# Patient Record
Sex: MALE | Race: WHITE | Employment: UNEMPLOYED | URBAN - METROPOLITAN AREA
[De-identification: names, ages, dates, MRNs, and addresses within clinical notes are randomized per-mention and may not be internally consistent; named-entity substitution may affect disease eponyms.]

---

## 2017-01-03 ENCOUNTER — OFFICE VISIT (OUTPATIENT)
Dept: INTERNAL MEDICINE CLINIC | Age: 57
End: 2017-01-03

## 2017-01-03 VITALS
DIASTOLIC BLOOD PRESSURE: 84 MMHG | SYSTOLIC BLOOD PRESSURE: 136 MMHG | WEIGHT: 229 LBS | HEART RATE: 76 BPM | HEIGHT: 67 IN | TEMPERATURE: 97.6 F | BODY MASS INDEX: 35.94 KG/M2

## 2017-01-03 DIAGNOSIS — I73.9 PAD (PERIPHERAL ARTERY DISEASE) (HCC): ICD-10-CM

## 2017-01-03 DIAGNOSIS — E78.00 PURE HYPERCHOLESTEROLEMIA: ICD-10-CM

## 2017-01-03 DIAGNOSIS — E11.8 CONTROLLED TYPE 2 DIABETES MELLITUS WITH COMPLICATION, WITHOUT LONG-TERM CURRENT USE OF INSULIN (HCC): Primary | ICD-10-CM

## 2017-01-03 DIAGNOSIS — J41.0 SIMPLE CHRONIC BRONCHITIS (HCC): ICD-10-CM

## 2017-01-03 LAB
A/G RATIO: 1 (ref 1.1–2.2)
ALBUMIN SERPL-MCNC: 4 G/DL (ref 3.4–5)
ALP BLD-CCNC: 79 U/L (ref 40–129)
ALT SERPL-CCNC: 24 U/L (ref 10–40)
ANION GAP SERPL CALCULATED.3IONS-SCNC: 14 MMOL/L (ref 3–16)
AST SERPL-CCNC: 24 U/L (ref 15–37)
BILIRUB SERPL-MCNC: 0.3 MG/DL (ref 0–1)
BUN BLDV-MCNC: 15 MG/DL (ref 7–20)
CALCIUM SERPL-MCNC: 9.6 MG/DL (ref 8.3–10.6)
CHLORIDE BLD-SCNC: 96 MMOL/L (ref 99–110)
CHOLESTEROL, TOTAL: 193 MG/DL (ref 0–199)
CO2: 31 MMOL/L (ref 21–32)
CREAT SERPL-MCNC: 0.9 MG/DL (ref 0.9–1.3)
GFR AFRICAN AMERICAN: >60
GFR NON-AFRICAN AMERICAN: >60
GLOBULIN: 3.9 G/DL
GLUCOSE BLD-MCNC: 147 MG/DL (ref 70–99)
HCT VFR BLD CALC: 37.4 % (ref 40.5–52.5)
HDLC SERPL-MCNC: 46 MG/DL (ref 40–60)
HEMOGLOBIN: 12 G/DL (ref 13.5–17.5)
LDL CHOLESTEROL CALCULATED: 127 MG/DL
MCH RBC QN AUTO: 25.6 PG (ref 26–34)
MCHC RBC AUTO-ENTMCNC: 32.2 G/DL (ref 31–36)
MCV RBC AUTO: 79.7 FL (ref 80–100)
PDW BLD-RTO: 16.2 % (ref 12.4–15.4)
PLATELET # BLD: 234 K/UL (ref 135–450)
PMV BLD AUTO: 8.9 FL (ref 5–10.5)
POTASSIUM SERPL-SCNC: 5.3 MMOL/L (ref 3.5–5.1)
RBC # BLD: 4.69 M/UL (ref 4.2–5.9)
SODIUM BLD-SCNC: 141 MMOL/L (ref 136–145)
TOTAL PROTEIN: 7.9 G/DL (ref 6.4–8.2)
TRIGL SERPL-MCNC: 98 MG/DL (ref 0–150)
VLDLC SERPL CALC-MCNC: 20 MG/DL
WBC # BLD: 7.6 K/UL (ref 4–11)

## 2017-01-03 PROCEDURE — 36415 COLL VENOUS BLD VENIPUNCTURE: CPT | Performed by: INTERNAL MEDICINE

## 2017-01-03 PROCEDURE — 99214 OFFICE O/P EST MOD 30 MIN: CPT | Performed by: INTERNAL MEDICINE

## 2017-01-03 ASSESSMENT — ENCOUNTER SYMPTOMS
GASTROINTESTINAL NEGATIVE: 1
SHORTNESS OF BREATH: 1
WHEEZING: 1
VISUAL CHANGE: 0

## 2017-01-04 LAB
ESTIMATED AVERAGE GLUCOSE: 165.7 MG/DL
HBA1C MFR BLD: 7.4 %

## 2017-01-10 DIAGNOSIS — E78.2 MIXED HYPERLIPIDEMIA: ICD-10-CM

## 2017-01-10 RX ORDER — ATORVASTATIN CALCIUM 10 MG/1
TABLET, FILM COATED ORAL
Qty: 90 TABLET | Refills: 0 | Status: ON HOLD | OUTPATIENT
Start: 2017-01-10 | End: 2017-06-15 | Stop reason: HOSPADM

## 2017-01-30 ENCOUNTER — CARE COORDINATION (OUTPATIENT)
Dept: CARE COORDINATION | Age: 57
End: 2017-01-30

## 2017-02-06 ENCOUNTER — OFFICE VISIT (OUTPATIENT)
Dept: CARDIOLOGY CLINIC | Age: 57
End: 2017-02-06

## 2017-02-06 VITALS
SYSTOLIC BLOOD PRESSURE: 132 MMHG | HEART RATE: 142 BPM | BODY MASS INDEX: 37.35 KG/M2 | HEIGHT: 67 IN | DIASTOLIC BLOOD PRESSURE: 86 MMHG | OXYGEN SATURATION: 97 % | WEIGHT: 238 LBS

## 2017-02-06 DIAGNOSIS — I50.22 CHRONIC SYSTOLIC CONGESTIVE HEART FAILURE (HCC): ICD-10-CM

## 2017-02-06 DIAGNOSIS — I42.9 CARDIOMYOPATHY (HCC): ICD-10-CM

## 2017-02-06 DIAGNOSIS — I73.9 PVD (PERIPHERAL VASCULAR DISEASE) (HCC): ICD-10-CM

## 2017-02-06 DIAGNOSIS — I48.91 ATRIAL FIBRILLATION WITH RVR (HCC): ICD-10-CM

## 2017-02-06 DIAGNOSIS — J44.1 COPD EXACERBATION (HCC): ICD-10-CM

## 2017-02-06 DIAGNOSIS — R06.02 SOB (SHORTNESS OF BREATH): Primary | ICD-10-CM

## 2017-02-06 PROCEDURE — 99214 OFFICE O/P EST MOD 30 MIN: CPT | Performed by: NURSE PRACTITIONER

## 2017-02-06 PROCEDURE — 93000 ELECTROCARDIOGRAM COMPLETE: CPT | Performed by: NURSE PRACTITIONER

## 2017-02-08 ENCOUNTER — TELEPHONE (OUTPATIENT)
Dept: SURGERY | Age: 57
End: 2017-02-08

## 2017-02-09 ENCOUNTER — TELEPHONE (OUTPATIENT)
Dept: SURGERY | Age: 57
End: 2017-02-09

## 2017-02-13 ENCOUNTER — TELEPHONE (OUTPATIENT)
Dept: CARDIOLOGY CLINIC | Age: 57
End: 2017-02-13

## 2017-02-13 ENCOUNTER — CARE COORDINATION (OUTPATIENT)
Dept: CARE COORDINATION | Age: 57
End: 2017-02-13

## 2017-02-17 ENCOUNTER — OFFICE VISIT (OUTPATIENT)
Dept: CARDIOLOGY CLINIC | Age: 57
End: 2017-02-17

## 2017-02-17 VITALS
HEIGHT: 67 IN | SYSTOLIC BLOOD PRESSURE: 110 MMHG | OXYGEN SATURATION: 98 % | DIASTOLIC BLOOD PRESSURE: 64 MMHG | BODY MASS INDEX: 34.84 KG/M2 | HEART RATE: 92 BPM | WEIGHT: 222 LBS

## 2017-02-17 DIAGNOSIS — I48.19 PERSISTENT ATRIAL FIBRILLATION (HCC): ICD-10-CM

## 2017-02-17 DIAGNOSIS — Z72.0 TOBACCO USE: ICD-10-CM

## 2017-02-17 DIAGNOSIS — I42.9 CARDIOMYOPATHY (HCC): ICD-10-CM

## 2017-02-17 DIAGNOSIS — I10 ESSENTIAL HYPERTENSION: ICD-10-CM

## 2017-02-17 DIAGNOSIS — I73.9 PVD (PERIPHERAL VASCULAR DISEASE) (HCC): ICD-10-CM

## 2017-02-17 DIAGNOSIS — I50.23 ACUTE ON CHRONIC SYSTOLIC HF (HEART FAILURE) (HCC): Primary | ICD-10-CM

## 2017-02-17 DIAGNOSIS — J44.9 CHRONIC OBSTRUCTIVE PULMONARY DISEASE, UNSPECIFIED COPD TYPE (HCC): ICD-10-CM

## 2017-02-17 PROCEDURE — 93000 ELECTROCARDIOGRAM COMPLETE: CPT | Performed by: NURSE PRACTITIONER

## 2017-02-17 PROCEDURE — 99214 OFFICE O/P EST MOD 30 MIN: CPT | Performed by: NURSE PRACTITIONER

## 2017-02-17 RX ORDER — NICOTINE 21 MG/24HR
1 PATCH, TRANSDERMAL 24 HOURS TRANSDERMAL EVERY 24 HOURS
COMMUNITY
End: 2017-05-18

## 2017-02-17 RX ORDER — FUROSEMIDE 40 MG/1
TABLET ORAL
Qty: 50 TABLET | Refills: 3 | Status: SHIPPED | OUTPATIENT
Start: 2017-02-17 | End: 2017-02-24 | Stop reason: DRUGHIGH

## 2017-02-20 RX ORDER — RANITIDINE 150 MG/1
TABLET ORAL
Qty: 180 TABLET | Refills: 1 | Status: ON HOLD | OUTPATIENT
Start: 2017-02-20 | End: 2017-06-15 | Stop reason: HOSPADM

## 2017-02-20 RX ORDER — RIVAROXABAN 20 MG/1
TABLET, FILM COATED ORAL
Qty: 30 TABLET | Refills: 6 | Status: SHIPPED | OUTPATIENT
Start: 2017-02-20 | End: 2017-02-24 | Stop reason: SDUPTHER

## 2017-02-24 ENCOUNTER — OFFICE VISIT (OUTPATIENT)
Dept: SURGERY | Age: 57
End: 2017-02-24

## 2017-02-24 ENCOUNTER — OFFICE VISIT (OUTPATIENT)
Dept: CARDIOLOGY CLINIC | Age: 57
End: 2017-02-24

## 2017-02-24 ENCOUNTER — PROCEDURE VISIT (OUTPATIENT)
Dept: SURGERY | Age: 57
End: 2017-02-24

## 2017-02-24 VITALS
HEART RATE: 104 BPM | DIASTOLIC BLOOD PRESSURE: 74 MMHG | SYSTOLIC BLOOD PRESSURE: 114 MMHG | BODY MASS INDEX: 35.16 KG/M2 | HEIGHT: 67 IN | WEIGHT: 224 LBS | OXYGEN SATURATION: 97 %

## 2017-02-24 VITALS
SYSTOLIC BLOOD PRESSURE: 128 MMHG | HEIGHT: 67 IN | HEART RATE: 63 BPM | DIASTOLIC BLOOD PRESSURE: 82 MMHG | BODY MASS INDEX: 35.63 KG/M2 | WEIGHT: 227 LBS

## 2017-02-24 DIAGNOSIS — E11.8 TYPE 2 DIABETES MELLITUS WITH COMPLICATION, UNSPECIFIED LONG TERM INSULIN USE STATUS: ICD-10-CM

## 2017-02-24 DIAGNOSIS — E78.00 PURE HYPERCHOLESTEROLEMIA: ICD-10-CM

## 2017-02-24 DIAGNOSIS — I10 ESSENTIAL HYPERTENSION: ICD-10-CM

## 2017-02-24 DIAGNOSIS — I42.9 CARDIOMYOPATHY (HCC): ICD-10-CM

## 2017-02-24 DIAGNOSIS — I73.9 PVD (PERIPHERAL VASCULAR DISEASE) (HCC): ICD-10-CM

## 2017-02-24 DIAGNOSIS — I48.19 PERSISTENT ATRIAL FIBRILLATION (HCC): Primary | ICD-10-CM

## 2017-02-24 DIAGNOSIS — I50.43 ACUTE ON CHRONIC COMBINED SYSTOLIC AND DIASTOLIC HF (HEART FAILURE) (HCC): ICD-10-CM

## 2017-02-24 DIAGNOSIS — I70.212 ATHEROSCLEROSIS OF NATIVE ARTERY OF LEFT LOWER EXTREMITY WITH INTERMITTENT CLAUDICATION (HCC): Primary | ICD-10-CM

## 2017-02-24 DIAGNOSIS — Z72.0 TOBACCO USE: ICD-10-CM

## 2017-02-24 PROCEDURE — 93000 ELECTROCARDIOGRAM COMPLETE: CPT | Performed by: NURSE PRACTITIONER

## 2017-02-24 PROCEDURE — 99214 OFFICE O/P EST MOD 30 MIN: CPT | Performed by: SURGERY

## 2017-02-24 PROCEDURE — 93925 LOWER EXTREMITY STUDY: CPT | Performed by: SURGERY

## 2017-02-24 PROCEDURE — 99213 OFFICE O/P EST LOW 20 MIN: CPT | Performed by: NURSE PRACTITIONER

## 2017-02-24 RX ORDER — FUROSEMIDE 40 MG/1
TABLET ORAL
Qty: 60 TABLET | Refills: 3 | Status: ON HOLD
Start: 2017-02-24 | End: 2017-06-15 | Stop reason: HOSPADM

## 2017-02-24 RX ORDER — BUDESONIDE AND FORMOTEROL FUMARATE DIHYDRATE 160; 4.5 UG/1; UG/1
AEROSOL RESPIRATORY (INHALATION)
Qty: 10.2 G | Refills: 5 | Status: ON HOLD | OUTPATIENT
Start: 2017-02-24 | End: 2017-06-02 | Stop reason: HOSPADM

## 2017-02-24 ASSESSMENT — ENCOUNTER SYMPTOMS
BACK PAIN: 1
RESPIRATORY NEGATIVE: 1
EYES NEGATIVE: 1

## 2017-03-24 ENCOUNTER — OFFICE VISIT (OUTPATIENT)
Dept: CARDIOLOGY CLINIC | Age: 57
End: 2017-03-24

## 2017-03-24 VITALS
DIASTOLIC BLOOD PRESSURE: 62 MMHG | HEART RATE: 92 BPM | HEIGHT: 67 IN | WEIGHT: 215 LBS | BODY MASS INDEX: 33.74 KG/M2 | SYSTOLIC BLOOD PRESSURE: 94 MMHG | OXYGEN SATURATION: 96 %

## 2017-03-24 DIAGNOSIS — I48.19 PERSISTENT ATRIAL FIBRILLATION (HCC): Primary | ICD-10-CM

## 2017-03-24 DIAGNOSIS — I73.9 PVD (PERIPHERAL VASCULAR DISEASE) (HCC): ICD-10-CM

## 2017-03-24 DIAGNOSIS — I42.9 CARDIOMYOPATHY (HCC): ICD-10-CM

## 2017-03-24 DIAGNOSIS — Z72.0 TOBACCO USE: ICD-10-CM

## 2017-03-24 DIAGNOSIS — I48.19 PERSISTENT ATRIAL FIBRILLATION (HCC): ICD-10-CM

## 2017-03-24 DIAGNOSIS — I10 ESSENTIAL HYPERTENSION: ICD-10-CM

## 2017-03-24 DIAGNOSIS — I50.42 CHRONIC COMBINED SYSTOLIC AND DIASTOLIC HF (HEART FAILURE) (HCC): ICD-10-CM

## 2017-03-24 LAB — TSH REFLEX: 2.53 UIU/ML (ref 0.27–4.2)

## 2017-03-24 PROCEDURE — 93000 ELECTROCARDIOGRAM COMPLETE: CPT | Performed by: NURSE PRACTITIONER

## 2017-03-24 PROCEDURE — 99213 OFFICE O/P EST LOW 20 MIN: CPT | Performed by: NURSE PRACTITIONER

## 2017-04-27 ENCOUNTER — OFFICE VISIT (OUTPATIENT)
Dept: INTERNAL MEDICINE CLINIC | Age: 57
End: 2017-04-27

## 2017-04-27 VITALS
HEART RATE: 105 BPM | BODY MASS INDEX: 34.61 KG/M2 | WEIGHT: 221 LBS | TEMPERATURE: 97.7 F | OXYGEN SATURATION: 95 % | DIASTOLIC BLOOD PRESSURE: 70 MMHG | SYSTOLIC BLOOD PRESSURE: 124 MMHG

## 2017-04-27 DIAGNOSIS — M79.672 BILATERAL LEG AND FOOT PAIN: Primary | ICD-10-CM

## 2017-04-27 DIAGNOSIS — J42 CHRONIC BRONCHITIS, UNSPECIFIED CHRONIC BRONCHITIS TYPE (HCC): ICD-10-CM

## 2017-04-27 DIAGNOSIS — M79.604 BILATERAL LEG AND FOOT PAIN: Primary | ICD-10-CM

## 2017-04-27 DIAGNOSIS — M79.605 BILATERAL LEG AND FOOT PAIN: Primary | ICD-10-CM

## 2017-04-27 DIAGNOSIS — I73.9 PAD (PERIPHERAL ARTERY DISEASE) (HCC): ICD-10-CM

## 2017-04-27 DIAGNOSIS — M79.671 BILATERAL LEG AND FOOT PAIN: Primary | ICD-10-CM

## 2017-04-27 DIAGNOSIS — H46.9: ICD-10-CM

## 2017-04-27 PROCEDURE — 99214 OFFICE O/P EST MOD 30 MIN: CPT | Performed by: INTERNAL MEDICINE

## 2017-04-27 RX ORDER — ALBUTEROL SULFATE 90 UG/1
2 AEROSOL, METERED RESPIRATORY (INHALATION) EVERY 6 HOURS PRN
Qty: 8.5 G | Refills: 5 | Status: SHIPPED | OUTPATIENT
Start: 2017-04-27 | End: 2017-09-22

## 2017-04-27 RX ORDER — GABAPENTIN 100 MG/1
100 CAPSULE ORAL 3 TIMES DAILY
Qty: 90 CAPSULE | Refills: 3 | Status: ON HOLD | OUTPATIENT
Start: 2017-04-27 | End: 2017-06-15 | Stop reason: HOSPADM

## 2017-04-27 ASSESSMENT — PATIENT HEALTH QUESTIONNAIRE - PHQ9
1. LITTLE INTEREST OR PLEASURE IN DOING THINGS: 0
SUM OF ALL RESPONSES TO PHQ9 QUESTIONS 1 & 2: 0
SUM OF ALL RESPONSES TO PHQ QUESTIONS 1-9: 0
2. FEELING DOWN, DEPRESSED OR HOPELESS: 0

## 2017-04-27 ASSESSMENT — ENCOUNTER SYMPTOMS
COUGH: 1
SHORTNESS OF BREATH: 1
GASTROINTESTINAL NEGATIVE: 1
BACK PAIN: 1

## 2017-05-08 ENCOUNTER — PROCEDURE VISIT (OUTPATIENT)
Dept: SURGERY | Age: 57
End: 2017-05-08

## 2017-05-08 DIAGNOSIS — I70.322 ATHEROSCLEROSIS OF BYPASS GRAFT OF LEFT LOWER EXTREMITY WITH REST PAIN (HCC): Primary | ICD-10-CM

## 2017-05-08 PROCEDURE — 93926 LOWER EXTREMITY STUDY: CPT | Performed by: SURGERY

## 2017-05-09 DIAGNOSIS — I70.212 ATHEROSCLEROSIS OF NATIVE ARTERY OF LEFT LOWER EXTREMITY WITH INTERMITTENT CLAUDICATION (HCC): Primary | ICD-10-CM

## 2017-05-09 RX ORDER — HYDROCODONE BITARTRATE AND ACETAMINOPHEN 5; 325 MG/1; MG/1
1 TABLET ORAL EVERY 6 HOURS PRN
Qty: 30 TABLET | Refills: 0 | Status: SHIPPED | OUTPATIENT
Start: 2017-05-09 | End: 2017-05-16

## 2017-05-12 ENCOUNTER — OFFICE VISIT (OUTPATIENT)
Dept: SURGERY | Age: 57
End: 2017-05-12

## 2017-05-12 VITALS
HEART RATE: 120 BPM | DIASTOLIC BLOOD PRESSURE: 83 MMHG | BODY MASS INDEX: 34.61 KG/M2 | WEIGHT: 221 LBS | SYSTOLIC BLOOD PRESSURE: 116 MMHG

## 2017-05-12 DIAGNOSIS — I48.91 ATRIAL FIBRILLATION WITH RAPID VENTRICULAR RESPONSE (HCC): ICD-10-CM

## 2017-05-12 DIAGNOSIS — E11.8 TYPE 2 DIABETES MELLITUS WITH COMPLICATION, UNSPECIFIED LONG TERM INSULIN USE STATUS: ICD-10-CM

## 2017-05-12 DIAGNOSIS — I73.9 PAD (PERIPHERAL ARTERY DISEASE) (HCC): ICD-10-CM

## 2017-05-12 DIAGNOSIS — I70.323: Primary | ICD-10-CM

## 2017-05-12 DIAGNOSIS — I50.21 ACUTE SYSTOLIC HF (HEART FAILURE) (HCC): ICD-10-CM

## 2017-05-12 DIAGNOSIS — I99.8 ISCHEMIC FOOT: ICD-10-CM

## 2017-05-12 DIAGNOSIS — E11.51 DIABETES MELLITUS WITH PERIPHERAL CIRCULATORY DISORDER (HCC): ICD-10-CM

## 2017-05-12 PROCEDURE — 99214 OFFICE O/P EST MOD 30 MIN: CPT | Performed by: SURGERY

## 2017-05-12 RX ORDER — HYDROCODONE BITARTRATE AND ACETAMINOPHEN 5; 325 MG/1; MG/1
1 TABLET ORAL EVERY 6 HOURS PRN
Qty: 40 TABLET | Refills: 0 | Status: ON HOLD | OUTPATIENT
Start: 2017-05-12 | End: 2017-06-02 | Stop reason: HOSPADM

## 2017-05-12 ASSESSMENT — ENCOUNTER SYMPTOMS
GASTROINTESTINAL NEGATIVE: 1
ALLERGIC/IMMUNOLOGIC NEGATIVE: 1
RESPIRATORY NEGATIVE: 1

## 2017-05-16 ENCOUNTER — HOSPITAL ENCOUNTER (OUTPATIENT)
Dept: VASCULAR LAB | Age: 57
Discharge: HOME | End: 2017-05-16
Attending: SURGERY | Admitting: SURGERY

## 2017-05-16 ENCOUNTER — TELEPHONE (OUTPATIENT)
Dept: CARDIOLOGY CLINIC | Age: 57
End: 2017-05-16

## 2017-05-16 DIAGNOSIS — I70.323: ICD-10-CM

## 2017-05-18 ENCOUNTER — OFFICE VISIT (OUTPATIENT)
Dept: CARDIOLOGY CLINIC | Age: 57
End: 2017-05-18

## 2017-05-18 ENCOUNTER — HOSPITAL ENCOUNTER (OUTPATIENT)
Dept: OTHER | Age: 57
Discharge: HOME | End: 2017-05-18

## 2017-05-18 VITALS
HEIGHT: 70 IN | OXYGEN SATURATION: 97 % | SYSTOLIC BLOOD PRESSURE: 94 MMHG | BODY MASS INDEX: 31.92 KG/M2 | DIASTOLIC BLOOD PRESSURE: 54 MMHG | WEIGHT: 223 LBS | HEART RATE: 96 BPM

## 2017-05-18 DIAGNOSIS — I42.9 CARDIOMYOPATHY (HCC): ICD-10-CM

## 2017-05-18 DIAGNOSIS — I48.0 PAF (PAROXYSMAL ATRIAL FIBRILLATION) (HCC): Primary | ICD-10-CM

## 2017-05-18 DIAGNOSIS — I10 ESSENTIAL HYPERTENSION: ICD-10-CM

## 2017-05-18 DIAGNOSIS — Z72.0 TOBACCO ABUSE: ICD-10-CM

## 2017-05-18 DIAGNOSIS — I50.42 CHRONIC COMBINED SYSTOLIC AND DIASTOLIC HEART FAILURE (HCC): ICD-10-CM

## 2017-05-18 DIAGNOSIS — I73.9 PERIPHERAL VASCULAR DISEASE (HCC): ICD-10-CM

## 2017-05-18 PROCEDURE — 93000 ELECTROCARDIOGRAM COMPLETE: CPT | Performed by: INTERNAL MEDICINE

## 2017-05-18 PROCEDURE — 99999 PR OFFICE/OUTPT VISIT,PROCEDURE ONLY: CPT | Performed by: INTERNAL MEDICINE

## 2017-05-23 ENCOUNTER — TELEPHONE (OUTPATIENT)
Dept: SURGERY | Age: 57
End: 2017-05-23

## 2017-06-19 ENCOUNTER — TELEPHONE (OUTPATIENT)
Dept: SURGERY | Age: 57
End: 2017-06-19

## 2017-06-22 ENCOUNTER — OFFICE VISIT (OUTPATIENT)
Dept: CARDIOLOGY CLINIC | Age: 57
End: 2017-06-22

## 2017-06-22 VITALS
HEART RATE: 104 BPM | HEIGHT: 66 IN | SYSTOLIC BLOOD PRESSURE: 100 MMHG | OXYGEN SATURATION: 96 % | DIASTOLIC BLOOD PRESSURE: 62 MMHG

## 2017-06-22 DIAGNOSIS — I48.20 CHRONIC ATRIAL FIBRILLATION (HCC): ICD-10-CM

## 2017-06-22 DIAGNOSIS — J44.9 CHRONIC OBSTRUCTIVE PULMONARY DISEASE, UNSPECIFIED COPD TYPE (HCC): ICD-10-CM

## 2017-06-22 DIAGNOSIS — I50.42 CHRONIC COMBINED SYSTOLIC AND DIASTOLIC HEART FAILURE (HCC): Primary | ICD-10-CM

## 2017-06-22 DIAGNOSIS — I73.9 PAD (PERIPHERAL ARTERY DISEASE) (HCC): ICD-10-CM

## 2017-06-22 DIAGNOSIS — D64.9 ANEMIA, UNSPECIFIED TYPE: ICD-10-CM

## 2017-06-22 PROCEDURE — 99214 OFFICE O/P EST MOD 30 MIN: CPT | Performed by: NURSE PRACTITIONER

## 2017-06-22 PROCEDURE — 93000 ELECTROCARDIOGRAM COMPLETE: CPT | Performed by: NURSE PRACTITIONER

## 2017-06-23 ENCOUNTER — OFFICE VISIT (OUTPATIENT)
Dept: INTERNAL MEDICINE CLINIC | Age: 57
End: 2017-06-23

## 2017-06-23 VITALS
DIASTOLIC BLOOD PRESSURE: 80 MMHG | HEART RATE: 90 BPM | OXYGEN SATURATION: 96 % | SYSTOLIC BLOOD PRESSURE: 122 MMHG | TEMPERATURE: 98 F

## 2017-06-23 DIAGNOSIS — S78.112A ABOVE KNEE AMPUTATION OF LEFT LOWER EXTREMITY (HCC): ICD-10-CM

## 2017-06-23 DIAGNOSIS — J43.2 CENTRILOBULAR EMPHYSEMA (HCC): ICD-10-CM

## 2017-06-23 DIAGNOSIS — E10.8 DIABETES MELLITUS TYPE 1 WITH COMPLICATIONS (HCC): ICD-10-CM

## 2017-06-23 DIAGNOSIS — D64.89 ANEMIA DUE TO OTHER CAUSE: Primary | ICD-10-CM

## 2017-06-23 LAB
ANION GAP SERPL CALCULATED.3IONS-SCNC: 15 MMOL/L (ref 3–16)
BUN BLDV-MCNC: 20 MG/DL (ref 7–20)
CALCIUM SERPL-MCNC: 9.9 MG/DL (ref 8.3–10.6)
CHLORIDE BLD-SCNC: 96 MMOL/L (ref 99–110)
CO2: 29 MMOL/L (ref 21–32)
CREAT SERPL-MCNC: 0.9 MG/DL (ref 0.9–1.3)
GFR AFRICAN AMERICAN: >60
GFR NON-AFRICAN AMERICAN: >60
GLUCOSE BLD-MCNC: 118 MG/DL (ref 70–99)
HCT VFR BLD CALC: 35.8 % (ref 40.5–52.5)
HEMOGLOBIN: 11 G/DL (ref 13.5–17.5)
MCH RBC QN AUTO: 24 PG (ref 26–34)
MCHC RBC AUTO-ENTMCNC: 30.7 G/DL (ref 31–36)
MCV RBC AUTO: 78.3 FL (ref 80–100)
PDW BLD-RTO: 18.6 % (ref 12.4–15.4)
PLATELET # BLD: 311 K/UL (ref 135–450)
PMV BLD AUTO: 9.4 FL (ref 5–10.5)
POTASSIUM SERPL-SCNC: 5.3 MMOL/L (ref 3.5–5.1)
RBC # BLD: 4.58 M/UL (ref 4.2–5.9)
SODIUM BLD-SCNC: 140 MMOL/L (ref 136–145)
WBC # BLD: 8.8 K/UL (ref 4–11)

## 2017-06-23 PROCEDURE — 36415 COLL VENOUS BLD VENIPUNCTURE: CPT | Performed by: INTERNAL MEDICINE

## 2017-06-23 PROCEDURE — 99214 OFFICE O/P EST MOD 30 MIN: CPT | Performed by: INTERNAL MEDICINE

## 2017-06-23 RX ORDER — DIPHENHYDRAMINE HCL 25 MG
25 TABLET ORAL EVERY 6 HOURS PRN
COMMUNITY
End: 2018-09-26 | Stop reason: ALTCHOICE

## 2017-06-23 RX ORDER — OXYCODONE HYDROCHLORIDE AND ACETAMINOPHEN 5; 325 MG/1; MG/1
1 TABLET ORAL EVERY 4 HOURS PRN
COMMUNITY
End: 2017-11-22 | Stop reason: ALTCHOICE

## 2017-06-23 ASSESSMENT — ENCOUNTER SYMPTOMS
COLOR CHANGE: 0
SINUS PRESSURE: 0
COUGH: 1
SHORTNESS OF BREATH: 1
CHOKING: 0
BACK PAIN: 1
GASTROINTESTINAL NEGATIVE: 1
EYES NEGATIVE: 1
STRIDOR: 0
APNEA: 0

## 2017-06-26 ENCOUNTER — TELEPHONE (OUTPATIENT)
Dept: INTERNAL MEDICINE CLINIC | Age: 57
End: 2017-06-26

## 2017-06-26 ENCOUNTER — TELEPHONE (OUTPATIENT)
Dept: CARDIOLOGY CLINIC | Age: 57
End: 2017-06-26

## 2017-06-26 DIAGNOSIS — E87.5 HYPERPOTASSEMIA: Primary | ICD-10-CM

## 2017-06-27 PROBLEM — E87.5 SERUM POTASSIUM ELEVATED: Status: ACTIVE | Noted: 2017-06-27

## 2017-06-27 RX ORDER — OXYCODONE HYDROCHLORIDE AND ACETAMINOPHEN 5; 325 MG/1; MG/1
1 TABLET ORAL EVERY 4 HOURS PRN
Qty: 80 TABLET | Refills: 0 | Status: SHIPPED | OUTPATIENT
Start: 2017-06-27 | End: 2017-07-04

## 2017-06-29 ENCOUNTER — TELEPHONE (OUTPATIENT)
Dept: INTERNAL MEDICINE CLINIC | Age: 57
End: 2017-06-29

## 2017-06-29 DIAGNOSIS — N40.0 BENIGN NODULAR PROSTATIC HYPERPLASIA WITHOUT LOWER URINARY TRACT SYMPTOMS: Primary | ICD-10-CM

## 2017-06-29 RX ORDER — TAMSULOSIN HYDROCHLORIDE 0.4 MG/1
0.4 CAPSULE ORAL DAILY
Qty: 30 CAPSULE | Refills: 3 | Status: SHIPPED | OUTPATIENT
Start: 2017-06-29 | End: 2017-12-19 | Stop reason: SDUPTHER

## 2017-07-06 ENCOUNTER — TELEPHONE (OUTPATIENT)
Dept: INTERNAL MEDICINE CLINIC | Age: 57
End: 2017-07-06

## 2017-07-07 ENCOUNTER — OFFICE VISIT (OUTPATIENT)
Dept: SURGERY | Age: 57
End: 2017-07-07

## 2017-07-07 VITALS — HEART RATE: 111 BPM | DIASTOLIC BLOOD PRESSURE: 88 MMHG | SYSTOLIC BLOOD PRESSURE: 144 MMHG

## 2017-07-07 DIAGNOSIS — Z89.612 S/P AKA (ABOVE KNEE AMPUTATION) UNILATERAL, LEFT (HCC): Primary | ICD-10-CM

## 2017-07-07 PROCEDURE — 99024 POSTOP FOLLOW-UP VISIT: CPT | Performed by: SURGERY

## 2017-07-07 ASSESSMENT — ENCOUNTER SYMPTOMS
GASTROINTESTINAL NEGATIVE: 1
RESPIRATORY NEGATIVE: 1
ALLERGIC/IMMUNOLOGIC NEGATIVE: 1

## 2017-07-11 ENCOUNTER — TELEPHONE (OUTPATIENT)
Dept: SURGERY | Age: 57
End: 2017-07-11

## 2017-07-11 RX ORDER — OXYCODONE HYDROCHLORIDE AND ACETAMINOPHEN 5; 325 MG/1; MG/1
1 TABLET ORAL EVERY 6 HOURS PRN
Qty: 25 TABLET | Refills: 0 | Status: SHIPPED | OUTPATIENT
Start: 2017-07-11 | End: 2017-07-18

## 2017-07-13 ENCOUNTER — TELEPHONE (OUTPATIENT)
Dept: INTERNAL MEDICINE CLINIC | Age: 57
End: 2017-07-13

## 2017-07-21 ENCOUNTER — OFFICE VISIT (OUTPATIENT)
Dept: CARDIOLOGY CLINIC | Age: 57
End: 2017-07-21

## 2017-07-21 VITALS
OXYGEN SATURATION: 98 % | SYSTOLIC BLOOD PRESSURE: 138 MMHG | HEIGHT: 66 IN | DIASTOLIC BLOOD PRESSURE: 72 MMHG | HEART RATE: 96 BPM

## 2017-07-21 DIAGNOSIS — I73.9 PAD (PERIPHERAL ARTERY DISEASE) (HCC): ICD-10-CM

## 2017-07-21 DIAGNOSIS — I50.42 CHRONIC COMBINED SYSTOLIC AND DIASTOLIC HEART FAILURE (HCC): ICD-10-CM

## 2017-07-21 DIAGNOSIS — J44.9 CHRONIC OBSTRUCTIVE PULMONARY DISEASE, UNSPECIFIED COPD TYPE (HCC): ICD-10-CM

## 2017-07-21 DIAGNOSIS — D64.9 ANEMIA, UNSPECIFIED TYPE: ICD-10-CM

## 2017-07-21 DIAGNOSIS — I48.20 CHRONIC ATRIAL FIBRILLATION (HCC): Primary | ICD-10-CM

## 2017-07-21 PROCEDURE — 99213 OFFICE O/P EST LOW 20 MIN: CPT | Performed by: NURSE PRACTITIONER

## 2017-07-21 PROCEDURE — 93000 ELECTROCARDIOGRAM COMPLETE: CPT | Performed by: NURSE PRACTITIONER

## 2017-07-25 ENCOUNTER — TELEPHONE (OUTPATIENT)
Dept: INTERNAL MEDICINE CLINIC | Age: 57
End: 2017-07-25

## 2017-07-25 ENCOUNTER — NURSE ONLY (OUTPATIENT)
Dept: INTERNAL MEDICINE CLINIC | Age: 57
End: 2017-07-25

## 2017-07-25 DIAGNOSIS — E87.5 HYPERPOTASSEMIA: ICD-10-CM

## 2017-07-25 DIAGNOSIS — M79.605 PAIN OF LEFT LOWER EXTREMITY: Primary | ICD-10-CM

## 2017-07-25 LAB
ANION GAP SERPL CALCULATED.3IONS-SCNC: 17 MMOL/L (ref 3–16)
BUN BLDV-MCNC: 24 MG/DL (ref 7–20)
CALCIUM SERPL-MCNC: 9.8 MG/DL (ref 8.3–10.6)
CHLORIDE BLD-SCNC: 94 MMOL/L (ref 99–110)
CO2: 30 MMOL/L (ref 21–32)
CREAT SERPL-MCNC: 0.9 MG/DL (ref 0.9–1.3)
GFR AFRICAN AMERICAN: >60
GFR NON-AFRICAN AMERICAN: >60
GLUCOSE BLD-MCNC: 150 MG/DL (ref 70–99)
POTASSIUM SERPL-SCNC: 4.5 MMOL/L (ref 3.5–5.1)
SODIUM BLD-SCNC: 141 MMOL/L (ref 136–145)

## 2017-07-25 PROCEDURE — 36415 COLL VENOUS BLD VENIPUNCTURE: CPT | Performed by: INTERNAL MEDICINE

## 2017-07-27 ENCOUNTER — HOSPITAL ENCOUNTER (OUTPATIENT)
Dept: OTHER | Age: 57
Discharge: HOME | End: 2017-07-31
Attending: PHYSICAL MEDICINE & REHABILITATION | Admitting: PHYSICAL MEDICINE & REHABILITATION

## 2017-08-01 ENCOUNTER — HOSPITAL ENCOUNTER (OUTPATIENT)
Dept: PHYSICAL THERAPY | Age: 57
Discharge: HOME | End: 2017-08-02
Admitting: PHYSICAL MEDICINE & REHABILITATION

## 2017-08-03 ENCOUNTER — HOSPITAL ENCOUNTER (OUTPATIENT)
Dept: PHYSICAL THERAPY | Age: 57
Discharge: HOME | End: 2017-08-04
Admitting: PHYSICAL MEDICINE & REHABILITATION

## 2017-08-07 ENCOUNTER — TELEPHONE (OUTPATIENT)
Dept: PAIN MANAGEMENT | Age: 57
End: 2017-08-07

## 2017-08-08 ENCOUNTER — HOSPITAL ENCOUNTER (OUTPATIENT)
Dept: PHYSICAL THERAPY | Age: 57
Discharge: HOME | End: 2017-08-09
Admitting: PHYSICAL MEDICINE & REHABILITATION

## 2017-08-10 ENCOUNTER — HOSPITAL ENCOUNTER (OUTPATIENT)
Dept: PHYSICAL THERAPY | Age: 57
Discharge: HOME | End: 2017-08-11
Admitting: PHYSICAL MEDICINE & REHABILITATION

## 2017-08-17 ENCOUNTER — HOSPITAL ENCOUNTER (OUTPATIENT)
Dept: PHYSICAL THERAPY | Age: 57
Discharge: HOME | End: 2017-08-18
Admitting: PHYSICAL MEDICINE & REHABILITATION

## 2017-08-22 ENCOUNTER — HOSPITAL ENCOUNTER (OUTPATIENT)
Dept: PHYSICAL THERAPY | Age: 57
Discharge: HOME | End: 2017-08-23
Admitting: PHYSICAL MEDICINE & REHABILITATION

## 2017-08-29 ENCOUNTER — HOSPITAL ENCOUNTER (OUTPATIENT)
Dept: PHYSICAL THERAPY | Age: 57
Discharge: HOME | End: 2017-08-30
Admitting: PHYSICAL MEDICINE & REHABILITATION

## 2017-09-05 ENCOUNTER — HOSPITAL ENCOUNTER (OUTPATIENT)
Dept: PHYSICAL THERAPY | Age: 57
Discharge: HOME | End: 2017-09-06
Admitting: PHYSICAL MEDICINE & REHABILITATION

## 2017-09-07 ENCOUNTER — HOSPITAL ENCOUNTER (OUTPATIENT)
Dept: PHYSICAL THERAPY | Age: 57
Discharge: HOME | End: 2017-09-08
Admitting: PHYSICAL MEDICINE & REHABILITATION

## 2017-09-12 ENCOUNTER — HOSPITAL ENCOUNTER (OUTPATIENT)
Dept: PHYSICAL THERAPY | Age: 57
Discharge: HOME | End: 2017-09-13
Admitting: PHYSICAL MEDICINE & REHABILITATION

## 2017-09-14 ENCOUNTER — HOSPITAL ENCOUNTER (OUTPATIENT)
Dept: PHYSICAL THERAPY | Age: 57
Discharge: HOME | End: 2017-09-15
Admitting: PHYSICAL MEDICINE & REHABILITATION

## 2017-09-19 ENCOUNTER — HOSPITAL ENCOUNTER (OUTPATIENT)
Dept: PHYSICAL THERAPY | Age: 57
Discharge: HOME | End: 2017-09-20
Admitting: PHYSICAL MEDICINE & REHABILITATION

## 2017-09-22 ENCOUNTER — OFFICE VISIT (OUTPATIENT)
Dept: INTERNAL MEDICINE CLINIC | Age: 57
End: 2017-09-22

## 2017-09-22 VITALS — DIASTOLIC BLOOD PRESSURE: 80 MMHG | HEART RATE: 64 BPM | TEMPERATURE: 97.9 F | SYSTOLIC BLOOD PRESSURE: 126 MMHG

## 2017-09-22 DIAGNOSIS — E11.8 TYPE 2 DIABETES MELLITUS WITH COMPLICATION, UNSPECIFIED LONG TERM INSULIN USE STATUS: Primary | ICD-10-CM

## 2017-09-22 DIAGNOSIS — E78.2 MIXED HYPERLIPIDEMIA: ICD-10-CM

## 2017-09-22 DIAGNOSIS — I48.0 PAF (PAROXYSMAL ATRIAL FIBRILLATION) (HCC): ICD-10-CM

## 2017-09-22 DIAGNOSIS — J42 CHRONIC BRONCHITIS, UNSPECIFIED CHRONIC BRONCHITIS TYPE (HCC): ICD-10-CM

## 2017-09-22 PROCEDURE — 99214 OFFICE O/P EST MOD 30 MIN: CPT | Performed by: INTERNAL MEDICINE

## 2017-09-22 ASSESSMENT — ENCOUNTER SYMPTOMS
GASTROINTESTINAL NEGATIVE: 1
COUGH: 1
SHORTNESS OF BREATH: 1

## 2017-09-28 ENCOUNTER — HOSPITAL ENCOUNTER (OUTPATIENT)
Dept: PHYSICAL THERAPY | Age: 57
Discharge: HOME | End: 2017-09-29
Admitting: PHYSICAL MEDICINE & REHABILITATION

## 2017-10-03 ENCOUNTER — HOSPITAL ENCOUNTER (OUTPATIENT)
Dept: PHYSICAL THERAPY | Age: 57
Discharge: HOME | End: 2017-10-04
Admitting: PHYSICAL MEDICINE & REHABILITATION

## 2017-10-10 ENCOUNTER — HOSPITAL ENCOUNTER (OUTPATIENT)
Dept: PHYSICAL THERAPY | Age: 57
Discharge: HOME | End: 2017-10-11
Admitting: PHYSICAL MEDICINE & REHABILITATION

## 2017-10-17 ENCOUNTER — HOSPITAL ENCOUNTER (OUTPATIENT)
Dept: PHYSICAL THERAPY | Age: 57
Discharge: HOME | End: 2017-10-18
Admitting: PHYSICAL MEDICINE & REHABILITATION

## 2017-11-01 ENCOUNTER — HOSPITAL ENCOUNTER (OUTPATIENT)
Dept: OTHER | Age: 57
Discharge: HOME | End: 2017-11-30
Attending: PHYSICAL MEDICINE & REHABILITATION | Admitting: PHYSICAL MEDICINE & REHABILITATION

## 2017-11-10 ENCOUNTER — OFFICE VISIT (OUTPATIENT)
Dept: SURGERY | Age: 57
End: 2017-11-10

## 2017-11-10 VITALS
DIASTOLIC BLOOD PRESSURE: 82 MMHG | HEART RATE: 108 BPM | WEIGHT: 225 LBS | BODY MASS INDEX: 35.24 KG/M2 | SYSTOLIC BLOOD PRESSURE: 122 MMHG

## 2017-11-10 DIAGNOSIS — I48.21 PERMANENT ATRIAL FIBRILLATION (HCC): ICD-10-CM

## 2017-11-10 DIAGNOSIS — I10 ESSENTIAL HYPERTENSION: ICD-10-CM

## 2017-11-10 DIAGNOSIS — I99.8 ISCHEMIC FOOT: ICD-10-CM

## 2017-11-10 DIAGNOSIS — I70.25 ATHEROSCLEROSIS OF NATIVE ARTERIES OF THE EXTREMITIES WITH ULCERATION (HCC): ICD-10-CM

## 2017-11-10 DIAGNOSIS — I50.43 HEART FAILURE, ACUTE ON CHRONIC, SYSTOLIC AND DIASTOLIC (HCC): ICD-10-CM

## 2017-11-10 DIAGNOSIS — I73.9 PAD (PERIPHERAL ARTERY DISEASE) (HCC): Primary | ICD-10-CM

## 2017-11-10 PROCEDURE — 99214 OFFICE O/P EST MOD 30 MIN: CPT | Performed by: SURGERY

## 2017-11-10 PROCEDURE — G8598 ASA/ANTIPLAT THER USED: HCPCS | Performed by: SURGERY

## 2017-11-10 PROCEDURE — G8427 DOCREV CUR MEDS BY ELIG CLIN: HCPCS | Performed by: SURGERY

## 2017-11-10 PROCEDURE — 4004F PT TOBACCO SCREEN RCVD TLK: CPT | Performed by: SURGERY

## 2017-11-10 PROCEDURE — G8417 CALC BMI ABV UP PARAM F/U: HCPCS | Performed by: SURGERY

## 2017-11-10 PROCEDURE — G8484 FLU IMMUNIZE NO ADMIN: HCPCS | Performed by: SURGERY

## 2017-11-10 PROCEDURE — 3017F COLORECTAL CA SCREEN DOC REV: CPT | Performed by: SURGERY

## 2017-11-10 ASSESSMENT — ENCOUNTER SYMPTOMS
SHORTNESS OF BREATH: 1
COLOR CHANGE: 1

## 2017-11-10 NOTE — PROGRESS NOTES
wheezes. He has no rales. He exhibits no tenderness. Abdominal: Soft. Bowel sounds are normal. He exhibits no distension, no abdominal bruit and no mass. There is no hepatosplenomegaly. There is no tenderness. There is no rebound, no guarding and no CVA tenderness. No hernia. Hernia confirmed negative in the ventral area, confirmed negative in the right inguinal area and confirmed negative in the left inguinal area. Genitourinary:   Genitourinary Comments: RECTAL EXAM  &  Guaiac NOT INDICATED   Musculoskeletal: Normal range of motion. He exhibits no edema or tenderness. Arms:       Legs:  +1 pitting edema of the lower back   Lymphadenopathy:     He has no cervical adenopathy. Right cervical: No superficial cervical, no deep cervical and no posterior cervical adenopathy present. Left cervical: No superficial cervical, no deep cervical and no posterior cervical adenopathy present. Right: No inguinal and no supraclavicular adenopathy present. Left: No inguinal and no supraclavicular adenopathy present. Neurological: He is alert and oriented to person, place, and time. He displays no atrophy and no tremor. No cranial nerve deficit or sensory deficit. He exhibits normal muscle tone. Coordination and gait normal.   Skin: Skin is warm and dry. Psychiatric: He has a normal mood and affect. His behavior is normal. Judgment and thought content normal.   Nursing note and vitals reviewed. Assessment:      1. PAD (peripheral artery disease) ([address]     2. Atherosclerosis of native arteries of the extremities with ulceration ([address]     3. Ischemic foot     4. Essential hypertension     5. Heart failure, acute on chronic, systolic and diastolic (HCC)     6. Permanent atrial fibrillation (HCC)             Plan:    SOB on exertion  Continue with medication for the heart to help with shortness of breath. Circulation to the right leg is adequate.   Return to office in 4 months.

## 2017-11-13 PROBLEM — E87.6 HYPOKALEMIA: Status: ACTIVE | Noted: 2017-11-13

## 2017-11-13 PROBLEM — J96.01 ACUTE RESPIRATORY FAILURE WITH HYPOXEMIA (HCC): Status: ACTIVE | Noted: 2017-11-13

## 2017-11-13 PROBLEM — E11.9 DIABETES MELLITUS TYPE 2, CONTROLLED (HCC): Status: ACTIVE | Noted: 2017-11-13

## 2017-11-13 PROBLEM — K59.00 CONSTIPATION: Status: ACTIVE | Noted: 2017-11-13

## 2017-11-13 PROBLEM — I50.23 ACUTE ON CHRONIC SYSTOLIC CHF (CONGESTIVE HEART FAILURE) (HCC): Status: ACTIVE | Noted: 2017-11-13

## 2017-11-22 ENCOUNTER — OFFICE VISIT (OUTPATIENT)
Dept: CARDIOLOGY CLINIC | Age: 57
End: 2017-11-22

## 2017-11-22 VITALS
SYSTOLIC BLOOD PRESSURE: 100 MMHG | DIASTOLIC BLOOD PRESSURE: 62 MMHG | BODY MASS INDEX: 36.73 KG/M2 | WEIGHT: 234 LBS | HEART RATE: 110 BPM | OXYGEN SATURATION: 96 % | HEIGHT: 67 IN

## 2017-11-22 DIAGNOSIS — J44.9 CHRONIC OBSTRUCTIVE PULMONARY DISEASE, UNSPECIFIED COPD TYPE (HCC): ICD-10-CM

## 2017-11-22 DIAGNOSIS — I50.42 CHRONIC COMBINED SYSTOLIC AND DIASTOLIC HEART FAILURE (HCC): ICD-10-CM

## 2017-11-22 DIAGNOSIS — Z72.0 TOBACCO USE: ICD-10-CM

## 2017-11-22 DIAGNOSIS — I73.9 PAD (PERIPHERAL ARTERY DISEASE) (HCC): ICD-10-CM

## 2017-11-22 DIAGNOSIS — D64.9 ANEMIA, UNSPECIFIED TYPE: ICD-10-CM

## 2017-11-22 DIAGNOSIS — I48.20 CHRONIC ATRIAL FIBRILLATION (HCC): Primary | ICD-10-CM

## 2017-11-22 PROCEDURE — 99214 OFFICE O/P EST MOD 30 MIN: CPT | Performed by: NURSE PRACTITIONER

## 2017-11-22 PROCEDURE — G8417 CALC BMI ABV UP PARAM F/U: HCPCS | Performed by: NURSE PRACTITIONER

## 2017-11-22 PROCEDURE — G8484 FLU IMMUNIZE NO ADMIN: HCPCS | Performed by: NURSE PRACTITIONER

## 2017-11-22 PROCEDURE — 1111F DSCHRG MED/CURRENT MED MERGE: CPT | Performed by: NURSE PRACTITIONER

## 2017-11-22 PROCEDURE — 3023F SPIROM DOC REV: CPT | Performed by: NURSE PRACTITIONER

## 2017-11-22 PROCEDURE — G8427 DOCREV CUR MEDS BY ELIG CLIN: HCPCS | Performed by: NURSE PRACTITIONER

## 2017-11-22 PROCEDURE — G8598 ASA/ANTIPLAT THER USED: HCPCS | Performed by: NURSE PRACTITIONER

## 2017-11-22 PROCEDURE — 93000 ELECTROCARDIOGRAM COMPLETE: CPT | Performed by: NURSE PRACTITIONER

## 2017-11-22 PROCEDURE — 4004F PT TOBACCO SCREEN RCVD TLK: CPT | Performed by: NURSE PRACTITIONER

## 2017-11-22 PROCEDURE — G8926 SPIRO NO PERF OR DOC: HCPCS | Performed by: NURSE PRACTITIONER

## 2017-11-22 PROCEDURE — 3017F COLORECTAL CA SCREEN DOC REV: CPT | Performed by: NURSE PRACTITIONER

## 2017-11-22 NOTE — LETTER
[facility] HEART [place] [address] [address] [facility]  Phone: [phone]  Fax: [phone]    Katelynn Hernandez CNP        November 22, 2017     Kina Jurado MD  [address] [place] [address]    Patient: Rigoberto Shaikh  MR Number: [id number]  YOB: 1960  Date of Visit: 11/22/2017    Dear Dr. Kina Jurado: Thank you for the request for consultation for Huber Spaulding. HPI:  The patient is 62 y.o. male with a past medical history significant for COPD, HTN, PAD with prior stents, HLP, NICM , A-fib and SHF here for hospital follow up. Hospitalized 11/11-11/16/2017 with CHF and atrial fib. He was diuresed and sent home with med adjustments. Has had several admits for CHF in the past (last in 5/2017). Last seen in office in 7/2016. Overall feels terrible. C/o fatigue, lack of ambition, cough productive clear phlegm and edema in R foot. Has noted some palpitations. Using nebulizer. Sleeps in recliner at home and using O2 at night. Gets dizzy with prolonged coughing episodes. Denies anginal chest pain,  syncope, weight change or claudication. Continues to smoke. Monitoring sodium and fluid intake closely    Assessment:    1. Chronic Atrial fib  -ECG A-fib today;  (on auscultation 102)  -on long term anticoagulation (Xarelto)  -on Amiodarone and Toprol for rate control  -CHADS Vasc score > 3    2. Chronic combined systolic and diastolic heart failure (HCC)  -last LVEF 40%  -NYHA class III  -continue Lasix, metolazone, BB and losartan  -not on spironolactone (worsening Cr and hyperkalemia in the past)    3. PAD (peripheral artery disease) ([facility])  -S/P L AKA  -continue statin    4. Chronic obstructive pulmonary disease, unspecified COPD type ([facility]  -on home O2 as needed  -continues to smoke  -now on nebulizer    5. Anemia, unspecified type  -last Hgb stable    6. Tobacco use  -smoking cessation emphasized    7.  Essential HTN

## 2017-11-22 NOTE — PROGRESS NOTES
[facility], [place], operated by [facility]  Office Visit    [place]  1960 November 22, 2017    CC:   Chief Complaint   Patient presents with   [address] from [address] 11/16/17; CHF, AF, DM    Chest Pain     tightness and belly pain    Dizziness     when he coughs    Edema     foot, ankle, calf    Fatigue     feels worn out, no energy    Palpitations     random    Shortness of Breath     at rest and on exertion     HPI:  The patient is 62 y.o. male with a past medical history significant for COPD, HTN, PAD with prior stents, HLP, NICM , A-fib and SHF here for hospital follow up. Hospitalized 11/11-11/16/2017 with CHF and atrial fib. He was diuresed and sent home with med adjustments. Has had several admits for CHF in the past (last in 5/2017). Last seen in office in 7/2016. Overall feels terrible. C/o fatigue, lack of ambition, cough productive clear phlegm and edema in R foot. Has noted some palpitations. Using nebulizer. Sleeps in recliner at home and using O2 at night. Gets dizzy with prolonged coughing episodes. Denies anginal chest pain,  syncope, weight change or claudication. Continues to smoke. Monitoring sodium and fluid intake closely    Review of Systems:  Constitutional: Denies  weakness, night sweats or fever. + fatigue  HEENT: Denies new visual changes, ringing in ears, nosebleeds, nasal congestion  Respiratory: + chronic SOB and chronic cough. Cardiovascular: see HPI  GI: Denies N/V, diarrhea, constipation, abdominal pain, change in bowel habits, melena or hematochezia  : Denies urinary frequency, urgency, incontinence, hematuria or dysuria. Skin: Denies rash, hives, or cyanosis  Musculoskeletal: + chronic back pain; + phantom pain in L stump  Neurological: Denies syncope or TIA-like symptoms.   Psychiatric: Denies anxiety, insomnia     Past Medical History:   Diagnosis Date    Alcohol abuse     Arthritis     Atrial fibrillation (HCC)     CHF (congestive mouth 2 times daily 60 tablet 3    Blood Glucose Monitoring Suppl (FREESTYLE FREEDOM LITE) w/Device KIT 1 kit by Does not apply route 2 times daily as needed (fasting and 2 hours post prandial) 1 kit 0    glucose blood VI test strips (FREESTYLE TEST STRIPS) strip Test Once Daily, Freestyle Lite, DX: 250.00 100 each 3     No current facility-administered medications for this visit. Physical Exam:   /62   Pulse 110 Comment: irreg  Ht 5' 7\" (1.702 m)   Wt 234 lb (106.1 kg) Comment: per patient; states he can not get on scale  SpO2 96%   BMI 36.65 kg/m²   Wt Readings from Last 2 Encounters:   11/22/17 234 lb (106.1 kg)   11/16/17 234 lb 12.6 oz (106.5 kg)     Constitutional: He is oriented to person, place, and time. He appears well-developed and well-nourished. In no acute distress. In wheelchair. Flat affect. HEENT: Normocephalic and atraumatic. Sclerae anicteric. No xanthelasmas. Neck: Neck supple. No JVD present. No  thyromegaly present. Cardiovascular: irreg, irreg; normal S1 and S2; soft systolic murmur; auscultated   Pulmonary/Chest: Effort normal and breath sounds normal.  Lungs clear to auscultation. Chest wall nontender  Abdominal: soft, nontender, nondistended. + bowel sounds; no hepatomegaly  Extremities: No cyanosis, or clubbing. + L AKA. Pulses are 2+ radial/carotid bilaterally; R DP pulse easily obtained with doppler. Trace R ankle edema-chronic  stasis changes. .  Neurological: No focal  deficit. Skin: Skin is warm and dry. Psychiatric: He has a normal mood and affect.  His speech is normal and behavior is normal.     Lab Review:   Lab Results   Component Value Date    TRIG 98 01/03/2017    HDL 46 01/03/2017    LDLCALC 127 01/03/2017    LABVLDL 20 01/03/2017     Lab Results   Component Value Date     11/16/2017    K 3.2 11/16/2017    CL 96 11/16/2017    CO2 34 11/16/2017    BUN 40 11/16/2017    CREATININE 1.0 11/16/2017    GLUCOSE 218 11/16/2017    CALCIUM 9.1 regurgitation seen.       [facility] 8/10/2015:  1. Right coronary artery arises from the right sinus Valsalva. It is a dominant artery, gives rise to the posterior descending and posterolateral branches. Right coronary artery and its branches are free of atherosclerosis.    2. Left main trunk arises from the right sinus Valsalva. It divides into the left and right descending artery and left circumflex artery. Left main trunk is free of atherosclerosis. 3. Left anterior descending artery: Moderate sized artery and descends into the intraventricular sulcus and wraps around the apex of the heart. The left anterior descending artery and its branches are free of atherosclerosis. 4. Left circumflex artery arises from the left main trunk. It is a moderate-sized artery. It gives rise to a moderate-sized obtuse marginal branch and is free of atherosclerosis. Left anterior descending artery as described earlier is also free of any atherosclerosis.       Assessment:    1. Chronic Atrial fib  -ECG A-fib today;  (on auscultation 102)  -on long term anticoagulation (Xarelto)  -on Amiodarone and Toprol for rate control  -CHADS Vasc score > 3    2. Chronic combined systolic and diastolic heart failure (HCC)  -last LVEF 40%  -NYHA class III  -continue Lasix, metolazone, BB and losartan  -not on spironolactone (worsening Cr and hyperkalemia in the past)    3. PAD (peripheral artery disease) (HCC)  -S/P L AKA  -continue statin    4. Chronic obstructive pulmonary disease, unspecified COPD type ([facility]  -on home O2 as needed  -continues to smoke  -now on nebulizer    5. Anemia, unspecified type  -last Hgb stable    6. Tobacco use  -smoking cessation emphasized    7. Essential HTN  -fairly well controlled  -continue medical management  -low sodium diet     8.  Diabetes Mellitus  -advised to follow up with his PCP    Plan:  Continue Cordarone, statin, lasix, metolazone, losartan, Toprol, Xarelto, KCL  Emphasized low fat/low sodium diet, continued smoking cessation, fluid restriction, worsening signs/sxs of HF   Labs from 11/16/2017 reviewed: needs follow up BMP, CBC  Reinforced annual testing with Amiodarone: eye exam, CXR or PFT's, TSH and liver profile  Emphasized smoking cessation and discussed strategies for smoking cessation (> 5 minutes of counseling given)  Advised follow up with his PCP regarding his depression, diabetes, and neuropathy (scheduled 12/5 with Dr. Inder Whitehead). He will plan for labs to be performed at that time. Follow up with Dr. Eileen Martin or D. Enzweiler, CNP in 4 weeks or sooner if needed    Return in about 4 weeks (around 12/20/2017) for Dr. Eileen Martin or D. Enzweiler, CNP. Thanks for allowing me to participate in the care of this patient.       Isabel Akhtar, [address] [facility], [address] [place] [address] [address], [address] [facility]  Office: [phone]  Fax: [phone]

## 2017-12-05 ENCOUNTER — OFFICE VISIT (OUTPATIENT)
Dept: INTERNAL MEDICINE CLINIC | Age: 57
End: 2017-12-05

## 2017-12-05 VITALS
OXYGEN SATURATION: 96 % | SYSTOLIC BLOOD PRESSURE: 132 MMHG | DIASTOLIC BLOOD PRESSURE: 80 MMHG | TEMPERATURE: 97.7 F | HEART RATE: 169 BPM

## 2017-12-05 DIAGNOSIS — F32.0 MILD SINGLE CURRENT EPISODE OF MAJOR DEPRESSIVE DISORDER (HCC): ICD-10-CM

## 2017-12-05 DIAGNOSIS — E78.00 PURE HYPERCHOLESTEROLEMIA: ICD-10-CM

## 2017-12-05 DIAGNOSIS — I48.20 CHRONIC ATRIAL FIBRILLATION (HCC): ICD-10-CM

## 2017-12-05 DIAGNOSIS — J41.0 SIMPLE CHRONIC BRONCHITIS (HCC): ICD-10-CM

## 2017-12-05 DIAGNOSIS — E08.59 DIABETES MELLITUS DUE TO UNDERLYING CONDITION WITH CARDIAC COMPLICATION (HCC): Primary | ICD-10-CM

## 2017-12-05 LAB
A/G RATIO: 1.2 (ref 1.1–2.2)
ALBUMIN SERPL-MCNC: 4 G/DL (ref 3.4–5)
ALP BLD-CCNC: 86 U/L (ref 40–129)
ALT SERPL-CCNC: 19 U/L (ref 10–40)
ANION GAP SERPL CALCULATED.3IONS-SCNC: 17 MMOL/L (ref 3–16)
AST SERPL-CCNC: 18 U/L (ref 15–37)
BILIRUB SERPL-MCNC: 0.5 MG/DL (ref 0–1)
BILIRUBIN, POC: NORMAL
BLOOD URINE, POC: NORMAL
BUN BLDV-MCNC: 14 MG/DL (ref 7–20)
CALCIUM SERPL-MCNC: 9.8 MG/DL (ref 8.3–10.6)
CHLORIDE BLD-SCNC: 96 MMOL/L (ref 99–110)
CHOLESTEROL, TOTAL: 132 MG/DL (ref 0–199)
CLARITY, POC: CLEAR
CO2: 31 MMOL/L (ref 21–32)
COLOR, POC: NORMAL
CREAT SERPL-MCNC: 0.8 MG/DL (ref 0.9–1.3)
GFR AFRICAN AMERICAN: >60
GFR NON-AFRICAN AMERICAN: >60
GLOBULIN: 3.4 G/DL
GLUCOSE BLD-MCNC: 111 MG/DL (ref 70–99)
GLUCOSE URINE, POC: NORMAL
HCT VFR BLD CALC: 37.1 % (ref 40.5–52.5)
HDLC SERPL-MCNC: 36 MG/DL (ref 40–60)
HEMOGLOBIN: 11.9 G/DL (ref 13.5–17.5)
KETONES, POC: NORMAL
LDL CHOLESTEROL CALCULATED: 79 MG/DL
LEUKOCYTE EST, POC: NORMAL
MCH RBC QN AUTO: 26.1 PG (ref 26–34)
MCHC RBC AUTO-ENTMCNC: 32 G/DL (ref 31–36)
MCV RBC AUTO: 81.6 FL (ref 80–100)
NITRITE, POC: NORMAL
PDW BLD-RTO: 18.2 % (ref 12.4–15.4)
PH, POC: 5
PLATELET # BLD: 284 K/UL (ref 135–450)
PMV BLD AUTO: 9.6 FL (ref 5–10.5)
POTASSIUM SERPL-SCNC: 3.7 MMOL/L (ref 3.5–5.1)
PROTEIN, POC: NORMAL
RBC # BLD: 4.55 M/UL (ref 4.2–5.9)
SODIUM BLD-SCNC: 144 MMOL/L (ref 136–145)
SPECIFIC GRAVITY, POC: 1.01
TOTAL PROTEIN: 7.4 G/DL (ref 6.4–8.2)
TRIGL SERPL-MCNC: 83 MG/DL (ref 0–150)
UROBILINOGEN, POC: 0.2
VLDLC SERPL CALC-MCNC: 17 MG/DL
WBC # BLD: 9.2 K/UL (ref 4–11)

## 2017-12-05 PROCEDURE — 36415 COLL VENOUS BLD VENIPUNCTURE: CPT | Performed by: INTERNAL MEDICINE

## 2017-12-05 PROCEDURE — 4004F PT TOBACCO SCREEN RCVD TLK: CPT | Performed by: INTERNAL MEDICINE

## 2017-12-05 PROCEDURE — 99214 OFFICE O/P EST MOD 30 MIN: CPT | Performed by: INTERNAL MEDICINE

## 2017-12-05 PROCEDURE — 81002 URINALYSIS NONAUTO W/O SCOPE: CPT | Performed by: INTERNAL MEDICINE

## 2017-12-05 PROCEDURE — G8598 ASA/ANTIPLAT THER USED: HCPCS | Performed by: INTERNAL MEDICINE

## 2017-12-05 PROCEDURE — G8427 DOCREV CUR MEDS BY ELIG CLIN: HCPCS | Performed by: INTERNAL MEDICINE

## 2017-12-05 PROCEDURE — 3023F SPIROM DOC REV: CPT | Performed by: INTERNAL MEDICINE

## 2017-12-05 PROCEDURE — G8484 FLU IMMUNIZE NO ADMIN: HCPCS | Performed by: INTERNAL MEDICINE

## 2017-12-05 PROCEDURE — G8417 CALC BMI ABV UP PARAM F/U: HCPCS | Performed by: INTERNAL MEDICINE

## 2017-12-05 PROCEDURE — 3017F COLORECTAL CA SCREEN DOC REV: CPT | Performed by: INTERNAL MEDICINE

## 2017-12-05 PROCEDURE — 1111F DSCHRG MED/CURRENT MED MERGE: CPT | Performed by: INTERNAL MEDICINE

## 2017-12-05 PROCEDURE — G8926 SPIRO NO PERF OR DOC: HCPCS | Performed by: INTERNAL MEDICINE

## 2017-12-05 RX ORDER — DULOXETIN HYDROCHLORIDE 30 MG/1
30 CAPSULE, DELAYED RELEASE ORAL DAILY
Qty: 30 CAPSULE | Refills: 5 | Status: SHIPPED | OUTPATIENT
Start: 2017-12-05 | End: 2018-07-30 | Stop reason: SDUPTHER

## 2017-12-05 RX ORDER — LOSARTAN POTASSIUM 25 MG/1
25 TABLET ORAL DAILY
Qty: 30 TABLET | Refills: 3 | Status: SHIPPED | OUTPATIENT
Start: 2017-12-05 | End: 2018-05-19 | Stop reason: SDUPTHER

## 2017-12-05 ASSESSMENT — ENCOUNTER SYMPTOMS
COUGH: 0
GASTROINTESTINAL NEGATIVE: 1
SHORTNESS OF BREATH: 1
APNEA: 1

## 2017-12-05 NOTE — PATIENT INSTRUCTIONS
Please call your pharmacy if you need any refills of your medication(s). Please call our office at [phone] if you don't hear from us about your test results. Bring an accurate list of your medications with you at every appointment to ensure that we have the correct information.     Our office hours are: Monday - Friday 8:30 am- 5 pm    Phone lines turn on at 8:30 am

## 2017-12-05 NOTE — PROGRESS NOTES
Subjective:      Patient ID: Kimberly Platt is a 62 y.o. male. Patient presents with:   Follow-Up from Hospital: CHF, follow up from hospital    Kimberly Platt is a 62 y.o. male with the following history as recorded in EpicCare:  Patient Active Problem List    Elevated troponin         Priority: High (1)      Anemia         Priority: High (1)      Heart failure, acute on chronic, systolic and diastolic (HCC)         Priority: High (1)      Hypomagnesemia         Priority: High (1)      Chronic atrial fibrillation (HCC)         Priority: High (1)      PAF (paroxysmal atrial fibrillation) ([address]         Priority: High (1)      Acute respiratory failure with hypoxemia ([address]         Date Noted: 11/13/2017      Diabetes mellitus type 2, controlled ([facility]         Date Noted: 11/13/2017      Constipation         Date Noted: 11/13/2017      Hypokalemia         Date Noted: 11/13/2017      Acute on chronic systolic CHF (congestive heart failure) ([address]         Date Noted: 11/13/2017      Persistent atrial fibrillation (HCC)      Postprocedural hypotension      Acute respiratory failure with hypercapnia (HCC)      Metabolic acidosis      Centrilobular emphysema ([address]      Other specified injury of inferior mesenteric vein, initial encounter      Permanent atrial fibrillation (HCC)      Shortness of breath      Bradycardia      Acromioclavicular joint arthritis         Date Noted: 08/29/2016      Nonhealing surgical wound         Date Noted: 06/27/2016      COPD exacerbation ([address]      Ischemic foot         Date Noted: 04/27/2016      Diabetes mellitus with peripheral circulatory disorder ([facility]         Date Noted: 04/27/2016      Limb ischemia      Vasovagal syncope         Date Noted: 11/30/2015      Laceration of scalp without foreign body         Date Noted: 11/30/2015      Nonischemic cardiomyopathy ([address]         Date Noted: 11/30/2015      Syncope and collapse      Hypotension         Date Noted: 10/28/2015

## 2017-12-06 LAB
ESTIMATED AVERAGE GLUCOSE: 168.6 MG/DL
HBA1C MFR BLD: 7.5 %

## 2017-12-19 RX ORDER — TAMSULOSIN HYDROCHLORIDE 0.4 MG/1
0.4 CAPSULE ORAL DAILY
Qty: 30 CAPSULE | Refills: 0 | Status: SHIPPED | OUTPATIENT
Start: 2017-12-19 | End: 2018-02-15 | Stop reason: SDUPTHER

## 2017-12-26 ENCOUNTER — OFFICE VISIT (OUTPATIENT)
Dept: CARDIOLOGY CLINIC | Age: 57
End: 2017-12-26

## 2017-12-26 VITALS
HEIGHT: 67 IN | BODY MASS INDEX: 32.82 KG/M2 | WEIGHT: 209.12 LBS | DIASTOLIC BLOOD PRESSURE: 70 MMHG | HEART RATE: 90 BPM | SYSTOLIC BLOOD PRESSURE: 110 MMHG | OXYGEN SATURATION: 96 %

## 2017-12-26 DIAGNOSIS — I73.9 PAD (PERIPHERAL ARTERY DISEASE) (HCC): ICD-10-CM

## 2017-12-26 DIAGNOSIS — I10 ESSENTIAL HYPERTENSION: ICD-10-CM

## 2017-12-26 DIAGNOSIS — I50.42 CHRONIC COMBINED SYSTOLIC AND DIASTOLIC HEART FAILURE (HCC): Primary | ICD-10-CM

## 2017-12-26 DIAGNOSIS — I48.20 CHRONIC ATRIAL FIBRILLATION (HCC): ICD-10-CM

## 2017-12-26 DIAGNOSIS — Z72.0 TOBACCO USE: ICD-10-CM

## 2017-12-26 PROCEDURE — 3017F COLORECTAL CA SCREEN DOC REV: CPT | Performed by: NURSE PRACTITIONER

## 2017-12-26 PROCEDURE — G8427 DOCREV CUR MEDS BY ELIG CLIN: HCPCS | Performed by: NURSE PRACTITIONER

## 2017-12-26 PROCEDURE — G8598 ASA/ANTIPLAT THER USED: HCPCS | Performed by: NURSE PRACTITIONER

## 2017-12-26 PROCEDURE — G8484 FLU IMMUNIZE NO ADMIN: HCPCS | Performed by: NURSE PRACTITIONER

## 2017-12-26 PROCEDURE — 4004F PT TOBACCO SCREEN RCVD TLK: CPT | Performed by: NURSE PRACTITIONER

## 2017-12-26 PROCEDURE — G8417 CALC BMI ABV UP PARAM F/U: HCPCS | Performed by: NURSE PRACTITIONER

## 2017-12-26 PROCEDURE — 99213 OFFICE O/P EST LOW 20 MIN: CPT | Performed by: NURSE PRACTITIONER

## 2017-12-26 RX ORDER — FUROSEMIDE 40 MG/1
40 TABLET ORAL 2 TIMES DAILY
Qty: 60 TABLET | Refills: 3 | Status: SHIPPED | OUTPATIENT
Start: 2017-12-26 | End: 2018-02-15 | Stop reason: SDUPTHER

## 2017-12-26 RX ORDER — METOPROLOL SUCCINATE 50 MG/1
50 TABLET, EXTENDED RELEASE ORAL 2 TIMES DAILY
Qty: 60 TABLET | Refills: 3 | Status: SHIPPED | OUTPATIENT
Start: 2017-12-26 | End: 2018-05-01 | Stop reason: SDUPTHER
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
## 2017-12-26 NOTE — LETTER
[facility] HEART [address] [place] [address]  Phone: [phone]  Fax: [phone]    Dariela Armando SAMANTHA        December 26, 2017     Readlyn Box, MD  [address] [place] [address]    Patient: Narciso Lopez  MR Number: [id number]  YOB: 1960  Date of Visit: 12/26/2017    Dear Dr. Eliel Patel: Thank you for the request for consultation for Mace Reasoner. HPI:  The patient is 62 y.o. male with a past medical history significant for COPD, HTN, PAD with prior stents, HLP, NICM , A-fib and SHF here for follow up. Hospitalized 11/11-11/16/2017 with CHF and atrial fib. He was diuresed and sent home with med adjustments. Has had several admits for CHF in the past (last in 5/2017). He was last seen in office on 11/22/2017 and no med changes were made at that time and smoking cessation emphasized. Overall feeling well. Has lost some weight and admits to increased urination since last seen. Continues to smoke. SOB with improvement since last visit. Using nebulizer at home and has relief from his SOB when he uses it. Has not needed home O2. Monitors sodium and fluid intake closely. Has some mild dyspnea with increased ambulation or rolling wheelchair long distances. Has occasional palpitations that are short lived and occur only with increased exertion. Has occasional nonproductive cough. Denies chest pain/discomfort, orthopnea/PND,  dizziness, syncope, edema  or claudication. Assessment:    1. C Chronic combined systolic and diastolic heart failure (HCC)  -last LVEF 40%  -NYHA class III and currently compensated  -continue Lasix, metolazone, BB and losartan  -not on spironolactone (worsening Cr and hyperkalemia in the past)    2. Chronic Atrial fib  -rate controlled on Amiodarone and BB  -on long term anticoagulation (Xarelto)  -CHADS Vasc score > 3     3.  PAD (peripheral artery disease) ([facility])  -S/P L AKA  -continue statin -followed by Dr. Heath Tomas    4. Essential HTN  -controlled  -continue medical management  -low sodium diet    5. Tobacco use  -smoking cessation discussed    6. Chronic obstructive pulmonary disease, unspecified COPD type ([address]  -on home O2 as needed (has not been using)  -continues to smoke  -nebulizer at home    7.  Anemia, unspecified type  -last Hgb improved    8. Diabetes Mellitus-last A1c 7.5    Plan:    Continue Cordarone, statin, lasix, metolazone, losartan, Toprol, Xarelto, KCL  Reinforced low fat/low sodium diet, smoking cessation, fluid restriction, worsening signs/sxs of HF   Labs from 12/5/2017 reviewed  Reinforced annual testing with Amiodarone: eye exam, CXR (11/2017) or PFT's, TSH (11/2017) and liver profile (12/2017)  Emphasized smoking cessation and discussed strategies for smoking cessation (> 5 minutes of counseling given)  Follow up with Dr. Cirilo Lazcano in 3 months in [place], or  sooner if needed    Return in about 3 months (around 3/26/2018) for with Dr. Cirilo Lazcano Anise Husbands). Thanks for allowing me to participate in the care of this patient. If you have questions, please do not hesitate to call me. I look forward to following Brady Davis along with you.     Sincerely,        DIRK Angel, CNP

## 2017-12-27 NOTE — COMMUNICATION BODY
HPI:  The patient is 62 y.o. male with a past medical history significant for COPD, HTN, PAD with prior stents, HLP, NICM , A-fib and SHF here for follow up. Hospitalized 11/11-11/16/2017 with CHF and atrial fib. He was diuresed and sent home with med adjustments. Has had several admits for CHF in the past (last in 5/2017). He was last seen in office on 11/22/2017 and no med changes were made at that time and smoking cessation emphasized. Overall feeling well. Has lost some weight and admits to increased urination since last seen. Continues to smoke. SOB with improvement since last visit. Using nebulizer at home and has relief from his SOB when he uses it. Has not needed home O2. Monitors sodium and fluid intake closely. Has some mild dyspnea with increased ambulation or rolling wheelchair long distances. Has occasional palpitations that are short lived and occur only with increased exertion. Has occasional nonproductive cough. Denies chest pain/discomfort, orthopnea/PND,  dizziness, syncope, edema  or claudication. Assessment:    1. C Chronic combined systolic and diastolic heart failure (HCC)  -last LVEF 40%  -NYHA class III and currently compensated  -continue Lasix, metolazone, BB and losartan  -not on spironolactone (worsening Cr and hyperkalemia in the past)    2. Chronic Atrial fib  -rate controlled on Amiodarone and BB  -on long term anticoagulation (Xarelto)  -CHADS Vasc score > 3     3. PAD (peripheral artery disease) ([facility])  -S/P L AKA  -continue statin  -followed by Dr. Beena Serrano    4. Essential HTN  -controlled  -continue medical management  -low sodium diet    5. Tobacco use  -smoking cessation discussed    6. Chronic obstructive pulmonary disease, unspecified COPD type ([facility]  -on home O2 as needed (has not been using)  -continues to smoke  -nebulizer at home    7.  Anemia, unspecified type  -last Hgb improved    8.  Diabetes Mellitus-last A1c 7.5    Plan:    Continue Cordarone, statin, lasix,

## 2018-01-08 RX ORDER — FAMOTIDINE 20 MG/1
TABLET, FILM COATED ORAL
Qty: 60 TABLET | Refills: 5 | Status: ON HOLD | OUTPATIENT
Start: 2018-01-08 | End: 2018-07-20 | Stop reason: HOSPADM

## 2018-01-19 ENCOUNTER — TELEPHONE (OUTPATIENT)
Dept: CARDIOLOGY CLINIC | Age: 58
End: 2018-01-19

## 2018-01-19 RX ORDER — AMIODARONE HYDROCHLORIDE 200 MG/1
200 TABLET ORAL DAILY
Qty: 30 TABLET | Refills: 3 | Status: SHIPPED | OUTPATIENT
Start: 2018-01-19 | End: 2018-03-23 | Stop reason: ALTCHOICE

## 2018-01-19 NOTE — TELEPHONE ENCOUNTER
Medication Refill    When was your last appointment with cardiology?  (if 1year or longer, please schedule an appointment)    Medication needing refilled: Amiodarone    Doseage of the medication: 200 mg    How are you taking this medication (QD, BID, TID, QID, PRN): QD    Patient want a 30 or 90 day supply called in: 30 day supply    Which Pharmacy are we sending the medication to: [address]

## 2018-02-02 ENCOUNTER — OFFICE VISIT (OUTPATIENT)
Dept: INTERNAL MEDICINE CLINIC | Age: 58
End: 2018-02-02

## 2018-02-02 VITALS
TEMPERATURE: 97.2 F | SYSTOLIC BLOOD PRESSURE: 132 MMHG | HEART RATE: 83 BPM | DIASTOLIC BLOOD PRESSURE: 78 MMHG | OXYGEN SATURATION: 92 %

## 2018-02-02 DIAGNOSIS — Z72.0 TOBACCO ABUSE: ICD-10-CM

## 2018-02-02 DIAGNOSIS — I48.20 CHRONIC ATRIAL FIBRILLATION (HCC): ICD-10-CM

## 2018-02-02 DIAGNOSIS — J06.9 ACUTE URI: Primary | ICD-10-CM

## 2018-02-02 DIAGNOSIS — J41.0 SIMPLE CHRONIC BRONCHITIS (HCC): ICD-10-CM

## 2018-02-02 DIAGNOSIS — I50.43 HEART FAILURE, ACUTE ON CHRONIC, SYSTOLIC AND DIASTOLIC (HCC): ICD-10-CM

## 2018-02-02 PROCEDURE — 3017F COLORECTAL CA SCREEN DOC REV: CPT | Performed by: INTERNAL MEDICINE

## 2018-02-02 PROCEDURE — G8926 SPIRO NO PERF OR DOC: HCPCS | Performed by: INTERNAL MEDICINE

## 2018-02-02 PROCEDURE — G8427 DOCREV CUR MEDS BY ELIG CLIN: HCPCS | Performed by: INTERNAL MEDICINE

## 2018-02-02 PROCEDURE — G8484 FLU IMMUNIZE NO ADMIN: HCPCS | Performed by: INTERNAL MEDICINE

## 2018-02-02 PROCEDURE — 99214 OFFICE O/P EST MOD 30 MIN: CPT | Performed by: INTERNAL MEDICINE

## 2018-02-02 PROCEDURE — G8599 NO ASA/ANTIPLAT THER USE RNG: HCPCS | Performed by: INTERNAL MEDICINE

## 2018-02-02 PROCEDURE — G8417 CALC BMI ABV UP PARAM F/U: HCPCS | Performed by: INTERNAL MEDICINE

## 2018-02-02 PROCEDURE — 4004F PT TOBACCO SCREEN RCVD TLK: CPT | Performed by: INTERNAL MEDICINE

## 2018-02-02 PROCEDURE — 3023F SPIROM DOC REV: CPT | Performed by: INTERNAL MEDICINE

## 2018-02-02 RX ORDER — DOXYCYCLINE HYCLATE 100 MG
100 TABLET ORAL 2 TIMES DAILY
Qty: 20 TABLET | Refills: 0 | Status: SHIPPED | OUTPATIENT
Start: 2018-02-02 | End: 2018-02-12

## 2018-02-02 RX ORDER — GUAIFENESIN AND CODEINE PHOSPHATE 100; 10 MG/5ML; MG/5ML
10 SOLUTION ORAL 3 TIMES DAILY PRN
Qty: 120 ML | Refills: 0 | Status: SHIPPED | OUTPATIENT
Start: 2018-02-02 | End: 2018-02-09

## 2018-02-02 ASSESSMENT — ENCOUNTER SYMPTOMS
GASTROINTESTINAL NEGATIVE: 1
WHEEZING: 1
COUGH: 1
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
## 2018-02-02 NOTE — PATIENT INSTRUCTIONS
Please call your pharmacy if you need any refills of your medication(s). Please call our office at [phone] if you don't hear from us about your test results. Bring an accurate list of your medications with you at every appointment to ensure that we have the correct information.     Our office hours are: Monday - Friday 8:30 am- 5 pm    Phone lines turn on at 8:30 am

## 2018-02-08 ENCOUNTER — TELEPHONE (OUTPATIENT)
Dept: INTERNAL MEDICINE CLINIC | Age: 58
End: 2018-02-08

## 2018-02-08 NOTE — TELEPHONE ENCOUNTER
Blessing--nurse zachary/ Faisal calling b/c she's following the pt and pt is requesting a electric wheelchair. Pt has a leg amputation and torn rotator cuff that is making it hard for him to push his wheelchair thru the carpeting in his house. Fernanda Duggan wants to know if Dr Benjie Lopez would write a rx for the electric wheelchair and there is a medical equipment supplier that Dr Benjie Lopez uses. Per Blessing the rx and clinical information would need to go the medical supplier.

## 2018-02-15 DIAGNOSIS — I50.42 CHRONIC COMBINED SYSTOLIC AND DIASTOLIC HEART FAILURE (HCC): ICD-10-CM

## 2018-02-15 RX ORDER — TAMSULOSIN HYDROCHLORIDE 0.4 MG/1
0.4 CAPSULE ORAL DAILY
Qty: 30 CAPSULE | Refills: 0 | Status: SHIPPED | OUTPATIENT
Start: 2018-02-15 | End: 2018-03-21 | Stop reason: SDUPTHER

## 2018-02-15 RX ORDER — FUROSEMIDE 40 MG/1
40 TABLET ORAL 2 TIMES DAILY
Qty: 60 TABLET | Refills: 3 | Status: SHIPPED | OUTPATIENT
Start: 2018-02-15 | End: 2018-07-26 | Stop reason: ALTCHOICE

## 2018-02-20 ENCOUNTER — TELEPHONE (OUTPATIENT)
Dept: INTERNAL MEDICINE CLINIC | Age: 58
End: 2018-02-20

## 2018-02-22 NOTE — TELEPHONE ENCOUNTER
LUIS ALFREDO:      Neyda--OT w/ [facility] calling to let Dr Omayra Grimes know that she did a OT eval only today and pt has no concerns w/ his ADL\"s at the present time.

## 2018-02-26 RX ORDER — ATORVASTATIN CALCIUM 10 MG/1
10 TABLET, FILM COATED ORAL DAILY
Qty: 30 TABLET | Refills: 3 | Status: SHIPPED | OUTPATIENT
Start: 2018-02-26 | End: 2018-07-30 | Stop reason: SDUPTHER

## 2018-03-06 RX ORDER — RIVAROXABAN 20 MG/1
TABLET, FILM COATED ORAL
Qty: 30 TABLET | Refills: 3 | Status: SHIPPED | OUTPATIENT
Start: 2018-03-06 | End: 2018-06-11 | Stop reason: SDUPTHER

## 2018-03-13 ENCOUNTER — OFFICE VISIT (OUTPATIENT)
Dept: INTERNAL MEDICINE CLINIC | Age: 58
End: 2018-03-13

## 2018-03-13 VITALS
TEMPERATURE: 97.7 F | DIASTOLIC BLOOD PRESSURE: 68 MMHG | SYSTOLIC BLOOD PRESSURE: 102 MMHG | OXYGEN SATURATION: 97 % | HEART RATE: 95 BPM

## 2018-03-13 DIAGNOSIS — E08.40 DIABETES MELLITUS DUE TO UNDERLYING CONDITION WITH DIABETIC NEUROPATHY, WITHOUT LONG-TERM CURRENT USE OF INSULIN (HCC): Primary | ICD-10-CM

## 2018-03-13 DIAGNOSIS — I10 ESSENTIAL HYPERTENSION: ICD-10-CM

## 2018-03-13 DIAGNOSIS — J42 CHRONIC BRONCHITIS, UNSPECIFIED CHRONIC BRONCHITIS TYPE (HCC): ICD-10-CM

## 2018-03-13 LAB
CHOLESTEROL, TOTAL: 143 MG/DL (ref 0–199)
HCT VFR BLD CALC: 40.9 % (ref 40.5–52.5)
HDLC SERPL-MCNC: 31 MG/DL (ref 40–60)
HEMOGLOBIN: 14 G/DL (ref 13.5–17.5)
LDL CHOLESTEROL CALCULATED: 83 MG/DL
MCH RBC QN AUTO: 28.6 PG (ref 26–34)
MCHC RBC AUTO-ENTMCNC: 34.1 G/DL (ref 31–36)
MCV RBC AUTO: 83.9 FL (ref 80–100)
PDW BLD-RTO: 18.9 % (ref 12.4–15.4)
PLATELET # BLD: 233 K/UL (ref 135–450)
PMV BLD AUTO: 9.6 FL (ref 5–10.5)
RBC # BLD: 4.88 M/UL (ref 4.2–5.9)
TRIGL SERPL-MCNC: 143 MG/DL (ref 0–150)
VLDLC SERPL CALC-MCNC: 29 MG/DL
WBC # BLD: 10.5 K/UL (ref 4–11)

## 2018-03-13 PROCEDURE — 36415 COLL VENOUS BLD VENIPUNCTURE: CPT | Performed by: INTERNAL MEDICINE

## 2018-03-13 PROCEDURE — G8417 CALC BMI ABV UP PARAM F/U: HCPCS | Performed by: INTERNAL MEDICINE

## 2018-03-13 PROCEDURE — 3017F COLORECTAL CA SCREEN DOC REV: CPT | Performed by: INTERNAL MEDICINE

## 2018-03-13 PROCEDURE — 3023F SPIROM DOC REV: CPT | Performed by: INTERNAL MEDICINE

## 2018-03-13 PROCEDURE — G8482 FLU IMMUNIZE ORDER/ADMIN: HCPCS | Performed by: INTERNAL MEDICINE

## 2018-03-13 PROCEDURE — 4004F PT TOBACCO SCREEN RCVD TLK: CPT | Performed by: INTERNAL MEDICINE

## 2018-03-13 PROCEDURE — G8926 SPIRO NO PERF OR DOC: HCPCS | Performed by: INTERNAL MEDICINE

## 2018-03-13 PROCEDURE — G8598 ASA/ANTIPLAT THER USED: HCPCS | Performed by: INTERNAL MEDICINE

## 2018-03-13 PROCEDURE — 99214 OFFICE O/P EST MOD 30 MIN: CPT | Performed by: INTERNAL MEDICINE

## 2018-03-13 PROCEDURE — G8427 DOCREV CUR MEDS BY ELIG CLIN: HCPCS | Performed by: INTERNAL MEDICINE

## 2018-03-13 RX ORDER — ALBUTEROL SULFATE 90 UG/1
2 AEROSOL, METERED RESPIRATORY (INHALATION) EVERY 4 HOURS PRN
Qty: 1 INHALER | Refills: 0 | Status: SHIPPED | OUTPATIENT
Start: 2018-03-13 | End: 2018-09-20 | Stop reason: SDUPTHER

## 2018-03-13 ASSESSMENT — ENCOUNTER SYMPTOMS
COUGH: 1
WHEEZING: 1
SHORTNESS OF BREATH: 1
GASTROINTESTINAL NEGATIVE: 1
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
## 2018-03-13 NOTE — PATIENT INSTRUCTIONS
Please call your pharmacy if you need any refills of your medication(s). Please call our office at [phone] if you don't hear from us about your test results. Bring an accurate list of your medications with you at every appointment to ensure that we have the correct information.     Our office hours are: Monday - Friday 8:30 am- 5 pm    Phone lines turn on at 8:30 am

## 2018-03-14 LAB
ESTIMATED AVERAGE GLUCOSE: 157.1 MG/DL
HBA1C MFR BLD: 7.1 %

## 2018-03-20 ENCOUNTER — TELEPHONE (OUTPATIENT)
Dept: INTERNAL MEDICINE CLINIC | Age: 58
End: 2018-03-20

## 2018-03-20 NOTE — TELEPHONE ENCOUNTER
LUIS ALFREDO:    Alecia--PT w/ [facility] wants for Dr Devon Colby to know that they ordered a wheelchair for pt and they are discharging pt from home PT today.

## 2018-03-22 RX ORDER — TAMSULOSIN HYDROCHLORIDE 0.4 MG/1
0.4 CAPSULE ORAL DAILY
Qty: 30 CAPSULE | Refills: 0 | Status: SHIPPED | OUTPATIENT
Start: 2018-03-22 | End: 2018-05-08 | Stop reason: SDUPTHER

## 2018-03-23 ENCOUNTER — OFFICE VISIT (OUTPATIENT)
Dept: CARDIOLOGY CLINIC | Age: 58
End: 2018-03-23

## 2018-03-23 VITALS
HEART RATE: 96 BPM | HEIGHT: 67 IN | WEIGHT: 215 LBS | SYSTOLIC BLOOD PRESSURE: 104 MMHG | BODY MASS INDEX: 33.74 KG/M2 | DIASTOLIC BLOOD PRESSURE: 60 MMHG | OXYGEN SATURATION: 97 %

## 2018-03-23 DIAGNOSIS — I10 ESSENTIAL HYPERTENSION: ICD-10-CM

## 2018-03-23 DIAGNOSIS — I48.20 CHRONIC ATRIAL FIBRILLATION (HCC): ICD-10-CM

## 2018-03-23 DIAGNOSIS — Z72.0 TOBACCO USE: ICD-10-CM

## 2018-03-23 DIAGNOSIS — J44.9 CHRONIC OBSTRUCTIVE PULMONARY DISEASE, UNSPECIFIED COPD TYPE (HCC): ICD-10-CM

## 2018-03-23 DIAGNOSIS — D64.9 ANEMIA, UNSPECIFIED TYPE: ICD-10-CM

## 2018-03-23 DIAGNOSIS — Z51.81 ENCOUNTER FOR MONITORING DIURETIC THERAPY: ICD-10-CM

## 2018-03-23 DIAGNOSIS — Z79.899 ENCOUNTER FOR MONITORING DIURETIC THERAPY: ICD-10-CM

## 2018-03-23 DIAGNOSIS — I73.9 PAD (PERIPHERAL ARTERY DISEASE) (HCC): ICD-10-CM

## 2018-03-23 DIAGNOSIS — I50.42 CHRONIC COMBINED SYSTOLIC AND DIASTOLIC HEART FAILURE (HCC): Primary | ICD-10-CM

## 2018-03-23 PROCEDURE — 93000 ELECTROCARDIOGRAM COMPLETE: CPT | Performed by: INTERNAL MEDICINE

## 2018-03-23 PROCEDURE — 99214 OFFICE O/P EST MOD 30 MIN: CPT | Performed by: INTERNAL MEDICINE

## 2018-03-23 PROCEDURE — G8417 CALC BMI ABV UP PARAM F/U: HCPCS | Performed by: INTERNAL MEDICINE

## 2018-03-23 PROCEDURE — 4004F PT TOBACCO SCREEN RCVD TLK: CPT | Performed by: INTERNAL MEDICINE

## 2018-03-23 PROCEDURE — G8598 ASA/ANTIPLAT THER USED: HCPCS | Performed by: INTERNAL MEDICINE

## 2018-03-23 PROCEDURE — G8482 FLU IMMUNIZE ORDER/ADMIN: HCPCS | Performed by: INTERNAL MEDICINE

## 2018-03-23 PROCEDURE — G8427 DOCREV CUR MEDS BY ELIG CLIN: HCPCS | Performed by: INTERNAL MEDICINE

## 2018-03-23 PROCEDURE — G8926 SPIRO NO PERF OR DOC: HCPCS | Performed by: INTERNAL MEDICINE

## 2018-03-23 PROCEDURE — 3017F COLORECTAL CA SCREEN DOC REV: CPT | Performed by: INTERNAL MEDICINE

## 2018-03-23 PROCEDURE — 3023F SPIROM DOC REV: CPT | Performed by: INTERNAL MEDICINE

## 2018-03-23 NOTE — PATIENT INSTRUCTIONS
increases your risk for heart attack and stroke. If you need help quitting, talk to your doctor about stop-smoking programs and medicines. These can increase your chances of quitting for good. When should you call for help? Call 911 anytime you think you may need emergency care. This may mean having symptoms that suggest that your blood pressure is causing a serious heart or blood vessel problem. Your blood pressure may be over 180/110. ? For example, call 911 if:  ? · You have symptoms of a heart attack. These may include:  ¨ Chest pain or pressure, or a strange feeling in the chest.  ¨ Sweating. ¨ Shortness of breath. ¨ Nausea or vomiting. ¨ Pain, pressure, or a strange feeling in the back, neck, jaw, or upper belly or in one or both shoulders or arms. ¨ Lightheadedness or sudden weakness. ¨ A fast or irregular heartbeat. ? · You have symptoms of a stroke. These may include:  ¨ Sudden numbness, tingling, weakness, or loss of movement in your face, arm, or leg, especially on only one side of your body. ¨ Sudden vision changes. ¨ Sudden trouble speaking. ¨ Sudden confusion or trouble understanding simple statements. ¨ Sudden problems with walking or balance. ¨ A sudden, severe headache that is different from past headaches. ? · You have severe back or belly pain. ?Do not wait until your blood pressure comes down on its own. Get help right away. ?Call your doctor now or seek immediate care if:  ? · Your blood pressure is much higher than normal (such as 180/110 or higher), but you don't have symptoms. ? · You think high blood pressure is causing symptoms, such as:  ¨ Severe headache. ¨ Blurry vision. ? Watch closely for changes in your health, and be sure to contact your doctor if:  ? · Your blood pressure measures 140/90 or higher at least 2 times. That means the top number is 140 or higher or the bottom number is 90 or higher, or both.    ? · You think you may be having side effects from your

## 2018-03-23 NOTE — PROGRESS NOTES
Outpatient Prescriptions   Medication Sig Dispense Refill    tamsulosin (FLOMAX) 0.4 MG capsule TAKE 1 CAPSULE BY MOUTH DAILY 30 capsule 0    albuterol sulfate HFA (PROVENTIL HFA) 108 (90 Base) MCG/ACT inhaler Inhale 2 puffs into the lungs every 4 hours as needed for Wheezing or Shortness of Breath (Space out to every 6 hours as symptoms improve) Space out to every 6 hours as symptoms improve.  1 Inhaler 0    XARELTO 20 MG TABS tablet TAKE 1 TABLET BY MOUTH EVERY DAY 30 tablet 3    atorvastatin (LIPITOR) 10 MG tablet Take 1 tablet by mouth daily 30 tablet 3    furosemide (LASIX) 40 MG tablet Take 1 tablet by mouth 2 times daily 60 tablet 3    famotidine (PEPCID) 20 MG tablet TAKE 1 TABLET BY MOUTH TWICE DAILY 60 tablet 5    MAGNESIUM-OXIDE 400 (241.3 Mg) MG TABS tablet TAKE 1 TABLET BY MOUTH TWICE DAILY 60 tablet 5    metoprolol succinate (TOPROL XL) 50 MG extended release tablet Take 1 tablet by mouth 2 times daily 60 tablet 3    losartan (COZAAR) 25 MG tablet Take 1 tablet by mouth daily 30 tablet 3    DULoxetine (CYMBALTA) 30 MG extended release capsule Take 1 capsule by mouth daily 30 capsule 5    ipratropium-albuterol (DUONEB) 0.5-2.5 (3) MG/3ML SOLN nebulizer solution Inhale 3 mLs into the lungs every 4 hours as needed for Shortness of Breath 360 mL 1    metolazone (ZAROXOLYN) 2.5 MG tablet Take 1 tablet by mouth Twice a Week 12 tablet 2    Respiratory Therapy Supplies (NEBULIZER/ADULT MASK) KIT 1 kit by Does not apply route as needed (SOB) 1 kit 0    potassium chloride (KLOR-CON M) 20 MEQ extended release tablet Take 1 tablet by mouth daily (with breakfast) 60 tablet 3    diphenhydrAMINE (BENADRYL) 25 MG tablet Take 25 mg by mouth every 6 hours as needed for Itching      metFORMIN (GLUCOPHAGE) 500 MG tablet TAKE 2 TABLETS BY MOUTH TWICE DAILY WITH FOOD 180 tablet 3    gabapentin (NEURONTIN) 100 MG capsule Take 1 capsule by mouth 3 times daily 90 capsule 3    Blood Glucose Monitoring Suppl (FREESTYLE FREEDOM LITE) w/Device KIT 1 kit by Does not apply route 2 times daily as needed (fasting and 2 hours post prandial) 1 kit 0    glucose blood VI test strips (FREESTYLE TEST STRIPS) strip Test Once Daily, Freestyle Lite, DX: 250.00 100 each 3     No current facility-administered medications for this visit. Physical Exam:     /60   Pulse 96   Ht 5' 7\" (1.702 m)   Wt 215 lb (97.5 kg) Comment: left leg amputated, weight given by patient  SpO2 97%   BMI 33.67 kg/m²   Wt Readings from Last 2 Encounters:   03/23/18 215 lb (97.5 kg)   12/26/17 209 lb 1.9 oz (94.9 kg)     Constitutional: He is oriented to person, place, and time. He appears well-developed and well-nourished. In no acute distress. In wheelchair. Talkative and pleasant today  HEENT: Normocephalic and atraumatic. Sclerae anicteric. No xanthelasmas. Neck: Neck supple. No JVD present. No  thyromegaly present. Cardiovascular:slightly irreg; normal S1 and S2; soft systolic murmur  Pulmonary/Chest: Effort normal and breath sounds normal.  Lungs clear to auscultation. Chest wall nontender  Abdominal: soft, nontender, nondistended. + bowel sounds; no hepatomegaly  Extremities: No cyanosis, or clubbing. + L AKA. Pulses are 2+ radial/carotid bilaterally; R DP faint but palpable. Chronic stasis changes R pretibial area  Neurological: No focal  deficit. Skin: Skin is warm and dry. Psychiatric: He has a normal mood and affect.  His speech is normal and behavior is normal.     Lab Review:   Lab Results   Component Value Date    TRIG 143 03/13/2018    HDL 31 03/13/2018    LDLCALC 83 03/13/2018    LABVLDL 29 03/13/2018     Lab Results   Component Value Date     12/05/2017    K 3.7 12/05/2017    CL 96 12/05/2017    CO2 31 12/05/2017    BUN 14 12/05/2017    CREATININE 0.8 12/05/2017    GLUCOSE 111 12/05/2017    CALCIUM 9.8 12/05/2017      Lab Results   Component Value Date    WBC 10.5 03/13/2018    HGB 14.0 03/13/2018    HCT 40.9 03/13/2018    MCV 83.9 03/13/2018     03/13/2018     Echo 5/22/17:  Left ventricular size appears to be increased .  Normal left ventricular wall thickness. Ejection fraction is moderately reduced & visually estimated to be 35-40 %. The mitral valve leaflets are slightly thickened with normal leaflet mobility. The left atrium appears to be dilated. The right ventricle appears to be mildly enlarged. Right atrial size appears to be dilated . Echo 1/27/2017: This is a suboptimal study. Definity was administered.   Patient has irregular rhythm   Left ventricle size is normal. Normal left ventricular wall thickness.   Global ejection fraction is moderate-to-severely decreased and estimated to  be 40 % .   No regional wall motion abnormalities are noted. Normal right ventricular  size.   Right ventricular systolic function is moderately reduced . [address] 2/8/2017:  OVERALL IMPRESSION:  1. Significantly elevated right heart pressures and capillary wedge pressures  consistent with congestive heart failure. 2. Normal cardiac output and indices. RHC/C 8/11/2015:   1. Right atrial mean pressure was 8 mmHg.    2. RV pressure was 40/2 with mean of 2.    3. PA pressure was 47/19 with a mean of 27.  4. Pulmonary capillary wedge pressure mean was about 20.  5. Cardiac output is 3.13 L per minute. 6. Cardiac index was 1.5 L per minute/body surface area.    7. The LV pressure was 92/5.    8. End-diastolic pressure was 17. There was no gradient across the aortic  valve noted. PA mean saturation was 57 and RV mean saturation was 56.    9. Left ventriculogram reveals a global left ventricular systolic function  with estimated EF of approximately 20% with global hypokinesis. There was no  mitral regurgitation seen.       [facility] 8/10/2015:  1. Right coronary artery arises from the right sinus Valsalva. It is a dominant artery, gives rise to the posterior descending and posterolateral branches.  Right coronary artery and

## 2018-03-23 NOTE — LETTER
[facility] SUR[facility] SPECIALTY [place] [id number]  Phone: [phone]  Fax: [phone]    Sukhjinder Jurado MD        March 23, 2018     MD Latanya Hancock [address] [facility] [address]    Patient: Marisel Crews  MR Number: [id number]  YOB: 1960  Date of Visit: 3/23/2018    Dear Dr. Sangeeta Meadows:                [address]  Office Visit    Marisel rCews  1960 March 23, 2018    CC: \"My breathing is good\"     HPI:  The patient is 62 y.o. male with a past medical history significant for COPD, HTN, PAD with prior stents, HLP, NICM , A-fib and SHF here for follow up. Hospitalized 11/11-11/16/2017 with CHF and atrial fib. He was diuresed and sent home with med adjustments. Has had several admits for CHF in the past (last in 5/2017). He was last seen in office on 11/22/2017 and no med changes were made at that time and smoking cessation emphasized. He was last seen in office by Adelfo Meyer CNP in 12/2017. He is here today in follow up for his Afib and SHF. He states he is doing well. He does have occasional dizziness if he coughs too hard or pushes himself. He continues to smokes about 4-5 cigarettes daily. he states breathing is okay. He continues to use a nebulizer at home and has relief from his SOB when he uses it. He has not needed home O2. He does monitor sodium and fluid intake closely. He has had some mild dyspnea with increased ambulation or rolling wheelchair long distances. He has a nonproductive cough. Denies chest pain/discomfort, orthopnea/PND, dizziness, syncope, edema  or claudication. Review of Systems:  Constitutional: Denies  weakness, night sweats or fever. + fatigue   HEENT: Denies new visual changes, ringing in ears, nosebleeds, nasal congestion  Respiratory: + chronic SOB (with some improvement) and chronic cough.    Cardiovascular: see HPI  GI: Denies N/V, diarrhea, constipation, abdominal pain, change in bowel

## 2018-04-04 ENCOUNTER — TELEPHONE (OUTPATIENT)
Dept: INTERNAL MEDICINE CLINIC | Age: 58
End: 2018-04-04

## 2018-04-23 RX ORDER — METOLAZONE 2.5 MG/1
TABLET ORAL
Qty: 12 TABLET | Refills: 6 | Status: SHIPPED | OUTPATIENT
Start: 2018-04-23 | End: 2018-10-25

## 2018-04-24 PROBLEM — I48.91 ATRIAL FIBRILLATION WITH RAPID VENTRICULAR RESPONSE (HCC): Status: ACTIVE | Noted: 2018-04-24

## 2018-04-25 PROBLEM — J44.1 COPD WITH ACUTE EXACERBATION (HCC): Status: ACTIVE | Noted: 2018-04-25

## 2018-04-26 PROBLEM — A41.9 SEPSIS (HCC): Status: ACTIVE | Noted: 2018-04-26

## 2018-04-26 PROBLEM — F14.90 COCAINE USE: Status: ACTIVE | Noted: 2018-04-26

## 2018-04-28 PROBLEM — A41.9 SEPSIS (HCC): Status: RESOLVED | Noted: 2018-04-26 | Resolved: 2018-04-28

## 2018-04-29 ENCOUNTER — CARE COORDINATION (OUTPATIENT)
Dept: CASE MANAGEMENT | Age: 58
End: 2018-04-29

## 2018-04-29 DIAGNOSIS — J44.1 COPD WITH ACUTE EXACERBATION (HCC): Primary | ICD-10-CM

## 2018-04-29 PROCEDURE — 1111F DSCHRG MED/CURRENT MED MERGE: CPT

## 2018-05-01 ENCOUNTER — OFFICE VISIT (OUTPATIENT)
Dept: CARDIOLOGY CLINIC | Age: 58
End: 2018-05-01

## 2018-05-01 VITALS
OXYGEN SATURATION: 97 % | BODY MASS INDEX: 34.06 KG/M2 | SYSTOLIC BLOOD PRESSURE: 122 MMHG | HEART RATE: 114 BPM | WEIGHT: 217 LBS | DIASTOLIC BLOOD PRESSURE: 70 MMHG | HEIGHT: 67 IN

## 2018-05-01 DIAGNOSIS — I48.0 PAF (PAROXYSMAL ATRIAL FIBRILLATION) (HCC): ICD-10-CM

## 2018-05-01 DIAGNOSIS — I48.20 CHRONIC ATRIAL FIBRILLATION (HCC): ICD-10-CM

## 2018-05-01 DIAGNOSIS — I73.9 PAD (PERIPHERAL ARTERY DISEASE) (HCC): ICD-10-CM

## 2018-05-01 DIAGNOSIS — R06.02 SOB (SHORTNESS OF BREATH): ICD-10-CM

## 2018-05-01 DIAGNOSIS — J44.9 CHRONIC OBSTRUCTIVE PULMONARY DISEASE, UNSPECIFIED COPD TYPE (HCC): ICD-10-CM

## 2018-05-01 DIAGNOSIS — I10 ESSENTIAL HYPERTENSION: ICD-10-CM

## 2018-05-01 DIAGNOSIS — D64.9 ANEMIA, UNSPECIFIED TYPE: ICD-10-CM

## 2018-05-01 DIAGNOSIS — I50.43 ACUTE ON CHRONIC COMBINED SYSTOLIC AND DIASTOLIC HEART FAILURE (HCC): Primary | ICD-10-CM

## 2018-05-01 DIAGNOSIS — Z72.0 TOBACCO USE: ICD-10-CM

## 2018-05-01 PROCEDURE — G8427 DOCREV CUR MEDS BY ELIG CLIN: HCPCS | Performed by: INTERNAL MEDICINE

## 2018-05-01 PROCEDURE — 1036F TOBACCO NON-USER: CPT | Performed by: INTERNAL MEDICINE

## 2018-05-01 PROCEDURE — 1111F DSCHRG MED/CURRENT MED MERGE: CPT | Performed by: INTERNAL MEDICINE

## 2018-05-01 PROCEDURE — 99214 OFFICE O/P EST MOD 30 MIN: CPT | Performed by: INTERNAL MEDICINE

## 2018-05-01 PROCEDURE — G8417 CALC BMI ABV UP PARAM F/U: HCPCS | Performed by: INTERNAL MEDICINE

## 2018-05-01 PROCEDURE — 93000 ELECTROCARDIOGRAM COMPLETE: CPT | Performed by: INTERNAL MEDICINE

## 2018-05-01 PROCEDURE — 3023F SPIROM DOC REV: CPT | Performed by: INTERNAL MEDICINE

## 2018-05-01 PROCEDURE — G8598 ASA/ANTIPLAT THER USED: HCPCS | Performed by: INTERNAL MEDICINE

## 2018-05-01 PROCEDURE — 3017F COLORECTAL CA SCREEN DOC REV: CPT | Performed by: INTERNAL MEDICINE

## 2018-05-01 PROCEDURE — G8926 SPIRO NO PERF OR DOC: HCPCS | Performed by: INTERNAL MEDICINE

## 2018-05-01 RX ORDER — METOPROLOL SUCCINATE 50 MG/1
75 TABLET, EXTENDED RELEASE ORAL 2 TIMES DAILY
Qty: 90 TABLET | Refills: 3 | Status: ON HOLD | OUTPATIENT
Start: 2018-05-01 | End: 2018-10-21 | Stop reason: HOSPADM

## 2018-05-08 RX ORDER — TAMSULOSIN HYDROCHLORIDE 0.4 MG/1
0.4 CAPSULE ORAL DAILY
Qty: 30 CAPSULE | Refills: 0 | Status: SHIPPED | OUTPATIENT
Start: 2018-05-08 | End: 2018-06-16 | Stop reason: SDUPTHER

## 2018-05-19 DIAGNOSIS — E08.59 DIABETES MELLITUS DUE TO UNDERLYING CONDITION WITH CARDIAC COMPLICATION (HCC): ICD-10-CM

## 2018-05-21 ENCOUNTER — TELEPHONE (OUTPATIENT)
Dept: CARDIOLOGY CLINIC | Age: 58
End: 2018-05-21

## 2018-05-21 DIAGNOSIS — I50.43 HEART FAILURE, ACUTE ON CHRONIC, SYSTOLIC AND DIASTOLIC (HCC): Primary | ICD-10-CM

## 2018-05-21 RX ORDER — LOSARTAN POTASSIUM 25 MG/1
25 TABLET ORAL DAILY
Qty: 30 TABLET | Refills: 0 | Status: SHIPPED | OUTPATIENT
Start: 2018-05-21 | End: 2018-06-24 | Stop reason: SDUPTHER

## 2018-06-11 ENCOUNTER — OFFICE VISIT (OUTPATIENT)
Dept: INTERNAL MEDICINE CLINIC | Age: 58
End: 2018-06-11

## 2018-06-11 VITALS
DIASTOLIC BLOOD PRESSURE: 78 MMHG | SYSTOLIC BLOOD PRESSURE: 122 MMHG | TEMPERATURE: 97.5 F | OXYGEN SATURATION: 95 % | HEART RATE: 94 BPM

## 2018-06-11 DIAGNOSIS — J42 CHRONIC BRONCHITIS, UNSPECIFIED CHRONIC BRONCHITIS TYPE (HCC): ICD-10-CM

## 2018-06-11 DIAGNOSIS — Z72.0 TOBACCO ABUSE: Primary | ICD-10-CM

## 2018-06-11 DIAGNOSIS — E11.51 DIABETES MELLITUS WITH PERIPHERAL CIRCULATORY DISORDER (HCC): ICD-10-CM

## 2018-06-11 DIAGNOSIS — I48.20 CHRONIC ATRIAL FIBRILLATION (HCC): ICD-10-CM

## 2018-06-11 DIAGNOSIS — F51.01 PRIMARY INSOMNIA: ICD-10-CM

## 2018-06-11 DIAGNOSIS — E78.00 PURE HYPERCHOLESTEROLEMIA: ICD-10-CM

## 2018-06-11 DIAGNOSIS — K21.00 GASTROESOPHAGEAL REFLUX DISEASE WITH ESOPHAGITIS: ICD-10-CM

## 2018-06-11 LAB
A/G RATIO: 1.2 (ref 1.1–2.2)
ALBUMIN SERPL-MCNC: 4.1 G/DL (ref 3.4–5)
ALP BLD-CCNC: 67 U/L (ref 40–129)
ALT SERPL-CCNC: 23 U/L (ref 10–40)
ANION GAP SERPL CALCULATED.3IONS-SCNC: 17 MMOL/L (ref 3–16)
AST SERPL-CCNC: 24 U/L (ref 15–37)
BILIRUB SERPL-MCNC: 0.4 MG/DL (ref 0–1)
BILIRUBIN, POC: NORMAL
BLOOD URINE, POC: NORMAL
BUN BLDV-MCNC: 19 MG/DL (ref 7–20)
CALCIUM SERPL-MCNC: 9.8 MG/DL (ref 8.3–10.6)
CHLORIDE BLD-SCNC: 94 MMOL/L (ref 99–110)
CHOLESTEROL, TOTAL: 141 MG/DL (ref 0–199)
CLARITY, POC: CLEAR
CO2: 33 MMOL/L (ref 21–32)
COLOR, POC: NORMAL
CREAT SERPL-MCNC: 1 MG/DL (ref 0.9–1.3)
GFR AFRICAN AMERICAN: >60
GFR NON-AFRICAN AMERICAN: >60
GLOBULIN: 3.4 G/DL
GLUCOSE BLD-MCNC: 159 MG/DL (ref 70–99)
GLUCOSE URINE, POC: NORMAL
HDLC SERPL-MCNC: 29 MG/DL (ref 40–60)
KETONES, POC: NORMAL
LDL CHOLESTEROL CALCULATED: 79 MG/DL
LEUKOCYTE EST, POC: NORMAL
NITRITE, POC: NORMAL
PH, POC: 6.5
POTASSIUM SERPL-SCNC: 3.7 MMOL/L (ref 3.5–5.1)
PROTEIN, POC: NORMAL
SODIUM BLD-SCNC: 144 MMOL/L (ref 136–145)
SPECIFIC GRAVITY, POC: 1.01
TOTAL PROTEIN: 7.5 G/DL (ref 6.4–8.2)
TRIGL SERPL-MCNC: 163 MG/DL (ref 0–150)
TSH SERPL DL<=0.05 MIU/L-ACNC: 1.1 UIU/ML (ref 0.27–4.2)
UROBILINOGEN, POC: 0.2
VLDLC SERPL CALC-MCNC: 33 MG/DL

## 2018-06-11 PROCEDURE — 3023F SPIROM DOC REV: CPT | Performed by: INTERNAL MEDICINE

## 2018-06-11 PROCEDURE — 1036F TOBACCO NON-USER: CPT | Performed by: INTERNAL MEDICINE

## 2018-06-11 PROCEDURE — G8926 SPIRO NO PERF OR DOC: HCPCS | Performed by: INTERNAL MEDICINE

## 2018-06-11 PROCEDURE — 99214 OFFICE O/P EST MOD 30 MIN: CPT | Performed by: INTERNAL MEDICINE

## 2018-06-11 PROCEDURE — G8417 CALC BMI ABV UP PARAM F/U: HCPCS | Performed by: INTERNAL MEDICINE

## 2018-06-11 PROCEDURE — G8598 ASA/ANTIPLAT THER USED: HCPCS | Performed by: INTERNAL MEDICINE

## 2018-06-11 PROCEDURE — 2022F DILAT RTA XM EVC RTNOPTHY: CPT | Performed by: INTERNAL MEDICINE

## 2018-06-11 PROCEDURE — 36415 COLL VENOUS BLD VENIPUNCTURE: CPT | Performed by: INTERNAL MEDICINE

## 2018-06-11 PROCEDURE — 81002 URINALYSIS NONAUTO W/O SCOPE: CPT | Performed by: INTERNAL MEDICINE

## 2018-06-11 PROCEDURE — 3017F COLORECTAL CA SCREEN DOC REV: CPT | Performed by: INTERNAL MEDICINE

## 2018-06-11 PROCEDURE — G8427 DOCREV CUR MEDS BY ELIG CLIN: HCPCS | Performed by: INTERNAL MEDICINE

## 2018-06-11 PROCEDURE — 3045F PR MOST RECENT HEMOGLOBIN A1C LEVEL 7.0-9.0%: CPT | Performed by: INTERNAL MEDICINE

## 2018-06-11 RX ORDER — OMEPRAZOLE 40 MG/1
40 CAPSULE, DELAYED RELEASE ORAL
Qty: 90 CAPSULE | Refills: 3 | Status: SHIPPED | OUTPATIENT
Start: 2018-06-11

## 2018-06-11 RX ORDER — NICOTINE 21 MG/24HR
1 PATCH, TRANSDERMAL 24 HOURS TRANSDERMAL EVERY 24 HOURS
Qty: 30 PATCH | Refills: 5 | COMMUNITY
Start: 2018-06-11 | End: 2019-01-10 | Stop reason: ALTCHOICE

## 2018-06-11 RX ORDER — TRAZODONE HYDROCHLORIDE 50 MG/1
50 TABLET ORAL NIGHTLY
Qty: 60 TABLET | Refills: 3 | Status: SHIPPED | OUTPATIENT
Start: 2018-06-11 | End: 2018-11-29 | Stop reason: SDUPTHER

## 2018-06-11 ASSESSMENT — ENCOUNTER SYMPTOMS
DIFFICULTY BREATHING: 1
COUGH: 1
SHORTNESS OF BREATH: 1
ABDOMINAL PAIN: 1

## 2018-06-11 ASSESSMENT — COPD QUESTIONNAIRES: COPD: 1

## 2018-06-12 ENCOUNTER — TELEPHONE (OUTPATIENT)
Dept: INTERNAL MEDICINE CLINIC | Age: 58
End: 2018-06-12

## 2018-06-12 LAB
ESTIMATED AVERAGE GLUCOSE: 177.2 MG/DL
HBA1C MFR BLD: 7.8 %

## 2018-06-18 RX ORDER — TAMSULOSIN HYDROCHLORIDE 0.4 MG/1
0.4 CAPSULE ORAL DAILY
Qty: 30 CAPSULE | Refills: 0 | Status: SHIPPED | OUTPATIENT
Start: 2018-06-18 | End: 2018-07-10 | Stop reason: SDUPTHER

## 2018-06-21 RX ORDER — GABAPENTIN 100 MG/1
100 CAPSULE ORAL 3 TIMES DAILY
Qty: 90 CAPSULE | Refills: 3 | Status: SHIPPED | OUTPATIENT
Start: 2018-06-21 | End: 2018-10-16 | Stop reason: ALTCHOICE

## 2018-06-24 DIAGNOSIS — E08.59 DIABETES MELLITUS DUE TO UNDERLYING CONDITION WITH CARDIAC COMPLICATION (HCC): ICD-10-CM

## 2018-06-25 RX ORDER — LOSARTAN POTASSIUM 25 MG/1
25 TABLET ORAL DAILY
Qty: 30 TABLET | Refills: 0 | Status: SHIPPED | OUTPATIENT
Start: 2018-06-25 | End: 2018-07-30 | Stop reason: SDUPTHER

## 2018-07-10 RX ORDER — TAMSULOSIN HYDROCHLORIDE 0.4 MG/1
0.4 CAPSULE ORAL DAILY
Qty: 30 CAPSULE | Refills: 0 | Status: SHIPPED | OUTPATIENT
Start: 2018-07-10 | End: 2018-10-01 | Stop reason: SDUPTHER

## 2018-07-15 PROBLEM — I48.91 ATRIAL FIBRILLATION WITH RVR (HCC): Status: ACTIVE | Noted: 2018-07-15

## 2018-07-15 PROBLEM — E87.1 HYPONATREMIA: Status: ACTIVE | Noted: 2018-07-15

## 2018-07-15 PROBLEM — J96.21 ACUTE AND CHRONIC RESPIRATORY FAILURE WITH HYPOXIA (HCC): Status: ACTIVE | Noted: 2018-07-15

## 2018-07-21 ENCOUNTER — CARE COORDINATION (OUTPATIENT)
Dept: CASE MANAGEMENT | Age: 58
End: 2018-07-21

## 2018-07-21 DIAGNOSIS — J96.21 ACUTE AND CHRONIC RESPIRATORY FAILURE WITH HYPOXIA (HCC): Primary | ICD-10-CM

## 2018-07-21 PROCEDURE — 1111F DSCHRG MED/CURRENT MED MERGE: CPT

## 2018-07-23 RX ORDER — TAMSULOSIN HYDROCHLORIDE 0.4 MG/1
0.4 CAPSULE ORAL DAILY
Qty: 30 CAPSULE | Refills: 0 | Status: SHIPPED | OUTPATIENT
Start: 2018-07-23 | End: 2018-07-26 | Stop reason: CLARIF

## 2018-07-26 ENCOUNTER — OFFICE VISIT (OUTPATIENT)
Dept: CARDIOLOGY CLINIC | Age: 58
End: 2018-07-26

## 2018-07-26 VITALS
BODY MASS INDEX: 33.27 KG/M2 | DIASTOLIC BLOOD PRESSURE: 82 MMHG | WEIGHT: 212 LBS | SYSTOLIC BLOOD PRESSURE: 118 MMHG | HEIGHT: 67 IN | HEART RATE: 88 BPM | OXYGEN SATURATION: 95 %

## 2018-07-26 DIAGNOSIS — I50.42 CHRONIC COMBINED SYSTOLIC AND DIASTOLIC HF (HEART FAILURE) (HCC): ICD-10-CM

## 2018-07-26 DIAGNOSIS — I42.8 NONISCHEMIC CARDIOMYOPATHY (HCC): ICD-10-CM

## 2018-07-26 DIAGNOSIS — Z72.0 TOBACCO ABUSE: ICD-10-CM

## 2018-07-26 DIAGNOSIS — I73.9 PAD (PERIPHERAL ARTERY DISEASE) (HCC): ICD-10-CM

## 2018-07-26 DIAGNOSIS — I48.20 CHRONIC ATRIAL FIBRILLATION (HCC): Primary | ICD-10-CM

## 2018-07-26 PROCEDURE — 4004F PT TOBACCO SCREEN RCVD TLK: CPT | Performed by: NURSE PRACTITIONER

## 2018-07-26 PROCEDURE — G8417 CALC BMI ABV UP PARAM F/U: HCPCS | Performed by: NURSE PRACTITIONER

## 2018-07-26 PROCEDURE — 99214 OFFICE O/P EST MOD 30 MIN: CPT | Performed by: NURSE PRACTITIONER

## 2018-07-26 PROCEDURE — 1111F DSCHRG MED/CURRENT MED MERGE: CPT | Performed by: NURSE PRACTITIONER

## 2018-07-26 PROCEDURE — G8427 DOCREV CUR MEDS BY ELIG CLIN: HCPCS | Performed by: NURSE PRACTITIONER

## 2018-07-26 PROCEDURE — G8598 ASA/ANTIPLAT THER USED: HCPCS | Performed by: NURSE PRACTITIONER

## 2018-07-26 PROCEDURE — 3017F COLORECTAL CA SCREEN DOC REV: CPT | Performed by: NURSE PRACTITIONER

## 2018-07-26 PROCEDURE — 93000 ELECTROCARDIOGRAM COMPLETE: CPT | Performed by: NURSE PRACTITIONER

## 2018-07-26 RX ORDER — FUROSEMIDE 40 MG/1
40 TABLET ORAL 2 TIMES DAILY
COMMUNITY
End: 2018-09-26 | Stop reason: SDUPTHER

## 2018-07-26 RX ORDER — TORSEMIDE 20 MG/1
20 TABLET ORAL 2 TIMES DAILY
COMMUNITY
End: 2018-07-26 | Stop reason: CLARIF
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
## 2018-07-30 ENCOUNTER — CARE COORDINATION (OUTPATIENT)
Dept: CASE MANAGEMENT | Age: 58
End: 2018-07-30

## 2018-07-30 DIAGNOSIS — E08.59 DIABETES MELLITUS DUE TO UNDERLYING CONDITION WITH CARDIAC COMPLICATION (HCC): ICD-10-CM

## 2018-07-30 DIAGNOSIS — F32.0 MILD SINGLE CURRENT EPISODE OF MAJOR DEPRESSIVE DISORDER (HCC): ICD-10-CM

## 2018-07-30 RX ORDER — DULOXETIN HYDROCHLORIDE 30 MG/1
30 CAPSULE, DELAYED RELEASE ORAL DAILY
Qty: 30 CAPSULE | Refills: 0 | Status: SHIPPED | OUTPATIENT
Start: 2018-07-30 | End: 2018-08-30 | Stop reason: SDUPTHER

## 2018-07-30 RX ORDER — LOSARTAN POTASSIUM 25 MG/1
25 TABLET ORAL DAILY
Qty: 30 TABLET | Refills: 0 | Status: SHIPPED | OUTPATIENT
Start: 2018-07-30 | End: 2018-08-30 | Stop reason: SDUPTHER

## 2018-07-30 RX ORDER — ATORVASTATIN CALCIUM 10 MG/1
10 TABLET, FILM COATED ORAL DAILY
Qty: 30 TABLET | Refills: 0 | Status: SHIPPED | OUTPATIENT
Start: 2018-07-30 | End: 2018-08-30 | Stop reason: SDUPTHER

## 2018-07-30 NOTE — CARE COORDINATION
ZAC   9/26/2018 12:20 PM JOON Villegas CNP [facility]       Verlena Bence, RN

## 2018-08-13 ENCOUNTER — OFFICE VISIT (OUTPATIENT)
Dept: ORTHOPEDIC SURGERY | Age: 58
End: 2018-08-13

## 2018-08-13 VITALS
HEIGHT: 67 IN | BODY MASS INDEX: 33.27 KG/M2 | DIASTOLIC BLOOD PRESSURE: 88 MMHG | WEIGHT: 212 LBS | HEART RATE: 89 BPM | SYSTOLIC BLOOD PRESSURE: 137 MMHG

## 2018-08-13 DIAGNOSIS — M19.019 GLENOHUMERAL ARTHRITIS: ICD-10-CM

## 2018-08-13 DIAGNOSIS — M25.511 CHRONIC RIGHT SHOULDER PAIN: ICD-10-CM

## 2018-08-13 DIAGNOSIS — G89.29 CHRONIC RIGHT SHOULDER PAIN: ICD-10-CM

## 2018-08-13 DIAGNOSIS — M75.81 TENDINITIS OF RIGHT ROTATOR CUFF: Primary | ICD-10-CM

## 2018-08-13 DIAGNOSIS — M19.011 ARTHRITIS OF RIGHT ACROMIOCLAVICULAR JOINT: ICD-10-CM

## 2018-08-13 PROCEDURE — 4004F PT TOBACCO SCREEN RCVD TLK: CPT | Performed by: ORTHOPAEDIC SURGERY

## 2018-08-13 PROCEDURE — 1111F DSCHRG MED/CURRENT MED MERGE: CPT | Performed by: ORTHOPAEDIC SURGERY

## 2018-08-13 PROCEDURE — 20610 DRAIN/INJ JOINT/BURSA W/O US: CPT | Performed by: ORTHOPAEDIC SURGERY

## 2018-08-13 PROCEDURE — 3017F COLORECTAL CA SCREEN DOC REV: CPT | Performed by: ORTHOPAEDIC SURGERY

## 2018-08-13 PROCEDURE — G8417 CALC BMI ABV UP PARAM F/U: HCPCS | Performed by: ORTHOPAEDIC SURGERY

## 2018-08-13 PROCEDURE — G8427 DOCREV CUR MEDS BY ELIG CLIN: HCPCS | Performed by: ORTHOPAEDIC SURGERY

## 2018-08-13 PROCEDURE — G8598 ASA/ANTIPLAT THER USED: HCPCS | Performed by: ORTHOPAEDIC SURGERY

## 2018-08-13 PROCEDURE — 99214 OFFICE O/P EST MOD 30 MIN: CPT | Performed by: ORTHOPAEDIC SURGERY

## 2018-08-13 RX ORDER — METHYLPREDNISOLONE ACETATE 40 MG/ML
80 INJECTION, SUSPENSION INTRA-ARTICULAR; INTRALESIONAL; INTRAMUSCULAR; SOFT TISSUE ONCE
Status: COMPLETED | OUTPATIENT
Start: 2018-08-13 | End: 2018-08-13

## 2018-08-13 RX ADMIN — METHYLPREDNISOLONE ACETATE 80 MG: 40 INJECTION, SUSPENSION INTRA-ARTICULAR; INTRALESIONAL; INTRAMUSCULAR; SOFT TISSUE at 10:42

## 2018-08-13 NOTE — PROGRESS NOTES
degrees forward flexion, 50 degrees of external rotation and internal rotation to L3. Active and passive range of motion of the opposite shoulder is full. Examination of the right shoulder reveals positive Neer and Barcenas' impingement signs. There is mild subacromial crepitus with range of motion. Drop arm test is negative bilaterally. Speed's test is negative. There is no evidence of tenderness over the Bicipital groove. He  does have tenderness over the [facility] joint. Cross body adduction test is positive. He has moderate tenderness over the subacromial space. There is no evidence of scapular winging. Lift off sign is negative. There is no evidence of atrophy. External rotation strength is full. There are no skin lesions, cellulitis, or extreme edema in the upper extremities. Sensation is intact to axillary, median, radial, and ulnar nerves bilaterally. The patient has warm and well-perfused bilateral upper extremities with brisk capillary refill. Radial and ulnar pulses are palpable and 2+ bilaterally. X-rays: 4 views of the right shoulder obtained in the office today were extensively reviewed. The x-rays confirm moderate glenohumeral arthritis. There is evidence of a.c. joint arthritis. There is evidence of a calcification within the subacromial space consistent with calcific rotator cuff tendinitis. There is evidence of a type II acromion. Impression:  #1 right shoulder impingement/rotator cuff tendinitis #2 right shoulder a.c. joint arthritis #3 right shoulder glenohumeral arthritis    Plan: At this time we will inject the right shoulder under sterile conditions. After a Betadine prep of the shoulder, 3cc of 0.25% Marcaine and 2cc of 40 mg Depo-Medrol were injected into the shoulder. The patient tolerated the injection rather well. The patient was instructed to follow up immediately for any signs of infection. The patient will follow up with me in 6 week(s). The treatment plan was discussed

## 2018-08-14 ENCOUNTER — CARE COORDINATION (OUTPATIENT)
Dept: CASE MANAGEMENT | Age: 58
End: 2018-08-14

## 2018-08-30 DIAGNOSIS — F32.0 MILD SINGLE CURRENT EPISODE OF MAJOR DEPRESSIVE DISORDER (HCC): ICD-10-CM

## 2018-08-30 DIAGNOSIS — E08.59 DIABETES MELLITUS DUE TO UNDERLYING CONDITION WITH CARDIAC COMPLICATION (HCC): ICD-10-CM

## 2018-08-30 RX ORDER — LOSARTAN POTASSIUM 25 MG/1
25 TABLET ORAL DAILY
Qty: 30 TABLET | Refills: 0 | Status: SHIPPED | OUTPATIENT
Start: 2018-08-30 | End: 2018-10-01 | Stop reason: SDUPTHER

## 2018-08-30 RX ORDER — ATORVASTATIN CALCIUM 10 MG/1
10 TABLET, FILM COATED ORAL DAILY
Qty: 30 TABLET | Refills: 0 | Status: SHIPPED | OUTPATIENT
Start: 2018-08-30 | End: 2018-10-01 | Stop reason: SDUPTHER

## 2018-08-30 RX ORDER — DULOXETIN HYDROCHLORIDE 30 MG/1
30 CAPSULE, DELAYED RELEASE ORAL DAILY
Qty: 30 CAPSULE | Refills: 0 | Status: SHIPPED | OUTPATIENT
Start: 2018-08-30 | End: 2018-10-16 | Stop reason: SDUPTHER

## 2018-09-15 DIAGNOSIS — I50.42 CHRONIC COMBINED SYSTOLIC AND DIASTOLIC HEART FAILURE (HCC): ICD-10-CM

## 2018-09-17 RX ORDER — FUROSEMIDE 40 MG/1
TABLET ORAL
Qty: 60 TABLET | Refills: 0 | Status: SHIPPED | OUTPATIENT
Start: 2018-09-17 | End: 2018-09-26 | Stop reason: SDUPTHER

## 2018-09-20 ENCOUNTER — TELEPHONE (OUTPATIENT)
Dept: INTERNAL MEDICINE CLINIC | Age: 58
End: 2018-09-20

## 2018-09-20 RX ORDER — ALBUTEROL SULFATE 90 UG/1
2 AEROSOL, METERED RESPIRATORY (INHALATION) EVERY 4 HOURS PRN
Qty: 1 INHALER | Refills: 0 | Status: SHIPPED | OUTPATIENT
Start: 2018-09-20 | End: 2018-11-29 | Stop reason: SDUPTHER

## 2018-09-25 ENCOUNTER — TELEPHONE (OUTPATIENT)
Dept: INTERNAL MEDICINE CLINIC | Age: 58
End: 2018-09-25

## 2018-09-25 DIAGNOSIS — Z89.619 HISTORY OF LEG AMPUTATION (HCC): Primary | ICD-10-CM

## 2018-09-26 ENCOUNTER — OFFICE VISIT (OUTPATIENT)
Dept: CARDIOLOGY CLINIC | Age: 58
End: 2018-09-26
Payer: COMMERCIAL

## 2018-09-26 VITALS
HEIGHT: 67 IN | HEART RATE: 75 BPM | BODY MASS INDEX: 33.2 KG/M2 | DIASTOLIC BLOOD PRESSURE: 64 MMHG | OXYGEN SATURATION: 98 % | SYSTOLIC BLOOD PRESSURE: 110 MMHG

## 2018-09-26 DIAGNOSIS — I42.8 NONISCHEMIC CARDIOMYOPATHY (HCC): ICD-10-CM

## 2018-09-26 DIAGNOSIS — I73.9 PAD (PERIPHERAL ARTERY DISEASE) (HCC): ICD-10-CM

## 2018-09-26 DIAGNOSIS — I48.20 CHRONIC ATRIAL FIBRILLATION (HCC): Primary | ICD-10-CM

## 2018-09-26 DIAGNOSIS — Z72.0 TOBACCO ABUSE: ICD-10-CM

## 2018-09-26 DIAGNOSIS — I50.42 CHRONIC COMBINED SYSTOLIC AND DIASTOLIC HF (HEART FAILURE) (HCC): ICD-10-CM

## 2018-09-26 DIAGNOSIS — F10.10 ETOH ABUSE: ICD-10-CM

## 2018-09-26 LAB
A/G RATIO: 1.2 (ref 1.1–2.2)
ALBUMIN SERPL-MCNC: 4.1 G/DL (ref 3.4–5)
ALP BLD-CCNC: 70 U/L (ref 40–129)
ALT SERPL-CCNC: 18 U/L (ref 10–40)
ANION GAP SERPL CALCULATED.3IONS-SCNC: 14 MMOL/L (ref 3–16)
AST SERPL-CCNC: 20 U/L (ref 15–37)
BILIRUB SERPL-MCNC: 0.5 MG/DL (ref 0–1)
BUN BLDV-MCNC: 17 MG/DL (ref 7–20)
CALCIUM SERPL-MCNC: 9.2 MG/DL (ref 8.3–10.6)
CHLORIDE BLD-SCNC: 89 MMOL/L (ref 99–110)
CO2: 34 MMOL/L (ref 21–32)
CREAT SERPL-MCNC: 1 MG/DL (ref 0.9–1.3)
GFR AFRICAN AMERICAN: >60
GFR NON-AFRICAN AMERICAN: >60
GLOBULIN: 3.3 G/DL
GLUCOSE BLD-MCNC: 134 MG/DL (ref 70–99)
POTASSIUM SERPL-SCNC: 3.4 MMOL/L (ref 3.5–5.1)
PRO-BNP: 901 PG/ML (ref 0–124)
SODIUM BLD-SCNC: 137 MMOL/L (ref 136–145)
TOTAL PROTEIN: 7.4 G/DL (ref 6.4–8.2)

## 2018-09-26 PROCEDURE — G8598 ASA/ANTIPLAT THER USED: HCPCS | Performed by: NURSE PRACTITIONER

## 2018-09-26 PROCEDURE — G8417 CALC BMI ABV UP PARAM F/U: HCPCS | Performed by: NURSE PRACTITIONER

## 2018-09-26 PROCEDURE — 3017F COLORECTAL CA SCREEN DOC REV: CPT | Performed by: NURSE PRACTITIONER

## 2018-09-26 PROCEDURE — G8427 DOCREV CUR MEDS BY ELIG CLIN: HCPCS | Performed by: NURSE PRACTITIONER

## 2018-09-26 PROCEDURE — 99214 OFFICE O/P EST MOD 30 MIN: CPT | Performed by: NURSE PRACTITIONER

## 2018-09-26 PROCEDURE — 4004F PT TOBACCO SCREEN RCVD TLK: CPT | Performed by: NURSE PRACTITIONER

## 2018-09-26 PROCEDURE — 93000 ELECTROCARDIOGRAM COMPLETE: CPT | Performed by: NURSE PRACTITIONER

## 2018-09-26 RX ORDER — FUROSEMIDE 40 MG/1
TABLET ORAL
Qty: 80 TABLET | Refills: 3 | Status: SHIPPED | OUTPATIENT
Start: 2018-09-26 | End: 2018-11-29 | Stop reason: SDUPTHER

## 2018-09-26 NOTE — LETTER
[facility] [address]  Phone: [phone]  Fax: [phone]    Abdulaziz Butler, APRN - CNP        September 26, 2018     Walter Olivarez MD  [address] [place] [address]    Patient: Johnathon Avery  MR Number: [id number]  YOB: 1960  Date of Visit: 9/26/2018    Dear Dr. Walter Olivarez: Thank you for the request for consultation for Adam Sizer     HPI:  The patient is 62 y.o. male with a past medical history significant for COPD, HTN, PAD with prior stents, HLP, NICM , A-fib and SHF here for follow up. He has had a multiple hospital admits in the past for CHF, rapid A-fib and COPD exacerbation. Last hospitalized from 7/15-7/20/2018 after presenting with worsening SOB, cough, generalized weakness. He was intubated, diuresed and placed on IV cardizem for rapid atrial fib. Diuresed > 3L and wt at discharge 213#. + for cocaine on drug screen. He was followed up in office in 7/2018 and no med changes made. Overall feeling okay. He is frustrated that he can't get up on his leg yet. He has talked with his PCP and is trying to get into inpatient rehab at [facility] to help improve his ability to get up on his legs. Unable to stand on scale. Feels he has gained weight: abdominal girth increased. Edema in R leg. Continues to smoke and drink beer daily 8-10 beers a day. Admits he does not pay any attention to sodium intake daily. Has noted increased SOB. Uses O2 at night. Denies chest pain/discomfort,  orthopnea/PND, cough, palpitations, dizziness, syncope. L AKA    Assessment:    1. Chronic atrial fibrillation (HCC)  -VR slightly increased today (suspect secondary to volume overload)  -continue BB and CCB  -on Xarelto     2.  Nonischemic Cardiomyopathy  -multifactorial  -chronic diastolic dysfunction (LVEF 45-50%)  -continue BB, ARB and diuretic   -low sodium diet and fluid restriction 3. Chronic combined systolic and diastolic HF (heart failure) (HCC)  -compensated at present  -LVEF 45-50%: NYHA class III  -continue  BB and ARB  -increase diuretic  -no aldactone d/t elevated Cr and elevated K+ in the past    4. Tobacco use  -on Nicoderm patch  -smoking cessation emphasized    5. PAD (peripheral artery disease) ([facility])  -S/P L AKA  -continue statin  -followed by vascular     6. ETOH abuse  -decreasing beer consumption discussed    7. Essential hypertension  -well controlled  -continue medical management    8. Chronic obstructive pulmonary disease, unspecified COPD type ([facility] [address]  -on home O2 as needed   -continues to smoke and cessation emphasized    9. Diabetes Mellitus, type II  -per Primary MD    Plan:    Continue diltiazem, statin, lasix, metolazone, losartan, Toprol, Xarelto, KCL, magnesium tab  Increase Lasix  to 80 mg in the AM and 40 mg in the PM x 5 days and then decrease to 40 mg BID given fluid overload  Reinforced and discussed low-fat/low sodium diet, monitoring of daily weights, fluid restriction (which includes decreasing ETOH intake significantly), worsening signs and symptoms of heart failure and when to call, and the importance of regular exercise and activity. Discussed smoking cessation and discussed strategies for smoking cessation (> 3-5 minutes of counseling given)  Discussed decreasing ETOH intake  Check CMP and BNP level today  Discussed his lifestyle choices and long term effect on his prognosis. He does not seem to want to change his habits stating, \"It's all I got. \"  Follow up with D. Enzweiler, CNP in 2 months  or sooner if needed    Return in about 2 months (around 11/26/2018) for with D. Enzweiler, CNP. Thanks for allowing me to participate in the care of this patient. If you have questions, please do not hesitate to call me. I look forward to following Jodie Nassar along with you.     Sincerely,        JOON Lantigua - SAMANTHA

## 2018-09-26 NOTE — PROGRESS NOTES
[address]  Office Visit    Igor Panda  1960 September 26, 2018    CC:   Chief Complaint   Patient presents with    Follow-up     chf,copd,dm,hld,pad,af/ SOB at times, some swelling/ denies any other symptoms/ pt didnt bring medication list today     HPI:  The patient is 62 y.o. male with a past medical history significant for COPD, HTN, PAD with prior stents, HLP, NICM , A-fib and SHF here for follow up. He has had a multiple hospital admits in the past for CHF, rapid A-fib and COPD exacerbation. Last hospitalized from 7/15-7/20/2018 after presenting with worsening SOB, cough, generalized weakness. He was intubated, diuresed and placed on IV cardizem for rapid atrial fib. Diuresed > 3L and wt at discharge 213#. + for cocaine on drug screen. He was followed up in office in 7/2018 and no med changes made. Overall feeling okay. He is frustrated that he can't get up on his leg yet. He has talked with his PCP and is trying to get into inpatient rehab at [facility] to help improve his ability to get up on his legs. Unable to stand on scale. Feels he has gained weight: abdominal girth increased. Edema in R leg. Continues to smoke and drink beer daily 8-10 beers a day. Admits he does not pay any attention to sodium intake daily. Has noted increased SOB. Uses O2 at night. Denies chest pain/discomfort,  orthopnea/PND, cough, palpitations, dizziness, syncope. L AKA    Review of Systems:  Constitutional: Denies  weakness, night sweats or fever. + chronic fatigue   HEENT: Denies new visual changes, ringing in ears, nosebleeds, nasal congestion   Respiratory: + chronic SOB   Cardiovascular: see HPI  GI: Denies N/V, diarrhea, constipation, abdominal pain, change in bowel habits, melena or hematochezia  : Denies urinary frequency, urgency, incontinence, hematuria or dysuria.   Skin: Denies rash, hives, or cyanosis  Musculoskeletal: + chronic shoulder pain and L stump 3/24/2018    Smokeless tobacco: Never Used    Alcohol use 4.2 oz/week     7 Cans of beer per week      Comment: daily - last drink 2. 1.17       Allergies   Allergen Reactions    Rocephin [Ceftriaxone] Other (See Comments)     Sloughing of skin (blisters) on hands and lower arms; pancytopenia     Current Outpatient Prescriptions   Medication Sig Dispense Refill    furosemide (LASIX) 40 MG tablet Take 80 mg (2 tablets) in the AM and 40 mg (1 tablet) in the PM x 5 days and then decrease to 40 mg tablet twice a day 80 tablet 3    albuterol sulfate HFA (PROVENTIL HFA) 108 (90 Base) MCG/ACT inhaler Inhale 2 puffs into the lungs every 4 hours as needed for Wheezing or Shortness of Breath (Space out to every 6 hours as symptoms improve) Space out to every 6 hours as symptoms improve. 1 Inhaler 0    mometasone-formoterol (DULERA) 100-5 MCG/ACT inhaler Inhale 2 puffs into the lungs 2 times daily Rinse mouth after using.  1 Inhaler 2    DULoxetine (CYMBALTA) 30 MG extended release capsule TAKE 1 CAPSULE BY MOUTH DAILY 30 capsule 0    atorvastatin (LIPITOR) 10 MG tablet TAKE 1 TABLET BY MOUTH DAILY 30 tablet 0    losartan (COZAAR) 25 MG tablet TAKE 1 TABLET BY MOUTH DAILY 30 tablet 0    diltiazem (CARDIZEM CD) 120 MG extended release capsule Take 1 capsule by mouth daily 30 capsule 3    mometasone-formoterol (DULERA) 100-5 MCG/ACT inhaler Inhale 2 puffs into the lungs 2 times daily 1 Inhaler 0    tamsulosin (FLOMAX) 0.4 MG capsule TAKE 1 CAPSULE BY MOUTH DAILY 30 capsule 0    nicotine (NICODERM CQ) 21 MG/24HR Place 1 patch onto the skin every 24 hours 30 patch 5    omeprazole (PRILOSEC) 40 MG delayed release capsule Take 1 capsule by mouth daily (with breakfast) 90 capsule 3    rivaroxaban (XARELTO) 20 MG TABS tablet Take 1 tablet by mouth daily (with breakfast) 30 tablet 5    traZODone (DESYREL) 50 MG tablet Take 1 tablet by mouth nightly 60 tablet 3    metoprolol succinate (TOPROL XL) 50 MG extended release is warm and dry. Psychiatric: He has a normal mood and affect. His speech is normal and behavior is normal.     Lab Review:   Lab Results   Component Value Date    TRIG 230 07/16/2018    HDL 26 07/16/2018    LDLCALC 70 07/16/2018    LABVLDL 46 07/16/2018     Lab Results   Component Value Date     07/20/2018    K 3.2 07/20/2018    K 3.3 07/15/2018    CL 99 07/20/2018    CO2 37 07/20/2018    BUN 43 07/20/2018    CREATININE 1.0 07/20/2018    GLUCOSE 136 07/20/2018    CALCIUM 8.4 07/20/2018      Lab Results   Component Value Date    WBC 11.9 (H) 07/20/2018    HGB 11.2 (L) 07/20/2018    HCT 33.4 (L) 07/20/2018    MCV 89.6 07/20/2018     07/20/2018     ECG 9/26/2018: Atrial fib with ; nonspecific ST-TW changes    Echo 4/25/2018:  Technically difficult study due to patient size and body habitus .   Mild bi-atrial enlargement.   Mild conc. LVH ; EF 45-50%.   Normal right ventricular size and function.     Echo 5/22/17:  Left ventricular size appears to be increased .  Normal left ventricular wall thickness. Ejection fraction is moderately reduced & visually estimated to be 35-40 %. The mitral valve leaflets are slightly thickened with normal leaflet mobility. The left atrium appears to be dilated. The right ventricle appears to be mildly enlarged. Right atrial size appears to be dilated . [address] 2/8/2017:  OVERALL IMPRESSION:  1. Significantly elevated right heart pressures and capillary wedge pressures  consistent with congestive heart failure. 2. Normal cardiac output and indices. RHC/LHC 8/11/2015:   1. Right atrial mean pressure was 8 mmHg.    2. RV pressure was 40/2 with mean of 2.    3. PA pressure was 47/19 with a mean of 27.  4. Pulmonary capillary wedge pressure mean was about 20.  5. Cardiac output is 3.13 L per minute. 6. Cardiac index was 1.5 L per minute/body surface area.    7. The LV pressure was 92/5.    8. End-diastolic pressure was 17.  There was no gradient across the aortic consumption discussed    7. Essential hypertension  -well controlled  -continue medical management    8. Chronic obstructive pulmonary disease, unspecified COPD type ([address]  -on home O2 as needed   -continues to smoke and cessation emphasized    9. Diabetes Mellitus, type II  -per Primary MD    Plan:  Continue diltiazem, statin, lasix, metolazone, losartan, Toprol, Xarelto, KCL, magnesium tab  Increase Lasix  to 80 mg in the AM and 40 mg in the PM x 5 days and then decrease to 40 mg BID given fluid overload  Reinforced and discussed low-fat/low sodium diet, monitoring of daily weights, fluid restriction (which includes decreasing ETOH intake significantly), worsening signs and symptoms of heart failure and when to call, and the importance of regular exercise and activity. Discussed smoking cessation and discussed strategies for smoking cessation (> 3-5 minutes of counseling given)  Discussed decreasing ETOH intake  Check CMP and BNP level today  Discussed his lifestyle choices and long term effect on his prognosis. He does not seem to want to change his habits stating, \"It's all I got. \"  Follow up with D. Enzweiler, CNP in 2 months  or sooner if needed    Return in about 2 months (around 11/26/2018) for with D. Enzweiler, CNP. Thanks for allowing me to participate in the care of this patient.       Edith Catalan, [address], [address], [address]  Office: [phone]  Fax: [phone]

## 2018-09-26 NOTE — PATIENT INSTRUCTIONS
Decrease fluid intake to no more than 64 ounces daily    Decrease sodium intake to < 2000 mg daily    Change Lasix (Furosemide) to 80 mg (2 tabs) in the AM and 40 mg (1 tab) in the PM x 5 days and then decrease to 40 mg tablet twice a day    Lab work today: CMP and BNP

## 2018-09-27 ENCOUNTER — TELEPHONE (OUTPATIENT)
Dept: INTERNAL MEDICINE CLINIC | Age: 58
End: 2018-09-27

## 2018-09-27 RX ORDER — POTASSIUM CHLORIDE 1.5 G/1.77G
20 POWDER, FOR SOLUTION ORAL DAILY
Qty: 30 EACH | Refills: 3 | Status: SHIPPED | OUTPATIENT
Start: 2018-09-27 | End: 2018-11-29

## 2018-10-01 DIAGNOSIS — E08.59 DIABETES MELLITUS DUE TO UNDERLYING CONDITION WITH CARDIAC COMPLICATION (HCC): ICD-10-CM

## 2018-10-01 RX ORDER — LOSARTAN POTASSIUM 25 MG/1
25 TABLET ORAL DAILY
Qty: 30 TABLET | Refills: 0 | Status: SHIPPED | OUTPATIENT
Start: 2018-10-01 | End: 2018-11-18 | Stop reason: SDUPTHER

## 2018-10-01 RX ORDER — TAMSULOSIN HYDROCHLORIDE 0.4 MG/1
0.4 CAPSULE ORAL DAILY
Qty: 30 CAPSULE | Refills: 0 | Status: SHIPPED | OUTPATIENT
Start: 2018-10-01 | End: 2018-11-06 | Stop reason: SDUPTHER

## 2018-10-01 RX ORDER — ATORVASTATIN CALCIUM 10 MG/1
10 TABLET, FILM COATED ORAL DAILY
Qty: 30 TABLET | Refills: 0 | Status: ON HOLD | OUTPATIENT
Start: 2018-10-01 | End: 2018-10-21 | Stop reason: HOSPADM

## 2018-10-16 ENCOUNTER — HOSPITAL ENCOUNTER (INPATIENT)
Age: 58
LOS: 5 days | Discharge: HOME | DRG: 194 | End: 2018-10-21
Attending: EMERGENCY MEDICINE | Admitting: HOSPITALIST
Payer: COMMERCIAL

## 2018-10-16 ENCOUNTER — APPOINTMENT (OUTPATIENT)
Dept: GENERAL RADIOLOGY | Age: 58
DRG: 194 | End: 2018-10-16
Payer: COMMERCIAL

## 2018-10-16 DIAGNOSIS — I48.91 ATRIAL FIBRILLATION WITH RAPID VENTRICULAR RESPONSE (HCC): ICD-10-CM

## 2018-10-16 DIAGNOSIS — F32.0 MILD SINGLE CURRENT EPISODE OF MAJOR DEPRESSIVE DISORDER (HCC): ICD-10-CM

## 2018-10-16 DIAGNOSIS — I50.9 ACUTE ON CHRONIC CONGESTIVE HEART FAILURE, UNSPECIFIED HEART FAILURE TYPE (HCC): Primary | ICD-10-CM

## 2018-10-16 LAB
A/G RATIO: 1.1 (ref 1.1–2.2)
ALBUMIN SERPL-MCNC: 4.2 G/DL (ref 3.4–5)
ALP BLD-CCNC: 68 U/L (ref 40–129)
ALT SERPL-CCNC: 20 U/L (ref 10–40)
ANION GAP SERPL CALCULATED.3IONS-SCNC: 16 MMOL/L (ref 3–16)
AST SERPL-CCNC: 30 U/L (ref 15–37)
BILIRUB SERPL-MCNC: 0.4 MG/DL (ref 0–1)
BILIRUBIN URINE: NEGATIVE
BLOOD, URINE: NEGATIVE
BUN BLDV-MCNC: 14 MG/DL (ref 7–20)
CALCIUM SERPL-MCNC: 9.6 MG/DL (ref 8.3–10.6)
CHLORIDE BLD-SCNC: 89 MMOL/L (ref 99–110)
CLARITY: CLEAR
CO2: 33 MMOL/L (ref 21–32)
COLOR: YELLOW
CREAT SERPL-MCNC: 0.9 MG/DL (ref 0.9–1.3)
ETHANOL: 53 MG/DL (ref 0–0.08)
GFR AFRICAN AMERICAN: >60
GFR NON-AFRICAN AMERICAN: >60
GLOBULIN: 3.8 G/DL
GLUCOSE BLD-MCNC: 176 MG/DL (ref 70–99)
GLUCOSE BLD-MCNC: 177 MG/DL (ref 70–99)
GLUCOSE URINE: NEGATIVE MG/DL
HCT VFR BLD CALC: 38.9 % (ref 40.5–52.5)
HEMOGLOBIN: 13 G/DL (ref 13.5–17.5)
KETONES, URINE: NEGATIVE MG/DL
LEUKOCYTE ESTERASE, URINE: NEGATIVE
MCH RBC QN AUTO: 29.6 PG (ref 26–34)
MCHC RBC AUTO-ENTMCNC: 33.5 G/DL (ref 31–36)
MCV RBC AUTO: 88.4 FL (ref 80–100)
MICROSCOPIC EXAMINATION: NORMAL
NITRITE, URINE: NEGATIVE
PDW BLD-RTO: 14.4 % (ref 12.4–15.4)
PERFORMED ON: ABNORMAL
PH UA: 6
PLATELET # BLD: 236 K/UL (ref 135–450)
PMV BLD AUTO: 8.5 FL (ref 5–10.5)
POTASSIUM SERPL-SCNC: 3.1 MMOL/L (ref 3.5–5.1)
PRO-BNP: 1081 PG/ML (ref 0–124)
PROTEIN UA: NEGATIVE MG/DL
RBC # BLD: 4.4 M/UL (ref 4.2–5.9)
SODIUM BLD-SCNC: 138 MMOL/L (ref 136–145)
SPECIFIC GRAVITY UA: 1.01
TOTAL PROTEIN: 8 G/DL (ref 6.4–8.2)
TROPONIN: <0.01 NG/ML
URINE REFLEX TO CULTURE: NORMAL
URINE TYPE: NORMAL
UROBILINOGEN, URINE: 0.2 E.U./DL
WBC # BLD: 9.8 K/UL (ref 4–11)

## 2018-10-16 PROCEDURE — 83880 ASSAY OF NATRIURETIC PEPTIDE: CPT

## 2018-10-16 PROCEDURE — 96365 THER/PROPH/DIAG IV INF INIT: CPT

## 2018-10-16 PROCEDURE — 99285 EMERGENCY DEPT VISIT HI MDM: CPT

## 2018-10-16 PROCEDURE — 6370000000 HC RX 637 (ALT 250 FOR IP): Performed by: HOSPITALIST

## 2018-10-16 PROCEDURE — 6360000002 HC RX W HCPCS: Performed by: PHYSICIAN ASSISTANT

## 2018-10-16 PROCEDURE — 93005 ELECTROCARDIOGRAM TRACING: CPT | Performed by: PHYSICIAN ASSISTANT

## 2018-10-16 PROCEDURE — 2580000003 HC RX 258: Performed by: PHYSICIAN ASSISTANT

## 2018-10-16 PROCEDURE — 2500000003 HC RX 250 WO HCPCS: Performed by: PHYSICIAN ASSISTANT

## 2018-10-16 PROCEDURE — 71046 X-RAY EXAM CHEST 2 VIEWS: CPT

## 2018-10-16 PROCEDURE — 6360000002 HC RX W HCPCS: Performed by: HOSPITALIST

## 2018-10-16 PROCEDURE — 2060000000 HC ICU INTERMEDIATE R&B

## 2018-10-16 PROCEDURE — 6370000000 HC RX 637 (ALT 250 FOR IP): Performed by: PHYSICIAN ASSISTANT

## 2018-10-16 PROCEDURE — 94640 AIRWAY INHALATION TREATMENT: CPT

## 2018-10-16 PROCEDURE — 84484 ASSAY OF TROPONIN QUANT: CPT

## 2018-10-16 PROCEDURE — G0480 DRUG TEST DEF 1-7 CLASSES: HCPCS

## 2018-10-16 PROCEDURE — 85027 COMPLETE CBC AUTOMATED: CPT

## 2018-10-16 PROCEDURE — 81003 URINALYSIS AUTO W/O SCOPE: CPT

## 2018-10-16 PROCEDURE — 80053 COMPREHEN METABOLIC PANEL: CPT

## 2018-10-16 PROCEDURE — 2700000000 HC OXYGEN THERAPY PER DAY

## 2018-10-16 PROCEDURE — 96376 TX/PRO/DX INJ SAME DRUG ADON: CPT

## 2018-10-16 PROCEDURE — 2580000003 HC RX 258: Performed by: HOSPITALIST

## 2018-10-16 PROCEDURE — 96375 TX/PRO/DX INJ NEW DRUG ADDON: CPT

## 2018-10-16 PROCEDURE — 94664 DEMO&/EVAL PT USE INHALER: CPT

## 2018-10-16 RX ORDER — PREDNISONE 20 MG/1
40 TABLET ORAL DAILY
Status: DISCONTINUED | OUTPATIENT
Start: 2018-10-17 | End: 2018-10-18

## 2018-10-16 RX ORDER — POTASSIUM CHLORIDE 1.5 G/1.77G
20 POWDER, FOR SOLUTION ORAL DAILY
Status: DISCONTINUED | OUTPATIENT
Start: 2018-10-17 | End: 2018-10-17

## 2018-10-16 RX ORDER — POTASSIUM CHLORIDE 1.5 G/1.77G
40 POWDER, FOR SOLUTION ORAL PRN
Status: DISCONTINUED | OUTPATIENT
Start: 2018-10-16 | End: 2018-10-21 | Stop reason: HOSPADM

## 2018-10-16 RX ORDER — METHYLPREDNISOLONE SODIUM SUCCINATE 125 MG/2ML
125 INJECTION, POWDER, LYOPHILIZED, FOR SOLUTION INTRAMUSCULAR; INTRAVENOUS ONCE
Status: COMPLETED | OUTPATIENT
Start: 2018-10-16 | End: 2018-10-16

## 2018-10-16 RX ORDER — MAGNESIUM SULFATE 1 G/100ML
1 INJECTION INTRAVENOUS PRN
Status: DISCONTINUED | OUTPATIENT
Start: 2018-10-16 | End: 2018-10-21 | Stop reason: HOSPADM

## 2018-10-16 RX ORDER — LOSARTAN POTASSIUM 25 MG/1
25 TABLET ORAL DAILY
Status: DISCONTINUED | OUTPATIENT
Start: 2018-10-17 | End: 2018-10-21 | Stop reason: HOSPADM

## 2018-10-16 RX ORDER — PANTOPRAZOLE SODIUM 40 MG/1
40 TABLET, DELAYED RELEASE ORAL
Status: DISCONTINUED | OUTPATIENT
Start: 2018-10-17 | End: 2018-10-21 | Stop reason: HOSPADM

## 2018-10-16 RX ORDER — ONDANSETRON 2 MG/ML
4 INJECTION INTRAMUSCULAR; INTRAVENOUS EVERY 6 HOURS PRN
Status: DISCONTINUED | OUTPATIENT
Start: 2018-10-16 | End: 2018-10-21 | Stop reason: HOSPADM

## 2018-10-16 RX ORDER — POTASSIUM CHLORIDE 20 MEQ/1
40 TABLET, EXTENDED RELEASE ORAL ONCE
Status: COMPLETED | OUTPATIENT
Start: 2018-10-16 | End: 2018-10-16

## 2018-10-16 RX ORDER — SODIUM CHLORIDE 0.9 % (FLUSH) 0.9 %
10 SYRINGE (ML) INJECTION PRN
Status: DISCONTINUED | OUTPATIENT
Start: 2018-10-16 | End: 2018-10-21 | Stop reason: HOSPADM

## 2018-10-16 RX ORDER — GABAPENTIN 100 MG/1
200 CAPSULE ORAL 3 TIMES DAILY
COMMUNITY

## 2018-10-16 RX ORDER — ACETAMINOPHEN 325 MG/1
650 TABLET ORAL EVERY 4 HOURS PRN
Status: DISCONTINUED | OUTPATIENT
Start: 2018-10-16 | End: 2018-10-21 | Stop reason: HOSPADM

## 2018-10-16 RX ORDER — DEXTROSE MONOHYDRATE 25 G/50ML
12.5 INJECTION, SOLUTION INTRAVENOUS PRN
Status: DISCONTINUED | OUTPATIENT
Start: 2018-10-16 | End: 2018-10-21 | Stop reason: HOSPADM

## 2018-10-16 RX ORDER — ATORVASTATIN CALCIUM 10 MG/1
10 TABLET, FILM COATED ORAL DAILY
Status: DISCONTINUED | OUTPATIENT
Start: 2018-10-17 | End: 2018-10-20

## 2018-10-16 RX ORDER — POTASSIUM CHLORIDE 20 MEQ/1
40 TABLET, EXTENDED RELEASE ORAL PRN
Status: DISCONTINUED | OUTPATIENT
Start: 2018-10-16 | End: 2018-10-21 | Stop reason: HOSPADM

## 2018-10-16 RX ORDER — FUROSEMIDE 10 MG/ML
40 INJECTION INTRAMUSCULAR; INTRAVENOUS 2 TIMES DAILY
Status: DISCONTINUED | OUTPATIENT
Start: 2018-10-17 | End: 2018-10-20

## 2018-10-16 RX ORDER — NICOTINE POLACRILEX 4 MG
15 LOZENGE BUCCAL PRN
Status: DISCONTINUED | OUTPATIENT
Start: 2018-10-16 | End: 2018-10-21 | Stop reason: HOSPADM

## 2018-10-16 RX ORDER — DULOXETIN HYDROCHLORIDE 30 MG/1
30 CAPSULE, DELAYED RELEASE ORAL DAILY
Qty: 30 CAPSULE | Refills: 0 | Status: SHIPPED | OUTPATIENT
Start: 2018-10-16 | End: 2018-11-25 | Stop reason: SDUPTHER

## 2018-10-16 RX ORDER — TAMSULOSIN HYDROCHLORIDE 0.4 MG/1
0.4 CAPSULE ORAL DAILY
Status: DISCONTINUED | OUTPATIENT
Start: 2018-10-17 | End: 2018-10-21 | Stop reason: HOSPADM

## 2018-10-16 RX ORDER — HYDROCODONE BITARTRATE AND ACETAMINOPHEN 5; 325 MG/1; MG/1
1 TABLET ORAL ONCE
Status: COMPLETED | OUTPATIENT
Start: 2018-10-16 | End: 2018-10-16

## 2018-10-16 RX ORDER — DULOXETIN HYDROCHLORIDE 30 MG/1
30 CAPSULE, DELAYED RELEASE ORAL DAILY
Status: DISCONTINUED | OUTPATIENT
Start: 2018-10-17 | End: 2018-10-21 | Stop reason: HOSPADM

## 2018-10-16 RX ORDER — SODIUM CHLORIDE 0.9 % (FLUSH) 0.9 %
10 SYRINGE (ML) INJECTION EVERY 12 HOURS SCHEDULED
Status: DISCONTINUED | OUTPATIENT
Start: 2018-10-16 | End: 2018-10-21 | Stop reason: HOSPADM

## 2018-10-16 RX ORDER — FUROSEMIDE 10 MG/ML
40 INJECTION INTRAMUSCULAR; INTRAVENOUS ONCE
Status: COMPLETED | OUTPATIENT
Start: 2018-10-16 | End: 2018-10-16

## 2018-10-16 RX ORDER — DILTIAZEM HYDROCHLORIDE 5 MG/ML
10 INJECTION INTRAVENOUS ONCE
Status: COMPLETED | OUTPATIENT
Start: 2018-10-16 | End: 2018-10-16

## 2018-10-16 RX ORDER — GABAPENTIN 100 MG/1
100 CAPSULE ORAL 3 TIMES DAILY
Status: DISCONTINUED | OUTPATIENT
Start: 2018-10-16 | End: 2018-10-21 | Stop reason: HOSPADM

## 2018-10-16 RX ORDER — TRAZODONE HYDROCHLORIDE 50 MG/1
50 TABLET ORAL NIGHTLY
Status: DISCONTINUED | OUTPATIENT
Start: 2018-10-16 | End: 2018-10-21 | Stop reason: HOSPADM

## 2018-10-16 RX ORDER — POTASSIUM CHLORIDE 7.45 MG/ML
10 INJECTION INTRAVENOUS PRN
Status: DISCONTINUED | OUTPATIENT
Start: 2018-10-16 | End: 2018-10-21 | Stop reason: HOSPADM

## 2018-10-16 RX ORDER — NICOTINE 21 MG/24HR
1 PATCH, TRANSDERMAL 24 HOURS TRANSDERMAL EVERY 24 HOURS
Status: DISCONTINUED | OUTPATIENT
Start: 2018-10-17 | End: 2018-10-21 | Stop reason: HOSPADM

## 2018-10-16 RX ORDER — DEXTROSE MONOHYDRATE 50 MG/ML
100 INJECTION, SOLUTION INTRAVENOUS PRN
Status: DISCONTINUED | OUTPATIENT
Start: 2018-10-16 | End: 2018-10-21 | Stop reason: HOSPADM

## 2018-10-16 RX ADMIN — GABAPENTIN 100 MG: 100 CAPSULE ORAL at 23:24

## 2018-10-16 RX ADMIN — HYDROCODONE BITARTRATE AND ACETAMINOPHEN 1 TABLET: 5; 325 TABLET ORAL at 20:16

## 2018-10-16 RX ADMIN — METHYLPREDNISOLONE SODIUM SUCCINATE 125 MG: 125 INJECTION, POWDER, FOR SOLUTION INTRAMUSCULAR; INTRAVENOUS at 23:25

## 2018-10-16 RX ADMIN — DILTIAZEM HYDROCHLORIDE 10 MG: 5 INJECTION INTRAVENOUS at 21:33

## 2018-10-16 RX ADMIN — Medication 10 ML: at 23:24

## 2018-10-16 RX ADMIN — POTASSIUM CHLORIDE 40 MEQ: 20 TABLET, EXTENDED RELEASE ORAL at 20:21

## 2018-10-16 RX ADMIN — INSULIN LISPRO 1 UNITS: 100 INJECTION, SOLUTION INTRAVENOUS; SUBCUTANEOUS at 23:25

## 2018-10-16 RX ADMIN — TRAZODONE HYDROCHLORIDE 50 MG: 50 TABLET ORAL at 23:24

## 2018-10-16 RX ADMIN — FUROSEMIDE 40 MG: 10 INJECTION, SOLUTION INTRAMUSCULAR; INTRAVENOUS at 20:19

## 2018-10-16 RX ADMIN — DILTIAZEM HYDROCHLORIDE 5 MG/HR: 5 INJECTION INTRAVENOUS at 22:15

## 2018-10-16 RX ADMIN — MOMETASONE FUROATE AND FORMOTEROL FUMARATE DIHYDRATE 2 PUFF: 100; 5 AEROSOL RESPIRATORY (INHALATION) at 23:25

## 2018-10-16 RX ADMIN — METOPROLOL SUCCINATE 75 MG: 50 TABLET, EXTENDED RELEASE ORAL at 23:24

## 2018-10-16 RX ADMIN — Medication 400 MG: at 23:24

## 2018-10-16 ASSESSMENT — PAIN DESCRIPTION - LOCATION
LOCATION: BACK
LOCATION: BACK
LOCATION: LEG;BACK
LOCATION: BACK

## 2018-10-16 ASSESSMENT — ENCOUNTER SYMPTOMS
ABDOMINAL PAIN: 0
COUGH: 1
COLOR CHANGE: 0
VOMITING: 0
SHORTNESS OF BREATH: 1

## 2018-10-16 ASSESSMENT — PAIN SCALES - GENERAL
PAINLEVEL_OUTOF10: 10
PAINLEVEL_OUTOF10: 9
PAINLEVEL_OUTOF10: 8

## 2018-10-16 ASSESSMENT — PAIN DESCRIPTION - ORIENTATION
ORIENTATION: LEFT
ORIENTATION: LOWER

## 2018-10-16 ASSESSMENT — PAIN DESCRIPTION - DESCRIPTORS: DESCRIPTORS: ACHING;THROBBING

## 2018-10-16 ASSESSMENT — PAIN DESCRIPTION - FREQUENCY: FREQUENCY: CONTINUOUS

## 2018-10-16 ASSESSMENT — PAIN DESCRIPTION - PAIN TYPE: TYPE: CHRONIC PAIN

## 2018-10-16 NOTE — ED TRIAGE NOTES
Pt to ED via [facility] with c/o of SOB of last couple of days. States he feels like he has too many fluids build up. SpO2 is 95% on RA. Pt is in Afib rate of 120 with occ PVCs. Pt states he is having lower back pain, that he feels as if his kidneys are full. Pt states he drinks 5 or more alcoholic drinks a day, does not wish to talk about it at this time. States he knows its a problem but he has bigger problems right now. Pt also reports lower back pain from 3 herniated discs.

## 2018-10-16 NOTE — ED PROVIDER NOTES
[address]  eMERGENCY dEPARTMENT eNCOUnter      Pt Name: Jane Raygoza  MRN: [id number]  [place] 1960  Date of evaluation: 10/16/2018  Provider: Braden Resendiz Dr       Chief Complaint   Patient presents with    Shortness of Breath     pt to ER via [facility] for c/o shortness of breath for a couple of days       CRITICAL CARE TIME   Total Critical Care time was 31 minutes, excluding separately reportable procedures. There was a high probability of clinically significant/life threatening deterioration in the patient's condition which required my urgent intervention. Including IV Cardizem management, reevaluation, supplement oxygen, discussion with hospitalist, IV Lasix. HISTORY OF PRESENT ILLNESS  (Location/Symptom, Timing/Onset, Context/Setting, Quality, Duration, Modifying Factors, Severity.)   Jane Raygoza is a 62 y.o. male who presents to the emergency department via EMS complaint shortness of breath and cough. Symptoms have been present for the past 2-3 days. Patient has history of COPD and states that he uses 2 L via nasal cannula p.r.n. He states that he is a smoker. He is coughing up yellow-green phlegm. He states that his symptoms feel similar to prior congestive heart failure exacerbations. He has a left above-the-knee amputation but states his right leg has been swelling. No vomiting or abdominal pain. Takes Lasix 40 mg bid at home as well as Xarelto for history of chronic A. fib. Nursing Notes were reviewed and I agree. REVIEW OF SYSTEMS    (2-9 systems for level 4, 10 or more for level 5)     Review of Systems   Constitutional: Negative for fever. Respiratory: Positive for cough and shortness of breath. Cardiovascular: Positive for leg swelling. Negative for chest pain. Gastrointestinal: Negative for abdominal pain and vomiting. Skin: Negative for color change, rash and wound.    Neurological: Negative for weakness and drinks about 4.2 oz of alcohol per week . He reports that he does not use drugs. PHYSICAL EXAM    (up to 7 for level 4, 8 or more for level 5)     ED Triage Vitals [10/16/18 1818]   BP Temp Temp Source Pulse Resp SpO2 Height Weight   120/61 98.6 °F (37 °C) Oral 117 20 94 % 5' 7\" (1.702 m) 234 lb 9.1 oz (106.4 kg)     Physical Exam   Constitutional: He is oriented to person, place, and time. He appears well-developed and well-nourished. No distress. HENT:   Head: Normocephalic and atraumatic. Eyes: Pupils are equal, round, and reactive to light. Neck: Normal range of motion. Cardiovascular: Normal heart sounds. An irregularly irregular rhythm present. Tachycardia present. Pulmonary/Chest: No respiratory distress. He has rales. Abdominal: Soft. There is no tenderness. Musculoskeletal: He exhibits edema. Left aka prior   Neurological: He is alert and oriented to person, place, and time. Skin: Skin is warm. Psychiatric: He has a normal mood and affect. His behavior is normal.   Nursing note and vitals reviewed. DIAGNOSTIC RESULTS     EKG: All EKG's are interpreted by DREA Moore in the absence of a cardiologist.    EKG interpreted by myself - please refer to attending physician's note for complete EKG interpretation:    Rhythm: afib   Rate: rapid  No evidence of acute ischemia or injury. RADIOLOGY:   Non-plain film images such as CT, Ultrasound and MRI are read by the radiologist. Plain radiographic images are visualized and preliminarily interpreted by DREA Moore with the below findings:    Reviewed radiology's dictation. Interpretation per the Radiologist below, if available at the time of this note:    XR CHEST STANDARD (2 VW)   Final Result   Borderline cardiomegaly with perihilar and reticular airspace opacities that   may represent vascular congestion or mild pulmonary edema. An underlying   viral infection may also be considered.                LABS:  Labs Reviewed

## 2018-10-17 LAB
ANION GAP SERPL CALCULATED.3IONS-SCNC: 13 MMOL/L (ref 3–16)
BUN BLDV-MCNC: 19 MG/DL (ref 7–20)
CALCIUM SERPL-MCNC: 9.6 MG/DL (ref 8.3–10.6)
CHLORIDE BLD-SCNC: 89 MMOL/L (ref 99–110)
CO2: 33 MMOL/L (ref 21–32)
CREAT SERPL-MCNC: 1.3 MG/DL (ref 0.9–1.3)
ESTIMATED AVERAGE GLUCOSE: 180 MG/DL
GFR AFRICAN AMERICAN: >60
GFR NON-AFRICAN AMERICAN: 57
GLUCOSE BLD-MCNC: 292 MG/DL (ref 70–99)
GLUCOSE BLD-MCNC: 331 MG/DL (ref 70–99)
GLUCOSE BLD-MCNC: 334 MG/DL (ref 70–99)
GLUCOSE BLD-MCNC: 360 MG/DL (ref 70–99)
GLUCOSE BLD-MCNC: 399 MG/DL (ref 70–99)
HBA1C MFR BLD: 7.9 %
LV EF: 45 %
LVEF MODALITY: NORMAL
MAGNESIUM: 1.6 MG/DL (ref 1.8–2.4)
MAGNESIUM: 2 MG/DL (ref 1.8–2.4)
PERFORMED ON: ABNORMAL
POTASSIUM SERPL-SCNC: 3.9 MMOL/L (ref 3.5–5.1)
POTASSIUM SERPL-SCNC: 5.2 MMOL/L (ref 3.5–5.1)
PRO-BNP: 700 PG/ML (ref 0–124)
SODIUM BLD-SCNC: 135 MMOL/L (ref 136–145)
TROPONIN: 0.02 NG/ML
TROPONIN: <0.01 NG/ML

## 2018-10-17 PROCEDURE — G0008 ADMIN INFLUENZA VIRUS VAC: HCPCS | Performed by: HOSPITALIST

## 2018-10-17 PROCEDURE — 80048 BASIC METABOLIC PNL TOTAL CA: CPT

## 2018-10-17 PROCEDURE — 83880 ASSAY OF NATRIURETIC PEPTIDE: CPT

## 2018-10-17 PROCEDURE — 94640 AIRWAY INHALATION TREATMENT: CPT

## 2018-10-17 PROCEDURE — 2700000000 HC OXYGEN THERAPY PER DAY

## 2018-10-17 PROCEDURE — 6370000000 HC RX 637 (ALT 250 FOR IP): Performed by: NURSE PRACTITIONER

## 2018-10-17 PROCEDURE — 84132 ASSAY OF SERUM POTASSIUM: CPT

## 2018-10-17 PROCEDURE — 83735 ASSAY OF MAGNESIUM: CPT

## 2018-10-17 PROCEDURE — 84484 ASSAY OF TROPONIN QUANT: CPT

## 2018-10-17 PROCEDURE — 2060000000 HC ICU INTERMEDIATE R&B

## 2018-10-17 PROCEDURE — 6360000004 HC RX CONTRAST MEDICATION: Performed by: INTERNAL MEDICINE

## 2018-10-17 PROCEDURE — 6370000000 HC RX 637 (ALT 250 FOR IP): Performed by: INTERNAL MEDICINE

## 2018-10-17 PROCEDURE — 2580000003 HC RX 258: Performed by: PHYSICIAN ASSISTANT

## 2018-10-17 PROCEDURE — 2580000003 HC RX 258: Performed by: HOSPITALIST

## 2018-10-17 PROCEDURE — 6370000000 HC RX 637 (ALT 250 FOR IP): Performed by: HOSPITALIST

## 2018-10-17 PROCEDURE — 6360000002 HC RX W HCPCS: Performed by: NURSE PRACTITIONER

## 2018-10-17 PROCEDURE — 94761 N-INVAS EAR/PLS OXIMETRY MLT: CPT

## 2018-10-17 PROCEDURE — 83036 HEMOGLOBIN GLYCOSYLATED A1C: CPT

## 2018-10-17 PROCEDURE — 36415 COLL VENOUS BLD VENIPUNCTURE: CPT

## 2018-10-17 PROCEDURE — 2500000003 HC RX 250 WO HCPCS: Performed by: PHYSICIAN ASSISTANT

## 2018-10-17 PROCEDURE — 90686 IIV4 VACC NO PRSV 0.5 ML IM: CPT | Performed by: HOSPITALIST

## 2018-10-17 PROCEDURE — C8929 TTE W OR WO FOL WCON,DOPPLER: HCPCS

## 2018-10-17 PROCEDURE — 6360000002 HC RX W HCPCS: Performed by: HOSPITALIST

## 2018-10-17 RX ORDER — DIPHENHYDRAMINE HYDROCHLORIDE 50 MG/ML
25 INJECTION INTRAMUSCULAR; INTRAVENOUS ONCE
Status: COMPLETED | OUTPATIENT
Start: 2018-10-17 | End: 2018-10-17

## 2018-10-17 RX ORDER — AZITHROMYCIN 250 MG/1
250 TABLET, FILM COATED ORAL DAILY
Status: COMPLETED | OUTPATIENT
Start: 2018-10-18 | End: 2018-10-21

## 2018-10-17 RX ORDER — AZITHROMYCIN 500 MG/1
500 TABLET, FILM COATED ORAL DAILY
Status: COMPLETED | OUTPATIENT
Start: 2018-10-17 | End: 2018-10-17

## 2018-10-17 RX ORDER — INSULIN GLARGINE 100 [IU]/ML
8 INJECTION, SOLUTION SUBCUTANEOUS NIGHTLY
Status: DISCONTINUED | OUTPATIENT
Start: 2018-10-17 | End: 2018-10-18

## 2018-10-17 RX ORDER — IPRATROPIUM BROMIDE AND ALBUTEROL SULFATE 2.5; .5 MG/3ML; MG/3ML
1 SOLUTION RESPIRATORY (INHALATION) EVERY 4 HOURS PRN
Status: DISCONTINUED | OUTPATIENT
Start: 2018-10-17 | End: 2018-10-21 | Stop reason: HOSPADM

## 2018-10-17 RX ADMIN — PERFLUTREN 2.2 MG: 6.52 INJECTION, SUSPENSION INTRAVENOUS at 11:03

## 2018-10-17 RX ADMIN — FUROSEMIDE 40 MG: 10 INJECTION, SOLUTION INTRAMUSCULAR; INTRAVENOUS at 17:05

## 2018-10-17 RX ADMIN — GABAPENTIN 100 MG: 100 CAPSULE ORAL at 08:00

## 2018-10-17 RX ADMIN — LOSARTAN POTASSIUM 25 MG: 25 TABLET ORAL at 11:55

## 2018-10-17 RX ADMIN — INSULIN LISPRO 7 UNITS: 100 INJECTION, SOLUTION INTRAVENOUS; SUBCUTANEOUS at 20:48

## 2018-10-17 RX ADMIN — ACETAMINOPHEN 650 MG: 325 TABLET, FILM COATED ORAL at 20:59

## 2018-10-17 RX ADMIN — RIVAROXABAN 20 MG: 20 TABLET, FILM COATED ORAL at 07:59

## 2018-10-17 RX ADMIN — Medication 10 ML: at 11:54

## 2018-10-17 RX ADMIN — PREDNISONE 40 MG: 20 TABLET ORAL at 07:55

## 2018-10-17 RX ADMIN — PANTOPRAZOLE SODIUM 40 MG: 40 TABLET, DELAYED RELEASE ORAL at 06:35

## 2018-10-17 RX ADMIN — AZITHROMYCIN 500 MG: 500 TABLET, FILM COATED ORAL at 13:51

## 2018-10-17 RX ADMIN — INSULIN LISPRO 5 UNITS: 100 INJECTION, SOLUTION INTRAVENOUS; SUBCUTANEOUS at 11:57

## 2018-10-17 RX ADMIN — Medication 400 MG: at 08:00

## 2018-10-17 RX ADMIN — MOMETASONE FUROATE AND FORMOTEROL FUMARATE DIHYDRATE 2 PUFF: 100; 5 AEROSOL RESPIRATORY (INHALATION) at 07:42

## 2018-10-17 RX ADMIN — MAGNESIUM SULFATE HEPTAHYDRATE 1 G: 1 INJECTION, SOLUTION INTRAVENOUS at 06:36

## 2018-10-17 RX ADMIN — ATORVASTATIN CALCIUM 10 MG: 10 TABLET, FILM COATED ORAL at 08:01

## 2018-10-17 RX ADMIN — INSULIN LISPRO 8 UNITS: 100 INJECTION, SOLUTION INTRAVENOUS; SUBCUTANEOUS at 08:06

## 2018-10-17 RX ADMIN — DIPHENHYDRAMINE HYDROCHLORIDE 25 MG: 50 INJECTION, SOLUTION INTRAMUSCULAR; INTRAVENOUS at 22:11

## 2018-10-17 RX ADMIN — MOMETASONE FUROATE AND FORMOTEROL FUMARATE DIHYDRATE 2 PUFF: 100; 5 AEROSOL RESPIRATORY (INHALATION) at 19:32

## 2018-10-17 RX ADMIN — DILTIAZEM HYDROCHLORIDE 2.5 MG/HR: 5 INJECTION INTRAVENOUS at 18:18

## 2018-10-17 RX ADMIN — GABAPENTIN 100 MG: 100 CAPSULE ORAL at 13:51

## 2018-10-17 RX ADMIN — DILTIAZEM HYDROCHLORIDE 5 MG/HR: 5 INJECTION INTRAVENOUS at 13:52

## 2018-10-17 RX ADMIN — TAMSULOSIN HYDROCHLORIDE 0.4 MG: 0.4 CAPSULE ORAL at 07:59

## 2018-10-17 RX ADMIN — METOPROLOL SUCCINATE 75 MG: 50 TABLET, EXTENDED RELEASE ORAL at 20:40

## 2018-10-17 RX ADMIN — METOPROLOL SUCCINATE 75 MG: 50 TABLET, EXTENDED RELEASE ORAL at 07:58

## 2018-10-17 RX ADMIN — INSULIN LISPRO 10 UNITS: 100 INJECTION, SOLUTION INTRAVENOUS; SUBCUTANEOUS at 11:47

## 2018-10-17 RX ADMIN — INSULIN GLARGINE 8 UNITS: 100 INJECTION, SOLUTION SUBCUTANEOUS at 20:48

## 2018-10-17 RX ADMIN — DILTIAZEM HYDROCHLORIDE 10 MG/HR: 5 INJECTION INTRAVENOUS at 11:44

## 2018-10-17 RX ADMIN — ACETAMINOPHEN 650 MG: 325 TABLET, FILM COATED ORAL at 00:00

## 2018-10-17 RX ADMIN — Medication 400 MG: at 20:40

## 2018-10-17 RX ADMIN — GABAPENTIN 100 MG: 100 CAPSULE ORAL at 20:40

## 2018-10-17 RX ADMIN — TRAZODONE HYDROCHLORIDE 50 MG: 50 TABLET ORAL at 20:40

## 2018-10-17 RX ADMIN — FUROSEMIDE 40 MG: 10 INJECTION, SOLUTION INTRAMUSCULAR; INTRAVENOUS at 08:03

## 2018-10-17 RX ADMIN — DULOXETINE HYDROCHLORIDE 30 MG: 30 CAPSULE, DELAYED RELEASE ORAL at 08:01

## 2018-10-17 RX ADMIN — MAGNESIUM SULFATE HEPTAHYDRATE 1 G: 1 INJECTION, SOLUTION INTRAVENOUS at 07:33

## 2018-10-17 RX ADMIN — INSULIN LISPRO 12 UNITS: 100 INJECTION, SOLUTION INTRAVENOUS; SUBCUTANEOUS at 17:05

## 2018-10-17 RX ADMIN — INFLUENZA A VIRUS A/MICHIGAN/45/2015 X-275 (H1N1) ANTIGEN (FORMALDEHYDE INACTIVATED), INFLUENZA A VIRUS A/SINGAPORE/INFIMH-16-0019/2016 IVR-186 (H3N2) ANTIGEN (FORMALDEHYDE INACTIVATED), INFLUENZA B VIRUS B/PHUKET/3073/2013 ANTIGEN (FORMALDEHYDE INACTIVATED), AND INFLUENZA B VIRUS B/MARYLAND/15/2016 BX-69A ANTIGEN (FORMALDEHYDE INACTIVATED) 0.5 ML: 15; 15; 15; 15 INJECTION, SUSPENSION INTRAMUSCULAR at 08:03

## 2018-10-17 RX ADMIN — Medication 10 ML: at 20:40

## 2018-10-17 ASSESSMENT — PAIN DESCRIPTION - LOCATION
LOCATION: BACK

## 2018-10-17 ASSESSMENT — PAIN DESCRIPTION - ORIENTATION
ORIENTATION: LOWER

## 2018-10-17 ASSESSMENT — PAIN SCALES - GENERAL
PAINLEVEL_OUTOF10: 6
PAINLEVEL_OUTOF10: 10
PAINLEVEL_OUTOF10: 8
PAINLEVEL_OUTOF10: 6
PAINLEVEL_OUTOF10: 8

## 2018-10-17 ASSESSMENT — PAIN DESCRIPTION - PAIN TYPE
TYPE: CHRONIC PAIN

## 2018-10-17 ASSESSMENT — PAIN DESCRIPTION - DESCRIPTORS
DESCRIPTORS: BURNING;SHOOTING;SHARP
DESCRIPTORS: ACHING;THROBBING
DESCRIPTORS: ACHING;THROBBING

## 2018-10-17 ASSESSMENT — PAIN DESCRIPTION - FREQUENCY
FREQUENCY: CONTINUOUS

## 2018-10-17 ASSESSMENT — PAIN DESCRIPTION - ONSET: ONSET: ON-GOING

## 2018-10-17 NOTE — PROGRESS NOTES
Hospitalist Progress Note      PCP: Debbie Salgado MD    Date of Admission: 10/16/2018    Chief Complaint: shortness of breath    Subjective: Patient seen and examined. He feels better than when he first came in, but still short of breath. Denies fever, chills, chest pain, abdominal pain, nausea, vomiting, constipation, diarrhea, and dysuria. Medications:  Reviewed    Infusion Medications    diltiazem 7.5 mg/hr (10/17/18 1205)    dextrose       Scheduled Medications    insulin lispro  0-18 Units Subcutaneous TID WC    insulin lispro  0-9 Units Subcutaneous Nightly    insulin glargine  8 Units Subcutaneous Nightly    atorvastatin  10 mg Oral Daily    gabapentin  100 mg Oral TID    DULoxetine  30 mg Oral Daily    mometasone-formoterol  2 puff Inhalation BID    pantoprazole  40 mg Oral QAM AC    nicotine  1 patch Transdermal Q24H    rivaroxaban  20 mg Oral Daily with breakfast    tamsulosin  0.4 mg Oral Daily    traZODone  50 mg Oral Nightly    metoprolol succinate  75 mg Oral BID    losartan  25 mg Oral Daily    magnesium oxide  400 mg Oral BID    sodium chloride flush  10 mL Intravenous 2 times per day    furosemide  40 mg Intravenous BID    predniSONE  40 mg Oral Daily     PRN Meds: sodium chloride flush, magnesium hydroxide, ondansetron, potassium chloride **OR** potassium chloride **OR** potassium chloride, magnesium sulfate, glucose, dextrose, glucagon (rDNA), dextrose, acetaminophen      Intake/Output Summary (Last 24 hours) at 10/17/18 1238  Last data filed at 10/17/18 1201   Gross per 24 hour   Intake           698.75 ml   Output             1578 ml   Net          -879.25 ml       Exam:    /75   Pulse 97   Temp 97.9 °F (36.6 °C) (Oral)   Resp 12   Ht 5' 7\" (1.702 m)   Wt 227 lb 15.3 oz (103.4 kg)   SpO2 93%   BMI 35.70 kg/m²     General appearance: No apparent distress, appears stated age and cooperative. HEENT: Pupils equal, round, and reactive to light. Problems    Diagnosis Date Noted    CHF, left ventricular ([address] [I50.9] 10/16/2018        Acute on chronic combined systolic and diastolic heart failure, NYHA Class III - last Echo 4/18 with EF 45%. -repeat Echo pending  -continue IV Lasix 40 mg BID  -daily weights  -strict I/O's  -continue BB, ARB, statin    Chronic atrial fibrillation with RVR - rate relatively well controlled. Started on dilt gtt and BB continued.   -continued to titrate down dilt gtt  -may need to increase BB dose if BP is stable  -continue Xarelto, ARB    COPD exacerbation - at baseline O2 but with wheezing on exam  -continue PO prednisone  -Dulera BID  -Duoneb PRN  -Azithromycin x 5 days    Hyperkalemia - likely 2/2 potassium replacement from yesterday  -hold potassium today  -recheck BMP tomorrow    Tobacco abuse  -nicotine patches offered    Type 2 DM - only on metformin at home  -rechecking Hgb A1c  -high blood glucose levels likely from steroids but may be uncontrolled  -high dose SSI  -Lantus  -hypoglycemia protocol    Alcohol abuse  -CIWA protocol      DVT Prophylaxis: Xarelto  Diet: DIET CARDIAC; Carb Control: 3 carb choices (45 gms)/meal; Daily Fluid Restriction: 1500 ml  Code Status: Full Code    PT/OT Eval Status: defer    Dispo - continue care    Sandie Rboledo MD

## 2018-10-17 NOTE — PROGRESS NOTES
Pt declined CHF nutrition therapy. See at LOS.      Electronically signed by Jeniffer Webster on 10/17/2018 at 2:46 PM

## 2018-10-17 NOTE — H&P
9/20/18  Yes Devon Villasenor MD   diltiazem (CARDIZEM CD) 120 MG extended release capsule Take 1 capsule by mouth daily 7/20/18  Yes Joanna Castle MD   mometasone-formoterol (DULERA) 100-5 MCG/ACT inhaler Inhale 2 puffs into the lungs 2 times daily 7/20/18  Yes Joanna Castle MD   omeprazole (PRILOSEC) 40 MG delayed release capsule Take 1 capsule by mouth daily (with breakfast) 6/11/18  Yes Devon Villasenor MD   rivaroxaban (XARELTO) 20 MG TABS tablet Take 1 tablet by mouth daily (with breakfast) 6/11/18  Yes Devon Villasenor MD   traZODone (DESYREL) 50 MG tablet Take 1 tablet by mouth nightly 6/11/18  Yes Devon Villasenor MD   metoprolol succinate (TOPROL XL) 50 MG extended release tablet Take 1.5 tablets by mouth 2 times daily 5/1/18  Yes Leticia Mena MD   metolazone (ZAROXOLYN) 2.5 MG tablet TAKE 1 TABLET BY MOUTH TWICE EVERY WEEK 4/23/18  Yes Leticia Mena MD   MAGNESIUM-OXIDE 400 (241.3 Mg) MG TABS tablet TAKE 1 TABLET BY MOUTH TWICE DAILY 1/8/18  Yes JOON Mejía CNP   metFORMIN (GLUCOPHAGE) 500 MG tablet TAKE 2 TABLETS BY MOUTH TWICE DAILY WITH FOOD 6/16/17  Yes Stephen Eaton MD   potassium chloride (KLOR-CON) 20 MEQ packet Take 20 mEq by mouth daily 9/27/18   JOON Mejía CNP   nicotine (NICODERM CQ) 21 MG/24HR Place 1 patch onto the skin every 24 hours 6/11/18   Devon Villasenor MD   Respiratory Therapy Supplies (NEBULIZER/ADULT MASK) KIT 1 kit by Does not apply route as needed (SOB) 11/16/17   Ignacia Mack MD   Blood Glucose Monitoring Suppl (FREESTYLE FREEDOM LITE) w/Device KIT 1 kit by Does not apply route 2 times daily as needed (fasting and 2 hours post prandial) 6/14/17   Stephen Eaton MD   glucose blood VI test strips (FREESTYLE TEST STRIPS) strip Test Once Daily, Freestyle Lite, DX: 250.00 9/16/15   Devon Villasenor MD       Allergies:  Rocephin [ceftriaxone]    Social History:      The patient currently lives     TOBACCO:   reports that he has been smoking

## 2018-10-18 LAB
ANION GAP SERPL CALCULATED.3IONS-SCNC: 13 MMOL/L (ref 3–16)
BUN BLDV-MCNC: 35 MG/DL (ref 7–20)
CALCIUM SERPL-MCNC: 9.5 MG/DL (ref 8.3–10.6)
CHLORIDE BLD-SCNC: 91 MMOL/L (ref 99–110)
CHOLESTEROL, TOTAL: 126 MG/DL (ref 0–199)
CO2: 35 MMOL/L (ref 21–32)
CREAT SERPL-MCNC: 1 MG/DL (ref 0.9–1.3)
EKG ATRIAL RATE: 82 BPM
EKG DIAGNOSIS: NORMAL
EKG Q-T INTERVAL: 342 MS
EKG QRS DURATION: 86 MS
EKG QTC CALCULATION (BAZETT): 471 MS
EKG R AXIS: 88 DEGREES
EKG T AXIS: 255 DEGREES
EKG VENTRICULAR RATE: 114 BPM
GFR AFRICAN AMERICAN: >60
GFR NON-AFRICAN AMERICAN: >60
GLUCOSE BLD-MCNC: 219 MG/DL (ref 70–99)
GLUCOSE BLD-MCNC: 268 MG/DL (ref 70–99)
GLUCOSE BLD-MCNC: 273 MG/DL (ref 70–99)
GLUCOSE BLD-MCNC: 371 MG/DL (ref 70–99)
GLUCOSE BLD-MCNC: 395 MG/DL (ref 70–99)
HDLC SERPL-MCNC: 35 MG/DL (ref 40–60)
LDL CHOLESTEROL CALCULATED: 77 MG/DL
MAGNESIUM: 2.3 MG/DL (ref 1.8–2.4)
PERFORMED ON: ABNORMAL
POTASSIUM SERPL-SCNC: 3.4 MMOL/L (ref 3.5–5.1)
SODIUM BLD-SCNC: 139 MMOL/L (ref 136–145)
TRIGL SERPL-MCNC: 71 MG/DL (ref 0–150)
VLDLC SERPL CALC-MCNC: 14 MG/DL

## 2018-10-18 PROCEDURE — G8979 MOBILITY GOAL STATUS: HCPCS | Performed by: PHYSICAL THERAPIST

## 2018-10-18 PROCEDURE — 94664 DEMO&/EVAL PT USE INHALER: CPT

## 2018-10-18 PROCEDURE — 93010 ELECTROCARDIOGRAM REPORT: CPT | Performed by: INTERNAL MEDICINE

## 2018-10-18 PROCEDURE — 97167 OT EVAL HIGH COMPLEX 60 MIN: CPT | Performed by: PHYSICIAN ASSISTANT

## 2018-10-18 PROCEDURE — 2060000000 HC ICU INTERMEDIATE R&B

## 2018-10-18 PROCEDURE — 2500000003 HC RX 250 WO HCPCS: Performed by: INTERNAL MEDICINE

## 2018-10-18 PROCEDURE — 36415 COLL VENOUS BLD VENIPUNCTURE: CPT

## 2018-10-18 PROCEDURE — 80048 BASIC METABOLIC PNL TOTAL CA: CPT

## 2018-10-18 PROCEDURE — 6370000000 HC RX 637 (ALT 250 FOR IP): Performed by: HOSPITALIST

## 2018-10-18 PROCEDURE — 6370000000 HC RX 637 (ALT 250 FOR IP): Performed by: INTERNAL MEDICINE

## 2018-10-18 PROCEDURE — 97530 THERAPEUTIC ACTIVITIES: CPT | Performed by: PHYSICAL THERAPIST

## 2018-10-18 PROCEDURE — 94640 AIRWAY INHALATION TREATMENT: CPT

## 2018-10-18 PROCEDURE — 99223 1ST HOSP IP/OBS HIGH 75: CPT | Performed by: INTERNAL MEDICINE

## 2018-10-18 PROCEDURE — 97162 PT EVAL MOD COMPLEX 30 MIN: CPT | Performed by: PHYSICAL THERAPIST

## 2018-10-18 PROCEDURE — G8987 SELF CARE CURRENT STATUS: HCPCS | Performed by: PHYSICIAN ASSISTANT

## 2018-10-18 PROCEDURE — G8978 MOBILITY CURRENT STATUS: HCPCS | Performed by: PHYSICAL THERAPIST

## 2018-10-18 PROCEDURE — 6360000002 HC RX W HCPCS: Performed by: NURSE PRACTITIONER

## 2018-10-18 PROCEDURE — 6360000002 HC RX W HCPCS: Performed by: HOSPITALIST

## 2018-10-18 PROCEDURE — 2580000003 HC RX 258: Performed by: HOSPITALIST

## 2018-10-18 PROCEDURE — 97530 THERAPEUTIC ACTIVITIES: CPT | Performed by: PHYSICIAN ASSISTANT

## 2018-10-18 PROCEDURE — 2700000000 HC OXYGEN THERAPY PER DAY

## 2018-10-18 PROCEDURE — 2580000003 HC RX 258: Performed by: INTERNAL MEDICINE

## 2018-10-18 PROCEDURE — 97535 SELF CARE MNGMENT TRAINING: CPT | Performed by: PHYSICIAN ASSISTANT

## 2018-10-18 PROCEDURE — 83735 ASSAY OF MAGNESIUM: CPT

## 2018-10-18 PROCEDURE — 80061 LIPID PANEL: CPT

## 2018-10-18 PROCEDURE — 94761 N-INVAS EAR/PLS OXIMETRY MLT: CPT

## 2018-10-18 PROCEDURE — G8988 SELF CARE GOAL STATUS: HCPCS | Performed by: PHYSICIAN ASSISTANT

## 2018-10-18 RX ORDER — METOPROLOL SUCCINATE 25 MG/1
25 TABLET, EXTENDED RELEASE ORAL ONCE
Status: COMPLETED | OUTPATIENT
Start: 2018-10-18 | End: 2018-10-18

## 2018-10-18 RX ORDER — DIPHENHYDRAMINE HYDROCHLORIDE 50 MG/ML
25 INJECTION INTRAMUSCULAR; INTRAVENOUS NIGHTLY PRN
Status: DISCONTINUED | OUTPATIENT
Start: 2018-10-19 | End: 2018-10-18

## 2018-10-18 RX ORDER — DIPHENHYDRAMINE HYDROCHLORIDE 50 MG/ML
25 INJECTION INTRAMUSCULAR; INTRAVENOUS NIGHTLY PRN
Status: DISCONTINUED | OUTPATIENT
Start: 2018-10-18 | End: 2018-10-21 | Stop reason: HOSPADM

## 2018-10-18 RX ORDER — PREDNISONE 20 MG/1
40 TABLET ORAL DAILY
Status: COMPLETED | OUTPATIENT
Start: 2018-10-19 | End: 2018-10-21

## 2018-10-18 RX ORDER — METOPROLOL SUCCINATE 50 MG/1
100 TABLET, EXTENDED RELEASE ORAL 2 TIMES DAILY
Status: DISCONTINUED | OUTPATIENT
Start: 2018-10-18 | End: 2018-10-19

## 2018-10-18 RX ORDER — OXYCODONE HYDROCHLORIDE AND ACETAMINOPHEN 5; 325 MG/1; MG/1
1 TABLET ORAL EVERY 4 HOURS PRN
Status: DISCONTINUED | OUTPATIENT
Start: 2018-10-18 | End: 2018-10-21 | Stop reason: HOSPADM

## 2018-10-18 RX ORDER — INSULIN GLARGINE 100 [IU]/ML
10 INJECTION, SOLUTION SUBCUTANEOUS NIGHTLY
Status: DISCONTINUED | OUTPATIENT
Start: 2018-10-18 | End: 2018-10-20

## 2018-10-18 RX ADMIN — PANTOPRAZOLE SODIUM 40 MG: 40 TABLET, DELAYED RELEASE ORAL at 07:02

## 2018-10-18 RX ADMIN — RIVAROXABAN 20 MG: 20 TABLET, FILM COATED ORAL at 09:22

## 2018-10-18 RX ADMIN — FUROSEMIDE 40 MG: 10 INJECTION, SOLUTION INTRAMUSCULAR; INTRAVENOUS at 18:20

## 2018-10-18 RX ADMIN — INSULIN LISPRO 9 UNITS: 100 INJECTION, SOLUTION INTRAVENOUS; SUBCUTANEOUS at 22:19

## 2018-10-18 RX ADMIN — FUROSEMIDE 40 MG: 10 INJECTION, SOLUTION INTRAMUSCULAR; INTRAVENOUS at 09:21

## 2018-10-18 RX ADMIN — GABAPENTIN 100 MG: 100 CAPSULE ORAL at 16:14

## 2018-10-18 RX ADMIN — DULOXETINE HYDROCHLORIDE 30 MG: 30 CAPSULE, DELAYED RELEASE ORAL at 09:22

## 2018-10-18 RX ADMIN — METOPROLOL SUCCINATE 25 MG: 25 TABLET, EXTENDED RELEASE ORAL at 11:12

## 2018-10-18 RX ADMIN — PREDNISONE 40 MG: 20 TABLET ORAL at 09:22

## 2018-10-18 RX ADMIN — METOPROLOL SUCCINATE 100 MG: 50 TABLET, EXTENDED RELEASE ORAL at 20:40

## 2018-10-18 RX ADMIN — DILTIAZEM HYDROCHLORIDE 5 MG/HR: 5 INJECTION INTRAVENOUS at 19:58

## 2018-10-18 RX ADMIN — INSULIN GLARGINE 10 UNITS: 100 INJECTION, SOLUTION SUBCUTANEOUS at 22:16

## 2018-10-18 RX ADMIN — Medication 10 ML: at 09:22

## 2018-10-18 RX ADMIN — INSULIN LISPRO 9 UNITS: 100 INJECTION, SOLUTION INTRAVENOUS; SUBCUTANEOUS at 13:56

## 2018-10-18 RX ADMIN — OXYCODONE AND ACETAMINOPHEN 1 TABLET: 5; 325 TABLET ORAL at 20:43

## 2018-10-18 RX ADMIN — Medication 400 MG: at 09:22

## 2018-10-18 RX ADMIN — INSULIN LISPRO 6 UNITS: 100 INJECTION, SOLUTION INTRAVENOUS; SUBCUTANEOUS at 09:23

## 2018-10-18 RX ADMIN — MOMETASONE FUROATE AND FORMOTEROL FUMARATE DIHYDRATE 2 PUFF: 100; 5 AEROSOL RESPIRATORY (INHALATION) at 07:28

## 2018-10-18 RX ADMIN — METOPROLOL SUCCINATE 75 MG: 50 TABLET, EXTENDED RELEASE ORAL at 09:21

## 2018-10-18 RX ADMIN — GABAPENTIN 100 MG: 100 CAPSULE ORAL at 20:40

## 2018-10-18 RX ADMIN — LOSARTAN POTASSIUM 25 MG: 25 TABLET ORAL at 09:22

## 2018-10-18 RX ADMIN — TAMSULOSIN HYDROCHLORIDE 0.4 MG: 0.4 CAPSULE ORAL at 09:22

## 2018-10-18 RX ADMIN — MOMETASONE FUROATE AND FORMOTEROL FUMARATE DIHYDRATE 2 PUFF: 100; 5 AEROSOL RESPIRATORY (INHALATION) at 19:43

## 2018-10-18 RX ADMIN — OXYCODONE AND ACETAMINOPHEN 1 TABLET: 5; 325 TABLET ORAL at 11:16

## 2018-10-18 RX ADMIN — POTASSIUM CHLORIDE 40 MEQ: 20 TABLET, EXTENDED RELEASE ORAL at 09:22

## 2018-10-18 RX ADMIN — Medication 400 MG: at 20:40

## 2018-10-18 RX ADMIN — Medication 10 ML: at 20:41

## 2018-10-18 RX ADMIN — AZITHROMYCIN 250 MG: 250 TABLET, FILM COATED ORAL at 09:21

## 2018-10-18 RX ADMIN — ATORVASTATIN CALCIUM 10 MG: 10 TABLET, FILM COATED ORAL at 09:21

## 2018-10-18 RX ADMIN — TRAZODONE HYDROCHLORIDE 50 MG: 50 TABLET ORAL at 20:40

## 2018-10-18 RX ADMIN — DILTIAZEM HYDROCHLORIDE 5 MG/HR: 5 INJECTION INTRAVENOUS at 18:21

## 2018-10-18 RX ADMIN — INSULIN LISPRO 15 UNITS: 100 INJECTION, SOLUTION INTRAVENOUS; SUBCUTANEOUS at 18:21

## 2018-10-18 RX ADMIN — DIPHENHYDRAMINE HYDROCHLORIDE 25 MG: 50 INJECTION, SOLUTION INTRAMUSCULAR; INTRAVENOUS at 22:16

## 2018-10-18 RX ADMIN — GABAPENTIN 100 MG: 100 CAPSULE ORAL at 09:22

## 2018-10-18 RX ADMIN — OXYCODONE AND ACETAMINOPHEN 1 TABLET: 5; 325 TABLET ORAL at 16:14

## 2018-10-18 ASSESSMENT — PAIN SCALES - GENERAL
PAINLEVEL_OUTOF10: 0
PAINLEVEL_OUTOF10: 7

## 2018-10-18 ASSESSMENT — PAIN DESCRIPTION - LOCATION: LOCATION: BACK

## 2018-10-18 ASSESSMENT — PAIN DESCRIPTION - PAIN TYPE: TYPE: CHRONIC PAIN

## 2018-10-18 ASSESSMENT — PAIN SCALES - WONG BAKER: WONGBAKER_NUMERICALRESPONSE: 0

## 2018-10-18 ASSESSMENT — PAIN DESCRIPTION - ORIENTATION: ORIENTATION: LOWER

## 2018-10-18 NOTE — PROGRESS NOTES
restricted  Self Care Goal Status (): At least 1 percent but less than 20 percent impaired, limited or restricted  OutComes Score    Maximo Ware scored a 18/24 on the AM-[facility] ADL Inpatient form. Current research shows that an AM-PAC score of 17 or less is typically not associated with a discharge to the patient's home setting. Based on the patients AM-PAC score and their current ADL deficits, it is recommended that the patient have 3-5 sessions per week of Occupational Therapy at d/c to increase the patients independence. AM-PAC Score        AM-[facility] Inpatient Daily Activity Raw Score: 18  AM-PAC Inpatient ADL T-Scale Score : 38.66  ADL Inpatient CMS 0-100% Score: 46.65  ADL Inpatient CMS G-Code Modifier : CK    Goals  Short term goals  Time Frame for Short term goals: 2-3 weeks  Short term goal 1: S bathing  Short term goal 2: S dressing  Short term goal 3: S toileting  Short term goal 4: S functional transfers  Short term goal 5: S grooming at sink with standing for 2 minutes at a time  Short term goal 6: I UE HEP (limited R shoulder). Patient Goals   Patient goals : Pt wants to be able to walk with his prosthetic.        Therapy Time   Individual Concurrent Group Co-treatment   Time In 0915         Time Out 1036         Minutes 81         Timed Code Treatment Minutes: [address] [place], [place]

## 2018-10-18 NOTE — CONSULTS
tablet 250 mg, 250 mg, Oral, Daily  diltiazem 125 mg in dextrose 5 % 125 mL infusion, 5 mg/hr, Intravenous, Continuous  atorvastatin (LIPITOR) tablet 10 mg, 10 mg, Oral, Daily  gabapentin (NEURONTIN) capsule 100 mg, 100 mg, Oral, TID  DULoxetine (CYMBALTA) extended release capsule 30 mg, 30 mg, Oral, Daily  mometasone-formoterol (DULERA) 100-5 MCG/ACT inhaler 2 puff, 2 puff, Inhalation, BID  pantoprazole (PROTONIX) tablet 40 mg, 40 mg, Oral, QAM AC  nicotine (NICODERM CQ) 21 MG/24HR 1 patch, 1 patch, Transdermal, Q24H  rivaroxaban (XARELTO) tablet 20 mg, 20 mg, Oral, Daily with breakfast  tamsulosin (FLOMAX) capsule 0.4 mg, 0.4 mg, Oral, Daily  traZODone (DESYREL) tablet 50 mg, 50 mg, Oral, Nightly  losartan (COZAAR) tablet 25 mg, 25 mg, Oral, Daily  magnesium oxide (MAG-OX) tablet 400 mg, 400 mg, Oral, BID  sodium chloride flush 0.9 % injection 10 mL, 10 mL, Intravenous, 2 times per day  sodium chloride flush 0.9 % injection 10 mL, 10 mL, Intravenous, PRN  magnesium hydroxide (MILK OF MAGNESIA) 400 MG/5ML suspension 30 mL, 30 mL, Oral, Daily PRN  ondansetron (ZOFRAN) injection 4 mg, 4 mg, Intravenous, Q6H PRN  potassium chloride (KLOR-CON M) extended release tablet 40 mEq, 40 mEq, Oral, PRN **OR** potassium chloride (KLOR-CON) packet 40 mEq, 40 mEq, Oral, PRN **OR** potassium chloride 10 mEq/100 mL IVPB (Peripheral Line), 10 mEq, Intravenous, PRN  magnesium sulfate 1 g in dextrose 5% 100 mL IVPB, 1 g, Intravenous, PRN  furosemide (LASIX) injection 40 mg, 40 mg, Intravenous, BID  glucose (GLUTOSE) 40 % oral gel 15 g, 15 g, Oral, PRN  dextrose 50 % solution 12.5 g, 12.5 g, Intravenous, PRN  glucagon (rDNA) injection 1 mg, 1 mg, Intramuscular, PRN  dextrose 5 % solution, 100 mL/hr, Intravenous, PRN  acetaminophen (TYLENOL) tablet 650 mg, 650 mg, Oral, Q4H PRN      Labs:   Recent Labs      10/16/18   1920   WBC  9.8   HGB  13.0*   HCT  38.9*   PLT  236     Recent Labs      10/17/18   0412  10/17/18   1417  10/18/18 0500   NA  135*   --   139   K  5.2*  3.9  3.4*   CO2  33*   --   35*   BUN  19   --   35*   CREATININE  1.3   --   1.0   GLUCOSE  292*   --   268*     No results for input(s): BNP in the last 72 hours. No results for input(s): PROTIME, INR in the last 72 hours. No results for input(s): APTT in the last 72 hours. Recent Labs      10/16/18   1920  10/17/18   0006  10/17/18   0413   TROPONINI  <0.01  0.02*  <0.01     Lab Results   Component Value Date    HDL 35 10/18/2018    LDLCALC 77 10/18/2018    TRIG 71 10/18/2018     Recent Labs      10/16/18   1920   AST  30   ALT  20   LABALBU  4.2         EKG:       Chest X-Ray:      Echo 10/18/2018   Patient appears to be in atrial fibrillation.   Mild Conc. LVH with EF  45%.   The left atrium is mildly dilated.   Mild mitral regurgitation.   Normal RV size with mildly reduced systolic function.   Trivial pulmonic regurgitation present      [facility] 8/10/2015:  1. Right coronary artery arises from the right sinus Valsalva. It is a dominant artery, gives rise to the posterior descending and posterolateral branches. Right coronary artery and its branches are free of atherosclerosis.    2. Left main trunk arises from the right sinus Valsalva. It divides into the left and right descending artery and left circumflex artery. Left main trunk is free of atherosclerosis. 3. Left anterior descending artery: Moderate sized artery and descends into the intraventricular sulcus and wraps around the apex of the heart. The left anterior descending artery and its branches are free of atherosclerosis. 4. Left circumflex artery arises from the left main trunk. It is a moderate-sized artery. It gives rise to a moderate-sized obtuse marginal branch and is free of atherosclerosis. Left anterior descending artery as described earlier is also free of any atherosclerosis. [address] 2/8/2017:  OVERALL IMPRESSION:  1.  Significantly elevated right heart pressures and capillary wedge

## 2018-10-18 NOTE — PROGRESS NOTES
(Left); Wrist fracture surgery (Right, 1980); knee surgery; angioplasty (Bilateral, 1-15-15, 1/19/15); Coronary angioplasty with stent; Femoral-tibial Bypass Graft (Left, 5/2/16); and Leg amputation through femur. Restrictions  Restrictions/Precautions  Restrictions/Precautions: Fall Risk  Position Activity Restriction  Other position/activity restrictions: L AKA, IV attached  Vision/Hearing  Vision: Within Functional Limits  Hearing: Within functional limits     Subjective  General  Chart Reviewed: Yes  Patient assessed for rehabilitation services?: Yes  Additional Pertinent Hx: per ER note:  Chyna Greenwood is a 62 y.o. male who presents to the emergency department via EMS complaint shortness of breath and cough. Symptoms have been present for the past 2-3 days. Patient has history of COPD and states that he uses 2 L via nasal cannula p.r.n. He states that he is a smoker. He is coughing up yellow-green phlegm. He states that his symptoms feel similar to prior congestive heart failure exacerbations. He has a left above-the-knee amputation but states his right leg has been swelling. No vomiting or abdominal pain. Takes Lasix 40 mg bid at home as well as Xarelto for history of chronic A. fib.   Response To Previous Treatment: Not applicable  Family / Caregiver Present: No  Follows Commands: Within Functional Limits  Subjective  Subjective: pt agreeable to therapy; has some unrealistic expectations about therapy and his deficits; pt initially reported he wanted to walk but then reports he can only stand briefly due to chronic back pain and they can't do surgery and he has shoulder problems  Pain Screening  Patient Currently in Pain: No  Vital Signs  Patient Currently in Pain: No       Orientation  Orientation  Overall Orientation Status: Within Functional Limits    Social/Functional History  Social/Functional History  Lives With:  (parents)  Type of Home: House  Home Layout: One level  Home Access: Ramped entrance  Bathroom Shower/Tub: Tub/Shower unit  Bathroom Toilet: Handicap height  Bathroom Equipment: Grab bars in shower, Shower chair, Hand-held shower, 3-in-1 commode, Grab bars around toilet  Bathroom Accessibility: Accessible (bathroom accessable for w/c but not electric w/c)  Home Equipment: Rolling walker, Cane, [employer], Electric scooter  ADL Assistance: Independent  Ambulation Assistance: Independent (w/c Ind)  Transfer Assistance: Independent  Active : Yes  Occupation: On disability  Type of occupation: dry wall  IADL Comments: Pt uses power w/c for mobility in the house, and std w/c to get out to the car.   Objective          PROM RLE (degrees)  RLE PROM: WFL  RLE General PROM: slighlty lacking full knee extension  PROM LLE (degrees)  LLE General PROM: L AKA; WFL at hip  Strength RLE  Strength RLE: WFL  Strength LLE  Comment: L AKA     Sensation  Overall Sensation Status: WFL     Transfers  Sit to Stand: Contact guard assistance;Minimal Assistance (with gerda stedy)  Stand to sit: Contact guard assistance;Minimal Assistance (with gerda stedy)  Stand Pivot Transfers: Contact guard assistance  Ambulation  Ambulation?: No (pt uses w/c or electric scooter for home mobility)     Balance  Comments: SBA for sitting balance  Exercises  Comments: encouraged glut sets, and RLE ex while sitting in chair     Pt needed to use bathroom, therefore used stedy to transport to commode, pt needed some assist with clothes management; sat on stedy to brush his teeth    Assessment   Body structures, Functions, Activity limitations: Decreased functional mobility   Assessment: pt is a 63 yo male who was adm to hosp with SOB and weakness; pt currently is a fall risk and would like to be more Ind and stronger so he can walk short distances to allow him to store his electric scooter or w/c in car and walk a few feet to get into car; feel pt has the potential to achieve this with continued therapy; recommend continued

## 2018-10-18 NOTE — PROGRESS NOTES
infection may also be considered. Assessment/Plan:    Active Hospital Problems    Diagnosis Date Noted    CHF, left ventricular ([facility] [address] [I50.9] 10/16/2018        Acute on chronic combined systolic and diastolic heart failure, NYHA Class III - improved - last Echo 4/18 with EF 45%. Repeat Echo 10/17 essentially unchanged.  -continue IV Lasix 40 mg BID, possibly transition to PO tomorrow  -daily weights  -strict I/O's  -continue BB, ARB, statin; increase BB with hopes of weaning off dilt gtt    Chronic atrial fibrillation with RVR - rate relatively well controlled. Started on dilt gtt and BB continued. -continued to titrate down dilt gtt  -increase BB dose to try and wean off dilt gtt  -continue Xarelto, ARB    COPD exacerbation - at baseline O2 but with wheezing on exam  -continue PO prednisone  -Dulera BID  -Duoneb PRN  -Azithromycin x 5 days    Hypokalemia - likely 2/2 Lasix  -replace PRN  -recheck BMP tomorrow    Tobacco abuse  -nicotine patches offered    Type 2 DM - only on metformin at home, A1c 7.9.  -high blood glucose levels likely from steroids  -high dose SSI  -Lantus 10 Units tonight  -hypoglycemia protocol    Alcohol abuse  -CIWA protocol      DVT Prophylaxis: Xarelto  Diet: DIET CARDIAC; Low Sodium (2 GM);  Daily Fluid Restriction: 1500 ml  Code Status: Full Code    PT/OT Eval Status: defer    Dispo - continue care, anticipate discharge in 1-2 days    Ponce Méndez MD

## 2018-10-19 LAB
ANION GAP SERPL CALCULATED.3IONS-SCNC: 14 MMOL/L (ref 3–16)
BUN BLDV-MCNC: 43 MG/DL (ref 7–20)
CALCIUM SERPL-MCNC: 9.9 MG/DL (ref 8.3–10.6)
CHLORIDE BLD-SCNC: 94 MMOL/L (ref 99–110)
CO2: 37 MMOL/L (ref 21–32)
CREAT SERPL-MCNC: 1.3 MG/DL (ref 0.9–1.3)
GFR AFRICAN AMERICAN: >60
GFR NON-AFRICAN AMERICAN: 57
GLUCOSE BLD-MCNC: 176 MG/DL (ref 70–99)
GLUCOSE BLD-MCNC: 223 MG/DL (ref 70–99)
GLUCOSE BLD-MCNC: 270 MG/DL (ref 70–99)
GLUCOSE BLD-MCNC: 317 MG/DL (ref 70–99)
GLUCOSE BLD-MCNC: 325 MG/DL (ref 70–99)
MAGNESIUM: 2.3 MG/DL (ref 1.8–2.4)
PERFORMED ON: ABNORMAL
POTASSIUM SERPL-SCNC: 3.8 MMOL/L (ref 3.5–5.1)
PRO-BNP: 967 PG/ML (ref 0–124)
SODIUM BLD-SCNC: 145 MMOL/L (ref 136–145)

## 2018-10-19 PROCEDURE — 83735 ASSAY OF MAGNESIUM: CPT

## 2018-10-19 PROCEDURE — 83880 ASSAY OF NATRIURETIC PEPTIDE: CPT

## 2018-10-19 PROCEDURE — 99233 SBSQ HOSP IP/OBS HIGH 50: CPT | Performed by: INTERNAL MEDICINE

## 2018-10-19 PROCEDURE — 2580000003 HC RX 258: Performed by: HOSPITALIST

## 2018-10-19 PROCEDURE — 94640 AIRWAY INHALATION TREATMENT: CPT

## 2018-10-19 PROCEDURE — 6370000000 HC RX 637 (ALT 250 FOR IP): Performed by: INTERNAL MEDICINE

## 2018-10-19 PROCEDURE — 97530 THERAPEUTIC ACTIVITIES: CPT | Performed by: PHYSICAL THERAPIST

## 2018-10-19 PROCEDURE — G8987 SELF CARE CURRENT STATUS: HCPCS

## 2018-10-19 PROCEDURE — 2500000003 HC RX 250 WO HCPCS: Performed by: INTERNAL MEDICINE

## 2018-10-19 PROCEDURE — 2060000000 HC ICU INTERMEDIATE R&B

## 2018-10-19 PROCEDURE — 2700000000 HC OXYGEN THERAPY PER DAY

## 2018-10-19 PROCEDURE — 97530 THERAPEUTIC ACTIVITIES: CPT

## 2018-10-19 PROCEDURE — 36415 COLL VENOUS BLD VENIPUNCTURE: CPT

## 2018-10-19 PROCEDURE — G8989 SELF CARE D/C STATUS: HCPCS

## 2018-10-19 PROCEDURE — 6370000000 HC RX 637 (ALT 250 FOR IP): Performed by: HOSPITALIST

## 2018-10-19 PROCEDURE — 2580000003 HC RX 258: Performed by: INTERNAL MEDICINE

## 2018-10-19 PROCEDURE — G8988 SELF CARE GOAL STATUS: HCPCS

## 2018-10-19 PROCEDURE — 6360000002 HC RX W HCPCS: Performed by: HOSPITALIST

## 2018-10-19 PROCEDURE — 94761 N-INVAS EAR/PLS OXIMETRY MLT: CPT

## 2018-10-19 PROCEDURE — 80048 BASIC METABOLIC PNL TOTAL CA: CPT

## 2018-10-19 PROCEDURE — G8980 MOBILITY D/C STATUS: HCPCS | Performed by: PHYSICAL THERAPIST

## 2018-10-19 PROCEDURE — 6360000002 HC RX W HCPCS: Performed by: NURSE PRACTITIONER

## 2018-10-19 RX ORDER — METOPROLOL SUCCINATE 50 MG/1
100 TABLET, EXTENDED RELEASE ORAL DAILY
Status: DISCONTINUED | OUTPATIENT
Start: 2018-10-20 | End: 2018-10-21 | Stop reason: HOSPADM

## 2018-10-19 RX ADMIN — Medication 10 ML: at 19:45

## 2018-10-19 RX ADMIN — OXYCODONE AND ACETAMINOPHEN 1 TABLET: 5; 325 TABLET ORAL at 06:22

## 2018-10-19 RX ADMIN — GABAPENTIN 100 MG: 100 CAPSULE ORAL at 08:34

## 2018-10-19 RX ADMIN — Medication 400 MG: at 20:53

## 2018-10-19 RX ADMIN — METOPROLOL SUCCINATE 100 MG: 50 TABLET, EXTENDED RELEASE ORAL at 08:34

## 2018-10-19 RX ADMIN — INSULIN LISPRO 3 UNITS: 100 INJECTION, SOLUTION INTRAVENOUS; SUBCUTANEOUS at 08:38

## 2018-10-19 RX ADMIN — OXYCODONE AND ACETAMINOPHEN 1 TABLET: 5; 325 TABLET ORAL at 11:43

## 2018-10-19 RX ADMIN — RIVAROXABAN 20 MG: 20 TABLET, FILM COATED ORAL at 08:34

## 2018-10-19 RX ADMIN — MOMETASONE FUROATE AND FORMOTEROL FUMARATE DIHYDRATE 2 PUFF: 100; 5 AEROSOL RESPIRATORY (INHALATION) at 08:40

## 2018-10-19 RX ADMIN — LOSARTAN POTASSIUM 25 MG: 25 TABLET ORAL at 08:34

## 2018-10-19 RX ADMIN — ATORVASTATIN CALCIUM 10 MG: 10 TABLET, FILM COATED ORAL at 08:33

## 2018-10-19 RX ADMIN — PREDNISONE 40 MG: 20 TABLET ORAL at 08:34

## 2018-10-19 RX ADMIN — Medication 400 MG: at 08:33

## 2018-10-19 RX ADMIN — FUROSEMIDE 40 MG: 10 INJECTION, SOLUTION INTRAMUSCULAR; INTRAVENOUS at 08:33

## 2018-10-19 RX ADMIN — MOMETASONE FUROATE AND FORMOTEROL FUMARATE DIHYDRATE 2 PUFF: 100; 5 AEROSOL RESPIRATORY (INHALATION) at 20:38

## 2018-10-19 RX ADMIN — OXYCODONE AND ACETAMINOPHEN 1 TABLET: 5; 325 TABLET ORAL at 20:53

## 2018-10-19 RX ADMIN — PANTOPRAZOLE SODIUM 40 MG: 40 TABLET, DELAYED RELEASE ORAL at 06:22

## 2018-10-19 RX ADMIN — GABAPENTIN 100 MG: 100 CAPSULE ORAL at 13:46

## 2018-10-19 RX ADMIN — Medication 10 ML: at 08:34

## 2018-10-19 RX ADMIN — DIPHENHYDRAMINE HYDROCHLORIDE 25 MG: 50 INJECTION, SOLUTION INTRAMUSCULAR; INTRAVENOUS at 20:53

## 2018-10-19 RX ADMIN — TRAZODONE HYDROCHLORIDE 50 MG: 50 TABLET ORAL at 20:53

## 2018-10-19 RX ADMIN — TAMSULOSIN HYDROCHLORIDE 0.4 MG: 0.4 CAPSULE ORAL at 08:34

## 2018-10-19 RX ADMIN — OXYCODONE AND ACETAMINOPHEN 1 TABLET: 5; 325 TABLET ORAL at 02:15

## 2018-10-19 RX ADMIN — AZITHROMYCIN 250 MG: 250 TABLET, FILM COATED ORAL at 08:34

## 2018-10-19 RX ADMIN — INSULIN LISPRO 12 UNITS: 100 INJECTION, SOLUTION INTRAVENOUS; SUBCUTANEOUS at 16:32

## 2018-10-19 RX ADMIN — GABAPENTIN 100 MG: 100 CAPSULE ORAL at 20:53

## 2018-10-19 RX ADMIN — DILTIAZEM HYDROCHLORIDE 5 MG/HR: 5 INJECTION INTRAVENOUS at 19:44

## 2018-10-19 RX ADMIN — INSULIN GLARGINE 10 UNITS: 100 INJECTION, SOLUTION SUBCUTANEOUS at 21:05

## 2018-10-19 RX ADMIN — INSULIN LISPRO 5 UNITS: 100 INJECTION, SOLUTION INTRAVENOUS; SUBCUTANEOUS at 21:06

## 2018-10-19 RX ADMIN — DULOXETINE HYDROCHLORIDE 30 MG: 30 CAPSULE, DELAYED RELEASE ORAL at 08:34

## 2018-10-19 RX ADMIN — FUROSEMIDE 40 MG: 10 INJECTION, SOLUTION INTRAMUSCULAR; INTRAVENOUS at 16:31

## 2018-10-19 RX ADMIN — OXYCODONE AND ACETAMINOPHEN 1 TABLET: 5; 325 TABLET ORAL at 16:31

## 2018-10-19 RX ADMIN — INSULIN LISPRO 12 UNITS: 100 INJECTION, SOLUTION INTRAVENOUS; SUBCUTANEOUS at 11:44

## 2018-10-19 ASSESSMENT — PAIN SCALES - GENERAL
PAINLEVEL_OUTOF10: 0
PAINLEVEL_OUTOF10: 8
PAINLEVEL_OUTOF10: 0
PAINLEVEL_OUTOF10: 8
PAINLEVEL_OUTOF10: 0
PAINLEVEL_OUTOF10: 8
PAINLEVEL_OUTOF10: 7
PAINLEVEL_OUTOF10: 6

## 2018-10-19 NOTE — PROGRESS NOTES
PRN      Labs:   Recent Labs      10/16/18   1920   WBC  9.8   HGB  13.0*   HCT  38.9*   PLT  236     Recent Labs      10/18/18   0500  10/19/18   0520   NA  139  145   K  3.4*  3.8   CO2  35*  37*   BUN  35*  43*   CREATININE  1.0  1.3   GLUCOSE  268*  223*     No results for input(s): BNP in the last 72 hours. No results for input(s): PROTIME, INR in the last 72 hours. No results for input(s): APTT in the last 72 hours. Recent Labs      10/16/18   1920  10/17/18   0006  10/17/18   0413   TROPONINI  <0.01  0.02*  <0.01     Lab Results   Component Value Date    HDL 35 10/18/2018    LDLCALC 77 10/18/2018    TRIG 71 10/18/2018     Recent Labs      10/16/18   1920   AST  30   ALT  20   LABALBU  4.2         EKG:       Chest X-Ray:      Echo 10/18/2018   Patient appears to be in atrial fibrillation.   Mild Conc. LVH with EF  45%.   The left atrium is mildly dilated.   Mild mitral regurgitation.   Normal RV size with mildly reduced systolic function.   Trivial pulmonic regurgitation present      [facility] 8/10/2015:  1. Right coronary artery arises from the right sinus Valsalva. It is a dominant artery, gives rise to the posterior descending and posterolateral branches. Right coronary artery and its branches are free of atherosclerosis.    2. Left main trunk arises from the right sinus Valsalva. It divides into the left and right descending artery and left circumflex artery. Left main trunk is free of atherosclerosis. 3. Left anterior descending artery: Moderate sized artery and descends into the intraventricular sulcus and wraps around the apex of the heart. The left anterior descending artery and its branches are free of atherosclerosis. 4. Left circumflex artery arises from the left main trunk. It is a moderate-sized artery. It gives rise to a moderate-sized obtuse marginal branch and is free of atherosclerosis. Left anterior descending artery as described earlier is also free of any atherosclerosis.        [address]

## 2018-10-19 NOTE — PROGRESS NOTES
Hospitalist Progress Note      PCP: Sruthi Darling MD    Date of Admission: 10/16/2018    Chief Complaint: shortness of breath    Subjective: Patient seen and examined. He feels better than when he first came in. Denies fever, chills, chest pain. Medications:  Reviewed    Infusion Medications    dextrose       Scheduled Medications    [START ON 10/20/2018] metoprolol succinate  100 mg Oral Daily    insulin glargine  10 Units Subcutaneous Nightly    predniSONE  40 mg Oral Daily    insulin lispro  0-18 Units Subcutaneous TID WC    insulin lispro  0-9 Units Subcutaneous Nightly    azithromycin  250 mg Oral Daily    atorvastatin  10 mg Oral Daily    gabapentin  100 mg Oral TID    DULoxetine  30 mg Oral Daily    mometasone-formoterol  2 puff Inhalation BID    pantoprazole  40 mg Oral QAM AC    nicotine  1 patch Transdermal Q24H    rivaroxaban  20 mg Oral Daily with breakfast    tamsulosin  0.4 mg Oral Daily    traZODone  50 mg Oral Nightly    losartan  25 mg Oral Daily    magnesium oxide  400 mg Oral BID    sodium chloride flush  10 mL Intravenous 2 times per day    furosemide  40 mg Intravenous BID     PRN Meds: oxyCODONE-acetaminophen, diphenhydrAMINE, ipratropium-albuterol, sodium chloride flush, magnesium hydroxide, ondansetron, potassium chloride **OR** potassium chloride **OR** potassium chloride, magnesium sulfate, glucose, dextrose, glucagon (rDNA), dextrose, acetaminophen      Intake/Output Summary (Last 24 hours) at 10/19/18 1049  Last data filed at 10/19/18 1029   Gross per 24 hour   Intake          1409.63 ml   Output             2175 ml   Net          -765.37 ml       Exam:    /84   Pulse 95   Temp 97.2 °F (36.2 °C) (Axillary)   Resp 20   Ht 5' 7\" (1.702 m)   Wt 229 lb 4.5 oz (104 kg)   SpO2 95%   BMI 35.91 kg/m²     General appearance: No apparent distress, appears stated age and cooperative. HEENT: Pupils equal, round, and reactive to light.  Conjunctivae/corneas

## 2018-10-20 ENCOUNTER — APPOINTMENT (OUTPATIENT)
Dept: CT IMAGING | Age: 58
DRG: 194 | End: 2018-10-20
Payer: COMMERCIAL

## 2018-10-20 LAB
ANION GAP SERPL CALCULATED.3IONS-SCNC: 11 MMOL/L (ref 3–16)
BUN BLDV-MCNC: 40 MG/DL (ref 7–20)
CALCIUM SERPL-MCNC: 9.6 MG/DL (ref 8.3–10.6)
CHLORIDE BLD-SCNC: 96 MMOL/L (ref 99–110)
CO2: 37 MMOL/L (ref 21–32)
CREAT SERPL-MCNC: 1.1 MG/DL (ref 0.9–1.3)
GFR AFRICAN AMERICAN: >60
GFR NON-AFRICAN AMERICAN: >60
GLUCOSE BLD-MCNC: 158 MG/DL (ref 70–99)
GLUCOSE BLD-MCNC: 163 MG/DL (ref 70–99)
GLUCOSE BLD-MCNC: 216 MG/DL (ref 70–99)
GLUCOSE BLD-MCNC: 294 MG/DL (ref 70–99)
GLUCOSE BLD-MCNC: 385 MG/DL (ref 70–99)
MAGNESIUM: 2.2 MG/DL (ref 1.8–2.4)
PERFORMED ON: ABNORMAL
POTASSIUM SERPL-SCNC: 3.5 MMOL/L (ref 3.5–5.1)
SODIUM BLD-SCNC: 144 MMOL/L (ref 136–145)

## 2018-10-20 PROCEDURE — 6370000000 HC RX 637 (ALT 250 FOR IP): Performed by: INTERNAL MEDICINE

## 2018-10-20 PROCEDURE — 71250 CT THORAX DX C-: CPT

## 2018-10-20 PROCEDURE — 6370000000 HC RX 637 (ALT 250 FOR IP): Performed by: HOSPITALIST

## 2018-10-20 PROCEDURE — 99233 SBSQ HOSP IP/OBS HIGH 50: CPT | Performed by: INTERNAL MEDICINE

## 2018-10-20 PROCEDURE — 2700000000 HC OXYGEN THERAPY PER DAY

## 2018-10-20 PROCEDURE — 0HBRXZZ EXCISION OF TOE NAIL, EXTERNAL APPROACH: ICD-10-PCS | Performed by: PODIATRIST

## 2018-10-20 PROCEDURE — 2580000003 HC RX 258: Performed by: HOSPITALIST

## 2018-10-20 PROCEDURE — 6360000002 HC RX W HCPCS: Performed by: NURSE PRACTITIONER

## 2018-10-20 PROCEDURE — 80048 BASIC METABOLIC PNL TOTAL CA: CPT

## 2018-10-20 PROCEDURE — 2060000000 HC ICU INTERMEDIATE R&B

## 2018-10-20 PROCEDURE — 83735 ASSAY OF MAGNESIUM: CPT

## 2018-10-20 PROCEDURE — 94761 N-INVAS EAR/PLS OXIMETRY MLT: CPT

## 2018-10-20 PROCEDURE — 6360000002 HC RX W HCPCS: Performed by: HOSPITALIST

## 2018-10-20 PROCEDURE — 6370000000 HC RX 637 (ALT 250 FOR IP): Performed by: PODIATRIST

## 2018-10-20 PROCEDURE — 36415 COLL VENOUS BLD VENIPUNCTURE: CPT

## 2018-10-20 PROCEDURE — 6360000002 HC RX W HCPCS: Performed by: INTERNAL MEDICINE

## 2018-10-20 PROCEDURE — 94640 AIRWAY INHALATION TREATMENT: CPT

## 2018-10-20 RX ORDER — LIDOCAINE HYDROCHLORIDE 10 MG/ML
5 INJECTION, SOLUTION INFILTRATION; PERINEURAL ONCE
Status: DISCONTINUED | OUTPATIENT
Start: 2018-10-20 | End: 2018-10-21 | Stop reason: HOSPADM

## 2018-10-20 RX ORDER — ATORVASTATIN CALCIUM 40 MG/1
40 TABLET, FILM COATED ORAL DAILY
Status: DISCONTINUED | OUTPATIENT
Start: 2018-10-21 | End: 2018-10-21 | Stop reason: HOSPADM

## 2018-10-20 RX ORDER — DILTIAZEM HYDROCHLORIDE 120 MG/1
120 CAPSULE, COATED, EXTENDED RELEASE ORAL DAILY
Status: DISCONTINUED | OUTPATIENT
Start: 2018-10-20 | End: 2018-10-20

## 2018-10-20 RX ORDER — ACETAZOLAMIDE 500 MG/5ML
500 INJECTION, POWDER, LYOPHILIZED, FOR SOLUTION INTRAVENOUS EVERY 12 HOURS
Status: COMPLETED | OUTPATIENT
Start: 2018-10-20 | End: 2018-10-21

## 2018-10-20 RX ORDER — FUROSEMIDE 40 MG/1
40 TABLET ORAL 2 TIMES DAILY
Status: DISCONTINUED | OUTPATIENT
Start: 2018-10-21 | End: 2018-10-21 | Stop reason: HOSPADM

## 2018-10-20 RX ORDER — FUROSEMIDE 10 MG/ML
60 INJECTION INTRAMUSCULAR; INTRAVENOUS 2 TIMES DAILY
Status: COMPLETED | OUTPATIENT
Start: 2018-10-20 | End: 2018-10-20

## 2018-10-20 RX ORDER — DILTIAZEM HYDROCHLORIDE 180 MG/1
180 CAPSULE, COATED, EXTENDED RELEASE ORAL DAILY
Status: DISCONTINUED | OUTPATIENT
Start: 2018-10-20 | End: 2018-10-21 | Stop reason: HOSPADM

## 2018-10-20 RX ORDER — GENTAMICIN SULFATE 1 MG/G
CREAM TOPICAL 3 TIMES DAILY
Status: DISCONTINUED | OUTPATIENT
Start: 2018-10-20 | End: 2018-10-21 | Stop reason: HOSPADM

## 2018-10-20 RX ORDER — INSULIN GLARGINE 100 [IU]/ML
15 INJECTION, SOLUTION SUBCUTANEOUS NIGHTLY
Status: DISCONTINUED | OUTPATIENT
Start: 2018-10-20 | End: 2018-10-21 | Stop reason: HOSPADM

## 2018-10-20 RX ADMIN — OXYCODONE AND ACETAMINOPHEN 1 TABLET: 5; 325 TABLET ORAL at 06:00

## 2018-10-20 RX ADMIN — OXYCODONE AND ACETAMINOPHEN 1 TABLET: 5; 325 TABLET ORAL at 12:30

## 2018-10-20 RX ADMIN — INSULIN LISPRO 2 UNITS: 100 INJECTION, SOLUTION INTRAVENOUS; SUBCUTANEOUS at 21:06

## 2018-10-20 RX ADMIN — ATORVASTATIN CALCIUM 10 MG: 10 TABLET, FILM COATED ORAL at 08:20

## 2018-10-20 RX ADMIN — INSULIN LISPRO 14 UNITS: 100 INJECTION, SOLUTION INTRAVENOUS; SUBCUTANEOUS at 18:29

## 2018-10-20 RX ADMIN — MOMETASONE FUROATE AND FORMOTEROL FUMARATE DIHYDRATE 2 PUFF: 100; 5 AEROSOL RESPIRATORY (INHALATION) at 08:45

## 2018-10-20 RX ADMIN — DULOXETINE HYDROCHLORIDE 30 MG: 30 CAPSULE, DELAYED RELEASE ORAL at 08:20

## 2018-10-20 RX ADMIN — FUROSEMIDE 40 MG: 10 INJECTION, SOLUTION INTRAMUSCULAR; INTRAVENOUS at 08:21

## 2018-10-20 RX ADMIN — GABAPENTIN 100 MG: 100 CAPSULE ORAL at 20:45

## 2018-10-20 RX ADMIN — INSULIN GLARGINE 15 UNITS: 100 INJECTION, SOLUTION SUBCUTANEOUS at 21:05

## 2018-10-20 RX ADMIN — MOMETASONE FUROATE AND FORMOTEROL FUMARATE DIHYDRATE 2 PUFF: 100; 5 AEROSOL RESPIRATORY (INHALATION) at 20:11

## 2018-10-20 RX ADMIN — GABAPENTIN 100 MG: 100 CAPSULE ORAL at 12:30

## 2018-10-20 RX ADMIN — POTASSIUM CHLORIDE 40 MEQ: 20 TABLET, EXTENDED RELEASE ORAL at 08:40

## 2018-10-20 RX ADMIN — RIVAROXABAN 20 MG: 20 TABLET, FILM COATED ORAL at 08:28

## 2018-10-20 RX ADMIN — METOPROLOL SUCCINATE 100 MG: 50 TABLET, EXTENDED RELEASE ORAL at 08:20

## 2018-10-20 RX ADMIN — INSULIN LISPRO 3 UNITS: 100 INJECTION, SOLUTION INTRAVENOUS; SUBCUTANEOUS at 08:41

## 2018-10-20 RX ADMIN — GENTAMICIN SULFATE: 1 CREAM TOPICAL at 09:54

## 2018-10-20 RX ADMIN — LOSARTAN POTASSIUM 25 MG: 25 TABLET ORAL at 08:20

## 2018-10-20 RX ADMIN — ACETAZOLAMIDE 500 MG: 500 INJECTION, POWDER, LYOPHILIZED, FOR SOLUTION INTRAVENOUS at 16:40

## 2018-10-20 RX ADMIN — Medication 400 MG: at 08:20

## 2018-10-20 RX ADMIN — Medication 400 MG: at 20:45

## 2018-10-20 RX ADMIN — GABAPENTIN 100 MG: 100 CAPSULE ORAL at 08:20

## 2018-10-20 RX ADMIN — GENTAMICIN SULFATE: 1 CREAM TOPICAL at 12:37

## 2018-10-20 RX ADMIN — PANTOPRAZOLE SODIUM 40 MG: 40 TABLET, DELAYED RELEASE ORAL at 05:33

## 2018-10-20 RX ADMIN — OXYCODONE AND ACETAMINOPHEN 1 TABLET: 5; 325 TABLET ORAL at 06:31

## 2018-10-20 RX ADMIN — TAMSULOSIN HYDROCHLORIDE 0.4 MG: 0.4 CAPSULE ORAL at 08:28

## 2018-10-20 RX ADMIN — AZITHROMYCIN 250 MG: 250 TABLET, FILM COATED ORAL at 08:20

## 2018-10-20 RX ADMIN — INSULIN LISPRO 20 UNITS: 100 INJECTION, SOLUTION INTRAVENOUS; SUBCUTANEOUS at 12:37

## 2018-10-20 RX ADMIN — Medication 10 ML: at 20:45

## 2018-10-20 RX ADMIN — FUROSEMIDE 60 MG: 10 INJECTION, SOLUTION INTRAMUSCULAR; INTRAVENOUS at 16:41

## 2018-10-20 RX ADMIN — TRAZODONE HYDROCHLORIDE 50 MG: 50 TABLET ORAL at 20:45

## 2018-10-20 RX ADMIN — DILTIAZEM HYDROCHLORIDE 180 MG: 180 CAPSULE, COATED, EXTENDED RELEASE ORAL at 12:30

## 2018-10-20 RX ADMIN — OXYCODONE AND ACETAMINOPHEN 1 TABLET: 5; 325 TABLET ORAL at 20:45

## 2018-10-20 RX ADMIN — PREDNISONE 40 MG: 20 TABLET ORAL at 08:28

## 2018-10-20 RX ADMIN — DIPHENHYDRAMINE HYDROCHLORIDE 25 MG: 50 INJECTION, SOLUTION INTRAMUSCULAR; INTRAVENOUS at 20:45

## 2018-10-20 RX ADMIN — GENTAMICIN SULFATE: 1 CREAM TOPICAL at 20:49

## 2018-10-20 ASSESSMENT — PAIN SCALES - GENERAL
PAINLEVEL_OUTOF10: 8
PAINLEVEL_OUTOF10: 8
PAINLEVEL_OUTOF10: 5
PAINLEVEL_OUTOF10: 4
PAINLEVEL_OUTOF10: 0
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
## 2018-10-20 NOTE — PLAN OF CARE
Problem: Falls - Risk of:  Goal: Will remain free from falls  Will remain free from falls   Outcome: Ongoing  Fall precautions in place. Bed wheels locked, bed in lowest position, call light within reach, non-skid footwear applied, bed alarm on. Patient is aware to call nurse for assistance before getting up. Problem: Pain:  Goal: Pain level will decrease  Pain level will decrease   Outcome: Ongoing  Pt. Pain level assessed. Patient describes pain using 0-10 pain scale. Patient educated on pain management and notifying the nurse of pain. Patient pain managed with PRN analgesic per MD order. Problem: OXYGENATION/RESPIRATORY FUNCTION  Goal: Patient will achieve/maintain normal respiratory rate/effort  Respiratory rate and effort will be within normal limits for the patient   Outcome: Ongoing      Problem: HEMODYNAMIC STATUS  Goal: Patient has stable vital signs and fluid balance  Outcome: Ongoing      Problem: FLUID AND ELECTROLYTE IMBALANCE  Goal: Fluid and electrolyte balance are achieved/maintained  Outcome: Ongoing  Strict I/O's. Patient weighed daily. Patient on 1500mL fluid restriction. Patient educated on and encouraged to drink the prescribed amount of fluid.      Problem: ACTIVITY INTOLERANCE/IMPAIRED MOBILITY  Goal: Mobility/activity is maintained at optimum level for patient  Outcome: Ongoing

## 2018-10-21 VITALS
WEIGHT: 237.66 LBS | RESPIRATION RATE: 16 BRPM | TEMPERATURE: 97.5 F | OXYGEN SATURATION: 96 % | DIASTOLIC BLOOD PRESSURE: 87 MMHG | HEART RATE: 109 BPM | HEIGHT: 67 IN | BODY MASS INDEX: 37.3 KG/M2 | SYSTOLIC BLOOD PRESSURE: 129 MMHG

## 2018-10-21 LAB
ANION GAP SERPL CALCULATED.3IONS-SCNC: 10 MMOL/L (ref 3–16)
BUN BLDV-MCNC: 40 MG/DL (ref 7–20)
CALCIUM SERPL-MCNC: 9.3 MG/DL (ref 8.3–10.6)
CHLORIDE BLD-SCNC: 95 MMOL/L (ref 99–110)
CO2: 34 MMOL/L (ref 21–32)
CREAT SERPL-MCNC: 1.1 MG/DL (ref 0.9–1.3)
GFR AFRICAN AMERICAN: >60
GFR NON-AFRICAN AMERICAN: >60
GLUCOSE BLD-MCNC: 178 MG/DL (ref 70–99)
GLUCOSE BLD-MCNC: 231 MG/DL (ref 70–99)
GLUCOSE BLD-MCNC: 306 MG/DL (ref 70–99)
MAGNESIUM: 2.4 MG/DL (ref 1.8–2.4)
PERFORMED ON: ABNORMAL
PERFORMED ON: ABNORMAL
POTASSIUM SERPL-SCNC: 3.5 MMOL/L (ref 3.5–5.1)
PRO-BNP: 1137 PG/ML (ref 0–124)
SODIUM BLD-SCNC: 139 MMOL/L (ref 136–145)

## 2018-10-21 PROCEDURE — 6370000000 HC RX 637 (ALT 250 FOR IP): Performed by: HOSPITALIST

## 2018-10-21 PROCEDURE — 99232 SBSQ HOSP IP/OBS MODERATE 35: CPT | Performed by: INTERNAL MEDICINE

## 2018-10-21 PROCEDURE — 6370000000 HC RX 637 (ALT 250 FOR IP): Performed by: INTERNAL MEDICINE

## 2018-10-21 PROCEDURE — 36415 COLL VENOUS BLD VENIPUNCTURE: CPT

## 2018-10-21 PROCEDURE — 6360000002 HC RX W HCPCS: Performed by: INTERNAL MEDICINE

## 2018-10-21 PROCEDURE — 83880 ASSAY OF NATRIURETIC PEPTIDE: CPT

## 2018-10-21 PROCEDURE — 94640 AIRWAY INHALATION TREATMENT: CPT

## 2018-10-21 PROCEDURE — 80048 BASIC METABOLIC PNL TOTAL CA: CPT

## 2018-10-21 PROCEDURE — 94761 N-INVAS EAR/PLS OXIMETRY MLT: CPT

## 2018-10-21 PROCEDURE — 2700000000 HC OXYGEN THERAPY PER DAY

## 2018-10-21 PROCEDURE — 83735 ASSAY OF MAGNESIUM: CPT

## 2018-10-21 RX ORDER — ATORVASTATIN CALCIUM 40 MG/1
40 TABLET, FILM COATED ORAL DAILY
Qty: 30 TABLET | Refills: 3 | Status: SHIPPED | OUTPATIENT
Start: 2018-10-22 | End: 2019-04-15 | Stop reason: SDUPTHER

## 2018-10-21 RX ORDER — PREDNISONE 20 MG/1
20 TABLET ORAL DAILY
Qty: 3 TABLET | Refills: 0 | Status: SHIPPED | OUTPATIENT
Start: 2018-10-21 | End: 2018-10-24

## 2018-10-21 RX ORDER — DILTIAZEM HYDROCHLORIDE 180 MG/1
180 CAPSULE, COATED, EXTENDED RELEASE ORAL DAILY
Qty: 30 CAPSULE | Refills: 3 | Status: SHIPPED | OUTPATIENT
Start: 2018-10-22 | End: 2018-11-01 | Stop reason: SDUPTHER

## 2018-10-21 RX ORDER — METOPROLOL SUCCINATE 100 MG/1
100 TABLET, EXTENDED RELEASE ORAL DAILY
Qty: 30 TABLET | Refills: 3 | Status: ON HOLD | OUTPATIENT
Start: 2018-10-22 | End: 2018-12-25 | Stop reason: ALTCHOICE

## 2018-10-21 RX ADMIN — GENTAMICIN SULFATE: 1 CREAM TOPICAL at 07:44

## 2018-10-21 RX ADMIN — AZITHROMYCIN 250 MG: 250 TABLET, FILM COATED ORAL at 07:40

## 2018-10-21 RX ADMIN — LOSARTAN POTASSIUM 25 MG: 25 TABLET ORAL at 07:40

## 2018-10-21 RX ADMIN — OXYCODONE AND ACETAMINOPHEN 1 TABLET: 5; 325 TABLET ORAL at 09:58

## 2018-10-21 RX ADMIN — OXYCODONE AND ACETAMINOPHEN 1 TABLET: 5; 325 TABLET ORAL at 01:48

## 2018-10-21 RX ADMIN — METOPROLOL SUCCINATE 100 MG: 50 TABLET, EXTENDED RELEASE ORAL at 07:40

## 2018-10-21 RX ADMIN — OXYCODONE AND ACETAMINOPHEN 1 TABLET: 5; 325 TABLET ORAL at 12:57

## 2018-10-21 RX ADMIN — GABAPENTIN 100 MG: 100 CAPSULE ORAL at 07:40

## 2018-10-21 RX ADMIN — GABAPENTIN 100 MG: 100 CAPSULE ORAL at 12:48

## 2018-10-21 RX ADMIN — MOMETASONE FUROATE AND FORMOTEROL FUMARATE DIHYDRATE 2 PUFF: 100; 5 AEROSOL RESPIRATORY (INHALATION) at 07:58

## 2018-10-21 RX ADMIN — Medication 400 MG: at 07:40

## 2018-10-21 RX ADMIN — RIVAROXABAN 20 MG: 20 TABLET, FILM COATED ORAL at 07:40

## 2018-10-21 RX ADMIN — FUROSEMIDE 40 MG: 40 TABLET ORAL at 07:40

## 2018-10-21 RX ADMIN — OXYCODONE AND ACETAMINOPHEN 1 TABLET: 5; 325 TABLET ORAL at 05:58

## 2018-10-21 RX ADMIN — ATORVASTATIN CALCIUM 40 MG: 40 TABLET, FILM COATED ORAL at 07:40

## 2018-10-21 RX ADMIN — TAMSULOSIN HYDROCHLORIDE 0.4 MG: 0.4 CAPSULE ORAL at 07:39

## 2018-10-21 RX ADMIN — DILTIAZEM HYDROCHLORIDE 180 MG: 180 CAPSULE, COATED, EXTENDED RELEASE ORAL at 07:40

## 2018-10-21 RX ADMIN — PREDNISONE 40 MG: 20 TABLET ORAL at 07:40

## 2018-10-21 RX ADMIN — DULOXETINE HYDROCHLORIDE 30 MG: 30 CAPSULE, DELAYED RELEASE ORAL at 07:39

## 2018-10-21 RX ADMIN — POTASSIUM CHLORIDE 40 MEQ: 20 TABLET, EXTENDED RELEASE ORAL at 07:40

## 2018-10-21 RX ADMIN — INSULIN LISPRO 17 UNITS: 100 INJECTION, SOLUTION INTRAVENOUS; SUBCUTANEOUS at 12:48

## 2018-10-21 RX ADMIN — ACETAZOLAMIDE 500 MG: 500 INJECTION, POWDER, LYOPHILIZED, FOR SOLUTION INTRAVENOUS at 05:05

## 2018-10-21 RX ADMIN — INSULIN LISPRO 8 UNITS: 100 INJECTION, SOLUTION INTRAVENOUS; SUBCUTANEOUS at 07:45

## 2018-10-21 RX ADMIN — PANTOPRAZOLE SODIUM 40 MG: 40 TABLET, DELAYED RELEASE ORAL at 05:09

## 2018-10-21 ASSESSMENT — PAIN SCALES - GENERAL
PAINLEVEL_OUTOF10: 8
PAINLEVEL_OUTOF10: 5
PAINLEVEL_OUTOF10: 7
PAINLEVEL_OUTOF10: 8
PAINLEVEL_OUTOF10: 5

## 2018-10-21 NOTE — PROGRESS NOTES
Pharmacy Heart Failure Medication Reconciliation Note    Pt discharged from [facility] today after admission for CHF. David Glover has a diagnosis of systolic heart failure (last EF = 45% on 10/17). Patient taking an ACEI / ARB / Entresto:  Yes     Patient taking a BETA BLOCKER:  Yes  Patient taking a LOOP DIURETIC: Yes  Patient taking a ALDOSTERONE RECEPTOR ANTAGONIST: No: EF > 40    Corrections to discharge medications include:  Have atorvastatin prescribed at higher dose as it was changed by cardiologist during admission. Discharge Medications:         Medication List      START taking these medications    predniSONE 20 MG tablet  Commonly known as:  DELTASONE  Take 1 tablet by mouth daily for 3 days        CHANGE how you take these medications    atorvastatin 40 MG tablet  Commonly known as:  LIPITOR  Take 1 tablet by mouth daily  Start taking on:  10/22/2018  What changed:  · medication strength  · how much to take     diltiazem 180 MG extended release capsule  Commonly known as:  CARDIZEM CD  Take 1 capsule by mouth daily  Start taking on:  10/22/2018  What changed:  · medication strength  · how much to take     furosemide 40 MG tablet  Commonly known as:  LASIX  Take 80 mg (2 tablets) in the AM and 40 mg (1 tablet) in the PM x 5 days and then decrease to 40 mg tablet twice a day  What changed:  · how much to take  · how to take this  · when to take this  · additional instructions     gabapentin 100 MG capsule  Commonly known as:  NEURONTIN  What changed:  Another medication with the same name was removed. Continue taking this medication, and follow the directions you see here.      metoprolol succinate 100 MG extended release tablet  Commonly known as:  TOPROL XL  Take 1 tablet by mouth daily  Start taking on:  10/22/2018  What changed:  · medication strength  · how much to take  · when to take this     mometasone-formoterol 100-5 MCG/ACT inhaler  Commonly known as:  DULERA  Inhale 2 puffs into the lungs 2 Your Medications      These medications were sent to [address], [address] [place] [id number]    Phone:  [phone]   · atorvastatin 40 MG tablet  · diltiazem 180 MG extended release capsule  · metoprolol succinate 100 MG extended release tablet  · predniSONE 20 MG tablet         NEETU John MARISABEL [facility]  Heart Failure Discharge Medication Reconciliation Program  [phone]

## 2018-10-21 NOTE — DISCHARGE SUMMARY
mild pulmonary edema. Hospital Course:    Patient was admitted to the hospital and then started on supportive care with improvement in symptoms and please see below for further information. Acute on chronic systolic heart failure: Improving. Trigger unclear and maybe dietary non-compliance. EF 45%. -Diuretics   -Continue B-blocker and ARB   -s/p Cards eval and cleared for discharge         Atrial fibrillation w/ RVR: Improved. -Cardizem infusion weaned off   -Cardizem and B-blocker up-titrated   -Continue Xarelto         COPD exacerbation: Improving. Extensive wheezing improving. Attributed to ongoing tobacco use.    -Nebs   -Steroids   -O2   -CT chest negative   -s/p Empiric Azithromycin        Chronic respiratory failure with hypoxia: Attributed to morbid obesity. On 2 L/min of nightly oxygen.    -Nightly oxygen        Obesity/obstructive sleep apnea: Complicates medical therapy and at increased risk for morbidity and mortality.   -Counseled on lifestyle modification   -Nightly O2   -Counseled regarding outpatient sleep study        Discharge Physical Exam:  Vitals: /87   Pulse 109   Temp 97.5 °F (36.4 °C) (Oral)   Resp 16   Ht 5' 7\" (1.702 m)   Wt 237 lb 10.5 oz (107.8 kg)   SpO2 96%   BMI 37.22 kg/m²   General appearance: Morbidly obesed  Skin: Warm  HEENT: Head: Normocephalic, no lesions, without obvious abnormality. Neck: no adenopathy, no carotid bruit, no JVD, supple, symmetrical, trachea midline and thyroid not enlarged, symmetric, no tenderness/mass/nodules  Lungs: wheezes bilaterally  Heart: irregularly irregular rhythm  Abdomen: obese and distended  Extremities: extremities normal, atraumatic, no cyanosis or edema  Neurologic: Mental status: Alert, oriented, thought content appropriate  Capillary Refill: Brisk,< 3 seconds   Peripheral Pulses: +2 palpable, equal bilaterally          Labs:  For convenience and continuity at follow-up the following most recent labs are

## 2018-10-21 NOTE — PROGRESS NOTES
-Nebs   -Steroids   -O2   -CT chest negative   -Empiric Azithromycin        Chronic respiratory failure with hypoxia: Attributed to morbid obesity.  On 2 L/min of nightly oxygen.    -Nightly oxygen        Obesity/obstructive sleep apnea: Complicates medical therapy and at increased risk for morbidity and mortality.   -Counseled on lifestyle modification   -Nightly O2   -Counseled regarding outpatient sleep study            Disposition:   1. TBD    Angelica Garnica MD, FACP  10/21/2018  10:13 AM  [phone]

## 2018-10-25 ENCOUNTER — CARE COORDINATION (OUTPATIENT)
Dept: CARE COORDINATION | Age: 58
End: 2018-10-25

## 2018-10-25 ENCOUNTER — OFFICE VISIT (OUTPATIENT)
Dept: INTERNAL MEDICINE CLINIC | Age: 58
End: 2018-10-25
Payer: COMMERCIAL

## 2018-10-25 VITALS
OXYGEN SATURATION: 96 % | TEMPERATURE: 97.4 F | SYSTOLIC BLOOD PRESSURE: 122 MMHG | HEART RATE: 97 BPM | DIASTOLIC BLOOD PRESSURE: 80 MMHG

## 2018-10-25 DIAGNOSIS — I48.0 PAF (PAROXYSMAL ATRIAL FIBRILLATION) (HCC): Primary | ICD-10-CM

## 2018-10-25 DIAGNOSIS — I25.729 CORONARY ARTERY DISEASE INVOLVING AUTOLOGOUS ARTERY CORONARY BYPASS GRAFT WITH ANGINA PECTORIS (HCC): ICD-10-CM

## 2018-10-25 DIAGNOSIS — E10.40 TYPE 1 DIABETES MELLITUS WITH DIABETIC NEUROPATHY (HCC): ICD-10-CM

## 2018-10-25 DIAGNOSIS — I50.22 CHRONIC SYSTOLIC CHF (CONGESTIVE HEART FAILURE), NYHA CLASS 3 (HCC): ICD-10-CM

## 2018-10-25 DIAGNOSIS — F17.210 CIGARETTE NICOTINE DEPENDENCE WITHOUT COMPLICATION: ICD-10-CM

## 2018-10-25 DIAGNOSIS — J44.1 COPD EXACERBATION (HCC): ICD-10-CM

## 2018-10-25 DIAGNOSIS — E11.51 DIABETES MELLITUS WITH PERIPHERAL CIRCULATORY DISORDER (HCC): ICD-10-CM

## 2018-10-25 PROCEDURE — 1111F DSCHRG MED/CURRENT MED MERGE: CPT | Performed by: INTERNAL MEDICINE

## 2018-10-25 PROCEDURE — G8427 DOCREV CUR MEDS BY ELIG CLIN: HCPCS | Performed by: INTERNAL MEDICINE

## 2018-10-25 PROCEDURE — G8598 ASA/ANTIPLAT THER USED: HCPCS | Performed by: INTERNAL MEDICINE

## 2018-10-25 PROCEDURE — G8417 CALC BMI ABV UP PARAM F/U: HCPCS | Performed by: INTERNAL MEDICINE

## 2018-10-25 PROCEDURE — G8926 SPIRO NO PERF OR DOC: HCPCS | Performed by: INTERNAL MEDICINE

## 2018-10-25 PROCEDURE — 3023F SPIROM DOC REV: CPT | Performed by: INTERNAL MEDICINE

## 2018-10-25 PROCEDURE — 3045F PR MOST RECENT HEMOGLOBIN A1C LEVEL 7.0-9.0%: CPT | Performed by: INTERNAL MEDICINE

## 2018-10-25 PROCEDURE — 4004F PT TOBACCO SCREEN RCVD TLK: CPT | Performed by: INTERNAL MEDICINE

## 2018-10-25 PROCEDURE — G8482 FLU IMMUNIZE ORDER/ADMIN: HCPCS | Performed by: INTERNAL MEDICINE

## 2018-10-25 PROCEDURE — 3017F COLORECTAL CA SCREEN DOC REV: CPT | Performed by: INTERNAL MEDICINE

## 2018-10-25 PROCEDURE — 99214 OFFICE O/P EST MOD 30 MIN: CPT | Performed by: INTERNAL MEDICINE

## 2018-10-25 PROCEDURE — 2022F DILAT RTA XM EVC RTNOPTHY: CPT | Performed by: INTERNAL MEDICINE

## 2018-10-25 ASSESSMENT — ENCOUNTER SYMPTOMS
DYSPNEA ASSOCIATED WITH: MINIMAL EXERTION
DIARRHEA: 1
WHEEZING: 1
SHORTNESS OF BREATH: 1

## 2018-10-25 NOTE — PROGRESS NOTES
Acromioclavicular joint arthritis         Date Noted: 08/29/2016      Nonhealing surgical wound         Date Noted: 06/27/2016      COPD exacerbation ([facility]      Ischemic foot         Date Noted: 04/27/2016      Diabetes mellitus with peripheral circulatory disorder ([facility]         Date Noted: 04/27/2016      Limb ischemia      Vasovagal syncope         Date Noted: 11/30/2015      Laceration of scalp without foreign body         Date Noted: 11/30/2015      Nonischemic cardiomyopathy ([facility]         Date Noted: 11/30/2015      Syncope and collapse      Hypotension         Date Noted: 10/28/2015      Typical atrial flutter (HCC)      Chronic systolic CHF (congestive heart failure), NYHA class 3 (HCC)      Hyperkalemia      Acute on chronic combined systolic and diastolic HF (heart failure) (HCC)      Chest pain of uncertain etiology      Coronary artery disease involving autologous artery coronary bypass graft with angina pectoris ([facility]      Essential hypertension      Hypokalemia         Date Noted: 08/07/2015      REJI (acute kidney injury) ([facility]         Date Noted: 08/03/2015      Atrial fibrillation with RVR ([facility]         Date Noted: 08/02/2015      Hyponatremia      Congestive heart failure (HCC)      Pleural effusion due to CHF (congestive heart failure) ([facility]         Date Noted: 08/01/2015      Pleural effusion, right         Date Noted: 08/01/2015      Alcohol abuse, continuous         Date Noted: 08/01/2015      Tachycardia         Date Noted: 08/01/2015      Chest pain         Date Noted: 06/21/2015      Atherosclerosis of native artery of left lower extremity with intermittent claudication ([facility]         Date Noted: 04/03/2015      Tobacco use         Date Noted: 12/29/2014      Back pain         Date Noted: 06/09/2014      PAD (peripheral artery disease) ([facility]         Date Noted: 06/09/2014      Spinal stenosis of lumbar region         Date Noted: 06/09/2014      Viral hepatitis C         Date Noted: 05/30/2014      COPD (chronic obstructive pulmonary disease) ([facility]         Date Noted: 05/16/2014      Diabetes mellitus with hyperglycemia ([facility]         Date Noted: 05/08/2014      HBP (high blood pressure)         Date Noted: 05/08/2014      Hyperlipidemia         Date Noted: 05/08/2014      Arthritis      Current Outpatient Prescriptions:  metoprolol succinate (TOPROL XL) 100 MG extended release tablet, Take 1 tablet by mouth daily, Disp: 30 tablet, Rfl: 3  diltiazem (CARDIZEM CD) 180 MG extended release capsule, Take 1 capsule by mouth daily, Disp: 30 capsule, Rfl: 3  atorvastatin (LIPITOR) 40 MG tablet, Take 1 tablet by mouth daily, Disp: 30 tablet, Rfl: 3  DULoxetine (CYMBALTA) 30 MG extended release capsule, TAKE 1 CAPSULE BY MOUTH DAILY, Disp: 30 capsule, Rfl: 0  gabapentin (NEURONTIN) 100 MG capsule, Take 100 mg by mouth 3 times daily. ., Disp: , Rfl:   tamsulosin (FLOMAX) 0.4 MG capsule, TAKE 1 CAPSULE BY MOUTH DAILY, Disp: 30 capsule, Rfl: 0  losartan (COZAAR) 25 MG tablet, TAKE 1 TABLET BY MOUTH DAILY, Disp: 30 tablet, Rfl: 0  potassium chloride (KLOR-CON) 20 MEQ packet, Take 20 mEq by mouth daily, Disp: 30 each, Rfl: 3  furosemide (LASIX) 40 MG tablet, Take 80 mg (2 tablets) in the AM and 40 mg (1 tablet) in the PM x 5 days and then decrease to 40 mg tablet twice a day (Patient taking differently: Take 40 mg by mouth 2 times daily ), Disp: 80 tablet, Rfl: 3  albuterol sulfate HFA (PROVENTIL HFA) 108 (90 Base) MCG/ACT inhaler, Inhale 2 puffs into the lungs every 4 hours as needed for Wheezing or Shortness of Breath (Space out to every 6 hours as symptoms improve) Space out to every 6 hours as symptoms improve., Disp: 1 Inhaler, Rfl: 0  mometasone-formoterol (DULERA) 100-5 MCG/ACT inhaler, Inhale 2 puffs into the lungs 2 times daily, Disp: 1 Inhaler, Rfl: 0  nicotine (NICODERM CQ) 21 MG/24HR, Place 1 patch onto the skin every 24 hours, Disp: 30 patch, Rfl: 5  omeprazole (PRILOSEC) 40 MG delayed release is significant for CAD and COPD. (CHF)       Review of Systems   Constitutional: Positive for fatigue. Negative for fever. HENT: Negative. Eyes: Negative for visual disturbance. Respiratory: Positive for shortness of breath and wheezing. Cardiovascular: Positive for palpitations and leg swelling. Negative for chest pain. Gastrointestinal: Positive for diarrhea. Genitourinary: Positive for frequency. Musculoskeletal: Positive for gait problem. Lt leg amputation. Skin: Negative. Neurological: Positive for headaches. Psychiatric/Behavioral: Positive for sleep disturbance. The patient is nervous/anxious. Objective:   Physical Exam   Cardiovascular:   Heart beat regular. Pulmonary/Chest: He has decreased breath sounds. He has no wheezes. He has no rhonchi. Assessment:      Encounter Diagnoses   Name Primary?  PAF (paroxysmal atrial fibrillation) (HCC) Yes    Chronic systolic CHF (congestive heart failure), NYHA class 3 (HCC)     Diabetes mellitus with peripheral circulatory disorder (HCC)     COPD exacerbation (HCC)     Coronary artery disease involving autologous artery coronary bypass graft with angina pectoris (HCC)     Cigarette nicotine dependence without complication            Plan:      Pamela Bee was seen today for follow-up from hospital.    Diagnoses and all orders for this visit:    PAF (paroxysmal atrial fibrillation) (HCC)    Chronic systolic CHF (congestive heart failure), NYHA class 3 ([address]    Diabetes mellitus with peripheral circulatory disorder ([address]    COPD exacerbation (HCC)    Coronary artery disease involving autologous artery coronary bypass graft with angina pectoris ([address]    Cigarette nicotine dependence without complication      Referred to  care.         Emeterio Dai MD

## 2018-10-26 ENCOUNTER — TELEPHONE (OUTPATIENT)
Dept: INTERNAL MEDICINE CLINIC | Age: 58
End: 2018-10-26

## 2018-11-01 ENCOUNTER — TELEPHONE (OUTPATIENT)
Dept: INTERNAL MEDICINE CLINIC | Age: 58
End: 2018-11-01

## 2018-11-01 ENCOUNTER — TELEPHONE (OUTPATIENT)
Dept: CARDIOLOGY CLINIC | Age: 58
End: 2018-11-01

## 2018-11-01 RX ORDER — DILTIAZEM HYDROCHLORIDE 240 MG/1
240 CAPSULE, COATED, EXTENDED RELEASE ORAL DAILY
Qty: 30 CAPSULE | Refills: 3 | Status: ON HOLD | OUTPATIENT
Start: 2018-11-01 | End: 2019-07-18 | Stop reason: HOSPADM

## 2018-11-01 NOTE — TELEPHONE ENCOUNTER
Increase Cardizem to 240 mg daily and continue other medications. He chronically has a HR around 100-110's. Reinforce compliance with meds, sodium intake and fluid restriction. He should have a follow up appointment.

## 2018-11-06 RX ORDER — TAMSULOSIN HYDROCHLORIDE 0.4 MG/1
0.4 CAPSULE ORAL DAILY
Qty: 30 CAPSULE | Refills: 0 | Status: SHIPPED | OUTPATIENT
Start: 2018-11-06 | End: 2018-12-06 | Stop reason: SDUPTHER

## 2018-11-18 DIAGNOSIS — E08.59 DIABETES MELLITUS DUE TO UNDERLYING CONDITION WITH CARDIAC COMPLICATION (HCC): ICD-10-CM

## 2018-11-18 DIAGNOSIS — I50.42 CHRONIC COMBINED SYSTOLIC AND DIASTOLIC HEART FAILURE (HCC): ICD-10-CM

## 2018-11-19 RX ORDER — FUROSEMIDE 40 MG/1
TABLET ORAL
Qty: 60 TABLET | Refills: 0 | Status: SHIPPED | OUTPATIENT
Start: 2018-11-19 | End: 2019-01-10 | Stop reason: ALTCHOICE

## 2018-11-19 RX ORDER — LOSARTAN POTASSIUM 25 MG/1
25 TABLET ORAL DAILY
Qty: 30 TABLET | Refills: 0 | Status: SHIPPED | OUTPATIENT
Start: 2018-11-19 | End: 2018-12-17 | Stop reason: SDUPTHER

## 2018-11-21 ENCOUNTER — CARE COORDINATION (OUTPATIENT)
Dept: CARE COORDINATION | Age: 58
End: 2018-11-21

## 2018-11-21 ASSESSMENT — ENCOUNTER SYMPTOMS: DYSPNEA ASSOCIATED WITH: MINIMAL EXERTION

## 2018-11-21 NOTE — CARE COORDINATION
MD Corrine   nicotine (NICODERM CQ) 21 MG/24HR Place 1 patch onto the skin every 24 hours 6/11/18   Devon Villasenor MD   omeprazole (PRILOSEC) 40 MG delayed release capsule Take 1 capsule by mouth daily (with breakfast) 6/11/18   Devon Villasenor MD   rivaroxaban (XARELTO) 20 MG TABS tablet Take 1 tablet by mouth daily (with breakfast) 6/11/18   Devon Villasenor MD   traZODone (DESYREL) 50 MG tablet Take 1 tablet by mouth nightly 6/11/18   Devon Villasenor MD   MAGNESIUM-OXIDE 400 (241.3 Mg) MG TABS tablet TAKE 1 TABLET BY MOUTH TWICE DAILY 1/8/18   JOON Mejía CNP   Respiratory Therapy Supplies (NEBULIZER/ADULT MASK) KIT 1 kit by Does not apply route as needed (SOB) 11/16/17   Ignacia Mack MD   metFORMIN (GLUCOPHAGE) 500 MG tablet TAKE 2 TABLETS BY MOUTH TWICE DAILY WITH FOOD 6/16/17   Stephen Eaton MD   Blood Glucose Monitoring Suppl (FREESTYLE FREEDOM LITE) w/Device KIT 1 kit by Does not apply route 2 times daily as needed (fasting and 2 hours post prandial) 6/14/17   Stephen Eaton MD   glucose blood VI test strips (FREESTYLE TEST STRIPS) strip Test Once Daily, Freestyle Lite, DX: 250.00 9/16/15   Devon Villasenor MD       Future Appointments  Date Time Provider Kiran Valverdei   [facility]2018 10:00 [facility]   12/4/2018 10:40 AM JOON Salter CNP [facility]     ,   Diabetes Assessment    Medic Alert ID:  No  Meal Planning:  Avoidance of concentrated sweets   How often do you test your blood sugar?:  No Testing   Do you have barriers with adherence to non-pharmacologic self-management interventions?  (Nutrition/Exercise/Self-Monitoring):  No   Have you ever had to go to the ED for symptoms of low blood sugar?:  No       No patient-reported symptoms      ,   Congestive Heart Failure Assessment    Are you currently restricting fluids?:  Other (Comment: 1500 ml)  Do you understand a low sodium diet?:  Yes  Do you understand how to read food labels?:  No  Do you salt your food before tasting it?:  Yes     Swelling (worse than baseline) in hands, feet/legs or around abdomen      Symptoms:   CHF associated angina: Neg, CHF associated dyspnea on exertion: Pos, CHF associated fatigue: Neg, CHF associated leg swelling: Pos, CHF associated orthostatic hypotension: Neg, CHF associated PND: Neg, CHF associated shortness of breath: Neg, CHF associated weakness: Neg      Symptom course:  improving  Patient-reported weight (lb):  (Comment: no weight available -difficulty standing on scale)  Salt intake watch compared to last visit:  improved      and   COPD Assessment    Does the patient understand envrionmental exposure?:  Yes  Is the patient able to verbalize Rescue vs. Long Acting medications?:  Yes  Does the patient have a nebulizer?:  No  Does the patient use a space with inhaled medications?:  Yes            Symptoms:      Symptom course:  stable  Breathlessness:  minimal exertion  Increase use of rapid acting/rescue inhaled medications?:  No  Change in chronic cough?:  No/At Baseline  Change in sputum?:  No/At Baseline  Sputum characteristics:  White  Self Monitoring - SaO2:  Yes  Baseline SaO2 Reading:  (Comment: no battery )

## 2018-11-25 DIAGNOSIS — F32.0 MILD SINGLE CURRENT EPISODE OF MAJOR DEPRESSIVE DISORDER (HCC): ICD-10-CM

## 2018-11-26 RX ORDER — FAMOTIDINE 20 MG/1
TABLET, FILM COATED ORAL
Qty: 60 TABLET | Refills: 5 | Status: ON HOLD | OUTPATIENT
Start: 2018-11-26 | End: 2018-12-29 | Stop reason: HOSPADM

## 2018-11-26 RX ORDER — DULOXETIN HYDROCHLORIDE 30 MG/1
30 CAPSULE, DELAYED RELEASE ORAL DAILY
Qty: 30 CAPSULE | Refills: 0 | Status: SHIPPED | OUTPATIENT
Start: 2018-11-26 | End: 2018-11-29

## 2018-11-29 ENCOUNTER — OFFICE VISIT (OUTPATIENT)
Dept: INTERNAL MEDICINE CLINIC | Age: 58
End: 2018-11-29
Payer: COMMERCIAL

## 2018-11-29 ENCOUNTER — CARE COORDINATION (OUTPATIENT)
Dept: CARE COORDINATION | Age: 58
End: 2018-11-29

## 2018-11-29 VITALS
DIASTOLIC BLOOD PRESSURE: 80 MMHG | SYSTOLIC BLOOD PRESSURE: 132 MMHG | WEIGHT: 237 LBS | HEIGHT: 67 IN | BODY MASS INDEX: 37.2 KG/M2 | RESPIRATION RATE: 18 BRPM | HEART RATE: 126 BPM | OXYGEN SATURATION: 91 %

## 2018-11-29 DIAGNOSIS — E87.6 HYPOKALEMIA: ICD-10-CM

## 2018-11-29 DIAGNOSIS — I50.43 HEART FAILURE, ACUTE ON CHRONIC, SYSTOLIC AND DIASTOLIC (HCC): ICD-10-CM

## 2018-11-29 DIAGNOSIS — F32.A DEPRESSION, UNSPECIFIED DEPRESSION TYPE: ICD-10-CM

## 2018-11-29 DIAGNOSIS — F51.01 PRIMARY INSOMNIA: ICD-10-CM

## 2018-11-29 DIAGNOSIS — I73.9 PAD (PERIPHERAL ARTERY DISEASE) (HCC): ICD-10-CM

## 2018-11-29 DIAGNOSIS — J42 CHRONIC BRONCHITIS, UNSPECIFIED CHRONIC BRONCHITIS TYPE (HCC): Primary | ICD-10-CM

## 2018-11-29 PROCEDURE — 3017F COLORECTAL CA SCREEN DOC REV: CPT | Performed by: INTERNAL MEDICINE

## 2018-11-29 PROCEDURE — G8598 ASA/ANTIPLAT THER USED: HCPCS | Performed by: INTERNAL MEDICINE

## 2018-11-29 PROCEDURE — G8926 SPIRO NO PERF OR DOC: HCPCS | Performed by: INTERNAL MEDICINE

## 2018-11-29 PROCEDURE — G8427 DOCREV CUR MEDS BY ELIG CLIN: HCPCS | Performed by: INTERNAL MEDICINE

## 2018-11-29 PROCEDURE — 99214 OFFICE O/P EST MOD 30 MIN: CPT | Performed by: INTERNAL MEDICINE

## 2018-11-29 PROCEDURE — 4004F PT TOBACCO SCREEN RCVD TLK: CPT | Performed by: INTERNAL MEDICINE

## 2018-11-29 PROCEDURE — 3023F SPIROM DOC REV: CPT | Performed by: INTERNAL MEDICINE

## 2018-11-29 PROCEDURE — G8482 FLU IMMUNIZE ORDER/ADMIN: HCPCS | Performed by: INTERNAL MEDICINE

## 2018-11-29 PROCEDURE — G8417 CALC BMI ABV UP PARAM F/U: HCPCS | Performed by: INTERNAL MEDICINE

## 2018-11-29 RX ORDER — POTASSIUM CHLORIDE 600 MG/1
8 TABLET, FILM COATED, EXTENDED RELEASE ORAL 3 TIMES DAILY
Qty: 90 TABLET | Refills: 3 | Status: ON HOLD | OUTPATIENT
Start: 2018-11-29 | End: 2019-01-05

## 2018-11-29 RX ORDER — ALBUTEROL SULFATE 90 UG/1
2 AEROSOL, METERED RESPIRATORY (INHALATION) EVERY 4 HOURS PRN
Qty: 1 INHALER | Refills: 0 | Status: ON HOLD | OUTPATIENT
Start: 2018-11-29 | End: 2019-11-11

## 2018-11-29 RX ORDER — DULOXETIN HYDROCHLORIDE 60 MG/1
60 CAPSULE, DELAYED RELEASE ORAL DAILY
Qty: 90 CAPSULE | Refills: 1 | Status: ON HOLD | OUTPATIENT
Start: 2018-11-29 | End: 2019-06-02

## 2018-11-29 RX ORDER — TRAZODONE HYDROCHLORIDE 50 MG/1
50 TABLET ORAL NIGHTLY
Qty: 90 TABLET | Refills: 3 | Status: SHIPPED | OUTPATIENT
Start: 2018-11-29 | End: 2019-04-15 | Stop reason: SDUPTHER

## 2018-11-29 ASSESSMENT — ENCOUNTER SYMPTOMS
SHORTNESS OF BREATH: 1
WHEEZING: 1
DIFFICULTY BREATHING: 1
BACK PAIN: 1
CHEST TIGHTNESS: 1
DYSPNEA ASSOCIATED WITH: MINIMAL EXERTION

## 2018-11-29 ASSESSMENT — COPD QUESTIONNAIRES: COPD: 1
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
## 2018-12-04 ENCOUNTER — OFFICE VISIT (OUTPATIENT)
Dept: CARDIOLOGY CLINIC | Age: 58
End: 2018-12-04
Payer: COMMERCIAL

## 2018-12-04 VITALS
HEART RATE: 98 BPM | SYSTOLIC BLOOD PRESSURE: 110 MMHG | OXYGEN SATURATION: 96 % | HEIGHT: 67 IN | DIASTOLIC BLOOD PRESSURE: 78 MMHG | BODY MASS INDEX: 37.12 KG/M2

## 2018-12-04 DIAGNOSIS — I50.42 CHRONIC COMBINED SYSTOLIC AND DIASTOLIC HEART FAILURE (HCC): ICD-10-CM

## 2018-12-04 DIAGNOSIS — Z72.0 TOBACCO ABUSE: ICD-10-CM

## 2018-12-04 DIAGNOSIS — I10 ESSENTIAL HYPERTENSION: ICD-10-CM

## 2018-12-04 DIAGNOSIS — I73.9 PAD (PERIPHERAL ARTERY DISEASE) (HCC): ICD-10-CM

## 2018-12-04 DIAGNOSIS — I42.8 NONISCHEMIC CARDIOMYOPATHY (HCC): ICD-10-CM

## 2018-12-04 DIAGNOSIS — I48.20 CHRONIC ATRIAL FIBRILLATION (HCC): Primary | ICD-10-CM

## 2018-12-04 PROCEDURE — 93000 ELECTROCARDIOGRAM COMPLETE: CPT | Performed by: NURSE PRACTITIONER

## 2018-12-04 PROCEDURE — G8417 CALC BMI ABV UP PARAM F/U: HCPCS | Performed by: NURSE PRACTITIONER

## 2018-12-04 PROCEDURE — 3017F COLORECTAL CA SCREEN DOC REV: CPT | Performed by: NURSE PRACTITIONER

## 2018-12-04 PROCEDURE — G8482 FLU IMMUNIZE ORDER/ADMIN: HCPCS | Performed by: NURSE PRACTITIONER

## 2018-12-04 PROCEDURE — G8598 ASA/ANTIPLAT THER USED: HCPCS | Performed by: NURSE PRACTITIONER

## 2018-12-04 PROCEDURE — G8427 DOCREV CUR MEDS BY ELIG CLIN: HCPCS | Performed by: NURSE PRACTITIONER

## 2018-12-04 PROCEDURE — 99214 OFFICE O/P EST MOD 30 MIN: CPT | Performed by: NURSE PRACTITIONER

## 2018-12-04 PROCEDURE — 4004F PT TOBACCO SCREEN RCVD TLK: CPT | Performed by: NURSE PRACTITIONER

## 2018-12-04 NOTE — PROGRESS NOTES
Inhale 2 puffs into the lungs 2 times daily 1 Inhaler 0    nicotine (NICODERM CQ) 21 MG/24HR Place 1 patch onto the skin every 24 hours 30 patch 5    omeprazole (PRILOSEC) 40 MG delayed release capsule Take 1 capsule by mouth daily (with breakfast) 90 capsule 3    rivaroxaban (XARELTO) 20 MG TABS tablet Take 1 tablet by mouth daily (with breakfast) 30 tablet 5    MAGNESIUM-OXIDE 400 (241.3 Mg) MG TABS tablet TAKE 1 TABLET BY MOUTH TWICE DAILY 60 tablet 5    Respiratory Therapy Supplies (NEBULIZER/ADULT MASK) KIT 1 kit by Does not apply route as needed (SOB) 1 kit 0    metFORMIN (GLUCOPHAGE) 500 MG tablet TAKE 2 TABLETS BY MOUTH TWICE DAILY WITH FOOD 180 tablet 3    Blood Glucose Monitoring Suppl (FREESTYLE FREEDOM LITE) w/Device KIT 1 kit by Does not apply route 2 times daily as needed (fasting and 2 hours post prandial) 1 kit 0    glucose blood VI test strips (FREESTYLE TEST STRIPS) strip Test Once Daily, Freestyle Lite, DX: 250.00 100 each 3     No current facility-administered medications for this visit. Physical Exam:   /78   Pulse 98   Ht 5' 7\" (1.702 m)   SpO2 96%   BMI 37.12 kg/m²   Wt Readings from Last 2 Encounters:   11/29/18 237 lb (107.5 kg)   10/21/18 237 lb 10.5 oz (107.8 kg)     Constitutional: He is oriented to person, place, and time. He appears well-developed and well-nourished. In no acute distress. In wheelchair. Color flushed  HEENT: Normocephalic and atraumatic. Sclerae anicteric. No xanthelasmas. Mild swelling of lower eyelids  Neck: Neck supple and thick. JVD hard to ascertain d/t body habitus. No  thyromegaly present. Cardiovascular: irreg, irreg; normal S1 and S2; soft systolic murmur  Pulmonary/Chest: Effort normal and breath sounds normal.  Lungs clear to auscultation. Chest wall nontender  Abdominal: soft, nontender, nondistended, + bowel sounds; Extremities: + L AKA. Pulses are 2+ radial bilateral; R DP faint but palpable.  Chronic stasis changes R

## 2018-12-06 RX ORDER — TAMSULOSIN HYDROCHLORIDE 0.4 MG/1
0.4 CAPSULE ORAL DAILY
Qty: 30 CAPSULE | Refills: 0 | Status: SHIPPED | OUTPATIENT
Start: 2018-12-06 | End: 2019-06-13 | Stop reason: SDUPTHER

## 2018-12-17 DIAGNOSIS — E08.59 DIABETES MELLITUS DUE TO UNDERLYING CONDITION WITH CARDIAC COMPLICATION (HCC): ICD-10-CM

## 2018-12-17 RX ORDER — LOSARTAN POTASSIUM 25 MG/1
25 TABLET ORAL DAILY
Qty: 30 TABLET | Refills: 0 | Status: ON HOLD | OUTPATIENT
Start: 2018-12-17 | End: 2018-12-29 | Stop reason: HOSPADM

## 2018-12-24 ENCOUNTER — HOSPITAL ENCOUNTER (INPATIENT)
Age: 58
LOS: 5 days | Discharge: HOME HEALTH | DRG: 140 | End: 2018-12-29
Attending: EMERGENCY MEDICINE | Admitting: INTERNAL MEDICINE
Payer: COMMERCIAL

## 2018-12-24 ENCOUNTER — APPOINTMENT (OUTPATIENT)
Dept: GENERAL RADIOLOGY | Age: 58
DRG: 140 | End: 2018-12-24
Payer: COMMERCIAL

## 2018-12-24 DIAGNOSIS — R79.89 ELEVATED BRAIN NATRIURETIC PEPTIDE (BNP) LEVEL: ICD-10-CM

## 2018-12-24 DIAGNOSIS — D64.9 ANEMIA, UNSPECIFIED TYPE: ICD-10-CM

## 2018-12-24 DIAGNOSIS — E87.6 HYPOKALEMIA: ICD-10-CM

## 2018-12-24 DIAGNOSIS — J44.1 COPD EXACERBATION (HCC): Primary | ICD-10-CM

## 2018-12-24 DIAGNOSIS — R79.89 ELEVATED SERUM CREATININE: ICD-10-CM

## 2018-12-24 PROBLEM — N17.9 ACUTE RENAL FAILURE (ARF) (HCC): Status: ACTIVE | Noted: 2018-12-24

## 2018-12-24 PROBLEM — I25.10 CAD (CORONARY ARTERY DISEASE): Status: ACTIVE | Noted: 2018-12-24

## 2018-12-24 PROBLEM — E66.01 MORBID OBESITY DUE TO EXCESS CALORIES (HCC): Status: ACTIVE | Noted: 2018-12-24

## 2018-12-24 LAB
A/G RATIO: 1.1 (ref 1.1–2.2)
ALBUMIN SERPL-MCNC: 3.9 G/DL (ref 3.4–5)
ALP BLD-CCNC: 82 U/L (ref 40–129)
ALT SERPL-CCNC: 13 U/L (ref 10–40)
ANION GAP SERPL CALCULATED.3IONS-SCNC: 19 MMOL/L (ref 3–16)
AST SERPL-CCNC: 14 U/L (ref 15–37)
BASOPHILS ABSOLUTE: 0.1 K/UL (ref 0–0.2)
BASOPHILS RELATIVE PERCENT: 1.1 %
BILIRUB SERPL-MCNC: 0.6 MG/DL (ref 0–1)
BUN BLDV-MCNC: 9 MG/DL (ref 7–20)
CALCIUM SERPL-MCNC: 8.8 MG/DL (ref 8.3–10.6)
CHLORIDE BLD-SCNC: 87 MMOL/L (ref 99–110)
CO2: 26 MMOL/L (ref 21–32)
CREAT SERPL-MCNC: 1.5 MG/DL (ref 0.9–1.3)
EOSINOPHILS ABSOLUTE: 0.1 K/UL (ref 0–0.6)
EOSINOPHILS RELATIVE PERCENT: 1.6 %
ETHANOL: 85 MG/DL (ref 0–0.08)
GFR AFRICAN AMERICAN: 58
GFR NON-AFRICAN AMERICAN: 48
GLOBULIN: 3.5 G/DL
GLUCOSE BLD-MCNC: 166 MG/DL (ref 70–99)
GLUCOSE BLD-MCNC: 226 MG/DL (ref 70–99)
HCT VFR BLD CALC: 28.7 % (ref 40.5–52.5)
HEMOGLOBIN: 9.4 G/DL (ref 13.5–17.5)
INR BLD: 2.04 (ref 0.86–1.14)
LACTIC ACID: 4.1 MMOL/L (ref 0.4–2)
LYMPHOCYTES ABSOLUTE: 1.4 K/UL (ref 1–5.1)
LYMPHOCYTES RELATIVE PERCENT: 15.2 %
MCH RBC QN AUTO: 29.1 PG (ref 26–34)
MCHC RBC AUTO-ENTMCNC: 32.7 G/DL (ref 31–36)
MCV RBC AUTO: 89 FL (ref 80–100)
MONOCYTES ABSOLUTE: 0.7 K/UL (ref 0–1.3)
MONOCYTES RELATIVE PERCENT: 8 %
NEUTROPHILS ABSOLUTE: 6.8 K/UL (ref 1.7–7.7)
NEUTROPHILS RELATIVE PERCENT: 74.1 %
PDW BLD-RTO: 15.1 % (ref 12.4–15.4)
PERFORMED ON: ABNORMAL
PLATELET # BLD: 249 K/UL (ref 135–450)
PMV BLD AUTO: 8.3 FL (ref 5–10.5)
POTASSIUM SERPL-SCNC: 3.1 MMOL/L (ref 3.5–5.1)
PRO-BNP: 2100 PG/ML (ref 0–124)
PROTHROMBIN TIME: 23.2 SEC (ref 9.8–13)
RBC # BLD: 3.22 M/UL (ref 4.2–5.9)
SODIUM BLD-SCNC: 132 MMOL/L (ref 136–145)
TOTAL PROTEIN: 7.4 G/DL (ref 6.4–8.2)
TROPONIN: 0.01 NG/ML
WBC # BLD: 9.2 K/UL (ref 4–11)

## 2018-12-24 PROCEDURE — 84484 ASSAY OF TROPONIN QUANT: CPT

## 2018-12-24 PROCEDURE — 85610 PROTHROMBIN TIME: CPT

## 2018-12-24 PROCEDURE — 96374 THER/PROPH/DIAG INJ IV PUSH: CPT

## 2018-12-24 PROCEDURE — 2700000000 HC OXYGEN THERAPY PER DAY

## 2018-12-24 PROCEDURE — 71045 X-RAY EXAM CHEST 1 VIEW: CPT

## 2018-12-24 PROCEDURE — 83880 ASSAY OF NATRIURETIC PEPTIDE: CPT

## 2018-12-24 PROCEDURE — 6370000000 HC RX 637 (ALT 250 FOR IP): Performed by: PHYSICIAN ASSISTANT

## 2018-12-24 PROCEDURE — 1200000000 HC SEMI PRIVATE

## 2018-12-24 PROCEDURE — 2580000003 HC RX 258: Performed by: INTERNAL MEDICINE

## 2018-12-24 PROCEDURE — 2580000003 HC RX 258: Performed by: EMERGENCY MEDICINE

## 2018-12-24 PROCEDURE — 96361 HYDRATE IV INFUSION ADD-ON: CPT

## 2018-12-24 PROCEDURE — 85025 COMPLETE CBC W/AUTO DIFF WBC: CPT

## 2018-12-24 PROCEDURE — 83605 ASSAY OF LACTIC ACID: CPT

## 2018-12-24 PROCEDURE — 94760 N-INVAS EAR/PLS OXIMETRY 1: CPT

## 2018-12-24 PROCEDURE — 94640 AIRWAY INHALATION TREATMENT: CPT

## 2018-12-24 PROCEDURE — 80053 COMPREHEN METABOLIC PANEL: CPT

## 2018-12-24 PROCEDURE — 6360000002 HC RX W HCPCS: Performed by: INTERNAL MEDICINE

## 2018-12-24 PROCEDURE — 87040 BLOOD CULTURE FOR BACTERIA: CPT

## 2018-12-24 PROCEDURE — 6370000000 HC RX 637 (ALT 250 FOR IP): Performed by: INTERNAL MEDICINE

## 2018-12-24 PROCEDURE — 6360000002 HC RX W HCPCS: Performed by: PHYSICIAN ASSISTANT

## 2018-12-24 PROCEDURE — 99285 EMERGENCY DEPT VISIT HI MDM: CPT

## 2018-12-24 PROCEDURE — 93005 ELECTROCARDIOGRAM TRACING: CPT | Performed by: PHYSICIAN ASSISTANT

## 2018-12-24 PROCEDURE — 94664 DEMO&/EVAL PT USE INHALER: CPT

## 2018-12-24 PROCEDURE — G0480 DRUG TEST DEF 1-7 CLASSES: HCPCS

## 2018-12-24 PROCEDURE — 36415 COLL VENOUS BLD VENIPUNCTURE: CPT

## 2018-12-24 RX ORDER — NICOTINE POLACRILEX 4 MG
15 LOZENGE BUCCAL PRN
Status: DISCONTINUED | OUTPATIENT
Start: 2018-12-24 | End: 2018-12-29 | Stop reason: HOSPADM

## 2018-12-24 RX ORDER — POTASSIUM CHLORIDE 20 MEQ/1
40 TABLET, EXTENDED RELEASE ORAL ONCE
Status: COMPLETED | OUTPATIENT
Start: 2018-12-24 | End: 2018-12-24

## 2018-12-24 RX ORDER — ONDANSETRON 2 MG/ML
4 INJECTION INTRAMUSCULAR; INTRAVENOUS EVERY 4 HOURS PRN
Status: DISCONTINUED | OUTPATIENT
Start: 2018-12-24 | End: 2018-12-29 | Stop reason: HOSPADM

## 2018-12-24 RX ORDER — GABAPENTIN 100 MG/1
100 CAPSULE ORAL 3 TIMES DAILY
Status: DISCONTINUED | OUTPATIENT
Start: 2018-12-24 | End: 2018-12-29 | Stop reason: HOSPADM

## 2018-12-24 RX ORDER — METOPROLOL SUCCINATE 50 MG/1
150 TABLET, EXTENDED RELEASE ORAL DAILY
Status: DISCONTINUED | OUTPATIENT
Start: 2018-12-25 | End: 2018-12-24

## 2018-12-24 RX ORDER — BISACODYL 10 MG
10 SUPPOSITORY, RECTAL RECTAL DAILY PRN
Status: DISCONTINUED | OUTPATIENT
Start: 2018-12-24 | End: 2018-12-29 | Stop reason: HOSPADM

## 2018-12-24 RX ORDER — ATORVASTATIN CALCIUM 40 MG/1
40 TABLET, FILM COATED ORAL DAILY
Status: DISCONTINUED | OUTPATIENT
Start: 2018-12-25 | End: 2018-12-29 | Stop reason: HOSPADM

## 2018-12-24 RX ORDER — FUROSEMIDE 10 MG/ML
40 INJECTION INTRAMUSCULAR; INTRAVENOUS ONCE
Status: DISCONTINUED | OUTPATIENT
Start: 2018-12-24 | End: 2018-12-24

## 2018-12-24 RX ORDER — SODIUM CHLORIDE 0.9 % (FLUSH) 0.9 %
10 SYRINGE (ML) INJECTION PRN
Status: DISCONTINUED | OUTPATIENT
Start: 2018-12-24 | End: 2018-12-29 | Stop reason: HOSPADM

## 2018-12-24 RX ORDER — DOCUSATE SODIUM 100 MG/1
100 CAPSULE, LIQUID FILLED ORAL 2 TIMES DAILY PRN
Status: DISCONTINUED | OUTPATIENT
Start: 2018-12-24 | End: 2018-12-29 | Stop reason: HOSPADM

## 2018-12-24 RX ORDER — SODIUM CHLORIDE 9 MG/ML
INJECTION, SOLUTION INTRAVENOUS CONTINUOUS
Status: DISCONTINUED | OUTPATIENT
Start: 2018-12-24 | End: 2018-12-25

## 2018-12-24 RX ORDER — LOSARTAN POTASSIUM 25 MG/1
25 TABLET ORAL DAILY
Status: DISCONTINUED | OUTPATIENT
Start: 2018-12-25 | End: 2018-12-24

## 2018-12-24 RX ORDER — TRAMADOL HYDROCHLORIDE 50 MG/1
100 TABLET ORAL EVERY 6 HOURS PRN
Status: DISCONTINUED | OUTPATIENT
Start: 2018-12-24 | End: 2018-12-25

## 2018-12-24 RX ORDER — PANTOPRAZOLE SODIUM 40 MG/1
40 TABLET, DELAYED RELEASE ORAL
Status: DISCONTINUED | OUTPATIENT
Start: 2018-12-25 | End: 2018-12-29 | Stop reason: HOSPADM

## 2018-12-24 RX ORDER — IPRATROPIUM BROMIDE AND ALBUTEROL SULFATE 2.5; .5 MG/3ML; MG/3ML
1 SOLUTION RESPIRATORY (INHALATION)
Status: DISCONTINUED | OUTPATIENT
Start: 2018-12-24 | End: 2018-12-29 | Stop reason: HOSPADM

## 2018-12-24 RX ORDER — TRAMADOL HYDROCHLORIDE 50 MG/1
50 TABLET ORAL EVERY 6 HOURS PRN
Status: DISCONTINUED | OUTPATIENT
Start: 2018-12-24 | End: 2018-12-25

## 2018-12-24 RX ORDER — DEXTROSE MONOHYDRATE 50 MG/ML
100 INJECTION, SOLUTION INTRAVENOUS PRN
Status: DISCONTINUED | OUTPATIENT
Start: 2018-12-24 | End: 2018-12-29 | Stop reason: HOSPADM

## 2018-12-24 RX ORDER — TAMSULOSIN HYDROCHLORIDE 0.4 MG/1
0.4 CAPSULE ORAL DAILY
Status: DISCONTINUED | OUTPATIENT
Start: 2018-12-25 | End: 2018-12-29 | Stop reason: HOSPADM

## 2018-12-24 RX ORDER — IPRATROPIUM BROMIDE AND ALBUTEROL SULFATE 2.5; .5 MG/3ML; MG/3ML
3 SOLUTION RESPIRATORY (INHALATION) ONCE
Status: COMPLETED | OUTPATIENT
Start: 2018-12-24 | End: 2018-12-24

## 2018-12-24 RX ORDER — METHYLPREDNISOLONE SODIUM SUCCINATE 125 MG/2ML
125 INJECTION, POWDER, LYOPHILIZED, FOR SOLUTION INTRAMUSCULAR; INTRAVENOUS ONCE
Status: COMPLETED | OUTPATIENT
Start: 2018-12-24 | End: 2018-12-24

## 2018-12-24 RX ORDER — TRAZODONE HYDROCHLORIDE 50 MG/1
50 TABLET ORAL NIGHTLY
Status: DISCONTINUED | OUTPATIENT
Start: 2018-12-24 | End: 2018-12-29 | Stop reason: HOSPADM

## 2018-12-24 RX ORDER — DEXTROSE MONOHYDRATE 25 G/50ML
12.5 INJECTION, SOLUTION INTRAVENOUS PRN
Status: DISCONTINUED | OUTPATIENT
Start: 2018-12-24 | End: 2018-12-29 | Stop reason: HOSPADM

## 2018-12-24 RX ORDER — IPRATROPIUM BROMIDE AND ALBUTEROL SULFATE 2.5; .5 MG/3ML; MG/3ML
1 SOLUTION RESPIRATORY (INHALATION)
Status: DISCONTINUED | OUTPATIENT
Start: 2018-12-24 | End: 2018-12-24

## 2018-12-24 RX ORDER — DILTIAZEM HYDROCHLORIDE 240 MG/1
240 CAPSULE, COATED, EXTENDED RELEASE ORAL DAILY
Status: DISCONTINUED | OUTPATIENT
Start: 2018-12-25 | End: 2018-12-28

## 2018-12-24 RX ORDER — METHYLPREDNISOLONE SODIUM SUCCINATE 40 MG/ML
40 INJECTION, POWDER, LYOPHILIZED, FOR SOLUTION INTRAMUSCULAR; INTRAVENOUS EVERY 6 HOURS
Status: DISCONTINUED | OUTPATIENT
Start: 2018-12-25 | End: 2018-12-26

## 2018-12-24 RX ORDER — DULOXETIN HYDROCHLORIDE 60 MG/1
60 CAPSULE, DELAYED RELEASE ORAL DAILY
Status: DISCONTINUED | OUTPATIENT
Start: 2018-12-25 | End: 2018-12-29 | Stop reason: HOSPADM

## 2018-12-24 RX ORDER — IPRATROPIUM BROMIDE AND ALBUTEROL SULFATE 2.5; .5 MG/3ML; MG/3ML
1 SOLUTION RESPIRATORY (INHALATION)
Status: DISCONTINUED | OUTPATIENT
Start: 2018-12-25 | End: 2018-12-25

## 2018-12-24 RX ORDER — 0.9 % SODIUM CHLORIDE 0.9 %
500 INTRAVENOUS SOLUTION INTRAVENOUS ONCE
Status: COMPLETED | OUTPATIENT
Start: 2018-12-24 | End: 2018-12-24

## 2018-12-24 RX ORDER — NICOTINE 21 MG/24HR
1 PATCH, TRANSDERMAL 24 HOURS TRANSDERMAL EVERY 24 HOURS
Status: DISCONTINUED | OUTPATIENT
Start: 2018-12-25 | End: 2018-12-29 | Stop reason: HOSPADM

## 2018-12-24 RX ORDER — SODIUM CHLORIDE 0.9 % (FLUSH) 0.9 %
10 SYRINGE (ML) INJECTION EVERY 12 HOURS SCHEDULED
Status: DISCONTINUED | OUTPATIENT
Start: 2018-12-24 | End: 2018-12-29 | Stop reason: HOSPADM

## 2018-12-24 RX ADMIN — AZITHROMYCIN MONOHYDRATE 500 MG: 500 INJECTION, POWDER, LYOPHILIZED, FOR SOLUTION INTRAVENOUS at 23:58

## 2018-12-24 RX ADMIN — SODIUM CHLORIDE: 9 INJECTION, SOLUTION INTRAVENOUS at 23:57

## 2018-12-24 RX ADMIN — SODIUM CHLORIDE 500 ML: 9 INJECTION, SOLUTION INTRAVENOUS at 20:27

## 2018-12-24 RX ADMIN — GABAPENTIN 100 MG: 100 CAPSULE ORAL at 23:52

## 2018-12-24 RX ADMIN — MOMETASONE FUROATE AND FORMOTEROL FUMARATE DIHYDRATE 2 PUFF: 100; 5 AEROSOL RESPIRATORY (INHALATION) at 22:32

## 2018-12-24 RX ADMIN — TRAZODONE HYDROCHLORIDE 50 MG: 50 TABLET ORAL at 23:52

## 2018-12-24 RX ADMIN — METHYLPREDNISOLONE SODIUM SUCCINATE 125 MG: 125 INJECTION, POWDER, FOR SOLUTION INTRAMUSCULAR; INTRAVENOUS at 20:27

## 2018-12-24 RX ADMIN — POTASSIUM CHLORIDE 40 MEQ: 20 TABLET, EXTENDED RELEASE ORAL at 21:35

## 2018-12-24 RX ADMIN — IPRATROPIUM BROMIDE AND ALBUTEROL SULFATE 3 AMPULE: .5; 3 SOLUTION RESPIRATORY (INHALATION) at 20:28

## 2018-12-24 RX ADMIN — Medication 10 ML: at 23:55

## 2018-12-24 ASSESSMENT — PAIN DESCRIPTION - LOCATION: LOCATION: BACK;FLANK;CHEST

## 2018-12-24 ASSESSMENT — PAIN DESCRIPTION - PAIN TYPE: TYPE: CHRONIC PAIN;ACUTE PAIN

## 2018-12-24 ASSESSMENT — PAIN SCALES - GENERAL: PAINLEVEL_OUTOF10: 7

## 2018-12-25 LAB
ALBUMIN SERPL-MCNC: 4.1 G/DL (ref 3.4–5)
ANION GAP SERPL CALCULATED.3IONS-SCNC: 15 MMOL/L (ref 3–16)
ANION GAP SERPL CALCULATED.3IONS-SCNC: 16 MMOL/L (ref 3–16)
BASE EXCESS ARTERIAL: 1.1 MMOL/L (ref -3–3)
BASOPHILS ABSOLUTE: 0 K/UL (ref 0–0.2)
BASOPHILS RELATIVE PERCENT: 0.4 %
BILIRUBIN URINE: ABNORMAL
BLOOD, URINE: NEGATIVE
BUN BLDV-MCNC: 16 MG/DL (ref 7–20)
BUN BLDV-MCNC: 25 MG/DL (ref 7–20)
CALCIUM SERPL-MCNC: 9 MG/DL (ref 8.3–10.6)
CALCIUM SERPL-MCNC: 9.1 MG/DL (ref 8.3–10.6)
CARBOXYHEMOGLOBIN ARTERIAL: 1.9 % (ref 0–1.5)
CHLORIDE BLD-SCNC: 88 MMOL/L (ref 99–110)
CHLORIDE BLD-SCNC: 88 MMOL/L (ref 99–110)
CLARITY: ABNORMAL
CO2: 25 MMOL/L (ref 21–32)
CO2: 25 MMOL/L (ref 21–32)
COLOR: YELLOW
CORTISOL TOTAL: 11 UG/DL
CREAT SERPL-MCNC: 1.4 MG/DL (ref 0.9–1.3)
CREAT SERPL-MCNC: 1.6 MG/DL (ref 0.9–1.3)
CREATININE URINE: 164.7 MG/DL (ref 39–259)
EOSINOPHILS ABSOLUTE: 0 K/UL (ref 0–0.6)
EOSINOPHILS RELATIVE PERCENT: 0.1 %
EPITHELIAL CELLS, UA: 1 /HPF (ref 0–5)
GFR AFRICAN AMERICAN: 54
GFR AFRICAN AMERICAN: >60
GFR NON-AFRICAN AMERICAN: 45
GFR NON-AFRICAN AMERICAN: 52
GLUCOSE BLD-MCNC: 298 MG/DL (ref 70–99)
GLUCOSE BLD-MCNC: 302 MG/DL (ref 70–99)
GLUCOSE BLD-MCNC: 310 MG/DL (ref 70–99)
GLUCOSE BLD-MCNC: 315 MG/DL (ref 70–99)
GLUCOSE BLD-MCNC: 360 MG/DL (ref 70–99)
GLUCOSE BLD-MCNC: 416 MG/DL (ref 70–99)
GLUCOSE URINE: 500 MG/DL
HCO3 ARTERIAL: 26.4 MMOL/L (ref 21–29)
HCT VFR BLD CALC: 28.7 % (ref 40.5–52.5)
HEMOGLOBIN, ART, EXTENDED: 9.2 G/DL (ref 13.5–17.5)
HEMOGLOBIN: 9.6 G/DL (ref 13.5–17.5)
HYALINE CASTS: 11 /LPF (ref 0–8)
KETONES, URINE: ABNORMAL MG/DL
LACTIC ACID: 3.5 MMOL/L (ref 0.4–2)
LACTIC ACID: 4.3 MMOL/L (ref 0.4–2)
LEUKOCYTE ESTERASE, URINE: NEGATIVE
LYMPHOCYTES ABSOLUTE: 0.4 K/UL (ref 1–5.1)
LYMPHOCYTES RELATIVE PERCENT: 5.1 %
MCH RBC QN AUTO: 29.6 PG (ref 26–34)
MCHC RBC AUTO-ENTMCNC: 33.5 G/DL (ref 31–36)
MCV RBC AUTO: 88.2 FL (ref 80–100)
METHEMOGLOBIN ARTERIAL: 0.6 %
MICROSCOPIC EXAMINATION: YES
MONOCYTES ABSOLUTE: 0.1 K/UL (ref 0–1.3)
MONOCYTES RELATIVE PERCENT: 1.1 %
NEUTROPHILS ABSOLUTE: 8.1 K/UL (ref 1.7–7.7)
NEUTROPHILS RELATIVE PERCENT: 93.3 %
NITRITE, URINE: NEGATIVE
O2 CONTENT ARTERIAL: 13 ML/DL
O2 SAT, ARTERIAL: 99.8 %
O2 THERAPY: ABNORMAL
OSMOLALITY URINE: 293 MOSM/KG (ref 390–1070)
PCO2 ARTERIAL: 48.5 MMHG (ref 35–45)
PDW BLD-RTO: 15.2 % (ref 12.4–15.4)
PERFORMED ON: ABNORMAL
PH ARTERIAL: 7.35 (ref 7.35–7.45)
PH UA: 5
PHOSPHORUS: 3.9 MG/DL (ref 2.5–4.9)
PLATELET # BLD: 230 K/UL (ref 135–450)
PMV BLD AUTO: 8.7 FL (ref 5–10.5)
PO2 ARTERIAL: 176 MMHG (ref 75–108)
POTASSIUM REFLEX MAGNESIUM: 5.3 MMOL/L (ref 3.5–5.1)
POTASSIUM SERPL-SCNC: 4.9 MMOL/L (ref 3.5–5.1)
POTASSIUM SERPL-SCNC: 5.3 MMOL/L (ref 3.5–5.1)
PROTEIN UA: ABNORMAL MG/DL
RBC # BLD: 3.25 M/UL (ref 4.2–5.9)
RBC UA: 2 /HPF (ref 0–4)
SODIUM BLD-SCNC: 128 MMOL/L (ref 136–145)
SODIUM BLD-SCNC: 129 MMOL/L (ref 136–145)
SODIUM URINE: <20 MMOL/L
SPECIFIC GRAVITY UA: 1.01
TCO2 ARTERIAL: 27.8 MMOL/L
TSH REFLEX: 0.43 UIU/ML (ref 0.27–4.2)
URIC ACID, SERUM: 9.7 MG/DL (ref 3.5–7.2)
UROBILINOGEN, URINE: 0.2 E.U./DL
WBC # BLD: 8.7 K/UL (ref 4–11)
WBC UA: 7 /HPF (ref 0–5)

## 2018-12-25 PROCEDURE — 93010 ELECTROCARDIOGRAM REPORT: CPT | Performed by: INTERNAL MEDICINE

## 2018-12-25 PROCEDURE — 6370000000 HC RX 637 (ALT 250 FOR IP): Performed by: FAMILY MEDICINE

## 2018-12-25 PROCEDURE — 6370000000 HC RX 637 (ALT 250 FOR IP): Performed by: INTERNAL MEDICINE

## 2018-12-25 PROCEDURE — 82803 BLOOD GASES ANY COMBINATION: CPT

## 2018-12-25 PROCEDURE — 83930 ASSAY OF BLOOD OSMOLALITY: CPT

## 2018-12-25 PROCEDURE — 36600 WITHDRAWAL OF ARTERIAL BLOOD: CPT

## 2018-12-25 PROCEDURE — 83935 ASSAY OF URINE OSMOLALITY: CPT

## 2018-12-25 PROCEDURE — 2060000000 HC ICU INTERMEDIATE R&B

## 2018-12-25 PROCEDURE — 84443 ASSAY THYROID STIM HORMONE: CPT

## 2018-12-25 PROCEDURE — 82570 ASSAY OF URINE CREATININE: CPT

## 2018-12-25 PROCEDURE — 2580000003 HC RX 258: Performed by: INTERNAL MEDICINE

## 2018-12-25 PROCEDURE — 94760 N-INVAS EAR/PLS OXIMETRY 1: CPT

## 2018-12-25 PROCEDURE — 87086 URINE CULTURE/COLONY COUNT: CPT

## 2018-12-25 PROCEDURE — 94762 N-INVAS EAR/PLS OXIMTRY CONT: CPT

## 2018-12-25 PROCEDURE — 84300 ASSAY OF URINE SODIUM: CPT

## 2018-12-25 PROCEDURE — 6360000002 HC RX W HCPCS: Performed by: INTERNAL MEDICINE

## 2018-12-25 PROCEDURE — 84550 ASSAY OF BLOOD/URIC ACID: CPT

## 2018-12-25 PROCEDURE — 81001 URINALYSIS AUTO W/SCOPE: CPT

## 2018-12-25 PROCEDURE — 80069 RENAL FUNCTION PANEL: CPT

## 2018-12-25 PROCEDURE — 94640 AIRWAY INHALATION TREATMENT: CPT

## 2018-12-25 PROCEDURE — 6360000002 HC RX W HCPCS: Performed by: FAMILY MEDICINE

## 2018-12-25 PROCEDURE — 83605 ASSAY OF LACTIC ACID: CPT

## 2018-12-25 PROCEDURE — 2700000000 HC OXYGEN THERAPY PER DAY

## 2018-12-25 PROCEDURE — 82533 TOTAL CORTISOL: CPT

## 2018-12-25 PROCEDURE — 36415 COLL VENOUS BLD VENIPUNCTURE: CPT

## 2018-12-25 PROCEDURE — 2500000003 HC RX 250 WO HCPCS: Performed by: INTERNAL MEDICINE

## 2018-12-25 PROCEDURE — 85025 COMPLETE CBC W/AUTO DIFF WBC: CPT

## 2018-12-25 RX ORDER — DILTIAZEM HYDROCHLORIDE 5 MG/ML
10 INJECTION INTRAVENOUS ONCE
Status: COMPLETED | OUTPATIENT
Start: 2018-12-25 | End: 2018-12-25

## 2018-12-25 RX ORDER — OXYCODONE HYDROCHLORIDE AND ACETAMINOPHEN 5; 325 MG/1; MG/1
1 TABLET ORAL EVERY 6 HOURS PRN
Status: DISCONTINUED | OUTPATIENT
Start: 2018-12-25 | End: 2018-12-29 | Stop reason: HOSPADM

## 2018-12-25 RX ORDER — INSULIN GLARGINE 100 [IU]/ML
10 INJECTION, SOLUTION SUBCUTANEOUS NIGHTLY
Status: DISCONTINUED | OUTPATIENT
Start: 2018-12-25 | End: 2018-12-26

## 2018-12-25 RX ORDER — BUDESONIDE 0.5 MG/2ML
0.5 INHALANT ORAL 2 TIMES DAILY
Status: DISCONTINUED | OUTPATIENT
Start: 2018-12-25 | End: 2018-12-29 | Stop reason: HOSPADM

## 2018-12-25 RX ORDER — METOPROLOL SUCCINATE 50 MG/1
100 TABLET, EXTENDED RELEASE ORAL DAILY
Status: DISCONTINUED | OUTPATIENT
Start: 2018-12-25 | End: 2018-12-26

## 2018-12-25 RX ORDER — IPRATROPIUM BROMIDE AND ALBUTEROL SULFATE 2.5; .5 MG/3ML; MG/3ML
1 SOLUTION RESPIRATORY (INHALATION) EVERY 4 HOURS
Status: DISCONTINUED | OUTPATIENT
Start: 2018-12-25 | End: 2018-12-29 | Stop reason: HOSPADM

## 2018-12-25 RX ORDER — FORMOTEROL FUMARATE 20 UG/2ML
20 SOLUTION RESPIRATORY (INHALATION) 2 TIMES DAILY
Status: DISCONTINUED | OUTPATIENT
Start: 2018-12-25 | End: 2018-12-29 | Stop reason: HOSPADM

## 2018-12-25 RX ORDER — SODIUM CHLORIDE 9 MG/ML
INJECTION, SOLUTION INTRAVENOUS CONTINUOUS
Status: DISCONTINUED | OUTPATIENT
Start: 2018-12-25 | End: 2018-12-27

## 2018-12-25 RX ORDER — METOPROLOL SUCCINATE 50 MG/1
150 TABLET, EXTENDED RELEASE ORAL DAILY
Status: ON HOLD | COMMUNITY
End: 2019-02-19 | Stop reason: HOSPADM

## 2018-12-25 RX ADMIN — DILTIAZEM HYDROCHLORIDE 240 MG: 240 CAPSULE, COATED, EXTENDED RELEASE ORAL at 08:46

## 2018-12-25 RX ADMIN — OXYCODONE AND ACETAMINOPHEN 1 TABLET: 5; 325 TABLET ORAL at 12:32

## 2018-12-25 RX ADMIN — METHYLPREDNISOLONE SODIUM SUCCINATE 40 MG: 40 INJECTION, POWDER, FOR SOLUTION INTRAMUSCULAR; INTRAVENOUS at 22:33

## 2018-12-25 RX ADMIN — METHYLPREDNISOLONE SODIUM SUCCINATE 40 MG: 40 INJECTION, POWDER, FOR SOLUTION INTRAMUSCULAR; INTRAVENOUS at 08:46

## 2018-12-25 RX ADMIN — DOCUSATE SODIUM 100 MG: 100 CAPSULE, LIQUID FILLED ORAL at 22:33

## 2018-12-25 RX ADMIN — INSULIN LISPRO 15 UNITS: 100 INJECTION, SOLUTION INTRAVENOUS; SUBCUTANEOUS at 17:07

## 2018-12-25 RX ADMIN — RIVAROXABAN 20 MG: 20 TABLET, FILM COATED ORAL at 08:55

## 2018-12-25 RX ADMIN — INSULIN LISPRO 9 UNITS: 100 INJECTION, SOLUTION INTRAVENOUS; SUBCUTANEOUS at 22:40

## 2018-12-25 RX ADMIN — TRAZODONE HYDROCHLORIDE 50 MG: 50 TABLET ORAL at 22:33

## 2018-12-25 RX ADMIN — FORMOTEROL FUMARATE DIHYDRATE 20 MCG: 20 SOLUTION RESPIRATORY (INHALATION) at 21:44

## 2018-12-25 RX ADMIN — MOMETASONE FUROATE AND FORMOTEROL FUMARATE DIHYDRATE 2 PUFF: 100; 5 AEROSOL RESPIRATORY (INHALATION) at 09:20

## 2018-12-25 RX ADMIN — METOPROLOL SUCCINATE 100 MG: 50 TABLET, EXTENDED RELEASE ORAL at 12:31

## 2018-12-25 RX ADMIN — BUDESONIDE 500 MCG: 0.5 SUSPENSION RESPIRATORY (INHALATION) at 12:51

## 2018-12-25 RX ADMIN — GABAPENTIN 100 MG: 100 CAPSULE ORAL at 13:42

## 2018-12-25 RX ADMIN — ATORVASTATIN CALCIUM 40 MG: 40 TABLET, FILM COATED ORAL at 08:46

## 2018-12-25 RX ADMIN — PANTOPRAZOLE SODIUM 40 MG: 40 TABLET, DELAYED RELEASE ORAL at 06:35

## 2018-12-25 RX ADMIN — INSULIN LISPRO 8 UNITS: 100 INJECTION, SOLUTION INTRAVENOUS; SUBCUTANEOUS at 09:00

## 2018-12-25 RX ADMIN — METHYLPREDNISOLONE SODIUM SUCCINATE 40 MG: 40 INJECTION, POWDER, FOR SOLUTION INTRAMUSCULAR; INTRAVENOUS at 13:42

## 2018-12-25 RX ADMIN — DULOXETINE HYDROCHLORIDE 60 MG: 60 CAPSULE, DELAYED RELEASE ORAL at 08:46

## 2018-12-25 RX ADMIN — INSULIN LISPRO 2 UNITS: 100 INJECTION, SOLUTION INTRAVENOUS; SUBCUTANEOUS at 00:01

## 2018-12-25 RX ADMIN — BUDESONIDE 500 MCG: 0.5 SUSPENSION RESPIRATORY (INHALATION) at 21:45

## 2018-12-25 RX ADMIN — IPRATROPIUM BROMIDE AND ALBUTEROL SULFATE 1 AMPULE: 2.5; .5 SOLUTION RESPIRATORY (INHALATION) at 16:05

## 2018-12-25 RX ADMIN — AZITHROMYCIN MONOHYDRATE 500 MG: 500 INJECTION, POWDER, LYOPHILIZED, FOR SOLUTION INTRAVENOUS at 22:34

## 2018-12-25 RX ADMIN — GABAPENTIN 100 MG: 100 CAPSULE ORAL at 22:33

## 2018-12-25 RX ADMIN — INSULIN GLARGINE 10 UNITS: 100 INJECTION, SOLUTION SUBCUTANEOUS at 22:41

## 2018-12-25 RX ADMIN — INSULIN LISPRO 12 UNITS: 100 INJECTION, SOLUTION INTRAVENOUS; SUBCUTANEOUS at 11:54

## 2018-12-25 RX ADMIN — DILTIAZEM HYDROCHLORIDE 5 MG/HR: 5 INJECTION INTRAVENOUS at 05:35

## 2018-12-25 RX ADMIN — MOMETASONE FUROATE AND FORMOTEROL FUMARATE DIHYDRATE 2 PUFF: 100; 5 AEROSOL RESPIRATORY (INHALATION) at 21:44

## 2018-12-25 RX ADMIN — IPRATROPIUM BROMIDE AND ALBUTEROL SULFATE 1 AMPULE: 2.5; .5 SOLUTION RESPIRATORY (INHALATION) at 21:44

## 2018-12-25 RX ADMIN — FORMOTEROL FUMARATE DIHYDRATE 20 MCG: 20 SOLUTION RESPIRATORY (INHALATION) at 12:51

## 2018-12-25 RX ADMIN — GABAPENTIN 100 MG: 100 CAPSULE ORAL at 08:46

## 2018-12-25 RX ADMIN — IPRATROPIUM BROMIDE AND ALBUTEROL SULFATE 1 AMPULE: .5; 3 SOLUTION RESPIRATORY (INHALATION) at 09:20

## 2018-12-25 RX ADMIN — OXYCODONE AND ACETAMINOPHEN 1 TABLET: 5; 325 TABLET ORAL at 18:41

## 2018-12-25 RX ADMIN — DILTIAZEM HYDROCHLORIDE 10 MG: 5 INJECTION INTRAVENOUS at 05:32

## 2018-12-25 RX ADMIN — TAMSULOSIN HYDROCHLORIDE 0.4 MG: 0.4 CAPSULE ORAL at 08:46

## 2018-12-25 RX ADMIN — Medication 10 ML: at 22:48

## 2018-12-25 RX ADMIN — SODIUM CHLORIDE: 9 INJECTION, SOLUTION INTRAVENOUS at 13:42

## 2018-12-25 RX ADMIN — METHYLPREDNISOLONE SODIUM SUCCINATE 40 MG: 40 INJECTION, POWDER, FOR SOLUTION INTRAMUSCULAR; INTRAVENOUS at 03:01

## 2018-12-25 ASSESSMENT — ENCOUNTER SYMPTOMS
VOMITING: 0
SHORTNESS OF BREATH: 1
WHEEZING: 1
ABDOMINAL DISTENTION: 1
NAUSEA: 0
COUGH: 1

## 2018-12-25 ASSESSMENT — PAIN SCALES - GENERAL
PAINLEVEL_OUTOF10: 6
PAINLEVEL_OUTOF10: 0
PAINLEVEL_OUTOF10: 3
PAINLEVEL_OUTOF10: 6
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
## 2018-12-25 NOTE — PROGRESS NOTES
Patient arrived to room 4112. Patient is alert and oriented in bed. VSS. Call light within reach of patient. Will continue to monitor.

## 2018-12-25 NOTE — ED PROVIDER NOTES
[address] [place] [address]  eMERGENCY dEPARTMENT eNCOUnter      Pt Name: John Leyva  MRN: [id number]  [place] 1960  Date of evaluation: 12/24/2018  Provider: DREA Pisano    CHIEF COMPLAINT       Chief Complaint   Patient presents with    Shortness of Breath     x4 days on 4L wears 2L HS at home denies fever, cough, or chills         HISTORY OF PRESENT ILLNESS  (Location/Symptom, Timing/Onset, Context/Setting, Quality, Duration, Modifying Factors, Severity.)   John Leyva is a 62 y.o. male who presents to the emergency department For shortness of breath for the past week. Patient wears 2 L oxygen at night only. He reports he thinks he's retaining fluid. Reports his fluid restriction and should only drink 1500 mL a a day. Reports he's been drinking 6-8 12 ounce beers a day. Reports he's been doing about 40 years. Denies any history of withdrawal or DTs. Also reports she's been wheezing. Has COPD. Wheezing has been worse for the past week. Has had a cough productive of white's and brown sputum. Denies hemoptysis. He does take Lasix b.i.d. which he has been compliant with . hes unsure the dose. Denies fever chills nausea vomiting. Reports his abdomen is swollen. No chest pain now. Has some chest pain yesterday. Reports he has A. fib takes Xarelto. Smokes half pack a day for the past 40 years. Has history of hyperlipidemia hypertension diabetes family history of MI. Has had 2 MIs in the past and one stent. Nursing Notes were reviewed and I agree. REVIEW OF SYSTEMS    (2-9 systems for level 4, 10 or more for level 5)     Review of Systems   Constitutional: Negative for chills and fever. Respiratory: Positive for cough, shortness of breath and wheezing. Neg for hemoptysis   Cardiovascular: Negative for chest pain. Gastrointestinal: Positive for abdominal distention. Negative for nausea and vomiting.      Except as noted above the remainder of the review of nicotine (NICODERM CQ) 21 MG/24HR Place 1 patch onto the skin every 24 hours  Qty: 30 patch, Refills: 5    Associated Diagnoses: Tobacco abuse; Chronic bronchitis, unspecified chronic bronchitis type (HCC)      omeprazole (PRILOSEC) 40 MG delayed release capsule Take 1 capsule by mouth daily (with breakfast)  Qty: 90 capsule, Refills: 3    Associated Diagnoses: Gastroesophageal reflux disease with esophagitis      rivaroxaban (XARELTO) 20 MG TABS tablet Take 1 tablet by mouth daily (with breakfast)  Qty: 30 tablet, Refills: 5    Associated Diagnoses: Chronic atrial fibrillation (HCC)      Respiratory Therapy Supplies (NEBULIZER/ADULT MASK) KIT 1 kit by Does not apply route as needed (SOB)  Qty: 1 kit, Refills: 0      !! metFORMIN (GLUCOPHAGE) 500 MG tablet TAKE 2 TABLETS BY MOUTH TWICE DAILY WITH FOOD  Qty: 180 tablet, Refills: 3      Blood Glucose Monitoring Suppl (FREESTYLE FREEDOM LITE) w/Device KIT 1 kit by Does not apply route 2 times daily as needed (fasting and 2 hours post prandial)  Qty: 1 kit, Refills: 0    Associated Diagnoses: Diabetes mellitus with peripheral circulatory disorder (HCC)      glucose blood VI test strips (FREESTYLE TEST STRIPS) strip Test Once Daily, Freestyle Lite, DX: 250.00  Qty: 100 each, Refills: 3       !! - Potential duplicate medications found. Please discuss with provider. ALLERGIES     Rocephin [ceftriaxone]    FAMILY HISTORY       Family History   Problem Relation Age of Onset    Cancer Maternal Grandmother     Diabetes Maternal Grandmother     Cancer Maternal Grandfather     High Cholesterol Mother     High Blood Pressure Father      Family Status   Relation Status    MGM     MGF     Mother Alive    Father Alive        SOCIAL HISTORY      reports that he has been smoking Cigarettes. He has a 10.00 pack-year smoking history. He has never used smokeless tobacco. He reports that he drinks about 25.2 - 33.6 oz of alcohol per week .  He reports that he does not use drugs. PHYSICAL EXAM    (up to 7 for level 4, 8 or more for level 5)     ED Triage Vitals   BP Temp Temp Source Pulse Resp SpO2 Height Weight   12/24/18 1933 12/24/18 1937 12/24/18 1937 12/24/18 1933 12/24/18 1937 12/24/18 1937 12/24/18 1937 12/24/18 1937   88/68 97.5 °F (36.4 °C) Oral 82 24 97 % 5' 7\" (1.702 m) 251 lb 8.7 oz (114.1 kg)       Physical Exam   Constitutional: He is oriented to person, place, and time. He appears well-developed and well-nourished. No distress. HENT:   Head: Normocephalic and atraumatic. Right Ear: External ear normal.   Left Ear: External ear normal.   Nose: Nose normal.   Eyes: EOM are normal.   Neck: Normal range of motion. Neck supple. Cardiovascular:   Irregularly irregular   Pulmonary/Chest: He is in respiratory distress (mild). He has wheezes. Moderate amount of wheezing bilaterally    On 5L NC    Speaks in full sentences but conversationally dyspneic. Abdominal: Soft. He exhibits distension (mild). He exhibits no mass. There is tenderness (mild diffuse TTP entire abdomen). There is no rebound and no guarding. No hernia. Musculoskeletal: Normal range of motion. Neurological: He is alert and oriented to person, place, and time. Skin: Skin is warm and dry. He is not diaphoretic. Psychiatric: He has a normal mood and affect. His behavior is normal. Judgment and thought content normal.       DIFFERENTIAL DIAGNOSIS   COPD/Asthma Exacerbation, Pneumothorax, CHF, Pneumonia, Pulmonary Embolism, Anemia      DIAGNOSTICRESULTS     EKG: All EKG's are interpreted by DREA Funez in the absence of a cardiologist.    EKG obtained. Please see Dr. Di Cogan note for interpretation.     RADIOLOGY:   Non-plain film images such as CT, Ultrasound and MRI are read by the radiologist. Plain radiographic images are visualized and preliminarily interpreted by DREA Funez with the below findings:      Interpretation per the Radiologist Elevated brain natriuretic peptide (BNP) level    4. Anemia, unspecified type    5. Elevated serum creatinine          DISPOSITION/PLAN   DISPOSITION Admitted 12/24/2018 09:21:37 PM      PATIENT REFERRED TO:  Jose Angulo MD  [address] [facility].   [place] [id number]  [phone]            DISCHARGE MEDICATIONS:  Current Discharge Medication List          (Please note that portions of this note werecompleted with a voice recognition program.  Efforts were made to edit the dictations but occasionally words are mis-transcribed.)    Joon Madrid, [address] [place] [address]  12/25/18 [id number]
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
## 2018-12-26 LAB
ANION GAP SERPL CALCULATED.3IONS-SCNC: 13 MMOL/L (ref 3–16)
BASOPHILS ABSOLUTE: 0 K/UL (ref 0–0.2)
BASOPHILS RELATIVE PERCENT: 0 %
BUN BLDV-MCNC: 32 MG/DL (ref 7–20)
CALCIUM SERPL-MCNC: 9.6 MG/DL (ref 8.3–10.6)
CHLORIDE BLD-SCNC: 91 MMOL/L (ref 99–110)
CO2: 26 MMOL/L (ref 21–32)
CREAT SERPL-MCNC: 1.4 MG/DL (ref 0.9–1.3)
EOSINOPHILS ABSOLUTE: 0 K/UL (ref 0–0.6)
EOSINOPHILS RELATIVE PERCENT: 0 %
GFR AFRICAN AMERICAN: >60
GFR NON-AFRICAN AMERICAN: 52
GLUCOSE BLD-MCNC: 356 MG/DL (ref 70–99)
GLUCOSE BLD-MCNC: 364 MG/DL (ref 70–99)
GLUCOSE BLD-MCNC: 372 MG/DL (ref 70–99)
GLUCOSE BLD-MCNC: 377 MG/DL (ref 70–99)
GLUCOSE BLD-MCNC: 404 MG/DL (ref 70–99)
HCT VFR BLD CALC: 28.8 % (ref 40.5–52.5)
HEMOGLOBIN: 9.5 G/DL (ref 13.5–17.5)
LACTIC ACID: 2.6 MMOL/L (ref 0.4–2)
LYMPHOCYTES ABSOLUTE: 0.5 K/UL (ref 1–5.1)
LYMPHOCYTES RELATIVE PERCENT: 3.4 %
MCH RBC QN AUTO: 29.6 PG (ref 26–34)
MCHC RBC AUTO-ENTMCNC: 32.9 G/DL (ref 31–36)
MCV RBC AUTO: 90 FL (ref 80–100)
MONOCYTES ABSOLUTE: 0.6 K/UL (ref 0–1.3)
MONOCYTES RELATIVE PERCENT: 4.2 %
NEUTROPHILS ABSOLUTE: 14 K/UL (ref 1.7–7.7)
NEUTROPHILS RELATIVE PERCENT: 92.4 %
OSMOLALITY: 291 MOSM/KG (ref 278–305)
PDW BLD-RTO: 15.5 % (ref 12.4–15.4)
PERFORMED ON: ABNORMAL
PLATELET # BLD: 242 K/UL (ref 135–450)
PMV BLD AUTO: 8.7 FL (ref 5–10.5)
POTASSIUM REFLEX MAGNESIUM: 5 MMOL/L (ref 3.5–5.1)
RBC # BLD: 3.2 M/UL (ref 4.2–5.9)
SODIUM BLD-SCNC: 130 MMOL/L (ref 136–145)
URINE CULTURE, ROUTINE: NORMAL
WBC # BLD: 15.1 K/UL (ref 4–11)

## 2018-12-26 PROCEDURE — 2580000003 HC RX 258: Performed by: INTERNAL MEDICINE

## 2018-12-26 PROCEDURE — 6360000002 HC RX W HCPCS: Performed by: INTERNAL MEDICINE

## 2018-12-26 PROCEDURE — 6370000000 HC RX 637 (ALT 250 FOR IP): Performed by: INTERNAL MEDICINE

## 2018-12-26 PROCEDURE — 97530 THERAPEUTIC ACTIVITIES: CPT

## 2018-12-26 PROCEDURE — G8988 SELF CARE GOAL STATUS: HCPCS

## 2018-12-26 PROCEDURE — 94640 AIRWAY INHALATION TREATMENT: CPT

## 2018-12-26 PROCEDURE — 2060000000 HC ICU INTERMEDIATE R&B

## 2018-12-26 PROCEDURE — 80048 BASIC METABOLIC PNL TOTAL CA: CPT

## 2018-12-26 PROCEDURE — G8978 MOBILITY CURRENT STATUS: HCPCS

## 2018-12-26 PROCEDURE — G8979 MOBILITY GOAL STATUS: HCPCS

## 2018-12-26 PROCEDURE — 83605 ASSAY OF LACTIC ACID: CPT

## 2018-12-26 PROCEDURE — 6370000000 HC RX 637 (ALT 250 FOR IP): Performed by: FAMILY MEDICINE

## 2018-12-26 PROCEDURE — 94664 DEMO&/EVAL PT USE INHALER: CPT

## 2018-12-26 PROCEDURE — 97166 OT EVAL MOD COMPLEX 45 MIN: CPT

## 2018-12-26 PROCEDURE — 97161 PT EVAL LOW COMPLEX 20 MIN: CPT

## 2018-12-26 PROCEDURE — 94762 N-INVAS EAR/PLS OXIMTRY CONT: CPT

## 2018-12-26 PROCEDURE — 2700000000 HC OXYGEN THERAPY PER DAY

## 2018-12-26 PROCEDURE — 85025 COMPLETE CBC W/AUTO DIFF WBC: CPT

## 2018-12-26 PROCEDURE — 36415 COLL VENOUS BLD VENIPUNCTURE: CPT

## 2018-12-26 PROCEDURE — 6360000002 HC RX W HCPCS: Performed by: FAMILY MEDICINE

## 2018-12-26 PROCEDURE — G8987 SELF CARE CURRENT STATUS: HCPCS

## 2018-12-26 RX ORDER — INSULIN GLARGINE 100 [IU]/ML
10 INJECTION, SOLUTION SUBCUTANEOUS 2 TIMES DAILY
Status: DISCONTINUED | OUTPATIENT
Start: 2018-12-26 | End: 2018-12-27

## 2018-12-26 RX ORDER — AZITHROMYCIN 500 MG/1
500 TABLET, FILM COATED ORAL NIGHTLY
Status: COMPLETED | OUTPATIENT
Start: 2018-12-26 | End: 2018-12-28

## 2018-12-26 RX ORDER — METHYLPREDNISOLONE SODIUM SUCCINATE 40 MG/ML
40 INJECTION, POWDER, LYOPHILIZED, FOR SOLUTION INTRAMUSCULAR; INTRAVENOUS EVERY 8 HOURS
Status: DISCONTINUED | OUTPATIENT
Start: 2018-12-26 | End: 2018-12-28

## 2018-12-26 RX ORDER — DIPHENHYDRAMINE HCL 25 MG
50 TABLET ORAL EVERY 6 HOURS PRN
Status: DISCONTINUED | OUTPATIENT
Start: 2018-12-26 | End: 2018-12-29 | Stop reason: HOSPADM

## 2018-12-26 RX ORDER — METOPROLOL SUCCINATE 50 MG/1
50 TABLET, EXTENDED RELEASE ORAL ONCE
Status: COMPLETED | OUTPATIENT
Start: 2018-12-26 | End: 2018-12-26

## 2018-12-26 RX ORDER — METOPROLOL SUCCINATE 50 MG/1
150 TABLET, EXTENDED RELEASE ORAL DAILY
Status: DISCONTINUED | OUTPATIENT
Start: 2018-12-27 | End: 2018-12-29 | Stop reason: HOSPADM

## 2018-12-26 RX ADMIN — SODIUM CHLORIDE: 9 INJECTION, SOLUTION INTRAVENOUS at 05:10

## 2018-12-26 RX ADMIN — INSULIN LISPRO 18 UNITS: 100 INJECTION, SOLUTION INTRAVENOUS; SUBCUTANEOUS at 07:45

## 2018-12-26 RX ADMIN — SODIUM CHLORIDE: 9 INJECTION, SOLUTION INTRAVENOUS at 18:27

## 2018-12-26 RX ADMIN — IPRATROPIUM BROMIDE AND ALBUTEROL SULFATE 1 AMPULE: 2.5; .5 SOLUTION RESPIRATORY (INHALATION) at 08:33

## 2018-12-26 RX ADMIN — RIVAROXABAN 20 MG: 20 TABLET, FILM COATED ORAL at 07:42

## 2018-12-26 RX ADMIN — PANTOPRAZOLE SODIUM 40 MG: 40 TABLET, DELAYED RELEASE ORAL at 06:53

## 2018-12-26 RX ADMIN — Medication 10 ML: at 21:39

## 2018-12-26 RX ADMIN — IPRATROPIUM BROMIDE AND ALBUTEROL SULFATE 1 AMPULE: 2.5; .5 SOLUTION RESPIRATORY (INHALATION) at 04:21

## 2018-12-26 RX ADMIN — ATORVASTATIN CALCIUM 40 MG: 40 TABLET, FILM COATED ORAL at 07:42

## 2018-12-26 RX ADMIN — GABAPENTIN 100 MG: 100 CAPSULE ORAL at 13:02

## 2018-12-26 RX ADMIN — IPRATROPIUM BROMIDE AND ALBUTEROL SULFATE 1 AMPULE: 2.5; .5 SOLUTION RESPIRATORY (INHALATION) at 00:32

## 2018-12-26 RX ADMIN — METHYLPREDNISOLONE SODIUM SUCCINATE 40 MG: 40 INJECTION, POWDER, FOR SOLUTION INTRAMUSCULAR; INTRAVENOUS at 15:26

## 2018-12-26 RX ADMIN — BUDESONIDE 500 MCG: 0.5 SUSPENSION RESPIRATORY (INHALATION) at 19:49

## 2018-12-26 RX ADMIN — DIPHENHYDRAMINE HCL 50 MG: 25 TABLET ORAL at 22:30

## 2018-12-26 RX ADMIN — TAMSULOSIN HYDROCHLORIDE 0.4 MG: 0.4 CAPSULE ORAL at 07:42

## 2018-12-26 RX ADMIN — OXYCODONE AND ACETAMINOPHEN 1 TABLET: 5; 325 TABLET ORAL at 13:02

## 2018-12-26 RX ADMIN — DILTIAZEM HYDROCHLORIDE 240 MG: 240 CAPSULE, COATED, EXTENDED RELEASE ORAL at 07:42

## 2018-12-26 RX ADMIN — INSULIN LISPRO 15 UNITS: 100 INJECTION, SOLUTION INTRAVENOUS; SUBCUTANEOUS at 17:09

## 2018-12-26 RX ADMIN — GABAPENTIN 100 MG: 100 CAPSULE ORAL at 21:39

## 2018-12-26 RX ADMIN — METOPROLOL SUCCINATE 100 MG: 50 TABLET, EXTENDED RELEASE ORAL at 07:42

## 2018-12-26 RX ADMIN — OXYCODONE AND ACETAMINOPHEN 1 TABLET: 5; 325 TABLET ORAL at 18:57

## 2018-12-26 RX ADMIN — IPRATROPIUM BROMIDE AND ALBUTEROL SULFATE 1 AMPULE: 2.5; .5 SOLUTION RESPIRATORY (INHALATION) at 11:34

## 2018-12-26 RX ADMIN — OXYCODONE AND ACETAMINOPHEN 1 TABLET: 5; 325 TABLET ORAL at 00:43

## 2018-12-26 RX ADMIN — INSULIN GLARGINE 10 UNITS: 100 INJECTION, SOLUTION SUBCUTANEOUS at 09:58

## 2018-12-26 RX ADMIN — DOCUSATE SODIUM 100 MG: 100 CAPSULE, LIQUID FILLED ORAL at 21:39

## 2018-12-26 RX ADMIN — DULOXETINE HYDROCHLORIDE 60 MG: 60 CAPSULE, DELAYED RELEASE ORAL at 07:41

## 2018-12-26 RX ADMIN — IPRATROPIUM BROMIDE AND ALBUTEROL SULFATE 1 AMPULE: .5; 3 SOLUTION RESPIRATORY (INHALATION) at 16:28

## 2018-12-26 RX ADMIN — INSULIN GLARGINE 10 UNITS: 100 INJECTION, SOLUTION SUBCUTANEOUS at 22:30

## 2018-12-26 RX ADMIN — AZITHROMYCIN 500 MG: 500 TABLET, FILM COATED ORAL at 21:38

## 2018-12-26 RX ADMIN — FORMOTEROL FUMARATE DIHYDRATE 20 MCG: 20 SOLUTION RESPIRATORY (INHALATION) at 08:50

## 2018-12-26 RX ADMIN — INSULIN LISPRO 7 UNITS: 100 INJECTION, SOLUTION INTRAVENOUS; SUBCUTANEOUS at 22:31

## 2018-12-26 RX ADMIN — METHYLPREDNISOLONE SODIUM SUCCINATE 40 MG: 40 INJECTION, POWDER, FOR SOLUTION INTRAMUSCULAR; INTRAVENOUS at 03:05

## 2018-12-26 RX ADMIN — BUDESONIDE 500 MCG: 0.5 SUSPENSION RESPIRATORY (INHALATION) at 08:43

## 2018-12-26 RX ADMIN — TRAZODONE HYDROCHLORIDE 50 MG: 50 TABLET ORAL at 21:38

## 2018-12-26 RX ADMIN — FORMOTEROL FUMARATE DIHYDRATE 20 MCG: 20 SOLUTION RESPIRATORY (INHALATION) at 19:49

## 2018-12-26 RX ADMIN — IPRATROPIUM BROMIDE AND ALBUTEROL SULFATE 1 AMPULE: 2.5; .5 SOLUTION RESPIRATORY (INHALATION) at 19:49

## 2018-12-26 RX ADMIN — DIPHENHYDRAMINE HCL 50 MG: 25 TABLET ORAL at 13:02

## 2018-12-26 RX ADMIN — OXYCODONE AND ACETAMINOPHEN 1 TABLET: 5; 325 TABLET ORAL at 06:53

## 2018-12-26 RX ADMIN — INSULIN LISPRO 15 UNITS: 100 INJECTION, SOLUTION INTRAVENOUS; SUBCUTANEOUS at 11:34

## 2018-12-26 RX ADMIN — METOPROLOL SUCCINATE 50 MG: 50 TABLET, EXTENDED RELEASE ORAL at 09:58

## 2018-12-26 RX ADMIN — GABAPENTIN 100 MG: 100 CAPSULE ORAL at 07:42

## 2018-12-26 RX ADMIN — METHYLPREDNISOLONE SODIUM SUCCINATE 40 MG: 40 INJECTION, POWDER, FOR SOLUTION INTRAMUSCULAR; INTRAVENOUS at 07:41

## 2018-12-26 ASSESSMENT — PAIN DESCRIPTION - PAIN TYPE
TYPE: CHRONIC PAIN

## 2018-12-26 ASSESSMENT — PAIN DESCRIPTION - ORIENTATION
ORIENTATION: MID;LOWER
ORIENTATION: MID;LOWER
ORIENTATION: LOWER;MID

## 2018-12-26 ASSESSMENT — PAIN DESCRIPTION - DESCRIPTORS
DESCRIPTORS: ACHING

## 2018-12-26 ASSESSMENT — PAIN SCALES - GENERAL
PAINLEVEL_OUTOF10: 0
PAINLEVEL_OUTOF10: 7
PAINLEVEL_OUTOF10: 7
PAINLEVEL_OUTOF10: 8
PAINLEVEL_OUTOF10: 0
PAINLEVEL_OUTOF10: 7
PAINLEVEL_OUTOF10: 0
PAINLEVEL_OUTOF10: 6
PAINLEVEL_OUTOF10: 0
PAINLEVEL_OUTOF10: 5
PAINLEVEL_OUTOF10: 0

## 2018-12-26 ASSESSMENT — PAIN DESCRIPTION - LOCATION
LOCATION: BACK
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
## 2018-12-26 NOTE — PROGRESS NOTES
Results for Jon Herrera (MRN [id number]) as of 12/25/2018 23:56   Ref. Range 12/25/2018 19:09   Lactic Acid Latest Ref Range: 0.4 - 2.0 mmol/L 4.3 ()     Samira Reyes MP. notified of above Lactic acid.

## 2018-12-26 NOTE — PLAN OF CARE
Problem: Falls - Risk of:  Goal: Will remain free from falls  Will remain free from falls   Outcome: Ongoing  Ongoing  Goal: Absence of physical injury  Absence of physical injury   Outcome: Ongoing  Calls out appropriately. Problem: Risk for Impaired Skin Integrity  Goal: Tissue integrity - skin and mucous membranes  Structural intactness and normal physiological function of skin and  mucous membranes. Intervention: TURN PATIENT  Patient is reminded to turn while awake in bed. Problem:  Activity Intolerance:  Goal: Ability to tolerate increased activity will improve  Ability to tolerate increased activity will improve   Outcome: Ongoing  Ongoing

## 2018-12-27 LAB
ANION GAP SERPL CALCULATED.3IONS-SCNC: 13 MMOL/L (ref 3–16)
BASOPHILS ABSOLUTE: 0 K/UL (ref 0–0.2)
BASOPHILS RELATIVE PERCENT: 0.2 %
BUN BLDV-MCNC: 50 MG/DL (ref 7–20)
CALCIUM SERPL-MCNC: 9.6 MG/DL (ref 8.3–10.6)
CHLORIDE BLD-SCNC: 94 MMOL/L (ref 99–110)
CO2: 24 MMOL/L (ref 21–32)
CREAT SERPL-MCNC: 1.4 MG/DL (ref 0.9–1.3)
EKG ATRIAL RATE: 111 BPM
EKG DIAGNOSIS: NORMAL
EKG Q-T INTERVAL: 378 MS
EKG QRS DURATION: 90 MS
EKG QTC CALCULATION (BAZETT): 457 MS
EKG R AXIS: 93 DEGREES
EKG T AXIS: 193 DEGREES
EKG VENTRICULAR RATE: 88 BPM
EOSINOPHILS ABSOLUTE: 0 K/UL (ref 0–0.6)
EOSINOPHILS RELATIVE PERCENT: 0 %
GFR AFRICAN AMERICAN: >60
GFR NON-AFRICAN AMERICAN: 52
GLUCOSE BLD-MCNC: 273 MG/DL (ref 70–99)
GLUCOSE BLD-MCNC: 293 MG/DL (ref 70–99)
GLUCOSE BLD-MCNC: 303 MG/DL (ref 70–99)
GLUCOSE BLD-MCNC: 365 MG/DL (ref 70–99)
GLUCOSE BLD-MCNC: 411 MG/DL (ref 70–99)
HCT VFR BLD CALC: 30.8 % (ref 40.5–52.5)
HEMOGLOBIN: 10 G/DL (ref 13.5–17.5)
LACTIC ACID: 2.3 MMOL/L (ref 0.4–2)
LYMPHOCYTES ABSOLUTE: 0.8 K/UL (ref 1–5.1)
LYMPHOCYTES RELATIVE PERCENT: 4.5 %
MCH RBC QN AUTO: 29.5 PG (ref 26–34)
MCHC RBC AUTO-ENTMCNC: 32.3 G/DL (ref 31–36)
MCV RBC AUTO: 91.4 FL (ref 80–100)
MONOCYTES ABSOLUTE: 1 K/UL (ref 0–1.3)
MONOCYTES RELATIVE PERCENT: 5.8 %
NEUTROPHILS ABSOLUTE: 15.4 K/UL (ref 1.7–7.7)
NEUTROPHILS RELATIVE PERCENT: 89.5 %
PDW BLD-RTO: 15.7 % (ref 12.4–15.4)
PERFORMED ON: ABNORMAL
PLATELET # BLD: 244 K/UL (ref 135–450)
PMV BLD AUTO: 8.6 FL (ref 5–10.5)
POTASSIUM REFLEX MAGNESIUM: 5.4 MMOL/L (ref 3.5–5.1)
RBC # BLD: 3.37 M/UL (ref 4.2–5.9)
SODIUM BLD-SCNC: 131 MMOL/L (ref 136–145)
WBC # BLD: 17.2 K/UL (ref 4–11)

## 2018-12-27 PROCEDURE — 80048 BASIC METABOLIC PNL TOTAL CA: CPT

## 2018-12-27 PROCEDURE — 94664 DEMO&/EVAL PT USE INHALER: CPT

## 2018-12-27 PROCEDURE — 97530 THERAPEUTIC ACTIVITIES: CPT

## 2018-12-27 PROCEDURE — G8988 SELF CARE GOAL STATUS: HCPCS

## 2018-12-27 PROCEDURE — 6360000002 HC RX W HCPCS: Performed by: FAMILY MEDICINE

## 2018-12-27 PROCEDURE — 6370000000 HC RX 637 (ALT 250 FOR IP): Performed by: FAMILY MEDICINE

## 2018-12-27 PROCEDURE — 97535 SELF CARE MNGMENT TRAINING: CPT

## 2018-12-27 PROCEDURE — 99255 IP/OBS CONSLTJ NEW/EST HI 80: CPT | Performed by: INTERNAL MEDICINE

## 2018-12-27 PROCEDURE — 2060000000 HC ICU INTERMEDIATE R&B

## 2018-12-27 PROCEDURE — 2580000003 HC RX 258: Performed by: INTERNAL MEDICINE

## 2018-12-27 PROCEDURE — 83605 ASSAY OF LACTIC ACID: CPT

## 2018-12-27 PROCEDURE — 2700000000 HC OXYGEN THERAPY PER DAY

## 2018-12-27 PROCEDURE — 94762 N-INVAS EAR/PLS OXIMTRY CONT: CPT

## 2018-12-27 PROCEDURE — 85025 COMPLETE CBC W/AUTO DIFF WBC: CPT

## 2018-12-27 PROCEDURE — 36415 COLL VENOUS BLD VENIPUNCTURE: CPT

## 2018-12-27 PROCEDURE — 94640 AIRWAY INHALATION TREATMENT: CPT

## 2018-12-27 PROCEDURE — G8987 SELF CARE CURRENT STATUS: HCPCS

## 2018-12-27 PROCEDURE — 6370000000 HC RX 637 (ALT 250 FOR IP): Performed by: INTERNAL MEDICINE

## 2018-12-27 RX ORDER — SODIUM POLYSTYRENE SULFONATE 15 G/60ML
15 SUSPENSION ORAL; RECTAL ONCE
Status: COMPLETED | OUTPATIENT
Start: 2018-12-27 | End: 2018-12-27

## 2018-12-27 RX ORDER — INSULIN GLARGINE 100 [IU]/ML
15 INJECTION, SOLUTION SUBCUTANEOUS ONCE
Status: COMPLETED | OUTPATIENT
Start: 2018-12-27 | End: 2018-12-27

## 2018-12-27 RX ORDER — INSULIN GLARGINE 100 [IU]/ML
20 INJECTION, SOLUTION SUBCUTANEOUS 2 TIMES DAILY
Status: DISCONTINUED | OUTPATIENT
Start: 2018-12-27 | End: 2018-12-29 | Stop reason: HOSPADM

## 2018-12-27 RX ADMIN — DILTIAZEM HYDROCHLORIDE 240 MG: 240 CAPSULE, COATED, EXTENDED RELEASE ORAL at 08:18

## 2018-12-27 RX ADMIN — INSULIN LISPRO 12 UNITS: 100 INJECTION, SOLUTION INTRAVENOUS; SUBCUTANEOUS at 08:20

## 2018-12-27 RX ADMIN — GABAPENTIN 100 MG: 100 CAPSULE ORAL at 20:17

## 2018-12-27 RX ADMIN — METHYLPREDNISOLONE SODIUM SUCCINATE 40 MG: 40 INJECTION, POWDER, FOR SOLUTION INTRAMUSCULAR; INTRAVENOUS at 00:19

## 2018-12-27 RX ADMIN — Medication 10 ML: at 08:19

## 2018-12-27 RX ADMIN — INSULIN LISPRO 10 UNITS: 100 INJECTION, SOLUTION INTRAVENOUS; SUBCUTANEOUS at 17:28

## 2018-12-27 RX ADMIN — SODIUM POLYSTYRENE SULFONATE 15 G: 15 SUSPENSION ORAL; RECTAL at 13:22

## 2018-12-27 RX ADMIN — INSULIN GLARGINE 10 UNITS: 100 INJECTION, SOLUTION SUBCUTANEOUS at 08:21

## 2018-12-27 RX ADMIN — METHYLPREDNISOLONE SODIUM SUCCINATE 40 MG: 40 INJECTION, POWDER, FOR SOLUTION INTRAMUSCULAR; INTRAVENOUS at 23:10

## 2018-12-27 RX ADMIN — IPRATROPIUM BROMIDE AND ALBUTEROL SULFATE 1 AMPULE: 2.5; .5 SOLUTION RESPIRATORY (INHALATION) at 00:23

## 2018-12-27 RX ADMIN — RIVAROXABAN 20 MG: 20 TABLET, FILM COATED ORAL at 08:18

## 2018-12-27 RX ADMIN — IPRATROPIUM BROMIDE AND ALBUTEROL SULFATE 1 AMPULE: 2.5; .5 SOLUTION RESPIRATORY (INHALATION) at 05:08

## 2018-12-27 RX ADMIN — IPRATROPIUM BROMIDE AND ALBUTEROL SULFATE 1 AMPULE: 2.5; .5 SOLUTION RESPIRATORY (INHALATION) at 16:33

## 2018-12-27 RX ADMIN — DULOXETINE HYDROCHLORIDE 60 MG: 60 CAPSULE, DELAYED RELEASE ORAL at 08:19

## 2018-12-27 RX ADMIN — BUDESONIDE 500 MCG: 0.5 SUSPENSION RESPIRATORY (INHALATION) at 20:08

## 2018-12-27 RX ADMIN — GABAPENTIN 100 MG: 100 CAPSULE ORAL at 08:18

## 2018-12-27 RX ADMIN — INSULIN LISPRO 15 UNITS: 100 INJECTION, SOLUTION INTRAVENOUS; SUBCUTANEOUS at 13:22

## 2018-12-27 RX ADMIN — INSULIN GLARGINE 15 UNITS: 100 INJECTION, SOLUTION SUBCUTANEOUS at 10:59

## 2018-12-27 RX ADMIN — Medication 10 ML: at 20:18

## 2018-12-27 RX ADMIN — PANTOPRAZOLE SODIUM 40 MG: 40 TABLET, DELAYED RELEASE ORAL at 06:22

## 2018-12-27 RX ADMIN — TRAZODONE HYDROCHLORIDE 50 MG: 50 TABLET ORAL at 20:17

## 2018-12-27 RX ADMIN — FORMOTEROL FUMARATE DIHYDRATE 20 MCG: 20 SOLUTION RESPIRATORY (INHALATION) at 20:07

## 2018-12-27 RX ADMIN — ATORVASTATIN CALCIUM 40 MG: 40 TABLET, FILM COATED ORAL at 08:18

## 2018-12-27 RX ADMIN — TAMSULOSIN HYDROCHLORIDE 0.4 MG: 0.4 CAPSULE ORAL at 08:19

## 2018-12-27 RX ADMIN — AZITHROMYCIN 500 MG: 500 TABLET, FILM COATED ORAL at 20:17

## 2018-12-27 RX ADMIN — METOPROLOL SUCCINATE 150 MG: 50 TABLET, EXTENDED RELEASE ORAL at 08:19

## 2018-12-27 RX ADMIN — IPRATROPIUM BROMIDE AND ALBUTEROL SULFATE 1 AMPULE: 2.5; .5 SOLUTION RESPIRATORY (INHALATION) at 20:08

## 2018-12-27 RX ADMIN — OXYCODONE AND ACETAMINOPHEN 1 TABLET: 5; 325 TABLET ORAL at 14:28

## 2018-12-27 RX ADMIN — INSULIN LISPRO 10 UNITS: 100 INJECTION, SOLUTION INTRAVENOUS; SUBCUTANEOUS at 13:23

## 2018-12-27 RX ADMIN — METHYLPREDNISOLONE SODIUM SUCCINATE 40 MG: 40 INJECTION, POWDER, FOR SOLUTION INTRAMUSCULAR; INTRAVENOUS at 08:19

## 2018-12-27 RX ADMIN — INSULIN LISPRO 5 UNITS: 100 INJECTION, SOLUTION INTRAVENOUS; SUBCUTANEOUS at 20:18

## 2018-12-27 RX ADMIN — METHYLPREDNISOLONE SODIUM SUCCINATE 40 MG: 40 INJECTION, POWDER, FOR SOLUTION INTRAMUSCULAR; INTRAVENOUS at 17:28

## 2018-12-27 RX ADMIN — BUDESONIDE 500 MCG: 0.5 SUSPENSION RESPIRATORY (INHALATION) at 08:10

## 2018-12-27 RX ADMIN — IPRATROPIUM BROMIDE AND ALBUTEROL SULFATE 1 AMPULE: 2.5; .5 SOLUTION RESPIRATORY (INHALATION) at 08:00

## 2018-12-27 RX ADMIN — SODIUM CHLORIDE: 9 INJECTION, SOLUTION INTRAVENOUS at 06:46

## 2018-12-27 RX ADMIN — FORMOTEROL FUMARATE DIHYDRATE 20 MCG: 20 SOLUTION RESPIRATORY (INHALATION) at 08:20

## 2018-12-27 RX ADMIN — INSULIN GLARGINE 20 UNITS: 100 INJECTION, SOLUTION SUBCUTANEOUS at 20:19

## 2018-12-27 RX ADMIN — GABAPENTIN 100 MG: 100 CAPSULE ORAL at 14:28

## 2018-12-27 RX ADMIN — OXYCODONE AND ACETAMINOPHEN 1 TABLET: 5; 325 TABLET ORAL at 23:09

## 2018-12-27 RX ADMIN — OXYCODONE AND ACETAMINOPHEN 1 TABLET: 5; 325 TABLET ORAL at 01:26

## 2018-12-27 RX ADMIN — INSULIN LISPRO 18 UNITS: 100 INJECTION, SOLUTION INTRAVENOUS; SUBCUTANEOUS at 17:28

## 2018-12-27 RX ADMIN — OXYCODONE AND ACETAMINOPHEN 1 TABLET: 5; 325 TABLET ORAL at 08:19

## 2018-12-27 RX ADMIN — IPRATROPIUM BROMIDE AND ALBUTEROL SULFATE 1 AMPULE: 2.5; .5 SOLUTION RESPIRATORY (INHALATION) at 12:20

## 2018-12-27 ASSESSMENT — PAIN DESCRIPTION - ORIENTATION
ORIENTATION: LOWER
ORIENTATION: MID;LOWER
ORIENTATION: LOWER
ORIENTATION: LOWER

## 2018-12-27 ASSESSMENT — PAIN SCALES - GENERAL
PAINLEVEL_OUTOF10: 10
PAINLEVEL_OUTOF10: 0
PAINLEVEL_OUTOF10: 8
PAINLEVEL_OUTOF10: 10
PAINLEVEL_OUTOF10: 10
PAINLEVEL_OUTOF10: 7
PAINLEVEL_OUTOF10: 0

## 2018-12-27 ASSESSMENT — PAIN DESCRIPTION - LOCATION
LOCATION: BACK

## 2018-12-27 ASSESSMENT — PAIN DESCRIPTION - PAIN TYPE
TYPE: CHRONIC PAIN

## 2018-12-27 ASSESSMENT — PAIN DESCRIPTION - FREQUENCY: FREQUENCY: INTERMITTENT

## 2018-12-27 ASSESSMENT — PAIN DESCRIPTION - DESCRIPTORS
DESCRIPTORS: ACHING
DESCRIPTORS: ACHING
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
## 2018-12-27 NOTE — PROGRESS NOTES
fibrillation ([facility] Pneumonia; Psychiatric problem; PVD (peripheral vascular disease) ([facility] Type 2 diabetes mellitus without complication ([facility] and Type II or unspecified type diabetes mellitus without mention of complication, not stated as uncontrolled. has a past surgical history that includes Leg Surgery (Right, 1980); Appendectomy; Hand surgery (Left); Wrist fracture surgery (Right, 1980); knee surgery; angioplasty (Bilateral, 1-15-15, 1/19/15); Coronary angioplasty with stent; Femoral-tibial Bypass Graft (Left, 5/2/16); and Leg amputation through femur. Social/Functional History  Lives With:  (parents )  Type of Home: House  Home Layout: One level  Home Access: Ramped entrance  Bathroom Shower/Tub: Tub/Shower unit  Bathroom Equipment: Grab bars in shower, Shower chair, Hand-held shower, 3-in-1 commode, Grab bars around toilet  Bathroom Accessibility: Accessible (bathroom accessable for w/c but not electric w/c)  Home Equipment: Cane, Rolling walker, [employer], Wheelchair-electric  ADL Assistance: Independent  Ambulation Assistance: Independent (w/c )  Transfer Assistance: Independent  Active : Yes  Occupation: On disability  IADL Comments: Pt uses power w/c for mobility in the house, and std w/c to get out to the car. Restrictions  Restrictions/Precautions  Restrictions/Precautions: Fall Risk  Position Activity Restriction  Other position/activity restrictions: L AKA; goes by Sreekanth. Subjective   General  Chart Reviewed: Yes  Patient assessed for rehabilitation services?: Yes  Additional Pertinent Hx: has a past medical history of Alcohol abuse; Anticoagulant long-term use; Arthritis; Atrial fibrillation ([address] Blood circulation, collateral; CHF (congestive heart failure) ([address] Chronic back pain; Chronic kidney disease; COPD (chronic obstructive pulmonary disease) ([address] Coronary artery disease; Diabetic neuropathy ([facility]  Fractures, multiple; GERD (gastroesophageal reflux disease);

## 2018-12-28 LAB
ANION GAP SERPL CALCULATED.3IONS-SCNC: 17 MMOL/L (ref 3–16)
BASOPHILS ABSOLUTE: 0 K/UL (ref 0–0.2)
BASOPHILS RELATIVE PERCENT: 0.2 %
BUN BLDV-MCNC: 52 MG/DL (ref 7–20)
CALCIUM SERPL-MCNC: 9.5 MG/DL (ref 8.3–10.6)
CHLORIDE BLD-SCNC: 93 MMOL/L (ref 99–110)
CO2: 25 MMOL/L (ref 21–32)
CREAT SERPL-MCNC: 1.5 MG/DL (ref 0.9–1.3)
EOSINOPHILS ABSOLUTE: 0 K/UL (ref 0–0.6)
EOSINOPHILS RELATIVE PERCENT: 0.1 %
GFR AFRICAN AMERICAN: 58
GFR NON-AFRICAN AMERICAN: 48
GLUCOSE BLD-MCNC: 258 MG/DL (ref 70–99)
GLUCOSE BLD-MCNC: 270 MG/DL (ref 70–99)
GLUCOSE BLD-MCNC: 280 MG/DL (ref 70–99)
GLUCOSE BLD-MCNC: 290 MG/DL (ref 70–99)
GLUCOSE BLD-MCNC: 386 MG/DL (ref 70–99)
HCT VFR BLD CALC: 32 % (ref 40.5–52.5)
HEMOGLOBIN: 10.2 G/DL (ref 13.5–17.5)
LYMPHOCYTES ABSOLUTE: 1 K/UL (ref 1–5.1)
LYMPHOCYTES RELATIVE PERCENT: 6.3 %
MCH RBC QN AUTO: 29 PG (ref 26–34)
MCHC RBC AUTO-ENTMCNC: 31.8 G/DL (ref 31–36)
MCV RBC AUTO: 91 FL (ref 80–100)
MONOCYTES ABSOLUTE: 1.2 K/UL (ref 0–1.3)
MONOCYTES RELATIVE PERCENT: 7.5 %
NEUTROPHILS ABSOLUTE: 13.3 K/UL (ref 1.7–7.7)
NEUTROPHILS RELATIVE PERCENT: 85.9 %
PDW BLD-RTO: 15.8 % (ref 12.4–15.4)
PERFORMED ON: ABNORMAL
PLATELET # BLD: 278 K/UL (ref 135–450)
PMV BLD AUTO: 8.8 FL (ref 5–10.5)
POTASSIUM REFLEX MAGNESIUM: 4.6 MMOL/L (ref 3.5–5.1)
RBC # BLD: 3.51 M/UL (ref 4.2–5.9)
SODIUM BLD-SCNC: 135 MMOL/L (ref 136–145)
WBC # BLD: 15.5 K/UL (ref 4–11)

## 2018-12-28 PROCEDURE — 97530 THERAPEUTIC ACTIVITIES: CPT

## 2018-12-28 PROCEDURE — 6360000002 HC RX W HCPCS: Performed by: FAMILY MEDICINE

## 2018-12-28 PROCEDURE — 2580000003 HC RX 258: Performed by: INTERNAL MEDICINE

## 2018-12-28 PROCEDURE — 2580000003 HC RX 258: Performed by: FAMILY MEDICINE

## 2018-12-28 PROCEDURE — 6360000002 HC RX W HCPCS: Performed by: INTERNAL MEDICINE

## 2018-12-28 PROCEDURE — 94762 N-INVAS EAR/PLS OXIMTRY CONT: CPT

## 2018-12-28 PROCEDURE — 97535 SELF CARE MNGMENT TRAINING: CPT

## 2018-12-28 PROCEDURE — 2500000003 HC RX 250 WO HCPCS: Performed by: FAMILY MEDICINE

## 2018-12-28 PROCEDURE — 6370000000 HC RX 637 (ALT 250 FOR IP): Performed by: FAMILY MEDICINE

## 2018-12-28 PROCEDURE — 2700000000 HC OXYGEN THERAPY PER DAY

## 2018-12-28 PROCEDURE — G8978 MOBILITY CURRENT STATUS: HCPCS

## 2018-12-28 PROCEDURE — 36415 COLL VENOUS BLD VENIPUNCTURE: CPT

## 2018-12-28 PROCEDURE — 99232 SBSQ HOSP IP/OBS MODERATE 35: CPT | Performed by: INTERNAL MEDICINE

## 2018-12-28 PROCEDURE — 80048 BASIC METABOLIC PNL TOTAL CA: CPT

## 2018-12-28 PROCEDURE — G8979 MOBILITY GOAL STATUS: HCPCS

## 2018-12-28 PROCEDURE — 6370000000 HC RX 637 (ALT 250 FOR IP): Performed by: INTERNAL MEDICINE

## 2018-12-28 PROCEDURE — 85025 COMPLETE CBC W/AUTO DIFF WBC: CPT

## 2018-12-28 PROCEDURE — 94640 AIRWAY INHALATION TREATMENT: CPT

## 2018-12-28 PROCEDURE — 2060000000 HC ICU INTERMEDIATE R&B

## 2018-12-28 RX ORDER — METHYLPREDNISOLONE SODIUM SUCCINATE 40 MG/ML
40 INJECTION, POWDER, LYOPHILIZED, FOR SOLUTION INTRAMUSCULAR; INTRAVENOUS EVERY 12 HOURS
Status: DISCONTINUED | OUTPATIENT
Start: 2018-12-28 | End: 2018-12-29 | Stop reason: HOSPADM

## 2018-12-28 RX ORDER — DILTIAZEM HYDROCHLORIDE 5 MG/ML
10 INJECTION INTRAVENOUS ONCE
Status: COMPLETED | OUTPATIENT
Start: 2018-12-28 | End: 2018-12-28

## 2018-12-28 RX ORDER — FUROSEMIDE 10 MG/ML
40 INJECTION INTRAMUSCULAR; INTRAVENOUS ONCE
Status: COMPLETED | OUTPATIENT
Start: 2018-12-28 | End: 2018-12-28

## 2018-12-28 RX ADMIN — IPRATROPIUM BROMIDE AND ALBUTEROL SULFATE 1 AMPULE: 2.5; .5 SOLUTION RESPIRATORY (INHALATION) at 04:14

## 2018-12-28 RX ADMIN — Medication 10 ML: at 22:02

## 2018-12-28 RX ADMIN — TRAZODONE HYDROCHLORIDE 50 MG: 50 TABLET ORAL at 22:01

## 2018-12-28 RX ADMIN — IPRATROPIUM BROMIDE AND ALBUTEROL SULFATE 1 AMPULE: 2.5; .5 SOLUTION RESPIRATORY (INHALATION) at 00:42

## 2018-12-28 RX ADMIN — DILTIAZEM HYDROCHLORIDE 10 MG: 5 INJECTION INTRAVENOUS at 18:03

## 2018-12-28 RX ADMIN — INSULIN LISPRO 15 UNITS: 100 INJECTION, SOLUTION INTRAVENOUS; SUBCUTANEOUS at 12:22

## 2018-12-28 RX ADMIN — OXYCODONE AND ACETAMINOPHEN 1 TABLET: 5; 325 TABLET ORAL at 20:25

## 2018-12-28 RX ADMIN — INSULIN LISPRO 9 UNITS: 100 INJECTION, SOLUTION INTRAVENOUS; SUBCUTANEOUS at 10:05

## 2018-12-28 RX ADMIN — OXYCODONE AND ACETAMINOPHEN 1 TABLET: 5; 325 TABLET ORAL at 10:04

## 2018-12-28 RX ADMIN — PANTOPRAZOLE SODIUM 40 MG: 40 TABLET, DELAYED RELEASE ORAL at 06:05

## 2018-12-28 RX ADMIN — METHYLPREDNISOLONE SODIUM SUCCINATE 40 MG: 40 INJECTION, POWDER, FOR SOLUTION INTRAMUSCULAR; INTRAVENOUS at 22:02

## 2018-12-28 RX ADMIN — IPRATROPIUM BROMIDE AND ALBUTEROL SULFATE 1 AMPULE: 2.5; .5 SOLUTION RESPIRATORY (INHALATION) at 20:17

## 2018-12-28 RX ADMIN — Medication 10 ML: at 10:05

## 2018-12-28 RX ADMIN — INSULIN LISPRO 5 UNITS: 100 INJECTION, SOLUTION INTRAVENOUS; SUBCUTANEOUS at 22:03

## 2018-12-28 RX ADMIN — METHYLPREDNISOLONE SODIUM SUCCINATE 40 MG: 40 INJECTION, POWDER, FOR SOLUTION INTRAMUSCULAR; INTRAVENOUS at 10:05

## 2018-12-28 RX ADMIN — INSULIN GLARGINE 20 UNITS: 100 INJECTION, SOLUTION SUBCUTANEOUS at 22:03

## 2018-12-28 RX ADMIN — INSULIN LISPRO 15 UNITS: 100 INJECTION, SOLUTION INTRAVENOUS; SUBCUTANEOUS at 16:58

## 2018-12-28 RX ADMIN — TAMSULOSIN HYDROCHLORIDE 0.4 MG: 0.4 CAPSULE ORAL at 10:03

## 2018-12-28 RX ADMIN — DILTIAZEM HYDROCHLORIDE 5 MG/HR: 5 INJECTION INTRAVENOUS at 18:03

## 2018-12-28 RX ADMIN — INSULIN GLARGINE 20 UNITS: 100 INJECTION, SOLUTION SUBCUTANEOUS at 10:06

## 2018-12-28 RX ADMIN — RIVAROXABAN 20 MG: 20 TABLET, FILM COATED ORAL at 10:04

## 2018-12-28 RX ADMIN — BUDESONIDE 500 MCG: 0.5 SUSPENSION RESPIRATORY (INHALATION) at 09:10

## 2018-12-28 RX ADMIN — GABAPENTIN 100 MG: 100 CAPSULE ORAL at 10:04

## 2018-12-28 RX ADMIN — INSULIN LISPRO 9 UNITS: 100 INJECTION, SOLUTION INTRAVENOUS; SUBCUTANEOUS at 16:58

## 2018-12-28 RX ADMIN — GABAPENTIN 100 MG: 100 CAPSULE ORAL at 15:30

## 2018-12-28 RX ADMIN — ATORVASTATIN CALCIUM 40 MG: 40 TABLET, FILM COATED ORAL at 10:02

## 2018-12-28 RX ADMIN — FUROSEMIDE 40 MG: 10 INJECTION, SOLUTION INTRAMUSCULAR; INTRAVENOUS at 15:30

## 2018-12-28 RX ADMIN — IPRATROPIUM BROMIDE AND ALBUTEROL SULFATE 1 AMPULE: 2.5; .5 SOLUTION RESPIRATORY (INHALATION) at 09:10

## 2018-12-28 RX ADMIN — GABAPENTIN 100 MG: 100 CAPSULE ORAL at 22:01

## 2018-12-28 RX ADMIN — METOPROLOL SUCCINATE 150 MG: 50 TABLET, EXTENDED RELEASE ORAL at 10:03

## 2018-12-28 RX ADMIN — DILTIAZEM HYDROCHLORIDE 240 MG: 240 CAPSULE, COATED, EXTENDED RELEASE ORAL at 10:02

## 2018-12-28 RX ADMIN — FORMOTEROL FUMARATE DIHYDRATE 20 MCG: 20 SOLUTION RESPIRATORY (INHALATION) at 20:17

## 2018-12-28 RX ADMIN — AZITHROMYCIN 500 MG: 500 TABLET, FILM COATED ORAL at 22:01

## 2018-12-28 RX ADMIN — INSULIN LISPRO 10 UNITS: 100 INJECTION, SOLUTION INTRAVENOUS; SUBCUTANEOUS at 10:04

## 2018-12-28 RX ADMIN — IPRATROPIUM BROMIDE AND ALBUTEROL SULFATE 1 AMPULE: 2.5; .5 SOLUTION RESPIRATORY (INHALATION) at 11:37

## 2018-12-28 RX ADMIN — FORMOTEROL FUMARATE DIHYDRATE 20 MCG: 20 SOLUTION RESPIRATORY (INHALATION) at 09:09

## 2018-12-28 RX ADMIN — DULOXETINE HYDROCHLORIDE 60 MG: 60 CAPSULE, DELAYED RELEASE ORAL at 10:03

## 2018-12-28 RX ADMIN — BUDESONIDE 500 MCG: 0.5 SUSPENSION RESPIRATORY (INHALATION) at 20:17

## 2018-12-28 RX ADMIN — IPRATROPIUM BROMIDE AND ALBUTEROL SULFATE 1 AMPULE: 2.5; .5 SOLUTION RESPIRATORY (INHALATION) at 16:00

## 2018-12-28 ASSESSMENT — PAIN SCALES - GENERAL
PAINLEVEL_OUTOF10: 10
PAINLEVEL_OUTOF10: 5
PAINLEVEL_OUTOF10: 0
PAINLEVEL_OUTOF10: 5
PAINLEVEL_OUTOF10: 9
PAINLEVEL_OUTOF10: 5

## 2018-12-28 ASSESSMENT — PAIN DESCRIPTION - LOCATION
LOCATION: BACK

## 2018-12-28 ASSESSMENT — PAIN DESCRIPTION - PAIN TYPE
TYPE: CHRONIC PAIN

## 2018-12-28 ASSESSMENT — PAIN DESCRIPTION - DESCRIPTORS: DESCRIPTORS: ACHING

## 2018-12-28 ASSESSMENT — PAIN DESCRIPTION - ORIENTATION: ORIENTATION: LOWER;MID

## 2018-12-28 NOTE — CARE COORDINATION
Sw spoke to patient in regards to discharge plan. He is now refusing SNF, reported that he will be returning home from hospital. He feels that he can safely manage his care at home. He is active with [facility] for RN, wishes to resume them and add PT/OT. He expressed no other needs at this time. HC orders and HEENA will need to be completed. SW will need to fax orders to [facility]([phone]) upon discharge.      Sruthi Ahumada, [address]  [phone]  12/28/18 at 1:13 PM

## 2018-12-28 NOTE — PLAN OF CARE
Problem: Falls - Risk of:  Goal: Will remain free from falls  Will remain free from falls   Outcome: Ongoing      Problem: Risk for Impaired Skin Integrity  Goal: Tissue integrity - skin and mucous membranes  Structural intactness and normal physiological function of skin and  mucous membranes.    Outcome: Ongoing      Problem: Gas Exchange - Impaired:  Goal: Levels of oxygenation will improve  Levels of oxygenation will improve   Outcome: Ongoing      Problem: Pain:  Goal: Control of chronic pain  Control of chronic pain   Outcome: Ongoing      Problem: OXYGENATION/RESPIRATORY FUNCTION  Goal: Patient will maintain patent airway  Outcome: Ongoing

## 2018-12-29 ENCOUNTER — APPOINTMENT (OUTPATIENT)
Dept: GENERAL RADIOLOGY | Age: 58
DRG: 140 | End: 2018-12-29
Payer: COMMERCIAL

## 2018-12-29 VITALS
WEIGHT: 255.95 LBS | SYSTOLIC BLOOD PRESSURE: 132 MMHG | RESPIRATION RATE: 16 BRPM | HEIGHT: 67 IN | DIASTOLIC BLOOD PRESSURE: 90 MMHG | TEMPERATURE: 97.5 F | BODY MASS INDEX: 40.17 KG/M2 | OXYGEN SATURATION: 93 % | HEART RATE: 109 BPM

## 2018-12-29 LAB
ANION GAP SERPL CALCULATED.3IONS-SCNC: 8 MMOL/L (ref 3–16)
BASOPHILS ABSOLUTE: 0 K/UL (ref 0–0.2)
BASOPHILS RELATIVE PERCENT: 0.4 %
BUN BLDV-MCNC: 48 MG/DL (ref 7–20)
CALCIUM SERPL-MCNC: 9.8 MG/DL (ref 8.3–10.6)
CHLORIDE BLD-SCNC: 96 MMOL/L (ref 99–110)
CO2: 32 MMOL/L (ref 21–32)
CREAT SERPL-MCNC: 1.1 MG/DL (ref 0.9–1.3)
EOSINOPHILS ABSOLUTE: 0 K/UL (ref 0–0.6)
EOSINOPHILS RELATIVE PERCENT: 0 %
GFR AFRICAN AMERICAN: >60
GFR NON-AFRICAN AMERICAN: >60
GLUCOSE BLD-MCNC: 206 MG/DL (ref 70–99)
GLUCOSE BLD-MCNC: 233 MG/DL (ref 70–99)
GLUCOSE BLD-MCNC: 319 MG/DL (ref 70–99)
HCT VFR BLD CALC: 30.3 % (ref 40.5–52.5)
HEMOGLOBIN: 10 G/DL (ref 13.5–17.5)
LYMPHOCYTES ABSOLUTE: 0.8 K/UL (ref 1–5.1)
LYMPHOCYTES RELATIVE PERCENT: 6.9 %
MCH RBC QN AUTO: 29.6 PG (ref 26–34)
MCHC RBC AUTO-ENTMCNC: 33.1 G/DL (ref 31–36)
MCV RBC AUTO: 89.3 FL (ref 80–100)
MONOCYTES ABSOLUTE: 0.9 K/UL (ref 0–1.3)
MONOCYTES RELATIVE PERCENT: 7.8 %
NEUTROPHILS ABSOLUTE: 10.3 K/UL (ref 1.7–7.7)
NEUTROPHILS RELATIVE PERCENT: 84.9 %
PDW BLD-RTO: 15.1 % (ref 12.4–15.4)
PERFORMED ON: ABNORMAL
PERFORMED ON: ABNORMAL
PLATELET # BLD: 232 K/UL (ref 135–450)
PMV BLD AUTO: 8.8 FL (ref 5–10.5)
POTASSIUM REFLEX MAGNESIUM: 4.6 MMOL/L (ref 3.5–5.1)
RBC # BLD: 3.39 M/UL (ref 4.2–5.9)
SODIUM BLD-SCNC: 136 MMOL/L (ref 136–145)
WBC # BLD: 12.1 K/UL (ref 4–11)

## 2018-12-29 PROCEDURE — 6370000000 HC RX 637 (ALT 250 FOR IP): Performed by: INTERNAL MEDICINE

## 2018-12-29 PROCEDURE — 85025 COMPLETE CBC W/AUTO DIFF WBC: CPT

## 2018-12-29 PROCEDURE — 6370000000 HC RX 637 (ALT 250 FOR IP): Performed by: FAMILY MEDICINE

## 2018-12-29 PROCEDURE — 6360000002 HC RX W HCPCS: Performed by: INTERNAL MEDICINE

## 2018-12-29 PROCEDURE — 71045 X-RAY EXAM CHEST 1 VIEW: CPT

## 2018-12-29 PROCEDURE — 80048 BASIC METABOLIC PNL TOTAL CA: CPT

## 2018-12-29 PROCEDURE — 6360000002 HC RX W HCPCS: Performed by: FAMILY MEDICINE

## 2018-12-29 PROCEDURE — 94640 AIRWAY INHALATION TREATMENT: CPT

## 2018-12-29 PROCEDURE — 2700000000 HC OXYGEN THERAPY PER DAY

## 2018-12-29 PROCEDURE — 36415 COLL VENOUS BLD VENIPUNCTURE: CPT

## 2018-12-29 RX ORDER — PREDNISONE 10 MG/1
TABLET ORAL
Qty: 30 TABLET | Refills: 0 | Status: ON HOLD | OUTPATIENT
Start: 2018-12-29 | End: 2019-01-05 | Stop reason: HOSPADM

## 2018-12-29 RX ORDER — DILTIAZEM HYDROCHLORIDE 240 MG/1
240 CAPSULE, COATED, EXTENDED RELEASE ORAL DAILY
Status: DISCONTINUED | OUTPATIENT
Start: 2018-12-29 | End: 2018-12-29 | Stop reason: HOSPADM

## 2018-12-29 RX ADMIN — OXYCODONE AND ACETAMINOPHEN 1 TABLET: 5; 325 TABLET ORAL at 10:37

## 2018-12-29 RX ADMIN — INSULIN LISPRO 6 UNITS: 100 INJECTION, SOLUTION INTRAVENOUS; SUBCUTANEOUS at 08:27

## 2018-12-29 RX ADMIN — TAMSULOSIN HYDROCHLORIDE 0.4 MG: 0.4 CAPSULE ORAL at 08:21

## 2018-12-29 RX ADMIN — GABAPENTIN 100 MG: 100 CAPSULE ORAL at 13:30

## 2018-12-29 RX ADMIN — INSULIN LISPRO 15 UNITS: 100 INJECTION, SOLUTION INTRAVENOUS; SUBCUTANEOUS at 08:28

## 2018-12-29 RX ADMIN — METHYLPREDNISOLONE SODIUM SUCCINATE 40 MG: 40 INJECTION, POWDER, FOR SOLUTION INTRAMUSCULAR; INTRAVENOUS at 11:31

## 2018-12-29 RX ADMIN — METOPROLOL SUCCINATE 150 MG: 50 TABLET, EXTENDED RELEASE ORAL at 08:21

## 2018-12-29 RX ADMIN — OXYCODONE AND ACETAMINOPHEN 1 TABLET: 5; 325 TABLET ORAL at 04:28

## 2018-12-29 RX ADMIN — DULOXETINE HYDROCHLORIDE 60 MG: 60 CAPSULE, DELAYED RELEASE ORAL at 08:21

## 2018-12-29 RX ADMIN — IPRATROPIUM BROMIDE AND ALBUTEROL SULFATE 1 AMPULE: 2.5; .5 SOLUTION RESPIRATORY (INHALATION) at 09:08

## 2018-12-29 RX ADMIN — RIVAROXABAN 20 MG: 20 TABLET, FILM COATED ORAL at 08:21

## 2018-12-29 RX ADMIN — IPRATROPIUM BROMIDE AND ALBUTEROL SULFATE 1 AMPULE: 2.5; .5 SOLUTION RESPIRATORY (INHALATION) at 04:28

## 2018-12-29 RX ADMIN — PANTOPRAZOLE SODIUM 40 MG: 40 TABLET, DELAYED RELEASE ORAL at 06:54

## 2018-12-29 RX ADMIN — DILTIAZEM HYDROCHLORIDE 240 MG: 240 CAPSULE, COATED, EXTENDED RELEASE ORAL at 10:36

## 2018-12-29 RX ADMIN — IPRATROPIUM BROMIDE AND ALBUTEROL SULFATE 1 AMPULE: 2.5; .5 SOLUTION RESPIRATORY (INHALATION) at 12:50

## 2018-12-29 RX ADMIN — INSULIN LISPRO 15 UNITS: 100 INJECTION, SOLUTION INTRAVENOUS; SUBCUTANEOUS at 13:36

## 2018-12-29 RX ADMIN — GABAPENTIN 100 MG: 100 CAPSULE ORAL at 08:21

## 2018-12-29 RX ADMIN — BUDESONIDE 500 MCG: 0.5 SUSPENSION RESPIRATORY (INHALATION) at 09:08

## 2018-12-29 RX ADMIN — INSULIN GLARGINE 20 UNITS: 100 INJECTION, SOLUTION SUBCUTANEOUS at 08:25

## 2018-12-29 RX ADMIN — IPRATROPIUM BROMIDE AND ALBUTEROL SULFATE 1 AMPULE: 2.5; .5 SOLUTION RESPIRATORY (INHALATION) at 00:13

## 2018-12-29 RX ADMIN — FORMOTEROL FUMARATE DIHYDRATE 20 MCG: 20 SOLUTION RESPIRATORY (INHALATION) at 09:08

## 2018-12-29 RX ADMIN — ATORVASTATIN CALCIUM 40 MG: 40 TABLET, FILM COATED ORAL at 08:21

## 2018-12-29 ASSESSMENT — PAIN DESCRIPTION - PAIN TYPE
TYPE: CHRONIC PAIN

## 2018-12-29 ASSESSMENT — PAIN DESCRIPTION - LOCATION
LOCATION: BACK

## 2018-12-29 ASSESSMENT — PAIN SCALES - GENERAL
PAINLEVEL_OUTOF10: 7
PAINLEVEL_OUTOF10: 6
PAINLEVEL_OUTOF10: 6
PAINLEVEL_OUTOF10: 8
PAINLEVEL_OUTOF10: 3

## 2018-12-29 NOTE — PLAN OF CARE
Problem: Risk for Impaired Skin Integrity  Goal: Tissue integrity - skin and mucous membranes  Structural intactness and normal physiological function of skin and  mucous membranes. Outcome: Ongoing  Skin assessment completed every shift. Pt assessed for incontinence, appropriate barrier cream applied prn. Pt encouraged to turn/rotate every 2 hours. Assistance provided if pt unable to do so themselves. Electronically signed by Robbie Marrero RN on 12/29/2018 at 9:46 AM      Problem: Discharge Planning:  Goal: Discharged to appropriate level of care  Discharged to appropriate level of care   Outcome: Ongoing      Problem: Pain:  Goal: Pain level will decrease  Pain level will decrease   Outcome: Ongoing  Pain/discomfort being managed with PRN analgesics per MD orders. Pt able to express presence and absence of pain and rate pain appropriately using numerical scale. Electronically signed by Robbie Marrero RN on 12/29/2018 at 9:46 AM      Problem: OXYGENATION/RESPIRATORY FUNCTION  Goal: Patient will maintain patent airway  Outcome: Ongoing  Educated patient/family on CHF signs/ symptoms, causes, treatments, limited fluid intake, daily weights, discharge medications, low sodium diet, activiety and follow-up. Pt to call physician if weight gain of 3 pounds in one day or 5 pounds in one week.   Electronically signed by Robbie Marrero RN on 12/29/2018 at 9:46 AM

## 2018-12-30 LAB
BLOOD CULTURE, ROUTINE: NORMAL
BLOOD CULTURE, ROUTINE: NORMAL

## 2018-12-31 ENCOUNTER — TELEPHONE (OUTPATIENT)
Dept: INTERNAL MEDICINE CLINIC | Age: 58
End: 2018-12-31

## 2018-12-31 ENCOUNTER — APPOINTMENT (OUTPATIENT)
Dept: GENERAL RADIOLOGY | Age: 58
DRG: 201 | End: 2018-12-31
Payer: COMMERCIAL

## 2018-12-31 ENCOUNTER — HOSPITAL ENCOUNTER (INPATIENT)
Age: 58
LOS: 5 days | Discharge: NURSING, REHAB OR LONG-TERM CARE | DRG: 201 | End: 2019-01-05
Attending: EMERGENCY MEDICINE | Admitting: INTERNAL MEDICINE
Payer: COMMERCIAL

## 2018-12-31 ENCOUNTER — CARE COORDINATION (OUTPATIENT)
Dept: CARE COORDINATION | Age: 58
End: 2018-12-31

## 2018-12-31 ENCOUNTER — TELEPHONE (OUTPATIENT)
Dept: OTHER | Facility: CLINIC | Age: 58
End: 2018-12-31

## 2018-12-31 DIAGNOSIS — I48.91 ATRIAL FIBRILLATION WITH RVR (HCC): Primary | ICD-10-CM

## 2018-12-31 DIAGNOSIS — G89.29 CHRONIC BACK PAIN, UNSPECIFIED BACK LOCATION, UNSPECIFIED BACK PAIN LATERALITY: ICD-10-CM

## 2018-12-31 DIAGNOSIS — J44.1 COPD WITH ACUTE EXACERBATION (HCC): ICD-10-CM

## 2018-12-31 DIAGNOSIS — I50.23 ACUTE ON CHRONIC SYSTOLIC HEART FAILURE (HCC): ICD-10-CM

## 2018-12-31 DIAGNOSIS — M54.9 CHRONIC BACK PAIN, UNSPECIFIED BACK LOCATION, UNSPECIFIED BACK PAIN LATERALITY: ICD-10-CM

## 2018-12-31 DIAGNOSIS — E87.6 HYPOKALEMIA: ICD-10-CM

## 2018-12-31 LAB
ANION GAP SERPL CALCULATED.3IONS-SCNC: 13 MMOL/L (ref 3–16)
BASE EXCESS ARTERIAL: 5.6 MMOL/L (ref -3–3)
BASOPHILS ABSOLUTE: 0 K/UL (ref 0–0.2)
BASOPHILS RELATIVE PERCENT: 0.2 %
BUN BLDV-MCNC: 40 MG/DL (ref 7–20)
CALCIUM SERPL-MCNC: 9.3 MG/DL (ref 8.3–10.6)
CARBOXYHEMOGLOBIN ARTERIAL: 2 % (ref 0–1.5)
CHLORIDE BLD-SCNC: 94 MMOL/L (ref 99–110)
CO2: 29 MMOL/L (ref 21–32)
CREAT SERPL-MCNC: 0.9 MG/DL (ref 0.9–1.3)
EOSINOPHILS ABSOLUTE: 0 K/UL (ref 0–0.6)
EOSINOPHILS RELATIVE PERCENT: 0.1 %
GFR AFRICAN AMERICAN: >60
GFR NON-AFRICAN AMERICAN: >60
GLUCOSE BLD-MCNC: 150 MG/DL (ref 70–99)
HCO3 ARTERIAL: 30.6 MMOL/L (ref 21–29)
HCT VFR BLD CALC: 33 % (ref 40.5–52.5)
HEMOGLOBIN, ART, EXTENDED: 10.3 G/DL (ref 13.5–17.5)
HEMOGLOBIN: 10.5 G/DL (ref 13.5–17.5)
LYMPHOCYTES ABSOLUTE: 1 K/UL (ref 1–5.1)
LYMPHOCYTES RELATIVE PERCENT: 6.2 %
MCH RBC QN AUTO: 27.9 PG (ref 26–34)
MCHC RBC AUTO-ENTMCNC: 31.6 G/DL (ref 31–36)
MCV RBC AUTO: 88.2 FL (ref 80–100)
METHEMOGLOBIN ARTERIAL: 1 %
MONOCYTES ABSOLUTE: 1.5 K/UL (ref 0–1.3)
MONOCYTES RELATIVE PERCENT: 9.5 %
NEUTROPHILS ABSOLUTE: 13.6 K/UL (ref 1.7–7.7)
NEUTROPHILS RELATIVE PERCENT: 84 %
O2 CONTENT ARTERIAL: 14 ML/DL
O2 SAT, ARTERIAL: 99.2 %
O2 THERAPY: ABNORMAL
PCO2 ARTERIAL: 49.1 MMHG (ref 35–45)
PDW BLD-RTO: 15.7 % (ref 12.4–15.4)
PH ARTERIAL: 7.41 (ref 7.35–7.45)
PLATELET # BLD: 277 K/UL (ref 135–450)
PMV BLD AUTO: 9.1 FL (ref 5–10.5)
PO2 ARTERIAL: 149 MMHG (ref 75–108)
POTASSIUM SERPL-SCNC: 4.1 MMOL/L (ref 3.5–5.1)
PRO-BNP: 8308 PG/ML (ref 0–124)
RBC # BLD: 3.75 M/UL (ref 4.2–5.9)
SODIUM BLD-SCNC: 136 MMOL/L (ref 136–145)
TCO2 ARTERIAL: 32.1 MMOL/L
TROPONIN: 0.02 NG/ML
WBC # BLD: 16.2 K/UL (ref 4–11)

## 2018-12-31 PROCEDURE — 2580000003 HC RX 258: Performed by: INTERNAL MEDICINE

## 2018-12-31 PROCEDURE — 96365 THER/PROPH/DIAG IV INF INIT: CPT

## 2018-12-31 PROCEDURE — 93010 ELECTROCARDIOGRAM REPORT: CPT | Performed by: INTERNAL MEDICINE

## 2018-12-31 PROCEDURE — 2700000000 HC OXYGEN THERAPY PER DAY

## 2018-12-31 PROCEDURE — 2500000003 HC RX 250 WO HCPCS: Performed by: INTERNAL MEDICINE

## 2018-12-31 PROCEDURE — 6370000000 HC RX 637 (ALT 250 FOR IP): Performed by: NURSE PRACTITIONER

## 2018-12-31 PROCEDURE — 84484 ASSAY OF TROPONIN QUANT: CPT

## 2018-12-31 PROCEDURE — 94640 AIRWAY INHALATION TREATMENT: CPT

## 2018-12-31 PROCEDURE — 96376 TX/PRO/DX INJ SAME DRUG ADON: CPT

## 2018-12-31 PROCEDURE — 96366 THER/PROPH/DIAG IV INF ADDON: CPT

## 2018-12-31 PROCEDURE — 83880 ASSAY OF NATRIURETIC PEPTIDE: CPT

## 2018-12-31 PROCEDURE — 36600 WITHDRAWAL OF ARTERIAL BLOOD: CPT

## 2018-12-31 PROCEDURE — 93005 ELECTROCARDIOGRAM TRACING: CPT | Performed by: NURSE PRACTITIONER

## 2018-12-31 PROCEDURE — 85025 COMPLETE CBC W/AUTO DIFF WBC: CPT

## 2018-12-31 PROCEDURE — 71045 X-RAY EXAM CHEST 1 VIEW: CPT

## 2018-12-31 PROCEDURE — 2580000003 HC RX 258: Performed by: NURSE PRACTITIONER

## 2018-12-31 PROCEDURE — 36415 COLL VENOUS BLD VENIPUNCTURE: CPT

## 2018-12-31 PROCEDURE — 94660 CPAP INITIATION&MGMT: CPT

## 2018-12-31 PROCEDURE — 2060000000 HC ICU INTERMEDIATE R&B

## 2018-12-31 PROCEDURE — 80048 BASIC METABOLIC PNL TOTAL CA: CPT

## 2018-12-31 PROCEDURE — 94664 DEMO&/EVAL PT USE INHALER: CPT

## 2018-12-31 PROCEDURE — 94762 N-INVAS EAR/PLS OXIMTRY CONT: CPT

## 2018-12-31 PROCEDURE — 6370000000 HC RX 637 (ALT 250 FOR IP): Performed by: INTERNAL MEDICINE

## 2018-12-31 PROCEDURE — 96375 TX/PRO/DX INJ NEW DRUG ADDON: CPT

## 2018-12-31 PROCEDURE — 2500000003 HC RX 250 WO HCPCS: Performed by: NURSE PRACTITIONER

## 2018-12-31 PROCEDURE — 82803 BLOOD GASES ANY COMBINATION: CPT

## 2018-12-31 PROCEDURE — 6360000002 HC RX W HCPCS: Performed by: INTERNAL MEDICINE

## 2018-12-31 PROCEDURE — 99285 EMERGENCY DEPT VISIT HI MDM: CPT

## 2018-12-31 PROCEDURE — 6360000002 HC RX W HCPCS: Performed by: NURSE PRACTITIONER

## 2018-12-31 RX ORDER — SODIUM CHLORIDE 0.9 % (FLUSH) 0.9 %
10 SYRINGE (ML) INJECTION EVERY 12 HOURS SCHEDULED
Status: DISCONTINUED | OUTPATIENT
Start: 2018-12-31 | End: 2019-01-05 | Stop reason: HOSPADM

## 2018-12-31 RX ORDER — OXYCODONE HYDROCHLORIDE AND ACETAMINOPHEN 5; 325 MG/1; MG/1
1 TABLET ORAL EVERY 6 HOURS PRN
Status: DISCONTINUED | OUTPATIENT
Start: 2018-12-31 | End: 2019-01-05 | Stop reason: HOSPADM

## 2018-12-31 RX ORDER — DILTIAZEM HYDROCHLORIDE 5 MG/ML
10 INJECTION INTRAVENOUS ONCE
Status: COMPLETED | OUTPATIENT
Start: 2018-12-31 | End: 2018-12-31

## 2018-12-31 RX ORDER — DULOXETIN HYDROCHLORIDE 60 MG/1
60 CAPSULE, DELAYED RELEASE ORAL DAILY
Status: DISCONTINUED | OUTPATIENT
Start: 2019-01-01 | End: 2019-01-05 | Stop reason: HOSPADM

## 2018-12-31 RX ORDER — LEVOFLOXACIN 5 MG/ML
500 INJECTION, SOLUTION INTRAVENOUS EVERY 24 HOURS
Status: DISCONTINUED | OUTPATIENT
Start: 2018-12-31 | End: 2019-01-02

## 2018-12-31 RX ORDER — ONDANSETRON 2 MG/ML
4 INJECTION INTRAMUSCULAR; INTRAVENOUS EVERY 6 HOURS PRN
Status: DISCONTINUED | OUTPATIENT
Start: 2018-12-31 | End: 2019-01-05 | Stop reason: HOSPADM

## 2018-12-31 RX ORDER — METOPROLOL SUCCINATE 50 MG/1
150 TABLET, EXTENDED RELEASE ORAL DAILY
Status: DISCONTINUED | OUTPATIENT
Start: 2018-12-31 | End: 2019-01-05 | Stop reason: HOSPADM

## 2018-12-31 RX ORDER — IPRATROPIUM BROMIDE AND ALBUTEROL SULFATE 2.5; .5 MG/3ML; MG/3ML
1 SOLUTION RESPIRATORY (INHALATION) ONCE
Status: COMPLETED | OUTPATIENT
Start: 2018-12-31 | End: 2018-12-31

## 2018-12-31 RX ORDER — TAMSULOSIN HYDROCHLORIDE 0.4 MG/1
0.4 CAPSULE ORAL DAILY
Status: DISCONTINUED | OUTPATIENT
Start: 2018-12-31 | End: 2019-01-05 | Stop reason: HOSPADM

## 2018-12-31 RX ORDER — ATORVASTATIN CALCIUM 40 MG/1
40 TABLET, FILM COATED ORAL DAILY
Status: DISCONTINUED | OUTPATIENT
Start: 2019-01-01 | End: 2019-01-05 | Stop reason: HOSPADM

## 2018-12-31 RX ORDER — OXYCODONE HYDROCHLORIDE AND ACETAMINOPHEN 5; 325 MG/1; MG/1
1-2 TABLET ORAL EVERY 6 HOURS PRN
COMMUNITY
End: 2019-03-29

## 2018-12-31 RX ORDER — GABAPENTIN 100 MG/1
100 CAPSULE ORAL 3 TIMES DAILY
Status: DISCONTINUED | OUTPATIENT
Start: 2018-12-31 | End: 2019-01-05 | Stop reason: HOSPADM

## 2018-12-31 RX ORDER — SODIUM CHLORIDE 0.9 % (FLUSH) 0.9 %
10 SYRINGE (ML) INJECTION PRN
Status: DISCONTINUED | OUTPATIENT
Start: 2018-12-31 | End: 2019-01-05 | Stop reason: HOSPADM

## 2018-12-31 RX ORDER — METHYLPREDNISOLONE SODIUM SUCCINATE 40 MG/ML
40 INJECTION, POWDER, LYOPHILIZED, FOR SOLUTION INTRAMUSCULAR; INTRAVENOUS EVERY 6 HOURS
Status: DISCONTINUED | OUTPATIENT
Start: 2018-12-31 | End: 2019-01-01

## 2018-12-31 RX ORDER — FUROSEMIDE 10 MG/ML
40 INJECTION INTRAMUSCULAR; INTRAVENOUS ONCE
Status: COMPLETED | OUTPATIENT
Start: 2018-12-31 | End: 2018-12-31

## 2018-12-31 RX ADMIN — DILTIAZEM HYDROCHLORIDE 10 MG/HR: 5 INJECTION INTRAVENOUS at 18:22

## 2018-12-31 RX ADMIN — METHYLPREDNISOLONE SODIUM SUCCINATE 40 MG: 40 INJECTION, POWDER, FOR SOLUTION INTRAMUSCULAR; INTRAVENOUS at 18:22

## 2018-12-31 RX ADMIN — OXYCODONE AND ACETAMINOPHEN 1 TABLET: 5; 325 TABLET ORAL at 18:22

## 2018-12-31 RX ADMIN — FUROSEMIDE 40 MG: 10 INJECTION, SOLUTION INTRAMUSCULAR; INTRAVENOUS at 11:54

## 2018-12-31 RX ADMIN — MOMETASONE FUROATE AND FORMOTEROL FUMARATE DIHYDRATE 2 PUFF: 100; 5 AEROSOL RESPIRATORY (INHALATION) at 19:45

## 2018-12-31 RX ADMIN — IPRATROPIUM BROMIDE AND ALBUTEROL SULFATE 1 AMPULE: .5; 3 SOLUTION RESPIRATORY (INHALATION) at 11:45

## 2018-12-31 RX ADMIN — METHYLPREDNISOLONE SODIUM SUCCINATE 40 MG: 40 INJECTION, POWDER, FOR SOLUTION INTRAMUSCULAR; INTRAVENOUS at 22:31

## 2018-12-31 RX ADMIN — GABAPENTIN 100 MG: 100 CAPSULE ORAL at 18:22

## 2018-12-31 RX ADMIN — DILTIAZEM HYDROCHLORIDE 5 MG/HR: 5 INJECTION INTRAVENOUS at 13:42

## 2018-12-31 RX ADMIN — DILTIAZEM HYDROCHLORIDE 10 MG: 5 INJECTION INTRAVENOUS at 12:12

## 2018-12-31 RX ADMIN — LEVOFLOXACIN 500 MG: 5 INJECTION, SOLUTION INTRAVENOUS at 18:22

## 2018-12-31 RX ADMIN — GABAPENTIN 100 MG: 100 CAPSULE ORAL at 20:41

## 2018-12-31 ASSESSMENT — PAIN DESCRIPTION - LOCATION
LOCATION: BACK
LOCATION: BACK

## 2018-12-31 ASSESSMENT — PAIN SCALES - GENERAL
PAINLEVEL_OUTOF10: 6
PAINLEVEL_OUTOF10: 10
PAINLEVEL_OUTOF10: 7

## 2018-12-31 ASSESSMENT — PAIN DESCRIPTION - ORIENTATION
ORIENTATION: LOWER
ORIENTATION: LOWER

## 2018-12-31 ASSESSMENT — PAIN DESCRIPTION - DESCRIPTORS: DESCRIPTORS: ACHING

## 2018-12-31 ASSESSMENT — PAIN DESCRIPTION - PAIN TYPE
TYPE: CHRONIC PAIN
TYPE: CHRONIC PAIN

## 2018-12-31 NOTE — TELEPHONE ENCOUNTER
Writer contacted Rose [place], ED provider to inform of 30 day readmission risk. ED provider informed writer of readmission.

## 2018-12-31 NOTE — TELEPHONE ENCOUNTER
Hailey sharma/ [facility] just saw the pt today. Pt was discharged from the hospital this past weekend. Pt's vitals are: /90,pulse 134,02 level is at 97%, and his respirations are between 26 and 30. Pt has edema in his lower extremities,abdomen and face/near eyes. Pt is having a hard time speaking b/c he's having such a hard time breathing. Pt's mom is considering calling a ambulance b/c pt is so bad.

## 2019-01-01 LAB
ANION GAP SERPL CALCULATED.3IONS-SCNC: 12 MMOL/L (ref 3–16)
BUN BLDV-MCNC: 41 MG/DL (ref 7–20)
CALCIUM SERPL-MCNC: 9.2 MG/DL (ref 8.3–10.6)
CHLORIDE BLD-SCNC: 91 MMOL/L (ref 99–110)
CO2: 32 MMOL/L (ref 21–32)
CREAT SERPL-MCNC: 1.1 MG/DL (ref 0.9–1.3)
GFR AFRICAN AMERICAN: >60
GFR NON-AFRICAN AMERICAN: >60
GLUCOSE BLD-MCNC: 392 MG/DL (ref 70–99)
GLUCOSE BLD-MCNC: 393 MG/DL (ref 70–99)
GLUCOSE BLD-MCNC: 468 MG/DL (ref 70–99)
PERFORMED ON: ABNORMAL
PERFORMED ON: ABNORMAL
POTASSIUM SERPL-SCNC: 4.2 MMOL/L (ref 3.5–5.1)
SODIUM BLD-SCNC: 135 MMOL/L (ref 136–145)

## 2019-01-01 PROCEDURE — 2060000000 HC ICU INTERMEDIATE R&B

## 2019-01-01 PROCEDURE — 6360000002 HC RX W HCPCS: Performed by: INTERNAL MEDICINE

## 2019-01-01 PROCEDURE — 94640 AIRWAY INHALATION TREATMENT: CPT

## 2019-01-01 PROCEDURE — 2580000003 HC RX 258: Performed by: INTERNAL MEDICINE

## 2019-01-01 PROCEDURE — 80048 BASIC METABOLIC PNL TOTAL CA: CPT

## 2019-01-01 PROCEDURE — 83036 HEMOGLOBIN GLYCOSYLATED A1C: CPT

## 2019-01-01 PROCEDURE — 6370000000 HC RX 637 (ALT 250 FOR IP): Performed by: INTERNAL MEDICINE

## 2019-01-01 PROCEDURE — 2700000000 HC OXYGEN THERAPY PER DAY

## 2019-01-01 PROCEDURE — 2500000003 HC RX 250 WO HCPCS: Performed by: INTERNAL MEDICINE

## 2019-01-01 PROCEDURE — 2500000003 HC RX 250 WO HCPCS: Performed by: PHYSICIAN ASSISTANT

## 2019-01-01 PROCEDURE — 36415 COLL VENOUS BLD VENIPUNCTURE: CPT

## 2019-01-01 PROCEDURE — 94761 N-INVAS EAR/PLS OXIMETRY MLT: CPT

## 2019-01-01 PROCEDURE — 99255 IP/OBS CONSLTJ NEW/EST HI 80: CPT | Performed by: INTERNAL MEDICINE

## 2019-01-01 RX ORDER — DEXTROSE MONOHYDRATE 50 MG/ML
100 INJECTION, SOLUTION INTRAVENOUS PRN
Status: DISCONTINUED | OUTPATIENT
Start: 2019-01-01 | End: 2019-01-01 | Stop reason: SDUPTHER

## 2019-01-01 RX ORDER — NICOTINE POLACRILEX 4 MG
15 LOZENGE BUCCAL PRN
Status: DISCONTINUED | OUTPATIENT
Start: 2019-01-01 | End: 2019-01-01 | Stop reason: SDUPTHER

## 2019-01-01 RX ORDER — NICOTINE POLACRILEX 4 MG
15 LOZENGE BUCCAL PRN
Status: DISCONTINUED | OUTPATIENT
Start: 2019-01-01 | End: 2019-01-05 | Stop reason: HOSPADM

## 2019-01-01 RX ORDER — DEXTROSE MONOHYDRATE 25 G/50ML
12.5 INJECTION, SOLUTION INTRAVENOUS PRN
Status: DISCONTINUED | OUTPATIENT
Start: 2019-01-01 | End: 2019-01-05 | Stop reason: HOSPADM

## 2019-01-01 RX ORDER — METHYLPREDNISOLONE SODIUM SUCCINATE 40 MG/ML
40 INJECTION, POWDER, LYOPHILIZED, FOR SOLUTION INTRAMUSCULAR; INTRAVENOUS EVERY 12 HOURS
Status: DISCONTINUED | OUTPATIENT
Start: 2019-01-02 | End: 2019-01-02

## 2019-01-01 RX ORDER — DEXTROSE MONOHYDRATE 25 G/50ML
12.5 INJECTION, SOLUTION INTRAVENOUS PRN
Status: DISCONTINUED | OUTPATIENT
Start: 2019-01-01 | End: 2019-01-01 | Stop reason: SDUPTHER

## 2019-01-01 RX ORDER — DEXTROSE MONOHYDRATE 50 MG/ML
100 INJECTION, SOLUTION INTRAVENOUS PRN
Status: DISCONTINUED | OUTPATIENT
Start: 2019-01-01 | End: 2019-01-05 | Stop reason: HOSPADM

## 2019-01-01 RX ORDER — METOPROLOL TARTRATE 5 MG/5ML
5 INJECTION INTRAVENOUS EVERY 6 HOURS PRN
Status: DISCONTINUED | OUTPATIENT
Start: 2019-01-01 | End: 2019-01-02

## 2019-01-01 RX ORDER — FUROSEMIDE 10 MG/ML
40 INJECTION INTRAMUSCULAR; INTRAVENOUS 2 TIMES DAILY
Status: DISCONTINUED | OUTPATIENT
Start: 2019-01-01 | End: 2019-01-05 | Stop reason: HOSPADM

## 2019-01-01 RX ADMIN — GABAPENTIN 100 MG: 100 CAPSULE ORAL at 07:58

## 2019-01-01 RX ADMIN — Medication 10 ML: at 20:03

## 2019-01-01 RX ADMIN — TAMSULOSIN HYDROCHLORIDE 0.4 MG: 0.4 CAPSULE ORAL at 07:57

## 2019-01-01 RX ADMIN — GABAPENTIN 100 MG: 100 CAPSULE ORAL at 13:32

## 2019-01-01 RX ADMIN — MOMETASONE FUROATE AND FORMOTEROL FUMARATE DIHYDRATE 2 PUFF: 100; 5 AEROSOL RESPIRATORY (INHALATION) at 20:48

## 2019-01-01 RX ADMIN — METOPROLOL SUCCINATE 150 MG: 50 TABLET, EXTENDED RELEASE ORAL at 07:57

## 2019-01-01 RX ADMIN — DULOXETINE HYDROCHLORIDE 60 MG: 60 CAPSULE, DELAYED RELEASE ORAL at 07:58

## 2019-01-01 RX ADMIN — OXYCODONE AND ACETAMINOPHEN 1 TABLET: 5; 325 TABLET ORAL at 13:32

## 2019-01-01 RX ADMIN — METOPROLOL TARTRATE 5 MG: 5 INJECTION, SOLUTION INTRAVENOUS at 22:58

## 2019-01-01 RX ADMIN — OXYCODONE AND ACETAMINOPHEN 1 TABLET: 5; 325 TABLET ORAL at 00:24

## 2019-01-01 RX ADMIN — GABAPENTIN 100 MG: 100 CAPSULE ORAL at 20:02

## 2019-01-01 RX ADMIN — OXYCODONE AND ACETAMINOPHEN 1 TABLET: 5; 325 TABLET ORAL at 20:02

## 2019-01-01 RX ADMIN — INSULIN LISPRO 7 UNITS: 100 INJECTION, SOLUTION INTRAVENOUS; SUBCUTANEOUS at 21:30

## 2019-01-01 RX ADMIN — METHYLPREDNISOLONE SODIUM SUCCINATE 40 MG: 40 INJECTION, POWDER, FOR SOLUTION INTRAMUSCULAR; INTRAVENOUS at 11:53

## 2019-01-01 RX ADMIN — OXYCODONE AND ACETAMINOPHEN 1 TABLET: 5; 325 TABLET ORAL at 06:28

## 2019-01-01 RX ADMIN — INSULIN LISPRO 6 UNITS: 100 INJECTION, SOLUTION INTRAVENOUS; SUBCUTANEOUS at 17:53

## 2019-01-01 RX ADMIN — METHYLPREDNISOLONE SODIUM SUCCINATE 40 MG: 40 INJECTION, POWDER, FOR SOLUTION INTRAMUSCULAR; INTRAVENOUS at 04:47

## 2019-01-01 RX ADMIN — MOMETASONE FUROATE AND FORMOTEROL FUMARATE DIHYDRATE 2 PUFF: 100; 5 AEROSOL RESPIRATORY (INHALATION) at 08:58

## 2019-01-01 RX ADMIN — LEVOFLOXACIN 500 MG: 5 INJECTION, SOLUTION INTRAVENOUS at 17:53

## 2019-01-01 RX ADMIN — RIVAROXABAN 20 MG: 20 TABLET, FILM COATED ORAL at 07:58

## 2019-01-01 RX ADMIN — DILTIAZEM HYDROCHLORIDE 10 MG/HR: 5 INJECTION INTRAVENOUS at 00:24

## 2019-01-01 RX ADMIN — FUROSEMIDE 40 MG: 10 INJECTION, SOLUTION INTRAMUSCULAR; INTRAVENOUS at 17:52

## 2019-01-01 RX ADMIN — ATORVASTATIN CALCIUM 40 MG: 40 TABLET, FILM COATED ORAL at 07:58

## 2019-01-01 ASSESSMENT — PAIN SCALES - GENERAL
PAINLEVEL_OUTOF10: 10
PAINLEVEL_OUTOF10: 7
PAINLEVEL_OUTOF10: 8
PAINLEVEL_OUTOF10: 0
PAINLEVEL_OUTOF10: 2
PAINLEVEL_OUTOF10: 0
PAINLEVEL_OUTOF10: 8
PAINLEVEL_OUTOF10: 3

## 2019-01-01 ASSESSMENT — PAIN DESCRIPTION - LOCATION
LOCATION: BACK

## 2019-01-01 ASSESSMENT — PAIN DESCRIPTION - ORIENTATION
ORIENTATION: LOWER

## 2019-01-01 ASSESSMENT — PAIN DESCRIPTION - FREQUENCY
FREQUENCY: CONTINUOUS
FREQUENCY: CONTINUOUS

## 2019-01-01 ASSESSMENT — PAIN DESCRIPTION - PAIN TYPE
TYPE: CHRONIC PAIN

## 2019-01-01 ASSESSMENT — PAIN DESCRIPTION - DESCRIPTORS: DESCRIPTORS: ACHING

## 2019-01-02 LAB
ESTIMATED AVERAGE GLUCOSE: 185.8 MG/DL
GLUCOSE BLD-MCNC: 264 MG/DL (ref 70–99)
GLUCOSE BLD-MCNC: 283 MG/DL (ref 70–99)
GLUCOSE BLD-MCNC: 322 MG/DL (ref 70–99)
GLUCOSE BLD-MCNC: 345 MG/DL (ref 70–99)
HBA1C MFR BLD: 8.1 %
PERFORMED ON: ABNORMAL

## 2019-01-02 PROCEDURE — 97166 OT EVAL MOD COMPLEX 45 MIN: CPT

## 2019-01-02 PROCEDURE — 97530 THERAPEUTIC ACTIVITIES: CPT

## 2019-01-02 PROCEDURE — 97530 THERAPEUTIC ACTIVITIES: CPT | Performed by: PHYSICAL THERAPIST

## 2019-01-02 PROCEDURE — 6370000000 HC RX 637 (ALT 250 FOR IP): Performed by: INTERNAL MEDICINE

## 2019-01-02 PROCEDURE — 2700000000 HC OXYGEN THERAPY PER DAY

## 2019-01-02 PROCEDURE — 2500000003 HC RX 250 WO HCPCS: Performed by: PHYSICIAN ASSISTANT

## 2019-01-02 PROCEDURE — 2060000000 HC ICU INTERMEDIATE R&B

## 2019-01-02 PROCEDURE — 6370000000 HC RX 637 (ALT 250 FOR IP): Performed by: PHYSICIAN ASSISTANT

## 2019-01-02 PROCEDURE — G8987 SELF CARE CURRENT STATUS: HCPCS

## 2019-01-02 PROCEDURE — 97162 PT EVAL MOD COMPLEX 30 MIN: CPT | Performed by: PHYSICAL THERAPIST

## 2019-01-02 PROCEDURE — G8988 SELF CARE GOAL STATUS: HCPCS

## 2019-01-02 PROCEDURE — 6360000002 HC RX W HCPCS: Performed by: INTERNAL MEDICINE

## 2019-01-02 PROCEDURE — 94664 DEMO&/EVAL PT USE INHALER: CPT

## 2019-01-02 PROCEDURE — 94640 AIRWAY INHALATION TREATMENT: CPT

## 2019-01-02 PROCEDURE — G8978 MOBILITY CURRENT STATUS: HCPCS | Performed by: PHYSICAL THERAPIST

## 2019-01-02 PROCEDURE — 94761 N-INVAS EAR/PLS OXIMETRY MLT: CPT

## 2019-01-02 PROCEDURE — 2580000003 HC RX 258: Performed by: INTERNAL MEDICINE

## 2019-01-02 PROCEDURE — G8979 MOBILITY GOAL STATUS: HCPCS | Performed by: PHYSICAL THERAPIST

## 2019-01-02 RX ORDER — LEVOFLOXACIN 500 MG/1
500 TABLET, FILM COATED ORAL EVERY EVENING
Status: DISCONTINUED | OUTPATIENT
Start: 2019-01-02 | End: 2019-01-05 | Stop reason: HOSPADM

## 2019-01-02 RX ORDER — METOPROLOL TARTRATE 5 MG/5ML
10 INJECTION INTRAVENOUS EVERY 6 HOURS PRN
Status: DISCONTINUED | OUTPATIENT
Start: 2019-01-02 | End: 2019-01-05 | Stop reason: HOSPADM

## 2019-01-02 RX ORDER — INSULIN GLARGINE 100 [IU]/ML
10 INJECTION, SOLUTION SUBCUTANEOUS NIGHTLY
Status: DISCONTINUED | OUTPATIENT
Start: 2019-01-02 | End: 2019-01-05 | Stop reason: HOSPADM

## 2019-01-02 RX ORDER — PANTOPRAZOLE SODIUM 40 MG/1
40 TABLET, DELAYED RELEASE ORAL
Status: DISCONTINUED | OUTPATIENT
Start: 2019-01-03 | End: 2019-01-05 | Stop reason: HOSPADM

## 2019-01-02 RX ORDER — PREDNISONE 20 MG/1
40 TABLET ORAL DAILY
Status: DISCONTINUED | OUTPATIENT
Start: 2019-01-02 | End: 2019-01-05 | Stop reason: HOSPADM

## 2019-01-02 RX ORDER — TIZANIDINE 4 MG/1
4 TABLET ORAL EVERY 6 HOURS PRN
Status: DISCONTINUED | OUTPATIENT
Start: 2019-01-02 | End: 2019-01-05 | Stop reason: HOSPADM

## 2019-01-02 RX ORDER — DILTIAZEM HYDROCHLORIDE 240 MG/1
240 CAPSULE, COATED, EXTENDED RELEASE ORAL DAILY
Status: DISCONTINUED | OUTPATIENT
Start: 2019-01-02 | End: 2019-01-05 | Stop reason: HOSPADM

## 2019-01-02 RX ADMIN — TAMSULOSIN HYDROCHLORIDE 0.4 MG: 0.4 CAPSULE ORAL at 09:36

## 2019-01-02 RX ADMIN — GABAPENTIN 100 MG: 100 CAPSULE ORAL at 09:36

## 2019-01-02 RX ADMIN — METOPROLOL SUCCINATE 150 MG: 50 TABLET, EXTENDED RELEASE ORAL at 09:36

## 2019-01-02 RX ADMIN — INSULIN LISPRO 9 UNITS: 100 INJECTION, SOLUTION INTRAVENOUS; SUBCUTANEOUS at 09:37

## 2019-01-02 RX ADMIN — INSULIN GLARGINE 10 UNITS: 100 INJECTION, SOLUTION SUBCUTANEOUS at 22:54

## 2019-01-02 RX ADMIN — LEVOFLOXACIN 500 MG: 500 TABLET, FILM COATED ORAL at 18:02

## 2019-01-02 RX ADMIN — MOMETASONE FUROATE AND FORMOTEROL FUMARATE DIHYDRATE 2 PUFF: 100; 5 AEROSOL RESPIRATORY (INHALATION) at 21:08

## 2019-01-02 RX ADMIN — ATORVASTATIN CALCIUM 40 MG: 40 TABLET, FILM COATED ORAL at 09:36

## 2019-01-02 RX ADMIN — METOPROLOL TARTRATE 10 MG: 5 INJECTION, SOLUTION INTRAVENOUS at 18:02

## 2019-01-02 RX ADMIN — METHYLPREDNISOLONE SODIUM SUCCINATE 40 MG: 40 INJECTION, POWDER, FOR SOLUTION INTRAMUSCULAR; INTRAVENOUS at 05:41

## 2019-01-02 RX ADMIN — GABAPENTIN 100 MG: 100 CAPSULE ORAL at 14:29

## 2019-01-02 RX ADMIN — INSULIN LISPRO 6 UNITS: 100 INJECTION, SOLUTION INTRAVENOUS; SUBCUTANEOUS at 18:02

## 2019-01-02 RX ADMIN — DILTIAZEM HYDROCHLORIDE 240 MG: 240 CAPSULE, COATED, EXTENDED RELEASE ORAL at 14:29

## 2019-01-02 RX ADMIN — OXYCODONE AND ACETAMINOPHEN 1 TABLET: 5; 325 TABLET ORAL at 09:36

## 2019-01-02 RX ADMIN — Medication 10 ML: at 20:44

## 2019-01-02 RX ADMIN — TIZANIDINE 4 MG: 4 TABLET ORAL at 02:43

## 2019-01-02 RX ADMIN — METOPROLOL TARTRATE 10 MG: 5 INJECTION, SOLUTION INTRAVENOUS at 02:43

## 2019-01-02 RX ADMIN — INSULIN LISPRO 8 UNITS: 100 INJECTION, SOLUTION INTRAVENOUS; SUBCUTANEOUS at 14:30

## 2019-01-02 RX ADMIN — Medication 10 ML: at 09:37

## 2019-01-02 RX ADMIN — FUROSEMIDE 40 MG: 10 INJECTION, SOLUTION INTRAMUSCULAR; INTRAVENOUS at 18:02

## 2019-01-02 RX ADMIN — OXYCODONE AND ACETAMINOPHEN 1 TABLET: 5; 325 TABLET ORAL at 02:11

## 2019-01-02 RX ADMIN — FUROSEMIDE 40 MG: 10 INJECTION, SOLUTION INTRAMUSCULAR; INTRAVENOUS at 09:36

## 2019-01-02 RX ADMIN — MOMETASONE FUROATE AND FORMOTEROL FUMARATE DIHYDRATE 2 PUFF: 100; 5 AEROSOL RESPIRATORY (INHALATION) at 09:58

## 2019-01-02 RX ADMIN — INSULIN LISPRO 4 UNITS: 100 INJECTION, SOLUTION INTRAVENOUS; SUBCUTANEOUS at 22:53

## 2019-01-02 RX ADMIN — GABAPENTIN 100 MG: 100 CAPSULE ORAL at 20:43

## 2019-01-02 RX ADMIN — OXYCODONE AND ACETAMINOPHEN 1 TABLET: 5; 325 TABLET ORAL at 20:43

## 2019-01-02 RX ADMIN — DULOXETINE HYDROCHLORIDE 60 MG: 60 CAPSULE, DELAYED RELEASE ORAL at 09:36

## 2019-01-02 RX ADMIN — PREDNISONE 40 MG: 20 TABLET ORAL at 18:02

## 2019-01-02 RX ADMIN — RIVAROXABAN 20 MG: 20 TABLET, FILM COATED ORAL at 09:41

## 2019-01-02 ASSESSMENT — PAIN DESCRIPTION - LOCATION
LOCATION: BACK

## 2019-01-02 ASSESSMENT — PAIN SCALES - GENERAL
PAINLEVEL_OUTOF10: 0
PAINLEVEL_OUTOF10: 5
PAINLEVEL_OUTOF10: 0
PAINLEVEL_OUTOF10: 7
PAINLEVEL_OUTOF10: 0
PAINLEVEL_OUTOF10: 10
PAINLEVEL_OUTOF10: 8
PAINLEVEL_OUTOF10: 7

## 2019-01-02 ASSESSMENT — PAIN DESCRIPTION - PAIN TYPE
TYPE: CHRONIC PAIN

## 2019-01-02 ASSESSMENT — PAIN DESCRIPTION - ORIENTATION: ORIENTATION: LOWER

## 2019-01-03 LAB
ALBUMIN SERPL-MCNC: 3.6 G/DL (ref 3.4–5)
ANION GAP SERPL CALCULATED.3IONS-SCNC: 9 MMOL/L (ref 3–16)
BASOPHILS ABSOLUTE: 0 K/UL (ref 0–0.2)
BASOPHILS RELATIVE PERCENT: 0.3 %
BUN BLDV-MCNC: 36 MG/DL (ref 7–20)
CALCIUM SERPL-MCNC: 9.3 MG/DL (ref 8.3–10.6)
CHLORIDE BLD-SCNC: 92 MMOL/L (ref 99–110)
CO2: 38 MMOL/L (ref 21–32)
CREAT SERPL-MCNC: 0.9 MG/DL (ref 0.9–1.3)
EKG ATRIAL RATE: 102 BPM
EKG DIAGNOSIS: NORMAL
EKG Q-T INTERVAL: 260 MS
EKG QRS DURATION: 82 MS
EKG QTC CALCULATION (BAZETT): 428 MS
EKG R AXIS: 96 DEGREES
EKG T AXIS: -86 DEGREES
EKG VENTRICULAR RATE: 163 BPM
EOSINOPHILS ABSOLUTE: 0 K/UL (ref 0–0.6)
EOSINOPHILS RELATIVE PERCENT: 0 %
GFR AFRICAN AMERICAN: >60
GFR NON-AFRICAN AMERICAN: >60
GLUCOSE BLD-MCNC: 256 MG/DL (ref 70–99)
GLUCOSE BLD-MCNC: 257 MG/DL (ref 70–99)
GLUCOSE BLD-MCNC: 400 MG/DL (ref 70–99)
GLUCOSE BLD-MCNC: 427 MG/DL (ref 70–99)
GLUCOSE BLD-MCNC: 427 MG/DL (ref 70–99)
HCT VFR BLD CALC: 31 % (ref 40.5–52.5)
HEMOGLOBIN: 10.1 G/DL (ref 13.5–17.5)
LYMPHOCYTES ABSOLUTE: 0.8 K/UL (ref 1–5.1)
LYMPHOCYTES RELATIVE PERCENT: 6.3 %
MAGNESIUM: 2.2 MG/DL (ref 1.8–2.4)
MCH RBC QN AUTO: 28.9 PG (ref 26–34)
MCHC RBC AUTO-ENTMCNC: 32.4 G/DL (ref 31–36)
MCV RBC AUTO: 89.2 FL (ref 80–100)
MONOCYTES ABSOLUTE: 1.1 K/UL (ref 0–1.3)
MONOCYTES RELATIVE PERCENT: 8.8 %
NEUTROPHILS ABSOLUTE: 10.8 K/UL (ref 1.7–7.7)
NEUTROPHILS RELATIVE PERCENT: 84.6 %
PDW BLD-RTO: 15.7 % (ref 12.4–15.4)
PERFORMED ON: ABNORMAL
PHOSPHORUS: 3.6 MG/DL (ref 2.5–4.9)
PLATELET # BLD: 251 K/UL (ref 135–450)
PMV BLD AUTO: 9 FL (ref 5–10.5)
POTASSIUM SERPL-SCNC: 4 MMOL/L (ref 3.5–5.1)
RBC # BLD: 3.48 M/UL (ref 4.2–5.9)
SODIUM BLD-SCNC: 139 MMOL/L (ref 136–145)
WBC # BLD: 12.7 K/UL (ref 4–11)

## 2019-01-03 PROCEDURE — 83735 ASSAY OF MAGNESIUM: CPT

## 2019-01-03 PROCEDURE — 6360000002 HC RX W HCPCS: Performed by: INTERNAL MEDICINE

## 2019-01-03 PROCEDURE — 6370000000 HC RX 637 (ALT 250 FOR IP): Performed by: INTERNAL MEDICINE

## 2019-01-03 PROCEDURE — 97530 THERAPEUTIC ACTIVITIES: CPT

## 2019-01-03 PROCEDURE — G8988 SELF CARE GOAL STATUS: HCPCS

## 2019-01-03 PROCEDURE — 94761 N-INVAS EAR/PLS OXIMETRY MLT: CPT

## 2019-01-03 PROCEDURE — 2060000000 HC ICU INTERMEDIATE R&B

## 2019-01-03 PROCEDURE — 94640 AIRWAY INHALATION TREATMENT: CPT

## 2019-01-03 PROCEDURE — 80069 RENAL FUNCTION PANEL: CPT

## 2019-01-03 PROCEDURE — 85025 COMPLETE CBC W/AUTO DIFF WBC: CPT

## 2019-01-03 PROCEDURE — 36415 COLL VENOUS BLD VENIPUNCTURE: CPT

## 2019-01-03 PROCEDURE — G8987 SELF CARE CURRENT STATUS: HCPCS

## 2019-01-03 PROCEDURE — 97535 SELF CARE MNGMENT TRAINING: CPT

## 2019-01-03 PROCEDURE — 2580000003 HC RX 258: Performed by: INTERNAL MEDICINE

## 2019-01-03 PROCEDURE — 97110 THERAPEUTIC EXERCISES: CPT

## 2019-01-03 PROCEDURE — 2700000000 HC OXYGEN THERAPY PER DAY

## 2019-01-03 RX ADMIN — MOMETASONE FUROATE AND FORMOTEROL FUMARATE DIHYDRATE 2 PUFF: 100; 5 AEROSOL RESPIRATORY (INHALATION) at 20:33

## 2019-01-03 RX ADMIN — INSULIN LISPRO 6 UNITS: 100 INJECTION, SOLUTION INTRAVENOUS; SUBCUTANEOUS at 21:22

## 2019-01-03 RX ADMIN — TAMSULOSIN HYDROCHLORIDE 0.4 MG: 0.4 CAPSULE ORAL at 09:54

## 2019-01-03 RX ADMIN — INSULIN LISPRO 12 UNITS: 100 INJECTION, SOLUTION INTRAVENOUS; SUBCUTANEOUS at 18:07

## 2019-01-03 RX ADMIN — FUROSEMIDE 40 MG: 10 INJECTION, SOLUTION INTRAMUSCULAR; INTRAVENOUS at 09:55

## 2019-01-03 RX ADMIN — INSULIN LISPRO 6 UNITS: 100 INJECTION, SOLUTION INTRAVENOUS; SUBCUTANEOUS at 09:55

## 2019-01-03 RX ADMIN — ATORVASTATIN CALCIUM 40 MG: 40 TABLET, FILM COATED ORAL at 09:54

## 2019-01-03 RX ADMIN — OXYCODONE AND ACETAMINOPHEN 1 TABLET: 5; 325 TABLET ORAL at 18:07

## 2019-01-03 RX ADMIN — METOPROLOL SUCCINATE 150 MG: 50 TABLET, EXTENDED RELEASE ORAL at 09:54

## 2019-01-03 RX ADMIN — DULOXETINE HYDROCHLORIDE 60 MG: 60 CAPSULE, DELAYED RELEASE ORAL at 09:54

## 2019-01-03 RX ADMIN — GABAPENTIN 100 MG: 100 CAPSULE ORAL at 15:37

## 2019-01-03 RX ADMIN — Medication 10 ML: at 09:55

## 2019-01-03 RX ADMIN — DILTIAZEM HYDROCHLORIDE 240 MG: 240 CAPSULE, COATED, EXTENDED RELEASE ORAL at 09:54

## 2019-01-03 RX ADMIN — OXYCODONE AND ACETAMINOPHEN 1 TABLET: 5; 325 TABLET ORAL at 04:22

## 2019-01-03 RX ADMIN — Medication 10 ML: at 21:21

## 2019-01-03 RX ADMIN — FUROSEMIDE 40 MG: 10 INJECTION, SOLUTION INTRAMUSCULAR; INTRAVENOUS at 18:07

## 2019-01-03 RX ADMIN — OXYCODONE AND ACETAMINOPHEN 1 TABLET: 5; 325 TABLET ORAL at 11:34

## 2019-01-03 RX ADMIN — INSULIN LISPRO 12 UNITS: 100 INJECTION, SOLUTION INTRAVENOUS; SUBCUTANEOUS at 11:34

## 2019-01-03 RX ADMIN — RIVAROXABAN 20 MG: 20 TABLET, FILM COATED ORAL at 09:54

## 2019-01-03 RX ADMIN — LEVOFLOXACIN 500 MG: 500 TABLET, FILM COATED ORAL at 18:07

## 2019-01-03 RX ADMIN — PREDNISONE 40 MG: 20 TABLET ORAL at 09:54

## 2019-01-03 RX ADMIN — PANTOPRAZOLE SODIUM 40 MG: 40 TABLET, DELAYED RELEASE ORAL at 06:09

## 2019-01-03 RX ADMIN — INSULIN GLARGINE 10 UNITS: 100 INJECTION, SOLUTION SUBCUTANEOUS at 21:21

## 2019-01-03 RX ADMIN — GABAPENTIN 100 MG: 100 CAPSULE ORAL at 21:21

## 2019-01-03 RX ADMIN — GABAPENTIN 100 MG: 100 CAPSULE ORAL at 09:55

## 2019-01-03 RX ADMIN — MOMETASONE FUROATE AND FORMOTEROL FUMARATE DIHYDRATE 2 PUFF: 100; 5 AEROSOL RESPIRATORY (INHALATION) at 07:38

## 2019-01-03 ASSESSMENT — PAIN SCALES - GENERAL
PAINLEVEL_OUTOF10: 6
PAINLEVEL_OUTOF10: 6
PAINLEVEL_OUTOF10: 4
PAINLEVEL_OUTOF10: 5
PAINLEVEL_OUTOF10: 7
PAINLEVEL_OUTOF10: 0

## 2019-01-03 ASSESSMENT — PAIN DESCRIPTION - PAIN TYPE
TYPE: CHRONIC PAIN
TYPE: CHRONIC PAIN

## 2019-01-03 ASSESSMENT — PAIN DESCRIPTION - DESCRIPTORS: DESCRIPTORS: ACHING

## 2019-01-03 ASSESSMENT — PAIN DESCRIPTION - LOCATION: LOCATION: BACK

## 2019-01-04 LAB
ALBUMIN SERPL-MCNC: 3.6 G/DL (ref 3.4–5)
ANION GAP SERPL CALCULATED.3IONS-SCNC: 8 MMOL/L (ref 3–16)
ANISOCYTOSIS: ABNORMAL
BANDED NEUTROPHILS RELATIVE PERCENT: 4 % (ref 0–7)
BASOPHILS ABSOLUTE: 0 K/UL (ref 0–0.2)
BASOPHILS RELATIVE PERCENT: 0 %
BUN BLDV-MCNC: 32 MG/DL (ref 7–20)
CALCIUM SERPL-MCNC: 9.2 MG/DL (ref 8.3–10.6)
CHLORIDE BLD-SCNC: 93 MMOL/L (ref 99–110)
CO2: 41 MMOL/L (ref 21–32)
CREAT SERPL-MCNC: 0.8 MG/DL (ref 0.9–1.3)
EOSINOPHILS ABSOLUTE: 0 K/UL (ref 0–0.6)
EOSINOPHILS RELATIVE PERCENT: 0 %
GFR AFRICAN AMERICAN: >60
GFR NON-AFRICAN AMERICAN: >60
GLUCOSE BLD-MCNC: 225 MG/DL (ref 70–99)
GLUCOSE BLD-MCNC: 255 MG/DL (ref 70–99)
GLUCOSE BLD-MCNC: 276 MG/DL (ref 70–99)
GLUCOSE BLD-MCNC: 350 MG/DL (ref 70–99)
GLUCOSE BLD-MCNC: 383 MG/DL (ref 70–99)
HCT VFR BLD CALC: 31.6 % (ref 40.5–52.5)
HEMATOLOGY PATH CONSULT: NORMAL
HEMATOLOGY PATH CONSULT: YES
HEMOGLOBIN: 10.1 G/DL (ref 13.5–17.5)
LYMPHOCYTES ABSOLUTE: 1.6 K/UL (ref 1–5.1)
LYMPHOCYTES RELATIVE PERCENT: 11 %
MAGNESIUM: 2.1 MG/DL (ref 1.8–2.4)
MCH RBC QN AUTO: 28.1 PG (ref 26–34)
MCHC RBC AUTO-ENTMCNC: 32 G/DL (ref 31–36)
MCV RBC AUTO: 87.7 FL (ref 80–100)
MONOCYTES ABSOLUTE: 1.7 K/UL (ref 0–1.3)
MONOCYTES RELATIVE PERCENT: 12 %
NEUTROPHILS ABSOLUTE: 10.9 K/UL (ref 1.7–7.7)
NEUTROPHILS RELATIVE PERCENT: 73 %
OVALOCYTES: ABNORMAL
PDW BLD-RTO: 15.4 % (ref 12.4–15.4)
PERFORMED ON: ABNORMAL
PHOSPHORUS: 2.9 MG/DL (ref 2.5–4.9)
PLATELET # BLD: 227 K/UL (ref 135–450)
PLATELET SLIDE REVIEW: ADEQUATE
PMV BLD AUTO: 9.1 FL (ref 5–10.5)
POIKILOCYTES: ABNORMAL
POTASSIUM SERPL-SCNC: 3.8 MMOL/L (ref 3.5–5.1)
RBC # BLD: 3.6 M/UL (ref 4.2–5.9)
SLIDE REVIEW: ABNORMAL
SODIUM BLD-SCNC: 142 MMOL/L (ref 136–145)
TEAR DROP CELLS: ABNORMAL
TOXIC GRANULATION: PRESENT
VACUOLATED NEUTROPHILS: PRESENT
WBC # BLD: 14.2 K/UL (ref 4–11)

## 2019-01-04 PROCEDURE — 2580000003 HC RX 258: Performed by: INTERNAL MEDICINE

## 2019-01-04 PROCEDURE — 2060000000 HC ICU INTERMEDIATE R&B

## 2019-01-04 PROCEDURE — 6370000000 HC RX 637 (ALT 250 FOR IP): Performed by: INTERNAL MEDICINE

## 2019-01-04 PROCEDURE — 94640 AIRWAY INHALATION TREATMENT: CPT

## 2019-01-04 PROCEDURE — 6360000002 HC RX W HCPCS: Performed by: INTERNAL MEDICINE

## 2019-01-04 PROCEDURE — 94761 N-INVAS EAR/PLS OXIMETRY MLT: CPT

## 2019-01-04 PROCEDURE — 97530 THERAPEUTIC ACTIVITIES: CPT

## 2019-01-04 PROCEDURE — 2700000000 HC OXYGEN THERAPY PER DAY

## 2019-01-04 PROCEDURE — 83735 ASSAY OF MAGNESIUM: CPT

## 2019-01-04 PROCEDURE — 80069 RENAL FUNCTION PANEL: CPT

## 2019-01-04 PROCEDURE — G8978 MOBILITY CURRENT STATUS: HCPCS

## 2019-01-04 PROCEDURE — 36415 COLL VENOUS BLD VENIPUNCTURE: CPT

## 2019-01-04 PROCEDURE — 85025 COMPLETE CBC W/AUTO DIFF WBC: CPT

## 2019-01-04 PROCEDURE — G8979 MOBILITY GOAL STATUS: HCPCS

## 2019-01-04 RX ADMIN — MOMETASONE FUROATE AND FORMOTEROL FUMARATE DIHYDRATE 2 PUFF: 100; 5 AEROSOL RESPIRATORY (INHALATION) at 09:00

## 2019-01-04 RX ADMIN — OXYCODONE AND ACETAMINOPHEN 1 TABLET: 5; 325 TABLET ORAL at 00:22

## 2019-01-04 RX ADMIN — TAMSULOSIN HYDROCHLORIDE 0.4 MG: 0.4 CAPSULE ORAL at 08:42

## 2019-01-04 RX ADMIN — RIVAROXABAN 20 MG: 20 TABLET, FILM COATED ORAL at 08:42

## 2019-01-04 RX ADMIN — DILTIAZEM HYDROCHLORIDE 240 MG: 240 CAPSULE, COATED, EXTENDED RELEASE ORAL at 08:43

## 2019-01-04 RX ADMIN — FUROSEMIDE 40 MG: 10 INJECTION, SOLUTION INTRAMUSCULAR; INTRAVENOUS at 08:42

## 2019-01-04 RX ADMIN — DULOXETINE HYDROCHLORIDE 60 MG: 60 CAPSULE, DELAYED RELEASE ORAL at 08:42

## 2019-01-04 RX ADMIN — INSULIN LISPRO 6 UNITS: 100 INJECTION, SOLUTION INTRAVENOUS; SUBCUTANEOUS at 08:45

## 2019-01-04 RX ADMIN — Medication 10 ML: at 08:45

## 2019-01-04 RX ADMIN — METOPROLOL SUCCINATE 150 MG: 50 TABLET, EXTENDED RELEASE ORAL at 08:42

## 2019-01-04 RX ADMIN — INSULIN GLARGINE 10 UNITS: 100 INJECTION, SOLUTION SUBCUTANEOUS at 21:21

## 2019-01-04 RX ADMIN — PANTOPRAZOLE SODIUM 40 MG: 40 TABLET, DELAYED RELEASE ORAL at 06:38

## 2019-01-04 RX ADMIN — GABAPENTIN 100 MG: 100 CAPSULE ORAL at 13:45

## 2019-01-04 RX ADMIN — MOMETASONE FUROATE AND FORMOTEROL FUMARATE DIHYDRATE 2 PUFF: 100; 5 AEROSOL RESPIRATORY (INHALATION) at 21:11

## 2019-01-04 RX ADMIN — INSULIN LISPRO 4 UNITS: 100 INJECTION, SOLUTION INTRAVENOUS; SUBCUTANEOUS at 12:31

## 2019-01-04 RX ADMIN — INSULIN LISPRO 10 UNITS: 100 INJECTION, SOLUTION INTRAVENOUS; SUBCUTANEOUS at 17:25

## 2019-01-04 RX ADMIN — PREDNISONE 40 MG: 20 TABLET ORAL at 08:43

## 2019-01-04 RX ADMIN — LEVOFLOXACIN 500 MG: 500 TABLET, FILM COATED ORAL at 17:26

## 2019-01-04 RX ADMIN — OXYCODONE AND ACETAMINOPHEN 1 TABLET: 5; 325 TABLET ORAL at 14:24

## 2019-01-04 RX ADMIN — GABAPENTIN 100 MG: 100 CAPSULE ORAL at 21:21

## 2019-01-04 RX ADMIN — Medication 10 ML: at 21:21

## 2019-01-04 RX ADMIN — INSULIN LISPRO 5 UNITS: 100 INJECTION, SOLUTION INTRAVENOUS; SUBCUTANEOUS at 21:21

## 2019-01-04 RX ADMIN — ATORVASTATIN CALCIUM 40 MG: 40 TABLET, FILM COATED ORAL at 08:42

## 2019-01-04 RX ADMIN — OXYCODONE AND ACETAMINOPHEN 1 TABLET: 5; 325 TABLET ORAL at 06:38

## 2019-01-04 RX ADMIN — GABAPENTIN 100 MG: 100 CAPSULE ORAL at 08:42

## 2019-01-04 RX ADMIN — OXYCODONE AND ACETAMINOPHEN 1 TABLET: 5; 325 TABLET ORAL at 22:04

## 2019-01-04 RX ADMIN — FUROSEMIDE 40 MG: 10 INJECTION, SOLUTION INTRAMUSCULAR; INTRAVENOUS at 17:26

## 2019-01-04 ASSESSMENT — PAIN DESCRIPTION - PAIN TYPE
TYPE: CHRONIC PAIN

## 2019-01-04 ASSESSMENT — PAIN SCALES - GENERAL
PAINLEVEL_OUTOF10: 8
PAINLEVEL_OUTOF10: 8
PAINLEVEL_OUTOF10: 5
PAINLEVEL_OUTOF10: 3
PAINLEVEL_OUTOF10: 8
PAINLEVEL_OUTOF10: 0
PAINLEVEL_OUTOF10: 8
PAINLEVEL_OUTOF10: 8

## 2019-01-04 ASSESSMENT — PAIN DESCRIPTION - FREQUENCY: FREQUENCY: CONTINUOUS

## 2019-01-04 ASSESSMENT — PAIN DESCRIPTION - LOCATION
LOCATION: BACK

## 2019-01-04 ASSESSMENT — PAIN DESCRIPTION - ORIENTATION
ORIENTATION: LOWER
ORIENTATION: LOWER

## 2019-01-05 VITALS
HEART RATE: 105 BPM | BODY MASS INDEX: 38.34 KG/M2 | RESPIRATION RATE: 18 BRPM | TEMPERATURE: 97.8 F | WEIGHT: 244.27 LBS | OXYGEN SATURATION: 97 % | SYSTOLIC BLOOD PRESSURE: 124 MMHG | DIASTOLIC BLOOD PRESSURE: 92 MMHG | HEIGHT: 67 IN

## 2019-01-05 LAB
ALBUMIN SERPL-MCNC: 3.6 G/DL (ref 3.4–5)
ANION GAP SERPL CALCULATED.3IONS-SCNC: 7 MMOL/L (ref 3–16)
BUN BLDV-MCNC: 28 MG/DL (ref 7–20)
CALCIUM SERPL-MCNC: 9.3 MG/DL (ref 8.3–10.6)
CHLORIDE BLD-SCNC: 90 MMOL/L (ref 99–110)
CO2: 42 MMOL/L (ref 21–32)
CREAT SERPL-MCNC: 0.8 MG/DL (ref 0.9–1.3)
FERRITIN: 47.6 NG/ML (ref 30–400)
FOLATE: 17.59 NG/ML (ref 4.78–24.2)
GFR AFRICAN AMERICAN: >60
GFR NON-AFRICAN AMERICAN: >60
GLUCOSE BLD-MCNC: 202 MG/DL (ref 70–99)
GLUCOSE BLD-MCNC: 216 MG/DL (ref 70–99)
GLUCOSE BLD-MCNC: 224 MG/DL (ref 70–99)
HAPTOGLOBIN: 279 MG/DL (ref 30–200)
IRON SATURATION: 10 % (ref 20–50)
IRON: 42 UG/DL (ref 59–158)
MAGNESIUM: 2 MG/DL (ref 1.8–2.4)
PERFORMED ON: ABNORMAL
PERFORMED ON: ABNORMAL
PHOSPHORUS: 2.8 MG/DL (ref 2.5–4.9)
POTASSIUM SERPL-SCNC: 3.8 MMOL/L (ref 3.5–5.1)
SODIUM BLD-SCNC: 139 MMOL/L (ref 136–145)
TOTAL IRON BINDING CAPACITY: 404 UG/DL (ref 260–445)
TSH SERPL DL<=0.05 MIU/L-ACNC: 0.7 UIU/ML (ref 0.27–4.2)
VITAMIN B-12: 701 PG/ML (ref 211–911)

## 2019-01-05 PROCEDURE — 80069 RENAL FUNCTION PANEL: CPT

## 2019-01-05 PROCEDURE — 6370000000 HC RX 637 (ALT 250 FOR IP): Performed by: INTERNAL MEDICINE

## 2019-01-05 PROCEDURE — 82607 VITAMIN B-12: CPT

## 2019-01-05 PROCEDURE — 83550 IRON BINDING TEST: CPT

## 2019-01-05 PROCEDURE — 83010 ASSAY OF HAPTOGLOBIN QUANT: CPT

## 2019-01-05 PROCEDURE — 2700000000 HC OXYGEN THERAPY PER DAY

## 2019-01-05 PROCEDURE — 84155 ASSAY OF PROTEIN SERUM: CPT

## 2019-01-05 PROCEDURE — 88271 CYTOGENETICS DNA PROBE: CPT

## 2019-01-05 PROCEDURE — 85025 COMPLETE CBC W/AUTO DIFF WBC: CPT

## 2019-01-05 PROCEDURE — 6360000002 HC RX W HCPCS: Performed by: INTERNAL MEDICINE

## 2019-01-05 PROCEDURE — 88275 CYTOGENETICS 100-300: CPT

## 2019-01-05 PROCEDURE — 83540 ASSAY OF IRON: CPT

## 2019-01-05 PROCEDURE — 88237 TISSUE CULTURE BONE MARROW: CPT

## 2019-01-05 PROCEDURE — 82746 ASSAY OF FOLIC ACID SERUM: CPT

## 2019-01-05 PROCEDURE — 84165 PROTEIN E-PHORESIS SERUM: CPT

## 2019-01-05 PROCEDURE — 36415 COLL VENOUS BLD VENIPUNCTURE: CPT

## 2019-01-05 PROCEDURE — 84443 ASSAY THYROID STIM HORMONE: CPT

## 2019-01-05 PROCEDURE — 94760 N-INVAS EAR/PLS OXIMETRY 1: CPT

## 2019-01-05 PROCEDURE — 83735 ASSAY OF MAGNESIUM: CPT

## 2019-01-05 PROCEDURE — 82728 ASSAY OF FERRITIN: CPT

## 2019-01-05 PROCEDURE — 2580000003 HC RX 258: Performed by: INTERNAL MEDICINE

## 2019-01-05 PROCEDURE — 94640 AIRWAY INHALATION TREATMENT: CPT

## 2019-01-05 RX ORDER — POTASSIUM CHLORIDE 600 MG/1
8 TABLET, FILM COATED, EXTENDED RELEASE ORAL DAILY
Qty: 90 TABLET | Refills: 0 | Status: ON HOLD | OUTPATIENT
Start: 2019-01-05 | End: 2019-01-15 | Stop reason: HOSPADM

## 2019-01-05 RX ORDER — FERROUS SULFATE TAB EC 324 MG (65 MG FE EQUIVALENT) 324 (65 FE) MG
324 TABLET DELAYED RESPONSE ORAL
Qty: 30 TABLET | Refills: 0 | Status: ON HOLD | OUTPATIENT
Start: 2019-01-05 | End: 2019-06-02

## 2019-01-05 RX ORDER — INSULIN GLARGINE 100 [IU]/ML
12 INJECTION, SOLUTION SUBCUTANEOUS NIGHTLY
Qty: 1 VIAL | Refills: 0 | Status: ON HOLD | OUTPATIENT
Start: 2019-01-05 | End: 2019-01-15 | Stop reason: HOSPADM

## 2019-01-05 RX ORDER — OXYCODONE HYDROCHLORIDE AND ACETAMINOPHEN 5; 325 MG/1; MG/1
1 TABLET ORAL EVERY 8 HOURS PRN
Qty: 3 TABLET | Refills: 0 | Status: SHIPPED | OUTPATIENT
Start: 2019-01-05 | End: 2019-01-06

## 2019-01-05 RX ORDER — FERROUS SULFATE TAB EC 324 MG (65 MG FE EQUIVALENT) 324 (65 FE) MG
324 TABLET DELAYED RESPONSE ORAL
Status: DISCONTINUED | OUTPATIENT
Start: 2019-01-05 | End: 2019-01-05 | Stop reason: HOSPADM

## 2019-01-05 RX ORDER — PREDNISONE 10 MG/1
TABLET ORAL
Qty: 26 TABLET | Refills: 0 | Status: SHIPPED | OUTPATIENT
Start: 2019-01-05 | End: 2019-02-12

## 2019-01-05 RX ORDER — LEVOFLOXACIN 500 MG/1
500 TABLET, FILM COATED ORAL EVERY EVENING
Qty: 3 TABLET | Refills: 0 | Status: SHIPPED | OUTPATIENT
Start: 2019-01-05 | End: 2019-01-08

## 2019-01-05 RX ADMIN — LEVOFLOXACIN 500 MG: 500 TABLET, FILM COATED ORAL at 16:44

## 2019-01-05 RX ADMIN — FUROSEMIDE 40 MG: 10 INJECTION, SOLUTION INTRAMUSCULAR; INTRAVENOUS at 16:44

## 2019-01-05 RX ADMIN — FERROUS SULFATE TAB EC 324 MG (65 MG FE EQUIVALENT) 324 MG: 324 (65 FE) TABLET DELAYED RESPONSE at 12:41

## 2019-01-05 RX ADMIN — METOPROLOL SUCCINATE 150 MG: 50 TABLET, EXTENDED RELEASE ORAL at 09:24

## 2019-01-05 RX ADMIN — INSULIN LISPRO 4 UNITS: 100 INJECTION, SOLUTION INTRAVENOUS; SUBCUTANEOUS at 12:41

## 2019-01-05 RX ADMIN — DULOXETINE HYDROCHLORIDE 60 MG: 60 CAPSULE, DELAYED RELEASE ORAL at 09:24

## 2019-01-05 RX ADMIN — GABAPENTIN 100 MG: 100 CAPSULE ORAL at 09:24

## 2019-01-05 RX ADMIN — GABAPENTIN 100 MG: 100 CAPSULE ORAL at 12:40

## 2019-01-05 RX ADMIN — INSULIN LISPRO 4 UNITS: 100 INJECTION, SOLUTION INTRAVENOUS; SUBCUTANEOUS at 09:26

## 2019-01-05 RX ADMIN — FUROSEMIDE 40 MG: 10 INJECTION, SOLUTION INTRAMUSCULAR; INTRAVENOUS at 09:24

## 2019-01-05 RX ADMIN — Medication 10 ML: at 09:25

## 2019-01-05 RX ADMIN — PREDNISONE 40 MG: 20 TABLET ORAL at 09:24

## 2019-01-05 RX ADMIN — OXYCODONE AND ACETAMINOPHEN 1 TABLET: 5; 325 TABLET ORAL at 16:51

## 2019-01-05 RX ADMIN — OXYCODONE AND ACETAMINOPHEN 1 TABLET: 5; 325 TABLET ORAL at 05:36

## 2019-01-05 RX ADMIN — RIVAROXABAN 20 MG: 20 TABLET, FILM COATED ORAL at 09:25

## 2019-01-05 RX ADMIN — MOMETASONE FUROATE AND FORMOTEROL FUMARATE DIHYDRATE 2 PUFF: 100; 5 AEROSOL RESPIRATORY (INHALATION) at 09:05

## 2019-01-05 RX ADMIN — DILTIAZEM HYDROCHLORIDE 240 MG: 240 CAPSULE, COATED, EXTENDED RELEASE ORAL at 09:24

## 2019-01-05 RX ADMIN — TAMSULOSIN HYDROCHLORIDE 0.4 MG: 0.4 CAPSULE ORAL at 09:24

## 2019-01-05 RX ADMIN — PANTOPRAZOLE SODIUM 40 MG: 40 TABLET, DELAYED RELEASE ORAL at 05:36

## 2019-01-05 RX ADMIN — ATORVASTATIN CALCIUM 40 MG: 40 TABLET, FILM COATED ORAL at 09:24

## 2019-01-05 ASSESSMENT — PAIN SCALES - GENERAL
PAINLEVEL_OUTOF10: 8
PAINLEVEL_OUTOF10: 7
PAINLEVEL_OUTOF10: 8

## 2019-01-07 LAB
ALBUMIN SERPL-MCNC: 2.8 G/DL (ref 3.1–4.9)
ALPHA-1-GLOBULIN: 0.2 G/DL (ref 0.2–0.4)
ALPHA-2-GLOBULIN: 0.9 G/DL (ref 0.4–1.1)
BASOPHILS ABSOLUTE: 0.1 K/UL (ref 0–0.2)
BASOPHILS RELATIVE PERCENT: 0.4 %
BETA GLOBULIN: 1.3 G/DL (ref 0.9–1.6)
EOSINOPHILS ABSOLUTE: 0 K/UL (ref 0–0.6)
EOSINOPHILS RELATIVE PERCENT: 0.1 %
GAMMA GLOBULIN: 1.2 G/DL (ref 0.6–1.8)
HCT VFR BLD CALC: 32.9 % (ref 40.5–52.5)
HEMATOLOGY PATH CONSULT: NO
HEMOGLOBIN: 10.8 G/DL (ref 13.5–17.5)
LYMPHOCYTES ABSOLUTE: 1.5 K/UL (ref 1–5.1)
LYMPHOCYTES RELATIVE PERCENT: 9.9 %
MCH RBC QN AUTO: 29.2 PG (ref 26–34)
MCHC RBC AUTO-ENTMCNC: 32.9 G/DL (ref 31–36)
MCV RBC AUTO: 88.5 FL (ref 80–100)
MONOCYTES ABSOLUTE: 1.6 K/UL (ref 0–1.3)
MONOCYTES RELATIVE PERCENT: 10.4 %
NEUTROPHILS ABSOLUTE: 12.3 K/UL (ref 1.7–7.7)
NEUTROPHILS RELATIVE PERCENT: 79.2 %
PDW BLD-RTO: 15.1 % (ref 12.4–15.4)
PLATELET # BLD: 218 K/UL (ref 135–450)
PMV BLD AUTO: 8.6 FL (ref 5–10.5)
RBC # BLD: 3.72 M/UL (ref 4.2–5.9)
SPE/IFE INTERPRETATION: NORMAL
TOTAL PROTEIN: 6.4 G/DL (ref 6.4–8.2)
WBC # BLD: 15.5 K/UL (ref 4–11)

## 2019-01-09 LAB — REPORT: NORMAL

## 2019-01-10 ENCOUNTER — TELEPHONE (OUTPATIENT)
Dept: OTHER | Facility: CLINIC | Age: 59
End: 2019-01-10

## 2019-01-10 ENCOUNTER — HOSPITAL ENCOUNTER (INPATIENT)
Age: 59
LOS: 5 days | Discharge: NURSING, REHAB OR LONG-TERM CARE | DRG: 133 | End: 2019-01-16
Attending: EMERGENCY MEDICINE | Admitting: HOSPITALIST
Payer: COMMERCIAL

## 2019-01-10 ENCOUNTER — APPOINTMENT (OUTPATIENT)
Dept: GENERAL RADIOLOGY | Age: 59
DRG: 133 | End: 2019-01-10
Payer: COMMERCIAL

## 2019-01-10 DIAGNOSIS — R79.81 ELEVATED CO2 LEVEL: Primary | ICD-10-CM

## 2019-01-10 DIAGNOSIS — M54.9 CHRONIC BACK PAIN, UNSPECIFIED BACK LOCATION, UNSPECIFIED BACK PAIN LATERALITY: ICD-10-CM

## 2019-01-10 DIAGNOSIS — G89.29 CHRONIC BACK PAIN, UNSPECIFIED BACK LOCATION, UNSPECIFIED BACK PAIN LATERALITY: ICD-10-CM

## 2019-01-10 DIAGNOSIS — R73.9 HYPERGLYCEMIA: ICD-10-CM

## 2019-01-10 PROBLEM — R06.89 HYPERCAPNEMIA: Status: ACTIVE | Noted: 2019-01-10

## 2019-01-10 LAB
A/G RATIO: 1.3 (ref 1.1–2.2)
ALBUMIN SERPL-MCNC: 4 G/DL (ref 3.4–5)
ALP BLD-CCNC: 65 U/L (ref 40–129)
ALT SERPL-CCNC: 79 U/L (ref 10–40)
ANION GAP SERPL CALCULATED.3IONS-SCNC: 15 MMOL/L (ref 3–16)
AST SERPL-CCNC: 35 U/L (ref 15–37)
BASE EXCESS ARTERIAL: 17.2 MMOL/L (ref -3–3)
BETA-HYDROXYBUTYRATE: 0.11 MMOL/L (ref 0–0.27)
BILIRUB SERPL-MCNC: 0.3 MG/DL (ref 0–1)
BILIRUBIN URINE: NEGATIVE
BLOOD, URINE: NEGATIVE
BUN BLDV-MCNC: 22 MG/DL (ref 7–20)
CALCIUM SERPL-MCNC: 9.9 MG/DL (ref 8.3–10.6)
CARBOXYHEMOGLOBIN ARTERIAL: 4.7 % (ref 0–1.5)
CHLORIDE BLD-SCNC: 84 MMOL/L (ref 99–110)
CLARITY: CLEAR
CO2: 40 MMOL/L (ref 21–32)
COLOR: YELLOW
CREAT SERPL-MCNC: 1.2 MG/DL (ref 0.9–1.3)
GFR AFRICAN AMERICAN: >60
GFR NON-AFRICAN AMERICAN: >60
GLOBULIN: 3.1 G/DL
GLUCOSE BLD-MCNC: 133 MG/DL (ref 70–99)
GLUCOSE BLD-MCNC: 138 MG/DL (ref 70–99)
GLUCOSE BLD-MCNC: 140 MG/DL (ref 70–99)
GLUCOSE BLD-MCNC: 169 MG/DL (ref 70–99)
GLUCOSE BLD-MCNC: 542 MG/DL (ref 70–99)
GLUCOSE URINE: >=1000 MG/DL
HCO3 ARTERIAL: 44.9 MMOL/L (ref 21–29)
HCT VFR BLD CALC: 34.1 % (ref 40.5–52.5)
HEMOGLOBIN, ART, EXTENDED: 11 G/DL (ref 13.5–17.5)
HEMOGLOBIN: 10.8 G/DL (ref 13.5–17.5)
KETONES, URINE: NEGATIVE MG/DL
LEUKOCYTE ESTERASE, URINE: NEGATIVE
MCH RBC QN AUTO: 28.1 PG (ref 26–34)
MCHC RBC AUTO-ENTMCNC: 31.9 G/DL (ref 31–36)
MCV RBC AUTO: 88.2 FL (ref 80–100)
METHEMOGLOBIN ARTERIAL: 1 %
MICROSCOPIC EXAMINATION: ABNORMAL
NITRITE, URINE: NEGATIVE
O2 CONTENT ARTERIAL: 14 ML/DL
O2 SAT, ARTERIAL: 97.9 %
O2 THERAPY: ABNORMAL
PCO2 ARTERIAL: 72.1 MMHG (ref 35–45)
PDW BLD-RTO: 16.1 % (ref 12.4–15.4)
PERFORMED ON: ABNORMAL
PH ARTERIAL: 7.41 (ref 7.35–7.45)
PH UA: 6
PLATELET # BLD: 175 K/UL (ref 135–450)
PMV BLD AUTO: 9.1 FL (ref 5–10.5)
PO2 ARTERIAL: 93.5 MMHG (ref 75–108)
POTASSIUM SERPL-SCNC: 3.6 MMOL/L (ref 3.5–5.1)
PRO-BNP: 1876 PG/ML (ref 0–124)
PROTEIN UA: NEGATIVE MG/DL
RBC # BLD: 3.86 M/UL (ref 4.2–5.9)
SODIUM BLD-SCNC: 139 MMOL/L (ref 136–145)
SPECIFIC GRAVITY UA: 1.01
TCO2 ARTERIAL: 47.1 MMOL/L
TOTAL PROTEIN: 7.1 G/DL (ref 6.4–8.2)
TROPONIN: 0.02 NG/ML
URINE REFLEX TO CULTURE: ABNORMAL
URINE TYPE: ABNORMAL
UROBILINOGEN, URINE: 0.2 E.U./DL
WBC # BLD: 12.5 K/UL (ref 4–11)

## 2019-01-10 PROCEDURE — 94762 N-INVAS EAR/PLS OXIMTRY CONT: CPT

## 2019-01-10 PROCEDURE — 82010 KETONE BODYS QUAN: CPT

## 2019-01-10 PROCEDURE — 6360000002 HC RX W HCPCS: Performed by: PHYSICIAN ASSISTANT

## 2019-01-10 PROCEDURE — 84484 ASSAY OF TROPONIN QUANT: CPT

## 2019-01-10 PROCEDURE — 36600 WITHDRAWAL OF ARTERIAL BLOOD: CPT

## 2019-01-10 PROCEDURE — 83880 ASSAY OF NATRIURETIC PEPTIDE: CPT

## 2019-01-10 PROCEDURE — 6370000000 HC RX 637 (ALT 250 FOR IP): Performed by: PHYSICIAN ASSISTANT

## 2019-01-10 PROCEDURE — 81003 URINALYSIS AUTO W/O SCOPE: CPT

## 2019-01-10 PROCEDURE — 2700000000 HC OXYGEN THERAPY PER DAY

## 2019-01-10 PROCEDURE — 80053 COMPREHEN METABOLIC PANEL: CPT

## 2019-01-10 PROCEDURE — 96374 THER/PROPH/DIAG INJ IV PUSH: CPT

## 2019-01-10 PROCEDURE — 36415 COLL VENOUS BLD VENIPUNCTURE: CPT

## 2019-01-10 PROCEDURE — 85027 COMPLETE CBC AUTOMATED: CPT

## 2019-01-10 PROCEDURE — G0378 HOSPITAL OBSERVATION PER HR: HCPCS

## 2019-01-10 PROCEDURE — 82803 BLOOD GASES ANY COMBINATION: CPT

## 2019-01-10 PROCEDURE — 71046 X-RAY EXAM CHEST 2 VIEWS: CPT

## 2019-01-10 PROCEDURE — 93005 ELECTROCARDIOGRAM TRACING: CPT | Performed by: PHYSICIAN ASSISTANT

## 2019-01-10 PROCEDURE — 99285 EMERGENCY DEPT VISIT HI MDM: CPT

## 2019-01-10 RX ORDER — POTASSIUM CHLORIDE 750 MG/1
10 TABLET, FILM COATED, EXTENDED RELEASE ORAL DAILY
Status: DISCONTINUED | OUTPATIENT
Start: 2019-01-11 | End: 2019-01-14

## 2019-01-10 RX ORDER — 0.9 % SODIUM CHLORIDE 0.9 %
500 INTRAVENOUS SOLUTION INTRAVENOUS ONCE
Status: DISCONTINUED | OUTPATIENT
Start: 2019-01-10 | End: 2019-01-13

## 2019-01-10 RX ORDER — DULOXETIN HYDROCHLORIDE 60 MG/1
60 CAPSULE, DELAYED RELEASE ORAL DAILY
Status: DISCONTINUED | OUTPATIENT
Start: 2019-01-11 | End: 2019-01-16 | Stop reason: HOSPADM

## 2019-01-10 RX ORDER — PANTOPRAZOLE SODIUM 40 MG/1
40 TABLET, DELAYED RELEASE ORAL
Status: DISCONTINUED | OUTPATIENT
Start: 2019-01-11 | End: 2019-01-16 | Stop reason: HOSPADM

## 2019-01-10 RX ORDER — SODIUM CHLORIDE 0.9 % (FLUSH) 0.9 %
10 SYRINGE (ML) INJECTION PRN
Status: DISCONTINUED | OUTPATIENT
Start: 2019-01-10 | End: 2019-01-16 | Stop reason: HOSPADM

## 2019-01-10 RX ORDER — FUROSEMIDE 10 MG/ML
40 INJECTION INTRAMUSCULAR; INTRAVENOUS ONCE
Status: COMPLETED | OUTPATIENT
Start: 2019-01-10 | End: 2019-01-10

## 2019-01-10 RX ORDER — DILTIAZEM HYDROCHLORIDE 240 MG/1
240 CAPSULE, COATED, EXTENDED RELEASE ORAL DAILY
Status: DISCONTINUED | OUTPATIENT
Start: 2019-01-11 | End: 2019-01-16 | Stop reason: HOSPADM

## 2019-01-10 RX ORDER — INSULIN GLARGINE 100 [IU]/ML
12 INJECTION, SOLUTION SUBCUTANEOUS NIGHTLY
Status: DISCONTINUED | OUTPATIENT
Start: 2019-01-11 | End: 2019-01-12

## 2019-01-10 RX ORDER — METOPROLOL SUCCINATE 50 MG/1
150 TABLET, EXTENDED RELEASE ORAL DAILY
Status: DISCONTINUED | OUTPATIENT
Start: 2019-01-11 | End: 2019-01-16 | Stop reason: HOSPADM

## 2019-01-10 RX ORDER — ALBUTEROL SULFATE 90 UG/1
2 AEROSOL, METERED RESPIRATORY (INHALATION) EVERY 4 HOURS PRN
Status: DISCONTINUED | OUTPATIENT
Start: 2019-01-10 | End: 2019-01-16 | Stop reason: HOSPADM

## 2019-01-10 RX ORDER — METOLAZONE 5 MG/1
5 TABLET ORAL DAILY
Status: ON HOLD | COMMUNITY
End: 2019-01-15 | Stop reason: HOSPADM

## 2019-01-10 RX ORDER — FERROUS SULFATE TAB EC 324 MG (65 MG FE EQUIVALENT) 324 (65 FE) MG
324 TABLET DELAYED RESPONSE ORAL
Status: DISCONTINUED | OUTPATIENT
Start: 2019-01-11 | End: 2019-01-16 | Stop reason: HOSPADM

## 2019-01-10 RX ORDER — GABAPENTIN 100 MG/1
100 CAPSULE ORAL 3 TIMES DAILY
Status: DISCONTINUED | OUTPATIENT
Start: 2019-01-11 | End: 2019-01-16 | Stop reason: HOSPADM

## 2019-01-10 RX ORDER — TRAZODONE HYDROCHLORIDE 50 MG/1
50 TABLET ORAL NIGHTLY
Status: DISCONTINUED | OUTPATIENT
Start: 2019-01-11 | End: 2019-01-16 | Stop reason: HOSPADM

## 2019-01-10 RX ORDER — ONDANSETRON 2 MG/ML
4 INJECTION INTRAMUSCULAR; INTRAVENOUS EVERY 6 HOURS PRN
Status: DISCONTINUED | OUTPATIENT
Start: 2019-01-10 | End: 2019-01-16 | Stop reason: HOSPADM

## 2019-01-10 RX ORDER — NICOTINE POLACRILEX 4 MG
15 LOZENGE BUCCAL PRN
Status: DISCONTINUED | OUTPATIENT
Start: 2019-01-10 | End: 2019-01-16 | Stop reason: HOSPADM

## 2019-01-10 RX ORDER — SODIUM CHLORIDE 0.9 % (FLUSH) 0.9 %
10 SYRINGE (ML) INJECTION EVERY 12 HOURS SCHEDULED
Status: DISCONTINUED | OUTPATIENT
Start: 2019-01-11 | End: 2019-01-16 | Stop reason: HOSPADM

## 2019-01-10 RX ORDER — TAMSULOSIN HYDROCHLORIDE 0.4 MG/1
0.4 CAPSULE ORAL DAILY
Status: DISCONTINUED | OUTPATIENT
Start: 2019-01-11 | End: 2019-01-16 | Stop reason: HOSPADM

## 2019-01-10 RX ORDER — DEXTROSE MONOHYDRATE 50 MG/ML
100 INJECTION, SOLUTION INTRAVENOUS PRN
Status: DISCONTINUED | OUTPATIENT
Start: 2019-01-10 | End: 2019-01-16 | Stop reason: HOSPADM

## 2019-01-10 RX ORDER — METOLAZONE 5 MG/1
5 TABLET ORAL DAILY
Status: DISCONTINUED | OUTPATIENT
Start: 2019-01-11 | End: 2019-01-14

## 2019-01-10 RX ORDER — DEXTROSE MONOHYDRATE 25 G/50ML
12.5 INJECTION, SOLUTION INTRAVENOUS PRN
Status: DISCONTINUED | OUTPATIENT
Start: 2019-01-10 | End: 2019-01-16 | Stop reason: HOSPADM

## 2019-01-10 RX ORDER — ATORVASTATIN CALCIUM 40 MG/1
40 TABLET, FILM COATED ORAL DAILY
Status: DISCONTINUED | OUTPATIENT
Start: 2019-01-11 | End: 2019-01-16 | Stop reason: HOSPADM

## 2019-01-10 RX ORDER — IPRATROPIUM BROMIDE AND ALBUTEROL SULFATE 2.5; .5 MG/3ML; MG/3ML
1 SOLUTION RESPIRATORY (INHALATION)
Status: DISCONTINUED | OUTPATIENT
Start: 2019-01-11 | End: 2019-01-11

## 2019-01-10 RX ORDER — LANOLIN ALCOHOL/MO/W.PET/CERES
400 CREAM (GRAM) TOPICAL 2 TIMES DAILY
Status: DISCONTINUED | OUTPATIENT
Start: 2019-01-11 | End: 2019-01-16 | Stop reason: HOSPADM

## 2019-01-10 RX ORDER — TORSEMIDE 20 MG/1
40 TABLET ORAL 2 TIMES DAILY
Status: ON HOLD | COMMUNITY
End: 2019-01-15

## 2019-01-10 RX ORDER — ALBUTEROL SULFATE 2.5 MG/3ML
2.5 SOLUTION RESPIRATORY (INHALATION)
Status: DISCONTINUED | OUTPATIENT
Start: 2019-01-10 | End: 2019-01-16 | Stop reason: HOSPADM

## 2019-01-10 RX ORDER — TORSEMIDE 20 MG/1
40 TABLET ORAL 2 TIMES DAILY
Status: DISCONTINUED | OUTPATIENT
Start: 2019-01-11 | End: 2019-01-12

## 2019-01-10 RX ADMIN — INSULIN HUMAN 10 UNITS: 100 INJECTION, SOLUTION PARENTERAL at 21:28

## 2019-01-10 RX ADMIN — FUROSEMIDE 40 MG: 10 INJECTION, SOLUTION INTRAMUSCULAR; INTRAVENOUS at 21:22

## 2019-01-10 ASSESSMENT — ENCOUNTER SYMPTOMS
DIARRHEA: 1
ABDOMINAL PAIN: 0
COUGH: 1
VOMITING: 0
SHORTNESS OF BREATH: 1
BACK PAIN: 0

## 2019-01-11 PROBLEM — R06.02 SOB (SHORTNESS OF BREATH): Status: ACTIVE | Noted: 2019-01-11

## 2019-01-11 LAB
BASE EXCESS ARTERIAL: 18.8 MMOL/L (ref -3–3)
BASE EXCESS ARTERIAL: 20.7 MMOL/L (ref -3–3)
CARBOXYHEMOGLOBIN ARTERIAL: 2.2 % (ref 0–1.5)
CARBOXYHEMOGLOBIN ARTERIAL: 2.4 % (ref 0–1.5)
EKG ATRIAL RATE: 104 BPM
EKG DIAGNOSIS: NORMAL
EKG Q-T INTERVAL: 350 MS
EKG QRS DURATION: 84 MS
EKG QTC CALCULATION (BAZETT): 437 MS
EKG R AXIS: 102 DEGREES
EKG T AXIS: 239 DEGREES
EKG VENTRICULAR RATE: 94 BPM
GLUCOSE BLD-MCNC: 250 MG/DL (ref 70–99)
GLUCOSE BLD-MCNC: 285 MG/DL (ref 70–99)
GLUCOSE BLD-MCNC: 395 MG/DL (ref 70–99)
GLUCOSE BLD-MCNC: 441 MG/DL (ref 70–99)
GLUCOSE BLD-MCNC: 480 MG/DL (ref 70–99)
HCO3 ARTERIAL: 46.9 MMOL/L (ref 21–29)
HCO3 ARTERIAL: 49.3 MMOL/L (ref 21–29)
HEMOGLOBIN, ART, EXTENDED: 11 G/DL (ref 13.5–17.5)
HEMOGLOBIN, ART, EXTENDED: 11.3 G/DL (ref 13.5–17.5)
METHEMOGLOBIN ARTERIAL: 0.6 %
METHEMOGLOBIN ARTERIAL: 0.8 %
O2 CONTENT ARTERIAL: 12 ML/DL
O2 CONTENT ARTERIAL: 14 ML/DL
O2 SAT, ARTERIAL: 74.9 %
O2 SAT, ARTERIAL: 94.7 %
O2 THERAPY: ABNORMAL
O2 THERAPY: ABNORMAL
PCO2 ARTERIAL: 75.7 MMHG (ref 35–45)
PCO2 ARTERIAL: 79.9 MMHG (ref 35–45)
PERFORMED ON: ABNORMAL
PH ARTERIAL: 7.41 (ref 7.35–7.45)
PH ARTERIAL: 7.41 (ref 7.35–7.45)
PO2 ARTERIAL: 41.7 MMHG (ref 75–108)
PO2 ARTERIAL: 70 MMHG (ref 75–108)
TCO2 ARTERIAL: 49.2 MMOL/L
TCO2 ARTERIAL: 51.8 MMOL/L

## 2019-01-11 PROCEDURE — 6370000000 HC RX 637 (ALT 250 FOR IP): Performed by: FAMILY MEDICINE

## 2019-01-11 PROCEDURE — 93010 ELECTROCARDIOGRAM REPORT: CPT | Performed by: INTERNAL MEDICINE

## 2019-01-11 PROCEDURE — 6370000000 HC RX 637 (ALT 250 FOR IP): Performed by: NURSE PRACTITIONER

## 2019-01-11 PROCEDURE — 94760 N-INVAS EAR/PLS OXIMETRY 1: CPT

## 2019-01-11 PROCEDURE — G0378 HOSPITAL OBSERVATION PER HR: HCPCS

## 2019-01-11 PROCEDURE — 36600 WITHDRAWAL OF ARTERIAL BLOOD: CPT

## 2019-01-11 PROCEDURE — 2580000003 HC RX 258: Performed by: NURSE PRACTITIONER

## 2019-01-11 PROCEDURE — 6370000000 HC RX 637 (ALT 250 FOR IP): Performed by: INTERNAL MEDICINE

## 2019-01-11 PROCEDURE — 2700000000 HC OXYGEN THERAPY PER DAY

## 2019-01-11 PROCEDURE — 1200000000 HC SEMI PRIVATE

## 2019-01-11 PROCEDURE — 94640 AIRWAY INHALATION TREATMENT: CPT

## 2019-01-11 PROCEDURE — 96376 TX/PRO/DX INJ SAME DRUG ADON: CPT

## 2019-01-11 PROCEDURE — 6360000002 HC RX W HCPCS: Performed by: INTERNAL MEDICINE

## 2019-01-11 PROCEDURE — 99223 1ST HOSP IP/OBS HIGH 75: CPT | Performed by: INTERNAL MEDICINE

## 2019-01-11 PROCEDURE — 96375 TX/PRO/DX INJ NEW DRUG ADDON: CPT

## 2019-01-11 PROCEDURE — 82803 BLOOD GASES ANY COMBINATION: CPT

## 2019-01-11 RX ORDER — OXYCODONE HYDROCHLORIDE AND ACETAMINOPHEN 5; 325 MG/1; MG/1
2 TABLET ORAL EVERY 6 HOURS PRN
Status: DISCONTINUED | OUTPATIENT
Start: 2019-01-11 | End: 2019-01-16 | Stop reason: HOSPADM

## 2019-01-11 RX ORDER — OXYCODONE HYDROCHLORIDE AND ACETAMINOPHEN 5; 325 MG/1; MG/1
1 TABLET ORAL EVERY 6 HOURS PRN
Status: DISCONTINUED | OUTPATIENT
Start: 2019-01-11 | End: 2019-01-16 | Stop reason: HOSPADM

## 2019-01-11 RX ORDER — ACETAZOLAMIDE 500 MG/5ML
250 INJECTION, POWDER, LYOPHILIZED, FOR SOLUTION INTRAVENOUS 3 TIMES DAILY
Status: DISCONTINUED | OUTPATIENT
Start: 2019-01-11 | End: 2019-01-13

## 2019-01-11 RX ORDER — PREDNISONE 10 MG/1
10 TABLET ORAL DAILY
Status: DISCONTINUED | OUTPATIENT
Start: 2019-01-17 | End: 2019-01-11

## 2019-01-11 RX ORDER — PREDNISONE 20 MG/1
20 TABLET ORAL DAILY
Status: DISCONTINUED | OUTPATIENT
Start: 2019-01-14 | End: 2019-01-11

## 2019-01-11 RX ORDER — PREDNISONE 10 MG/1
10 TABLET ORAL ONCE
Status: COMPLETED | OUTPATIENT
Start: 2019-01-11 | End: 2019-01-11

## 2019-01-11 RX ORDER — IPRATROPIUM BROMIDE AND ALBUTEROL SULFATE 2.5; .5 MG/3ML; MG/3ML
1 SOLUTION RESPIRATORY (INHALATION) EVERY 6 HOURS
Status: DISCONTINUED | OUTPATIENT
Start: 2019-01-11 | End: 2019-01-16 | Stop reason: HOSPADM

## 2019-01-11 RX ADMIN — TORSEMIDE 40 MG: 20 TABLET ORAL at 17:20

## 2019-01-11 RX ADMIN — ACETAZOLAMIDE 250 MG: 500 INJECTION, POWDER, LYOPHILIZED, FOR SOLUTION INTRAVENOUS at 10:45

## 2019-01-11 RX ADMIN — IPRATROPIUM BROMIDE AND ALBUTEROL SULFATE 1 AMPULE: 2.5; .5 SOLUTION RESPIRATORY (INHALATION) at 20:54

## 2019-01-11 RX ADMIN — DILTIAZEM HYDROCHLORIDE 240 MG: 240 CAPSULE, COATED, EXTENDED RELEASE ORAL at 08:55

## 2019-01-11 RX ADMIN — IPRATROPIUM BROMIDE AND ALBUTEROL SULFATE 1 AMPULE: 2.5; .5 SOLUTION RESPIRATORY (INHALATION) at 13:33

## 2019-01-11 RX ADMIN — GABAPENTIN 100 MG: 100 CAPSULE ORAL at 00:34

## 2019-01-11 RX ADMIN — PANTOPRAZOLE SODIUM 40 MG: 40 TABLET, DELAYED RELEASE ORAL at 06:01

## 2019-01-11 RX ADMIN — INSULIN LISPRO 5 UNITS: 100 INJECTION, SOLUTION INTRAVENOUS; SUBCUTANEOUS at 21:12

## 2019-01-11 RX ADMIN — GABAPENTIN 100 MG: 100 CAPSULE ORAL at 08:54

## 2019-01-11 RX ADMIN — TRAZODONE HYDROCHLORIDE 50 MG: 50 TABLET ORAL at 00:34

## 2019-01-11 RX ADMIN — POTASSIUM CHLORIDE 10 MEQ: 750 TABLET, EXTENDED RELEASE ORAL at 08:54

## 2019-01-11 RX ADMIN — RIVAROXABAN 20 MG: 20 TABLET, FILM COATED ORAL at 08:54

## 2019-01-11 RX ADMIN — Medication 400 MG: at 08:54

## 2019-01-11 RX ADMIN — FERROUS SULFATE TAB EC 324 MG (65 MG FE EQUIVALENT) 324 MG: 324 (65 FE) TABLET DELAYED RESPONSE at 08:54

## 2019-01-11 RX ADMIN — INSULIN GLARGINE 12 UNITS: 100 INJECTION, SOLUTION SUBCUTANEOUS at 21:12

## 2019-01-11 RX ADMIN — ACETAZOLAMIDE 250 MG: 500 INJECTION, POWDER, LYOPHILIZED, FOR SOLUTION INTRAVENOUS at 21:11

## 2019-01-11 RX ADMIN — INSULIN LISPRO 12 UNITS: 100 INJECTION, SOLUTION INTRAVENOUS; SUBCUTANEOUS at 17:07

## 2019-01-11 RX ADMIN — Medication 400 MG: at 21:10

## 2019-01-11 RX ADMIN — OXYCODONE AND ACETAMINOPHEN 2 TABLET: 5; 325 TABLET ORAL at 21:10

## 2019-01-11 RX ADMIN — OXYCODONE AND ACETAMINOPHEN 2 TABLET: 5; 325 TABLET ORAL at 00:34

## 2019-01-11 RX ADMIN — INSULIN LISPRO 6 UNITS: 100 INJECTION, SOLUTION INTRAVENOUS; SUBCUTANEOUS at 08:55

## 2019-01-11 RX ADMIN — ATORVASTATIN CALCIUM 40 MG: 40 TABLET, FILM COATED ORAL at 08:55

## 2019-01-11 RX ADMIN — TORSEMIDE 40 MG: 20 TABLET ORAL at 08:54

## 2019-01-11 RX ADMIN — IPRATROPIUM BROMIDE AND ALBUTEROL SULFATE 1 AMPULE: .5; 3 SOLUTION RESPIRATORY (INHALATION) at 07:51

## 2019-01-11 RX ADMIN — GABAPENTIN 100 MG: 100 CAPSULE ORAL at 21:10

## 2019-01-11 RX ADMIN — INSULIN GLARGINE 12 UNITS: 100 INJECTION, SOLUTION SUBCUTANEOUS at 02:22

## 2019-01-11 RX ADMIN — OXYCODONE AND ACETAMINOPHEN 2 TABLET: 5; 325 TABLET ORAL at 06:32

## 2019-01-11 RX ADMIN — Medication 10 ML: at 09:00

## 2019-01-11 RX ADMIN — TAMSULOSIN HYDROCHLORIDE 0.4 MG: 0.4 CAPSULE ORAL at 08:54

## 2019-01-11 RX ADMIN — INSULIN LISPRO 6 UNITS: 100 INJECTION, SOLUTION INTRAVENOUS; SUBCUTANEOUS at 12:42

## 2019-01-11 RX ADMIN — DULOXETINE 60 MG: 60 CAPSULE, DELAYED RELEASE ORAL at 08:54

## 2019-01-11 RX ADMIN — PREDNISONE 10 MG: 10 TABLET ORAL at 08:54

## 2019-01-11 RX ADMIN — MOMETASONE FUROATE AND FORMOTEROL FUMARATE DIHYDRATE 2 PUFF: 100; 5 AEROSOL RESPIRATORY (INHALATION) at 20:54

## 2019-01-11 RX ADMIN — GABAPENTIN 100 MG: 100 CAPSULE ORAL at 14:06

## 2019-01-11 RX ADMIN — METOPROLOL SUCCINATE 150 MG: 50 TABLET, EXTENDED RELEASE ORAL at 08:54

## 2019-01-11 RX ADMIN — Medication 10 ML: at 23:50

## 2019-01-11 RX ADMIN — ACETAZOLAMIDE 250 MG: 500 INJECTION, POWDER, LYOPHILIZED, FOR SOLUTION INTRAVENOUS at 14:07

## 2019-01-11 RX ADMIN — OXYCODONE AND ACETAMINOPHEN 2 TABLET: 5; 325 TABLET ORAL at 14:07

## 2019-01-11 RX ADMIN — MOMETASONE FUROATE AND FORMOTEROL FUMARATE DIHYDRATE 2 PUFF: 100; 5 AEROSOL RESPIRATORY (INHALATION) at 07:51

## 2019-01-11 RX ADMIN — METOLAZONE 5 MG: 5 TABLET ORAL at 08:54

## 2019-01-11 RX ADMIN — TRAZODONE HYDROCHLORIDE 50 MG: 50 TABLET ORAL at 21:10

## 2019-01-11 RX ADMIN — Medication 400 MG: at 00:34

## 2019-01-11 ASSESSMENT — PAIN SCALES - GENERAL
PAINLEVEL_OUTOF10: 8
PAINLEVEL_OUTOF10: 10
PAINLEVEL_OUTOF10: 8
PAINLEVEL_OUTOF10: 0
PAINLEVEL_OUTOF10: 8

## 2019-01-11 ASSESSMENT — PAIN DESCRIPTION - PAIN TYPE
TYPE: CHRONIC PAIN
TYPE: CHRONIC PAIN

## 2019-01-11 ASSESSMENT — PAIN DESCRIPTION - ORIENTATION: ORIENTATION: MID

## 2019-01-11 ASSESSMENT — PAIN DESCRIPTION - FREQUENCY: FREQUENCY: CONTINUOUS

## 2019-01-11 ASSESSMENT — PAIN DESCRIPTION - LOCATION: LOCATION: BACK

## 2019-01-11 ASSESSMENT — PAIN DESCRIPTION - DESCRIPTORS: DESCRIPTORS: CONSTANT;ACHING

## 2019-01-11 ASSESSMENT — PAIN DESCRIPTION - ONSET: ONSET: ON-GOING

## 2019-01-12 LAB
ANION GAP SERPL CALCULATED.3IONS-SCNC: 11 MMOL/L (ref 3–16)
BASE EXCESS ARTERIAL: 19.6 MMOL/L (ref -3–3)
BUN BLDV-MCNC: 34 MG/DL (ref 7–20)
CALCIUM SERPL-MCNC: 10.1 MG/DL (ref 8.3–10.6)
CARBOXYHEMOGLOBIN ARTERIAL: 1.6 % (ref 0–1.5)
CHLORIDE BLD-SCNC: 78 MMOL/L (ref 99–110)
CO2: 49 MMOL/L (ref 21–32)
CREAT SERPL-MCNC: 1.3 MG/DL (ref 0.9–1.3)
GFR AFRICAN AMERICAN: >60
GFR NON-AFRICAN AMERICAN: 57
GLUCOSE BLD-MCNC: 232 MG/DL (ref 70–99)
GLUCOSE BLD-MCNC: 255 MG/DL (ref 70–99)
GLUCOSE BLD-MCNC: 280 MG/DL (ref 70–99)
GLUCOSE BLD-MCNC: 293 MG/DL (ref 70–99)
GLUCOSE BLD-MCNC: 299 MG/DL (ref 70–99)
HCO3 ARTERIAL: 48.4 MMOL/L (ref 21–29)
HCT VFR BLD CALC: 35.4 % (ref 40.5–52.5)
HEMOGLOBIN, ART, EXTENDED: 11.2 G/DL (ref 13.5–17.5)
HEMOGLOBIN: 11.5 G/DL (ref 13.5–17.5)
MCH RBC QN AUTO: 28.8 PG (ref 26–34)
MCHC RBC AUTO-ENTMCNC: 32.5 G/DL (ref 31–36)
MCV RBC AUTO: 88.6 FL (ref 80–100)
METHEMOGLOBIN ARTERIAL: 0.6 %
O2 CONTENT ARTERIAL: 15 ML/DL
O2 SAT, ARTERIAL: 94.5 %
O2 THERAPY: ABNORMAL
PCO2 ARTERIAL: 82.8 MMHG (ref 35–45)
PDW BLD-RTO: 15.9 % (ref 12.4–15.4)
PERFORMED ON: ABNORMAL
PH ARTERIAL: 7.38 (ref 7.35–7.45)
PLATELET # BLD: 138 K/UL (ref 135–450)
PMV BLD AUTO: 9.4 FL (ref 5–10.5)
PO2 ARTERIAL: 73.4 MMHG (ref 75–108)
POTASSIUM SERPL-SCNC: 2.9 MMOL/L (ref 3.5–5.1)
RBC # BLD: 4 M/UL (ref 4.2–5.9)
SODIUM BLD-SCNC: 138 MMOL/L (ref 136–145)
TCO2 ARTERIAL: 50.9 MMOL/L
WBC # BLD: 9.3 K/UL (ref 4–11)

## 2019-01-12 PROCEDURE — 2580000003 HC RX 258: Performed by: NURSE PRACTITIONER

## 2019-01-12 PROCEDURE — 6370000000 HC RX 637 (ALT 250 FOR IP): Performed by: FAMILY MEDICINE

## 2019-01-12 PROCEDURE — 2700000000 HC OXYGEN THERAPY PER DAY

## 2019-01-12 PROCEDURE — 1200000000 HC SEMI PRIVATE

## 2019-01-12 PROCEDURE — 94761 N-INVAS EAR/PLS OXIMETRY MLT: CPT

## 2019-01-12 PROCEDURE — 85027 COMPLETE CBC AUTOMATED: CPT

## 2019-01-12 PROCEDURE — 6360000002 HC RX W HCPCS: Performed by: INTERNAL MEDICINE

## 2019-01-12 PROCEDURE — 2580000003 HC RX 258: Performed by: INTERNAL MEDICINE

## 2019-01-12 PROCEDURE — 82803 BLOOD GASES ANY COMBINATION: CPT

## 2019-01-12 PROCEDURE — 36415 COLL VENOUS BLD VENIPUNCTURE: CPT

## 2019-01-12 PROCEDURE — 6360000002 HC RX W HCPCS: Performed by: FAMILY MEDICINE

## 2019-01-12 PROCEDURE — 80048 BASIC METABOLIC PNL TOTAL CA: CPT

## 2019-01-12 PROCEDURE — 6370000000 HC RX 637 (ALT 250 FOR IP): Performed by: NURSE PRACTITIONER

## 2019-01-12 PROCEDURE — 6370000000 HC RX 637 (ALT 250 FOR IP): Performed by: INTERNAL MEDICINE

## 2019-01-12 PROCEDURE — 94640 AIRWAY INHALATION TREATMENT: CPT

## 2019-01-12 PROCEDURE — 94664 DEMO&/EVAL PT USE INHALER: CPT

## 2019-01-12 PROCEDURE — 36600 WITHDRAWAL OF ARTERIAL BLOOD: CPT

## 2019-01-12 RX ORDER — POTASSIUM CHLORIDE 20 MEQ/1
40 TABLET, EXTENDED RELEASE ORAL ONCE
Status: COMPLETED | OUTPATIENT
Start: 2019-01-12 | End: 2019-01-12

## 2019-01-12 RX ORDER — INSULIN GLARGINE 100 [IU]/ML
12 INJECTION, SOLUTION SUBCUTANEOUS 2 TIMES DAILY
Status: DISCONTINUED | OUTPATIENT
Start: 2019-01-12 | End: 2019-01-14

## 2019-01-12 RX ORDER — POTASSIUM CHLORIDE 7.45 MG/ML
10 INJECTION INTRAVENOUS
Status: COMPLETED | OUTPATIENT
Start: 2019-01-12 | End: 2019-01-12

## 2019-01-12 RX ADMIN — IPRATROPIUM BROMIDE AND ALBUTEROL SULFATE 1 AMPULE: 2.5; .5 SOLUTION RESPIRATORY (INHALATION) at 12:57

## 2019-01-12 RX ADMIN — POTASSIUM CHLORIDE 10 MEQ: 7.46 INJECTION, SOLUTION INTRAVENOUS at 20:01

## 2019-01-12 RX ADMIN — ATORVASTATIN CALCIUM 40 MG: 40 TABLET, FILM COATED ORAL at 09:06

## 2019-01-12 RX ADMIN — Medication 10 ML: at 09:12

## 2019-01-12 RX ADMIN — POTASSIUM CHLORIDE 10 MEQ: 7.46 INJECTION, SOLUTION INTRAVENOUS at 15:51

## 2019-01-12 RX ADMIN — RIVAROXABAN 20 MG: 20 TABLET, FILM COATED ORAL at 09:05

## 2019-01-12 RX ADMIN — MOMETASONE FUROATE AND FORMOTEROL FUMARATE DIHYDRATE 2 PUFF: 100; 5 AEROSOL RESPIRATORY (INHALATION) at 19:36

## 2019-01-12 RX ADMIN — POTASSIUM CHLORIDE 10 MEQ: 750 TABLET, EXTENDED RELEASE ORAL at 09:06

## 2019-01-12 RX ADMIN — IPRATROPIUM BROMIDE AND ALBUTEROL SULFATE 1 AMPULE: 2.5; .5 SOLUTION RESPIRATORY (INHALATION) at 08:46

## 2019-01-12 RX ADMIN — Medication 400 MG: at 20:59

## 2019-01-12 RX ADMIN — TORSEMIDE 40 MG: 20 TABLET ORAL at 09:06

## 2019-01-12 RX ADMIN — POTASSIUM CHLORIDE 10 MEQ: 7.46 INJECTION, SOLUTION INTRAVENOUS at 16:55

## 2019-01-12 RX ADMIN — TAMSULOSIN HYDROCHLORIDE 0.4 MG: 0.4 CAPSULE ORAL at 09:05

## 2019-01-12 RX ADMIN — TRAZODONE HYDROCHLORIDE 50 MG: 50 TABLET ORAL at 20:59

## 2019-01-12 RX ADMIN — OXYCODONE AND ACETAMINOPHEN 2 TABLET: 5; 325 TABLET ORAL at 03:22

## 2019-01-12 RX ADMIN — OXYCODONE AND ACETAMINOPHEN 2 TABLET: 5; 325 TABLET ORAL at 15:15

## 2019-01-12 RX ADMIN — Medication 400 MG: at 09:05

## 2019-01-12 RX ADMIN — INSULIN GLARGINE 12 UNITS: 100 INJECTION, SOLUTION SUBCUTANEOUS at 10:26

## 2019-01-12 RX ADMIN — INSULIN GLARGINE 12 UNITS: 100 INJECTION, SOLUTION SUBCUTANEOUS at 21:00

## 2019-01-12 RX ADMIN — ACETAZOLAMIDE 250 MG: 500 INJECTION, POWDER, LYOPHILIZED, FOR SOLUTION INTRAVENOUS at 21:01

## 2019-01-12 RX ADMIN — IPRATROPIUM BROMIDE AND ALBUTEROL SULFATE 1 AMPULE: 2.5; .5 SOLUTION RESPIRATORY (INHALATION) at 00:58

## 2019-01-12 RX ADMIN — INSULIN LISPRO 9 UNITS: 100 INJECTION, SOLUTION INTRAVENOUS; SUBCUTANEOUS at 16:54

## 2019-01-12 RX ADMIN — WATER 5 ML: 1 INJECTION INTRAMUSCULAR; INTRAVENOUS; SUBCUTANEOUS at 21:53

## 2019-01-12 RX ADMIN — GABAPENTIN 100 MG: 100 CAPSULE ORAL at 09:05

## 2019-01-12 RX ADMIN — METOPROLOL SUCCINATE 150 MG: 50 TABLET, EXTENDED RELEASE ORAL at 09:03

## 2019-01-12 RX ADMIN — IPRATROPIUM BROMIDE AND ALBUTEROL SULFATE 1 AMPULE: 2.5; .5 SOLUTION RESPIRATORY (INHALATION) at 19:36

## 2019-01-12 RX ADMIN — PANTOPRAZOLE SODIUM 40 MG: 40 TABLET, DELAYED RELEASE ORAL at 06:19

## 2019-01-12 RX ADMIN — DULOXETINE 60 MG: 60 CAPSULE, DELAYED RELEASE ORAL at 09:05

## 2019-01-12 RX ADMIN — OXYCODONE AND ACETAMINOPHEN 2 TABLET: 5; 325 TABLET ORAL at 09:03

## 2019-01-12 RX ADMIN — POTASSIUM CHLORIDE 40 MEQ: 20 TABLET, EXTENDED RELEASE ORAL at 16:54

## 2019-01-12 RX ADMIN — FERROUS SULFATE TAB EC 324 MG (65 MG FE EQUIVALENT) 324 MG: 324 (65 FE) TABLET DELAYED RESPONSE at 09:06

## 2019-01-12 RX ADMIN — MOMETASONE FUROATE AND FORMOTEROL FUMARATE DIHYDRATE 2 PUFF: 100; 5 AEROSOL RESPIRATORY (INHALATION) at 08:46

## 2019-01-12 RX ADMIN — ACETAZOLAMIDE 250 MG: 500 INJECTION, POWDER, LYOPHILIZED, FOR SOLUTION INTRAVENOUS at 15:12

## 2019-01-12 RX ADMIN — GABAPENTIN 100 MG: 100 CAPSULE ORAL at 20:59

## 2019-01-12 RX ADMIN — INSULIN LISPRO 0 UNITS: 100 INJECTION, SOLUTION INTRAVENOUS; SUBCUTANEOUS at 20:59

## 2019-01-12 RX ADMIN — INSULIN LISPRO 9 UNITS: 100 INJECTION, SOLUTION INTRAVENOUS; SUBCUTANEOUS at 12:54

## 2019-01-12 RX ADMIN — ACETAZOLAMIDE 250 MG: 500 INJECTION, POWDER, LYOPHILIZED, FOR SOLUTION INTRAVENOUS at 09:05

## 2019-01-12 RX ADMIN — GABAPENTIN 100 MG: 100 CAPSULE ORAL at 15:12

## 2019-01-12 RX ADMIN — OXYCODONE AND ACETAMINOPHEN 2 TABLET: 5; 325 TABLET ORAL at 21:08

## 2019-01-12 RX ADMIN — METOLAZONE 5 MG: 5 TABLET ORAL at 09:05

## 2019-01-12 RX ADMIN — DILTIAZEM HYDROCHLORIDE 240 MG: 240 CAPSULE, COATED, EXTENDED RELEASE ORAL at 09:04

## 2019-01-12 RX ADMIN — INSULIN LISPRO 9 UNITS: 100 INJECTION, SOLUTION INTRAVENOUS; SUBCUTANEOUS at 09:13

## 2019-01-12 ASSESSMENT — PAIN SCALES - GENERAL
PAINLEVEL_OUTOF10: 8
PAINLEVEL_OUTOF10: 6
PAINLEVEL_OUTOF10: 3
PAINLEVEL_OUTOF10: 8
PAINLEVEL_OUTOF10: 6
PAINLEVEL_OUTOF10: 8

## 2019-01-12 ASSESSMENT — PAIN DESCRIPTION - LOCATION: LOCATION: BACK

## 2019-01-12 ASSESSMENT — PAIN DESCRIPTION - PAIN TYPE: TYPE: CHRONIC PAIN

## 2019-01-13 LAB
ANION GAP SERPL CALCULATED.3IONS-SCNC: 9 MMOL/L (ref 3–16)
BUN BLDV-MCNC: 33 MG/DL (ref 7–20)
CALCIUM SERPL-MCNC: 9.7 MG/DL (ref 8.3–10.6)
CHLORIDE BLD-SCNC: 80 MMOL/L (ref 99–110)
CO2: 48 MMOL/L (ref 21–32)
CREAT SERPL-MCNC: 1.3 MG/DL (ref 0.9–1.3)
GFR AFRICAN AMERICAN: >60
GFR NON-AFRICAN AMERICAN: 57
GLUCOSE BLD-MCNC: 191 MG/DL (ref 70–99)
GLUCOSE BLD-MCNC: 213 MG/DL (ref 70–99)
GLUCOSE BLD-MCNC: 232 MG/DL (ref 70–99)
GLUCOSE BLD-MCNC: 235 MG/DL (ref 70–99)
GLUCOSE BLD-MCNC: 249 MG/DL (ref 70–99)
PERFORMED ON: ABNORMAL
POTASSIUM SERPL-SCNC: 2.9 MMOL/L (ref 3.5–5.1)
SODIUM BLD-SCNC: 137 MMOL/L (ref 136–145)

## 2019-01-13 PROCEDURE — 6360000002 HC RX W HCPCS: Performed by: FAMILY MEDICINE

## 2019-01-13 PROCEDURE — 2580000003 HC RX 258: Performed by: NURSE PRACTITIONER

## 2019-01-13 PROCEDURE — 2580000003 HC RX 258: Performed by: INTERNAL MEDICINE

## 2019-01-13 PROCEDURE — 6370000000 HC RX 637 (ALT 250 FOR IP): Performed by: FAMILY MEDICINE

## 2019-01-13 PROCEDURE — 1200000000 HC SEMI PRIVATE

## 2019-01-13 PROCEDURE — 6370000000 HC RX 637 (ALT 250 FOR IP): Performed by: NURSE PRACTITIONER

## 2019-01-13 PROCEDURE — 80048 BASIC METABOLIC PNL TOTAL CA: CPT

## 2019-01-13 PROCEDURE — 94640 AIRWAY INHALATION TREATMENT: CPT

## 2019-01-13 PROCEDURE — 94760 N-INVAS EAR/PLS OXIMETRY 1: CPT

## 2019-01-13 PROCEDURE — 6370000000 HC RX 637 (ALT 250 FOR IP): Performed by: INTERNAL MEDICINE

## 2019-01-13 PROCEDURE — 6360000002 HC RX W HCPCS: Performed by: INTERNAL MEDICINE

## 2019-01-13 PROCEDURE — 2700000000 HC OXYGEN THERAPY PER DAY

## 2019-01-13 PROCEDURE — 36415 COLL VENOUS BLD VENIPUNCTURE: CPT

## 2019-01-13 PROCEDURE — 99232 SBSQ HOSP IP/OBS MODERATE 35: CPT | Performed by: INTERNAL MEDICINE

## 2019-01-13 RX ORDER — POTASSIUM CHLORIDE 20 MEQ/1
40 TABLET, EXTENDED RELEASE ORAL ONCE
Status: COMPLETED | OUTPATIENT
Start: 2019-01-13 | End: 2019-01-13

## 2019-01-13 RX ORDER — POTASSIUM CHLORIDE 7.45 MG/ML
10 INJECTION INTRAVENOUS
Status: COMPLETED | OUTPATIENT
Start: 2019-01-13 | End: 2019-01-13

## 2019-01-13 RX ORDER — SODIUM CHLORIDE 9 MG/ML
INJECTION, SOLUTION INTRAVENOUS ONCE
Status: COMPLETED | OUTPATIENT
Start: 2019-01-13 | End: 2019-01-13

## 2019-01-13 RX ORDER — NICOTINE 21 MG/24HR
1 PATCH, TRANSDERMAL 24 HOURS TRANSDERMAL DAILY PRN
Status: DISCONTINUED | OUTPATIENT
Start: 2019-01-13 | End: 2019-01-16 | Stop reason: HOSPADM

## 2019-01-13 RX ADMIN — INSULIN LISPRO 7 UNITS: 100 INJECTION, SOLUTION INTRAVENOUS; SUBCUTANEOUS at 17:32

## 2019-01-13 RX ADMIN — WATER 5 ML: 1 INJECTION INTRAMUSCULAR; INTRAVENOUS; SUBCUTANEOUS at 13:20

## 2019-01-13 RX ADMIN — INSULIN LISPRO 7 UNITS: 100 INJECTION, SOLUTION INTRAVENOUS; SUBCUTANEOUS at 13:26

## 2019-01-13 RX ADMIN — MOMETASONE FUROATE AND FORMOTEROL FUMARATE DIHYDRATE 2 PUFF: 100; 5 AEROSOL RESPIRATORY (INHALATION) at 20:11

## 2019-01-13 RX ADMIN — METOPROLOL SUCCINATE 150 MG: 50 TABLET, EXTENDED RELEASE ORAL at 09:03

## 2019-01-13 RX ADMIN — TRAZODONE HYDROCHLORIDE 50 MG: 50 TABLET ORAL at 21:36

## 2019-01-13 RX ADMIN — DILTIAZEM HYDROCHLORIDE 240 MG: 240 CAPSULE, COATED, EXTENDED RELEASE ORAL at 09:03

## 2019-01-13 RX ADMIN — POTASSIUM CHLORIDE 10 MEQ: 750 TABLET, EXTENDED RELEASE ORAL at 09:03

## 2019-01-13 RX ADMIN — FERROUS SULFATE TAB EC 324 MG (65 MG FE EQUIVALENT) 324 MG: 324 (65 FE) TABLET DELAYED RESPONSE at 09:04

## 2019-01-13 RX ADMIN — Medication 10 MEQ: at 11:03

## 2019-01-13 RX ADMIN — OXYCODONE AND ACETAMINOPHEN 2 TABLET: 5; 325 TABLET ORAL at 04:53

## 2019-01-13 RX ADMIN — IPRATROPIUM BROMIDE AND ALBUTEROL SULFATE 1 AMPULE: 2.5; .5 SOLUTION RESPIRATORY (INHALATION) at 08:37

## 2019-01-13 RX ADMIN — INSULIN LISPRO 6 UNITS: 100 INJECTION, SOLUTION INTRAVENOUS; SUBCUTANEOUS at 09:05

## 2019-01-13 RX ADMIN — IPRATROPIUM BROMIDE AND ALBUTEROL SULFATE 1 AMPULE: 2.5; .5 SOLUTION RESPIRATORY (INHALATION) at 20:11

## 2019-01-13 RX ADMIN — IPRATROPIUM BROMIDE AND ALBUTEROL SULFATE 1 AMPULE: 2.5; .5 SOLUTION RESPIRATORY (INHALATION) at 01:42

## 2019-01-13 RX ADMIN — OXYCODONE AND ACETAMINOPHEN 2 TABLET: 5; 325 TABLET ORAL at 11:03

## 2019-01-13 RX ADMIN — ACETAZOLAMIDE 250 MG: 500 INJECTION, POWDER, LYOPHILIZED, FOR SOLUTION INTRAVENOUS at 02:50

## 2019-01-13 RX ADMIN — INSULIN LISPRO 6 UNITS: 100 INJECTION, SOLUTION INTRAVENOUS; SUBCUTANEOUS at 17:32

## 2019-01-13 RX ADMIN — ATORVASTATIN CALCIUM 40 MG: 40 TABLET, FILM COATED ORAL at 09:03

## 2019-01-13 RX ADMIN — WATER 5 ML: 1 INJECTION INTRAMUSCULAR; INTRAVENOUS; SUBCUTANEOUS at 09:19

## 2019-01-13 RX ADMIN — Medication 10 MEQ: at 16:00

## 2019-01-13 RX ADMIN — Medication 10 MEQ: at 13:19

## 2019-01-13 RX ADMIN — GABAPENTIN 100 MG: 100 CAPSULE ORAL at 09:03

## 2019-01-13 RX ADMIN — INSULIN LISPRO 6 UNITS: 100 INJECTION, SOLUTION INTRAVENOUS; SUBCUTANEOUS at 13:25

## 2019-01-13 RX ADMIN — ACETAZOLAMIDE 250 MG: 500 INJECTION, POWDER, LYOPHILIZED, FOR SOLUTION INTRAVENOUS at 13:19

## 2019-01-13 RX ADMIN — INSULIN GLARGINE 12 UNITS: 100 INJECTION, SOLUTION SUBCUTANEOUS at 09:04

## 2019-01-13 RX ADMIN — IPRATROPIUM BROMIDE AND ALBUTEROL SULFATE 1 AMPULE: 2.5; .5 SOLUTION RESPIRATORY (INHALATION) at 13:41

## 2019-01-13 RX ADMIN — Medication 10 MEQ: at 12:08

## 2019-01-13 RX ADMIN — RIVAROXABAN 20 MG: 20 TABLET, FILM COATED ORAL at 09:03

## 2019-01-13 RX ADMIN — TAMSULOSIN HYDROCHLORIDE 0.4 MG: 0.4 CAPSULE ORAL at 09:03

## 2019-01-13 RX ADMIN — GABAPENTIN 100 MG: 100 CAPSULE ORAL at 13:19

## 2019-01-13 RX ADMIN — INSULIN GLARGINE 12 UNITS: 100 INJECTION, SOLUTION SUBCUTANEOUS at 21:37

## 2019-01-13 RX ADMIN — Medication 400 MG: at 09:03

## 2019-01-13 RX ADMIN — Medication 400 MG: at 21:36

## 2019-01-13 RX ADMIN — Medication 10 ML: at 09:03

## 2019-01-13 RX ADMIN — DULOXETINE 60 MG: 60 CAPSULE, DELAYED RELEASE ORAL at 09:03

## 2019-01-13 RX ADMIN — PANTOPRAZOLE SODIUM 40 MG: 40 TABLET, DELAYED RELEASE ORAL at 05:07

## 2019-01-13 RX ADMIN — INSULIN LISPRO 2 UNITS: 100 INJECTION, SOLUTION INTRAVENOUS; SUBCUTANEOUS at 21:37

## 2019-01-13 RX ADMIN — SODIUM CHLORIDE: 9 INJECTION, SOLUTION INTRAVENOUS at 17:27

## 2019-01-13 RX ADMIN — OXYCODONE AND ACETAMINOPHEN 2 TABLET: 5; 325 TABLET ORAL at 21:36

## 2019-01-13 RX ADMIN — GABAPENTIN 100 MG: 100 CAPSULE ORAL at 21:36

## 2019-01-13 RX ADMIN — METOLAZONE 5 MG: 5 TABLET ORAL at 09:03

## 2019-01-13 RX ADMIN — POTASSIUM CHLORIDE 40 MEQ: 20 TABLET, EXTENDED RELEASE ORAL at 11:03

## 2019-01-13 RX ADMIN — MOMETASONE FUROATE AND FORMOTEROL FUMARATE DIHYDRATE 2 PUFF: 100; 5 AEROSOL RESPIRATORY (INHALATION) at 08:37

## 2019-01-13 ASSESSMENT — PAIN SCALES - GENERAL
PAINLEVEL_OUTOF10: 0
PAINLEVEL_OUTOF10: 7
PAINLEVEL_OUTOF10: 0
PAINLEVEL_OUTOF10: 8
PAINLEVEL_OUTOF10: 10
PAINLEVEL_OUTOF10: 0
PAINLEVEL_OUTOF10: 8

## 2019-01-14 LAB
ANION GAP SERPL CALCULATED.3IONS-SCNC: 11 MMOL/L (ref 3–16)
BASE EXCESS ARTERIAL: 13.1 MMOL/L (ref -3–3)
BUN BLDV-MCNC: 26 MG/DL (ref 7–20)
CALCIUM SERPL-MCNC: 8.8 MG/DL (ref 8.3–10.6)
CARBOXYHEMOGLOBIN ARTERIAL: 1.8 % (ref 0–1.5)
CHLORIDE BLD-SCNC: 88 MMOL/L (ref 99–110)
CO2: 39 MMOL/L (ref 21–32)
CREAT SERPL-MCNC: 1.1 MG/DL (ref 0.9–1.3)
GFR AFRICAN AMERICAN: >60
GFR NON-AFRICAN AMERICAN: >60
GLUCOSE BLD-MCNC: 174 MG/DL (ref 70–99)
GLUCOSE BLD-MCNC: 207 MG/DL (ref 70–99)
GLUCOSE BLD-MCNC: 266 MG/DL (ref 70–99)
GLUCOSE BLD-MCNC: 272 MG/DL (ref 70–99)
GLUCOSE BLD-MCNC: 277 MG/DL (ref 70–99)
HCO3 ARTERIAL: 40.9 MMOL/L (ref 21–29)
HEMOGLOBIN, ART, EXTENDED: 10.7 G/DL (ref 13.5–17.5)
METHEMOGLOBIN ARTERIAL: 1 %
O2 CONTENT ARTERIAL: 14 ML/DL
O2 SAT, ARTERIAL: 95.3 %
O2 THERAPY: ABNORMAL
PCO2 ARTERIAL: 74.9 MMHG (ref 35–45)
PERFORMED ON: ABNORMAL
PH ARTERIAL: 7.36 (ref 7.35–7.45)
PO2 ARTERIAL: 75.2 MMHG (ref 75–108)
POTASSIUM REFLEX MAGNESIUM: 3.2 MMOL/L (ref 3.5–5.1)
POTASSIUM SERPL-SCNC: 3 MMOL/L (ref 3.5–5.1)
SODIUM BLD-SCNC: 138 MMOL/L (ref 136–145)
TCO2 ARTERIAL: 43.2 MMOL/L

## 2019-01-14 PROCEDURE — 84132 ASSAY OF SERUM POTASSIUM: CPT

## 2019-01-14 PROCEDURE — 97166 OT EVAL MOD COMPLEX 45 MIN: CPT

## 2019-01-14 PROCEDURE — 6370000000 HC RX 637 (ALT 250 FOR IP): Performed by: NURSE PRACTITIONER

## 2019-01-14 PROCEDURE — 36600 WITHDRAWAL OF ARTERIAL BLOOD: CPT

## 2019-01-14 PROCEDURE — 83735 ASSAY OF MAGNESIUM: CPT

## 2019-01-14 PROCEDURE — 94640 AIRWAY INHALATION TREATMENT: CPT

## 2019-01-14 PROCEDURE — 80048 BASIC METABOLIC PNL TOTAL CA: CPT

## 2019-01-14 PROCEDURE — 6370000000 HC RX 637 (ALT 250 FOR IP): Performed by: FAMILY MEDICINE

## 2019-01-14 PROCEDURE — 2580000003 HC RX 258: Performed by: NURSE PRACTITIONER

## 2019-01-14 PROCEDURE — 6360000002 HC RX W HCPCS: Performed by: HOSPITALIST

## 2019-01-14 PROCEDURE — 2700000000 HC OXYGEN THERAPY PER DAY

## 2019-01-14 PROCEDURE — 94760 N-INVAS EAR/PLS OXIMETRY 1: CPT

## 2019-01-14 PROCEDURE — 97530 THERAPEUTIC ACTIVITIES: CPT

## 2019-01-14 PROCEDURE — 97535 SELF CARE MNGMENT TRAINING: CPT

## 2019-01-14 PROCEDURE — G8987 SELF CARE CURRENT STATUS: HCPCS

## 2019-01-14 PROCEDURE — 1200000000 HC SEMI PRIVATE

## 2019-01-14 PROCEDURE — 6370000000 HC RX 637 (ALT 250 FOR IP): Performed by: PHYSICIAN ASSISTANT

## 2019-01-14 PROCEDURE — G8988 SELF CARE GOAL STATUS: HCPCS

## 2019-01-14 PROCEDURE — G8978 MOBILITY CURRENT STATUS: HCPCS

## 2019-01-14 PROCEDURE — 97162 PT EVAL MOD COMPLEX 30 MIN: CPT

## 2019-01-14 PROCEDURE — 36415 COLL VENOUS BLD VENIPUNCTURE: CPT

## 2019-01-14 PROCEDURE — 6370000000 HC RX 637 (ALT 250 FOR IP): Performed by: INTERNAL MEDICINE

## 2019-01-14 PROCEDURE — 6370000000 HC RX 637 (ALT 250 FOR IP): Performed by: HOSPITALIST

## 2019-01-14 PROCEDURE — 82803 BLOOD GASES ANY COMBINATION: CPT

## 2019-01-14 PROCEDURE — 99231 SBSQ HOSP IP/OBS SF/LOW 25: CPT | Performed by: INTERNAL MEDICINE

## 2019-01-14 PROCEDURE — G8979 MOBILITY GOAL STATUS: HCPCS

## 2019-01-14 RX ORDER — POTASSIUM CHLORIDE 7.45 MG/ML
10 INJECTION INTRAVENOUS PRN
Status: DISCONTINUED | OUTPATIENT
Start: 2019-01-14 | End: 2019-01-15

## 2019-01-14 RX ORDER — GUAIFENESIN 100 MG/5ML
200 SOLUTION ORAL EVERY 4 HOURS PRN
Status: DISCONTINUED | OUTPATIENT
Start: 2019-01-14 | End: 2019-01-16 | Stop reason: HOSPADM

## 2019-01-14 RX ORDER — POTASSIUM CHLORIDE 20 MEQ/1
40 TABLET, EXTENDED RELEASE ORAL PRN
Status: DISCONTINUED | OUTPATIENT
Start: 2019-01-14 | End: 2019-01-15

## 2019-01-14 RX ORDER — POTASSIUM CHLORIDE 1.5 G/1.77G
40 POWDER, FOR SOLUTION ORAL PRN
Status: DISCONTINUED | OUTPATIENT
Start: 2019-01-14 | End: 2019-01-15

## 2019-01-14 RX ORDER — INSULIN GLARGINE 100 [IU]/ML
15 INJECTION, SOLUTION SUBCUTANEOUS 2 TIMES DAILY
Status: DISCONTINUED | OUTPATIENT
Start: 2019-01-14 | End: 2019-01-16 | Stop reason: HOSPADM

## 2019-01-14 RX ADMIN — POTASSIUM CHLORIDE 10 MEQ: 7.46 INJECTION, SOLUTION INTRAVENOUS at 18:29

## 2019-01-14 RX ADMIN — GABAPENTIN 100 MG: 100 CAPSULE ORAL at 14:01

## 2019-01-14 RX ADMIN — DILTIAZEM HYDROCHLORIDE 240 MG: 240 CAPSULE, COATED, EXTENDED RELEASE ORAL at 09:21

## 2019-01-14 RX ADMIN — TRAZODONE HYDROCHLORIDE 50 MG: 50 TABLET ORAL at 22:04

## 2019-01-14 RX ADMIN — METOPROLOL SUCCINATE 150 MG: 50 TABLET, EXTENDED RELEASE ORAL at 09:20

## 2019-01-14 RX ADMIN — RIVAROXABAN 20 MG: 20 TABLET, FILM COATED ORAL at 09:31

## 2019-01-14 RX ADMIN — FERROUS SULFATE TAB EC 324 MG (65 MG FE EQUIVALENT) 324 MG: 324 (65 FE) TABLET DELAYED RESPONSE at 09:31

## 2019-01-14 RX ADMIN — MOMETASONE FUROATE AND FORMOTEROL FUMARATE DIHYDRATE 2 PUFF: 100; 5 AEROSOL RESPIRATORY (INHALATION) at 08:04

## 2019-01-14 RX ADMIN — OXYCODONE AND ACETAMINOPHEN 2 TABLET: 5; 325 TABLET ORAL at 14:09

## 2019-01-14 RX ADMIN — DULOXETINE 60 MG: 60 CAPSULE, DELAYED RELEASE ORAL at 09:20

## 2019-01-14 RX ADMIN — INSULIN LISPRO 7 UNITS: 100 INJECTION, SOLUTION INTRAVENOUS; SUBCUTANEOUS at 18:33

## 2019-01-14 RX ADMIN — Medication 10 ML: at 22:08

## 2019-01-14 RX ADMIN — GUAIFENESIN 200 MG: 200 SOLUTION ORAL at 22:06

## 2019-01-14 RX ADMIN — INSULIN LISPRO 7 UNITS: 100 INJECTION, SOLUTION INTRAVENOUS; SUBCUTANEOUS at 14:02

## 2019-01-14 RX ADMIN — OXYCODONE AND ACETAMINOPHEN 2 TABLET: 5; 325 TABLET ORAL at 22:05

## 2019-01-14 RX ADMIN — POTASSIUM CHLORIDE 10 MEQ: 7.46 INJECTION, SOLUTION INTRAVENOUS at 17:07

## 2019-01-14 RX ADMIN — MOMETASONE FUROATE AND FORMOTEROL FUMARATE DIHYDRATE 2 PUFF: 100; 5 AEROSOL RESPIRATORY (INHALATION) at 20:01

## 2019-01-14 RX ADMIN — INSULIN GLARGINE 15 UNITS: 100 INJECTION, SOLUTION SUBCUTANEOUS at 22:03

## 2019-01-14 RX ADMIN — POTASSIUM CHLORIDE 10 MEQ: 7.46 INJECTION, SOLUTION INTRAVENOUS at 15:04

## 2019-01-14 RX ADMIN — GABAPENTIN 100 MG: 100 CAPSULE ORAL at 22:05

## 2019-01-14 RX ADMIN — Medication 400 MG: at 09:20

## 2019-01-14 RX ADMIN — INSULIN LISPRO 9 UNITS: 100 INJECTION, SOLUTION INTRAVENOUS; SUBCUTANEOUS at 09:21

## 2019-01-14 RX ADMIN — INSULIN LISPRO 7 UNITS: 100 INJECTION, SOLUTION INTRAVENOUS; SUBCUTANEOUS at 09:22

## 2019-01-14 RX ADMIN — INSULIN LISPRO 3 UNITS: 100 INJECTION, SOLUTION INTRAVENOUS; SUBCUTANEOUS at 18:30

## 2019-01-14 RX ADMIN — OXYCODONE AND ACETAMINOPHEN 2 TABLET: 5; 325 TABLET ORAL at 06:24

## 2019-01-14 RX ADMIN — INSULIN LISPRO 3 UNITS: 100 INJECTION, SOLUTION INTRAVENOUS; SUBCUTANEOUS at 22:04

## 2019-01-14 RX ADMIN — INSULIN LISPRO 9 UNITS: 100 INJECTION, SOLUTION INTRAVENOUS; SUBCUTANEOUS at 14:01

## 2019-01-14 RX ADMIN — POTASSIUM CHLORIDE 10 MEQ: 7.46 INJECTION, SOLUTION INTRAVENOUS at 12:47

## 2019-01-14 RX ADMIN — IPRATROPIUM BROMIDE AND ALBUTEROL SULFATE 1 AMPULE: 2.5; .5 SOLUTION RESPIRATORY (INHALATION) at 20:01

## 2019-01-14 RX ADMIN — POTASSIUM CHLORIDE 10 MEQ: 750 TABLET, EXTENDED RELEASE ORAL at 09:21

## 2019-01-14 RX ADMIN — POTASSIUM CHLORIDE 10 MEQ: 7.46 INJECTION, SOLUTION INTRAVENOUS at 16:06

## 2019-01-14 RX ADMIN — GABAPENTIN 100 MG: 100 CAPSULE ORAL at 09:20

## 2019-01-14 RX ADMIN — IPRATROPIUM BROMIDE AND ALBUTEROL SULFATE 1 AMPULE: 2.5; .5 SOLUTION RESPIRATORY (INHALATION) at 08:04

## 2019-01-14 RX ADMIN — POTASSIUM CHLORIDE 10 MEQ: 7.46 INJECTION, SOLUTION INTRAVENOUS at 14:01

## 2019-01-14 RX ADMIN — ATORVASTATIN CALCIUM 40 MG: 40 TABLET, FILM COATED ORAL at 09:21

## 2019-01-14 RX ADMIN — INSULIN GLARGINE 12 UNITS: 100 INJECTION, SOLUTION SUBCUTANEOUS at 09:27

## 2019-01-14 RX ADMIN — Medication 400 MG: at 22:05

## 2019-01-14 RX ADMIN — TAMSULOSIN HYDROCHLORIDE 0.4 MG: 0.4 CAPSULE ORAL at 09:20

## 2019-01-14 RX ADMIN — PANTOPRAZOLE SODIUM 40 MG: 40 TABLET, DELAYED RELEASE ORAL at 06:24

## 2019-01-14 ASSESSMENT — PAIN SCALES - GENERAL
PAINLEVEL_OUTOF10: 7
PAINLEVEL_OUTOF10: 8
PAINLEVEL_OUTOF10: 7
PAINLEVEL_OUTOF10: 8

## 2019-01-14 ASSESSMENT — PAIN DESCRIPTION - LOCATION: LOCATION: BACK

## 2019-01-14 ASSESSMENT — PAIN DESCRIPTION - PAIN TYPE: TYPE: CHRONIC PAIN

## 2019-01-15 LAB
ANION GAP SERPL CALCULATED.3IONS-SCNC: 6 MMOL/L (ref 3–16)
BUN BLDV-MCNC: 21 MG/DL (ref 7–20)
CALCIUM SERPL-MCNC: 9 MG/DL (ref 8.3–10.6)
CHLORIDE BLD-SCNC: 93 MMOL/L (ref 99–110)
CO2: 40 MMOL/L (ref 21–32)
CREAT SERPL-MCNC: 0.9 MG/DL (ref 0.9–1.3)
GFR AFRICAN AMERICAN: >60
GFR NON-AFRICAN AMERICAN: >60
GLUCOSE BLD-MCNC: 211 MG/DL (ref 70–99)
GLUCOSE BLD-MCNC: 232 MG/DL (ref 70–99)
GLUCOSE BLD-MCNC: 252 MG/DL (ref 70–99)
GLUCOSE BLD-MCNC: 264 MG/DL (ref 70–99)
GLUCOSE BLD-MCNC: 268 MG/DL (ref 70–99)
MAGNESIUM: 2.1 MG/DL (ref 1.8–2.4)
PERFORMED ON: ABNORMAL
POTASSIUM SERPL-SCNC: 3.2 MMOL/L (ref 3.5–5.1)
SODIUM BLD-SCNC: 139 MMOL/L (ref 136–145)

## 2019-01-15 PROCEDURE — 6370000000 HC RX 637 (ALT 250 FOR IP): Performed by: NURSE PRACTITIONER

## 2019-01-15 PROCEDURE — 94761 N-INVAS EAR/PLS OXIMETRY MLT: CPT

## 2019-01-15 PROCEDURE — 6370000000 HC RX 637 (ALT 250 FOR IP): Performed by: FAMILY MEDICINE

## 2019-01-15 PROCEDURE — 2700000000 HC OXYGEN THERAPY PER DAY

## 2019-01-15 PROCEDURE — 36415 COLL VENOUS BLD VENIPUNCTURE: CPT

## 2019-01-15 PROCEDURE — 6370000000 HC RX 637 (ALT 250 FOR IP): Performed by: HOSPITALIST

## 2019-01-15 PROCEDURE — 2580000003 HC RX 258: Performed by: NURSE PRACTITIONER

## 2019-01-15 PROCEDURE — 80048 BASIC METABOLIC PNL TOTAL CA: CPT

## 2019-01-15 PROCEDURE — 1200000000 HC SEMI PRIVATE

## 2019-01-15 PROCEDURE — 94664 DEMO&/EVAL PT USE INHALER: CPT

## 2019-01-15 PROCEDURE — 94640 AIRWAY INHALATION TREATMENT: CPT

## 2019-01-15 PROCEDURE — 94760 N-INVAS EAR/PLS OXIMETRY 1: CPT

## 2019-01-15 PROCEDURE — 6370000000 HC RX 637 (ALT 250 FOR IP): Performed by: INTERNAL MEDICINE

## 2019-01-15 RX ORDER — POTASSIUM CHLORIDE 20 MEQ/1
40 TABLET, EXTENDED RELEASE ORAL ONCE
Status: COMPLETED | OUTPATIENT
Start: 2019-01-15 | End: 2019-01-15

## 2019-01-15 RX ORDER — POTASSIUM CHLORIDE 20 MEQ/1
20 TABLET, EXTENDED RELEASE ORAL DAILY
Qty: 30 TABLET | Refills: 1 | Status: ON HOLD | OUTPATIENT
Start: 2019-01-15 | End: 2019-02-12

## 2019-01-15 RX ORDER — INSULIN GLARGINE 100 [IU]/ML
15 INJECTION, SOLUTION SUBCUTANEOUS 2 TIMES DAILY
Qty: 1 VIAL | Refills: 3 | Status: ON HOLD | OUTPATIENT
Start: 2019-01-15 | End: 2019-02-19

## 2019-01-15 RX ORDER — TORSEMIDE 20 MG/1
20 TABLET ORAL DAILY
Qty: 30 TABLET | Refills: 3 | Status: ON HOLD | OUTPATIENT
Start: 2019-01-15 | End: 2019-02-12

## 2019-01-15 RX ADMIN — GABAPENTIN 100 MG: 100 CAPSULE ORAL at 21:56

## 2019-01-15 RX ADMIN — Medication 400 MG: at 09:01

## 2019-01-15 RX ADMIN — INSULIN LISPRO 7 UNITS: 100 INJECTION, SOLUTION INTRAVENOUS; SUBCUTANEOUS at 12:37

## 2019-01-15 RX ADMIN — GABAPENTIN 100 MG: 100 CAPSULE ORAL at 17:58

## 2019-01-15 RX ADMIN — MOMETASONE FUROATE AND FORMOTEROL FUMARATE DIHYDRATE 2 PUFF: 100; 5 AEROSOL RESPIRATORY (INHALATION) at 19:44

## 2019-01-15 RX ADMIN — DULOXETINE 60 MG: 60 CAPSULE, DELAYED RELEASE ORAL at 09:01

## 2019-01-15 RX ADMIN — INSULIN GLARGINE 15 UNITS: 100 INJECTION, SOLUTION SUBCUTANEOUS at 12:33

## 2019-01-15 RX ADMIN — OXYCODONE AND ACETAMINOPHEN 2 TABLET: 5; 325 TABLET ORAL at 19:05

## 2019-01-15 RX ADMIN — Medication 10 ML: at 21:56

## 2019-01-15 RX ADMIN — INSULIN LISPRO 7 UNITS: 100 INJECTION, SOLUTION INTRAVENOUS; SUBCUTANEOUS at 18:08

## 2019-01-15 RX ADMIN — INSULIN LISPRO 9 UNITS: 100 INJECTION, SOLUTION INTRAVENOUS; SUBCUTANEOUS at 12:32

## 2019-01-15 RX ADMIN — Medication 400 MG: at 21:56

## 2019-01-15 RX ADMIN — INSULIN GLARGINE 15 UNITS: 100 INJECTION, SOLUTION SUBCUTANEOUS at 21:56

## 2019-01-15 RX ADMIN — Medication 10 ML: at 09:02

## 2019-01-15 RX ADMIN — METOPROLOL SUCCINATE 150 MG: 50 TABLET, EXTENDED RELEASE ORAL at 09:02

## 2019-01-15 RX ADMIN — INSULIN LISPRO 5 UNITS: 100 INJECTION, SOLUTION INTRAVENOUS; SUBCUTANEOUS at 21:57

## 2019-01-15 RX ADMIN — INSULIN LISPRO 6 UNITS: 100 INJECTION, SOLUTION INTRAVENOUS; SUBCUTANEOUS at 17:58

## 2019-01-15 RX ADMIN — IPRATROPIUM BROMIDE AND ALBUTEROL SULFATE 1 AMPULE: 2.5; .5 SOLUTION RESPIRATORY (INHALATION) at 19:44

## 2019-01-15 RX ADMIN — RIVAROXABAN 20 MG: 20 TABLET, FILM COATED ORAL at 09:01

## 2019-01-15 RX ADMIN — IPRATROPIUM BROMIDE AND ALBUTEROL SULFATE 1 AMPULE: 2.5; .5 SOLUTION RESPIRATORY (INHALATION) at 07:15

## 2019-01-15 RX ADMIN — INSULIN LISPRO 7 UNITS: 100 INJECTION, SOLUTION INTRAVENOUS; SUBCUTANEOUS at 09:09

## 2019-01-15 RX ADMIN — TRAZODONE HYDROCHLORIDE 50 MG: 50 TABLET ORAL at 21:56

## 2019-01-15 RX ADMIN — ATORVASTATIN CALCIUM 40 MG: 40 TABLET, FILM COATED ORAL at 09:02

## 2019-01-15 RX ADMIN — FERROUS SULFATE TAB EC 324 MG (65 MG FE EQUIVALENT) 324 MG: 324 (65 FE) TABLET DELAYED RESPONSE at 09:01

## 2019-01-15 RX ADMIN — TAMSULOSIN HYDROCHLORIDE 0.4 MG: 0.4 CAPSULE ORAL at 09:01

## 2019-01-15 RX ADMIN — OXYCODONE AND ACETAMINOPHEN 2 TABLET: 5; 325 TABLET ORAL at 09:25

## 2019-01-15 RX ADMIN — DILTIAZEM HYDROCHLORIDE 240 MG: 240 CAPSULE, COATED, EXTENDED RELEASE ORAL at 09:01

## 2019-01-15 RX ADMIN — MOMETASONE FUROATE AND FORMOTEROL FUMARATE DIHYDRATE 2 PUFF: 100; 5 AEROSOL RESPIRATORY (INHALATION) at 07:25

## 2019-01-15 RX ADMIN — IPRATROPIUM BROMIDE AND ALBUTEROL SULFATE 1 AMPULE: 2.5; .5 SOLUTION RESPIRATORY (INHALATION) at 12:45

## 2019-01-15 RX ADMIN — GABAPENTIN 100 MG: 100 CAPSULE ORAL at 09:01

## 2019-01-15 RX ADMIN — IPRATROPIUM BROMIDE AND ALBUTEROL SULFATE 1 AMPULE: 2.5; .5 SOLUTION RESPIRATORY (INHALATION) at 01:08

## 2019-01-15 RX ADMIN — POTASSIUM CHLORIDE 40 MEQ: 20 TABLET, EXTENDED RELEASE ORAL at 09:02

## 2019-01-15 RX ADMIN — PANTOPRAZOLE SODIUM 40 MG: 40 TABLET, DELAYED RELEASE ORAL at 06:33

## 2019-01-15 RX ADMIN — INSULIN LISPRO 6 UNITS: 100 INJECTION, SOLUTION INTRAVENOUS; SUBCUTANEOUS at 09:09

## 2019-01-15 ASSESSMENT — PAIN SCALES - GENERAL
PAINLEVEL_OUTOF10: 8
PAINLEVEL_OUTOF10: 8
PAINLEVEL_OUTOF10: 6

## 2019-01-16 VITALS
OXYGEN SATURATION: 95 % | WEIGHT: 231.7 LBS | HEART RATE: 71 BPM | DIASTOLIC BLOOD PRESSURE: 69 MMHG | RESPIRATION RATE: 20 BRPM | BODY MASS INDEX: 38.6 KG/M2 | HEIGHT: 65 IN | SYSTOLIC BLOOD PRESSURE: 123 MMHG | TEMPERATURE: 98.5 F

## 2019-01-16 LAB
ANION GAP SERPL CALCULATED.3IONS-SCNC: 10 MMOL/L (ref 3–16)
BUN BLDV-MCNC: 17 MG/DL (ref 7–20)
CALCIUM SERPL-MCNC: 8.8 MG/DL (ref 8.3–10.6)
CHLORIDE BLD-SCNC: 93 MMOL/L (ref 99–110)
CO2: 37 MMOL/L (ref 21–32)
CREAT SERPL-MCNC: 0.7 MG/DL (ref 0.9–1.3)
GFR AFRICAN AMERICAN: >60
GFR NON-AFRICAN AMERICAN: >60
GLUCOSE BLD-MCNC: 229 MG/DL (ref 70–99)
GLUCOSE BLD-MCNC: 235 MG/DL (ref 70–99)
GLUCOSE BLD-MCNC: 316 MG/DL (ref 70–99)
PERFORMED ON: ABNORMAL
PERFORMED ON: ABNORMAL
POTASSIUM SERPL-SCNC: 3 MMOL/L (ref 3.5–5.1)
SODIUM BLD-SCNC: 140 MMOL/L (ref 136–145)

## 2019-01-16 PROCEDURE — 94761 N-INVAS EAR/PLS OXIMETRY MLT: CPT

## 2019-01-16 PROCEDURE — 2700000000 HC OXYGEN THERAPY PER DAY

## 2019-01-16 PROCEDURE — 6370000000 HC RX 637 (ALT 250 FOR IP): Performed by: HOSPITALIST

## 2019-01-16 PROCEDURE — 80048 BASIC METABOLIC PNL TOTAL CA: CPT

## 2019-01-16 PROCEDURE — 36415 COLL VENOUS BLD VENIPUNCTURE: CPT

## 2019-01-16 PROCEDURE — 6370000000 HC RX 637 (ALT 250 FOR IP): Performed by: NURSE PRACTITIONER

## 2019-01-16 PROCEDURE — 2580000003 HC RX 258: Performed by: NURSE PRACTITIONER

## 2019-01-16 PROCEDURE — 6370000000 HC RX 637 (ALT 250 FOR IP): Performed by: FAMILY MEDICINE

## 2019-01-16 PROCEDURE — 6370000000 HC RX 637 (ALT 250 FOR IP): Performed by: INTERNAL MEDICINE

## 2019-01-16 PROCEDURE — 94640 AIRWAY INHALATION TREATMENT: CPT

## 2019-01-16 RX ORDER — OXYCODONE HYDROCHLORIDE AND ACETAMINOPHEN 5; 325 MG/1; MG/1
1 TABLET ORAL EVERY 6 HOURS PRN
Qty: 30 TABLET | Refills: 0 | Status: SHIPPED | OUTPATIENT
Start: 2019-01-16 | End: 2019-01-23

## 2019-01-16 RX ADMIN — TAMSULOSIN HYDROCHLORIDE 0.4 MG: 0.4 CAPSULE ORAL at 08:32

## 2019-01-16 RX ADMIN — INSULIN LISPRO 12 UNITS: 100 INJECTION, SOLUTION INTRAVENOUS; SUBCUTANEOUS at 12:21

## 2019-01-16 RX ADMIN — METOPROLOL SUCCINATE 150 MG: 50 TABLET, EXTENDED RELEASE ORAL at 08:32

## 2019-01-16 RX ADMIN — IPRATROPIUM BROMIDE AND ALBUTEROL SULFATE 1 AMPULE: 2.5; .5 SOLUTION RESPIRATORY (INHALATION) at 08:09

## 2019-01-16 RX ADMIN — IPRATROPIUM BROMIDE AND ALBUTEROL SULFATE 1 AMPULE: 2.5; .5 SOLUTION RESPIRATORY (INHALATION) at 00:55

## 2019-01-16 RX ADMIN — PANTOPRAZOLE SODIUM 40 MG: 40 TABLET, DELAYED RELEASE ORAL at 06:45

## 2019-01-16 RX ADMIN — MOMETASONE FUROATE AND FORMOTEROL FUMARATE DIHYDRATE 2 PUFF: 100; 5 AEROSOL RESPIRATORY (INHALATION) at 08:09

## 2019-01-16 RX ADMIN — INSULIN LISPRO 7 UNITS: 100 INJECTION, SOLUTION INTRAVENOUS; SUBCUTANEOUS at 08:33

## 2019-01-16 RX ADMIN — IPRATROPIUM BROMIDE AND ALBUTEROL SULFATE 1 AMPULE: 2.5; .5 SOLUTION RESPIRATORY (INHALATION) at 13:00

## 2019-01-16 RX ADMIN — INSULIN LISPRO 7 UNITS: 100 INJECTION, SOLUTION INTRAVENOUS; SUBCUTANEOUS at 12:21

## 2019-01-16 RX ADMIN — Medication 10 ML: at 08:34

## 2019-01-16 RX ADMIN — GABAPENTIN 100 MG: 100 CAPSULE ORAL at 08:32

## 2019-01-16 RX ADMIN — DILTIAZEM HYDROCHLORIDE 240 MG: 240 CAPSULE, COATED, EXTENDED RELEASE ORAL at 08:32

## 2019-01-16 RX ADMIN — INSULIN LISPRO 6 UNITS: 100 INJECTION, SOLUTION INTRAVENOUS; SUBCUTANEOUS at 08:33

## 2019-01-16 RX ADMIN — Medication 400 MG: at 08:32

## 2019-01-16 RX ADMIN — INSULIN GLARGINE 15 UNITS: 100 INJECTION, SOLUTION SUBCUTANEOUS at 11:20

## 2019-01-16 RX ADMIN — FERROUS SULFATE TAB EC 324 MG (65 MG FE EQUIVALENT) 324 MG: 324 (65 FE) TABLET DELAYED RESPONSE at 08:32

## 2019-01-16 RX ADMIN — ATORVASTATIN CALCIUM 40 MG: 40 TABLET, FILM COATED ORAL at 08:32

## 2019-01-16 RX ADMIN — RIVAROXABAN 20 MG: 20 TABLET, FILM COATED ORAL at 08:32

## 2019-01-16 RX ADMIN — DULOXETINE 60 MG: 60 CAPSULE, DELAYED RELEASE ORAL at 08:32

## 2019-01-16 RX ADMIN — OXYCODONE AND ACETAMINOPHEN 2 TABLET: 5; 325 TABLET ORAL at 08:32

## 2019-01-16 ASSESSMENT — PAIN SCALES - GENERAL
PAINLEVEL_OUTOF10: 0
PAINLEVEL_OUTOF10: 7
PAINLEVEL_OUTOF10: 5

## 2019-02-12 ENCOUNTER — APPOINTMENT (OUTPATIENT)
Dept: GENERAL RADIOLOGY | Age: 59
DRG: 113 | End: 2019-02-12
Payer: COMMERCIAL

## 2019-02-12 ENCOUNTER — HOSPITAL ENCOUNTER (INPATIENT)
Age: 59
LOS: 9 days | Discharge: HOME HEALTH | DRG: 113 | End: 2019-02-21
Attending: EMERGENCY MEDICINE | Admitting: INTERNAL MEDICINE
Payer: COMMERCIAL

## 2019-02-12 DIAGNOSIS — J11.1 INFLUENZA WITH RESPIRATORY MANIFESTATION OTHER THAN PNEUMONIA: ICD-10-CM

## 2019-02-12 DIAGNOSIS — R06.89 HYPERCAPNIA: ICD-10-CM

## 2019-02-12 DIAGNOSIS — J96.01 ACUTE RESPIRATORY FAILURE WITH HYPOXIA (HCC): Primary | ICD-10-CM

## 2019-02-12 DIAGNOSIS — J81.0 ACUTE PULMONARY EDEMA (HCC): ICD-10-CM

## 2019-02-12 LAB
ANION GAP SERPL CALCULATED.3IONS-SCNC: 12 MMOL/L (ref 3–16)
APTT: 39.6 SEC (ref 26–36)
BASE EXCESS ARTERIAL: 10.5 MMOL/L (ref -3–3)
BASOPHILS ABSOLUTE: 0.1 K/UL (ref 0–0.2)
BASOPHILS RELATIVE PERCENT: 0.9 %
BUN BLDV-MCNC: 14 MG/DL (ref 7–20)
CALCIUM SERPL-MCNC: 9 MG/DL (ref 8.3–10.6)
CARBOXYHEMOGLOBIN ARTERIAL: 2.3 % (ref 0–1.5)
CHLORIDE BLD-SCNC: 92 MMOL/L (ref 99–110)
CO2: 33 MMOL/L (ref 21–32)
CREAT SERPL-MCNC: 1.1 MG/DL (ref 0.9–1.3)
EOSINOPHILS ABSOLUTE: 0 K/UL (ref 0–0.6)
EOSINOPHILS RELATIVE PERCENT: 0.3 %
GFR AFRICAN AMERICAN: >60
GFR NON-AFRICAN AMERICAN: >60
GLUCOSE BLD-MCNC: 146 MG/DL (ref 70–99)
GLUCOSE BLD-MCNC: 179 MG/DL (ref 70–99)
GLUCOSE BLD-MCNC: 248 MG/DL (ref 70–99)
HCO3 ARTERIAL: 36.5 MMOL/L (ref 21–29)
HCT VFR BLD CALC: 32.9 % (ref 40.5–52.5)
HEMOGLOBIN, ART, EXTENDED: 10.1 G/DL (ref 13.5–17.5)
HEMOGLOBIN: 10.6 G/DL (ref 13.5–17.5)
INR BLD: 2.82 (ref 0.86–1.14)
LACTIC ACID: 1 MMOL/L (ref 0.4–2)
LYMPHOCYTES ABSOLUTE: 0.5 K/UL (ref 1–5.1)
LYMPHOCYTES RELATIVE PERCENT: 6.9 %
MCH RBC QN AUTO: 30 PG (ref 26–34)
MCHC RBC AUTO-ENTMCNC: 32.3 G/DL (ref 31–36)
MCV RBC AUTO: 93.1 FL (ref 80–100)
METHEMOGLOBIN ARTERIAL: 0.5 %
MONOCYTES ABSOLUTE: 0.8 K/UL (ref 0–1.3)
MONOCYTES RELATIVE PERCENT: 11.3 %
NEUTROPHILS ABSOLUTE: 6 K/UL (ref 1.7–7.7)
NEUTROPHILS RELATIVE PERCENT: 80.6 %
O2 CONTENT ARTERIAL: 12 ML/DL
O2 SAT, ARTERIAL: 87.9 %
O2 THERAPY: ABNORMAL
PCO2 ARTERIAL: 58.8 MMHG (ref 35–45)
PDW BLD-RTO: 19 % (ref 12.4–15.4)
PERFORMED ON: ABNORMAL
PERFORMED ON: ABNORMAL
PH ARTERIAL: 7.41 (ref 7.35–7.45)
PLATELET # BLD: 211 K/UL (ref 135–450)
PMV BLD AUTO: 8.8 FL (ref 5–10.5)
PO2 ARTERIAL: 53.5 MMHG (ref 75–108)
POTASSIUM REFLEX MAGNESIUM: 4.8 MMOL/L (ref 3.5–5.1)
PRO-BNP: 4715 PG/ML (ref 0–124)
PROCALCITONIN: 0.1 NG/ML (ref 0–0.15)
PROTHROMBIN TIME: 32.1 SEC (ref 9.8–13)
RAPID INFLUENZA  B AGN: NEGATIVE
RAPID INFLUENZA A AGN: POSITIVE
RBC # BLD: 3.53 M/UL (ref 4.2–5.9)
SODIUM BLD-SCNC: 137 MMOL/L (ref 136–145)
TCO2 ARTERIAL: 38.3 MMOL/L
TROPONIN: <0.01 NG/ML
WBC # BLD: 7.4 K/UL (ref 4–11)

## 2019-02-12 PROCEDURE — 6360000002 HC RX W HCPCS: Performed by: EMERGENCY MEDICINE

## 2019-02-12 PROCEDURE — 87804 INFLUENZA ASSAY W/OPTIC: CPT

## 2019-02-12 PROCEDURE — 6370000000 HC RX 637 (ALT 250 FOR IP): Performed by: INTERNAL MEDICINE

## 2019-02-12 PROCEDURE — 94664 DEMO&/EVAL PT USE INHALER: CPT

## 2019-02-12 PROCEDURE — 36600 WITHDRAWAL OF ARTERIAL BLOOD: CPT

## 2019-02-12 PROCEDURE — 84484 ASSAY OF TROPONIN QUANT: CPT

## 2019-02-12 PROCEDURE — 6360000002 HC RX W HCPCS: Performed by: INTERNAL MEDICINE

## 2019-02-12 PROCEDURE — 2700000000 HC OXYGEN THERAPY PER DAY

## 2019-02-12 PROCEDURE — 99285 EMERGENCY DEPT VISIT HI MDM: CPT

## 2019-02-12 PROCEDURE — 80048 BASIC METABOLIC PNL TOTAL CA: CPT

## 2019-02-12 PROCEDURE — 94640 AIRWAY INHALATION TREATMENT: CPT

## 2019-02-12 PROCEDURE — 85025 COMPLETE CBC W/AUTO DIFF WBC: CPT

## 2019-02-12 PROCEDURE — 85610 PROTHROMBIN TIME: CPT

## 2019-02-12 PROCEDURE — 6370000000 HC RX 637 (ALT 250 FOR IP): Performed by: EMERGENCY MEDICINE

## 2019-02-12 PROCEDURE — 85730 THROMBOPLASTIN TIME PARTIAL: CPT

## 2019-02-12 PROCEDURE — 83605 ASSAY OF LACTIC ACID: CPT

## 2019-02-12 PROCEDURE — 96375 TX/PRO/DX INJ NEW DRUG ADDON: CPT

## 2019-02-12 PROCEDURE — 2060000000 HC ICU INTERMEDIATE R&B

## 2019-02-12 PROCEDURE — 83880 ASSAY OF NATRIURETIC PEPTIDE: CPT

## 2019-02-12 PROCEDURE — 96365 THER/PROPH/DIAG IV INF INIT: CPT

## 2019-02-12 PROCEDURE — 94762 N-INVAS EAR/PLS OXIMTRY CONT: CPT

## 2019-02-12 PROCEDURE — 82803 BLOOD GASES ANY COMBINATION: CPT

## 2019-02-12 PROCEDURE — 71045 X-RAY EXAM CHEST 1 VIEW: CPT

## 2019-02-12 PROCEDURE — 36415 COLL VENOUS BLD VENIPUNCTURE: CPT

## 2019-02-12 PROCEDURE — 84145 PROCALCITONIN (PCT): CPT

## 2019-02-12 PROCEDURE — 2580000003 HC RX 258: Performed by: INTERNAL MEDICINE

## 2019-02-12 PROCEDURE — 93005 ELECTROCARDIOGRAM TRACING: CPT | Performed by: EMERGENCY MEDICINE

## 2019-02-12 RX ORDER — POTASSIUM CHLORIDE 20 MEQ/1
40 TABLET, EXTENDED RELEASE ORAL 2 TIMES DAILY
Status: ON HOLD | COMMUNITY
End: 2019-06-02

## 2019-02-12 RX ORDER — TORSEMIDE 20 MG/1
20 TABLET ORAL DAILY
Status: DISCONTINUED | OUTPATIENT
Start: 2019-02-13 | End: 2019-02-12 | Stop reason: DRUGHIGH

## 2019-02-12 RX ORDER — TORSEMIDE 20 MG/1
60 TABLET ORAL DAILY
Status: DISCONTINUED | OUTPATIENT
Start: 2019-02-13 | End: 2019-02-13

## 2019-02-12 RX ORDER — ALBUTEROL SULFATE 90 UG/1
2 AEROSOL, METERED RESPIRATORY (INHALATION) EVERY 4 HOURS PRN
Status: DISCONTINUED | OUTPATIENT
Start: 2019-02-12 | End: 2019-02-21 | Stop reason: HOSPADM

## 2019-02-12 RX ORDER — METHYLPREDNISOLONE SODIUM SUCCINATE 125 MG/2ML
125 INJECTION, POWDER, LYOPHILIZED, FOR SOLUTION INTRAMUSCULAR; INTRAVENOUS ONCE
Status: COMPLETED | OUTPATIENT
Start: 2019-02-12 | End: 2019-02-12

## 2019-02-12 RX ORDER — INSULIN GLARGINE 100 [IU]/ML
15 INJECTION, SOLUTION SUBCUTANEOUS 2 TIMES DAILY
Status: DISCONTINUED | OUTPATIENT
Start: 2019-02-12 | End: 2019-02-14

## 2019-02-12 RX ORDER — ONDANSETRON 2 MG/ML
4 INJECTION INTRAMUSCULAR; INTRAVENOUS EVERY 6 HOURS PRN
Status: DISCONTINUED | OUTPATIENT
Start: 2019-02-12 | End: 2019-02-21 | Stop reason: HOSPADM

## 2019-02-12 RX ORDER — LEVOFLOXACIN 5 MG/ML
750 INJECTION, SOLUTION INTRAVENOUS ONCE
Status: COMPLETED | OUTPATIENT
Start: 2019-02-12 | End: 2019-02-12

## 2019-02-12 RX ORDER — TRAZODONE HYDROCHLORIDE 50 MG/1
50 TABLET ORAL NIGHTLY
Status: DISCONTINUED | OUTPATIENT
Start: 2019-02-12 | End: 2019-02-21 | Stop reason: HOSPADM

## 2019-02-12 RX ORDER — FUROSEMIDE 10 MG/ML
40 INJECTION INTRAMUSCULAR; INTRAVENOUS ONCE
Status: COMPLETED | OUTPATIENT
Start: 2019-02-12 | End: 2019-02-12

## 2019-02-12 RX ORDER — DULOXETIN HYDROCHLORIDE 60 MG/1
60 CAPSULE, DELAYED RELEASE ORAL DAILY
Status: DISCONTINUED | OUTPATIENT
Start: 2019-02-13 | End: 2019-02-21 | Stop reason: HOSPADM

## 2019-02-12 RX ORDER — FUROSEMIDE 10 MG/ML
40 INJECTION INTRAMUSCULAR; INTRAVENOUS ONCE
Status: DISCONTINUED | OUTPATIENT
Start: 2019-02-12 | End: 2019-02-12

## 2019-02-12 RX ORDER — LEVOFLOXACIN 500 MG/1
500 TABLET, FILM COATED ORAL DAILY
Status: DISCONTINUED | OUTPATIENT
Start: 2019-02-13 | End: 2019-02-14

## 2019-02-12 RX ORDER — DEXTROSE MONOHYDRATE 25 G/50ML
12.5 INJECTION, SOLUTION INTRAVENOUS PRN
Status: DISCONTINUED | OUTPATIENT
Start: 2019-02-12 | End: 2019-02-21 | Stop reason: HOSPADM

## 2019-02-12 RX ORDER — PANTOPRAZOLE SODIUM 40 MG/1
40 TABLET, DELAYED RELEASE ORAL
Status: DISCONTINUED | OUTPATIENT
Start: 2019-02-13 | End: 2019-02-21 | Stop reason: HOSPADM

## 2019-02-12 RX ORDER — OSELTAMIVIR PHOSPHATE 75 MG/1
75 CAPSULE ORAL 2 TIMES DAILY
Status: COMPLETED | OUTPATIENT
Start: 2019-02-12 | End: 2019-02-17

## 2019-02-12 RX ORDER — IPRATROPIUM BROMIDE AND ALBUTEROL SULFATE 2.5; .5 MG/3ML; MG/3ML
1 SOLUTION RESPIRATORY (INHALATION) ONCE
Status: COMPLETED | OUTPATIENT
Start: 2019-02-12 | End: 2019-02-12

## 2019-02-12 RX ORDER — DILTIAZEM HYDROCHLORIDE 240 MG/1
240 CAPSULE, COATED, EXTENDED RELEASE ORAL DAILY
Status: DISCONTINUED | OUTPATIENT
Start: 2019-02-13 | End: 2019-02-21 | Stop reason: HOSPADM

## 2019-02-12 RX ORDER — SODIUM CHLORIDE 0.9 % (FLUSH) 0.9 %
10 SYRINGE (ML) INJECTION EVERY 12 HOURS SCHEDULED
Status: DISCONTINUED | OUTPATIENT
Start: 2019-02-12 | End: 2019-02-21 | Stop reason: HOSPADM

## 2019-02-12 RX ORDER — OSELTAMIVIR PHOSPHATE 75 MG/1
75 CAPSULE ORAL ONCE
Status: COMPLETED | OUTPATIENT
Start: 2019-02-12 | End: 2019-02-12

## 2019-02-12 RX ORDER — GABAPENTIN 100 MG/1
100 CAPSULE ORAL 3 TIMES DAILY
Status: DISCONTINUED | OUTPATIENT
Start: 2019-02-12 | End: 2019-02-21 | Stop reason: HOSPADM

## 2019-02-12 RX ORDER — TORSEMIDE 20 MG/1
60 TABLET ORAL DAILY
Status: ON HOLD | COMMUNITY
End: 2019-02-21 | Stop reason: HOSPADM

## 2019-02-12 RX ORDER — OXYCODONE HYDROCHLORIDE AND ACETAMINOPHEN 5; 325 MG/1; MG/1
1 TABLET ORAL EVERY 6 HOURS PRN
Status: DISCONTINUED | OUTPATIENT
Start: 2019-02-12 | End: 2019-02-21 | Stop reason: HOSPADM

## 2019-02-12 RX ORDER — SODIUM CHLORIDE 0.9 % (FLUSH) 0.9 %
10 SYRINGE (ML) INJECTION PRN
Status: DISCONTINUED | OUTPATIENT
Start: 2019-02-12 | End: 2019-02-21 | Stop reason: HOSPADM

## 2019-02-12 RX ORDER — NICOTINE POLACRILEX 4 MG
15 LOZENGE BUCCAL PRN
Status: DISCONTINUED | OUTPATIENT
Start: 2019-02-12 | End: 2019-02-21 | Stop reason: HOSPADM

## 2019-02-12 RX ORDER — ATORVASTATIN CALCIUM 40 MG/1
40 TABLET, FILM COATED ORAL DAILY
Status: DISCONTINUED | OUTPATIENT
Start: 2019-02-13 | End: 2019-02-21 | Stop reason: HOSPADM

## 2019-02-12 RX ORDER — DEXTROSE MONOHYDRATE 50 MG/ML
100 INJECTION, SOLUTION INTRAVENOUS PRN
Status: DISCONTINUED | OUTPATIENT
Start: 2019-02-12 | End: 2019-02-21 | Stop reason: HOSPADM

## 2019-02-12 RX ORDER — ALBUTEROL SULFATE 2.5 MG/3ML
5 SOLUTION RESPIRATORY (INHALATION) ONCE
Status: COMPLETED | OUTPATIENT
Start: 2019-02-12 | End: 2019-02-12

## 2019-02-12 RX ORDER — TAMSULOSIN HYDROCHLORIDE 0.4 MG/1
0.4 CAPSULE ORAL DAILY
Status: DISCONTINUED | OUTPATIENT
Start: 2019-02-13 | End: 2019-02-21 | Stop reason: HOSPADM

## 2019-02-12 RX ADMIN — Medication 10 ML: at 20:32

## 2019-02-12 RX ADMIN — METHYLPREDNISOLONE SODIUM SUCCINATE 125 MG: 125 INJECTION, POWDER, FOR SOLUTION INTRAMUSCULAR; INTRAVENOUS at 13:20

## 2019-02-12 RX ADMIN — TRAZODONE HYDROCHLORIDE 50 MG: 50 TABLET ORAL at 20:31

## 2019-02-12 RX ADMIN — INSULIN LISPRO 1 UNITS: 100 INJECTION, SOLUTION INTRAVENOUS; SUBCUTANEOUS at 17:08

## 2019-02-12 RX ADMIN — IPRATROPIUM BROMIDE AND ALBUTEROL SULFATE 1 AMPULE: .5; 3 SOLUTION RESPIRATORY (INHALATION) at 13:03

## 2019-02-12 RX ADMIN — LEVOFLOXACIN 750 MG: 5 INJECTION, SOLUTION INTRAVENOUS at 13:52

## 2019-02-12 RX ADMIN — FUROSEMIDE 40 MG: 10 INJECTION, SOLUTION INTRAMUSCULAR; INTRAVENOUS at 17:06

## 2019-02-12 RX ADMIN — ALBUTEROL SULFATE 5 MG: 2.5 SOLUTION RESPIRATORY (INHALATION) at 14:35

## 2019-02-12 RX ADMIN — OSELTAMIVIR PHOSPHATE 75 MG: 75 CAPSULE ORAL at 13:53

## 2019-02-12 RX ADMIN — INSULIN GLARGINE 15 UNITS: 100 INJECTION, SOLUTION SUBCUTANEOUS at 22:05

## 2019-02-12 RX ADMIN — GABAPENTIN 100 MG: 100 CAPSULE ORAL at 20:31

## 2019-02-12 RX ADMIN — MOMETASONE FUROATE AND FORMOTEROL FUMARATE DIHYDRATE 2 PUFF: 100; 5 AEROSOL RESPIRATORY (INHALATION) at 20:14

## 2019-02-12 RX ADMIN — OSELTAMIVIR PHOSPHATE 75 MG: 75 CAPSULE ORAL at 20:31

## 2019-02-12 RX ADMIN — GABAPENTIN 100 MG: 100 CAPSULE ORAL at 17:06

## 2019-02-12 RX ADMIN — INSULIN LISPRO 1 UNITS: 100 INJECTION, SOLUTION INTRAVENOUS; SUBCUTANEOUS at 21:37

## 2019-02-12 ASSESSMENT — ENCOUNTER SYMPTOMS
CHOKING: 0
EYE PAIN: 0
BACK PAIN: 0
EYE ITCHING: 0
SHORTNESS OF BREATH: 1
STRIDOR: 0
COUGH: 1
NAUSEA: 0
APNEA: 0
VOMITING: 0
ABDOMINAL DISTENTION: 0
BLOOD IN STOOL: 0
EYE DISCHARGE: 0
ABDOMINAL PAIN: 0
COLOR CHANGE: 0
DIARRHEA: 0
CHEST TIGHTNESS: 0
EYE REDNESS: 0
RECTAL PAIN: 0
ANAL BLEEDING: 0
WHEEZING: 0
PHOTOPHOBIA: 0
CONSTIPATION: 0

## 2019-02-12 ASSESSMENT — PAIN DESCRIPTION - LOCATION: LOCATION: BACK

## 2019-02-12 ASSESSMENT — PAIN DESCRIPTION - DESCRIPTORS: DESCRIPTORS: ACHING

## 2019-02-12 ASSESSMENT — PAIN SCALES - GENERAL
PAINLEVEL_OUTOF10: 0

## 2019-02-12 ASSESSMENT — PAIN DESCRIPTION - PAIN TYPE: TYPE: CHRONIC PAIN

## 2019-02-12 ASSESSMENT — PAIN DESCRIPTION - PROGRESSION: CLINICAL_PROGRESSION: GRADUALLY WORSENING

## 2019-02-12 ASSESSMENT — PAIN DESCRIPTION - ORIENTATION: ORIENTATION: MID

## 2019-02-13 LAB
ANION GAP SERPL CALCULATED.3IONS-SCNC: 9 MMOL/L (ref 3–16)
BASE EXCESS ARTERIAL: 9.8 MMOL/L (ref -3–3)
BASOPHILS ABSOLUTE: 0 K/UL (ref 0–0.2)
BASOPHILS RELATIVE PERCENT: 0.2 %
BUN BLDV-MCNC: 19 MG/DL (ref 7–20)
CALCIUM SERPL-MCNC: 8.9 MG/DL (ref 8.3–10.6)
CARBOXYHEMOGLOBIN ARTERIAL: 1.5 % (ref 0–1.5)
CHLORIDE BLD-SCNC: 93 MMOL/L (ref 99–110)
CO2: 35 MMOL/L (ref 21–32)
CREAT SERPL-MCNC: 0.9 MG/DL (ref 0.9–1.3)
EOSINOPHILS ABSOLUTE: 0 K/UL (ref 0–0.6)
EOSINOPHILS RELATIVE PERCENT: 0 %
GFR AFRICAN AMERICAN: >60
GFR NON-AFRICAN AMERICAN: >60
GLUCOSE BLD-MCNC: 192 MG/DL (ref 70–99)
GLUCOSE BLD-MCNC: 200 MG/DL (ref 70–99)
GLUCOSE BLD-MCNC: 203 MG/DL (ref 70–99)
GLUCOSE BLD-MCNC: 230 MG/DL (ref 70–99)
GLUCOSE BLD-MCNC: 315 MG/DL (ref 70–99)
HCO3 ARTERIAL: 36.5 MMOL/L (ref 21–29)
HCT VFR BLD CALC: 30.5 % (ref 40.5–52.5)
HEMOGLOBIN, ART, EXTENDED: 10.2 G/DL (ref 13.5–17.5)
HEMOGLOBIN: 9.9 G/DL (ref 13.5–17.5)
LYMPHOCYTES ABSOLUTE: 0.4 K/UL (ref 1–5.1)
LYMPHOCYTES RELATIVE PERCENT: 8.4 %
MAGNESIUM: 2.1 MG/DL (ref 1.8–2.4)
MCH RBC QN AUTO: 30.3 PG (ref 26–34)
MCHC RBC AUTO-ENTMCNC: 32.5 G/DL (ref 31–36)
MCV RBC AUTO: 93.1 FL (ref 80–100)
METHEMOGLOBIN ARTERIAL: 0.9 %
MONOCYTES ABSOLUTE: 0.4 K/UL (ref 0–1.3)
MONOCYTES RELATIVE PERCENT: 8.2 %
NEUTROPHILS ABSOLUTE: 4.1 K/UL (ref 1.7–7.7)
NEUTROPHILS RELATIVE PERCENT: 83.2 %
O2 CONTENT ARTERIAL: 13 ML/DL
O2 SAT, ARTERIAL: 94.7 %
O2 THERAPY: ABNORMAL
PCO2 ARTERIAL: 64.4 MMHG (ref 35–45)
PDW BLD-RTO: 18.6 % (ref 12.4–15.4)
PERFORMED ON: ABNORMAL
PH ARTERIAL: 7.37 (ref 7.35–7.45)
PLATELET # BLD: 197 K/UL (ref 135–450)
PMV BLD AUTO: 9 FL (ref 5–10.5)
PO2 ARTERIAL: 71.9 MMHG (ref 75–108)
POTASSIUM REFLEX MAGNESIUM: 4.7 MMOL/L (ref 3.5–5.1)
RBC # BLD: 3.28 M/UL (ref 4.2–5.9)
SODIUM BLD-SCNC: 137 MMOL/L (ref 136–145)
TCO2 ARTERIAL: 38.5 MMOL/L
WBC # BLD: 5 K/UL (ref 4–11)

## 2019-02-13 PROCEDURE — 94660 CPAP INITIATION&MGMT: CPT

## 2019-02-13 PROCEDURE — 6370000000 HC RX 637 (ALT 250 FOR IP): Performed by: INTERNAL MEDICINE

## 2019-02-13 PROCEDURE — 36415 COLL VENOUS BLD VENIPUNCTURE: CPT

## 2019-02-13 PROCEDURE — 2060000000 HC ICU INTERMEDIATE R&B

## 2019-02-13 PROCEDURE — 94664 DEMO&/EVAL PT USE INHALER: CPT

## 2019-02-13 PROCEDURE — 80048 BASIC METABOLIC PNL TOTAL CA: CPT

## 2019-02-13 PROCEDURE — 36600 WITHDRAWAL OF ARTERIAL BLOOD: CPT

## 2019-02-13 PROCEDURE — 85025 COMPLETE CBC W/AUTO DIFF WBC: CPT

## 2019-02-13 PROCEDURE — 6360000002 HC RX W HCPCS: Performed by: INTERNAL MEDICINE

## 2019-02-13 PROCEDURE — 83735 ASSAY OF MAGNESIUM: CPT

## 2019-02-13 PROCEDURE — 2700000000 HC OXYGEN THERAPY PER DAY

## 2019-02-13 PROCEDURE — 94640 AIRWAY INHALATION TREATMENT: CPT

## 2019-02-13 PROCEDURE — 2580000003 HC RX 258: Performed by: INTERNAL MEDICINE

## 2019-02-13 PROCEDURE — 99255 IP/OBS CONSLTJ NEW/EST HI 80: CPT | Performed by: INTERNAL MEDICINE

## 2019-02-13 PROCEDURE — 82803 BLOOD GASES ANY COMBINATION: CPT

## 2019-02-13 PROCEDURE — 94762 N-INVAS EAR/PLS OXIMTRY CONT: CPT

## 2019-02-13 RX ORDER — METHYLPREDNISOLONE SODIUM SUCCINATE 40 MG/ML
40 INJECTION, POWDER, LYOPHILIZED, FOR SOLUTION INTRAMUSCULAR; INTRAVENOUS DAILY
Status: DISCONTINUED | OUTPATIENT
Start: 2019-02-13 | End: 2019-02-14

## 2019-02-13 RX ORDER — IPRATROPIUM BROMIDE AND ALBUTEROL SULFATE 2.5; .5 MG/3ML; MG/3ML
1 SOLUTION RESPIRATORY (INHALATION)
Status: DISCONTINUED | OUTPATIENT
Start: 2019-02-13 | End: 2019-02-21 | Stop reason: HOSPADM

## 2019-02-13 RX ORDER — METOPROLOL SUCCINATE 25 MG/1
25 TABLET, EXTENDED RELEASE ORAL DAILY
Status: DISCONTINUED | OUTPATIENT
Start: 2019-02-13 | End: 2019-02-14

## 2019-02-13 RX ORDER — OXYMETAZOLINE HYDROCHLORIDE 0.05 G/100ML
2 SPRAY NASAL 2 TIMES DAILY
Status: COMPLETED | OUTPATIENT
Start: 2019-02-13 | End: 2019-02-16

## 2019-02-13 RX ORDER — FUROSEMIDE 10 MG/ML
40 INJECTION INTRAMUSCULAR; INTRAVENOUS 3 TIMES DAILY
Status: DISCONTINUED | OUTPATIENT
Start: 2019-02-13 | End: 2019-02-14

## 2019-02-13 RX ADMIN — OSELTAMIVIR PHOSPHATE 75 MG: 75 CAPSULE ORAL at 09:52

## 2019-02-13 RX ADMIN — LEVOFLOXACIN 500 MG: 500 TABLET, FILM COATED ORAL at 09:53

## 2019-02-13 RX ADMIN — INSULIN GLARGINE 15 UNITS: 100 INJECTION, SOLUTION SUBCUTANEOUS at 10:02

## 2019-02-13 RX ADMIN — OXYCODONE AND ACETAMINOPHEN 1 TABLET: 5; 325 TABLET ORAL at 10:21

## 2019-02-13 RX ADMIN — MOMETASONE FUROATE AND FORMOTEROL FUMARATE DIHYDRATE 2 PUFF: 100; 5 AEROSOL RESPIRATORY (INHALATION) at 07:48

## 2019-02-13 RX ADMIN — METHYLPREDNISOLONE SODIUM SUCCINATE 40 MG: 40 INJECTION, POWDER, FOR SOLUTION INTRAMUSCULAR; INTRAVENOUS at 18:38

## 2019-02-13 RX ADMIN — DULOXETINE 60 MG: 60 CAPSULE, DELAYED RELEASE ORAL at 09:53

## 2019-02-13 RX ADMIN — GABAPENTIN 100 MG: 100 CAPSULE ORAL at 09:52

## 2019-02-13 RX ADMIN — Medication 10 ML: at 21:27

## 2019-02-13 RX ADMIN — INSULIN GLARGINE 15 UNITS: 100 INJECTION, SOLUTION SUBCUTANEOUS at 22:43

## 2019-02-13 RX ADMIN — Medication 10 ML: at 09:54

## 2019-02-13 RX ADMIN — IPRATROPIUM BROMIDE AND ALBUTEROL SULFATE 1 AMPULE: .5; 3 SOLUTION RESPIRATORY (INHALATION) at 21:26

## 2019-02-13 RX ADMIN — TORSEMIDE 60 MG: 20 TABLET ORAL at 09:52

## 2019-02-13 RX ADMIN — PANTOPRAZOLE SODIUM 40 MG: 40 TABLET, DELAYED RELEASE ORAL at 06:23

## 2019-02-13 RX ADMIN — FUROSEMIDE 40 MG: 10 INJECTION, SOLUTION INTRAMUSCULAR; INTRAVENOUS at 21:27

## 2019-02-13 RX ADMIN — OSELTAMIVIR PHOSPHATE 75 MG: 75 CAPSULE ORAL at 21:26

## 2019-02-13 RX ADMIN — GABAPENTIN 100 MG: 100 CAPSULE ORAL at 21:26

## 2019-02-13 RX ADMIN — INSULIN LISPRO 1 UNITS: 100 INJECTION, SOLUTION INTRAVENOUS; SUBCUTANEOUS at 18:39

## 2019-02-13 RX ADMIN — INSULIN LISPRO 2 UNITS: 100 INJECTION, SOLUTION INTRAVENOUS; SUBCUTANEOUS at 22:43

## 2019-02-13 RX ADMIN — GABAPENTIN 100 MG: 100 CAPSULE ORAL at 13:15

## 2019-02-13 RX ADMIN — DILTIAZEM HYDROCHLORIDE 240 MG: 240 CAPSULE, COATED, EXTENDED RELEASE ORAL at 09:53

## 2019-02-13 RX ADMIN — INSULIN LISPRO 2 UNITS: 100 INJECTION, SOLUTION INTRAVENOUS; SUBCUTANEOUS at 10:02

## 2019-02-13 RX ADMIN — TAMSULOSIN HYDROCHLORIDE 0.4 MG: 0.4 CAPSULE ORAL at 09:52

## 2019-02-13 RX ADMIN — RIVAROXABAN 20 MG: 20 TABLET, FILM COATED ORAL at 09:53

## 2019-02-13 RX ADMIN — Medication 2 SPRAY: at 21:26

## 2019-02-13 RX ADMIN — FUROSEMIDE 40 MG: 10 INJECTION, SOLUTION INTRAMUSCULAR; INTRAVENOUS at 13:15

## 2019-02-13 RX ADMIN — METOPROLOL SUCCINATE 25 MG: 25 TABLET, EXTENDED RELEASE ORAL at 18:38

## 2019-02-13 RX ADMIN — OXYCODONE AND ACETAMINOPHEN 1 TABLET: 5; 325 TABLET ORAL at 22:10

## 2019-02-13 RX ADMIN — TRAZODONE HYDROCHLORIDE 50 MG: 50 TABLET ORAL at 21:26

## 2019-02-13 RX ADMIN — ATORVASTATIN CALCIUM 40 MG: 40 TABLET, FILM COATED ORAL at 09:53

## 2019-02-13 RX ADMIN — MOMETASONE FUROATE AND FORMOTEROL FUMARATE DIHYDRATE 2 PUFF: 100; 5 AEROSOL RESPIRATORY (INHALATION) at 21:26

## 2019-02-13 RX ADMIN — INSULIN LISPRO 2 UNITS: 100 INJECTION, SOLUTION INTRAVENOUS; SUBCUTANEOUS at 14:05

## 2019-02-13 ASSESSMENT — PAIN DESCRIPTION - ONSET: ONSET: ON-GOING

## 2019-02-13 ASSESSMENT — PAIN DESCRIPTION - LOCATION: LOCATION: BACK

## 2019-02-13 ASSESSMENT — PAIN SCALES - WONG BAKER
WONGBAKER_NUMERICALRESPONSE: 6
WONGBAKER_NUMERICALRESPONSE: 0
WONGBAKER_NUMERICALRESPONSE: 0
WONGBAKER_NUMERICALRESPONSE: 6
WONGBAKER_NUMERICALRESPONSE: 0
WONGBAKER_NUMERICALRESPONSE: 6
WONGBAKER_NUMERICALRESPONSE: 0
WONGBAKER_NUMERICALRESPONSE: 6
WONGBAKER_NUMERICALRESPONSE: 6
WONGBAKER_NUMERICALRESPONSE: 0
WONGBAKER_NUMERICALRESPONSE: 6
WONGBAKER_NUMERICALRESPONSE: 6

## 2019-02-13 ASSESSMENT — PAIN DESCRIPTION - DESCRIPTORS: DESCRIPTORS: ACHING

## 2019-02-13 ASSESSMENT — PAIN DESCRIPTION - PAIN TYPE
TYPE: CHRONIC PAIN
TYPE: CHRONIC PAIN

## 2019-02-13 ASSESSMENT — PAIN SCALES - GENERAL
PAINLEVEL_OUTOF10: 5
PAINLEVEL_OUTOF10: 10
PAINLEVEL_OUTOF10: 10
PAINLEVEL_OUTOF10: 8

## 2019-02-13 ASSESSMENT — PAIN DESCRIPTION - PROGRESSION: CLINICAL_PROGRESSION: GRADUALLY WORSENING

## 2019-02-13 ASSESSMENT — PAIN DESCRIPTION - FREQUENCY: FREQUENCY: CONTINUOUS

## 2019-02-13 ASSESSMENT — PAIN DESCRIPTION - ORIENTATION: ORIENTATION: MID

## 2019-02-14 LAB
ANION GAP SERPL CALCULATED.3IONS-SCNC: 11 MMOL/L (ref 3–16)
BASE EXCESS ARTERIAL: 16.5 MMOL/L (ref -3–3)
BASOPHILS ABSOLUTE: 0 K/UL (ref 0–0.2)
BASOPHILS RELATIVE PERCENT: 0.6 %
BUN BLDV-MCNC: 28 MG/DL (ref 7–20)
CALCIUM SERPL-MCNC: 9 MG/DL (ref 8.3–10.6)
CARBOXYHEMOGLOBIN ARTERIAL: 1.4 % (ref 0–1.5)
CHLORIDE BLD-SCNC: 89 MMOL/L (ref 99–110)
CO2: 41 MMOL/L (ref 21–32)
CREAT SERPL-MCNC: 0.9 MG/DL (ref 0.9–1.3)
EOSINOPHILS ABSOLUTE: 0 K/UL (ref 0–0.6)
EOSINOPHILS RELATIVE PERCENT: 0.1 %
GFR AFRICAN AMERICAN: >60
GFR NON-AFRICAN AMERICAN: >60
GLUCOSE BLD-MCNC: 241 MG/DL (ref 70–99)
GLUCOSE BLD-MCNC: 299 MG/DL (ref 70–99)
GLUCOSE BLD-MCNC: 306 MG/DL (ref 70–99)
GLUCOSE BLD-MCNC: 310 MG/DL (ref 70–99)
GLUCOSE BLD-MCNC: 323 MG/DL (ref 70–99)
GLUCOSE BLD-MCNC: 333 MG/DL (ref 70–99)
HCO3 ARTERIAL: 43.9 MMOL/L (ref 21–29)
HCT VFR BLD CALC: 32.4 % (ref 40.5–52.5)
HEMOGLOBIN, ART, EXTENDED: 10.2 G/DL (ref 13.5–17.5)
HEMOGLOBIN: 10.4 G/DL (ref 13.5–17.5)
LYMPHOCYTES ABSOLUTE: 0.6 K/UL (ref 1–5.1)
LYMPHOCYTES RELATIVE PERCENT: 7.9 %
MAGNESIUM: 1.9 MG/DL (ref 1.8–2.4)
MCH RBC QN AUTO: 29.7 PG (ref 26–34)
MCHC RBC AUTO-ENTMCNC: 32.2 G/DL (ref 31–36)
MCV RBC AUTO: 92.4 FL (ref 80–100)
METHEMOGLOBIN ARTERIAL: 0.7 %
MONOCYTES ABSOLUTE: 0.5 K/UL (ref 0–1.3)
MONOCYTES RELATIVE PERCENT: 6.9 %
NEUTROPHILS ABSOLUTE: 6 K/UL (ref 1.7–7.7)
NEUTROPHILS RELATIVE PERCENT: 84.5 %
O2 CONTENT ARTERIAL: 14 ML/DL
O2 SAT, ARTERIAL: 95.7 %
O2 THERAPY: ABNORMAL
PCO2 ARTERIAL: 72.1 MMHG (ref 35–45)
PDW BLD-RTO: 17.9 % (ref 12.4–15.4)
PERFORMED ON: ABNORMAL
PH ARTERIAL: 7.4 (ref 7.35–7.45)
PLATELET # BLD: 216 K/UL (ref 135–450)
PMV BLD AUTO: 9.2 FL (ref 5–10.5)
PO2 ARTERIAL: 75 MMHG (ref 75–108)
POTASSIUM REFLEX MAGNESIUM: 3.6 MMOL/L (ref 3.5–5.1)
RBC # BLD: 3.5 M/UL (ref 4.2–5.9)
SODIUM BLD-SCNC: 141 MMOL/L (ref 136–145)
TCO2 ARTERIAL: 46.1 MMOL/L
WBC # BLD: 7.1 K/UL (ref 4–11)

## 2019-02-14 PROCEDURE — 6370000000 HC RX 637 (ALT 250 FOR IP): Performed by: STUDENT IN AN ORGANIZED HEALTH CARE EDUCATION/TRAINING PROGRAM

## 2019-02-14 PROCEDURE — 36600 WITHDRAWAL OF ARTERIAL BLOOD: CPT

## 2019-02-14 PROCEDURE — 2700000000 HC OXYGEN THERAPY PER DAY

## 2019-02-14 PROCEDURE — 99255 IP/OBS CONSLTJ NEW/EST HI 80: CPT | Performed by: INTERNAL MEDICINE

## 2019-02-14 PROCEDURE — 80048 BASIC METABOLIC PNL TOTAL CA: CPT

## 2019-02-14 PROCEDURE — 94640 AIRWAY INHALATION TREATMENT: CPT

## 2019-02-14 PROCEDURE — 6360000002 HC RX W HCPCS: Performed by: INTERNAL MEDICINE

## 2019-02-14 PROCEDURE — 6370000000 HC RX 637 (ALT 250 FOR IP): Performed by: INTERNAL MEDICINE

## 2019-02-14 PROCEDURE — 85025 COMPLETE CBC W/AUTO DIFF WBC: CPT

## 2019-02-14 PROCEDURE — 82803 BLOOD GASES ANY COMBINATION: CPT

## 2019-02-14 PROCEDURE — 2060000000 HC ICU INTERMEDIATE R&B

## 2019-02-14 PROCEDURE — 83735 ASSAY OF MAGNESIUM: CPT

## 2019-02-14 PROCEDURE — 99233 SBSQ HOSP IP/OBS HIGH 50: CPT | Performed by: INTERNAL MEDICINE

## 2019-02-14 PROCEDURE — 94762 N-INVAS EAR/PLS OXIMTRY CONT: CPT

## 2019-02-14 PROCEDURE — 2580000003 HC RX 258: Performed by: INTERNAL MEDICINE

## 2019-02-14 PROCEDURE — 36415 COLL VENOUS BLD VENIPUNCTURE: CPT

## 2019-02-14 PROCEDURE — 94660 CPAP INITIATION&MGMT: CPT

## 2019-02-14 RX ORDER — INSULIN GLARGINE 100 [IU]/ML
25 INJECTION, SOLUTION SUBCUTANEOUS 2 TIMES DAILY
Status: DISCONTINUED | OUTPATIENT
Start: 2019-02-14 | End: 2019-02-17

## 2019-02-14 RX ORDER — ACETAZOLAMIDE 500 MG/5ML
500 INJECTION, POWDER, LYOPHILIZED, FOR SOLUTION INTRAVENOUS EVERY 12 HOURS SCHEDULED
Status: COMPLETED | OUTPATIENT
Start: 2019-02-14 | End: 2019-02-15

## 2019-02-14 RX ORDER — FUROSEMIDE 10 MG/ML
40 INJECTION INTRAMUSCULAR; INTRAVENOUS 2 TIMES DAILY
Status: DISCONTINUED | OUTPATIENT
Start: 2019-02-14 | End: 2019-02-16

## 2019-02-14 RX ORDER — PREDNISONE 20 MG/1
40 TABLET ORAL DAILY
Status: COMPLETED | OUTPATIENT
Start: 2019-02-14 | End: 2019-02-17

## 2019-02-14 RX ORDER — METOPROLOL SUCCINATE 50 MG/1
50 TABLET, EXTENDED RELEASE ORAL DAILY
Status: DISCONTINUED | OUTPATIENT
Start: 2019-02-15 | End: 2019-02-15

## 2019-02-14 RX ADMIN — Medication 10 ML: at 21:38

## 2019-02-14 RX ADMIN — FUROSEMIDE 40 MG: 10 INJECTION, SOLUTION INTRAMUSCULAR; INTRAVENOUS at 19:12

## 2019-02-14 RX ADMIN — Medication 2 SPRAY: at 09:46

## 2019-02-14 RX ADMIN — METOPROLOL SUCCINATE 25 MG: 25 TABLET, EXTENDED RELEASE ORAL at 09:00

## 2019-02-14 RX ADMIN — GABAPENTIN 100 MG: 100 CAPSULE ORAL at 09:01

## 2019-02-14 RX ADMIN — DILTIAZEM HYDROCHLORIDE 240 MG: 240 CAPSULE, COATED, EXTENDED RELEASE ORAL at 09:01

## 2019-02-14 RX ADMIN — INSULIN GLARGINE 25 UNITS: 100 INJECTION, SOLUTION SUBCUTANEOUS at 21:38

## 2019-02-14 RX ADMIN — ATORVASTATIN CALCIUM 40 MG: 40 TABLET, FILM COATED ORAL at 09:00

## 2019-02-14 RX ADMIN — TAMSULOSIN HYDROCHLORIDE 0.4 MG: 0.4 CAPSULE ORAL at 09:01

## 2019-02-14 RX ADMIN — MOMETASONE FUROATE AND FORMOTEROL FUMARATE DIHYDRATE 2 PUFF: 100; 5 AEROSOL RESPIRATORY (INHALATION) at 20:22

## 2019-02-14 RX ADMIN — IPRATROPIUM BROMIDE AND ALBUTEROL SULFATE 1 AMPULE: .5; 3 SOLUTION RESPIRATORY (INHALATION) at 12:25

## 2019-02-14 RX ADMIN — OXYCODONE AND ACETAMINOPHEN 1 TABLET: 5; 325 TABLET ORAL at 19:11

## 2019-02-14 RX ADMIN — IPRATROPIUM BROMIDE AND ALBUTEROL SULFATE 1 AMPULE: .5; 3 SOLUTION RESPIRATORY (INHALATION) at 20:22

## 2019-02-14 RX ADMIN — INSULIN LISPRO 4 UNITS: 100 INJECTION, SOLUTION INTRAVENOUS; SUBCUTANEOUS at 18:37

## 2019-02-14 RX ADMIN — FUROSEMIDE 40 MG: 10 INJECTION, SOLUTION INTRAMUSCULAR; INTRAVENOUS at 09:05

## 2019-02-14 RX ADMIN — IPRATROPIUM BROMIDE AND ALBUTEROL SULFATE 1 AMPULE: .5; 3 SOLUTION RESPIRATORY (INHALATION) at 16:48

## 2019-02-14 RX ADMIN — LEVOFLOXACIN 500 MG: 500 TABLET, FILM COATED ORAL at 09:01

## 2019-02-14 RX ADMIN — Medication 2 SPRAY: at 21:38

## 2019-02-14 RX ADMIN — IPRATROPIUM BROMIDE AND ALBUTEROL SULFATE 1 AMPULE: .5; 3 SOLUTION RESPIRATORY (INHALATION) at 09:23

## 2019-02-14 RX ADMIN — MOMETASONE FUROATE AND FORMOTEROL FUMARATE DIHYDRATE 2 PUFF: 100; 5 AEROSOL RESPIRATORY (INHALATION) at 09:23

## 2019-02-14 RX ADMIN — OXYCODONE AND ACETAMINOPHEN 1 TABLET: 5; 325 TABLET ORAL at 05:52

## 2019-02-14 RX ADMIN — INSULIN LISPRO 4 UNITS: 100 INJECTION, SOLUTION INTRAVENOUS; SUBCUTANEOUS at 09:21

## 2019-02-14 RX ADMIN — GABAPENTIN 100 MG: 100 CAPSULE ORAL at 19:11

## 2019-02-14 RX ADMIN — INSULIN LISPRO 7 UNITS: 100 INJECTION, SOLUTION INTRAVENOUS; SUBCUTANEOUS at 09:13

## 2019-02-14 RX ADMIN — INSULIN LISPRO 7 UNITS: 100 INJECTION, SOLUTION INTRAVENOUS; SUBCUTANEOUS at 13:33

## 2019-02-14 RX ADMIN — METHYLPREDNISOLONE SODIUM SUCCINATE 40 MG: 40 INJECTION, POWDER, FOR SOLUTION INTRAMUSCULAR; INTRAVENOUS at 09:03

## 2019-02-14 RX ADMIN — OSELTAMIVIR PHOSPHATE 75 MG: 75 CAPSULE ORAL at 21:36

## 2019-02-14 RX ADMIN — PANTOPRAZOLE SODIUM 40 MG: 40 TABLET, DELAYED RELEASE ORAL at 05:52

## 2019-02-14 RX ADMIN — ACETAZOLAMIDE SODIUM 500 MG: 500 INJECTION, POWDER, LYOPHILIZED, FOR SOLUTION INTRAVENOUS at 13:23

## 2019-02-14 RX ADMIN — INSULIN GLARGINE 25 UNITS: 100 INJECTION, SOLUTION SUBCUTANEOUS at 09:45

## 2019-02-14 RX ADMIN — GABAPENTIN 100 MG: 100 CAPSULE ORAL at 21:35

## 2019-02-14 RX ADMIN — INSULIN LISPRO 2 UNITS: 100 INJECTION, SOLUTION INTRAVENOUS; SUBCUTANEOUS at 21:39

## 2019-02-14 RX ADMIN — INSULIN LISPRO 7 UNITS: 100 INJECTION, SOLUTION INTRAVENOUS; SUBCUTANEOUS at 18:41

## 2019-02-14 RX ADMIN — PREDNISONE 40 MG: 20 TABLET ORAL at 19:11

## 2019-02-14 RX ADMIN — OSELTAMIVIR PHOSPHATE 75 MG: 75 CAPSULE ORAL at 09:01

## 2019-02-14 RX ADMIN — ACETAZOLAMIDE SODIUM 500 MG: 500 INJECTION, POWDER, LYOPHILIZED, FOR SOLUTION INTRAVENOUS at 21:37

## 2019-02-14 RX ADMIN — TRAZODONE HYDROCHLORIDE 50 MG: 50 TABLET ORAL at 21:35

## 2019-02-14 RX ADMIN — INSULIN LISPRO 3 UNITS: 100 INJECTION, SOLUTION INTRAVENOUS; SUBCUTANEOUS at 13:30

## 2019-02-14 RX ADMIN — DULOXETINE 60 MG: 60 CAPSULE, DELAYED RELEASE ORAL at 09:01

## 2019-02-14 RX ADMIN — Medication 10 ML: at 09:05

## 2019-02-14 RX ADMIN — RIVAROXABAN 20 MG: 20 TABLET, FILM COATED ORAL at 09:00

## 2019-02-14 ASSESSMENT — PAIN DESCRIPTION - PAIN TYPE
TYPE: CHRONIC PAIN

## 2019-02-14 ASSESSMENT — PAIN DESCRIPTION - FREQUENCY
FREQUENCY: CONTINUOUS

## 2019-02-14 ASSESSMENT — PAIN SCALES - GENERAL
PAINLEVEL_OUTOF10: 8
PAINLEVEL_OUTOF10: 5
PAINLEVEL_OUTOF10: 5
PAINLEVEL_OUTOF10: 6
PAINLEVEL_OUTOF10: 0
PAINLEVEL_OUTOF10: 6
PAINLEVEL_OUTOF10: 6

## 2019-02-14 ASSESSMENT — PAIN SCALES - WONG BAKER
WONGBAKER_NUMERICALRESPONSE: 6

## 2019-02-14 ASSESSMENT — PAIN DESCRIPTION - PROGRESSION
CLINICAL_PROGRESSION: GRADUALLY IMPROVING

## 2019-02-14 ASSESSMENT — PAIN DESCRIPTION - LOCATION
LOCATION: BACK

## 2019-02-14 ASSESSMENT — PAIN DESCRIPTION - DESCRIPTORS
DESCRIPTORS: ACHING

## 2019-02-14 ASSESSMENT — PAIN DESCRIPTION - ORIENTATION: ORIENTATION: MID

## 2019-02-14 ASSESSMENT — PAIN DESCRIPTION - ONSET
ONSET: ON-GOING

## 2019-02-15 LAB
ANION GAP SERPL CALCULATED.3IONS-SCNC: 12 MMOL/L (ref 3–16)
BASOPHILS ABSOLUTE: 0 K/UL (ref 0–0.2)
BASOPHILS RELATIVE PERCENT: 0.1 %
BUN BLDV-MCNC: 25 MG/DL (ref 7–20)
CALCIUM SERPL-MCNC: 9.2 MG/DL (ref 8.3–10.6)
CHLORIDE BLD-SCNC: 97 MMOL/L (ref 99–110)
CO2: 34 MMOL/L (ref 21–32)
CREAT SERPL-MCNC: 0.9 MG/DL (ref 0.9–1.3)
EOSINOPHILS ABSOLUTE: 0 K/UL (ref 0–0.6)
EOSINOPHILS RELATIVE PERCENT: 0 %
GFR AFRICAN AMERICAN: >60
GFR NON-AFRICAN AMERICAN: >60
GLUCOSE BLD-MCNC: 267 MG/DL (ref 70–99)
GLUCOSE BLD-MCNC: 308 MG/DL (ref 70–99)
GLUCOSE BLD-MCNC: 324 MG/DL (ref 70–99)
GLUCOSE BLD-MCNC: 341 MG/DL (ref 70–99)
GLUCOSE BLD-MCNC: 387 MG/DL (ref 70–99)
HCT VFR BLD CALC: 34 % (ref 40.5–52.5)
HEMOGLOBIN: 11 G/DL (ref 13.5–17.5)
LYMPHOCYTES ABSOLUTE: 0.8 K/UL (ref 1–5.1)
LYMPHOCYTES RELATIVE PERCENT: 7.2 %
MAGNESIUM: 2.1 MG/DL (ref 1.8–2.4)
MCH RBC QN AUTO: 29.7 PG (ref 26–34)
MCHC RBC AUTO-ENTMCNC: 32.3 G/DL (ref 31–36)
MCV RBC AUTO: 91.8 FL (ref 80–100)
MONOCYTES ABSOLUTE: 0.8 K/UL (ref 0–1.3)
MONOCYTES RELATIVE PERCENT: 7.2 %
NEUTROPHILS ABSOLUTE: 9.2 K/UL (ref 1.7–7.7)
NEUTROPHILS RELATIVE PERCENT: 85.5 %
PDW BLD-RTO: 17.3 % (ref 12.4–15.4)
PERFORMED ON: ABNORMAL
PLATELET # BLD: 220 K/UL (ref 135–450)
PMV BLD AUTO: 8.8 FL (ref 5–10.5)
POTASSIUM REFLEX MAGNESIUM: 3.2 MMOL/L (ref 3.5–5.1)
RBC # BLD: 3.7 M/UL (ref 4.2–5.9)
SODIUM BLD-SCNC: 143 MMOL/L (ref 136–145)
WBC # BLD: 10.8 K/UL (ref 4–11)

## 2019-02-15 PROCEDURE — 83735 ASSAY OF MAGNESIUM: CPT

## 2019-02-15 PROCEDURE — 6370000000 HC RX 637 (ALT 250 FOR IP): Performed by: INTERNAL MEDICINE

## 2019-02-15 PROCEDURE — 80048 BASIC METABOLIC PNL TOTAL CA: CPT

## 2019-02-15 PROCEDURE — 36415 COLL VENOUS BLD VENIPUNCTURE: CPT

## 2019-02-15 PROCEDURE — 94640 AIRWAY INHALATION TREATMENT: CPT

## 2019-02-15 PROCEDURE — 6370000000 HC RX 637 (ALT 250 FOR IP): Performed by: STUDENT IN AN ORGANIZED HEALTH CARE EDUCATION/TRAINING PROGRAM

## 2019-02-15 PROCEDURE — 6360000002 HC RX W HCPCS: Performed by: INTERNAL MEDICINE

## 2019-02-15 PROCEDURE — 2580000003 HC RX 258: Performed by: INTERNAL MEDICINE

## 2019-02-15 PROCEDURE — 2060000000 HC ICU INTERMEDIATE R&B

## 2019-02-15 PROCEDURE — 99232 SBSQ HOSP IP/OBS MODERATE 35: CPT | Performed by: INTERNAL MEDICINE

## 2019-02-15 PROCEDURE — 85025 COMPLETE CBC W/AUTO DIFF WBC: CPT

## 2019-02-15 PROCEDURE — 94762 N-INVAS EAR/PLS OXIMTRY CONT: CPT

## 2019-02-15 PROCEDURE — 94660 CPAP INITIATION&MGMT: CPT

## 2019-02-15 PROCEDURE — 94664 DEMO&/EVAL PT USE INHALER: CPT

## 2019-02-15 PROCEDURE — 2700000000 HC OXYGEN THERAPY PER DAY

## 2019-02-15 RX ORDER — POTASSIUM CHLORIDE 1.5 G/1.77G
40 POWDER, FOR SOLUTION ORAL ONCE
Status: DISCONTINUED | OUTPATIENT
Start: 2019-02-15 | End: 2019-02-15

## 2019-02-15 RX ORDER — SPIRONOLACTONE 25 MG/1
25 TABLET ORAL DAILY
Status: DISCONTINUED | OUTPATIENT
Start: 2019-02-15 | End: 2019-02-21 | Stop reason: HOSPADM

## 2019-02-15 RX ORDER — POTASSIUM CHLORIDE 20 MEQ/1
40 TABLET, EXTENDED RELEASE ORAL ONCE
Status: COMPLETED | OUTPATIENT
Start: 2019-02-15 | End: 2019-02-15

## 2019-02-15 RX ADMIN — POTASSIUM CHLORIDE 40 MEQ: 20 TABLET, EXTENDED RELEASE ORAL at 17:01

## 2019-02-15 RX ADMIN — PREDNISONE 40 MG: 20 TABLET ORAL at 08:42

## 2019-02-15 RX ADMIN — FUROSEMIDE 40 MG: 10 INJECTION, SOLUTION INTRAMUSCULAR; INTRAVENOUS at 16:27

## 2019-02-15 RX ADMIN — METOPROLOL SUCCINATE 50 MG: 50 TABLET, EXTENDED RELEASE ORAL at 08:42

## 2019-02-15 RX ADMIN — TRAZODONE HYDROCHLORIDE 50 MG: 50 TABLET ORAL at 22:23

## 2019-02-15 RX ADMIN — RIVAROXABAN 20 MG: 20 TABLET, FILM COATED ORAL at 08:42

## 2019-02-15 RX ADMIN — TAMSULOSIN HYDROCHLORIDE 0.4 MG: 0.4 CAPSULE ORAL at 08:42

## 2019-02-15 RX ADMIN — INSULIN LISPRO 7 UNITS: 100 INJECTION, SOLUTION INTRAVENOUS; SUBCUTANEOUS at 12:06

## 2019-02-15 RX ADMIN — SPIRONOLACTONE 25 MG: 25 TABLET ORAL at 17:01

## 2019-02-15 RX ADMIN — Medication 2 SPRAY: at 22:24

## 2019-02-15 RX ADMIN — DILTIAZEM HYDROCHLORIDE 240 MG: 240 CAPSULE, COATED, EXTENDED RELEASE ORAL at 08:42

## 2019-02-15 RX ADMIN — GABAPENTIN 100 MG: 100 CAPSULE ORAL at 16:28

## 2019-02-15 RX ADMIN — INSULIN LISPRO 2 UNITS: 100 INJECTION, SOLUTION INTRAVENOUS; SUBCUTANEOUS at 22:26

## 2019-02-15 RX ADMIN — INSULIN HUMAN 10 UNITS: 100 INJECTION, SOLUTION PARENTERAL at 15:41

## 2019-02-15 RX ADMIN — GABAPENTIN 100 MG: 100 CAPSULE ORAL at 22:22

## 2019-02-15 RX ADMIN — ATORVASTATIN CALCIUM 40 MG: 40 TABLET, FILM COATED ORAL at 08:42

## 2019-02-15 RX ADMIN — IPRATROPIUM BROMIDE AND ALBUTEROL SULFATE 1 AMPULE: .5; 3 SOLUTION RESPIRATORY (INHALATION) at 08:03

## 2019-02-15 RX ADMIN — IPRATROPIUM BROMIDE AND ALBUTEROL SULFATE 1 AMPULE: .5; 3 SOLUTION RESPIRATORY (INHALATION) at 16:39

## 2019-02-15 RX ADMIN — PANTOPRAZOLE SODIUM 40 MG: 40 TABLET, DELAYED RELEASE ORAL at 06:00

## 2019-02-15 RX ADMIN — POTASSIUM CHLORIDE 40 MEQ: 20 TABLET, EXTENDED RELEASE ORAL at 14:10

## 2019-02-15 RX ADMIN — INSULIN LISPRO 3 UNITS: 100 INJECTION, SOLUTION INTRAVENOUS; SUBCUTANEOUS at 08:50

## 2019-02-15 RX ADMIN — ACETAZOLAMIDE SODIUM 500 MG: 500 INJECTION, POWDER, LYOPHILIZED, FOR SOLUTION INTRAVENOUS at 22:24

## 2019-02-15 RX ADMIN — GABAPENTIN 100 MG: 100 CAPSULE ORAL at 08:42

## 2019-02-15 RX ADMIN — OSELTAMIVIR PHOSPHATE 75 MG: 75 CAPSULE ORAL at 22:22

## 2019-02-15 RX ADMIN — INSULIN GLARGINE 25 UNITS: 100 INJECTION, SOLUTION SUBCUTANEOUS at 08:46

## 2019-02-15 RX ADMIN — Medication 10 ML: at 22:23

## 2019-02-15 RX ADMIN — OXYCODONE AND ACETAMINOPHEN 1 TABLET: 5; 325 TABLET ORAL at 15:42

## 2019-02-15 RX ADMIN — IPRATROPIUM BROMIDE AND ALBUTEROL SULFATE 1 AMPULE: .5; 3 SOLUTION RESPIRATORY (INHALATION) at 12:09

## 2019-02-15 RX ADMIN — DULOXETINE 60 MG: 60 CAPSULE, DELAYED RELEASE ORAL at 08:42

## 2019-02-15 RX ADMIN — MOMETASONE FUROATE AND FORMOTEROL FUMARATE DIHYDRATE 2 PUFF: 100; 5 AEROSOL RESPIRATORY (INHALATION) at 08:05

## 2019-02-15 RX ADMIN — INSULIN LISPRO 4 UNITS: 100 INJECTION, SOLUTION INTRAVENOUS; SUBCUTANEOUS at 17:01

## 2019-02-15 RX ADMIN — OXYCODONE AND ACETAMINOPHEN 1 TABLET: 5; 325 TABLET ORAL at 08:43

## 2019-02-15 RX ADMIN — Medication 2 SPRAY: at 11:35

## 2019-02-15 RX ADMIN — INSULIN LISPRO 7 UNITS: 100 INJECTION, SOLUTION INTRAVENOUS; SUBCUTANEOUS at 08:47

## 2019-02-15 RX ADMIN — Medication 10 ML: at 08:41

## 2019-02-15 RX ADMIN — INSULIN LISPRO 7 UNITS: 100 INJECTION, SOLUTION INTRAVENOUS; SUBCUTANEOUS at 16:34

## 2019-02-15 RX ADMIN — INSULIN GLARGINE 25 UNITS: 100 INJECTION, SOLUTION SUBCUTANEOUS at 22:24

## 2019-02-15 RX ADMIN — INSULIN LISPRO 5 UNITS: 100 INJECTION, SOLUTION INTRAVENOUS; SUBCUTANEOUS at 12:06

## 2019-02-15 RX ADMIN — ACETAZOLAMIDE SODIUM 500 MG: 500 INJECTION, POWDER, LYOPHILIZED, FOR SOLUTION INTRAVENOUS at 08:41

## 2019-02-15 RX ADMIN — OSELTAMIVIR PHOSPHATE 75 MG: 75 CAPSULE ORAL at 08:42

## 2019-02-15 RX ADMIN — FUROSEMIDE 40 MG: 10 INJECTION, SOLUTION INTRAMUSCULAR; INTRAVENOUS at 08:42

## 2019-02-15 ASSESSMENT — PAIN DESCRIPTION - PAIN TYPE
TYPE: CHRONIC PAIN

## 2019-02-15 ASSESSMENT — PAIN SCALES - GENERAL
PAINLEVEL_OUTOF10: 5
PAINLEVEL_OUTOF10: 4
PAINLEVEL_OUTOF10: 7
PAINLEVEL_OUTOF10: 6
PAINLEVEL_OUTOF10: 7
PAINLEVEL_OUTOF10: 0
PAINLEVEL_OUTOF10: 8
PAINLEVEL_OUTOF10: 6
PAINLEVEL_OUTOF10: 5
PAINLEVEL_OUTOF10: 0
PAINLEVEL_OUTOF10: 8

## 2019-02-15 ASSESSMENT — PAIN SCALES - WONG BAKER
WONGBAKER_NUMERICALRESPONSE: 4
WONGBAKER_NUMERICALRESPONSE: 6
WONGBAKER_NUMERICALRESPONSE: 6

## 2019-02-15 ASSESSMENT — PAIN DESCRIPTION - DESCRIPTORS
DESCRIPTORS: ACHING

## 2019-02-15 ASSESSMENT — PAIN DESCRIPTION - LOCATION
LOCATION: GENERALIZED

## 2019-02-15 ASSESSMENT — PAIN DESCRIPTION - FREQUENCY
FREQUENCY: CONTINUOUS

## 2019-02-16 LAB
ANION GAP SERPL CALCULATED.3IONS-SCNC: 11 MMOL/L (ref 3–16)
ANISOCYTOSIS: ABNORMAL
BASOPHILS ABSOLUTE: 0 K/UL (ref 0–0.2)
BASOPHILS RELATIVE PERCENT: 0 %
BUN BLDV-MCNC: 25 MG/DL (ref 7–20)
CALCIUM SERPL-MCNC: 9.4 MG/DL (ref 8.3–10.6)
CHLORIDE BLD-SCNC: 97 MMOL/L (ref 99–110)
CO2: 30 MMOL/L (ref 21–32)
CREAT SERPL-MCNC: 0.8 MG/DL (ref 0.9–1.3)
EOSINOPHILS ABSOLUTE: 0 K/UL (ref 0–0.6)
EOSINOPHILS RELATIVE PERCENT: 0 %
GFR AFRICAN AMERICAN: >60
GFR NON-AFRICAN AMERICAN: >60
GLUCOSE BLD-MCNC: 163 MG/DL (ref 70–99)
GLUCOSE BLD-MCNC: 168 MG/DL (ref 70–99)
GLUCOSE BLD-MCNC: 208 MG/DL (ref 70–99)
GLUCOSE BLD-MCNC: 219 MG/DL (ref 70–99)
GLUCOSE BLD-MCNC: 338 MG/DL (ref 70–99)
HCT VFR BLD CALC: 33.9 % (ref 40.5–52.5)
HEMOGLOBIN: 11.1 G/DL (ref 13.5–17.5)
LYMPHOCYTES ABSOLUTE: 1.9 K/UL (ref 1–5.1)
LYMPHOCYTES RELATIVE PERCENT: 15 %
MAGNESIUM: 2.2 MG/DL (ref 1.8–2.4)
MCH RBC QN AUTO: 29.2 PG (ref 26–34)
MCHC RBC AUTO-ENTMCNC: 32.6 G/DL (ref 31–36)
MCV RBC AUTO: 89.7 FL (ref 80–100)
MONOCYTES ABSOLUTE: 1.5 K/UL (ref 0–1.3)
MONOCYTES RELATIVE PERCENT: 12 %
NEUTROPHILS ABSOLUTE: 9.1 K/UL (ref 1.7–7.7)
NEUTROPHILS RELATIVE PERCENT: 73 %
PDW BLD-RTO: 17.2 % (ref 12.4–15.4)
PERFORMED ON: ABNORMAL
PLATELET # BLD: 249 K/UL (ref 135–450)
PLATELET SLIDE REVIEW: ADEQUATE
PMV BLD AUTO: 8.6 FL (ref 5–10.5)
POTASSIUM REFLEX MAGNESIUM: 3.3 MMOL/L (ref 3.5–5.1)
RBC # BLD: 3.78 M/UL (ref 4.2–5.9)
SLIDE REVIEW: ABNORMAL
SODIUM BLD-SCNC: 138 MMOL/L (ref 136–145)
WBC # BLD: 12.5 K/UL (ref 4–11)

## 2019-02-16 PROCEDURE — 6370000000 HC RX 637 (ALT 250 FOR IP): Performed by: INTERNAL MEDICINE

## 2019-02-16 PROCEDURE — 94762 N-INVAS EAR/PLS OXIMTRY CONT: CPT

## 2019-02-16 PROCEDURE — 83735 ASSAY OF MAGNESIUM: CPT

## 2019-02-16 PROCEDURE — 85025 COMPLETE CBC W/AUTO DIFF WBC: CPT

## 2019-02-16 PROCEDURE — 2060000000 HC ICU INTERMEDIATE R&B

## 2019-02-16 PROCEDURE — 6360000002 HC RX W HCPCS: Performed by: INTERNAL MEDICINE

## 2019-02-16 PROCEDURE — 99232 SBSQ HOSP IP/OBS MODERATE 35: CPT | Performed by: INTERNAL MEDICINE

## 2019-02-16 PROCEDURE — 80048 BASIC METABOLIC PNL TOTAL CA: CPT

## 2019-02-16 PROCEDURE — 2580000003 HC RX 258: Performed by: INTERNAL MEDICINE

## 2019-02-16 PROCEDURE — 2700000000 HC OXYGEN THERAPY PER DAY

## 2019-02-16 PROCEDURE — 6370000000 HC RX 637 (ALT 250 FOR IP): Performed by: STUDENT IN AN ORGANIZED HEALTH CARE EDUCATION/TRAINING PROGRAM

## 2019-02-16 PROCEDURE — 94640 AIRWAY INHALATION TREATMENT: CPT

## 2019-02-16 RX ORDER — METOPROLOL SUCCINATE 50 MG/1
100 TABLET, EXTENDED RELEASE ORAL DAILY
Status: DISCONTINUED | OUTPATIENT
Start: 2019-02-17 | End: 2019-02-18

## 2019-02-16 RX ORDER — FUROSEMIDE 10 MG/ML
60 INJECTION INTRAMUSCULAR; INTRAVENOUS 2 TIMES DAILY
Status: DISCONTINUED | OUTPATIENT
Start: 2019-02-16 | End: 2019-02-19

## 2019-02-16 RX ORDER — POTASSIUM CHLORIDE 20 MEQ/1
40 TABLET, EXTENDED RELEASE ORAL ONCE
Status: COMPLETED | OUTPATIENT
Start: 2019-02-16 | End: 2019-02-16

## 2019-02-16 RX ADMIN — Medication 10 ML: at 09:56

## 2019-02-16 RX ADMIN — IPRATROPIUM BROMIDE AND ALBUTEROL SULFATE 1 AMPULE: .5; 3 SOLUTION RESPIRATORY (INHALATION) at 12:41

## 2019-02-16 RX ADMIN — MOMETASONE FUROATE AND FORMOTEROL FUMARATE DIHYDRATE 2 PUFF: 100; 5 AEROSOL RESPIRATORY (INHALATION) at 09:04

## 2019-02-16 RX ADMIN — OSELTAMIVIR PHOSPHATE 75 MG: 75 CAPSULE ORAL at 20:30

## 2019-02-16 RX ADMIN — OXYCODONE AND ACETAMINOPHEN 1 TABLET: 5; 325 TABLET ORAL at 18:12

## 2019-02-16 RX ADMIN — IPRATROPIUM BROMIDE AND ALBUTEROL SULFATE 1 AMPULE: .5; 3 SOLUTION RESPIRATORY (INHALATION) at 20:38

## 2019-02-16 RX ADMIN — MOMETASONE FUROATE AND FORMOTEROL FUMARATE DIHYDRATE 2 PUFF: 100; 5 AEROSOL RESPIRATORY (INHALATION) at 20:43

## 2019-02-16 RX ADMIN — INSULIN LISPRO 2 UNITS: 100 INJECTION, SOLUTION INTRAVENOUS; SUBCUTANEOUS at 18:40

## 2019-02-16 RX ADMIN — POTASSIUM CHLORIDE 40 MEQ: 20 TABLET, EXTENDED RELEASE ORAL at 11:24

## 2019-02-16 RX ADMIN — PREDNISONE 40 MG: 20 TABLET ORAL at 09:45

## 2019-02-16 RX ADMIN — TRAZODONE HYDROCHLORIDE 50 MG: 50 TABLET ORAL at 20:07

## 2019-02-16 RX ADMIN — METOPROLOL SUCCINATE 75 MG: 50 TABLET, EXTENDED RELEASE ORAL at 09:45

## 2019-02-16 RX ADMIN — IPRATROPIUM BROMIDE AND ALBUTEROL SULFATE 1 AMPULE: .5; 3 SOLUTION RESPIRATORY (INHALATION) at 09:04

## 2019-02-16 RX ADMIN — Medication 10 ML: at 20:02

## 2019-02-16 RX ADMIN — PANTOPRAZOLE SODIUM 40 MG: 40 TABLET, DELAYED RELEASE ORAL at 06:25

## 2019-02-16 RX ADMIN — INSULIN LISPRO 10 UNITS: 100 INJECTION, SOLUTION INTRAVENOUS; SUBCUTANEOUS at 18:39

## 2019-02-16 RX ADMIN — INSULIN GLARGINE 25 UNITS: 100 INJECTION, SOLUTION SUBCUTANEOUS at 21:27

## 2019-02-16 RX ADMIN — OSELTAMIVIR PHOSPHATE 75 MG: 75 CAPSULE ORAL at 09:45

## 2019-02-16 RX ADMIN — INSULIN LISPRO 7 UNITS: 100 INJECTION, SOLUTION INTRAVENOUS; SUBCUTANEOUS at 09:47

## 2019-02-16 RX ADMIN — DILTIAZEM HYDROCHLORIDE 240 MG: 240 CAPSULE, COATED, EXTENDED RELEASE ORAL at 09:45

## 2019-02-16 RX ADMIN — INSULIN LISPRO 2 UNITS: 100 INJECTION, SOLUTION INTRAVENOUS; SUBCUTANEOUS at 13:17

## 2019-02-16 RX ADMIN — IPRATROPIUM BROMIDE AND ALBUTEROL SULFATE 1 AMPULE: .5; 3 SOLUTION RESPIRATORY (INHALATION) at 16:40

## 2019-02-16 RX ADMIN — FUROSEMIDE 60 MG: 10 INJECTION, SOLUTION INTRAMUSCULAR; INTRAVENOUS at 20:01

## 2019-02-16 RX ADMIN — RIVAROXABAN 20 MG: 20 TABLET, FILM COATED ORAL at 09:55

## 2019-02-16 RX ADMIN — DULOXETINE 60 MG: 60 CAPSULE, DELAYED RELEASE ORAL at 09:44

## 2019-02-16 RX ADMIN — OXYCODONE AND ACETAMINOPHEN 1 TABLET: 5; 325 TABLET ORAL at 09:44

## 2019-02-16 RX ADMIN — INSULIN LISPRO 7 UNITS: 100 INJECTION, SOLUTION INTRAVENOUS; SUBCUTANEOUS at 13:16

## 2019-02-16 RX ADMIN — GABAPENTIN 100 MG: 100 CAPSULE ORAL at 09:45

## 2019-02-16 RX ADMIN — INSULIN LISPRO 1 UNITS: 100 INJECTION, SOLUTION INTRAVENOUS; SUBCUTANEOUS at 09:48

## 2019-02-16 RX ADMIN — Medication 2 SPRAY: at 09:46

## 2019-02-16 RX ADMIN — ATORVASTATIN CALCIUM 40 MG: 40 TABLET, FILM COATED ORAL at 09:45

## 2019-02-16 RX ADMIN — TAMSULOSIN HYDROCHLORIDE 0.4 MG: 0.4 CAPSULE ORAL at 09:45

## 2019-02-16 RX ADMIN — INSULIN LISPRO 2 UNITS: 100 INJECTION, SOLUTION INTRAVENOUS; SUBCUTANEOUS at 21:27

## 2019-02-16 RX ADMIN — GABAPENTIN 100 MG: 100 CAPSULE ORAL at 20:30

## 2019-02-16 RX ADMIN — SPIRONOLACTONE 25 MG: 25 TABLET ORAL at 09:45

## 2019-02-16 RX ADMIN — INSULIN GLARGINE 25 UNITS: 100 INJECTION, SOLUTION SUBCUTANEOUS at 09:46

## 2019-02-16 RX ADMIN — FUROSEMIDE 40 MG: 10 INJECTION, SOLUTION INTRAMUSCULAR; INTRAVENOUS at 09:44

## 2019-02-16 ASSESSMENT — PAIN SCALES - GENERAL
PAINLEVEL_OUTOF10: 0
PAINLEVEL_OUTOF10: 6
PAINLEVEL_OUTOF10: 5
PAINLEVEL_OUTOF10: 8
PAINLEVEL_OUTOF10: 0

## 2019-02-17 LAB
ANION GAP SERPL CALCULATED.3IONS-SCNC: 12 MMOL/L (ref 3–16)
BASOPHILS ABSOLUTE: 0.1 K/UL (ref 0–0.2)
BASOPHILS RELATIVE PERCENT: 0.5 %
BUN BLDV-MCNC: 28 MG/DL (ref 7–20)
CALCIUM SERPL-MCNC: 9.6 MG/DL (ref 8.3–10.6)
CHLORIDE BLD-SCNC: 98 MMOL/L (ref 99–110)
CO2: 30 MMOL/L (ref 21–32)
CREAT SERPL-MCNC: 0.8 MG/DL (ref 0.9–1.3)
EOSINOPHILS ABSOLUTE: 0 K/UL (ref 0–0.6)
EOSINOPHILS RELATIVE PERCENT: 0.1 %
GFR AFRICAN AMERICAN: >60
GFR NON-AFRICAN AMERICAN: >60
GLUCOSE BLD-MCNC: 187 MG/DL (ref 70–99)
GLUCOSE BLD-MCNC: 244 MG/DL (ref 70–99)
GLUCOSE BLD-MCNC: 252 MG/DL (ref 70–99)
GLUCOSE BLD-MCNC: 292 MG/DL (ref 70–99)
GLUCOSE BLD-MCNC: 302 MG/DL (ref 70–99)
HCT VFR BLD CALC: 35.3 % (ref 40.5–52.5)
HEMOGLOBIN: 11.5 G/DL (ref 13.5–17.5)
LYMPHOCYTES ABSOLUTE: 1.3 K/UL (ref 1–5.1)
LYMPHOCYTES RELATIVE PERCENT: 11.9 %
MAGNESIUM: 2.2 MG/DL (ref 1.8–2.4)
MCH RBC QN AUTO: 29.7 PG (ref 26–34)
MCHC RBC AUTO-ENTMCNC: 32.7 G/DL (ref 31–36)
MCV RBC AUTO: 90.7 FL (ref 80–100)
MONOCYTES ABSOLUTE: 1 K/UL (ref 0–1.3)
MONOCYTES RELATIVE PERCENT: 9.2 %
NEUTROPHILS ABSOLUTE: 8.4 K/UL (ref 1.7–7.7)
NEUTROPHILS RELATIVE PERCENT: 78.3 %
PDW BLD-RTO: 16.9 % (ref 12.4–15.4)
PERFORMED ON: ABNORMAL
PLATELET # BLD: 241 K/UL (ref 135–450)
PMV BLD AUTO: 8.9 FL (ref 5–10.5)
POTASSIUM REFLEX MAGNESIUM: 4.1 MMOL/L (ref 3.5–5.1)
RBC # BLD: 3.89 M/UL (ref 4.2–5.9)
SODIUM BLD-SCNC: 140 MMOL/L (ref 136–145)
WBC # BLD: 10.7 K/UL (ref 4–11)

## 2019-02-17 PROCEDURE — 6370000000 HC RX 637 (ALT 250 FOR IP): Performed by: STUDENT IN AN ORGANIZED HEALTH CARE EDUCATION/TRAINING PROGRAM

## 2019-02-17 PROCEDURE — 94640 AIRWAY INHALATION TREATMENT: CPT

## 2019-02-17 PROCEDURE — 2580000003 HC RX 258: Performed by: INTERNAL MEDICINE

## 2019-02-17 PROCEDURE — 6370000000 HC RX 637 (ALT 250 FOR IP): Performed by: INTERNAL MEDICINE

## 2019-02-17 PROCEDURE — 6360000002 HC RX W HCPCS: Performed by: INTERNAL MEDICINE

## 2019-02-17 PROCEDURE — 99233 SBSQ HOSP IP/OBS HIGH 50: CPT | Performed by: INTERNAL MEDICINE

## 2019-02-17 PROCEDURE — 80048 BASIC METABOLIC PNL TOTAL CA: CPT

## 2019-02-17 PROCEDURE — 2700000000 HC OXYGEN THERAPY PER DAY

## 2019-02-17 PROCEDURE — 36415 COLL VENOUS BLD VENIPUNCTURE: CPT

## 2019-02-17 PROCEDURE — 2060000000 HC ICU INTERMEDIATE R&B

## 2019-02-17 PROCEDURE — 83735 ASSAY OF MAGNESIUM: CPT

## 2019-02-17 PROCEDURE — 94760 N-INVAS EAR/PLS OXIMETRY 1: CPT

## 2019-02-17 PROCEDURE — 85025 COMPLETE CBC W/AUTO DIFF WBC: CPT

## 2019-02-17 RX ORDER — INSULIN GLARGINE 100 [IU]/ML
30 INJECTION, SOLUTION SUBCUTANEOUS 2 TIMES DAILY
Status: DISCONTINUED | OUTPATIENT
Start: 2019-02-17 | End: 2019-02-21 | Stop reason: HOSPADM

## 2019-02-17 RX ADMIN — ATORVASTATIN CALCIUM 40 MG: 40 TABLET, FILM COATED ORAL at 09:59

## 2019-02-17 RX ADMIN — INSULIN GLARGINE 30 UNITS: 100 INJECTION, SOLUTION SUBCUTANEOUS at 10:02

## 2019-02-17 RX ADMIN — DULOXETINE 60 MG: 60 CAPSULE, DELAYED RELEASE ORAL at 09:59

## 2019-02-17 RX ADMIN — INSULIN LISPRO 15 UNITS: 100 INJECTION, SOLUTION INTRAVENOUS; SUBCUTANEOUS at 16:56

## 2019-02-17 RX ADMIN — PREDNISONE 40 MG: 20 TABLET ORAL at 09:58

## 2019-02-17 RX ADMIN — Medication 10 ML: at 10:00

## 2019-02-17 RX ADMIN — TAMSULOSIN HYDROCHLORIDE 0.4 MG: 0.4 CAPSULE ORAL at 09:58

## 2019-02-17 RX ADMIN — IPRATROPIUM BROMIDE AND ALBUTEROL SULFATE 1 AMPULE: .5; 3 SOLUTION RESPIRATORY (INHALATION) at 19:34

## 2019-02-17 RX ADMIN — GABAPENTIN 100 MG: 100 CAPSULE ORAL at 21:02

## 2019-02-17 RX ADMIN — INSULIN LISPRO 2 UNITS: 100 INJECTION, SOLUTION INTRAVENOUS; SUBCUTANEOUS at 12:40

## 2019-02-17 RX ADMIN — IPRATROPIUM BROMIDE AND ALBUTEROL SULFATE 1 AMPULE: .5; 3 SOLUTION RESPIRATORY (INHALATION) at 11:58

## 2019-02-17 RX ADMIN — DILTIAZEM HYDROCHLORIDE 240 MG: 240 CAPSULE, COATED, EXTENDED RELEASE ORAL at 09:59

## 2019-02-17 RX ADMIN — GABAPENTIN 100 MG: 100 CAPSULE ORAL at 12:39

## 2019-02-17 RX ADMIN — INSULIN LISPRO 15 UNITS: 100 INJECTION, SOLUTION INTRAVENOUS; SUBCUTANEOUS at 12:40

## 2019-02-17 RX ADMIN — IPRATROPIUM BROMIDE AND ALBUTEROL SULFATE 1 AMPULE: .5; 3 SOLUTION RESPIRATORY (INHALATION) at 16:04

## 2019-02-17 RX ADMIN — SPIRONOLACTONE 25 MG: 25 TABLET ORAL at 09:58

## 2019-02-17 RX ADMIN — INSULIN LISPRO 3 UNITS: 100 INJECTION, SOLUTION INTRAVENOUS; SUBCUTANEOUS at 16:56

## 2019-02-17 RX ADMIN — METOPROLOL SUCCINATE 100 MG: 50 TABLET, EXTENDED RELEASE ORAL at 09:57

## 2019-02-17 RX ADMIN — INSULIN LISPRO 1 UNITS: 100 INJECTION, SOLUTION INTRAVENOUS; SUBCUTANEOUS at 10:01

## 2019-02-17 RX ADMIN — MOMETASONE FUROATE AND FORMOTEROL FUMARATE DIHYDRATE 2 PUFF: 100; 5 AEROSOL RESPIRATORY (INHALATION) at 08:24

## 2019-02-17 RX ADMIN — INSULIN GLARGINE 30 UNITS: 100 INJECTION, SOLUTION SUBCUTANEOUS at 21:02

## 2019-02-17 RX ADMIN — OXYCODONE AND ACETAMINOPHEN 1 TABLET: 5; 325 TABLET ORAL at 18:54

## 2019-02-17 RX ADMIN — IPRATROPIUM BROMIDE AND ALBUTEROL SULFATE 1 AMPULE: .5; 3 SOLUTION RESPIRATORY (INHALATION) at 08:24

## 2019-02-17 RX ADMIN — TRAZODONE HYDROCHLORIDE 50 MG: 50 TABLET ORAL at 21:03

## 2019-02-17 RX ADMIN — RIVAROXABAN 20 MG: 20 TABLET, FILM COATED ORAL at 09:59

## 2019-02-17 RX ADMIN — INSULIN LISPRO 2 UNITS: 100 INJECTION, SOLUTION INTRAVENOUS; SUBCUTANEOUS at 21:02

## 2019-02-17 RX ADMIN — INSULIN LISPRO 15 UNITS: 100 INJECTION, SOLUTION INTRAVENOUS; SUBCUTANEOUS at 10:02

## 2019-02-17 RX ADMIN — PANTOPRAZOLE SODIUM 40 MG: 40 TABLET, DELAYED RELEASE ORAL at 05:35

## 2019-02-17 RX ADMIN — FUROSEMIDE 60 MG: 10 INJECTION, SOLUTION INTRAMUSCULAR; INTRAVENOUS at 10:00

## 2019-02-17 RX ADMIN — MOMETASONE FUROATE AND FORMOTEROL FUMARATE DIHYDRATE 2 PUFF: 100; 5 AEROSOL RESPIRATORY (INHALATION) at 19:34

## 2019-02-17 RX ADMIN — OXYCODONE AND ACETAMINOPHEN 1 TABLET: 5; 325 TABLET ORAL at 09:59

## 2019-02-17 RX ADMIN — GABAPENTIN 100 MG: 100 CAPSULE ORAL at 09:58

## 2019-02-17 RX ADMIN — Medication 10 ML: at 21:03

## 2019-02-17 RX ADMIN — FUROSEMIDE 60 MG: 10 INJECTION, SOLUTION INTRAMUSCULAR; INTRAVENOUS at 18:49

## 2019-02-17 RX ADMIN — OSELTAMIVIR PHOSPHATE 75 MG: 75 CAPSULE ORAL at 09:59

## 2019-02-17 ASSESSMENT — PAIN SCALES - GENERAL
PAINLEVEL_OUTOF10: 8
PAINLEVEL_OUTOF10: 5
PAINLEVEL_OUTOF10: 0
PAINLEVEL_OUTOF10: 8

## 2019-02-17 ASSESSMENT — PAIN DESCRIPTION - LOCATION: LOCATION: BACK

## 2019-02-17 ASSESSMENT — PAIN DESCRIPTION - PROGRESSION: CLINICAL_PROGRESSION: GRADUALLY WORSENING

## 2019-02-17 ASSESSMENT — PAIN DESCRIPTION - PAIN TYPE: TYPE: CHRONIC PAIN

## 2019-02-17 ASSESSMENT — PAIN DESCRIPTION - DESCRIPTORS: DESCRIPTORS: ACHING;CONSTANT;DISCOMFORT;DULL

## 2019-02-17 ASSESSMENT — PAIN DESCRIPTION - ONSET: ONSET: ON-GOING

## 2019-02-17 ASSESSMENT — PAIN - FUNCTIONAL ASSESSMENT: PAIN_FUNCTIONAL_ASSESSMENT: PREVENTS OR INTERFERES SOME ACTIVE ACTIVITIES AND ADLS

## 2019-02-17 ASSESSMENT — PAIN DESCRIPTION - FREQUENCY: FREQUENCY: CONTINUOUS

## 2019-02-18 LAB
ANION GAP SERPL CALCULATED.3IONS-SCNC: 10 MMOL/L (ref 3–16)
BASOPHILS ABSOLUTE: 0.1 K/UL (ref 0–0.2)
BASOPHILS RELATIVE PERCENT: 0.7 %
BUN BLDV-MCNC: 26 MG/DL (ref 7–20)
CALCIUM SERPL-MCNC: 9.3 MG/DL (ref 8.3–10.6)
CHLORIDE BLD-SCNC: 99 MMOL/L (ref 99–110)
CO2: 35 MMOL/L (ref 21–32)
CREAT SERPL-MCNC: 0.7 MG/DL (ref 0.9–1.3)
EOSINOPHILS ABSOLUTE: 0.1 K/UL (ref 0–0.6)
EOSINOPHILS RELATIVE PERCENT: 0.8 %
GFR AFRICAN AMERICAN: >60
GFR NON-AFRICAN AMERICAN: >60
GLUCOSE BLD-MCNC: 139 MG/DL (ref 70–99)
GLUCOSE BLD-MCNC: 154 MG/DL (ref 70–99)
GLUCOSE BLD-MCNC: 174 MG/DL (ref 70–99)
GLUCOSE BLD-MCNC: 228 MG/DL (ref 70–99)
GLUCOSE BLD-MCNC: 241 MG/DL (ref 70–99)
HCT VFR BLD CALC: 37.3 % (ref 40.5–52.5)
HEMOGLOBIN: 12 G/DL (ref 13.5–17.5)
LYMPHOCYTES ABSOLUTE: 2.3 K/UL (ref 1–5.1)
LYMPHOCYTES RELATIVE PERCENT: 17.7 %
MAGNESIUM: 2.1 MG/DL (ref 1.8–2.4)
MCH RBC QN AUTO: 29 PG (ref 26–34)
MCHC RBC AUTO-ENTMCNC: 32.3 G/DL (ref 31–36)
MCV RBC AUTO: 89.7 FL (ref 80–100)
MONOCYTES ABSOLUTE: 1.4 K/UL (ref 0–1.3)
MONOCYTES RELATIVE PERCENT: 11 %
NEUTROPHILS ABSOLUTE: 9.2 K/UL (ref 1.7–7.7)
NEUTROPHILS RELATIVE PERCENT: 69.8 %
PDW BLD-RTO: 16.7 % (ref 12.4–15.4)
PERFORMED ON: ABNORMAL
PLATELET # BLD: 256 K/UL (ref 135–450)
PMV BLD AUTO: 8.8 FL (ref 5–10.5)
POTASSIUM REFLEX MAGNESIUM: 3.3 MMOL/L (ref 3.5–5.1)
RBC # BLD: 4.16 M/UL (ref 4.2–5.9)
SODIUM BLD-SCNC: 144 MMOL/L (ref 136–145)
WBC # BLD: 13.2 K/UL (ref 4–11)

## 2019-02-18 PROCEDURE — 2580000003 HC RX 258: Performed by: INTERNAL MEDICINE

## 2019-02-18 PROCEDURE — 6370000000 HC RX 637 (ALT 250 FOR IP): Performed by: INTERNAL MEDICINE

## 2019-02-18 PROCEDURE — 80048 BASIC METABOLIC PNL TOTAL CA: CPT

## 2019-02-18 PROCEDURE — 83735 ASSAY OF MAGNESIUM: CPT

## 2019-02-18 PROCEDURE — 2060000000 HC ICU INTERMEDIATE R&B

## 2019-02-18 PROCEDURE — 85025 COMPLETE CBC W/AUTO DIFF WBC: CPT

## 2019-02-18 PROCEDURE — 6370000000 HC RX 637 (ALT 250 FOR IP): Performed by: HOSPITALIST

## 2019-02-18 PROCEDURE — 99232 SBSQ HOSP IP/OBS MODERATE 35: CPT | Performed by: INTERNAL MEDICINE

## 2019-02-18 PROCEDURE — 94640 AIRWAY INHALATION TREATMENT: CPT

## 2019-02-18 PROCEDURE — 94664 DEMO&/EVAL PT USE INHALER: CPT

## 2019-02-18 PROCEDURE — 36415 COLL VENOUS BLD VENIPUNCTURE: CPT

## 2019-02-18 PROCEDURE — 6360000002 HC RX W HCPCS: Performed by: INTERNAL MEDICINE

## 2019-02-18 PROCEDURE — 99232 SBSQ HOSP IP/OBS MODERATE 35: CPT | Performed by: NURSE PRACTITIONER

## 2019-02-18 PROCEDURE — 2700000000 HC OXYGEN THERAPY PER DAY

## 2019-02-18 PROCEDURE — 94762 N-INVAS EAR/PLS OXIMTRY CONT: CPT

## 2019-02-18 RX ORDER — POTASSIUM CHLORIDE 1.5 G/1.77G
40 POWDER, FOR SOLUTION ORAL PRN
Status: DISCONTINUED | OUTPATIENT
Start: 2019-02-18 | End: 2019-02-21 | Stop reason: HOSPADM

## 2019-02-18 RX ORDER — POTASSIUM CHLORIDE 20 MEQ/1
40 TABLET, EXTENDED RELEASE ORAL PRN
Status: DISCONTINUED | OUTPATIENT
Start: 2019-02-18 | End: 2019-02-21 | Stop reason: HOSPADM

## 2019-02-18 RX ORDER — METOPROLOL SUCCINATE 50 MG/1
150 TABLET, EXTENDED RELEASE ORAL DAILY
Status: DISCONTINUED | OUTPATIENT
Start: 2019-02-19 | End: 2019-02-21 | Stop reason: HOSPADM

## 2019-02-18 RX ORDER — POTASSIUM CHLORIDE 7.45 MG/ML
10 INJECTION INTRAVENOUS PRN
Status: DISCONTINUED | OUTPATIENT
Start: 2019-02-18 | End: 2019-02-21 | Stop reason: HOSPADM

## 2019-02-18 RX ADMIN — TRAZODONE HYDROCHLORIDE 50 MG: 50 TABLET ORAL at 21:15

## 2019-02-18 RX ADMIN — IPRATROPIUM BROMIDE AND ALBUTEROL SULFATE 1 AMPULE: .5; 3 SOLUTION RESPIRATORY (INHALATION) at 20:47

## 2019-02-18 RX ADMIN — INSULIN LISPRO 1 UNITS: 100 INJECTION, SOLUTION INTRAVENOUS; SUBCUTANEOUS at 21:15

## 2019-02-18 RX ADMIN — OXYCODONE AND ACETAMINOPHEN 1 TABLET: 5; 325 TABLET ORAL at 09:01

## 2019-02-18 RX ADMIN — FUROSEMIDE 60 MG: 10 INJECTION, SOLUTION INTRAMUSCULAR; INTRAVENOUS at 08:53

## 2019-02-18 RX ADMIN — INSULIN GLARGINE 30 UNITS: 100 INJECTION, SOLUTION SUBCUTANEOUS at 08:55

## 2019-02-18 RX ADMIN — OXYCODONE AND ACETAMINOPHEN 1 TABLET: 5; 325 TABLET ORAL at 17:35

## 2019-02-18 RX ADMIN — METOPROLOL SUCCINATE 100 MG: 50 TABLET, EXTENDED RELEASE ORAL at 08:51

## 2019-02-18 RX ADMIN — GABAPENTIN 100 MG: 100 CAPSULE ORAL at 13:21

## 2019-02-18 RX ADMIN — INSULIN LISPRO 2 UNITS: 100 INJECTION, SOLUTION INTRAVENOUS; SUBCUTANEOUS at 12:20

## 2019-02-18 RX ADMIN — DILTIAZEM HYDROCHLORIDE 240 MG: 240 CAPSULE, COATED, EXTENDED RELEASE ORAL at 08:51

## 2019-02-18 RX ADMIN — POTASSIUM CHLORIDE 40 MEQ: 20 TABLET, EXTENDED RELEASE ORAL at 06:58

## 2019-02-18 RX ADMIN — ATORVASTATIN CALCIUM 40 MG: 40 TABLET, FILM COATED ORAL at 08:53

## 2019-02-18 RX ADMIN — MOMETASONE FUROATE AND FORMOTEROL FUMARATE DIHYDRATE 2 PUFF: 100; 5 AEROSOL RESPIRATORY (INHALATION) at 20:47

## 2019-02-18 RX ADMIN — GABAPENTIN 100 MG: 100 CAPSULE ORAL at 21:15

## 2019-02-18 RX ADMIN — DULOXETINE 60 MG: 60 CAPSULE, DELAYED RELEASE ORAL at 08:51

## 2019-02-18 RX ADMIN — IPRATROPIUM BROMIDE AND ALBUTEROL SULFATE 1 AMPULE: .5; 3 SOLUTION RESPIRATORY (INHALATION) at 15:55

## 2019-02-18 RX ADMIN — Medication 10 ML: at 08:54

## 2019-02-18 RX ADMIN — INSULIN GLARGINE 30 UNITS: 100 INJECTION, SOLUTION SUBCUTANEOUS at 21:15

## 2019-02-18 RX ADMIN — INSULIN LISPRO 15 UNITS: 100 INJECTION, SOLUTION INTRAVENOUS; SUBCUTANEOUS at 12:21

## 2019-02-18 RX ADMIN — INSULIN LISPRO 2 UNITS: 100 INJECTION, SOLUTION INTRAVENOUS; SUBCUTANEOUS at 17:36

## 2019-02-18 RX ADMIN — PANTOPRAZOLE SODIUM 40 MG: 40 TABLET, DELAYED RELEASE ORAL at 06:40

## 2019-02-18 RX ADMIN — FUROSEMIDE 60 MG: 10 INJECTION, SOLUTION INTRAMUSCULAR; INTRAVENOUS at 17:34

## 2019-02-18 RX ADMIN — RIVAROXABAN 20 MG: 20 TABLET, FILM COATED ORAL at 08:51

## 2019-02-18 RX ADMIN — INSULIN LISPRO 15 UNITS: 100 INJECTION, SOLUTION INTRAVENOUS; SUBCUTANEOUS at 17:36

## 2019-02-18 RX ADMIN — TAMSULOSIN HYDROCHLORIDE 0.4 MG: 0.4 CAPSULE ORAL at 08:53

## 2019-02-18 RX ADMIN — GABAPENTIN 100 MG: 100 CAPSULE ORAL at 08:53

## 2019-02-18 RX ADMIN — IPRATROPIUM BROMIDE AND ALBUTEROL SULFATE 1 AMPULE: .5; 3 SOLUTION RESPIRATORY (INHALATION) at 12:04

## 2019-02-18 RX ADMIN — INSULIN LISPRO 15 UNITS: 100 INJECTION, SOLUTION INTRAVENOUS; SUBCUTANEOUS at 08:55

## 2019-02-18 RX ADMIN — Medication 10 ML: at 21:15

## 2019-02-18 RX ADMIN — SPIRONOLACTONE 25 MG: 25 TABLET ORAL at 08:53

## 2019-02-18 ASSESSMENT — PAIN DESCRIPTION - ONSET: ONSET: ON-GOING

## 2019-02-18 ASSESSMENT — PAIN DESCRIPTION - ORIENTATION
ORIENTATION: LOWER
ORIENTATION: LOWER

## 2019-02-18 ASSESSMENT — PAIN DESCRIPTION - DESCRIPTORS
DESCRIPTORS: ACHING
DESCRIPTORS: ACHING;SHOOTING

## 2019-02-18 ASSESSMENT — PAIN SCALES - GENERAL
PAINLEVEL_OUTOF10: 0
PAINLEVEL_OUTOF10: 8
PAINLEVEL_OUTOF10: 0
PAINLEVEL_OUTOF10: 8
PAINLEVEL_OUTOF10: 7
PAINLEVEL_OUTOF10: 0
PAINLEVEL_OUTOF10: 7

## 2019-02-18 ASSESSMENT — PAIN DESCRIPTION - PAIN TYPE
TYPE: CHRONIC PAIN
TYPE: CHRONIC PAIN

## 2019-02-18 ASSESSMENT — PAIN DESCRIPTION - LOCATION
LOCATION: BACK
LOCATION: BACK

## 2019-02-18 ASSESSMENT — PAIN DESCRIPTION - FREQUENCY: FREQUENCY: CONTINUOUS

## 2019-02-19 ENCOUNTER — TELEPHONE (OUTPATIENT)
Dept: CARDIOLOGY CLINIC | Age: 59
End: 2019-02-19

## 2019-02-19 PROBLEM — J96.01 ACUTE RESPIRATORY FAILURE WITH HYPOXEMIA (HCC): Status: RESOLVED | Noted: 2017-11-13 | Resolved: 2019-02-19

## 2019-02-19 LAB
ANION GAP SERPL CALCULATED.3IONS-SCNC: 11 MMOL/L (ref 3–16)
BASOPHILS ABSOLUTE: 0.1 K/UL (ref 0–0.2)
BASOPHILS RELATIVE PERCENT: 1 %
BUN BLDV-MCNC: 25 MG/DL (ref 7–20)
CALCIUM SERPL-MCNC: 9.3 MG/DL (ref 8.3–10.6)
CHLORIDE BLD-SCNC: 96 MMOL/L (ref 99–110)
CO2: 35 MMOL/L (ref 21–32)
CREAT SERPL-MCNC: 0.7 MG/DL (ref 0.9–1.3)
EOSINOPHILS ABSOLUTE: 0.2 K/UL (ref 0–0.6)
EOSINOPHILS RELATIVE PERCENT: 2 %
GFR AFRICAN AMERICAN: >60
GFR NON-AFRICAN AMERICAN: >60
GLUCOSE BLD-MCNC: 111 MG/DL (ref 70–99)
GLUCOSE BLD-MCNC: 132 MG/DL (ref 70–99)
GLUCOSE BLD-MCNC: 170 MG/DL (ref 70–99)
GLUCOSE BLD-MCNC: 185 MG/DL (ref 70–99)
GLUCOSE BLD-MCNC: 265 MG/DL (ref 70–99)
HCT VFR BLD CALC: 38.9 % (ref 40.5–52.5)
HEMOGLOBIN: 12.7 G/DL (ref 13.5–17.5)
LYMPHOCYTES ABSOLUTE: 2.5 K/UL (ref 1–5.1)
LYMPHOCYTES RELATIVE PERCENT: 23.3 %
MAGNESIUM: 2.1 MG/DL (ref 1.8–2.4)
MCH RBC QN AUTO: 29.4 PG (ref 26–34)
MCHC RBC AUTO-ENTMCNC: 32.8 G/DL (ref 31–36)
MCV RBC AUTO: 89.8 FL (ref 80–100)
MONOCYTES ABSOLUTE: 1.1 K/UL (ref 0–1.3)
MONOCYTES RELATIVE PERCENT: 10.3 %
NEUTROPHILS ABSOLUTE: 6.8 K/UL (ref 1.7–7.7)
NEUTROPHILS RELATIVE PERCENT: 63.4 %
PDW BLD-RTO: 17 % (ref 12.4–15.4)
PERFORMED ON: ABNORMAL
PLATELET # BLD: 271 K/UL (ref 135–450)
PMV BLD AUTO: 8.5 FL (ref 5–10.5)
POTASSIUM REFLEX MAGNESIUM: 3.7 MMOL/L (ref 3.5–5.1)
RBC # BLD: 4.33 M/UL (ref 4.2–5.9)
SODIUM BLD-SCNC: 142 MMOL/L (ref 136–145)
WBC # BLD: 10.8 K/UL (ref 4–11)

## 2019-02-19 PROCEDURE — 6360000002 HC RX W HCPCS: Performed by: NURSE PRACTITIONER

## 2019-02-19 PROCEDURE — 6370000000 HC RX 637 (ALT 250 FOR IP): Performed by: INTERNAL MEDICINE

## 2019-02-19 PROCEDURE — 94640 AIRWAY INHALATION TREATMENT: CPT

## 2019-02-19 PROCEDURE — 2060000000 HC ICU INTERMEDIATE R&B

## 2019-02-19 PROCEDURE — 94664 DEMO&/EVAL PT USE INHALER: CPT

## 2019-02-19 PROCEDURE — 99233 SBSQ HOSP IP/OBS HIGH 50: CPT | Performed by: NURSE PRACTITIONER

## 2019-02-19 PROCEDURE — 80048 BASIC METABOLIC PNL TOTAL CA: CPT

## 2019-02-19 PROCEDURE — 83735 ASSAY OF MAGNESIUM: CPT

## 2019-02-19 PROCEDURE — 36415 COLL VENOUS BLD VENIPUNCTURE: CPT

## 2019-02-19 PROCEDURE — 94762 N-INVAS EAR/PLS OXIMTRY CONT: CPT

## 2019-02-19 PROCEDURE — 85025 COMPLETE CBC W/AUTO DIFF WBC: CPT

## 2019-02-19 PROCEDURE — 2700000000 HC OXYGEN THERAPY PER DAY

## 2019-02-19 PROCEDURE — 6370000000 HC RX 637 (ALT 250 FOR IP): Performed by: NURSE PRACTITIONER

## 2019-02-19 PROCEDURE — 2580000003 HC RX 258: Performed by: INTERNAL MEDICINE

## 2019-02-19 RX ORDER — SPIRONOLACTONE 25 MG/1
25 TABLET ORAL DAILY
Qty: 30 TABLET | Refills: 3 | Status: ON HOLD | OUTPATIENT
Start: 2019-02-19 | End: 2019-06-02

## 2019-02-19 RX ORDER — POTASSIUM CHLORIDE 20 MEQ/1
40 TABLET, EXTENDED RELEASE ORAL ONCE
Status: COMPLETED | OUTPATIENT
Start: 2019-02-19 | End: 2019-02-19

## 2019-02-19 RX ORDER — TORSEMIDE 20 MG/1
20 TABLET ORAL 2 TIMES DAILY
Status: DISCONTINUED | OUTPATIENT
Start: 2019-02-19 | End: 2019-02-20 | Stop reason: SDUPTHER

## 2019-02-19 RX ORDER — DIGOXIN 0.25 MG/ML
250 INJECTION INTRAMUSCULAR; INTRAVENOUS ONCE
Status: COMPLETED | OUTPATIENT
Start: 2019-02-19 | End: 2019-02-19

## 2019-02-19 RX ORDER — METOPROLOL SUCCINATE 50 MG/1
150 TABLET, EXTENDED RELEASE ORAL DAILY
Qty: 30 TABLET | Refills: 3 | Status: ON HOLD | OUTPATIENT
Start: 2019-02-19 | End: 2019-07-18 | Stop reason: HOSPADM

## 2019-02-19 RX ORDER — INSULIN GLARGINE 100 [IU]/ML
30 INJECTION, SOLUTION SUBCUTANEOUS 2 TIMES DAILY
Qty: 1 VIAL | Refills: 3 | Status: SHIPPED | OUTPATIENT
Start: 2019-02-19 | End: 2019-03-29

## 2019-02-19 RX ADMIN — INSULIN GLARGINE 30 UNITS: 100 INJECTION, SOLUTION SUBCUTANEOUS at 22:03

## 2019-02-19 RX ADMIN — IPRATROPIUM BROMIDE AND ALBUTEROL SULFATE 1 AMPULE: .5; 3 SOLUTION RESPIRATORY (INHALATION) at 08:30

## 2019-02-19 RX ADMIN — METOPROLOL SUCCINATE 150 MG: 50 TABLET, EXTENDED RELEASE ORAL at 10:41

## 2019-02-19 RX ADMIN — TORSEMIDE 20 MG: 20 TABLET ORAL at 10:45

## 2019-02-19 RX ADMIN — POTASSIUM CHLORIDE 40 MEQ: 20 TABLET, EXTENDED RELEASE ORAL at 10:42

## 2019-02-19 RX ADMIN — DULOXETINE 60 MG: 60 CAPSULE, DELAYED RELEASE ORAL at 10:42

## 2019-02-19 RX ADMIN — IPRATROPIUM BROMIDE AND ALBUTEROL SULFATE 1 AMPULE: .5; 3 SOLUTION RESPIRATORY (INHALATION) at 16:29

## 2019-02-19 RX ADMIN — IPRATROPIUM BROMIDE AND ALBUTEROL SULFATE 1 AMPULE: .5; 3 SOLUTION RESPIRATORY (INHALATION) at 19:28

## 2019-02-19 RX ADMIN — INSULIN LISPRO 15 UNITS: 100 INJECTION, SOLUTION INTRAVENOUS; SUBCUTANEOUS at 10:46

## 2019-02-19 RX ADMIN — OXYCODONE AND ACETAMINOPHEN 1 TABLET: 5; 325 TABLET ORAL at 00:19

## 2019-02-19 RX ADMIN — INSULIN LISPRO 15 UNITS: 100 INJECTION, SOLUTION INTRAVENOUS; SUBCUTANEOUS at 12:17

## 2019-02-19 RX ADMIN — TRAZODONE HYDROCHLORIDE 50 MG: 50 TABLET ORAL at 21:57

## 2019-02-19 RX ADMIN — TAMSULOSIN HYDROCHLORIDE 0.4 MG: 0.4 CAPSULE ORAL at 10:42

## 2019-02-19 RX ADMIN — RIVAROXABAN 20 MG: 20 TABLET, FILM COATED ORAL at 10:42

## 2019-02-19 RX ADMIN — SPIRONOLACTONE 25 MG: 25 TABLET ORAL at 10:42

## 2019-02-19 RX ADMIN — Medication 10 ML: at 22:09

## 2019-02-19 RX ADMIN — ATORVASTATIN CALCIUM 40 MG: 40 TABLET, FILM COATED ORAL at 10:41

## 2019-02-19 RX ADMIN — DILTIAZEM HYDROCHLORIDE 240 MG: 240 CAPSULE, COATED, EXTENDED RELEASE ORAL at 10:42

## 2019-02-19 RX ADMIN — PANTOPRAZOLE SODIUM 40 MG: 40 TABLET, DELAYED RELEASE ORAL at 05:59

## 2019-02-19 RX ADMIN — MOMETASONE FUROATE AND FORMOTEROL FUMARATE DIHYDRATE 2 PUFF: 100; 5 AEROSOL RESPIRATORY (INHALATION) at 19:28

## 2019-02-19 RX ADMIN — Medication 10 ML: at 10:43

## 2019-02-19 RX ADMIN — OXYCODONE AND ACETAMINOPHEN 1 TABLET: 5; 325 TABLET ORAL at 06:23

## 2019-02-19 RX ADMIN — IPRATROPIUM BROMIDE AND ALBUTEROL SULFATE 1 AMPULE: .5; 3 SOLUTION RESPIRATORY (INHALATION) at 12:35

## 2019-02-19 RX ADMIN — DIGOXIN 250 MCG: 0.25 INJECTION INTRAMUSCULAR; INTRAVENOUS at 13:56

## 2019-02-19 RX ADMIN — MOMETASONE FUROATE AND FORMOTEROL FUMARATE DIHYDRATE 2 PUFF: 100; 5 AEROSOL RESPIRATORY (INHALATION) at 08:40

## 2019-02-19 RX ADMIN — INSULIN LISPRO 3 UNITS: 100 INJECTION, SOLUTION INTRAVENOUS; SUBCUTANEOUS at 12:16

## 2019-02-19 RX ADMIN — GABAPENTIN 100 MG: 100 CAPSULE ORAL at 10:42

## 2019-02-19 RX ADMIN — GABAPENTIN 100 MG: 100 CAPSULE ORAL at 21:57

## 2019-02-19 RX ADMIN — OXYCODONE AND ACETAMINOPHEN 1 TABLET: 5; 325 TABLET ORAL at 22:03

## 2019-02-19 RX ADMIN — INSULIN LISPRO 1 UNITS: 100 INJECTION, SOLUTION INTRAVENOUS; SUBCUTANEOUS at 17:29

## 2019-02-19 RX ADMIN — GABAPENTIN 100 MG: 100 CAPSULE ORAL at 13:57

## 2019-02-19 RX ADMIN — INSULIN GLARGINE 30 UNITS: 100 INJECTION, SOLUTION SUBCUTANEOUS at 10:46

## 2019-02-19 RX ADMIN — TORSEMIDE 20 MG: 20 TABLET ORAL at 17:28

## 2019-02-19 RX ADMIN — OXYCODONE AND ACETAMINOPHEN 1 TABLET: 5; 325 TABLET ORAL at 13:57

## 2019-02-19 RX ADMIN — INSULIN LISPRO 15 UNITS: 100 INJECTION, SOLUTION INTRAVENOUS; SUBCUTANEOUS at 17:29

## 2019-02-19 ASSESSMENT — PAIN - FUNCTIONAL ASSESSMENT: PAIN_FUNCTIONAL_ASSESSMENT: PREVENTS OR INTERFERES SOME ACTIVE ACTIVITIES AND ADLS

## 2019-02-19 ASSESSMENT — PAIN SCALES - GENERAL
PAINLEVEL_OUTOF10: 8
PAINLEVEL_OUTOF10: 3
PAINLEVEL_OUTOF10: 0
PAINLEVEL_OUTOF10: 7
PAINLEVEL_OUTOF10: 0
PAINLEVEL_OUTOF10: 8

## 2019-02-19 ASSESSMENT — PAIN SCALES - WONG BAKER
WONGBAKER_NUMERICALRESPONSE: 4
WONGBAKER_NUMERICALRESPONSE: 4

## 2019-02-19 ASSESSMENT — PAIN DESCRIPTION - DESCRIPTORS: DESCRIPTORS: ACHING

## 2019-02-19 ASSESSMENT — PAIN DESCRIPTION - PROGRESSION
CLINICAL_PROGRESSION: GRADUALLY IMPROVING

## 2019-02-19 ASSESSMENT — PAIN DESCRIPTION - ORIENTATION: ORIENTATION: LOWER

## 2019-02-19 ASSESSMENT — PAIN DESCRIPTION - ONSET: ONSET: ON-GOING

## 2019-02-19 ASSESSMENT — PAIN DESCRIPTION - LOCATION: LOCATION: BACK

## 2019-02-19 ASSESSMENT — PAIN DESCRIPTION - FREQUENCY: FREQUENCY: CONTINUOUS

## 2019-02-19 ASSESSMENT — PAIN DESCRIPTION - PAIN TYPE: TYPE: CHRONIC PAIN

## 2019-02-19 NOTE — TELEPHONE ENCOUNTER
[facility] is calling to report a drug interaction between the pt's potassium and spironolactone. [facility] would like to know if Chrissy SINGH is aware. You can reach an available pharmacist at [phone].

## 2019-02-20 LAB
ANION GAP SERPL CALCULATED.3IONS-SCNC: 11 MMOL/L (ref 3–16)
BASOPHILS ABSOLUTE: 0.1 K/UL (ref 0–0.2)
BASOPHILS RELATIVE PERCENT: 1.4 %
BUN BLDV-MCNC: 24 MG/DL (ref 7–20)
CALCIUM SERPL-MCNC: 9.2 MG/DL (ref 8.3–10.6)
CHLORIDE BLD-SCNC: 94 MMOL/L (ref 99–110)
CO2: 36 MMOL/L (ref 21–32)
CREAT SERPL-MCNC: 0.7 MG/DL (ref 0.9–1.3)
EKG ATRIAL RATE: 63 BPM
EKG DIAGNOSIS: NORMAL
EKG P AXIS: 17 DEGREES
EKG P-R INTERVAL: 180 MS
EKG Q-T INTERVAL: 400 MS
EKG QRS DURATION: 86 MS
EKG QTC CALCULATION (BAZETT): 409 MS
EKG R AXIS: 92 DEGREES
EKG T AXIS: 88 DEGREES
EKG VENTRICULAR RATE: 63 BPM
EOSINOPHILS ABSOLUTE: 0.2 K/UL (ref 0–0.6)
EOSINOPHILS RELATIVE PERCENT: 2.1 %
GFR AFRICAN AMERICAN: >60
GFR NON-AFRICAN AMERICAN: >60
GLUCOSE BLD-MCNC: 120 MG/DL (ref 70–99)
GLUCOSE BLD-MCNC: 123 MG/DL (ref 70–99)
GLUCOSE BLD-MCNC: 137 MG/DL (ref 70–99)
GLUCOSE BLD-MCNC: 233 MG/DL (ref 70–99)
HCT VFR BLD CALC: 41.2 % (ref 40.5–52.5)
HEMOGLOBIN: 13.3 G/DL (ref 13.5–17.5)
LYMPHOCYTES ABSOLUTE: 2.2 K/UL (ref 1–5.1)
LYMPHOCYTES RELATIVE PERCENT: 22.8 %
MAGNESIUM: 2.1 MG/DL (ref 1.8–2.4)
MCH RBC QN AUTO: 29.1 PG (ref 26–34)
MCHC RBC AUTO-ENTMCNC: 32.3 G/DL (ref 31–36)
MCV RBC AUTO: 90.2 FL (ref 80–100)
MONOCYTES ABSOLUTE: 1.1 K/UL (ref 0–1.3)
MONOCYTES RELATIVE PERCENT: 11.1 %
NEUTROPHILS ABSOLUTE: 6.2 K/UL (ref 1.7–7.7)
NEUTROPHILS RELATIVE PERCENT: 62.6 %
PDW BLD-RTO: 16.8 % (ref 12.4–15.4)
PERFORMED ON: ABNORMAL
PLATELET # BLD: 276 K/UL (ref 135–450)
PMV BLD AUTO: 9.2 FL (ref 5–10.5)
POTASSIUM REFLEX MAGNESIUM: 4.2 MMOL/L (ref 3.5–5.1)
RBC # BLD: 4.56 M/UL (ref 4.2–5.9)
SODIUM BLD-SCNC: 141 MMOL/L (ref 136–145)
WBC # BLD: 9.8 K/UL (ref 4–11)

## 2019-02-20 PROCEDURE — 2580000003 HC RX 258: Performed by: INTERNAL MEDICINE

## 2019-02-20 PROCEDURE — 94762 N-INVAS EAR/PLS OXIMTRY CONT: CPT

## 2019-02-20 PROCEDURE — 6370000000 HC RX 637 (ALT 250 FOR IP): Performed by: INTERNAL MEDICINE

## 2019-02-20 PROCEDURE — 92960 CARDIOVERSION ELECTRIC EXT: CPT | Performed by: INTERNAL MEDICINE

## 2019-02-20 PROCEDURE — 94664 DEMO&/EVAL PT USE INHALER: CPT

## 2019-02-20 PROCEDURE — 5A2204Z RESTORATION OF CARDIAC RHYTHM, SINGLE: ICD-10-PCS | Performed by: INTERNAL MEDICINE

## 2019-02-20 PROCEDURE — 6370000000 HC RX 637 (ALT 250 FOR IP): Performed by: NURSE PRACTITIONER

## 2019-02-20 PROCEDURE — 93010 ELECTROCARDIOGRAM REPORT: CPT | Performed by: INTERNAL MEDICINE

## 2019-02-20 PROCEDURE — 6360000002 HC RX W HCPCS: Performed by: INTERNAL MEDICINE

## 2019-02-20 PROCEDURE — 99233 SBSQ HOSP IP/OBS HIGH 50: CPT | Performed by: INTERNAL MEDICINE

## 2019-02-20 PROCEDURE — 93005 ELECTROCARDIOGRAM TRACING: CPT | Performed by: INTERNAL MEDICINE

## 2019-02-20 PROCEDURE — 80048 BASIC METABOLIC PNL TOTAL CA: CPT

## 2019-02-20 PROCEDURE — 2060000000 HC ICU INTERMEDIATE R&B

## 2019-02-20 PROCEDURE — 94640 AIRWAY INHALATION TREATMENT: CPT

## 2019-02-20 PROCEDURE — 85025 COMPLETE CBC W/AUTO DIFF WBC: CPT

## 2019-02-20 PROCEDURE — 2700000000 HC OXYGEN THERAPY PER DAY

## 2019-02-20 PROCEDURE — 2500000003 HC RX 250 WO HCPCS

## 2019-02-20 PROCEDURE — 36415 COLL VENOUS BLD VENIPUNCTURE: CPT

## 2019-02-20 PROCEDURE — 83735 ASSAY OF MAGNESIUM: CPT

## 2019-02-20 PROCEDURE — 2580000003 HC RX 258

## 2019-02-20 RX ORDER — POTASSIUM CHLORIDE 20 MEQ/1
40 TABLET, EXTENDED RELEASE ORAL 2 TIMES DAILY
Status: DISCONTINUED | OUTPATIENT
Start: 2019-02-20 | End: 2019-02-21 | Stop reason: HOSPADM

## 2019-02-20 RX ORDER — SODIUM CHLORIDE 0.9 % (FLUSH) 0.9 %
10 SYRINGE (ML) INJECTION PRN
Status: DISCONTINUED | OUTPATIENT
Start: 2019-02-20 | End: 2019-02-20 | Stop reason: SDUPTHER

## 2019-02-20 RX ORDER — SODIUM CHLORIDE 0.9 % (FLUSH) 0.9 %
10 SYRINGE (ML) INJECTION EVERY 12 HOURS SCHEDULED
Status: DISCONTINUED | OUTPATIENT
Start: 2019-02-20 | End: 2019-02-20 | Stop reason: SDUPTHER

## 2019-02-20 RX ORDER — TORSEMIDE 20 MG/1
60 TABLET ORAL DAILY
Status: DISCONTINUED | OUTPATIENT
Start: 2019-02-20 | End: 2019-02-21 | Stop reason: HOSPADM

## 2019-02-20 RX ADMIN — TRAZODONE HYDROCHLORIDE 50 MG: 50 TABLET ORAL at 22:27

## 2019-02-20 RX ADMIN — GABAPENTIN 100 MG: 100 CAPSULE ORAL at 09:27

## 2019-02-20 RX ADMIN — INSULIN GLARGINE 30 UNITS: 100 INJECTION, SOLUTION SUBCUTANEOUS at 22:34

## 2019-02-20 RX ADMIN — DULOXETINE 60 MG: 60 CAPSULE, DELAYED RELEASE ORAL at 09:27

## 2019-02-20 RX ADMIN — AMIODARONE HYDROCHLORIDE 1 MG/MIN: 50 INJECTION, SOLUTION INTRAVENOUS at 12:42

## 2019-02-20 RX ADMIN — OXYCODONE AND ACETAMINOPHEN 1 TABLET: 5; 325 TABLET ORAL at 16:54

## 2019-02-20 RX ADMIN — MOMETASONE FUROATE AND FORMOTEROL FUMARATE DIHYDRATE 2 PUFF: 100; 5 AEROSOL RESPIRATORY (INHALATION) at 20:23

## 2019-02-20 RX ADMIN — SPIRONOLACTONE 25 MG: 25 TABLET ORAL at 09:26

## 2019-02-20 RX ADMIN — IPRATROPIUM BROMIDE AND ALBUTEROL SULFATE 1 AMPULE: .5; 3 SOLUTION RESPIRATORY (INHALATION) at 16:42

## 2019-02-20 RX ADMIN — INSULIN LISPRO 15 UNITS: 100 INJECTION, SOLUTION INTRAVENOUS; SUBCUTANEOUS at 16:57

## 2019-02-20 RX ADMIN — TAMSULOSIN HYDROCHLORIDE 0.4 MG: 0.4 CAPSULE ORAL at 09:27

## 2019-02-20 RX ADMIN — INSULIN LISPRO 2 UNITS: 100 INJECTION, SOLUTION INTRAVENOUS; SUBCUTANEOUS at 12:46

## 2019-02-20 RX ADMIN — IPRATROPIUM BROMIDE AND ALBUTEROL SULFATE 1 AMPULE: .5; 3 SOLUTION RESPIRATORY (INHALATION) at 09:05

## 2019-02-20 RX ADMIN — IPRATROPIUM BROMIDE AND ALBUTEROL SULFATE 1 AMPULE: .5; 3 SOLUTION RESPIRATORY (INHALATION) at 20:22

## 2019-02-20 RX ADMIN — DEXTROSE MONOHYDRATE 150 MG: 50 INJECTION, SOLUTION INTRAVENOUS at 12:29

## 2019-02-20 RX ADMIN — RIVAROXABAN 20 MG: 20 TABLET, FILM COATED ORAL at 09:28

## 2019-02-20 RX ADMIN — ATORVASTATIN CALCIUM 40 MG: 40 TABLET, FILM COATED ORAL at 09:26

## 2019-02-20 RX ADMIN — IPRATROPIUM BROMIDE AND ALBUTEROL SULFATE 1 AMPULE: .5; 3 SOLUTION RESPIRATORY (INHALATION) at 12:20

## 2019-02-20 RX ADMIN — MOMETASONE FUROATE AND FORMOTEROL FUMARATE DIHYDRATE 2 PUFF: 100; 5 AEROSOL RESPIRATORY (INHALATION) at 09:15

## 2019-02-20 RX ADMIN — Medication 10 ML: at 09:28

## 2019-02-20 RX ADMIN — INSULIN LISPRO 15 UNITS: 100 INJECTION, SOLUTION INTRAVENOUS; SUBCUTANEOUS at 09:29

## 2019-02-20 RX ADMIN — OXYCODONE AND ACETAMINOPHEN 1 TABLET: 5; 325 TABLET ORAL at 09:26

## 2019-02-20 RX ADMIN — TORSEMIDE 20 MG: 20 TABLET ORAL at 09:27

## 2019-02-20 RX ADMIN — INSULIN GLARGINE 30 UNITS: 100 INJECTION, SOLUTION SUBCUTANEOUS at 09:28

## 2019-02-20 RX ADMIN — PANTOPRAZOLE SODIUM 40 MG: 40 TABLET, DELAYED RELEASE ORAL at 05:00

## 2019-02-20 RX ADMIN — OXYCODONE AND ACETAMINOPHEN 1 TABLET: 5; 325 TABLET ORAL at 22:26

## 2019-02-20 RX ADMIN — GABAPENTIN 100 MG: 100 CAPSULE ORAL at 22:27

## 2019-02-20 RX ADMIN — GABAPENTIN 100 MG: 100 CAPSULE ORAL at 16:53

## 2019-02-20 RX ADMIN — DILTIAZEM HYDROCHLORIDE 240 MG: 240 CAPSULE, COATED, EXTENDED RELEASE ORAL at 09:26

## 2019-02-20 RX ADMIN — TORSEMIDE 60 MG: 20 TABLET ORAL at 16:53

## 2019-02-20 RX ADMIN — METOPROLOL SUCCINATE 150 MG: 50 TABLET, EXTENDED RELEASE ORAL at 09:27

## 2019-02-20 ASSESSMENT — PAIN - FUNCTIONAL ASSESSMENT
PAIN_FUNCTIONAL_ASSESSMENT: PREVENTS OR INTERFERES SOME ACTIVE ACTIVITIES AND ADLS
PAIN_FUNCTIONAL_ASSESSMENT: PREVENTS OR INTERFERES SOME ACTIVE ACTIVITIES AND ADLS

## 2019-02-20 ASSESSMENT — PAIN SCALES - GENERAL
PAINLEVEL_OUTOF10: 8
PAINLEVEL_OUTOF10: 8
PAINLEVEL_OUTOF10: 4
PAINLEVEL_OUTOF10: 8
PAINLEVEL_OUTOF10: 6
PAINLEVEL_OUTOF10: 8

## 2019-02-20 ASSESSMENT — PAIN DESCRIPTION - PROGRESSION
CLINICAL_PROGRESSION: NOT CHANGED
CLINICAL_PROGRESSION: NOT CHANGED
CLINICAL_PROGRESSION: GRADUALLY IMPROVING
CLINICAL_PROGRESSION: GRADUALLY IMPROVING
CLINICAL_PROGRESSION: NOT CHANGED
CLINICAL_PROGRESSION: NOT CHANGED
CLINICAL_PROGRESSION: GRADUALLY IMPROVING
CLINICAL_PROGRESSION: GRADUALLY IMPROVING

## 2019-02-20 ASSESSMENT — PAIN DESCRIPTION - FREQUENCY
FREQUENCY: CONTINUOUS
FREQUENCY: CONTINUOUS

## 2019-02-20 ASSESSMENT — PAIN DESCRIPTION - ONSET
ONSET: ON-GOING
ONSET: ON-GOING

## 2019-02-20 ASSESSMENT — PAIN DESCRIPTION - PAIN TYPE
TYPE: CHRONIC PAIN
TYPE: CHRONIC PAIN

## 2019-02-20 ASSESSMENT — PAIN SCALES - WONG BAKER
WONGBAKER_NUMERICALRESPONSE: 4

## 2019-02-20 ASSESSMENT — PAIN DESCRIPTION - DESCRIPTORS
DESCRIPTORS: ACHING
DESCRIPTORS: ACHING;CONSTANT;DISCOMFORT;DULL

## 2019-02-20 ASSESSMENT — PAIN DESCRIPTION - LOCATION
LOCATION: BACK
LOCATION: BACK

## 2019-02-20 ASSESSMENT — PAIN DESCRIPTION - ORIENTATION: ORIENTATION: LOWER

## 2019-02-21 VITALS
RESPIRATION RATE: 20 BRPM | HEART RATE: 75 BPM | BODY MASS INDEX: 35.47 KG/M2 | OXYGEN SATURATION: 97 % | DIASTOLIC BLOOD PRESSURE: 83 MMHG | HEIGHT: 67 IN | SYSTOLIC BLOOD PRESSURE: 147 MMHG | TEMPERATURE: 98.2 F | WEIGHT: 225.97 LBS

## 2019-02-21 LAB
ANION GAP SERPL CALCULATED.3IONS-SCNC: 11 MMOL/L (ref 3–16)
BUN BLDV-MCNC: 24 MG/DL (ref 7–20)
CALCIUM SERPL-MCNC: 9.1 MG/DL (ref 8.3–10.6)
CHLORIDE BLD-SCNC: 93 MMOL/L (ref 99–110)
CO2: 36 MMOL/L (ref 21–32)
CREAT SERPL-MCNC: 0.9 MG/DL (ref 0.9–1.3)
GFR AFRICAN AMERICAN: >60
GFR NON-AFRICAN AMERICAN: >60
GLUCOSE BLD-MCNC: 167 MG/DL (ref 70–99)
GLUCOSE BLD-MCNC: 209 MG/DL (ref 70–99)
MAGNESIUM: 1.9 MG/DL (ref 1.8–2.4)
PERFORMED ON: ABNORMAL
POTASSIUM REFLEX MAGNESIUM: 4.6 MMOL/L (ref 3.5–5.1)
SODIUM BLD-SCNC: 140 MMOL/L (ref 136–145)

## 2019-02-21 PROCEDURE — 6370000000 HC RX 637 (ALT 250 FOR IP): Performed by: INTERNAL MEDICINE

## 2019-02-21 PROCEDURE — 36415 COLL VENOUS BLD VENIPUNCTURE: CPT

## 2019-02-21 PROCEDURE — 83735 ASSAY OF MAGNESIUM: CPT

## 2019-02-21 PROCEDURE — 94640 AIRWAY INHALATION TREATMENT: CPT

## 2019-02-21 PROCEDURE — 2700000000 HC OXYGEN THERAPY PER DAY

## 2019-02-21 PROCEDURE — 6370000000 HC RX 637 (ALT 250 FOR IP): Performed by: NURSE PRACTITIONER

## 2019-02-21 PROCEDURE — 94762 N-INVAS EAR/PLS OXIMTRY CONT: CPT

## 2019-02-21 PROCEDURE — 2580000003 HC RX 258: Performed by: INTERNAL MEDICINE

## 2019-02-21 PROCEDURE — 80048 BASIC METABOLIC PNL TOTAL CA: CPT

## 2019-02-21 PROCEDURE — 6360000002 HC RX W HCPCS: Performed by: HOSPITALIST

## 2019-02-21 RX ORDER — AMIODARONE HYDROCHLORIDE 200 MG/1
200 TABLET ORAL DAILY
Status: DISCONTINUED | OUTPATIENT
Start: 2019-02-21 | End: 2019-02-21 | Stop reason: HOSPADM

## 2019-02-21 RX ORDER — TORSEMIDE 20 MG/1
60 TABLET ORAL DAILY
Qty: 30 TABLET | Refills: 3 | Status: SHIPPED | OUTPATIENT
Start: 2019-02-21 | End: 2019-06-12 | Stop reason: SDUPTHER

## 2019-02-21 RX ORDER — AMIODARONE HYDROCHLORIDE 200 MG/1
200 TABLET ORAL DAILY
Qty: 30 TABLET | Refills: 3 | Status: ON HOLD | OUTPATIENT
Start: 2019-02-21 | End: 2019-07-18 | Stop reason: HOSPADM

## 2019-02-21 RX ADMIN — DILTIAZEM HYDROCHLORIDE 240 MG: 240 CAPSULE, COATED, EXTENDED RELEASE ORAL at 09:43

## 2019-02-21 RX ADMIN — MOMETASONE FUROATE AND FORMOTEROL FUMARATE DIHYDRATE 2 PUFF: 100; 5 AEROSOL RESPIRATORY (INHALATION) at 08:23

## 2019-02-21 RX ADMIN — IPRATROPIUM BROMIDE AND ALBUTEROL SULFATE 1 AMPULE: .5; 3 SOLUTION RESPIRATORY (INHALATION) at 08:23

## 2019-02-21 RX ADMIN — TORSEMIDE 60 MG: 20 TABLET ORAL at 09:44

## 2019-02-21 RX ADMIN — Medication 10 ML: at 09:45

## 2019-02-21 RX ADMIN — TAMSULOSIN HYDROCHLORIDE 0.4 MG: 0.4 CAPSULE ORAL at 09:43

## 2019-02-21 RX ADMIN — SPIRONOLACTONE 25 MG: 25 TABLET ORAL at 09:43

## 2019-02-21 RX ADMIN — PANTOPRAZOLE SODIUM 40 MG: 40 TABLET, DELAYED RELEASE ORAL at 09:43

## 2019-02-21 RX ADMIN — GABAPENTIN 100 MG: 100 CAPSULE ORAL at 09:43

## 2019-02-21 RX ADMIN — INSULIN LISPRO 15 UNITS: 100 INJECTION, SOLUTION INTRAVENOUS; SUBCUTANEOUS at 09:49

## 2019-02-21 RX ADMIN — RIVAROXABAN 20 MG: 20 TABLET, FILM COATED ORAL at 09:43

## 2019-02-21 RX ADMIN — INSULIN GLARGINE 30 UNITS: 100 INJECTION, SOLUTION SUBCUTANEOUS at 09:48

## 2019-02-21 RX ADMIN — METOPROLOL SUCCINATE 150 MG: 50 TABLET, EXTENDED RELEASE ORAL at 09:43

## 2019-02-21 RX ADMIN — DULOXETINE 60 MG: 60 CAPSULE, DELAYED RELEASE ORAL at 09:43

## 2019-02-21 RX ADMIN — Medication 10 ML: at 00:43

## 2019-02-21 RX ADMIN — OXYCODONE AND ACETAMINOPHEN 1 TABLET: 5; 325 TABLET ORAL at 09:44

## 2019-02-21 RX ADMIN — ATORVASTATIN CALCIUM 40 MG: 40 TABLET, FILM COATED ORAL at 09:43

## 2019-02-21 RX ADMIN — POTASSIUM CHLORIDE 40 MEQ: 1500 TABLET, EXTENDED RELEASE ORAL at 00:48

## 2019-02-21 RX ADMIN — POTASSIUM CHLORIDE 40 MEQ: 1500 TABLET, EXTENDED RELEASE ORAL at 09:44

## 2019-02-21 RX ADMIN — POTASSIUM CHLORIDE 10 MEQ: 7.46 INJECTION, SOLUTION INTRAVENOUS at 00:42

## 2019-02-21 RX ADMIN — AMIODARONE HYDROCHLORIDE 200 MG: 200 TABLET ORAL at 09:48

## 2019-02-21 ASSESSMENT — PAIN SCALES - WONG BAKER
WONGBAKER_NUMERICALRESPONSE: 4

## 2019-02-21 ASSESSMENT — PAIN DESCRIPTION - PROGRESSION
CLINICAL_PROGRESSION: NOT CHANGED

## 2019-02-21 ASSESSMENT — PAIN SCALES - GENERAL: PAINLEVEL_OUTOF10: 8

## 2019-02-22 ENCOUNTER — TELEPHONE (OUTPATIENT)
Dept: INTERNAL MEDICINE CLINIC | Age: 59
End: 2019-02-22

## 2019-02-22 LAB
EKG ATRIAL RATE: 85 BPM
EKG DIAGNOSIS: NORMAL
EKG Q-T INTERVAL: 344 MS
EKG QRS DURATION: 82 MS
EKG QTC CALCULATION (BAZETT): 423 MS
EKG R AXIS: 97 DEGREES
EKG T AXIS: 85 DEGREES
EKG VENTRICULAR RATE: 91 BPM

## 2019-02-25 ENCOUNTER — TELEPHONE (OUTPATIENT)
Dept: FAMILY MEDICINE CLINIC | Age: 59
End: 2019-02-25

## 2019-02-26 ENCOUNTER — OFFICE VISIT (OUTPATIENT)
Dept: CARDIOLOGY CLINIC | Age: 59
End: 2019-02-26
Payer: COMMERCIAL

## 2019-02-26 VITALS
HEART RATE: 79 BPM | SYSTOLIC BLOOD PRESSURE: 140 MMHG | BODY MASS INDEX: 37.6 KG/M2 | DIASTOLIC BLOOD PRESSURE: 72 MMHG | OXYGEN SATURATION: 95 % | HEIGHT: 65 IN

## 2019-02-26 DIAGNOSIS — I10 ESSENTIAL HYPERTENSION: ICD-10-CM

## 2019-02-26 DIAGNOSIS — I73.9 PAD (PERIPHERAL ARTERY DISEASE) (HCC): ICD-10-CM

## 2019-02-26 DIAGNOSIS — I42.8 NONISCHEMIC CARDIOMYOPATHY (HCC): ICD-10-CM

## 2019-02-26 DIAGNOSIS — Z72.0 TOBACCO ABUSE: ICD-10-CM

## 2019-02-26 DIAGNOSIS — I48.0 PAROXYSMAL ATRIAL FIBRILLATION (HCC): ICD-10-CM

## 2019-02-26 DIAGNOSIS — F10.10 ETOH ABUSE: ICD-10-CM

## 2019-02-26 DIAGNOSIS — I50.42 CHRONIC COMBINED SYSTOLIC AND DIASTOLIC HEART FAILURE (HCC): Primary | ICD-10-CM

## 2019-02-26 DIAGNOSIS — J44.9 CHRONIC OBSTRUCTIVE PULMONARY DISEASE, UNSPECIFIED COPD TYPE (HCC): ICD-10-CM

## 2019-02-26 PROCEDURE — G8417 CALC BMI ABV UP PARAM F/U: HCPCS | Performed by: NURSE PRACTITIONER

## 2019-02-26 PROCEDURE — 3023F SPIROM DOC REV: CPT | Performed by: NURSE PRACTITIONER

## 2019-02-26 PROCEDURE — G8926 SPIRO NO PERF OR DOC: HCPCS | Performed by: NURSE PRACTITIONER

## 2019-02-26 PROCEDURE — G8427 DOCREV CUR MEDS BY ELIG CLIN: HCPCS | Performed by: NURSE PRACTITIONER

## 2019-02-26 PROCEDURE — 93000 ELECTROCARDIOGRAM COMPLETE: CPT | Performed by: NURSE PRACTITIONER

## 2019-02-26 PROCEDURE — G8598 ASA/ANTIPLAT THER USED: HCPCS | Performed by: NURSE PRACTITIONER

## 2019-02-26 PROCEDURE — 4004F PT TOBACCO SCREEN RCVD TLK: CPT | Performed by: NURSE PRACTITIONER

## 2019-02-26 PROCEDURE — 99214 OFFICE O/P EST MOD 30 MIN: CPT | Performed by: NURSE PRACTITIONER

## 2019-02-26 PROCEDURE — G8482 FLU IMMUNIZE ORDER/ADMIN: HCPCS | Performed by: NURSE PRACTITIONER

## 2019-02-26 PROCEDURE — 3017F COLORECTAL CA SCREEN DOC REV: CPT | Performed by: NURSE PRACTITIONER

## 2019-02-27 ENCOUNTER — OFFICE VISIT (OUTPATIENT)
Dept: PULMONOLOGY | Age: 59
End: 2019-02-27
Payer: COMMERCIAL

## 2019-02-27 VITALS
OXYGEN SATURATION: 93 % | SYSTOLIC BLOOD PRESSURE: 121 MMHG | HEART RATE: 63 BPM | BODY MASS INDEX: 39.15 KG/M2 | TEMPERATURE: 96.8 F | WEIGHT: 235 LBS | HEIGHT: 65 IN | DIASTOLIC BLOOD PRESSURE: 74 MMHG | RESPIRATION RATE: 16 BRPM

## 2019-02-27 DIAGNOSIS — J43.2 CENTRILOBULAR EMPHYSEMA (HCC): ICD-10-CM

## 2019-02-27 DIAGNOSIS — G47.34 NOCTURNAL HYPOXIA: ICD-10-CM

## 2019-02-27 DIAGNOSIS — F17.218 CIGARETTE NICOTINE DEPENDENCE WITH OTHER NICOTINE-INDUCED DISORDER: Primary | ICD-10-CM

## 2019-02-27 PROCEDURE — G0296 VISIT TO DETERM LDCT ELIG: HCPCS | Performed by: INTERNAL MEDICINE

## 2019-02-27 PROCEDURE — G8427 DOCREV CUR MEDS BY ELIG CLIN: HCPCS | Performed by: INTERNAL MEDICINE

## 2019-02-27 PROCEDURE — G8598 ASA/ANTIPLAT THER USED: HCPCS | Performed by: INTERNAL MEDICINE

## 2019-02-27 PROCEDURE — 3017F COLORECTAL CA SCREEN DOC REV: CPT | Performed by: INTERNAL MEDICINE

## 2019-02-27 PROCEDURE — G8417 CALC BMI ABV UP PARAM F/U: HCPCS | Performed by: INTERNAL MEDICINE

## 2019-02-27 PROCEDURE — 99214 OFFICE O/P EST MOD 30 MIN: CPT | Performed by: INTERNAL MEDICINE

## 2019-02-27 PROCEDURE — 4004F PT TOBACCO SCREEN RCVD TLK: CPT | Performed by: INTERNAL MEDICINE

## 2019-02-27 PROCEDURE — 3023F SPIROM DOC REV: CPT | Performed by: INTERNAL MEDICINE

## 2019-02-27 PROCEDURE — G8926 SPIRO NO PERF OR DOC: HCPCS | Performed by: INTERNAL MEDICINE

## 2019-02-27 PROCEDURE — G8482 FLU IMMUNIZE ORDER/ADMIN: HCPCS | Performed by: INTERNAL MEDICINE

## 2019-03-12 ENCOUNTER — CARE COORDINATION (OUTPATIENT)
Dept: CARE COORDINATION | Age: 59
End: 2019-03-12

## 2019-03-12 ASSESSMENT — ENCOUNTER SYMPTOMS: DYSPNEA ASSOCIATED WITH: MINIMAL EXERTION

## 2019-03-14 ENCOUNTER — HOSPITAL ENCOUNTER (OUTPATIENT)
Dept: CT IMAGING | Age: 59
Discharge: HOME | End: 2019-03-14
Payer: COMMERCIAL

## 2019-03-14 ENCOUNTER — HOSPITAL ENCOUNTER (OUTPATIENT)
Dept: PULMONOLOGY | Age: 59
Discharge: HOME | End: 2019-03-14
Payer: COMMERCIAL

## 2019-03-14 DIAGNOSIS — J43.2 CENTRILOBULAR EMPHYSEMA (HCC): ICD-10-CM

## 2019-03-14 DIAGNOSIS — F17.218 CIGARETTE NICOTINE DEPENDENCE WITH OTHER NICOTINE-INDUCED DISORDER: ICD-10-CM

## 2019-03-14 LAB
DLCO %PRED: 61 %
DLCO PRED: NORMAL ML/MIN/MMHG
DLCO/VA %PRED: NORMAL %
DLCO/VA PRED: NORMAL ML/MIN/MMHG
DLCO/VA: NORMAL ML/MIN/MMHG
DLCO: NORMAL ML/MIN/MMHG
EXPIRATORY TIME-POST: NORMAL SEC
EXPIRATORY TIME: NORMAL SEC
FEF 25-75% %CHNG: NORMAL
FEF 25-75% %PRED-POST: NORMAL %
FEF 25-75% %PRED-PRE: NORMAL L/SEC
FEF 25-75% PRED: NORMAL L/SEC
FEF 25-75%-POST: NORMAL L/SEC
FEF 25-75%-PRE: NORMAL L/SEC
FEV1 %PRED-POST: 52 %
FEV1 %PRED-PRE: 40 %
FEV1 PRED: NORMAL L
FEV1-POST: NORMAL L
FEV1-PRE: NORMAL L
FEV1/FVC %PRED-POST: NORMAL %
FEV1/FVC %PRED-PRE: NORMAL %
FEV1/FVC PRED: NORMAL %
FEV1/FVC-POST: 79 %
FEV1/FVC-PRE: 77 %
FVC %PRED-POST: NORMAL L
FVC %PRED-PRE: NORMAL %
FVC PRED: NORMAL L
FVC-POST: NORMAL L
FVC-PRE: NORMAL L
GAW %PRED: NORMAL %
GAW PRED: NORMAL L/S/CMH2O
GAW: NORMAL L/S/CMH2O
IC %PRED: NORMAL %
IC PRED: NORMAL L
IC: NORMAL L
MEP: NORMAL
MIP: NORMAL
MVV %PRED-PRE: NORMAL %
MVV PRED: NORMAL L/MIN
MVV-PRE: NORMAL L/MIN
PEF %PRED-POST: NORMAL %
PEF %PRED-PRE: NORMAL L/SEC
PEF PRED: NORMAL L/SEC
PEF%CHNG: NORMAL
PEF-POST: NORMAL L/SEC
PEF-PRE: NORMAL L/SEC
RAW %PRED: NORMAL %
RAW PRED: NORMAL CMH2O/L/S
RAW: NORMAL CMH2O/L/S
RV %PRED: NORMAL %
RV PRED: NORMAL L
RV: NORMAL L
SVC %PRED: NORMAL %
SVC PRED: NORMAL L
SVC: NORMAL L
TLC %PRED: 78 %
TLC PRED: NORMAL L
TLC: NORMAL L
VA %PRED: NORMAL %
VA PRED: NORMAL L
VA: NORMAL L
VTG %PRED: NORMAL %
VTG PRED: NORMAL L
VTG: NORMAL L

## 2019-03-14 PROCEDURE — 6370000000 HC RX 637 (ALT 250 FOR IP): Performed by: INTERNAL MEDICINE

## 2019-03-14 PROCEDURE — 94726 PLETHYSMOGRAPHY LUNG VOLUMES: CPT | Performed by: INTERNAL MEDICINE

## 2019-03-14 PROCEDURE — 94664 DEMO&/EVAL PT USE INHALER: CPT

## 2019-03-14 PROCEDURE — 94060 EVALUATION OF WHEEZING: CPT | Performed by: INTERNAL MEDICINE

## 2019-03-14 PROCEDURE — 94729 DIFFUSING CAPACITY: CPT

## 2019-03-14 PROCEDURE — 94640 AIRWAY INHALATION TREATMENT: CPT

## 2019-03-14 PROCEDURE — 94729 DIFFUSING CAPACITY: CPT | Performed by: INTERNAL MEDICINE

## 2019-03-14 PROCEDURE — 94726 PLETHYSMOGRAPHY LUNG VOLUMES: CPT

## 2019-03-14 PROCEDURE — G0297 LDCT FOR LUNG CA SCREEN: HCPCS

## 2019-03-14 PROCEDURE — 94060 EVALUATION OF WHEEZING: CPT

## 2019-03-14 RX ORDER — ALBUTEROL SULFATE 90 UG/1
4 AEROSOL, METERED RESPIRATORY (INHALATION) ONCE
Status: COMPLETED | OUTPATIENT
Start: 2019-03-14 | End: 2019-03-14

## 2019-03-14 RX ADMIN — ALBUTEROL SULFATE 4 PUFF: 90 AEROSOL, METERED RESPIRATORY (INHALATION) at 12:07

## 2019-03-14 ASSESSMENT — PULMONARY FUNCTION TESTS
FEV1/FVC_PRE: 77
FEV1_PERCENT_PREDICTED_PRE: 40
FEV1_PERCENT_PREDICTED_POST: 52
FEV1/FVC_POST: 79

## 2019-03-25 ENCOUNTER — TELEPHONE (OUTPATIENT)
Dept: INTERNAL MEDICINE CLINIC | Age: 59
End: 2019-03-25

## 2019-03-29 ENCOUNTER — OFFICE VISIT (OUTPATIENT)
Dept: INTERNAL MEDICINE CLINIC | Age: 59
End: 2019-03-29
Payer: COMMERCIAL

## 2019-03-29 VITALS
HEART RATE: 118 BPM | TEMPERATURE: 97.3 F | SYSTOLIC BLOOD PRESSURE: 124 MMHG | OXYGEN SATURATION: 91 % | DIASTOLIC BLOOD PRESSURE: 80 MMHG

## 2019-03-29 DIAGNOSIS — E11.51 DIABETES MELLITUS WITH PERIPHERAL CIRCULATORY DISORDER (HCC): Primary | ICD-10-CM

## 2019-03-29 DIAGNOSIS — F41.9 ANXIETY: ICD-10-CM

## 2019-03-29 DIAGNOSIS — J96.12 CHRONIC RESPIRATORY FAILURE WITH HYPERCAPNIA (HCC): ICD-10-CM

## 2019-03-29 DIAGNOSIS — I10 ESSENTIAL HYPERTENSION: ICD-10-CM

## 2019-03-29 DIAGNOSIS — I48.20 CHRONIC ATRIAL FIBRILLATION (HCC): ICD-10-CM

## 2019-03-29 DIAGNOSIS — F51.01 PRIMARY INSOMNIA: ICD-10-CM

## 2019-03-29 DIAGNOSIS — E78.2 MIXED HYPERLIPIDEMIA: ICD-10-CM

## 2019-03-29 DIAGNOSIS — I73.9 PAD (PERIPHERAL ARTERY DISEASE) (HCC): ICD-10-CM

## 2019-03-29 DIAGNOSIS — J44.1 COPD EXACERBATION (HCC): ICD-10-CM

## 2019-03-29 DIAGNOSIS — I50.42 CHRONIC COMBINED SYSTOLIC AND DIASTOLIC HEART FAILURE (HCC): ICD-10-CM

## 2019-03-29 LAB
A/G RATIO: 1.1 (ref 1.1–2.2)
ALBUMIN SERPL-MCNC: 3.8 G/DL (ref 3.4–5)
ALP BLD-CCNC: 80 U/L (ref 40–129)
ALT SERPL-CCNC: 17 U/L (ref 10–40)
ANION GAP SERPL CALCULATED.3IONS-SCNC: 12 MMOL/L (ref 3–16)
AST SERPL-CCNC: 19 U/L (ref 15–37)
BILIRUB SERPL-MCNC: 0.5 MG/DL (ref 0–1)
BUN BLDV-MCNC: 12 MG/DL (ref 7–20)
CALCIUM SERPL-MCNC: 9.3 MG/DL (ref 8.3–10.6)
CHLORIDE BLD-SCNC: 93 MMOL/L (ref 99–110)
CHOLESTEROL, TOTAL: 126 MG/DL (ref 0–199)
CO2: 37 MMOL/L (ref 21–32)
CREAT SERPL-MCNC: 0.9 MG/DL (ref 0.9–1.3)
GFR AFRICAN AMERICAN: >60
GFR NON-AFRICAN AMERICAN: >60
GLOBULIN: 3.4 G/DL
GLUCOSE BLD-MCNC: 211 MG/DL (ref 70–99)
HCT VFR BLD CALC: 37.1 % (ref 40.5–52.5)
HDLC SERPL-MCNC: 27 MG/DL (ref 40–60)
HEMOGLOBIN: 12.1 G/DL (ref 13.5–17.5)
LDL CHOLESTEROL CALCULATED: 76 MG/DL
MCH RBC QN AUTO: 28.6 PG (ref 26–34)
MCHC RBC AUTO-ENTMCNC: 32.6 G/DL (ref 31–36)
MCV RBC AUTO: 87.6 FL (ref 80–100)
PDW BLD-RTO: 16 % (ref 12.4–15.4)
PLATELET # BLD: 210 K/UL (ref 135–450)
PMV BLD AUTO: 9.8 FL (ref 5–10.5)
POTASSIUM SERPL-SCNC: 4.3 MMOL/L (ref 3.5–5.1)
RBC # BLD: 4.24 M/UL (ref 4.2–5.9)
SODIUM BLD-SCNC: 142 MMOL/L (ref 136–145)
TOTAL PROTEIN: 7.2 G/DL (ref 6.4–8.2)
TRIGL SERPL-MCNC: 116 MG/DL (ref 0–150)
TSH SERPL DL<=0.05 MIU/L-ACNC: 0.28 UIU/ML (ref 0.27–4.2)
VLDLC SERPL CALC-MCNC: 23 MG/DL
WBC # BLD: 9.4 K/UL (ref 4–11)

## 2019-03-29 PROCEDURE — 3023F SPIROM DOC REV: CPT | Performed by: INTERNAL MEDICINE

## 2019-03-29 PROCEDURE — G8417 CALC BMI ABV UP PARAM F/U: HCPCS | Performed by: INTERNAL MEDICINE

## 2019-03-29 PROCEDURE — 2022F DILAT RTA XM EVC RTNOPTHY: CPT | Performed by: INTERNAL MEDICINE

## 2019-03-29 PROCEDURE — G8482 FLU IMMUNIZE ORDER/ADMIN: HCPCS | Performed by: INTERNAL MEDICINE

## 2019-03-29 PROCEDURE — G8427 DOCREV CUR MEDS BY ELIG CLIN: HCPCS | Performed by: INTERNAL MEDICINE

## 2019-03-29 PROCEDURE — 1036F TOBACCO NON-USER: CPT | Performed by: INTERNAL MEDICINE

## 2019-03-29 PROCEDURE — G8926 SPIRO NO PERF OR DOC: HCPCS | Performed by: INTERNAL MEDICINE

## 2019-03-29 PROCEDURE — G8598 ASA/ANTIPLAT THER USED: HCPCS | Performed by: INTERNAL MEDICINE

## 2019-03-29 PROCEDURE — 36415 COLL VENOUS BLD VENIPUNCTURE: CPT | Performed by: INTERNAL MEDICINE

## 2019-03-29 PROCEDURE — 3045F PR MOST RECENT HEMOGLOBIN A1C LEVEL 7.0-9.0%: CPT | Performed by: INTERNAL MEDICINE

## 2019-03-29 PROCEDURE — 99214 OFFICE O/P EST MOD 30 MIN: CPT | Performed by: INTERNAL MEDICINE

## 2019-03-29 PROCEDURE — 3017F COLORECTAL CA SCREEN DOC REV: CPT | Performed by: INTERNAL MEDICINE

## 2019-03-29 ASSESSMENT — ENCOUNTER SYMPTOMS
RESPIRATORY NEGATIVE: 1
GASTROINTESTINAL NEGATIVE: 1

## 2019-03-30 LAB
ESTIMATED AVERAGE GLUCOSE: 231.7 MG/DL
HBA1C MFR BLD: 9.7 %

## 2019-04-01 ENCOUNTER — TELEPHONE (OUTPATIENT)
Dept: INTERNAL MEDICINE CLINIC | Age: 59
End: 2019-04-01

## 2019-04-01 NOTE — TELEPHONE ENCOUNTER
[address] [facility] calling to give update on pt. Nadira Freitas stated that pt had a fall over the weekend and hurt his back when he it the chair and landed on the floor.

## 2019-04-15 DIAGNOSIS — F51.01 PRIMARY INSOMNIA: ICD-10-CM

## 2019-04-15 DIAGNOSIS — J42 CHRONIC BRONCHITIS, UNSPECIFIED CHRONIC BRONCHITIS TYPE (HCC): ICD-10-CM

## 2019-04-15 RX ORDER — ATORVASTATIN CALCIUM 40 MG/1
40 TABLET, FILM COATED ORAL DAILY
Qty: 30 TABLET | Refills: 0 | Status: SHIPPED | OUTPATIENT
Start: 2019-04-15 | End: 2019-05-23 | Stop reason: SDUPTHER

## 2019-04-15 RX ORDER — TRAZODONE HYDROCHLORIDE 50 MG/1
TABLET ORAL
Qty: 30 TABLET | Refills: 0 | Status: SHIPPED | OUTPATIENT
Start: 2019-04-15 | End: 2019-05-06 | Stop reason: SDUPTHER

## 2019-04-16 ENCOUNTER — CARE COORDINATION (OUTPATIENT)
Dept: CARE COORDINATION | Age: 59
End: 2019-04-16

## 2019-04-16 NOTE — CARE COORDINATION
Ambulatory Care Coordination Note  4/16/2019  CM Risk Score: 11  Elsy Mortality Risk Score:      ACC: Tata Ni, RN    Summary Note: [address] spoke with Sindi Landa. He denies complaints today. He feels that he has tried to make changes in his diet. Lab Results   Component Value Date    LABA1C 9.7 03/29/2019    LABA1C 8.1 01/01/2019    LABA1C 7.9 10/17/2018     Lab Results   Component Value Date    .7 03/29/2019    .8 01/01/2019    .0 10/17/2018     He is being followed by [facility]. He denies increased cough, shortness of breath and swelling. He is not weighing daily. Johnie plans to follow up with Dr. Mohamdu Correa. Future Appointments   Date Time Provider Kiran Stephens   5/29/2019 10:20 AM JOON Boyle - CNP [facility]   6/3/2019 10:20 AM Gerda Levin MD [facility]   6/28/2019 11:00 AM Milena Sol MD [facility]     Action: discussed recent testing. Offered support of RD with low sodium and low carb diet. Discussed A1C results. Contacted [facility] and requested call back from nurse. PLAN:  F/u with [facility] nurse: Sherrie Wilkins  F/u on diabetic, CHF and COPD teaching. Care Coordination Interventions    Program Enrollment:  Complex Care  Referral from Primary Care Provider:  No  Suggested Interventions and Community Resources  Fall Risk Prevention:  Completed  Home Health Services:  Completed (Comment: [address] [facility]  [phone])  Social Work:  In Process  Zone Management Tools: In Process  Other Services or Interventions:  [facility] - skilled care 2 x week and PT 2 x week         Goals Addressed     None          Prior to Admission medications    Medication Sig Start Date End Date Taking?  Authorizing Provider   traZODone (DESYREL) 50 MG tablet TAKE 1 TABLET BY MOUTH DAILY FOR INSOMNIA 4/15/19   Milena Sol MD   atorvastatin (LIPITOR) 40 MG tablet TAKE 1 TABLET BY MOUTH DAILY 4/15/19   Milena Sol MD metFORMIN (GLUCOPHAGE) 500 MG tablet Take 500 mg by mouth 2 times daily (with meals)    Historical Provider, MD   amiodarone (CORDARONE) 200 MG tablet Take 1 tablet by mouth daily 2/21/19   Janet Monte MD   torsemide [facility]) 20 MG tablet Take 3 tablets by mouth daily 2/21/19   Janet Monte MD   metoprolol succinate (TOPROL XL) 50 MG extended release tablet Take 3 tablets by mouth daily 2/19/19   Janet Monte MD   spironolactone (ALDACTONE) 25 MG tablet Take 1 tablet by mouth daily 2/19/19   JOON Chapa CNP   potassium chloride (KLOR-CON M) 20 MEQ extended release tablet Take 40 mEq by mouth 2 times daily    Historical Provider, MD   ferrous sulfate 324 (65 Fe) MG EC tablet Take 1 tablet by mouth daily (with breakfast) 1/5/19   Brian Upton MD   MAGNESIUM-OXIDE 400 (241.3 Mg) MG TABS tablet TAKE 1 TABLET BY MOUTH TWICE DAILY 12/7/18   Wilder Day Do, MD   tamsulosin (FLOMAX) 0.4 MG capsule TAKE 1 CAPSULE BY MOUTH DAILY 12/6/18   Beni Sahu MD   albuterol sulfate HFA (PROVENTIL HFA) 108 (90 Base) MCG/ACT inhaler Inhale 2 puffs into the lungs every 4 hours as needed for Wheezing or Shortness of Breath (Space out to every 6 hours as symptoms improve) Space out to every 6 hours as symptoms improve. 11/29/18   Beni Sahu MD   DULoxetine (CYMBALTA) 60 MG extended release capsule Take 1 capsule by mouth daily 11/29/18   Beni Sahu MD   diltiazem (CARDIZEM CD) 240 MG extended release capsule Take 1 capsule by mouth daily 11/1/18   JOON Chapa CNP   gabapentin (NEURONTIN) 100 MG capsule Take 100 mg by mouth 3 times daily. Su Olivas     Historical Provider, MD   mometasone-formoterol [facility]) 100-5 MCG/ACT inhaler Inhale 2 puffs into the lungs 2 times daily 7/20/18   Joanna Castle MD   omeprazole (PRILOSEC) 40 MG delayed release capsule Take 1 capsule by mouth daily (with breakfast) 6/11/18   Beni Sahu MD   rivaroxaban (XARELTO) 20 MG TABS tablet Take 1 tablet by mouth daily (with breakfast) 6/11/18   Unique Billingsley MD       Future Appointments   Date Time Provider Kiran Valverdei   5/29/2019 10:20 AM JOON Franco - CNP [facility]   6/3/2019 10:20 AM MD JAYCE Ayala [facility]   6/28/2019 11:00 AM Unique Billingsley MD [facility]     ,   Diabetes Assessment    Medic Alert ID:  No  Meal Planning:  Avoidance of concentrated sweets   How often do you test your blood sugar?:  No Testing   Do you have barriers with adherence to non-pharmacologic self-management interventions?  (Nutrition/Exercise/Self-Monitoring):  No   Have you ever had to go to the ED for symptoms of low blood sugar?:  No       Increase or Decrease trend in Blood Sugars   Do you have hyperglycemia symptoms?:  No   Do you have hypoglycemia symptoms?:  No   Last Blood Sugar Value:  235      ,   Congestive Heart Failure Assessment    Are you currently restricting fluids?:  Other (Comment: 1500 ml)  Do you understand a low sodium diet?:  Yes  Do you understand how to read food labels?:  No  Do you salt your food before tasting it?:  Yes     No patient-reported symptoms      Symptoms:          and   COPD Assessment    Does the patient understand envrionmental exposure?:  Yes  Is the patient able to verbalize Rescue vs. Long Acting medications?:  Yes  Does the patient have a nebulizer?:  No  Does the patient use a space with inhaled medications?:  Yes     No patient-reported symptoms         Symptoms:

## 2019-04-17 ENCOUNTER — CARE COORDINATION (OUTPATIENT)
Dept: CARE COORDINATION | Age: 59
End: 2019-04-17

## 2019-04-18 DIAGNOSIS — F32.0 MILD SINGLE CURRENT EPISODE OF MAJOR DEPRESSIVE DISORDER (HCC): ICD-10-CM

## 2019-04-18 DIAGNOSIS — I48.20 CHRONIC ATRIAL FIBRILLATION (HCC): ICD-10-CM

## 2019-04-18 RX ORDER — RIVAROXABAN 20 MG/1
TABLET, FILM COATED ORAL
Qty: 30 TABLET | Refills: 0 | Status: SHIPPED | OUTPATIENT
Start: 2019-04-18 | End: 2019-05-23 | Stop reason: SDUPTHER

## 2019-04-18 RX ORDER — DULOXETIN HYDROCHLORIDE 30 MG/1
30 CAPSULE, DELAYED RELEASE ORAL DAILY
Qty: 30 CAPSULE | Refills: 0 | Status: SHIPPED | OUTPATIENT
Start: 2019-04-18 | End: 2019-05-23 | Stop reason: SDUPTHER

## 2019-04-18 NOTE — CARE COORDINATION
RNCC spoke with Mateo today. She reports she is unaware of A1C results. (9.7)  Encouraged to contact office with BG levels and CHF monitoring. She reports he continues to drink alcohol - beer. Unsure of how many beers per day. He is non-compliant with evening meds. According to Mateo. Mateo will call if any needs or concerns.

## 2019-04-22 ENCOUNTER — TELEPHONE (OUTPATIENT)
Dept: INTERNAL MEDICINE CLINIC | Age: 59
End: 2019-04-22

## 2019-04-22 NOTE — TELEPHONE ENCOUNTER
Miki Mai calling to let you know that they will be doing PT 2x this week and certification will be up so they will do a reevaluation.

## 2019-04-24 ENCOUNTER — TELEPHONE (OUTPATIENT)
Dept: INTERNAL MEDICINE CLINIC | Age: 59
End: 2019-04-24

## 2019-04-24 NOTE — TELEPHONE ENCOUNTER
perlita---PT w/ [facility] calling b/c pt had a fall yesterday. Pt was transferring from his power chair to his  and his right knee buckled. Pt's right knee is sore. Jess Worrell just wanted dr Melissa Jeronimo to be made aware.

## 2019-04-29 ENCOUNTER — TELEPHONE (OUTPATIENT)
Dept: INTERNAL MEDICINE CLINIC | Age: 59
End: 2019-04-29

## 2019-04-29 NOTE — TELEPHONE ENCOUNTER
Brayden Moya w/ [facility] calling to let Dr Robert aly know that the pt is doing well with his prostatic leg. He is able to walk down his driveway and back up. They will continue PT 2x a week for 4 weeks.

## 2019-05-06 DIAGNOSIS — F51.01 PRIMARY INSOMNIA: ICD-10-CM

## 2019-05-06 DIAGNOSIS — J42 CHRONIC BRONCHITIS, UNSPECIFIED CHRONIC BRONCHITIS TYPE (HCC): ICD-10-CM

## 2019-05-07 RX ORDER — TRAZODONE HYDROCHLORIDE 50 MG/1
TABLET ORAL
Qty: 30 TABLET | Refills: 0 | Status: ON HOLD | OUTPATIENT
Start: 2019-05-07 | End: 2020-02-14 | Stop reason: HOSPADM

## 2019-05-23 DIAGNOSIS — I48.20 CHRONIC ATRIAL FIBRILLATION (HCC): ICD-10-CM

## 2019-05-23 DIAGNOSIS — F32.0 MILD SINGLE CURRENT EPISODE OF MAJOR DEPRESSIVE DISORDER (HCC): ICD-10-CM

## 2019-05-24 RX ORDER — DULOXETIN HYDROCHLORIDE 30 MG/1
30 CAPSULE, DELAYED RELEASE ORAL DAILY
Qty: 30 CAPSULE | Refills: 0 | Status: ON HOLD | OUTPATIENT
Start: 2019-05-24 | End: 2019-07-20 | Stop reason: HOSPADM

## 2019-05-24 RX ORDER — RIVAROXABAN 20 MG/1
TABLET, FILM COATED ORAL
Qty: 30 TABLET | Refills: 0 | Status: ON HOLD | OUTPATIENT
Start: 2019-05-24 | End: 2019-07-18 | Stop reason: HOSPADM

## 2019-05-24 RX ORDER — ATORVASTATIN CALCIUM 40 MG/1
40 TABLET, FILM COATED ORAL DAILY
Qty: 30 TABLET | Refills: 0 | Status: ON HOLD | OUTPATIENT
Start: 2019-05-24 | End: 2020-02-25 | Stop reason: HOSPADM

## 2019-06-01 ENCOUNTER — APPOINTMENT (OUTPATIENT)
Dept: GENERAL RADIOLOGY | Age: 59
DRG: 194 | End: 2019-06-01
Payer: COMMERCIAL

## 2019-06-01 ENCOUNTER — HOSPITAL ENCOUNTER (INPATIENT)
Age: 59
LOS: 10 days | Discharge: HOME HEALTH | DRG: 194 | End: 2019-06-11
Attending: EMERGENCY MEDICINE | Admitting: INTERNAL MEDICINE
Payer: COMMERCIAL

## 2019-06-01 DIAGNOSIS — I50.23 ACUTE ON CHRONIC SYSTOLIC (CONGESTIVE) HEART FAILURE (HCC): ICD-10-CM

## 2019-06-01 DIAGNOSIS — J44.1 COPD WITH ACUTE EXACERBATION (HCC): Primary | ICD-10-CM

## 2019-06-01 DIAGNOSIS — R09.02 HYPOXIA: ICD-10-CM

## 2019-06-01 PROBLEM — Z89.612 HX OF AKA (ABOVE KNEE AMPUTATION), LEFT (HCC): Status: ACTIVE | Noted: 2019-06-01

## 2019-06-01 LAB
A/G RATIO: 0.9 (ref 1.1–2.2)
ALBUMIN SERPL-MCNC: 3.8 G/DL (ref 3.4–5)
ALP BLD-CCNC: 89 U/L (ref 40–129)
ALT SERPL-CCNC: 18 U/L (ref 10–40)
ANION GAP SERPL CALCULATED.3IONS-SCNC: 14 MMOL/L (ref 3–16)
AST SERPL-CCNC: 19 U/L (ref 15–37)
BASE EXCESS VENOUS: 6.4 MMOL/L
BASOPHILS ABSOLUTE: 0.1 K/UL (ref 0–0.2)
BASOPHILS RELATIVE PERCENT: 1.1 %
BILIRUB SERPL-MCNC: 0.8 MG/DL (ref 0–1)
BUN BLDV-MCNC: 11 MG/DL (ref 7–20)
CALCIUM SERPL-MCNC: 9.4 MG/DL (ref 8.3–10.6)
CARBOXYHEMOGLOBIN: 6.1 %
CHLORIDE BLD-SCNC: 90 MMOL/L (ref 99–110)
CO2: 30 MMOL/L (ref 21–32)
CREAT SERPL-MCNC: 1 MG/DL (ref 0.9–1.3)
EOSINOPHILS ABSOLUTE: 0.1 K/UL (ref 0–0.6)
EOSINOPHILS RELATIVE PERCENT: 1 %
GFR AFRICAN AMERICAN: >60
GFR NON-AFRICAN AMERICAN: >60
GLOBULIN: 4.3 G/DL
GLUCOSE BLD-MCNC: 238 MG/DL (ref 70–99)
GLUCOSE BLD-MCNC: 242 MG/DL (ref 70–99)
HCO3 VENOUS: 32 MMOL/L (ref 23–29)
HCT VFR BLD CALC: 34.2 % (ref 40.5–52.5)
HEMOGLOBIN: 11.1 G/DL (ref 13.5–17.5)
LYMPHOCYTES ABSOLUTE: 1.3 K/UL (ref 1–5.1)
LYMPHOCYTES RELATIVE PERCENT: 14.1 %
MCH RBC QN AUTO: 28.9 PG (ref 26–34)
MCHC RBC AUTO-ENTMCNC: 32.6 G/DL (ref 31–36)
MCV RBC AUTO: 88.7 FL (ref 80–100)
METHEMOGLOBIN VENOUS: 0.7 %
MONOCYTES ABSOLUTE: 1 K/UL (ref 0–1.3)
MONOCYTES RELATIVE PERCENT: 10.9 %
NEUTROPHILS ABSOLUTE: 6.8 K/UL (ref 1.7–7.7)
NEUTROPHILS RELATIVE PERCENT: 72.9 %
O2 CONTENT, VEN: 12 ML/DL
O2 SAT, VEN: 89 %
O2 THERAPY: ABNORMAL
PCO2, VEN: 57.1 MMHG (ref 40–50)
PDW BLD-RTO: 15.8 % (ref 12.4–15.4)
PERFORMED ON: ABNORMAL
PH VENOUS: 7.37 (ref 7.35–7.45)
PLATELET # BLD: 243 K/UL (ref 135–450)
PMV BLD AUTO: 8.7 FL (ref 5–10.5)
PO2, VEN: 57 MMHG
POTASSIUM REFLEX MAGNESIUM: 3.8 MMOL/L (ref 3.5–5.1)
PRO-BNP: 3334 PG/ML (ref 0–124)
RBC # BLD: 3.85 M/UL (ref 4.2–5.9)
SODIUM BLD-SCNC: 134 MMOL/L (ref 136–145)
TCO2 CALC VENOUS: 34 MMOL/L
TOTAL PROTEIN: 8.1 G/DL (ref 6.4–8.2)
TROPONIN: <0.01 NG/ML
WBC # BLD: 9.4 K/UL (ref 4–11)

## 2019-06-01 PROCEDURE — 94762 N-INVAS EAR/PLS OXIMTRY CONT: CPT

## 2019-06-01 PROCEDURE — 2060000000 HC ICU INTERMEDIATE R&B

## 2019-06-01 PROCEDURE — 96375 TX/PRO/DX INJ NEW DRUG ADDON: CPT

## 2019-06-01 PROCEDURE — 83880 ASSAY OF NATRIURETIC PEPTIDE: CPT

## 2019-06-01 PROCEDURE — 80053 COMPREHEN METABOLIC PANEL: CPT

## 2019-06-01 PROCEDURE — 85025 COMPLETE CBC W/AUTO DIFF WBC: CPT

## 2019-06-01 PROCEDURE — 6370000000 HC RX 637 (ALT 250 FOR IP): Performed by: INTERNAL MEDICINE

## 2019-06-01 PROCEDURE — 6370000000 HC RX 637 (ALT 250 FOR IP): Performed by: PHYSICIAN ASSISTANT

## 2019-06-01 PROCEDURE — 36415 COLL VENOUS BLD VENIPUNCTURE: CPT

## 2019-06-01 PROCEDURE — 2580000003 HC RX 258: Performed by: INTERNAL MEDICINE

## 2019-06-01 PROCEDURE — 94640 AIRWAY INHALATION TREATMENT: CPT

## 2019-06-01 PROCEDURE — 96374 THER/PROPH/DIAG INJ IV PUSH: CPT

## 2019-06-01 PROCEDURE — 6360000002 HC RX W HCPCS: Performed by: INTERNAL MEDICINE

## 2019-06-01 PROCEDURE — 84484 ASSAY OF TROPONIN QUANT: CPT

## 2019-06-01 PROCEDURE — 99285 EMERGENCY DEPT VISIT HI MDM: CPT

## 2019-06-01 PROCEDURE — 82803 BLOOD GASES ANY COMBINATION: CPT

## 2019-06-01 PROCEDURE — 6360000002 HC RX W HCPCS: Performed by: PHYSICIAN ASSISTANT

## 2019-06-01 PROCEDURE — 2700000000 HC OXYGEN THERAPY PER DAY

## 2019-06-01 PROCEDURE — 71046 X-RAY EXAM CHEST 2 VIEWS: CPT

## 2019-06-01 RX ORDER — FUROSEMIDE 10 MG/ML
40 INJECTION INTRAMUSCULAR; INTRAVENOUS ONCE
Status: COMPLETED | OUTPATIENT
Start: 2019-06-01 | End: 2019-06-01

## 2019-06-01 RX ORDER — IPRATROPIUM BROMIDE AND ALBUTEROL SULFATE 2.5; .5 MG/3ML; MG/3ML
1 SOLUTION RESPIRATORY (INHALATION) ONCE
Status: COMPLETED | OUTPATIENT
Start: 2019-06-01 | End: 2019-06-01

## 2019-06-01 RX ORDER — METHYLPREDNISOLONE SODIUM SUCCINATE 40 MG/ML
40 INJECTION, POWDER, LYOPHILIZED, FOR SOLUTION INTRAMUSCULAR; INTRAVENOUS EVERY 6 HOURS
Status: DISCONTINUED | OUTPATIENT
Start: 2019-06-02 | End: 2019-06-04

## 2019-06-01 RX ORDER — METHYLPREDNISOLONE SODIUM SUCCINATE 125 MG/2ML
125 INJECTION, POWDER, LYOPHILIZED, FOR SOLUTION INTRAMUSCULAR; INTRAVENOUS ONCE
Status: COMPLETED | OUTPATIENT
Start: 2019-06-01 | End: 2019-06-01

## 2019-06-01 RX ORDER — IPRATROPIUM BROMIDE AND ALBUTEROL SULFATE 2.5; .5 MG/3ML; MG/3ML
1 SOLUTION RESPIRATORY (INHALATION)
Status: DISCONTINUED | OUTPATIENT
Start: 2019-06-01 | End: 2019-06-11 | Stop reason: HOSPADM

## 2019-06-01 RX ORDER — DEXTROSE MONOHYDRATE 50 MG/ML
100 INJECTION, SOLUTION INTRAVENOUS PRN
Status: DISCONTINUED | OUTPATIENT
Start: 2019-06-01 | End: 2019-06-04 | Stop reason: SDUPTHER

## 2019-06-01 RX ORDER — AMIODARONE HYDROCHLORIDE 200 MG/1
200 TABLET ORAL DAILY
Status: DISCONTINUED | OUTPATIENT
Start: 2019-06-02 | End: 2019-06-11 | Stop reason: HOSPADM

## 2019-06-01 RX ORDER — PANTOPRAZOLE SODIUM 40 MG/1
40 TABLET, DELAYED RELEASE ORAL
Status: DISCONTINUED | OUTPATIENT
Start: 2019-06-02 | End: 2019-06-11 | Stop reason: HOSPADM

## 2019-06-01 RX ORDER — ACETAMINOPHEN 325 MG/1
650 TABLET ORAL EVERY 4 HOURS PRN
Status: DISCONTINUED | OUTPATIENT
Start: 2019-06-01 | End: 2019-06-11 | Stop reason: HOSPADM

## 2019-06-01 RX ORDER — TAMSULOSIN HYDROCHLORIDE 0.4 MG/1
0.4 CAPSULE ORAL DAILY
Status: DISCONTINUED | OUTPATIENT
Start: 2019-06-02 | End: 2019-06-11 | Stop reason: HOSPADM

## 2019-06-01 RX ORDER — METOPROLOL SUCCINATE 50 MG/1
150 TABLET, EXTENDED RELEASE ORAL DAILY
Status: DISCONTINUED | OUTPATIENT
Start: 2019-06-02 | End: 2019-06-11 | Stop reason: HOSPADM

## 2019-06-01 RX ORDER — ACETAMINOPHEN 325 MG/1
650 TABLET ORAL EVERY 4 HOURS PRN
Status: DISCONTINUED | OUTPATIENT
Start: 2019-06-01 | End: 2019-06-01 | Stop reason: SDUPTHER

## 2019-06-01 RX ORDER — DULOXETIN HYDROCHLORIDE 30 MG/1
30 CAPSULE, DELAYED RELEASE ORAL DAILY
Status: DISCONTINUED | OUTPATIENT
Start: 2019-06-02 | End: 2019-06-11 | Stop reason: HOSPADM

## 2019-06-01 RX ORDER — SODIUM CHLORIDE 0.9 % (FLUSH) 0.9 %
10 SYRINGE (ML) INJECTION EVERY 12 HOURS SCHEDULED
Status: DISCONTINUED | OUTPATIENT
Start: 2019-06-01 | End: 2019-06-11 | Stop reason: HOSPADM

## 2019-06-01 RX ORDER — SODIUM CHLORIDE 0.9 % (FLUSH) 0.9 %
10 SYRINGE (ML) INJECTION PRN
Status: DISCONTINUED | OUTPATIENT
Start: 2019-06-01 | End: 2019-06-11 | Stop reason: HOSPADM

## 2019-06-01 RX ORDER — DEXTROSE MONOHYDRATE 25 G/50ML
12.5 INJECTION, SOLUTION INTRAVENOUS PRN
Status: DISCONTINUED | OUTPATIENT
Start: 2019-06-01 | End: 2019-06-04 | Stop reason: SDUPTHER

## 2019-06-01 RX ORDER — TRAZODONE HYDROCHLORIDE 50 MG/1
50 TABLET ORAL NIGHTLY PRN
Status: DISCONTINUED | OUTPATIENT
Start: 2019-06-01 | End: 2019-06-11 | Stop reason: HOSPADM

## 2019-06-01 RX ORDER — GABAPENTIN 100 MG/1
100 CAPSULE ORAL 3 TIMES DAILY
Status: DISCONTINUED | OUTPATIENT
Start: 2019-06-01 | End: 2019-06-11 | Stop reason: HOSPADM

## 2019-06-01 RX ORDER — OXYCODONE HYDROCHLORIDE AND ACETAMINOPHEN 5; 325 MG/1; MG/1
1 TABLET ORAL ONCE
Status: COMPLETED | OUTPATIENT
Start: 2019-06-01 | End: 2019-06-01

## 2019-06-01 RX ORDER — NICOTINE POLACRILEX 4 MG
15 LOZENGE BUCCAL PRN
Status: DISCONTINUED | OUTPATIENT
Start: 2019-06-01 | End: 2019-06-04 | Stop reason: SDUPTHER

## 2019-06-01 RX ORDER — ATORVASTATIN CALCIUM 40 MG/1
40 TABLET, FILM COATED ORAL DAILY
Status: DISCONTINUED | OUTPATIENT
Start: 2019-06-02 | End: 2019-06-11 | Stop reason: HOSPADM

## 2019-06-01 RX ORDER — FUROSEMIDE 10 MG/ML
40 INJECTION INTRAMUSCULAR; INTRAVENOUS 2 TIMES DAILY
Status: DISCONTINUED | OUTPATIENT
Start: 2019-06-02 | End: 2019-06-04

## 2019-06-01 RX ORDER — DILTIAZEM HYDROCHLORIDE 240 MG/1
240 CAPSULE, COATED, EXTENDED RELEASE ORAL DAILY
Status: DISCONTINUED | OUTPATIENT
Start: 2019-06-02 | End: 2019-06-11 | Stop reason: HOSPADM

## 2019-06-01 RX ORDER — ONDANSETRON 2 MG/ML
4 INJECTION INTRAMUSCULAR; INTRAVENOUS EVERY 4 HOURS PRN
Status: DISCONTINUED | OUTPATIENT
Start: 2019-06-01 | End: 2019-06-11 | Stop reason: HOSPADM

## 2019-06-01 RX ADMIN — IPRATROPIUM BROMIDE AND ALBUTEROL SULFATE 1 AMPULE: .5; 3 SOLUTION RESPIRATORY (INHALATION) at 17:38

## 2019-06-01 RX ADMIN — IPRATROPIUM BROMIDE AND ALBUTEROL SULFATE 1 AMPULE: .5; 3 SOLUTION RESPIRATORY (INHALATION) at 17:39

## 2019-06-01 RX ADMIN — Medication 10 ML: at 21:29

## 2019-06-01 RX ADMIN — GABAPENTIN 100 MG: 100 CAPSULE ORAL at 21:28

## 2019-06-01 RX ADMIN — IPRATROPIUM BROMIDE AND ALBUTEROL SULFATE 1 AMPULE: .5; 3 SOLUTION RESPIRATORY (INHALATION) at 20:53

## 2019-06-01 RX ADMIN — IPRATROPIUM BROMIDE AND ALBUTEROL SULFATE 1 AMPULE: .5; 3 SOLUTION RESPIRATORY (INHALATION) at 17:37

## 2019-06-01 RX ADMIN — INSULIN LISPRO 2 UNITS: 100 INJECTION, SOLUTION INTRAVENOUS; SUBCUTANEOUS at 21:30

## 2019-06-01 RX ADMIN — METHYLPREDNISOLONE SODIUM SUCCINATE 40 MG: 40 INJECTION, POWDER, FOR SOLUTION INTRAMUSCULAR; INTRAVENOUS at 23:08

## 2019-06-01 RX ADMIN — OXYCODONE HYDROCHLORIDE AND ACETAMINOPHEN 1 TABLET: 5; 325 TABLET ORAL at 17:28

## 2019-06-01 RX ADMIN — FUROSEMIDE 40 MG: 10 INJECTION, SOLUTION INTRAMUSCULAR; INTRAVENOUS at 17:59

## 2019-06-01 RX ADMIN — AZITHROMYCIN MONOHYDRATE 500 MG: 500 INJECTION, POWDER, LYOPHILIZED, FOR SOLUTION INTRAVENOUS at 21:28

## 2019-06-01 RX ADMIN — METHYLPREDNISOLONE SODIUM SUCCINATE 125 MG: 125 INJECTION, POWDER, FOR SOLUTION INTRAMUSCULAR; INTRAVENOUS at 17:28

## 2019-06-01 RX ADMIN — TRAZODONE HYDROCHLORIDE 50 MG: 50 TABLET ORAL at 23:08

## 2019-06-01 RX ADMIN — MOMETASONE FUROATE AND FORMOTEROL FUMARATE DIHYDRATE 2 PUFF: 100; 5 AEROSOL RESPIRATORY (INHALATION) at 20:53

## 2019-06-01 ASSESSMENT — PAIN DESCRIPTION - FREQUENCY: FREQUENCY: CONTINUOUS

## 2019-06-01 ASSESSMENT — PAIN DESCRIPTION - DESCRIPTORS: DESCRIPTORS: THROBBING;BURNING

## 2019-06-01 ASSESSMENT — PAIN DESCRIPTION - LOCATION: LOCATION: LEG;BACK

## 2019-06-01 ASSESSMENT — ENCOUNTER SYMPTOMS
BACK PAIN: 0
ABDOMINAL PAIN: 0
SHORTNESS OF BREATH: 1
EYE PAIN: 0
NAUSEA: 0
VOMITING: 0
DIARRHEA: 0
WHEEZING: 1
COUGH: 1
ABDOMINAL DISTENTION: 1

## 2019-06-01 ASSESSMENT — PAIN DESCRIPTION - PROGRESSION: CLINICAL_PROGRESSION: NOT CHANGED

## 2019-06-01 ASSESSMENT — PAIN SCALES - GENERAL
PAINLEVEL_OUTOF10: 6
PAINLEVEL_OUTOF10: 0
PAINLEVEL_OUTOF10: 8

## 2019-06-01 ASSESSMENT — PAIN DESCRIPTION - ORIENTATION: ORIENTATION: RIGHT

## 2019-06-01 ASSESSMENT — PAIN DESCRIPTION - PAIN TYPE
TYPE: CHRONIC PAIN
TYPE: ACUTE PAIN

## 2019-06-01 ASSESSMENT — PAIN - FUNCTIONAL ASSESSMENT: PAIN_FUNCTIONAL_ASSESSMENT: PREVENTS OR INTERFERES SOME ACTIVE ACTIVITIES AND ADLS

## 2019-06-01 ASSESSMENT — PAIN DESCRIPTION - ONSET: ONSET: ON-GOING
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
## 2019-06-01 NOTE — ED NOTES
Bed: B-09  Expected date: 6/1/19  Expected time: 4:21 PM  Means of arrival: [address]  Comments:  59M resp distress/ on 15L NRB     Nella Mckinney RN  06/01/19 2257

## 2019-06-01 NOTE — ED PROVIDER NOTES
I independently evaluated and obtained a history and physical on Abhishek Alvarado. All diagnostic, treatment, and disposition assistants were made to myself in conjunction the advanced practice provider. For further details of this patient's emergency department encounter, please see the advanced practice provider's documentation. History: Pt presents with chronic cough and acute dyspnea. He has COPD and is on oxygen therapy at home. His oxygen saturation was 82% when EMS arrived and 88% on 4LNC here. Physician Exam: Patient does have some accessory muscle use while holding a conversation. Mild respiratory distress present. Diffuse rhonchi with some expiratory wheezing present. Regular heart rate and rhythm. Patient was given 3 breathing treatments. On blood workup his BNP is elevated. He has findings consistent with CHF on chest x-ray. Lung exam he has findings more consistent with COPD. He likely has a combination of causes for his symptoms. He requires 4 L of oxygen to maintain oxygen saturation above 87%. He will be admitted for further care.       (Please note that portions of this note may have been completed with a voice recognition program. Efforts were made to edit the dictations but occasionally words are mis-transcribed.)        Forrest Roach MD  06/04/19 8947
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
## 2019-06-02 LAB
ANION GAP SERPL CALCULATED.3IONS-SCNC: 14 MMOL/L (ref 3–16)
BASOPHILS ABSOLUTE: 0 K/UL (ref 0–0.2)
BASOPHILS RELATIVE PERCENT: 0.5 %
BUN BLDV-MCNC: 19 MG/DL (ref 7–20)
CALCIUM SERPL-MCNC: 9 MG/DL (ref 8.3–10.6)
CHLORIDE BLD-SCNC: 88 MMOL/L (ref 99–110)
CO2: 31 MMOL/L (ref 21–32)
CREAT SERPL-MCNC: 1.3 MG/DL (ref 0.9–1.3)
EOSINOPHILS ABSOLUTE: 0 K/UL (ref 0–0.6)
EOSINOPHILS RELATIVE PERCENT: 0 %
GFR AFRICAN AMERICAN: >60
GFR NON-AFRICAN AMERICAN: 56
GLUCOSE BLD-MCNC: 387 MG/DL (ref 70–99)
GLUCOSE BLD-MCNC: 400 MG/DL (ref 70–99)
GLUCOSE BLD-MCNC: 414 MG/DL (ref 70–99)
GLUCOSE BLD-MCNC: 425 MG/DL (ref 70–99)
GLUCOSE BLD-MCNC: 460 MG/DL (ref 70–99)
HCT VFR BLD CALC: 32.4 % (ref 40.5–52.5)
HEMOGLOBIN: 10.6 G/DL (ref 13.5–17.5)
LYMPHOCYTES ABSOLUTE: 0.4 K/UL (ref 1–5.1)
LYMPHOCYTES RELATIVE PERCENT: 5.4 %
MCH RBC QN AUTO: 29.1 PG (ref 26–34)
MCHC RBC AUTO-ENTMCNC: 32.7 G/DL (ref 31–36)
MCV RBC AUTO: 89 FL (ref 80–100)
MONOCYTES ABSOLUTE: 0.1 K/UL (ref 0–1.3)
MONOCYTES RELATIVE PERCENT: 1.5 %
NEUTROPHILS ABSOLUTE: 6.7 K/UL (ref 1.7–7.7)
NEUTROPHILS RELATIVE PERCENT: 92.6 %
PDW BLD-RTO: 15.8 % (ref 12.4–15.4)
PERFORMED ON: ABNORMAL
PLATELET # BLD: 205 K/UL (ref 135–450)
PMV BLD AUTO: 9.7 FL (ref 5–10.5)
POTASSIUM REFLEX MAGNESIUM: 5.1 MMOL/L (ref 3.5–5.1)
PROCALCITONIN: 0.1 NG/ML (ref 0–0.15)
RBC # BLD: 3.64 M/UL (ref 4.2–5.9)
SODIUM BLD-SCNC: 133 MMOL/L (ref 136–145)
WBC # BLD: 7.2 K/UL (ref 4–11)

## 2019-06-02 PROCEDURE — 85025 COMPLETE CBC W/AUTO DIFF WBC: CPT

## 2019-06-02 PROCEDURE — 2700000000 HC OXYGEN THERAPY PER DAY

## 2019-06-02 PROCEDURE — 80048 BASIC METABOLIC PNL TOTAL CA: CPT

## 2019-06-02 PROCEDURE — 6370000000 HC RX 637 (ALT 250 FOR IP): Performed by: INTERNAL MEDICINE

## 2019-06-02 PROCEDURE — 94640 AIRWAY INHALATION TREATMENT: CPT

## 2019-06-02 PROCEDURE — 6370000000 HC RX 637 (ALT 250 FOR IP): Performed by: NURSE PRACTITIONER

## 2019-06-02 PROCEDURE — 84145 PROCALCITONIN (PCT): CPT

## 2019-06-02 PROCEDURE — 2580000003 HC RX 258: Performed by: INTERNAL MEDICINE

## 2019-06-02 PROCEDURE — 6360000002 HC RX W HCPCS: Performed by: INTERNAL MEDICINE

## 2019-06-02 PROCEDURE — 36415 COLL VENOUS BLD VENIPUNCTURE: CPT

## 2019-06-02 PROCEDURE — 94760 N-INVAS EAR/PLS OXIMETRY 1: CPT

## 2019-06-02 PROCEDURE — 2060000000 HC ICU INTERMEDIATE R&B

## 2019-06-02 RX ORDER — INSULIN GLARGINE 100 [IU]/ML
15 INJECTION, SOLUTION SUBCUTANEOUS NIGHTLY
Status: DISCONTINUED | OUTPATIENT
Start: 2019-06-02 | End: 2019-06-04

## 2019-06-02 RX ORDER — LIDOCAINE 4 G/G
1 PATCH TOPICAL DAILY
Status: DISCONTINUED | OUTPATIENT
Start: 2019-06-02 | End: 2019-06-11 | Stop reason: HOSPADM

## 2019-06-02 RX ORDER — POTASSIUM CHLORIDE 600 MG/1
8 TABLET, FILM COATED, EXTENDED RELEASE ORAL 3 TIMES DAILY
Status: ON HOLD | COMMUNITY
End: 2019-07-18 | Stop reason: HOSPADM

## 2019-06-02 RX ADMIN — INSULIN LISPRO 5 UNITS: 100 INJECTION, SOLUTION INTRAVENOUS; SUBCUTANEOUS at 21:24

## 2019-06-02 RX ADMIN — INSULIN LISPRO 12 UNITS: 100 INJECTION, SOLUTION INTRAVENOUS; SUBCUTANEOUS at 13:45

## 2019-06-02 RX ADMIN — FUROSEMIDE 40 MG: 10 INJECTION, SOLUTION INTRAMUSCULAR; INTRAVENOUS at 08:40

## 2019-06-02 RX ADMIN — INSULIN LISPRO 12 UNITS: 100 INJECTION, SOLUTION INTRAVENOUS; SUBCUTANEOUS at 08:39

## 2019-06-02 RX ADMIN — METHYLPREDNISOLONE SODIUM SUCCINATE 40 MG: 40 INJECTION, POWDER, FOR SOLUTION INTRAMUSCULAR; INTRAVENOUS at 23:19

## 2019-06-02 RX ADMIN — GABAPENTIN 100 MG: 100 CAPSULE ORAL at 13:46

## 2019-06-02 RX ADMIN — MOMETASONE FUROATE AND FORMOTEROL FUMARATE DIHYDRATE 2 PUFF: 100; 5 AEROSOL RESPIRATORY (INHALATION) at 20:25

## 2019-06-02 RX ADMIN — INSULIN LISPRO 5 UNITS: 100 INJECTION, SOLUTION INTRAVENOUS; SUBCUTANEOUS at 18:33

## 2019-06-02 RX ADMIN — IPRATROPIUM BROMIDE AND ALBUTEROL SULFATE 1 AMPULE: .5; 3 SOLUTION RESPIRATORY (INHALATION) at 09:32

## 2019-06-02 RX ADMIN — MOMETASONE FUROATE AND FORMOTEROL FUMARATE DIHYDRATE 2 PUFF: 100; 5 AEROSOL RESPIRATORY (INHALATION) at 09:32

## 2019-06-02 RX ADMIN — DILTIAZEM HYDROCHLORIDE 240 MG: 240 CAPSULE, COATED, EXTENDED RELEASE ORAL at 08:40

## 2019-06-02 RX ADMIN — RIVAROXABAN 20 MG: 20 TABLET, FILM COATED ORAL at 08:40

## 2019-06-02 RX ADMIN — ACETAMINOPHEN 650 MG: 325 TABLET ORAL at 23:19

## 2019-06-02 RX ADMIN — DULOXETINE HYDROCHLORIDE 30 MG: 30 CAPSULE, DELAYED RELEASE ORAL at 08:40

## 2019-06-02 RX ADMIN — GABAPENTIN 100 MG: 100 CAPSULE ORAL at 08:40

## 2019-06-02 RX ADMIN — INSULIN GLARGINE 15 UNITS: 100 INJECTION, SOLUTION SUBCUTANEOUS at 21:24

## 2019-06-02 RX ADMIN — METHYLPREDNISOLONE SODIUM SUCCINATE 40 MG: 40 INJECTION, POWDER, FOR SOLUTION INTRAMUSCULAR; INTRAVENOUS at 13:46

## 2019-06-02 RX ADMIN — IPRATROPIUM BROMIDE AND ALBUTEROL SULFATE 1 AMPULE: .5; 3 SOLUTION RESPIRATORY (INHALATION) at 12:56

## 2019-06-02 RX ADMIN — AMIODARONE HYDROCHLORIDE 200 MG: 200 TABLET ORAL at 08:40

## 2019-06-02 RX ADMIN — METHYLPREDNISOLONE SODIUM SUCCINATE 40 MG: 40 INJECTION, POWDER, FOR SOLUTION INTRAMUSCULAR; INTRAVENOUS at 18:32

## 2019-06-02 RX ADMIN — INSULIN LISPRO 8 UNITS: 100 INJECTION, SOLUTION INTRAVENOUS; SUBCUTANEOUS at 14:35

## 2019-06-02 RX ADMIN — ATORVASTATIN CALCIUM 40 MG: 40 TABLET, FILM COATED ORAL at 08:40

## 2019-06-02 RX ADMIN — IPRATROPIUM BROMIDE AND ALBUTEROL SULFATE 1 AMPULE: .5; 3 SOLUTION RESPIRATORY (INHALATION) at 20:25

## 2019-06-02 RX ADMIN — TRAZODONE HYDROCHLORIDE 50 MG: 50 TABLET ORAL at 23:19

## 2019-06-02 RX ADMIN — METHYLPREDNISOLONE SODIUM SUCCINATE 40 MG: 40 INJECTION, POWDER, FOR SOLUTION INTRAMUSCULAR; INTRAVENOUS at 06:55

## 2019-06-02 RX ADMIN — PANTOPRAZOLE SODIUM 40 MG: 40 TABLET, DELAYED RELEASE ORAL at 06:55

## 2019-06-02 RX ADMIN — METOPROLOL SUCCINATE 150 MG: 50 TABLET, FILM COATED, EXTENDED RELEASE ORAL at 08:40

## 2019-06-02 RX ADMIN — TAMSULOSIN HYDROCHLORIDE 0.4 MG: 0.4 CAPSULE ORAL at 08:40

## 2019-06-02 RX ADMIN — FUROSEMIDE 40 MG: 10 INJECTION, SOLUTION INTRAMUSCULAR; INTRAVENOUS at 18:32

## 2019-06-02 RX ADMIN — ACETAMINOPHEN 650 MG: 325 TABLET ORAL at 09:02

## 2019-06-02 RX ADMIN — INSULIN LISPRO 12 UNITS: 100 INJECTION, SOLUTION INTRAVENOUS; SUBCUTANEOUS at 18:33

## 2019-06-02 RX ADMIN — GABAPENTIN 100 MG: 100 CAPSULE ORAL at 21:24

## 2019-06-02 RX ADMIN — Medication 10 ML: at 21:24

## 2019-06-02 RX ADMIN — IPRATROPIUM BROMIDE AND ALBUTEROL SULFATE 1 AMPULE: .5; 3 SOLUTION RESPIRATORY (INHALATION) at 16:28

## 2019-06-02 ASSESSMENT — PAIN DESCRIPTION - LOCATION
LOCATION: BACK
LOCATION: BACK;LEG

## 2019-06-02 ASSESSMENT — PAIN SCALES - GENERAL
PAINLEVEL_OUTOF10: 0
PAINLEVEL_OUTOF10: 6
PAINLEVEL_OUTOF10: 4
PAINLEVEL_OUTOF10: 5

## 2019-06-02 ASSESSMENT — PAIN DESCRIPTION - DESCRIPTORS
DESCRIPTORS: ACHING
DESCRIPTORS: ACHING

## 2019-06-02 ASSESSMENT — PAIN DESCRIPTION - PROGRESSION
CLINICAL_PROGRESSION: NOT CHANGED
CLINICAL_PROGRESSION: NOT CHANGED

## 2019-06-02 ASSESSMENT — PAIN - FUNCTIONAL ASSESSMENT: PAIN_FUNCTIONAL_ASSESSMENT: PREVENTS OR INTERFERES SOME ACTIVE ACTIVITIES AND ADLS

## 2019-06-02 ASSESSMENT — PAIN DESCRIPTION - ORIENTATION: ORIENTATION: RIGHT

## 2019-06-02 ASSESSMENT — PAIN DESCRIPTION - ONSET: ONSET: ON-GOING

## 2019-06-02 ASSESSMENT — PAIN DESCRIPTION - PAIN TYPE
TYPE: CHRONIC PAIN
TYPE: CHRONIC PAIN

## 2019-06-02 ASSESSMENT — PAIN DESCRIPTION - FREQUENCY
FREQUENCY: CONTINUOUS
FREQUENCY: CONTINUOUS

## 2019-06-02 ASSESSMENT — PAIN DESCRIPTION - DIRECTION: RADIATING_TOWARDS: LEG

## 2019-06-02 NOTE — PROGRESS NOTES
Pt arrived to room 5277 from ED at about 2000, A&Ox4, denies pain at this time, on 02 4L/NC, 02 sat 93%, Placed on heart monitor, CM notified, safety precautions discussed with pt, verbalized understanding, no acute distress noted at this time. Will continue to monitor.

## 2019-06-02 NOTE — PROGRESS NOTES
4 Eyes Skin Assessment     The patient is being assess for  Admission    I agree that 2 RN's have performed a thorough Head to Toe Skin Assessment on the patient. ALL assessment sites listed below have been assessed. Redness to mireya-area noted     Areas assessed by both nurses:  [x]   Head, Face, and Ears   [x]   Shoulders, Back, and Chest  [x]   Arms, Elbows, and Hands   []   Coccyx, Sacrum, and IschIum  [x]   Legs, Feet, and Heels        Does the Patient have Skin Breakdown?   No         Carlos Alberto Prevention initiated:  Yes   Wound Care Orders initiated:  No      [facility] nurse consulted for Pressure Injury (Stage 3,4, Unstageable, DTI, NWPT, and Complex wounds), New and Established Ostomies:  NA      Nurse 1 eSignature: Electronically signed by Ivanna Cano RN on 6/1/19 at 8:44 PM    **SHARE this note so that the co-signing nurse is able to place an eSignature    Nurse 2 eSignature: Electronically signed by Favian Elizondo RN on 6/2/19 at 1:53 AM

## 2019-06-02 NOTE — PLAN OF CARE
Problem: Falls - Risk of:  Goal: Will remain free from falls  Description  Will remain free from falls  Outcome: Ongoing  Goal: Absence of physical injury  Description  Absence of physical injury  Outcome: Ongoing     Problem: Risk for Impaired Skin Integrity  Goal: Tissue integrity - skin and mucous membranes  Description  Structural intactness and normal physiological function of skin and  mucous membranes.   Outcome: Ongoing     Problem: Acute Pain  Goal: Control of acute pain  Description  Control of acute pain     Outcome: Ongoing     Problem: SKIN INTEGRITY  Goal: Risk for impaired skin integrity will decrease  Description  Risk for impaired skin integrity will decrease     Outcome: Ongoing     Problem: DAILY CARE  Goal: Daily care needs are met  Outcome: Ongoing
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
## 2019-06-03 LAB
ANION GAP SERPL CALCULATED.3IONS-SCNC: 10 MMOL/L (ref 3–16)
BASOPHILS ABSOLUTE: 0 K/UL (ref 0–0.2)
BASOPHILS RELATIVE PERCENT: 0 %
BUN BLDV-MCNC: 34 MG/DL (ref 7–20)
CALCIUM SERPL-MCNC: 9.4 MG/DL (ref 8.3–10.6)
CHLORIDE BLD-SCNC: 91 MMOL/L (ref 99–110)
CO2: 32 MMOL/L (ref 21–32)
CREAT SERPL-MCNC: 1.1 MG/DL (ref 0.9–1.3)
EOSINOPHILS ABSOLUTE: 0 K/UL (ref 0–0.6)
EOSINOPHILS RELATIVE PERCENT: 0 %
GFR AFRICAN AMERICAN: >60
GFR NON-AFRICAN AMERICAN: >60
GLUCOSE BLD-MCNC: 315 MG/DL (ref 70–99)
GLUCOSE BLD-MCNC: 333 MG/DL (ref 70–99)
GLUCOSE BLD-MCNC: 403 MG/DL (ref 70–99)
GLUCOSE BLD-MCNC: 428 MG/DL (ref 70–99)
GLUCOSE BLD-MCNC: 434 MG/DL (ref 70–99)
GLUCOSE BLD-MCNC: 473 MG/DL (ref 70–99)
HCT VFR BLD CALC: 32.4 % (ref 40.5–52.5)
HEMOGLOBIN: 10.5 G/DL (ref 13.5–17.5)
LYMPHOCYTES ABSOLUTE: 0.5 K/UL (ref 1–5.1)
LYMPHOCYTES RELATIVE PERCENT: 4.1 %
MCH RBC QN AUTO: 28.6 PG (ref 26–34)
MCHC RBC AUTO-ENTMCNC: 32.3 G/DL (ref 31–36)
MCV RBC AUTO: 88.7 FL (ref 80–100)
MONOCYTES ABSOLUTE: 0.6 K/UL (ref 0–1.3)
MONOCYTES RELATIVE PERCENT: 4.4 %
NEUTROPHILS ABSOLUTE: 12 K/UL (ref 1.7–7.7)
NEUTROPHILS RELATIVE PERCENT: 91.5 %
PDW BLD-RTO: 15.7 % (ref 12.4–15.4)
PERFORMED ON: ABNORMAL
PLATELET # BLD: 220 K/UL (ref 135–450)
PMV BLD AUTO: 9.3 FL (ref 5–10.5)
POTASSIUM REFLEX MAGNESIUM: 4.5 MMOL/L (ref 3.5–5.1)
RBC # BLD: 3.66 M/UL (ref 4.2–5.9)
SODIUM BLD-SCNC: 133 MMOL/L (ref 136–145)
WBC # BLD: 13.1 K/UL (ref 4–11)

## 2019-06-03 PROCEDURE — 94640 AIRWAY INHALATION TREATMENT: CPT

## 2019-06-03 PROCEDURE — 6360000002 HC RX W HCPCS: Performed by: INTERNAL MEDICINE

## 2019-06-03 PROCEDURE — 85025 COMPLETE CBC W/AUTO DIFF WBC: CPT

## 2019-06-03 PROCEDURE — 99254 IP/OBS CNSLTJ NEW/EST MOD 60: CPT | Performed by: INTERNAL MEDICINE

## 2019-06-03 PROCEDURE — 80048 BASIC METABOLIC PNL TOTAL CA: CPT

## 2019-06-03 PROCEDURE — 6370000000 HC RX 637 (ALT 250 FOR IP): Performed by: NURSE PRACTITIONER

## 2019-06-03 PROCEDURE — 2580000003 HC RX 258: Performed by: INTERNAL MEDICINE

## 2019-06-03 PROCEDURE — 94761 N-INVAS EAR/PLS OXIMETRY MLT: CPT

## 2019-06-03 PROCEDURE — 36415 COLL VENOUS BLD VENIPUNCTURE: CPT

## 2019-06-03 PROCEDURE — 6370000000 HC RX 637 (ALT 250 FOR IP): Performed by: INTERNAL MEDICINE

## 2019-06-03 PROCEDURE — 2700000000 HC OXYGEN THERAPY PER DAY

## 2019-06-03 PROCEDURE — 2060000000 HC ICU INTERMEDIATE R&B

## 2019-06-03 RX ADMIN — IPRATROPIUM BROMIDE AND ALBUTEROL SULFATE 1 AMPULE: .5; 3 SOLUTION RESPIRATORY (INHALATION) at 20:08

## 2019-06-03 RX ADMIN — METHYLPREDNISOLONE SODIUM SUCCINATE 40 MG: 40 INJECTION, POWDER, FOR SOLUTION INTRAMUSCULAR; INTRAVENOUS at 18:40

## 2019-06-03 RX ADMIN — METOPROLOL SUCCINATE 150 MG: 50 TABLET, FILM COATED, EXTENDED RELEASE ORAL at 08:51

## 2019-06-03 RX ADMIN — GABAPENTIN 100 MG: 100 CAPSULE ORAL at 08:51

## 2019-06-03 RX ADMIN — INSULIN LISPRO 8 UNITS: 100 INJECTION, SOLUTION INTRAVENOUS; SUBCUTANEOUS at 08:53

## 2019-06-03 RX ADMIN — INSULIN LISPRO 5 UNITS: 100 INJECTION, SOLUTION INTRAVENOUS; SUBCUTANEOUS at 08:53

## 2019-06-03 RX ADMIN — PANTOPRAZOLE SODIUM 40 MG: 40 TABLET, DELAYED RELEASE ORAL at 06:27

## 2019-06-03 RX ADMIN — INSULIN LISPRO 6 UNITS: 100 INJECTION, SOLUTION INTRAVENOUS; SUBCUTANEOUS at 21:59

## 2019-06-03 RX ADMIN — GABAPENTIN 100 MG: 100 CAPSULE ORAL at 21:48

## 2019-06-03 RX ADMIN — IPRATROPIUM BROMIDE AND ALBUTEROL SULFATE 1 AMPULE: .5; 3 SOLUTION RESPIRATORY (INHALATION) at 08:00

## 2019-06-03 RX ADMIN — MOMETASONE FUROATE AND FORMOTEROL FUMARATE DIHYDRATE 2 PUFF: 100; 5 AEROSOL RESPIRATORY (INHALATION) at 20:08

## 2019-06-03 RX ADMIN — Medication 10 ML: at 08:56

## 2019-06-03 RX ADMIN — Medication 10 ML: at 22:24

## 2019-06-03 RX ADMIN — INSULIN LISPRO 12 UNITS: 100 INJECTION, SOLUTION INTRAVENOUS; SUBCUTANEOUS at 13:37

## 2019-06-03 RX ADMIN — IPRATROPIUM BROMIDE AND ALBUTEROL SULFATE 1 AMPULE: .5; 3 SOLUTION RESPIRATORY (INHALATION) at 12:00

## 2019-06-03 RX ADMIN — DULOXETINE HYDROCHLORIDE 30 MG: 30 CAPSULE, DELAYED RELEASE ORAL at 08:51

## 2019-06-03 RX ADMIN — TRAZODONE HYDROCHLORIDE 50 MG: 50 TABLET ORAL at 22:00

## 2019-06-03 RX ADMIN — AMIODARONE HYDROCHLORIDE 200 MG: 200 TABLET ORAL at 08:51

## 2019-06-03 RX ADMIN — MOMETASONE FUROATE AND FORMOTEROL FUMARATE DIHYDRATE 2 PUFF: 100; 5 AEROSOL RESPIRATORY (INHALATION) at 08:01

## 2019-06-03 RX ADMIN — INSULIN LISPRO 12 UNITS: 100 INJECTION, SOLUTION INTRAVENOUS; SUBCUTANEOUS at 18:42

## 2019-06-03 RX ADMIN — TAMSULOSIN HYDROCHLORIDE 0.4 MG: 0.4 CAPSULE ORAL at 08:51

## 2019-06-03 RX ADMIN — FUROSEMIDE 40 MG: 10 INJECTION, SOLUTION INTRAMUSCULAR; INTRAVENOUS at 08:51

## 2019-06-03 RX ADMIN — INSULIN LISPRO 8 UNITS: 100 INJECTION, SOLUTION INTRAVENOUS; SUBCUTANEOUS at 13:37

## 2019-06-03 RX ADMIN — ACETAMINOPHEN 650 MG: 325 TABLET ORAL at 22:00

## 2019-06-03 RX ADMIN — ATORVASTATIN CALCIUM 40 MG: 40 TABLET, FILM COATED ORAL at 08:51

## 2019-06-03 RX ADMIN — INSULIN LISPRO 8 UNITS: 100 INJECTION, SOLUTION INTRAVENOUS; SUBCUTANEOUS at 18:40

## 2019-06-03 RX ADMIN — GABAPENTIN 100 MG: 100 CAPSULE ORAL at 13:35

## 2019-06-03 RX ADMIN — ACETAMINOPHEN 650 MG: 325 TABLET ORAL at 09:02

## 2019-06-03 RX ADMIN — IPRATROPIUM BROMIDE AND ALBUTEROL SULFATE 1 AMPULE: .5; 3 SOLUTION RESPIRATORY (INHALATION) at 16:33

## 2019-06-03 RX ADMIN — DILTIAZEM HYDROCHLORIDE 240 MG: 240 CAPSULE, COATED, EXTENDED RELEASE ORAL at 08:51

## 2019-06-03 RX ADMIN — METHYLPREDNISOLONE SODIUM SUCCINATE 40 MG: 40 INJECTION, POWDER, FOR SOLUTION INTRAMUSCULAR; INTRAVENOUS at 13:35

## 2019-06-03 RX ADMIN — METHYLPREDNISOLONE SODIUM SUCCINATE 40 MG: 40 INJECTION, POWDER, FOR SOLUTION INTRAMUSCULAR; INTRAVENOUS at 06:27

## 2019-06-03 RX ADMIN — RIVAROXABAN 20 MG: 20 TABLET, FILM COATED ORAL at 08:51

## 2019-06-03 RX ADMIN — INSULIN GLARGINE 15 UNITS: 100 INJECTION, SOLUTION SUBCUTANEOUS at 21:59

## 2019-06-03 RX ADMIN — METHYLPREDNISOLONE SODIUM SUCCINATE 40 MG: 40 INJECTION, POWDER, FOR SOLUTION INTRAMUSCULAR; INTRAVENOUS at 23:31

## 2019-06-03 RX ADMIN — FUROSEMIDE 40 MG: 10 INJECTION, SOLUTION INTRAMUSCULAR; INTRAVENOUS at 18:40

## 2019-06-03 ASSESSMENT — PAIN DESCRIPTION - FREQUENCY: FREQUENCY: CONTINUOUS

## 2019-06-03 ASSESSMENT — PAIN SCALES - GENERAL
PAINLEVEL_OUTOF10: 0
PAINLEVEL_OUTOF10: 0
PAINLEVEL_OUTOF10: 6
PAINLEVEL_OUTOF10: 2
PAINLEVEL_OUTOF10: 3

## 2019-06-03 ASSESSMENT — PAIN - FUNCTIONAL ASSESSMENT: PAIN_FUNCTIONAL_ASSESSMENT: ACTIVITIES ARE NOT PREVENTED

## 2019-06-03 ASSESSMENT — PAIN DESCRIPTION - ONSET: ONSET: ON-GOING

## 2019-06-03 ASSESSMENT — PAIN DESCRIPTION - DESCRIPTORS: DESCRIPTORS: ACHING

## 2019-06-03 ASSESSMENT — PAIN DESCRIPTION - PAIN TYPE: TYPE: ACUTE PAIN

## 2019-06-03 ASSESSMENT — PAIN DESCRIPTION - PROGRESSION: CLINICAL_PROGRESSION: NOT CHANGED

## 2019-06-03 ASSESSMENT — PAIN DESCRIPTION - LOCATION: LOCATION: HEAD

## 2019-06-03 ASSESSMENT — PAIN DESCRIPTION - ORIENTATION: ORIENTATION: ANTERIOR

## 2019-06-03 NOTE — PLAN OF CARE
Problem: Falls - Risk of:  Goal: Will remain free from falls  Description  Will remain free from falls  6/3/2019 0301 by Demetria Estes RN  Outcome: Ongoing     Problem: Falls - Risk of:  Goal: Absence of physical injury  Description  Absence of physical injury  6/3/2019 0301 by Demetria Estes RN  Outcome: Ongoing   Patient has remained free from falls and physical injury this shift. Patient calls appropriately for help. Call light within reach, bed in lowest position with brakes on, fall socks on and other fall visuals posted. Will continue to monitor. Problem: Acute Pain  Goal: Control of acute pain  Description  Control of acute pain     6/3/2019 0301 by Demetria Estes RN  Outcome: Ongoing   Pain/discomfort being managed with PRN analgesics per MD orders. Pt able to express presence and absence of pain and rate pain appropriately using numerical scale. No complaints of pain for this Rn. Will continue to monitor.    Electronically signed by Demetria Estes RN on 6/3/2019 at 3:02 AM
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
## 2019-06-04 LAB
ANION GAP SERPL CALCULATED.3IONS-SCNC: 11 MMOL/L (ref 3–16)
BASOPHILS ABSOLUTE: 0 K/UL (ref 0–0.2)
BASOPHILS RELATIVE PERCENT: 0.1 %
BUN BLDV-MCNC: 40 MG/DL (ref 7–20)
CALCIUM SERPL-MCNC: 9.4 MG/DL (ref 8.3–10.6)
CHLORIDE BLD-SCNC: 92 MMOL/L (ref 99–110)
CO2: 34 MMOL/L (ref 21–32)
CREAT SERPL-MCNC: 1 MG/DL (ref 0.9–1.3)
EOSINOPHILS ABSOLUTE: 0 K/UL (ref 0–0.6)
EOSINOPHILS RELATIVE PERCENT: 0 %
GFR AFRICAN AMERICAN: >60
GFR NON-AFRICAN AMERICAN: >60
GLUCOSE BLD-MCNC: 370 MG/DL (ref 70–99)
GLUCOSE BLD-MCNC: 387 MG/DL (ref 70–99)
GLUCOSE BLD-MCNC: 410 MG/DL (ref 70–99)
GLUCOSE BLD-MCNC: 411 MG/DL (ref 70–99)
GLUCOSE BLD-MCNC: 411 MG/DL (ref 70–99)
GLUCOSE BLD-MCNC: 421 MG/DL (ref 70–99)
GLUCOSE BLD-MCNC: 469 MG/DL (ref 70–99)
GLUCOSE BLD-MCNC: 527 MG/DL (ref 70–99)
HCT VFR BLD CALC: 31.6 % (ref 40.5–52.5)
HEMOGLOBIN: 10.1 G/DL (ref 13.5–17.5)
LYMPHOCYTES ABSOLUTE: 0.5 K/UL (ref 1–5.1)
LYMPHOCYTES RELATIVE PERCENT: 4.6 %
MCH RBC QN AUTO: 28.8 PG (ref 26–34)
MCHC RBC AUTO-ENTMCNC: 32 G/DL (ref 31–36)
MCV RBC AUTO: 90.1 FL (ref 80–100)
MONOCYTES ABSOLUTE: 0.5 K/UL (ref 0–1.3)
MONOCYTES RELATIVE PERCENT: 4.7 %
NEUTROPHILS ABSOLUTE: 10.2 K/UL (ref 1.7–7.7)
NEUTROPHILS RELATIVE PERCENT: 90.6 %
PDW BLD-RTO: 15.7 % (ref 12.4–15.4)
PERFORMED ON: ABNORMAL
PLATELET # BLD: 208 K/UL (ref 135–450)
PMV BLD AUTO: 9.3 FL (ref 5–10.5)
POTASSIUM REFLEX MAGNESIUM: 4.1 MMOL/L (ref 3.5–5.1)
RBC # BLD: 3.51 M/UL (ref 4.2–5.9)
SODIUM BLD-SCNC: 137 MMOL/L (ref 136–145)
WBC # BLD: 11.3 K/UL (ref 4–11)

## 2019-06-04 PROCEDURE — 97162 PT EVAL MOD COMPLEX 30 MIN: CPT

## 2019-06-04 PROCEDURE — 97535 SELF CARE MNGMENT TRAINING: CPT

## 2019-06-04 PROCEDURE — 6370000000 HC RX 637 (ALT 250 FOR IP): Performed by: INTERNAL MEDICINE

## 2019-06-04 PROCEDURE — 94760 N-INVAS EAR/PLS OXIMETRY 1: CPT

## 2019-06-04 PROCEDURE — 6360000002 HC RX W HCPCS: Performed by: INTERNAL MEDICINE

## 2019-06-04 PROCEDURE — 2580000003 HC RX 258: Performed by: INTERNAL MEDICINE

## 2019-06-04 PROCEDURE — 85025 COMPLETE CBC W/AUTO DIFF WBC: CPT

## 2019-06-04 PROCEDURE — 99233 SBSQ HOSP IP/OBS HIGH 50: CPT | Performed by: NURSE PRACTITIONER

## 2019-06-04 PROCEDURE — 97530 THERAPEUTIC ACTIVITIES: CPT

## 2019-06-04 PROCEDURE — 6370000000 HC RX 637 (ALT 250 FOR IP): Performed by: NURSE PRACTITIONER

## 2019-06-04 PROCEDURE — 80048 BASIC METABOLIC PNL TOTAL CA: CPT

## 2019-06-04 PROCEDURE — 97166 OT EVAL MOD COMPLEX 45 MIN: CPT

## 2019-06-04 PROCEDURE — 2700000000 HC OXYGEN THERAPY PER DAY

## 2019-06-04 PROCEDURE — 94640 AIRWAY INHALATION TREATMENT: CPT

## 2019-06-04 PROCEDURE — 2060000000 HC ICU INTERMEDIATE R&B

## 2019-06-04 PROCEDURE — 6360000002 HC RX W HCPCS: Performed by: NURSE PRACTITIONER

## 2019-06-04 PROCEDURE — 36415 COLL VENOUS BLD VENIPUNCTURE: CPT

## 2019-06-04 RX ORDER — DEXTROSE MONOHYDRATE 50 MG/ML
100 INJECTION, SOLUTION INTRAVENOUS PRN
Status: DISCONTINUED | OUTPATIENT
Start: 2019-06-04 | End: 2019-06-11 | Stop reason: HOSPADM

## 2019-06-04 RX ORDER — FUROSEMIDE 10 MG/ML
80 INJECTION INTRAMUSCULAR; INTRAVENOUS 2 TIMES DAILY
Status: DISCONTINUED | OUTPATIENT
Start: 2019-06-04 | End: 2019-06-05

## 2019-06-04 RX ORDER — INSULIN GLARGINE 100 [IU]/ML
20 INJECTION, SOLUTION SUBCUTANEOUS NIGHTLY
Status: DISCONTINUED | OUTPATIENT
Start: 2019-06-04 | End: 2019-06-09

## 2019-06-04 RX ORDER — NICOTINE POLACRILEX 4 MG
15 LOZENGE BUCCAL PRN
Status: DISCONTINUED | OUTPATIENT
Start: 2019-06-04 | End: 2019-06-11 | Stop reason: HOSPADM

## 2019-06-04 RX ORDER — DEXTROSE MONOHYDRATE 25 G/50ML
12.5 INJECTION, SOLUTION INTRAVENOUS PRN
Status: DISCONTINUED | OUTPATIENT
Start: 2019-06-04 | End: 2019-06-11 | Stop reason: HOSPADM

## 2019-06-04 RX ADMIN — INSULIN LISPRO 18 UNITS: 100 INJECTION, SOLUTION INTRAVENOUS; SUBCUTANEOUS at 17:52

## 2019-06-04 RX ADMIN — ATORVASTATIN CALCIUM 40 MG: 40 TABLET, FILM COATED ORAL at 08:48

## 2019-06-04 RX ADMIN — FUROSEMIDE 40 MG: 10 INJECTION, SOLUTION INTRAMUSCULAR; INTRAVENOUS at 08:47

## 2019-06-04 RX ADMIN — IPRATROPIUM BROMIDE AND ALBUTEROL SULFATE 1 AMPULE: .5; 3 SOLUTION RESPIRATORY (INHALATION) at 19:41

## 2019-06-04 RX ADMIN — IPRATROPIUM BROMIDE AND ALBUTEROL SULFATE 1 AMPULE: .5; 3 SOLUTION RESPIRATORY (INHALATION) at 13:19

## 2019-06-04 RX ADMIN — GABAPENTIN 100 MG: 100 CAPSULE ORAL at 20:39

## 2019-06-04 RX ADMIN — INSULIN LISPRO 18 UNITS: 100 INJECTION, SOLUTION INTRAVENOUS; SUBCUTANEOUS at 13:24

## 2019-06-04 RX ADMIN — AMIODARONE HYDROCHLORIDE 200 MG: 200 TABLET ORAL at 08:48

## 2019-06-04 RX ADMIN — INSULIN LISPRO 8 UNITS: 100 INJECTION, SOLUTION INTRAVENOUS; SUBCUTANEOUS at 17:52

## 2019-06-04 RX ADMIN — INSULIN LISPRO 8 UNITS: 100 INJECTION, SOLUTION INTRAVENOUS; SUBCUTANEOUS at 11:55

## 2019-06-04 RX ADMIN — INSULIN LISPRO 8 UNITS: 100 INJECTION, SOLUTION INTRAVENOUS; SUBCUTANEOUS at 08:49

## 2019-06-04 RX ADMIN — METHYLPREDNISOLONE SODIUM SUCCINATE 40 MG: 40 INJECTION, POWDER, FOR SOLUTION INTRAMUSCULAR; INTRAVENOUS at 05:18

## 2019-06-04 RX ADMIN — PANTOPRAZOLE SODIUM 40 MG: 40 TABLET, DELAYED RELEASE ORAL at 05:18

## 2019-06-04 RX ADMIN — IPRATROPIUM BROMIDE AND ALBUTEROL SULFATE 1 AMPULE: .5; 3 SOLUTION RESPIRATORY (INHALATION) at 17:00

## 2019-06-04 RX ADMIN — DULOXETINE HYDROCHLORIDE 30 MG: 30 CAPSULE, DELAYED RELEASE ORAL at 08:48

## 2019-06-04 RX ADMIN — GABAPENTIN 100 MG: 100 CAPSULE ORAL at 13:30

## 2019-06-04 RX ADMIN — Medication 10 ML: at 20:40

## 2019-06-04 RX ADMIN — DILTIAZEM HYDROCHLORIDE 240 MG: 240 CAPSULE, COATED, EXTENDED RELEASE ORAL at 08:48

## 2019-06-04 RX ADMIN — ACETAMINOPHEN 650 MG: 325 TABLET ORAL at 20:39

## 2019-06-04 RX ADMIN — METOPROLOL SUCCINATE 150 MG: 50 TABLET, FILM COATED, EXTENDED RELEASE ORAL at 08:48

## 2019-06-04 RX ADMIN — INSULIN GLARGINE 20 UNITS: 100 INJECTION, SOLUTION SUBCUTANEOUS at 20:40

## 2019-06-04 RX ADMIN — MOMETASONE FUROATE AND FORMOTEROL FUMARATE DIHYDRATE 2 PUFF: 100; 5 AEROSOL RESPIRATORY (INHALATION) at 19:41

## 2019-06-04 RX ADMIN — Medication 10 ML: at 08:47

## 2019-06-04 RX ADMIN — INSULIN LISPRO 12 UNITS: 100 INJECTION, SOLUTION INTRAVENOUS; SUBCUTANEOUS at 08:49

## 2019-06-04 RX ADMIN — GABAPENTIN 100 MG: 100 CAPSULE ORAL at 08:48

## 2019-06-04 RX ADMIN — RIVAROXABAN 20 MG: 20 TABLET, FILM COATED ORAL at 08:48

## 2019-06-04 RX ADMIN — TAMSULOSIN HYDROCHLORIDE 0.4 MG: 0.4 CAPSULE ORAL at 08:48

## 2019-06-04 RX ADMIN — TRAZODONE HYDROCHLORIDE 50 MG: 50 TABLET ORAL at 20:39

## 2019-06-04 RX ADMIN — FUROSEMIDE 80 MG: 10 INJECTION, SOLUTION INTRAMUSCULAR; INTRAVENOUS at 17:52

## 2019-06-04 RX ADMIN — INSULIN LISPRO 12 UNITS: 100 INJECTION, SOLUTION INTRAVENOUS; SUBCUTANEOUS at 11:54

## 2019-06-04 ASSESSMENT — PAIN DESCRIPTION - DESCRIPTORS
DESCRIPTORS: ACHING
DESCRIPTORS: ACHING

## 2019-06-04 ASSESSMENT — PAIN DESCRIPTION - ONSET: ONSET: ON-GOING

## 2019-06-04 ASSESSMENT — PAIN SCALES - GENERAL
PAINLEVEL_OUTOF10: 6
PAINLEVEL_OUTOF10: 0
PAINLEVEL_OUTOF10: 0
PAINLEVEL_OUTOF10: 4
PAINLEVEL_OUTOF10: 8

## 2019-06-04 ASSESSMENT — PAIN DESCRIPTION - PAIN TYPE
TYPE: CHRONIC PAIN
TYPE: CHRONIC PAIN

## 2019-06-04 ASSESSMENT — PAIN DESCRIPTION - PROGRESSION: CLINICAL_PROGRESSION: NOT CHANGED

## 2019-06-04 ASSESSMENT — PAIN DESCRIPTION - LOCATION
LOCATION: BACK;GENERALIZED
LOCATION: BACK

## 2019-06-04 ASSESSMENT — PAIN - FUNCTIONAL ASSESSMENT: PAIN_FUNCTIONAL_ASSESSMENT: ACTIVITIES ARE NOT PREVENTED

## 2019-06-04 ASSESSMENT — PAIN DESCRIPTION - DIRECTION: RADIATING_TOWARDS: EVERYWHERE

## 2019-06-04 ASSESSMENT — PAIN DESCRIPTION - FREQUENCY: FREQUENCY: CONTINUOUS

## 2019-06-04 NOTE — PLAN OF CARE
Problem: Falls - Risk of:  Goal: Will remain free from falls  Description  Will remain free from falls  Outcome: Ongoing   Fall risk assessment completed every shift. All precautions in place. Pt has call light within reach at all times. Room clear of clutter. Pt aware to call for assistance when getting up. Problem: Risk for Impaired Skin Integrity  Goal: Tissue integrity - skin and mucous membranes  Description  Structural intactness and normal physiological function of skin and  mucous membranes. Outcome: Ongoing   Skin assessment completed every shift. Pt assessed for incontinence, appropriate barrier cream applied prn. Pt encouraged to turn/rotate every 2 hours. Assistance provided if pt unable to do so themselves. Problem: Acute Pain  Goal: Control of acute pain  Description  Control of acute pain  Outcome: Ongoing   Pain/discomfort being managed with PRN analgesics per MD orders. Pt able to express presence and absence of pain and rate pain appropriately using numerical scale. Problem: OXYGENATION/RESPIRATORY FUNCTION  Goal: Patient will maintain patent airway  Outcome: Ongoing   Patient's airway has maintained patency. Patient's breath sounds are diminished. c/o SOB with exertion. Patient able to express concerns. Will continue to monitor. Problem: FLUID AND ELECTROLYTE IMBALANCE  Goal: Fluid and electrolyte balance are achieved/maintained  Outcome: Ongoing   PO and IV fluid intake closely monitored. Electrolytes assessed and compared. Family included in patient care and education throughout. Will continue to monitor.

## 2019-06-04 NOTE — PROGRESS NOTES
[facility]   Daily Progress Note      Admit Date:  6/1/2019    Reason for follow up visit: CHF    CC: \"I don't feel any better. Still SOB. \"    60 y/o male with PMH significant for morbid obesity, CAD, PAD, nonischemic cardiomyopathy, CHF, COPD, HLP,  CKD, diabetes, untreated sleep apnea, PAF (S/P DCCV in 2/2019) and noncompliance who presented with c/o SOB, wt gain, hypoxia and chest pain. Wt is up ~ 25-30# since last admit in 2/2019. + smoker and + ETOH    Interval History:  Pt seen and examined  Continues to c/o SOB and edema  BP stable. O2 @ 4L. Wt today 254#  On IV diuretic (intermittent). Net diuresis -1.5 L  Remains in atrial fib today with VR 's  Denies further chest pain, productive cough, palpitations or dizziness    Subjective:  Pt with no acute overnight cardiac events. Review of Systems:   · Constitutional: + wt gain. There's been no change in energy level, sleep pattern, or activity level. No fevers, chills. · Eyes: No visual changes or diplopia. No scleral icterus. · ENT: No Headaches, hearing loss or vertigo. No mouth sores or sore throat. · Cardiovascular: as reviewed in HPI  · Respiratory: + occasional cough productive clear sputum. +nebulizer at home. No hematemesis. · Gastrointestinal: No abdominal pain, appetite loss, blood in stools. No change in bowel or bladder habits. · Genitourinary: No dysuria, trouble voiding, or hematuria. · Musculoskeletal:  + L AKA  · Integumentary: No rash or pruritis. · Neurological: No headache, diplopia, change in muscle strength, numbness or tingling. · Psychiatric: No anxiety or depression. · Endocrine: No temperature intolerance. No excessive thirst, fluid intake, or urination. No tremor. · Hematologic/Lymphatic: No abnormal bruising or bleeding, blood clots or swollen lymph nodes. · Allergic/Immunologic: No nasal congestion or hives.     Objective:   /84   Pulse 99   Temp 98.7 °F (37.1 °C) (Oral)   Resp 18 Ht 5' 7\" (1.702 m)   Wt 254 lb 6.6 oz (115.4 kg)   SpO2 92%   BMI 39.85 kg/m²       Intake/Output Summary (Last 24 hours) at 6/4/2019 0919  Last data filed at 6/4/2019 0852  Gross per 24 hour   Intake 1400 ml   Output 2500 ml   Net -1100 ml     Wt Readings from Last 3 Encounters:   06/04/19 254 lb 6.6 oz (115.4 kg)   02/27/19 235 lb (106.6 kg)   02/21/19 225 lb 15.5 oz (102.5 kg)       Physical Exam:  General: Awake, alert, and oriented X4. Up in chair. Dyspneic with prolonged talking. Chronically ill in appearance. IN no acute distress. Skin:  Warm and dry. No new appearing rashes or lesions. Neck:  Supple and thick. JVD hard to ascertain d/t body habitus. Carotids without bruits. Chest: Lungs with decreased breath sounds posteriorly. No wheezes/rhonchi/rales  Cardiovascular:  Irreg; normal S1 and S2; soft systolic murmur   Abdomen: obese, distended, nontender, +bowel sounds. Extremities: + L AKA. 2+ edema RLE. No cyanosis. 2+ bilateral radial pulses; 1+ R DP pulse.  Chronic stasis changes to RLE    Medications:    insulin lispro  8 Units Subcutaneous TID WC    lidocaine  1 patch Transdermal Daily    insulin glargine  15 Units Subcutaneous Nightly    rivaroxaban  20 mg Oral Daily with breakfast    tamsulosin  0.4 mg Oral Daily    pantoprazole  40 mg Oral QAM AC    mometasone-formoterol  2 puff Inhalation BID    metoprolol succinate  150 mg Oral Daily    gabapentin  100 mg Oral TID    DULoxetine  30 mg Oral Daily    diltiazem  240 mg Oral Daily    atorvastatin  40 mg Oral Daily    amiodarone  200 mg Oral Daily    furosemide  40 mg Intravenous BID    ipratropium-albuterol  1 ampule Inhalation Q4H WA    methylPREDNISolone  40 mg Intravenous Q6H    sodium chloride flush  10 mL Intravenous 2 times per day    insulin lispro  0-12 Units Subcutaneous TID     insulin lispro  0-6 Units Subcutaneous Nightly      dextrose       traZODone, ipratropium-albuterol, sodium chloride flush, magnesium hydroxide, ondansetron, glucose, dextrose, glucagon (rDNA), dextrose, acetaminophen    Lab Data:  CBC:   Recent Labs     06/02/19  0455 06/03/19  0550 06/04/19  0507   WBC 7.2 13.1* 11.3*   HGB 10.6* 10.5* 10.1*    220 208     BMP:    Recent Labs     06/02/19  0455 06/03/19  0550 06/04/19  0507   * 133* 137   K 5.1 4.5 4.1   CO2 31 32 34*   BUN 19 34* 40*   CREATININE 1.3 1.1 1.0     LIVR:   Recent Labs     06/01/19  1651   AST 19   ALT 18     Results for Carroll Contreras (MRN [id number]) as of 6/4/2019 10:57   Ref. Range 6/1/2019 16:51   Pro-BNP Latest Ref Range: 0 - 124 pg/mL 3,334 (H)   Troponin Latest Ref Range: <0.01 ng/mL <0.01     2/20/2019 Cardioversion:  IMPRESSION:  Successful cardioversion using 200 joules of synchronized current.  Postprocedure ECG has been obtained.     Echo 10/17/2018:  Suboptimal image quality. Definity contrast administered.   Patient appears to be in atrial fibrillation.   Mild Conc. LVH with EF  45%.   The left atrium is mildly dilated.   Mild mitral regurgitation.   Normal RV size with mildly reduced systolic function.   Trivial pulmonic regurgitation present. [address] 2/8/2017:  OVERALL IMPRESSION:  1. Significantly elevated right heart pressures and capillary wedge pressures  consistent with congestive heart failure. 2. Normal cardiac output and indices. [facility] 8/10/2015:  1. Right coronary artery arises from the right sinus Valsalva. It is a dominant artery, gives rise to the posterior descending and posterolateral branches. Right coronary artery and its branches are free of atherosclerosis.    2. Left main trunk arises from the right sinus Valsalva. It divides into the left and right descending artery and left circumflex artery. Left main trunk is free of atherosclerosis. 3. Left anterior descending artery: Moderate sized artery and descends into the intraventricular sulcus and wraps around the apex of the heart.  The left anterior descending artery and its branches are free of atherosclerosis. 4. Left circumflex artery arises from the left main trunk. It is a moderate-sized artery. It gives rise to a moderate-sized obtuse marginal branch and is free of atherosclerosis. Left anterior descending artery as described earlier is also free of any atherosclerosis. Left ventriculogram reveals a global left ventricular systolic function with estimated EF of approximately 20% with global hypokinesis. There was no mitral regurgitation seen. Telemetry: Atrial fibrillation with controlled VR    Assessment/Plan:    1. Acute on chronic systolic and diastolic HF/Nonischemic cardiomyopathy  -decompensated d/t noncompliance with diet and fluid restriction and recurrent atrial fibrillation  -continue IV diuretic (will increase dose)  -he is ~ 25# above his weight when discharged in 2/2019  -continue BB and diltiazem (has had difficult to control atrial fib, hence BB + CCB despite his EF)  -will consider addition of ACE-I pending renal function after IV lasix (has been on ARB in the past)  -low sodium diet and fluid restriction    2. Recurrent chronic atrial fibrillation  -has longstanding H/O difficult to control atrial fib  -S/P DCCV in 2/2019 with return to SR (not sure when it recurred)  -suspect he will be difficult to keep in SR given his COPD  -continue amiodarone, diltiazem and toprol  -continue Xarelto    3. Untreated sleep apnea  -he is quite adamant that he has no intention of using CPAP and does not want to pursue. Discussed effects of untreated sleep apnea on his overall heart and health; he continues to decline CPAP. 4. PAD  -S/P L AKA  -on statin    5. COPD  -uses nebulizer daily  -continues to smoke  -smoking cessation emphasized    6. ETOH use  -4-8 beers (most days of the week)  -discussed ETOH cessation    7. Diabetes Mellitus  -on insulin  -BS not well controlled  -Rx per Primary team    8.  Morbid obesity  -weight loss encouraged    Lengthy discussion with pt today regarding his self destructive behaviors and their effects on his long term prognosis. Discussed at length sodium and fluid restriction, ETOH cessation, smoking cessation, control of his BS and effects of his untreated sleep apnea. Discussed that his CHF is a chronic, progressive disease and lifestyle changes will help in managing it and his other chronic diseases. Given that he is > 25# above his weight at discharge in February of this year, will increase dose of lasix and monitor closely. May need to consider lasix infusion if no increase in diuresis with increased dose Lasix. Approximately 40 minutes spent with pt this AM discussing above.      Electronically signed by JOON Casey CNP on 6/4/2019 at 9:19 AM

## 2019-06-04 NOTE — PROGRESS NOTES
Occupational Therapy   Occupational Therapy Initial Assessment  Date: 2019   Patient Name: Qasim Mooney  MRN: [id number]     : 1960    Date of Service: 2019    Discharge Recommendations:  24 hour supervision or assist     Qsaim Mooney scored a 16/24 on the AM-PAC ADL Inpatient form. Current research shows that an AM-PAC score of 17 or less is typically not associated with a discharge to the patient's home setting. Pt's AM-PAC score reflects need for further skilled therapy upon d/c and feel pt would benefit. However, pt reports he plans to d/c to home when he is d/c'd from the hospital-will need 24/7 supervision to ensure pt safety and maximize independence with ADLs. Assessment: Pt is a 60 yo M admitted with congestive heart failure. PMHx includes chronic A-fib, COPD, HTN, DMII, CAD, morbid obesity, L AKA (2017). PTA, pt lives with his parents, recieves assistance PRN for ADLs, mother completes all homemaking tasks. Pt uses an electric WC for mobility and reports independence with fxl transfers. Pt is on 2L O2 at home. This date, pt functioning below baseline secondary to fatigue and SOB. Pt required mod Ax2 for bed mobility, max Ax2 and Stedy for fxl transfers, max A for toileting, and total A for LB dressing. Plan to continue to see on acute. Pt's AM-PAC score reflects need for further skilled therapy upon d/c and feel pt would benefit. However, pt reports he plans to d/c to home when he is d/c'd from the hospital-will need 24/7 supervision to ensure pt safety and maximize independence with ADLs.    Prognosis: Fair  Decision Making: Medium Complexity  History: see above  Exam: Anticipate pt will require overall max A for ADLs  Assistance / Modification: Tiffanie Daily  Patient Education: Role of OT, POC, d/c plans  Barriers to Learning: cognition, self-limiting behavior  REQUIRES OT FOLLOW UP: Yes  Activity Tolerance  Activity Tolerance: Treatment limited secondary to decreased history of hypertension, hyperlipidemia, diabetes mellitus, coronary artery disease, atrial fibrillation and CHF. He presents to the emergency room for evaluation following a two week history of worsening shortness of breath which became acutely worse over the past 24 hours. He states that his shortness of breath is now severe, worse with minimal exertion and improved with rest. EMS reports and oxygen saturation of 83% on room air at the time of their arrival. In the emergency room he was found to have acute on chronic systolic congestive heart failure and a COPD exacerbation. Family / Caregiver Present: No  Referring Practitioner: JOON Herring NP  Diagnosis: Acute on chronic systolic (congestive) heart failure   Subjective  Subjective: Pt met bedside for OT eval/tx with PT. Pt supine in bed upon arrival, requesting to use the bathroom and agreeable to therapists assisting with this (with encouragement). C/o chronic back pain but does not rate.   Pain Assessment  Pain Assessment: 0-10  Pain Level: 8  Pain Type: Chronic pain  Pain Location: Back  Pain Descriptors: Aching  Non-Pharmaceutical Pain Intervention(s): Repositioned  Oxygen Therapy  SpO2: 92 %  Pulse Oximeter Device Mode: Intermittent  Pulse Oximeter Device Location: Finger  O2 Device: Nasal cannula  O2 Flow Rate (L/min): 4 L/min  Social/Functional History  Social/Functional History  Lives With: Family(parents)  Type of Home: House  Home Layout: One level  Home Access: Stairs to enter with rails, Ramped entrance  Entrance Stairs - Number of Steps: 1-2 CORNEL to enter, also had ramped entrance   Bathroom Shower/Tub: Tub/Shower unit  [employer]: Handicap height  Bathroom Equipment: Grab bars in shower, Shower chair, Hand-held shower, 3-in-1 commode, Grab bars around toilet  Bathroom Accessibility: Accessible  Home Equipment: Rolling walker, Hernández Darter, Wheelchair-electric, Oxygen(2L home O2)  Receives Help From: Family  ADL Assistance: time;Follows one step commands with repetition  Attention Span: Appears intact  Memory: Appears intact  Safety Judgement: Decreased awareness of need for safety  Problem Solving: Decreased awareness of errors;Assistance required to identify errors made  Insights: Decreased awareness of deficits  Initiation: Requires cues for some  Sequencing: Requires cues for some                 LUE AROM (degrees)  LUE AROM : WFL  Left Hand AROM (degrees)  Left Hand AROM: WFL  RUE AROM (degrees)  RUE AROM : WFL  Right Hand AROM (degrees)  Right Hand AROM: WFL  LUE Strength  LUE Strength Comment: grossly 3+/5  RUE Strength  RUE Strength Comment: grossly 4/5                   Plan   Plan  Times per week: 3-5  Times per day: Daily  Current Treatment Recommendations: Strengthening, Functional Mobility Training, Endurance Training, ROM, Safety Education & Training, Self-Care / ADL, Balance Training      AM-PAC Score        AM-[facility] Inpatient Daily Activity Raw Score: 16  AM-PAC Inpatient ADL T-Scale Score : 35.96  ADL Inpatient CMS 0-100% Score: 53.32  ADL Inpatient CMS G-Code Modifier : CK    Goals  Short term goals  Time Frame for Short term goals: prior to d/c  Short term goal 1: Pt will perform bed mobility with mod Ax1  Short term goal 2: Pt will perform fxl transfers with min Ax2 and AD  Short term goal 3: Pt will toilet with min A  Short term goal 4: Pt will perform seated grooming tasks with setup  Short term goal 5: Pt will perform UB sponge bath with min A  Long term goals  Time Frame for Long term goals : LTG=STG  Patient Goals   Patient goals : return home       Therapy Time   Individual Concurrent Group Co-treatment   Time In 0902     0902   Time Out 0940     0940   Minutes 38     38   Timed Code Treatment Minutes: 28 Minutes(10 min eval)     This note to serve as OT d/c summary if pt is d/c-ed prior to next therapy session.     Sriram Lux, OTR/L [id number]
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
## 2019-06-04 NOTE — PLAN OF CARE
Problem: Falls - Risk of:  Goal: Will remain free from falls  Description  Will remain free from falls  6/4/2019 0007 by Fernando Urbina RN  Outcome: Ongoing  6/3/2019 2004 by Shanique Iniguez RN  Outcome: Ongoing  Goal: Absence of physical injury  Description  Absence of physical injury  6/4/2019 0007 by Fernando Urbina RN  Outcome: Ongoing  6/3/2019 2004 by Shanique Iniguez RN  Outcome: Ongoing     Problem: Risk for Impaired Skin Integrity  Goal: Tissue integrity - skin and mucous membranes  Description  Structural intactness and normal physiological function of skin and  mucous membranes.   6/4/2019 0007 by Fernando Urbina RN  Outcome: Ongoing  6/3/2019 2004 by Shanique Iniguez RN  Outcome: Ongoing     Problem: Acute Pain  Goal: Control of acute pain  Description  Control of acute pain     6/4/2019 0007 by Fernando Urbina RN  Outcome: Ongoing  6/3/2019 2004 by Shanique Iniguez RN  Outcome: Ongoing     Problem: SKIN INTEGRITY  Goal: Risk for impaired skin integrity will decrease  Description  Risk for impaired skin integrity will decrease     6/4/2019 0007 by Fernando Urbina RN  Outcome: Ongoing  6/3/2019 2004 by Shanique Iniguez RN  Outcome: Ongoing     Problem: DAILY CARE  Goal: Daily care needs are met  6/4/2019 0007 by Fernando Urbina RN  Outcome: Ongoing  6/3/2019 2004 by Shanique Iniguez RN  Outcome: Ongoing     Problem: OXYGENATION/RESPIRATORY FUNCTION  Goal: Patient will maintain patent airway  Outcome: Ongoing  Goal: Patient will achieve/maintain normal respiratory rate/effort  Description  Respiratory rate and effort will be within normal limits for the patient  Outcome: Ongoing     Problem: HEMODYNAMIC STATUS  Goal: Patient has stable vital signs and fluid balance  Outcome: Ongoing     Problem: FLUID AND ELECTROLYTE IMBALANCE  Goal: Fluid and electrolyte balance are achieved/maintained  Outcome: Ongoing     Problem: ACTIVITY INTOLERANCE/IMPAIRED MOBILITY  Goal: Mobility/activity is maintained at optimum level for patient  Outcome: Ongoing

## 2019-06-04 NOTE — CARE COORDINATION
Discharge Planning:  SW again met with pt to discuss d/c plans. SW expressed concern that per PT notes pt is requiring Mod-Max assist of 2. Pt stated that he has zheng to Home at [facility] in the past and if he was going to go somewhere, he would go to this facility but he IS NOT going to go to any facility for skilled care. Pt stated that he is able to transfer to his power chair without any difficulty at home and that his mother is able to take care of him. Pt stated that he is agreeable to home care through [address] [place] [address] for PT/OT and nursing. SW did discuss the [facility] with pt. Pt is agreeable to a referral to the [facility]. [id number] completed and faxed to [address] [place] [address] on pts behalf. Plan: Home with resumption of nursing through [facility] and the addition of PT/OT.   Referral initiated to the [facility] program.   Electronically signed by Almaz Garcia on 6/4/2019 at 11:43 AM

## 2019-06-04 NOTE — CONSULTS
HF RN consult received from [address] [place] [address]. Chart reviewed. Pt is very well known to HF RNs from previous encounters. Patient has longstanding history of noncompliance. He has history of COPD, CHF, and AF. He continues to smoke and has excessive ETOH intake. He states he is reluctant to stop either as these are his Gloria Betty enjoyment left in life\". Patient presented to ED via EMS with c/o SOB x 1 week. EMS reported sat 83% on RA upon arrival.  Patient has known DIMAS but refuses home CPAP. Patient follows with I and was last seen in office on 2/26/19 by Florecita Mendez NP. He was improved from recent hospital stay 2/12 - 21 / 2019 with HF / COPD. Discharge weight was 225#. Pt was unable to stand for office weight. ProBNP on admission 3334 with Cr 1.0. Weight 251# (ED) and 248# (floor). Cardiology was consulted. Pt is being treated with IV bolus Lasix. Dr Jason Lopez notes recurrent HF secondary to noncompliance, recurrent AF, refusal to use CPAP for known DIMAS, continued ETOH abuse / smoking. CM noted need for home scale but pt is unable to stand so he is not appropriate / unable to self weigh at home. I met with him in his room. He is on room air and appears relatively comfortable at rest and with light conversation. Patient is well versed in his HF management - he simply does not do it. He states \"I do this every three months or so\". We discussed his actions between hospitalizations is the issue. He states \"I know -- I have to do better\". I introduced topic of hospice care if he no longer wishes to follow \"medical care\" -- he states \"oh, I am ok taking medicine\". We discussed his other lifestyle habits that negatively impact his health. He is not willing to stop smoking , drinking, and \"won't wear one of those face mask things (cpap) -- I can't\". He evades topic of hospice even though I circled back to it 3 times in our conversation.       He states his mother fixes \"just regular food\" when she cooks. He states she does not use added salt \"but it is on the table\". He reports he does not add any to his meals -- claims he uses Mrs Claire Portillo for seasoning. He reports snacking in evenings -- \"oranges, halos (tangerines), and that kind of stuff-- I don't eat chips\". Praise given for this positive behavior. He asks for some water and states \"I haven't had any since 7 am\". I cleared with RN and provided him with 1/2 cup of ice chips. Pt is aware he has an existing appt on 6/12 with Ritu Gomez NP. He reports his mother will be bringing him. He states he is able to get in / out of car with her assistance. I question what he plans to do when his parents are no longer able to help care for him. He states \"I guess I will live in a nursing home ---but I would rather die\". Pt states he might still be in the hospital at time of his cardiology appt. I assured him I would monitor and would push it back if pt was still in hospital on Friday. He agrees to this plan. We discussed [facility]. He denies any prior knowledge of this service. He declines offer of referral despite my urging of benefit for HF, COPD AND DM. He declines. HF measures are in place: daily weights, I/O, and sodium /fluid restricted diet. HF careplan is current. HF instructions will be added to [address]. For now, pt has appt 6/12 with Ritu Gomez NP. HF RNs will continue to monitor and assist with transition of care at discharge.

## 2019-06-04 NOTE — CONSULTS
[address] [address]                                  CONSULTATION    PATIENT NAME: Naomi Tracey                  :        1960  MED REC NO:   [id number]                          ROOM:       5277  ACCOUNT NO:   [de-identified]                           ADMIT DATE: 2019  PROVIDER:     Rosine Eisenmenger, MD    CONSULT DATE:  2019    HISTORY OF PRESENT ILLNESS:  This is a 59-year-old morbidly obese male  with a known history of coronary artery disease, peripheral arterial  disease, nonischemic cardiomyopathy, recurrent CHF, noncompliance,  diabetes, morbid obesity, and untreated sleep apnea, who came to the  hospital with symptoms of worsening shortness of breath and weight gain. He also had evidence of low oxygen levels. He also complained to me  about some intermittent chest pain. He does admit to smoking and  drinking moderately heavy at times. He is also not compliant with his  dietary restrictions. He does say that he only smokes a few cigarettes  daily. He presented to the Emergency Room and he was again found to have heart  failure and he has been admitted with Cardiology consultation requested. REVIEW OF SYSTEMS:  Please see HPI. All other systems were reviewed and  they are negative. PAST MEDICAL HISTORY:  1. History of nonischemic chemotherapy and recurrent congestive heart  failure. Last ejection fraction is about 45%. 2.  Persistent atrial fibrillation, currently on amiodarone and Xarelto. 3.  Morbid obesity with possible untreated sleep apnea. 4.  Chronic kidney disease. 5.  Paroxysmal atrial fibrillation. 6.  Hyperlipidemia. 7.  COPD. 8.  Diabetic neuropathy. SURGICAL HISTORY:  1. The patient had an angioplasty in the past.  2.  Fem-tibial bypass. 3.  Leg amputation. 4.  Wrist fracture.     SOCIAL HISTORY:  The patient admits to drinking moderately heavy at  times and he also smokes. MEDICINE/ALLERGIES:  Have been reviewed. His last echocardiogram was from 11/2019. It showed an EF of 45%. The  patient is also status post successful cardioversion on 02/20/2019. PHYSICAL EXAMINATION:  VITAL SIGNS:  His pulse is 106 and irregular, blood pressure 133/85,  respirations are 16, temperature is 97.2. CONSTITUTIONAL:  He is alert and oriented but has evidence of shortness  of breath. HEENT:  Neck is short. Jugular venous pulse cannot adequately be  assessed. No carotid bruits heard. No thyromegaly appreciated. Eyes  show no pale conjunctiva or icterus. CARDIAC:  Examination reveals irregularly irregular heart rhythm. I  could not appreciate any gallop, murmur, or rub. LUNGS:  Reveal bilateral expiratory wheezes. ABDOMEN:  Distended. Unable to appreciate any organomegaly. EXTREMITIES:  Show 1 to 2+ lower extremity edema. SKIN:  Shows changes of chronic venous insufficiency. NEUROLOGIC:  He is alert, oriented. Cranial nerves II through XII  intact. No focal deficit. LABORATORY DATA:  Sodium is 133, potassium 4.5, chloride is 91, bicarb  32, BUN is 34, creatinine 1.1. ProBNP was 3334. Troponin was less than  0.01. White count is 13.1, hemoglobin 10.5, hematocrit is 32.4. IMAGING:  Chest x-ray is consistent with pulmonary edema. EKG shows  atrial fibrillation. Blood gas show pH of 7.37, pCO2 of 57, pCO2 of 57. IMPRESSION:  1. This is a 68-year-old male who has presented with recurrent  acute-on-chronic systolic/diastolic heart failure. This is due to a  combination of his noncompliance, his recurrent atrial fibrillation, his  noncompliance with the CPAP machine, as well as his noncompliance with  diet at home. 2.  Morbid obesity. 3.  Untreated sleep apnea. 4.  COPD. 5.  Recurrent atrial fibrillation. RECOMMENDATIONS:  1.  I will agree with IV diuretic therapy.   2.  Again, reinforce the importance of fluid and sodium restriction as  well as avoiding alcohol. 3.  We will try to arrange for him to be arranged for a CPAP machine. I appreciate the opportunity to participate in the care of this pleasant  male.     With warm regards,        David Valles MD    D: 06/03/2019 17:18:42       T: 06/03/2019 21:45:27     [email or web address]  Job#: [id number]     Doc#: [id number]    CC:  <>
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
## 2019-06-05 LAB
ANION GAP SERPL CALCULATED.3IONS-SCNC: 12 MMOL/L (ref 3–16)
BASOPHILS ABSOLUTE: 0 K/UL (ref 0–0.2)
BASOPHILS RELATIVE PERCENT: 0.1 %
BUN BLDV-MCNC: 40 MG/DL (ref 7–20)
CALCIUM SERPL-MCNC: 9 MG/DL (ref 8.3–10.6)
CHLORIDE BLD-SCNC: 96 MMOL/L (ref 99–110)
CO2: 34 MMOL/L (ref 21–32)
CREAT SERPL-MCNC: 1 MG/DL (ref 0.9–1.3)
EOSINOPHILS ABSOLUTE: 0 K/UL (ref 0–0.6)
EOSINOPHILS RELATIVE PERCENT: 0 %
GFR AFRICAN AMERICAN: >60
GFR NON-AFRICAN AMERICAN: >60
GLUCOSE BLD-MCNC: 184 MG/DL (ref 70–99)
GLUCOSE BLD-MCNC: 222 MG/DL (ref 70–99)
GLUCOSE BLD-MCNC: 263 MG/DL (ref 70–99)
GLUCOSE BLD-MCNC: 277 MG/DL (ref 70–99)
GLUCOSE BLD-MCNC: 293 MG/DL (ref 70–99)
HCT VFR BLD CALC: 31.8 % (ref 40.5–52.5)
HEMOGLOBIN: 10.2 G/DL (ref 13.5–17.5)
LYMPHOCYTES ABSOLUTE: 1.2 K/UL (ref 1–5.1)
LYMPHOCYTES RELATIVE PERCENT: 10.6 %
MCH RBC QN AUTO: 28.5 PG (ref 26–34)
MCHC RBC AUTO-ENTMCNC: 32.1 G/DL (ref 31–36)
MCV RBC AUTO: 88.8 FL (ref 80–100)
MONOCYTES ABSOLUTE: 1.3 K/UL (ref 0–1.3)
MONOCYTES RELATIVE PERCENT: 11.5 %
NEUTROPHILS ABSOLUTE: 8.7 K/UL (ref 1.7–7.7)
NEUTROPHILS RELATIVE PERCENT: 77.8 %
PDW BLD-RTO: 15.5 % (ref 12.4–15.4)
PERFORMED ON: ABNORMAL
PLATELET # BLD: 226 K/UL (ref 135–450)
PMV BLD AUTO: 9.4 FL (ref 5–10.5)
POTASSIUM REFLEX MAGNESIUM: 4 MMOL/L (ref 3.5–5.1)
RBC # BLD: 3.58 M/UL (ref 4.2–5.9)
SODIUM BLD-SCNC: 142 MMOL/L (ref 136–145)
WBC # BLD: 11.1 K/UL (ref 4–11)

## 2019-06-05 PROCEDURE — 94760 N-INVAS EAR/PLS OXIMETRY 1: CPT

## 2019-06-05 PROCEDURE — 80048 BASIC METABOLIC PNL TOTAL CA: CPT

## 2019-06-05 PROCEDURE — 85025 COMPLETE CBC W/AUTO DIFF WBC: CPT

## 2019-06-05 PROCEDURE — 6370000000 HC RX 637 (ALT 250 FOR IP): Performed by: INTERNAL MEDICINE

## 2019-06-05 PROCEDURE — 36415 COLL VENOUS BLD VENIPUNCTURE: CPT

## 2019-06-05 PROCEDURE — 2580000003 HC RX 258: Performed by: NURSE PRACTITIONER

## 2019-06-05 PROCEDURE — 6360000002 HC RX W HCPCS: Performed by: NURSE PRACTITIONER

## 2019-06-05 PROCEDURE — 2700000000 HC OXYGEN THERAPY PER DAY

## 2019-06-05 PROCEDURE — 2580000003 HC RX 258: Performed by: INTERNAL MEDICINE

## 2019-06-05 PROCEDURE — 99232 SBSQ HOSP IP/OBS MODERATE 35: CPT | Performed by: NURSE PRACTITIONER

## 2019-06-05 PROCEDURE — 94640 AIRWAY INHALATION TREATMENT: CPT

## 2019-06-05 PROCEDURE — 6370000000 HC RX 637 (ALT 250 FOR IP): Performed by: NURSE PRACTITIONER

## 2019-06-05 PROCEDURE — 2060000000 HC ICU INTERMEDIATE R&B

## 2019-06-05 PROCEDURE — 94761 N-INVAS EAR/PLS OXIMETRY MLT: CPT

## 2019-06-05 RX ADMIN — INSULIN LISPRO 3 UNITS: 100 INJECTION, SOLUTION INTRAVENOUS; SUBCUTANEOUS at 17:30

## 2019-06-05 RX ADMIN — DULOXETINE HYDROCHLORIDE 30 MG: 30 CAPSULE, DELAYED RELEASE ORAL at 09:30

## 2019-06-05 RX ADMIN — INSULIN LISPRO 9 UNITS: 100 INJECTION, SOLUTION INTRAVENOUS; SUBCUTANEOUS at 12:05

## 2019-06-05 RX ADMIN — INSULIN GLARGINE 20 UNITS: 100 INJECTION, SOLUTION SUBCUTANEOUS at 20:24

## 2019-06-05 RX ADMIN — IPRATROPIUM BROMIDE AND ALBUTEROL SULFATE 1 AMPULE: .5; 3 SOLUTION RESPIRATORY (INHALATION) at 08:36

## 2019-06-05 RX ADMIN — INSULIN LISPRO 8 UNITS: 100 INJECTION, SOLUTION INTRAVENOUS; SUBCUTANEOUS at 12:03

## 2019-06-05 RX ADMIN — INSULIN LISPRO 8 UNITS: 100 INJECTION, SOLUTION INTRAVENOUS; SUBCUTANEOUS at 17:31

## 2019-06-05 RX ADMIN — Medication 10 ML: at 09:41

## 2019-06-05 RX ADMIN — ACETAMINOPHEN 650 MG: 325 TABLET ORAL at 09:49

## 2019-06-05 RX ADMIN — TRAZODONE HYDROCHLORIDE 50 MG: 50 TABLET ORAL at 20:23

## 2019-06-05 RX ADMIN — INSULIN LISPRO 9 UNITS: 100 INJECTION, SOLUTION INTRAVENOUS; SUBCUTANEOUS at 09:46

## 2019-06-05 RX ADMIN — GABAPENTIN 100 MG: 100 CAPSULE ORAL at 20:23

## 2019-06-05 RX ADMIN — RIVAROXABAN 20 MG: 20 TABLET, FILM COATED ORAL at 09:50

## 2019-06-05 RX ADMIN — MOMETASONE FUROATE AND FORMOTEROL FUMARATE DIHYDRATE 2 PUFF: 100; 5 AEROSOL RESPIRATORY (INHALATION) at 20:44

## 2019-06-05 RX ADMIN — AMIODARONE HYDROCHLORIDE 200 MG: 200 TABLET ORAL at 09:30

## 2019-06-05 RX ADMIN — FUROSEMIDE 10 MG/HR: 10 INJECTION, SOLUTION INTRAMUSCULAR; INTRAVENOUS at 15:04

## 2019-06-05 RX ADMIN — ATORVASTATIN CALCIUM 40 MG: 40 TABLET, FILM COATED ORAL at 09:29

## 2019-06-05 RX ADMIN — IPRATROPIUM BROMIDE AND ALBUTEROL SULFATE 1 AMPULE: .5; 3 SOLUTION RESPIRATORY (INHALATION) at 11:32

## 2019-06-05 RX ADMIN — PANTOPRAZOLE SODIUM 40 MG: 40 TABLET, DELAYED RELEASE ORAL at 06:56

## 2019-06-05 RX ADMIN — IPRATROPIUM BROMIDE AND ALBUTEROL SULFATE 1 AMPULE: .5; 3 SOLUTION RESPIRATORY (INHALATION) at 20:44

## 2019-06-05 RX ADMIN — GABAPENTIN 100 MG: 100 CAPSULE ORAL at 14:20

## 2019-06-05 RX ADMIN — GABAPENTIN 100 MG: 100 CAPSULE ORAL at 09:31

## 2019-06-05 RX ADMIN — MOMETASONE FUROATE AND FORMOTEROL FUMARATE DIHYDRATE 2 PUFF: 100; 5 AEROSOL RESPIRATORY (INHALATION) at 08:36

## 2019-06-05 RX ADMIN — ACETAMINOPHEN 650 MG: 325 TABLET ORAL at 20:23

## 2019-06-05 RX ADMIN — INSULIN LISPRO 8 UNITS: 100 INJECTION, SOLUTION INTRAVENOUS; SUBCUTANEOUS at 09:47

## 2019-06-05 RX ADMIN — FUROSEMIDE 80 MG: 10 INJECTION, SOLUTION INTRAMUSCULAR; INTRAVENOUS at 09:33

## 2019-06-05 RX ADMIN — DILTIAZEM HYDROCHLORIDE 240 MG: 240 CAPSULE, COATED, EXTENDED RELEASE ORAL at 09:29

## 2019-06-05 RX ADMIN — IPRATROPIUM BROMIDE AND ALBUTEROL SULFATE 1 AMPULE: .5; 3 SOLUTION RESPIRATORY (INHALATION) at 16:20

## 2019-06-05 RX ADMIN — METOPROLOL SUCCINATE 150 MG: 50 TABLET, FILM COATED, EXTENDED RELEASE ORAL at 09:31

## 2019-06-05 RX ADMIN — Medication 10 ML: at 20:25

## 2019-06-05 RX ADMIN — TAMSULOSIN HYDROCHLORIDE 0.4 MG: 0.4 CAPSULE ORAL at 09:28

## 2019-06-05 ASSESSMENT — PAIN DESCRIPTION - PAIN TYPE
TYPE: CHRONIC PAIN

## 2019-06-05 ASSESSMENT — PAIN DESCRIPTION - LOCATION
LOCATION: BACK
LOCATION: BACK;GENERALIZED

## 2019-06-05 ASSESSMENT — PAIN DESCRIPTION - PROGRESSION: CLINICAL_PROGRESSION: NOT CHANGED

## 2019-06-05 ASSESSMENT — PAIN - FUNCTIONAL ASSESSMENT: PAIN_FUNCTIONAL_ASSESSMENT: PREVENTS OR INTERFERES SOME ACTIVE ACTIVITIES AND ADLS

## 2019-06-05 ASSESSMENT — PAIN SCALES - GENERAL
PAINLEVEL_OUTOF10: 8
PAINLEVEL_OUTOF10: 8
PAINLEVEL_OUTOF10: 6
PAINLEVEL_OUTOF10: 8
PAINLEVEL_OUTOF10: 3

## 2019-06-05 ASSESSMENT — PAIN DESCRIPTION - DESCRIPTORS
DESCRIPTORS: ACHING

## 2019-06-05 ASSESSMENT — PAIN DESCRIPTION - FREQUENCY: FREQUENCY: CONTINUOUS

## 2019-06-05 ASSESSMENT — PAIN DESCRIPTION - ONSET: ONSET: ON-GOING
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
## 2019-06-05 NOTE — PROGRESS NOTES
Occupational Therapy  OT to pt's bedside with PT, attempted to complete tx; however, unable to complete at this time due to pt refusal. Pt stating that he just got back to bed and needs time to rest.  Provided encouragement and explained purpose of tx, pt cont to refuse at this time. Will cont to follow.      Sharyle Fujisawa, OT/L [id number]

## 2019-06-05 NOTE — PLAN OF CARE
Problem: Falls - Risk of:  Goal: Will remain free from falls  Description  Will remain free from falls  Outcome: Ongoing   Fall risk assessment completed every shift. All precautions in place. Pt has call light within reach at all times. Room clear of clutter. Pt aware to call for assistance when getting up. Problem: Risk for Impaired Skin Integrity  Goal: Tissue integrity - skin and mucous membranes  Description  Structural intactness and normal physiological function of skin and  mucous membranes. Outcome: Ongoing   Skin assessment completed every shift. Pt assessed for incontinence, appropriate barrier cream applied prn. Pt encouraged to turn/rotate every 2 hours. Assistance provided if pt unable to do so themselves. Problem: OXYGENATION/RESPIRATORY FUNCTION  Goal: Patient will achieve/maintain normal respiratory rate/effort  Description  Respiratory rate and effort will be within normal limits for the patient  Outcome: Ongoing   Patient's airway has maintained patency. Patient's breath sounds are diminished with wheezes. c/o SOB while flat and with exertion. Patient able to express concerns. Will continue to monitor. Problem: FLUID AND ELECTROLYTE IMBALANCE  Goal: Fluid and electrolyte balance are achieved/maintained  Outcome: Ongoing   PO and IV fluid intake closely monitored. Electrolytes assessed and compared. Family included in patient care and education throughout. Will continue to monitor.

## 2019-06-05 NOTE — PROGRESS NOTES
[address] 81   Daily Progress Note      Admit Date:  6/1/2019    Reason for follow up visit: CHF    CC: \" I am still SOB and I feel like I am swelling more. \"    60 y/o male with PMH significant for morbid obesity, CAD, PAD, nonischemic cardiomyopathy, CHF, COPD, HLP,  CKD, diabetes, untreated sleep apnea, PAF (S/P DCCV in 2/2019) and noncompliance who presented with c/o SOB, wt gain, hypoxia and chest pain. Wt is up ~ 25-30# since last admit in 2/2019. Has been receiving intermittent IV lasix without much change in sxs. + smoker and + ETOH    Interval History:  Pt seen and examined  Remains SOB and with generalized edema  Remains on O2 @ 4L. Wt today 252# (225# baseline)  In atrial fib with controlled VR  Denies chest pain, cough, palpitations or dizziness    Subjective:  Pt with no acute overnight cardiac events. Review of Systems:   · Constitutional: + wt gain. No unanticipated weight loss. There's been no change in energy level, sleep pattern, or activity level. No fevers, chills. · Eyes: No visual changes or diplopia. No scleral icterus. · ENT: No Headaches, hearing loss or vertigo. No mouth sores or sore throat. · Cardiovascular: as reviewed in HPI  · Respiratory: No sputum production. No hematemesis. · Gastrointestinal: No abdominal pain, appetite loss, blood in stools. No change in bowel or bladder habits. · Genitourinary: No dysuria, trouble voiding, or hematuria. · Musculoskeletal:  + L AKA  · Integumentary: No rash or pruritis. · Neurological: No headache, diplopia, change in muscle strength, numbness or tingling. · Psychiatric: No anxiety or depression. · Endocrine: No temperature intolerance. No excessive thirst, fluid intake, or urination. No tremor. · Hematologic/Lymphatic: No abnormal bruising or bleeding, blood clots or swollen lymph nodes. · Allergic/Immunologic: No nasal congestion or hives.     Objective:   BP (!) 133/103   Pulse 102   Temp 97.6 °F (36.4 °C) (Oral)   Resp 18   Ht 5' 7\" (1.702 m)   Wt 252 lb 6.8 oz (114.5 kg)   SpO2 93%   BMI 39.54 kg/m²       Intake/Output Summary (Last 24 hours) at 6/5/2019 1313  Last data filed at 6/5/2019 1301  Gross per 24 hour   Intake 1010 ml   Output 4115 ml   Net -3105 ml     Wt Readings from Last 3 Encounters:   06/05/19 252 lb 6.8 oz (114.5 kg)   02/27/19 235 lb (106.6 kg)   02/21/19 225 lb 15.5 oz (102.5 kg)       Physical Exam:  General: In no acute distress. Awake, alert, and oriented X4. Dyspneic with prolonged talking. On O2. Skin:  Warm and dry. No new appearing rashes or lesions. Neck:  Supple and thick. Hard to ascertain JVD d/t body habitus  Chest: Lungs with decreased breath sounds bilaterally. No wheezes/rhonchi/rales  Cardiovascular:  Irreg; normal S1 and S2; soft systolic murmur  Abdomen: obese, distended, nontender, +bowel sounds. + pitting edema lower abdominal wall  Extremities: L AKA. 2-3+ edema RLE; presacral edema.   Chronic stasis changes to lower R leg    Medications:    furosemide  80 mg Intravenous BID    insulin lispro  0-18 Units Subcutaneous TID WC    insulin glargine  20 Units Subcutaneous Nightly    insulin lispro  8 Units Subcutaneous TID WC    lidocaine  1 patch Transdermal Daily    rivaroxaban  20 mg Oral Daily with breakfast    tamsulosin  0.4 mg Oral Daily    pantoprazole  40 mg Oral QAM AC    mometasone-formoterol  2 puff Inhalation BID    metoprolol succinate  150 mg Oral Daily    gabapentin  100 mg Oral TID    DULoxetine  30 mg Oral Daily    diltiazem  240 mg Oral Daily    atorvastatin  40 mg Oral Daily    amiodarone  200 mg Oral Daily    ipratropium-albuterol  1 ampule Inhalation Q4H WA    sodium chloride flush  10 mL Intravenous 2 times per day      dextrose       glucose, dextrose, glucagon (rDNA), dextrose, traZODone, ipratropium-albuterol, sodium chloride flush, magnesium hydroxide, ondansetron, acetaminophen    Lab Data:  CBC:   Recent Labs     06/03/19  0550 06/04/19  0507 06/05/19  0523   WBC 13.1* 11.3* 11.1*   HGB 10.5* 10.1* 10.2*    208 226     BMP:    Recent Labs     06/03/19  0550 06/04/19  0507 06/05/19  0523   * 137 142   K 4.5 4.1 4.0   CO2 32 34* 34*   BUN 34* 40* 40*   CREATININE 1.1 1.0 1.0     Results for Alexx Jaime (MRN [id number]) as of 6/5/2019 14:36   Ref. Range 6/1/2019 16:51   Pro-BNP Latest Ref Range: 0 - 124 pg/mL 3,334 (H)     2/20/2019 Cardioversion:  IMPRESSION:  Successful cardioversion using 200 joules of synchronized current.  Postprocedure ECG has been obtained.     Echo 10/17/2018:  Suboptimal image quality. Definity contrast administered.   Patient appears to be in atrial fibrillation.   Mild Conc. LVH with EF  45%.   The left atrium is mildly dilated.   Mild mitral regurgitation.   Normal RV size with mildly reduced systolic function.   Trivial pulmonic regurgitation present.     [facility] 2/8/2017:  OVERALL IMPRESSION:  1. Significantly elevated right heart pressures and capillary wedge pressures  consistent with congestive heart failure. 2. Normal cardiac output and indices.     [facility] 8/10/2015:  1. Right coronary artery arises from the right sinus Valsalva. It is a dominant artery, gives rise to the posterior descending and posterolateral branches. Right coronary artery and its branches are free of atherosclerosis.    2. Left main trunk arises from the right sinus Valsalva. It divides into the left and right descending artery and left circumflex artery. Left main trunk is free of atherosclerosis. 3. Left anterior descending artery: Moderate sized artery and descends into the intraventricular sulcus and wraps around the apex of the heart. The left anterior descending artery and its branches are free of atherosclerosis. 4. Left circumflex artery arises from the left main trunk. It is a moderate-sized artery. It gives rise to a moderate-sized obtuse marginal branch and is free of atherosclerosis.  Left anterior descending artery as described earlier is also free of any atherosclerosis.    Left ventriculogram reveals a global left ventricular systolic function with estimated EF of approximately 20% with global hypokinesis. There was no mitral regurgitation seen. Telemetry:Atrial fib with controlled VR    Assessment/Plan:    1. Acute on chronic systolic and diastolic HF/Nonischemic cardiomyopathy  -decompensated d/t noncompliance with diet and fluid restriction and recurrent atrial fibrillation  -+ anasarca and with little change from IV diuretic; still > 25# above baseline weight  -will change intermittent lasix to lasix gtt and monitor  -continue BB and diltiazem (has had difficult to control atrial fib, hence BB + CCB despite his EF)  -will consider addition of ACE-I pending renal function after IV lasix (has been on ARB in the past)  -low sodium diet and fluid restriction     2. Recurrent chronic atrial fibrillation  -has longstanding H/O difficult to control atrial fib  -S/P DCCV in 2/2019 with return to SR (not sure when it recurred)  -will be difficult to maintain normal SR given his COPD and comorbidities  -continue amiodarone (rate control), diltiazem and toprol  -continue Xarelto (CHADS Vasc score: 4 for HTN, vascular disease, CHF and DM)     3. Untreated sleep apnea  -he will not agree to CPAP despite discussion regarding effects of untreated sleep apnea long term  -he is quite adamant that he has no intention of using CPAP and does not want to pursue.      4. PAD  -S/P L AKA  -on statin     5. COPD  -uses nebulizer daily  -on home O2  -continues to smoke, but smoking cessation emphasized     6. ETOH use  -4-8 beers (most days of the week)  -discussed ETOH cessation     7. Diabetes Mellitus  -on insulin  -BS improving  -Rx per Primary team     8.  Morbid obesity  -weight loss encouraged    Electronically signed by JOON Salas - CNP on 6/5/2019 at 1:13 PM

## 2019-06-06 LAB
ANION GAP SERPL CALCULATED.3IONS-SCNC: 10 MMOL/L (ref 3–16)
BASOPHILS ABSOLUTE: 0 K/UL (ref 0–0.2)
BASOPHILS RELATIVE PERCENT: 0.2 %
BUN BLDV-MCNC: 30 MG/DL (ref 7–20)
CALCIUM SERPL-MCNC: 9 MG/DL (ref 8.3–10.6)
CHLORIDE BLD-SCNC: 92 MMOL/L (ref 99–110)
CO2: 43 MMOL/L (ref 21–32)
CREAT SERPL-MCNC: 0.9 MG/DL (ref 0.9–1.3)
EOSINOPHILS ABSOLUTE: 0.1 K/UL (ref 0–0.6)
EOSINOPHILS RELATIVE PERCENT: 0.8 %
GFR AFRICAN AMERICAN: >60
GFR NON-AFRICAN AMERICAN: >60
GLUCOSE BLD-MCNC: 181 MG/DL (ref 70–99)
GLUCOSE BLD-MCNC: 186 MG/DL (ref 70–99)
GLUCOSE BLD-MCNC: 188 MG/DL (ref 70–99)
GLUCOSE BLD-MCNC: 202 MG/DL (ref 70–99)
GLUCOSE BLD-MCNC: 240 MG/DL (ref 70–99)
HCT VFR BLD CALC: 31.3 % (ref 40.5–52.5)
HEMOGLOBIN: 10.2 G/DL (ref 13.5–17.5)
LYMPHOCYTES ABSOLUTE: 1.4 K/UL (ref 1–5.1)
LYMPHOCYTES RELATIVE PERCENT: 17.5 %
MAGNESIUM: 1.8 MG/DL (ref 1.8–2.4)
MCH RBC QN AUTO: 28.8 PG (ref 26–34)
MCHC RBC AUTO-ENTMCNC: 32.7 G/DL (ref 31–36)
MCV RBC AUTO: 88.1 FL (ref 80–100)
MONOCYTES ABSOLUTE: 0.8 K/UL (ref 0–1.3)
MONOCYTES RELATIVE PERCENT: 10.1 %
NEUTROPHILS ABSOLUTE: 5.8 K/UL (ref 1.7–7.7)
NEUTROPHILS RELATIVE PERCENT: 71.4 %
PDW BLD-RTO: 15.3 % (ref 12.4–15.4)
PERFORMED ON: ABNORMAL
PLATELET # BLD: 202 K/UL (ref 135–450)
PMV BLD AUTO: 8.8 FL (ref 5–10.5)
POTASSIUM REFLEX MAGNESIUM: 3.5 MMOL/L (ref 3.5–5.1)
PRO-BNP: 2366 PG/ML (ref 0–124)
RBC # BLD: 3.55 M/UL (ref 4.2–5.9)
SODIUM BLD-SCNC: 145 MMOL/L (ref 136–145)
WBC # BLD: 8.1 K/UL (ref 4–11)

## 2019-06-06 PROCEDURE — 94640 AIRWAY INHALATION TREATMENT: CPT

## 2019-06-06 PROCEDURE — 94761 N-INVAS EAR/PLS OXIMETRY MLT: CPT

## 2019-06-06 PROCEDURE — 80048 BASIC METABOLIC PNL TOTAL CA: CPT

## 2019-06-06 PROCEDURE — 83735 ASSAY OF MAGNESIUM: CPT

## 2019-06-06 PROCEDURE — 6370000000 HC RX 637 (ALT 250 FOR IP): Performed by: NURSE PRACTITIONER

## 2019-06-06 PROCEDURE — 2580000003 HC RX 258: Performed by: INTERNAL MEDICINE

## 2019-06-06 PROCEDURE — 83880 ASSAY OF NATRIURETIC PEPTIDE: CPT

## 2019-06-06 PROCEDURE — 2700000000 HC OXYGEN THERAPY PER DAY

## 2019-06-06 PROCEDURE — 6370000000 HC RX 637 (ALT 250 FOR IP): Performed by: INTERNAL MEDICINE

## 2019-06-06 PROCEDURE — 85025 COMPLETE CBC W/AUTO DIFF WBC: CPT

## 2019-06-06 PROCEDURE — 6360000002 HC RX W HCPCS: Performed by: NURSE PRACTITIONER

## 2019-06-06 PROCEDURE — 2580000003 HC RX 258: Performed by: NURSE PRACTITIONER

## 2019-06-06 PROCEDURE — 99232 SBSQ HOSP IP/OBS MODERATE 35: CPT | Performed by: NURSE PRACTITIONER

## 2019-06-06 PROCEDURE — 36415 COLL VENOUS BLD VENIPUNCTURE: CPT

## 2019-06-06 PROCEDURE — 2060000000 HC ICU INTERMEDIATE R&B

## 2019-06-06 RX ORDER — POTASSIUM CHLORIDE 20 MEQ/1
20 TABLET, EXTENDED RELEASE ORAL 2 TIMES DAILY WITH MEALS
Status: DISCONTINUED | OUTPATIENT
Start: 2019-06-06 | End: 2019-06-09

## 2019-06-06 RX ORDER — ACETAZOLAMIDE 250 MG/1
250 TABLET ORAL DAILY
Status: DISCONTINUED | OUTPATIENT
Start: 2019-06-06 | End: 2019-06-07

## 2019-06-06 RX ADMIN — IPRATROPIUM BROMIDE AND ALBUTEROL SULFATE 1 AMPULE: .5; 3 SOLUTION RESPIRATORY (INHALATION) at 20:54

## 2019-06-06 RX ADMIN — PANTOPRAZOLE SODIUM 40 MG: 40 TABLET, DELAYED RELEASE ORAL at 06:22

## 2019-06-06 RX ADMIN — Medication 10 ML: at 21:18

## 2019-06-06 RX ADMIN — INSULIN LISPRO 3 UNITS: 100 INJECTION, SOLUTION INTRAVENOUS; SUBCUTANEOUS at 17:12

## 2019-06-06 RX ADMIN — INSULIN LISPRO 8 UNITS: 100 INJECTION, SOLUTION INTRAVENOUS; SUBCUTANEOUS at 17:12

## 2019-06-06 RX ADMIN — IPRATROPIUM BROMIDE AND ALBUTEROL SULFATE 1 AMPULE: .5; 3 SOLUTION RESPIRATORY (INHALATION) at 12:33

## 2019-06-06 RX ADMIN — MOMETASONE FUROATE AND FORMOTEROL FUMARATE DIHYDRATE 2 PUFF: 100; 5 AEROSOL RESPIRATORY (INHALATION) at 20:54

## 2019-06-06 RX ADMIN — INSULIN GLARGINE 20 UNITS: 100 INJECTION, SOLUTION SUBCUTANEOUS at 21:18

## 2019-06-06 RX ADMIN — AMIODARONE HYDROCHLORIDE 200 MG: 200 TABLET ORAL at 08:36

## 2019-06-06 RX ADMIN — ACETAMINOPHEN 650 MG: 325 TABLET ORAL at 17:15

## 2019-06-06 RX ADMIN — ACETAMINOPHEN 650 MG: 325 TABLET ORAL at 21:18

## 2019-06-06 RX ADMIN — FUROSEMIDE 10 MG/HR: 10 INJECTION, SOLUTION INTRAMUSCULAR; INTRAVENOUS at 01:03

## 2019-06-06 RX ADMIN — METOPROLOL SUCCINATE 150 MG: 50 TABLET, FILM COATED, EXTENDED RELEASE ORAL at 08:36

## 2019-06-06 RX ADMIN — ATORVASTATIN CALCIUM 40 MG: 40 TABLET, FILM COATED ORAL at 08:36

## 2019-06-06 RX ADMIN — ACETAZOLAMIDE 250 MG: 250 TABLET ORAL at 17:11

## 2019-06-06 RX ADMIN — TRAZODONE HYDROCHLORIDE 50 MG: 50 TABLET ORAL at 21:18

## 2019-06-06 RX ADMIN — IPRATROPIUM BROMIDE AND ALBUTEROL SULFATE 1 AMPULE: .5; 3 SOLUTION RESPIRATORY (INHALATION) at 08:49

## 2019-06-06 RX ADMIN — GABAPENTIN 100 MG: 100 CAPSULE ORAL at 17:10

## 2019-06-06 RX ADMIN — IPRATROPIUM BROMIDE AND ALBUTEROL SULFATE 1 AMPULE: .5; 3 SOLUTION RESPIRATORY (INHALATION) at 15:55

## 2019-06-06 RX ADMIN — INSULIN LISPRO 8 UNITS: 100 INJECTION, SOLUTION INTRAVENOUS; SUBCUTANEOUS at 08:37

## 2019-06-06 RX ADMIN — ACETAMINOPHEN 650 MG: 325 TABLET ORAL at 08:40

## 2019-06-06 RX ADMIN — POTASSIUM CHLORIDE 20 MEQ: 20 TABLET, EXTENDED RELEASE ORAL at 17:11

## 2019-06-06 RX ADMIN — DULOXETINE HYDROCHLORIDE 30 MG: 30 CAPSULE, DELAYED RELEASE ORAL at 08:36

## 2019-06-06 RX ADMIN — POTASSIUM CHLORIDE 20 MEQ: 20 TABLET, EXTENDED RELEASE ORAL at 08:39

## 2019-06-06 RX ADMIN — RIVAROXABAN 20 MG: 20 TABLET, FILM COATED ORAL at 08:36

## 2019-06-06 RX ADMIN — GABAPENTIN 100 MG: 100 CAPSULE ORAL at 08:36

## 2019-06-06 RX ADMIN — INSULIN LISPRO 3 UNITS: 100 INJECTION, SOLUTION INTRAVENOUS; SUBCUTANEOUS at 08:37

## 2019-06-06 RX ADMIN — INSULIN LISPRO 8 UNITS: 100 INJECTION, SOLUTION INTRAVENOUS; SUBCUTANEOUS at 12:10

## 2019-06-06 RX ADMIN — DILTIAZEM HYDROCHLORIDE 240 MG: 240 CAPSULE, COATED, EXTENDED RELEASE ORAL at 08:36

## 2019-06-06 RX ADMIN — TAMSULOSIN HYDROCHLORIDE 0.4 MG: 0.4 CAPSULE ORAL at 08:36

## 2019-06-06 RX ADMIN — GABAPENTIN 100 MG: 100 CAPSULE ORAL at 21:18

## 2019-06-06 RX ADMIN — Medication 10 ML: at 08:40

## 2019-06-06 RX ADMIN — INSULIN LISPRO 6 UNITS: 100 INJECTION, SOLUTION INTRAVENOUS; SUBCUTANEOUS at 12:10

## 2019-06-06 RX ADMIN — MOMETASONE FUROATE AND FORMOTEROL FUMARATE DIHYDRATE 2 PUFF: 100; 5 AEROSOL RESPIRATORY (INHALATION) at 08:49

## 2019-06-06 ASSESSMENT — PAIN DESCRIPTION - ONSET
ONSET: ON-GOING
ONSET: ON-GOING

## 2019-06-06 ASSESSMENT — PAIN DESCRIPTION - PROGRESSION
CLINICAL_PROGRESSION: NOT CHANGED

## 2019-06-06 ASSESSMENT — PAIN - FUNCTIONAL ASSESSMENT
PAIN_FUNCTIONAL_ASSESSMENT: ACTIVITIES ARE NOT PREVENTED
PAIN_FUNCTIONAL_ASSESSMENT: ACTIVITIES ARE NOT PREVENTED

## 2019-06-06 ASSESSMENT — PAIN SCALES - GENERAL
PAINLEVEL_OUTOF10: 6
PAINLEVEL_OUTOF10: 0
PAINLEVEL_OUTOF10: 4
PAINLEVEL_OUTOF10: 4

## 2019-06-06 ASSESSMENT — PAIN DESCRIPTION - FREQUENCY
FREQUENCY: CONTINUOUS

## 2019-06-06 ASSESSMENT — PAIN DESCRIPTION - LOCATION
LOCATION: BACK

## 2019-06-06 ASSESSMENT — PAIN DESCRIPTION - DESCRIPTORS
DESCRIPTORS: ACHING
DESCRIPTORS: DISCOMFORT
DESCRIPTORS: ACHING

## 2019-06-06 ASSESSMENT — PAIN DESCRIPTION - PAIN TYPE
TYPE: CHRONIC PAIN
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
## 2019-06-06 NOTE — PROGRESS NOTES
Occupational Therapy Attempt Note  Felice Andino  [id number]  6/6/2019    Patient met b/s, in bed. Patient refuses therapy at this time, \"maybe later this afternoon. \" Educated that due to therapy schedule, this is the best time. Patient continues to refuse. Patient states he is too weak. Education provided that this is all the more reason to participate in therapy. Patient reports he has only been using gerda stedy because \"no one will let me try to stand-pivot. \" Educated patient this Dewain Chant will allow him to try to stand-pivot as this is what he does at home. Patient continues to refuse at this time. Will continue to try as patient willing. For last fxl status, refer to last OT note on 6/4/219 as patient has refused therapy attempts on 6/5 and 6/6/19.     David Schmidt, [address]

## 2019-06-06 NOTE — PLAN OF CARE
Fall risk assessment completed every shift. All precautions in place. Pt has call light within reach at all times. Room clear of clutter. Pt aware to call for assistance when getting up. Skin assessment completed every shift. Pt assessed for incontinence, appropriate barrier cream applied prn. Pt encouraged to turn/rotate every 2 hours. Assistance provided if pt unable to do so themselves.

## 2019-06-06 NOTE — PLAN OF CARE
Problem: Falls - Risk of:  Goal: Will remain free from falls  6/6/2019 0054 by Johnson Lugo RN  Outcome: Met This Shift  Note:   Fall risk assessment completed every shift. All precautions in place. Pt has call light within reach at all times. Room clear of clutter. Pt aware to call for assistance when getting up. Bed alarm on. No falls this shift. 6/5/2019 1548 by Juan Jerome RN  Outcome: Ongoing  Goal: Absence of physical injury  Outcome: Met This Shift     Problem: OXYGENATION/RESPIRATORY FUNCTION  Goal: Patient will maintain patent airway  Outcome: Met This Shift  Note:   Oxygen saturations WNL. Remains short of breath at rest and has difficulty with minimal activity. Problem: Risk for Impaired Skin Integrity  Goal: Tissue integrity - skin and mucous membranes  6/6/2019 0054 by Johnson Lugo RN  Outcome: Ongoing  Note:   Skin assessment completed every shift. Pt assessed for incontinence, appropriate barrier cream applied prn. Pt encouraged to turn/rotate every 2 hours. Assistance provided if pt unable to do so themselves. Cream applied PRN to swollen reddened areas with repositioning and pad changes. No new breakdown noted. 6/5/2019 1548 by Juan Jerome RN  Outcome: Ongoing     Problem: Acute Pain  Goal: Control of acute pain  Outcome: Ongoing  Note:   Pain level and characteristics of pain assessed. Patient included in decisions related to pain management. Pain medications given as ordered after other non-medication management options have been attempted. Effectiveness of pain medications assessed and ineffective pain management reported to MD as needed. Problem: SKIN INTEGRITY  Goal: Risk for impaired skin integrity will decrease  Outcome: Ongoing     Problem: DAILY CARE  Goal: Daily care needs are met  Outcome: Ongoing  Note:   Needs assist with everything. Encouraging movement and adhering to diet restrictions.      Problem: OXYGENATION/RESPIRATORY FUNCTION  Goal: Patient will achieve/maintain normal respiratory rate/effort  6/6/2019 0054 by Dee Mariano RN  Outcome: Ongoing  6/5/2019 1548 by Hayes Quiles RN  Outcome: Ongoing     Problem: HEMODYNAMIC STATUS  Goal: Patient has stable vital signs and fluid balance  Outcome: Ongoing     Problem: FLUID AND ELECTROLYTE IMBALANCE  Goal: Fluid and electrolyte balance are achieved/maintained  6/6/2019 0054 by Dee Mariano RN  Outcome: Ongoing  Note:   Lasix drip started. Continue to monitor fluid and electrolytes. 6/5/2019 1548 by Hayes Quiles RN  Outcome: Ongoing     Problem: ACTIVITY INTOLERANCE/IMPAIRED MOBILITY  Goal: Mobility/activity is maintained at optimum level for patient  Outcome: Ongoing     Problem: Pain:  Goal: Control of acute pain  Outcome: Ongoing  Note:   Pain level and characteristics of pain assessed. Patient included in decisions related to pain management. Pain medications given as ordered after other non-medication management options have been attempted. Effectiveness of pain medications assessed and ineffective pain management reported to MD as needed.    Goal: Pain level will decrease  Outcome: Ongoing  Goal: Control of chronic pain  Outcome: Ongoing

## 2019-06-06 NOTE — PROGRESS NOTES
[facility] 81   Daily Progress Note      Admit Date:  6/1/2019    Reason for follow up visit: CHF    CC: \"I feel terrible today. Nothing in particular. \"    62 y/o male with PMH significant for morbid obesity, CAD, PAD, nonischemic cardiomyopathy, CHF, COPD, HLP,  CKD, diabetes, untreated sleep apnea, PAF (S/P DCCV in 2/2019) and noncompliance who presented with c/o SOB, wt gain, hypoxia and chest pain. Wt is up ~ 25-30# since last admit in 2/2019. Given IV diuretic and slowly diuresing. + smoker and + ETOH    Interval History:  Labs noted. Will hold lasix for now. Diamox today  K+ noted: KCL supplement given  BP noted. Remains on O2  Continues to c/o SOB and generalized edema. Denies chest pain, cough, palpitations or dizziness  Net diuresis -6L since admit; -2.6 L last 24 hours    Subjective:  Pt with no acute overnight cardiac events. Review of Systems:   · Constitutional: no unanticipated weight loss. There's been no change in energy level, sleep pattern, or activity level. No fevers, chills. · Eyes: No visual changes or diplopia. No scleral icterus. · ENT: No Headaches or vertigo. No mouth sores or sore throat. · Cardiovascular: as reviewed in HPI  · Respiratory: + cough with clear sputum. No hematemesis. · Gastrointestinal: No abdominal pain, appetite loss, blood in stools. No change in bowel or bladder habits. · Genitourinary: No dysuria, trouble voiding, or hematuria. · Musculoskeletal:  + L AKA  · Integumentary: No rash or pruritis. · Neurological: No headache, diplopia, change in muscle strength, numbness or tingling. · Psychiatric: No anxiety or depression. · Endocrine: No temperature intolerance. No excessive thirst, fluid intake, or urination. No tremor. · Hematologic/Lymphatic: No abnormal bruising or bleeding, blood clots or swollen lymph nodes. · Allergic/Immunologic: No nasal congestion or hives.     Objective:   /84   Pulse 90   Temp 97.5 °F (36.4 °C) (Oral)   Resp 20   Ht 5' 7\" (1.702 m)   Wt 250 lb 7.1 oz (113.6 kg)   SpO2 96%   BMI 39.22 kg/m²       Intake/Output Summary (Last 24 hours) at 6/6/2019 0825  Last data filed at 6/6/2019 0816  Gross per 24 hour   Intake 2059.83 ml   Output 4465 ml   Net -2405.17 ml     Wt Readings from Last 3 Encounters:   06/06/19 250 lb 7.1 oz (113.6 kg)   02/27/19 235 lb (106.6 kg)   02/21/19 225 lb 15.5 oz (102.5 kg)       Physical Exam:  General: In no acute distress. Awake, alert, and oriented X4. Resting comfortably in bed. Skin:  Warm and dry. No new appearing rashes or lesions. Neck:  Supple and thick. Difficult to ascertain JVD d/t bdy habitus  Chest: Lungs with diminished breath sounds. No wheezes/rhonchi/rales  Cardiovascular:  Irreg; normal S1 and S2; soft systolic murmur  Abdomen: obese, + abdominal distension with lower abdominal wall edema;  +bowel sounds. Extremities:  L AKA.  2+ edema R lower leg; chronic stasis changes    Medications:    potassium chloride  20 mEq Oral BID WC    insulin lispro  0-18 Units Subcutaneous TID WC    insulin glargine  20 Units Subcutaneous Nightly    insulin lispro  8 Units Subcutaneous TID WC    lidocaine  1 patch Transdermal Daily    rivaroxaban  20 mg Oral Daily with breakfast    tamsulosin  0.4 mg Oral Daily    pantoprazole  40 mg Oral QAM AC    mometasone-formoterol  2 puff Inhalation BID    metoprolol succinate  150 mg Oral Daily    gabapentin  100 mg Oral TID    DULoxetine  30 mg Oral Daily    diltiazem  240 mg Oral Daily    atorvastatin  40 mg Oral Daily    amiodarone  200 mg Oral Daily    ipratropium-albuterol  1 ampule Inhalation Q4H WA    sodium chloride flush  10 mL Intravenous 2 times per day      dextrose       glucose, dextrose, glucagon (rDNA), dextrose, traZODone, ipratropium-albuterol, sodium chloride flush, magnesium hydroxide, ondansetron, acetaminophen    Lab Data:  CBC:   Recent Labs     06/04/19  0507 06/05/19  0523 06/06/19  0645   WBC 11.3* 11.1* 8.1   HGB 10.1* 10.2* 10.2*    226 202     BMP:    Recent Labs     06/04/19  0507 06/05/19  0523 06/06/19  0645    142 145   K 4.1 4.0 3.5   CO2 34* 34* 43*   BUN 40* 40* 30*   CREATININE 1.0 1.0 0.9     Results for Whiteny Anaya (MRN [id number]) as of 6/6/2019 14:13   Ref. Range 6/1/2019 16:51 6/6/2019 06:45   Pro-BNP Latest Ref Range: 0 - 124 pg/mL 3,334 (H) 2,366 (H)     2/20/2019 Cardioversion:  IMPRESSION:  Successful cardioversion using 200 joules of synchronized current.  Postprocedure ECG has been obtained.     Echo 10/17/2018:  Suboptimal image quality. Definity contrast administered.   Patient appears to be in atrial fibrillation.   Mild Conc. LVH with EF  45%.   The left atrium is mildly dilated.   Mild mitral regurgitation.   Normal RV size with mildly reduced systolic function.   Trivial pulmonic regurgitation present.     [facility] 2/8/2017:  OVERALL IMPRESSION:  1. Significantly elevated right heart pressures and capillary wedge pressures  consistent with congestive heart failure. 2. Normal cardiac output and indices.     [facility] 8/10/2015:  1. Right coronary artery arises from the right sinus Valsalva. It is a dominant artery, gives rise to the posterior descending and posterolateral branches. Right coronary artery and its branches are free of atherosclerosis.    2. Left main trunk arises from the right sinus Valsalva. It divides into the left and right descending artery and left circumflex artery. Left main trunk is free of atherosclerosis. 3. Left anterior descending artery: Moderate sized artery and descends into the intraventricular sulcus and wraps around the apex of the heart. The left anterior descending artery and its branches are free of atherosclerosis. 4. Left circumflex artery arises from the left main trunk. It is a moderate-sized artery. It gives rise to a moderate-sized obtuse marginal branch and is free of atherosclerosis.  Left anterior descending artery as described earlier is also free of any atherosclerosis.    Left ventriculogram reveals a global left ventricular systolic function with estimated EF of approximately 20% with global hypokinesis. There was no mitral regurgitation seen. Telemetry: Atrial fib with controlled VR    Assessment/Plan:    1. Acute on chronic systolic and diastolic HF/Nonischemic cardiomyopathy  -decompensated d/t noncompliance with diet and fluid restriction and recurrent atrial fibrillation  -increased diuresis on Lasix gtt: dc'd d/t contractual alkalosis  -will give dose of diamox today  -consider metolazone in AM  -continue BB and diltiazem for rate control  -LVEF 45%  -will consider addition of ACE-I pending renal function after IV lasix (has been on ARB in the past)  -low sodium diet and fluid restriction     2. Chronic atrial fibrillation  -has longstanding H/O difficult to control atrial fib  -S/P DCCV in 2/2019   -will be difficult to control given his significant COPD  -continue amiodarone (rate control), diltiazem and toprol  -continue Xarelto (CHADS Vasc score: 4 for HTN, vascular disease, CHF and DM)     3. Untreated sleep apnea  -again discussed sleep study and use of CPAP  -he does not want to pursue and is vocal about not wanting CPAP    4. PAD  -S/P L AKA  -on statin     5. COPD  -uses nebulizer daily  - nearing home O2 use  -smoking cessation emphasized     6. ETOH use  -discussed ETOH cessation     7. Diabetes Mellitus  -on insulin  -BS improving  -Rx per Primary team     8.  Morbid obesity  -weight loss encouraged    Electronically signed by JOON Hopkins - CNP on 6/6/2019 at 8:25 AM

## 2019-06-07 LAB
ANION GAP SERPL CALCULATED.3IONS-SCNC: 7 MMOL/L (ref 3–16)
BASOPHILS ABSOLUTE: 0 K/UL (ref 0–0.2)
BASOPHILS RELATIVE PERCENT: 0.5 %
BUN BLDV-MCNC: 26 MG/DL (ref 7–20)
CALCIUM SERPL-MCNC: 9.8 MG/DL (ref 8.3–10.6)
CHLORIDE BLD-SCNC: 95 MMOL/L (ref 99–110)
CO2: 40 MMOL/L (ref 21–32)
CREAT SERPL-MCNC: 0.8 MG/DL (ref 0.9–1.3)
EOSINOPHILS ABSOLUTE: 0.2 K/UL (ref 0–0.6)
EOSINOPHILS RELATIVE PERCENT: 2.3 %
GFR AFRICAN AMERICAN: >60
GFR NON-AFRICAN AMERICAN: >60
GLUCOSE BLD-MCNC: 207 MG/DL (ref 70–99)
GLUCOSE BLD-MCNC: 217 MG/DL (ref 70–99)
GLUCOSE BLD-MCNC: 233 MG/DL (ref 70–99)
GLUCOSE BLD-MCNC: 238 MG/DL (ref 70–99)
GLUCOSE BLD-MCNC: 243 MG/DL (ref 70–99)
HCT VFR BLD CALC: 30.3 % (ref 40.5–52.5)
HEMOGLOBIN: 9.9 G/DL (ref 13.5–17.5)
LYMPHOCYTES ABSOLUTE: 1.4 K/UL (ref 1–5.1)
LYMPHOCYTES RELATIVE PERCENT: 17.1 %
MAGNESIUM: 2 MG/DL (ref 1.8–2.4)
MCH RBC QN AUTO: 28.5 PG (ref 26–34)
MCHC RBC AUTO-ENTMCNC: 32.5 G/DL (ref 31–36)
MCV RBC AUTO: 87.7 FL (ref 80–100)
MONOCYTES ABSOLUTE: 0.8 K/UL (ref 0–1.3)
MONOCYTES RELATIVE PERCENT: 9.8 %
NEUTROPHILS ABSOLUTE: 5.9 K/UL (ref 1.7–7.7)
NEUTROPHILS RELATIVE PERCENT: 70.3 %
PDW BLD-RTO: 15.6 % (ref 12.4–15.4)
PERFORMED ON: ABNORMAL
PLATELET # BLD: 200 K/UL (ref 135–450)
PMV BLD AUTO: 9.2 FL (ref 5–10.5)
POTASSIUM REFLEX MAGNESIUM: 3.4 MMOL/L (ref 3.5–5.1)
RBC # BLD: 3.45 M/UL (ref 4.2–5.9)
SODIUM BLD-SCNC: 142 MMOL/L (ref 136–145)
WBC # BLD: 8.4 K/UL (ref 4–11)

## 2019-06-07 PROCEDURE — 99232 SBSQ HOSP IP/OBS MODERATE 35: CPT | Performed by: NURSE PRACTITIONER

## 2019-06-07 PROCEDURE — 94760 N-INVAS EAR/PLS OXIMETRY 1: CPT

## 2019-06-07 PROCEDURE — 36415 COLL VENOUS BLD VENIPUNCTURE: CPT

## 2019-06-07 PROCEDURE — 2060000000 HC ICU INTERMEDIATE R&B

## 2019-06-07 PROCEDURE — 2700000000 HC OXYGEN THERAPY PER DAY

## 2019-06-07 PROCEDURE — 6370000000 HC RX 637 (ALT 250 FOR IP): Performed by: INTERNAL MEDICINE

## 2019-06-07 PROCEDURE — 83735 ASSAY OF MAGNESIUM: CPT

## 2019-06-07 PROCEDURE — 94640 AIRWAY INHALATION TREATMENT: CPT

## 2019-06-07 PROCEDURE — 6360000002 HC RX W HCPCS: Performed by: NURSE PRACTITIONER

## 2019-06-07 PROCEDURE — 85025 COMPLETE CBC W/AUTO DIFF WBC: CPT

## 2019-06-07 PROCEDURE — 6370000000 HC RX 637 (ALT 250 FOR IP): Performed by: NURSE PRACTITIONER

## 2019-06-07 PROCEDURE — 80048 BASIC METABOLIC PNL TOTAL CA: CPT

## 2019-06-07 PROCEDURE — 2580000003 HC RX 258: Performed by: INTERNAL MEDICINE

## 2019-06-07 RX ORDER — FUROSEMIDE 10 MG/ML
40 INJECTION INTRAMUSCULAR; INTRAVENOUS ONCE
Status: COMPLETED | OUTPATIENT
Start: 2019-06-07 | End: 2019-06-07

## 2019-06-07 RX ORDER — METOLAZONE 5 MG/1
5 TABLET ORAL DAILY
Status: COMPLETED | OUTPATIENT
Start: 2019-06-07 | End: 2019-06-07

## 2019-06-07 RX ORDER — METOLAZONE 5 MG/1
5 TABLET ORAL DAILY
Status: DISCONTINUED | OUTPATIENT
Start: 2019-06-08 | End: 2019-06-07

## 2019-06-07 RX ADMIN — ACETAMINOPHEN 650 MG: 325 TABLET ORAL at 11:16

## 2019-06-07 RX ADMIN — POTASSIUM CHLORIDE 20 MEQ: 20 TABLET, EXTENDED RELEASE ORAL at 08:45

## 2019-06-07 RX ADMIN — AMIODARONE HYDROCHLORIDE 200 MG: 200 TABLET ORAL at 08:46

## 2019-06-07 RX ADMIN — RIVAROXABAN 20 MG: 20 TABLET, FILM COATED ORAL at 08:47

## 2019-06-07 RX ADMIN — IPRATROPIUM BROMIDE AND ALBUTEROL SULFATE 1 AMPULE: .5; 3 SOLUTION RESPIRATORY (INHALATION) at 21:36

## 2019-06-07 RX ADMIN — TRAZODONE HYDROCHLORIDE 50 MG: 50 TABLET ORAL at 21:36

## 2019-06-07 RX ADMIN — DULOXETINE HYDROCHLORIDE 30 MG: 30 CAPSULE, DELAYED RELEASE ORAL at 08:44

## 2019-06-07 RX ADMIN — INSULIN LISPRO 6 UNITS: 100 INJECTION, SOLUTION INTRAVENOUS; SUBCUTANEOUS at 12:11

## 2019-06-07 RX ADMIN — GABAPENTIN 100 MG: 100 CAPSULE ORAL at 08:45

## 2019-06-07 RX ADMIN — ACETAZOLAMIDE 250 MG: 250 TABLET ORAL at 10:21

## 2019-06-07 RX ADMIN — MOMETASONE FUROATE AND FORMOTEROL FUMARATE DIHYDRATE 2 PUFF: 100; 5 AEROSOL RESPIRATORY (INHALATION) at 09:33

## 2019-06-07 RX ADMIN — INSULIN LISPRO 8 UNITS: 100 INJECTION, SOLUTION INTRAVENOUS; SUBCUTANEOUS at 12:09

## 2019-06-07 RX ADMIN — INSULIN LISPRO 8 UNITS: 100 INJECTION, SOLUTION INTRAVENOUS; SUBCUTANEOUS at 17:55

## 2019-06-07 RX ADMIN — INSULIN LISPRO 6 UNITS: 100 INJECTION, SOLUTION INTRAVENOUS; SUBCUTANEOUS at 17:55

## 2019-06-07 RX ADMIN — DILTIAZEM HYDROCHLORIDE 240 MG: 240 CAPSULE, COATED, EXTENDED RELEASE ORAL at 08:46

## 2019-06-07 RX ADMIN — Medication 1 MG: at 21:36

## 2019-06-07 RX ADMIN — INSULIN GLARGINE 20 UNITS: 100 INJECTION, SOLUTION SUBCUTANEOUS at 21:36

## 2019-06-07 RX ADMIN — METOPROLOL SUCCINATE 150 MG: 50 TABLET, FILM COATED, EXTENDED RELEASE ORAL at 08:49

## 2019-06-07 RX ADMIN — INSULIN LISPRO 8 UNITS: 100 INJECTION, SOLUTION INTRAVENOUS; SUBCUTANEOUS at 08:51

## 2019-06-07 RX ADMIN — IPRATROPIUM BROMIDE AND ALBUTEROL SULFATE 1 AMPULE: .5; 3 SOLUTION RESPIRATORY (INHALATION) at 09:33

## 2019-06-07 RX ADMIN — Medication 10 ML: at 15:58

## 2019-06-07 RX ADMIN — TAMSULOSIN HYDROCHLORIDE 0.4 MG: 0.4 CAPSULE ORAL at 08:47

## 2019-06-07 RX ADMIN — ACETAMINOPHEN 650 MG: 325 TABLET ORAL at 21:36

## 2019-06-07 RX ADMIN — IPRATROPIUM BROMIDE AND ALBUTEROL SULFATE 1 AMPULE: .5; 3 SOLUTION RESPIRATORY (INHALATION) at 16:47

## 2019-06-07 RX ADMIN — GABAPENTIN 100 MG: 100 CAPSULE ORAL at 21:36

## 2019-06-07 RX ADMIN — ATORVASTATIN CALCIUM 40 MG: 40 TABLET, FILM COATED ORAL at 08:44

## 2019-06-07 RX ADMIN — INSULIN LISPRO 6 UNITS: 100 INJECTION, SOLUTION INTRAVENOUS; SUBCUTANEOUS at 08:53

## 2019-06-07 RX ADMIN — Medication 10 ML: at 10:21

## 2019-06-07 RX ADMIN — POTASSIUM CHLORIDE 20 MEQ: 20 TABLET, EXTENDED RELEASE ORAL at 17:55

## 2019-06-07 RX ADMIN — FUROSEMIDE 40 MG: 10 INJECTION, SOLUTION INTRAMUSCULAR; INTRAVENOUS at 15:58

## 2019-06-07 RX ADMIN — GABAPENTIN 100 MG: 100 CAPSULE ORAL at 13:50

## 2019-06-07 RX ADMIN — Medication 10 ML: at 21:40

## 2019-06-07 RX ADMIN — METOLAZONE 5 MG: 5 TABLET ORAL at 15:20

## 2019-06-07 RX ADMIN — MOMETASONE FUROATE AND FORMOTEROL FUMARATE DIHYDRATE 2 PUFF: 100; 5 AEROSOL RESPIRATORY (INHALATION) at 21:36

## 2019-06-07 RX ADMIN — PANTOPRAZOLE SODIUM 40 MG: 40 TABLET, DELAYED RELEASE ORAL at 06:59

## 2019-06-07 ASSESSMENT — PAIN DESCRIPTION - LOCATION
LOCATION: BACK

## 2019-06-07 ASSESSMENT — PAIN DESCRIPTION - FREQUENCY
FREQUENCY: CONTINUOUS

## 2019-06-07 ASSESSMENT — PAIN DESCRIPTION - PAIN TYPE
TYPE: CHRONIC PAIN

## 2019-06-07 ASSESSMENT — PAIN DESCRIPTION - ONSET
ONSET: ON-GOING

## 2019-06-07 ASSESSMENT — PAIN DESCRIPTION - PROGRESSION
CLINICAL_PROGRESSION: GRADUALLY IMPROVING
CLINICAL_PROGRESSION: NOT CHANGED
CLINICAL_PROGRESSION: NOT CHANGED

## 2019-06-07 ASSESSMENT — PAIN SCALES - GENERAL
PAINLEVEL_OUTOF10: 8
PAINLEVEL_OUTOF10: 8
PAINLEVEL_OUTOF10: 2
PAINLEVEL_OUTOF10: 8
PAINLEVEL_OUTOF10: 8

## 2019-06-07 ASSESSMENT — PAIN DESCRIPTION - DESCRIPTORS
DESCRIPTORS: DISCOMFORT

## 2019-06-07 NOTE — PROGRESS NOTES
Occupational Therapy and Discharge  Unable to see pt at this time due to pt declining to participate at this time. Pt educated on importance of OOB activity with pt reporting understanding and stating \"I'll be fine\". Pt has declined 3/4 therapy attempts at this time and and has not been receptive to therapy. Pt is not appropriate at this time due to lack of participation and will be D/C from OT services. Will follow up with pt as appropriate.      Vladislav Reece, OTR/L

## 2019-06-07 NOTE — PROGRESS NOTES
Nutrition Assessment (Low Risk)    Type and Reason for Visit: Initial(LOS)    Nutrition Recommendations:   Continue to encourage diet compliance with MD orders     Nutrition Assessment:  Patient assessed for nutritional risk. Deemed to be at low risk at this time. Will continue to monitor for changes in status.       Malnutrition Assessment:  · Malnutrition Status: No malnutrition    Nutrition Risk Level   Risk Level: Low    Nutrition Diagnosis:   · Problem: (non compliance to MD ordered regimen)  · Etiology: Cardiac dysfunction, Endocrine dysfunction    Signs and symptoms: Patient report of    Nutrition Intervention:  Food and/or Delivery: Continue current diet  Nutrition Education/Counseling/Coordination of Care:  Continued Inpatient Monitoring(further education declined)      Electronically signed by Adeel Schafer RD, LD on 6/7/19 at 3:48 PM    Contact Number: [phone]

## 2019-06-07 NOTE — PLAN OF CARE
Problem: Falls - Risk of:  Goal: Will remain free from falls  Description  Will remain free from falls  6/7/2019 1123 by Robby Strong RN  Outcome: Ongoing  6/7/2019 0228 by Robbie Johnston RN  Outcome: Ongoing  Goal: Absence of physical injury  Description  Absence of physical injury  6/7/2019 1123 by Robby Strong RN  Outcome: Ongoing  6/7/2019 0228 by Robbie Johnston RN  Outcome: Ongoing     Problem: Risk for Impaired Skin Integrity  Goal: Tissue integrity - skin and mucous membranes  Description  Structural intactness and normal physiological function of skin and  mucous membranes.   6/7/2019 1123 by Robby Strong RN  Outcome: Ongoing  6/7/2019 0228 by Robbie Johnston RN  Outcome: Ongoing     Problem: Acute Pain  Goal: Control of acute pain  Description  Control of acute pain     Control of acute pain  6/7/2019 1123 by Robby Strong RN  Outcome: Ongoing  6/7/2019 0228 by Robbie Johnston RN  Outcome: Ongoing     Problem: SKIN INTEGRITY  Goal: Risk for impaired skin integrity will decrease  Description  Risk for impaired skin integrity will decrease     Outcome: Ongoing     Problem: DAILY CARE  Goal: Daily care needs are met  Outcome: Ongoing     Problem: OXYGENATION/RESPIRATORY FUNCTION  Goal: Patient will maintain patent airway  Outcome: Ongoing  Goal: Patient will achieve/maintain normal respiratory rate/effort  Description  Respiratory rate and effort will be within normal limits for the patient  Outcome: Ongoing     Problem: HEMODYNAMIC STATUS  Goal: Patient has stable vital signs and fluid balance  Outcome: Ongoing     Problem: FLUID AND ELECTROLYTE IMBALANCE  Goal: Fluid and electrolyte balance are achieved/maintained  Outcome: Ongoing     Problem: ACTIVITY INTOLERANCE/IMPAIRED MOBILITY  Goal: Mobility/activity is maintained at optimum level for patient  Outcome: Ongoing     Problem: Pain:  Goal: Control of acute pain  Description  Control of acute pain     Control of acute pain  6/7/2019 1123 by Efren Napier RN  Outcome: Ongoing  6/7/2019 0228 by Estefania Fine RN  Outcome: Ongoing  Goal: Pain level will decrease  Description  Pain level will decrease  Outcome: Ongoing  Goal: Control of chronic pain  Description  Control of chronic pain  Outcome: Ongoing
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
## 2019-06-07 NOTE — PROGRESS NOTES
Physical Therapy  Attempt/Discharge Summary  Pt resting quietly in bed. PT explained goals of therapy (assessing pt's mobility on a daily basis; improving strength to ensure safe transition home). Pt verbalized understanding. PT offered tx session to pt in order to assess pt's mobility. Pt declined. PT explained that in order to determine safe d/c plan, pt needs to participate in mobility tasks. Pt continued to refuse. PT asked pt if he feels he will be able to manage at home (completing bed mobility and pivot transfers), and pt stated, \"We will cross that bridge when we come to it. \"  Pt is not receptive to therapy, and has been consistently unwilling to participate. At this time, PT is discharging pt from caseload d/t his lack of participation. If pt becomes willing to participate, please re-order therapy. It is unclear if pt is safe to return home at this time.    Time in: 1317  Electronically signed by David Chauhan, [place] [id number] on 6/7/2019 at 1:27 PM

## 2019-06-07 NOTE — PROGRESS NOTES
Jarad   Daily Progress Note      Admit Date:  6/1/2019    Reason for follow up visit:  CHF    CC: \"I don't fell like doing anything. \"    60 y/o male with PMH significant for morbid obesity, CAD, PAD, nonischemic cardiomyopathy, CHF, COPD, HLP,  CKD, diabetes, untreated sleep apnea, PAF (S/P DCCV in 2/2019) and noncompliance who presented with c/o SOB, wt gain, hypoxia and chest pain. Wt is up ~ 25-30# since last admit in 2/2019. Given IV diuretic and slowly diuresed, but held d/t contractual alkalosis. Received Diamox yesterday . + smoker and + ETOH    Interval History:  Lasix remains on hold. Diamox last 2 days. Net diuresis -5.4 L. Wt. ? Accuracy (bed scale only)  K+ 3.4 today  He continues to refuse to get out of bed or work with therapy  Continues to c/o SOB without much change  Denies chest pain, productive cough, palpitations or dizziness    Subjective:  Pt with no acute overnight cardiac events. Review of Systems:   · Constitutional: no unanticipated weight loss. There's been no change in energy level, sleep pattern, or activity level. No fevers, chills. · Eyes: No visual changes or diplopia. No scleral icterus. · ENT: No Headaches, hearing loss or vertigo. No mouth sores or sore throat. · Cardiovascular: as reviewed in HPI  · Respiratory: + improved cough. No hematemesis. · Gastrointestinal: No abdominal pain, appetite loss, blood in stools. No change in bowel or bladder habits. · Genitourinary: No dysuria, trouble voiding, or hematuria. · Musculoskeletal: + L AKA  · Integumentary: No rash or pruritis. · Neurological: No headache, diplopia, change in muscle strength, numbness or tingling. · Psychiatric: No anxiety or depression. · Endocrine: No temperature intolerance. No excessive thirst, fluid intake, or urination. No tremor. · Hematologic/Lymphatic: No abnormal bruising or bleeding, blood clots or swollen lymph nodes.   · Allergic/Immunologic: No nasal congestion or hives. Objective:   /85   Pulse 94   Temp 97.5 °F (36.4 °C) (Oral)   Resp 18   Ht 5' 7\" (1.702 m)   Wt 252 lb 10.4 oz (114.6 kg)   SpO2 93%   BMI 39.57 kg/m²       Intake/Output Summary (Last 24 hours) at 6/7/2019 1451  Last data filed at 6/7/2019 1351  Gross per 24 hour   Intake 1920 ml   Output 1775 ml   Net 145 ml     Wt Readings from Last 3 Encounters:   06/07/19 252 lb 10.4 oz (114.6 kg)   02/27/19 235 lb (106.6 kg)   02/21/19 225 lb 15.5 oz (102.5 kg)       Physical Exam:  General: In no acute distress. Awake, alert, and oriented X4. Remains on O2. Resting in bed (refuses to get up)  Skin:  Warm and dry. No new appearing rashes or lesions. Neck:  Supple and thick. No carotid bruit appreciated. JVD hard to ascertain d/t body habitus  Chest:  Lungs with decreased breath sounds bilaterally. No wheezes/rhonchi/rales  Cardiovascular:  Irreg; normal S1 and S2; no murmur/gallop or rub   Abdomen: obese, nontender, +bowel sounds. Extremities: 1+ RLE edema; RLE with chronic stasis changes. 1+ R DP/PT pulses.  + L AKA    Medications:    potassium chloride  20 mEq Oral BID WC    acetaZOLAMIDE  250 mg Oral Daily    insulin lispro  0-18 Units Subcutaneous TID WC    insulin glargine  20 Units Subcutaneous Nightly    insulin lispro  8 Units Subcutaneous TID WC    lidocaine  1 patch Transdermal Daily    rivaroxaban  20 mg Oral Daily with breakfast    tamsulosin  0.4 mg Oral Daily    pantoprazole  40 mg Oral QAM AC    mometasone-formoterol  2 puff Inhalation BID    metoprolol succinate  150 mg Oral Daily    gabapentin  100 mg Oral TID    DULoxetine  30 mg Oral Daily    diltiazem  240 mg Oral Daily    atorvastatin  40 mg Oral Daily    amiodarone  200 mg Oral Daily    ipratropium-albuterol  1 ampule Inhalation Q4H WA    sodium chloride flush  10 mL Intravenous 2 times per day      dextrose       melatonin ER, glucose, dextrose, glucagon (rDNA), dextrose, traZODone, ipratropium-albuterol, sodium chloride flush, magnesium hydroxide, ondansetron, acetaminophen    Lab Data:  CBC:   Recent Labs     06/05/19  0523 06/06/19  0645 06/07/19  0516   WBC 11.1* 8.1 8.4   HGB 10.2* 10.2* 9.9*    202 200     BMP:    Recent Labs     06/05/19  0523 06/06/19  0645 06/07/19  0516    145 142   K 4.0 3.5 3.4*   CO2 34* 43* 40*   BUN 40* 30* 26*   CREATININE 1.0 0.9 0.8*     Results for Ulises Olivarez (MRN [id number]) as of 6/7/2019 14:59   Ref. Range 6/1/2019 16:51 6/6/2019 06:45   Pro-BNP Latest Ref Range: 0 - 124 pg/mL 3,334 (H) 2,366 (H)     2/20/2019 Cardioversion:  IMPRESSION:  Successful cardioversion using 200 joules of synchronized current.  Postprocedure ECG has been obtained.     Echo 10/17/2018:  Suboptimal image quality. Definity contrast administered.   Patient appears to be in atrial fibrillation.   Mild Conc. LVH with EF  45%.   The left atrium is mildly dilated.   Mild mitral regurgitation.   Normal RV size with mildly reduced systolic function.   Trivial pulmonic regurgitation present.     [facility] 2/8/2017:  OVERALL IMPRESSION:  1. Significantly elevated right heart pressures and capillary wedge pressures  consistent with congestive heart failure. 2. Normal cardiac output and indices.     [facility] 8/10/2015:  1. Right coronary artery arises from the right sinus Valsalva. It is a dominant artery, gives rise to the posterior descending and posterolateral branches. Right coronary artery and its branches are free of atherosclerosis.    2. Left main trunk arises from the right sinus Valsalva. It divides into the left and right descending artery and left circumflex artery. Left main trunk is free of atherosclerosis. 3. Left anterior descending artery: Moderate sized artery and descends into the intraventricular sulcus and wraps around the apex of the heart. The left anterior descending artery and its branches are free of atherosclerosis. 4. Left circumflex artery arises from the left main trunk. It is a moderate-sized artery. It gives rise to a moderate-sized obtuse marginal branch and is free of atherosclerosis. Left anterior descending artery as described earlier is also free of any atherosclerosis.    Left ventriculogram reveals a global left ventricular systolic function with estimated EF of approximately 20% with global hypokinesis. There was no mitral regurgitation seen.     Telemetry: A-fib with controlled VR    Assessment/Plan:    1. Acute on chronic systolic and diastolic HF/Nonischemic cardiomyopathy  -decompensated d/t noncompliance with diet and fluid restriction and recurrent atrial fibrillation  -has been off lasix d/t contractual alkalosis; diamox last 2 days  -still with SOB and edema  -d/w Dr. Shimon Rabago: will give dose of IV lasix today and metolazone to see if any further diuresis  -continue both BB and CCB (difficult to control HR in past)  -will consider addition of ACE-I pending renal function -low sodium diet and fluid restriction     2. Recurrent chronic atrial fibrillation  -remains in atrial fib (controlled VR)  -has longstanding H/O difficult to control atrial fib  -S/P DCCV in 2/2019 with return to SR (not sure when it recurred)  -will be difficult to maintain normal SR given his COPD and comorbidities  -continue amiodarone (rate control), diltiazem and toprol  -continue Xarelto (CHADS Vasc score: 4 for HTN, vascular disease, CHF and DM)     3. Untreated sleep apnea  -he continues to decline any further testing or use of CPAP     4. PAD  -S/P L AKA  -chronic stasis changes in RLE  -on statin     5. COPD  -uses nebulizer daily  -on home O2  -continues to smoke  -smoking cessation emphasized     6. ETOH use  -4-8 beers (most days of the week)  Reinforced ETOH cessation     7. Diabetes Mellitus  -on insulin  -BS improving  -Rx per Primary team     8. Morbid obesity  -weight loss encouraged    Very difficult situation. He is not wanting to participate in therapy or get out of bed.  He insists his mother will take care of things once he is discharged. Again discussed his lifestyle and changes that should be made in order to help keep him out of the hospital. He expresses no desire to change anything. He remains volume overloaded, so will dc diamox and after d/w Dr. Luisa Castro, will give dose of IV lasix and metolazone today to see if any enhanced diuresis.      Electronically signed by JOON Hernandez CNP on 6/7/2019 at 2:51 PM

## 2019-06-08 LAB
ANION GAP SERPL CALCULATED.3IONS-SCNC: 8 MMOL/L (ref 3–16)
BASOPHILS ABSOLUTE: 0 K/UL (ref 0–0.2)
BASOPHILS RELATIVE PERCENT: 0.4 %
BUN BLDV-MCNC: 22 MG/DL (ref 7–20)
CALCIUM SERPL-MCNC: 10.3 MG/DL (ref 8.3–10.6)
CHLORIDE BLD-SCNC: 94 MMOL/L (ref 99–110)
CO2: 40 MMOL/L (ref 21–32)
CREAT SERPL-MCNC: 0.8 MG/DL (ref 0.9–1.3)
EOSINOPHILS ABSOLUTE: 0.2 K/UL (ref 0–0.6)
EOSINOPHILS RELATIVE PERCENT: 2 %
GFR AFRICAN AMERICAN: >60
GFR NON-AFRICAN AMERICAN: >60
GLUCOSE BLD-MCNC: 152 MG/DL (ref 70–99)
GLUCOSE BLD-MCNC: 161 MG/DL (ref 70–99)
GLUCOSE BLD-MCNC: 200 MG/DL (ref 70–99)
GLUCOSE BLD-MCNC: 227 MG/DL (ref 70–99)
GLUCOSE BLD-MCNC: 229 MG/DL (ref 70–99)
HCT VFR BLD CALC: 33.1 % (ref 40.5–52.5)
HEMOGLOBIN: 10.5 G/DL (ref 13.5–17.5)
LYMPHOCYTES ABSOLUTE: 1.5 K/UL (ref 1–5.1)
LYMPHOCYTES RELATIVE PERCENT: 16.4 %
MCH RBC QN AUTO: 27.9 PG (ref 26–34)
MCHC RBC AUTO-ENTMCNC: 31.9 G/DL (ref 31–36)
MCV RBC AUTO: 87.5 FL (ref 80–100)
MONOCYTES ABSOLUTE: 0.9 K/UL (ref 0–1.3)
MONOCYTES RELATIVE PERCENT: 9.2 %
NEUTROPHILS ABSOLUTE: 6.7 K/UL (ref 1.7–7.7)
NEUTROPHILS RELATIVE PERCENT: 72 %
PDW BLD-RTO: 15.6 % (ref 12.4–15.4)
PERFORMED ON: ABNORMAL
PLATELET # BLD: 229 K/UL (ref 135–450)
PMV BLD AUTO: 9.2 FL (ref 5–10.5)
POTASSIUM REFLEX MAGNESIUM: 3.8 MMOL/L (ref 3.5–5.1)
RBC # BLD: 3.78 M/UL (ref 4.2–5.9)
SODIUM BLD-SCNC: 142 MMOL/L (ref 136–145)
WBC # BLD: 9.4 K/UL (ref 4–11)

## 2019-06-08 PROCEDURE — 94640 AIRWAY INHALATION TREATMENT: CPT

## 2019-06-08 PROCEDURE — 36415 COLL VENOUS BLD VENIPUNCTURE: CPT

## 2019-06-08 PROCEDURE — 2060000000 HC ICU INTERMEDIATE R&B

## 2019-06-08 PROCEDURE — 6370000000 HC RX 637 (ALT 250 FOR IP): Performed by: INTERNAL MEDICINE

## 2019-06-08 PROCEDURE — 6360000002 HC RX W HCPCS: Performed by: INTERNAL MEDICINE

## 2019-06-08 PROCEDURE — 6370000000 HC RX 637 (ALT 250 FOR IP): Performed by: NURSE PRACTITIONER

## 2019-06-08 PROCEDURE — 94760 N-INVAS EAR/PLS OXIMETRY 1: CPT

## 2019-06-08 PROCEDURE — 80048 BASIC METABOLIC PNL TOTAL CA: CPT

## 2019-06-08 PROCEDURE — 99232 SBSQ HOSP IP/OBS MODERATE 35: CPT | Performed by: INTERNAL MEDICINE

## 2019-06-08 PROCEDURE — 2700000000 HC OXYGEN THERAPY PER DAY

## 2019-06-08 PROCEDURE — 85025 COMPLETE CBC W/AUTO DIFF WBC: CPT

## 2019-06-08 PROCEDURE — 2580000003 HC RX 258: Performed by: INTERNAL MEDICINE

## 2019-06-08 RX ORDER — FUROSEMIDE 10 MG/ML
40 INJECTION INTRAMUSCULAR; INTRAVENOUS 2 TIMES DAILY
Status: COMPLETED | OUTPATIENT
Start: 2019-06-08 | End: 2019-06-09

## 2019-06-08 RX ORDER — METOLAZONE 5 MG/1
5 TABLET ORAL DAILY
Status: COMPLETED | OUTPATIENT
Start: 2019-06-08 | End: 2019-06-09

## 2019-06-08 RX ADMIN — POTASSIUM CHLORIDE 20 MEQ: 20 TABLET, EXTENDED RELEASE ORAL at 10:10

## 2019-06-08 RX ADMIN — INSULIN GLARGINE 20 UNITS: 100 INJECTION, SOLUTION SUBCUTANEOUS at 20:56

## 2019-06-08 RX ADMIN — ATORVASTATIN CALCIUM 40 MG: 40 TABLET, FILM COATED ORAL at 10:10

## 2019-06-08 RX ADMIN — PANTOPRAZOLE SODIUM 40 MG: 40 TABLET, DELAYED RELEASE ORAL at 10:17

## 2019-06-08 RX ADMIN — GABAPENTIN 100 MG: 100 CAPSULE ORAL at 13:19

## 2019-06-08 RX ADMIN — TAMSULOSIN HYDROCHLORIDE 0.4 MG: 0.4 CAPSULE ORAL at 10:10

## 2019-06-08 RX ADMIN — INSULIN LISPRO 6 UNITS: 100 INJECTION, SOLUTION INTRAVENOUS; SUBCUTANEOUS at 18:49

## 2019-06-08 RX ADMIN — DULOXETINE HYDROCHLORIDE 30 MG: 30 CAPSULE, DELAYED RELEASE ORAL at 10:10

## 2019-06-08 RX ADMIN — IPRATROPIUM BROMIDE AND ALBUTEROL SULFATE 1 AMPULE: .5; 3 SOLUTION RESPIRATORY (INHALATION) at 20:54

## 2019-06-08 RX ADMIN — INSULIN LISPRO 8 UNITS: 100 INJECTION, SOLUTION INTRAVENOUS; SUBCUTANEOUS at 13:20

## 2019-06-08 RX ADMIN — METOPROLOL SUCCINATE 150 MG: 50 TABLET, FILM COATED, EXTENDED RELEASE ORAL at 10:10

## 2019-06-08 RX ADMIN — DILTIAZEM HYDROCHLORIDE 240 MG: 240 CAPSULE, COATED, EXTENDED RELEASE ORAL at 10:10

## 2019-06-08 RX ADMIN — MOMETASONE FUROATE AND FORMOTEROL FUMARATE DIHYDRATE 2 PUFF: 100; 5 AEROSOL RESPIRATORY (INHALATION) at 08:31

## 2019-06-08 RX ADMIN — INSULIN LISPRO 8 UNITS: 100 INJECTION, SOLUTION INTRAVENOUS; SUBCUTANEOUS at 10:13

## 2019-06-08 RX ADMIN — IPRATROPIUM BROMIDE AND ALBUTEROL SULFATE 1 AMPULE: .5; 3 SOLUTION RESPIRATORY (INHALATION) at 08:31

## 2019-06-08 RX ADMIN — Medication 10 ML: at 21:00

## 2019-06-08 RX ADMIN — FUROSEMIDE 40 MG: 10 INJECTION, SOLUTION INTRAMUSCULAR; INTRAVENOUS at 18:49

## 2019-06-08 RX ADMIN — GABAPENTIN 100 MG: 100 CAPSULE ORAL at 20:56

## 2019-06-08 RX ADMIN — INSULIN LISPRO 6 UNITS: 100 INJECTION, SOLUTION INTRAVENOUS; SUBCUTANEOUS at 13:20

## 2019-06-08 RX ADMIN — IPRATROPIUM BROMIDE AND ALBUTEROL SULFATE 1 AMPULE: .5; 3 SOLUTION RESPIRATORY (INHALATION) at 12:31

## 2019-06-08 RX ADMIN — MOMETASONE FUROATE AND FORMOTEROL FUMARATE DIHYDRATE 2 PUFF: 100; 5 AEROSOL RESPIRATORY (INHALATION) at 20:55

## 2019-06-08 RX ADMIN — IPRATROPIUM BROMIDE AND ALBUTEROL SULFATE 1 AMPULE: .5; 3 SOLUTION RESPIRATORY (INHALATION) at 16:50

## 2019-06-08 RX ADMIN — INSULIN LISPRO 3 UNITS: 100 INJECTION, SOLUTION INTRAVENOUS; SUBCUTANEOUS at 10:12

## 2019-06-08 RX ADMIN — ACETAMINOPHEN 650 MG: 325 TABLET ORAL at 20:56

## 2019-06-08 RX ADMIN — FUROSEMIDE 40 MG: 10 INJECTION, SOLUTION INTRAMUSCULAR; INTRAVENOUS at 13:19

## 2019-06-08 RX ADMIN — METOLAZONE 5 MG: 5 TABLET ORAL at 13:19

## 2019-06-08 RX ADMIN — GABAPENTIN 100 MG: 100 CAPSULE ORAL at 10:10

## 2019-06-08 RX ADMIN — TRAZODONE HYDROCHLORIDE 50 MG: 50 TABLET ORAL at 20:56

## 2019-06-08 RX ADMIN — POTASSIUM CHLORIDE 20 MEQ: 20 TABLET, EXTENDED RELEASE ORAL at 18:53

## 2019-06-08 RX ADMIN — INSULIN LISPRO 8 UNITS: 100 INJECTION, SOLUTION INTRAVENOUS; SUBCUTANEOUS at 18:49

## 2019-06-08 RX ADMIN — RIVAROXABAN 20 MG: 20 TABLET, FILM COATED ORAL at 10:10

## 2019-06-08 RX ADMIN — AMIODARONE HYDROCHLORIDE 200 MG: 200 TABLET ORAL at 10:10

## 2019-06-08 ASSESSMENT — PAIN DESCRIPTION - PROGRESSION: CLINICAL_PROGRESSION: GRADUALLY IMPROVING

## 2019-06-08 ASSESSMENT — PAIN DESCRIPTION - ONSET: ONSET: ON-GOING

## 2019-06-08 ASSESSMENT — PAIN DESCRIPTION - FREQUENCY: FREQUENCY: CONTINUOUS

## 2019-06-08 ASSESSMENT — PAIN DESCRIPTION - DESCRIPTORS: DESCRIPTORS: ACHING

## 2019-06-08 ASSESSMENT — PAIN DESCRIPTION - PAIN TYPE: TYPE: CHRONIC PAIN

## 2019-06-08 ASSESSMENT — PAIN SCALES - GENERAL
PAINLEVEL_OUTOF10: 6
PAINLEVEL_OUTOF10: 8
PAINLEVEL_OUTOF10: 5

## 2019-06-08 ASSESSMENT — PAIN DESCRIPTION - LOCATION: LOCATION: BACK

## 2019-06-08 ASSESSMENT — PAIN DESCRIPTION - ORIENTATION: ORIENTATION: LOWER
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
## 2019-06-08 NOTE — PROGRESS NOTES
(Oral)   Resp 18   Ht 5' 7\" (1.702 m)   Wt 250 lb 3.6 oz (113.5 kg)   SpO2 92%   BMI 39.19 kg/m²       Intake/Output Summary (Last 24 hours) at 6/8/2019 1052  Last data filed at 6/8/2019 0859  Gross per 24 hour   Intake 840 ml   Output 3775 ml   Net -2935 ml     Wt Readings from Last 3 Encounters:   06/08/19 250 lb 3.6 oz (113.5 kg)   02/27/19 235 lb (106.6 kg)   02/21/19 225 lb 15.5 oz (102.5 kg)       Physical Exam:  Telemetry: Afib with v-rates 70-90's bpm.  General: In no acute distress. Awake, alert, and oriented X4. Remains on O2. Resting in bed (refuses to get up)  Skin:  Warm and dry. No new appearing rashes or lesions. Neck:  Supple and thick. No carotid bruit appreciated. JVD hard to ascertain d/t body habitus  Chest:  Lungs with decreased breath sounds bilaterally. No wheezes/rhonchi/rales  Cardiovascular:  Irreg; normal S2; no murmur/gallop or rub   Abdomen: obese, nontender, +bowel sounds. Extremities: 1+ RLE edema; RLE with chronic stasis changes. 1+ R DP/PT pulses.  + L AKA    Medications:    potassium chloride  20 mEq Oral BID WC    insulin lispro  0-18 Units Subcutaneous TID WC    insulin glargine  20 Units Subcutaneous Nightly    insulin lispro  8 Units Subcutaneous TID WC    lidocaine  1 patch Transdermal Daily    rivaroxaban  20 mg Oral Daily with breakfast    tamsulosin  0.4 mg Oral Daily    pantoprazole  40 mg Oral QAM AC    mometasone-formoterol  2 puff Inhalation BID    metoprolol succinate  150 mg Oral Daily    gabapentin  100 mg Oral TID    DULoxetine  30 mg Oral Daily    diltiazem  240 mg Oral Daily    atorvastatin  40 mg Oral Daily    amiodarone  200 mg Oral Daily    ipratropium-albuterol  1 ampule Inhalation Q4H WA    sodium chloride flush  10 mL Intravenous 2 times per day      dextrose       melatonin ER, glucose, dextrose, glucagon (rDNA), dextrose, traZODone, ipratropium-albuterol, sodium chloride flush, magnesium hydroxide, ondansetron, acetaminophen    Lab Data:  CBC:   Recent Labs     06/06/19  0645 06/07/19  0516 06/08/19  0539   WBC 8.1 8.4 9.4   HGB 10.2* 9.9* 10.5*    200 229     BMP:    Recent Labs     06/06/19  0645 06/07/19  0516 06/08/19  0539    142 142   K 3.5 3.4* 3.8   CO2 43* 40* 40*   BUN 30* 26* 22*   CREATININE 0.9 0.8* 0.8*     Results for Javier Ma (MRN [id number]) as of 6/7/2019 14:59   Ref. Range 6/1/2019 16:51 6/6/2019 06:45   Pro-BNP Latest Ref Range: 0 - 124 pg/mL 3,334 (H) 2,366 (H)     2/20/2019 Cardioversion:  IMPRESSION:  Successful cardioversion using 200 joules of synchronized current.  Postprocedure ECG has been obtained.     Echo 10/17/2018:  Suboptimal image quality. Definity contrast administered.   Patient appears to be in atrial fibrillation.   Mild Conc. LVH with EF  45%.   The left atrium is mildly dilated.   Mild mitral regurgitation.   Normal RV size with mildly reduced systolic function.   Trivial pulmonic regurgitation present.     [facility] 2/8/2017:  OVERALL IMPRESSION:  1. Significantly elevated right heart pressures and capillary wedge pressures  consistent with congestive heart failure. 2. Normal cardiac output and indices.     [facility] 8/10/2015:  1. Right coronary artery arises from the right sinus Valsalva. It is a dominant artery, gives rise to the posterior descending and posterolateral branches. Right coronary artery and its branches are free of atherosclerosis.    2. Left main trunk arises from the right sinus Valsalva. It divides into the left and right descending artery and left circumflex artery. Left main trunk is free of atherosclerosis. 3. Left anterior descending artery: Moderate sized artery and descends into the intraventricular sulcus and wraps around the apex of the heart. The left anterior descending artery and its branches are free of atherosclerosis. 4. Left circumflex artery arises from the left main trunk. It is a moderate-sized artery.  It gives rise to a moderate-sized obtuse marginal branch and is free of atherosclerosis. Left anterior descending artery as described earlier is also free of any atherosclerosis.    Left ventriculogram reveals a global left ventricular systolic function with estimated EF of approximately 20% with global hypokinesis. There was no mitral regurgitation seen.     Telemetry: A-fib with controlled VR    Assessment/Plan:    1. Acute on chronic systolic and diastolic HF/Nonischemic cardiomyopathy  -decompensated d/t noncompliance with diet and fluid restriction and recurrent atrial fibrillation  -has been off lasix d/t contractual alkalosis; diamox last 2 days  -still with SOB and edema  -d/w Dr. Marychuy Foreman: will give dose of IV lasix today and metolazone to see if any further diuresis  -continue both BB and CCB (difficult to control HR in past)  -will consider addition of ACE-I pending renal function -low sodium diet and fluid restriction     2. Recurrent chronic atrial fibrillation  -remains in atrial fib (controlled VR)  -has longstanding H/O difficult to control atrial fib  -S/P DCCV in 2/2019 with return to SR (not sure when it recurred)  -will be difficult to maintain normal SR given his COPD and comorbidities  -continue amiodarone (rate control), diltiazem and toprol  -continue Xarelto (CHADS Vasc score: 4 for HTN, vascular disease, CHF and DM)     3. Untreated sleep apnea  -he continues to decline any further testing or use of CPAP     4. PAD  -S/P L AKA  -chronic stasis changes in RLE  -on statin     5. COPD  -uses nebulizer daily  -on home O2  -continues to smoke  -smoking cessation emphasized     6. ETOH use  -4-8 beers (most days of the week)  Reinforced ETOH cessation     7. Diabetes Mellitus  -on insulin  -BS improving  -Rx per Primary team     8. Morbid obesity  -weight loss encouraged    Very difficult situation. He is not wanting to participate in therapy or get out of bed. He insists his mother will take care of things once he is discharged.  Again discussed his lifestyle and changes that should be made in order to help keep him out of the hospital. He expresses no desire to change anything. He remains volume overloaded, and Lasix IV and metolazone yesterday very effective. Will continue the same today and tomorrow.       Electronically signed by Damari Levy MD on 6/8/2019 at 10:52 AM

## 2019-06-09 LAB
ANION GAP SERPL CALCULATED.3IONS-SCNC: 8 MMOL/L (ref 3–16)
BASOPHILS ABSOLUTE: 0 K/UL (ref 0–0.2)
BASOPHILS RELATIVE PERCENT: 0.5 %
BUN BLDV-MCNC: 21 MG/DL (ref 7–20)
CALCIUM SERPL-MCNC: 10.6 MG/DL (ref 8.3–10.6)
CHLORIDE BLD-SCNC: 90 MMOL/L (ref 99–110)
CO2: 42 MMOL/L (ref 21–32)
CREAT SERPL-MCNC: 0.7 MG/DL (ref 0.9–1.3)
EOSINOPHILS ABSOLUTE: 0.2 K/UL (ref 0–0.6)
EOSINOPHILS RELATIVE PERCENT: 1.8 %
GFR AFRICAN AMERICAN: >60
GFR NON-AFRICAN AMERICAN: >60
GLUCOSE BLD-MCNC: 191 MG/DL (ref 70–99)
GLUCOSE BLD-MCNC: 193 MG/DL (ref 70–99)
GLUCOSE BLD-MCNC: 225 MG/DL (ref 70–99)
GLUCOSE BLD-MCNC: 276 MG/DL (ref 70–99)
GLUCOSE BLD-MCNC: 340 MG/DL (ref 70–99)
HCT VFR BLD CALC: 32.3 % (ref 40.5–52.5)
HEMOGLOBIN: 10.7 G/DL (ref 13.5–17.5)
LYMPHOCYTES ABSOLUTE: 1.2 K/UL (ref 1–5.1)
LYMPHOCYTES RELATIVE PERCENT: 14.2 %
MAGNESIUM: 1.9 MG/DL (ref 1.8–2.4)
MCH RBC QN AUTO: 28.8 PG (ref 26–34)
MCHC RBC AUTO-ENTMCNC: 33 G/DL (ref 31–36)
MCV RBC AUTO: 87 FL (ref 80–100)
MONOCYTES ABSOLUTE: 0.9 K/UL (ref 0–1.3)
MONOCYTES RELATIVE PERCENT: 10.5 %
NEUTROPHILS ABSOLUTE: 6.1 K/UL (ref 1.7–7.7)
NEUTROPHILS RELATIVE PERCENT: 73 %
PDW BLD-RTO: 15.3 % (ref 12.4–15.4)
PERFORMED ON: ABNORMAL
PLATELET # BLD: 225 K/UL (ref 135–450)
PMV BLD AUTO: 8.9 FL (ref 5–10.5)
POTASSIUM REFLEX MAGNESIUM: 3.3 MMOL/L (ref 3.5–5.1)
RBC # BLD: 3.72 M/UL (ref 4.2–5.9)
SODIUM BLD-SCNC: 140 MMOL/L (ref 136–145)
WBC # BLD: 8.4 K/UL (ref 4–11)

## 2019-06-09 PROCEDURE — 6370000000 HC RX 637 (ALT 250 FOR IP): Performed by: NURSE PRACTITIONER

## 2019-06-09 PROCEDURE — 85025 COMPLETE CBC W/AUTO DIFF WBC: CPT

## 2019-06-09 PROCEDURE — 6360000002 HC RX W HCPCS: Performed by: INTERNAL MEDICINE

## 2019-06-09 PROCEDURE — 94640 AIRWAY INHALATION TREATMENT: CPT

## 2019-06-09 PROCEDURE — 6370000000 HC RX 637 (ALT 250 FOR IP): Performed by: INTERNAL MEDICINE

## 2019-06-09 PROCEDURE — 36415 COLL VENOUS BLD VENIPUNCTURE: CPT

## 2019-06-09 PROCEDURE — 94761 N-INVAS EAR/PLS OXIMETRY MLT: CPT

## 2019-06-09 PROCEDURE — 99233 SBSQ HOSP IP/OBS HIGH 50: CPT | Performed by: INTERNAL MEDICINE

## 2019-06-09 PROCEDURE — 2060000000 HC ICU INTERMEDIATE R&B

## 2019-06-09 PROCEDURE — 80048 BASIC METABOLIC PNL TOTAL CA: CPT

## 2019-06-09 PROCEDURE — 2700000000 HC OXYGEN THERAPY PER DAY

## 2019-06-09 PROCEDURE — 2580000003 HC RX 258: Performed by: INTERNAL MEDICINE

## 2019-06-09 PROCEDURE — 83735 ASSAY OF MAGNESIUM: CPT

## 2019-06-09 RX ORDER — POTASSIUM CHLORIDE 20 MEQ/1
40 TABLET, EXTENDED RELEASE ORAL 2 TIMES DAILY WITH MEALS
Status: DISCONTINUED | OUTPATIENT
Start: 2019-06-09 | End: 2019-06-11 | Stop reason: HOSPADM

## 2019-06-09 RX ORDER — INSULIN GLARGINE 100 [IU]/ML
24 INJECTION, SOLUTION SUBCUTANEOUS NIGHTLY
Status: DISCONTINUED | OUTPATIENT
Start: 2019-06-09 | End: 2019-06-10

## 2019-06-09 RX ORDER — ACETAZOLAMIDE 250 MG/1
250 TABLET ORAL ONCE
Status: COMPLETED | OUTPATIENT
Start: 2019-06-09 | End: 2019-06-09

## 2019-06-09 RX ADMIN — INSULIN LISPRO 8 UNITS: 100 INJECTION, SOLUTION INTRAVENOUS; SUBCUTANEOUS at 09:33

## 2019-06-09 RX ADMIN — TRAZODONE HYDROCHLORIDE 50 MG: 50 TABLET ORAL at 22:19

## 2019-06-09 RX ADMIN — GABAPENTIN 100 MG: 100 CAPSULE ORAL at 09:31

## 2019-06-09 RX ADMIN — Medication 10 ML: at 17:27

## 2019-06-09 RX ADMIN — Medication 1 MG: at 22:42

## 2019-06-09 RX ADMIN — INSULIN LISPRO 6 UNITS: 100 INJECTION, SOLUTION INTRAVENOUS; SUBCUTANEOUS at 12:28

## 2019-06-09 RX ADMIN — IPRATROPIUM BROMIDE AND ALBUTEROL SULFATE 1 AMPULE: .5; 3 SOLUTION RESPIRATORY (INHALATION) at 19:43

## 2019-06-09 RX ADMIN — INSULIN GLARGINE 24 UNITS: 100 INJECTION, SOLUTION SUBCUTANEOUS at 22:19

## 2019-06-09 RX ADMIN — METOLAZONE 5 MG: 5 TABLET ORAL at 09:31

## 2019-06-09 RX ADMIN — FUROSEMIDE 40 MG: 10 INJECTION, SOLUTION INTRAMUSCULAR; INTRAVENOUS at 17:24

## 2019-06-09 RX ADMIN — MOMETASONE FUROATE AND FORMOTEROL FUMARATE DIHYDRATE 2 PUFF: 100; 5 AEROSOL RESPIRATORY (INHALATION) at 19:43

## 2019-06-09 RX ADMIN — Medication 10 ML: at 21:02

## 2019-06-09 RX ADMIN — MOMETASONE FUROATE AND FORMOTEROL FUMARATE DIHYDRATE 2 PUFF: 100; 5 AEROSOL RESPIRATORY (INHALATION) at 08:36

## 2019-06-09 RX ADMIN — IPRATROPIUM BROMIDE AND ALBUTEROL SULFATE 1 AMPULE: .5; 3 SOLUTION RESPIRATORY (INHALATION) at 08:36

## 2019-06-09 RX ADMIN — AMIODARONE HYDROCHLORIDE 200 MG: 200 TABLET ORAL at 09:31

## 2019-06-09 RX ADMIN — INSULIN LISPRO 10 UNITS: 100 INJECTION, SOLUTION INTRAVENOUS; SUBCUTANEOUS at 17:25

## 2019-06-09 RX ADMIN — GABAPENTIN 100 MG: 100 CAPSULE ORAL at 12:28

## 2019-06-09 RX ADMIN — METOPROLOL SUCCINATE 150 MG: 50 TABLET, FILM COATED, EXTENDED RELEASE ORAL at 09:31

## 2019-06-09 RX ADMIN — PANTOPRAZOLE SODIUM 40 MG: 40 TABLET, DELAYED RELEASE ORAL at 06:13

## 2019-06-09 RX ADMIN — DILTIAZEM HYDROCHLORIDE 240 MG: 240 CAPSULE, COATED, EXTENDED RELEASE ORAL at 09:31

## 2019-06-09 RX ADMIN — ACETAZOLAMIDE 250 MG: 250 TABLET ORAL at 12:28

## 2019-06-09 RX ADMIN — INSULIN LISPRO 8 UNITS: 100 INJECTION, SOLUTION INTRAVENOUS; SUBCUTANEOUS at 12:29

## 2019-06-09 RX ADMIN — ATORVASTATIN CALCIUM 40 MG: 40 TABLET, FILM COATED ORAL at 09:31

## 2019-06-09 RX ADMIN — DULOXETINE HYDROCHLORIDE 30 MG: 30 CAPSULE, DELAYED RELEASE ORAL at 09:31

## 2019-06-09 RX ADMIN — POTASSIUM CHLORIDE 20 MEQ: 20 TABLET, EXTENDED RELEASE ORAL at 09:31

## 2019-06-09 RX ADMIN — FUROSEMIDE 40 MG: 10 INJECTION, SOLUTION INTRAMUSCULAR; INTRAVENOUS at 09:31

## 2019-06-09 RX ADMIN — GABAPENTIN 100 MG: 100 CAPSULE ORAL at 21:01

## 2019-06-09 RX ADMIN — INSULIN LISPRO 9 UNITS: 100 INJECTION, SOLUTION INTRAVENOUS; SUBCUTANEOUS at 17:24

## 2019-06-09 RX ADMIN — POTASSIUM CHLORIDE 40 MEQ: 20 TABLET, EXTENDED RELEASE ORAL at 17:24

## 2019-06-09 RX ADMIN — ACETAMINOPHEN 650 MG: 325 TABLET ORAL at 22:19

## 2019-06-09 RX ADMIN — RIVAROXABAN 20 MG: 20 TABLET, FILM COATED ORAL at 09:31

## 2019-06-09 RX ADMIN — TAMSULOSIN HYDROCHLORIDE 0.4 MG: 0.4 CAPSULE ORAL at 09:31

## 2019-06-09 RX ADMIN — IPRATROPIUM BROMIDE AND ALBUTEROL SULFATE 1 AMPULE: .5; 3 SOLUTION RESPIRATORY (INHALATION) at 17:01

## 2019-06-09 RX ADMIN — INSULIN LISPRO 3 UNITS: 100 INJECTION, SOLUTION INTRAVENOUS; SUBCUTANEOUS at 09:32

## 2019-06-09 ASSESSMENT — PAIN SCALES - GENERAL
PAINLEVEL_OUTOF10: 8
PAINLEVEL_OUTOF10: 6
PAINLEVEL_OUTOF10: 6
PAINLEVEL_OUTOF10: 8

## 2019-06-09 ASSESSMENT — PAIN DESCRIPTION - ORIENTATION
ORIENTATION: LOWER
ORIENTATION: LOWER

## 2019-06-09 ASSESSMENT — PAIN DESCRIPTION - LOCATION
LOCATION: BACK

## 2019-06-09 ASSESSMENT — PAIN DESCRIPTION - DESCRIPTORS: DESCRIPTORS: ACHING

## 2019-06-09 ASSESSMENT — PAIN - FUNCTIONAL ASSESSMENT: PAIN_FUNCTIONAL_ASSESSMENT: ACTIVITIES ARE NOT PREVENTED

## 2019-06-09 ASSESSMENT — PAIN DESCRIPTION - FREQUENCY: FREQUENCY: CONTINUOUS

## 2019-06-09 ASSESSMENT — PAIN DESCRIPTION - PAIN TYPE
TYPE: CHRONIC PAIN

## 2019-06-09 ASSESSMENT — PAIN DESCRIPTION - ONSET: ONSET: ON-GOING

## 2019-06-09 ASSESSMENT — PAIN DESCRIPTION - PROGRESSION: CLINICAL_PROGRESSION: NOT CHANGED

## 2019-06-09 NOTE — PLAN OF CARE
Problem: Falls - Risk of:  Goal: Will remain free from falls  Description  Will remain free from falls  Outcome: Ongoing  Goal: Absence of physical injury  Description  Absence of physical injury  Outcome: Ongoing     Problem: OXYGENATION/RESPIRATORY FUNCTION  Goal: Patient will maintain patent airway  Outcome: Ongoing  Goal: Patient will achieve/maintain normal respiratory rate/effort  Description  Respiratory rate and effort will be within normal limits for the patient  Outcome: Ongoing     Problem: HEMODYNAMIC STATUS  Goal: Patient has stable vital signs and fluid balance  Outcome: Ongoing     Problem: FLUID AND ELECTROLYTE IMBALANCE  Goal: Fluid and electrolyte balance are achieved/maintained  Outcome: Ongoing

## 2019-06-09 NOTE — PROGRESS NOTES
[facility]  Inpatient Progress Note    CC:         CHF         HPI:     60 y/o male with PMH significant for morbid obesity, CAD, PAD, nonischemic cardiomyopathy, CHF, COPD, HLP,  CKD, diabetes, untreated sleep apnea, PAF (S/P DCCV in 2/2019) and noncompliance who presented with c/o SOB, wt gain, hypoxia and chest pain. Wt is up ~ 25-30# since last admit in 2/2019. Given IV diuretic and slowly diuresed, but held d/t contractual alkalosis. Received Diamox yesterday . + smoker and + ETOH     Interval History:   Continues to c/o SOB without much change  Denies chest pain, productive cough, palpitations or dizziness  Fluid Balance -54509  Net loss -2930 yesterday    Review of Systems -   Constitutional: Negative for weight gain/loss; malaise, fever  Respiratory: Negative for Asthma;  cough and hemoptysis  Cardiovascular: Negative for palpitations,dizziness   Gastrointestinal: Negative for abd.pain; constipation/diarrhea;    Genitourinary: Negative for stones; hematuria; frequency hesitancy  Integumentt: Negative for rash or pruritis  Hematologic/lymphatic: Negative for blood dyscrasia; leukemia/lymphoma  Musculoskeletal: Negative for Connective tissue disease  Neurological:  Negative for Seizure   Behavioral/Psych:Negative for Bipolar disorder, Schizophrenia; Dementia  Endocrine: negative for thyroid, parathyroid disease      Intake/Output Summary (Last 24 hours) at 6/9/2019 1143  Last data filed at 6/9/2019 1039  Gross per 24 hour   Intake 720 ml   Output 4235 ml   Net -3515 ml         Physical Examination:    BP (!) 168/92   Pulse 77   Temp 98.4 °F (36.9 °C)   Resp 22   Ht 5' 7\" (1.702 m)   Wt 247 lb 2.2 oz (112.1 kg)   SpO2 92%   BMI 38.71 kg/m²   HEENT:  Face: Atraumatic, Conjunctiva: Pink; non icteric,  Mucous Memb:  Moist, No thyromegaly or Lymphadenopathy  Respiratory:  Resp Assessment: normal, Resp Auscultation: clear in front   Cardiovascular: Auscultation: nl S1 & S2, Palpation:  Nl PMI;  No heaves or thrills, JVP:  normal  Abdomen: Soft, non-tender, Normal bowel sounds,  No organomegaly  Extremities: No Cyanosis or Clubbing;   Neurological: Oriented to time, place, and person, Non-anxious  Psychiatric: Normal mood and affect  Skin: Warm and dry,  No rash seen    Current Facility-Administered Medications: potassium chloride (KLOR-CON M) extended release tablet 40 mEq, 40 mEq, Oral, BID WC  acetaZOLAMIDE (DIAMOX) tablet 250 mg, 250 mg, Oral, Once  furosemide (LASIX) injection 40 mg, 40 mg, Intravenous, BID  melatonin ER tablet 1 mg, 1 mg, Oral, Nightly PRN  glucose (GLUTOSE) 40 % oral gel 15 g, 15 g, Oral, PRN  dextrose 50 % IV solution, 12.5 g, Intravenous, PRN  glucagon (rDNA) injection 1 mg, 1 mg, Intramuscular, PRN  dextrose 5 % solution, 100 mL/hr, Intravenous, PRN  insulin lispro (HUMALOG) injection vial 0-18 Units, 0-18 Units, Subcutaneous, TID   insulin glargine (LANTUS) injection vial 20 Units, 20 Units, Subcutaneous, Nightly  insulin lispro (HUMALOG) injection vial 8 Units, 8 Units, Subcutaneous, TID WC  lidocaine 4 % external patch 1 patch, 1 patch, Transdermal, Daily  rivaroxaban (XARELTO) tablet 20 mg, 20 mg, Oral, Daily with breakfast  traZODone (DESYREL) tablet 50 mg, 50 mg, Oral, Nightly PRN  tamsulosin (FLOMAX) capsule 0.4 mg, 0.4 mg, Oral, Daily  pantoprazole (PROTONIX) tablet 40 mg, 40 mg, Oral, QAM AC  mometasone-formoterol (DULERA) 100-5 MCG/ACT inhaler 2 puff, 2 puff, Inhalation, BID  metoprolol succinate (TOPROL XL) extended release tablet 150 mg, 150 mg, Oral, Daily  gabapentin (NEURONTIN) capsule 100 mg, 100 mg, Oral, TID  DULoxetine (CYMBALTA) extended release capsule 30 mg, 30 mg, Oral, Daily  diltiazem (CARDIZEM CD) extended release capsule 240 mg, 240 mg, Oral, Daily  atorvastatin (LIPITOR) tablet 40 mg, 40 mg, Oral, Daily  amiodarone (CORDARONE) tablet 200 mg, 200 mg, Oral, Daily  ipratropium-albuterol (DUONEB) nebulizer solution 1 ampule, 1 ampule, Inhalation, Q2H PRN  ipratropium-albuterol (DUONEB) nebulizer solution 1 ampule, 1 ampule, Inhalation, Q4H WA  sodium chloride flush 0.9 % injection 10 mL, 10 mL, Intravenous, 2 times per day  sodium chloride flush 0.9 % injection 10 mL, 10 mL, Intravenous, PRN  magnesium hydroxide (MILK OF MAGNESIA) 400 MG/5ML suspension 30 mL, 30 mL, Oral, Daily PRN  ondansetron (ZOFRAN) injection 4 mg, 4 mg, Intravenous, Q4H PRN  acetaminophen (TYLENOL) tablet 650 mg, 650 mg, Oral, Q4H PRN         Labs:   Recent Labs     06/08/19  0539 06/09/19  0639   WBC 9.4 8.4   HGB 10.5* 10.7*   HCT 33.1* 32.3*    225     Recent Labs     06/08/19  0539 06/09/19  0639    140   K 3.8 3.3*   CO2 40* 42*   BUN 22* 21*   CREATININE 0.8* 0.7*   LABGLOM >60 >60       2/20/2019 Cardioversion:  Successful cardioversion using 200 joules of synchronized current.  Postprocedure ECG has been obtained.     Echo 10/17/2018:  Suboptimal image quality. Definity contrast administered.   Patient appears to be in atrial fibrillation.   Mild Conc. LVH with EF  45%.   The left atrium is mildly dilated.   Mild mitral regurgitation.   Normal RV size with mildly reduced systolic function.   Trivial pulmonic regurgitation present.     [facility] 2/8/2017:  OVERALL IMPRESSION:  1. Significantly elevated right heart pressures and capillary wedge pressures  consistent with congestive heart failure. 2. Normal cardiac output and indices.     [facility] 8/10/2015:  1. Right coronary artery arises from the right sinus Valsalva. It is a dominant artery, gives rise to the posterior descending and posterolateral branches. Right coronary artery and its branches are free of atherosclerosis.    2. Left main trunk arises from the right sinus Valsalva. It divides into the left and right descending artery and left circumflex artery. Left main trunk is free of atherosclerosis. 3. Left anterior descending artery:  Moderate sized artery and descends into the intraventricular sulcus and wraps around
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
## 2019-06-10 LAB
ANION GAP SERPL CALCULATED.3IONS-SCNC: 7 MMOL/L (ref 3–16)
BASOPHILS ABSOLUTE: 0 K/UL (ref 0–0.2)
BASOPHILS RELATIVE PERCENT: 0.5 %
BUN BLDV-MCNC: 21 MG/DL (ref 7–20)
CALCIUM SERPL-MCNC: 10.3 MG/DL (ref 8.3–10.6)
CHLORIDE BLD-SCNC: 88 MMOL/L (ref 99–110)
CO2: 42 MMOL/L (ref 21–32)
CREAT SERPL-MCNC: 0.9 MG/DL (ref 0.9–1.3)
EOSINOPHILS ABSOLUTE: 0.1 K/UL (ref 0–0.6)
EOSINOPHILS RELATIVE PERCENT: 1.6 %
GFR AFRICAN AMERICAN: >60
GFR NON-AFRICAN AMERICAN: >60
GLUCOSE BLD-MCNC: 207 MG/DL (ref 70–99)
GLUCOSE BLD-MCNC: 227 MG/DL (ref 70–99)
GLUCOSE BLD-MCNC: 236 MG/DL (ref 70–99)
GLUCOSE BLD-MCNC: 237 MG/DL (ref 70–99)
GLUCOSE BLD-MCNC: 290 MG/DL (ref 70–99)
HCT VFR BLD CALC: 32.6 % (ref 40.5–52.5)
HEMOGLOBIN: 10.5 G/DL (ref 13.5–17.5)
LYMPHOCYTES ABSOLUTE: 1.3 K/UL (ref 1–5.1)
LYMPHOCYTES RELATIVE PERCENT: 16.4 %
MAGNESIUM: 2 MG/DL (ref 1.8–2.4)
MCH RBC QN AUTO: 27.6 PG (ref 26–34)
MCHC RBC AUTO-ENTMCNC: 32.4 G/DL (ref 31–36)
MCV RBC AUTO: 85.3 FL (ref 80–100)
MONOCYTES ABSOLUTE: 1.1 K/UL (ref 0–1.3)
MONOCYTES RELATIVE PERCENT: 13.7 %
NEUTROPHILS ABSOLUTE: 5.5 K/UL (ref 1.7–7.7)
NEUTROPHILS RELATIVE PERCENT: 67.8 %
PDW BLD-RTO: 15.7 % (ref 12.4–15.4)
PERFORMED ON: ABNORMAL
PLATELET # BLD: 242 K/UL (ref 135–450)
PMV BLD AUTO: 8.9 FL (ref 5–10.5)
POTASSIUM REFLEX MAGNESIUM: 3.2 MMOL/L (ref 3.5–5.1)
PRO-BNP: 1402 PG/ML (ref 0–124)
RBC # BLD: 3.81 M/UL (ref 4.2–5.9)
SODIUM BLD-SCNC: 137 MMOL/L (ref 136–145)
WBC # BLD: 8.2 K/UL (ref 4–11)

## 2019-06-10 PROCEDURE — 6370000000 HC RX 637 (ALT 250 FOR IP): Performed by: INTERNAL MEDICINE

## 2019-06-10 PROCEDURE — 83735 ASSAY OF MAGNESIUM: CPT

## 2019-06-10 PROCEDURE — 85025 COMPLETE CBC W/AUTO DIFF WBC: CPT

## 2019-06-10 PROCEDURE — 2700000000 HC OXYGEN THERAPY PER DAY

## 2019-06-10 PROCEDURE — 6370000000 HC RX 637 (ALT 250 FOR IP): Performed by: NURSE PRACTITIONER

## 2019-06-10 PROCEDURE — 2060000000 HC ICU INTERMEDIATE R&B

## 2019-06-10 PROCEDURE — 94640 AIRWAY INHALATION TREATMENT: CPT

## 2019-06-10 PROCEDURE — 94760 N-INVAS EAR/PLS OXIMETRY 1: CPT

## 2019-06-10 PROCEDURE — 83880 ASSAY OF NATRIURETIC PEPTIDE: CPT

## 2019-06-10 PROCEDURE — 80048 BASIC METABOLIC PNL TOTAL CA: CPT

## 2019-06-10 PROCEDURE — 36415 COLL VENOUS BLD VENIPUNCTURE: CPT

## 2019-06-10 PROCEDURE — 99232 SBSQ HOSP IP/OBS MODERATE 35: CPT | Performed by: NURSE PRACTITIONER

## 2019-06-10 PROCEDURE — 6360000002 HC RX W HCPCS: Performed by: NURSE PRACTITIONER

## 2019-06-10 PROCEDURE — 2580000003 HC RX 258: Performed by: INTERNAL MEDICINE

## 2019-06-10 RX ORDER — POTASSIUM CHLORIDE 750 MG/1
40 TABLET, FILM COATED, EXTENDED RELEASE ORAL
Status: DISCONTINUED | OUTPATIENT
Start: 2019-06-10 | End: 2019-06-10 | Stop reason: DRUGHIGH

## 2019-06-10 RX ORDER — FUROSEMIDE 10 MG/ML
40 INJECTION INTRAMUSCULAR; INTRAVENOUS ONCE
Status: COMPLETED | OUTPATIENT
Start: 2019-06-10 | End: 2019-06-10

## 2019-06-10 RX ORDER — TORSEMIDE 20 MG/1
40 TABLET ORAL DAILY
Status: DISCONTINUED | OUTPATIENT
Start: 2019-06-11 | End: 2019-06-10

## 2019-06-10 RX ORDER — INSULIN GLARGINE 100 [IU]/ML
35 INJECTION, SOLUTION SUBCUTANEOUS NIGHTLY
Status: DISCONTINUED | OUTPATIENT
Start: 2019-06-10 | End: 2019-06-11 | Stop reason: HOSPADM

## 2019-06-10 RX ORDER — TORSEMIDE 20 MG/1
40 TABLET ORAL 2 TIMES DAILY
Status: DISCONTINUED | OUTPATIENT
Start: 2019-06-11 | End: 2019-06-11 | Stop reason: HOSPADM

## 2019-06-10 RX ORDER — ACETAZOLAMIDE 250 MG/1
250 TABLET ORAL ONCE
Status: COMPLETED | OUTPATIENT
Start: 2019-06-10 | End: 2019-06-10

## 2019-06-10 RX ORDER — POTASSIUM CHLORIDE 750 MG/1
40 TABLET, FILM COATED, EXTENDED RELEASE ORAL ONCE
Status: COMPLETED | OUTPATIENT
Start: 2019-06-10 | End: 2019-06-10

## 2019-06-10 RX ORDER — SPIRONOLACTONE 25 MG/1
25 TABLET ORAL DAILY
Status: DISCONTINUED | OUTPATIENT
Start: 2019-06-10 | End: 2019-06-11 | Stop reason: HOSPADM

## 2019-06-10 RX ADMIN — Medication 10 ML: at 21:00

## 2019-06-10 RX ADMIN — IPRATROPIUM BROMIDE AND ALBUTEROL SULFATE 1 AMPULE: .5; 3 SOLUTION RESPIRATORY (INHALATION) at 19:59

## 2019-06-10 RX ADMIN — TAMSULOSIN HYDROCHLORIDE 0.4 MG: 0.4 CAPSULE ORAL at 08:23

## 2019-06-10 RX ADMIN — INSULIN LISPRO 6 UNITS: 100 INJECTION, SOLUTION INTRAVENOUS; SUBCUTANEOUS at 16:56

## 2019-06-10 RX ADMIN — PANTOPRAZOLE SODIUM 40 MG: 40 TABLET, DELAYED RELEASE ORAL at 06:17

## 2019-06-10 RX ADMIN — GABAPENTIN 100 MG: 100 CAPSULE ORAL at 15:31

## 2019-06-10 RX ADMIN — GABAPENTIN 100 MG: 100 CAPSULE ORAL at 08:22

## 2019-06-10 RX ADMIN — DILTIAZEM HYDROCHLORIDE 240 MG: 240 CAPSULE, COATED, EXTENDED RELEASE ORAL at 08:22

## 2019-06-10 RX ADMIN — Medication 1 MG: at 22:58

## 2019-06-10 RX ADMIN — INSULIN LISPRO 6 UNITS: 100 INJECTION, SOLUTION INTRAVENOUS; SUBCUTANEOUS at 12:48

## 2019-06-10 RX ADMIN — GABAPENTIN 100 MG: 100 CAPSULE ORAL at 21:27

## 2019-06-10 RX ADMIN — AMIODARONE HYDROCHLORIDE 200 MG: 200 TABLET ORAL at 08:22

## 2019-06-10 RX ADMIN — FUROSEMIDE 40 MG: 10 INJECTION, SOLUTION INTRAMUSCULAR; INTRAVENOUS at 10:45

## 2019-06-10 RX ADMIN — ACETAZOLAMIDE 250 MG: 250 TABLET ORAL at 11:14

## 2019-06-10 RX ADMIN — Medication 10 ML: at 08:25

## 2019-06-10 RX ADMIN — POTASSIUM CHLORIDE 40 MEQ: 750 TABLET, EXTENDED RELEASE ORAL at 10:46

## 2019-06-10 RX ADMIN — SPIRONOLACTONE 25 MG: 25 TABLET, FILM COATED ORAL at 10:45

## 2019-06-10 RX ADMIN — IPRATROPIUM BROMIDE AND ALBUTEROL SULFATE 1 AMPULE: .5; 3 SOLUTION RESPIRATORY (INHALATION) at 08:46

## 2019-06-10 RX ADMIN — ACETAMINOPHEN 650 MG: 325 TABLET ORAL at 21:31

## 2019-06-10 RX ADMIN — POTASSIUM CHLORIDE 40 MEQ: 20 TABLET, EXTENDED RELEASE ORAL at 16:56

## 2019-06-10 RX ADMIN — DULOXETINE HYDROCHLORIDE 30 MG: 30 CAPSULE, DELAYED RELEASE ORAL at 08:22

## 2019-06-10 RX ADMIN — INSULIN LISPRO 12 UNITS: 100 INJECTION, SOLUTION INTRAVENOUS; SUBCUTANEOUS at 16:58

## 2019-06-10 RX ADMIN — ATORVASTATIN CALCIUM 40 MG: 40 TABLET, FILM COATED ORAL at 08:22

## 2019-06-10 RX ADMIN — MOMETASONE FUROATE AND FORMOTEROL FUMARATE DIHYDRATE 2 PUFF: 100; 5 AEROSOL RESPIRATORY (INHALATION) at 20:00

## 2019-06-10 RX ADMIN — METOPROLOL SUCCINATE 150 MG: 50 TABLET, FILM COATED, EXTENDED RELEASE ORAL at 08:23

## 2019-06-10 RX ADMIN — INSULIN GLARGINE 35 UNITS: 100 INJECTION, SOLUTION SUBCUTANEOUS at 21:26

## 2019-06-10 RX ADMIN — INSULIN LISPRO 12 UNITS: 100 INJECTION, SOLUTION INTRAVENOUS; SUBCUTANEOUS at 12:46

## 2019-06-10 RX ADMIN — INSULIN LISPRO 10 UNITS: 100 INJECTION, SOLUTION INTRAVENOUS; SUBCUTANEOUS at 08:23

## 2019-06-10 RX ADMIN — RIVAROXABAN 20 MG: 20 TABLET, FILM COATED ORAL at 08:22

## 2019-06-10 RX ADMIN — MOMETASONE FUROATE AND FORMOTEROL FUMARATE DIHYDRATE 2 PUFF: 100; 5 AEROSOL RESPIRATORY (INHALATION) at 08:46

## 2019-06-10 RX ADMIN — INSULIN LISPRO 6 UNITS: 100 INJECTION, SOLUTION INTRAVENOUS; SUBCUTANEOUS at 08:23

## 2019-06-10 RX ADMIN — TRAZODONE HYDROCHLORIDE 50 MG: 50 TABLET ORAL at 21:27

## 2019-06-10 RX ADMIN — POTASSIUM CHLORIDE 40 MEQ: 20 TABLET, EXTENDED RELEASE ORAL at 08:22

## 2019-06-10 ASSESSMENT — PAIN DESCRIPTION - LOCATION
LOCATION: BACK
LOCATION: BACK

## 2019-06-10 ASSESSMENT — PAIN SCALES - GENERAL
PAINLEVEL_OUTOF10: 6
PAINLEVEL_OUTOF10: 0
PAINLEVEL_OUTOF10: 6
PAINLEVEL_OUTOF10: 6
PAINLEVEL_OUTOF10: 0
PAINLEVEL_OUTOF10: 0

## 2019-06-10 ASSESSMENT — PAIN DESCRIPTION - DESCRIPTORS
DESCRIPTORS: ACHING
DESCRIPTORS: ACHING

## 2019-06-10 ASSESSMENT — PAIN DESCRIPTION - ORIENTATION
ORIENTATION: LOWER
ORIENTATION: LOWER

## 2019-06-10 ASSESSMENT — PAIN DESCRIPTION - PAIN TYPE
TYPE: CHRONIC PAIN
TYPE: CHRONIC PAIN

## 2019-06-10 ASSESSMENT — PAIN DESCRIPTION - PROGRESSION: CLINICAL_PROGRESSION: NOT CHANGED

## 2019-06-10 NOTE — PLAN OF CARE
Problem: Falls - Risk of:  Goal: Will remain free from falls  Description  Will remain free from falls  6/10/2019 0829 by Renata Schmidt RN  Outcome: Ongoing  6/10/2019 0242 by Allie Hamilton RN  Outcome: Ongoing  Goal: Absence of physical injury  Description  Absence of physical injury  6/10/2019 0829 by Renata Schmidt RN  Outcome: Ongoing  6/10/2019 0242 by Allie Hamilton RN  Outcome: Ongoing     Problem: Risk for Impaired Skin Integrity  Goal: Tissue integrity - skin and mucous membranes  Description  Structural intactness and normal physiological function of skin and  mucous membranes.   6/10/2019 0829 by Renata Schmidt RN  Outcome: Ongoing  6/10/2019 0242 by Allie Hamilton RN  Outcome: Ongoing     Problem: Acute Pain  Goal: Control of acute pain  Description  Control of acute pain     Control of acute pain  6/10/2019 0829 by Renata Schmidt RN  Outcome: Ongoing  6/10/2019 0242 by Allie Hamilton RN  Outcome: Ongoing     Problem: SKIN INTEGRITY  Goal: Risk for impaired skin integrity will decrease  Description  Risk for impaired skin integrity will decrease     6/10/2019 0829 by Renata Schmidt RN  Outcome: Ongoing  6/10/2019 0242 by Allie Hamilton RN  Outcome: Ongoing     Problem: DAILY CARE  Goal: Daily care needs are met  6/10/2019 0829 by Renata Schmidt RN  Outcome: Ongoing  6/10/2019 0242 by Allie Hamilton RN  Outcome: Ongoing     Problem: OXYGENATION/RESPIRATORY FUNCTION  Goal: Patient will maintain patent airway  6/10/2019 0829 by Renata Schmidt RN  Outcome: Ongoing  6/10/2019 0242 by Allie Hamilton RN  Outcome: Ongoing  Goal: Patient will achieve/maintain normal respiratory rate/effort  Description  Respiratory rate and effort will be within normal limits for the patient  6/10/2019 0829 by Renata Schmidt RN  Outcome: Ongoing  6/10/2019 0242 by Allie Hamilton RN  Outcome: Ongoing     Problem: HEMODYNAMIC STATUS  Goal: Patient has stable

## 2019-06-10 NOTE — PROGRESS NOTES
[facility] 81   Daily Progress Note      Admit Date:  6/1/2019    Reason for follow up visit: CHF    CC: \"I want to go home. I'm tired of peeing. \"    60 y/o male with PMH significant for morbid obesity, CAD, PAD, nonischemic cardiomyopathy, CHF, COPD, HLP,  CKD, diabetes, untreated sleep apnea, PAF (S/P DCCV in 2/2019) and noncompliance who presented with c/o SOB, wt gain, hypoxia and chest pain. Wt is up ~ 25-30# since last admit in 2/2019. Given IV diuretic and slowly diuresed, but held d/t contractual alkalosis. Received Diamox and then resumed lasix and metolazone to enhance diuresis.     Interval History:  Pt seen and examined  Remains on IV lasix. Net diuresis -14+L since admit  Net diuresis - > 3L last 24 hours. K+ 3.2 today. BP controlled. Remains on O2 @ 2L. Wt today   235# (wt loss 16# since admit)  SOB improved. Edema improving. Denies chest pain, cough, palpitations or dizziness  He has refused therapy and refuses to get out of bed. Subjective:  Pt with no acute overnight cardiac events. Review of Systems:   · Constitutional: no unanticipated weight loss. There's been no change in energy level, sleep pattern, or activity level. No fevers, chills. · Eyes: No visual changes or diplopia. No scleral icterus. · ENT: No Headaches, hearing loss or vertigo. No mouth sores or sore throat. · Cardiovascular: as reviewed in HPI  · Respiratory: No cough or wheezing, no sputum production. No hematemesis. · Gastrointestinal: No abdominal pain, appetite loss, blood in stools. No change in bowel or bladder habits. · Genitourinary: No dysuria, trouble voiding, or hematuria. · Musculoskeletal: + L AKA  · Integumentary: No rash or pruritis. · Neurological: No headache, diplopia, change in muscle strength, numbness or tingling. · Psychiatric: No anxiety or depression. · Endocrine: No temperature intolerance. No excessive thirst, fluid intake, or urination.  No tremor. · Hematologic/Lymphatic: No abnormal bruising or bleeding, blood clots or swollen lymph nodes. · Allergic/Immunologic: No nasal congestion or hives. Objective:   /75   Pulse 87   Temp 98.2 °F (36.8 °C) (Oral)   Resp 16   Ht 5' 7\" (1.702 m)   Wt 235 lb 14.3 oz (107 kg)   SpO2 93%   BMI 36.95 kg/m²       Intake/Output Summary (Last 24 hours) at 6/10/2019 0936  Last data filed at 6/10/2019 0916  Gross per 24 hour   Intake 1300 ml   Output 4505 ml   Net -3205 ml     Wt Readings from Last 3 Encounters:   06/10/19 235 lb 14.3 oz (107 kg)   02/27/19 235 lb (106.6 kg)   02/21/19 225 lb 15.5 oz (102.5 kg)       Physical Exam:  General: In no acute distress. Awake, alert, and oriented X4. Lying in bed. Skin:  Warm and dry. No new appearing rashes or lesions. Neck:  Supple and thick. JVD hard to ascertain d/t body habitus  Chest: Lungs clear to auscultation. No wheezes/rhonchi/rales  Cardiovascular:  Irreg; normal S1 and S2; no murmur/gallop or rub  Abdomen: obese,soft, nontender, +bowel sounds. Extremities:  1+ R hip edema and 1+ RLE edema. L AKA.   1+ R DP pulses     Medications:    insulin glargine  35 Units Subcutaneous Nightly    insulin lispro  12 Units Subcutaneous TID WC    potassium chloride  40 mEq Oral Once    potassium chloride  40 mEq Oral BID WC    insulin lispro  0-18 Units Subcutaneous TID WC    lidocaine  1 patch Transdermal Daily    rivaroxaban  20 mg Oral Daily with breakfast    tamsulosin  0.4 mg Oral Daily    pantoprazole  40 mg Oral QAM AC    mometasone-formoterol  2 puff Inhalation BID    metoprolol succinate  150 mg Oral Daily    gabapentin  100 mg Oral TID    DULoxetine  30 mg Oral Daily    diltiazem  240 mg Oral Daily    atorvastatin  40 mg Oral Daily    amiodarone  200 mg Oral Daily    ipratropium-albuterol  1 ampule Inhalation Q4H WA    sodium chloride flush  10 mL Intravenous 2 times per day      dextrose       melatonin ER, glucose, dextrose, glucagon (rDNA), dextrose, traZODone, ipratropium-albuterol, sodium chloride flush, magnesium hydroxide, ondansetron, acetaminophen    Lab Data:  CBC:   Recent Labs     06/08/19  0539 06/09/19  0639 06/10/19  0642   WBC 9.4 8.4 8.2   HGB 10.5* 10.7* 10.5*    225 242     BMP:    Recent Labs     06/08/19  0539 06/09/19  0639 06/10/19  0642    140 137   K 3.8 3.3* 3.2*   CO2 40* 42* 42*   BUN 22* 21* 21*   CREATININE 0.8* 0.7* 0.9     Results for Karis Rivera (MRN [id number]) as of 6/10/2019 09:39   Ref. Range 6/6/2019 06:45   Pro-BNP Latest Ref Range: 0 - 124 pg/mL 2,366 (H)     2/20/2019: Successful cardioversion  (now back in atrial fib)    Echo 10/17/2018:  Suboptimal image quality. Definity contrast administered.   Patient appears to be in atrial fibrillation.   Mild Conc. LVH with EF  45%.   The left atrium is mildly dilated.   Mild mitral regurgitation.   Normal RV size with mildly reduced systolic function.   Trivial pulmonic regurgitation present.     [facility] 2/8/2017:  OVERALL IMPRESSION:  1. Significantly elevated right heart pressures and capillary wedge pressures  consistent with congestive heart failure. 2. Normal cardiac output and indices.     [facility] 8/10/2015:  1. Right coronary artery arises from the right sinus Valsalva. It is a dominant artery, gives rise to the posterior descending and posterolateral branches. Right coronary artery and its branches are free of atherosclerosis.    2. Left main trunk arises from the right sinus Valsalva. It divides into the left and right descending artery and left circumflex artery. Left main trunk is free of atherosclerosis. 3. Left anterior descending artery: Moderate sized artery and descends into the intraventricular sulcus and wraps around the apex of the heart. The left anterior descending artery and its branches are free of atherosclerosis. 4. Left circumflex artery arises from the left main trunk. It is a moderate-sized artery.  It gives rise to a moderate-sized obtuse marginal branch and is free of atherosclerosis. Left anterior descending artery as described earlier is also free of any atherosclerosis.    Left ventriculogram reveals a global left ventricular systolic function with estimated EF of approximately 20% with global hypokinesis. There was no mitral regurgitation seen.      Telemetry: Atrial fib with controlled VR (rate 70-90)    Assessment/Plan:    1. Acute on chronic systolic and diastolic HF/Nonischemic cardiomyopathy  -decompensated d/t noncompliance with diet and fluid restriction   -will given IV lasix dose today and begin spironolactone  -change to po torsemide in the AM (hopefully home 1-2 days); he declines rehab and is unwilling to participate in PT  -continue BB  -will consider addition of ACE-I/ARB as outpt pending renal function. Will hold at present d/t addition of spironolactone and borderline BP  -low sodium diet, fluid restriction and decrease in ETOH intake again discussed and emphasized     2. Chronic atrial fibrillation  -controlled VR on BB + CCB + amiodarone and will continue  -longstanding H/O atrial fib with difficult to control HR  -S/P DCCV in 2/2019 with return to atrial fib  -will be difficult to maintain normal SR given his COPD and comorbidities  -continue Xarelto (CHADS Vasc score: 4 for HTN, vascular disease, CHF and DM)     3. Untreated sleep apnea  -he continues to decline any further testing or use of CPAP     4. PAD  -S/P L AKA  -chronic stasis changes in RLE  -on statin     5. COPD/Tobacc use  -uses nebulizer daily  -on home O2 and encouraged to use 24/7 at home (as been prescribed in the past)  -continued to smoke prior to admit and again emphasized smoking cessation     6. ETOH use  -4-8 beers most days of the week prior to admit  -reinforce decrease in ETOH consumption     7. Diabetes Mellitus  -on insulin  -BS improving  -Rx per Primary team     8.  Morbid obesity  -weight loss encouraged    Very difficult situation as he declines to participate in PT/OT, and offers little insight into his diet and fluid intake. He states his mother will take care of it. Emphasized that he needs to take an interest and initiative in his care. Discussed and emphasized low sodium diet and fluid restriction again and compliance with follow up. IV lasix x 1 dose today along with spironolactone. Diamox dose today and begin po torsemide tomorrow AM. Hopefully home in next 1-2 days. High risk for readmit.     Electronically signed by JOON Teixeira CNP on 6/10/2019 at 9:36 AM

## 2019-06-10 NOTE — PLAN OF CARE
Problem: Falls - Risk of:  Goal: Will remain free from falls  Description  Will remain free from falls  Outcome: Ongoing  Goal: Absence of physical injury  Description  Absence of physical injury  Outcome: Ongoing     Problem: Risk for Impaired Skin Integrity  Goal: Tissue integrity - skin and mucous membranes  Description  Structural intactness and normal physiological function of skin and  mucous membranes.   Outcome: Ongoing     Problem: Acute Pain  Goal: Control of acute pain  Description  Control of acute pain     Control of acute pain  Outcome: Ongoing     Problem: SKIN INTEGRITY  Goal: Risk for impaired skin integrity will decrease  Description  Risk for impaired skin integrity will decrease     Outcome: Ongoing     Problem: DAILY CARE  Goal: Daily care needs are met  Outcome: Ongoing     Problem: OXYGENATION/RESPIRATORY FUNCTION  Goal: Patient will maintain patent airway  Outcome: Ongoing  Goal: Patient will achieve/maintain normal respiratory rate/effort  Description  Respiratory rate and effort will be within normal limits for the patient  Outcome: Ongoing     Problem: HEMODYNAMIC STATUS  Goal: Patient has stable vital signs and fluid balance  Outcome: Ongoing     Problem: FLUID AND ELECTROLYTE IMBALANCE  Goal: Fluid and electrolyte balance are achieved/maintained  Outcome: Ongoing     Problem: ACTIVITY INTOLERANCE/IMPAIRED MOBILITY  Goal: Mobility/activity is maintained at optimum level for patient  Outcome: Ongoing     Problem: Pain:  Goal: Control of acute pain  Description  Control of acute pain     Control of acute pain  Outcome: Ongoing  Goal: Pain level will decrease  Description  Pain level will decrease  Outcome: Ongoing  Goal: Control of chronic pain  Description  Control of chronic pain  Outcome: Ongoing
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
## 2019-06-11 ENCOUNTER — TELEPHONE (OUTPATIENT)
Dept: INTERNAL MEDICINE CLINIC | Age: 59
End: 2019-06-11

## 2019-06-11 VITALS
SYSTOLIC BLOOD PRESSURE: 128 MMHG | BODY MASS INDEX: 37.2 KG/M2 | TEMPERATURE: 98.2 F | WEIGHT: 236.99 LBS | HEIGHT: 67 IN | RESPIRATION RATE: 16 BRPM | DIASTOLIC BLOOD PRESSURE: 66 MMHG | HEART RATE: 82 BPM | OXYGEN SATURATION: 92 %

## 2019-06-11 LAB
ANION GAP SERPL CALCULATED.3IONS-SCNC: 10 MMOL/L (ref 3–16)
BASOPHILS ABSOLUTE: 0.1 K/UL (ref 0–0.2)
BASOPHILS RELATIVE PERCENT: 0.7 %
BUN BLDV-MCNC: 25 MG/DL (ref 7–20)
CALCIUM SERPL-MCNC: 10.5 MG/DL (ref 8.3–10.6)
CHLORIDE BLD-SCNC: 93 MMOL/L (ref 99–110)
CO2: 36 MMOL/L (ref 21–32)
CREAT SERPL-MCNC: 0.9 MG/DL (ref 0.9–1.3)
EOSINOPHILS ABSOLUTE: 0.1 K/UL (ref 0–0.6)
EOSINOPHILS RELATIVE PERCENT: 1.4 %
ESTIMATED AVERAGE GLUCOSE: 223.1 MG/DL
GFR AFRICAN AMERICAN: >60
GFR NON-AFRICAN AMERICAN: >60
GLUCOSE BLD-MCNC: 215 MG/DL (ref 70–99)
GLUCOSE BLD-MCNC: 229 MG/DL (ref 70–99)
GLUCOSE BLD-MCNC: 302 MG/DL (ref 70–99)
HBA1C MFR BLD: 9.4 %
HCT VFR BLD CALC: 33.1 % (ref 40.5–52.5)
HEMOGLOBIN: 10.9 G/DL (ref 13.5–17.5)
LYMPHOCYTES ABSOLUTE: 1.3 K/UL (ref 1–5.1)
LYMPHOCYTES RELATIVE PERCENT: 14.9 %
MCH RBC QN AUTO: 28.4 PG (ref 26–34)
MCHC RBC AUTO-ENTMCNC: 32.9 G/DL (ref 31–36)
MCV RBC AUTO: 86.5 FL (ref 80–100)
MONOCYTES ABSOLUTE: 1.2 K/UL (ref 0–1.3)
MONOCYTES RELATIVE PERCENT: 13.5 %
NEUTROPHILS ABSOLUTE: 6 K/UL (ref 1.7–7.7)
NEUTROPHILS RELATIVE PERCENT: 69.5 %
PDW BLD-RTO: 15.6 % (ref 12.4–15.4)
PERFORMED ON: ABNORMAL
PERFORMED ON: ABNORMAL
PLATELET # BLD: 236 K/UL (ref 135–450)
PMV BLD AUTO: 9 FL (ref 5–10.5)
POTASSIUM REFLEX MAGNESIUM: 3.6 MMOL/L (ref 3.5–5.1)
RBC # BLD: 3.82 M/UL (ref 4.2–5.9)
SODIUM BLD-SCNC: 139 MMOL/L (ref 136–145)
WBC # BLD: 8.7 K/UL (ref 4–11)

## 2019-06-11 PROCEDURE — 85025 COMPLETE CBC W/AUTO DIFF WBC: CPT

## 2019-06-11 PROCEDURE — 80048 BASIC METABOLIC PNL TOTAL CA: CPT

## 2019-06-11 PROCEDURE — 6370000000 HC RX 637 (ALT 250 FOR IP): Performed by: INTERNAL MEDICINE

## 2019-06-11 PROCEDURE — 2580000003 HC RX 258: Performed by: INTERNAL MEDICINE

## 2019-06-11 PROCEDURE — 94640 AIRWAY INHALATION TREATMENT: CPT

## 2019-06-11 PROCEDURE — 6370000000 HC RX 637 (ALT 250 FOR IP): Performed by: NURSE PRACTITIONER

## 2019-06-11 PROCEDURE — 2700000000 HC OXYGEN THERAPY PER DAY

## 2019-06-11 PROCEDURE — 36415 COLL VENOUS BLD VENIPUNCTURE: CPT

## 2019-06-11 PROCEDURE — 99232 SBSQ HOSP IP/OBS MODERATE 35: CPT | Performed by: NURSE PRACTITIONER

## 2019-06-11 PROCEDURE — 83036 HEMOGLOBIN GLYCOSYLATED A1C: CPT

## 2019-06-11 PROCEDURE — 94760 N-INVAS EAR/PLS OXIMETRY 1: CPT

## 2019-06-11 RX ORDER — SPIRONOLACTONE 25 MG/1
25 TABLET ORAL DAILY
Qty: 30 TABLET | Refills: 3 | Status: ON HOLD | OUTPATIENT
Start: 2019-06-11 | End: 2019-07-20 | Stop reason: HOSPADM

## 2019-06-11 RX ADMIN — ATORVASTATIN CALCIUM 40 MG: 40 TABLET, FILM COATED ORAL at 08:54

## 2019-06-11 RX ADMIN — MOMETASONE FUROATE AND FORMOTEROL FUMARATE DIHYDRATE 2 PUFF: 100; 5 AEROSOL RESPIRATORY (INHALATION) at 09:33

## 2019-06-11 RX ADMIN — DULOXETINE HYDROCHLORIDE 30 MG: 30 CAPSULE, DELAYED RELEASE ORAL at 08:55

## 2019-06-11 RX ADMIN — TORSEMIDE 40 MG: 20 TABLET ORAL at 08:54

## 2019-06-11 RX ADMIN — INSULIN LISPRO 12 UNITS: 100 INJECTION, SOLUTION INTRAVENOUS; SUBCUTANEOUS at 13:11

## 2019-06-11 RX ADMIN — IPRATROPIUM BROMIDE AND ALBUTEROL SULFATE 1 AMPULE: .5; 3 SOLUTION RESPIRATORY (INHALATION) at 09:33

## 2019-06-11 RX ADMIN — GABAPENTIN 100 MG: 100 CAPSULE ORAL at 13:10

## 2019-06-11 RX ADMIN — TAMSULOSIN HYDROCHLORIDE 0.4 MG: 0.4 CAPSULE ORAL at 08:54

## 2019-06-11 RX ADMIN — GABAPENTIN 100 MG: 100 CAPSULE ORAL at 08:53

## 2019-06-11 RX ADMIN — RIVAROXABAN 20 MG: 20 TABLET, FILM COATED ORAL at 08:53

## 2019-06-11 RX ADMIN — AMIODARONE HYDROCHLORIDE 200 MG: 200 TABLET ORAL at 08:54

## 2019-06-11 RX ADMIN — Medication 10 ML: at 08:55

## 2019-06-11 RX ADMIN — METOPROLOL SUCCINATE 150 MG: 50 TABLET, FILM COATED, EXTENDED RELEASE ORAL at 08:52

## 2019-06-11 RX ADMIN — POTASSIUM CHLORIDE 40 MEQ: 20 TABLET, EXTENDED RELEASE ORAL at 08:54

## 2019-06-11 RX ADMIN — SPIRONOLACTONE 25 MG: 25 TABLET, FILM COATED ORAL at 08:55

## 2019-06-11 RX ADMIN — PANTOPRAZOLE SODIUM 40 MG: 40 TABLET, DELAYED RELEASE ORAL at 06:22

## 2019-06-11 RX ADMIN — DILTIAZEM HYDROCHLORIDE 240 MG: 240 CAPSULE, COATED, EXTENDED RELEASE ORAL at 08:53

## 2019-06-11 RX ADMIN — INSULIN LISPRO 12 UNITS: 100 INJECTION, SOLUTION INTRAVENOUS; SUBCUTANEOUS at 08:58

## 2019-06-11 RX ADMIN — INSULIN LISPRO 6 UNITS: 100 INJECTION, SOLUTION INTRAVENOUS; SUBCUTANEOUS at 08:57

## 2019-06-11 ASSESSMENT — PAIN DESCRIPTION - PAIN TYPE
TYPE: CHRONIC PAIN
TYPE: CHRONIC PAIN

## 2019-06-11 ASSESSMENT — PAIN DESCRIPTION - ONSET: ONSET: ON-GOING

## 2019-06-11 ASSESSMENT — PAIN DESCRIPTION - LOCATION
LOCATION: BACK
LOCATION: BACK

## 2019-06-11 ASSESSMENT — PAIN DESCRIPTION - PROGRESSION
CLINICAL_PROGRESSION: NOT CHANGED

## 2019-06-11 ASSESSMENT — PAIN SCALES - GENERAL
PAINLEVEL_OUTOF10: 5
PAINLEVEL_OUTOF10: 5

## 2019-06-11 ASSESSMENT — PAIN DESCRIPTION - DESCRIPTORS: DESCRIPTORS: ACHING

## 2019-06-11 ASSESSMENT — PAIN DESCRIPTION - ORIENTATION
ORIENTATION: LOWER
ORIENTATION: LOWER

## 2019-06-11 ASSESSMENT — PAIN - FUNCTIONAL ASSESSMENT: PAIN_FUNCTIONAL_ASSESSMENT: PREVENTS OR INTERFERES SOME ACTIVE ACTIVITIES AND ADLS

## 2019-06-11 ASSESSMENT — PAIN DESCRIPTION - FREQUENCY
FREQUENCY: CONTINUOUS
FREQUENCY: CONTINUOUS

## 2019-06-11 NOTE — DISCHARGE SUMMARY
Hospital Medicine Discharge Summary      Patient ID: Tonja Christie      Patient's PCP: Adelaida Izquierdo MD    Admit Date: 6/1/2019     Discharge Date:   06/11/19     Admitting Physician: Anastasiya Wheeler MD     Discharge Provider: JOON Stephen CNP     Discharge Diagnoses: Active Hospital Problems    Diagnosis Date Noted    Chronic atrial fibrillation [facility]) [I48.2]      Priority: High    Acute on chronic systolic (congestive) heart failure (HCC) [I50.23] 06/01/2019    Hx of AKA (above knee amputation), left ([facility] [Z89.612] 06/01/2019    Morbid obesity due to excess calories ([facility] [E66.01] 12/24/2018    CAD (coronary artery disease) [I25.10] 12/24/2018    DMII (diabetes mellitus, type 2) ([facility] [E11.9] 11/13/2017    COPD exacerbation ([facility] [J44.1]     Essential hypertension [I10]     Hyperlipidemia [E78.5] 05/08/2014       Disposition:  [] Home  [x] Home with home health [] Rehab [] Psych [] SNF  [] LTAC  [] Long term nursing home or group home [] Transfer to ICU  [] Transfer to PCU [] Other:    Adelaida Izquierdo MD  [address] [place] [address]. Lulu Trinidad [id number]  [phone]             Discharge Condition: stable      Code Status:  Full Code     Activity: activity as tolerated    Diet: DIET CARB CONTROL; Low Sodium (2 GM);  Daily Fluid Restriction: 1000 ml     Discharge Medications:     Current Discharge Medication List           Details   insulin glargine (LANTUS SOLOSTAR) 100 UNIT/ML injection pen Inject 35 Units into the skin nightly  Qty: 5 pen, Refills: 1      Insulin Pen Needle ([employer] PEN NEEDLES) 31G X 6 MM MISC 1 each by Does not apply route daily  Qty: 100 each, Refills: 1              Details   metFORMIN (GLUCOPHAGE) 500 MG tablet Take 2 tablets by mouth 2 times daily (with meals)  Qty: 60 tablet, Refills: 0              Details   potassium chloride (KLOR-CON) 8 MEQ extended release tablet Take 8 mEq by mouth 3 times daily      XARELTO 20 MG TABS tablet TAKE 1 TABLET BY MOUTH DAILY WITH Trachea midline. Lungs: Clear to auscultation bilaterally. Respirations even and unlabored. Chest: Exam unremarkable. Cardiac: S1/S2 noted. Regular Rhythm and rate. Abdomen/GI: Soft. Non-tender. Non-distended. BS+. Extremities: PP+. Atraumatic. No redness/cyanosis/edema noted. Brisk cap refill. Left AKA. Skin: Dry and intact. No lesions noted. Chronic vascular changes noted. Neuro: Grossly intact. No focal deficits noted. Consults:     IP CONSULT TO HEART FAILURE NURSE/COORDINATOR  IP CONSULT TO CARDIOLOGY  IP CONSULT TO HEART FAILURE NURSE/COORDINATOR  IP CONSULT TO HOME CARE NEEDS  IP CONSULT TO DIABETES EDUCATOR    Significant Diagnostic Studies:         Labs: For convenience and continuity at follow-up the following most recent labs are provided:    CBC:    Lab Results   Component Value Date    WBC 8.7 06/11/2019    HGB 10.9 06/11/2019    HCT 33.1 06/11/2019     06/11/2019       Renal:    Lab Results   Component Value Date     06/11/2019    K 3.6 06/11/2019    CL 93 06/11/2019    CO2 36 06/11/2019    BUN 25 06/11/2019    CREATININE 0.9 06/11/2019    CALCIUM 10.5 06/11/2019    PHOS 2.8 01/05/2019       Future Appointments   Date Time Provider [facility]   6/17/2019  1:20 PM JOON Martinez CNP [facility]   6/28/2019 11:00 AM Lucy Wyatt MD [facility]       Time Spent on discharge is more than 30 minutes in the examination, evaluation, counseling and review of medications and discharge plan. RX monitoring reviewed     Signed:    JOON Newton CNP   6/11/2019      Thank you Lucy Wyatt MD for the opportunity to be involved in this patient's care. If you have any questions or concerns please feel free to contact me at [phone].
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
## 2019-06-11 NOTE — PROGRESS NOTES
Data- discharge order received, pt verbalized agreement to discharge, disposition to previous residence. .     Action- discharge instructions prepared and given to patient, pt verbalized understanding. Medication information packet given r/t NEW and CHANGED prescriptions emphasizing name/purpose/side effects, pt verbalized understanding. Discharge instruction summary: Diet- carb control, low sodium 1000 fluid restriction, Activity- as tolerated, uses wheelchair, Primary Care Physician as follows: Uziel Deng [phone] f/u appointment June 28th, immunizations reviewed, prescription medications filled [facility]. CHF Education reviewed. Pt has had a total of 60 minutes CHF education this admission encounter. Response- Pt belongings gathered, IV removed. Disposition is home (no HHC/DME needs), transported with wheelchair, taken to lobby via, no complications.

## 2019-06-11 NOTE — PLAN OF CARE
Problem: Falls - Risk of:  Goal: Will remain free from falls  Description  Will remain free from falls  Outcome: Ongoing  Goal: Absence of physical injury  Description  Absence of physical injury  Outcome: Ongoing     Problem: Risk for Impaired Skin Integrity  Goal: Tissue integrity - skin and mucous membranes  Description  Structural intactness and normal physiological function of skin and  mucous membranes.   Outcome: Ongoing     Problem: OXYGENATION/RESPIRATORY FUNCTION  Goal: Patient will maintain patent airway  Outcome: Ongoing  Goal: Patient will achieve/maintain normal respiratory rate/effort  Description  Respiratory rate and effort will be within normal limits for the patient  Outcome: Ongoing     Problem: HEMODYNAMIC STATUS  Goal: Patient has stable vital signs and fluid balance  Outcome: Ongoing     Problem: FLUID AND ELECTROLYTE IMBALANCE  Goal: Fluid and electrolyte balance are achieved/maintained  Outcome: Ongoing

## 2019-06-11 NOTE — TELEPHONE ENCOUNTER
Cassy sharma/Radha BOJORQUEZ [facility] ER Z#[phone], called and stated pt has agreed right now at this time to take insulin Lantus or Basaglar once daily. She wanted for Dr. Lorna Watkins and Perfecto Retana to be aware.

## 2019-06-11 NOTE — CARE COORDINATION
SW following for d/c planning. Met with Pt, in agreement to d/c to home. Continues to refuse SNF, active with [facility] and wants to resume care Orders noted. Requesting transport assist to his f/u appointment at the [facility]. On June 17th. SW contacted his insurance and arranged transportation for him. Pt was instructed on how to make further arrangements for transport on his own if needed, Pt reported he understands process. [facility] will get d[facility]c orders from [facility]. [facility] following for passport referral.     DISCHARGE PLAN:  Home with [facility]. Family transport   F/u with [facility] with care source 1130am 3pm return trip confirmation # [phone]   [facility]A referral made by prior SW. Thank You.    Electronically signed by LAKIA Benz, LSW on 6/11/2019 at 10:54 AM   Phone: [phone]

## 2019-06-11 NOTE — PLAN OF CARE
1328 by Edgar Portillo RN  Outcome: Met This Shift  6/11/2019 0329 by Darby Mcneal RN  Outcome: Ongoing     Problem: ACTIVITY INTOLERANCE/IMPAIRED MOBILITY  Goal: Mobility/activity is maintained at optimum level for patient  Outcome: Met This Shift     Problem: Pain:  Goal: Control of acute pain  Description  Control of acute pain     Control of acute pain  Outcome: Met This Shift  Goal: Pain level will decrease  Description  Pain level will decrease  Outcome: Met This Shift  Goal: Control of chronic pain  Description  Control of chronic pain  Outcome: Met This Shift

## 2019-06-11 NOTE — PROGRESS NOTES
swollen lymph nodes. · Allergic/Immunologic: No nasal congestion or hives. Objective:   /79   Pulse 87   Temp 97.5 °F (36.4 °C) (Oral)   Resp 18   Ht 5' 7\" (1.702 m)   Wt 236 lb 15.9 oz (107.5 kg)   SpO2 95%   BMI 37.12 kg/m²       Intake/Output Summary (Last 24 hours) at 6/11/2019 0936  Last data filed at 6/11/2019 0924  Gross per 24 hour   Intake 610 ml   Output 2650 ml   Net -2040 ml     Wt Readings from Last 3 Encounters:   06/11/19 236 lb 15.9 oz (107.5 kg)   02/27/19 235 lb (106.6 kg)   02/21/19 225 lb 15.5 oz (102.5 kg)       Physical Exam:  General: In no acute distress. Awake, alert, and oriented X4. Lying in bed  Skin:  Warm and dry. No new appearing rashes or lesions. Neck:  Supple. No carotid bruit appreciated. JVD hard to ascertain d/t body habitus  Chest: Lungs clear with decreased breath sounds posteriorly. No wheezes/rhonchi/rales  Cardiovascular:  Irreg; normal S1 and S2; no murmur/gallop or rub  Abdomen: obese,soft, nontender, +bowel sounds. Extremities: Trace R lower leg edema; L AKA; 1+ R DP/PT pulses; chronic stasis changes to R lower leg.      Medications:    insulin glargine  35 Units Subcutaneous Nightly    insulin lispro  12 Units Subcutaneous TID WC    spironolactone  25 mg Oral Daily    torsemide  40 mg Oral BID    potassium chloride  40 mEq Oral BID WC    insulin lispro  0-18 Units Subcutaneous TID WC    lidocaine  1 patch Transdermal Daily    rivaroxaban  20 mg Oral Daily with breakfast    tamsulosin  0.4 mg Oral Daily    pantoprazole  40 mg Oral QAM AC    mometasone-formoterol  2 puff Inhalation BID    metoprolol succinate  150 mg Oral Daily    gabapentin  100 mg Oral TID    DULoxetine  30 mg Oral Daily    diltiazem  240 mg Oral Daily    atorvastatin  40 mg Oral Daily    amiodarone  200 mg Oral Daily    ipratropium-albuterol  1 ampule Inhalation Q4H WA    sodium chloride flush  10 mL Intravenous 2 times per day      dextrose       melatonin ER, and wraps around the apex of the heart. The left anterior descending artery and its branches are free of atherosclerosis. 4. Left circumflex artery arises from the left main trunk. It is a moderate-sized artery. It gives rise to a moderate-sized obtuse marginal branch and is free of atherosclerosis. Left anterior descending artery as described earlier is also free of any atherosclerosis.    Left ventriculogram reveals a global left ventricular systolic function with estimated EF of approximately 20% with global hypokinesis. There was no mitral regurgitation seen.     Telemetry: Atrial fib with controlled VR    Assessment/Plan:    1. Acute on chronic systolic and diastolic HF/Nonischemic cardiomyopathy  -improved and better compensated after diuresis  -d/t noncompliance with fluid and sodium intake  -now on po torsemide and spironolactone  -consider weekly dose of metolazone at follow up visit  -continue BB  -will consider addition of ACE-I/ARB as outpt pending renal function. Will hold at present d/t addition of spironolactone and borderline BP  -low sodium diet, fluid restriction and decrease in ETOH intake discussed     2. Chronic atrial fibrillation  -VR remains controlled  -continue BB, CCB and amiodarone for rate control  -S/P DCCV in 2/2019 with return to atrial fib  -will be difficult to maintain normal SR given his COPD and comorbidities  -continue Xarelto (CHADS Vasc score: 4 for HTN, vascular disease, CHF and DM)     3. Untreated sleep apnea  -discussed importance of long term treatment of sleep apnea  -he declines use of CPAP or further sleep eval     4. PAD  -S/P L AKA  -chronic stasis changes in RLE  -continue statin     5. COPD/Tobacc use  -nebulizer daily at home  -on home O2 and encouraged to use 24/7 at home (as been prescribed in the past)  -smoking cessation emphasized     6. ETOH use  -4-8 beers most days of the week prior to admit  -discussed decrease in ETOH consumption     7.  Diabetes Mellitus  -on insulin  -BS improving  -Rx per Primary team     8. Morbid obesity  -weight loss encouraged    Stable from cardiac standpoint and okay to discharge. Follow up with cardiology in 1-2 weeks    Emphasized and discussed low-fat/low sodium diet, monitoring of daily weights, fluid restriction, worsening signs and symptoms of heart failure and when to call, and the importance of regular exercise and activity.     Emphasized and discussed strategies for smoking cessation (> 3-5 minutes of counseling given)    ETOH cessation discussed and emphasized    Discharge Cardiac Meds:  Amiodarone 200 mg daily  Atorvastatin 40 mg daily  Diltiazem  mg daily  Toprol  mg daily  Klor Con 40 mEq BID  Xarelto 20 mg daily  Spironolactone 25 mg daily  Torsemide 40 mg BID    Electronically signed by JOON Palmer CNP on 6/11/2019 at 9:36 AM

## 2019-06-12 ENCOUNTER — CARE COORDINATION (OUTPATIENT)
Dept: CARE COORDINATION | Age: 59
End: 2019-06-12

## 2019-06-12 RX ORDER — TORSEMIDE 20 MG/1
40 TABLET ORAL 2 TIMES DAILY
Qty: 120 TABLET | Refills: 0 | Status: ON HOLD | OUTPATIENT
Start: 2019-06-12 | End: 2019-07-18 | Stop reason: HOSPADM

## 2019-06-12 ASSESSMENT — ENCOUNTER SYMPTOMS: DYSPNEA ASSOCIATED WITH: MINIMAL EXERTION

## 2019-06-12 NOTE — PROGRESS NOTES
Pharmacy Heart Failure Medication Reconciliation Note    Pt discharged from [facility] today after admission for heart failure. Meggan Benz has a diagnosis of systolic heart failure (last EF = 45% on 10/2018). Patient taking an ACEI / ARB / Entresto:  No: EF >40%      Patient taking a BETA BLOCKER:  Yes  Patient taking a LOOP DIURETIC: Yes  Patient taking a ALDOSTERONE RECEPTOR ANTAGONIST: Yes    Corrections to discharge medications include:  Clarified with Ravin Brand that torsemide should be 40mg twice daily. Okay to continue KCl at 8meq three times daily    Discharge Medications:         Medication List        START taking these medications      insulin glargine 100 UNIT/ML injection pen  Commonly known as:  LANTUS SOLOSTAR  Inject 35 Units into the skin nightly     Insulin Pen Needle 31G X 6 MM Misc  Commonly known as:  Alex Belinda PEN NEEDLES  1 each by Does not apply route daily            CHANGE how you take these medications      metFORMIN 500 MG tablet  Commonly known as:  GLUCOPHAGE  Take 2 tablets by mouth 2 times daily (with meals)  What changed:  how much to take     torsemide 20 MG tablet  Commonly known as:  DEMADEX  Take 2 tablets by mouth 2 times daily  What changed:    how much to take  when to take this            CONTINUE taking these medications      albuterol sulfate  (90 Base) MCG/ACT inhaler  Commonly known as:  PROVENTIL HFA  Inhale 2 puffs into the lungs every 4 hours as needed for Wheezing or Shortness of Breath (Space out to every 6 hours as symptoms improve) Space out to every 6 hours as symptoms improve.      amiodarone 200 MG tablet  Commonly known as:  CORDARONE  Take 1 tablet by mouth daily     atorvastatin 40 MG tablet  Commonly known as:  LIPITOR  TAKE 1 TABLET BY MOUTH DAILY     diltiazem 240 MG extended release capsule  Commonly known as:  CARDIZEM CD  Take 1 capsule by mouth daily     DULoxetine 30 MG extended release capsule  Commonly known as:  CYMBALTA  TAKE 1 CAPSULE BY MOUTH DAILY     gabapentin 100 MG capsule  Commonly known as:  NEURONTIN     MAGNESIUM-OXIDE 400 (241.3 Mg) MG Tabs tablet  Generic drug:  magnesium oxide  TAKE 1 TABLET BY MOUTH TWICE DAILY     metoprolol succinate 50 MG extended release tablet  Commonly known as:  TOPROL XL  Take 3 tablets by mouth daily     mometasone-formoterol 100-5 MCG/ACT inhaler  Commonly known as:  DULERA  Inhale 2 puffs into the lungs 2 times daily     omeprazole 40 MG delayed release capsule  Commonly known as:  PRILOSEC  Take 1 capsule by mouth daily (with breakfast)     potassium chloride 8 MEQ extended release tablet  Commonly known as:  KLOR-CON     spironolactone 25 MG tablet  Commonly known as:  ALDACTONE  Take 1 tablet by mouth daily     tamsulosin 0.4 MG capsule  Commonly known as:  FLOMAX  TAKE 1 CAPSULE BY MOUTH DAILY     traZODone 50 MG tablet  Commonly known as:  DESYREL  TAKE 1 TABLET BY MOUTH DAILY FOR INSOMNIA     XARELTO 20 MG Tabs tablet  Generic drug:  rivaroxaban  TAKE 1 TABLET BY MOUTH DAILY WITH BREAKFAST               Where to Get Your Medications        These medications were sent to [address] [place] [id number]      Phone:  [phone]   insulin glargine 100 UNIT/ML injection pen  Insulin Pen Needle 31G X 6 MM Misc  metFORMIN 500 MG tablet  spironolactone 25 MG tablet  torsemide 20 MG tablet         Arti Greer, PharmD  Heart Failure Discharge Medication Reconciliation Program  [phone]

## 2019-06-13 RX ORDER — TAMSULOSIN HYDROCHLORIDE 0.4 MG/1
0.4 CAPSULE ORAL DAILY
Qty: 30 CAPSULE | Refills: 0 | Status: SHIPPED | OUTPATIENT
Start: 2019-06-13

## 2019-06-17 ENCOUNTER — OFFICE VISIT (OUTPATIENT)
Dept: CARDIOLOGY CLINIC | Age: 59
End: 2019-06-17
Payer: COMMERCIAL

## 2019-06-17 VITALS
HEIGHT: 67 IN | DIASTOLIC BLOOD PRESSURE: 64 MMHG | HEART RATE: 105 BPM | BODY MASS INDEX: 37.12 KG/M2 | OXYGEN SATURATION: 90 % | SYSTOLIC BLOOD PRESSURE: 108 MMHG

## 2019-06-17 DIAGNOSIS — I50.23 ACUTE ON CHRONIC SYSTOLIC (CONGESTIVE) HEART FAILURE (HCC): ICD-10-CM

## 2019-06-17 DIAGNOSIS — F10.10 ETOH ABUSE: ICD-10-CM

## 2019-06-17 DIAGNOSIS — E66.9 OBESITY (BMI 30-39.9): ICD-10-CM

## 2019-06-17 DIAGNOSIS — I48.20 CHRONIC ATRIAL FIBRILLATION (HCC): ICD-10-CM

## 2019-06-17 DIAGNOSIS — I50.42 CHRONIC COMBINED SYSTOLIC AND DIASTOLIC HEART FAILURE (HCC): ICD-10-CM

## 2019-06-17 DIAGNOSIS — I73.9 PAD (PERIPHERAL ARTERY DISEASE) (HCC): ICD-10-CM

## 2019-06-17 DIAGNOSIS — Z72.0 TOBACCO ABUSE: ICD-10-CM

## 2019-06-17 DIAGNOSIS — G47.33 OBSTRUCTIVE SLEEP APNEA SYNDROME: ICD-10-CM

## 2019-06-17 DIAGNOSIS — I50.43 ACUTE ON CHRONIC COMBINED SYSTOLIC AND DIASTOLIC HF (HEART FAILURE) (HCC): Primary | ICD-10-CM

## 2019-06-17 LAB
ANION GAP SERPL CALCULATED.3IONS-SCNC: 12 MMOL/L (ref 3–16)
BUN BLDV-MCNC: 18 MG/DL (ref 7–20)
CALCIUM SERPL-MCNC: 9.1 MG/DL (ref 8.3–10.6)
CHLORIDE BLD-SCNC: 94 MMOL/L (ref 99–110)
CO2: 36 MMOL/L (ref 21–32)
CREAT SERPL-MCNC: 0.9 MG/DL (ref 0.9–1.3)
GFR AFRICAN AMERICAN: >60
GFR NON-AFRICAN AMERICAN: >60
GLUCOSE BLD-MCNC: 162 MG/DL (ref 70–99)
POTASSIUM SERPL-SCNC: 4 MMOL/L (ref 3.5–5.1)
SODIUM BLD-SCNC: 142 MMOL/L (ref 136–145)

## 2019-06-17 PROCEDURE — G8417 CALC BMI ABV UP PARAM F/U: HCPCS | Performed by: NURSE PRACTITIONER

## 2019-06-17 PROCEDURE — 93000 ELECTROCARDIOGRAM COMPLETE: CPT | Performed by: NURSE PRACTITIONER

## 2019-06-17 PROCEDURE — 1111F DSCHRG MED/CURRENT MED MERGE: CPT | Performed by: NURSE PRACTITIONER

## 2019-06-17 PROCEDURE — 99214 OFFICE O/P EST MOD 30 MIN: CPT | Performed by: NURSE PRACTITIONER

## 2019-06-17 PROCEDURE — 3017F COLORECTAL CA SCREEN DOC REV: CPT | Performed by: NURSE PRACTITIONER

## 2019-06-17 PROCEDURE — G8598 ASA/ANTIPLAT THER USED: HCPCS | Performed by: NURSE PRACTITIONER

## 2019-06-17 PROCEDURE — G8427 DOCREV CUR MEDS BY ELIG CLIN: HCPCS | Performed by: NURSE PRACTITIONER

## 2019-06-17 PROCEDURE — 4004F PT TOBACCO SCREEN RCVD TLK: CPT | Performed by: NURSE PRACTITIONER

## 2019-06-17 NOTE — LETTER
[facility] HEART [address] [place]  Phone: [phone]  Fax: [phone]    Lia Holter, APRN - CNP        June 17, 2019     Mehdi Petty MD  [address] [place] [address]    Patient: Ana Paula Vee  MR Number: [id number]  YOB: 1960  Date of Visit: 6/17/2019    Dear Dr. Mehdi Petty: Thank you for the request for consultation for Sam Signs. If you have questions, please do not hesitate to call me. I look forward to following Philip Salazar along with you.     Sincerely,        Lia Holter, APRN - CNP

## 2019-06-17 NOTE — PROGRESS NOTES
Packs/day: 0.50     Years: 40.00     Pack years: 20.00     Types: Cigarettes     Start date: 1969     Last attempt to quit: 3/29/2019     Years since quittin.2    Smokeless tobacco: Never Used    Tobacco comment: 5 cigs daily - pt states not really sure   Substance Use Topics    Alcohol use:  Yes     Alcohol/week: 3.0 - 3.6 oz     Types: 5 - 6 Cans of beer per week    Drug use: No       Allergies   Allergen Reactions    Rocephin [Ceftriaxone] Other (See Comments)     Sloughing of skin (blisters) on hands and lower arms; pancytopenia     Current Outpatient Medications   Medication Sig Dispense Refill    tamsulosin (FLOMAX) 0.4 MG capsule Take 1 capsule by mouth daily 30 capsule 0    torsemide (DEMADEX) 20 MG tablet Take 2 tablets by mouth 2 times daily 120 tablet 0    metFORMIN (GLUCOPHAGE) 500 MG tablet Take 2 tablets by mouth 2 times daily (with meals) 60 tablet 0    insulin glargine (LANTUS SOLOSTAR) 100 UNIT/ML injection pen Inject 35 Units into the skin nightly 5 pen 1    Insulin Pen Needle (VindiciaOGER PEN NEEDLES) 31G X 6 MM MISC 1 each by Does not apply route daily 100 each 1    spironolactone (ALDACTONE) 25 MG tablet Take 1 tablet by mouth daily 30 tablet 3    potassium chloride (KLOR-CON) 8 MEQ extended release tablet Take 8 mEq by mouth 3 times daily      XARELTO 20 MG TABS tablet TAKE 1 TABLET BY MOUTH DAILY WITH BREAKFAST 30 tablet 0    DULoxetine (CYMBALTA) 30 MG extended release capsule TAKE 1 CAPSULE BY MOUTH DAILY 30 capsule 0    atorvastatin (LIPITOR) 40 MG tablet TAKE 1 TABLET BY MOUTH DAILY 30 tablet 0    traZODone (DESYREL) 50 MG tablet TAKE 1 TABLET BY MOUTH DAILY FOR INSOMNIA 30 tablet 0    amiodarone (CORDARONE) 200 MG tablet Take 1 tablet by mouth daily 30 tablet 3    metoprolol succinate (TOPROL XL) 50 MG extended release tablet Take 3 tablets by mouth daily 30 tablet 3    MAGNESIUM-OXIDE 400 (241.3 Mg) MG TABS tablet TAKE 1 TABLET BY MOUTH TWICE DAILY 60 tablet 2    albuterol sulfate HFA (PROVENTIL HFA) 108 (90 Base) MCG/ACT inhaler Inhale 2 puffs into the lungs every 4 hours as needed for Wheezing or Shortness of Breath (Space out to every 6 hours as symptoms improve) Space out to every 6 hours as symptoms improve. 1 Inhaler 0    diltiazem (CARDIZEM CD) 240 MG extended release capsule Take 1 capsule by mouth daily 30 capsule 3    gabapentin (NEURONTIN) 100 MG capsule Take 100 mg by mouth 3 times daily. [place] Dark mometasone-formoterol (DULERA) 100-5 MCG/ACT inhaler Inhale 2 puffs into the lungs 2 times daily 1 Inhaler 0    omeprazole (PRILOSEC) 40 MG delayed release capsule Take 1 capsule by mouth daily (with breakfast) 90 capsule 3     No current facility-administered medications for this visit. Physical Exam:   /64 (Site: Left Upper Arm, Position: Sitting)   Pulse 105   Ht 5' 7\" (1.702 m)   SpO2 90%   BMI 37.12 kg/m²   Wt Readings from Last 2 Encounters:   06/11/19 236 lb 15.9 oz (107.5 kg)   02/27/19 235 lb (106.6 kg)     Constitutional: He is oriented to person, place, and time. He appears well-developed and well-nourished. In no acute distress. HEENT: Normocephalic and atraumatic. Sclerae anicteric. No xanthelasmas. EOM's intact. Neck: Supple and thick. No JVD present. Carotids without bruits. No thyromegaly present. No lymphadenopathy present. Cardiovascular: irreg, irreg; normal S1 and S2; soft systolic murmur  Pulmonary/Chest: Effort normal.  Lungs clear with minimally diminished breath sounds posteriorly on auscultation. Chest wall nontender  Abdominal: obese,soft, nontender, nondistended. + bowel sounds; no hepatomegaly or bruits. Extremities: Trace R pretibial and ankle edema; + chronic stasis changes to R lower leg. L AKA. No cyanosis. Cap refill brisk. 1+ R DP.  2+ bilateral radial pulses  Neurological: No focal deficit. Skin: Skin is warm and dry. Psychiatric: He has a normal mood and affect.  His speech is normal and behavior is normal. Lab Review:   Lab Results   Component Value Date    TRIG 116 03/29/2019    HDL 27 03/29/2019    LDLCALC 76 03/29/2019    LABVLDL 23 03/29/2019     Lab Results   Component Value Date     06/11/2019    K 3.6 06/11/2019    CL 93 06/11/2019    CO2 36 06/11/2019    BUN 25 06/11/2019    CREATININE 0.9 06/11/2019    GLUCOSE 229 06/11/2019    CALCIUM 10.5 06/11/2019      Lab Results   Component Value Date    WBC 8.7 06/11/2019    HGB 10.9 (L) 06/11/2019    HCT 33.1 (L) 06/11/2019    MCV 86.5 06/11/2019     06/11/2019     ECG 6/17/2019:  Atrial fib with ; old anterolateral infarct; nonspecific ST-T abnormalities    2/20/2019: Successful cardioversion  (now back in atrial fib)     Echo 10/17/2018:  Suboptimal image quality. Definity contrast administered.   Patient appears to be in atrial fibrillation.   Mild Conc. LVH with EF  45%.   The left atrium is mildly dilated.   Mild mitral regurgitation.   Normal RV size with mildly reduced systolic function.   Trivial pulmonic regurgitation present.     [facility] 2/8/2017:  OVERALL IMPRESSION:  1. Significantly elevated right heart pressures and capillary wedge pressures  consistent with congestive heart failure. 2. Normal cardiac output and indices.     [facility] 8/10/2015:  1. Right coronary artery arises from the right sinus Valsalva. It is a dominant artery, gives rise to the posterior descending and posterolateral branches. Right coronary artery and its branches are free of atherosclerosis.    2. Left main trunk arises from the right sinus Valsalva. It divides into the left and right descending artery and left circumflex artery. Left main trunk is free of atherosclerosis. 3. Left anterior descending artery: Moderate sized artery and descends into the intraventricular sulcus and wraps around the apex of the heart. The left anterior descending artery and its branches are free of atherosclerosis. 4. Left circumflex artery arises from the left main trunk.  It is a moderate-sized artery. It gives rise to a moderate-sized obtuse marginal branch and is free of atherosclerosis. Left anterior descending artery as described earlier is also free of any atherosclerosis.    Left ventriculogram reveals a global left ventricular systolic function with estimated EF of approximately 20% with global hypokinesis. There was no mitral regurgitation seen. Assessment:    1. Acute on chronic combined systolic and diastolic HF (heart failure) ([address]  -underlying nonischemic cardiomyopathy  -currently appears to be compensated  -continue BB, torsemide and aldactone  -if recurrent edema, consider weekly dose of metolazone  -no ACE-I/ARB given marginal BP today  -low sodium diet and fluid restriction  -reinforced decreased ETOH intake    2. Chronic atrial fibrillation (HCC)  -VR fairly well controlled  -on BB, CCB and amiodarone for rate control  -CHADS VAsc score 4 and on Xarelto  -prior DCCV in 2/2019 (returned to atrial fib)    3. PAD (peripheral artery disease) (HCC)  -S/P L AKA  -chronic stasis changes in RLE  -continue statin    4. Tobacco abuse  -smoking cessation emphasized    5. ETOH abuse  -encouraged to continue with ETOH cessation    6. Obstructive sleep apnea syndrome  -untreated and intolerant to CPAP  -declines any further study or intervention    7. Obesity (BMI 30-39.9)  -weight loss encouraged    8. COPD  -has home O2 and encouraged to continue to use  -smoking cessation emphasized    9. Diabetes Mellitus  -Rx per Primary MD    Plan:  Continue Xarelto, torsemide, aldactone, Toprol XL, Magnesium oxide, diltiazem, atorvastatin, amiodarone and klor con  Discussed and reviewed low-fat/low sodium diet, monitoring of daily weights, fluid restriction, worsening signs and symptoms of heart failure and when to call, and the importance of regular exercise and activity.   Emphasized and discussed strategies for smoking cessation (> 3-5 minutes of counseling given)  Discussed continuing with

## 2019-06-18 ENCOUNTER — TELEPHONE (OUTPATIENT)
Dept: INTERNAL MEDICINE CLINIC | Age: 59
End: 2019-06-18

## 2019-06-18 NOTE — TELEPHONE ENCOUNTER
Pts mother Miriam called and stated she wants to talk directly to Joan Galindo regarding pts Rx for [employer], she has more questions.     A#[phone]

## 2019-06-18 NOTE — TELEPHONE ENCOUNTER
Pt's mom states she wasn't sure how to use the pen and may have thrown away some pens containing meds.

## 2019-06-19 ENCOUNTER — TELEPHONE (OUTPATIENT)
Dept: INTERNAL MEDICINE CLINIC | Age: 59
End: 2019-06-19

## 2019-06-22 ENCOUNTER — HOSPITAL ENCOUNTER (INPATIENT)
Age: 59
LOS: 30 days | Discharge: NURSING, REHAB OR LONG-TERM CARE | DRG: 720 | End: 2019-07-23
Attending: FAMILY MEDICINE | Admitting: INTERNAL MEDICINE
Payer: COMMERCIAL

## 2019-06-22 ENCOUNTER — APPOINTMENT (OUTPATIENT)
Dept: GENERAL RADIOLOGY | Age: 59
DRG: 720 | End: 2019-06-22
Payer: COMMERCIAL

## 2019-06-22 DIAGNOSIS — A41.9 SEPTICEMIA (HCC): ICD-10-CM

## 2019-06-22 DIAGNOSIS — N17.9 AKI (ACUTE KIDNEY INJURY) (HCC): Primary | ICD-10-CM

## 2019-06-22 DIAGNOSIS — I46.9 PEA (PULSELESS ELECTRICAL ACTIVITY) (HCC): ICD-10-CM

## 2019-06-22 DIAGNOSIS — J44.1 COPD EXACERBATION (HCC): ICD-10-CM

## 2019-06-22 DIAGNOSIS — J96.21 ACUTE ON CHRONIC RESPIRATORY FAILURE WITH HYPOXIA (HCC): ICD-10-CM

## 2019-06-22 DIAGNOSIS — E87.5 ACUTE HYPERKALEMIA: ICD-10-CM

## 2019-06-22 DIAGNOSIS — R57.9 SHOCK CIRCULATORY (HCC): ICD-10-CM

## 2019-06-22 DIAGNOSIS — J96.02 ACUTE RESPIRATORY FAILURE WITH HYPOXIA AND HYPERCAPNIA (HCC): ICD-10-CM

## 2019-06-22 DIAGNOSIS — E83.42 HYPOMAGNESEMIA: ICD-10-CM

## 2019-06-22 DIAGNOSIS — I49.01 VENTRICULAR FIBRILLATION (HCC): ICD-10-CM

## 2019-06-22 DIAGNOSIS — J18.9 HCAP (HEALTHCARE-ASSOCIATED PNEUMONIA): ICD-10-CM

## 2019-06-22 DIAGNOSIS — J96.01 ACUTE RESPIRATORY FAILURE WITH HYPOXIA AND HYPERCAPNIA (HCC): ICD-10-CM

## 2019-06-22 LAB
A/G RATIO: 0.9 (ref 1.1–2.2)
ABO/RH: NORMAL
ALBUMIN SERPL-MCNC: 3.9 G/DL (ref 3.4–5)
ALP BLD-CCNC: 83 U/L (ref 40–129)
ALT SERPL-CCNC: 21 U/L (ref 10–40)
ANION GAP SERPL CALCULATED.3IONS-SCNC: 23 MMOL/L (ref 3–16)
ANTIBODY SCREEN: NORMAL
AST SERPL-CCNC: 24 U/L (ref 15–37)
BASE EXCESS VENOUS: 0.5 MMOL/L
BASOPHILS ABSOLUTE: 0.1 K/UL (ref 0–0.2)
BASOPHILS RELATIVE PERCENT: 1 %
BILIRUB SERPL-MCNC: 0.8 MG/DL (ref 0–1)
BUN BLDV-MCNC: 27 MG/DL (ref 7–20)
CALCIUM SERPL-MCNC: 8.5 MG/DL (ref 8.3–10.6)
CARBOXYHEMOGLOBIN: 5.5 %
CHLORIDE BLD-SCNC: 86 MMOL/L (ref 99–110)
CO2: 23 MMOL/L (ref 21–32)
CREAT SERPL-MCNC: 2.3 MG/DL (ref 0.9–1.3)
EOSINOPHILS ABSOLUTE: 0 K/UL (ref 0–0.6)
EOSINOPHILS RELATIVE PERCENT: 0.2 %
GFR AFRICAN AMERICAN: 35
GFR NON-AFRICAN AMERICAN: 29
GLOBULIN: 4.2 G/DL
GLUCOSE BLD-MCNC: 246 MG/DL (ref 70–99)
HCO3 VENOUS: 26 MMOL/L (ref 23–29)
HCT VFR BLD CALC: 33.6 % (ref 40.5–52.5)
HEMOGLOBIN: 10.8 G/DL (ref 13.5–17.5)
INR BLD: 4.15 (ref 0.86–1.14)
LACTIC ACID, SEPSIS: 6.9 MMOL/L (ref 0.4–1.9)
LYMPHOCYTES ABSOLUTE: 1.2 K/UL (ref 1–5.1)
LYMPHOCYTES RELATIVE PERCENT: 9.6 %
MAGNESIUM: 1.3 MG/DL (ref 1.8–2.4)
MCH RBC QN AUTO: 27.6 PG (ref 26–34)
MCHC RBC AUTO-ENTMCNC: 32.2 G/DL (ref 31–36)
MCV RBC AUTO: 85.8 FL (ref 80–100)
METHEMOGLOBIN VENOUS: 0.9 %
MONOCYTES ABSOLUTE: 0.8 K/UL (ref 0–1.3)
MONOCYTES RELATIVE PERCENT: 6.4 %
NEUTROPHILS ABSOLUTE: 10.4 K/UL (ref 1.7–7.7)
NEUTROPHILS RELATIVE PERCENT: 82.8 %
O2 CONTENT, VEN: 11 ML/DL
O2 SAT, VEN: 83 %
O2 THERAPY: NORMAL
PCO2, VEN: 48.2 MMHG (ref 40–50)
PDW BLD-RTO: 16 % (ref 12.4–15.4)
PH VENOUS: 7.35 (ref 7.35–7.45)
PLATELET # BLD: 274 K/UL (ref 135–450)
PMV BLD AUTO: 9.3 FL (ref 5–10.5)
PO2, VEN: 48 MMHG
POTASSIUM SERPL-SCNC: 4.9 MMOL/L (ref 3.5–5.1)
PRO-BNP: 3354 PG/ML (ref 0–124)
PROTHROMBIN TIME: 47.3 SEC (ref 9.8–13)
RBC # BLD: 3.92 M/UL (ref 4.2–5.9)
SODIUM BLD-SCNC: 132 MMOL/L (ref 136–145)
TCO2 CALC VENOUS: 27 MMOL/L
TOTAL PROTEIN: 8.1 G/DL (ref 6.4–8.2)
TROPONIN: 0.04 NG/ML
WBC # BLD: 12.5 K/UL (ref 4–11)

## 2019-06-22 PROCEDURE — 51702 INSERT TEMP BLADDER CATH: CPT

## 2019-06-22 PROCEDURE — 86850 RBC ANTIBODY SCREEN: CPT

## 2019-06-22 PROCEDURE — 80053 COMPREHEN METABOLIC PANEL: CPT

## 2019-06-22 PROCEDURE — 83735 ASSAY OF MAGNESIUM: CPT

## 2019-06-22 PROCEDURE — 6370000000 HC RX 637 (ALT 250 FOR IP): Performed by: FAMILY MEDICINE

## 2019-06-22 PROCEDURE — 84484 ASSAY OF TROPONIN QUANT: CPT

## 2019-06-22 PROCEDURE — 93005 ELECTROCARDIOGRAM TRACING: CPT | Performed by: FAMILY MEDICINE

## 2019-06-22 PROCEDURE — 6360000002 HC RX W HCPCS: Performed by: FAMILY MEDICINE

## 2019-06-22 PROCEDURE — 96368 THER/DIAG CONCURRENT INF: CPT

## 2019-06-22 PROCEDURE — 99291 CRITICAL CARE FIRST HOUR: CPT

## 2019-06-22 PROCEDURE — 85025 COMPLETE CBC W/AUTO DIFF WBC: CPT

## 2019-06-22 PROCEDURE — 94761 N-INVAS EAR/PLS OXIMETRY MLT: CPT

## 2019-06-22 PROCEDURE — 94640 AIRWAY INHALATION TREATMENT: CPT

## 2019-06-22 PROCEDURE — 2580000003 HC RX 258: Performed by: FAMILY MEDICINE

## 2019-06-22 PROCEDURE — 94660 CPAP INITIATION&MGMT: CPT

## 2019-06-22 PROCEDURE — 83880 ASSAY OF NATRIURETIC PEPTIDE: CPT

## 2019-06-22 PROCEDURE — 4500000026 HC ED CRITICAL CARE PROCEDURE

## 2019-06-22 PROCEDURE — 96375 TX/PRO/DX INJ NEW DRUG ADDON: CPT

## 2019-06-22 PROCEDURE — 83605 ASSAY OF LACTIC ACID: CPT

## 2019-06-22 PROCEDURE — 2700000000 HC OXYGEN THERAPY PER DAY

## 2019-06-22 PROCEDURE — 96365 THER/PROPH/DIAG IV INF INIT: CPT

## 2019-06-22 PROCEDURE — 87040 BLOOD CULTURE FOR BACTERIA: CPT

## 2019-06-22 PROCEDURE — 82803 BLOOD GASES ANY COMBINATION: CPT

## 2019-06-22 PROCEDURE — 85610 PROTHROMBIN TIME: CPT

## 2019-06-22 PROCEDURE — 86900 BLOOD TYPING SEROLOGIC ABO: CPT

## 2019-06-22 PROCEDURE — 71045 X-RAY EXAM CHEST 1 VIEW: CPT

## 2019-06-22 PROCEDURE — 92950 HEART/LUNG RESUSCITATION CPR: CPT

## 2019-06-22 PROCEDURE — 86901 BLOOD TYPING SEROLOGIC RH(D): CPT

## 2019-06-22 RX ORDER — KETAMINE HYDROCHLORIDE 50 MG/ML
200 INJECTION, SOLUTION, CONCENTRATE INTRAMUSCULAR; INTRAVENOUS ONCE
Status: DISCONTINUED | OUTPATIENT
Start: 2019-06-23 | End: 2019-06-23

## 2019-06-22 RX ORDER — ROCURONIUM BROMIDE 10 MG/ML
100 INJECTION, SOLUTION INTRAVENOUS ONCE
Status: COMPLETED | OUTPATIENT
Start: 2019-06-23 | End: 2019-06-23

## 2019-06-22 RX ORDER — 0.9 % SODIUM CHLORIDE 0.9 %
2000 INTRAVENOUS SOLUTION INTRAVENOUS ONCE
Status: COMPLETED | OUTPATIENT
Start: 2019-06-22 | End: 2019-06-23

## 2019-06-22 RX ORDER — DEXAMETHASONE SODIUM PHOSPHATE 4 MG/ML
10 INJECTION, SOLUTION INTRA-ARTICULAR; INTRALESIONAL; INTRAMUSCULAR; INTRAVENOUS; SOFT TISSUE ONCE
Status: COMPLETED | OUTPATIENT
Start: 2019-06-22 | End: 2019-06-22

## 2019-06-22 RX ORDER — MAGNESIUM SULFATE IN WATER 40 MG/ML
2 INJECTION, SOLUTION INTRAVENOUS ONCE
Status: COMPLETED | OUTPATIENT
Start: 2019-06-22 | End: 2019-06-23

## 2019-06-22 RX ORDER — ONDANSETRON 2 MG/ML
8 INJECTION INTRAMUSCULAR; INTRAVENOUS ONCE
Status: COMPLETED | OUTPATIENT
Start: 2019-06-22 | End: 2019-06-22

## 2019-06-22 RX ORDER — IPRATROPIUM BROMIDE AND ALBUTEROL SULFATE 2.5; .5 MG/3ML; MG/3ML
3 SOLUTION RESPIRATORY (INHALATION) ONCE
Status: COMPLETED | OUTPATIENT
Start: 2019-06-22 | End: 2019-06-22

## 2019-06-22 RX ORDER — 0.9 % SODIUM CHLORIDE 0.9 %
500 INTRAVENOUS SOLUTION INTRAVENOUS ONCE
Status: COMPLETED | OUTPATIENT
Start: 2019-06-22 | End: 2019-06-23

## 2019-06-22 RX ADMIN — SODIUM CHLORIDE 2000 ML: 9 INJECTION, SOLUTION INTRAVENOUS at 23:35

## 2019-06-22 RX ADMIN — SODIUM CHLORIDE 500 ML: 9 INJECTION, SOLUTION INTRAVENOUS at 22:50

## 2019-06-22 RX ADMIN — MAGNESIUM SULFATE HEPTAHYDRATE 2 G: 40 INJECTION, SOLUTION INTRAVENOUS at 23:49

## 2019-06-22 RX ADMIN — DEXAMETHASONE SODIUM PHOSPHATE 10 MG: 4 INJECTION, SOLUTION INTRAMUSCULAR; INTRAVENOUS at 22:24

## 2019-06-22 RX ADMIN — MEROPENEM 1 G: 1 INJECTION, POWDER, FOR SOLUTION INTRAVENOUS at 23:07

## 2019-06-22 RX ADMIN — IPRATROPIUM BROMIDE AND ALBUTEROL SULFATE 3 AMPULE: .5; 3 SOLUTION RESPIRATORY (INHALATION) at 22:17

## 2019-06-22 RX ADMIN — ONDANSETRON 8 MG: 2 INJECTION INTRAMUSCULAR; INTRAVENOUS at 22:50

## 2019-06-22 ASSESSMENT — PAIN DESCRIPTION - DESCRIPTORS: DESCRIPTORS: SHARP

## 2019-06-22 ASSESSMENT — PAIN DESCRIPTION - PAIN TYPE: TYPE: ACUTE PAIN

## 2019-06-22 ASSESSMENT — PAIN DESCRIPTION - FREQUENCY: FREQUENCY: CONTINUOUS

## 2019-06-22 ASSESSMENT — PAIN SCALES - GENERAL: PAINLEVEL_OUTOF10: 10

## 2019-06-22 ASSESSMENT — PAIN DESCRIPTION - PROGRESSION: CLINICAL_PROGRESSION: GRADUALLY WORSENING

## 2019-06-22 ASSESSMENT — PAIN DESCRIPTION - LOCATION: LOCATION: CHEST

## 2019-06-23 ENCOUNTER — APPOINTMENT (OUTPATIENT)
Dept: CT IMAGING | Age: 59
DRG: 720 | End: 2019-06-23
Payer: COMMERCIAL

## 2019-06-23 ENCOUNTER — APPOINTMENT (OUTPATIENT)
Dept: GENERAL RADIOLOGY | Age: 59
DRG: 720 | End: 2019-06-23
Payer: COMMERCIAL

## 2019-06-23 PROBLEM — R57.9 SHOCK CIRCULATORY (HCC): Status: ACTIVE | Noted: 2019-06-23

## 2019-06-23 PROBLEM — R06.82 TACHYPNEA: Status: ACTIVE | Noted: 2019-06-23

## 2019-06-23 PROBLEM — A41.9 SEPTIC SHOCK (HCC): Status: ACTIVE | Noted: 2019-06-23

## 2019-06-23 PROBLEM — J96.90 RESPIRATORY FAILURE (HCC): Status: ACTIVE | Noted: 2019-06-23

## 2019-06-23 PROBLEM — R65.20 SEVERE SEPSIS (HCC): Status: ACTIVE | Noted: 2018-04-26

## 2019-06-23 PROBLEM — J18.9 PNEUMONIA: Status: ACTIVE | Noted: 2019-06-23

## 2019-06-23 PROBLEM — I47.20 VENTRICULAR TACHYCARDIA: Status: ACTIVE | Noted: 2019-06-23

## 2019-06-23 PROBLEM — D72.9 NEUTROPHILIC LEUKOCYTOSIS: Status: ACTIVE | Noted: 2019-06-23

## 2019-06-23 PROBLEM — R65.21 SEPTIC SHOCK (HCC): Status: ACTIVE | Noted: 2019-06-23

## 2019-06-23 PROBLEM — J96.11 CHRONIC RESPIRATORY FAILURE WITH HYPOXIA (HCC): Status: ACTIVE | Noted: 2019-06-23

## 2019-06-23 LAB
A/G RATIO: 0.9 (ref 1.1–2.2)
ALBUMIN SERPL-MCNC: 2.9 G/DL (ref 3.4–5)
ALBUMIN SERPL-MCNC: 3.3 G/DL (ref 3.4–5)
ALP BLD-CCNC: 81 U/L (ref 40–129)
ALT SERPL-CCNC: 87 U/L (ref 10–40)
ANION GAP SERPL CALCULATED.3IONS-SCNC: 19 MMOL/L (ref 3–16)
ANION GAP SERPL CALCULATED.3IONS-SCNC: 21 MMOL/L (ref 3–16)
ANION GAP SERPL CALCULATED.3IONS-SCNC: 22 MMOL/L (ref 3–16)
ANION GAP SERPL CALCULATED.3IONS-SCNC: 22 MMOL/L (ref 3–16)
APTT: 36.2 SEC (ref 26–36)
AST SERPL-CCNC: 133 U/L (ref 15–37)
BASE EXCESS ARTERIAL: -5.2 MMOL/L (ref -3–3)
BASE EXCESS ARTERIAL: -5.4 MMOL/L (ref -3–3)
BASE EXCESS ARTERIAL: -8.7 MMOL/L (ref -3–3)
BASE EXCESS VENOUS: -7.9 MMOL/L
BASOPHILS ABSOLUTE: 0.1 K/UL (ref 0–0.2)
BASOPHILS ABSOLUTE: 0.1 K/UL (ref 0–0.2)
BASOPHILS RELATIVE PERCENT: 0.6 %
BASOPHILS RELATIVE PERCENT: 0.8 %
BILIRUB SERPL-MCNC: 1 MG/DL (ref 0–1)
BILIRUBIN URINE: ABNORMAL
BLOOD, URINE: ABNORMAL
BUN BLDV-MCNC: 27 MG/DL (ref 7–20)
BUN BLDV-MCNC: 29 MG/DL (ref 7–20)
BUN BLDV-MCNC: 31 MG/DL (ref 7–20)
BUN BLDV-MCNC: 33 MG/DL (ref 7–20)
CALCIUM IONIZED: 0.95 MMOL/L (ref 1.12–1.32)
CALCIUM SERPL-MCNC: 7.2 MG/DL (ref 8.3–10.6)
CALCIUM SERPL-MCNC: 7.9 MG/DL (ref 8.3–10.6)
CALCIUM SERPL-MCNC: 7.9 MG/DL (ref 8.3–10.6)
CALCIUM SERPL-MCNC: 8.2 MG/DL (ref 8.3–10.6)
CARBOXYHEMOGLOBIN ARTERIAL: 1.2 % (ref 0–1.5)
CARBOXYHEMOGLOBIN ARTERIAL: 1.4 % (ref 0–1.5)
CARBOXYHEMOGLOBIN ARTERIAL: 2 % (ref 0–1.5)
CARBOXYHEMOGLOBIN: 3.3 %
CHLORIDE BLD-SCNC: 90 MMOL/L (ref 99–110)
CHLORIDE BLD-SCNC: 91 MMOL/L (ref 99–110)
CHLORIDE BLD-SCNC: 92 MMOL/L (ref 99–110)
CHLORIDE BLD-SCNC: 94 MMOL/L (ref 99–110)
CLARITY: ABNORMAL
CO2: 19 MMOL/L (ref 21–32)
CO2: 20 MMOL/L (ref 21–32)
CO2: 21 MMOL/L (ref 21–32)
CO2: 21 MMOL/L (ref 21–32)
COLOR: ABNORMAL
CREAT SERPL-MCNC: 2.4 MG/DL (ref 0.9–1.3)
CREAT SERPL-MCNC: 2.6 MG/DL (ref 0.9–1.3)
CREAT SERPL-MCNC: 2.6 MG/DL (ref 0.9–1.3)
CREAT SERPL-MCNC: 3 MG/DL (ref 0.9–1.3)
EKG ATRIAL RATE: 340 BPM
EKG ATRIAL RATE: 67 BPM
EKG DIAGNOSIS: NORMAL
EKG Q-T INTERVAL: 402 MS
EKG Q-T INTERVAL: 444 MS
EKG Q-T INTERVAL: 452 MS
EKG QRS DURATION: 108 MS
EKG QRS DURATION: 122 MS
EKG QRS DURATION: 90 MS
EKG QTC CALCULATION (BAZETT): 454 MS
EKG QTC CALCULATION (BAZETT): 481 MS
EKG QTC CALCULATION (BAZETT): 511 MS
EKG R AXIS: 110 DEGREES
EKG R AXIS: 111 DEGREES
EKG R AXIS: 128 DEGREES
EKG T AXIS: 100 DEGREES
EKG T AXIS: 35 DEGREES
EKG T AXIS: 89 DEGREES
EKG VENTRICULAR RATE: 63 BPM
EKG VENTRICULAR RATE: 77 BPM
EKG VENTRICULAR RATE: 86 BPM
EOSINOPHILS ABSOLUTE: 0 K/UL (ref 0–0.6)
EOSINOPHILS ABSOLUTE: 0.1 K/UL (ref 0–0.6)
EOSINOPHILS RELATIVE PERCENT: 0.1 %
EOSINOPHILS RELATIVE PERCENT: 0.4 %
EPITHELIAL CELLS, UA: 1 /HPF (ref 0–5)
GFR AFRICAN AMERICAN: 26
GFR AFRICAN AMERICAN: 31
GFR AFRICAN AMERICAN: 31
GFR AFRICAN AMERICAN: 34
GFR NON-AFRICAN AMERICAN: 22
GFR NON-AFRICAN AMERICAN: 25
GFR NON-AFRICAN AMERICAN: 25
GFR NON-AFRICAN AMERICAN: 28
GLOBULIN: 3.7 G/DL
GLUCOSE BLD-MCNC: 199 MG/DL (ref 70–99)
GLUCOSE BLD-MCNC: 205 MG/DL (ref 70–99)
GLUCOSE BLD-MCNC: 228 MG/DL (ref 70–99)
GLUCOSE BLD-MCNC: 276 MG/DL (ref 70–99)
GLUCOSE BLD-MCNC: 285 MG/DL (ref 70–99)
GLUCOSE BLD-MCNC: 290 MG/DL (ref 70–99)
GLUCOSE BLD-MCNC: 292 MG/DL (ref 70–99)
GLUCOSE BLD-MCNC: 299 MG/DL (ref 70–99)
GLUCOSE BLD-MCNC: 301 MG/DL (ref 70–99)
GLUCOSE BLD-MCNC: 312 MG/DL (ref 70–99)
GLUCOSE BLD-MCNC: 352 MG/DL (ref 70–99)
GLUCOSE URINE: NEGATIVE MG/DL
HCO3 ARTERIAL: 18.6 MMOL/L (ref 21–29)
HCO3 ARTERIAL: 20.1 MMOL/L (ref 21–29)
HCO3 ARTERIAL: 20.9 MMOL/L (ref 21–29)
HCO3 VENOUS: 22 MMOL/L (ref 23–29)
HCT VFR BLD CALC: 34.5 % (ref 40.5–52.5)
HCT VFR BLD CALC: 37.2 % (ref 40.5–52.5)
HCT VFR BLD CALC: 38 % (ref 40.5–52.5)
HEMOGLOBIN, ART, EXTENDED: 11.2 G/DL (ref 13.5–17.5)
HEMOGLOBIN, ART, EXTENDED: 11.5 G/DL (ref 13.5–17.5)
HEMOGLOBIN, ART, EXTENDED: 12.6 G/DL (ref 13.5–17.5)
HEMOGLOBIN: 10.6 G/DL (ref 13.5–17.5)
HEMOGLOBIN: 11.6 G/DL (ref 13.5–17.5)
HEMOGLOBIN: 12.1 G/DL (ref 13.5–17.5)
HYALINE CASTS: 15 /LPF (ref 0–8)
INR BLD: 7.56 (ref 0.86–1.14)
KETONES, URINE: ABNORMAL MG/DL
L. PNEUMOPHILA SEROGP 1 UR AG: NORMAL
LACTIC ACID: 6.7 MMOL/L (ref 0.4–2)
LACTIC ACID: 6.9 MMOL/L (ref 0.4–2)
LACTIC ACID: 7.4 MMOL/L (ref 0.4–2)
LACTIC ACID: 8 MMOL/L (ref 0.4–2)
LEUKOCYTE ESTERASE, URINE: NEGATIVE
LYMPHOCYTES ABSOLUTE: 0.8 K/UL (ref 1–5.1)
LYMPHOCYTES ABSOLUTE: 1.4 K/UL (ref 1–5.1)
LYMPHOCYTES RELATIVE PERCENT: 4 %
LYMPHOCYTES RELATIVE PERCENT: 9.4 %
MAGNESIUM: 1.8 MG/DL (ref 1.8–2.4)
MAGNESIUM: 1.8 MG/DL (ref 1.8–2.4)
MCH RBC QN AUTO: 26.7 PG (ref 26–34)
MCH RBC QN AUTO: 26.9 PG (ref 26–34)
MCH RBC QN AUTO: 27.4 PG (ref 26–34)
MCHC RBC AUTO-ENTMCNC: 30.7 G/DL (ref 31–36)
MCHC RBC AUTO-ENTMCNC: 31.2 G/DL (ref 31–36)
MCHC RBC AUTO-ENTMCNC: 31.7 G/DL (ref 31–36)
MCV RBC AUTO: 85.6 FL (ref 80–100)
MCV RBC AUTO: 86.3 FL (ref 80–100)
MCV RBC AUTO: 87.5 FL (ref 80–100)
METHEMOGLOBIN ARTERIAL: 0.8 %
METHEMOGLOBIN ARTERIAL: 0.9 %
METHEMOGLOBIN ARTERIAL: 0.9 %
METHEMOGLOBIN VENOUS: 1 %
MICROSCOPIC EXAMINATION: YES
MONOCYTES ABSOLUTE: 0.7 K/UL (ref 0–1.3)
MONOCYTES ABSOLUTE: 1.3 K/UL (ref 0–1.3)
MONOCYTES RELATIVE PERCENT: 4.7 %
MONOCYTES RELATIVE PERCENT: 6.6 %
MRSA SCREEN RT-PCR: NORMAL
NEUTROPHILS ABSOLUTE: 12.7 K/UL (ref 1.7–7.7)
NEUTROPHILS ABSOLUTE: 17.8 K/UL (ref 1.7–7.7)
NEUTROPHILS RELATIVE PERCENT: 84.7 %
NEUTROPHILS RELATIVE PERCENT: 88.7 %
NITRITE, URINE: NEGATIVE
O2 CONTENT ARTERIAL: 14 ML/DL
O2 CONTENT ARTERIAL: 15 ML/DL
O2 CONTENT ARTERIAL: 16 ML/DL
O2 CONTENT, VEN: 10 ML/DL
O2 SAT, ARTERIAL: 87.1 %
O2 SAT, ARTERIAL: 92.4 %
O2 SAT, ARTERIAL: 94.3 %
O2 SAT, VEN: 72 %
O2 THERAPY: ABNORMAL
PCO2 ARTERIAL: 41.6 MMHG (ref 35–45)
PCO2 ARTERIAL: 45.6 MMHG (ref 35–45)
PCO2 ARTERIAL: 47.5 MMHG (ref 35–45)
PCO2, VEN: 66.6 MMHG (ref 40–50)
PDW BLD-RTO: 15.9 % (ref 12.4–15.4)
PDW BLD-RTO: 16.3 % (ref 12.4–15.4)
PDW BLD-RTO: 16.4 % (ref 12.4–15.4)
PERFORMED ON: ABNORMAL
PH ARTERIAL: 7.22 (ref 7.35–7.45)
PH ARTERIAL: 7.28 (ref 7.35–7.45)
PH ARTERIAL: 7.31 (ref 7.35–7.45)
PH UA: 5 (ref 5–8)
PH VENOUS: 7.13 (ref 7.35–7.45)
PH VENOUS: 7.26 (ref 7.35–7.45)
PHOSPHORUS: 4.9 MG/DL (ref 2.5–4.9)
PHOSPHORUS: 7.3 MG/DL (ref 2.5–4.9)
PLATELET # BLD: 270 K/UL (ref 135–450)
PLATELET # BLD: 318 K/UL (ref 135–450)
PLATELET # BLD: 338 K/UL (ref 135–450)
PMV BLD AUTO: 9.5 FL (ref 5–10.5)
PMV BLD AUTO: 9.6 FL (ref 5–10.5)
PMV BLD AUTO: 9.7 FL (ref 5–10.5)
PO2 ARTERIAL: 61.2 MMHG (ref 75–108)
PO2 ARTERIAL: 83.7 MMHG (ref 75–108)
PO2 ARTERIAL: 84 MMHG (ref 75–108)
PO2, VEN: 53 MMHG
POTASSIUM SERPL-SCNC: 4 MMOL/L (ref 3.5–5.1)
POTASSIUM SERPL-SCNC: 5.4 MMOL/L (ref 3.5–5.1)
POTASSIUM SERPL-SCNC: 6 MMOL/L (ref 3.5–5.1)
POTASSIUM SERPL-SCNC: 7.1 MMOL/L (ref 3.5–5.1)
PROCALCITONIN: 0.17 NG/ML (ref 0–0.15)
PROTEIN UA: 100 MG/DL
PROTHROMBIN TIME: 86.2 SEC (ref 9.8–13)
RBC # BLD: 3.94 M/UL (ref 4.2–5.9)
RBC # BLD: 4.34 M/UL (ref 4.2–5.9)
RBC # BLD: 4.4 M/UL (ref 4.2–5.9)
RBC UA: 8 /HPF (ref 0–4)
SODIUM BLD-SCNC: 132 MMOL/L (ref 136–145)
SODIUM BLD-SCNC: 133 MMOL/L (ref 136–145)
SODIUM BLD-SCNC: 133 MMOL/L (ref 136–145)
SODIUM BLD-SCNC: 134 MMOL/L (ref 136–145)
SPECIFIC GRAVITY UA: 1.02 (ref 1–1.03)
STREP PNEUMONIAE ANTIGEN, URINE: NORMAL
TCO2 ARTERIAL: 20.1 MMOL/L
TCO2 ARTERIAL: 21.4 MMOL/L
TCO2 ARTERIAL: 22.3 MMOL/L
TCO2 CALC VENOUS: 24 MMOL/L
TOTAL PROTEIN: 7 G/DL (ref 6.4–8.2)
TROPONIN: 0.02 NG/ML
URINE TYPE: ABNORMAL
UROBILINOGEN, URINE: 1 E.U./DL
WBC # BLD: 15 K/UL (ref 4–11)
WBC # BLD: 20.1 K/UL (ref 4–11)
WBC # BLD: 20.2 K/UL (ref 4–11)
WBC UA: 5 /HPF (ref 0–5)

## 2019-06-23 PROCEDURE — 6360000002 HC RX W HCPCS: Performed by: INTERNAL MEDICINE

## 2019-06-23 PROCEDURE — 93005 ELECTROCARDIOGRAM TRACING: CPT | Performed by: FAMILY MEDICINE

## 2019-06-23 PROCEDURE — 2580000003 HC RX 258: Performed by: INTERNAL MEDICINE

## 2019-06-23 PROCEDURE — 87086 URINE CULTURE/COLONY COUNT: CPT

## 2019-06-23 PROCEDURE — 87205 SMEAR GRAM STAIN: CPT

## 2019-06-23 PROCEDURE — 2700000000 HC OXYGEN THERAPY PER DAY

## 2019-06-23 PROCEDURE — 71045 X-RAY EXAM CHEST 1 VIEW: CPT

## 2019-06-23 PROCEDURE — 84484 ASSAY OF TROPONIN QUANT: CPT

## 2019-06-23 PROCEDURE — 2500000003 HC RX 250 WO HCPCS: Performed by: FAMILY MEDICINE

## 2019-06-23 PROCEDURE — 82330 ASSAY OF CALCIUM: CPT

## 2019-06-23 PROCEDURE — 83735 ASSAY OF MAGNESIUM: CPT

## 2019-06-23 PROCEDURE — 6360000002 HC RX W HCPCS

## 2019-06-23 PROCEDURE — 84145 PROCALCITONIN (PCT): CPT

## 2019-06-23 PROCEDURE — 94003 VENT MGMT INPAT SUBQ DAY: CPT

## 2019-06-23 PROCEDURE — 80053 COMPREHEN METABOLIC PANEL: CPT

## 2019-06-23 PROCEDURE — 94640 AIRWAY INHALATION TREATMENT: CPT

## 2019-06-23 PROCEDURE — 36556 INSERT NON-TUNNEL CV CATH: CPT | Performed by: INTERNAL MEDICINE

## 2019-06-23 PROCEDURE — 85610 PROTHROMBIN TIME: CPT

## 2019-06-23 PROCEDURE — 36415 COLL VENOUS BLD VENIPUNCTURE: CPT

## 2019-06-23 PROCEDURE — 71250 CT THORAX DX C-: CPT

## 2019-06-23 PROCEDURE — 93010 ELECTROCARDIOGRAM REPORT: CPT | Performed by: INTERNAL MEDICINE

## 2019-06-23 PROCEDURE — 2500000003 HC RX 250 WO HCPCS: Performed by: INTERNAL MEDICINE

## 2019-06-23 PROCEDURE — 87641 MR-STAPH DNA AMP PROBE: CPT

## 2019-06-23 PROCEDURE — 94002 VENT MGMT INPAT INIT DAY: CPT

## 2019-06-23 PROCEDURE — 70450 CT HEAD/BRAIN W/O DYE: CPT

## 2019-06-23 PROCEDURE — 5A1955Z RESPIRATORY VENTILATION, GREATER THAN 96 CONSECUTIVE HOURS: ICD-10-PCS | Performed by: FAMILY MEDICINE

## 2019-06-23 PROCEDURE — 85025 COMPLETE CBC W/AUTO DIFF WBC: CPT

## 2019-06-23 PROCEDURE — 6370000000 HC RX 637 (ALT 250 FOR IP): Performed by: INTERNAL MEDICINE

## 2019-06-23 PROCEDURE — 85730 THROMBOPLASTIN TIME PARTIAL: CPT

## 2019-06-23 PROCEDURE — 96366 THER/PROPH/DIAG IV INF ADDON: CPT

## 2019-06-23 PROCEDURE — 2580000003 HC RX 258: Performed by: PHARMACIST

## 2019-06-23 PROCEDURE — 81001 URINALYSIS AUTO W/SCOPE: CPT

## 2019-06-23 PROCEDURE — 5A12012 PERFORMANCE OF CARDIAC OUTPUT, SINGLE, MANUAL: ICD-10-PCS | Performed by: FAMILY MEDICINE

## 2019-06-23 PROCEDURE — 6360000002 HC RX W HCPCS: Performed by: FAMILY MEDICINE

## 2019-06-23 PROCEDURE — 83605 ASSAY OF LACTIC ACID: CPT

## 2019-06-23 PROCEDURE — 02H633Z INSERTION OF INFUSION DEVICE INTO RIGHT ATRIUM, PERCUTANEOUS APPROACH: ICD-10-PCS | Performed by: INTERNAL MEDICINE

## 2019-06-23 PROCEDURE — 84132 ASSAY OF SERUM POTASSIUM: CPT

## 2019-06-23 PROCEDURE — 96376 TX/PRO/DX INJ SAME DRUG ADON: CPT

## 2019-06-23 PROCEDURE — 36600 WITHDRAWAL OF ARTERIAL BLOOD: CPT

## 2019-06-23 PROCEDURE — 87070 CULTURE OTHR SPECIMN AEROBIC: CPT

## 2019-06-23 PROCEDURE — 94750 HC PULMONARY COMPLIANCE STUDY: CPT

## 2019-06-23 PROCEDURE — 93005 ELECTROCARDIOGRAM TRACING: CPT | Performed by: INTERNAL MEDICINE

## 2019-06-23 PROCEDURE — 90945 DIALYSIS ONE EVALUATION: CPT

## 2019-06-23 PROCEDURE — 96367 TX/PROPH/DG ADDL SEQ IV INF: CPT

## 2019-06-23 PROCEDURE — 99292 CRITICAL CARE ADDL 30 MIN: CPT | Performed by: INTERNAL MEDICINE

## 2019-06-23 PROCEDURE — 85027 COMPLETE CBC AUTOMATED: CPT

## 2019-06-23 PROCEDURE — 87449 NOS EACH ORGANISM AG IA: CPT

## 2019-06-23 PROCEDURE — 2580000003 HC RX 258: Performed by: FAMILY MEDICINE

## 2019-06-23 PROCEDURE — 84100 ASSAY OF PHOSPHORUS: CPT

## 2019-06-23 PROCEDURE — 2000000000 HC ICU R&B

## 2019-06-23 PROCEDURE — 76937 US GUIDE VASCULAR ACCESS: CPT | Performed by: INTERNAL MEDICINE

## 2019-06-23 PROCEDURE — 99291 CRITICAL CARE FIRST HOUR: CPT | Performed by: INTERNAL MEDICINE

## 2019-06-23 PROCEDURE — 0BH17EZ INSERTION OF ENDOTRACHEAL AIRWAY INTO TRACHEA, VIA NATURAL OR ARTIFICIAL OPENING: ICD-10-PCS | Performed by: FAMILY MEDICINE

## 2019-06-23 PROCEDURE — 6360000002 HC RX W HCPCS: Performed by: PHARMACIST

## 2019-06-23 PROCEDURE — 82803 BLOOD GASES ANY COMBINATION: CPT

## 2019-06-23 RX ORDER — MIDAZOLAM HYDROCHLORIDE 1 MG/ML
INJECTION INTRAMUSCULAR; INTRAVENOUS
Status: COMPLETED
Start: 2019-06-23 | End: 2019-06-23

## 2019-06-23 RX ORDER — FAMOTIDINE 20 MG/1
20 TABLET, FILM COATED ORAL 2 TIMES DAILY
Status: DISCONTINUED | OUTPATIENT
Start: 2019-06-23 | End: 2019-06-23 | Stop reason: DRUGHIGH

## 2019-06-23 RX ORDER — IPRATROPIUM BROMIDE AND ALBUTEROL SULFATE 2.5; .5 MG/3ML; MG/3ML
1 SOLUTION RESPIRATORY (INHALATION) EVERY 4 HOURS
Status: DISCONTINUED | OUTPATIENT
Start: 2019-06-23 | End: 2019-07-23 | Stop reason: HOSPADM

## 2019-06-23 RX ORDER — HEPARIN SODIUM 1000 [USP'U]/ML
60 INJECTION, SOLUTION INTRAVENOUS; SUBCUTANEOUS ONCE
Status: DISCONTINUED | OUTPATIENT
Start: 2019-06-23 | End: 2019-06-23

## 2019-06-23 RX ORDER — DEXTROSE MONOHYDRATE 50 MG/ML
100 INJECTION, SOLUTION INTRAVENOUS PRN
Status: DISCONTINUED | OUTPATIENT
Start: 2019-06-23 | End: 2019-07-23 | Stop reason: SDUPTHER

## 2019-06-23 RX ORDER — SODIUM CHLORIDE 9 MG/ML
INJECTION, SOLUTION INTRAVENOUS CONTINUOUS
Status: DISCONTINUED | OUTPATIENT
Start: 2019-06-23 | End: 2019-06-23

## 2019-06-23 RX ORDER — MIDAZOLAM HYDROCHLORIDE 1 MG/ML
2 INJECTION INTRAMUSCULAR; INTRAVENOUS ONCE
Status: COMPLETED | OUTPATIENT
Start: 2019-06-23 | End: 2019-06-23

## 2019-06-23 RX ORDER — METHYLPREDNISOLONE SODIUM SUCCINATE 40 MG/ML
40 INJECTION, POWDER, LYOPHILIZED, FOR SOLUTION INTRAMUSCULAR; INTRAVENOUS EVERY 6 HOURS
Status: DISCONTINUED | OUTPATIENT
Start: 2019-06-23 | End: 2019-06-24

## 2019-06-23 RX ORDER — FENTANYL CITRATE 50 UG/ML
25 INJECTION, SOLUTION INTRAMUSCULAR; INTRAVENOUS
Status: DISCONTINUED | OUTPATIENT
Start: 2019-06-23 | End: 2019-07-02

## 2019-06-23 RX ORDER — ACETAMINOPHEN 650 MG/1
650 SUPPOSITORY RECTAL EVERY 4 HOURS PRN
Status: DISCONTINUED | OUTPATIENT
Start: 2019-06-23 | End: 2019-07-23 | Stop reason: HOSPADM

## 2019-06-23 RX ORDER — SODIUM CHLORIDE 0.9 % (FLUSH) 0.9 %
10 SYRINGE (ML) INJECTION PRN
Status: DISCONTINUED | OUTPATIENT
Start: 2019-06-23 | End: 2019-07-23 | Stop reason: HOSPADM

## 2019-06-23 RX ORDER — POTASSIUM CHLORIDE 29.8 MG/ML
20 INJECTION INTRAVENOUS PRN
Status: DISCONTINUED | OUTPATIENT
Start: 2019-06-23 | End: 2019-07-02

## 2019-06-23 RX ORDER — AMIODARONE HYDROCHLORIDE 50 MG/ML
150 INJECTION, SOLUTION INTRAVENOUS ONCE
Status: COMPLETED | OUTPATIENT
Start: 2019-06-23 | End: 2019-06-23

## 2019-06-23 RX ORDER — DEXTROSE MONOHYDRATE 25 G/50ML
25 INJECTION, SOLUTION INTRAVENOUS ONCE
Status: COMPLETED | OUTPATIENT
Start: 2019-06-23 | End: 2019-06-23

## 2019-06-23 RX ORDER — CHLORHEXIDINE GLUCONATE 0.12 MG/ML
15 RINSE ORAL 2 TIMES DAILY
Status: DISCONTINUED | OUTPATIENT
Start: 2019-06-23 | End: 2019-07-04

## 2019-06-23 RX ORDER — DEXTROSE MONOHYDRATE 25 G/50ML
12.5 INJECTION, SOLUTION INTRAVENOUS PRN
Status: DISCONTINUED | OUTPATIENT
Start: 2019-06-23 | End: 2019-07-23 | Stop reason: SDUPTHER

## 2019-06-23 RX ORDER — FAMOTIDINE 20 MG/1
20 TABLET, FILM COATED ORAL DAILY
Status: DISCONTINUED | OUTPATIENT
Start: 2019-06-23 | End: 2019-06-23

## 2019-06-23 RX ORDER — KETAMINE HYDROCHLORIDE 50 MG/ML
40 INJECTION, SOLUTION, CONCENTRATE INTRAMUSCULAR; INTRAVENOUS ONCE
Status: DISCONTINUED | OUTPATIENT
Start: 2019-06-23 | End: 2019-06-23

## 2019-06-23 RX ORDER — FENTANYL CITRATE 50 UG/ML
50 INJECTION, SOLUTION INTRAMUSCULAR; INTRAVENOUS ONCE
Status: COMPLETED | OUTPATIENT
Start: 2019-06-23 | End: 2019-06-23

## 2019-06-23 RX ORDER — SODIUM CHLORIDE 0.9 % (FLUSH) 0.9 %
10 SYRINGE (ML) INJECTION EVERY 12 HOURS SCHEDULED
Status: DISCONTINUED | OUTPATIENT
Start: 2019-06-23 | End: 2019-07-23 | Stop reason: HOSPADM

## 2019-06-23 RX ORDER — MIDAZOLAM HYDROCHLORIDE 1 MG/ML
2 INJECTION INTRAMUSCULAR; INTRAVENOUS
Status: DISCONTINUED | OUTPATIENT
Start: 2019-06-23 | End: 2019-06-24

## 2019-06-23 RX ORDER — HEPARIN SODIUM 10000 [USP'U]/100ML
10 INJECTION, SOLUTION INTRAVENOUS CONTINUOUS
Status: DISCONTINUED | OUTPATIENT
Start: 2019-06-23 | End: 2019-06-23

## 2019-06-23 RX ORDER — INSULIN GLARGINE 100 [IU]/ML
35 INJECTION, SOLUTION SUBCUTANEOUS NIGHTLY
Status: DISCONTINUED | OUTPATIENT
Start: 2019-06-23 | End: 2019-06-23

## 2019-06-23 RX ORDER — MAGNESIUM SULFATE 1 G/100ML
1 INJECTION INTRAVENOUS PRN
Status: DISCONTINUED | OUTPATIENT
Start: 2019-06-23 | End: 2019-07-02

## 2019-06-23 RX ORDER — NICOTINE POLACRILEX 4 MG
15 LOZENGE BUCCAL PRN
Status: DISCONTINUED | OUTPATIENT
Start: 2019-06-23 | End: 2019-07-23 | Stop reason: SDUPTHER

## 2019-06-23 RX ORDER — HEPARIN SODIUM 1000 [USP'U]/ML
30 INJECTION, SOLUTION INTRAVENOUS; SUBCUTANEOUS PRN
Status: DISCONTINUED | OUTPATIENT
Start: 2019-06-23 | End: 2019-06-23

## 2019-06-23 RX ORDER — ALBUTEROL SULFATE 2.5 MG/3ML
2.5 SOLUTION RESPIRATORY (INHALATION) EVERY 4 HOURS PRN
Status: DISCONTINUED | OUTPATIENT
Start: 2019-06-23 | End: 2019-07-23 | Stop reason: HOSPADM

## 2019-06-23 RX ORDER — HEPARIN SODIUM 1000 [USP'U]/ML
60 INJECTION, SOLUTION INTRAVENOUS; SUBCUTANEOUS PRN
Status: DISCONTINUED | OUTPATIENT
Start: 2019-06-23 | End: 2019-06-23

## 2019-06-23 RX ORDER — ONDANSETRON 2 MG/ML
4 INJECTION INTRAMUSCULAR; INTRAVENOUS EVERY 4 HOURS PRN
Status: DISCONTINUED | OUTPATIENT
Start: 2019-06-23 | End: 2019-07-23 | Stop reason: HOSPADM

## 2019-06-23 RX ORDER — MIDAZOLAM HYDROCHLORIDE 1 MG/ML
INJECTION INTRAMUSCULAR; INTRAVENOUS
Status: DISCONTINUED
Start: 2019-06-23 | End: 2019-06-24

## 2019-06-23 RX ORDER — 0.9 % SODIUM CHLORIDE 0.9 %
600 INTRAVENOUS SOLUTION INTRAVENOUS ONCE
Status: COMPLETED | OUTPATIENT
Start: 2019-06-23 | End: 2019-06-23

## 2019-06-23 RX ADMIN — Medication 25 MCG/HR: at 10:16

## 2019-06-23 RX ADMIN — Medication 1000 ML/HR: at 20:30

## 2019-06-23 RX ADMIN — MIDAZOLAM HYDROCHLORIDE 2 MG: 1 INJECTION INTRAMUSCULAR; INTRAVENOUS at 09:21

## 2019-06-23 RX ADMIN — Medication 150 MCG/HR: at 20:19

## 2019-06-23 RX ADMIN — MIDAZOLAM 2 MG: 1 INJECTION INTRAMUSCULAR; INTRAVENOUS at 19:48

## 2019-06-23 RX ADMIN — AMIODARONE HYDROCHLORIDE 150 MG: 50 INJECTION, SOLUTION INTRAVENOUS at 01:05

## 2019-06-23 RX ADMIN — INSULIN LISPRO 6 UNITS: 100 INJECTION, SOLUTION INTRAVENOUS; SUBCUTANEOUS at 12:13

## 2019-06-23 RX ADMIN — MOMETASONE FUROATE AND FORMOTEROL FUMARATE DIHYDRATE 2 PUFF: 100; 5 AEROSOL RESPIRATORY (INHALATION) at 07:50

## 2019-06-23 RX ADMIN — MIDAZOLAM 2 MG: 1 INJECTION INTRAMUSCULAR; INTRAVENOUS at 22:53

## 2019-06-23 RX ADMIN — INSULIN HUMAN 10 UNITS: 100 INJECTION, SOLUTION PARENTERAL at 08:14

## 2019-06-23 RX ADMIN — METHYLPREDNISOLONE SODIUM SUCCINATE 40 MG: 40 INJECTION, POWDER, FOR SOLUTION INTRAMUSCULAR; INTRAVENOUS at 08:14

## 2019-06-23 RX ADMIN — MEROPENEM 500 MG: 500 INJECTION INTRAVENOUS at 08:21

## 2019-06-23 RX ADMIN — METHYLPREDNISOLONE SODIUM SUCCINATE 40 MG: 40 INJECTION, POWDER, FOR SOLUTION INTRAMUSCULAR; INTRAVENOUS at 21:35

## 2019-06-23 RX ADMIN — INSULIN LISPRO 4 UNITS: 100 INJECTION, SOLUTION INTRAVENOUS; SUBCUTANEOUS at 03:51

## 2019-06-23 RX ADMIN — DEXTROSE MONOHYDRATE 25 G: 500 INJECTION PARENTERAL at 08:15

## 2019-06-23 RX ADMIN — CHLORHEXIDINE GLUCONATE 0.12% ORAL RINSE 15 ML: 1.2 LIQUID ORAL at 12:13

## 2019-06-23 RX ADMIN — INSULIN LISPRO 6 UNITS: 100 INJECTION, SOLUTION INTRAVENOUS; SUBCUTANEOUS at 15:49

## 2019-06-23 RX ADMIN — ENOXAPARIN SODIUM 105 MG: 120 INJECTION SUBCUTANEOUS at 10:15

## 2019-06-23 RX ADMIN — INSULIN LISPRO 6 UNITS: 100 INJECTION, SOLUTION INTRAVENOUS; SUBCUTANEOUS at 23:26

## 2019-06-23 RX ADMIN — Medication 8 MCG/MIN: at 00:00

## 2019-06-23 RX ADMIN — MEROPENEM 500 MG: 500 INJECTION INTRAVENOUS at 20:32

## 2019-06-23 RX ADMIN — IPRATROPIUM BROMIDE AND ALBUTEROL SULFATE 1 AMPULE: .5; 3 SOLUTION RESPIRATORY (INHALATION) at 07:50

## 2019-06-23 RX ADMIN — IPRATROPIUM BROMIDE AND ALBUTEROL SULFATE 1 AMPULE: .5; 3 SOLUTION RESPIRATORY (INHALATION) at 11:58

## 2019-06-23 RX ADMIN — IPRATROPIUM BROMIDE AND ALBUTEROL SULFATE 1 AMPULE: .5; 3 SOLUTION RESPIRATORY (INHALATION) at 15:49

## 2019-06-23 RX ADMIN — METHYLPREDNISOLONE SODIUM SUCCINATE 40 MG: 40 INJECTION, POWDER, FOR SOLUTION INTRAMUSCULAR; INTRAVENOUS at 15:40

## 2019-06-23 RX ADMIN — AMIODARONE HYDROCHLORIDE 0.5 MG/MIN: 50 INJECTION, SOLUTION INTRAVENOUS at 17:02

## 2019-06-23 RX ADMIN — METHYLPREDNISOLONE SODIUM SUCCINATE 40 MG: 40 INJECTION, POWDER, FOR SOLUTION INTRAMUSCULAR; INTRAVENOUS at 03:51

## 2019-06-23 RX ADMIN — MOMETASONE FUROATE AND FORMOTEROL FUMARATE DIHYDRATE 2 PUFF: 100; 5 AEROSOL RESPIRATORY (INHALATION) at 19:52

## 2019-06-23 RX ADMIN — MIDAZOLAM 2 MG: 1 INJECTION INTRAMUSCULAR; INTRAVENOUS at 09:21

## 2019-06-23 RX ADMIN — FENTANYL CITRATE 50 MCG: 50 INJECTION INTRAMUSCULAR; INTRAVENOUS at 09:20

## 2019-06-23 RX ADMIN — Medication 12 MCG/MIN: at 01:00

## 2019-06-23 RX ADMIN — AMIODARONE HYDROCHLORIDE 0.5 MG/MIN: 50 INJECTION, SOLUTION INTRAVENOUS at 01:43

## 2019-06-23 RX ADMIN — CALCIUM GLUCONATE 2 G: 98 INJECTION, SOLUTION INTRAVENOUS at 08:18

## 2019-06-23 RX ADMIN — SODIUM CHLORIDE: 9 INJECTION, SOLUTION INTRAVENOUS at 04:32

## 2019-06-23 RX ADMIN — Medication 1000 ML/HR: at 12:50

## 2019-06-23 RX ADMIN — INSULIN LISPRO 10 UNITS: 100 INJECTION, SOLUTION INTRAVENOUS; SUBCUTANEOUS at 09:19

## 2019-06-23 RX ADMIN — Medication 6 MCG/MIN: at 20:53

## 2019-06-23 RX ADMIN — INSULIN LISPRO 8 UNITS: 100 INJECTION, SOLUTION INTRAVENOUS; SUBCUTANEOUS at 20:09

## 2019-06-23 RX ADMIN — KETAMINE HYDROCHLORIDE 200 MG: 50 INJECTION, SOLUTION INTRAMUSCULAR; INTRAVENOUS at 00:00

## 2019-06-23 RX ADMIN — SODIUM CHLORIDE 600 ML: 9 INJECTION, SOLUTION INTRAVENOUS at 03:47

## 2019-06-23 RX ADMIN — Medication 150 MCG/HR: at 23:40

## 2019-06-23 RX ADMIN — FENTANYL CITRATE 25 MCG: 0.05 INJECTION, SOLUTION INTRAMUSCULAR; INTRAVENOUS at 11:49

## 2019-06-23 RX ADMIN — Medication 100 MCG/HR: at 16:40

## 2019-06-23 RX ADMIN — Medication 10 ML: at 08:23

## 2019-06-23 RX ADMIN — SODIUM BICARBONATE: 84 INJECTION, SOLUTION INTRAVENOUS at 12:50

## 2019-06-23 RX ADMIN — IPRATROPIUM BROMIDE AND ALBUTEROL SULFATE 1 AMPULE: .5; 3 SOLUTION RESPIRATORY (INHALATION) at 19:52

## 2019-06-23 RX ADMIN — FENTANYL CITRATE 25 MCG: 0.05 INJECTION, SOLUTION INTRAMUSCULAR; INTRAVENOUS at 15:39

## 2019-06-23 RX ADMIN — ROCURONIUM BROMIDE 100 MG: 10 INJECTION INTRAVENOUS at 00:32

## 2019-06-23 RX ADMIN — CHLORHEXIDINE GLUCONATE 0.12% ORAL RINSE 15 ML: 1.2 LIQUID ORAL at 20:32

## 2019-06-23 RX ADMIN — IPRATROPIUM BROMIDE 0.5 MG: 0.5 SOLUTION RESPIRATORY (INHALATION) at 04:16

## 2019-06-23 RX ADMIN — CALCIUM GLUCONATE 1 G: 98 INJECTION, SOLUTION INTRAVENOUS at 18:37

## 2019-06-23 RX ADMIN — SODIUM BICARBONATE 50 MEQ: 84 INJECTION, SOLUTION INTRAVENOUS at 08:19

## 2019-06-23 RX ADMIN — VANCOMYCIN HYDROCHLORIDE 1500 MG: 5 INJECTION, POWDER, LYOPHILIZED, FOR SOLUTION INTRAVENOUS at 04:53

## 2019-06-23 RX ADMIN — MEROPENEM 500 MG: 500 INJECTION INTRAVENOUS at 14:32

## 2019-06-23 RX ADMIN — FAMOTIDINE 20 MG: 10 INJECTION, SOLUTION INTRAVENOUS at 11:50

## 2019-06-23 ASSESSMENT — PULMONARY FUNCTION TESTS
PIF_VALUE: 35
PIF_VALUE: 34
PIF_VALUE: 34
PIF_VALUE: 37
PIF_VALUE: 37
PIF_VALUE: 35
PIF_VALUE: 38
PIF_VALUE: 35
PIF_VALUE: 35
PIF_VALUE: 34
PIF_VALUE: 36
PIF_VALUE: 34
PIF_VALUE: 35
PIF_VALUE: 35
PIF_VALUE: 36
PIF_VALUE: 43
PIF_VALUE: 32
PIF_VALUE: 38
PIF_VALUE: 41
PIF_VALUE: 35
PIF_VALUE: 32
PIF_VALUE: 35
PIF_VALUE: 34
PIF_VALUE: 35
PIF_VALUE: 39
PIF_VALUE: 43
PIF_VALUE: 34
PIF_VALUE: 38
PIF_VALUE: 34
PIF_VALUE: 35

## 2019-06-23 ASSESSMENT — PAIN SCALES - GENERAL
PAINLEVEL_OUTOF10: 4
PAINLEVEL_OUTOF10: 0
PAINLEVEL_OUTOF10: 10

## 2019-06-23 ASSESSMENT — PAIN DESCRIPTION - LOCATION: LOCATION: BACK;OTHER (COMMENT)

## 2019-06-23 ASSESSMENT — PAIN DESCRIPTION - PAIN TYPE: TYPE: CHRONIC PAIN

## 2019-06-23 ASSESSMENT — PAIN DESCRIPTION - ORIENTATION: ORIENTATION: OTHER (COMMENT)

## 2019-06-23 ASSESSMENT — PAIN DESCRIPTION - PROGRESSION: CLINICAL_PROGRESSION: OTHER (COMMENT)
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
## 2019-06-23 NOTE — CONSULTS
acidosis    Centrilobular emphysema (HCC)    Hypomagnesemia    Heart failure, acute on chronic, systolic and diastolic (HCC)    Persistent atrial fibrillation (HCC)    Anemia    DMII (diabetes mellitus, type 2) (HCC)    Constipation    Hypokalemia    Acute on chronic systolic heart failure (HCC)    Atrial fibrillation, rapid (HCC)    COPD with acute exacerbation (HCC)    Cocaine use    Severe sepsis (HCC)    Atrial fibrillation with RVR (HCC)    Hyponatremia    Acute on chronic respiratory failure with hypoxia (HCC)    Respiratory distress    CHF, left ventricular (HCC)    Nicotine dependence    Suspected sleep apnea    Coronary artery disease involving native coronary artery of native heart without angina pectoris    CAD (coronary artery disease)    Acute renal failure (ARF) (HCC)    Morbid obesity due to excess calories (HCC)    Acute respiratory failure with hypoxia (HCC)    Nocturnal hypoxia    Hypercapnemia    SOB (shortness of breath)    Acute on chronic respiratory failure with hypercapnia (HCC)    Chronic respiratory failure with hypercapnia (HCC)    Acute on chronic systolic (congestive) heart failure (HCC)    Hx of AKA (above knee amputation), left (HCC)    Respiratory failure (HCC)    Pneumonia    Ventricular tachycardia (HCC)    Septic shock (HCC)    Tachypnea    Neutrophilic leukocytosis    Chronic respiratory failure with hypoxia (HCC)       Allergies:  Rocephin [ceftriaxone]     Temp max:  Temp (24hrs), Av.1 °F (36.2 °C), Min:96.7 °F (35.9 °C), Max:97.4 °F (36.3 °C)      Recent Labs     19  0125   WBC 12.5* 15.0*       Recent Labs     19  0125   BUN 27* 27*   CREATININE 2.3* 2.4*       No intake or output data in the 24 hours ending 19 0440      Ht Readings from Last 1 Encounters:   19 5' 7\" (1.702 m)        Wt Readings from Last 1 Encounters:   19 237 lb 7 oz (107.7 kg)         Estimated Creatinine Clearance: 39 mL/min (A) (based on SCr of 2.4 mg/dL (H)). Assessment/Plan:  Will initiate vancomycin 1500 mg IV for one dose at this time. Will provide further dosing based on levels due to REJI with creatinine of 2.4 at this time. Regimen projects a trough level of 15-20 mg/L. Timing of trough level will be determined based on culture results, renal function, and clinical response. Thank you for the consult. Will continue to follow.   Naveed Garcia, BlaneD

## 2019-06-23 NOTE — CONSULTS
133*  --    K 4.9 4.0  --  7.1*   CL 86* 92* 91*  --    CO2 23 19* 21  --    BUN 27* 27* 29*  --    CREATININE 2.3* 2.4* 2.6*  --      LIVER PROFILE:   Recent Labs     06/22/19  2220 06/23/19  0125   AST 24 133*   ALT 21 87*   BILITOT 0.8 1.0   ALKPHOS 83 81     PT/INR:   Recent Labs     06/22/19  2221   PROTIME 47.3*   INR 4.15*     APTT: No results for input(s): APTT in the last 72 hours. UA:No results for input(s): NITRITE, COLORU, PHUR, LABCAST, WBCUA, RBCUA, MUCUS, TRICHOMONAS, YEAST, BACTERIA, CLARITYU, SPECGRAV, LEUKOCYTESUR, UROBILINOGEN, BILIRUBINUR, BLOODU, GLUCOSEU, AMORPHOUS in the last 72 hours. Invalid input(s): Justin Jackson  Recent Labs     06/23/19  0253 06/23/19  0555   PHART 7.283* 7.218*   AAJ0YZG 45.6* 47.5*   PO2ART 83.7 84.0       Vent Information  $Ventilation: $Subsequent Day  Ventilator Started: Yes  Skin Assessment: Clean, dry, & intact  Equipment ID: [id number]  Vent Type: [id number]  Vent Mode: AC/VC  Vt Ordered: 500 mL  Rate Set: 26 bmp  Peak Flow: 60 L/min  Pressure Support: 0 cmH20  FiO2 : 80 %  Sensitivity: 3  PEEP/CPAP: 5  Cuff Pressure (cm H2O): 35 cm H2O  Humidification Source: Heated wire  Humidification Temp: 37  Humidification Temp Measured: 36.9  Circuit Condensation: Drained    Radiology Review:  Pertinent images / reports were reviewed as a part of this visit. CT Chest images reviewed personally by me, interpretation as follows:  -Bilateral intralobular septal thickening suggestive of pulmonary edema this is corroborated by right greater than left pleural effusion. CT Chest w/o contrast:   Results for orders placed during the hospital encounter of 06/22/19   CT CHEST WO CONTRAST    Narrative EXAMINATION:  CT OF THE CHEST WITHOUT CONTRAST 6/23/2019 1:19 am    TECHNIQUE:  CT of the chest was performed without the administration of intravenous  contrast. Multiplanar reformatted images are provided for review.  Dose  modulation, iterative reconstruction, and/or weight based adjustment of time.  -Titrate vasopressors for goal map of 65  -Strict I's and O's  -GI PPX-H2 a  -DVT PPX -heparin subcu  -Peridex    Due to the immediate potential for life-threatening deterioration due to shock, cardiac arrest, respiratory failure, hyperkalemia, I spent 110 minutes providing critical care. This time is excluding time spent performing procedures. This note was transcribed using [id number] [employer]IC[employer]. Please disregard any translational errors.     Thank you for the consult    [address]  Pulmonary, Sleep and Critical Care Medicine

## 2019-06-23 NOTE — PROGRESS NOTES
Pt off BIPAP, placed on HIGH FLOW nasal canula 6 lpm, sats 98%.     Electronically signed by Marisol Brennan RCP on 6/23/19 at 12:13 AM

## 2019-06-23 NOTE — PROGRESS NOTES
Clinical Pharmacy Note  Heparin Dosing Consult    Diane Armstrong is a 61 y.o. male ordered heparin per low dose nomogram by Dr. Roman Ganser. Lab Results   Component Value Date    APTT 39.6 02/12/2019     Lab Results   Component Value Date    HGB 11.6 06/23/2019    HCT 37.2 06/23/2019     06/23/2019    INR 4.15 06/22/2019       Ht Readings from Last 1 Encounters:   06/23/19 5' 7\" (1.702 m)        Wt Readings from Last 1 Encounters:   06/23/19 237 lb 7 oz (107.7 kg)     Dosing weight: 108 kg    Assessment/Plan:  Patient received Lovenox 105mg at 1015 today. We will start the drip at 2000 today with no bolus. No Initial bolus  Initial infusion rate: 10 mL/hr to start at 2000  Next aPTT: 0200 6/24/19    Pharmacy will continue to monitor adjust heparin based on aPTT results using nomogram below:     LOW DOSE HEPARIN PROTOCOL (ACS/STEMI/A FIB)     Initial Bolus: 60 units/kg Max Bolus: 4,000 units       Initial Rate: 12 units/kg/hr Max Initial Rate: 1,000 units/hr     aPTT < 36   Heparin 60 units/kg bolus Increase infusion by 4 units/kg/hr       (maximum 4,000 units)   aPTT 37-53   Heparin 30 units/kg bolus Increase infusion by 2 units/kg/hr       (maximum 2,000 units)   aPTT 54-90   No bolus   No change   aPTT 91-98   No bolus   Decrease infusion by 2 units/kg/hr   aPTT    Hold heparin for 1 hour Decrease infusion by 3 units/kg/hr   aPTT 108-115  Hold heparin for 1 hour Decrease infusion by 4 units/kg/hr   aPTT > 116   Hold heparin for 1 hour Decrease infusion by 6 units/kg/hr    Obtain aPTT 6 hours after initial bolus and 6 hours after any dose change until two consecutive therapeutic aPTTs are achieved - then daily.
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
## 2019-06-23 NOTE — ED PROVIDER NOTES
adenopathy or underlying mass lesion. XR CHEST PORTABLE   Final Result   Questionable right basilar atelectasis or pneumonia on a limited portable   exam.               EKG (if obtained): (All EKG's are interpreted by myself in the absence of a cardiologist) EKG with no significant change compared to previous. A. fib. Rate 63. Right axis deviation. Poor R wave progression. No acute ST elevation or depression. No flipped T waves. No Q waves. No arrhythmia. No ST elevation MI. Abnormal EKG. Chart review shows recent radiographs:  Xr Chest Standard (2 Vw)    Result Date: 6/1/2019  EXAMINATION: TWO XRAY VIEWS OF THE CHEST 6/1/2019 5:05 pm COMPARISON: Chest 02/12/2018 HISTORY: ORDERING SYSTEM PROVIDED HISTORY: SHORTNESS OF BREATH, COUGH, COPD, CHF TECHNOLOGIST PROVIDED HISTORY: Reason for exam:-> SHORTNESS OF BREATH, COUGH, COPD, CHF Ordering Physician Provided Reason for Exam: Shortness of Breath (couples week, worsened last night, Hx copd chf smoker) Acuity: Chronic Type of Exam: Ongoing FINDINGS: The cardiac silhouette is enlarged. Calcifications involving the aorta reflect atherosclerosis. The mediastinal and hilar silhouettes appear unremarkable. Vascular engorgement and cephalization is demonstrated with bilateral peribronchial cuffing and perivascular haziness. Possible bilateral pleural effusion, right greater than left. Focal atelectasis or infiltrate is evident lateral right lung base and bilateral parahilar regions. No pneumothorax is seen. No acute osseous abnormality is identified. 1. Congestive heart failure most likely given these findings. 2. Calcific atherosclerosis aorta. 3. Cardiomegaly. MDM:  51-year-old noncompliant diabetic, hypertensive, COPD, CHF patient who presents with acute respiratory failure. He was seen immediately upon arrival and BiPAP was started.     From a respiratory standpoint work of breathing and anxiety and retractions all resolved with BiPAP and patient tolerating this well initially however this unfortunately lasted only approximately 10 minutes before patient refused to wear the BiPAP and the patient's blood pressure began to trend down    Patient with most recent CHF admit. Today with his borderline low blood pressure and no obvious rales suspect this is more likely either pneumonia or COPD. I have initiated a 1 L fluid bolus and will carefully monitor this as additional laboratory studies to come back. Additionally 3 DuoNeb's and 10 mg IV Decadron for underlying COPD and wheezing. 2300: White count elevated at 12. 5. X-ray with evidence of pneumonia and not CHF which is consistent with his presentation. INR 4.15. Hemoglobin 10.8 which is stable with no evidence of acute blood loss. VBG with no evidence of acidosis and a PCO2 that is baseline for the patient. 3 L fluid bolus based on ideal body weight as well as empiric imipenem for broad-spectrum antibiotic coverage for healthcare associated pneumonia initiated. Patient refuses to wear BiPAP. Patient's blood pressure continues to be in the 38I systolic. As patient's respiratory status continued to decline and he was unresponsive the fluid bolus I reviewed with patient his end-of-life wishes. Patient would like to be full code. Patient is in agreement with intubation and central line and pressors as required for continued treatment of his sepsis.     Intubation  Date/Time: 6/23/2019 6:43 AM  Performed by: Neha Orozco MD  Authorized by: Jose Manuel Dumont MD     Consent:     Consent obtained:  Verbal and emergent situation    Consent given by:  Patient    Risks discussed:  Aspiration, bleeding, brain injury, death, hypoxia, pneumothorax and laryngeal injury    Alternatives discussed:  No treatment and alternative treatment  Pre-procedure details:     Patient status:  Awake    Mallampati score:  III    Pretreatment medications:  None    Paralytics:  Rocuronium  Procedure details:

## 2019-06-23 NOTE — ED NOTES
Pt in V-tach. CPR started. 1 epi 1mg given. Pt placed on defib pads.   Pt converted back to sinus jeronimo Cordova, RN  06/23/19 6553

## 2019-06-23 NOTE — ED NOTES
Bed: A-15  Expected date:   Expected time:   Means of arrival: [facility]  Comments:  Chest pain     Mela Johnson RN  06/22/19 1359

## 2019-06-23 NOTE — ED PROVIDER NOTES
[facility]  eMERGENCY dEPARTMENT eNCOUnter    *Procedure note only*    Pt Name: Zachary Ledezma  MRN: [id number]  [place] 1960  Date of evaluation: 6/22/2019  Provider: JOON Veliz CNP    I was asked by the attending physician, Tessa Mccabe MD, to place a central line      RADIOLOGY:   Non-plain film images such as CT, Ultrasound and MRI are read by the radiologist. Plain radiographic images are visualized and preliminarily interpreted by the  ED Provider with the below findings:        Interpretation per the Radiologist below, if available at the time of this note:    XR CHEST PORTABLE   Final Result   1. Endotracheal tube tip projects over the trachea, tip at the level of the   aortic knob, 3.7 cm superior to the erna. 2. Pulmonary edema evident. 3. Cardiomegaly. 4. No pneumothorax. 5. Nonspecific prominence of the azygos vein region may be related vascular   engorgement, adenopathy or underlying mass lesion. XR CHEST PORTABLE   Final Result   Questionable right basilar atelectasis or pneumonia on a limited portable   exam.         CT CHEST WO CONTRAST    (Results Pending)   CT Head WO Contrast    (Results Pending)     Xr Chest Standard (2 Vw)    Result Date: 6/1/2019  EXAMINATION: TWO XRAY VIEWS OF THE CHEST 6/1/2019 5:05 pm COMPARISON: Chest 02/12/2018 HISTORY: ORDERING SYSTEM PROVIDED HISTORY: SHORTNESS OF BREATH, COUGH, COPD, CHF TECHNOLOGIST PROVIDED HISTORY: Reason for exam:-> SHORTNESS OF BREATH, COUGH, COPD, CHF Ordering Physician Provided Reason for Exam: Shortness of Breath (couples week, worsened last night, Hx copd chf smoker) Acuity: Chronic Type of Exam: Ongoing FINDINGS: The cardiac silhouette is enlarged. Calcifications involving the aorta reflect atherosclerosis. The mediastinal and hilar silhouettes appear unremarkable.  Vascular engorgement and cephalization is demonstrated with bilateral peribronchial cuffing and

## 2019-06-23 NOTE — PROGRESS NOTES
Increased RR to 18 per Dr Oriana العلي.     Electronically signed by Alessandro Wheeler RCP on 6/23/19

## 2019-06-24 ENCOUNTER — APPOINTMENT (OUTPATIENT)
Dept: ULTRASOUND IMAGING | Age: 59
DRG: 720 | End: 2019-06-24
Payer: COMMERCIAL

## 2019-06-24 LAB
ALBUMIN SERPL-MCNC: 2.8 G/DL (ref 3.4–5)
ALBUMIN SERPL-MCNC: 2.9 G/DL (ref 3.4–5)
ALBUMIN SERPL-MCNC: 3.3 G/DL (ref 3.4–5)
ALBUMIN SERPL-MCNC: 3.5 G/DL (ref 3.4–5)
ALP BLD-CCNC: 103 U/L (ref 40–129)
ALT SERPL-CCNC: 4334 U/L (ref 10–40)
ANION GAP SERPL CALCULATED.3IONS-SCNC: 16 MMOL/L (ref 3–16)
ANION GAP SERPL CALCULATED.3IONS-SCNC: 17 MMOL/L (ref 3–16)
ANION GAP SERPL CALCULATED.3IONS-SCNC: 20 MMOL/L (ref 3–16)
ANION GAP SERPL CALCULATED.3IONS-SCNC: 21 MMOL/L (ref 3–16)
AST SERPL-CCNC: >7000 U/L (ref 15–37)
BASE EXCESS ARTERIAL: -6.2 MMOL/L (ref -3–3)
BASE EXCESS ARTERIAL: -8.1 MMOL/L (ref -3–3)
BASOPHILS ABSOLUTE: 0 K/UL (ref 0–0.2)
BASOPHILS RELATIVE PERCENT: 0.2 %
BILIRUB SERPL-MCNC: 1.1 MG/DL (ref 0–1)
BILIRUBIN DIRECT: 0.6 MG/DL (ref 0–0.3)
BILIRUBIN, INDIRECT: 0.5 MG/DL (ref 0–1)
BUN BLDV-MCNC: 18 MG/DL (ref 7–20)
BUN BLDV-MCNC: 20 MG/DL (ref 7–20)
BUN BLDV-MCNC: 21 MG/DL (ref 7–20)
BUN BLDV-MCNC: 25 MG/DL (ref 7–20)
CALCIUM IONIZED: 0.96 MMOL/L (ref 1.12–1.32)
CALCIUM IONIZED: 0.99 MMOL/L (ref 1.12–1.32)
CALCIUM SERPL-MCNC: 7.1 MG/DL (ref 8.3–10.6)
CALCIUM SERPL-MCNC: 7.3 MG/DL (ref 8.3–10.6)
CALCIUM SERPL-MCNC: 7.5 MG/DL (ref 8.3–10.6)
CALCIUM SERPL-MCNC: 7.6 MG/DL (ref 8.3–10.6)
CARBOXYHEMOGLOBIN ARTERIAL: 0.8 % (ref 0–1.5)
CARBOXYHEMOGLOBIN ARTERIAL: 1 % (ref 0–1.5)
CHLORIDE BLD-SCNC: 94 MMOL/L (ref 99–110)
CHLORIDE BLD-SCNC: 95 MMOL/L (ref 99–110)
CHLORIDE BLD-SCNC: 97 MMOL/L (ref 99–110)
CHLORIDE BLD-SCNC: 97 MMOL/L (ref 99–110)
CO2: 18 MMOL/L (ref 21–32)
CO2: 19 MMOL/L (ref 21–32)
CO2: 22 MMOL/L (ref 21–32)
CO2: 22 MMOL/L (ref 21–32)
CREAT SERPL-MCNC: 1.8 MG/DL (ref 0.9–1.3)
CREAT SERPL-MCNC: 1.9 MG/DL (ref 0.9–1.3)
CREAT SERPL-MCNC: 1.9 MG/DL (ref 0.9–1.3)
CREAT SERPL-MCNC: 2.2 MG/DL (ref 0.9–1.3)
EOSINOPHILS ABSOLUTE: 0 K/UL (ref 0–0.6)
EOSINOPHILS RELATIVE PERCENT: 0 %
GFR AFRICAN AMERICAN: 37
GFR AFRICAN AMERICAN: 44
GFR AFRICAN AMERICAN: 44
GFR AFRICAN AMERICAN: 47
GFR NON-AFRICAN AMERICAN: 31
GFR NON-AFRICAN AMERICAN: 36
GFR NON-AFRICAN AMERICAN: 36
GFR NON-AFRICAN AMERICAN: 39
GLUCOSE BLD-MCNC: 165 MG/DL (ref 70–99)
GLUCOSE BLD-MCNC: 174 MG/DL (ref 70–99)
GLUCOSE BLD-MCNC: 185 MG/DL (ref 70–99)
GLUCOSE BLD-MCNC: 188 MG/DL (ref 70–99)
GLUCOSE BLD-MCNC: 192 MG/DL (ref 70–99)
GLUCOSE BLD-MCNC: 193 MG/DL (ref 70–99)
GLUCOSE BLD-MCNC: 195 MG/DL (ref 70–99)
GLUCOSE BLD-MCNC: 201 MG/DL (ref 70–99)
GLUCOSE BLD-MCNC: 205 MG/DL (ref 70–99)
GLUCOSE BLD-MCNC: 269 MG/DL (ref 70–99)
HCO3 ARTERIAL: 18.3 MMOL/L (ref 21–29)
HCO3 ARTERIAL: 19.1 MMOL/L (ref 21–29)
HCT VFR BLD CALC: 32.2 % (ref 40.5–52.5)
HEMOGLOBIN, ART, EXTENDED: 11 G/DL (ref 13.5–17.5)
HEMOGLOBIN, ART, EXTENDED: 11.2 G/DL (ref 13.5–17.5)
HEMOGLOBIN: 10.3 G/DL (ref 13.5–17.5)
INR BLD: 4.7 (ref 0.86–1.14)
LACTIC ACID: 7.1 MMOL/L (ref 0.4–2)
LACTIC ACID: 7.2 MMOL/L (ref 0.4–2)
LACTIC ACID: 7.6 MMOL/L (ref 0.4–2)
LACTIC ACID: 8.8 MMOL/L (ref 0.4–2)
LYMPHOCYTES ABSOLUTE: 0.5 K/UL (ref 1–5.1)
LYMPHOCYTES RELATIVE PERCENT: 2.1 %
MAGNESIUM: 2.2 MG/DL (ref 1.8–2.4)
MAGNESIUM: 2.2 MG/DL (ref 1.8–2.4)
MCH RBC QN AUTO: 27.6 PG (ref 26–34)
MCHC RBC AUTO-ENTMCNC: 31.9 G/DL (ref 31–36)
MCV RBC AUTO: 86.4 FL (ref 80–100)
METHEMOGLOBIN ARTERIAL: 0.9 %
METHEMOGLOBIN ARTERIAL: 1 %
MONOCYTES ABSOLUTE: 0.5 K/UL (ref 0–1.3)
MONOCYTES RELATIVE PERCENT: 2.5 %
NEUTROPHILS ABSOLUTE: 20.2 K/UL (ref 1.7–7.7)
NEUTROPHILS RELATIVE PERCENT: 95.2 %
O2 CONTENT ARTERIAL: 14 ML/DL
O2 CONTENT ARTERIAL: 15 ML/DL
O2 SAT, ARTERIAL: 91.6 %
O2 SAT, ARTERIAL: 95.5 %
O2 THERAPY: ABNORMAL
O2 THERAPY: ABNORMAL
PCO2 ARTERIAL: 39.6 MMHG (ref 35–45)
PCO2 ARTERIAL: 43 MMHG (ref 35–45)
PDW BLD-RTO: 15.8 % (ref 12.4–15.4)
PERFORMED ON: ABNORMAL
PH ARTERIAL: 7.25 (ref 7.35–7.45)
PH ARTERIAL: 7.3 (ref 7.35–7.45)
PH VENOUS: 7.17 (ref 7.35–7.45)
PH VENOUS: 7.2 (ref 7.35–7.45)
PHOSPHORUS: 4 MG/DL (ref 2.5–4.9)
PHOSPHORUS: 4.1 MG/DL (ref 2.5–4.9)
PHOSPHORUS: 4.2 MG/DL (ref 2.5–4.9)
PHOSPHORUS: 4.3 MG/DL (ref 2.5–4.9)
PLATELET # BLD: 217 K/UL (ref 135–450)
PMV BLD AUTO: 9 FL (ref 5–10.5)
PO2 ARTERIAL: 75.3 MMHG (ref 75–108)
PO2 ARTERIAL: 92.2 MMHG (ref 75–108)
POTASSIUM SERPL-SCNC: 5.2 MMOL/L (ref 3.5–5.1)
POTASSIUM SERPL-SCNC: 5.3 MMOL/L (ref 3.5–5.1)
POTASSIUM SERPL-SCNC: 5.4 MMOL/L (ref 3.5–5.1)
POTASSIUM SERPL-SCNC: 5.5 MMOL/L (ref 3.5–5.1)
PROTHROMBIN TIME: 53.6 SEC (ref 9.8–13)
RBC # BLD: 3.73 M/UL (ref 4.2–5.9)
SODIUM BLD-SCNC: 132 MMOL/L (ref 136–145)
SODIUM BLD-SCNC: 134 MMOL/L (ref 136–145)
SODIUM BLD-SCNC: 136 MMOL/L (ref 136–145)
SODIUM BLD-SCNC: 136 MMOL/L (ref 136–145)
TCO2 ARTERIAL: 19.6 MMOL/L
TCO2 ARTERIAL: 20.3 MMOL/L
TOTAL PROTEIN: 6.4 G/DL (ref 6.4–8.2)
URINE CULTURE, ROUTINE: NORMAL
VANCOMYCIN RANDOM: 7.7 UG/ML
WBC # BLD: 21.2 K/UL (ref 4–11)

## 2019-06-24 PROCEDURE — 36592 COLLECT BLOOD FROM PICC: CPT

## 2019-06-24 PROCEDURE — 99255 IP/OBS CONSLTJ NEW/EST HI 80: CPT | Performed by: INTERNAL MEDICINE

## 2019-06-24 PROCEDURE — 85610 PROTHROMBIN TIME: CPT

## 2019-06-24 PROCEDURE — 94640 AIRWAY INHALATION TREATMENT: CPT

## 2019-06-24 PROCEDURE — 99291 CRITICAL CARE FIRST HOUR: CPT | Performed by: INTERNAL MEDICINE

## 2019-06-24 PROCEDURE — 6370000000 HC RX 637 (ALT 250 FOR IP): Performed by: NURSE PRACTITIONER

## 2019-06-24 PROCEDURE — 80202 ASSAY OF VANCOMYCIN: CPT

## 2019-06-24 PROCEDURE — 80069 RENAL FUNCTION PANEL: CPT

## 2019-06-24 PROCEDURE — 6370000000 HC RX 637 (ALT 250 FOR IP): Performed by: INTERNAL MEDICINE

## 2019-06-24 PROCEDURE — 94750 HC PULMONARY COMPLIANCE STUDY: CPT

## 2019-06-24 PROCEDURE — 6360000002 HC RX W HCPCS: Performed by: INTERNAL MEDICINE

## 2019-06-24 PROCEDURE — 2580000003 HC RX 258: Performed by: INTERNAL MEDICINE

## 2019-06-24 PROCEDURE — 83735 ASSAY OF MAGNESIUM: CPT

## 2019-06-24 PROCEDURE — 36600 WITHDRAWAL OF ARTERIAL BLOOD: CPT

## 2019-06-24 PROCEDURE — 99292 CRITICAL CARE ADDL 30 MIN: CPT | Performed by: INTERNAL MEDICINE

## 2019-06-24 PROCEDURE — 2580000003 HC RX 258: Performed by: PHARMACIST

## 2019-06-24 PROCEDURE — 36620 INSERTION CATHETER ARTERY: CPT | Performed by: INTERNAL MEDICINE

## 2019-06-24 PROCEDURE — 90945 DIALYSIS ONE EVALUATION: CPT

## 2019-06-24 PROCEDURE — 6370000000 HC RX 637 (ALT 250 FOR IP): Performed by: HOSPITALIST

## 2019-06-24 PROCEDURE — 2500000003 HC RX 250 WO HCPCS: Performed by: NURSE PRACTITIONER

## 2019-06-24 PROCEDURE — 80076 HEPATIC FUNCTION PANEL: CPT

## 2019-06-24 PROCEDURE — 83605 ASSAY OF LACTIC ACID: CPT

## 2019-06-24 PROCEDURE — 76770 US EXAM ABDO BACK WALL COMP: CPT

## 2019-06-24 PROCEDURE — 2000000000 HC ICU R&B

## 2019-06-24 PROCEDURE — 6360000002 HC RX W HCPCS: Performed by: PHARMACIST

## 2019-06-24 PROCEDURE — 6360000002 HC RX W HCPCS: Performed by: NURSE PRACTITIONER

## 2019-06-24 PROCEDURE — 82803 BLOOD GASES ANY COMBINATION: CPT

## 2019-06-24 PROCEDURE — 36620 INSERTION CATHETER ARTERY: CPT

## 2019-06-24 PROCEDURE — 2580000003 HC RX 258: Performed by: NURSE PRACTITIONER

## 2019-06-24 PROCEDURE — 82330 ASSAY OF CALCIUM: CPT

## 2019-06-24 PROCEDURE — 2700000000 HC OXYGEN THERAPY PER DAY

## 2019-06-24 PROCEDURE — 2500000003 HC RX 250 WO HCPCS: Performed by: INTERNAL MEDICINE

## 2019-06-24 PROCEDURE — 84100 ASSAY OF PHOSPHORUS: CPT

## 2019-06-24 PROCEDURE — 85025 COMPLETE CBC W/AUTO DIFF WBC: CPT

## 2019-06-24 RX ORDER — 0.9 % SODIUM CHLORIDE 0.9 %
500 INTRAVENOUS SOLUTION INTRAVENOUS ONCE
Status: COMPLETED | OUTPATIENT
Start: 2019-06-24 | End: 2019-06-24

## 2019-06-24 RX ORDER — LACTOBACILLUS RHAMNOSUS GG 10B CELL
2 CAPSULE ORAL 2 TIMES DAILY WITH MEALS
Status: DISCONTINUED | OUTPATIENT
Start: 2019-06-24 | End: 2019-07-02

## 2019-06-24 RX ORDER — SODIUM CHLORIDE 9 MG/ML
INJECTION, SOLUTION INTRAVENOUS CONTINUOUS
Status: DISCONTINUED | OUTPATIENT
Start: 2019-06-24 | End: 2019-06-25

## 2019-06-24 RX ORDER — INSULIN GLARGINE 100 [IU]/ML
5 INJECTION, SOLUTION SUBCUTANEOUS NIGHTLY
Status: DISCONTINUED | OUTPATIENT
Start: 2019-06-24 | End: 2019-06-26

## 2019-06-24 RX ORDER — PROPOFOL 10 MG/ML
10 INJECTION, EMULSION INTRAVENOUS
Status: DISCONTINUED | OUTPATIENT
Start: 2019-06-24 | End: 2019-06-27

## 2019-06-24 RX ADMIN — Medication 1000 ML/HR: at 06:41

## 2019-06-24 RX ADMIN — Medication 10 ML: at 08:38

## 2019-06-24 RX ADMIN — MOMETASONE FUROATE AND FORMOTEROL FUMARATE DIHYDRATE 2 PUFF: 100; 5 AEROSOL RESPIRATORY (INHALATION) at 20:53

## 2019-06-24 RX ADMIN — METHYLPREDNISOLONE SODIUM SUCCINATE 40 MG: 40 INJECTION, POWDER, FOR SOLUTION INTRAMUSCULAR; INTRAVENOUS at 09:24

## 2019-06-24 RX ADMIN — INSULIN LISPRO 2 UNITS: 100 INJECTION, SOLUTION INTRAVENOUS; SUBCUTANEOUS at 08:47

## 2019-06-24 RX ADMIN — MEROPENEM 500 MG: 500 INJECTION INTRAVENOUS at 21:36

## 2019-06-24 RX ADMIN — Medication 1000 ML/HR: at 16:33

## 2019-06-24 RX ADMIN — Medication 1000 ML/HR: at 11:47

## 2019-06-24 RX ADMIN — Medication 1000 ML/HR: at 01:32

## 2019-06-24 RX ADMIN — MEROPENEM 500 MG: 500 INJECTION INTRAVENOUS at 08:48

## 2019-06-24 RX ADMIN — MOMETASONE FUROATE AND FORMOTEROL FUMARATE DIHYDRATE 2 PUFF: 100; 5 AEROSOL RESPIRATORY (INHALATION) at 07:24

## 2019-06-24 RX ADMIN — Medication 2 CAPSULE: at 10:26

## 2019-06-24 RX ADMIN — Medication 10 ML: at 21:36

## 2019-06-24 RX ADMIN — Medication 1000 ML/HR: at 11:50

## 2019-06-24 RX ADMIN — MIDAZOLAM 2 MG: 1 INJECTION INTRAMUSCULAR; INTRAVENOUS at 08:30

## 2019-06-24 RX ADMIN — Medication 6 MCG/MIN: at 19:05

## 2019-06-24 RX ADMIN — INSULIN LISPRO 2 UNITS: 100 INJECTION, SOLUTION INTRAVENOUS; SUBCUTANEOUS at 16:13

## 2019-06-24 RX ADMIN — MEROPENEM 500 MG: 500 INJECTION INTRAVENOUS at 02:37

## 2019-06-24 RX ADMIN — IPRATROPIUM BROMIDE AND ALBUTEROL SULFATE 1 AMPULE: .5; 3 SOLUTION RESPIRATORY (INHALATION) at 20:53

## 2019-06-24 RX ADMIN — CALCIUM GLUCONATE 1 G: 98 INJECTION, SOLUTION INTRAVENOUS at 19:06

## 2019-06-24 RX ADMIN — Medication 1000 ML/HR: at 21:41

## 2019-06-24 RX ADMIN — AMIODARONE HYDROCHLORIDE 0.5 MG/MIN: 50 INJECTION, SOLUTION INTRAVENOUS at 08:46

## 2019-06-24 RX ADMIN — Medication 1000 ML/HR: at 21:47

## 2019-06-24 RX ADMIN — Medication 1000 ML/HR: at 16:34

## 2019-06-24 RX ADMIN — CALCIUM GLUCONATE 1 G: 98 INJECTION, SOLUTION INTRAVENOUS at 06:05

## 2019-06-24 RX ADMIN — SODIUM CHLORIDE: 9 INJECTION, SOLUTION INTRAVENOUS at 17:07

## 2019-06-24 RX ADMIN — IPRATROPIUM BROMIDE AND ALBUTEROL SULFATE 1 AMPULE: .5; 3 SOLUTION RESPIRATORY (INHALATION) at 12:41

## 2019-06-24 RX ADMIN — MEROPENEM 500 MG: 500 INJECTION INTRAVENOUS at 14:18

## 2019-06-24 RX ADMIN — IPRATROPIUM BROMIDE AND ALBUTEROL SULFATE 1 AMPULE: .5; 3 SOLUTION RESPIRATORY (INHALATION) at 03:58

## 2019-06-24 RX ADMIN — SODIUM CHLORIDE 500 ML: 9 INJECTION, SOLUTION INTRAVENOUS at 05:19

## 2019-06-24 RX ADMIN — Medication 75 MCG/HR: at 14:00

## 2019-06-24 RX ADMIN — MIDAZOLAM 2 MG: 1 INJECTION INTRAMUSCULAR; INTRAVENOUS at 04:20

## 2019-06-24 RX ADMIN — INSULIN LISPRO 2 UNITS: 100 INJECTION, SOLUTION INTRAVENOUS; SUBCUTANEOUS at 04:20

## 2019-06-24 RX ADMIN — INSULIN LISPRO 2 UNITS: 100 INJECTION, SOLUTION INTRAVENOUS; SUBCUTANEOUS at 12:23

## 2019-06-24 RX ADMIN — IPRATROPIUM BROMIDE AND ALBUTEROL SULFATE 1 AMPULE: .5; 3 SOLUTION RESPIRATORY (INHALATION) at 16:06

## 2019-06-24 RX ADMIN — Medication 150 MCG/HR: at 06:17

## 2019-06-24 RX ADMIN — Medication 75 MCG/HR: at 19:46

## 2019-06-24 RX ADMIN — PROPOFOL 13 MCG/KG/MIN: 10 INJECTION, EMULSION INTRAVENOUS at 18:30

## 2019-06-24 RX ADMIN — Medication 1000 ML/HR: at 21:43

## 2019-06-24 RX ADMIN — Medication 150 MCG/HR: at 02:53

## 2019-06-24 RX ADMIN — INSULIN GLARGINE 5 UNITS: 100 INJECTION, SOLUTION SUBCUTANEOUS at 21:29

## 2019-06-24 RX ADMIN — PROPOFOL 15 MCG/KG/MIN: 10 INJECTION, EMULSION INTRAVENOUS at 10:25

## 2019-06-24 RX ADMIN — CHLORHEXIDINE GLUCONATE 0.12% ORAL RINSE 15 ML: 1.2 LIQUID ORAL at 08:46

## 2019-06-24 RX ADMIN — IPRATROPIUM BROMIDE AND ALBUTEROL SULFATE 1 AMPULE: .5; 3 SOLUTION RESPIRATORY (INHALATION) at 07:24

## 2019-06-24 RX ADMIN — VASOPRESSIN 0.04 UNITS/MIN: 20 INJECTION INTRAVENOUS at 19:06

## 2019-06-24 RX ADMIN — FAMOTIDINE 20 MG: 10 INJECTION, SOLUTION INTRAVENOUS at 08:46

## 2019-06-24 RX ADMIN — INSULIN LISPRO 2 UNITS: 100 INJECTION, SOLUTION INTRAVENOUS; SUBCUTANEOUS at 21:28

## 2019-06-24 RX ADMIN — IPRATROPIUM BROMIDE AND ALBUTEROL SULFATE 1 AMPULE: .5; 3 SOLUTION RESPIRATORY (INHALATION) at 00:28

## 2019-06-24 RX ADMIN — Medication 125 MCG/HR: at 09:36

## 2019-06-24 RX ADMIN — Medication 2 CAPSULE: at 16:13

## 2019-06-24 RX ADMIN — METHYLPREDNISOLONE SODIUM SUCCINATE 40 MG: 40 INJECTION, POWDER, FOR SOLUTION INTRAMUSCULAR; INTRAVENOUS at 04:23

## 2019-06-24 RX ADMIN — CHLORHEXIDINE GLUCONATE 0.12% ORAL RINSE 15 ML: 1.2 LIQUID ORAL at 21:36

## 2019-06-24 ASSESSMENT — PULMONARY FUNCTION TESTS
PIF_VALUE: 40
PIF_VALUE: 39
PIF_VALUE: 50
PIF_VALUE: 37
PIF_VALUE: 41
PIF_VALUE: 39
PIF_VALUE: 38
PIF_VALUE: 42
PIF_VALUE: 33
PIF_VALUE: 36
PIF_VALUE: 40
PIF_VALUE: 42
PIF_VALUE: 34
PIF_VALUE: 40
PIF_VALUE: 44
PIF_VALUE: 41
PIF_VALUE: 47
PIF_VALUE: 38
PIF_VALUE: 38
PIF_VALUE: 36
PIF_VALUE: 41
PIF_VALUE: 37
PIF_VALUE: 40
PIF_VALUE: 46
PIF_VALUE: 44
PIF_VALUE: 37
PIF_VALUE: 44
PIF_VALUE: 43
PIF_VALUE: 37
PIF_VALUE: 37
PIF_VALUE: 35
PIF_VALUE: 39
PIF_VALUE: 44
PIF_VALUE: 37
PIF_VALUE: 44

## 2019-06-24 ASSESSMENT — PAIN SCALES - GENERAL
PAINLEVEL_OUTOF10: 0

## 2019-06-24 NOTE — PROGRESS NOTES
[facility]  Respiratory Therapy  Spontaneous Breathing Trial      Name: Frieda Benedict  Medical Record Number: [id number]  Age; 61 y.o.   Gender: male  : 1960  Today's date: 2019  Room: [place] [address]-      Patient Admission Diagnosis        Assessment            Patient Active Problem List   Diagnosis    Arthritis    Diabetes mellitus with hyperglycemia (HCC)    HBP (high blood pressure)    Hyperlipidemia    COPD (chronic obstructive pulmonary disease) (HCC)    Viral hepatitis C    Back pain    PAD (peripheral artery disease) ([facility] [facility]    Spinal stenosis of lumbar region    Tobacco use    Atherosclerosis of native artery of left lower extremity with intermittent claudication (HCC)    Chest pain    Pleural effusion due to CHF (congestive heart failure) ([facility]    Pleural effusion, right    Alcohol abuse, continuous    Tachycardia    Atrial fibrillation with RVR ([facility])    Hyponatremia    Congestive heart failure ([facility])    REJI (acute kidney injury) ([facility] [address]    Hypokalemia    Coronary artery disease involving autologous artery coronary bypass graft with angina pectoris ([facility] [address]    Essential hypertension    Chest pain of uncertain etiology    Acute on chronic combined systolic and diastolic HF (heart failure) (HCC)    Acute hyperkalemia    Typical atrial flutter (HCC)    Chronic systolic CHF (congestive heart failure), NYHA class 3 (HCC)    Hypotension    Vasovagal syncope    Laceration of scalp without foreign body    Nonischemic cardiomyopathy (HCC)    Syncope and collapse    Ischemic foot    Diabetes mellitus with peripheral circulatory disorder ([facility])    Limb ischemia    COPD exacerbation (HCC)    Nonhealing surgical wound    Acromioclavicular joint arthritis    Bradycardia    Shortness of breath    Permanent atrial fibrillation (HCC)    Chronic atrial fibrillation (HCC)    Other specified injury of inferior mesenteric vein, initial encounter    Postprocedural hypotension    Acute respiratory failure with hypercapnia (HCC)    High anion gap metabolic acidosis    Centrilobular emphysema (HCC)    Hypomagnesemia    Heart failure, acute on chronic, systolic and diastolic (HCC)    Persistent atrial fibrillation (HCC)    Anemia    DMII (diabetes mellitus, type 2) (HCC)    Constipation    Hypokalemia    Acute on chronic systolic heart failure (HCC)    Atrial fibrillation, rapid (HCC)    COPD with acute exacerbation (HCC)    Cocaine use    Severe sepsis (HCC)    Atrial fibrillation with RVR (HCC)    Hyponatremia    Acute on chronic respiratory failure with hypoxia (HCC)    Respiratory distress    CHF, left ventricular (HCC)    Nicotine dependence    Suspected sleep apnea    Coronary artery disease involving native coronary artery of native heart without angina pectoris    CAD (coronary artery disease)    Acute renal failure (ARF) (HCC)    Morbid obesity due to excess calories (HCC)    Acute respiratory failure with hypoxia (HCC)    Nocturnal hypoxia    Hypercapnemia    SOB (shortness of breath)    Acute on chronic respiratory failure with hypercapnia (HCC)    Chronic respiratory failure with hypercapnia (HCC)    Acute on chronic systolic (congestive) heart failure (HCC)    Hx of AKA (above knee amputation), left (HCC)    Respiratory failure (HCC)    Pneumonia    Ventricular tachycardia (HCC)    Shock circulatory (HCC)    Tachypnea    Neutrophilic leukocytosis    Chronic respiratory failure with hypoxia (HCC)    Cardiac arrest (HCC)       Social History     Social History     Tobacco Use    Smoking status: Current Every Day Smoker     Packs/day: 0.50     Years: 40.00     Pack years: 20.00     Types: Cigarettes    Smokeless tobacco: Never Used    Tobacco comment: 5 cigs daily - pt states not really sure   Substance Use

## 2019-06-24 NOTE — PROGRESS NOTES
Nephrology ([facility]) Progress Note    CC:  REJI and hyperkalemia    Subjective:    HPI:  Ventilated. No CP. CRRT running. Remains oliguric. ROS:  In bed. No fever  PMFSH:  medications reviewed. Objective:  Blood pressure 91/64, pulse 89, temperature 97.3 °F (36.3 °C), temperature source Axillary, resp. rate 26, height 5' 7\" (1.702 m), weight 238 lb 8.6 oz (108.2 kg), SpO2 91 %. Intake/Output Summary (Last 24 hours) at 6/24/2019 0944  Last data filed at 6/24/2019 0900  Gross per 24 hour   Intake 5347.92 ml   Output 2758 ml   Net 2589.92 ml     General:  NAD, ventilated  Chest:  coarse BS  CVS:  RRR  Abdominal:  NTND, soft, +BS  Extremities:  no edema  Skin:  no rash    Labs:  Renal panel:  Lab Results   Component Value Date/Time     (L) 06/24/2019 04:15 AM    K 5.4 (H) 06/24/2019 04:15 AM    K 3.6 06/11/2019 06:00 AM    CO2 22 06/24/2019 04:15 AM    BUN 21 (H) 06/24/2019 04:15 AM    CREATININE 1.9 (H) 06/24/2019 04:15 AM    CALCIUM 7.3 (L) 06/24/2019 04:15 AM    PHOS 4.2 06/24/2019 04:15 AM    MG 2.20 06/24/2019 04:15 AM     CBC:  Lab Results   Component Value Date/Time    WBC 21.2 (H) 06/24/2019 04:15 AM    HGB 10.3 (L) 06/24/2019 04:15 AM    HCT 32.2 (L) 06/24/2019 04:15 AM     06/24/2019 04:15 AM       Assessment/Plan:  Reviewed old records and labs.     1) REJI   - continue CRRT   - change to net positive 50 ml/hr    2) ventilated   - per pulmonary    3) ID   - on merpoenem    4) CV   - on levophed gtt    5) FEN   - hyperkalemia    - potassium level still remains slightly generous   - on CRRT sliding scale protocols    critical care: 30+ min

## 2019-06-24 NOTE — PLAN OF CARE
Nutrition Problem: Inadequate oral intake  Intervention: Food and/or Nutrient Delivery: Continue NPO(refer to MD for TF if started)  Nutritional Goals: tolerate most appropriate form of nutrition

## 2019-06-24 NOTE — CONSULTS
[address] [facility]                                  CONSULTATION    PATIENT NAME: Ruslan Yang                  :        1960  MED REC NO:   [id number]                          ROOM:       2109  ACCOUNT NO:   [de-identified]                           ADMIT DATE: 2019  PROVIDER:     Nima Law MD    CONSULT DATE:  2019    HISTORY OF PRESENT ILLNESS:  This is a 59-year-old male with a history  of chronic atrial fibrillation, COPD, morbid obesity, heart failure,  noncompliance with medicines and diet, alcohol usage, who comes to the  hospital with worsening shortness of breath for the last two to three  days. He was recently at the hospital for CHF/COPD exacerbation. When  the patient presented to the Emergency Room, he was found to be in  respiratory distress. BiPAP was initiate but his blood gas showed pH of  7.1 and elevated pCO2 level and he was subsequently intubated. After  the intubation, he became bradycardic and had a PEA. CPR was initiated  and ROSC was established. 30 minutes later, the patient again went into  pulseless VTach and CPR too initiated with subsequent ROSC. He was on  amiodarone drip. He also is currently on CRRT along with pressors. He  remains intubated. The patient apparently is still drinking and smoking though according to  the family, he has cut down on both those things. REVIEW OF SYSTEMS:  Please see HPI. All other systems have been  reviewed and they are negative. PAST MEDICAL HISTORY:  1. History of congestive heart failure. Last echocardiogram from  10/17/2018 showed an EF of 45%, normal right ventricle with reduced RV  function, mild mitral regurgitation. 2.  Chronic atrial fibrillation. 3.  Morbid obesity and sleep apnea. 4.  Diabetes mellitus. 5.  History of EtOH abuse. 6.  Chronic kidney disease. 7.  COPD.   8.  Peripheral vascular primary pulmonary event at the present time. 2.  Hypotension. This can be due to septic or cardiogenic shock. 3.  Chronic atrial fibrillation. The patient's rate is currently  controlled on amiodarone therapy. 4.  Renal failure with anuria, currently on CRRT. 5.  Hepatic failure, probably shocked liver from hypotension. The  patient also has a known history of EtOH abuse. 5.  Possible anoxic encephalopathy. RECOMMENDATIONS:  1.  I will wean the patient off IV amiodarone therapy since he has  evidence of liver failure at the present time and amiodarone is excreted  through the liver. 2.  Continue the supportive care for right now and if there is no  significant improvement in his overall condition, he may have to be  considered for a change in his code status. I appreciate the opportunity to participate in the care of this pleasant  white male.     With warm regards,        Cely Dos Santos MD    D: 06/24/2019 11:41:59       T: 06/24/2019 16:43:33     [email or web address]  Job#: [id number]     Doc#: [id number]    CC:  <>

## 2019-06-24 NOTE — PROGRESS NOTES
prismaSATE BGK 4/2.5 1,000 mL/hr (06/24/19 0641)    prismaSATE BGK 4/2.5 1,000 mL/hr (06/24/19 0641)    norepinephrine 8 mcg/min (06/24/19 0923)       PRN Meds:  iopamidol, albuterol, sodium chloride flush, ondansetron, acetaminophen, glucose, dextrose, glucagon (rDNA), dextrose, fentanNYL, potassium chloride, magnesium sulfate, sodium phosphate IVPB **OR** sodium phosphate IVPB **OR** sodium phosphate IVPB **OR** sodium phosphate IVPB, calcium gluconate IVPB **OR** calcium gluconate IVPB **OR** calcium gluconate IVPB **OR** calcium gluconate IVPB    Labs reviewed:  CBC:   Recent Labs     06/23/19  0500 06/23/19  1055 06/24/19  0415   WBC 20.1* 20.2* 21.2*   HGB 12.1* 11.6* 10.3*   HCT 38.0* 37.2* 32.2*   MCV 86.3 85.6 86.4    318 217     BMP:   Recent Labs     06/23/19  1730 06/23/19  2345 06/24/19  0415   * 134* 132*   K 5.4* 5.3* 5.4*   CL 94* 95* 94*   CO2 21 22 22   PHOS 4.9 4.3 4.2   BUN 31* 25* 21*   CREATININE 2.6* 2.2* 1.9*     LIVER PROFILE:   Recent Labs     06/22/19  2220 06/23/19  0125 06/24/19  0415   AST 24 133* >7,000*   ALT 21 87* 4,334*   BILIDIR  --   --  0.6*   BILITOT 0.8 1.0 1.1*   ALKPHOS 83 81 103     PT/INR:   Recent Labs     06/22/19  2221 06/23/19  1730 06/24/19  0415   PROTIME 47.3* 86.2* 53.6*   INR 4.15* 7.56* 4.70*     APTT:   Recent Labs     06/23/19  1200   APTT 36.2*     UA:  Recent Labs     06/23/19  1048   COLORU DK YELLOW   PHUR 5.0   WBCUA 5   RBCUA 8*   CLARITYU CLOUDY*   SPECGRAV 1.021   LEUKOCYTESUR Negative   UROBILINOGEN 1.0   BILIRUBINUR SMALL*   BLOODU MODERATE*   GLUCOSEU Negative     No results for input(s): PH, PCO2, PO2 in the last 72 hours. Films:  Radiology Review:  Pertinent images / reports were reviewed as a part of this visit. CT Chest w/ contrast: No results found for this or any previous visit.     CT Chest w/o contrast:   Results for orders placed during the hospital encounter of 06/22/19   CT CHEST WO CONTRAST    Narrative EXAMINATION:  CT OF THE CHEST WITHOUT CONTRAST 6/23/2019 1:19 am    TECHNIQUE:  CT of the chest was performed without the administration of intravenous  contrast. Multiplanar reformatted images are provided for review. Dose  modulation, iterative reconstruction, and/or weight based adjustment of the  mA/kV was utilized to reduce the radiation dose to as low as reasonably  achievable. COMPARISON:  03/14/2019 and 03/25/2017    HISTORY:  ORDERING SYSTEM PROVIDED HISTORY: resp failure, arrest, PNA  TECHNOLOGIST PROVIDED HISTORY:  Ordering Physician Provided Reason for Exam: resp failure, arrest, PNA  Acuity: Acute  Type of Exam: Initial    FINDINGS:  Mediastinum: Endotracheal tube terminates approximately 3 cm above the  erna. Enteric tube traverses the esophagus. The heart size is mildly  enlarged. Coronary artery calcifications are present. There is no  pericardial effusion. Aortic caliber is normal.  The main pulmonary artery  is dilated. Mediastinal adenopathy has developed, likely reactive. Lungs/pleura: There is a small right pleural effusion. There is no  pneumothorax. There is bilateral airspace disease which is primarily  dependent. Upper Abdomen: Large cystic structure in the right upper quadrant is again  noted, partially imaged. Enteric tube terminates in the stomach. The  visualized upper abdomen is otherwise unremarkable. Soft Tissues/Bones: There are probably acute nondisplaced fractures of the  anterior right 4th and 5th and anterior left 4th, 5th and 6th ribs. Impression 1. Bilateral airspace disease could represent atelectasis or pneumonia. 2. Small right pleural effusion. 3. Coronary artery disease. 4. Pulmonary hypertension. 5. Mediastinal adenopathy, probably reactive given the relatively rapid  development. 6. Suspected acute nondisplaced bilateral anterior rib fractures without  pneumothorax. CTPA: No results found for this or any previous visit.     CXR

## 2019-06-24 NOTE — PROGRESS NOTES
Hospitalist Progress Note  6/24/2019 10:01 AM    PCP: Nubia Contreras MD    [phone]     Date of Admission: 6/22/2019                                                                                                                     HOSPITAL COURSE    Patient demographics:  The patient  Omayra Linares is a 61 y.o. male     Significant past medical history:   Patient Active Problem List   Diagnosis    Arthritis    Diabetes mellitus with hyperglycemia ([address]    HBP (high blood pressure)    Hyperlipidemia    COPD (chronic obstructive pulmonary disease) (HCC)    Viral hepatitis C    Back pain    PAD (peripheral artery disease) ([address]    Spinal stenosis of lumbar region    Tobacco use    Atherosclerosis of native artery of left lower extremity with intermittent claudication (HCC)    Chest pain    Pleural effusion due to CHF (congestive heart failure) (HCC)    Pleural effusion, right    Alcohol abuse, continuous    Tachycardia    Atrial fibrillation with RVR (HCC)    Hyponatremia    Congestive heart failure (HCC)    REJI (acute kidney injury) ([address]    Hypokalemia    Coronary artery disease involving autologous artery coronary bypass graft with angina pectoris ([address]    Essential hypertension    Chest pain of uncertain etiology    Acute on chronic combined systolic and diastolic HF (heart failure) (HCC)    Acute hyperkalemia    Typical atrial flutter (HCC)    Chronic systolic CHF (congestive heart failure), NYHA class 3 (HCC)    Hypotension    Vasovagal syncope    Laceration of scalp without foreign body    Nonischemic cardiomyopathy (HCC)    Syncope and collapse    Ischemic foot    Diabetes mellitus with peripheral circulatory disorder (HCC)    Limb ischemia    COPD exacerbation (HCC)    Nonhealing surgical wound    Acromioclavicular joint arthritis    Bradycardia    Shortness of breath    Permanent atrial fibrillation (HCC)    Chronic atrial fibrillation ([address]    Other 96 % --   06/24/19 0544 -- -- -- -- 26 -- --   06/24/19 0527 (!) 103/54 -- -- 85 26 98 % --   06/24/19 0509 (!) 83/47 -- -- 93 26 92 % --   06/24/19 0507 (!) 83/47 -- -- 94 26 -- --   06/24/19 0502 (!) 48/22 -- -- 79 21 96 % --   06/24/19 0401 -- -- -- -- 9 99 % --   06/24/19 0400 (!) 152/135 97.5 °F (36.4 °C) Axillary 82 11 99 % --   06/24/19 0348 (!) 114/90 -- -- 89 10 -- --   06/24/19 0300 (!) 87/50 -- -- 72 13 -- --   06/24/19 0203 (!) 116/91 -- -- 80 26 -- --        72HR INTAKE/OUTPUT:      Intake/Output Summary (Last 24 hours) at 6/24/2019 1001  Last data filed at 6/24/2019 0900  Gross per 24 hour   Intake 5347.92 ml   Output 2758 ml   Net 2589.92 ml       Exam:    Gen:   Patient sedated on the ventilator  Eyes: PERRL. No sclera icterus. No conjunctival injection. ENT: No discharge. Pharynx clear. External appearance of ears and nose normal.  Neck: Trachea midline. No obvious mass. Resp: No accessory muscle use. No crackles. No wheezes. No rhonchi. CV: Regular rate. Regular rhythm. No murmur or rub. No edema. GI: Non-tender. Non-distended. No hernia. Skin: Warm, dry, normal texture and turgor. Lymph: No cervical LAD. No supraclavicular LAD. M/S: / Ext. No cyanosis. No clubbing. No joint deformity. Neuro: CN 2-12 are intact,  no neurologic deficits noted.     PT/INR:   Recent Labs     06/22/19  2221 06/23/19  1730 06/24/19  0415   PROTIME 47.3* 86.2* 53.6*   INR 4.15* 7.56* 4.70*     APTT:   Recent Labs     06/23/19  1200   APTT 36.2*       CBC:   Recent Labs     06/23/19  0125 06/23/19  0500 06/23/19  1055 06/24/19  0415   WBC 15.0* 20.1* 20.2* 21.2*   HGB 10.6* 12.1* 11.6* 10.3*   HCT 34.5* 38.0* 37.2* 32.2*   MCV 87.5 86.3 85.6 86.4    338 318 217       BMP:   Recent Labs     06/23/19  1055 06/23/19  1730 06/23/19  2345 06/24/19  0415   * 134* 134* 132*   K 6.0* 5.4* 5.3* 5.4*   CL 90* 94* 95* 94*   CO2 20* 21 22 22   PHOS 7.3* 4.9 4.3 4.2   BUN 33* 31* 25* 21*   CREATININE 3.0*

## 2019-06-24 NOTE — PROGRESS NOTES
Classification: BMI 35.0 - 39.9 Obese Class II    Nutrition Interventions:   Continue NPO(refer to MD for TF if started)  Continued Inpatient Monitoring, Education Not Indicated    Nutrition Evaluation:   · Evaluation: Goals set   · Goals: tolerate most appropriate form of nutrition    · Monitoring: Nutrition Progression, Skin Integrity, Weight, Pertinent Labs, Diarrhea, Constipation      Electronically signed by Nelson Tucker RD, LD on 6/24/19 at 1:02 PM    Contact Number: [phone]

## 2019-06-25 ENCOUNTER — TELEPHONE (OUTPATIENT)
Dept: INTERNAL MEDICINE CLINIC | Age: 59
End: 2019-06-25

## 2019-06-25 LAB
ALBUMIN SERPL-MCNC: 2.9 G/DL (ref 3.4–5)
ALBUMIN SERPL-MCNC: 3 G/DL (ref 3.4–5)
ALBUMIN SERPL-MCNC: 3 G/DL (ref 3.4–5)
ALBUMIN SERPL-MCNC: 3.1 G/DL (ref 3.4–5)
ALBUMIN SERPL-MCNC: 3.1 G/DL (ref 3.4–5)
ALP BLD-CCNC: 125 U/L (ref 40–129)
ALT SERPL-CCNC: 3518 U/L (ref 10–40)
ANION GAP SERPL CALCULATED.3IONS-SCNC: 15 MMOL/L (ref 3–16)
ANION GAP SERPL CALCULATED.3IONS-SCNC: 15 MMOL/L (ref 3–16)
ANION GAP SERPL CALCULATED.3IONS-SCNC: 16 MMOL/L (ref 3–16)
ANION GAP SERPL CALCULATED.3IONS-SCNC: 17 MMOL/L (ref 3–16)
AST SERPL-CCNC: 3364 U/L (ref 15–37)
BASE EXCESS ARTERIAL: -2.2 MMOL/L (ref -3–3)
BASOPHILS ABSOLUTE: 0 K/UL (ref 0–0.2)
BASOPHILS RELATIVE PERCENT: 0 %
BILIRUB SERPL-MCNC: 1.3 MG/DL (ref 0–1)
BILIRUBIN DIRECT: 1 MG/DL (ref 0–0.3)
BILIRUBIN, INDIRECT: 0.3 MG/DL (ref 0–1)
BUN BLDV-MCNC: 16 MG/DL (ref 7–20)
BUN BLDV-MCNC: 16 MG/DL (ref 7–20)
BUN BLDV-MCNC: 17 MG/DL (ref 7–20)
BUN BLDV-MCNC: 17 MG/DL (ref 7–20)
CALCIUM IONIZED: 0.99 MMOL/L (ref 1.12–1.32)
CALCIUM IONIZED: 1 MMOL/L (ref 1.12–1.32)
CALCIUM SERPL-MCNC: 7.5 MG/DL (ref 8.3–10.6)
CALCIUM SERPL-MCNC: 7.6 MG/DL (ref 8.3–10.6)
CALCIUM SERPL-MCNC: 7.7 MG/DL (ref 8.3–10.6)
CALCIUM SERPL-MCNC: 7.8 MG/DL (ref 8.3–10.6)
CARBOXYHEMOGLOBIN ARTERIAL: 1.1 % (ref 0–1.5)
CHLORIDE BLD-SCNC: 102 MMOL/L (ref 99–110)
CHLORIDE BLD-SCNC: 95 MMOL/L (ref 99–110)
CHLORIDE BLD-SCNC: 95 MMOL/L (ref 99–110)
CHLORIDE BLD-SCNC: 99 MMOL/L (ref 99–110)
CO2: 20 MMOL/L (ref 21–32)
CO2: 21 MMOL/L (ref 21–32)
CO2: 21 MMOL/L (ref 21–32)
CO2: 22 MMOL/L (ref 21–32)
CREAT SERPL-MCNC: 1.3 MG/DL (ref 0.9–1.3)
CREAT SERPL-MCNC: 1.4 MG/DL (ref 0.9–1.3)
CREAT SERPL-MCNC: 1.4 MG/DL (ref 0.9–1.3)
CREAT SERPL-MCNC: 1.5 MG/DL (ref 0.9–1.3)
CULTURE, RESPIRATORY: NORMAL
EOSINOPHILS ABSOLUTE: 0 K/UL (ref 0–0.6)
EOSINOPHILS RELATIVE PERCENT: 0 %
GFR AFRICAN AMERICAN: 58
GFR AFRICAN AMERICAN: >60
GFR NON-AFRICAN AMERICAN: 48
GFR NON-AFRICAN AMERICAN: 52
GFR NON-AFRICAN AMERICAN: 52
GFR NON-AFRICAN AMERICAN: 56
GLUCOSE BLD-MCNC: 176 MG/DL (ref 70–99)
GLUCOSE BLD-MCNC: 183 MG/DL (ref 70–99)
GLUCOSE BLD-MCNC: 184 MG/DL (ref 70–99)
GLUCOSE BLD-MCNC: 202 MG/DL (ref 70–99)
GLUCOSE BLD-MCNC: 206 MG/DL (ref 70–99)
GLUCOSE BLD-MCNC: 207 MG/DL (ref 70–99)
GLUCOSE BLD-MCNC: 220 MG/DL (ref 70–99)
GLUCOSE BLD-MCNC: 221 MG/DL (ref 70–99)
GLUCOSE BLD-MCNC: 223 MG/DL (ref 70–99)
GLUCOSE BLD-MCNC: 229 MG/DL (ref 70–99)
GLUCOSE BLD-MCNC: 237 MG/DL (ref 70–99)
GRAM STAIN RESULT: NORMAL
HCO3 ARTERIAL: 22 MMOL/L (ref 21–29)
HCT VFR BLD CALC: 29.5 % (ref 40.5–52.5)
HEMOGLOBIN, ART, EXTENDED: 9.9 G/DL (ref 13.5–17.5)
HEMOGLOBIN: 9.5 G/DL (ref 13.5–17.5)
LACTIC ACID: 2.3 MMOL/L (ref 0.4–2)
LACTIC ACID: 2.7 MMOL/L (ref 0.4–2)
LACTIC ACID: 4.4 MMOL/L (ref 0.4–2)
LACTIC ACID: 6.9 MMOL/L (ref 0.4–2)
LYMPHOCYTES ABSOLUTE: 0.5 K/UL (ref 1–5.1)
LYMPHOCYTES RELATIVE PERCENT: 2.2 %
MAGNESIUM: 2.4 MG/DL (ref 1.8–2.4)
MAGNESIUM: 2.5 MG/DL (ref 1.8–2.4)
MCH RBC QN AUTO: 27.7 PG (ref 26–34)
MCHC RBC AUTO-ENTMCNC: 32.1 G/DL (ref 31–36)
MCV RBC AUTO: 86.2 FL (ref 80–100)
METHEMOGLOBIN ARTERIAL: 1 %
MONOCYTES ABSOLUTE: 0.9 K/UL (ref 0–1.3)
MONOCYTES RELATIVE PERCENT: 4.4 %
NEUTROPHILS ABSOLUTE: 18.8 K/UL (ref 1.7–7.7)
NEUTROPHILS RELATIVE PERCENT: 93.4 %
O2 CONTENT ARTERIAL: 13 ML/DL
O2 SAT, ARTERIAL: 95.5 %
O2 THERAPY: ABNORMAL
PCO2 ARTERIAL: 37.7 MMHG (ref 35–45)
PDW BLD-RTO: 16.1 % (ref 12.4–15.4)
PERFORMED ON: ABNORMAL
PH ARTERIAL: 7.38 (ref 7.35–7.45)
PH VENOUS: 7.32 (ref 7.35–7.45)
PH VENOUS: 7.38 (ref 7.35–7.45)
PHOSPHORUS: 1.8 MG/DL (ref 2.5–4.9)
PHOSPHORUS: 2 MG/DL (ref 2.5–4.9)
PHOSPHORUS: 2.5 MG/DL (ref 2.5–4.9)
PHOSPHORUS: 3 MG/DL (ref 2.5–4.9)
PLATELET # BLD: 170 K/UL (ref 135–450)
PMV BLD AUTO: 9 FL (ref 5–10.5)
PO2 ARTERIAL: 81.2 MMHG (ref 75–108)
POTASSIUM SERPL-SCNC: 4.3 MMOL/L (ref 3.5–5.1)
POTASSIUM SERPL-SCNC: 4.3 MMOL/L (ref 3.5–5.1)
POTASSIUM SERPL-SCNC: 4.6 MMOL/L (ref 3.5–5.1)
POTASSIUM SERPL-SCNC: 4.9 MMOL/L (ref 3.5–5.1)
RBC # BLD: 3.42 M/UL (ref 4.2–5.9)
SODIUM BLD-SCNC: 131 MMOL/L (ref 136–145)
SODIUM BLD-SCNC: 132 MMOL/L (ref 136–145)
SODIUM BLD-SCNC: 136 MMOL/L (ref 136–145)
SODIUM BLD-SCNC: 139 MMOL/L (ref 136–145)
TCO2 ARTERIAL: 23.1 MMOL/L
TOTAL PROTEIN: 6 G/DL (ref 6.4–8.2)
WBC # BLD: 20.1 K/UL (ref 4–11)

## 2019-06-25 PROCEDURE — 2500000003 HC RX 250 WO HCPCS: Performed by: NURSE PRACTITIONER

## 2019-06-25 PROCEDURE — 36592 COLLECT BLOOD FROM PICC: CPT

## 2019-06-25 PROCEDURE — 6360000002 HC RX W HCPCS: Performed by: INTERNAL MEDICINE

## 2019-06-25 PROCEDURE — 6370000000 HC RX 637 (ALT 250 FOR IP): Performed by: INTERNAL MEDICINE

## 2019-06-25 PROCEDURE — 85025 COMPLETE CBC W/AUTO DIFF WBC: CPT

## 2019-06-25 PROCEDURE — 83605 ASSAY OF LACTIC ACID: CPT

## 2019-06-25 PROCEDURE — 99233 SBSQ HOSP IP/OBS HIGH 50: CPT | Performed by: INTERNAL MEDICINE

## 2019-06-25 PROCEDURE — 2580000003 HC RX 258: Performed by: INTERNAL MEDICINE

## 2019-06-25 PROCEDURE — 2580000003 HC RX 258: Performed by: PHARMACIST

## 2019-06-25 PROCEDURE — 83735 ASSAY OF MAGNESIUM: CPT

## 2019-06-25 PROCEDURE — 2500000003 HC RX 250 WO HCPCS: Performed by: INTERNAL MEDICINE

## 2019-06-25 PROCEDURE — 99291 CRITICAL CARE FIRST HOUR: CPT | Performed by: INTERNAL MEDICINE

## 2019-06-25 PROCEDURE — 80076 HEPATIC FUNCTION PANEL: CPT

## 2019-06-25 PROCEDURE — 90945 DIALYSIS ONE EVALUATION: CPT

## 2019-06-25 PROCEDURE — 6370000000 HC RX 637 (ALT 250 FOR IP): Performed by: NURSE PRACTITIONER

## 2019-06-25 PROCEDURE — 94760 N-INVAS EAR/PLS OXIMETRY 1: CPT

## 2019-06-25 PROCEDURE — 2000000000 HC ICU R&B

## 2019-06-25 PROCEDURE — 6360000002 HC RX W HCPCS: Performed by: PHARMACIST

## 2019-06-25 PROCEDURE — 6360000002 HC RX W HCPCS: Performed by: NURSE PRACTITIONER

## 2019-06-25 PROCEDURE — 82803 BLOOD GASES ANY COMBINATION: CPT

## 2019-06-25 PROCEDURE — 94003 VENT MGMT INPAT SUBQ DAY: CPT

## 2019-06-25 PROCEDURE — 94640 AIRWAY INHALATION TREATMENT: CPT

## 2019-06-25 PROCEDURE — 2700000000 HC OXYGEN THERAPY PER DAY

## 2019-06-25 PROCEDURE — 82330 ASSAY OF CALCIUM: CPT

## 2019-06-25 PROCEDURE — 94750 HC PULMONARY COMPLIANCE STUDY: CPT

## 2019-06-25 PROCEDURE — 2580000003 HC RX 258: Performed by: NURSE PRACTITIONER

## 2019-06-25 PROCEDURE — 80069 RENAL FUNCTION PANEL: CPT

## 2019-06-25 PROCEDURE — 6370000000 HC RX 637 (ALT 250 FOR IP): Performed by: HOSPITALIST

## 2019-06-25 PROCEDURE — 36415 COLL VENOUS BLD VENIPUNCTURE: CPT

## 2019-06-25 RX ORDER — DIGOXIN 0.25 MG/ML
250 INJECTION INTRAMUSCULAR; INTRAVENOUS ONCE
Status: COMPLETED | OUTPATIENT
Start: 2019-06-25 | End: 2019-06-25

## 2019-06-25 RX ORDER — METOPROLOL TARTRATE 5 MG/5ML
5 INJECTION INTRAVENOUS EVERY 6 HOURS SCHEDULED
Status: DISCONTINUED | OUTPATIENT
Start: 2019-06-25 | End: 2019-06-27

## 2019-06-25 RX ADMIN — Medication 100 MCG/HR: at 18:00

## 2019-06-25 RX ADMIN — CALCIUM GLUCONATE 1 G: 98 INJECTION, SOLUTION INTRAVENOUS at 07:59

## 2019-06-25 RX ADMIN — MOMETASONE FUROATE AND FORMOTEROL FUMARATE DIHYDRATE 2 PUFF: 100; 5 AEROSOL RESPIRATORY (INHALATION) at 19:54

## 2019-06-25 RX ADMIN — Medication 1000 ML/HR: at 02:47

## 2019-06-25 RX ADMIN — INSULIN LISPRO 3 UNITS: 100 INJECTION, SOLUTION INTRAVENOUS; SUBCUTANEOUS at 16:11

## 2019-06-25 RX ADMIN — Medication 75 MCG/HR: at 02:04

## 2019-06-25 RX ADMIN — Medication 1000 ML/HR: at 23:27

## 2019-06-25 RX ADMIN — Medication 1000 ML/HR: at 12:40

## 2019-06-25 RX ADMIN — METOPROLOL TARTRATE 5 MG: 5 INJECTION INTRAVENOUS at 17:14

## 2019-06-25 RX ADMIN — PROPOFOL 40 MCG/KG/MIN: 10 INJECTION, EMULSION INTRAVENOUS at 17:57

## 2019-06-25 RX ADMIN — VASOPRESSIN 0.04 UNITS/MIN: 20 INJECTION INTRAVENOUS at 00:18

## 2019-06-25 RX ADMIN — PROPOFOL 40 MCG/KG/MIN: 10 INJECTION, EMULSION INTRAVENOUS at 14:23

## 2019-06-25 RX ADMIN — Medication 1000 ML/HR: at 02:40

## 2019-06-25 RX ADMIN — INSULIN LISPRO 4 UNITS: 100 INJECTION, SOLUTION INTRAVENOUS; SUBCUTANEOUS at 00:56

## 2019-06-25 RX ADMIN — CALCIUM GLUCONATE 1 G: 98 INJECTION, SOLUTION INTRAVENOUS at 20:00

## 2019-06-25 RX ADMIN — VASOPRESSIN 0.04 UNITS/MIN: 20 INJECTION INTRAVENOUS at 08:27

## 2019-06-25 RX ADMIN — Medication 10 ML: at 08:32

## 2019-06-25 RX ADMIN — Medication 1000 ML/HR: at 02:33

## 2019-06-25 RX ADMIN — PROPOFOL 75 MCG/KG/MIN: 10 INJECTION, EMULSION INTRAVENOUS at 05:58

## 2019-06-25 RX ADMIN — FAMOTIDINE 20 MG: 10 INJECTION, SOLUTION INTRAVENOUS at 08:31

## 2019-06-25 RX ADMIN — IPRATROPIUM BROMIDE AND ALBUTEROL SULFATE 1 AMPULE: .5; 3 SOLUTION RESPIRATORY (INHALATION) at 01:00

## 2019-06-25 RX ADMIN — Medication 1000 ML/HR: at 07:50

## 2019-06-25 RX ADMIN — Medication 2 CAPSULE: at 08:19

## 2019-06-25 RX ADMIN — DIGOXIN 250 MCG: 0.25 INJECTION INTRAMUSCULAR; INTRAVENOUS at 06:55

## 2019-06-25 RX ADMIN — IPRATROPIUM BROMIDE AND ALBUTEROL SULFATE 1 AMPULE: .5; 3 SOLUTION RESPIRATORY (INHALATION) at 19:54

## 2019-06-25 RX ADMIN — Medication 1000 ML/HR: at 17:35

## 2019-06-25 RX ADMIN — INSULIN LISPRO 4 UNITS: 100 INJECTION, SOLUTION INTRAVENOUS; SUBCUTANEOUS at 08:19

## 2019-06-25 RX ADMIN — PROPOFOL 20 MCG/KG/MIN: 10 INJECTION, EMULSION INTRAVENOUS at 00:58

## 2019-06-25 RX ADMIN — Medication 2 CAPSULE: at 16:08

## 2019-06-25 RX ADMIN — SODIUM PHOSPHATE, MONOBASIC, MONOHYDRATE 12 MMOL: 276; 142 INJECTION, SOLUTION INTRAVENOUS at 15:13

## 2019-06-25 RX ADMIN — MEROPENEM 500 MG: 500 INJECTION INTRAVENOUS at 14:24

## 2019-06-25 RX ADMIN — METOPROLOL TARTRATE 5 MG: 5 INJECTION INTRAVENOUS at 23:45

## 2019-06-25 RX ADMIN — Medication 1000 ML/HR: at 07:56

## 2019-06-25 RX ADMIN — Medication 100 MCG/HR: at 13:11

## 2019-06-25 RX ADMIN — INSULIN LISPRO 4 UNITS: 100 INJECTION, SOLUTION INTRAVENOUS; SUBCUTANEOUS at 04:44

## 2019-06-25 RX ADMIN — INSULIN LISPRO 6 UNITS: 100 INJECTION, SOLUTION INTRAVENOUS; SUBCUTANEOUS at 12:30

## 2019-06-25 RX ADMIN — MEROPENEM 500 MG: 500 INJECTION INTRAVENOUS at 02:31

## 2019-06-25 RX ADMIN — FENTANYL CITRATE 25 MCG: 0.05 INJECTION, SOLUTION INTRAMUSCULAR; INTRAVENOUS at 11:32

## 2019-06-25 RX ADMIN — IPRATROPIUM BROMIDE AND ALBUTEROL SULFATE 1 AMPULE: .5; 3 SOLUTION RESPIRATORY (INHALATION) at 08:34

## 2019-06-25 RX ADMIN — Medication 8 MCG/MIN: at 00:57

## 2019-06-25 RX ADMIN — METOPROLOL TARTRATE 5 MG: 5 INJECTION INTRAVENOUS at 10:57

## 2019-06-25 RX ADMIN — VASOPRESSIN 0.04 UNITS/MIN: 20 INJECTION INTRAVENOUS at 16:32

## 2019-06-25 RX ADMIN — IPRATROPIUM BROMIDE AND ALBUTEROL SULFATE 1 AMPULE: .5; 3 SOLUTION RESPIRATORY (INHALATION) at 12:09

## 2019-06-25 RX ADMIN — MOMETASONE FUROATE AND FORMOTEROL FUMARATE DIHYDRATE 2 PUFF: 100; 5 AEROSOL RESPIRATORY (INHALATION) at 08:34

## 2019-06-25 RX ADMIN — CHLORHEXIDINE GLUCONATE 0.12% ORAL RINSE 15 ML: 1.2 LIQUID ORAL at 08:31

## 2019-06-25 RX ADMIN — Medication 100 MCG/HR: at 22:57

## 2019-06-25 RX ADMIN — Medication 1000 ML/HR: at 12:37

## 2019-06-25 RX ADMIN — MEROPENEM 500 MG: 500 INJECTION INTRAVENOUS at 09:47

## 2019-06-25 RX ADMIN — MEROPENEM 500 MG: 500 INJECTION INTRAVENOUS at 21:24

## 2019-06-25 RX ADMIN — PROPOFOL 40 MCG/KG/MIN: 10 INJECTION, EMULSION INTRAVENOUS at 21:38

## 2019-06-25 RX ADMIN — PROPOFOL 30 MCG/KG/MIN: 10 INJECTION, EMULSION INTRAVENOUS at 11:00

## 2019-06-25 RX ADMIN — IPRATROPIUM BROMIDE AND ALBUTEROL SULFATE 1 AMPULE: .5; 3 SOLUTION RESPIRATORY (INHALATION) at 16:17

## 2019-06-25 RX ADMIN — IPRATROPIUM BROMIDE AND ALBUTEROL SULFATE 1 AMPULE: .5; 3 SOLUTION RESPIRATORY (INHALATION) at 04:48

## 2019-06-25 RX ADMIN — INSULIN GLARGINE 5 UNITS: 100 INJECTION, SOLUTION SUBCUTANEOUS at 20:18

## 2019-06-25 RX ADMIN — CHLORHEXIDINE GLUCONATE 0.12% ORAL RINSE 15 ML: 1.2 LIQUID ORAL at 20:18

## 2019-06-25 RX ADMIN — INSULIN LISPRO 3 UNITS: 100 INJECTION, SOLUTION INTRAVENOUS; SUBCUTANEOUS at 20:17

## 2019-06-25 RX ADMIN — SODIUM PHOSPHATE, MONOBASIC, MONOHYDRATE 6 MMOL: 276; 142 INJECTION, SOLUTION INTRAVENOUS at 10:53

## 2019-06-25 RX ADMIN — Medication 10 ML: at 20:17

## 2019-06-25 RX ADMIN — Medication 75 MCG/HR: at 08:19

## 2019-06-25 ASSESSMENT — PULMONARY FUNCTION TESTS
PIF_VALUE: 32
PIF_VALUE: 32
PIF_VALUE: 33
PIF_VALUE: 39
PIF_VALUE: 33
PIF_VALUE: 40
PIF_VALUE: 37
PIF_VALUE: 30
PIF_VALUE: 31
PIF_VALUE: 50
PIF_VALUE: 50
PIF_VALUE: 33
PIF_VALUE: 37
PIF_VALUE: 32
PIF_VALUE: 38
PIF_VALUE: 32
PIF_VALUE: 50
PIF_VALUE: 33
PIF_VALUE: 32
PIF_VALUE: 34
PIF_VALUE: 31
PIF_VALUE: 31
PIF_VALUE: 33
PIF_VALUE: 33
PIF_VALUE: 35
PIF_VALUE: 31
PIF_VALUE: 32
PIF_VALUE: 32
PIF_VALUE: 33
PIF_VALUE: 33
PIF_VALUE: 36
PIF_VALUE: 32
PIF_VALUE: 32
PIF_VALUE: 42
PIF_VALUE: 34
PIF_VALUE: 33

## 2019-06-25 ASSESSMENT — PAIN SCALES - GENERAL
PAINLEVEL_OUTOF10: 0

## 2019-06-25 NOTE — PROGRESS NOTES
Pulmonary Progress Note    Date of Admission: 6/22/2019   LOS: 2 days     CC:  Chief Complaint   Patient presents with    Chest Pain   f/u cardiac arrest    HPI/Subjective  Overnight blood pressures in stable, able in place. Lactate clearing. ROS:   Unable to obtain due to mechanical ventilation      Intake/Output Summary (Last 24 hours) at 6/25/2019 1033  Last data filed at 6/25/2019 1000  Gross per 24 hour   Intake 2064.22 ml   Output 443 ml   Net 1621.22 ml         PHYSICAL EXAM:   Blood pressure 100/86, pulse 115, temperature 98.2 °F (36.8 °C), temperature source Axillary, resp. rate 28, height 5' 7\" (1.702 m), weight 242 lb 8.1 oz (110 kg), SpO2 98 %.'  Gen:  No acute distress. Intubated sedated  Eyes: PERRL. Anicteric sclera. No conjunctival injection. ENT: No discharge. Posterior oropharynx with ETTube. External appearance of ears and nose normal.  Neck: Trachea midline. No mass   Resp: Scattered crackles. No wheezes. No rhonchi. No dullness on percussion. CV: Regular rate. Regular rhythm. No murmur or rub. No edema. GI: Soft, Non-tender. Non-distended. +BS  Skin: Warm, dry, w/o erythema. Lymph: No cervical or supraclavicular LAD. M/S: No cyanosis. No clubbing. Neuro:  no focal neurologic deficit.   Moves all extremities      Medications:    Scheduled Meds:   lactobacillus  2 capsule Oral BID     insulin glargine  5 Units Subcutaneous Nightly    mometasone-formoterol  2 puff Inhalation BID    sodium chloride flush  10 mL Intravenous 2 times per day    insulin lispro  0-12 Units Subcutaneous Q4H    ipratropium-albuterol  1 ampule Inhalation Q4H    chlorhexidine  15 mL Mouth/Throat BID    famotidine (PEPCID) injection  20 mg Intravenous Daily    meropenem  500 mg Intravenous Q6H       Continuous Infusions:   propofol 30 mcg/kg/min (06/25/19 0710)    sodium chloride 5 mL/hr at 06/24/19 0657    vasopressin (Septic Shock) infusion 0.04 Units/min (06/25/19 8774)    dextrose      Triple Lumen 06/23/19 Right Femoral (Active)   Continued need for line? Yes 6/24/2019  8:00 AM   Site Assessment Clean;Dry; Intact 6/24/2019  8:00 AM   Proximal Lumen Status Infusing 6/24/2019  8:00 AM   Medial Lumen Status Infusing 6/24/2019  8:00 AM   Distal Lumen Status Infusing 6/24/2019  8:00 AM   Dressing Type Anti-microbial patch;Transparent 6/24/2019  8:00 AM   Dressing Status Clean;Dry; Intact 6/24/2019  8:00 AM   Dressing Intervention New 6/23/2019  6:00 AM   Dressing Change Due 06/26/19 6/24/2019  8:00 AM   Line Care Connections checked and tightened 6/23/2019  8:00 AM   Number of days: 1             Assessment:     Cardiac Arrest  Ventricular Tachycardia  Atrial fibrillation  CHF  Acute Hypoxemic Respiratory Failure with SAO2 <90% on Room Air  Septic Shock  Shock Liver  Lactic Acidosis  Hyperkalemia - improving  Anuric Renal Failure due to ATN  Alcohol abuse      Plan:      -Full vent support, Wean supplemental oxygen to goal saturation of >90%  - on amiodarone for V. tach, stopped overnight due to shock liver. However now is in A. fib with RVR. Will start metoprolol 5 mg as needed for rate control  -cardiology following appreciate recs. -NRT, currently positive fluid balance, would favor running net even to slightly negative today. ,  Defer to nephrology.  -Currently on vasopressin, titrate Levophed goal map 65  -cont merrem, mrsa nares (-)  -lactobacillus  -strict I/Os  -GIppx - pepcid  DVT ppx - heparin  Peridex    Due to the immediate potential for life-threatening deterioration due to cardiac arrest, septic shock, respiratory failure, I spent 40 minutes providing critical care. This time is excluding time spent performing procedures.         Thank you for this consult,    Erma Ortiz [facility]
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
## 2019-06-25 NOTE — PROGRESS NOTES
foot    Diabetes mellitus with peripheral circulatory disorder (HCC)    Limb ischemia    COPD exacerbation (HCC)    Nonhealing surgical wound    Acromioclavicular joint arthritis    Bradycardia    Shortness of breath    Permanent atrial fibrillation (HCC)    Atrial fibrillation, chronic (HCC)    Other specified injury of inferior mesenteric vein, initial encounter    Postprocedural hypotension    Acute respiratory failure with hypercapnia (HCC)    High anion gap metabolic acidosis    Centrilobular emphysema (HCC)    Hypomagnesemia    Heart failure, acute on chronic, systolic and diastolic (HCC)    Persistent atrial fibrillation (HCC)    Anemia    DMII (diabetes mellitus, type 2) (HCC)    Constipation    Hypokalemia    Acute on chronic systolic heart failure (HCC)    Atrial fibrillation, rapid (HCC)    COPD with acute exacerbation (HCC)    Cocaine use    Severe sepsis (HCC)    Atrial fibrillation with RVR (HCC)    Hyponatremia    Acute on chronic respiratory failure with hypoxia ([facility])    Respiratory distress    CHF, left ventricular (HCC)    Nicotine dependence    Suspected sleep apnea    Coronary artery disease involving native coronary artery of native heart without angina pectoris    CAD (coronary artery disease)    Acute renal failure (ARF) ([facility])    Morbid obesity due to excess calories (HCC)    Acute respiratory failure with hypoxia (HCC)    Nocturnal hypoxia    Hypercapnemia    SOB (shortness of breath)    Acute on chronic respiratory failure with hypercapnia (HCC)    Chronic respiratory failure with hypercapnia (HCC)    Acute on chronic systolic (congestive) heart failure (HCC)    Hx of AKA (above knee amputation), left (HCC)    Respiratory failure (HCC)    Pneumonia    Ventricular tachycardia (HCC)    Shock circulatory (HCC)    Tachypnea    Neutrophilic leukocytosis    Chronic respiratory failure with hypoxia ([address]    Cardiac arrest ([address]

## 2019-06-25 NOTE — PROGRESS NOTES
19:00 Shift handoff report received from Gareth Romeo RN.  19:45 Vas-Cath site checked - pt is currently on CRRT. TLC site checked and flushed. Patient repositioned. Oral care and turpin care provided. Patient opens eyes to voice. Follows commands. OG placement checked. Full assessment completed and documented - see assessment sheets for details. 20:30 Dr. Rah Robles at bedside. He placed an A-line in patient using sterile technique. He changed pt's vent settings - increased rate to 28 and decreased FiO2 to 65%. He wants an ABG drawn in one hour and results sent to Rodrigo Vitale NP - she is on call for critical care tonight. 21:30 ABG drawn and sent to the lab for processing. 22:00 Patient repositioned. Messaged Rodrigo Vitale NP the ABG results, updated on pt's status. Another ABG is to be gotten in the morning. 00:00 Patient opens eyes spontaneously. He does follow commands. A-line checked, leveled, flushed and zeroed. OG placement checked. Patient repositioned. Full assessment completed and documented - see assessment sheets for details. 02:00 Bath given. Gown and linens changed. Patient repositioned. 04:00 A-line checked, leveled, zeroed and flushed. OG placement checked. Patient repositioned. Full assessment completed and documented - see assessment sheets for details. 05:40 Morning labs drawn and sent to the lab for processing. 06:05 Patient's HR has increased to the 129 and is staying in the upper 120's. Paged Cardiology. 06:30 Dr. Lloyd Jones called back for Cardiology - new orders received - see MAR.

## 2019-06-26 ENCOUNTER — APPOINTMENT (OUTPATIENT)
Dept: ULTRASOUND IMAGING | Age: 59
DRG: 720 | End: 2019-06-26
Payer: COMMERCIAL

## 2019-06-26 LAB
ALBUMIN SERPL-MCNC: 2.8 G/DL (ref 3.4–5)
ALBUMIN SERPL-MCNC: 2.9 G/DL (ref 3.4–5)
ALBUMIN SERPL-MCNC: 2.9 G/DL (ref 3.4–5)
ALBUMIN SERPL-MCNC: 3.1 G/DL (ref 3.4–5)
ALP BLD-CCNC: 152 U/L (ref 40–129)
ALT SERPL-CCNC: 2168 U/L (ref 10–40)
ANION GAP SERPL CALCULATED.3IONS-SCNC: 13 MMOL/L (ref 3–16)
ANION GAP SERPL CALCULATED.3IONS-SCNC: 14 MMOL/L (ref 3–16)
ANION GAP SERPL CALCULATED.3IONS-SCNC: 16 MMOL/L (ref 3–16)
ANION GAP SERPL CALCULATED.3IONS-SCNC: 17 MMOL/L (ref 3–16)
AST SERPL-CCNC: 933 U/L (ref 15–37)
BASE EXCESS ARTERIAL: -2.2 MMOL/L (ref -3–3)
BASOPHILS ABSOLUTE: 0 K/UL (ref 0–0.2)
BASOPHILS RELATIVE PERCENT: 0.3 %
BILIRUB SERPL-MCNC: 1.6 MG/DL (ref 0–1)
BILIRUBIN DIRECT: 1.2 MG/DL (ref 0–0.3)
BILIRUBIN, INDIRECT: 0.4 MG/DL (ref 0–1)
BUN BLDV-MCNC: 15 MG/DL (ref 7–20)
BUN BLDV-MCNC: 16 MG/DL (ref 7–20)
CALCIUM IONIZED: 1 MMOL/L (ref 1.12–1.32)
CALCIUM IONIZED: 1.01 MMOL/L (ref 1.12–1.32)
CALCIUM SERPL-MCNC: 7.7 MG/DL (ref 8.3–10.6)
CALCIUM SERPL-MCNC: 7.8 MG/DL (ref 8.3–10.6)
CALCIUM SERPL-MCNC: 7.9 MG/DL (ref 8.3–10.6)
CALCIUM SERPL-MCNC: 8.1 MG/DL (ref 8.3–10.6)
CARBOXYHEMOGLOBIN ARTERIAL: 1.4 % (ref 0–1.5)
CHLORIDE BLD-SCNC: 95 MMOL/L (ref 99–110)
CHLORIDE BLD-SCNC: 96 MMOL/L (ref 99–110)
CHLORIDE BLD-SCNC: 96 MMOL/L (ref 99–110)
CHLORIDE BLD-SCNC: 99 MMOL/L (ref 99–110)
CO2: 20 MMOL/L (ref 21–32)
CO2: 20 MMOL/L (ref 21–32)
CO2: 22 MMOL/L (ref 21–32)
CO2: 22 MMOL/L (ref 21–32)
CREAT SERPL-MCNC: 1.1 MG/DL (ref 0.9–1.3)
CREAT SERPL-MCNC: 1.2 MG/DL (ref 0.9–1.3)
CREAT SERPL-MCNC: 1.2 MG/DL (ref 0.9–1.3)
CREAT SERPL-MCNC: 1.3 MG/DL (ref 0.9–1.3)
EOSINOPHILS ABSOLUTE: 0.1 K/UL (ref 0–0.6)
EOSINOPHILS RELATIVE PERCENT: 0.9 %
GFR AFRICAN AMERICAN: >60
GFR NON-AFRICAN AMERICAN: 56
GFR NON-AFRICAN AMERICAN: >60
GLUCOSE BLD-MCNC: 171 MG/DL (ref 70–99)
GLUCOSE BLD-MCNC: 182 MG/DL (ref 70–99)
GLUCOSE BLD-MCNC: 183 MG/DL (ref 70–99)
GLUCOSE BLD-MCNC: 186 MG/DL (ref 70–99)
GLUCOSE BLD-MCNC: 192 MG/DL (ref 70–99)
GLUCOSE BLD-MCNC: 202 MG/DL (ref 70–99)
GLUCOSE BLD-MCNC: 204 MG/DL (ref 70–99)
GLUCOSE BLD-MCNC: 208 MG/DL (ref 70–99)
GLUCOSE BLD-MCNC: 209 MG/DL (ref 70–99)
HCO3 ARTERIAL: 22.2 MMOL/L (ref 21–29)
HCT VFR BLD CALC: 30.7 % (ref 40.5–52.5)
HEMOGLOBIN, ART, EXTENDED: 10.1 G/DL (ref 13.5–17.5)
HEMOGLOBIN: 10.3 G/DL (ref 13.5–17.5)
LACTIC ACID: 1.7 MMOL/L (ref 0.4–2)
LACTIC ACID: 2.3 MMOL/L (ref 0.4–2)
LACTIC ACID: 2.5 MMOL/L (ref 0.4–2)
LACTIC ACID: 2.6 MMOL/L (ref 0.4–2)
LYMPHOCYTES ABSOLUTE: 0.8 K/UL (ref 1–5.1)
LYMPHOCYTES RELATIVE PERCENT: 4.7 %
MAGNESIUM: 2.5 MG/DL (ref 1.8–2.4)
MAGNESIUM: 2.6 MG/DL (ref 1.8–2.4)
MCH RBC QN AUTO: 29 PG (ref 26–34)
MCHC RBC AUTO-ENTMCNC: 33.7 G/DL (ref 31–36)
MCV RBC AUTO: 86.1 FL (ref 80–100)
METHEMOGLOBIN ARTERIAL: 1.7 %
MONOCYTES ABSOLUTE: 0.8 K/UL (ref 0–1.3)
MONOCYTES RELATIVE PERCENT: 4.8 %
NEUTROPHILS ABSOLUTE: 14.8 K/UL (ref 1.7–7.7)
NEUTROPHILS RELATIVE PERCENT: 89.3 %
O2 CONTENT ARTERIAL: 13 ML/DL
O2 SAT, ARTERIAL: 95.3 %
O2 THERAPY: ABNORMAL
PCO2 ARTERIAL: 38.7 MMHG (ref 35–45)
PDW BLD-RTO: 16.4 % (ref 12.4–15.4)
PERFORMED ON: ABNORMAL
PH ARTERIAL: 7.38 (ref 7.35–7.45)
PH VENOUS: 7.38 (ref 7.35–7.45)
PH VENOUS: 7.44 (ref 7.35–7.45)
PHOSPHORUS: 1.8 MG/DL (ref 2.5–4.9)
PHOSPHORUS: 2.1 MG/DL (ref 2.5–4.9)
PHOSPHORUS: 2.3 MG/DL (ref 2.5–4.9)
PHOSPHORUS: 2.6 MG/DL (ref 2.5–4.9)
PLATELET # BLD: 164 K/UL (ref 135–450)
PMV BLD AUTO: 9 FL (ref 5–10.5)
PO2 ARTERIAL: 77.1 MMHG (ref 75–108)
POTASSIUM SERPL-SCNC: 4.5 MMOL/L (ref 3.5–5.1)
POTASSIUM SERPL-SCNC: 4.6 MMOL/L (ref 3.5–5.1)
POTASSIUM SERPL-SCNC: 4.7 MMOL/L (ref 3.5–5.1)
POTASSIUM SERPL-SCNC: 4.9 MMOL/L (ref 3.5–5.1)
RBC # BLD: 3.56 M/UL (ref 4.2–5.9)
SODIUM BLD-SCNC: 131 MMOL/L (ref 136–145)
SODIUM BLD-SCNC: 132 MMOL/L (ref 136–145)
SODIUM BLD-SCNC: 132 MMOL/L (ref 136–145)
SODIUM BLD-SCNC: 135 MMOL/L (ref 136–145)
TCO2 ARTERIAL: 23.4 MMOL/L
TOTAL CK: 116 U/L (ref 39–308)
TOTAL PROTEIN: 6.7 G/DL (ref 6.4–8.2)
TRIGL SERPL-MCNC: 770 MG/DL (ref 0–150)
WBC # BLD: 16.6 K/UL (ref 4–11)

## 2019-06-26 PROCEDURE — 99233 SBSQ HOSP IP/OBS HIGH 50: CPT | Performed by: INTERNAL MEDICINE

## 2019-06-26 PROCEDURE — 94750 HC PULMONARY COMPLIANCE STUDY: CPT

## 2019-06-26 PROCEDURE — APPNB15 APP NON BILLABLE TIME 0-15 MINS: Performed by: NURSE PRACTITIONER

## 2019-06-26 PROCEDURE — 90945 DIALYSIS ONE EVALUATION: CPT

## 2019-06-26 PROCEDURE — 2580000003 HC RX 258: Performed by: INTERNAL MEDICINE

## 2019-06-26 PROCEDURE — 94003 VENT MGMT INPAT SUBQ DAY: CPT

## 2019-06-26 PROCEDURE — 80069 RENAL FUNCTION PANEL: CPT

## 2019-06-26 PROCEDURE — 80076 HEPATIC FUNCTION PANEL: CPT

## 2019-06-26 PROCEDURE — 2580000003 HC RX 258: Performed by: PHARMACIST

## 2019-06-26 PROCEDURE — 37799 UNLISTED PX VASCULAR SURGERY: CPT

## 2019-06-26 PROCEDURE — 2580000003 HC RX 258: Performed by: NURSE PRACTITIONER

## 2019-06-26 PROCEDURE — 6360000002 HC RX W HCPCS: Performed by: PHARMACIST

## 2019-06-26 PROCEDURE — 99291 CRITICAL CARE FIRST HOUR: CPT | Performed by: INTERNAL MEDICINE

## 2019-06-26 PROCEDURE — 82330 ASSAY OF CALCIUM: CPT

## 2019-06-26 PROCEDURE — 83605 ASSAY OF LACTIC ACID: CPT

## 2019-06-26 PROCEDURE — 6360000002 HC RX W HCPCS: Performed by: INTERNAL MEDICINE

## 2019-06-26 PROCEDURE — 76705 ECHO EXAM OF ABDOMEN: CPT

## 2019-06-26 PROCEDURE — 94760 N-INVAS EAR/PLS OXIMETRY 1: CPT

## 2019-06-26 PROCEDURE — 6370000000 HC RX 637 (ALT 250 FOR IP): Performed by: INTERNAL MEDICINE

## 2019-06-26 PROCEDURE — 6370000000 HC RX 637 (ALT 250 FOR IP): Performed by: NURSE PRACTITIONER

## 2019-06-26 PROCEDURE — 84478 ASSAY OF TRIGLYCERIDES: CPT

## 2019-06-26 PROCEDURE — 2700000000 HC OXYGEN THERAPY PER DAY

## 2019-06-26 PROCEDURE — 2000000000 HC ICU R&B

## 2019-06-26 PROCEDURE — 94640 AIRWAY INHALATION TREATMENT: CPT

## 2019-06-26 PROCEDURE — 82550 ASSAY OF CK (CPK): CPT

## 2019-06-26 PROCEDURE — 82803 BLOOD GASES ANY COMBINATION: CPT

## 2019-06-26 PROCEDURE — 6360000002 HC RX W HCPCS: Performed by: NURSE PRACTITIONER

## 2019-06-26 PROCEDURE — 2500000003 HC RX 250 WO HCPCS: Performed by: INTERNAL MEDICINE

## 2019-06-26 PROCEDURE — 83735 ASSAY OF MAGNESIUM: CPT

## 2019-06-26 PROCEDURE — 85025 COMPLETE CBC W/AUTO DIFF WBC: CPT

## 2019-06-26 PROCEDURE — 36592 COLLECT BLOOD FROM PICC: CPT

## 2019-06-26 PROCEDURE — 2500000003 HC RX 250 WO HCPCS: Performed by: NURSE PRACTITIONER

## 2019-06-26 RX ORDER — METOPROLOL TARTRATE 5 MG/5ML
5 INJECTION INTRAVENOUS EVERY 4 HOURS PRN
Status: DISCONTINUED | OUTPATIENT
Start: 2019-06-26 | End: 2019-06-28

## 2019-06-26 RX ORDER — INSULIN GLARGINE 100 [IU]/ML
10 INJECTION, SOLUTION SUBCUTANEOUS NIGHTLY
Status: DISCONTINUED | OUTPATIENT
Start: 2019-06-26 | End: 2019-07-23 | Stop reason: HOSPADM

## 2019-06-26 RX ORDER — DIGOXIN 0.25 MG/ML
250 INJECTION INTRAMUSCULAR; INTRAVENOUS ONCE
Status: COMPLETED | OUTPATIENT
Start: 2019-06-26 | End: 2019-06-26

## 2019-06-26 RX ORDER — GABAPENTIN 250 MG/5ML
50 SOLUTION ORAL EVERY 8 HOURS SCHEDULED
Status: DISCONTINUED | OUTPATIENT
Start: 2019-06-26 | End: 2019-07-13 | Stop reason: CLARIF

## 2019-06-26 RX ORDER — DIGOXIN 0.25 MG/ML
500 INJECTION INTRAMUSCULAR; INTRAVENOUS ONCE
Status: DISCONTINUED | OUTPATIENT
Start: 2019-06-26 | End: 2019-06-26

## 2019-06-26 RX ADMIN — MEROPENEM 500 MG: 500 INJECTION INTRAVENOUS at 15:56

## 2019-06-26 RX ADMIN — Medication 10 ML: at 21:15

## 2019-06-26 RX ADMIN — Medication 2 CAPSULE: at 18:12

## 2019-06-26 RX ADMIN — IPRATROPIUM BROMIDE AND ALBUTEROL SULFATE 1 AMPULE: .5; 3 SOLUTION RESPIRATORY (INHALATION) at 03:51

## 2019-06-26 RX ADMIN — IPRATROPIUM BROMIDE AND ALBUTEROL SULFATE 1 AMPULE: .5; 3 SOLUTION RESPIRATORY (INHALATION) at 20:56

## 2019-06-26 RX ADMIN — PROPOFOL 35 MCG/KG/MIN: 10 INJECTION, EMULSION INTRAVENOUS at 05:45

## 2019-06-26 RX ADMIN — Medication 1000 ML/HR: at 04:40

## 2019-06-26 RX ADMIN — INSULIN LISPRO 3 UNITS: 100 INJECTION, SOLUTION INTRAVENOUS; SUBCUTANEOUS at 00:13

## 2019-06-26 RX ADMIN — MEROPENEM 500 MG: 500 INJECTION INTRAVENOUS at 08:57

## 2019-06-26 RX ADMIN — IPRATROPIUM BROMIDE AND ALBUTEROL SULFATE 1 AMPULE: .5; 3 SOLUTION RESPIRATORY (INHALATION) at 11:42

## 2019-06-26 RX ADMIN — CALCIUM GLUCONATE 1 G: 98 INJECTION, SOLUTION INTRAVENOUS at 08:46

## 2019-06-26 RX ADMIN — INSULIN LISPRO 6 UNITS: 100 INJECTION, SOLUTION INTRAVENOUS; SUBCUTANEOUS at 12:48

## 2019-06-26 RX ADMIN — VASOPRESSIN 0.04 UNITS/MIN: 20 INJECTION INTRAVENOUS at 03:30

## 2019-06-26 RX ADMIN — INSULIN LISPRO 3 UNITS: 100 INJECTION, SOLUTION INTRAVENOUS; SUBCUTANEOUS at 20:27

## 2019-06-26 RX ADMIN — Medication 125 MCG/HR: at 12:03

## 2019-06-26 RX ADMIN — VASOPRESSIN 0.04 UNITS/MIN: 20 INJECTION INTRAVENOUS at 10:21

## 2019-06-26 RX ADMIN — METOPROLOL TARTRATE 5 MG: 5 INJECTION INTRAVENOUS at 05:59

## 2019-06-26 RX ADMIN — Medication 1000 ML/HR: at 20:21

## 2019-06-26 RX ADMIN — MOMETASONE FUROATE AND FORMOTEROL FUMARATE DIHYDRATE 2 PUFF: 100; 5 AEROSOL RESPIRATORY (INHALATION) at 07:52

## 2019-06-26 RX ADMIN — Medication 75 MCG/HR: at 21:56

## 2019-06-26 RX ADMIN — CALCIUM GLUCONATE 1 G: 98 INJECTION, SOLUTION INTRAVENOUS at 19:47

## 2019-06-26 RX ADMIN — INSULIN GLARGINE 10 UNITS: 100 INJECTION, SOLUTION SUBCUTANEOUS at 20:27

## 2019-06-26 RX ADMIN — Medication 1000 ML/HR: at 14:41

## 2019-06-26 RX ADMIN — Medication 1000 ML/HR: at 09:34

## 2019-06-26 RX ADMIN — Medication 100 MCG/HR: at 08:44

## 2019-06-26 RX ADMIN — Medication 2 CAPSULE: at 08:57

## 2019-06-26 RX ADMIN — INSULIN LISPRO 3 UNITS: 100 INJECTION, SOLUTION INTRAVENOUS; SUBCUTANEOUS at 08:54

## 2019-06-26 RX ADMIN — MEROPENEM 500 MG: 500 INJECTION INTRAVENOUS at 03:30

## 2019-06-26 RX ADMIN — SODIUM CHLORIDE 0.4 MCG/KG/HR: 9 INJECTION, SOLUTION INTRAVENOUS at 21:02

## 2019-06-26 RX ADMIN — Medication 100 MCG/HR: at 16:05

## 2019-06-26 RX ADMIN — INSULIN LISPRO 3 UNITS: 100 INJECTION, SOLUTION INTRAVENOUS; SUBCUTANEOUS at 17:36

## 2019-06-26 RX ADMIN — FAMOTIDINE 20 MG: 10 INJECTION, SOLUTION INTRAVENOUS at 08:57

## 2019-06-26 RX ADMIN — MEROPENEM 500 MG: 500 INJECTION INTRAVENOUS at 21:14

## 2019-06-26 RX ADMIN — METOPROLOL TARTRATE 5 MG: 5 INJECTION INTRAVENOUS at 03:31

## 2019-06-26 RX ADMIN — IPRATROPIUM BROMIDE AND ALBUTEROL SULFATE 1 AMPULE: .5; 3 SOLUTION RESPIRATORY (INHALATION) at 07:52

## 2019-06-26 RX ADMIN — IPRATROPIUM BROMIDE AND ALBUTEROL SULFATE 1 AMPULE: .5; 3 SOLUTION RESPIRATORY (INHALATION) at 00:23

## 2019-06-26 RX ADMIN — Medication 50 MG: at 16:00

## 2019-06-26 RX ADMIN — CHLORHEXIDINE GLUCONATE 0.12% ORAL RINSE 15 ML: 1.2 LIQUID ORAL at 21:14

## 2019-06-26 RX ADMIN — CHLORHEXIDINE GLUCONATE 0.12% ORAL RINSE 15 ML: 1.2 LIQUID ORAL at 08:57

## 2019-06-26 RX ADMIN — SODIUM PHOSPHATE, MONOBASIC, MONOHYDRATE 6 MMOL: 276; 142 INJECTION, SOLUTION INTRAVENOUS at 09:45

## 2019-06-26 RX ADMIN — METOPROLOL TARTRATE 5 MG: 5 INJECTION INTRAVENOUS at 19:18

## 2019-06-26 RX ADMIN — MOMETASONE FUROATE AND FORMOTEROL FUMARATE DIHYDRATE 2 PUFF: 100; 5 AEROSOL RESPIRATORY (INHALATION) at 20:56

## 2019-06-26 RX ADMIN — IPRATROPIUM BROMIDE AND ALBUTEROL SULFATE 1 AMPULE: .5; 3 SOLUTION RESPIRATORY (INHALATION) at 17:16

## 2019-06-26 RX ADMIN — METOPROLOL TARTRATE 5 MG: 5 INJECTION INTRAVENOUS at 11:07

## 2019-06-26 RX ADMIN — Medication 100 MCG/HR: at 03:44

## 2019-06-26 RX ADMIN — SODIUM PHOSPHATE, MONOBASIC, MONOHYDRATE 6 MMOL: 276; 142 INJECTION, SOLUTION INTRAVENOUS at 02:14

## 2019-06-26 RX ADMIN — METOPROLOL TARTRATE 5 MG: 5 INJECTION INTRAVENOUS at 08:45

## 2019-06-26 RX ADMIN — INSULIN LISPRO 3 UNITS: 100 INJECTION, SOLUTION INTRAVENOUS; SUBCUTANEOUS at 04:55

## 2019-06-26 RX ADMIN — DIGOXIN 250 MCG: 0.25 INJECTION INTRAMUSCULAR; INTRAVENOUS at 12:15

## 2019-06-26 RX ADMIN — Medication 10 ML: at 08:16

## 2019-06-26 RX ADMIN — SODIUM CHLORIDE 0.2 MCG/KG/HR: 9 INJECTION, SOLUTION INTRAVENOUS at 11:41

## 2019-06-26 RX ADMIN — Medication 50 MG: at 21:15

## 2019-06-26 RX ADMIN — PROPOFOL 15 MCG/KG/MIN: 10 INJECTION, EMULSION INTRAVENOUS at 09:49

## 2019-06-26 ASSESSMENT — PULMONARY FUNCTION TESTS
PIF_VALUE: 33
PIF_VALUE: 31
PIF_VALUE: 41
PIF_VALUE: 30
PIF_VALUE: 33
PIF_VALUE: 34
PIF_VALUE: 31
PIF_VALUE: 30
PIF_VALUE: 28
PIF_VALUE: 32
PIF_VALUE: 31
PIF_VALUE: 32
PIF_VALUE: 30
PIF_VALUE: 31
PIF_VALUE: 28
PIF_VALUE: 30
PIF_VALUE: 31
PIF_VALUE: 28
PIF_VALUE: 30
PIF_VALUE: 34
PIF_VALUE: 30
PIF_VALUE: 31
PIF_VALUE: 32
PIF_VALUE: 43
PIF_VALUE: 28
PIF_VALUE: 34
PIF_VALUE: 33
PIF_VALUE: 38
PIF_VALUE: 35
PIF_VALUE: 30
PIF_VALUE: 35
PIF_VALUE: 29
PIF_VALUE: 31
PIF_VALUE: 29
PIF_VALUE: 29
PIF_VALUE: 40
PIF_VALUE: 30
PIF_VALUE: 31
PIF_VALUE: 36
PIF_VALUE: 30
PIF_VALUE: 33
PIF_VALUE: 37
PIF_VALUE: 34
PIF_VALUE: 31
PIF_VALUE: 29
PIF_VALUE: 29
PIF_VALUE: 30

## 2019-06-26 ASSESSMENT — PAIN SCALES - GENERAL
PAINLEVEL_OUTOF10: 0
PAINLEVEL_OUTOF10: 0

## 2019-06-26 NOTE — PROGRESS NOTES
vasopressin (Septic Shock) infusion 0.03 Units/min (06/26/19 1029)    dextrose      fentaNYL 150 mcg/hr (06/26/19 0910)    prismaSATE BGK 4/2.5 1,000 mL/hr (06/26/19 0934)    prismaSATE BGK 4/2.5 1,000 mL/hr (06/26/19 0934)    prismaSATE BGK 4/2.5 1,000 mL/hr (06/26/19 0934)    norepinephrine Stopped (06/25/19 0715)       PRN Meds:  metoprolol, iopamidol, albuterol, sodium chloride flush, ondansetron, acetaminophen, glucose, dextrose, glucagon (rDNA), dextrose, fentanNYL, potassium chloride, magnesium sulfate, sodium phosphate IVPB **OR** sodium phosphate IVPB **OR** sodium phosphate IVPB **OR** sodium phosphate IVPB, calcium gluconate IVPB **OR** calcium gluconate IVPB **OR** calcium gluconate IVPB **OR** calcium gluconate IVPB    Labs reviewed:  CBC:   Recent Labs     06/24/19  0415 06/25/19  0540 06/26/19  0553   WBC 21.2* 20.1* 16.6*   HGB 10.3* 9.5* 10.3*   HCT 32.2* 29.5* 30.7*   MCV 86.4 86.2 86.1    170 164     BMP:   Recent Labs     06/25/19  1810 06/25/19  2354 06/26/19  0553   * 131* 132*   K 4.3 4.6 4.7   CL 95* 96* 95*   CO2 21 22 20*   PHOS 2.5 2.1* 2.3*   BUN 16 16 15   CREATININE 1.3 1.3 1.1     LIVER PROFILE:   Recent Labs     06/24/19  0415 06/25/19  0540 06/26/19  0553   AST >7,000* 3,364* 933*   ALT 4,334* 3,518* 2,168*   BILIDIR 0.6* 1.0* 1.2*   BILITOT 1.1* 1.3* 1.6*   ALKPHOS 103 125 152*     PT/INR:   Recent Labs     06/23/19  1730 06/24/19  0415   PROTIME 86.2* 53.6*   INR 7.56* 4.70*     APTT:   Recent Labs     06/23/19  1200   APTT 36.2*     UA:  No results for input(s): NITRITE, COLORU, PHUR, LABCAST, WBCUA, RBCUA, MUCUS, TRICHOMONAS, YEAST, BACTERIA, CLARITYU, SPECGRAV, LEUKOCYTESUR, UROBILINOGEN, BILIRUBINUR, BLOODU, GLUCOSEU, AMORPHOUS in the last 72 hours. Invalid input(s): Derryl Monk  No results for input(s): PH, PCO2, PO2 in the last 72 hours. Films:  Radiology Review:  Pertinent images / reports were reviewed as a part of this visit.     CT Chest w/

## 2019-06-26 NOTE — PROGRESS NOTES
4 Eyes Skin Assessment     The patient is being assess for  Shift Handoff    I agree that 2 RN's have performed a thorough Head to Toe Skin Assessment on the patient. ALL assessment sites listed below have been assessed. Areas assessed by both nurses: Harlan Lennon and Mary  [x]   Head, Face, and Ears   [x]   Shoulders, Back, and Chest  [x]   Arms, Elbows, and Hands   [x]   Coccyx, Sacrum, and IschIum  [x]   Legs, Feet, and Heels        Does the Patient have Skin Breakdown?   No         Carlos Alberto Prevention initiated:  Yes   Wound Care Orders initiated:  No      [facility] nurse consulted for Pressure Injury (Stage 3,4, Unstageable, DTI, NWPT, and Complex wounds), New and Established Ostomies:  No      Nurse 1 eSignature: Electronically signed by Zeb Hicks RN on 6/26/19 at 4:30 PM     **SHARE this note so that the co-signing nurse is able to place an eSignature**    Nurse 2 eSignature: Electronically signed by Mami Blackwell RN on 6/26/19 at 8:09 PM

## 2019-06-26 NOTE — PLAN OF CARE
Problem: Restraint Use - Nonviolent/Non-Self-Destructive Behavior:  Goal: Absence of restraint indications  Description  Absence of restraint indications  Outcome: Ongoing    Continued use of restraints necessary. Pt reaches for ETT with arousal or release. Documentation Q1 hour. Order up to date for 24 hours. Problem: Risk for Impaired Skin Integrity  Goal: Tissue integrity - skin and mucous membranes  Description  Structural intactness and normal physiological function of skin and  mucous membranes. Outcome: Ongoing  Skin assessed per protocol. No new open or red areas appreciated. Assisted with repositioning every two hours to prevent pressure related skin breakdown. Heel elevated off bed. Will monitor. Problem: Falls - Risk of:  Goal: Will remain free from falls  Description  Will remain free from falls  Outcome: Ongoing    No Bed alarm in use, clinical decision. Pt on vent, sedated and restrained. Sound com fall risk light on. Hourly rounding. Problem: HEMODYNAMIC STATUS  Goal: Patient has stable vital signs and fluid balance  Outcome: Ongoing  Patient on cardiac telemetry. Atrial fibrillation on telemetry. Patient on vasopressin to support blood pressure. Patient with Arterial line in right radial artery. Will monitor. Problem: OXYGENATION/RESPIRATORY FUNCTION  Goal: Patient will maintain patent airway  Outcome: Ongoing    Pt on vent. VAP preventions in place. Lungs wit fine crackles bilateral.  HOB always > 30 degrees except at times of repositioning. Oral care Q2H and PRN. BID Peridex Chlorhexidine ordered and used. Suction ETT Qshift and PRN. RT to assess pt daily for possible wean from vent.

## 2019-06-26 NOTE — PROGRESS NOTES
BGK 4/2.5 1,000 mL/hr (06/26/19 0934)    prismaSATE BGK 4/2.5 1,000 mL/hr (06/26/19 0934)    prismaSATE BGK 4/2.5 1,000 mL/hr (06/26/19 0934)    norepinephrine Stopped (06/25/19 0715)       Lab Data:  CBC:   Recent Labs     06/24/19  0415 06/25/19  0540 06/26/19  0553   WBC 21.2* 20.1* 16.6*   HGB 10.3* 9.5* 10.3*    170 164     BMP:    Recent Labs     06/25/19  1810 06/25/19  2354 06/26/19  0553   * 131* 132*   K 4.3 4.6 4.7   CO2 21 22 20*   BUN 16 16 15   CREATININE 1.3 1.3 1.1     LIVR:   Recent Labs     06/24/19  0415 06/25/19  0540 06/26/19  0553   AST >7,000* 3,364* 933*   ALT 4,334* 3,518* 2,168*     PT/INR:   Recent Labs     06/23/19  1730 06/24/19  0415 06/25/19  0540 06/26/19  0553   PROT  --  6.4 6.0* 6.7   INR 7.56* 4.70*  --   --      APTT:   Recent Labs     06/23/19  1200   APTT 36.2*     Lab Results   Component Value Date    CHOL 126 03/29/2019    HDL 27 03/29/2019    TRIG 770 06/26/2019       Assessment:  1. Atrial fibrillation with RVR  2. Acute on chronic Systolic CHF  3. Acute Kidney Injury  4. Alcohol Dependence    Plan:   Johnie's heart rate is quite elevated but I feel this may be more physiologic than a primary cardiac issue. He is agitated at times and may indeed be withdrawing from alcohol. I would err on the side of sedation and perhaps consider switching agents from Propofol. His abdomen is quite tense and he could have peritonitis with his presumed ascites. He could have an additional dose of IV digoxin now one time and continue the IV Lopressor as ordered. I think that his heart rate will be difficult to control. If his blood pressure is stable we could institute oral beta blockade and titrate this up further.            Signed:  Pito Oven, MD

## 2019-06-26 NOTE — PROGRESS NOTES
specified injury of inferior mesenteric vein, initial encounter    Postprocedural hypotension    Acute respiratory failure with hypercapnia (HCC)    High anion gap metabolic acidosis    Centrilobular emphysema (HCC)    Hypomagnesemia    Heart failure, acute on chronic, systolic and diastolic (HCC)    Persistent atrial fibrillation (HCC)    Anemia    DMII (diabetes mellitus, type 2) (HCC)    Constipation    Hypokalemia    Acute on chronic systolic heart failure (HCC)    Atrial fibrillation, rapid (HCC)    COPD with acute exacerbation (HCC)    Cocaine use    Severe sepsis (HCC)    Atrial fibrillation with RVR (HCC)    Hyponatremia    Acute on chronic respiratory failure with hypoxia (HCC)    Respiratory distress    CHF, left ventricular (HCC)    Nicotine dependence    Suspected sleep apnea    Coronary artery disease involving native coronary artery of native heart without angina pectoris    CAD (coronary artery disease)    Acute renal failure (ARF) (HCC)    Morbid obesity due to excess calories (HCC)    Acute respiratory failure with hypoxia (HCC)    Nocturnal hypoxia    Hypercapnemia    SOB (shortness of breath)    Acute on chronic respiratory failure with hypercapnia (HCC)    Chronic respiratory failure with hypercapnia (HCC)    Acute on chronic systolic (congestive) heart failure (HCC)    Hx of AKA (above knee amputation), left (HCC)    Respiratory failure (HCC)    Pneumonia    Ventricular tachycardia (HCC)    Shock circulatory (HCC)    Tachypnea    Neutrophilic leukocytosis    Chronic respiratory failure with hypoxia (HCC)    Cardiac arrest (HCC)         Presenting symptoms:  shortness of breath        Diagnostic workup:      CONSULTS DURING ADMISSION :   IP CONSULT TO CRITICAL CARE  IP CONSULT TO NEPHROLOGY  IP CONSULT TO NEPHROLOGY  IP CONSULT TO CARDIOLOGY  IP CONSULT TO PALLIATIVE CARE      Patient was diagnosed with:  Septic shock  Acute respiratory failure with the Allergies  Rocephin [ceftriaxone]    Medications    Scheduled Meds:   insulin glargine  10 Units Subcutaneous Nightly    insulin lispro  0-18 Units Subcutaneous Q4H    metoprolol  5 mg Intravenous 4 times per day    lactobacillus  2 capsule Oral BID WC    mometasone-formoterol  2 puff Inhalation BID    sodium chloride flush  10 mL Intravenous 2 times per day    ipratropium-albuterol  1 ampule Inhalation Q4H    chlorhexidine  15 mL Mouth/Throat BID    famotidine (PEPCID) injection  20 mg Intravenous Daily    meropenem  500 mg Intravenous Q6H     Continuous Infusions:   propofol 15 mcg/kg/min (06/26/19 0949)    vasopressin (Septic Shock) infusion 0.04 Units/min (06/26/19 1021)    dextrose      fentaNYL 150 mcg/hr (06/26/19 0910)    prismaSATE BGK 4/2.5 1,000 mL/hr (06/26/19 0934)    prismaSATE BGK 4/2.5 1,000 mL/hr (06/26/19 0934)    prismaSATE BGK 4/2.5 1,000 mL/hr (06/26/19 0934)    norepinephrine Stopped (06/25/19 0715)     PRN Meds:  metoprolol, iopamidol, albuterol, sodium chloride flush, ondansetron, acetaminophen, glucose, dextrose, glucagon (rDNA), dextrose, fentanNYL, potassium chloride, magnesium sulfate, sodium phosphate IVPB **OR** sodium phosphate IVPB **OR** sodium phosphate IVPB **OR** sodium phosphate IVPB, calcium gluconate IVPB **OR** calcium gluconate IVPB **OR** calcium gluconate IVPB **OR** calcium gluconate IVPB    Vitals   Vitals /wt   Patient Vitals for the past 8 hrs:   BP Temp Temp src Pulse Resp SpO2 Weight   06/26/19 1001 96/62 -- -- 139 28 -- --   06/26/19 1000 106/61 -- -- 133 28 -- --   06/26/19 0926 (!) 103/36 -- -- 135 28 -- --   06/26/19 0900 (!) 88/56 -- -- 130 28 97 % --   06/26/19 0800 99/61 -- -- 131 28 97 % --   06/26/19 0757 97/63 98.4 °F (36.9 °C) Axillary 139 28 97 % --   06/26/19 0756 -- -- -- -- 25 -- --   06/26/19 0755 -- -- -- -- 28 -- --   06/26/19 0752 -- -- -- 119 25 -- --   06/26/19 0700 -- -- -- 115 28 -- --   06/26/19 0600 -- -- -- 139 28 --

## 2019-06-27 ENCOUNTER — APPOINTMENT (OUTPATIENT)
Dept: GENERAL RADIOLOGY | Age: 59
DRG: 720 | End: 2019-06-27
Payer: COMMERCIAL

## 2019-06-27 LAB
ALBUMIN SERPL-MCNC: 2.6 G/DL (ref 3.4–5)
ALBUMIN SERPL-MCNC: 2.8 G/DL (ref 3.4–5)
ALBUMIN SERPL-MCNC: 2.8 G/DL (ref 3.4–5)
ALBUMIN SERPL-MCNC: 3 G/DL (ref 3.4–5)
ALP BLD-CCNC: 122 U/L (ref 40–129)
ALT SERPL-CCNC: 1506 U/L (ref 10–40)
ANION GAP SERPL CALCULATED.3IONS-SCNC: 10 MMOL/L (ref 3–16)
ANION GAP SERPL CALCULATED.3IONS-SCNC: 11 MMOL/L (ref 3–16)
ANION GAP SERPL CALCULATED.3IONS-SCNC: 13 MMOL/L (ref 3–16)
ANION GAP SERPL CALCULATED.3IONS-SCNC: 14 MMOL/L (ref 3–16)
AST SERPL-CCNC: 360 U/L (ref 15–37)
BASE EXCESS ARTERIAL: 0.1 MMOL/L (ref -3–3)
BASOPHILS ABSOLUTE: 0 K/UL (ref 0–0.2)
BASOPHILS RELATIVE PERCENT: 0.3 %
BILIRUB SERPL-MCNC: 1.5 MG/DL (ref 0–1)
BILIRUBIN DIRECT: 1.1 MG/DL (ref 0–0.3)
BILIRUBIN, INDIRECT: 0.4 MG/DL (ref 0–1)
BUN BLDV-MCNC: 13 MG/DL (ref 7–20)
BUN BLDV-MCNC: 14 MG/DL (ref 7–20)
BUN BLDV-MCNC: 14 MG/DL (ref 7–20)
BUN BLDV-MCNC: 15 MG/DL (ref 7–20)
CALCIUM IONIZED: 1.01 MMOL/L (ref 1.12–1.32)
CALCIUM IONIZED: 1.05 MMOL/L (ref 1.12–1.32)
CALCIUM IONIZED: 1.07 MMOL/L (ref 1.12–1.32)
CALCIUM SERPL-MCNC: 7.4 MG/DL (ref 8.3–10.6)
CALCIUM SERPL-MCNC: 7.7 MG/DL (ref 8.3–10.6)
CALCIUM SERPL-MCNC: 7.8 MG/DL (ref 8.3–10.6)
CALCIUM SERPL-MCNC: 7.9 MG/DL (ref 8.3–10.6)
CARBOXYHEMOGLOBIN ARTERIAL: 1.5 % (ref 0–1.5)
CHLORIDE BLD-SCNC: 100 MMOL/L (ref 99–110)
CHLORIDE BLD-SCNC: 101 MMOL/L (ref 99–110)
CHLORIDE BLD-SCNC: 101 MMOL/L (ref 99–110)
CHLORIDE BLD-SCNC: 103 MMOL/L (ref 99–110)
CO2: 22 MMOL/L (ref 21–32)
CO2: 22 MMOL/L (ref 21–32)
CO2: 23 MMOL/L (ref 21–32)
CO2: 24 MMOL/L (ref 21–32)
CREAT SERPL-MCNC: 1 MG/DL (ref 0.9–1.3)
CREAT SERPL-MCNC: 1.1 MG/DL (ref 0.9–1.3)
CREAT SERPL-MCNC: 1.1 MG/DL (ref 0.9–1.3)
CREAT SERPL-MCNC: 1.2 MG/DL (ref 0.9–1.3)
EOSINOPHILS ABSOLUTE: 0 K/UL (ref 0–0.6)
EOSINOPHILS RELATIVE PERCENT: 0.3 %
GFR AFRICAN AMERICAN: >60
GFR NON-AFRICAN AMERICAN: >60
GLUCOSE BLD-MCNC: 134 MG/DL (ref 70–99)
GLUCOSE BLD-MCNC: 140 MG/DL (ref 70–99)
GLUCOSE BLD-MCNC: 145 MG/DL (ref 70–99)
GLUCOSE BLD-MCNC: 146 MG/DL (ref 70–99)
GLUCOSE BLD-MCNC: 156 MG/DL (ref 70–99)
GLUCOSE BLD-MCNC: 159 MG/DL (ref 70–99)
GLUCOSE BLD-MCNC: 165 MG/DL (ref 70–99)
GLUCOSE BLD-MCNC: 168 MG/DL (ref 70–99)
GLUCOSE BLD-MCNC: 170 MG/DL (ref 70–99)
GLUCOSE BLD-MCNC: 176 MG/DL (ref 70–99)
GLUCOSE BLD-MCNC: 177 MG/DL (ref 70–99)
HCO3 ARTERIAL: 23.1 MMOL/L (ref 21–29)
HCT VFR BLD CALC: 26.5 % (ref 40.5–52.5)
HEMOGLOBIN, ART, EXTENDED: 8.6 G/DL (ref 13.5–17.5)
HEMOGLOBIN: 8.5 G/DL (ref 13.5–17.5)
INR BLD: 1.5 (ref 0.86–1.14)
LACTIC ACID: 1.2 MMOL/L (ref 0.4–2)
LACTIC ACID: 1.4 MMOL/L (ref 0.4–2)
LYMPHOCYTES ABSOLUTE: 0.8 K/UL (ref 1–5.1)
LYMPHOCYTES RELATIVE PERCENT: 8 %
MAGNESIUM: 2.5 MG/DL (ref 1.8–2.4)
MCH RBC QN AUTO: 27.2 PG (ref 26–34)
MCHC RBC AUTO-ENTMCNC: 32.1 G/DL (ref 31–36)
MCV RBC AUTO: 84.7 FL (ref 80–100)
METHEMOGLOBIN ARTERIAL: 0.9 %
MONOCYTES ABSOLUTE: 0.7 K/UL (ref 0–1.3)
MONOCYTES RELATIVE PERCENT: 7.2 %
NEUTROPHILS ABSOLUTE: 8.7 K/UL (ref 1.7–7.7)
NEUTROPHILS RELATIVE PERCENT: 84.2 %
O2 CONTENT ARTERIAL: 12 ML/DL
O2 SAT, ARTERIAL: 97.2 %
O2 THERAPY: ABNORMAL
PCO2 ARTERIAL: 32.3 MMHG (ref 35–45)
PDW BLD-RTO: 16.7 % (ref 12.4–15.4)
PERFORMED ON: ABNORMAL
PH ARTERIAL: 7.47 (ref 7.35–7.45)
PH VENOUS: 7.41 (ref 7.35–7.45)
PH VENOUS: 7.46 (ref 7.35–7.45)
PH VENOUS: 7.47 (ref 7.35–7.45)
PHOSPHORUS: 1.7 MG/DL (ref 2.5–4.9)
PHOSPHORUS: 1.9 MG/DL (ref 2.5–4.9)
PHOSPHORUS: 1.9 MG/DL (ref 2.5–4.9)
PHOSPHORUS: 2.5 MG/DL (ref 2.5–4.9)
PLATELET # BLD: 122 K/UL (ref 135–450)
PMV BLD AUTO: 8.9 FL (ref 5–10.5)
PO2 ARTERIAL: 79.4 MMHG (ref 75–108)
POTASSIUM SERPL-SCNC: 4 MMOL/L (ref 3.5–5.1)
POTASSIUM SERPL-SCNC: 4 MMOL/L (ref 3.5–5.1)
POTASSIUM SERPL-SCNC: 4.2 MMOL/L (ref 3.5–5.1)
POTASSIUM SERPL-SCNC: 4.3 MMOL/L (ref 3.5–5.1)
PROTHROMBIN TIME: 17.1 SEC (ref 9.8–13)
RBC # BLD: 3.12 M/UL (ref 4.2–5.9)
SODIUM BLD-SCNC: 135 MMOL/L (ref 136–145)
SODIUM BLD-SCNC: 136 MMOL/L (ref 136–145)
SODIUM BLD-SCNC: 136 MMOL/L (ref 136–145)
SODIUM BLD-SCNC: 137 MMOL/L (ref 136–145)
TCO2 ARTERIAL: 24.1 MMOL/L
TOTAL PROTEIN: 5.4 G/DL (ref 6.4–8.2)
WBC # BLD: 10.3 K/UL (ref 4–11)

## 2019-06-27 PROCEDURE — 94750 HC PULMONARY COMPLIANCE STUDY: CPT

## 2019-06-27 PROCEDURE — 83605 ASSAY OF LACTIC ACID: CPT

## 2019-06-27 PROCEDURE — 83735 ASSAY OF MAGNESIUM: CPT

## 2019-06-27 PROCEDURE — 90945 DIALYSIS ONE EVALUATION: CPT

## 2019-06-27 PROCEDURE — 2580000003 HC RX 258: Performed by: INTERNAL MEDICINE

## 2019-06-27 PROCEDURE — 99291 CRITICAL CARE FIRST HOUR: CPT | Performed by: INTERNAL MEDICINE

## 2019-06-27 PROCEDURE — 36592 COLLECT BLOOD FROM PICC: CPT

## 2019-06-27 PROCEDURE — 85610 PROTHROMBIN TIME: CPT

## 2019-06-27 PROCEDURE — 2000000000 HC ICU R&B

## 2019-06-27 PROCEDURE — 6360000002 HC RX W HCPCS: Performed by: PHARMACIST

## 2019-06-27 PROCEDURE — 80069 RENAL FUNCTION PANEL: CPT

## 2019-06-27 PROCEDURE — 94760 N-INVAS EAR/PLS OXIMETRY 1: CPT

## 2019-06-27 PROCEDURE — 94640 AIRWAY INHALATION TREATMENT: CPT

## 2019-06-27 PROCEDURE — 82803 BLOOD GASES ANY COMBINATION: CPT

## 2019-06-27 PROCEDURE — 80076 HEPATIC FUNCTION PANEL: CPT

## 2019-06-27 PROCEDURE — 6370000000 HC RX 637 (ALT 250 FOR IP): Performed by: NURSE PRACTITIONER

## 2019-06-27 PROCEDURE — 82330 ASSAY OF CALCIUM: CPT

## 2019-06-27 PROCEDURE — 6360000002 HC RX W HCPCS: Performed by: INTERNAL MEDICINE

## 2019-06-27 PROCEDURE — 2700000000 HC OXYGEN THERAPY PER DAY

## 2019-06-27 PROCEDURE — 2580000003 HC RX 258: Performed by: NURSE PRACTITIONER

## 2019-06-27 PROCEDURE — 2500000003 HC RX 250 WO HCPCS: Performed by: INTERNAL MEDICINE

## 2019-06-27 PROCEDURE — 99233 SBSQ HOSP IP/OBS HIGH 50: CPT | Performed by: INTERNAL MEDICINE

## 2019-06-27 PROCEDURE — 2500000003 HC RX 250 WO HCPCS: Performed by: NURSE PRACTITIONER

## 2019-06-27 PROCEDURE — 6370000000 HC RX 637 (ALT 250 FOR IP): Performed by: INTERNAL MEDICINE

## 2019-06-27 PROCEDURE — 94003 VENT MGMT INPAT SUBQ DAY: CPT

## 2019-06-27 PROCEDURE — 2580000003 HC RX 258: Performed by: PHARMACIST

## 2019-06-27 PROCEDURE — 37799 UNLISTED PX VASCULAR SURGERY: CPT

## 2019-06-27 PROCEDURE — 71045 X-RAY EXAM CHEST 1 VIEW: CPT

## 2019-06-27 PROCEDURE — 85025 COMPLETE CBC W/AUTO DIFF WBC: CPT

## 2019-06-27 RX ADMIN — Medication 2 CAPSULE: at 17:55

## 2019-06-27 RX ADMIN — IPRATROPIUM BROMIDE AND ALBUTEROL SULFATE 1 AMPULE: .5; 3 SOLUTION RESPIRATORY (INHALATION) at 23:50

## 2019-06-27 RX ADMIN — Medication 1000 ML/HR: at 22:34

## 2019-06-27 RX ADMIN — METOPROLOL TARTRATE 12.5 MG: 25 TABLET ORAL at 18:26

## 2019-06-27 RX ADMIN — IPRATROPIUM BROMIDE AND ALBUTEROL SULFATE 1 AMPULE: .5; 3 SOLUTION RESPIRATORY (INHALATION) at 12:05

## 2019-06-27 RX ADMIN — Medication 2 CAPSULE: at 09:27

## 2019-06-27 RX ADMIN — INSULIN GLARGINE 10 UNITS: 100 INJECTION, SOLUTION SUBCUTANEOUS at 20:16

## 2019-06-27 RX ADMIN — Medication 50 MG: at 22:34

## 2019-06-27 RX ADMIN — DARBEPOETIN ALFA 60 MCG: 60 SOLUTION INTRAVENOUS; SUBCUTANEOUS at 13:52

## 2019-06-27 RX ADMIN — Medication 1000 ML/HR: at 06:53

## 2019-06-27 RX ADMIN — MOMETASONE FUROATE AND FORMOTEROL FUMARATE DIHYDRATE 2 PUFF: 100; 5 AEROSOL RESPIRATORY (INHALATION) at 08:25

## 2019-06-27 RX ADMIN — SODIUM PHOSPHATE, MONOBASIC, MONOHYDRATE 12 MMOL: 276; 142 INJECTION, SOLUTION INTRAVENOUS at 09:18

## 2019-06-27 RX ADMIN — METOPROLOL TARTRATE 5 MG: 5 INJECTION INTRAVENOUS at 00:27

## 2019-06-27 RX ADMIN — IPRATROPIUM BROMIDE AND ALBUTEROL SULFATE 1 AMPULE: .5; 3 SOLUTION RESPIRATORY (INHALATION) at 16:04

## 2019-06-27 RX ADMIN — IPRATROPIUM BROMIDE AND ALBUTEROL SULFATE 1 AMPULE: .5; 3 SOLUTION RESPIRATORY (INHALATION) at 00:48

## 2019-06-27 RX ADMIN — CALCIUM GLUCONATE 1 G: 98 INJECTION, SOLUTION INTRAVENOUS at 08:15

## 2019-06-27 RX ADMIN — IPRATROPIUM BROMIDE AND ALBUTEROL SULFATE 1 AMPULE: .5; 3 SOLUTION RESPIRATORY (INHALATION) at 20:05

## 2019-06-27 RX ADMIN — Medication 1000 ML/HR: at 17:10

## 2019-06-27 RX ADMIN — Medication 50 MCG/HR: at 23:00

## 2019-06-27 RX ADMIN — CALCIUM GLUCONATE 1 G: 98 INJECTION, SOLUTION INTRAVENOUS at 20:02

## 2019-06-27 RX ADMIN — Medication 50 MG: at 14:09

## 2019-06-27 RX ADMIN — INSULIN LISPRO 3 UNITS: 100 INJECTION, SOLUTION INTRAVENOUS; SUBCUTANEOUS at 12:37

## 2019-06-27 RX ADMIN — Medication 1000 ML/HR: at 11:50

## 2019-06-27 RX ADMIN — MEROPENEM 500 MG: 500 INJECTION INTRAVENOUS at 20:16

## 2019-06-27 RX ADMIN — CHLORHEXIDINE GLUCONATE 0.12% ORAL RINSE 15 ML: 1.2 LIQUID ORAL at 20:16

## 2019-06-27 RX ADMIN — Medication 50 MCG/HR: at 05:58

## 2019-06-27 RX ADMIN — MOMETASONE FUROATE AND FORMOTEROL FUMARATE DIHYDRATE 2 PUFF: 100; 5 AEROSOL RESPIRATORY (INHALATION) at 20:06

## 2019-06-27 RX ADMIN — CHLORHEXIDINE GLUCONATE 0.12% ORAL RINSE 15 ML: 1.2 LIQUID ORAL at 09:19

## 2019-06-27 RX ADMIN — MEROPENEM 500 MG: 500 INJECTION INTRAVENOUS at 09:19

## 2019-06-27 RX ADMIN — INSULIN LISPRO 3 UNITS: 100 INJECTION, SOLUTION INTRAVENOUS; SUBCUTANEOUS at 05:08

## 2019-06-27 RX ADMIN — METOPROLOL TARTRATE 12.5 MG: 25 TABLET ORAL at 13:46

## 2019-06-27 RX ADMIN — Medication 1000 ML/HR: at 17:12

## 2019-06-27 RX ADMIN — SODIUM PHOSPHATE, MONOBASIC, MONOHYDRATE 12 MMOL: 276; 142 INJECTION, SOLUTION INTRAVENOUS at 14:09

## 2019-06-27 RX ADMIN — INSULIN LISPRO 3 UNITS: 100 INJECTION, SOLUTION INTRAVENOUS; SUBCUTANEOUS at 00:27

## 2019-06-27 RX ADMIN — Medication 1000 ML/HR: at 01:22

## 2019-06-27 RX ADMIN — Medication 10 ML: at 20:17

## 2019-06-27 RX ADMIN — Medication 10 ML: at 09:00

## 2019-06-27 RX ADMIN — Medication 50 MG: at 06:51

## 2019-06-27 RX ADMIN — IPRATROPIUM BROMIDE AND ALBUTEROL SULFATE 1 AMPULE: .5; 3 SOLUTION RESPIRATORY (INHALATION) at 05:39

## 2019-06-27 RX ADMIN — MEROPENEM 500 MG: 500 INJECTION INTRAVENOUS at 15:08

## 2019-06-27 RX ADMIN — FAMOTIDINE 20 MG: 10 INJECTION, SOLUTION INTRAVENOUS at 09:19

## 2019-06-27 RX ADMIN — MEROPENEM 500 MG: 500 INJECTION INTRAVENOUS at 03:13

## 2019-06-27 RX ADMIN — INSULIN LISPRO 3 UNITS: 100 INJECTION, SOLUTION INTRAVENOUS; SUBCUTANEOUS at 20:16

## 2019-06-27 RX ADMIN — IPRATROPIUM BROMIDE AND ALBUTEROL SULFATE 1 AMPULE: .5; 3 SOLUTION RESPIRATORY (INHALATION) at 08:25

## 2019-06-27 RX ADMIN — Medication 50 MCG/HR: at 15:57

## 2019-06-27 RX ADMIN — SODIUM PHOSPHATE, MONOBASIC, MONOHYDRATE 12 MMOL: 276; 142 INJECTION, SOLUTION INTRAVENOUS at 01:52

## 2019-06-27 RX ADMIN — SODIUM CHLORIDE 0.2 MCG/KG/HR: 9 INJECTION, SOLUTION INTRAVENOUS at 08:35

## 2019-06-27 RX ADMIN — METOPROLOL TARTRATE 5 MG: 5 INJECTION INTRAVENOUS at 06:15

## 2019-06-27 RX ADMIN — INSULIN LISPRO 3 UNITS: 100 INJECTION, SOLUTION INTRAVENOUS; SUBCUTANEOUS at 09:20

## 2019-06-27 RX ADMIN — SODIUM CHLORIDE 0.4 MCG/KG/HR: 9 INJECTION, SOLUTION INTRAVENOUS at 23:42

## 2019-06-27 RX ADMIN — CALCIUM GLUCONATE 1 G: 98 INJECTION, SOLUTION INTRAVENOUS at 16:11

## 2019-06-27 ASSESSMENT — PULMONARY FUNCTION TESTS
PIF_VALUE: 29
PIF_VALUE: 31
PIF_VALUE: 33
PIF_VALUE: 31
PIF_VALUE: 33
PIF_VALUE: 32
PIF_VALUE: 29
PIF_VALUE: 27
PIF_VALUE: 33
PIF_VALUE: 34
PIF_VALUE: 29
PIF_VALUE: 31
PIF_VALUE: 28
PIF_VALUE: 30
PIF_VALUE: 30
PIF_VALUE: 38
PIF_VALUE: 35
PIF_VALUE: 36
PIF_VALUE: 30
PIF_VALUE: 32
PIF_VALUE: 33
PIF_VALUE: 31
PIF_VALUE: 29
PIF_VALUE: 27
PIF_VALUE: 32
PIF_VALUE: 28
PIF_VALUE: 36
PIF_VALUE: 30
PIF_VALUE: 40
PIF_VALUE: 33
PIF_VALUE: 29
PIF_VALUE: 37
PIF_VALUE: 29
PIF_VALUE: 35
PIF_VALUE: 29
PIF_VALUE: 30
PIF_VALUE: 29
PIF_VALUE: 37
PIF_VALUE: 35

## 2019-06-27 ASSESSMENT — PAIN SCALES - GENERAL
PAINLEVEL_OUTOF10: 0

## 2019-06-27 NOTE — FLOWSHEET NOTE
Handoff and 4 eyes done. Pt repositioned. CRRT in progress. 0800 BP alarming for MAP < 65. BP rechecked per cuff and petra recalibrated/releveled and Map in 50's. Levophed titrated per orders. 1614 Map improving. Assessment done. Pt fluttering eyelids to name. Couldn't open. Followed commands with R foot but would not squeeze with hands. Extremities are tight with fluid. CRRT cont. , monitor in AF, rate 107, u/o marginal. Dustin returned to [employer] following med. Temp 97R, Louie hugger temp decreased. 0900 MAP in 80s, will slowly wean Levo. Temp 97.4R. Louie hugger off at [employer]. Pt normally likes fan on and legs uncovered. 1000 Dr Ludmila Finn here for rounds, along with ICU pharmacist and dietary, updated, new orders rec/d. Pt's sister here and updated by MD and nursing. 1200 Levophed off. No change in condition. Becomes agitated with oral care. 1230 Map 65 or> but sys BP < 100. Metoprolol held. 1310 Map < 65, Levophed restarted. Discussed metoprolol and BP with Dr Ludmila Finn. Will give oral dose. -118.    1330 Dr Dhruv Naqvi into see pt, updated, new orders rec/d.    1400 ICA drawn and to lab. Phos replacement hung. 1600 VSS, MAP > 65 with Levo at 1mcq. Ca and phos infusing. Pt very agitated again with any oral care or movement. Precedex increased sl. TMP and filter readings increasing. Dr Andrea Martinez into see pt, updated, no new orders. 1700 Dr Woody Ortiz here, updated, no new orders at this time. MAP 75. Levo off.    1730 . Blood returned per protocol. 18 Dr Naheed Bass returned call for Dr Woody Ortiz, updated, informed of MD note for digoxin order. Dr Naheed Bass will contact Dr Woody Ortiz. 1800 Map in 46s Levophed resumed. 1810 Crrt resumed. BP up with Levophed so oral metoprolol given. , AF. Pt restless, pulling at restraints. Fentanyl increased for pt comfort.  Electronically signed by Onnie Ly, RN on 6/27/2019 at 6:48 PM

## 2019-06-27 NOTE — PROGRESS NOTES
4 Eyes Skin Assessment     The patient is being assess for  Shift Handoff    I agree that 2 RN's have performed a thorough Head to Toe Skin Assessment on the patient. ALL assessment sites listed below have been assessed. Areas assessed by both nurses: yes  [x]   Head, Face, and Ears   [x]   Shoulders, Back, and Chest  [x]   Arms, Elbows, and Hands   [x]   Coccyx, Sacrum, and IschIum  [x]   Legs, Feet, and Heels        Does the Patient have Skin Breakdown?   No         Carlos Alberto Prevention initiated:  Yes   Wound Care Orders initiated:  No      [facility] nurse consulted for Pressure Injury (Stage 3,4, Unstageable, DTI, NWPT, and Complex wounds), New and Established Ostomies:  No      Nurse 1 eSignature: Electronically signed by David Back RN on 6/27/19 at 7:37 PM    **SHARE this note so that the co-signing nurse is able to place an eSignature**    Nurse 2 eSignature: Electronically signed by Marko Jaramillo RN on 6/27/19 at 8:51 PM

## 2019-06-27 NOTE — PROGRESS NOTES
fibrillation    Medications:    metoprolol tartrate  12.5 mg Oral 4 times per day    darbepoetin vinay-polysorbate  60 mcg Subcutaneous Weekly - Thursday    insulin glargine  10 Units Subcutaneous Nightly    gabapentin  50 mg Oral 3 times per day    insulin lispro  0-18 Units Subcutaneous Q4H    lactobacillus  2 capsule Oral BID WC    mometasone-formoterol  2 puff Inhalation BID    sodium chloride flush  10 mL Intravenous 2 times per day    ipratropium-albuterol  1 ampule Inhalation Q4H    chlorhexidine  15 mL Mouth/Throat BID    famotidine (PEPCID) injection  20 mg Intravenous Daily    meropenem  500 mg Intravenous Q6H      dexmedetomidine (PRECEDEX) IV infusion 0.3 mcg/kg/hr (06/27/19 1632)    dextrose      fentaNYL 50 mcg/hr (06/27/19 1557)    prismaSATE BGK 4/2.5 1,000 mL/hr (06/27/19 1150)    prismaSATE BGK 4/2.5 1,000 mL/hr (06/27/19 1150)    prismaSATE BGK 4/2.5 1,000 mL/hr (06/27/19 1150)    norepinephrine 1 mcg/min (06/27/19 1510)     metoprolol, iopamidol, albuterol, sodium chloride flush, ondansetron, acetaminophen, glucose, dextrose, glucagon (rDNA), dextrose, fentanNYL, potassium chloride, magnesium sulfate, sodium phosphate IVPB **OR** sodium phosphate IVPB **OR** sodium phosphate IVPB **OR** sodium phosphate IVPB, calcium gluconate IVPB **OR** calcium gluconate IVPB **OR** calcium gluconate IVPB **OR** calcium gluconate IVPB    Lab Data:  CBC:   Recent Labs     06/25/19  0540 06/26/19  0553 06/27/19  0624   WBC 20.1* 16.6* 10.3   HGB 9.5* 10.3* 8.5*    164 122*     BMP:    Recent Labs     06/27/19  0042 06/27/19  0624 06/27/19  1200    136 137   K 4.0 4.0 4.3   CO2 22 22 23   BUN 15 14 13   CREATININE 1.2 1.1 1.0     LIVR:   Recent Labs     06/25/19  0540 06/26/19  0553 06/27/19  0624   AST 3,364* 933* 360*   ALT 3,518* 2,168* 1,506*     INR:    Recent Labs     06/27/19 0624   INR 1.50*     APTT:   No results for input(s): APTT in the last 72 hours.   BNP:  No results for input(s): BNP in the last 72 hours. Imaging    Assessment/Plan:  1)Cardiopulmonary arrest  - Stays intubated  - Vent management per critical care team.    2) Shock  - combination of septic & probably cardiogenic  - BP is better. - On low dose  Pressors. 3) REJI  - On CRRT    4) A fib  - has mild RVR   due to pressors,hypotension and also due to being off amiodarone and beta-blocker therapy  - Currently not on xarelto due to liver failure but will need to restart xarelto when OK with other treating  providers   on low-dose  PObeta-blocker therapy  - will give him one dose of I/v lanoxin to control his rate     5) Liver failure  - due to shock liver  - improving     His overall long term prognosis still remains poor.           Electronically signed by Rolanda Monteiro MD on 6/27/2019 at 5:01 PM

## 2019-06-27 NOTE — PROGRESS NOTES
-- -- 99 28 100 % --   06/27/19 0200 139/79 -- -- 89 28 100 % --   06/27/19 0127 (!) 104/49 -- -- -- -- -- --   06/27/19 0100 128/61 -- -- 88 28 100 % --   06/27/19 0048 -- -- -- 93 28 100 % --   06/27/19 0000 (!) 116/56 97.4 °F (36.3 °C) Oral 102 28 100 % --        72HR INTAKE/OUTPUT:      Intake/Output Summary (Last 24 hours) at 6/27/2019 0759  Last data filed at 6/27/2019 0701  Gross per 24 hour   Intake 2350.23 ml   Output 2398 ml   Net -47.77 ml       Exam:    Gen:   Patient sedated on the ventilator  Eyes: PERRL. No sclera icterus. No conjunctival injection. ENT: No discharge. Pharynx clear. External appearance of ears and nose normal.  Neck: Trachea midline. No obvious mass. Resp: No accessory muscle use. No crackles. No wheezes. No rhonchi. CV: Regular rate. Regular rhythm. No murmur or rub. No edema. GI: Non-tender. Non-distended. No hernia. Skin: Warm, dry, normal texture and turgor. Lymph: No cervical LAD. No supraclavicular LAD. M/S: / Ext. No cyanosis. No clubbing. No joint deformity. Neuro: CN 2-12 are intact,  no neurologic deficits noted. PT/INR:   Recent Labs     06/27/19 0624   PROTIME 17.1*   INR 1.50*     APTT:   No results for input(s): APTT in the last 72 hours. CBC:   Recent Labs     06/25/19  0540 06/26/19  0553 06/27/19  0624   WBC 20.1* 16.6* 10.3   HGB 9.5* 10.3* 8.5*   HCT 29.5* 30.7* 26.5*   MCV 86.2 86.1 84.7    164 122*       BMP:   Recent Labs     06/26/19  1200 06/26/19  1742 06/27/19  0042 06/27/19  0624   * 135* 136 136   K 4.9 4.5 4.0 4.0   CL 96* 99 100 101   CO2 20* 22 22 22   PHOS 2.6 1.8* 1.7* 1.9*   BUN 15 15 15 14   CREATININE 1.2 1.2 1.2 1.1       LIVER PROFILE:   Recent Labs     06/25/19  0540 06/26/19  0553 06/27/19  0624   ALKPHOS 125 152* 122   AST 3,364* 933* 360*   ALT 3,518* 2,168* 1,506*   BILIDIR 1.0* 1.2* 1.1*   BILITOT 1.3* 1.6* 1.5*     No results for input(s): AMYLASE in the last 72 hours.   No results for input(s): LIPASE in the last 72 hours. UA:  No results for input(s): NITRITE, LABCAST, WBCUA, RBCUA, MUCUS in the last 72 hours. TROPONIN:   Recent Labs     06/26/19  0553   CKTOTAL 116       Lab Results   Component Value Date/Time    URRFLXCULT Not Indicated 01/10/2019 07:38 PM       No results for input(s): TSHREFLEX in the last 72 hours. No components found for: [id number]  POC GLUCOSE:    Recent Labs     06/26/19  1159 06/26/19  1546 06/26/19  1945 06/27/19  0022 06/27/19  0433   POCGLU 209* 182* 171* 156* 159*     No results for input(s): LABA1C in the last 72 hours. Lab Results   Component Value Date    LABA1C 9.4 06/11/2019     Echocardiogram-ejection fraction 45% in October 2018    ASSESSMENT AND PLAN     Acute Hypercapnic / Hypoxemic resp failure  Continue on ventilator support  Likely due to pneumonia      Septic shock  Continue on pressors  Clinically improving  Minimum dose of pressors     Acute renal failure  Continue CRRT-plan is for a net even fluid balance  Minimal amount of urine  Nephrology is following    Alcohol abuse and withdrawal   continue on Precedex      Pneumonia  Likely gram-negative  Continue on antibiotic with meropenem      COPD unspecified J44.9  Continue with the bronchodilators         DM  - metformin d/c  Add 5 units Lantus continue sliding scale    Elevated LFTs  Abdominal ultrasound is unremarkable      Cardiac arrest  PEA-amiodarone drip-discontinued  Patient in atrial fibrillation-metoprolol to control heart rate  Patient also received IV digoxin 1 time  Cardiologist following    Acute on chronic systolic CHF  Cardiologist following        Code Status: Full Code        Dispo - continue care        The patient and / or the family were informed of the results of any tests, a time was given to answer questions, a plan was proposed and they agreed with plan. Dariel Sahu MD    This note was transcribed using [id number] [facility]. Please disregard any translational errors.

## 2019-06-27 NOTE — PROGRESS NOTES
Pulmonary Progress Note    Date of Admission: 6/22/2019   LOS: 4 days     CC:  Chief Complaint   Patient presents with    Chest Pain   f/u cardiac arrest    HPI/Subjective  Remains on 3 of levophed, difficulty weaning due to hypotension. Remains on RRT, minimal urine output  Hypothermic this morning, remains on Merrem        ROS:   Unable to obtain due to mechanical ventilation      Intake/Output Summary (Last 24 hours) at 6/27/2019 0950  Last data filed at 6/27/2019 0904  Gross per 24 hour   Intake 2192.63 ml   Output 2178 ml   Net 14.63 ml         PHYSICAL EXAM:   Blood pressure 136/73, pulse 109, temperature 97.4 °F (36.3 °C), temperature source Rectal, resp. rate 28, height 5' 7\" (1.702 m), weight 243 lb 2.7 oz (110.3 kg), SpO2 97 %.'  Gen:  No acute distress. Intubated, opens eyes. Eyes: PERRL. Anicteric sclera. No conjunctival injection. ENT: No discharge. Posterior oropharynx with ETTube. External appearance of ears and nose normal.  Neck: Trachea midline. No mass   Resp: +Crackles. No wheezes. No rhonchi. No dullness on percussion. CV: Tacky rate regular rhythm. No murmur or rub.  + Upper extremity bilateral edema. GI: Soft, Non-tender. +distended. +BS  Skin: Warm, dry, w/o erythema. Lymph: No cervical or supraclavicular LAD. M/S: No cyanosis. No clubbing. Status post left AKA  Neuro:  no focal neurologic deficit.   Moves all extremities      Medications:    Scheduled Meds:   insulin glargine  10 Units Subcutaneous Nightly    gabapentin  50 mg Oral 3 times per day    insulin lispro  0-18 Units Subcutaneous Q4H    metoprolol  5 mg Intravenous 4 times per day    lactobacillus  2 capsule Oral BID WC    mometasone-formoterol  2 puff Inhalation BID    sodium chloride flush  10 mL Intravenous 2 times per day    ipratropium-albuterol  1 ampule Inhalation Q4H    chlorhexidine  15 mL Mouth/Throat BID    famotidine (PEPCID) injection  20 mg Intravenous Daily    meropenem  500 mg Intravenous Q6H       Continuous Infusions:   dexmedetomidine (PRECEDEX) IV infusion 0.2 mcg/kg/hr (06/27/19 0835)    propofol Stopped (06/26/19 1119)    dextrose      fentaNYL 50 mcg/hr (06/27/19 0558)    prismaSATE BGK 4/2.5 1,000 mL/hr (06/27/19 0653)    prismaSATE BGK 4/2.5 1,000 mL/hr (06/27/19 0653)    prismaSATE BGK 4/2.5 1,000 mL/hr (06/27/19 2549)    norepinephrine 3 mcg/min (06/27/19 0904)       PRN Meds:  metoprolol, iopamidol, albuterol, sodium chloride flush, ondansetron, acetaminophen, glucose, dextrose, glucagon (rDNA), dextrose, fentanNYL, potassium chloride, magnesium sulfate, sodium phosphate IVPB **OR** sodium phosphate IVPB **OR** sodium phosphate IVPB **OR** sodium phosphate IVPB, calcium gluconate IVPB **OR** calcium gluconate IVPB **OR** calcium gluconate IVPB **OR** calcium gluconate IVPB    Labs reviewed:  CBC:   Recent Labs     06/25/19  0540 06/26/19  0553 06/27/19  0624   WBC 20.1* 16.6* 10.3   HGB 9.5* 10.3* 8.5*   HCT 29.5* 30.7* 26.5*   MCV 86.2 86.1 84.7    164 122*     BMP:   Recent Labs     06/26/19  1742 06/27/19  0042 06/27/19  0624   * 136 136   K 4.5 4.0 4.0   CL 99 100 101   CO2 22 22 22   PHOS 1.8* 1.7* 1.9*   BUN 15 15 14   CREATININE 1.2 1.2 1.1     LIVER PROFILE:   Recent Labs     06/25/19  0540 06/26/19  0553 06/27/19  0624   AST 3,364* 933* 360*   ALT 3,518* 2,168* 1,506*   BILIDIR 1.0* 1.2* 1.1*   BILITOT 1.3* 1.6* 1.5*   ALKPHOS 125 152* 122     PT/INR:   Recent Labs     06/27/19  0624   PROTIME 17.1*   INR 1.50*     APTT:   No results for input(s): APTT in the last 72 hours. UA:  No results for input(s): NITRITE, COLORU, PHUR, LABCAST, WBCUA, RBCUA, MUCUS, TRICHOMONAS, YEAST, BACTERIA, CLARITYU, SPECGRAV, LEUKOCYTESUR, UROBILINOGEN, BILIRUBINUR, BLOODU, GLUCOSEU, AMORPHOUS in the last 72 hours. Invalid input(s): [employer]is [employer]ur[employer]  No results for input(s): PH, PCO2, PO2 in the last 72 hours.       Films:  Radiology Review:  Pertinent images / reports were reviewed as

## 2019-06-28 ENCOUNTER — APPOINTMENT (OUTPATIENT)
Dept: GENERAL RADIOLOGY | Age: 59
DRG: 720 | End: 2019-06-28
Payer: COMMERCIAL

## 2019-06-28 ENCOUNTER — TELEPHONE (OUTPATIENT)
Dept: INTERNAL MEDICINE CLINIC | Age: 59
End: 2019-06-28

## 2019-06-28 LAB
ALBUMIN SERPL-MCNC: 2.6 G/DL (ref 3.4–5)
ALBUMIN SERPL-MCNC: 2.7 G/DL (ref 3.4–5)
ALBUMIN SERPL-MCNC: 3 G/DL (ref 3.4–5)
ALBUMIN SERPL-MCNC: 3.1 G/DL (ref 3.4–5)
ALP BLD-CCNC: 121 U/L (ref 40–129)
ALT SERPL-CCNC: 1088 U/L (ref 10–40)
ANION GAP SERPL CALCULATED.3IONS-SCNC: 7 MMOL/L (ref 3–16)
ANION GAP SERPL CALCULATED.3IONS-SCNC: 9 MMOL/L (ref 3–16)
ANISOCYTOSIS: ABNORMAL
AST SERPL-CCNC: 163 U/L (ref 15–37)
BASE EXCESS ARTERIAL: 0.7 MMOL/L (ref -3–3)
BASOPHILS ABSOLUTE: 0 K/UL (ref 0–0.2)
BASOPHILS RELATIVE PERCENT: 0.4 %
BILIRUB SERPL-MCNC: 1.5 MG/DL (ref 0–1)
BILIRUBIN DIRECT: 1.1 MG/DL (ref 0–0.3)
BILIRUBIN, INDIRECT: 0.4 MG/DL (ref 0–1)
BLOOD CULTURE, ROUTINE: NORMAL
BUN BLDV-MCNC: 12 MG/DL (ref 7–20)
BUN BLDV-MCNC: 13 MG/DL (ref 7–20)
BUN BLDV-MCNC: 13 MG/DL (ref 7–20)
BUN BLDV-MCNC: 15 MG/DL (ref 7–20)
CALCIUM IONIZED: 1.07 MMOL/L (ref 1.12–1.32)
CALCIUM IONIZED: 1.09 MMOL/L (ref 1.12–1.32)
CALCIUM IONIZED: 1.13 MMOL/L (ref 1.12–1.32)
CALCIUM IONIZED: 1.14 MMOL/L (ref 1.12–1.32)
CALCIUM SERPL-MCNC: 7.6 MG/DL (ref 8.3–10.6)
CALCIUM SERPL-MCNC: 8 MG/DL (ref 8.3–10.6)
CALCIUM SERPL-MCNC: 8.3 MG/DL (ref 8.3–10.6)
CALCIUM SERPL-MCNC: 8.4 MG/DL (ref 8.3–10.6)
CARBOXYHEMOGLOBIN ARTERIAL: 1.5 % (ref 0–1.5)
CHLORIDE BLD-SCNC: 103 MMOL/L (ref 99–110)
CHLORIDE BLD-SCNC: 105 MMOL/L (ref 99–110)
CO2: 25 MMOL/L (ref 21–32)
CO2: 26 MMOL/L (ref 21–32)
CREAT SERPL-MCNC: 1.1 MG/DL (ref 0.9–1.3)
CREAT SERPL-MCNC: 1.2 MG/DL (ref 0.9–1.3)
CULTURE, BLOOD 2: NORMAL
EOSINOPHILS ABSOLUTE: 0.1 K/UL (ref 0–0.6)
EOSINOPHILS RELATIVE PERCENT: 0.9 %
GFR AFRICAN AMERICAN: >60
GFR NON-AFRICAN AMERICAN: >60
GLUCOSE BLD-MCNC: 148 MG/DL (ref 70–99)
GLUCOSE BLD-MCNC: 149 MG/DL (ref 70–99)
GLUCOSE BLD-MCNC: 152 MG/DL (ref 70–99)
GLUCOSE BLD-MCNC: 160 MG/DL (ref 70–99)
GLUCOSE BLD-MCNC: 174 MG/DL (ref 70–99)
GLUCOSE BLD-MCNC: 174 MG/DL (ref 70–99)
GLUCOSE BLD-MCNC: 186 MG/DL (ref 70–99)
GLUCOSE BLD-MCNC: 190 MG/DL (ref 70–99)
GLUCOSE BLD-MCNC: 191 MG/DL (ref 70–99)
HCO3 ARTERIAL: 27 MMOL/L (ref 21–29)
HCT VFR BLD CALC: 26.8 % (ref 40.5–52.5)
HEMOGLOBIN, ART, EXTENDED: 9.3 G/DL (ref 13.5–17.5)
HEMOGLOBIN: 8.4 G/DL (ref 13.5–17.5)
LYMPHOCYTES ABSOLUTE: 0.8 K/UL (ref 1–5.1)
LYMPHOCYTES RELATIVE PERCENT: 9 %
MAGNESIUM: 2.3 MG/DL (ref 1.8–2.4)
MAGNESIUM: 2.4 MG/DL (ref 1.8–2.4)
MAGNESIUM: 2.4 MG/DL (ref 1.8–2.4)
MAGNESIUM: 2.7 MG/DL (ref 1.8–2.4)
MCH RBC QN AUTO: 26.8 PG (ref 26–34)
MCHC RBC AUTO-ENTMCNC: 31.4 G/DL (ref 31–36)
MCV RBC AUTO: 85.5 FL (ref 80–100)
METHEMOGLOBIN ARTERIAL: 0.9 %
MONOCYTES ABSOLUTE: 1 K/UL (ref 0–1.3)
MONOCYTES RELATIVE PERCENT: 11 %
NEUTROPHILS ABSOLUTE: 6.9 K/UL (ref 1.7–7.7)
NEUTROPHILS RELATIVE PERCENT: 78.7 %
O2 CONTENT ARTERIAL: 12 ML/DL
O2 SAT, ARTERIAL: 96.4 %
O2 THERAPY: ABNORMAL
PCO2 ARTERIAL: 55.8 MMHG (ref 35–45)
PDW BLD-RTO: 16.5 % (ref 12.4–15.4)
PERFORMED ON: ABNORMAL
PH ARTERIAL: 7.31 (ref 7.35–7.45)
PH VENOUS: 7.29 (ref 7.35–7.45)
PH VENOUS: 7.33 (ref 7.35–7.45)
PH VENOUS: 7.39 (ref 7.35–7.45)
PH VENOUS: 7.46 (ref 7.35–7.45)
PHOSPHORUS: 2 MG/DL (ref 2.5–4.9)
PHOSPHORUS: 2 MG/DL (ref 2.5–4.9)
PHOSPHORUS: 2.7 MG/DL (ref 2.5–4.9)
PHOSPHORUS: 2.8 MG/DL (ref 2.5–4.9)
PLATELET # BLD: 75 K/UL (ref 135–450)
PLATELET SLIDE REVIEW: ABNORMAL
PMV BLD AUTO: 8.4 FL (ref 5–10.5)
PO2 ARTERIAL: 88.5 MMHG (ref 75–108)
POTASSIUM SERPL-SCNC: 4.3 MMOL/L (ref 3.5–5.1)
POTASSIUM SERPL-SCNC: 4.4 MMOL/L (ref 3.5–5.1)
POTASSIUM SERPL-SCNC: 4.6 MMOL/L (ref 3.5–5.1)
POTASSIUM SERPL-SCNC: 4.6 MMOL/L (ref 3.5–5.1)
RBC # BLD: 3.13 M/UL (ref 4.2–5.9)
SLIDE REVIEW: ABNORMAL
SODIUM BLD-SCNC: 136 MMOL/L (ref 136–145)
SODIUM BLD-SCNC: 138 MMOL/L (ref 136–145)
SODIUM BLD-SCNC: 138 MMOL/L (ref 136–145)
SODIUM BLD-SCNC: 139 MMOL/L (ref 136–145)
TCO2 ARTERIAL: 28.7 MMOL/L
TOTAL PROTEIN: 5.8 G/DL (ref 6.4–8.2)
WBC # BLD: 8.8 K/UL (ref 4–11)

## 2019-06-28 PROCEDURE — 6360000002 HC RX W HCPCS: Performed by: INTERNAL MEDICINE

## 2019-06-28 PROCEDURE — 36592 COLLECT BLOOD FROM PICC: CPT

## 2019-06-28 PROCEDURE — 6370000000 HC RX 637 (ALT 250 FOR IP): Performed by: INTERNAL MEDICINE

## 2019-06-28 PROCEDURE — 2580000003 HC RX 258: Performed by: INTERNAL MEDICINE

## 2019-06-28 PROCEDURE — 71045 X-RAY EXAM CHEST 1 VIEW: CPT

## 2019-06-28 PROCEDURE — 90945 DIALYSIS ONE EVALUATION: CPT

## 2019-06-28 PROCEDURE — 36569 INSJ PICC 5 YR+ W/O IMAGING: CPT

## 2019-06-28 PROCEDURE — 76937 US GUIDE VASCULAR ACCESS: CPT

## 2019-06-28 PROCEDURE — 83735 ASSAY OF MAGNESIUM: CPT

## 2019-06-28 PROCEDURE — 2700000000 HC OXYGEN THERAPY PER DAY

## 2019-06-28 PROCEDURE — 6370000000 HC RX 637 (ALT 250 FOR IP): Performed by: NURSE PRACTITIONER

## 2019-06-28 PROCEDURE — 2580000003 HC RX 258: Performed by: PHARMACIST

## 2019-06-28 PROCEDURE — 85025 COMPLETE CBC W/AUTO DIFF WBC: CPT

## 2019-06-28 PROCEDURE — 2500000003 HC RX 250 WO HCPCS: Performed by: INTERNAL MEDICINE

## 2019-06-28 PROCEDURE — 2580000003 HC RX 258: Performed by: NURSE PRACTITIONER

## 2019-06-28 PROCEDURE — 94760 N-INVAS EAR/PLS OXIMETRY 1: CPT

## 2019-06-28 PROCEDURE — 2580000003 HC RX 258: Performed by: HOSPITALIST

## 2019-06-28 PROCEDURE — 6360000002 HC RX W HCPCS: Performed by: PHARMACIST

## 2019-06-28 PROCEDURE — C1751 CATH, INF, PER/CENT/MIDLINE: HCPCS

## 2019-06-28 PROCEDURE — 82330 ASSAY OF CALCIUM: CPT

## 2019-06-28 PROCEDURE — 94750 HC PULMONARY COMPLIANCE STUDY: CPT

## 2019-06-28 PROCEDURE — 37799 UNLISTED PX VASCULAR SURGERY: CPT

## 2019-06-28 PROCEDURE — 94003 VENT MGMT INPAT SUBQ DAY: CPT

## 2019-06-28 PROCEDURE — 02HV33Z INSERTION OF INFUSION DEVICE INTO SUPERIOR VENA CAVA, PERCUTANEOUS APPROACH: ICD-10-PCS | Performed by: INTERNAL MEDICINE

## 2019-06-28 PROCEDURE — 2000000000 HC ICU R&B

## 2019-06-28 PROCEDURE — 99223 1ST HOSP IP/OBS HIGH 75: CPT | Performed by: INTERNAL MEDICINE

## 2019-06-28 PROCEDURE — 82803 BLOOD GASES ANY COMBINATION: CPT

## 2019-06-28 PROCEDURE — 80076 HEPATIC FUNCTION PANEL: CPT

## 2019-06-28 PROCEDURE — 80069 RENAL FUNCTION PANEL: CPT

## 2019-06-28 PROCEDURE — 94640 AIRWAY INHALATION TREATMENT: CPT

## 2019-06-28 PROCEDURE — 99291 CRITICAL CARE FIRST HOUR: CPT | Performed by: INTERNAL MEDICINE

## 2019-06-28 PROCEDURE — 2500000003 HC RX 250 WO HCPCS: Performed by: NURSE PRACTITIONER

## 2019-06-28 RX ORDER — SODIUM CHLORIDE 9 MG/ML
INJECTION, SOLUTION INTRAVENOUS ONCE
Status: COMPLETED | OUTPATIENT
Start: 2019-06-28 | End: 2019-06-28

## 2019-06-28 RX ORDER — SODIUM CHLORIDE 0.9 % (FLUSH) 0.9 %
10 SYRINGE (ML) INJECTION EVERY 12 HOURS SCHEDULED
Status: DISCONTINUED | OUTPATIENT
Start: 2019-06-28 | End: 2019-07-23 | Stop reason: HOSPADM

## 2019-06-28 RX ORDER — PROPOFOL 10 MG/ML
10 INJECTION, EMULSION INTRAVENOUS
Status: DISCONTINUED | OUTPATIENT
Start: 2019-06-28 | End: 2019-06-28

## 2019-06-28 RX ORDER — PROPOFOL 10 MG/ML
INJECTION, EMULSION INTRAVENOUS
Status: DISPENSED
Start: 2019-06-28 | End: 2019-06-28

## 2019-06-28 RX ORDER — SODIUM CHLORIDE 0.9 % (FLUSH) 0.9 %
10 SYRINGE (ML) INJECTION PRN
Status: DISCONTINUED | OUTPATIENT
Start: 2019-06-28 | End: 2019-07-23 | Stop reason: HOSPADM

## 2019-06-28 RX ORDER — HEPARIN SODIUM 5000 [USP'U]/ML
5000 INJECTION, SOLUTION INTRAVENOUS; SUBCUTANEOUS EVERY 8 HOURS SCHEDULED
Status: DISPENSED | OUTPATIENT
Start: 2019-06-28 | End: 2019-07-19

## 2019-06-28 RX ORDER — SODIUM CHLORIDE 9 MG/ML
INJECTION, SOLUTION INTRAVENOUS CONTINUOUS
Status: DISCONTINUED | OUTPATIENT
Start: 2019-06-28 | End: 2019-07-15

## 2019-06-28 RX ORDER — DIGOXIN 0.25 MG/ML
250 INJECTION INTRAMUSCULAR; INTRAVENOUS ONCE
Status: COMPLETED | OUTPATIENT
Start: 2019-06-28 | End: 2019-06-28

## 2019-06-28 RX ORDER — METOPROLOL TARTRATE 5 MG/5ML
5 INJECTION INTRAVENOUS EVERY 4 HOURS PRN
Status: DISCONTINUED | OUTPATIENT
Start: 2019-06-28 | End: 2019-07-02

## 2019-06-28 RX ORDER — LIDOCAINE HYDROCHLORIDE 10 MG/ML
5 INJECTION, SOLUTION EPIDURAL; INFILTRATION; INTRACAUDAL; PERINEURAL ONCE
Status: COMPLETED | OUTPATIENT
Start: 2019-06-28 | End: 2019-06-28

## 2019-06-28 RX ORDER — MIDAZOLAM HYDROCHLORIDE 1 MG/ML
2 INJECTION INTRAMUSCULAR; INTRAVENOUS ONCE
Status: COMPLETED | OUTPATIENT
Start: 2019-06-28 | End: 2019-06-28

## 2019-06-28 RX ADMIN — MOMETASONE FUROATE AND FORMOTEROL FUMARATE DIHYDRATE 2 PUFF: 100; 5 AEROSOL RESPIRATORY (INHALATION) at 20:31

## 2019-06-28 RX ADMIN — INSULIN LISPRO 3 UNITS: 100 INJECTION, SOLUTION INTRAVENOUS; SUBCUTANEOUS at 12:39

## 2019-06-28 RX ADMIN — IPRATROPIUM BROMIDE AND ALBUTEROL SULFATE 1 AMPULE: .5; 3 SOLUTION RESPIRATORY (INHALATION) at 04:34

## 2019-06-28 RX ADMIN — Medication 2 CAPSULE: at 17:04

## 2019-06-28 RX ADMIN — CHLORHEXIDINE GLUCONATE 0.12% ORAL RINSE 15 ML: 1.2 LIQUID ORAL at 20:17

## 2019-06-28 RX ADMIN — SODIUM PHOSPHATE, MONOBASIC, MONOHYDRATE 6 MMOL: 276; 142 INJECTION, SOLUTION INTRAVENOUS at 15:22

## 2019-06-28 RX ADMIN — METOPROLOL TARTRATE 12.5 MG: 25 TABLET ORAL at 17:28

## 2019-06-28 RX ADMIN — INSULIN LISPRO 3 UNITS: 100 INJECTION, SOLUTION INTRAVENOUS; SUBCUTANEOUS at 00:28

## 2019-06-28 RX ADMIN — Medication 2 CAPSULE: at 08:36

## 2019-06-28 RX ADMIN — Medication 1000 ML/HR: at 04:25

## 2019-06-28 RX ADMIN — DIGOXIN 250 MCG: 0.25 INJECTION INTRAMUSCULAR; INTRAVENOUS at 08:57

## 2019-06-28 RX ADMIN — Medication 2 MCG/MIN: at 22:00

## 2019-06-28 RX ADMIN — SODIUM PHOSPHATE, MONOBASIC, MONOHYDRATE 6 MMOL: 276; 142 INJECTION, SOLUTION INTRAVENOUS at 20:19

## 2019-06-28 RX ADMIN — Medication 50 MCG/HR: at 18:51

## 2019-06-28 RX ADMIN — MEROPENEM 500 MG: 500 INJECTION INTRAVENOUS at 08:37

## 2019-06-28 RX ADMIN — SODIUM CHLORIDE 0.4 MCG/KG/HR: 9 INJECTION, SOLUTION INTRAVENOUS at 23:45

## 2019-06-28 RX ADMIN — MOMETASONE FUROATE AND FORMOTEROL FUMARATE DIHYDRATE 2 PUFF: 100; 5 AEROSOL RESPIRATORY (INHALATION) at 08:47

## 2019-06-28 RX ADMIN — Medication 50 MG: at 22:10

## 2019-06-28 RX ADMIN — FAMOTIDINE 20 MG: 10 INJECTION, SOLUTION INTRAVENOUS at 08:37

## 2019-06-28 RX ADMIN — Medication 10 ML: at 09:13

## 2019-06-28 RX ADMIN — METOPROLOL TARTRATE 12.5 MG: 25 TABLET ORAL at 12:23

## 2019-06-28 RX ADMIN — IPRATROPIUM BROMIDE AND ALBUTEROL SULFATE 1 AMPULE: .5; 3 SOLUTION RESPIRATORY (INHALATION) at 20:31

## 2019-06-28 RX ADMIN — Medication 1000 ML/HR: at 09:44

## 2019-06-28 RX ADMIN — SODIUM CHLORIDE: 9 INJECTION, SOLUTION INTRAVENOUS at 14:00

## 2019-06-28 RX ADMIN — MIDAZOLAM 2 MG: 1 INJECTION INTRAMUSCULAR; INTRAVENOUS at 10:13

## 2019-06-28 RX ADMIN — Medication 50 MG: at 14:09

## 2019-06-28 RX ADMIN — SODIUM CHLORIDE 0.2 MCG/KG/HR: 9 INJECTION, SOLUTION INTRAVENOUS at 12:23

## 2019-06-28 RX ADMIN — INSULIN GLARGINE 10 UNITS: 100 INJECTION, SOLUTION SUBCUTANEOUS at 20:30

## 2019-06-28 RX ADMIN — SODIUM CHLORIDE: 9 INJECTION, SOLUTION INTRAVENOUS at 18:11

## 2019-06-28 RX ADMIN — INSULIN LISPRO 3 UNITS: 100 INJECTION, SOLUTION INTRAVENOUS; SUBCUTANEOUS at 20:29

## 2019-06-28 RX ADMIN — LIDOCAINE HYDROCHLORIDE 5 ML: 10 INJECTION, SOLUTION EPIDURAL; INFILTRATION; INTRACAUDAL; PERINEURAL at 15:45

## 2019-06-28 RX ADMIN — HEPARIN SODIUM 5000 UNITS: 5000 INJECTION INTRAVENOUS; SUBCUTANEOUS at 14:09

## 2019-06-28 RX ADMIN — HEPARIN SODIUM 5000 UNITS: 5000 INJECTION INTRAVENOUS; SUBCUTANEOUS at 22:10

## 2019-06-28 RX ADMIN — CHLORHEXIDINE GLUCONATE 0.12% ORAL RINSE 15 ML: 1.2 LIQUID ORAL at 08:36

## 2019-06-28 RX ADMIN — Medication 1000 ML/HR: at 15:25

## 2019-06-28 RX ADMIN — Medication 1000 ML/HR: at 15:29

## 2019-06-28 RX ADMIN — MEROPENEM 500 MG: 500 INJECTION INTRAVENOUS at 03:04

## 2019-06-28 RX ADMIN — IPRATROPIUM BROMIDE AND ALBUTEROL SULFATE 1 AMPULE: .5; 3 SOLUTION RESPIRATORY (INHALATION) at 08:46

## 2019-06-28 RX ADMIN — Medication 50 MG: at 06:29

## 2019-06-28 RX ADMIN — CALCIUM GLUCONATE 1 G: 98 INJECTION, SOLUTION INTRAVENOUS at 02:45

## 2019-06-28 RX ADMIN — Medication 25 MCG/HR: at 08:37

## 2019-06-28 RX ADMIN — CALCIUM GLUCONATE 1 G: 98 INJECTION, SOLUTION INTRAVENOUS at 20:19

## 2019-06-28 RX ADMIN — IPRATROPIUM BROMIDE AND ALBUTEROL SULFATE 1 AMPULE: .5; 3 SOLUTION RESPIRATORY (INHALATION) at 12:10

## 2019-06-28 RX ADMIN — PROPOFOL 10 MCG/KG/MIN: 10 INJECTION, EMULSION INTRAVENOUS at 09:55

## 2019-06-28 RX ADMIN — MEROPENEM 500 MG: 500 INJECTION INTRAVENOUS at 20:17

## 2019-06-28 RX ADMIN — METOPROLOL TARTRATE 12.5 MG: 25 TABLET ORAL at 05:42

## 2019-06-28 RX ADMIN — INSULIN LISPRO 3 UNITS: 100 INJECTION, SOLUTION INTRAVENOUS; SUBCUTANEOUS at 04:20

## 2019-06-28 RX ADMIN — INSULIN LISPRO 3 UNITS: 100 INJECTION, SOLUTION INTRAVENOUS; SUBCUTANEOUS at 08:37

## 2019-06-28 RX ADMIN — Medication 10 ML: at 20:33

## 2019-06-28 RX ADMIN — METOPROLOL TARTRATE 12.5 MG: 25 TABLET ORAL at 23:41

## 2019-06-28 RX ADMIN — Medication 1000 ML/HR: at 20:16

## 2019-06-28 RX ADMIN — Medication 1000 ML/HR: at 20:14

## 2019-06-28 RX ADMIN — Medication 10 ML: at 20:17

## 2019-06-28 RX ADMIN — IPRATROPIUM BROMIDE AND ALBUTEROL SULFATE 1 AMPULE: .5; 3 SOLUTION RESPIRATORY (INHALATION) at 15:49

## 2019-06-28 RX ADMIN — INSULIN LISPRO 3 UNITS: 100 INJECTION, SOLUTION INTRAVENOUS; SUBCUTANEOUS at 17:03

## 2019-06-28 RX ADMIN — MEROPENEM 500 MG: 500 INJECTION INTRAVENOUS at 15:22

## 2019-06-28 ASSESSMENT — PULMONARY FUNCTION TESTS
PIF_VALUE: 33
PIF_VALUE: 35
PIF_VALUE: 35
PIF_VALUE: 40
PIF_VALUE: 33
PIF_VALUE: 34
PIF_VALUE: 40
PIF_VALUE: 34
PIF_VALUE: 33
PIF_VALUE: 35
PIF_VALUE: 33
PIF_VALUE: 33
PIF_VALUE: 37
PIF_VALUE: 35
PIF_VALUE: 34
PIF_VALUE: 42
PIF_VALUE: 31
PIF_VALUE: 33
PIF_VALUE: 35
PIF_VALUE: 45
PIF_VALUE: 34
PIF_VALUE: 32
PIF_VALUE: 32
PIF_VALUE: 29
PIF_VALUE: 41
PIF_VALUE: 50
PIF_VALUE: 35
PIF_VALUE: 34
PIF_VALUE: 33
PIF_VALUE: 37
PIF_VALUE: 30
PIF_VALUE: 42
PIF_VALUE: 38
PIF_VALUE: 34
PIF_VALUE: 34
PIF_VALUE: 35
PIF_VALUE: 38
PIF_VALUE: 30
PIF_VALUE: 50
PIF_VALUE: 43
PIF_VALUE: 35
PIF_VALUE: 35
PIF_VALUE: 47
PIF_VALUE: 31

## 2019-06-28 ASSESSMENT — PAIN SCALES - GENERAL
PAINLEVEL_OUTOF10: 0

## 2019-06-28 NOTE — PROGRESS NOTES
injection  20 mg Intravenous Daily    meropenem  500 mg Intravenous Q6H       Continuous Infusions:   propofol 10 mcg/kg/min (06/28/19 1014)    dexmedetomidine (PRECEDEX) IV infusion 0.2 mcg/hr (06/28/19 0546)    dextrose      fentaNYL 50 mcg/hr (06/28/19 0930)    prismaSATE BGK 4/2.5 1,000 mL/hr (06/28/19 0944)    prismaSATE BGK 4/2.5 1,000 mL/hr (06/28/19 0944)    prismaSATE BGK 4/2.5 1,000 mL/hr (06/28/19 0944)    norepinephrine Stopped (06/27/19 2024)       PRN Meds:  metoprolol, iopamidol, albuterol, sodium chloride flush, ondansetron, acetaminophen, glucose, dextrose, glucagon (rDNA), dextrose, fentanNYL, potassium chloride, magnesium sulfate, sodium phosphate IVPB **OR** sodium phosphate IVPB **OR** sodium phosphate IVPB **OR** sodium phosphate IVPB, calcium gluconate IVPB **OR** calcium gluconate IVPB **OR** calcium gluconate IVPB **OR** calcium gluconate IVPB    Labs reviewed:  CBC:   Recent Labs     06/26/19  0553 06/27/19  0624 06/28/19  0600   WBC 16.6* 10.3 8.8   HGB 10.3* 8.5* 8.4*   HCT 30.7* 26.5* 26.8*   MCV 86.1 84.7 85.5    122* 75*     BMP:   Recent Labs     06/27/19  1830 06/28/19  0101 06/28/19  0600   * 138 136   K 4.2 4.4 4.6    105 103   CO2 24 26 26   PHOS 2.5 2.8 2.7   BUN 14 13 12   CREATININE 1.1 1.1 1.1     LIVER PROFILE:   Recent Labs     06/26/19  0553 06/27/19  0624 06/28/19  0600   * 360* 163*   ALT 2,168* 1,506* 1,088*   BILIDIR 1.2* 1.1* 1.1*   BILITOT 1.6* 1.5* 1.5*   ALKPHOS 152* 122 121     PT/INR:   Recent Labs     06/27/19  0624   PROTIME 17.1*   INR 1.50*     APTT:   No results for input(s): APTT in the last 72 hours. UA:  No results for input(s): NITRITE, COLORU, PHUR, LABCAST, WBCUA, RBCUA, MUCUS, TRICHOMONAS, YEAST, BACTERIA, CLARITYU, SPECGRAV, LEUKOCYTESUR, UROBILINOGEN, BILIRUBINUR, BLOODU, GLUCOSEU, AMORPHOUS in the last 72 hours.     Invalid input(s): [facility]  No results for input(s): PH, PCO2, PO2 in the last 72 hours.      Films:  Radiology Review:  Pertinent images / reports were reviewed as a part of this visit. CT Chest w/ contrast: No results found for this or any previous visit. Access  Arterial       PICC           CVC        CVC Triple Lumen 06/23/19 Right Femoral (Active)   Continued need for line? Yes 6/24/2019  8:00 AM   Site Assessment Clean;Dry; Intact 6/24/2019  8:00 AM   Proximal Lumen Status Infusing 6/24/2019  8:00 AM   Medial Lumen Status Infusing 6/24/2019  8:00 AM   Distal Lumen Status Infusing 6/24/2019  8:00 AM   Dressing Type Anti-microbial patch;Transparent 6/24/2019  8:00 AM   Dressing Status Clean;Dry; Intact 6/24/2019  8:00 AM   Dressing Intervention New 6/23/2019  6:00 AM   Dressing Change Due 06/26/19 6/24/2019  8:00 AM   Line Care Connections checked and tightened 6/23/2019  8:00 AM   Number of days: 1             Assessment:     S/p Cardiac Arrest  Ventricular Tachycardia  afib with RVR  Acute Hypoxemic Respiratory Failure with SAO2 <90% on Room Air   Pulmonary edema  Shock Liver - improving  Anuric Renal Failure due to ATN  Alcohol abuse/HCV  CHF    Plan:      -Full vent support, Wean supplemental oxygen to goal saturation of >90%   -Repeat chest x-ray shows worsening pulmonary edema and effusions  -RRT per nephrology. Based on CXR likely needs volume removal.  Possible extubation pending improvement in pulmonary edema. -off propofol for elev triglycerides and on minimal precedex  - gabapentin.  -metoprolol PO, PRN IV for HR control  -Digoxin dosing per cardiology  -currently off pressors  -cont merrem, lactobacillus  -Restart tube feeds  -strict I/Os  -GIppx - pepcid  DVT ppx - heparin, will need therapeutic AC at some point. Holding due to anticipated permacath and thrombocytopenia. Peridex    Due to the immediate potential for life-threatening deterioration due to septic shock, encephalopathy, renal failure, I spent 36 minutes providing critical care.   This time is excluding time

## 2019-06-28 NOTE — TELEPHONE ENCOUNTER
Shay Chauhan (mom) wants for sabiha to know that the pt is in [address] ICU. Pt isn't producing much urine and is still on a ventilator.

## 2019-06-28 NOTE — PROGRESS NOTES
DIETITIAN      Patient was diagnosed with:  Septic shock  Acute respiratory failure with the hypoxia and hypercapnia  pneumonia  COPD exacerbation      Treatment while inpatient:  Patient presented with septic shock  Patient went into acute respiratory failure and cardiac arrest.    Patient was resuscitated and started with full ventilator support                      Patient received  amiodarone drip for pulseless ventricular tachycardia  Cardiology and pulmonary managed the patient in ICU  Patient was also started on CRRT, for acute renal failure.                                                         ----------------------------------------------------------      SUBJECTIVE COMPLAINTS- follow-up for septic shock    Diet: DIET TUBE FEED CONTINUOUS/CYCLIC NPO; Renal Formula;  Orogastric; Continuous; 35; 35      OBJECTIVE:   Patient Active Problem List   Diagnosis    Arthritis    Diabetes mellitus with hyperglycemia ([facility])    HBP (high blood pressure)    Hyperlipidemia    COPD (chronic obstructive pulmonary disease) (HCC)    Viral hepatitis C    Back pain    [facility]AD [facility]peripheral artery disease) [facility] [address]    Spinal stenosis of lumbar region    Tobacco use    Atherosclerosis of native artery of left lower extremity with intermittent claudication ([facility])    Chest pain    Pleural effusion due to CHF (congestive heart failure) ([facility])    Pleural effusion, right    Alcohol abuse, continuous    Tachycardia    Atrial fibrillation with RVR ([facility])    Hyponatremia    Congestive heart failure ([facility])    REJI (acute kidney injury) ([address]    Hypokalemia    Coronary artery disease involving autologous artery coronary bypass graft with angina pectoris ([address]    Essential hypertension    Chest pain of uncertain etiology    Acute on chronic combined systolic and diastolic HF (heart failure) (HCC)    Acute hyperkalemia    Typical atrial flutter ([facility])    Chronic systolic CHF (congestive heart failure), NYHA class 3 ([address]    Hypotension    Vasovagal syncope    Laceration of scalp without foreign body    Nonischemic cardiomyopathy ([facility] [address]    Syncope and collapse    Ischemic foot    Diabetes mellitus with peripheral circulatory disorder (HCC)    Limb ischemia    COPD exacerbation (HCC)    Nonhealing surgical wound    Acromioclavicular joint arthritis    Bradycardia    Shortness of breath    Permanent atrial fibrillation (HCC)    Atrial fibrillation, chronic (HCC)    Other specified injury of inferior mesenteric vein, initial encounter    Postprocedural hypotension    Acute respiratory failure with hypercapnia (HCC)    High anion gap metabolic acidosis    Centrilobular emphysema (HCC)    Hypomagnesemia    Heart failure, acute on chronic, systolic and diastolic (HCC)    Persistent atrial fibrillation (HCC)    Anemia    DMII (diabetes mellitus, type 2) (HCC)    Constipation    Hypokalemia    Acute on chronic systolic heart failure (HCC)    Atrial fibrillation, rapid (HCC)    COPD with acute exacerbation (HCC)    Cocaine use    Severe sepsis (HCC)    Atrial fibrillation with RVR (HCC)    Hyponatremia    Acute on chronic respiratory failure with hypoxia (HCC)    Respiratory distress    CHF, left ventricular (HCC)    Nicotine dependence    Suspected sleep apnea    Coronary artery disease involving native coronary artery of native heart without angina pectoris    CAD (coronary artery disease)    Acute renal failure (ARF) (HCC)    Morbid obesity due to excess calories (HCC)    Acute respiratory failure with hypoxia (HCC)    Nocturnal hypoxia    Hypercapnemia    SOB (shortness of breath)    Acute on chronic respiratory failure with hypercapnia (HCC)    Chronic respiratory failure with hypercapnia (HCC)    Acute on chronic systolic (congestive) heart failure (HCC)    Hx of AKA (above knee amputation), left (HCC)    Respiratory failure (HCC)    Pneumonia    Ventricular tachycardia (HCC)    Shock (36.7 °C) Oral 102 25 100 % --   06/28/19 0700 89/69 -- -- 108 24 100 % --   06/28/19 0600 92/65 -- -- 110 22 100 % 251 lb 5.2 oz (114 kg)   06/28/19 0500 (!) 90/52 -- -- 115 24 99 % --   06/28/19 0436 -- -- -- 104 23 99 % --   06/28/19 0434 -- -- -- -- 22 99 % --   06/28/19 0400 96/71 97.8 °F (36.6 °C) Rectal 100 30 100 % --   06/28/19 0300 89/68 -- -- 103 28 100 % --   06/28/19 0200 (!) 103/53 -- -- 106 28 100 % --   06/28/19 0100 (!) 101/49 -- -- 99 25 100 % --        72HR INTAKE/OUTPUT:      Intake/Output Summary (Last 24 hours) at 6/28/2019 0856  Last data filed at 6/28/2019 0802  Gross per 24 hour   Intake 2661.9 ml   Output 2897 ml   Net -235.1 ml       Exam:    Gen:   Patient sedated on the ventilator  Eyes: PERRL. No sclera icterus. No conjunctival injection. ENT: No discharge. Pharynx clear. External appearance of ears and nose normal.  Neck: Trachea midline. No obvious mass. Resp: No accessory muscle use. No crackles. No wheezes. No rhonchi. CV: Regular rate. Regular rhythm. No murmur or rub. No edema. GI: Non-tender. Non-distended. No hernia. Skin: Warm, dry, normal texture and turgor. Lymph: No cervical LAD. No supraclavicular LAD. M/S: / Ext. No cyanosis. No clubbing. No joint deformity. Neuro: CN 2-12 are intact,  no neurologic deficits noted. PT/INR:   Recent Labs     06/27/19 0624   PROTIME 17.1*   INR 1.50*     APTT:   No results for input(s): APTT in the last 72 hours.     CBC:   Recent Labs     06/26/19  0553 06/27/19 0624 06/28/19  0600   WBC 16.6* 10.3 8.8   HGB 10.3* 8.5* 8.4*   HCT 30.7* 26.5* 26.8*   MCV 86.1 84.7 85.5    122* 75*       BMP:   Recent Labs     06/27/19  1200 06/27/19  1830 06/28/19  0101 06/28/19  0600    135* 138 136   K 4.3 4.2 4.4 4.6    101 105 103   CO2 23 24 26 26   PHOS 1.9* 2.5 2.8 2.7   BUN 13 14 13 12   CREATININE 1.0 1.1 1.1 1.1       LIVER PROFILE:   Recent Labs     06/26/19  0553 06/27/19  0624 06/28/19  0600   ALKPHOS 152*

## 2019-06-28 NOTE — PROGRESS NOTES
[facility] [address]   Daily Progress Note      Admit Date:  6/22/2019    CC: \"Cardiopulmonary arrest  This is a 75-year-old male with a history  of chronic atrial fibrillation, COPD, morbid obesity, heart failure,  noncompliance with medicines and diet, alcohol usage, who comes to the  hospital with worsening shortness of breath for the last two to three  days. He was recently at the hospital for CHF/COPD exacerbation. When  the patient presented to the Emergency Room, he was found to be in  respiratory distress. BiPAP was initiate but his blood gas showed pH of  7.1 and elevated pCO2 level and he was subsequently intubated. After  the intubation, he became bradycardic and had a PEA. CPR was initiated  and ROSC was established. 30 minutes later, the patient again went into  pulseless VTach and CPR too initiated with subsequent ROSC. He was on  amiodarone drip. He also is currently on CRRT along with pressors. He  remains intubated. Subjective:  Patient remains intubated. A fib is mildly uncontrolled . He is off pressors. Little more awake. Still on CRRT      Objective:   BP 88/67   Pulse 110   Temp 98.1 °F (36.7 °C) (Oral)   Resp 28   Ht 5' 7\" (1.702 m)   Wt 251 lb 5.2 oz (114 kg)   SpO2 100%   BMI 39.36 kg/m²       Intake/Output Summary (Last 24 hours) at 6/28/2019 0817  Last data filed at 6/28/2019 0802  Gross per 24 hour   Intake 2661.9 ml   Output 2923 ml   Net -261.1 ml     Wt Readings from Last 3 Encounters:   06/28/19 251 lb 5.2 oz (114 kg)   06/11/19 236 lb 15.9 oz (107.5 kg)   02/27/19 235 lb (106.6 kg)     Telemetry:A fib    Physical Exam:  General:  intubated  Skin:  Warm and dry  Neck:  Supple, no JVP appreciated, no bruit  Chest:  Clear to auscultation, no wheezes/rhonchi/rales  Cardiovascular: Irregularly irregular rate. S1S2  Abdomen:  Soft, nontender, +bowel sounds.  Distended  Extremities:  generalised  edema    Cardiac Diagnosis:  diabetes, hypertension, hyperlipidemia,

## 2019-06-29 LAB
ALBUMIN SERPL-MCNC: 2.7 G/DL (ref 3.4–5)
ALBUMIN SERPL-MCNC: 3 G/DL (ref 3.4–5)
ALBUMIN SERPL-MCNC: 3.2 G/DL (ref 3.4–5)
ALBUMIN SERPL-MCNC: 3.3 G/DL (ref 3.4–5)
ALP BLD-CCNC: 132 U/L (ref 40–129)
ALT SERPL-CCNC: 777 U/L (ref 10–40)
ANION GAP SERPL CALCULATED.3IONS-SCNC: 10 MMOL/L (ref 3–16)
ANION GAP SERPL CALCULATED.3IONS-SCNC: 10 MMOL/L (ref 3–16)
ANION GAP SERPL CALCULATED.3IONS-SCNC: 8 MMOL/L (ref 3–16)
ANION GAP SERPL CALCULATED.3IONS-SCNC: 9 MMOL/L (ref 3–16)
ANISOCYTOSIS: ABNORMAL
AST SERPL-CCNC: 82 U/L (ref 15–37)
BANDED NEUTROPHILS RELATIVE PERCENT: 1 % (ref 0–7)
BASE EXCESS ARTERIAL: -0.5 MMOL/L (ref -3–3)
BASOPHILS ABSOLUTE: 0 K/UL (ref 0–0.2)
BASOPHILS RELATIVE PERCENT: 0 %
BILIRUB SERPL-MCNC: 1.5 MG/DL (ref 0–1)
BILIRUBIN DIRECT: 0.9 MG/DL (ref 0–0.3)
BILIRUBIN, INDIRECT: 0.6 MG/DL (ref 0–1)
BUN BLDV-MCNC: 14 MG/DL (ref 7–20)
BUN BLDV-MCNC: 14 MG/DL (ref 7–20)
BUN BLDV-MCNC: 15 MG/DL (ref 7–20)
BUN BLDV-MCNC: 17 MG/DL (ref 7–20)
CALCIUM IONIZED: 0.88 MMOL/L (ref 1.12–1.32)
CALCIUM IONIZED: 1.11 MMOL/L (ref 1.12–1.32)
CALCIUM IONIZED: 1.13 MMOL/L (ref 1.12–1.32)
CALCIUM IONIZED: 1.16 MMOL/L (ref 1.12–1.32)
CALCIUM IONIZED: 1.17 MMOL/L (ref 1.12–1.32)
CALCIUM SERPL-MCNC: 7.5 MG/DL (ref 8.3–10.6)
CALCIUM SERPL-MCNC: 7.9 MG/DL (ref 8.3–10.6)
CALCIUM SERPL-MCNC: 8.4 MG/DL (ref 8.3–10.6)
CALCIUM SERPL-MCNC: 8.5 MG/DL (ref 8.3–10.6)
CARBOXYHEMOGLOBIN ARTERIAL: 1.4 % (ref 0–1.5)
CHLORIDE BLD-SCNC: 103 MMOL/L (ref 99–110)
CHLORIDE BLD-SCNC: 103 MMOL/L (ref 99–110)
CHLORIDE BLD-SCNC: 104 MMOL/L (ref 99–110)
CHLORIDE BLD-SCNC: 106 MMOL/L (ref 99–110)
CO2: 25 MMOL/L (ref 21–32)
CO2: 26 MMOL/L (ref 21–32)
CREAT SERPL-MCNC: 0.8 MG/DL (ref 0.9–1.3)
CREAT SERPL-MCNC: 1 MG/DL (ref 0.9–1.3)
CREAT SERPL-MCNC: 1.1 MG/DL (ref 0.9–1.3)
CREAT SERPL-MCNC: 1.3 MG/DL (ref 0.9–1.3)
DIGOXIN LEVEL: 0.9 NG/ML (ref 0.8–2)
EOSINOPHILS ABSOLUTE: 0.6 K/UL (ref 0–0.6)
EOSINOPHILS RELATIVE PERCENT: 5 %
GFR AFRICAN AMERICAN: >60
GFR NON-AFRICAN AMERICAN: 56
GFR NON-AFRICAN AMERICAN: >60
GLUCOSE BLD-MCNC: 100 MG/DL (ref 70–99)
GLUCOSE BLD-MCNC: 110 MG/DL (ref 70–99)
GLUCOSE BLD-MCNC: 112 MG/DL (ref 70–99)
GLUCOSE BLD-MCNC: 121 MG/DL (ref 70–99)
GLUCOSE BLD-MCNC: 136 MG/DL (ref 70–99)
GLUCOSE BLD-MCNC: 142 MG/DL (ref 70–99)
GLUCOSE BLD-MCNC: 148 MG/DL (ref 70–99)
GLUCOSE BLD-MCNC: 153 MG/DL (ref 70–99)
GLUCOSE BLD-MCNC: 87 MG/DL (ref 70–99)
GLUCOSE BLD-MCNC: 89 MG/DL (ref 70–99)
HCO3 ARTERIAL: 26.1 MMOL/L (ref 21–29)
HCT VFR BLD CALC: 28.6 % (ref 40.5–52.5)
HEMATOLOGY PATH CONSULT: NO
HEMOGLOBIN, ART, EXTENDED: 9.4 G/DL (ref 13.5–17.5)
HEMOGLOBIN: 9 G/DL (ref 13.5–17.5)
LYMPHOCYTES ABSOLUTE: 1 K/UL (ref 1–5.1)
LYMPHOCYTES RELATIVE PERCENT: 8 %
MAGNESIUM: 2.3 MG/DL (ref 1.8–2.4)
MAGNESIUM: 2.6 MG/DL (ref 1.8–2.4)
MAGNESIUM: 2.6 MG/DL (ref 1.8–2.4)
MCH RBC QN AUTO: 26.9 PG (ref 26–34)
MCHC RBC AUTO-ENTMCNC: 31.5 G/DL (ref 31–36)
MCV RBC AUTO: 85.3 FL (ref 80–100)
METHEMOGLOBIN ARTERIAL: 0.9 %
MONOCYTES ABSOLUTE: 1.7 K/UL (ref 0–1.3)
MONOCYTES RELATIVE PERCENT: 13 %
NEUTROPHILS ABSOLUTE: 9.5 K/UL (ref 1.7–7.7)
NEUTROPHILS RELATIVE PERCENT: 73 %
O2 CONTENT ARTERIAL: 12 ML/DL
O2 SAT, ARTERIAL: 95.4 %
O2 THERAPY: ABNORMAL
PCO2 ARTERIAL: 56.6 MMHG (ref 35–45)
PDW BLD-RTO: 17 % (ref 12.4–15.4)
PERFORMED ON: ABNORMAL
PERFORMED ON: NORMAL
PERFORMED ON: NORMAL
PH ARTERIAL: 7.29 (ref 7.35–7.45)
PH VENOUS: 7.21 (ref 7.35–7.45)
PH VENOUS: 7.22 (ref 7.35–7.45)
PH VENOUS: 7.29 (ref 7.35–7.45)
PH VENOUS: 7.3 (ref 7.35–7.45)
PH VENOUS: 7.34 (ref 7.35–7.45)
PHOSPHORUS: 2.3 MG/DL (ref 2.5–4.9)
PHOSPHORUS: 2.9 MG/DL (ref 2.5–4.9)
PHOSPHORUS: 3.1 MG/DL (ref 2.5–4.9)
PHOSPHORUS: 3.1 MG/DL (ref 2.5–4.9)
PLATELET # BLD: 70 K/UL (ref 135–450)
PLATELET SLIDE REVIEW: ABNORMAL
PMV BLD AUTO: 9.1 FL (ref 5–10.5)
PO2 ARTERIAL: 84 MMHG (ref 75–108)
POTASSIUM SERPL-SCNC: 4.2 MMOL/L (ref 3.5–5.1)
POTASSIUM SERPL-SCNC: 4.3 MMOL/L (ref 3.5–5.1)
POTASSIUM SERPL-SCNC: 4.3 MMOL/L (ref 3.5–5.1)
POTASSIUM SERPL-SCNC: 4.7 MMOL/L (ref 3.5–5.1)
RBC # BLD: 3.35 M/UL (ref 4.2–5.9)
REASON FOR REJECTION: NORMAL
REJECTED TEST: NORMAL
SLIDE REVIEW: ABNORMAL
SODIUM BLD-SCNC: 137 MMOL/L (ref 136–145)
SODIUM BLD-SCNC: 138 MMOL/L (ref 136–145)
SODIUM BLD-SCNC: 139 MMOL/L (ref 136–145)
SODIUM BLD-SCNC: 140 MMOL/L (ref 136–145)
TCO2 ARTERIAL: 27.8 MMOL/L
TOTAL PROTEIN: 6.6 G/DL (ref 6.4–8.2)
WBC # BLD: 12.8 K/UL (ref 4–11)

## 2019-06-29 PROCEDURE — 6370000000 HC RX 637 (ALT 250 FOR IP): Performed by: INTERNAL MEDICINE

## 2019-06-29 PROCEDURE — 83735 ASSAY OF MAGNESIUM: CPT

## 2019-06-29 PROCEDURE — 99232 SBSQ HOSP IP/OBS MODERATE 35: CPT | Performed by: INTERNAL MEDICINE

## 2019-06-29 PROCEDURE — 99291 CRITICAL CARE FIRST HOUR: CPT | Performed by: INTERNAL MEDICINE

## 2019-06-29 PROCEDURE — 36415 COLL VENOUS BLD VENIPUNCTURE: CPT

## 2019-06-29 PROCEDURE — 80069 RENAL FUNCTION PANEL: CPT

## 2019-06-29 PROCEDURE — 85025 COMPLETE CBC W/AUTO DIFF WBC: CPT

## 2019-06-29 PROCEDURE — 37799 UNLISTED PX VASCULAR SURGERY: CPT

## 2019-06-29 PROCEDURE — 82803 BLOOD GASES ANY COMBINATION: CPT

## 2019-06-29 PROCEDURE — 6370000000 HC RX 637 (ALT 250 FOR IP): Performed by: NURSE PRACTITIONER

## 2019-06-29 PROCEDURE — 80076 HEPATIC FUNCTION PANEL: CPT

## 2019-06-29 PROCEDURE — 82330 ASSAY OF CALCIUM: CPT

## 2019-06-29 PROCEDURE — 2000000000 HC ICU R&B

## 2019-06-29 PROCEDURE — 2580000003 HC RX 258: Performed by: INTERNAL MEDICINE

## 2019-06-29 PROCEDURE — 2500000003 HC RX 250 WO HCPCS: Performed by: NURSE PRACTITIONER

## 2019-06-29 PROCEDURE — 6360000002 HC RX W HCPCS: Performed by: INTERNAL MEDICINE

## 2019-06-29 PROCEDURE — 6360000002 HC RX W HCPCS: Performed by: PHARMACIST

## 2019-06-29 PROCEDURE — P9047 ALBUMIN (HUMAN), 25%, 50ML: HCPCS | Performed by: INTERNAL MEDICINE

## 2019-06-29 PROCEDURE — 2580000003 HC RX 258: Performed by: PHARMACIST

## 2019-06-29 PROCEDURE — 94750 HC PULMONARY COMPLIANCE STUDY: CPT

## 2019-06-29 PROCEDURE — 2500000003 HC RX 250 WO HCPCS: Performed by: INTERNAL MEDICINE

## 2019-06-29 PROCEDURE — 80162 ASSAY OF DIGOXIN TOTAL: CPT

## 2019-06-29 PROCEDURE — 36592 COLLECT BLOOD FROM PICC: CPT

## 2019-06-29 PROCEDURE — 2580000003 HC RX 258: Performed by: NURSE PRACTITIONER

## 2019-06-29 PROCEDURE — 94640 AIRWAY INHALATION TREATMENT: CPT

## 2019-06-29 PROCEDURE — 90945 DIALYSIS ONE EVALUATION: CPT

## 2019-06-29 RX ORDER — LACTULOSE 10 G/15ML
20 SOLUTION ORAL 3 TIMES DAILY
Status: DISCONTINUED | OUTPATIENT
Start: 2019-06-29 | End: 2019-06-30

## 2019-06-29 RX ORDER — ALBUMIN (HUMAN) 12.5 G/50ML
25 SOLUTION INTRAVENOUS EVERY 8 HOURS
Status: COMPLETED | OUTPATIENT
Start: 2019-06-29 | End: 2019-06-30

## 2019-06-29 RX ORDER — ALBUMIN (HUMAN) 12.5 G/50ML
25 SOLUTION INTRAVENOUS EVERY 8 HOURS
Status: DISCONTINUED | OUTPATIENT
Start: 2019-06-29 | End: 2019-06-29

## 2019-06-29 RX ORDER — MINERAL OIL AND WHITE PETROLATUM 150; 830 MG/G; MG/G
OINTMENT OPHTHALMIC
Status: DISCONTINUED | OUTPATIENT
Start: 2019-06-29 | End: 2019-07-23 | Stop reason: HOSPADM

## 2019-06-29 RX ADMIN — FAMOTIDINE 20 MG: 10 INJECTION, SOLUTION INTRAVENOUS at 08:38

## 2019-06-29 RX ADMIN — METOPROLOL TARTRATE 12.5 MG: 25 TABLET ORAL at 05:41

## 2019-06-29 RX ADMIN — MUPIROCIN: 20 OINTMENT TOPICAL at 08:38

## 2019-06-29 RX ADMIN — Medication 50 MG: at 05:41

## 2019-06-29 RX ADMIN — Medication 1000 ML/HR: at 07:53

## 2019-06-29 RX ADMIN — Medication 1000 ML/HR: at 18:52

## 2019-06-29 RX ADMIN — ALBUMIN (HUMAN) 25 G: 0.25 INJECTION, SOLUTION INTRAVENOUS at 13:06

## 2019-06-29 RX ADMIN — Medication 50 MG: at 21:30

## 2019-06-29 RX ADMIN — INSULIN LISPRO 3 UNITS: 100 INJECTION, SOLUTION INTRAVENOUS; SUBCUTANEOUS at 08:37

## 2019-06-29 RX ADMIN — Medication 1000 ML/HR: at 03:02

## 2019-06-29 RX ADMIN — Medication 1500 ML/HR: at 12:12

## 2019-06-29 RX ADMIN — MEROPENEM 500 MG: 500 INJECTION INTRAVENOUS at 20:33

## 2019-06-29 RX ADMIN — Medication 1500 ML/HR: at 15:01

## 2019-06-29 RX ADMIN — IPRATROPIUM BROMIDE AND ALBUTEROL SULFATE 1 AMPULE: .5; 3 SOLUTION RESPIRATORY (INHALATION) at 04:49

## 2019-06-29 RX ADMIN — Medication 50 MCG/HR: at 18:56

## 2019-06-29 RX ADMIN — SODIUM CHLORIDE 0.4 MCG/KG/HR: 9 INJECTION, SOLUTION INTRAVENOUS at 14:52

## 2019-06-29 RX ADMIN — ALBUMIN (HUMAN) 25 G: 0.25 INJECTION, SOLUTION INTRAVENOUS at 19:48

## 2019-06-29 RX ADMIN — Medication 1000 ML/HR: at 23:28

## 2019-06-29 RX ADMIN — Medication 1000 ML/HR: at 03:05

## 2019-06-29 RX ADMIN — IPRATROPIUM BROMIDE AND ALBUTEROL SULFATE 1 AMPULE: .5; 3 SOLUTION RESPIRATORY (INHALATION) at 20:17

## 2019-06-29 RX ADMIN — Medication 10 ML: at 20:32

## 2019-06-29 RX ADMIN — MUPIROCIN: 20 OINTMENT TOPICAL at 20:34

## 2019-06-29 RX ADMIN — SODIUM PHOSPHATE, MONOBASIC, MONOHYDRATE 6 MMOL: 276; 142 INJECTION, SOLUTION INTRAVENOUS at 09:12

## 2019-06-29 RX ADMIN — HEPARIN SODIUM 5000 UNITS: 5000 INJECTION INTRAVENOUS; SUBCUTANEOUS at 05:41

## 2019-06-29 RX ADMIN — CHLORHEXIDINE GLUCONATE 0.12% ORAL RINSE 15 ML: 1.2 LIQUID ORAL at 08:38

## 2019-06-29 RX ADMIN — IPRATROPIUM BROMIDE AND ALBUTEROL SULFATE 1 AMPULE: .5; 3 SOLUTION RESPIRATORY (INHALATION) at 00:29

## 2019-06-29 RX ADMIN — Medication 1000 ML/HR: at 12:12

## 2019-06-29 RX ADMIN — WHITE PETROLATUM 57.7 %-MINERAL OIL 31.9 % EYE OINTMENT: at 17:27

## 2019-06-29 RX ADMIN — LACTULOSE 20 G: 20 SOLUTION ORAL at 14:45

## 2019-06-29 RX ADMIN — Medication 50 MCG/HR: at 04:17

## 2019-06-29 RX ADMIN — Medication 50 MG: at 14:45

## 2019-06-29 RX ADMIN — LACTULOSE 20 G: 20 SOLUTION ORAL at 08:38

## 2019-06-29 RX ADMIN — MEROPENEM 500 MG: 500 INJECTION INTRAVENOUS at 15:02

## 2019-06-29 RX ADMIN — LACTULOSE 20 G: 20 SOLUTION ORAL at 20:32

## 2019-06-29 RX ADMIN — Medication 1500 ML/HR: at 22:38

## 2019-06-29 RX ADMIN — HEPARIN SODIUM 5000 UNITS: 5000 INJECTION INTRAVENOUS; SUBCUTANEOUS at 21:30

## 2019-06-29 RX ADMIN — CHLORHEXIDINE GLUCONATE 0.12% ORAL RINSE 15 ML: 1.2 LIQUID ORAL at 20:32

## 2019-06-29 RX ADMIN — FENTANYL CITRATE 25 MCG: 0.05 INJECTION, SOLUTION INTRAMUSCULAR; INTRAVENOUS at 02:01

## 2019-06-29 RX ADMIN — FENTANYL CITRATE 25 MCG: 0.05 INJECTION, SOLUTION INTRAMUSCULAR; INTRAVENOUS at 20:27

## 2019-06-29 RX ADMIN — Medication 1000 ML/HR: at 18:06

## 2019-06-29 RX ADMIN — MEROPENEM 500 MG: 500 INJECTION INTRAVENOUS at 09:12

## 2019-06-29 RX ADMIN — MOMETASONE FUROATE AND FORMOTEROL FUMARATE DIHYDRATE 2 PUFF: 100; 5 AEROSOL RESPIRATORY (INHALATION) at 20:17

## 2019-06-29 RX ADMIN — MOMETASONE FUROATE AND FORMOTEROL FUMARATE DIHYDRATE 2 PUFF: 100; 5 AEROSOL RESPIRATORY (INHALATION) at 07:37

## 2019-06-29 RX ADMIN — IPRATROPIUM BROMIDE AND ALBUTEROL SULFATE 1 AMPULE: .5; 3 SOLUTION RESPIRATORY (INHALATION) at 07:35

## 2019-06-29 RX ADMIN — Medication 10 ML: at 21:00

## 2019-06-29 RX ADMIN — IPRATROPIUM BROMIDE AND ALBUTEROL SULFATE 1 AMPULE: .5; 3 SOLUTION RESPIRATORY (INHALATION) at 15:23

## 2019-06-29 RX ADMIN — Medication 1000 ML/HR: at 03:07

## 2019-06-29 RX ADMIN — Medication 2 CAPSULE: at 08:38

## 2019-06-29 RX ADMIN — INSULIN LISPRO 3 UNITS: 100 INJECTION, SOLUTION INTRAVENOUS; SUBCUTANEOUS at 11:41

## 2019-06-29 RX ADMIN — Medication 1500 ML/HR: at 19:20

## 2019-06-29 RX ADMIN — IPRATROPIUM BROMIDE AND ALBUTEROL SULFATE 1 AMPULE: .5; 3 SOLUTION RESPIRATORY (INHALATION) at 11:35

## 2019-06-29 RX ADMIN — MEROPENEM 500 MG: 500 INJECTION INTRAVENOUS at 03:14

## 2019-06-29 RX ADMIN — SODIUM CHLORIDE 0.4 MCG/KG/HR: 9 INJECTION, SOLUTION INTRAVENOUS at 08:37

## 2019-06-29 RX ADMIN — Medication 2 CAPSULE: at 17:26

## 2019-06-29 RX ADMIN — HEPARIN SODIUM 5000 UNITS: 5000 INJECTION INTRAVENOUS; SUBCUTANEOUS at 14:45

## 2019-06-29 RX ADMIN — CALCIUM GLUCONATE 1 G: 98 INJECTION, SOLUTION INTRAVENOUS at 16:14

## 2019-06-29 ASSESSMENT — PAIN SCALES - GENERAL
PAINLEVEL_OUTOF10: 0

## 2019-06-29 ASSESSMENT — PULMONARY FUNCTION TESTS
PIF_VALUE: 17
PIF_VALUE: 21
PIF_VALUE: 36
PIF_VALUE: 15
PIF_VALUE: 23
PIF_VALUE: 21
PIF_VALUE: 39
PIF_VALUE: 23
PIF_VALUE: 36
PIF_VALUE: 27
PIF_VALUE: 30
PIF_VALUE: 35
PIF_VALUE: 21
PIF_VALUE: 16
PIF_VALUE: 25
PIF_VALUE: 21
PIF_VALUE: 36
PIF_VALUE: 28
PIF_VALUE: 21
PIF_VALUE: 32
PIF_VALUE: 17
PIF_VALUE: 21
PIF_VALUE: 25
PIF_VALUE: 36
PIF_VALUE: 35
PIF_VALUE: 21
PIF_VALUE: 21
PIF_VALUE: 18
PIF_VALUE: 21
PIF_VALUE: 20
PIF_VALUE: 17
PIF_VALUE: 26
PIF_VALUE: 35
PIF_VALUE: 32
PIF_VALUE: 35

## 2019-06-29 NOTE — PLAN OF CARE
Problem: Restraint Use - Nonviolent/Non-Self-Destructive Behavior:  Goal: Absence of restraint indications  Description  Absence of restraint indications  Outcome: Ongoing  Note:   Pt on bilateral soft wrist restraints for safety reasons. Problem: Risk for Impaired Skin Integrity  Goal: Tissue integrity - skin and mucous membranes  Description  Structural intactness and normal physiological function of skin and  mucous membranes. Outcome: Ongoing  Note:   Reposition was performed every two hours and PRN basis. Skin care was provided. No signs of new skin injury was noted. Problem: Falls - Risk of:  Goal: Will remain free from falls  Description  Will remain free from falls  Outcome: Ongoing  Note:   Side rails up x 3. Bed locked and low position. Falling star program remains in place. Call light and personal belongings within reach. Frequent visual monitoring continues. Toileting program inplace. Patient assisted in turning/repositioning at least once every 2 hours, and on a prn basis. Problem: OXYGENATION/RESPIRATORY FUNCTION  Goal: Patient will maintain patent airway  Outcome: Ongoing  Note:   Pt on mechanical ventilator keeping O2 sat greater than 92%. Problem: Nutrition  Goal: Optimal nutrition therapy  Outcome: Ongoing  Note:   Pt on feeding tube with nepro at 35 ml/hr. Tolerating well.

## 2019-06-29 NOTE — PROGRESS NOTES
Component Value Date/Time    WBC 12.8 (H) 06/29/2019 06:10 AM    HGB 9.0 (L) 06/29/2019 06:10 AM    HCT 28.6 (L) 06/29/2019 06:10 AM    PLT 70 (L) 06/29/2019 06:10 AM       Assessment/Plan:  Reviewed old records and labs. 1) REJI - Seen on CRRT - will remove volume as BP tolerates.  Increased Prepump Fluid to 1500 ml/hr    2) Respiratory failure - Vent    3) ID - on meropenem    4) Septic Shock - back on 4 mcg of Levophed (discrepancy between A line and cuff)    5) Anemia - monitor

## 2019-06-29 NOTE — PROGRESS NOTES
PRN   mometasone-formoterol (DULERA) 100-5 MCG/ACT inhaler 2 puff BID   sodium chloride flush 0.9 % injection 10 mL 2 times per day   sodium chloride flush 0.9 % injection 10 mL PRN   ondansetron (ZOFRAN) injection 4 mg Q4H PRN   acetaminophen (TYLENOL) suppository 650 mg Q4H PRN   glucose (GLUTOSE) 40 % oral gel 15 g PRN   dextrose 50 % IV solution PRN   glucagon (rDNA) injection 1 mg PRN   dextrose 5 % solution PRN   ipratropium-albuterol (DUONEB) nebulizer solution 1 ampule Q4H   fentaNYL 10 mcg/mL infusion Continuous   fentaNYL (SUBLIMAZE) injection 25 mcg Q1H PRN   chlorhexidine (PERIDEX) 0.12 % solution 15 mL BID   famotidine (PEPCID) injection 20 mg Daily   prismaSATE BGK 4/2.5 dialysis solution Continuous   prismaSATE BGK 4/2.5 dialysis solution Continuous   prismaSATE BGK 4/2.5 dialysis solution Continuous   potassium chloride 20 mEq/50 mL IVPB (Central Line) PRN   magnesium sulfate 1 g in dextrose 5% 100 mL IVPB PRN   sodium phosphate 6 mmol in dextrose 5 % 250 mL IVPB PRN   Or    sodium phosphate 12 mmol in dextrose 5 % 250 mL IVPB PRN   Or    sodium phosphate 18 mmol in dextrose 5 % 500 mL IVPB PRN   Or    sodium phosphate 24 mmol in dextrose 5 % 500 mL IVPB PRN   calcium gluconate 1 g in dextrose 5 % 100 mL IVPB PRN   Or    calcium gluconate 2 g in dextrose 5 % 100 mL IVPB PRN   Or    calcium gluconate 3 g in dextrose 5 % 100 mL IVPB PRN   Or    calcium gluconate 4 g in dextrose 5 % 100 mL IVPB PRN   meropenem (MERREM) 500 mg IVPB minibag Q6H   norepinephrine (LEVOPHED) 16 mg in dextrose 5% 250 mL infusion Continuous       Allergies:  Rocephin [ceftriaxone]           Review of Systems: SEE HPI   · None available      Objective:     PHYSICAL EXAM:      Vitals:    06/29/19 1000   BP: (!) 90/51   Pulse: 107   Resp: 16   Temp:    SpO2:     Weight: 249 lb 5.4 oz (113.1 kg)       General Appearance: On ventilator. Head:  Normocephalic, without obvious abnormality, atraumatic.    Eyes:  Pupils equal and

## 2019-06-29 NOTE — PROGRESS NOTES
specified injury of inferior mesenteric vein, initial encounter    Postprocedural hypotension    Acute respiratory failure with hypercapnia (HCC)    High anion gap metabolic acidosis    Centrilobular emphysema (HCC)    Hypomagnesemia    Heart failure, acute on chronic, systolic and diastolic (HCC)    Persistent atrial fibrillation (HCC)    Anemia    DMII (diabetes mellitus, type 2) (HCC)    Constipation    Hypokalemia    Acute on chronic systolic heart failure (HCC)    Atrial fibrillation, rapid (HCC)    COPD with acute exacerbation (HCC)    Cocaine use    Severe sepsis (HCC)    Atrial fibrillation with RVR (HCC)    Hyponatremia    Acute on chronic respiratory failure with hypoxia (HCC)    Respiratory distress    CHF, left ventricular (HCC)    Nicotine dependence    Suspected sleep apnea    Coronary artery disease involving native coronary artery of native heart without angina pectoris    CAD (coronary artery disease)    Acute renal failure (ARF) (HCC)    Morbid obesity due to excess calories (HCC)    Acute respiratory failure with hypoxia (HCC)    Nocturnal hypoxia    Hypercapnemia    SOB (shortness of breath)    Acute on chronic respiratory failure with hypercapnia (HCC)    Chronic respiratory failure with hypercapnia (HCC)    Acute on chronic systolic (congestive) heart failure (HCC)    Hx of AKA (above knee amputation), left (HCC)    Respiratory failure (HCC)    Pneumonia    Ventricular tachycardia (HCC)    Shock circulatory (HCC)    Tachypnea    Neutrophilic leukocytosis    Chronic respiratory failure with hypoxia (HCC)    Cardiac arrest (HCC)         Presenting symptoms:  shortness of breath        Diagnostic workup:      CONSULTS DURING ADMISSION :   IP CONSULT TO CRITICAL CARE  IP CONSULT TO NEPHROLOGY  IP CONSULT TO NEPHROLOGY  IP CONSULT TO CARDIOLOGY  IP CONSULT TO PALLIATIVE CARE  IP CONSULT TO HEART FAILURE NURSE/COORDINATOR  IP CONSULT TO ([address]    Hypotension    Vasovagal syncope    Laceration of scalp without foreign body    Nonischemic cardiomyopathy ([address]    Syncope and collapse    Ischemic foot    Diabetes mellitus with peripheral circulatory disorder (HCC)    Limb ischemia    COPD exacerbation (HCC)    Nonhealing surgical wound    Acromioclavicular joint arthritis    Bradycardia    Shortness of breath    Permanent atrial fibrillation (HCC)    Atrial fibrillation, chronic (HCC)    Other specified injury of inferior mesenteric vein, initial encounter    Postprocedural hypotension    Acute respiratory failure with hypercapnia (HCC)    High anion gap metabolic acidosis    Centrilobular emphysema (HCC)    Hypomagnesemia    Heart failure, acute on chronic, systolic and diastolic (HCC)    Persistent atrial fibrillation (HCC)    Anemia    DMII (diabetes mellitus, type 2) (HCC)    Constipation    Hypokalemia    Acute on chronic systolic heart failure (HCC)    Atrial fibrillation, rapid (HCC)    COPD with acute exacerbation (HCC)    Cocaine use    Severe sepsis (HCC)    Atrial fibrillation with RVR (HCC)    Hyponatremia    Acute on chronic respiratory failure with hypoxia (HCC)    Respiratory distress    CHF, left ventricular (HCC)    Nicotine dependence    Suspected sleep apnea    Coronary artery disease involving native coronary artery of native heart without angina pectoris    CAD (coronary artery disease)    Acute renal failure (ARF) ([address]    Morbid obesity due to excess calories (HCC)    Acute respiratory failure with hypoxia (HCC)    Nocturnal hypoxia    Hypercapnemia    SOB (shortness of breath)    Acute on chronic respiratory failure with hypercapnia (HCC)    Chronic respiratory failure with hypercapnia (HCC)    Acute on chronic systolic (congestive) heart failure (HCC)    Hx of AKA (above knee amputation), left (HCC)    Respiratory failure (HCC)    Pneumonia    Ventricular tachycardia ([address]  Shock circulatory (HCC)    Tachypnea    Neutrophilic leukocytosis    Chronic respiratory failure with hypoxia (HCC)    Cardiac arrest (HCC)       Allergies  Rocephin [ceftriaxone]    Medications    Scheduled Meds:   heparin (porcine)  5,000 Units Subcutaneous 3 times per day    sodium chloride flush  10 mL Intravenous 2 times per day    metoprolol tartrate  12.5 mg Oral 4 times per day    darbepoetin vinay-polysorbate  60 mcg Subcutaneous Weekly - Thursday    insulin glargine  10 Units Subcutaneous Nightly    gabapentin  50 mg Oral 3 times per day    insulin lispro  0-18 Units Subcutaneous Q4H    lactobacillus  2 capsule Oral BID WC    mometasone-formoterol  2 puff Inhalation BID    sodium chloride flush  10 mL Intravenous 2 times per day    ipratropium-albuterol  1 ampule Inhalation Q4H    chlorhexidine  15 mL Mouth/Throat BID    famotidine (PEPCID) injection  20 mg Intravenous Daily    meropenem  500 mg Intravenous Q6H     Continuous Infusions:   sodium chloride 5 mL/hr at 06/28/19 1811    dexmedetomidine (PRECEDEX) IV infusion 0.4 mcg/kg/hr (06/28/19 2345)    dextrose      fentaNYL 50 mcg/hr (06/29/19 0417)    prismaSATE BGK 4/2.5 1,000 mL/hr (06/29/19 0302)    prismaSATE BGK 4/2.5 1,000 mL/hr (06/29/19 0307)    prismaSATE BGK 4/2.5 1,000 mL/hr (06/29/19 0305)    norepinephrine 2 mcg/min (06/29/19 0600)     PRN Meds:  metoprolol, sodium chloride flush, iopamidol, albuterol, sodium chloride flush, ondansetron, acetaminophen, glucose, dextrose, glucagon (rDNA), dextrose, fentanNYL, potassium chloride, magnesium sulfate, sodium phosphate IVPB **OR** sodium phosphate IVPB **OR** sodium phosphate IVPB **OR** sodium phosphate IVPB, calcium gluconate IVPB **OR** calcium gluconate IVPB **OR** calcium gluconate IVPB **OR** calcium gluconate IVPB    Vitals   Vitals /wt   Patient Vitals for the past 8 hrs:   BP Temp Temp src Pulse Resp SpO2 Weight   06/29/19 0600 107/73 99.7 °F (37.6 °C) Rectal 114 28 -- 249 lb 5.4 oz (113.1 kg)   06/29/19 0500 -- -- -- 107 28 -- --   06/29/19 0450 -- -- -- 105 17 -- --   06/29/19 0400 102/79 100.2 °F (37.9 °C) Rectal 99 28 99 % --   06/29/19 0300 111/76 -- -- 103 28 98 % --   06/29/19 0200 98/62 100.2 °F (37.9 °C) Rectal 128 28 94 % --   06/29/19 0100 -- -- -- 111 28 -- --   06/29/19 0031 -- -- -- -- 24 -- --   06/29/19 0000 (!) 70/55 99.9 °F (37.7 °C) Rectal 107 12 96 % --   06/28/19 2341 (!) 100/59 -- -- 104 -- -- --        72HR INTAKE/OUTPUT:      Intake/Output Summary (Last 24 hours) at 6/29/2019 0730  Last data filed at 6/29/2019 0700  Gross per 24 hour   Intake 2273.2 ml   Output 2895 ml   Net -621.8 ml       Exam:    Gen:   Patient sedated on the ventilator  Eyes: PERRL. No sclera icterus. No conjunctival injection. ENT: No discharge. Pharynx clear. External appearance of ears and nose normal.  Neck: Trachea midline. No obvious mass. Resp: No accessory muscle use. No crackles. No wheezes. No rhonchi. CV: Regular rate. Regular rhythm. No murmur or rub. No edema. GI: Non-tender. Non-distended. No hernia. Skin: Warm, dry, normal texture and turgor. Lymph: No cervical LAD. No supraclavicular LAD. M/S: / Ext. No cyanosis. No clubbing. No joint deformity. Neuro: CN 2-12 are intact,  no neurologic deficits noted. PT/INR:   Recent Labs     06/27/19 0624   PROTIME 17.1*   INR 1.50*     APTT:   No results for input(s): APTT in the last 72 hours.     CBC:   Recent Labs     06/27/19  0624 06/28/19  0600 06/29/19  0610   WBC 10.3 8.8 12.8*   HGB 8.5* 8.4* 9.0*   HCT 26.5* 26.8* 28.6*   MCV 84.7 85.5 85.3   * 75* 70*       BMP:   Recent Labs     06/28/19  1230 06/28/19  1815 06/29/19  0023 06/29/19  0610    138 139 138   K 4.6 4.3 4.7 4.3    105 104 103   CO2 25 26 25 25   PHOS 2.0* 2.0* 2.9 2.3*   BUN 13 15 15 17   CREATININE 1.2 1.1 1.3 1.1       LIVER PROFILE:   Recent Labs     06/27/19  0624 06/28/19  0600 06/29/19  0610   ALKPHOS 122

## 2019-06-30 ENCOUNTER — APPOINTMENT (OUTPATIENT)
Dept: GENERAL RADIOLOGY | Age: 59
DRG: 720 | End: 2019-06-30
Payer: COMMERCIAL

## 2019-06-30 LAB
ALBUMIN SERPL-MCNC: 3.7 G/DL (ref 3.4–5)
ALBUMIN SERPL-MCNC: 3.8 G/DL (ref 3.4–5)
ALBUMIN SERPL-MCNC: 4 G/DL (ref 3.4–5)
ALBUMIN SERPL-MCNC: 4.1 G/DL (ref 3.4–5)
ALP BLD-CCNC: 135 U/L (ref 40–129)
ALT SERPL-CCNC: 520 U/L (ref 10–40)
ANION GAP SERPL CALCULATED.3IONS-SCNC: 10 MMOL/L (ref 3–16)
ANION GAP SERPL CALCULATED.3IONS-SCNC: 12 MMOL/L (ref 3–16)
ANION GAP SERPL CALCULATED.3IONS-SCNC: 12 MMOL/L (ref 3–16)
ANION GAP SERPL CALCULATED.3IONS-SCNC: 8 MMOL/L (ref 3–16)
AST SERPL-CCNC: 71 U/L (ref 15–37)
BASE EXCESS ARTERIAL: -1 MMOL/L (ref -3–3)
BASOPHILS ABSOLUTE: 0 K/UL (ref 0–0.2)
BASOPHILS RELATIVE PERCENT: 0.3 %
BILIRUB SERPL-MCNC: 1.6 MG/DL (ref 0–1)
BILIRUBIN DIRECT: 1 MG/DL (ref 0–0.3)
BILIRUBIN, INDIRECT: 0.6 MG/DL (ref 0–1)
BUN BLDV-MCNC: 13 MG/DL (ref 7–20)
BUN BLDV-MCNC: 15 MG/DL (ref 7–20)
BUN BLDV-MCNC: 16 MG/DL (ref 7–20)
BUN BLDV-MCNC: 16 MG/DL (ref 7–20)
CALCIUM IONIZED: 1.16 MMOL/L (ref 1.12–1.32)
CALCIUM IONIZED: 1.18 MMOL/L (ref 1.12–1.32)
CALCIUM IONIZED: 1.19 MMOL/L (ref 1.12–1.32)
CALCIUM IONIZED: 1.19 MMOL/L (ref 1.12–1.32)
CALCIUM SERPL-MCNC: 8.2 MG/DL (ref 8.3–10.6)
CALCIUM SERPL-MCNC: 8.7 MG/DL (ref 8.3–10.6)
CALCIUM SERPL-MCNC: 8.7 MG/DL (ref 8.3–10.6)
CALCIUM SERPL-MCNC: 8.8 MG/DL (ref 8.3–10.6)
CARBOXYHEMOGLOBIN ARTERIAL: 1.5 % (ref 0–1.5)
CHLORIDE BLD-SCNC: 100 MMOL/L (ref 99–110)
CHLORIDE BLD-SCNC: 101 MMOL/L (ref 99–110)
CHLORIDE BLD-SCNC: 102 MMOL/L (ref 99–110)
CHLORIDE BLD-SCNC: 99 MMOL/L (ref 99–110)
CO2: 25 MMOL/L (ref 21–32)
CO2: 25 MMOL/L (ref 21–32)
CO2: 26 MMOL/L (ref 21–32)
CO2: 27 MMOL/L (ref 21–32)
CREAT SERPL-MCNC: 1 MG/DL (ref 0.9–1.3)
CREAT SERPL-MCNC: 1.1 MG/DL (ref 0.9–1.3)
EOSINOPHILS ABSOLUTE: 0.2 K/UL (ref 0–0.6)
EOSINOPHILS RELATIVE PERCENT: 1.4 %
GFR AFRICAN AMERICAN: >60
GFR NON-AFRICAN AMERICAN: >60
GLUCOSE BLD-MCNC: 104 MG/DL (ref 70–99)
GLUCOSE BLD-MCNC: 110 MG/DL (ref 70–99)
GLUCOSE BLD-MCNC: 118 MG/DL (ref 70–99)
GLUCOSE BLD-MCNC: 120 MG/DL (ref 70–99)
GLUCOSE BLD-MCNC: 136 MG/DL (ref 70–99)
GLUCOSE BLD-MCNC: 144 MG/DL (ref 70–99)
GLUCOSE BLD-MCNC: 152 MG/DL (ref 70–99)
GLUCOSE BLD-MCNC: 158 MG/DL (ref 70–99)
GLUCOSE BLD-MCNC: 50 MG/DL (ref 70–99)
GLUCOSE BLD-MCNC: 56 MG/DL (ref 70–99)
GLUCOSE BLD-MCNC: 86 MG/DL (ref 70–99)
HCO3 ARTERIAL: 25.4 MMOL/L (ref 21–29)
HCT VFR BLD CALC: 27.1 % (ref 40.5–52.5)
HEMATOLOGY PATH CONSULT: NO
HEMOGLOBIN, ART, EXTENDED: 8.7 G/DL (ref 13.5–17.5)
HEMOGLOBIN: 8.5 G/DL (ref 13.5–17.5)
LYMPHOCYTES ABSOLUTE: 0.8 K/UL (ref 1–5.1)
LYMPHOCYTES RELATIVE PERCENT: 6.6 %
MAGNESIUM: 2.5 MG/DL (ref 1.8–2.4)
MAGNESIUM: 2.6 MG/DL (ref 1.8–2.4)
MCH RBC QN AUTO: 27.1 PG (ref 26–34)
MCHC RBC AUTO-ENTMCNC: 31.2 G/DL (ref 31–36)
MCV RBC AUTO: 86.7 FL (ref 80–100)
METHEMOGLOBIN ARTERIAL: 0.9 %
MONOCYTES ABSOLUTE: 1.9 K/UL (ref 0–1.3)
MONOCYTES RELATIVE PERCENT: 15.1 %
NEUTROPHILS ABSOLUTE: 9.5 K/UL (ref 1.7–7.7)
NEUTROPHILS RELATIVE PERCENT: 76.6 %
O2 CONTENT ARTERIAL: 12 ML/DL
O2 SAT, ARTERIAL: 96.7 %
O2 THERAPY: ABNORMAL
PCO2 ARTERIAL: 54.7 MMHG (ref 35–45)
PDW BLD-RTO: 16.6 % (ref 12.4–15.4)
PERFORMED ON: ABNORMAL
PERFORMED ON: NORMAL
PH ARTERIAL: 7.29 (ref 7.35–7.45)
PH VENOUS: 7.21 (ref 7.35–7.45)
PH VENOUS: 7.22 (ref 7.35–7.45)
PH VENOUS: 7.23 (ref 7.35–7.45)
PH VENOUS: 7.23 (ref 7.35–7.45)
PHOSPHORUS: 2.4 MG/DL (ref 2.5–4.9)
PHOSPHORUS: 2.4 MG/DL (ref 2.5–4.9)
PHOSPHORUS: 2.5 MG/DL (ref 2.5–4.9)
PHOSPHORUS: 2.8 MG/DL (ref 2.5–4.9)
PLATELET # BLD: 67 K/UL (ref 135–450)
PMV BLD AUTO: 9.8 FL (ref 5–10.5)
PO2 ARTERIAL: 88.4 MMHG (ref 75–108)
POTASSIUM SERPL-SCNC: 4 MMOL/L (ref 3.5–5.1)
POTASSIUM SERPL-SCNC: 4.1 MMOL/L (ref 3.5–5.1)
POTASSIUM SERPL-SCNC: 4.3 MMOL/L (ref 3.5–5.1)
POTASSIUM SERPL-SCNC: 4.3 MMOL/L (ref 3.5–5.1)
RBC # BLD: 3.12 M/UL (ref 4.2–5.9)
SODIUM BLD-SCNC: 135 MMOL/L (ref 136–145)
SODIUM BLD-SCNC: 136 MMOL/L (ref 136–145)
SODIUM BLD-SCNC: 137 MMOL/L (ref 136–145)
SODIUM BLD-SCNC: 139 MMOL/L (ref 136–145)
TCO2 ARTERIAL: 27 MMOL/L
TOTAL PROTEIN: 7.5 G/DL (ref 6.4–8.2)
WBC # BLD: 12.4 K/UL (ref 4–11)

## 2019-06-30 PROCEDURE — 6360000002 HC RX W HCPCS: Performed by: INTERNAL MEDICINE

## 2019-06-30 PROCEDURE — 2500000003 HC RX 250 WO HCPCS: Performed by: INTERNAL MEDICINE

## 2019-06-30 PROCEDURE — 82803 BLOOD GASES ANY COMBINATION: CPT

## 2019-06-30 PROCEDURE — 80076 HEPATIC FUNCTION PANEL: CPT

## 2019-06-30 PROCEDURE — 2580000003 HC RX 258: Performed by: NURSE PRACTITIONER

## 2019-06-30 PROCEDURE — 71045 X-RAY EXAM CHEST 1 VIEW: CPT

## 2019-06-30 PROCEDURE — 2580000003 HC RX 258: Performed by: INTERNAL MEDICINE

## 2019-06-30 PROCEDURE — 6370000000 HC RX 637 (ALT 250 FOR IP): Performed by: INTERNAL MEDICINE

## 2019-06-30 PROCEDURE — 6370000000 HC RX 637 (ALT 250 FOR IP): Performed by: NURSE PRACTITIONER

## 2019-06-30 PROCEDURE — 82330 ASSAY OF CALCIUM: CPT

## 2019-06-30 PROCEDURE — 94761 N-INVAS EAR/PLS OXIMETRY MLT: CPT

## 2019-06-30 PROCEDURE — 94640 AIRWAY INHALATION TREATMENT: CPT

## 2019-06-30 PROCEDURE — 2580000003 HC RX 258: Performed by: PHARMACIST

## 2019-06-30 PROCEDURE — 83735 ASSAY OF MAGNESIUM: CPT

## 2019-06-30 PROCEDURE — 80069 RENAL FUNCTION PANEL: CPT

## 2019-06-30 PROCEDURE — 6360000002 HC RX W HCPCS: Performed by: PHARMACIST

## 2019-06-30 PROCEDURE — P9047 ALBUMIN (HUMAN), 25%, 50ML: HCPCS | Performed by: INTERNAL MEDICINE

## 2019-06-30 PROCEDURE — 99291 CRITICAL CARE FIRST HOUR: CPT | Performed by: INTERNAL MEDICINE

## 2019-06-30 PROCEDURE — 2000000000 HC ICU R&B

## 2019-06-30 PROCEDURE — 90945 DIALYSIS ONE EVALUATION: CPT

## 2019-06-30 PROCEDURE — 74018 RADEX ABDOMEN 1 VIEW: CPT

## 2019-06-30 PROCEDURE — 2500000003 HC RX 250 WO HCPCS: Performed by: NURSE PRACTITIONER

## 2019-06-30 PROCEDURE — 2700000000 HC OXYGEN THERAPY PER DAY

## 2019-06-30 PROCEDURE — 94750 HC PULMONARY COMPLIANCE STUDY: CPT

## 2019-06-30 PROCEDURE — 85025 COMPLETE CBC W/AUTO DIFF WBC: CPT

## 2019-06-30 RX ORDER — ALBUMIN (HUMAN) 12.5 G/50ML
25 SOLUTION INTRAVENOUS EVERY 8 HOURS
Status: COMPLETED | OUTPATIENT
Start: 2019-06-30 | End: 2019-07-01

## 2019-06-30 RX ADMIN — IPRATROPIUM BROMIDE AND ALBUTEROL SULFATE 1 AMPULE: .5; 3 SOLUTION RESPIRATORY (INHALATION) at 12:04

## 2019-06-30 RX ADMIN — Medication 2 CAPSULE: at 07:50

## 2019-06-30 RX ADMIN — INSULIN LISPRO 3 UNITS: 100 INJECTION, SOLUTION INTRAVENOUS; SUBCUTANEOUS at 12:30

## 2019-06-30 RX ADMIN — Medication 1000 ML/HR: at 05:10

## 2019-06-30 RX ADMIN — SODIUM CHLORIDE 0.04 MCG/KG/HR: 9 INJECTION, SOLUTION INTRAVENOUS at 00:33

## 2019-06-30 RX ADMIN — METOPROLOL TARTRATE 5 MG: 5 INJECTION INTRAVENOUS at 21:30

## 2019-06-30 RX ADMIN — METOPROLOL TARTRATE 12.5 MG: 25 TABLET ORAL at 05:18

## 2019-06-30 RX ADMIN — Medication 2 CAPSULE: at 17:45

## 2019-06-30 RX ADMIN — HEPARIN SODIUM 5000 UNITS: 5000 INJECTION INTRAVENOUS; SUBCUTANEOUS at 12:13

## 2019-06-30 RX ADMIN — IPRATROPIUM BROMIDE AND ALBUTEROL SULFATE 1 AMPULE: .5; 3 SOLUTION RESPIRATORY (INHALATION) at 08:22

## 2019-06-30 RX ADMIN — SODIUM PHOSPHATE, MONOBASIC, MONOHYDRATE 6 MMOL: 276; 142 INJECTION, SOLUTION INTRAVENOUS at 14:15

## 2019-06-30 RX ADMIN — SODIUM CHLORIDE 0.4 MCG/KG/HR: 9 INJECTION, SOLUTION INTRAVENOUS at 10:35

## 2019-06-30 RX ADMIN — Medication 1500 ML/HR: at 12:54

## 2019-06-30 RX ADMIN — Medication 1500 ML/HR: at 09:28

## 2019-06-30 RX ADMIN — ALBUMIN (HUMAN) 25 G: 0.25 INJECTION, SOLUTION INTRAVENOUS at 12:13

## 2019-06-30 RX ADMIN — MOMETASONE FUROATE AND FORMOTEROL FUMARATE DIHYDRATE 2 PUFF: 100; 5 AEROSOL RESPIRATORY (INHALATION) at 19:33

## 2019-06-30 RX ADMIN — METOPROLOL TARTRATE 12.5 MG: 25 TABLET ORAL at 12:13

## 2019-06-30 RX ADMIN — Medication 1500 ML/HR: at 02:34

## 2019-06-30 RX ADMIN — Medication 1000 ML/HR: at 10:20

## 2019-06-30 RX ADMIN — Medication 1000 ML/HR: at 21:16

## 2019-06-30 RX ADMIN — Medication 1500 ML/HR: at 20:19

## 2019-06-30 RX ADMIN — FENTANYL CITRATE 25 MCG: 0.05 INJECTION, SOLUTION INTRAMUSCULAR; INTRAVENOUS at 04:33

## 2019-06-30 RX ADMIN — IPRATROPIUM BROMIDE AND ALBUTEROL SULFATE 1 AMPULE: .5; 3 SOLUTION RESPIRATORY (INHALATION) at 15:45

## 2019-06-30 RX ADMIN — FENTANYL CITRATE 25 MCG: 0.05 INJECTION, SOLUTION INTRAMUSCULAR; INTRAVENOUS at 00:01

## 2019-06-30 RX ADMIN — HEPARIN SODIUM 5000 UNITS: 5000 INJECTION INTRAVENOUS; SUBCUTANEOUS at 05:17

## 2019-06-30 RX ADMIN — Medication 1000 ML/HR: at 21:18

## 2019-06-30 RX ADMIN — CHLORHEXIDINE GLUCONATE 0.12% ORAL RINSE 15 ML: 1.2 LIQUID ORAL at 20:22

## 2019-06-30 RX ADMIN — METOPROLOL TARTRATE 12.5 MG: 25 TABLET ORAL at 17:44

## 2019-06-30 RX ADMIN — Medication 25 MCG/HR: at 17:45

## 2019-06-30 RX ADMIN — Medication 1000 ML/HR: at 16:11

## 2019-06-30 RX ADMIN — HEPARIN SODIUM 5000 UNITS: 5000 INJECTION INTRAVENOUS; SUBCUTANEOUS at 21:30

## 2019-06-30 RX ADMIN — FENTANYL CITRATE 25 MCG: 0.05 INJECTION, SOLUTION INTRAMUSCULAR; INTRAVENOUS at 08:13

## 2019-06-30 RX ADMIN — Medication 10 ML: at 07:43

## 2019-06-30 RX ADMIN — Medication 10 ML: at 20:23

## 2019-06-30 RX ADMIN — Medication 10 ML: at 07:50

## 2019-06-30 RX ADMIN — FAMOTIDINE 20 MG: 10 INJECTION, SOLUTION INTRAVENOUS at 07:50

## 2019-06-30 RX ADMIN — Medication 50 MG: at 21:35

## 2019-06-30 RX ADMIN — IPRATROPIUM BROMIDE AND ALBUTEROL SULFATE 1 AMPULE: .5; 3 SOLUTION RESPIRATORY (INHALATION) at 00:25

## 2019-06-30 RX ADMIN — IPRATROPIUM BROMIDE AND ALBUTEROL SULFATE 1 AMPULE: .5; 3 SOLUTION RESPIRATORY (INHALATION) at 19:33

## 2019-06-30 RX ADMIN — Medication 50 MG: at 05:32

## 2019-06-30 RX ADMIN — Medication 50 MCG/HR: at 05:17

## 2019-06-30 RX ADMIN — CHLORHEXIDINE GLUCONATE 0.12% ORAL RINSE 15 ML: 1.2 LIQUID ORAL at 07:50

## 2019-06-30 RX ADMIN — IPRATROPIUM BROMIDE AND ALBUTEROL SULFATE 1 AMPULE: .5; 3 SOLUTION RESPIRATORY (INHALATION) at 05:12

## 2019-06-30 RX ADMIN — MUPIROCIN: 20 OINTMENT TOPICAL at 07:44

## 2019-06-30 RX ADMIN — MEROPENEM 500 MG: 500 INJECTION INTRAVENOUS at 07:50

## 2019-06-30 RX ADMIN — ALBUMIN (HUMAN) 25 G: 0.25 INJECTION, SOLUTION INTRAVENOUS at 04:18

## 2019-06-30 RX ADMIN — FENTANYL CITRATE 25 MCG: 0.05 INJECTION, SOLUTION INTRAMUSCULAR; INTRAVENOUS at 19:38

## 2019-06-30 RX ADMIN — MOMETASONE FUROATE AND FORMOTEROL FUMARATE DIHYDRATE 2 PUFF: 100; 5 AEROSOL RESPIRATORY (INHALATION) at 08:23

## 2019-06-30 RX ADMIN — Medication 50 MG: at 12:13

## 2019-06-30 RX ADMIN — MUPIROCIN: 20 OINTMENT TOPICAL at 20:23

## 2019-06-30 RX ADMIN — MEROPENEM 500 MG: 500 INJECTION INTRAVENOUS at 02:47

## 2019-06-30 RX ADMIN — SODIUM CHLORIDE 0.4 MCG/KG/HR: 9 INJECTION, SOLUTION INTRAVENOUS at 19:37

## 2019-06-30 RX ADMIN — DEXTROSE MONOHYDRATE 12.5 G: 25 INJECTION, SOLUTION INTRAVENOUS at 23:45

## 2019-06-30 RX ADMIN — METOPROLOL TARTRATE 12.5 MG: 25 TABLET ORAL at 23:40

## 2019-06-30 RX ADMIN — METOPROLOL TARTRATE 12.5 MG: 25 TABLET ORAL at 01:06

## 2019-06-30 RX ADMIN — INSULIN LISPRO 3 UNITS: 100 INJECTION, SOLUTION INTRAVENOUS; SUBCUTANEOUS at 17:44

## 2019-06-30 RX ADMIN — ALBUMIN (HUMAN) 25 G: 0.25 INJECTION, SOLUTION INTRAVENOUS at 19:38

## 2019-06-30 RX ADMIN — SODIUM PHOSPHATE, MONOBASIC, MONOHYDRATE 6 MMOL: 276; 142 INJECTION, SOLUTION INTRAVENOUS at 19:37

## 2019-06-30 RX ADMIN — Medication 1500 ML/HR: at 23:36

## 2019-06-30 RX ADMIN — Medication 1500 ML/HR: at 17:48

## 2019-06-30 RX ADMIN — Medication 1000 ML/HR: at 05:36

## 2019-06-30 RX ADMIN — Medication 1000 ML/HR: at 00:03

## 2019-06-30 RX ADMIN — Medication 10 ML: at 20:22

## 2019-06-30 ASSESSMENT — PULMONARY FUNCTION TESTS
PIF_VALUE: 21
PIF_VALUE: 16
PIF_VALUE: 11
PIF_VALUE: 20
PIF_VALUE: 20
PIF_VALUE: 16
PIF_VALUE: 24
PIF_VALUE: 25
PIF_VALUE: 11
PIF_VALUE: 16
PIF_VALUE: 16
PIF_VALUE: 18
PIF_VALUE: 16
PIF_VALUE: 11
PIF_VALUE: 16
PIF_VALUE: 16
PIF_VALUE: 21
PIF_VALUE: 24
PIF_VALUE: 18
PIF_VALUE: 19
PIF_VALUE: 25
PIF_VALUE: 24
PIF_VALUE: 22
PIF_VALUE: 21
PIF_VALUE: 21

## 2019-06-30 ASSESSMENT — PAIN SCALES - GENERAL
PAINLEVEL_OUTOF10: 1
PAINLEVEL_OUTOF10: 0
PAINLEVEL_OUTOF10: 1
PAINLEVEL_OUTOF10: 0

## 2019-06-30 NOTE — PROGRESS NOTES
Nephrology ([facility]) Progress Note    CC:  REJI and hyperkalemia    Subjective:    HPI:  Ventilated. CRRT running. Remains oliguric. Question of alcohol withdrawal.  ROS:  In bed. No fever  PMFSH:  medications reviewed.  albumin human  25 g Intravenous Q8H    mupirocin   Topical BID    heparin (porcine)  5,000 Units Subcutaneous 3 times per day    sodium chloride flush  10 mL Intravenous 2 times per day    metoprolol tartrate  12.5 mg Oral 4 times per day    darbepoetin vinay-polysorbate  60 mcg Subcutaneous Weekly - Thursday    insulin glargine  10 Units Subcutaneous Nightly    gabapentin  50 mg Oral 3 times per day    insulin lispro  0-18 Units Subcutaneous Q4H    lactobacillus  2 capsule Oral BID WC    mometasone-formoterol  2 puff Inhalation BID    sodium chloride flush  10 mL Intravenous 2 times per day    ipratropium-albuterol  1 ampule Inhalation Q4H    chlorhexidine  15 mL Mouth/Throat BID    famotidine (PEPCID) injection  20 mg Intravenous Daily         Objective:  Blood pressure 127/79, pulse 112, temperature 98.3 °F (36.8 °C), temperature source Axillary, resp. rate 28, height 5' 7\" (1.702 m), weight 246 lb 11.1 oz (111.9 kg), SpO2 (!) 84 %.     Intake/Output Summary (Last 24 hours) at 6/30/2019 1145  Last data filed at 6/30/2019 1118  Gross per 24 hour   Intake 2324.6 ml   Output 4453 ml   Net -2128.4 ml     General:  NAD, ventilated  Chest:  coarse BS  CVS:  RRR  Abdominal:  NTND, soft, +BS  Extremities:  1+ edema  Skin:  no rash    Labs:  Renal panel:  Lab Results   Component Value Date/Time     (L) 06/30/2019 06:25 AM    K 4.0 06/30/2019 06:25 AM    K 3.6 06/11/2019 06:00 AM    CO2 26 06/30/2019 06:25 AM    BUN 16 06/30/2019 06:25 AM    CREATININE 1.0 06/30/2019 06:25 AM    CALCIUM 8.8 06/30/2019 06:25 AM    PHOS 2.5 06/30/2019 06:25 AM    MG 2.60 (H) 06/30/2019 06:25 AM     CBC:  Lab Results   Component Value Date/Time    WBC 12.4 (H) 06/30/2019 06:25 AM HGB 8.5 (L) 06/30/2019 06:25 AM    HCT 27.1 (L) 06/30/2019 06:25 AM    PLT 67 (L) 06/30/2019 06:25 AM       Assessment/Plan:  Reviewed old records and labs. 1) REJI - Seen on CRRT - will remove volume as BP tolerates.  Increased UF rate to 100 ml/hr    2) Respiratory failure - Vent    3) ID - on meropenem    4) Septic Shock - off pressors    5) Anemia - monitor

## 2019-06-30 NOTE — PROGRESS NOTES
([address]    Hypotension    Vasovagal syncope    Laceration of scalp without foreign body    Nonischemic cardiomyopathy ([address]    Syncope and collapse    Ischemic foot    Diabetes mellitus with peripheral circulatory disorder (HCC)    Limb ischemia    COPD exacerbation (HCC)    Nonhealing surgical wound    Acromioclavicular joint arthritis    Bradycardia    Shortness of breath    Permanent atrial fibrillation (HCC)    Atrial fibrillation, chronic (HCC)    Other specified injury of inferior mesenteric vein, initial encounter    Postprocedural hypotension    Acute respiratory failure with hypercapnia (HCC)    High anion gap metabolic acidosis    Centrilobular emphysema (HCC)    Hypomagnesemia    Heart failure, acute on chronic, systolic and diastolic (HCC)    Persistent atrial fibrillation (HCC)    Anemia    DMII (diabetes mellitus, type 2) (HCC)    Constipation    Hypokalemia    Acute on chronic systolic heart failure (HCC)    Atrial fibrillation, rapid (HCC)    COPD with acute exacerbation (HCC)    Cocaine use    Severe sepsis (HCC)    Atrial fibrillation with RVR (HCC)    Hyponatremia    Acute on chronic respiratory failure with hypoxia (HCC)    Respiratory distress    CHF, left ventricular (HCC)    Nicotine dependence    Suspected sleep apnea    Coronary artery disease involving native coronary artery of native heart without angina pectoris    CAD (coronary artery disease)    Acute renal failure (ARF) ([address]    Morbid obesity due to excess calories (HCC)    Acute respiratory failure with hypoxia (HCC)    Nocturnal hypoxia    Hypercapnemia    SOB (shortness of breath)    Acute on chronic respiratory failure with hypercapnia (HCC)    Chronic respiratory failure with hypercapnia (HCC)    Acute on chronic systolic (congestive) heart failure (HCC)    Hx of AKA (above knee amputation), left (HCC)    Respiratory failure (HCC)    Pneumonia    Ventricular tachycardia ([address] src Pulse Resp SpO2 Weight   06/30/19 0825 -- -- -- 106 18 100 % --   06/30/19 0800 -- -- -- 100 16 -- --   06/30/19 0740 127/79 98.3 °F (36.8 °C) Axillary 103 17 -- --   06/30/19 0600 114/69 -- -- 116 20 100 % 246 lb 11.1 oz (111.9 kg)   06/30/19 0518 -- -- -- 119 21 -- --   06/30/19 0500 -- -- -- 122 18 98 % --   06/30/19 0400 118/70 98.2 °F (36.8 °C) Rectal 103 15 98 % --   06/30/19 0300 -- -- -- 114 16 -- --   06/30/19 0200 127/78 98.5 °F (36.9 °C) Rectal 112 21 100 % --   06/30/19 0100 -- -- -- 108 15 100 % --        72HR INTAKE/OUTPUT:      Intake/Output Summary (Last 24 hours) at 6/30/2019 0858  Last data filed at 6/30/2019 0817  Gross per 24 hour   Intake 2330.8 ml   Output 4323 ml   Net -1992.2 ml       Exam:    Gen:   Patient sedated on the ventilator  Eyes: PERRL. No sclera icterus. No conjunctival injection. ENT: No discharge. Pharynx clear. External appearance of ears and nose normal.  Neck: Trachea midline. No obvious mass. Resp: No accessory muscle use. No crackles. No wheezes. No rhonchi. CV: Regular rate. Regular rhythm. No murmur or rub. No edema. GI: Non-tender. Non-distended. No hernia. Skin: Warm, dry, normal texture and turgor. Lymph: No cervical LAD. No supraclavicular LAD. M/S: / Ext. No cyanosis. No clubbing. No joint deformity. Neuro: CN 2-12 are intact,  no neurologic deficits noted. PT/INR:   No results for input(s): PROTIME, INR in the last 72 hours. APTT:   No results for input(s): APTT in the last 72 hours.     CBC:   Recent Labs     06/28/19  0600 06/29/19  0610 06/30/19  0625   WBC 8.8 12.8* 12.4*   HGB 8.4* 9.0* 8.5*   HCT 26.8* 28.6* 27.1*   MCV 85.5 85.3 86.7   PLT 75* 70* 67*       BMP:   Recent Labs     06/29/19  1229 06/29/19  1737 06/29/19  2350 06/30/19  0625    137 137 135*   K 4.2 4.3 4.3 4.0    103 100 99   CO2 25 26 25 26   PHOS 3.1 3.1 2.8 2.5   BUN 14 14 16 16   CREATININE 1.0 0.8* 1.0 1.0       LIVER PROFILE:   Recent Labs

## 2019-06-30 NOTE — PROGRESS NOTES
anterior rib fractures without  pneumothorax. CTPA: No results found for this or any previous visit. CXR PA/LAT:   Results for orders placed during the hospital encounter of 06/01/19   XR CHEST STANDARD (2 VW)    Narrative EXAMINATION:  TWO XRAY VIEWS OF THE CHEST    6/1/2019 5:05 pm    COMPARISON:  Chest 02/12/2018    HISTORY:  ORDERING SYSTEM PROVIDED HISTORY: SHORTNESS OF BREATH, COUGH, COPD, CHF  TECHNOLOGIST PROVIDED HISTORY:  Reason for exam:-> SHORTNESS OF BREATH, COUGH, COPD, CHF  Ordering Physician Provided Reason for Exam: Shortness of Breath (couples  week, worsened last night, Hx copd chf smoker)  Acuity: Chronic  Type of Exam: Ongoing    FINDINGS:  The cardiac silhouette is enlarged. Calcifications involving the aorta  reflect atherosclerosis. The mediastinal and hilar silhouettes appear  unremarkable. Vascular engorgement and cephalization is demonstrated with bilateral  peribronchial cuffing and perivascular haziness. Possible bilateral pleural  effusion, right greater than left. Focal atelectasis or infiltrate is  evident lateral right lung base and bilateral parahilar regions. No pneumothorax is seen. No acute osseous abnormality is identified. Impression 1. Congestive heart failure most likely given these findings. 2. Calcific atherosclerosis aorta. 3. Cardiomegaly. CXR portable:   Results for orders placed during the hospital encounter of 06/22/19   XR CHEST PORTABLE    Narrative EXAMINATION:  ONE XRAY VIEW OF THE CHEST    6/28/2019 4:34 pm    COMPARISON:  Chest x-ray earlier today at 10:42 a.m. HISTORY:  ORDERING SYSTEM PROVIDED HISTORY: PICC line placement  TECHNOLOGIST PROVIDED HISTORY:  Reason for exam:->PICC line placement  Ordering Physician Provided Reason for Exam: PICC  Acuity: Acute  Type of Exam: Initial    FINDINGS:  Left-sided PICC line has been placed, terminating in the lower SVC.   No  significant change in the appearance of the chest otherwise. Impression PICC line in good position. Assessment:     S/p Cardiac Arrest  Ventricular Tachycardia  afib with RVR  Acute Hypoxemic Respiratory Failure with SAO2 <90% on Room Air   Pulmonary edema  Shock Liver - improving  Anuric Renal Failure due to ATN  Alcohol abuse/HCV  CHF    Plan:      Mechanical ventilation  -improving.   -Start on spontaneous today. 5/5 with FiO2 40%. Tidal volumes 400-600 respiratory rate 14. Start SBT at 1020. Fluid status  -Correcting with CRRT  -Per nephrology      Sedation  -More awake today. It fentanyl stopped for SBT. Continue Precedex through extubation. Chronic pain  -Gabapentin      Infectious disease  -Meropenem and lactobacillus. Completed 8 days of meropenem. Will DC. GI  - tube feeding. Tolerating. Residual 125.  - constipation. DC lactulose with 3 bowel movements. With the distention, KUB. Electrolytes  -per nephrology    Prophylaxis  - GI - pepcid  - DVT - heparin    - VAP - peridex  - C. Diff - Lactobacillus  - Nasal Decolonization - Bactroban    Nutrition  - DIET TUBE FEED CONTINUOUS/CYCLIC NPO; Renal Formula; Orogastric; Continuous; 35; 35    Intake/Output Summary (Last 24 hours) at 6/30/2019 1018  Last data filed at 6/30/2019 1000  Gross per 24 hour   Intake 2217.6 ml   Output 4487 ml   Net -2269.4 ml          Access  Arterial   Arterial Line 06/24/19 Right Radial (Active)   $ Arterial line insertion $ Yes 6/24/2019  8:00 PM   Continued need for line? Yes 6/30/2019 10:00 AM   Site Assessment Clean;Dry; Intact 6/30/2019 10:00 AM   Line Status Intact and in place 6/30/2019 10:00 AM   Art Line Waveform Appropriate 6/30/2019 10:00 AM   Art Line Interventions Zeroed and calibrated; Leveled; Flushed per protocol 6/30/2019 10:00 AM   Color/Movement/Sensation Capillary refill less than 3 sec 6/30/2019 10:00 AM   Dressing Status Clean;Dry; Intact 6/30/2019 10:00 AM   Dressing Type Transparent; Anti-microbial patch 6/30/2019 10:00 AM

## 2019-07-01 ENCOUNTER — APPOINTMENT (OUTPATIENT)
Dept: CT IMAGING | Age: 59
DRG: 720 | End: 2019-07-01
Payer: COMMERCIAL

## 2019-07-01 LAB
ALBUMIN SERPL-MCNC: 3.6 G/DL (ref 3.4–5)
ALBUMIN SERPL-MCNC: 4.1 G/DL (ref 3.4–5)
ALBUMIN SERPL-MCNC: 4.2 G/DL (ref 3.4–5)
ALBUMIN SERPL-MCNC: 4.4 G/DL (ref 3.4–5)
ALP BLD-CCNC: 110 U/L (ref 40–129)
ALT SERPL-CCNC: 301 U/L (ref 10–40)
ANION GAP SERPL CALCULATED.3IONS-SCNC: 11 MMOL/L (ref 3–16)
ANION GAP SERPL CALCULATED.3IONS-SCNC: 11 MMOL/L (ref 3–16)
ANION GAP SERPL CALCULATED.3IONS-SCNC: 12 MMOL/L (ref 3–16)
ANION GAP SERPL CALCULATED.3IONS-SCNC: 8 MMOL/L (ref 3–16)
AST SERPL-CCNC: 52 U/L (ref 15–37)
BASE EXCESS ARTERIAL: 0.3 MMOL/L (ref -3–3)
BASOPHILS ABSOLUTE: 0 K/UL (ref 0–0.2)
BASOPHILS RELATIVE PERCENT: 0.2 %
BILIRUB SERPL-MCNC: 2 MG/DL (ref 0–1)
BILIRUBIN DIRECT: 1.1 MG/DL (ref 0–0.3)
BILIRUBIN, INDIRECT: 0.9 MG/DL (ref 0–1)
BUN BLDV-MCNC: 12 MG/DL (ref 7–20)
BUN BLDV-MCNC: 13 MG/DL (ref 7–20)
BUN BLDV-MCNC: 13 MG/DL (ref 7–20)
BUN BLDV-MCNC: 14 MG/DL (ref 7–20)
CALCIUM IONIZED: 1.09 MMOL/L (ref 1.12–1.32)
CALCIUM IONIZED: 1.12 MMOL/L (ref 1.12–1.32)
CALCIUM IONIZED: 1.13 MMOL/L (ref 1.12–1.32)
CALCIUM IONIZED: 1.17 MMOL/L (ref 1.12–1.32)
CALCIUM SERPL-MCNC: 8 MG/DL (ref 8.3–10.6)
CALCIUM SERPL-MCNC: 8.4 MG/DL (ref 8.3–10.6)
CALCIUM SERPL-MCNC: 8.5 MG/DL (ref 8.3–10.6)
CALCIUM SERPL-MCNC: 8.6 MG/DL (ref 8.3–10.6)
CARBOXYHEMOGLOBIN ARTERIAL: 1.5 % (ref 0–1.5)
CHLORIDE BLD-SCNC: 100 MMOL/L (ref 99–110)
CHLORIDE BLD-SCNC: 103 MMOL/L (ref 99–110)
CHLORIDE BLD-SCNC: 98 MMOL/L (ref 99–110)
CHLORIDE BLD-SCNC: 99 MMOL/L (ref 99–110)
CO2: 25 MMOL/L (ref 21–32)
CO2: 26 MMOL/L (ref 21–32)
CO2: 26 MMOL/L (ref 21–32)
CO2: 27 MMOL/L (ref 21–32)
CREAT SERPL-MCNC: 0.9 MG/DL (ref 0.9–1.3)
CREAT SERPL-MCNC: 1.2 MG/DL (ref 0.9–1.3)
EOSINOPHILS ABSOLUTE: 0.1 K/UL (ref 0–0.6)
EOSINOPHILS RELATIVE PERCENT: 0.5 %
GFR AFRICAN AMERICAN: >60
GFR NON-AFRICAN AMERICAN: >60
GLUCOSE BLD-MCNC: 106 MG/DL (ref 70–99)
GLUCOSE BLD-MCNC: 107 MG/DL (ref 70–99)
GLUCOSE BLD-MCNC: 107 MG/DL (ref 70–99)
GLUCOSE BLD-MCNC: 112 MG/DL (ref 70–99)
GLUCOSE BLD-MCNC: 118 MG/DL (ref 70–99)
GLUCOSE BLD-MCNC: 121 MG/DL (ref 70–99)
GLUCOSE BLD-MCNC: 124 MG/DL (ref 70–99)
GLUCOSE BLD-MCNC: 131 MG/DL (ref 70–99)
GLUCOSE BLD-MCNC: 147 MG/DL (ref 70–99)
GLUCOSE BLD-MCNC: 70 MG/DL (ref 70–99)
GLUCOSE BLD-MCNC: 95 MG/DL (ref 70–99)
HCO3 ARTERIAL: 26.3 MMOL/L (ref 21–29)
HCT VFR BLD CALC: 23.7 % (ref 40.5–52.5)
HEMATOLOGY PATH CONSULT: NO
HEMOGLOBIN, ART, EXTENDED: 7.5 G/DL (ref 13.5–17.5)
HEMOGLOBIN: 7.5 G/DL (ref 13.5–17.5)
LACTIC ACID: 1.4 MMOL/L (ref 0.4–2)
LV EF: 33 %
LVEF MODALITY: NORMAL
LYMPHOCYTES ABSOLUTE: 0.8 K/UL (ref 1–5.1)
LYMPHOCYTES RELATIVE PERCENT: 4.8 %
MAGNESIUM: 2.4 MG/DL (ref 1.8–2.4)
MAGNESIUM: 2.4 MG/DL (ref 1.8–2.4)
MAGNESIUM: 2.5 MG/DL (ref 1.8–2.4)
MAGNESIUM: 2.5 MG/DL (ref 1.8–2.4)
MCH RBC QN AUTO: 27.5 PG (ref 26–34)
MCHC RBC AUTO-ENTMCNC: 31.7 G/DL (ref 31–36)
MCV RBC AUTO: 86.5 FL (ref 80–100)
METHEMOGLOBIN ARTERIAL: 0.9 %
MONOCYTES ABSOLUTE: 2.2 K/UL (ref 0–1.3)
MONOCYTES RELATIVE PERCENT: 13.7 %
NEUTROPHILS ABSOLUTE: 12.9 K/UL (ref 1.7–7.7)
NEUTROPHILS RELATIVE PERCENT: 80.8 %
O2 CONTENT ARTERIAL: 11 ML/DL
O2 SAT, ARTERIAL: 99.3 %
O2 THERAPY: ABNORMAL
PCO2 ARTERIAL: 54.5 MMHG (ref 35–45)
PDW BLD-RTO: 16.6 % (ref 12.4–15.4)
PERFORMED ON: ABNORMAL
PERFORMED ON: NORMAL
PERFORMED ON: NORMAL
PH ARTERIAL: 7.3 (ref 7.35–7.45)
PH VENOUS: 7.25 (ref 7.35–7.45)
PH VENOUS: 7.27 (ref 7.35–7.45)
PH VENOUS: 7.28 (ref 7.35–7.45)
PH VENOUS: 7.29 (ref 7.35–7.45)
PHOSPHORUS: 1.8 MG/DL (ref 2.5–4.9)
PHOSPHORUS: 1.9 MG/DL (ref 2.5–4.9)
PHOSPHORUS: 2.4 MG/DL (ref 2.5–4.9)
PHOSPHORUS: 2.5 MG/DL (ref 2.5–4.9)
PLATELET # BLD: 63 K/UL (ref 135–450)
PMV BLD AUTO: 9.3 FL (ref 5–10.5)
PO2 ARTERIAL: 162 MMHG (ref 75–108)
POTASSIUM SERPL-SCNC: 4.4 MMOL/L (ref 3.5–5.1)
POTASSIUM SERPL-SCNC: 4.5 MMOL/L (ref 3.5–5.1)
RBC # BLD: 2.74 M/UL (ref 4.2–5.9)
SODIUM BLD-SCNC: 135 MMOL/L (ref 136–145)
SODIUM BLD-SCNC: 136 MMOL/L (ref 136–145)
SODIUM BLD-SCNC: 137 MMOL/L (ref 136–145)
SODIUM BLD-SCNC: 138 MMOL/L (ref 136–145)
TCO2 ARTERIAL: 27.9 MMOL/L
TOTAL PROTEIN: 7.5 G/DL (ref 6.4–8.2)
WBC # BLD: 16 K/UL (ref 4–11)

## 2019-07-01 PROCEDURE — 6360000004 HC RX CONTRAST MEDICATION: Performed by: INTERNAL MEDICINE

## 2019-07-01 PROCEDURE — 94003 VENT MGMT INPAT SUBQ DAY: CPT

## 2019-07-01 PROCEDURE — 6370000000 HC RX 637 (ALT 250 FOR IP): Performed by: INTERNAL MEDICINE

## 2019-07-01 PROCEDURE — 2580000003 HC RX 258: Performed by: INTERNAL MEDICINE

## 2019-07-01 PROCEDURE — 90945 DIALYSIS ONE EVALUATION: CPT

## 2019-07-01 PROCEDURE — 94640 AIRWAY INHALATION TREATMENT: CPT

## 2019-07-01 PROCEDURE — 2500000003 HC RX 250 WO HCPCS: Performed by: INTERNAL MEDICINE

## 2019-07-01 PROCEDURE — 85025 COMPLETE CBC W/AUTO DIFF WBC: CPT

## 2019-07-01 PROCEDURE — P9047 ALBUMIN (HUMAN), 25%, 50ML: HCPCS | Performed by: INTERNAL MEDICINE

## 2019-07-01 PROCEDURE — 2700000000 HC OXYGEN THERAPY PER DAY

## 2019-07-01 PROCEDURE — 83605 ASSAY OF LACTIC ACID: CPT

## 2019-07-01 PROCEDURE — 2580000003 HC RX 258: Performed by: NURSE PRACTITIONER

## 2019-07-01 PROCEDURE — 80076 HEPATIC FUNCTION PANEL: CPT

## 2019-07-01 PROCEDURE — 6360000002 HC RX W HCPCS: Performed by: INTERNAL MEDICINE

## 2019-07-01 PROCEDURE — 2500000003 HC RX 250 WO HCPCS: Performed by: NURSE PRACTITIONER

## 2019-07-01 PROCEDURE — 2000000000 HC ICU R&B

## 2019-07-01 PROCEDURE — 99232 SBSQ HOSP IP/OBS MODERATE 35: CPT | Performed by: NURSE PRACTITIONER

## 2019-07-01 PROCEDURE — 82803 BLOOD GASES ANY COMBINATION: CPT

## 2019-07-01 PROCEDURE — 83735 ASSAY OF MAGNESIUM: CPT

## 2019-07-01 PROCEDURE — 80069 RENAL FUNCTION PANEL: CPT

## 2019-07-01 PROCEDURE — APPNB15 APP NON BILLABLE TIME 0-15 MINS: Performed by: NURSE PRACTITIONER

## 2019-07-01 PROCEDURE — 6370000000 HC RX 637 (ALT 250 FOR IP): Performed by: NURSE PRACTITIONER

## 2019-07-01 PROCEDURE — 94760 N-INVAS EAR/PLS OXIMETRY 1: CPT

## 2019-07-01 PROCEDURE — 94750 HC PULMONARY COMPLIANCE STUDY: CPT

## 2019-07-01 PROCEDURE — 99291 CRITICAL CARE FIRST HOUR: CPT | Performed by: INTERNAL MEDICINE

## 2019-07-01 PROCEDURE — 6360000004 HC RX CONTRAST MEDICATION

## 2019-07-01 PROCEDURE — 74178 CT ABD&PLV WO CNTR FLWD CNTR: CPT

## 2019-07-01 PROCEDURE — 82330 ASSAY OF CALCIUM: CPT

## 2019-07-01 PROCEDURE — 93306 TTE W/DOPPLER COMPLETE: CPT

## 2019-07-01 RX ADMIN — IPRATROPIUM BROMIDE AND ALBUTEROL SULFATE 1 AMPULE: .5; 3 SOLUTION RESPIRATORY (INHALATION) at 20:14

## 2019-07-01 RX ADMIN — Medication 2 CAPSULE: at 08:53

## 2019-07-01 RX ADMIN — Medication 1500 ML/HR: at 06:38

## 2019-07-01 RX ADMIN — MUPIROCIN: 20 OINTMENT TOPICAL at 20:22

## 2019-07-01 RX ADMIN — INSULIN LISPRO 3 UNITS: 100 INJECTION, SOLUTION INTRAVENOUS; SUBCUTANEOUS at 13:21

## 2019-07-01 RX ADMIN — IOPAMIDOL 75 ML: 755 INJECTION, SOLUTION INTRAVENOUS at 11:56

## 2019-07-01 RX ADMIN — CALCIUM GLUCONATE 1 G: 98 INJECTION, SOLUTION INTRAVENOUS at 06:58

## 2019-07-01 RX ADMIN — METOPROLOL TARTRATE 12.5 MG: 25 TABLET ORAL at 05:33

## 2019-07-01 RX ADMIN — MOMETASONE FUROATE AND FORMOTEROL FUMARATE DIHYDRATE 2 PUFF: 100; 5 AEROSOL RESPIRATORY (INHALATION) at 20:14

## 2019-07-01 RX ADMIN — Medication 10 ML: at 20:23

## 2019-07-01 RX ADMIN — IPRATROPIUM BROMIDE AND ALBUTEROL SULFATE 1 AMPULE: .5; 3 SOLUTION RESPIRATORY (INHALATION) at 16:29

## 2019-07-01 RX ADMIN — CHLORHEXIDINE GLUCONATE 0.12% ORAL RINSE 15 ML: 1.2 LIQUID ORAL at 08:52

## 2019-07-01 RX ADMIN — HEPARIN SODIUM 5000 UNITS: 5000 INJECTION INTRAVENOUS; SUBCUTANEOUS at 21:45

## 2019-07-01 RX ADMIN — Medication 1000 ML/HR: at 19:53

## 2019-07-01 RX ADMIN — METOPROLOL TARTRATE 12.5 MG: 25 TABLET ORAL at 13:21

## 2019-07-01 RX ADMIN — FAMOTIDINE 20 MG: 10 INJECTION, SOLUTION INTRAVENOUS at 08:53

## 2019-07-01 RX ADMIN — Medication 25 MCG/HR: at 16:23

## 2019-07-01 RX ADMIN — Medication 1000 ML/HR: at 14:56

## 2019-07-01 RX ADMIN — METOPROLOL TARTRATE 12.5 MG: 25 TABLET ORAL at 18:07

## 2019-07-01 RX ADMIN — CHLORHEXIDINE GLUCONATE 0.12% ORAL RINSE 15 ML: 1.2 LIQUID ORAL at 20:22

## 2019-07-01 RX ADMIN — SODIUM PHOSPHATE, MONOBASIC, MONOHYDRATE 12 MMOL: 276; 142 INJECTION, SOLUTION INTRAVENOUS at 19:40

## 2019-07-01 RX ADMIN — Medication 1500 ML/HR: at 22:29

## 2019-07-01 RX ADMIN — Medication 10 ML: at 08:53

## 2019-07-01 RX ADMIN — Medication 2 CAPSULE: at 18:06

## 2019-07-01 RX ADMIN — Medication 1000 ML/HR: at 07:50

## 2019-07-01 RX ADMIN — DEXTROSE MONOHYDRATE 12.5 G: 25 INJECTION, SOLUTION INTRAVENOUS at 02:20

## 2019-07-01 RX ADMIN — Medication 50 MG: at 21:45

## 2019-07-01 RX ADMIN — MUPIROCIN: 20 OINTMENT TOPICAL at 08:54

## 2019-07-01 RX ADMIN — IPRATROPIUM BROMIDE AND ALBUTEROL SULFATE 1 AMPULE: .5; 3 SOLUTION RESPIRATORY (INHALATION) at 11:19

## 2019-07-01 RX ADMIN — HEPARIN SODIUM 5000 UNITS: 5000 INJECTION INTRAVENOUS; SUBCUTANEOUS at 05:34

## 2019-07-01 RX ADMIN — Medication 10 ML: at 20:22

## 2019-07-01 RX ADMIN — HEPARIN SODIUM 5000 UNITS: 5000 INJECTION INTRAVENOUS; SUBCUTANEOUS at 13:21

## 2019-07-01 RX ADMIN — Medication 1000 ML/HR: at 10:48

## 2019-07-01 RX ADMIN — Medication 50 MG: at 05:34

## 2019-07-01 RX ADMIN — SODIUM CHLORIDE 0.4 MCG/KG/HR: 9 INJECTION, SOLUTION INTRAVENOUS at 16:15

## 2019-07-01 RX ADMIN — CALCIUM GLUCONATE 1 G: 98 INJECTION, SOLUTION INTRAVENOUS at 20:14

## 2019-07-01 RX ADMIN — IPRATROPIUM BROMIDE AND ALBUTEROL SULFATE 1 AMPULE: .5; 3 SOLUTION RESPIRATORY (INHALATION) at 00:37

## 2019-07-01 RX ADMIN — ALBUMIN (HUMAN) 25 G: 0.25 INJECTION, SOLUTION INTRAVENOUS at 03:53

## 2019-07-01 RX ADMIN — Medication 1000 ML/HR: at 19:58

## 2019-07-01 RX ADMIN — IPRATROPIUM BROMIDE AND ALBUTEROL SULFATE 1 AMPULE: .5; 3 SOLUTION RESPIRATORY (INHALATION) at 09:03

## 2019-07-01 RX ADMIN — SODIUM PHOSPHATE, MONOBASIC, MONOHYDRATE 12 MMOL: 276; 142 INJECTION, SOLUTION INTRAVENOUS at 01:59

## 2019-07-01 RX ADMIN — SODIUM CHLORIDE 0.4 MCG/KG/HR: 9 INJECTION, SOLUTION INTRAVENOUS at 04:27

## 2019-07-01 RX ADMIN — IOHEXOL 50 ML: 240 INJECTION, SOLUTION INTRATHECAL; INTRAVASCULAR; INTRAVENOUS; ORAL at 10:17

## 2019-07-01 RX ADMIN — MOMETASONE FUROATE AND FORMOTEROL FUMARATE DIHYDRATE 2 PUFF: 100; 5 AEROSOL RESPIRATORY (INHALATION) at 09:03

## 2019-07-01 RX ADMIN — IPRATROPIUM BROMIDE AND ALBUTEROL SULFATE 1 AMPULE: .5; 3 SOLUTION RESPIRATORY (INHALATION) at 04:41

## 2019-07-01 RX ADMIN — FENTANYL CITRATE 25 MCG: 0.05 INJECTION, SOLUTION INTRAMUSCULAR; INTRAVENOUS at 11:35

## 2019-07-01 RX ADMIN — Medication 1500 ML/HR: at 19:23

## 2019-07-01 ASSESSMENT — PAIN SCALES - GENERAL
PAINLEVEL_OUTOF10: 0
PAINLEVEL_OUTOF10: 1
PAINLEVEL_OUTOF10: 1
PAINLEVEL_OUTOF10: 0
PAINLEVEL_OUTOF10: 1

## 2019-07-01 ASSESSMENT — PULMONARY FUNCTION TESTS
PIF_VALUE: 16
PIF_VALUE: 11
PIF_VALUE: 16
PIF_VALUE: 15
PIF_VALUE: 16
PIF_VALUE: 15
PIF_VALUE: 17
PIF_VALUE: 16
PIF_VALUE: 15
PIF_VALUE: 16
PIF_VALUE: 15
PIF_VALUE: 17
PIF_VALUE: 15
PIF_VALUE: 16
PIF_VALUE: 15
PIF_VALUE: 16

## 2019-07-01 NOTE — PROGRESS NOTES
abnormality. CT CHEST WO CONTRAST   Final Result   1. Bilateral airspace disease could represent atelectasis or pneumonia. 2. Small right pleural effusion. 3. Coronary artery disease. 4. Pulmonary hypertension. 5. Mediastinal adenopathy, probably reactive given the relatively rapid   development. 6. Suspected acute nondisplaced bilateral anterior rib fractures without   pneumothorax. XR CHEST PORTABLE   Final Result   1. Endotracheal tube tip projects over the trachea, tip at the level of the   aortic knob, 3.7 cm superior to the erna. 2. Pulmonary edema evident. 3. Cardiomegaly. 4. No pneumothorax. 5. Nonspecific prominence of the azygos vein region may be related vascular   engorgement, adenopathy or underlying mass lesion.          XR CHEST PORTABLE   Final Result   Questionable right basilar atelectasis or pneumonia on a limited portable   exam.                 Assessment/Plan:    Active Hospital Problems    Diagnosis    Atrial fibrillation, chronic ([facility]) [I48.2]     Priority: High    PEA (Pulseless electrical activity) ([facility]) [I46.9]    Ileus ([facility]) [K56.7]    Respiratory failure ([facility]) [J96.90]    Pneumonia [J18.9]    Ventricular tachycardia ([facility]) [I47.2]    Shock circulatory ([facility]) [R57.9]    Tachypnea [Y25.74]    Neutrophilic leukocytosis [T09.4]    Chronic respiratory failure with hypoxia ([facility]) [J96.11]    Cardiac arrest ([facility]) [I46.9]    Acute pulmonary edema ([facility]) [J81.0]    CAD (coronary artery disease) [I25.10]    Morbid obesity due to excess calories ([facility] [E66.01]    Acute renal failure (ARF) ([facility]) [N17.9]    Severe sepsis ([facility]) [A41.9, R65.20]    DMII (diabetes mellitus, type 2) ([facility]) [E11.9]    COPD exacerbation ([facility]) [J44.1]    Cardiomyopathy ([facility] [I42.9]    Hypotension [I95.9]    Chronic systolic CHF (congestive heart failure), NYHA class 3 ([facility]) [I50.22]    Acute hyperkalemia [E87.5]    Essential hypertension [I10]    Alcohol abuse,

## 2019-07-01 NOTE — PROGRESS NOTES
times per day    insulin lispro  0-18 Units Subcutaneous Q4H    lactobacillus  2 capsule Oral BID WC    mometasone-formoterol  2 puff Inhalation BID    sodium chloride flush  10 mL Intravenous 2 times per day    ipratropium-albuterol  1 ampule Inhalation Q4H    chlorhexidine  15 mL Mouth/Throat BID    famotidine (PEPCID) injection  20 mg Intravenous Daily      sodium chloride 5 mL/hr at 06/28/19 1811    dexmedetomidine (PRECEDEX) IV infusion 0.4 mcg/kg/hr (07/01/19 0427)    dextrose      fentaNYL 25 mcg/hr (06/30/19 1745)    prismaSATE BGK 4/2.5 1,000 mL/hr (07/01/19 0750)    prismaSATE BGK 4/2.5 1,500 mL/hr (07/01/19 4002)    prismaSATE BGK 4/2.5 1,000 mL/hr (07/01/19 0750)    norepinephrine Stopped (06/30/19 0530)     lubrifresh P.M., metoprolol, sodium chloride flush, iopamidol, albuterol, sodium chloride flush, ondansetron, acetaminophen, glucose, dextrose, glucagon (rDNA), dextrose, fentanNYL, potassium chloride, magnesium sulfate, sodium phosphate IVPB **OR** sodium phosphate IVPB **OR** sodium phosphate IVPB **OR** sodium phosphate IVPB, calcium gluconate IVPB **OR** calcium gluconate IVPB **OR** calcium gluconate IVPB **OR** calcium gluconate IVPB    Lab Data:  CBC:   Recent Labs     06/29/19  0610 06/30/19 0625 07/01/19  0600   WBC 12.8* 12.4* 16.0*   HGB 9.0* 8.5* 7.5*   PLT 70* 67* 63*     BMP:    Recent Labs     06/30/19  1800 07/01/19  0015 07/01/19  0600    137 136   K 4.3 4.4 4.4   CO2 27 26 25   BUN 13 14 13   CREATININE 1.0 1.2 1.2     LIVR:   Recent Labs     06/29/19  0610 06/30/19  0625 07/01/19  0600   AST 82* 71* 52*   * 520* 301*     6/30/2019 Abdominal xray  Suspicious for SBO    6/30/2019 CXR: (no significant change)    The heart is enlarged with mild   central pulmonary venous congestion.  Multifocal bilateral perihilar and   lower lobe airspace opacification is again noted, unchanged from prior.    There are small bilateral pleural effusions.  Mediastinal contours are stable. Echo 10/2018:  Suboptimal image quality. Definity contrast administered.   Patient appears to be in atrial fibrillation.   Mild Conc. LVH with EF    45%.   The left atrium is mildly dilated.   Mild mitral regurgitation.   Normal RV size with mildly reduced systolic function.   Trivial pulmonic regurgitation present. Telemetry:Atrial fib with controlled VR    Assessment/Plan:    1. Acute hypoxic respiratory failure   -remains intubated  -Rx per pulmonary/critical care    2. S/P PEA cardiac arrest with VT  -remains intubated  -off pressors  -no further VT noted    3. Chronic atrial fibrillation  -increased VR last evening  -VR controlled this AM  -continue Metoprolol  -not currently on Xarelto d/t liver failure    4. REJI  -remains on CRRT  -nephrology following    5. Liver failure  -LFT's improving  -d/t shock liver    From cardiac standpoint his HR and BP are stable this AM.  Prognosis remains guarded.      Electronically signed by JOON Paige CNP on 7/1/2019 at 8:26 AM

## 2019-07-01 NOTE — PLAN OF CARE
Nutrition Problem: Inadequate oral intake  Intervention: Food and/or Nutrient Delivery: Continue NPO(monitor GI status)  Nutritional Goals: tolerate most appropriate form of nutrition

## 2019-07-01 NOTE — PROGRESS NOTES
15 mL Mouth/Throat BID    famotidine (PEPCID) injection  20 mg Intravenous Daily       Continuous Infusions:   sodium chloride 5 mL/hr at 06/28/19 1811    dexmedetomidine (PRECEDEX) IV infusion 0.4 mcg/kg/hr (07/01/19 0427)    dextrose      fentaNYL 25 mcg/hr (06/30/19 1745)    prismaSATE BGK 4/2.5 1,000 mL/hr (07/01/19 0750)    prismaSATE BGK 4/2.5 1,500 mL/hr (07/01/19 3275)    prismaSATE BGK 4/2.5 1,000 mL/hr (07/01/19 0750)    norepinephrine Stopped (06/30/19 0530)       PRN Meds:  lubrifresh P.M., metoprolol, sodium chloride flush, iopamidol, albuterol, sodium chloride flush, ondansetron, acetaminophen, glucose, dextrose, glucagon (rDNA), dextrose, fentanNYL, potassium chloride, magnesium sulfate, sodium phosphate IVPB **OR** sodium phosphate IVPB **OR** sodium phosphate IVPB **OR** sodium phosphate IVPB, calcium gluconate IVPB **OR** calcium gluconate IVPB **OR** calcium gluconate IVPB **OR** calcium gluconate IVPB    Labs:  CBC:   Recent Labs     06/29/19  0610 06/30/19  0625 07/01/19  0600   WBC 12.8* 12.4* 16.0*   HGB 9.0* 8.5* 7.5*   HCT 28.6* 27.1* 23.7*   MCV 85.3 86.7 86.5   PLT 70* 67* 63*     BMP:   Recent Labs     06/30/19  1800 07/01/19  0015 07/01/19  0600    137 136   K 4.3 4.4 4.4    100 99   CO2 27 26 25   PHOS 2.4* 1.9* 2.4*   BUN 13 14 13   CREATININE 1.0 1.2 1.2     LIVER PROFILE:   Recent Labs     06/29/19  0610 06/30/19  0625 07/01/19  0600   AST 82* 71* 52*   * 520* 301*   BILIDIR 0.9* 1.0* 1.1*   BILITOT 1.5* 1.6* 2.0*   ALKPHOS 132* 135* 110     PT/INR: No results for input(s): PROTIME, INR in the last 72 hours. APTT: No results for input(s): APTT in the last 72 hours. UA:No results for input(s): NITRITE, COLORU, PHUR, LABCAST, WBCUA, RBCUA, MUCUS, TRICHOMONAS, YEAST, BACTERIA, CLARITYU, SPECGRAV, LEUKOCYTESUR, UROBILINOGEN, BILIRUBINUR, BLOODU, GLUCOSEU, AMORPHOUS in the last 72 hours.     Invalid input(s): Mabel Martlayne  No results for input(s): PH, PCO2, PO2 in

## 2019-07-02 LAB
ALBUMIN SERPL-MCNC: 3.8 G/DL (ref 3.4–5)
ALBUMIN SERPL-MCNC: 4 G/DL (ref 3.4–5)
ALP BLD-CCNC: 111 U/L (ref 40–129)
ALT SERPL-CCNC: 212 U/L (ref 10–40)
ANION GAP SERPL CALCULATED.3IONS-SCNC: 12 MMOL/L (ref 3–16)
ANION GAP SERPL CALCULATED.3IONS-SCNC: 14 MMOL/L (ref 3–16)
AST SERPL-CCNC: 46 U/L (ref 15–37)
BASOPHILS ABSOLUTE: 0 K/UL (ref 0–0.2)
BASOPHILS RELATIVE PERCENT: 0.1 %
BILIRUB SERPL-MCNC: 1.7 MG/DL (ref 0–1)
BILIRUBIN DIRECT: 1 MG/DL (ref 0–0.3)
BILIRUBIN, INDIRECT: 0.7 MG/DL (ref 0–1)
BUN BLDV-MCNC: 12 MG/DL (ref 7–20)
BUN BLDV-MCNC: 13 MG/DL (ref 7–20)
CALCIUM IONIZED: 1.13 MMOL/L (ref 1.12–1.32)
CALCIUM IONIZED: 1.13 MMOL/L (ref 1.12–1.32)
CALCIUM SERPL-MCNC: 8.3 MG/DL (ref 8.3–10.6)
CALCIUM SERPL-MCNC: 8.6 MG/DL (ref 8.3–10.6)
CHLORIDE BLD-SCNC: 99 MMOL/L (ref 99–110)
CHLORIDE BLD-SCNC: 99 MMOL/L (ref 99–110)
CO2: 24 MMOL/L (ref 21–32)
CO2: 25 MMOL/L (ref 21–32)
CREAT SERPL-MCNC: 1 MG/DL (ref 0.9–1.3)
CREAT SERPL-MCNC: 1 MG/DL (ref 0.9–1.3)
EKG DIAGNOSIS: NORMAL
EKG Q-T INTERVAL: 304 MS
EKG QRS DURATION: 94 MS
EKG QTC CALCULATION (BAZETT): 475 MS
EKG R AXIS: 76 DEGREES
EKG T AXIS: -74 DEGREES
EKG VENTRICULAR RATE: 147 BPM
EOSINOPHILS ABSOLUTE: 0.1 K/UL (ref 0–0.6)
EOSINOPHILS RELATIVE PERCENT: 0.5 %
GFR AFRICAN AMERICAN: >60
GFR AFRICAN AMERICAN: >60
GFR NON-AFRICAN AMERICAN: >60
GFR NON-AFRICAN AMERICAN: >60
GLUCOSE BLD-MCNC: 105 MG/DL (ref 70–99)
GLUCOSE BLD-MCNC: 109 MG/DL (ref 70–99)
GLUCOSE BLD-MCNC: 112 MG/DL (ref 70–99)
GLUCOSE BLD-MCNC: 113 MG/DL (ref 70–99)
GLUCOSE BLD-MCNC: 124 MG/DL (ref 70–99)
GLUCOSE BLD-MCNC: 125 MG/DL (ref 70–99)
GLUCOSE BLD-MCNC: 132 MG/DL (ref 70–99)
GLUCOSE BLD-MCNC: 136 MG/DL (ref 70–99)
GLUCOSE BLD-MCNC: 153 MG/DL (ref 70–99)
HBV SURFACE AB TITR SER: <3.5 MIU/ML
HCT VFR BLD CALC: 23.9 % (ref 40.5–52.5)
HEMATOLOGY PATH CONSULT: NO
HEMOGLOBIN: 7.5 G/DL (ref 13.5–17.5)
HEPATITIS B SURFACE ANTIGEN INTERPRETATION: NORMAL
LYMPHOCYTES ABSOLUTE: 0.6 K/UL (ref 1–5.1)
LYMPHOCYTES RELATIVE PERCENT: 4.3 %
MAGNESIUM: 2.4 MG/DL (ref 1.8–2.4)
MAGNESIUM: 2.5 MG/DL (ref 1.8–2.4)
MCH RBC QN AUTO: 27.4 PG (ref 26–34)
MCHC RBC AUTO-ENTMCNC: 31.5 G/DL (ref 31–36)
MCV RBC AUTO: 86.9 FL (ref 80–100)
MONOCYTES ABSOLUTE: 2 K/UL (ref 0–1.3)
MONOCYTES RELATIVE PERCENT: 13.7 %
NEUTROPHILS ABSOLUTE: 11.9 K/UL (ref 1.7–7.7)
NEUTROPHILS RELATIVE PERCENT: 81.4 %
PDW BLD-RTO: 17.2 % (ref 12.4–15.4)
PERFORMED ON: ABNORMAL
PH VENOUS: 7.27 (ref 7.35–7.45)
PH VENOUS: 7.28 (ref 7.35–7.45)
PHOSPHORUS: 1.9 MG/DL (ref 2.5–4.9)
PHOSPHORUS: 2.2 MG/DL (ref 2.5–4.9)
PLATELET # BLD: 66 K/UL (ref 135–450)
PMV BLD AUTO: 9.3 FL (ref 5–10.5)
POTASSIUM SERPL-SCNC: 4.7 MMOL/L (ref 3.5–5.1)
POTASSIUM SERPL-SCNC: 5.1 MMOL/L (ref 3.5–5.1)
RBC # BLD: 2.75 M/UL (ref 4.2–5.9)
SODIUM BLD-SCNC: 136 MMOL/L (ref 136–145)
SODIUM BLD-SCNC: 137 MMOL/L (ref 136–145)
TOTAL PROTEIN: 7.1 G/DL (ref 6.4–8.2)
WBC # BLD: 14.7 K/UL (ref 4–11)

## 2019-07-02 PROCEDURE — 2580000003 HC RX 258: Performed by: INTERNAL MEDICINE

## 2019-07-02 PROCEDURE — 83735 ASSAY OF MAGNESIUM: CPT

## 2019-07-02 PROCEDURE — 85025 COMPLETE CBC W/AUTO DIFF WBC: CPT

## 2019-07-02 PROCEDURE — 2500000003 HC RX 250 WO HCPCS: Performed by: NURSE PRACTITIONER

## 2019-07-02 PROCEDURE — 2500000003 HC RX 250 WO HCPCS: Performed by: INTERNAL MEDICINE

## 2019-07-02 PROCEDURE — APPNB15 APP NON BILLABLE TIME 0-15 MINS: Performed by: NURSE PRACTITIONER

## 2019-07-02 PROCEDURE — 99232 SBSQ HOSP IP/OBS MODERATE 35: CPT | Performed by: NURSE PRACTITIONER

## 2019-07-02 PROCEDURE — 93010 ELECTROCARDIOGRAM REPORT: CPT | Performed by: INTERNAL MEDICINE

## 2019-07-02 PROCEDURE — 80076 HEPATIC FUNCTION PANEL: CPT

## 2019-07-02 PROCEDURE — 6370000000 HC RX 637 (ALT 250 FOR IP): Performed by: INTERNAL MEDICINE

## 2019-07-02 PROCEDURE — 94760 N-INVAS EAR/PLS OXIMETRY 1: CPT

## 2019-07-02 PROCEDURE — 87340 HEPATITIS B SURFACE AG IA: CPT

## 2019-07-02 PROCEDURE — 93005 ELECTROCARDIOGRAM TRACING: CPT | Performed by: NURSE PRACTITIONER

## 2019-07-02 PROCEDURE — 86706 HEP B SURFACE ANTIBODY: CPT

## 2019-07-02 PROCEDURE — 94640 AIRWAY INHALATION TREATMENT: CPT

## 2019-07-02 PROCEDURE — 6370000000 HC RX 637 (ALT 250 FOR IP): Performed by: NURSE PRACTITIONER

## 2019-07-02 PROCEDURE — 2580000003 HC RX 258: Performed by: NURSE PRACTITIONER

## 2019-07-02 PROCEDURE — 86704 HEP B CORE ANTIBODY TOTAL: CPT

## 2019-07-02 PROCEDURE — 94003 VENT MGMT INPAT SUBQ DAY: CPT

## 2019-07-02 PROCEDURE — 99291 CRITICAL CARE FIRST HOUR: CPT | Performed by: INTERNAL MEDICINE

## 2019-07-02 PROCEDURE — 6360000002 HC RX W HCPCS: Performed by: INTERNAL MEDICINE

## 2019-07-02 PROCEDURE — 2700000000 HC OXYGEN THERAPY PER DAY

## 2019-07-02 PROCEDURE — 80069 RENAL FUNCTION PANEL: CPT

## 2019-07-02 PROCEDURE — 82330 ASSAY OF CALCIUM: CPT

## 2019-07-02 PROCEDURE — 2000000000 HC ICU R&B

## 2019-07-02 PROCEDURE — 6360000002 HC RX W HCPCS: Performed by: NURSE PRACTITIONER

## 2019-07-02 PROCEDURE — 90945 DIALYSIS ONE EVALUATION: CPT

## 2019-07-02 RX ORDER — FENTANYL CITRATE 50 UG/ML
50 INJECTION, SOLUTION INTRAMUSCULAR; INTRAVENOUS
Status: DISCONTINUED | OUTPATIENT
Start: 2019-07-02 | End: 2019-07-08

## 2019-07-02 RX ORDER — METOPROLOL TARTRATE 5 MG/5ML
5 INJECTION INTRAVENOUS EVERY 6 HOURS
Status: DISCONTINUED | OUTPATIENT
Start: 2019-07-02 | End: 2019-07-03

## 2019-07-02 RX ORDER — VANCOMYCIN HYDROCHLORIDE 250 MG/1
250 CAPSULE ORAL EVERY 6 HOURS SCHEDULED
Status: DISCONTINUED | OUTPATIENT
Start: 2019-07-02 | End: 2019-07-02

## 2019-07-02 RX ORDER — POLYETHYLENE GLYCOL 3350 17 G/17G
17 POWDER, FOR SOLUTION ORAL DAILY
Status: DISCONTINUED | OUTPATIENT
Start: 2019-07-02 | End: 2019-07-02

## 2019-07-02 RX ORDER — LACTOBACILLUS RHAMNOSUS GG 10B CELL
2 CAPSULE ORAL 2 TIMES DAILY WITH MEALS
Status: DISCONTINUED | OUTPATIENT
Start: 2019-07-02 | End: 2019-07-23 | Stop reason: HOSPADM

## 2019-07-02 RX ORDER — MIDAZOLAM HYDROCHLORIDE 1 MG/ML
1 INJECTION INTRAMUSCULAR; INTRAVENOUS
Status: DISCONTINUED | OUTPATIENT
Start: 2019-07-02 | End: 2019-07-08

## 2019-07-02 RX ORDER — HEPARIN SODIUM 1000 [USP'U]/ML
1000 INJECTION, SOLUTION INTRAVENOUS; SUBCUTANEOUS
Status: COMPLETED | OUTPATIENT
Start: 2019-07-02 | End: 2019-07-02

## 2019-07-02 RX ORDER — MILRINONE LACTATE 0.2 MG/ML
0.2 INJECTION, SOLUTION INTRAVENOUS CONTINUOUS
Status: DISCONTINUED | OUTPATIENT
Start: 2019-07-02 | End: 2019-07-04

## 2019-07-02 RX ADMIN — AMIODARONE HYDROCHLORIDE 1 MG/MIN: 50 INJECTION, SOLUTION INTRAVENOUS at 17:05

## 2019-07-02 RX ADMIN — HEPARIN SODIUM 5000 UNITS: 5000 INJECTION INTRAVENOUS; SUBCUTANEOUS at 13:44

## 2019-07-02 RX ADMIN — Medication 2 CAPSULE: at 17:06

## 2019-07-02 RX ADMIN — Medication 10 ML: at 08:44

## 2019-07-02 RX ADMIN — Medication 1500 ML/HR: at 08:40

## 2019-07-02 RX ADMIN — MOMETASONE FUROATE AND FORMOTEROL FUMARATE DIHYDRATE 2 PUFF: 100; 5 AEROSOL RESPIRATORY (INHALATION) at 08:43

## 2019-07-02 RX ADMIN — METRONIDAZOLE 500 MG: 500 INJECTION, SOLUTION INTRAVENOUS at 17:06

## 2019-07-02 RX ADMIN — CHLORHEXIDINE GLUCONATE 0.12% ORAL RINSE 15 ML: 1.2 LIQUID ORAL at 08:43

## 2019-07-02 RX ADMIN — Medication 250 MG: at 17:06

## 2019-07-02 RX ADMIN — Medication 50 MG: at 21:55

## 2019-07-02 RX ADMIN — IPRATROPIUM BROMIDE AND ALBUTEROL SULFATE 1 AMPULE: .5; 3 SOLUTION RESPIRATORY (INHALATION) at 08:43

## 2019-07-02 RX ADMIN — IPRATROPIUM BROMIDE AND ALBUTEROL SULFATE 1 AMPULE: .5; 3 SOLUTION RESPIRATORY (INHALATION) at 20:30

## 2019-07-02 RX ADMIN — IPRATROPIUM BROMIDE AND ALBUTEROL SULFATE 1 AMPULE: .5; 3 SOLUTION RESPIRATORY (INHALATION) at 04:39

## 2019-07-02 RX ADMIN — METRONIDAZOLE 500 MG: 500 INJECTION, SOLUTION INTRAVENOUS at 09:40

## 2019-07-02 RX ADMIN — MUPIROCIN: 20 OINTMENT TOPICAL at 20:05

## 2019-07-02 RX ADMIN — HEPARIN SODIUM 5000 UNITS: 5000 INJECTION INTRAVENOUS; SUBCUTANEOUS at 05:40

## 2019-07-02 RX ADMIN — AMIODARONE HYDROCHLORIDE 150 MG: 50 INJECTION, SOLUTION INTRAVENOUS at 16:51

## 2019-07-02 RX ADMIN — FENTANYL CITRATE 50 MCG: 0.05 INJECTION, SOLUTION INTRAMUSCULAR; INTRAVENOUS at 14:58

## 2019-07-02 RX ADMIN — FAMOTIDINE 20 MG: 10 INJECTION, SOLUTION INTRAVENOUS at 08:44

## 2019-07-02 RX ADMIN — MIDAZOLAM 1 MG: 1 INJECTION INTRAMUSCULAR; INTRAVENOUS at 22:41

## 2019-07-02 RX ADMIN — Medication 1500 ML/HR: at 01:44

## 2019-07-02 RX ADMIN — Medication 50 MG: at 14:06

## 2019-07-02 RX ADMIN — SODIUM PHOSPHATE, MONOBASIC, MONOHYDRATE 20 MMOL: 276; 142 INJECTION, SOLUTION INTRAVENOUS at 09:30

## 2019-07-02 RX ADMIN — HEPARIN SODIUM 5000 UNITS: 5000 INJECTION INTRAVENOUS; SUBCUTANEOUS at 21:28

## 2019-07-02 RX ADMIN — Medication 10 ML: at 08:45

## 2019-07-02 RX ADMIN — POLYETHYLENE GLYCOL 3350 17 G: 17 POWDER, FOR SOLUTION ORAL at 09:40

## 2019-07-02 RX ADMIN — Medication 50 MG: at 05:30

## 2019-07-02 RX ADMIN — METOPROLOL TARTRATE 12.5 MG: 25 TABLET ORAL at 00:22

## 2019-07-02 RX ADMIN — METOPROLOL TARTRATE 12.5 MG: 25 TABLET ORAL at 05:30

## 2019-07-02 RX ADMIN — IPRATROPIUM BROMIDE AND ALBUTEROL SULFATE 1 AMPULE: .5; 3 SOLUTION RESPIRATORY (INHALATION) at 00:22

## 2019-07-02 RX ADMIN — Medication 250 MG: at 13:45

## 2019-07-02 RX ADMIN — CHLORHEXIDINE GLUCONATE 0.12% ORAL RINSE 15 ML: 1.2 LIQUID ORAL at 20:03

## 2019-07-02 RX ADMIN — MILRINONE LACTATE IN DEXTROSE 0.2 MCG/KG/MIN: 200 INJECTION, SOLUTION INTRAVENOUS at 09:50

## 2019-07-02 RX ADMIN — METOPROLOL TARTRATE 5 MG: 5 INJECTION INTRAVENOUS at 19:35

## 2019-07-02 RX ADMIN — HEPARIN SODIUM 1000 UNITS: 1000 INJECTION INTRAVENOUS; SUBCUTANEOUS at 10:27

## 2019-07-02 RX ADMIN — FENTANYL CITRATE 50 MCG: 0.05 INJECTION, SOLUTION INTRAMUSCULAR; INTRAVENOUS at 18:10

## 2019-07-02 RX ADMIN — Medication 1500 ML/HR: at 05:09

## 2019-07-02 RX ADMIN — INSULIN LISPRO 3 UNITS: 100 INJECTION, SOLUTION INTRAVENOUS; SUBCUTANEOUS at 23:53

## 2019-07-02 RX ADMIN — SODIUM CHLORIDE 0.4 MCG/KG/HR: 9 INJECTION, SOLUTION INTRAVENOUS at 00:14

## 2019-07-02 RX ADMIN — MIDAZOLAM 1 MG: 1 INJECTION INTRAMUSCULAR; INTRAVENOUS at 13:13

## 2019-07-02 RX ADMIN — MIDAZOLAM 1 MG: 1 INJECTION INTRAMUSCULAR; INTRAVENOUS at 18:11

## 2019-07-02 RX ADMIN — IPRATROPIUM BROMIDE AND ALBUTEROL SULFATE 1 AMPULE: .5; 3 SOLUTION RESPIRATORY (INHALATION) at 12:25

## 2019-07-02 RX ADMIN — Medication 2 CAPSULE: at 08:43

## 2019-07-02 RX ADMIN — Medication 10 ML: at 20:04

## 2019-07-02 RX ADMIN — Medication 1000 ML/HR: at 00:55

## 2019-07-02 RX ADMIN — AMIODARONE HYDROCHLORIDE 0.5 MG/MIN: 50 INJECTION, SOLUTION INTRAVENOUS at 22:30

## 2019-07-02 RX ADMIN — MILRINONE LACTATE IN DEXTROSE 0.2 MCG/KG/MIN: 200 INJECTION, SOLUTION INTRAVENOUS at 22:30

## 2019-07-02 RX ADMIN — Medication 10 ML: at 20:03

## 2019-07-02 RX ADMIN — MOMETASONE FUROATE AND FORMOTEROL FUMARATE DIHYDRATE 2 PUFF: 100; 5 AEROSOL RESPIRATORY (INHALATION) at 20:30

## 2019-07-02 RX ADMIN — Medication 250 MG: at 23:53

## 2019-07-02 RX ADMIN — MUPIROCIN: 20 OINTMENT TOPICAL at 09:35

## 2019-07-02 RX ADMIN — FENTANYL CITRATE 50 MCG: 0.05 INJECTION, SOLUTION INTRAMUSCULAR; INTRAVENOUS at 13:13

## 2019-07-02 RX ADMIN — FENTANYL CITRATE 50 MCG: 0.05 INJECTION, SOLUTION INTRAMUSCULAR; INTRAVENOUS at 19:20

## 2019-07-02 RX ADMIN — IPRATROPIUM BROMIDE AND ALBUTEROL SULFATE 1 AMPULE: .5; 3 SOLUTION RESPIRATORY (INHALATION) at 15:52

## 2019-07-02 RX ADMIN — Medication 50 MCG/HR: at 03:00

## 2019-07-02 RX ADMIN — IPRATROPIUM BROMIDE AND ALBUTEROL SULFATE 1 AMPULE: .5; 3 SOLUTION RESPIRATORY (INHALATION) at 23:47

## 2019-07-02 RX ADMIN — FENTANYL CITRATE 50 MCG: 0.05 INJECTION, SOLUTION INTRAMUSCULAR; INTRAVENOUS at 23:38

## 2019-07-02 RX ADMIN — SODIUM PHOSPHATE, MONOBASIC, MONOHYDRATE 6 MMOL: 276; 142 INJECTION, SOLUTION INTRAVENOUS at 02:23

## 2019-07-02 ASSESSMENT — PAIN SCALES - GENERAL
PAINLEVEL_OUTOF10: 0

## 2019-07-02 ASSESSMENT — PULMONARY FUNCTION TESTS
PIF_VALUE: 15
PIF_VALUE: 16
PIF_VALUE: 16
PIF_VALUE: 18
PIF_VALUE: 15
PIF_VALUE: 16
PIF_VALUE: 18
PIF_VALUE: 15
PIF_VALUE: 16
PIF_VALUE: 16

## 2019-07-02 NOTE — PROGRESS NOTES
antrly, No accessory muscle use. No crackles. No wheezes. No rhonchi. CV: tachycardia, Regular rhythm. GI: distended/tense, minimal BS  Skin: Warm, dry, normal texture and turgor. Lymph: No cervical LAD. No supraclavicular LAD. M/S: / Ext. No cyanosis. No clubbing. No joint deformity.    Neuro: unable to be examined 2/2 intubated/sedated    Labs:   Recent Labs     06/30/19  0625 07/01/19  0600 07/02/19  0615   WBC 12.4* 16.0* 14.7*   HGB 8.5* 7.5* 7.5*   HCT 27.1* 23.7* 23.9*   PLT 67* 63* 66*     Recent Labs     07/01/19  1807 07/02/19  0023 07/02/19  0615    137 136   K 4.4 5.1 4.7    99 99   CO2 27 24 25   BUN 12 13 12   CREATININE 0.9 1.0 1.0   CALCIUM 8.0* 8.6 8.3   PHOS 1.8* 2.2* 1.9*     Recent Labs     06/30/19  0625 07/01/19  0600 07/02/19  0615   AST 71* 52* 46*   * 301* 212*   BILIDIR 1.0* 1.1* 1.0*   BILITOT 1.6* 2.0* 1.7*   ALKPHOS 135* 110 111     No results for input(s): INR in the last 72 hours. No results for input(s): Princess Terrence in the last 72 hours. Urinalysis:      Lab Results   Component Value Date    NITRU Negative 06/23/2019    WBCUA 5 06/23/2019    RBCUA 8 06/23/2019    BLOODU MODERATE 06/23/2019    SPECGRAV 1.021 06/23/2019    GLUCOSEU Negative 06/23/2019       Radiology:  CT ABDOMEN PELVIS W WO CONTRAST Additional Contrast? Oral   Final Result   Pneumatosis of the splenic flexure of the colon. This is associated with a   generalized ileus. The appearance is concerning for colitis. (Interestingly, the patient had a transient presentation of portal venous gas   near the transverse colon on 05/22 to 5/25/2017. This was apparently benign.)      Small amount of ascites. Bibasilar pulmonary consolidations- pneumonia versus atelectasis with small   effusions. Anasarca. XR CHEST PORTABLE   Final Result   No significant interval change.          XR ABDOMEN (KUB) (SINGLE AP VIEW)   Final Result   Gaseous distention of this scratch the

## 2019-07-02 NOTE — PROGRESS NOTES
Nephrology ([facility]) Progress Note    CC:  REJI and hyperkalemia    Subjective:    HPI:  Ventilated. CRRT running. Remains oliguric. Question of alcohol withdrawal.  ROS:  In bed. No fever. Family at bedside  [address]:  medications reviewed.  sodium phosphate IVPB  20 mmol Intravenous Once    polyethylene glycol  17 g Oral Daily    metroNIDAZOLE  500 mg Intravenous Q8H    vancomycin  250 mg Oral 4 times per day    lactobacillus  2 capsule Oral BID WC    mupirocin   Topical BID    heparin (porcine)  5,000 Units Subcutaneous 3 times per day    sodium chloride flush  10 mL Intravenous 2 times per day    darbepoetin vinay-polysorbate  60 mcg Subcutaneous Weekly - Thursday    insulin glargine  10 Units Subcutaneous Nightly    gabapentin  50 mg Oral 3 times per day    insulin lispro  0-18 Units Subcutaneous Q4H    mometasone-formoterol  2 puff Inhalation BID    sodium chloride flush  10 mL Intravenous 2 times per day    ipratropium-albuterol  1 ampule Inhalation Q4H    chlorhexidine  15 mL Mouth/Throat BID    famotidine (PEPCID) injection  20 mg Intravenous Daily         Objective:  Blood pressure 99/60, pulse 118, temperature 98.4 °F (36.9 °C), temperature source Axillary, resp. rate 18, height 5' 7\" (1.702 m), weight 235 lb 14.3 oz (107 kg), SpO2 97 %.     Intake/Output Summary (Last 24 hours) at 7/2/2019 1007  Last data filed at 7/2/2019 0943  Gross per 24 hour   Intake 1830 ml   Output 5669 ml   Net -3839 ml     General:  NAD, ventilated  Chest:  coarse BS  CVS:  RRR  Abdominal:  NTND, soft, +BS  Extremities:  1+ edema  Skin:  no rash    Labs:  Renal panel:  Lab Results   Component Value Date/Time     07/02/2019 06:15 AM    K 4.7 07/02/2019 06:15 AM    K 3.6 06/11/2019 06:00 AM    CO2 25 07/02/2019 06:15 AM    BUN 12 07/02/2019 06:15 AM    CREATININE 1.0 07/02/2019 06:15 AM    CALCIUM 8.3 07/02/2019 06:15 AM    PHOS 1.9 (L) 07/02/2019 06:15 AM    MG 2.40 07/02/2019 06:15 AM

## 2019-07-03 LAB
ABO/RH: NORMAL
ALBUMIN SERPL-MCNC: 3 G/DL (ref 3.4–5)
ALP BLD-CCNC: 108 U/L (ref 40–129)
ALT SERPL-CCNC: 137 U/L (ref 10–40)
ANION GAP SERPL CALCULATED.3IONS-SCNC: 13 MMOL/L (ref 3–16)
ANTIBODY SCREEN: NORMAL
AST SERPL-CCNC: 38 U/L (ref 15–37)
BASE EXCESS ARTERIAL: 2.5 MMOL/L (ref -3–3)
BASOPHILS ABSOLUTE: 0 K/UL (ref 0–0.2)
BASOPHILS RELATIVE PERCENT: 0.1 %
BILIRUB SERPL-MCNC: 1.5 MG/DL (ref 0–1)
BILIRUBIN DIRECT: 1 MG/DL (ref 0–0.3)
BILIRUBIN, INDIRECT: 0.5 MG/DL (ref 0–1)
BLOOD BANK DISPENSE STATUS: NORMAL
BLOOD BANK PRODUCT CODE: NORMAL
BPU ID: NORMAL
BUN BLDV-MCNC: 23 MG/DL (ref 7–20)
CALCIUM IONIZED: 1.12 MMOL/L (ref 1.12–1.32)
CALCIUM SERPL-MCNC: 8.4 MG/DL (ref 8.3–10.6)
CARBOXYHEMOGLOBIN ARTERIAL: 1.4 % (ref 0–1.5)
CHLORIDE BLD-SCNC: 99 MMOL/L (ref 99–110)
CO2: 23 MMOL/L (ref 21–32)
CREAT SERPL-MCNC: 1.5 MG/DL (ref 0.9–1.3)
DESCRIPTION BLOOD BANK: NORMAL
EOSINOPHILS ABSOLUTE: 0 K/UL (ref 0–0.6)
EOSINOPHILS RELATIVE PERCENT: 0.3 %
GFR AFRICAN AMERICAN: 58
GFR NON-AFRICAN AMERICAN: 48
GLUCOSE BLD-MCNC: 116 MG/DL (ref 70–99)
GLUCOSE BLD-MCNC: 137 MG/DL (ref 70–99)
GLUCOSE BLD-MCNC: 145 MG/DL (ref 70–99)
GLUCOSE BLD-MCNC: 146 MG/DL (ref 70–99)
GLUCOSE BLD-MCNC: 146 MG/DL (ref 70–99)
GLUCOSE BLD-MCNC: 155 MG/DL (ref 70–99)
GLUCOSE BLD-MCNC: 98 MG/DL (ref 70–99)
HCO3 ARTERIAL: 27.2 MMOL/L (ref 21–29)
HCT VFR BLD CALC: 21.6 % (ref 40.5–52.5)
HCT VFR BLD CALC: 25.1 % (ref 40.5–52.5)
HEMATOLOGY PATH CONSULT: NO
HEMOGLOBIN, ART, EXTENDED: 8 G/DL (ref 13.5–17.5)
HEMOGLOBIN: 6.7 G/DL (ref 13.5–17.5)
HEMOGLOBIN: 8.1 G/DL (ref 13.5–17.5)
LYMPHOCYTES ABSOLUTE: 0.9 K/UL (ref 1–5.1)
LYMPHOCYTES RELATIVE PERCENT: 5.7 %
MAGNESIUM: 2.3 MG/DL (ref 1.8–2.4)
MCH RBC QN AUTO: 26.6 PG (ref 26–34)
MCHC RBC AUTO-ENTMCNC: 31.3 G/DL (ref 31–36)
MCV RBC AUTO: 85.2 FL (ref 80–100)
METHEMOGLOBIN ARTERIAL: 1 %
MONOCYTES ABSOLUTE: 2.2 K/UL (ref 0–1.3)
MONOCYTES RELATIVE PERCENT: 14.5 %
NEUTROPHILS ABSOLUTE: 12.3 K/UL (ref 1.7–7.7)
NEUTROPHILS RELATIVE PERCENT: 79.4 %
O2 CONTENT ARTERIAL: 11 ML/DL
O2 SAT, ARTERIAL: 98.7 %
O2 THERAPY: ABNORMAL
PCO2 ARTERIAL: 46.2 MMHG (ref 35–45)
PDW BLD-RTO: 17.5 % (ref 12.4–15.4)
PERFORMED ON: ABNORMAL
PERFORMED ON: NORMAL
PH ARTERIAL: 7.39 (ref 7.35–7.45)
PH VENOUS: 7.3 (ref 7.35–7.45)
PHOSPHORUS: 3 MG/DL (ref 2.5–4.9)
PLATELET # BLD: 88 K/UL (ref 135–450)
PMV BLD AUTO: 9.1 FL (ref 5–10.5)
PO2 ARTERIAL: 107 MMHG (ref 75–108)
POTASSIUM SERPL-SCNC: 3.9 MMOL/L (ref 3.5–5.1)
RBC # BLD: 2.53 M/UL (ref 4.2–5.9)
SODIUM BLD-SCNC: 135 MMOL/L (ref 136–145)
TCO2 ARTERIAL: 28.6 MMOL/L
TOTAL PROTEIN: 6.4 G/DL (ref 6.4–8.2)
WBC # BLD: 15.4 K/UL (ref 4–11)

## 2019-07-03 PROCEDURE — 2700000000 HC OXYGEN THERAPY PER DAY

## 2019-07-03 PROCEDURE — 94664 DEMO&/EVAL PT USE INHALER: CPT

## 2019-07-03 PROCEDURE — 86900 BLOOD TYPING SEROLOGIC ABO: CPT

## 2019-07-03 PROCEDURE — 86901 BLOOD TYPING SEROLOGIC RH(D): CPT

## 2019-07-03 PROCEDURE — 85025 COMPLETE CBC W/AUTO DIFF WBC: CPT

## 2019-07-03 PROCEDURE — 82803 BLOOD GASES ANY COMBINATION: CPT

## 2019-07-03 PROCEDURE — 90935 HEMODIALYSIS ONE EVALUATION: CPT

## 2019-07-03 PROCEDURE — 94760 N-INVAS EAR/PLS OXIMETRY 1: CPT

## 2019-07-03 PROCEDURE — 36430 TRANSFUSION BLD/BLD COMPNT: CPT

## 2019-07-03 PROCEDURE — 85014 HEMATOCRIT: CPT

## 2019-07-03 PROCEDURE — 36592 COLLECT BLOOD FROM PICC: CPT

## 2019-07-03 PROCEDURE — 94640 AIRWAY INHALATION TREATMENT: CPT

## 2019-07-03 PROCEDURE — 99232 SBSQ HOSP IP/OBS MODERATE 35: CPT | Performed by: NURSE PRACTITIONER

## 2019-07-03 PROCEDURE — 80069 RENAL FUNCTION PANEL: CPT

## 2019-07-03 PROCEDURE — 2500000003 HC RX 250 WO HCPCS: Performed by: INTERNAL MEDICINE

## 2019-07-03 PROCEDURE — 82330 ASSAY OF CALCIUM: CPT

## 2019-07-03 PROCEDURE — P9016 RBC LEUKOCYTES REDUCED: HCPCS

## 2019-07-03 PROCEDURE — APPNB15 APP NON BILLABLE TIME 0-15 MINS: Performed by: NURSE PRACTITIONER

## 2019-07-03 PROCEDURE — 89220 SPUTUM SPECIMEN COLLECTION: CPT

## 2019-07-03 PROCEDURE — 99291 CRITICAL CARE FIRST HOUR: CPT | Performed by: INTERNAL MEDICINE

## 2019-07-03 PROCEDURE — 94750 HC PULMONARY COMPLIANCE STUDY: CPT

## 2019-07-03 PROCEDURE — 85018 HEMOGLOBIN: CPT

## 2019-07-03 PROCEDURE — 6370000000 HC RX 637 (ALT 250 FOR IP): Performed by: NURSE PRACTITIONER

## 2019-07-03 PROCEDURE — 2580000003 HC RX 258: Performed by: INTERNAL MEDICINE

## 2019-07-03 PROCEDURE — 80076 HEPATIC FUNCTION PANEL: CPT

## 2019-07-03 PROCEDURE — 6370000000 HC RX 637 (ALT 250 FOR IP): Performed by: INTERNAL MEDICINE

## 2019-07-03 PROCEDURE — 2000000000 HC ICU R&B

## 2019-07-03 PROCEDURE — 2500000003 HC RX 250 WO HCPCS: Performed by: NURSE PRACTITIONER

## 2019-07-03 PROCEDURE — 6360000002 HC RX W HCPCS: Performed by: INTERNAL MEDICINE

## 2019-07-03 PROCEDURE — 83735 ASSAY OF MAGNESIUM: CPT

## 2019-07-03 PROCEDURE — 2580000003 HC RX 258: Performed by: NURSE PRACTITIONER

## 2019-07-03 PROCEDURE — 6360000002 HC RX W HCPCS: Performed by: NURSE PRACTITIONER

## 2019-07-03 PROCEDURE — 86923 COMPATIBILITY TEST ELECTRIC: CPT

## 2019-07-03 PROCEDURE — 36415 COLL VENOUS BLD VENIPUNCTURE: CPT

## 2019-07-03 PROCEDURE — 86850 RBC ANTIBODY SCREEN: CPT

## 2019-07-03 PROCEDURE — 5A1D70Z PERFORMANCE OF URINARY FILTRATION, INTERMITTENT, LESS THAN 6 HOURS PER DAY: ICD-10-PCS | Performed by: INTERNAL MEDICINE

## 2019-07-03 PROCEDURE — 94660 CPAP INITIATION&MGMT: CPT

## 2019-07-03 PROCEDURE — 94003 VENT MGMT INPAT SUBQ DAY: CPT

## 2019-07-03 RX ORDER — POLYETHYLENE GLYCOL 3350 17 G/17G
17 POWDER, FOR SOLUTION ORAL DAILY
Status: DISCONTINUED | OUTPATIENT
Start: 2019-07-03 | End: 2019-07-23 | Stop reason: HOSPADM

## 2019-07-03 RX ORDER — 0.9 % SODIUM CHLORIDE 0.9 %
250 INTRAVENOUS SOLUTION INTRAVENOUS ONCE
Status: COMPLETED | OUTPATIENT
Start: 2019-07-03 | End: 2019-07-03

## 2019-07-03 RX ORDER — METOPROLOL TARTRATE 5 MG/5ML
5 INJECTION INTRAVENOUS EVERY 6 HOURS
Status: DISCONTINUED | OUTPATIENT
Start: 2019-07-03 | End: 2019-07-09

## 2019-07-03 RX ADMIN — AMIODARONE HYDROCHLORIDE 150 MG: 50 INJECTION, SOLUTION INTRAVENOUS at 09:55

## 2019-07-03 RX ADMIN — IPRATROPIUM BROMIDE AND ALBUTEROL SULFATE 1 AMPULE: .5; 3 SOLUTION RESPIRATORY (INHALATION) at 20:45

## 2019-07-03 RX ADMIN — CHLORHEXIDINE GLUCONATE 0.12% ORAL RINSE 15 ML: 1.2 LIQUID ORAL at 21:35

## 2019-07-03 RX ADMIN — METOPROLOL TARTRATE 5 MG: 5 INJECTION INTRAVENOUS at 15:39

## 2019-07-03 RX ADMIN — MOMETASONE FUROATE AND FORMOTEROL FUMARATE DIHYDRATE 2 PUFF: 100; 5 AEROSOL RESPIRATORY (INHALATION) at 20:45

## 2019-07-03 RX ADMIN — INSULIN LISPRO 3 UNITS: 100 INJECTION, SOLUTION INTRAVENOUS; SUBCUTANEOUS at 04:38

## 2019-07-03 RX ADMIN — INSULIN LISPRO 3 UNITS: 100 INJECTION, SOLUTION INTRAVENOUS; SUBCUTANEOUS at 07:53

## 2019-07-03 RX ADMIN — HEPARIN SODIUM 5000 UNITS: 5000 INJECTION INTRAVENOUS; SUBCUTANEOUS at 05:49

## 2019-07-03 RX ADMIN — IPRATROPIUM BROMIDE AND ALBUTEROL SULFATE 1 AMPULE: .5; 3 SOLUTION RESPIRATORY (INHALATION) at 08:10

## 2019-07-03 RX ADMIN — Medication 50 MG: at 22:20

## 2019-07-03 RX ADMIN — INSULIN GLARGINE 10 UNITS: 100 INJECTION, SOLUTION SUBCUTANEOUS at 21:33

## 2019-07-03 RX ADMIN — CHLORHEXIDINE GLUCONATE 0.12% ORAL RINSE 15 ML: 1.2 LIQUID ORAL at 07:51

## 2019-07-03 RX ADMIN — Medication 250 MG: at 18:08

## 2019-07-03 RX ADMIN — MILRINONE LACTATE IN DEXTROSE 0.2 MCG/KG/MIN: 200 INJECTION, SOLUTION INTRAVENOUS at 18:54

## 2019-07-03 RX ADMIN — Medication 10 ML: at 07:53

## 2019-07-03 RX ADMIN — SODIUM CHLORIDE 250 ML: 9 INJECTION, SOLUTION INTRAVENOUS at 09:55

## 2019-07-03 RX ADMIN — Medication 250 MG: at 12:32

## 2019-07-03 RX ADMIN — METRONIDAZOLE 500 MG: 500 INJECTION, SOLUTION INTRAVENOUS at 12:31

## 2019-07-03 RX ADMIN — Medication 50 MG: at 05:49

## 2019-07-03 RX ADMIN — FENTANYL CITRATE 50 MCG: 0.05 INJECTION, SOLUTION INTRAMUSCULAR; INTRAVENOUS at 04:39

## 2019-07-03 RX ADMIN — MUPIROCIN: 20 OINTMENT TOPICAL at 21:35

## 2019-07-03 RX ADMIN — MUPIROCIN: 20 OINTMENT TOPICAL at 12:32

## 2019-07-03 RX ADMIN — IPRATROPIUM BROMIDE AND ALBUTEROL SULFATE 1 AMPULE: .5; 3 SOLUTION RESPIRATORY (INHALATION) at 04:04

## 2019-07-03 RX ADMIN — METOPROLOL TARTRATE 5 MG: 5 INJECTION INTRAVENOUS at 21:10

## 2019-07-03 RX ADMIN — FENTANYL CITRATE 50 MCG: 0.05 INJECTION, SOLUTION INTRAMUSCULAR; INTRAVENOUS at 12:44

## 2019-07-03 RX ADMIN — Medication 250 MG: at 05:49

## 2019-07-03 RX ADMIN — Medication 10 ML: at 07:52

## 2019-07-03 RX ADMIN — FENTANYL CITRATE 50 MCG: 0.05 INJECTION, SOLUTION INTRAMUSCULAR; INTRAVENOUS at 13:56

## 2019-07-03 RX ADMIN — HEPARIN SODIUM 5000 UNITS: 5000 INJECTION INTRAVENOUS; SUBCUTANEOUS at 13:18

## 2019-07-03 RX ADMIN — METOPROLOL TARTRATE 5 MG: 5 INJECTION INTRAVENOUS at 07:51

## 2019-07-03 RX ADMIN — FENTANYL CITRATE 50 MCG: 0.05 INJECTION, SOLUTION INTRAMUSCULAR; INTRAVENOUS at 01:51

## 2019-07-03 RX ADMIN — POLYETHYLENE GLYCOL 3350 17 G: 17 POWDER, FOR SOLUTION ORAL at 12:32

## 2019-07-03 RX ADMIN — HEPARIN SODIUM 5000 UNITS: 5000 INJECTION INTRAVENOUS; SUBCUTANEOUS at 22:12

## 2019-07-03 RX ADMIN — IPRATROPIUM BROMIDE AND ALBUTEROL SULFATE 1 AMPULE: .5; 3 SOLUTION RESPIRATORY (INHALATION) at 15:48

## 2019-07-03 RX ADMIN — METRONIDAZOLE 500 MG: 500 INJECTION, SOLUTION INTRAVENOUS at 01:30

## 2019-07-03 RX ADMIN — INSULIN LISPRO 3 UNITS: 100 INJECTION, SOLUTION INTRAVENOUS; SUBCUTANEOUS at 19:57

## 2019-07-03 RX ADMIN — FENTANYL CITRATE 50 MCG: 0.05 INJECTION, SOLUTION INTRAMUSCULAR; INTRAVENOUS at 11:18

## 2019-07-03 RX ADMIN — IPRATROPIUM BROMIDE AND ALBUTEROL SULFATE 1 AMPULE: .5; 3 SOLUTION RESPIRATORY (INHALATION) at 12:45

## 2019-07-03 RX ADMIN — Medication 2 CAPSULE: at 07:51

## 2019-07-03 RX ADMIN — MOMETASONE FUROATE AND FORMOTEROL FUMARATE DIHYDRATE 2 PUFF: 100; 5 AEROSOL RESPIRATORY (INHALATION) at 08:10

## 2019-07-03 RX ADMIN — FAMOTIDINE 20 MG: 10 INJECTION, SOLUTION INTRAVENOUS at 07:51

## 2019-07-03 RX ADMIN — METRONIDAZOLE 500 MG: 500 INJECTION, SOLUTION INTRAVENOUS at 18:08

## 2019-07-03 RX ADMIN — FENTANYL CITRATE 50 MCG: 0.05 INJECTION, SOLUTION INTRAMUSCULAR; INTRAVENOUS at 21:29

## 2019-07-03 RX ADMIN — Medication 10 ML: at 21:33

## 2019-07-03 RX ADMIN — METOPROLOL TARTRATE 5 MG: 5 INJECTION INTRAVENOUS at 01:30

## 2019-07-03 RX ADMIN — Medication 50 MG: at 13:19

## 2019-07-03 RX ADMIN — Medication 10 ML: at 21:10

## 2019-07-03 RX ADMIN — FENTANYL CITRATE 50 MCG: 0.05 INJECTION, SOLUTION INTRAMUSCULAR; INTRAVENOUS at 18:51

## 2019-07-03 ASSESSMENT — PULMONARY FUNCTION TESTS
PIF_VALUE: 16
PIF_VALUE: 17
PIF_VALUE: 16
PIF_VALUE: 16
PIF_VALUE: 14
PIF_VALUE: 16
PIF_VALUE: 16
PIF_VALUE: 14
PIF_VALUE: 11
PIF_VALUE: 16
PIF_VALUE: 16
PIF_VALUE: 15
PIF_VALUE: 28
PIF_VALUE: 16
PIF_VALUE: 14
PIF_VALUE: 16

## 2019-07-03 ASSESSMENT — PAIN SCALES - GENERAL
PAINLEVEL_OUTOF10: 0
PAINLEVEL_OUTOF10: 8
PAINLEVEL_OUTOF10: 0
PAINLEVEL_OUTOF10: 0
PAINLEVEL_OUTOF10: 9
PAINLEVEL_OUTOF10: 0
PAINLEVEL_OUTOF10: 3
PAINLEVEL_OUTOF10: 8
PAINLEVEL_OUTOF10: 0
PAINLEVEL_OUTOF10: 0
PAINLEVEL_OUTOF10: 3
PAINLEVEL_OUTOF10: 0
PAINLEVEL_OUTOF10: 0
PAINLEVEL_OUTOF10: 6
PAINLEVEL_OUTOF10: 3
PAINLEVEL_OUTOF10: 3
PAINLEVEL_OUTOF10: 0

## 2019-07-03 ASSESSMENT — PAIN DESCRIPTION - LOCATION
LOCATION: BACK
LOCATION: BACK

## 2019-07-03 ASSESSMENT — PAIN DESCRIPTION - ONSET
ONSET: ON-GOING
ONSET: ON-GOING

## 2019-07-03 ASSESSMENT — PAIN DESCRIPTION - ORIENTATION
ORIENTATION: MID
ORIENTATION: MID

## 2019-07-03 ASSESSMENT — PAIN DESCRIPTION - FREQUENCY
FREQUENCY: CONTINUOUS
FREQUENCY: CONTINUOUS

## 2019-07-03 ASSESSMENT — PAIN DESCRIPTION - PAIN TYPE
TYPE: CHRONIC PAIN
TYPE: CHRONIC PAIN

## 2019-07-03 ASSESSMENT — PAIN DESCRIPTION - DESCRIPTORS
DESCRIPTORS: CONSTANT;CRAMPING
DESCRIPTORS: CRAMPING

## 2019-07-03 ASSESSMENT — PAIN DESCRIPTION - PROGRESSION
CLINICAL_PROGRESSION: GRADUALLY WORSENING
CLINICAL_PROGRESSION: GRADUALLY WORSENING

## 2019-07-03 NOTE — PROGRESS NOTES
07/02/2019:  1915:Cardiologist called back to [address] regarding : Pt on A fib with HR on 150's and 160's. MD order to keep pt on  Milrinone drip and added Lopressor Q 6H  2000:Patient on mechanical ventilator,able to follow commands,open eyes to name. It is noted arterial line in his right wrist, waveform was noted it was zeroed,calibrated and leveled;PICC line in his LUE connected to amiodarone and milrinone drip; turpin catheter patent with small amount of  urine tea color; vas cath in his right IJ hep lock ; OG tube connected to wall suction . Lungs:breath sounds decreased in both hemithorax;skin:generalized edema. Call  Bobby in reach. Side rails up x 3. Bed locked and low position. Continue monitoring. Bilateral soft wrist restraints. FSBS:136 mg/dl. 2030:RT Amairani in room giving neb treatments. 2330: in ICU floor and was informed about :pt on cardiac monitor with A fib with HR on 130's to 140's.07/03/2019:  0000:REassesment was performed,NO change from last assess. No signs of pain,Cardiac monitor:A fin uncontrolled. 0100:PICC line dressing was changed with sterile technique. 0330:Bed bath was given. All linens,chux and pillowcases were changed. New gown was placed. Pt tolearte well.  0500:Vas cath dressing was changed. 0630:Blood was drawn for lab purposes.

## 2019-07-03 NOTE — FLOWSHEET NOTE
Treatment time: 4 hours  Net UF: 4000 ml     Pre weight: 105.8 kg   Post weight: 101.4 kg  EDW: TBD      Access used: RIJ NTDC  Access function: Good with  - 400 ml/min     Medications or blood products given: 1 unit RBC     Regular outpatient schedule: REJI     Summary of response to treatment:   Tolerated and completed tx. Report given to [facility].   Copy of dialysis treatment record placed in chart, to be scanned into EMR.       07/03/19 0730 07/03/19 1200   Vital Signs   Temp  --  97.6 °F (36.4 °C)   Pulse 126 122   Resp 20 20   Weight  --  223 lb 8.7 oz (101.4 kg)   Weight Method  --  Actual;Bed scale   During Hemodialysis Assessment   ABP (Arterial line BP) 119/61 124/66

## 2019-07-03 NOTE — PROGRESS NOTES
[facility]  Respiratory Therapy  Spontaneous Breathing Trial            Name: Bella Carrasco  Medical Record Number: [id number]  Age; 61 y.o.   Gender: male  : 1960  Today's date: 7/3/2019  Room: K6A-7553/2109-01      Patient Admission Diagnosis        Assessment            Patient Active Problem List   Diagnosis    Arthritis    Diabetes mellitus with hyperglycemia (HCC)    HBP (high blood pressure)    Hyperlipidemia    COPD (chronic obstructive pulmonary disease) (HCC)    Viral hepatitis C    Back pain    PAD (peripheral artery disease) ([address]    Spinal stenosis of lumbar region    Tobacco use    Atherosclerosis of native artery of left lower extremity with intermittent claudication (HCC)    Chest pain    Pleural effusion due to CHF (congestive heart failure) ([facility])    Pleural effusion, right    Alcohol abuse, continuous    Tachycardia    Atrial fibrillation with RVR ([facility])    Hyponatremia    Congestive heart failure ([facility])    REJI (acute kidney injury) ([address]    Hypokalemia    Coronary artery disease involving autologous artery coronary bypass graft with angina pectoris ([address]    Essential hypertension    Chest pain of uncertain etiology    Acute on chronic combined systolic and diastolic HF (heart failure) (HCC)    Acute hyperkalemia    Typical atrial flutter (HCC)    Chronic systolic CHF (congestive heart failure), NYHA class 3 (HCC)    Hypotension    Vasovagal syncope    Laceration of scalp without foreign body    Cardiomyopathy ([address]    Syncope and collapse    Ischemic foot    Diabetes mellitus with peripheral circulatory disorder ([facility])    Limb ischemia    COPD exacerbation (HCC)    Nonhealing surgical wound    Acromioclavicular joint arthritis    Bradycardia    Shortness of breath    Permanent atrial fibrillation (HCC)    Atrial fibrillation, chronic (HCC)    Other specified injury of inferior mesenteric vein, initial encounter    Postprocedural hypotension    Acute respiratory failure with hypercapnia (HCC)    High anion gap metabolic acidosis    Centrilobular emphysema (HCC)    Hypomagnesemia    Heart failure, acute on chronic, systolic and diastolic (HCC)    Persistent atrial fibrillation (HCC)    Anemia    DMII (diabetes mellitus, type 2) (HCC)    Constipation    Hypokalemia    Acute on chronic systolic heart failure (HCC)    Atrial fibrillation, rapid (HCC)    COPD with acute exacerbation (HCC)    Cocaine use    Severe sepsis (HCC)    Atrial fibrillation with RVR (HCC)    Hyponatremia    Acute on chronic respiratory failure with hypoxia (HCC)    Respiratory distress    CHF, left ventricular (HCC)    Nicotine dependence    Suspected sleep apnea    Coronary artery disease involving native coronary artery of native heart without angina pectoris    CAD (coronary artery disease)    Acute renal failure (ARF) (HCC)    Morbid obesity due to excess calories (HCC)    Acute respiratory failure with hypoxia (HCC)    Nocturnal hypoxia    Hypercapnemia    SOB (shortness of breath)    Acute on chronic respiratory failure with hypercapnia (HCC)    Chronic respiratory failure with hypercapnia (HCC)    Acute pulmonary edema (HCC)    Acute on chronic systolic (congestive) heart failure (HCC)    Hx of AKA (above knee amputation), left (HCC)    Respiratory failure (HCC)    Pneumonia    Ventricular tachycardia (HCC)    Shock circulatory (HCC)    Tachypnea    Neutrophilic leukocytosis    Chronic respiratory failure with hypoxia (HCC)    Cardiac arrest (HCC)    PEA (Pulseless electrical activity) (HCC)    Ileus (HCC)       Social History     Social History     Tobacco Use    Smoking status: Current Every Day Smoker     Packs/day: 0.50     Years: 40.00     Pack years: 20.00     Types: Cigarettes    Smokeless tobacco: Never Used    Tobacco comment: 5 cigs daily - pt states not really sure   Substance Use Topics    Alcohol use: Yes     Alcohol/week: 3.0 - 3.6 oz     Types: 5 - 6 Cans of beer per week    Drug use: No           The patient was assessed for readiness to wean at 0554. The patient met 10 of the criteria for a spontaneous breathing trial. The Sedation assessment was performed by the RN; The patient's RASS score was -1 per RN. The SBT was started at ***. After 30 minutes, the RSBI was *** and an ABG was sent. The ABG result was @LABNT(phart:1,iqc0syy:1,po2art:1) and the patient was { Blank Single :65587::\"returned to previous settings\",\"remains on SBT settings\",\"extubated\"}. The patient's spontaneous breathing trial results were reviewed with  {Blank single:53846::\"MD\",\"NP\"}. The order was received to extubate the patient. The patient's sedation was turned off by the bedside nurse. The patient was extubated at *** and placed on  {Blank single:32907::\"Room Air\",\"Nasal Cannula at *** lpm\",\"High Flow Nasal Cannula at *** lpm\",\"NRB at *** lpm\",\"Venturi Mask at *** % FiO2,\",\"Heated Humidified Nasal Cannula at *** % FiO2 and *** lpm\",\"BiPap at *** and *** % FiO2\"} . The patient was {stable/unstable:17001} at the time of extubation. The patient has {blank single:84166::\"no stridor present\",\"stridor present, MD/NP aware\"}        Patient/caregiver was educated on SBT process:  {yes no:412357::\"Yes\"}      Level of patient/caregiver understanding able to:   [] Verbalize understanding   [] Demonstrate understanding       [] Teach back        [] Needs reinforcement       []  No available caregiver                []  Other:     Response to education:  {Responses;  Excellent - EOBU:770732151}     Teaching Time:  {NUMBERS; 5-50 BY 5 (100):10088}  minutes            Electronically signed by Catarina Blackburn RCP on 7/3/2019 at 5:54 AM

## 2019-07-03 NOTE — PROGRESS NOTES
Pulmonary/Critical Care Progress Note    ABG reviewed post-SBT. Patient alert and following commands. Patient extubated and able to vocalize name and needs.     Will continue to monitor patient's respiratory status    [facility]

## 2019-07-03 NOTE — PROGRESS NOTES
Nephrology ([facility]) Progress Note    CC:  REJI and hyperkalemia    Subjective:    HPI:  Ventilated. Remains oliguric. ROS:  In bed. No fever. [address]:  medications reviewed.  sodium chloride  250 mL Intravenous Once    polyethylene glycol  17 g Oral Daily    metroNIDAZOLE  500 mg Intravenous Q8H    lactobacillus  2 capsule Oral BID WC    vancomycin  250 mg Oral 4 times per day    mupirocin   Topical BID    heparin (porcine)  5,000 Units Subcutaneous 3 times per day    sodium chloride flush  10 mL Intravenous 2 times per day    darbepoetin vinay-polysorbate  60 mcg Subcutaneous Weekly - Thursday    insulin glargine  10 Units Subcutaneous Nightly    gabapentin  50 mg Oral 3 times per day    insulin lispro  0-18 Units Subcutaneous Q4H    mometasone-formoterol  2 puff Inhalation BID    sodium chloride flush  10 mL Intravenous 2 times per day    ipratropium-albuterol  1 ampule Inhalation Q4H    chlorhexidine  15 mL Mouth/Throat BID    famotidine (PEPCID) injection  20 mg Intravenous Daily         Objective:  Blood pressure 119/62, pulse 130, temperature 98.1 °F (36.7 °C), resp. rate 25, height 5' 7\" (1.702 m), weight 233 lb 4 oz (105.8 kg), SpO2 99 %.     Intake/Output Summary (Last 24 hours) at 7/3/2019 1109  Last data filed at 7/3/2019 0947  Gross per 24 hour   Intake 1662 ml   Output 455 ml   Net 1207 ml     General:  NAD, ventilated  Chest:  coarse BS  CVS:  RRR  Abdominal:  NTND, soft, +BS  Extremities:  1+ edema  Skin:  no rash    Labs:  Renal panel:  Lab Results   Component Value Date/Time     (L) 07/03/2019 06:25 AM    K 3.9 07/03/2019 06:25 AM    K 3.6 06/11/2019 06:00 AM    CO2 23 07/03/2019 06:25 AM    BUN 23 (H) 07/03/2019 06:25 AM    CREATININE 1.5 (H) 07/03/2019 06:25 AM    CALCIUM 8.4 07/03/2019 06:25 AM    PHOS 3.0 07/03/2019 06:25 AM    MG 2.30 07/03/2019 06:25 AM     CBC:  Lab Results   Component Value Date/Time    WBC 15.4 (H) 07/03/2019 06:25 AM    HGB

## 2019-07-03 NOTE — PROGRESS NOTES
[facility]   Daily Progress Note      Admit Date:  6/22/2019    Reason for follow up visit:  Respiratory failure: A-fib with RVR    CC: \"I still can't talk. \"    Interval History:  Pt. seen and examined; records reviewed  Extubated and on O2. More alert and attempting to answer questions  Dialyzed and tolerated  On IV amiodarone d/t RVR that began last evening  Remains on Milrinone  Net diuresis -4.7 L . Wt today 223#    Subjective:  Rapid atrial fib since last evening  No acute overnight cardiac events    Review of Systems:   · Pt unable to provide    Objective:   /66   Pulse 132   Temp 97.6 °F (36.4 °C) (Axillary)   Resp 23   Ht 5' 7\" (1.702 m)   Wt 223 lb 8.7 oz (101.4 kg)   SpO2 99%   BMI 35.01 kg/m²       Intake/Output Summary (Last 24 hours) at 7/3/2019 1417  Last data filed at 7/3/2019 1231  Gross per 24 hour   Intake 2552 ml   Output 5315 ml   Net -2763 ml     Wt Readings from Last 3 Encounters:   07/03/19 223 lb 8.7 oz (101.4 kg)   06/11/19 236 lb 15.9 oz (107.5 kg)   02/27/19 235 lb (106.6 kg)       Physical Exam:  General: Extubated. Resting in bed and eyes open. Attempting to talk, but difficulty with voice. In no acute distress. Skin:  Warm and dry. No new appearing rashes or lesions. Neck:  Supple. JVD hard to ascertain d/t body habitus  Chest:Lungs with decreased breath sounds; few rhonchi; weak cough effort  Cardiovascular: irreg, irreg; tachycardic;  Soft systolic murmur  Abdomen: distended, hypoactive bowel sounds Extremities: Generalized edema with some improvement    Medications:    sodium chloride  250 mL Intravenous Once    polyethylene glycol  17 g Oral Daily    metroNIDAZOLE  500 mg Intravenous Q8H    lactobacillus  2 capsule Oral BID     vancomycin  250 mg Oral 4 times per day    mupirocin   Topical BID    heparin (porcine)  5,000 Units Subcutaneous 3 times per day    sodium chloride flush  10 mL Intravenous 2 times per day    darbepoetin vinay-polysorbate  60 mcg Subcutaneous Weekly - Thursday    insulin glargine  10 Units Subcutaneous Nightly    gabapentin  50 mg Oral 3 times per day    insulin lispro  0-18 Units Subcutaneous Q4H    mometasone-formoterol  2 puff Inhalation BID    sodium chloride flush  10 mL Intravenous 2 times per day    ipratropium-albuterol  1 ampule Inhalation Q4H    chlorhexidine  15 mL Mouth/Throat BID    famotidine (PEPCID) injection  20 mg Intravenous Daily      milrinone 0.2 mcg/kg/min (07/02/19 2230)    amiodarone 0.5 mg/min (07/02/19 2230)    sodium chloride 5 mL/hr at 06/28/19 1811    dextrose       fentanNYL, midazolam, perflutren lipid microspheres, lubrifresh P.M., sodium chloride flush, albuterol, sodium chloride flush, ondansetron, acetaminophen, glucose, dextrose, glucagon (rDNA), dextrose    Lab Data:  CBC:   Recent Labs     07/01/19  0600 07/02/19  0615 07/03/19  0625   WBC 16.0* 14.7* 15.4*   HGB 7.5* 7.5* 6.7*   PLT 63* 66* 88*     BMP:    Recent Labs     07/02/19  0023 07/02/19  0615 07/03/19  0625    136 135*   K 5.1 4.7 3.9   CO2 24 25 23   BUN 13 12 23*   CREATININE 1.0 1.0 1.5*     LIVR:   Recent Labs     07/01/19  0600 07/02/19  0615 07/03/19  0625   AST 52* 46* 38*   * 212* 137*     7/1/2019 CT abdomen and pelvis:  Pneumatosis of the splenic flexure of the colon.  This is associated with a   generalized ileus.  The appearance is concerning for colitis. (Interestingly, the patient had a transient presentation of portal venous gas   near the transverse colon on 05/22 to 5/25/2017.  This was apparently benign.)       Small amount of ascites.       Bibasilar pulmonary consolidations- pneumonia versus atelectasis with small   effusions.       Anasarca.      Echo 7/1/2019:  Normal left ventricular size and wall thickness. Severe left ventricular dysfunction with global hypokinesis. Ejection fraction is visually estimated to be 30-35%.   The right ventricle is severely enlarged with

## 2019-07-04 ENCOUNTER — APPOINTMENT (OUTPATIENT)
Dept: GENERAL RADIOLOGY | Age: 59
DRG: 720 | End: 2019-07-04
Payer: COMMERCIAL

## 2019-07-04 LAB
ALBUMIN SERPL-MCNC: 3.3 G/DL (ref 3.4–5)
ALP BLD-CCNC: 123 U/L (ref 40–129)
ALT SERPL-CCNC: 121 U/L (ref 10–40)
ANION GAP SERPL CALCULATED.3IONS-SCNC: 10 MMOL/L (ref 3–16)
AST SERPL-CCNC: 37 U/L (ref 15–37)
BASE EXCESS ARTERIAL: 5 MMOL/L (ref -3–3)
BASOPHILS ABSOLUTE: 0.1 K/UL (ref 0–0.2)
BASOPHILS RELATIVE PERCENT: 0.5 %
BILIRUB SERPL-MCNC: 1.8 MG/DL (ref 0–1)
BILIRUBIN DIRECT: 1.1 MG/DL (ref 0–0.3)
BILIRUBIN, INDIRECT: 0.7 MG/DL (ref 0–1)
BUN BLDV-MCNC: 22 MG/DL (ref 7–20)
CALCIUM IONIZED: 1.13 MMOL/L (ref 1.12–1.32)
CALCIUM SERPL-MCNC: 8.9 MG/DL (ref 8.3–10.6)
CARBOXYHEMOGLOBIN ARTERIAL: 1.6 % (ref 0–1.5)
CHLORIDE BLD-SCNC: 96 MMOL/L (ref 99–110)
CO2: 31 MMOL/L (ref 21–32)
CREAT SERPL-MCNC: 1.5 MG/DL (ref 0.9–1.3)
EOSINOPHILS ABSOLUTE: 0 K/UL (ref 0–0.6)
EOSINOPHILS RELATIVE PERCENT: 0.1 %
GFR AFRICAN AMERICAN: 58
GFR NON-AFRICAN AMERICAN: 48
GLUCOSE BLD-MCNC: 130 MG/DL (ref 70–99)
GLUCOSE BLD-MCNC: 138 MG/DL (ref 70–99)
GLUCOSE BLD-MCNC: 151 MG/DL (ref 70–99)
GLUCOSE BLD-MCNC: 156 MG/DL (ref 70–99)
GLUCOSE BLD-MCNC: 187 MG/DL (ref 70–99)
GLUCOSE BLD-MCNC: 191 MG/DL (ref 70–99)
HCO3 ARTERIAL: 30.5 MMOL/L (ref 21–29)
HCT VFR BLD CALC: 26.6 % (ref 40.5–52.5)
HEMATOLOGY PATH CONSULT: NO
HEMOGLOBIN, ART, EXTENDED: 8.2 G/DL (ref 13.5–17.5)
HEMOGLOBIN: 8.5 G/DL (ref 13.5–17.5)
HEPATITIS B CORE TOTAL ANTIBODY: NEGATIVE
LYMPHOCYTES ABSOLUTE: 1.3 K/UL (ref 1–5.1)
LYMPHOCYTES RELATIVE PERCENT: 5.6 %
MAGNESIUM: 2 MG/DL (ref 1.8–2.4)
MCH RBC QN AUTO: 26.9 PG (ref 26–34)
MCHC RBC AUTO-ENTMCNC: 31.9 G/DL (ref 31–36)
MCV RBC AUTO: 84.5 FL (ref 80–100)
METHEMOGLOBIN ARTERIAL: 0.9 %
MONOCYTES ABSOLUTE: 3.1 K/UL (ref 0–1.3)
MONOCYTES RELATIVE PERCENT: 13.4 %
NEUTROPHILS ABSOLUTE: 18.7 K/UL (ref 1.7–7.7)
NEUTROPHILS RELATIVE PERCENT: 80.4 %
O2 CONTENT ARTERIAL: 11 ML/DL
O2 SAT, ARTERIAL: 98.9 %
O2 THERAPY: ABNORMAL
PCO2 ARTERIAL: 54.3 MMHG (ref 35–45)
PDW BLD-RTO: 16.9 % (ref 12.4–15.4)
PERFORMED ON: ABNORMAL
PH ARTERIAL: 7.37 (ref 7.35–7.45)
PH VENOUS: 7.31 (ref 7.35–7.45)
PHOSPHORUS: 2.7 MG/DL (ref 2.5–4.9)
PLATELET # BLD: 115 K/UL (ref 135–450)
PMV BLD AUTO: 8.6 FL (ref 5–10.5)
PO2 ARTERIAL: 114 MMHG (ref 75–108)
POTASSIUM SERPL-SCNC: 4.1 MMOL/L (ref 3.5–5.1)
RBC # BLD: 3.15 M/UL (ref 4.2–5.9)
SODIUM BLD-SCNC: 137 MMOL/L (ref 136–145)
TCO2 ARTERIAL: 32.2 MMOL/L
TOTAL PROTEIN: 7.1 G/DL (ref 6.4–8.2)
WBC # BLD: 23.3 K/UL (ref 4–11)

## 2019-07-04 PROCEDURE — 6370000000 HC RX 637 (ALT 250 FOR IP): Performed by: INTERNAL MEDICINE

## 2019-07-04 PROCEDURE — 83735 ASSAY OF MAGNESIUM: CPT

## 2019-07-04 PROCEDURE — 2580000003 HC RX 258: Performed by: NURSE PRACTITIONER

## 2019-07-04 PROCEDURE — 2580000003 HC RX 258: Performed by: INTERNAL MEDICINE

## 2019-07-04 PROCEDURE — 2500000003 HC RX 250 WO HCPCS: Performed by: NURSE PRACTITIONER

## 2019-07-04 PROCEDURE — 2500000003 HC RX 250 WO HCPCS: Performed by: INTERNAL MEDICINE

## 2019-07-04 PROCEDURE — 80069 RENAL FUNCTION PANEL: CPT

## 2019-07-04 PROCEDURE — 2700000000 HC OXYGEN THERAPY PER DAY

## 2019-07-04 PROCEDURE — 99233 SBSQ HOSP IP/OBS HIGH 50: CPT | Performed by: INTERNAL MEDICINE

## 2019-07-04 PROCEDURE — 80076 HEPATIC FUNCTION PANEL: CPT

## 2019-07-04 PROCEDURE — 94640 AIRWAY INHALATION TREATMENT: CPT

## 2019-07-04 PROCEDURE — 6360000002 HC RX W HCPCS: Performed by: NURSE PRACTITIONER

## 2019-07-04 PROCEDURE — 85025 COMPLETE CBC W/AUTO DIFF WBC: CPT

## 2019-07-04 PROCEDURE — 6360000002 HC RX W HCPCS: Performed by: INTERNAL MEDICINE

## 2019-07-04 PROCEDURE — 6370000000 HC RX 637 (ALT 250 FOR IP): Performed by: NURSE PRACTITIONER

## 2019-07-04 PROCEDURE — 2000000000 HC ICU R&B

## 2019-07-04 PROCEDURE — 94669 MECHANICAL CHEST WALL OSCILL: CPT

## 2019-07-04 PROCEDURE — 90935 HEMODIALYSIS ONE EVALUATION: CPT

## 2019-07-04 PROCEDURE — 94660 CPAP INITIATION&MGMT: CPT

## 2019-07-04 PROCEDURE — 94761 N-INVAS EAR/PLS OXIMETRY MLT: CPT

## 2019-07-04 PROCEDURE — 99291 CRITICAL CARE FIRST HOUR: CPT | Performed by: INTERNAL MEDICINE

## 2019-07-04 PROCEDURE — 71045 X-RAY EXAM CHEST 1 VIEW: CPT

## 2019-07-04 PROCEDURE — 82330 ASSAY OF CALCIUM: CPT

## 2019-07-04 PROCEDURE — 82803 BLOOD GASES ANY COMBINATION: CPT

## 2019-07-04 RX ORDER — METOPROLOL TARTRATE 5 MG/5ML
5 INJECTION INTRAVENOUS EVERY 4 HOURS PRN
Status: DISCONTINUED | OUTPATIENT
Start: 2019-07-04 | End: 2019-07-23 | Stop reason: HOSPADM

## 2019-07-04 RX ORDER — DIGOXIN 0.25 MG/ML
500 INJECTION INTRAMUSCULAR; INTRAVENOUS DAILY
Status: DISCONTINUED | OUTPATIENT
Start: 2019-07-04 | End: 2019-07-04

## 2019-07-04 RX ADMIN — IPRATROPIUM BROMIDE AND ALBUTEROL SULFATE 1 AMPULE: .5; 3 SOLUTION RESPIRATORY (INHALATION) at 16:29

## 2019-07-04 RX ADMIN — POLYETHYLENE GLYCOL 3350 17 G: 17 POWDER, FOR SOLUTION ORAL at 09:39

## 2019-07-04 RX ADMIN — Medication 50 MG: at 22:29

## 2019-07-04 RX ADMIN — Medication 10 ML: at 20:55

## 2019-07-04 RX ADMIN — Medication 10 ML: at 09:40

## 2019-07-04 RX ADMIN — Medication 50 MG: at 06:10

## 2019-07-04 RX ADMIN — IPRATROPIUM BROMIDE AND ALBUTEROL SULFATE 1 AMPULE: .5; 3 SOLUTION RESPIRATORY (INHALATION) at 04:10

## 2019-07-04 RX ADMIN — Medication 250 MG: at 00:10

## 2019-07-04 RX ADMIN — DIGOXIN 500 MCG: 0.25 INJECTION INTRAMUSCULAR; INTRAVENOUS at 10:17

## 2019-07-04 RX ADMIN — METOPROLOL TARTRATE 5 MG: 5 INJECTION INTRAVENOUS at 20:55

## 2019-07-04 RX ADMIN — AMIODARONE HYDROCHLORIDE 0.5 MG/MIN: 50 INJECTION, SOLUTION INTRAVENOUS at 17:28

## 2019-07-04 RX ADMIN — METOPROLOL TARTRATE 5 MG: 5 INJECTION INTRAVENOUS at 14:24

## 2019-07-04 RX ADMIN — INSULIN LISPRO 3 UNITS: 100 INJECTION, SOLUTION INTRAVENOUS; SUBCUTANEOUS at 04:22

## 2019-07-04 RX ADMIN — INSULIN LISPRO 3 UNITS: 100 INJECTION, SOLUTION INTRAVENOUS; SUBCUTANEOUS at 00:27

## 2019-07-04 RX ADMIN — Medication 10 ML: at 09:39

## 2019-07-04 RX ADMIN — IPRATROPIUM BROMIDE AND ALBUTEROL SULFATE 1 AMPULE: .5; 3 SOLUTION RESPIRATORY (INHALATION) at 12:55

## 2019-07-04 RX ADMIN — METOPROLOL TARTRATE 5 MG: 5 INJECTION INTRAVENOUS at 09:40

## 2019-07-04 RX ADMIN — Medication 10 ML: at 21:01

## 2019-07-04 RX ADMIN — MOMETASONE FUROATE AND FORMOTEROL FUMARATE DIHYDRATE 2 PUFF: 100; 5 AEROSOL RESPIRATORY (INHALATION) at 19:54

## 2019-07-04 RX ADMIN — HEPARIN SODIUM 5000 UNITS: 5000 INJECTION INTRAVENOUS; SUBCUTANEOUS at 12:55

## 2019-07-04 RX ADMIN — DARBEPOETIN ALFA 60 MCG: 60 SOLUTION INTRAVENOUS; SUBCUTANEOUS at 08:25

## 2019-07-04 RX ADMIN — METOPROLOL TARTRATE 5 MG: 5 INJECTION INTRAVENOUS at 03:46

## 2019-07-04 RX ADMIN — METRONIDAZOLE 500 MG: 500 INJECTION, SOLUTION INTRAVENOUS at 02:10

## 2019-07-04 RX ADMIN — METRONIDAZOLE 500 MG: 500 INJECTION, SOLUTION INTRAVENOUS at 09:39

## 2019-07-04 RX ADMIN — Medication 2 CAPSULE: at 09:39

## 2019-07-04 RX ADMIN — Medication 2 CAPSULE: at 17:21

## 2019-07-04 RX ADMIN — Medication 50 MG: at 12:55

## 2019-07-04 RX ADMIN — FAMOTIDINE 20 MG: 10 INJECTION, SOLUTION INTRAVENOUS at 09:39

## 2019-07-04 RX ADMIN — INSULIN LISPRO 3 UNITS: 100 INJECTION, SOLUTION INTRAVENOUS; SUBCUTANEOUS at 20:14

## 2019-07-04 RX ADMIN — INSULIN GLARGINE 10 UNITS: 100 INJECTION, SOLUTION SUBCUTANEOUS at 20:56

## 2019-07-04 RX ADMIN — METOPROLOL TARTRATE 5 MG: 5 INJECTION INTRAVENOUS at 00:24

## 2019-07-04 RX ADMIN — AMIODARONE HYDROCHLORIDE 150 MG: 50 INJECTION, SOLUTION INTRAVENOUS at 09:39

## 2019-07-04 RX ADMIN — METRONIDAZOLE 500 MG: 500 INJECTION, SOLUTION INTRAVENOUS at 17:21

## 2019-07-04 RX ADMIN — Medication 250 MG: at 06:10

## 2019-07-04 RX ADMIN — INSULIN LISPRO 3 UNITS: 100 INJECTION, SOLUTION INTRAVENOUS; SUBCUTANEOUS at 12:55

## 2019-07-04 RX ADMIN — IPRATROPIUM BROMIDE AND ALBUTEROL SULFATE 1 AMPULE: .5; 3 SOLUTION RESPIRATORY (INHALATION) at 19:50

## 2019-07-04 RX ADMIN — Medication 5 MCG/MIN: at 08:50

## 2019-07-04 RX ADMIN — HEPARIN SODIUM 5000 UNITS: 5000 INJECTION INTRAVENOUS; SUBCUTANEOUS at 07:51

## 2019-07-04 RX ADMIN — IPRATROPIUM BROMIDE AND ALBUTEROL SULFATE 1 AMPULE: .5; 3 SOLUTION RESPIRATORY (INHALATION) at 00:15

## 2019-07-04 RX ADMIN — IPRATROPIUM BROMIDE AND ALBUTEROL SULFATE 1 AMPULE: .5; 3 SOLUTION RESPIRATORY (INHALATION) at 08:02

## 2019-07-04 RX ADMIN — HEPARIN SODIUM 5000 UNITS: 5000 INJECTION INTRAVENOUS; SUBCUTANEOUS at 22:10

## 2019-07-04 RX ADMIN — INSULIN LISPRO 3 UNITS: 100 INJECTION, SOLUTION INTRAVENOUS; SUBCUTANEOUS at 10:16

## 2019-07-04 RX ADMIN — AMIODARONE HYDROCHLORIDE 0.5 MG/MIN: 50 INJECTION, SOLUTION INTRAVENOUS at 00:40

## 2019-07-04 ASSESSMENT — PAIN SCALES - GENERAL
PAINLEVEL_OUTOF10: 0
PAINLEVEL_OUTOF10: 3
PAINLEVEL_OUTOF10: 0

## 2019-07-04 ASSESSMENT — PAIN DESCRIPTION - ORIENTATION: ORIENTATION: MID

## 2019-07-04 ASSESSMENT — PAIN DESCRIPTION - PAIN TYPE: TYPE: CHRONIC PAIN

## 2019-07-04 ASSESSMENT — PAIN DESCRIPTION - PROGRESSION: CLINICAL_PROGRESSION: GRADUALLY IMPROVING

## 2019-07-04 ASSESSMENT — PAIN DESCRIPTION - ONSET: ONSET: ON-GOING

## 2019-07-04 ASSESSMENT — PAIN DESCRIPTION - LOCATION: LOCATION: BACK

## 2019-07-04 ASSESSMENT — PAIN DESCRIPTION - DESCRIPTORS: DESCRIPTORS: CRAMPING;CONSTANT

## 2019-07-04 ASSESSMENT — PAIN - FUNCTIONAL ASSESSMENT: PAIN_FUNCTIONAL_ASSESSMENT: ACTIVITIES ARE NOT PREVENTED

## 2019-07-04 ASSESSMENT — PAIN DESCRIPTION - FREQUENCY: FREQUENCY: CONTINUOUS

## 2019-07-04 NOTE — PROGRESS NOTES
antrly, No accessory muscle use. No crackles. No wheezes. No rhonchi. CV: tachycardia, Regular rhythm. GI: distended/tense, minimal BS  Skin: Warm, dry, normal texture and turgor. Lymph: No cervical LAD. No supraclavicular LAD. M/S: / Ext. No cyanosis. No clubbing. No joint deformity.    Neuro: awake on BIPAP, grossly non focal       Labs:   Recent Labs     07/02/19  0615 07/03/19  0625 07/03/19  1545 07/04/19  0620   WBC 14.7* 15.4*  --  23.3*   HGB 7.5* 6.7* 8.1* 8.5*   HCT 23.9* 21.6* 25.1* 26.6*   PLT 66* 88*  --  115*     Recent Labs     07/02/19 0615 07/03/19  0625 07/04/19  0620    135* 137   K 4.7 3.9 4.1   CL 99 99 96*   CO2 25 23 31   BUN 12 23* 22*   CREATININE 1.0 1.5* 1.5*   CALCIUM 8.3 8.4 8.9   PHOS 1.9* 3.0 2.7     Recent Labs     07/02/19 0615 07/03/19  0625 07/04/19  0620   AST 46* 38* 37   * 137* 121*   BILIDIR 1.0* 1.0* 1.1*   BILITOT 1.7* 1.5* 1.8*   ALKPHOS 111 108 123     No results for input(s): INR in the last 72 hours. No results for input(s): [place] in the last 72 hours. Urinalysis:      Lab Results   Component Value Date    NITRU Negative 06/23/2019    WBCUA 5 06/23/2019    RBCUA 8 06/23/2019    BLOODU MODERATE 06/23/2019    SPECGRAV 1.021 06/23/2019    GLUCOSEU Negative 06/23/2019       Radiology:  XR CHEST PORTABLE   Final Result   Significant worsening in the appearance of the chest with significant   increase in the size of the right basilar pneumonia. Probable small right   pleural effusion. Slight vascular congestion. CT ABDOMEN PELVIS W WO CONTRAST Additional Contrast? Oral   Final Result   Pneumatosis of the splenic flexure of the colon. This is associated with a   generalized ileus. The appearance is concerning for colitis. (Interestingly, the patient had a transient presentation of portal venous gas   near the transverse colon on 05/22 to 5/25/2017. This was apparently benign.)      Small amount of ascites.       Bibasilar pulmonary consolidations- pneumonia versus atelectasis with small   effusions. Anasarca. XR CHEST PORTABLE   Final Result   No significant interval change. XR ABDOMEN (KUB) (SINGLE AP VIEW)   Final Result   Gaseous distention of this scratch the distension of the stomach and small   bowel with relative paucity of colonic gas. Findings are suspicious for   small bowel obstruction. XR CHEST PORTABLE   Final Result   PICC line in good position. XR CHEST PORTABLE   Final Result   Stable support lines and tubes. No significant change in findings of pulmonary edema, including bilateral   pleural effusions. XR CHEST PORTABLE   Final Result   Increased severity of right basilar pulmonary disease, with increased   adjacent pleural effusion      Slightly increased interstitial edema of both lungs. Unchanged left basilar airspace disease         US ABDOMEN LIMITED   Final Result   1. Limited bedside examination as described above. 2. No evidence of ascites. 3. Limited evaluation of the liver demonstrates no gross stigmata of   cirrhosis. US RENAL COMPLETE   Final Result   Horseshoe kidney. No hydronephrosis. Stable, simple cyst of the superior pole of the right kidney. XR CHEST PORTABLE   Final Result   1. Right transjugular sign of these catheter again likely terminating in the   right atrium. 2. Left basilar opacities compatible with atelectasis versus pneumonia with a   possible small effusion. XR CHEST PORTABLE   Final Result   No acute process. Right jugular central venous catheter in place terminating in the right atrium         CT Head WO Contrast   Final Result   No acute intracranial abnormality. CT CHEST WO CONTRAST   Final Result   1. Bilateral airspace disease could represent atelectasis or pneumonia. 2. Small right pleural effusion. 3. Coronary artery disease. 4. Pulmonary hypertension.    5.

## 2019-07-04 NOTE — PROGRESS NOTES
times per day    insulin lispro  0-18 Units Subcutaneous Q4H    mometasone-formoterol  2 puff Inhalation BID    sodium chloride flush  10 mL Intravenous 2 times per day    ipratropium-albuterol  1 ampule Inhalation Q4H    famotidine (PEPCID) injection  20 mg Intravenous Daily       Continuous Infusions:   milrinone 0.2 mcg/kg/min (07/03/19 1854)    amiodarone 0.5 mg/min (07/04/19 0040)    sodium chloride 5 mL/hr at 06/28/19 1811    dextrose         PRN Meds:  metoprolol, fentanNYL, midazolam, perflutren lipid microspheres, lubrifresh P.M., sodium chloride flush, albuterol, sodium chloride flush, ondansetron, acetaminophen, glucose, dextrose, glucagon (rDNA), dextrose    Labs reviewed:  CBC:   Recent Labs     07/02/19 0615 07/03/19 0625 07/03/19  1545 07/04/19  0620   WBC 14.7* 15.4*  --  23.3*   HGB 7.5* 6.7* 8.1* 8.5*   HCT 23.9* 21.6* 25.1* 26.6*   MCV 86.9 85.2  --  84.5   PLT 66* 88*  --  115*     BMP:   Recent Labs     07/02/19 0615 07/03/19  0625 07/04/19  0620    135* 137   K 4.7 3.9 4.1   CL 99 99 96*   CO2 25 23 31   PHOS 1.9* 3.0 2.7   BUN 12 23* 22*   CREATININE 1.0 1.5* 1.5*     LIVER PROFILE:   Recent Labs     07/02/19 0615 07/03/19  0625 07/04/19  0620   AST 46* 38* 37   * 137* 121*   BILIDIR 1.0* 1.0* 1.1*   BILITOT 1.7* 1.5* 1.8*   ALKPHOS 111 108 123     PT/INR: No results for input(s): PROTIME, INR in the last 72 hours. APTT: No results for input(s): APTT in the last 72 hours. UA:No results for input(s): NITRITE, COLORU, PHUR, LABCAST, WBCUA, RBCUA, MUCUS, TRICHOMONAS, YEAST, BACTERIA, CLARITYU, SPECGRAV, LEUKOCYTESUR, UROBILINOGEN, BILIRUBINUR, BLOODU, GLUCOSEU, AMORPHOUS in the last 72 hours. Invalid input(s): Mearl Bone  No results for input(s): PH, PCO2, PO2 in the last 72 hours. Films:  Radiology Review:  Pertinent images / reports were reviewed as a part of this visit. CT Chest w/ contrast: No results found for this or any previous visit.     CT Chest w/o

## 2019-07-04 NOTE — PROGRESS NOTES
07/04/2019 06:20 AM     (L) 07/04/2019 06:20 AM       Assessment/Plan:  Reviewed old records and labs. 1) REJI - No sign of recovery of kidney function - remains HD dependent; HD MWF - HD tomorrow.  Tolerated UF today    2) Respiratory failure - extubated     3) Septic Shock - completed course of abx - currently off pressors    4) Anemia - received 1 unit of PRBC 7/3/19 - continue DAVID    5) Systolic CHF - remove fluid with HD

## 2019-07-05 LAB
ALBUMIN SERPL-MCNC: 3.1 G/DL (ref 3.4–5)
ALP BLD-CCNC: 119 U/L (ref 40–129)
ALT SERPL-CCNC: 84 U/L (ref 10–40)
ANION GAP SERPL CALCULATED.3IONS-SCNC: 12 MMOL/L (ref 3–16)
AST SERPL-CCNC: 32 U/L (ref 15–37)
BASE EXCESS ARTERIAL: 0.5 MMOL/L (ref -3–3)
BASOPHILS ABSOLUTE: 0.1 K/UL (ref 0–0.2)
BASOPHILS RELATIVE PERCENT: 0.2 %
BILIRUB SERPL-MCNC: 1.7 MG/DL (ref 0–1)
BILIRUBIN DIRECT: 1.2 MG/DL (ref 0–0.3)
BILIRUBIN, INDIRECT: 0.5 MG/DL (ref 0–1)
BUN BLDV-MCNC: 40 MG/DL (ref 7–20)
CALCIUM IONIZED: 1.15 MMOL/L (ref 1.12–1.32)
CALCIUM SERPL-MCNC: 8.9 MG/DL (ref 8.3–10.6)
CARBOXYHEMOGLOBIN ARTERIAL: 1.3 % (ref 0–1.5)
CHLORIDE BLD-SCNC: 99 MMOL/L (ref 99–110)
CO2: 24 MMOL/L (ref 21–32)
CREAT SERPL-MCNC: 1.5 MG/DL (ref 0.9–1.3)
EOSINOPHILS ABSOLUTE: 0 K/UL (ref 0–0.6)
EOSINOPHILS RELATIVE PERCENT: 0 %
GFR AFRICAN AMERICAN: 58
GFR NON-AFRICAN AMERICAN: 48
GLUCOSE BLD-MCNC: 107 MG/DL (ref 70–99)
GLUCOSE BLD-MCNC: 114 MG/DL (ref 70–99)
GLUCOSE BLD-MCNC: 123 MG/DL (ref 70–99)
GLUCOSE BLD-MCNC: 125 MG/DL (ref 70–99)
GLUCOSE BLD-MCNC: 131 MG/DL (ref 70–99)
GLUCOSE BLD-MCNC: 132 MG/DL (ref 70–99)
GLUCOSE BLD-MCNC: 133 MG/DL (ref 70–99)
HCO3 ARTERIAL: 25.5 MMOL/L (ref 21–29)
HCT VFR BLD CALC: 24.7 % (ref 40.5–52.5)
HEMATOLOGY PATH CONSULT: NO
HEMOGLOBIN, ART, EXTENDED: 8 G/DL (ref 13.5–17.5)
HEMOGLOBIN: 7.9 G/DL (ref 13.5–17.5)
LACTIC ACID: 1.3 MMOL/L (ref 0.4–2)
LYMPHOCYTES ABSOLUTE: 0.9 K/UL (ref 1–5.1)
LYMPHOCYTES RELATIVE PERCENT: 2.5 %
MAGNESIUM: 1.5 MG/DL (ref 1.8–2.4)
MCH RBC QN AUTO: 27.1 PG (ref 26–34)
MCHC RBC AUTO-ENTMCNC: 31.9 G/DL (ref 31–36)
MCV RBC AUTO: 85 FL (ref 80–100)
METHEMOGLOBIN ARTERIAL: 1.1 %
MONOCYTES ABSOLUTE: 2.3 K/UL (ref 0–1.3)
MONOCYTES RELATIVE PERCENT: 6.5 %
NEUTROPHILS ABSOLUTE: 32.5 K/UL (ref 1.7–7.7)
NEUTROPHILS RELATIVE PERCENT: 90.8 %
O2 CONTENT ARTERIAL: 11 ML/DL
O2 SAT, ARTERIAL: 99.5 %
O2 THERAPY: ABNORMAL
PCO2 ARTERIAL: 46 MMHG (ref 35–45)
PDW BLD-RTO: 17 % (ref 12.4–15.4)
PERFORMED ON: ABNORMAL
PH ARTERIAL: 7.36 (ref 7.35–7.45)
PH VENOUS: 7.33 (ref 7.35–7.45)
PHOSPHORUS: 2.1 MG/DL (ref 2.5–4.9)
PLATELET # BLD: 123 K/UL (ref 135–450)
PMV BLD AUTO: 8.5 FL (ref 5–10.5)
PO2 ARTERIAL: 154 MMHG (ref 75–108)
POTASSIUM SERPL-SCNC: 4.5 MMOL/L (ref 3.5–5.1)
PROCALCITONIN: 3.44 NG/ML (ref 0–0.15)
RBC # BLD: 2.91 M/UL (ref 4.2–5.9)
SODIUM BLD-SCNC: 135 MMOL/L (ref 136–145)
TCO2 ARTERIAL: 26.9 MMOL/L
TOTAL PROTEIN: 6.7 G/DL (ref 6.4–8.2)
WBC # BLD: 35.7 K/UL (ref 4–11)

## 2019-07-05 PROCEDURE — 92610 EVALUATE SWALLOWING FUNCTION: CPT

## 2019-07-05 PROCEDURE — 94669 MECHANICAL CHEST WALL OSCILL: CPT

## 2019-07-05 PROCEDURE — 2580000003 HC RX 258: Performed by: INTERNAL MEDICINE

## 2019-07-05 PROCEDURE — 99232 SBSQ HOSP IP/OBS MODERATE 35: CPT | Performed by: NURSE PRACTITIONER

## 2019-07-05 PROCEDURE — 6370000000 HC RX 637 (ALT 250 FOR IP): Performed by: INTERNAL MEDICINE

## 2019-07-05 PROCEDURE — 2500000003 HC RX 250 WO HCPCS: Performed by: NURSE PRACTITIONER

## 2019-07-05 PROCEDURE — 99291 CRITICAL CARE FIRST HOUR: CPT | Performed by: INTERNAL MEDICINE

## 2019-07-05 PROCEDURE — 2700000000 HC OXYGEN THERAPY PER DAY

## 2019-07-05 PROCEDURE — 83605 ASSAY OF LACTIC ACID: CPT

## 2019-07-05 PROCEDURE — 87040 BLOOD CULTURE FOR BACTERIA: CPT

## 2019-07-05 PROCEDURE — 94660 CPAP INITIATION&MGMT: CPT

## 2019-07-05 PROCEDURE — 84145 PROCALCITONIN (PCT): CPT

## 2019-07-05 PROCEDURE — 83735 ASSAY OF MAGNESIUM: CPT

## 2019-07-05 PROCEDURE — 80069 RENAL FUNCTION PANEL: CPT

## 2019-07-05 PROCEDURE — 85025 COMPLETE CBC W/AUTO DIFF WBC: CPT

## 2019-07-05 PROCEDURE — 36600 WITHDRAWAL OF ARTERIAL BLOOD: CPT

## 2019-07-05 PROCEDURE — 6370000000 HC RX 637 (ALT 250 FOR IP): Performed by: NURSE PRACTITIONER

## 2019-07-05 PROCEDURE — 2580000003 HC RX 258: Performed by: NURSE PRACTITIONER

## 2019-07-05 PROCEDURE — 90935 HEMODIALYSIS ONE EVALUATION: CPT

## 2019-07-05 PROCEDURE — 6360000002 HC RX W HCPCS: Performed by: INTERNAL MEDICINE

## 2019-07-05 PROCEDURE — 82330 ASSAY OF CALCIUM: CPT

## 2019-07-05 PROCEDURE — 36415 COLL VENOUS BLD VENIPUNCTURE: CPT

## 2019-07-05 PROCEDURE — P9047 ALBUMIN (HUMAN), 25%, 50ML: HCPCS | Performed by: INTERNAL MEDICINE

## 2019-07-05 PROCEDURE — 2500000003 HC RX 250 WO HCPCS: Performed by: INTERNAL MEDICINE

## 2019-07-05 PROCEDURE — 82803 BLOOD GASES ANY COMBINATION: CPT

## 2019-07-05 PROCEDURE — 6360000002 HC RX W HCPCS: Performed by: NURSE PRACTITIONER

## 2019-07-05 PROCEDURE — 94640 AIRWAY INHALATION TREATMENT: CPT

## 2019-07-05 PROCEDURE — 2000000000 HC ICU R&B

## 2019-07-05 PROCEDURE — 80076 HEPATIC FUNCTION PANEL: CPT

## 2019-07-05 PROCEDURE — 94761 N-INVAS EAR/PLS OXIMETRY MLT: CPT

## 2019-07-05 RX ORDER — ALBUMIN (HUMAN) 12.5 G/50ML
25 SOLUTION INTRAVENOUS PRN
Status: DISCONTINUED | OUTPATIENT
Start: 2019-07-05 | End: 2019-07-23 | Stop reason: HOSPADM

## 2019-07-05 RX ORDER — HEPARIN SODIUM 1000 [USP'U]/ML
1000 INJECTION, SOLUTION INTRAVENOUS; SUBCUTANEOUS PRN
Status: DISCONTINUED | OUTPATIENT
Start: 2019-07-05 | End: 2019-07-12

## 2019-07-05 RX ADMIN — AMIODARONE HYDROCHLORIDE 0.5 MG/MIN: 50 INJECTION, SOLUTION INTRAVENOUS at 22:24

## 2019-07-05 RX ADMIN — Medication 10 ML: at 20:48

## 2019-07-05 RX ADMIN — Medication 10 ML: at 08:11

## 2019-07-05 RX ADMIN — METOPROLOL TARTRATE 5 MG: 5 INJECTION INTRAVENOUS at 03:11

## 2019-07-05 RX ADMIN — AMIODARONE HYDROCHLORIDE 0.5 MG/MIN: 50 INJECTION, SOLUTION INTRAVENOUS at 08:39

## 2019-07-05 RX ADMIN — IPRATROPIUM BROMIDE AND ALBUTEROL SULFATE 1 AMPULE: .5; 3 SOLUTION RESPIRATORY (INHALATION) at 15:38

## 2019-07-05 RX ADMIN — METOPROLOL TARTRATE 5 MG: 5 INJECTION INTRAVENOUS at 20:47

## 2019-07-05 RX ADMIN — HEPARIN SODIUM 5000 UNITS: 5000 INJECTION INTRAVENOUS; SUBCUTANEOUS at 06:20

## 2019-07-05 RX ADMIN — IPRATROPIUM BROMIDE AND ALBUTEROL SULFATE 1 AMPULE: .5; 3 SOLUTION RESPIRATORY (INHALATION) at 08:52

## 2019-07-05 RX ADMIN — HEPARIN SODIUM 5000 UNITS: 5000 INJECTION INTRAVENOUS; SUBCUTANEOUS at 20:48

## 2019-07-05 RX ADMIN — MEROPENEM 500 MG: 500 INJECTION INTRAVENOUS at 08:42

## 2019-07-05 RX ADMIN — IPRATROPIUM BROMIDE AND ALBUTEROL SULFATE 1 AMPULE: .5; 3 SOLUTION RESPIRATORY (INHALATION) at 05:01

## 2019-07-05 RX ADMIN — ALBUMIN (HUMAN) 25 G: 0.25 INJECTION, SOLUTION INTRAVENOUS at 11:11

## 2019-07-05 RX ADMIN — VANCOMYCIN HYDROCHLORIDE 1500 MG: 10 INJECTION, POWDER, LYOPHILIZED, FOR SOLUTION INTRAVENOUS at 14:00

## 2019-07-05 RX ADMIN — IPRATROPIUM BROMIDE AND ALBUTEROL SULFATE 1 AMPULE: .5; 3 SOLUTION RESPIRATORY (INHALATION) at 20:18

## 2019-07-05 RX ADMIN — MOMETASONE FUROATE AND FORMOTEROL FUMARATE DIHYDRATE 2 PUFF: 100; 5 AEROSOL RESPIRATORY (INHALATION) at 20:18

## 2019-07-05 RX ADMIN — METRONIDAZOLE 500 MG: 500 INJECTION, SOLUTION INTRAVENOUS at 01:47

## 2019-07-05 RX ADMIN — IPRATROPIUM BROMIDE AND ALBUTEROL SULFATE 1 AMPULE: .5; 3 SOLUTION RESPIRATORY (INHALATION) at 00:13

## 2019-07-05 RX ADMIN — Medication 10 ML: at 08:10

## 2019-07-05 RX ADMIN — IPRATROPIUM BROMIDE AND ALBUTEROL SULFATE 1 AMPULE: .5; 3 SOLUTION RESPIRATORY (INHALATION) at 12:19

## 2019-07-05 RX ADMIN — METOPROLOL TARTRATE 5 MG: 5 INJECTION INTRAVENOUS at 08:10

## 2019-07-05 RX ADMIN — Medication 2 CAPSULE: at 08:10

## 2019-07-05 RX ADMIN — INSULIN GLARGINE 10 UNITS: 100 INJECTION, SOLUTION SUBCUTANEOUS at 20:47

## 2019-07-05 RX ADMIN — Medication 50 MG: at 06:20

## 2019-07-05 RX ADMIN — METOPROLOL TARTRATE 5 MG: 5 INJECTION INTRAVENOUS at 16:10

## 2019-07-05 RX ADMIN — FAMOTIDINE 20 MG: 10 INJECTION, SOLUTION INTRAVENOUS at 08:10

## 2019-07-05 RX ADMIN — MOMETASONE FUROATE AND FORMOTEROL FUMARATE DIHYDRATE 2 PUFF: 100; 5 AEROSOL RESPIRATORY (INHALATION) at 08:52

## 2019-07-05 RX ADMIN — HEPARIN SODIUM 5000 UNITS: 5000 INJECTION INTRAVENOUS; SUBCUTANEOUS at 16:10

## 2019-07-05 ASSESSMENT — PAIN SCALES - GENERAL
PAINLEVEL_OUTOF10: 0

## 2019-07-05 NOTE — PLAN OF CARE
Nutrition Problem: Inadequate energy intake  Intervention: Food and/or Nutrient Delivery: (refer to MD for tube feeding recommendations r/t recommendations for npo per Speech Therapy)  Nutritional Goals: tolerate most appropriate form of nutrition

## 2019-07-05 NOTE — PROGRESS NOTES
0715: Handoff received from [facility]. Four eyes skin assessment completed. Patient assisted to bedpan, medium BM. Pericare provided, bed pad changed. 0800: Assessment completed, see documentation. VSS on bi-pap.  0830: Critical care rounds completed with Dr. Shant Armenta, see new orders. 0845: HD RN at bedside preparing for treatment. SLP at bedside for swallow eval, patient has intermitted coughing when drinking. NPO for now, will reevaluate over weekend. 1545: HD completed, -4L.   1600: Family at bedside. Patient placed on 10L high flow NC, well tolerated. NPO/swallow status explained to patient and family. Family agreeable, swabs and mouth moisturizer provided to patient. 15 min CHF education provided, including fluid restriction, daily weights, and low sodium diet. Family agreeable, patient resistant to learning. 1700: NG tube placement explained to patient. RN explained need for continued nutrition pending swallow reevaluation. Patient no agreeable to NG at this time. Will attempt at another time. 1900: Handoff to [employer]. Four eyes skin assessment completed.

## 2019-07-05 NOTE — PROGRESS NOTES
Orders: Carb Control 4 Carbs/Meal, 2gm Sodium, Fluid Restriction   · Oral Diet intake: Unable to assess  · Oral Nutrition Supplement (ONS) Orders: None  · ONS intake: NPO  · Tube Feeding (TF) Goal  If started. recommend Nepro formula to advance as tolerated to goal rate of 50 ml per hour. Rate adjusted for routine Nursing care (~20 hour run). To help meet protein needs, recommend 1 bottle of Proteinex per feeding tube per day. Do not mix directly with TF. Flush per MD. Regimen offers; 1000 ml / 1904 kcals (1800 (TF) + 104 (Proteinex) / 107 gms pro (81 (TF) + 26 (Proteinex) / 727 ml free water per day.    · Additional Calories: none  · Anthropometric Measures:  · Ht: 5' 7\" (170.2 cm)   · Current Body Wt: 220 lb (99.8 kg)  · Admission Body Wt: 234 lb (106.1 kg)  · Weight Change: weight loss likely r/t combination of limited diet orders & fluid shifts   · Ideal Body Wt: 148 lb (67.1 kg), % Ideal Body    · BMI Classification: BMI 30.0 - 34.9 Obese Class I    Nutrition Interventions:   (refer to MD for tube feeding recommendations r/t recommendations for npo per Speech Therapy)  Continued Inpatient Monitoring, Education Not Indicated    Nutrition Evaluation:   · Evaluation: No progress toward goals(per Speech Therapy noting aspiration risk)   · Goals: tolerate most appropriate form of nutrition   · Monitoring: Nutrition Progression, Skin Integrity, Weight, Pertinent Labs, Chewing/Swallowing, Diarrhea, Constipation(start of alternate nutrition per Speech Therapy recommnedations)      Electronically signed by Felicity Ro RD, LD on 7/5/19 at 2:41 PM    Contact Number: [phone]

## 2019-07-05 NOTE — PROGRESS NOTES
without  pneumothorax. CTPA: No results found for this or any previous visit. CXR PA/LAT:   Results for orders placed during the hospital encounter of 06/01/19   XR CHEST STANDARD (2 VW)    Narrative EXAMINATION:  TWO XRAY VIEWS OF THE CHEST    6/1/2019 5:05 pm    COMPARISON:  Chest 02/12/2018    HISTORY:  ORDERING SYSTEM PROVIDED HISTORY: SHORTNESS OF BREATH, COUGH, COPD, CHF  TECHNOLOGIST PROVIDED HISTORY:  Reason for exam:-> SHORTNESS OF BREATH, COUGH, COPD, CHF  Ordering Physician Provided Reason for Exam: Shortness of Breath (couples  week, worsened last night, Hx copd chf smoker)  Acuity: Chronic  Type of Exam: Ongoing    FINDINGS:  The cardiac silhouette is enlarged. Calcifications involving the aorta  reflect atherosclerosis. The mediastinal and hilar silhouettes appear  unremarkable. Vascular engorgement and cephalization is demonstrated with bilateral  peribronchial cuffing and perivascular haziness. Possible bilateral pleural  effusion, right greater than left. Focal atelectasis or infiltrate is  evident lateral right lung base and bilateral parahilar regions. No pneumothorax is seen. No acute osseous abnormality is identified. Impression 1. Congestive heart failure most likely given these findings. 2. Calcific atherosclerosis aorta. 3. Cardiomegaly. CXR portable:   Results for orders placed during the hospital encounter of 06/22/19   XR CHEST PORTABLE    Narrative EXAMINATION:  ONE XRAY VIEW OF THE CHEST    6/30/2019 10:42 am    COMPARISON:  06/28/2019    HISTORY:  ORDERING SYSTEM PROVIDED HISTORY: hypoxemia  TECHNOLOGIST PROVIDED HISTORY:  Reason for exam:->hypoxemia  Ordering Physician Provided Reason for Exam: hypoxemia  Acuity: Acute  Type of Exam: Ongoing  Relevant Medical/Surgical History: Respiratory failure    FINDINGS:  Support hardware is stable in configuration. The heart is enlarged with mild  central pulmonary venous congestion.   Multifocal bilateral perihilar and  lower lobe airspace opacification is again noted, unchanged from prior. There are small bilateral pleural effusions. Mediastinal contours are stable. Impression No significant interval change. Access  Arterial  Arterial Line 06/24/19 Right Radial (Active)   $ Arterial line insertion $ Yes 6/24/2019  8:00 PM   Continued need for line? Yes 7/4/2019  6:00 AM   Site Assessment Clean;Dry; Intact 7/4/2019  6:00 AM   Line Status Arterial fluids per protocol; Intact and in place 7/4/2019  6:00 AM   Art Line Waveform Appropriate 7/4/2019  6:00 AM   Art Line Interventions Zeroed and calibrated; Leveled; Connections checked and tightened 7/3/2019  8:00 PM   Color/Movement/Sensation Capillary refill less than 3 sec 7/4/2019  6:00 AM   Dressing Status Clean;Dry; Intact 7/4/2019  6:00 AM   Dressing Type Transparent; Anti-microbial patch 7/4/2019  6:00 AM   Number of days: 9       PICC       PICC Double Lumen 00/71/37 Left Cephalic (Active)   Continued need for line? Yes 7/4/2019  6:00 AM   Is this a power PICC? Yes 7/4/2019  6:00 AM   Site Assessment Clean;Dry; Intact 7/4/2019  6:00 AM   Gray Lumen Status Infusing;Capped 7/4/2019  6:00 AM   Purple Lumen Status Flushed;Capped 7/4/2019  6:00 AM   Exposed Catheter (cm) 1 cm 6/28/2019  4:32 PM   Extremity Circumference (cm) 37 cm 6/29/2019  6:00 AM   Line Care Connections checked and tightened 7/2/2019  8:00 AM   Dressing Status Clean;Dry; Intact 7/4/2019  6:00 AM   Dressing Type Transparent; Anti-microbial patch; Securing device 7/4/2019  6:00 AM   Dressing Change Due 07/07/19 7/4/2019  6:00 AM   Dressing Intervention New 6/29/2019  6:00 AM   Number of days: 5        CVC                 Assessment:     Acute Hypoxemic Respiratory Failure with SAO2 <90% on Room Air  Acute pulmonary edema  Hospital Acquired pneumonia, RLL  Atrial fibrillation with rapid ventricular response  Cardiomyopathy, LVEF 30 to 35%  REJI on RRT  Pneumatosis intestinalis  Anemia    Plan: -Wean oxygen as tolerated for goal SPO2 greater than 90%  -CXR with newly worsened RLL pneumonia, ?aspiration, SLP eval.  -off milrinone, Tachycardia is much improved. Rebolused amio and dig yesterday per cards   -remains on amio gtt, prn lopressor  -Uop marginal but stable  -Is on IV Flagyl and p.o. vancomycin for presumed C. difficile colitis based on CT findings. But Solid stool yesterday.    -Will switch abx to merrem/vanc for new pneumonia. -plan for HD today  -Continue MiraLAX for ileus  DVT prophylaxis      Due to the immediate potential for life-threatening deterioration due to acute hypoxia requiring noninvasive positive pressure ventilation, cardiogenic shock, I spent 43 minutes providing critical care. This time is excluding time spent performing procedures.       Thank you for this consult,    Erma Ortiz [facility]

## 2019-07-05 NOTE — PROGRESS NOTES
[address]   Daily Progress Note      Admit Date:  6/22/2019    Reason for follow up visit: Atrial fib    CC: \"I want some water. \"    63/y/o male with PMH significant chronic atrial fibrillation, CHF, COPD, morbid obesity and ETOH abuse presented with respiratory failure requiring intubation. He is now extubated and currently on HD. He has transiently been on Levophed d/t hypotension which currently is off. He was placed on Milrinone to aid in diuresis and has been discontinued. Now on IV amiodarone has he has had atrial fib with RVR for last 72 hours. Interval History:  Pt. seen and examined; records reviewed  Milrinone dc'd yesterday  Rebolused with Amiodarone yesterday and remains on continuous infusion  IV dig yesterday  Today with atrial fib and VR better controlled (100-110's)  Tm 100.3 and on abx for PNA  Continues with dialysis today  BP stable (off pressors). Remains on O2 (high flow); on BiPap until early this AM  Wt 219# today. Net diuresis -9+L since admit  Denies chest pain  More alert and requesting something to drink    Subjective:  Pt with no acute overnight cardiac events.  + SOB and on O2; congested cough  Denies chest pain, palpitations or dizziness    Review of Systems:   Pt unable to provide    Objective:   /67   Pulse 116   Temp 100.3 °F (37.9 °C) (Axillary)   Resp 27   Ht 5' 7\" (1.702 m)   Wt 219 lb 9.3 oz (99.6 kg)   SpO2 97%   BMI 34.39 kg/m²       Intake/Output Summary (Last 24 hours) at 7/5/2019 0930  Last data filed at 7/5/2019 0800  Gross per 24 hour   Intake 859.6 ml   Output 760 ml   Net 99.6 ml     Wt Readings from Last 3 Encounters:   07/05/19 219 lb 9.3 oz (99.6 kg)   06/11/19 236 lb 15.9 oz (107.5 kg)   02/27/19 235 lb (106.6 kg)       Physical Exam:  General:Awake and alert. Asking for water. In no acute distress  Skin:  Warm and dry. No new appearing rashes or lesions. Neck:  Supple and thick. No JVD appreciated.   Chest: Lungs with diminished

## 2019-07-05 NOTE — PROGRESS NOTES
Persistent atrial fibrillation ([address] Anemia; DMII (diabetes mellitus, type 2) ([address] Constipation; Hypokalemia; Acute on chronic systolic heart failure ([address] Atrial fibrillation, rapid ([address] COPD with acute exacerbation ([address] Cocaine use; Severe sepsis ([address] Atrial fibrillation with RVR ([address] Hyponatremia; Acute on chronic respiratory failure with hypoxia ([address] Respiratory distress; CHF, left ventricular ([address] Nicotine dependence; Suspected sleep apnea; Coronary artery disease involving native coronary artery of native heart without angina pectoris; CAD (coronary artery disease); Acute renal failure (ARF) ([address] Morbid obesity due to excess calories ([address] Acute respiratory failure with hypoxia ([address] Nocturnal hypoxia; Hypercapnemia; SOB (shortness of breath); Acute on chronic respiratory failure with hypercapnia ([address] Chronic respiratory failure with hypercapnia ([address] Acute pulmonary edema ([address] Acute on chronic systolic HF (heart failure) ([address] Hx of AKA (above knee amputation), left ([address] Respiratory failure ([address] Pneumonia; Ventricular tachycardia ([address] Shock circulatory ([address] Tachypnea; Neutrophilic leukocytosis; Chronic respiratory failure with hypoxia ([address] Cardiac arrest [facility]); PEA (Pulseless electrical activity) ([address] and Ileus ([address] on their problem list.    ONSET DATE: 6/22/2019    Chart review:  6/23/2019 admitted with SOB. Admission H&P HPI:  Pt seen just prior to intubation and again after intubation. Patient is a 59-year-old man with a history of hypertension, hyperlipidemia, diabetes mellitus, coronary artery disease, COPD with chronic hypoxemic respiratory failure, chronic systolic congestive heart failure and morbid obesity. He presents to the emergency room for evaluation following a three day history of worsening shortness of breath.  At the time of his arrival his shortness of breath was severe, made worse with minimal exertion and improved with rest. He was noted to be swallows with concern for reduced pharyngeal clearance and increased penetration/aspiration risk. Weak, intermittent cough with puree trials. · Puree only administered d/t severity of impairments and concern for high penetration/aspiration risk with additional PO trials. · Recommend NPO pending continued ST evaluation for PO and/or MBS readiness. Dysphagia Outcome Severity Scale: Level 1: Severe dysphagia- NPO. Unable to tolerate any PO safely     Treatment Plan  Requires SLP Intervention: Yes  Duration/Frequency of Treatment: ST to tx 3-5 times per week for dysphagia during acute admission  D/C Recommendations: To be determined    Recommended Diet and Intervention  Diet Solids Recommendation: NPO(consider temporary alternate nutrtion)  Liquid Consistency Recommendation: NPO(consider temporary alternate nutrtion)  Recommended Form of Meds: Via alternative means of nutrition  Therapeutic Interventions: Therapeutic PO trials with SLP;Patient/Family education; Bolus control exercises; Laryngeal exercises; Pharyngeal exercises    Compensatory Swallowing Strategies  TBD    Treatment/Goals  Dysphagia Goals: The patient will improve oral preparation phase via bolus control/manipulation exercises to 5/5 each trial.;The patient will tolerate repeat BSE when able. (The patient will improve pharyngeal phase via pharyngolaryngeal exercises to 5/5 each.)    General  Chart Reviewed: Yes  Subjective: Accepted and tolerated evaluation at bedside. BiPAP removed by RN for evaluation. Behavior/Cognition: Alert; Cooperative  Communication Observation: Functional  Follows Directions: Simple  Dentition: Adequate  Patient Positioning: Upright in bed  Baseline Vocal Quality: Aphonic;Dysphonic  Volitional Cough: Weak  Volitional Swallow: Delayed  Consistencies Administered: Puree    Vision/Hearing  Vision: Within Functional Limits  Hearing: Within functional limits    Oral Motor Deficits  Oral/Motor  Labial Strength: Reduced  Lingual Strength: Reduced  Lingual Coordination: Reduced  Mandible: Impaired    Oral Phase Dysfunction  Oral Phase Dysfunction  Impaired Mastication: (suspected)  Decreased Anterior to Posterior Transit: Puree     Indicators of Pharyngeal Phase Dysfunction  Indicators of Pharyngeal Phase Dysfunction  Delayed Swallow: Puree  Decreased Laryngeal Elevation: Puree  Cough - Delayed: Puree  Comment: repeat swallows with concern for reduced pharyngeal clearance and increased penetration/aspiration risk. Prognosis  Prognosis for safe diet advancement: good  Consulted and agree with results and recommendations: Patient;RN    Education  Patient Education: Completed on recommendations/plan  Patient Education Response: Needs reinforcement         Therapy Time  SLP Individual Minutes  Time In: 0930  Time Out: 1000  Minutes: Kiran Devlin.  Skyler Mistry, #[id number]  Speech-Language Pathologist  7/5/2019 10:00 AM

## 2019-07-05 NOTE — PROGRESS NOTES
Nephrology ([facility]) Progress Note    CC:  REIJ and hyperkalemia    Subjective:    HPI:  Extubated/BIPAP. Remains oliguric. ROS:  In bed. No fever. [address]:  medications reviewed.  vancomycin  1,500 mg Intravenous Once    meropenem  500 mg Intravenous Q24H    vancomycin (VANCOCIN) intermittent dosing (placeholder)   Other RX Placeholder    polyethylene glycol  17 g Oral Daily    metoprolol  5 mg Intravenous Q6H    lactobacillus  2 capsule Oral BID WC    heparin (porcine)  5,000 Units Subcutaneous 3 times per day    sodium chloride flush  10 mL Intravenous 2 times per day    darbepoetin vinay-polysorbate  60 mcg Subcutaneous Weekly - Thursday    insulin glargine  10 Units Subcutaneous Nightly    gabapentin  50 mg Oral 3 times per day    insulin lispro  0-18 Units Subcutaneous Q4H    mometasone-formoterol  2 puff Inhalation BID    sodium chloride flush  10 mL Intravenous 2 times per day    ipratropium-albuterol  1 ampule Inhalation Q4H    famotidine (PEPCID) injection  20 mg Intravenous Daily         Objective:  Blood pressure 124/67, pulse 112, temperature 100.6 °F (38.1 °C), resp. rate 24, height 5' 7\" (1.702 m), weight 220 lb 0.3 oz (99.8 kg), SpO2 100 %.     Intake/Output Summary (Last 24 hours) at 7/5/2019 1103  Last data filed at 7/5/2019 1000  Gross per 24 hour   Intake 859.6 ml   Output 880 ml   Net -20.4 ml     General:  NAD, ventilated  Chest:  coarse BS  CVS:  RRR  Abdominal:  NTND, soft, +BS  Extremities:  2+ edema  Skin:  no rash    Labs:  Renal panel:  Lab Results   Component Value Date/Time     (L) 07/05/2019 05:50 AM    K 4.5 07/05/2019 05:50 AM    K 3.6 06/11/2019 06:00 AM    CO2 24 07/05/2019 05:50 AM    BUN 40 (H) 07/05/2019 05:50 AM    CREATININE 1.5 (H) 07/05/2019 05:50 AM    CALCIUM 8.9 07/05/2019 05:50 AM    PHOS 2.1 (L) 07/05/2019 05:50 AM    MG 1.50 (L) 07/05/2019 05:50 AM     CBC:  Lab Results   Component Value Date/Time    WBC 35.7 (H)

## 2019-07-06 LAB
ALBUMIN SERPL-MCNC: 3.3 G/DL (ref 3.4–5)
ALP BLD-CCNC: 110 U/L (ref 40–129)
ALT SERPL-CCNC: 54 U/L (ref 10–40)
ANION GAP SERPL CALCULATED.3IONS-SCNC: 12 MMOL/L (ref 3–16)
AST SERPL-CCNC: 24 U/L (ref 15–37)
BASE EXCESS ARTERIAL: 2.8 MMOL/L (ref -3–3)
BASOPHILS ABSOLUTE: 0.1 K/UL (ref 0–0.2)
BASOPHILS RELATIVE PERCENT: 0.3 %
BILIRUB SERPL-MCNC: 1.2 MG/DL (ref 0–1)
BILIRUBIN DIRECT: 0.8 MG/DL (ref 0–0.3)
BILIRUBIN, INDIRECT: 0.4 MG/DL (ref 0–1)
BUN BLDV-MCNC: 28 MG/DL (ref 7–20)
CALCIUM IONIZED: 1.14 MMOL/L (ref 1.12–1.32)
CALCIUM SERPL-MCNC: 8.6 MG/DL (ref 8.3–10.6)
CARBOXYHEMOGLOBIN ARTERIAL: 1.6 % (ref 0–1.5)
CHLORIDE BLD-SCNC: 97 MMOL/L (ref 99–110)
CO2: 26 MMOL/L (ref 21–32)
CREAT SERPL-MCNC: 1 MG/DL (ref 0.9–1.3)
EOSINOPHILS ABSOLUTE: 0 K/UL (ref 0–0.6)
EOSINOPHILS RELATIVE PERCENT: 0.1 %
GFR AFRICAN AMERICAN: >60
GFR NON-AFRICAN AMERICAN: >60
GLUCOSE BLD-MCNC: 111 MG/DL (ref 70–99)
GLUCOSE BLD-MCNC: 112 MG/DL (ref 70–99)
GLUCOSE BLD-MCNC: 114 MG/DL (ref 70–99)
GLUCOSE BLD-MCNC: 117 MG/DL (ref 70–99)
GLUCOSE BLD-MCNC: 121 MG/DL (ref 70–99)
GLUCOSE BLD-MCNC: 137 MG/DL (ref 70–99)
GLUCOSE BLD-MCNC: 92 MG/DL (ref 70–99)
HCO3 ARTERIAL: 27.5 MMOL/L (ref 21–29)
HCT VFR BLD CALC: 23.1 % (ref 40.5–52.5)
HEMOGLOBIN, ART, EXTENDED: 7.4 G/DL (ref 13.5–17.5)
HEMOGLOBIN: 7.4 G/DL (ref 13.5–17.5)
LYMPHOCYTES ABSOLUTE: 1.1 K/UL (ref 1–5.1)
LYMPHOCYTES RELATIVE PERCENT: 4.2 %
MAGNESIUM: 1.7 MG/DL (ref 1.8–2.4)
MCH RBC QN AUTO: 27 PG (ref 26–34)
MCHC RBC AUTO-ENTMCNC: 31.8 G/DL (ref 31–36)
MCV RBC AUTO: 85 FL (ref 80–100)
METHEMOGLOBIN ARTERIAL: 1 %
MONOCYTES ABSOLUTE: 1.9 K/UL (ref 0–1.3)
MONOCYTES RELATIVE PERCENT: 7.5 %
NEUTROPHILS ABSOLUTE: 22.1 K/UL (ref 1.7–7.7)
NEUTROPHILS RELATIVE PERCENT: 87.9 %
O2 CONTENT ARTERIAL: 10 ML/DL
O2 SAT, ARTERIAL: 96.3 %
O2 THERAPY: ABNORMAL
PCO2 ARTERIAL: 45.8 MMHG (ref 35–45)
PDW BLD-RTO: 17.2 % (ref 12.4–15.4)
PERFORMED ON: ABNORMAL
PERFORMED ON: NORMAL
PH ARTERIAL: 7.39 (ref 7.35–7.45)
PH VENOUS: 7.36 (ref 7.35–7.45)
PHOSPHORUS: 2.3 MG/DL (ref 2.5–4.9)
PLATELET # BLD: 113 K/UL (ref 135–450)
PMV BLD AUTO: 8.6 FL (ref 5–10.5)
PO2 ARTERIAL: 73.7 MMHG (ref 75–108)
POTASSIUM SERPL-SCNC: 3.7 MMOL/L (ref 3.5–5.1)
PROCALCITONIN: 2.31 NG/ML (ref 0–0.15)
RBC # BLD: 2.72 M/UL (ref 4.2–5.9)
SODIUM BLD-SCNC: 135 MMOL/L (ref 136–145)
TCO2 ARTERIAL: 28.9 MMOL/L
TOTAL PROTEIN: 6.7 G/DL (ref 6.4–8.2)
VANCOMYCIN RANDOM: 5.4 UG/ML
WBC # BLD: 25.2 K/UL (ref 4–11)

## 2019-07-06 PROCEDURE — 6370000000 HC RX 637 (ALT 250 FOR IP): Performed by: INTERNAL MEDICINE

## 2019-07-06 PROCEDURE — 94669 MECHANICAL CHEST WALL OSCILL: CPT

## 2019-07-06 PROCEDURE — 6370000000 HC RX 637 (ALT 250 FOR IP): Performed by: NURSE PRACTITIONER

## 2019-07-06 PROCEDURE — 2500000003 HC RX 250 WO HCPCS: Performed by: NURSE PRACTITIONER

## 2019-07-06 PROCEDURE — 2580000003 HC RX 258: Performed by: INTERNAL MEDICINE

## 2019-07-06 PROCEDURE — 94660 CPAP INITIATION&MGMT: CPT

## 2019-07-06 PROCEDURE — 94640 AIRWAY INHALATION TREATMENT: CPT

## 2019-07-06 PROCEDURE — 80076 HEPATIC FUNCTION PANEL: CPT

## 2019-07-06 PROCEDURE — 84145 PROCALCITONIN (PCT): CPT

## 2019-07-06 PROCEDURE — 80069 RENAL FUNCTION PANEL: CPT

## 2019-07-06 PROCEDURE — 6360000002 HC RX W HCPCS: Performed by: INTERNAL MEDICINE

## 2019-07-06 PROCEDURE — 2700000000 HC OXYGEN THERAPY PER DAY

## 2019-07-06 PROCEDURE — 85025 COMPLETE CBC W/AUTO DIFF WBC: CPT

## 2019-07-06 PROCEDURE — 82803 BLOOD GASES ANY COMBINATION: CPT

## 2019-07-06 PROCEDURE — 83735 ASSAY OF MAGNESIUM: CPT

## 2019-07-06 PROCEDURE — 2000000000 HC ICU R&B

## 2019-07-06 PROCEDURE — 82330 ASSAY OF CALCIUM: CPT

## 2019-07-06 PROCEDURE — 94761 N-INVAS EAR/PLS OXIMETRY MLT: CPT

## 2019-07-06 PROCEDURE — 99232 SBSQ HOSP IP/OBS MODERATE 35: CPT | Performed by: NURSE PRACTITIONER

## 2019-07-06 PROCEDURE — 80202 ASSAY OF VANCOMYCIN: CPT

## 2019-07-06 PROCEDURE — 94667 MNPJ CHEST WALL 1ST: CPT

## 2019-07-06 PROCEDURE — 2500000003 HC RX 250 WO HCPCS: Performed by: INTERNAL MEDICINE

## 2019-07-06 PROCEDURE — 2580000003 HC RX 258: Performed by: NURSE PRACTITIONER

## 2019-07-06 PROCEDURE — 36600 WITHDRAWAL OF ARTERIAL BLOOD: CPT

## 2019-07-06 PROCEDURE — 6360000002 HC RX W HCPCS: Performed by: NURSE PRACTITIONER

## 2019-07-06 PROCEDURE — 99233 SBSQ HOSP IP/OBS HIGH 50: CPT | Performed by: INTERNAL MEDICINE

## 2019-07-06 PROCEDURE — 90935 HEMODIALYSIS ONE EVALUATION: CPT

## 2019-07-06 RX ORDER — MAGNESIUM SULFATE IN WATER 40 MG/ML
2 INJECTION, SOLUTION INTRAVENOUS ONCE
Status: COMPLETED | OUTPATIENT
Start: 2019-07-06 | End: 2019-07-06

## 2019-07-06 RX ADMIN — FAMOTIDINE 20 MG: 10 INJECTION, SOLUTION INTRAVENOUS at 11:11

## 2019-07-06 RX ADMIN — MOMETASONE FUROATE AND FORMOTEROL FUMARATE DIHYDRATE 2 PUFF: 100; 5 AEROSOL RESPIRATORY (INHALATION) at 19:34

## 2019-07-06 RX ADMIN — MEROPENEM 500 MG: 500 INJECTION INTRAVENOUS at 11:11

## 2019-07-06 RX ADMIN — MOMETASONE FUROATE AND FORMOTEROL FUMARATE DIHYDRATE 2 PUFF: 100; 5 AEROSOL RESPIRATORY (INHALATION) at 08:35

## 2019-07-06 RX ADMIN — HEPARIN SODIUM 5000 UNITS: 5000 INJECTION INTRAVENOUS; SUBCUTANEOUS at 13:25

## 2019-07-06 RX ADMIN — VANCOMYCIN HYDROCHLORIDE 1500 MG: 10 INJECTION, POWDER, LYOPHILIZED, FOR SOLUTION INTRAVENOUS at 09:46

## 2019-07-06 RX ADMIN — HEPARIN SODIUM 5000 UNITS: 5000 INJECTION INTRAVENOUS; SUBCUTANEOUS at 06:24

## 2019-07-06 RX ADMIN — IPRATROPIUM BROMIDE AND ALBUTEROL SULFATE 1 AMPULE: .5; 3 SOLUTION RESPIRATORY (INHALATION) at 08:33

## 2019-07-06 RX ADMIN — IPRATROPIUM BROMIDE AND ALBUTEROL SULFATE 1 AMPULE: .5; 3 SOLUTION RESPIRATORY (INHALATION) at 15:43

## 2019-07-06 RX ADMIN — MAGNESIUM SULFATE HEPTAHYDRATE 2 G: 40 INJECTION, SOLUTION INTRAVENOUS at 11:45

## 2019-07-06 RX ADMIN — Medication 10 ML: at 20:34

## 2019-07-06 RX ADMIN — INSULIN GLARGINE 10 UNITS: 100 INJECTION, SOLUTION SUBCUTANEOUS at 20:27

## 2019-07-06 RX ADMIN — IPRATROPIUM BROMIDE AND ALBUTEROL SULFATE 1 AMPULE: .5; 3 SOLUTION RESPIRATORY (INHALATION) at 04:53

## 2019-07-06 RX ADMIN — METOPROLOL TARTRATE 5 MG: 5 INJECTION INTRAVENOUS at 04:30

## 2019-07-06 RX ADMIN — METOPROLOL TARTRATE 5 MG: 5 INJECTION INTRAVENOUS at 14:22

## 2019-07-06 RX ADMIN — HEPARIN SODIUM 5000 UNITS: 5000 INJECTION INTRAVENOUS; SUBCUTANEOUS at 20:27

## 2019-07-06 RX ADMIN — IPRATROPIUM BROMIDE AND ALBUTEROL SULFATE 1 AMPULE: .5; 3 SOLUTION RESPIRATORY (INHALATION) at 19:34

## 2019-07-06 RX ADMIN — AMIODARONE HYDROCHLORIDE 0.5 MG/MIN: 50 INJECTION, SOLUTION INTRAVENOUS at 13:24

## 2019-07-06 RX ADMIN — Medication 10 ML: at 08:30

## 2019-07-06 RX ADMIN — IPRATROPIUM BROMIDE AND ALBUTEROL SULFATE 1 AMPULE: .5; 3 SOLUTION RESPIRATORY (INHALATION) at 00:42

## 2019-07-06 RX ADMIN — Medication 10 ML: at 20:28

## 2019-07-06 RX ADMIN — IPRATROPIUM BROMIDE AND ALBUTEROL SULFATE 1 AMPULE: .5; 3 SOLUTION RESPIRATORY (INHALATION) at 11:51

## 2019-07-06 ASSESSMENT — PAIN SCALES - GENERAL
PAINLEVEL_OUTOF10: 0

## 2019-07-06 NOTE — PROGRESS NOTES
Slight vascular congestion.          7/1/2019 CT abdomen and pelvis:  Pneumatosis of the splenic flexure of the colon.  This is associated with a   generalized ileus.  The appearance is concerning for colitis. (Interestingly, the patient had a transient presentation of portal venous gas   near the transverse colon on 05/22 to 5/25/2017.  This was apparently benign.)       Small amount of ascites.       Bibasilar pulmonary consolidations- pneumonia versus atelectasis with small   effusions.       Anasarca.      Echo 7/1/2019:  Normal left ventricular size and wall thickness. Severe left ventricular dysfunction with global hypokinesis. Ejection fraction is visually estimated to be 30-35%. The right ventricle is severely enlarged with severely reduced function. The left atrium is mildly dilated. Mild mitral regurgitation. There is mild tricuspid regurgitation with the systolic pulmonary artery  pressure (SPAP) estimated at 54 mmHg (estimated RA pressure of 15 mmHg included). Mild mitral regurgitation.     Echo 10/2018:  Suboptimal image quality. Definity contrast administered.   Patient appears to be in atrial fibrillation.   Mild Conc. LVH with EF  45%.   The left atrium is mildly dilated.   Mild mitral regurgitation.   Normal RV size with mildly reduced systolic function.   Trivial pulmonic regurgitation present. Telemetry: Atrial fib with -110's    Assessment/Plan:    1. Atrial fib with RVR/ Chronic atrial fib  -remains on IV amiodarone and metoprolol  -VR better controlled today (</= 110)  -chronically in atrial fib (DCCV in the past with return to A-fib)  -off Xarelto at present (reevaluate as he progresses)     2. Acute on chronic systolic heart failure/Cardiomyopathy  -echo this admit with LVEF 30-35% (down from 45% in 10/2018)  -O2 requirement improving gradually  -continue hemodialysis     3.  REJI  -nephrology following  -Cr continues to improve  -remains on HD     4. Respiratory

## 2019-07-06 NOTE — FLOWSHEET NOTE
Treatment time: 4 hours    Net UF: 3.5 Liters     Pre weight: 96.0 kg bed scale   Post weight: 92.4 kg   EDW: tbd     Access used: R IJ neck  Access function: Lines reversed; good at 400 BFR     Medications or blood products given: Vancomycin 1500 mg     Regular outpatient schedule: na    Summary of response to treatment: 3.5 L net volume removed with 4 hour Dialysis. Pt toleratd tx well. Vancomycin 1500 given with tx. Post VSS. Report given to Razia ARENAS     Copy of dialysis treatment record placed in chart, to be scanned into EMR. 07/06/19 0654 07/06/19 1054   Vital Signs   /77 135/75   Temp  --  98.7 °F (37.1 °C)   Pulse 95 111   Weight 211 lb 10.3 oz (96 kg) 203 lb 11.3 oz (92.4 kg)   Weight Method Bed scale Bed scale   Percent Weight Change  --  -3.75   Treatment Initiation   Dialyze Hours 4  --    Treatment  Initiation Universal Precautions maintained;Lines secured to patient; Connections secured;Prime given;Venous Parameters set; Arterial Parameters set; Air foam detector engaged; Hemosafe Device; Dialysate;Saline line double clamped; Hemo-Safe Applied;Dialyzer;F180  --    Post-Hemodialysis Assessment   Hemodialysis Intake (ml)  --  550 ml   Hemodialysis Output (ml)  --  4050 ml   NET Removed (ml)  --  3500 ml   Tolerated Treatment  --  Good

## 2019-07-06 NOTE — PROGRESS NOTES
5. Mediastinal adenopathy, probably reactive given the relatively rapid   development. 6. Suspected acute nondisplaced bilateral anterior rib fractures without   pneumothorax. XR CHEST PORTABLE   Final Result   1. Endotracheal tube tip projects over the trachea, tip at the level of the   aortic knob, 3.7 cm superior to the erna. 2. Pulmonary edema evident. 3. Cardiomegaly. 4. No pneumothorax. 5. Nonspecific prominence of the azygos vein region may be related vascular   engorgement, adenopathy or underlying mass lesion.          XR CHEST PORTABLE   Final Result   Questionable right basilar atelectasis or pneumonia on a limited portable   exam.                 Assessment/Plan:    Active Hospital Problems    Diagnosis    Atrial fibrillation, chronic ([facility]) [I48.2]     Priority: High    PEA (Pulseless electrical activity) ([facility]) [I46.9]    Ileus ([facility]) [K56.7]    Respiratory failure ([facility]) [J96.90]    HCAP (healthcare-associated pneumonia) [J18.9]    Ventricular tachycardia ([facility]) [I47.2]    Shock circulatory ([facility] [R57.9]    Tachypnea [U86.51]    Neutrophilic leukocytosis [F62.5]    Chronic respiratory failure with hypoxia ([facility]) [J96.11]    Cardiac arrest ([facility] [I46.9]    Acute on chronic systolic HF (heart failure) ([facility]) [I50.23]    Acute pulmonary edema ([facility]) [J81.0]    CAD (coronary artery disease) [I25.10]    Morbid obesity due to excess calories ([facility] [E66.01]    Acute renal failure (ARF) ([facility]) [N17.9]    Severe sepsis ([facility]) [A41.9, R65.20]    DMII (diabetes mellitus, type 2) ([facility]) [E11.9]    COPD exacerbation ([facility]) [J44.1]    Cardiomyopathy ([facility] [I42.9]    Hypotension [I95.9]    Chronic systolic CHF (congestive heart failure), NYHA class 3 ([facility]) [I50.22]    Acute hyperkalemia [E87.5]    Essential hypertension [I10]    Alcohol abuse, continuous [F10.10]    Tachycardia [R00.0]    Hyperlipidemia [E78.5]       Acute Hypercapnic / Hypoxemic resp failure  Pt intubated

## 2019-07-06 NOTE — PROGRESS NOTES
Physical Therapy  Attempt Note    Patient Name: Ted Azul   Patient : 1960  MRN: [id number]   Room Number: V1G-6976/0017-74      I went to patient's bedside to attempt PT evaluation, however patient was out of room for dialysis. Will attempt PT eval again later today as schedule permits.         Leyla Snell, PT

## 2019-07-06 NOTE — PROGRESS NOTES
Inhalation Q4H    famotidine (PEPCID) injection  20 mg Intravenous Daily       Continuous Infusions:   norepinephrine Stopped (19 0915)    amiodarone 0.5 mg/min (19)    sodium chloride 5 mL/hr at 19 1811    dextrose         PRN Meds:  albumin human, heparin (porcine), metoprolol, fentanNYL, midazolam, perflutren lipid microspheres, lubrifresh P.M., sodium chloride flush, albuterol, sodium chloride flush, ondansetron, acetaminophen, glucose, dextrose, glucagon (rDNA), dextrose    Labs:  CBC:   Recent Labs     19  0619  1545 19  0619  0550   WBC 15.4*  --  23.3* 35.7*   HGB 6.7* 8.1* 8.5* 7.9*   HCT 21.6* 25.1* 26.6* 24.7*   MCV 85.2  --  84.5 85.0   PLT 88*  --  115* 123*     BMP:   Recent Labs     19  0619  0619  0550   * 137 135*   K 3.9 4.1 4.5   CL 99 96* 99   CO2 23 31 24   PHOS 3.0 2.7 2.1*   BUN 23* 22* 40*   CREATININE 1.5* 1.5* 1.5*     LIVER PROFILE:   Recent Labs     19  0619  0620 19  0550   AST 38* 37 32   * 121* 84*   BILIDIR 1.0* 1.1* 1.2*   BILITOT 1.5* 1.8* 1.7*   ALKPHOS 108 123 119     PT/INR: No results for input(s): PROTIME, INR in the last 72 hours. APTT: No results for input(s): APTT in the last 72 hours. UA:No results for input(s): NITRITE, COLORU, PHUR, LABCAST, WBCUA, RBCUA, MUCUS, TRICHOMONAS, YEAST, BACTERIA, CLARITYU, SPECGRAV, LEUKOCYTESUR, UROBILINOGEN, BILIRUBINUR, BLOODU, GLUCOSEU, AMORPHOUS in the last 72 hours. Invalid input(s): Mearl Bone  No results for input(s): PH, PCO2, PO2 in the last 72 hours.         Films:  Chest imaging reports were reviewed and imaging was reviewed by me and showed HD cath, RLL airspace disease with possible effusion    VB.36    Cultures:  Negative to date    I reviewed the labs and images listed above    Assessment:   · Acute Hypoxic Respiratory Failure due to hypoxia, less than 90% on room air  · Possible HCAP  · Pulmonary Edema  · Cardiomyopathy with EF 30-35%  · REJI with RRT.   Possible cardio-renal??  · Ileus vs SBO and/or colitis   · Anemia, ?slow blood loss with dilution on top of CKD  · Dysphagia       Plan:  Titrate oxygen for saturations greater than or equal to 90%  · BIPAP prn and at night  · Duonebs q 4 hours  · EZPAP q 8 (if off bipap)  · Vanco/Merrem for presumed HCAP  · Repeat cultures process   · Trend procalcitonin level  · PT/OT  · Replace magnesium   · DVT prophylaxis  · Speech to follow today      DO RENO [facility]

## 2019-07-06 NOTE — PROGRESS NOTES
0700: HD being completed @ bedside   1200: Pt educated on NPO status. Still refusing NG @ this time. Awaiting follow up swallow eval  1800: Pt resting with eyes closed @ this time. Sats 96% on 4L NC. Call light within reach.  Will monitor  Electronically signed by Raul Shelby RN on 7/6/2019 at 6:14 PM

## 2019-07-06 NOTE — PROGRESS NOTES
4 Eyes Skin Assessment     The patient is being assess for  Shift Handoff    I agree that 2 RN's have performed a thorough Head to Toe Skin Assessment on the patient. ALL assessment sites listed below have been assessed. Areas assessed by both nurses:   [x]   Head, Face, and Ears   [x]   Shoulders, Back, and Chest  [x]   Arms, Elbows, and Hands   [x]   Coccyx, Sacrum, and IschIum  [x]   Legs, Feet, and Heels        Does the Patient have Skin Breakdown?   No         Carlos Alberto Prevention initiated:  Yes   Wound Care Orders initiated:  NA      [facility] nurse consulted for Pressure Injury (Stage 3,4, Unstageable, DTI, NWPT, and Complex wounds), New and Established Ostomies:  NA      Nurse 1 eSignature: Electronically signed by Ana Young RN on 7/6/19 at 6:14 PM    **SHARE this note so that the co-signing nurse is able to place an eSignature**    Nurse 2 eSignature: Electronically signed by Elidia Gallagher RN on 7/7/19 at 7:09 AM

## 2019-07-06 NOTE — CONSULTS
Clinical Pharmacy Note  Vancomycin Consult    Doris Burkitt is a 61 y.o. male ordered Vancomycin for sepsis / pneumonia; consult received from Dr. Talat Hernandez to manage therapy. Also receiving Meropenem.     Patient Active Problem List   Diagnosis    Arthritis    Diabetes mellitus with hyperglycemia (HCC)    HBP (high blood pressure)    Hyperlipidemia    COPD (chronic obstructive pulmonary disease) (HCC)    Viral hepatitis C    Back pain    PAD (peripheral artery disease) ([address]    Spinal stenosis of lumbar region    Tobacco use    Atherosclerosis of native artery of left lower extremity with intermittent claudication (HCC)    Chest pain    Pleural effusion due to CHF (congestive heart failure) (HCC)    Pleural effusion, right    Alcohol abuse, continuous    Tachycardia    Atrial fibrillation with RVR (HCC)    Hyponatremia    Congestive heart failure (HCC)    REJI (acute kidney injury) ([address]    Hypokalemia    Coronary artery disease involving autologous artery coronary bypass graft with angina pectoris ([address]    Essential hypertension    Chest pain of uncertain etiology    Acute on chronic combined systolic and diastolic HF (heart failure) (HCC)    Acute hyperkalemia    Typical atrial flutter (HCC)    Chronic systolic CHF (congestive heart failure), NYHA class 3 (HCC)    Hypotension    Vasovagal syncope    Laceration of scalp without foreign body    Cardiomyopathy ([address]    Syncope and collapse    Ischemic foot    Diabetes mellitus with peripheral circulatory disorder (HCC)    Limb ischemia    COPD exacerbation (HCC)    Nonhealing surgical wound    Acromioclavicular joint arthritis    Bradycardia    Shortness of breath    Permanent atrial fibrillation (HCC)    Atrial fibrillation, chronic (HCC)    Other specified injury of inferior mesenteric vein, initial encounter    Postprocedural hypotension    Acute respiratory failure with hypercapnia (HCC)    High anion gap metabolic

## 2019-07-07 LAB
ALBUMIN SERPL-MCNC: 3.1 G/DL (ref 3.4–5)
ALP BLD-CCNC: 124 U/L (ref 40–129)
ALT SERPL-CCNC: 44 U/L (ref 10–40)
ANION GAP SERPL CALCULATED.3IONS-SCNC: 12 MMOL/L (ref 3–16)
AST SERPL-CCNC: 26 U/L (ref 15–37)
BASOPHILS ABSOLUTE: 0.1 K/UL (ref 0–0.2)
BASOPHILS RELATIVE PERCENT: 0.4 %
BILIRUB SERPL-MCNC: 1.1 MG/DL (ref 0–1)
BILIRUBIN DIRECT: 0.7 MG/DL (ref 0–0.3)
BILIRUBIN, INDIRECT: 0.4 MG/DL (ref 0–1)
BUN BLDV-MCNC: 25 MG/DL (ref 7–20)
CALCIUM IONIZED: 1.11 MMOL/L (ref 1.12–1.32)
CALCIUM SERPL-MCNC: 8.5 MG/DL (ref 8.3–10.6)
CHLORIDE BLD-SCNC: 97 MMOL/L (ref 99–110)
CO2: 29 MMOL/L (ref 21–32)
CREAT SERPL-MCNC: 1.6 MG/DL (ref 0.9–1.3)
EOSINOPHILS ABSOLUTE: 0.1 K/UL (ref 0–0.6)
EOSINOPHILS RELATIVE PERCENT: 0.6 %
GFR AFRICAN AMERICAN: 54
GFR NON-AFRICAN AMERICAN: 44
GLUCOSE BLD-MCNC: 104 MG/DL (ref 70–99)
GLUCOSE BLD-MCNC: 108 MG/DL (ref 70–99)
GLUCOSE BLD-MCNC: 108 MG/DL (ref 70–99)
GLUCOSE BLD-MCNC: 110 MG/DL (ref 70–99)
GLUCOSE BLD-MCNC: 112 MG/DL (ref 70–99)
GLUCOSE BLD-MCNC: 121 MG/DL (ref 70–99)
GLUCOSE BLD-MCNC: 158 MG/DL (ref 70–99)
HCT VFR BLD CALC: 24.4 % (ref 40.5–52.5)
HEMOGLOBIN: 7.8 G/DL (ref 13.5–17.5)
LYMPHOCYTES ABSOLUTE: 1.1 K/UL (ref 1–5.1)
LYMPHOCYTES RELATIVE PERCENT: 5.2 %
MAGNESIUM: 2.3 MG/DL (ref 1.8–2.4)
MCH RBC QN AUTO: 27.4 PG (ref 26–34)
MCHC RBC AUTO-ENTMCNC: 32.2 G/DL (ref 31–36)
MCV RBC AUTO: 85 FL (ref 80–100)
MONOCYTES ABSOLUTE: 1.8 K/UL (ref 0–1.3)
MONOCYTES RELATIVE PERCENT: 8.5 %
NEUTROPHILS ABSOLUTE: 18.5 K/UL (ref 1.7–7.7)
NEUTROPHILS RELATIVE PERCENT: 85.3 %
PDW BLD-RTO: 17.5 % (ref 12.4–15.4)
PERFORMED ON: ABNORMAL
PH VENOUS: 7.36 (ref 7.35–7.45)
PHOSPHORUS: 2.1 MG/DL (ref 2.5–4.9)
PLATELET # BLD: 152 K/UL (ref 135–450)
PMV BLD AUTO: 8.8 FL (ref 5–10.5)
POTASSIUM SERPL-SCNC: 3.6 MMOL/L (ref 3.5–5.1)
RBC # BLD: 2.86 M/UL (ref 4.2–5.9)
SODIUM BLD-SCNC: 138 MMOL/L (ref 136–145)
TOTAL PROTEIN: 7 G/DL (ref 6.4–8.2)
VANCOMYCIN RANDOM: 6.6 UG/ML
WBC # BLD: 21.6 K/UL (ref 4–11)

## 2019-07-07 PROCEDURE — 80069 RENAL FUNCTION PANEL: CPT

## 2019-07-07 PROCEDURE — 2500000003 HC RX 250 WO HCPCS: Performed by: INTERNAL MEDICINE

## 2019-07-07 PROCEDURE — 82330 ASSAY OF CALCIUM: CPT

## 2019-07-07 PROCEDURE — 92526 ORAL FUNCTION THERAPY: CPT

## 2019-07-07 PROCEDURE — 2500000003 HC RX 250 WO HCPCS: Performed by: NURSE PRACTITIONER

## 2019-07-07 PROCEDURE — 83735 ASSAY OF MAGNESIUM: CPT

## 2019-07-07 PROCEDURE — 6370000000 HC RX 637 (ALT 250 FOR IP): Performed by: INTERNAL MEDICINE

## 2019-07-07 PROCEDURE — 80076 HEPATIC FUNCTION PANEL: CPT

## 2019-07-07 PROCEDURE — 97162 PT EVAL MOD COMPLEX 30 MIN: CPT

## 2019-07-07 PROCEDURE — 94668 MNPJ CHEST WALL SBSQ: CPT

## 2019-07-07 PROCEDURE — 2580000003 HC RX 258: Performed by: NURSE PRACTITIONER

## 2019-07-07 PROCEDURE — 99232 SBSQ HOSP IP/OBS MODERATE 35: CPT | Performed by: NURSE PRACTITIONER

## 2019-07-07 PROCEDURE — 2580000003 HC RX 258: Performed by: INTERNAL MEDICINE

## 2019-07-07 PROCEDURE — 6370000000 HC RX 637 (ALT 250 FOR IP): Performed by: NURSE PRACTITIONER

## 2019-07-07 PROCEDURE — 94761 N-INVAS EAR/PLS OXIMETRY MLT: CPT

## 2019-07-07 PROCEDURE — 6360000002 HC RX W HCPCS: Performed by: INTERNAL MEDICINE

## 2019-07-07 PROCEDURE — 97530 THERAPEUTIC ACTIVITIES: CPT

## 2019-07-07 PROCEDURE — 85025 COMPLETE CBC W/AUTO DIFF WBC: CPT

## 2019-07-07 PROCEDURE — 2700000000 HC OXYGEN THERAPY PER DAY

## 2019-07-07 PROCEDURE — 2000000000 HC ICU R&B

## 2019-07-07 PROCEDURE — 94640 AIRWAY INHALATION TREATMENT: CPT

## 2019-07-07 PROCEDURE — 99233 SBSQ HOSP IP/OBS HIGH 50: CPT | Performed by: INTERNAL MEDICINE

## 2019-07-07 PROCEDURE — 6360000002 HC RX W HCPCS: Performed by: NURSE PRACTITIONER

## 2019-07-07 PROCEDURE — 80202 ASSAY OF VANCOMYCIN: CPT

## 2019-07-07 PROCEDURE — 94669 MECHANICAL CHEST WALL OSCILL: CPT

## 2019-07-07 RX ADMIN — METOPROLOL TARTRATE 5 MG: 5 INJECTION INTRAVENOUS at 14:56

## 2019-07-07 RX ADMIN — Medication 10 ML: at 07:53

## 2019-07-07 RX ADMIN — MOMETASONE FUROATE AND FORMOTEROL FUMARATE DIHYDRATE 2 PUFF: 100; 5 AEROSOL RESPIRATORY (INHALATION) at 19:56

## 2019-07-07 RX ADMIN — METOPROLOL TARTRATE 5 MG: 5 INJECTION INTRAVENOUS at 07:50

## 2019-07-07 RX ADMIN — Medication 10 ML: at 20:39

## 2019-07-07 RX ADMIN — IPRATROPIUM BROMIDE AND ALBUTEROL SULFATE 1 AMPULE: .5; 3 SOLUTION RESPIRATORY (INHALATION) at 08:18

## 2019-07-07 RX ADMIN — HEPARIN SODIUM 5000 UNITS: 5000 INJECTION INTRAVENOUS; SUBCUTANEOUS at 14:56

## 2019-07-07 RX ADMIN — MOMETASONE FUROATE AND FORMOTEROL FUMARATE DIHYDRATE 2 PUFF: 100; 5 AEROSOL RESPIRATORY (INHALATION) at 08:18

## 2019-07-07 RX ADMIN — INSULIN LISPRO 3 UNITS: 100 INJECTION, SOLUTION INTRAVENOUS; SUBCUTANEOUS at 12:51

## 2019-07-07 RX ADMIN — FAMOTIDINE 20 MG: 10 INJECTION, SOLUTION INTRAVENOUS at 07:50

## 2019-07-07 RX ADMIN — AMIODARONE HYDROCHLORIDE 0.5 MG/MIN: 50 INJECTION, SOLUTION INTRAVENOUS at 20:37

## 2019-07-07 RX ADMIN — Medication 10 ML: at 20:40

## 2019-07-07 RX ADMIN — HEPARIN SODIUM 5000 UNITS: 5000 INJECTION INTRAVENOUS; SUBCUTANEOUS at 05:38

## 2019-07-07 RX ADMIN — POTASSIUM PHOSPHATE, MONOBASIC AND POTASSIUM PHOSPHATE, DIBASIC 20 MMOL: 224; 236 INJECTION, SOLUTION INTRAVENOUS at 08:13

## 2019-07-07 RX ADMIN — Medication 1250 MG: at 09:07

## 2019-07-07 RX ADMIN — INSULIN GLARGINE 10 UNITS: 100 INJECTION, SOLUTION SUBCUTANEOUS at 20:38

## 2019-07-07 RX ADMIN — IPRATROPIUM BROMIDE AND ALBUTEROL SULFATE 1 AMPULE: .5; 3 SOLUTION RESPIRATORY (INHALATION) at 19:56

## 2019-07-07 RX ADMIN — AMIODARONE HYDROCHLORIDE 0.5 MG/MIN: 50 INJECTION, SOLUTION INTRAVENOUS at 04:28

## 2019-07-07 RX ADMIN — HEPARIN SODIUM 5000 UNITS: 5000 INJECTION INTRAVENOUS; SUBCUTANEOUS at 20:38

## 2019-07-07 RX ADMIN — IPRATROPIUM BROMIDE AND ALBUTEROL SULFATE 1 AMPULE: .5; 3 SOLUTION RESPIRATORY (INHALATION) at 00:20

## 2019-07-07 RX ADMIN — IPRATROPIUM BROMIDE AND ALBUTEROL SULFATE 1 AMPULE: .5; 3 SOLUTION RESPIRATORY (INHALATION) at 04:07

## 2019-07-07 RX ADMIN — IPRATROPIUM BROMIDE AND ALBUTEROL SULFATE 1 AMPULE: .5; 3 SOLUTION RESPIRATORY (INHALATION) at 11:39

## 2019-07-07 RX ADMIN — IPRATROPIUM BROMIDE AND ALBUTEROL SULFATE 1 AMPULE: .5; 3 SOLUTION RESPIRATORY (INHALATION) at 15:41

## 2019-07-07 RX ADMIN — MEROPENEM 500 MG: 500 INJECTION INTRAVENOUS at 07:50

## 2019-07-07 ASSESSMENT — PAIN SCALES - GENERAL
PAINLEVEL_OUTOF10: 0

## 2019-07-07 NOTE — PROGRESS NOTES
4 Eyes Skin Assessment     The patient is being assess for  Shift Handoff    I agree that 2 RN's have performed a thorough Head to Toe Skin Assessment on the patient. ALL assessment sites listed below have been assessed. Areas assessed by both nurses: Ruben/Elizabeth  [x]   Head, Face, and Ears   [x]   Shoulders, Back, and Chest  [x]   Arms, Elbows, and Hands   [x]   Coccyx, Sacrum, and IschIum  [x]   Legs, Feet, and Heels        Does the Patient have Skin Breakdown?   No         Carlos Alberto Prevention initiated:  Yes   Wound Care Orders initiated:  No      [facility] nurse consulted for Pressure Injury (Stage 3,4, Unstageable, DTI, NWPT, and Complex wounds), New and Established Ostomies:  No      Nurse 1 eSignature: Electronically signed by Yury Mejia RN on 7/7/19 at 7:09 AM    **SHARE this note so that the co-signing nurse is able to place an eSignature**    Nurse 2 eSignature: Electronically signed by Raul Shelby RN on 7/7/19 at 9:24 AM

## 2019-07-07 NOTE — PROGRESS NOTES
[facility]   Speech Therapy  Daily Dysphagia Treatment Note        Omayra Linares  AGE: 61 y.o.    GENDER: male  : 1960  [id number]  EPISODE DATE:  2019  Patient Active Problem List   Diagnosis    Arthritis    Diabetes mellitus with hyperglycemia (HCC)    HBP (high blood pressure)    Hyperlipidemia    COPD (chronic obstructive pulmonary disease) (HCC)    Viral hepatitis C    Back pain    PAD (peripheral artery disease) ([address]    Spinal stenosis of lumbar region    Tobacco use    Atherosclerosis of native artery of left lower extremity with intermittent claudication (HCC)    Chest pain    Pleural effusion due to CHF (congestive heart failure) ([facility])    Pleural effusion, right    Alcohol abuse, continuous    Tachycardia    Atrial fibrillation with RVR (HCC)    Hyponatremia    Congestive heart failure (HCC)    REJI (acute kidney injury) ([address]    Hypokalemia    Coronary artery disease involving autologous artery coronary bypass graft with angina pectoris ([address]    Essential hypertension    Chest pain of uncertain etiology    Acute on chronic combined systolic and diastolic HF (heart failure) (HCC)    Acute hyperkalemia    Typical atrial flutter (HCC)    Chronic systolic CHF (congestive heart failure), NYHA class 3 (HCC)    Hypotension    Vasovagal syncope    Laceration of scalp without foreign body    Cardiomyopathy ([address]    Syncope and collapse    Ischemic foot    Diabetes mellitus with peripheral circulatory disorder (HCC)    Limb ischemia    COPD exacerbation (HCC)    Nonhealing surgical wound    Acromioclavicular joint arthritis    Bradycardia    Shortness of breath    Permanent atrial fibrillation (HCC)    Atrial fibrillation, chronic (HCC)    Other specified injury of inferior mesenteric vein, initial encounter    Postprocedural hypotension    Acute respiratory failure with hypercapnia (HCC)    High anion gap metabolic acidosis    cough in 2/3 trials. · Soft food--N/A  · Regular food--N/A    Dysphagia Tx: The focus of this session was to determine if pt could begin a p.o diet. Goals:   Dysphagia Goals:   1. The patient will improve oral preparation phase via bolus control/manipulation exercises to 5/5 each trial.--not addressed  2. The patient will tolerate repeat BSE when able. --The pt is very anxious to eat and has very poor insight into his dysphagia. Pt continues to have severe pharyngeal dysphagia for all p.o trials. An MBS is rec'd prior to initiating a p.o diet. 3.(The patient will improve pharyngela phase via pharyngolaryngela exercises to 5/5 each.)--not addressed. Assessment:   Impressions:   Dysphagia Diagnosis: Moderate oral stage dysphagia, Severe pharyngeal stage dysphagia     Diet Recommendations:  Con't NPO. Recommended Form of Meds: Via alternative means of nutrition    Strategies:        Education:  Consulted and agree with results and recommendations: Patient, RN  Patient Education: Cojmpleted on recommendations/plan  Patient Education Response: Needs reinforcement    Prognosis for Dysphagia:   Guarded    Plan:     Continue Dysphagia Therapy: Yes  Interventions: Therapeutic Interventions: Therapeutic PO trials with SLP, Patient/Family education, Bolus control exercises, Laryngeal exercises, Pharyngeal exercises  Duration/Frequency of therapy while on unit: Duration/Frequency of Treatment  Duration/Frequency of Treatment: ST to tx 3-5 times per week for dysphaiga during acute admission  Discharge Instructions:   Anticipate Yes_x___No__ for further skilled Speech Therapy for Dysphagia at discharge    This note serves as a D/C Summary in the event that this patient is discharged prior to the next therapy session.     Coded treatment time:   Total treatment time: 30 minutes    Electronically signed by Nuria Guzmán M.A./[facility]-SLP #[id number] on 7/7/2019 at 2:30 PM

## 2019-07-07 NOTE — PROGRESS NOTES
hypertension. 5. Mediastinal adenopathy, probably reactive given the relatively rapid   development. 6. Suspected acute nondisplaced bilateral anterior rib fractures without   pneumothorax. XR CHEST PORTABLE   Final Result   1. Endotracheal tube tip projects over the trachea, tip at the level of the   aortic knob, 3.7 cm superior to the erna. 2. Pulmonary edema evident. 3. Cardiomegaly. 4. No pneumothorax. 5. Nonspecific prominence of the azygos vein region may be related vascular   engorgement, adenopathy or underlying mass lesion.          XR CHEST PORTABLE   Final Result   Questionable right basilar atelectasis or pneumonia on a limited portable   exam.         Fluoroscopy modified barium swallow with video    (Results Pending)           Assessment/Plan:    Active Hospital Problems    Diagnosis    Atrial fibrillation, chronic ([facility]) [I48.2]     Priority: High    PEA (Pulseless electrical activity) ([facility]) [I46.9]    Ileus ([facility]) [K56.7]    Respiratory failure ([address] [J96.90]    HCAP (healthcare-associated pneumonia) [J18.9]    Ventricular tachycardia ([facility]) [I47.2]    Shock circulatory ([address] [R57.9]    Tachypnea [H70.79]    Neutrophilic leukocytosis [F96.0]    Chronic respiratory failure with hypoxia ([facility]) [J96.11]    Cardiac arrest ([address] [I46.9]    Acute on chronic systolic HF (heart failure) ([facility]) [I50.23]    Acute pulmonary edema ([facility]) [J81.0]    CAD (coronary artery disease) [I25.10]    Morbid obesity due to excess calories ([address] [E66.01]    Acute renal failure (ARF) ([facility]) [N17.9]    Severe sepsis ([facility]) [A41.9, R65.20]    DMII (diabetes mellitus, type 2) ([address] [E11.9]    COPD exacerbation ([facility]) [J44.1]    Cardiomyopathy ([address] [I42.9]    Hypotension [I95.9]    Chronic systolic CHF (congestive heart failure), NYHA class 3 ([facility]) [I50.22]    Acute hyperkalemia [E87.5]    Essential hypertension [I10]    Alcohol abuse, continuous [F10.10]    Tachycardia [R00.0]   

## 2019-07-07 NOTE — PROGRESS NOTES
acidosis    Centrilobular emphysema (HCC)    Hypomagnesemia    Heart failure, acute on chronic, systolic and diastolic (HCC)    Persistent atrial fibrillation (HCC)    Anemia    DMII (diabetes mellitus, type 2) (HCC)    Constipation    Hypokalemia    Acute on chronic systolic heart failure (HCC)    Atrial fibrillation, rapid (HCC)    COPD with acute exacerbation (HCC)    Cocaine use    Severe sepsis (HCC)    Atrial fibrillation with RVR (HCC)    Hyponatremia    Acute on chronic respiratory failure with hypoxia (HCC)    Respiratory distress    CHF, left ventricular (HCC)    Nicotine dependence    Suspected sleep apnea    Coronary artery disease involving native coronary artery of native heart without angina pectoris    CAD (coronary artery disease)    Acute renal failure (ARF) (HCC)    Morbid obesity due to excess calories (HCC)    Acute respiratory failure with hypoxia (HCC)    Nocturnal hypoxia    Hypercapnemia    SOB (shortness of breath)    Acute on chronic respiratory failure with hypercapnia (HCC)    Chronic respiratory failure with hypercapnia (HCC)    Acute pulmonary edema (HCC)    Acute on chronic systolic HF (heart failure) (HCC)    Hx of AKA (above knee amputation), left (HCC)    Respiratory failure (HCC)    HCAP (healthcare-associated pneumonia)    Ventricular tachycardia (HCC)    Shock circulatory (HCC)    Tachypnea    Neutrophilic leukocytosis    Chronic respiratory failure with hypoxia (HCC)    Cardiac arrest (HCC)    PEA (Pulseless electrical activity) (HCC)    Ileus (HCC)       Allergies:  Rocephin [ceftriaxone]     Temp max:  Temp (24hrs), Av.7 °F (37.1 °C), Min:98.1 °F (36.7 °C), Max:99.4 °F (37.4 °C)      Recent Labs     19  0550 19  0544 19  0548   WBC 35.7* 25.2* 21.6*       Recent Labs     19  0550 19  0544 19  0548   BUN 40* 28* 25*   CREATININE 1.5* 1.0 1.6*         Intake/Output Summary (Last 24 hours) at 7/7/2019 0805  Last data filed at 7/7/2019 0752  Gross per 24 hour   Intake 1133 ml   Output 4312 ml   Net -3179 ml       Culture Results:  Repeat Blood Cultures (07/05) - NGTD    Ht Readings from Last 1 Encounters:   07/05/19 5' 7\" (1.702 m)        Wt Readings from Last 1 Encounters:   07/07/19 205 lb 0.4 oz (93 kg)         Estimated Creatinine Clearance: 54 mL/min (A) (based on SCr of 1.6 mg/dL (H)). Assessment/Plan:  Random Vancomycin level = 6.6 ug/mL this AM.  Patient is oliguric and is requiring intermittent hemodialysis. Will order Vancomycin 1,250 mg IVPB x 1 dose today in order to get his level therapeutic. Will obtain a random Vancomycin level with AM labs on 07/08/19 with anticipation of next hemodialysis session. Thank you for the consult. Will continue to follow.     Toshia Cedeno, PharmD, BCPS  7/7/2019 8:05 AM

## 2019-07-07 NOTE — PROGRESS NOTES
mg Intravenous Once    meropenem  500 mg Intravenous Q24H    vancomycin (VANCOCIN) intermittent dosing (placeholder)   Other RX Placeholder    polyethylene glycol  17 g Oral Daily    metoprolol  5 mg Intravenous Q6H    lactobacillus  2 capsule Oral BID WC    heparin (porcine)  5,000 Units Subcutaneous 3 times per day    sodium chloride flush  10 mL Intravenous 2 times per day    darbepoetin vinay-polysorbate  60 mcg Subcutaneous Weekly - Thursday    insulin glargine  10 Units Subcutaneous Nightly    gabapentin  50 mg Oral 3 times per day    insulin lispro  0-18 Units Subcutaneous Q4H    mometasone-formoterol  2 puff Inhalation BID    sodium chloride flush  10 mL Intravenous 2 times per day    ipratropium-albuterol  1 ampule Inhalation Q4H    famotidine (PEPCID) injection  20 mg Intravenous Daily      norepinephrine Stopped (07/04/19 0915)    amiodarone 0.5 mg/min (07/07/19 0428)    sodium chloride 5 mL/hr at 06/28/19 1811    dextrose       albumin human, heparin (porcine), metoprolol, fentanNYL, midazolam, perflutren lipid microspheres, lubrifresh P.M., sodium chloride flush, albuterol, sodium chloride flush, ondansetron, acetaminophen, glucose, dextrose, glucagon (rDNA), dextrose    Lab Data:  CBC:   Recent Labs     07/05/19  0550 07/06/19  0544 07/07/19  0548   WBC 35.7* 25.2* 21.6*   HGB 7.9* 7.4* 7.8*   * 113* 152     BMP:    Recent Labs     07/05/19  0550 07/06/19  0544 07/07/19  0548   * 135* 138   K 4.5 3.7 3.6   CO2 24 26 29   BUN 40* 28* 25*   CREATININE 1.5* 1.0 1.6*     LIVR:   Recent Labs     07/05/19  0550 07/06/19  0544 07/07/19  0548   AST 32 24 26   ALT 84* 54* 44*     7/4/2019 CXR:  Significant worsening in the appearance of the chest with significant   increase in the size of the right basilar pneumonia.  Probable small right   pleural effusion.  Slight vascular congestion.          7/1/2019 CT abdomen and pelvis:  Pneumatosis of the splenic flexure of the colon. 7.8 today  -transfused previously  -on Aranesp     6.  S/P PEA arrest with VT (early in admission)  -no recurrent VT  -BP has remained stable      Electronically signed by JOON Godinez - CNP on 7/7/2019 at 10:23 AM

## 2019-07-08 ENCOUNTER — APPOINTMENT (OUTPATIENT)
Dept: GENERAL RADIOLOGY | Age: 59
DRG: 720 | End: 2019-07-08
Payer: COMMERCIAL

## 2019-07-08 LAB
ALBUMIN SERPL-MCNC: 3 G/DL (ref 3.4–5)
ALP BLD-CCNC: 106 U/L (ref 40–129)
ALT SERPL-CCNC: 33 U/L (ref 10–40)
ANION GAP SERPL CALCULATED.3IONS-SCNC: 11 MMOL/L (ref 3–16)
AST SERPL-CCNC: 26 U/L (ref 15–37)
BASOPHILS ABSOLUTE: 0.1 K/UL (ref 0–0.2)
BASOPHILS RELATIVE PERCENT: 0.9 %
BILIRUB SERPL-MCNC: 0.8 MG/DL (ref 0–1)
BILIRUBIN DIRECT: 0.5 MG/DL (ref 0–0.3)
BILIRUBIN, INDIRECT: 0.3 MG/DL (ref 0–1)
BUN BLDV-MCNC: 30 MG/DL (ref 7–20)
CALCIUM SERPL-MCNC: 8.7 MG/DL (ref 8.3–10.6)
CHLORIDE BLD-SCNC: 97 MMOL/L (ref 99–110)
CO2: 28 MMOL/L (ref 21–32)
CREAT SERPL-MCNC: 1.6 MG/DL (ref 0.9–1.3)
EOSINOPHILS ABSOLUTE: 0.1 K/UL (ref 0–0.6)
EOSINOPHILS RELATIVE PERCENT: 0.6 %
GFR AFRICAN AMERICAN: 54
GFR NON-AFRICAN AMERICAN: 44
GLUCOSE BLD-MCNC: 113 MG/DL (ref 70–99)
GLUCOSE BLD-MCNC: 149 MG/DL (ref 70–99)
GLUCOSE BLD-MCNC: 167 MG/DL (ref 70–99)
GLUCOSE BLD-MCNC: 85 MG/DL (ref 70–99)
GLUCOSE BLD-MCNC: 88 MG/DL (ref 70–99)
GLUCOSE BLD-MCNC: 94 MG/DL (ref 70–99)
GLUCOSE BLD-MCNC: 96 MG/DL (ref 70–99)
HCT VFR BLD CALC: 24 % (ref 40.5–52.5)
HEMOGLOBIN: 7.6 G/DL (ref 13.5–17.5)
LYMPHOCYTES ABSOLUTE: 1.2 K/UL (ref 1–5.1)
LYMPHOCYTES RELATIVE PERCENT: 9.3 %
MAGNESIUM: 1.8 MG/DL (ref 1.8–2.4)
MCH RBC QN AUTO: 26.7 PG (ref 26–34)
MCHC RBC AUTO-ENTMCNC: 31.9 G/DL (ref 31–36)
MCV RBC AUTO: 83.7 FL (ref 80–100)
MONOCYTES ABSOLUTE: 1.5 K/UL (ref 0–1.3)
MONOCYTES RELATIVE PERCENT: 11.6 %
NEUTROPHILS ABSOLUTE: 10 K/UL (ref 1.7–7.7)
NEUTROPHILS RELATIVE PERCENT: 77.6 %
PDW BLD-RTO: 18.1 % (ref 12.4–15.4)
PERFORMED ON: ABNORMAL
PERFORMED ON: NORMAL
PHOSPHORUS: 4 MG/DL (ref 2.5–4.9)
PLATELET # BLD: 200 K/UL (ref 135–450)
PMV BLD AUTO: 9.2 FL (ref 5–10.5)
POTASSIUM SERPL-SCNC: 3.5 MMOL/L (ref 3.5–5.1)
RBC # BLD: 2.86 M/UL (ref 4.2–5.9)
SODIUM BLD-SCNC: 136 MMOL/L (ref 136–145)
TOTAL PROTEIN: 6.5 G/DL (ref 6.4–8.2)
VANCOMYCIN RANDOM: 11.8 UG/ML
WBC # BLD: 12.9 K/UL (ref 4–11)

## 2019-07-08 PROCEDURE — 80202 ASSAY OF VANCOMYCIN: CPT

## 2019-07-08 PROCEDURE — 2500000003 HC RX 250 WO HCPCS: Performed by: INTERNAL MEDICINE

## 2019-07-08 PROCEDURE — 97530 THERAPEUTIC ACTIVITIES: CPT

## 2019-07-08 PROCEDURE — 80069 RENAL FUNCTION PANEL: CPT

## 2019-07-08 PROCEDURE — 2580000003 HC RX 258: Performed by: NURSE PRACTITIONER

## 2019-07-08 PROCEDURE — 2580000003 HC RX 258: Performed by: INTERNAL MEDICINE

## 2019-07-08 PROCEDURE — 2700000000 HC OXYGEN THERAPY PER DAY

## 2019-07-08 PROCEDURE — 6370000000 HC RX 637 (ALT 250 FOR IP): Performed by: NURSE PRACTITIONER

## 2019-07-08 PROCEDURE — 94669 MECHANICAL CHEST WALL OSCILL: CPT

## 2019-07-08 PROCEDURE — 80076 HEPATIC FUNCTION PANEL: CPT

## 2019-07-08 PROCEDURE — 36592 COLLECT BLOOD FROM PICC: CPT

## 2019-07-08 PROCEDURE — 6360000002 HC RX W HCPCS: Performed by: NURSE PRACTITIONER

## 2019-07-08 PROCEDURE — 97166 OT EVAL MOD COMPLEX 45 MIN: CPT

## 2019-07-08 PROCEDURE — 6370000000 HC RX 637 (ALT 250 FOR IP): Performed by: INTERNAL MEDICINE

## 2019-07-08 PROCEDURE — 94760 N-INVAS EAR/PLS OXIMETRY 1: CPT

## 2019-07-08 PROCEDURE — 99232 SBSQ HOSP IP/OBS MODERATE 35: CPT | Performed by: NURSE PRACTITIONER

## 2019-07-08 PROCEDURE — 6360000002 HC RX W HCPCS: Performed by: INTERNAL MEDICINE

## 2019-07-08 PROCEDURE — 90935 HEMODIALYSIS ONE EVALUATION: CPT

## 2019-07-08 PROCEDURE — 2500000003 HC RX 250 WO HCPCS: Performed by: NURSE PRACTITIONER

## 2019-07-08 PROCEDURE — 2060000000 HC ICU INTERMEDIATE R&B

## 2019-07-08 PROCEDURE — 99232 SBSQ HOSP IP/OBS MODERATE 35: CPT | Performed by: INTERNAL MEDICINE

## 2019-07-08 PROCEDURE — 94660 CPAP INITIATION&MGMT: CPT

## 2019-07-08 PROCEDURE — 92526 ORAL FUNCTION THERAPY: CPT

## 2019-07-08 PROCEDURE — 85025 COMPLETE CBC W/AUTO DIFF WBC: CPT

## 2019-07-08 PROCEDURE — 74230 X-RAY XM SWLNG FUNCJ C+: CPT

## 2019-07-08 PROCEDURE — 92611 MOTION FLUOROSCOPY/SWALLOW: CPT

## 2019-07-08 PROCEDURE — 97535 SELF CARE MNGMENT TRAINING: CPT

## 2019-07-08 PROCEDURE — 94640 AIRWAY INHALATION TREATMENT: CPT

## 2019-07-08 PROCEDURE — APPNB15 APP NON BILLABLE TIME 0-15 MINS: Performed by: NURSE PRACTITIONER

## 2019-07-08 PROCEDURE — 94668 MNPJ CHEST WALL SBSQ: CPT

## 2019-07-08 PROCEDURE — 83735 ASSAY OF MAGNESIUM: CPT

## 2019-07-08 PROCEDURE — 94761 N-INVAS EAR/PLS OXIMETRY MLT: CPT

## 2019-07-08 RX ORDER — MAGNESIUM SULFATE IN WATER 40 MG/ML
2 INJECTION, SOLUTION INTRAVENOUS ONCE
Status: COMPLETED | OUTPATIENT
Start: 2019-07-08 | End: 2019-07-08

## 2019-07-08 RX ADMIN — HEPARIN SODIUM 5000 UNITS: 5000 INJECTION INTRAVENOUS; SUBCUTANEOUS at 05:34

## 2019-07-08 RX ADMIN — METOPROLOL TARTRATE 5 MG: 5 INJECTION INTRAVENOUS at 11:51

## 2019-07-08 RX ADMIN — MOMETASONE FUROATE AND FORMOTEROL FUMARATE DIHYDRATE 2 PUFF: 100; 5 AEROSOL RESPIRATORY (INHALATION) at 08:04

## 2019-07-08 RX ADMIN — IPRATROPIUM BROMIDE AND ALBUTEROL SULFATE 1 AMPULE: .5; 3 SOLUTION RESPIRATORY (INHALATION) at 00:43

## 2019-07-08 RX ADMIN — VANCOMYCIN HYDROCHLORIDE 1250 MG: 10 INJECTION, POWDER, LYOPHILIZED, FOR SOLUTION INTRAVENOUS at 13:12

## 2019-07-08 RX ADMIN — METOPROLOL TARTRATE 5 MG: 5 INJECTION INTRAVENOUS at 16:39

## 2019-07-08 RX ADMIN — Medication 2 CAPSULE: at 16:39

## 2019-07-08 RX ADMIN — Medication 10 ML: at 11:52

## 2019-07-08 RX ADMIN — IPRATROPIUM BROMIDE AND ALBUTEROL SULFATE 1 AMPULE: .5; 3 SOLUTION RESPIRATORY (INHALATION) at 23:30

## 2019-07-08 RX ADMIN — INSULIN LISPRO 3 UNITS: 100 INJECTION, SOLUTION INTRAVENOUS; SUBCUTANEOUS at 16:38

## 2019-07-08 RX ADMIN — IPRATROPIUM BROMIDE AND ALBUTEROL SULFATE 1 AMPULE: .5; 3 SOLUTION RESPIRATORY (INHALATION) at 21:00

## 2019-07-08 RX ADMIN — Medication 10 ML: at 22:45

## 2019-07-08 RX ADMIN — Medication 10 ML: at 09:02

## 2019-07-08 RX ADMIN — HEPARIN SODIUM 5000 UNITS: 5000 INJECTION INTRAVENOUS; SUBCUTANEOUS at 15:30

## 2019-07-08 RX ADMIN — MOMETASONE FUROATE AND FORMOTEROL FUMARATE DIHYDRATE 2 PUFF: 100; 5 AEROSOL RESPIRATORY (INHALATION) at 21:00

## 2019-07-08 RX ADMIN — IPRATROPIUM BROMIDE AND ALBUTEROL SULFATE 1 AMPULE: .5; 3 SOLUTION RESPIRATORY (INHALATION) at 11:29

## 2019-07-08 RX ADMIN — IPRATROPIUM BROMIDE AND ALBUTEROL SULFATE 1 AMPULE: .5; 3 SOLUTION RESPIRATORY (INHALATION) at 08:04

## 2019-07-08 RX ADMIN — HEPARIN SODIUM 5000 UNITS: 5000 INJECTION INTRAVENOUS; SUBCUTANEOUS at 22:51

## 2019-07-08 RX ADMIN — Medication 10 ML: at 22:57

## 2019-07-08 RX ADMIN — IPRATROPIUM BROMIDE AND ALBUTEROL SULFATE 1 AMPULE: .5; 3 SOLUTION RESPIRATORY (INHALATION) at 16:57

## 2019-07-08 RX ADMIN — IPRATROPIUM BROMIDE AND ALBUTEROL SULFATE 1 AMPULE: .5; 3 SOLUTION RESPIRATORY (INHALATION) at 04:31

## 2019-07-08 RX ADMIN — MAGNESIUM SULFATE HEPTAHYDRATE 2 G: 40 INJECTION, SOLUTION INTRAVENOUS at 09:02

## 2019-07-08 RX ADMIN — AMIODARONE HYDROCHLORIDE 0.5 MG/MIN: 50 INJECTION, SOLUTION INTRAVENOUS at 13:12

## 2019-07-08 RX ADMIN — MEROPENEM 500 MG: 500 INJECTION INTRAVENOUS at 11:50

## 2019-07-08 RX ADMIN — INSULIN GLARGINE 10 UNITS: 100 INJECTION, SOLUTION SUBCUTANEOUS at 22:45

## 2019-07-08 RX ADMIN — FAMOTIDINE 20 MG: 10 INJECTION, SOLUTION INTRAVENOUS at 11:50

## 2019-07-08 RX ADMIN — METOPROLOL TARTRATE 5 MG: 5 INJECTION INTRAVENOUS at 22:45

## 2019-07-08 RX ADMIN — Medication 50 MG: at 22:51

## 2019-07-08 ASSESSMENT — PAIN SCALES - GENERAL
PAINLEVEL_OUTOF10: 0

## 2019-07-08 NOTE — PROGRESS NOTES
4 Eyes Skin Assessment     The patient is being assess for  Shift Handoff    I agree that 2 RN's have performed a thorough Head to Toe Skin Assessment on the patient. ALL assessment sites listed below have been assessed. Areas assessed by both nurses: Ruben/Juana  [x]   Head, Face, and Ears   [x]   Shoulders, Back, and Chest  [x]   Arms, Elbows, and Hands   [x]   Coccyx, Sacrum, and IschIum  [x]   Legs, Feet, and Heels        Does the Patient have Skin Breakdown?   No         Carlos Alberto Prevention initiated:  Yes   Wound Care Orders initiated:  No      [facility] nurse consulted for Pressure Injury (Stage 3,4, Unstageable, DTI, NWPT, and Complex wounds), New and Established Ostomies:  No      Nurse 1 eSignature: Electronically signed by Taty Matamoros RN on 7/8/19 at 6:46 AM    **SHARE this note so that the co-signing nurse is able to place an eSignature**    Nurse 2 eSignature: Electronically signed by Linda Avery RN on 7/8/19 at 7:51 AM

## 2019-07-08 NOTE — PROGRESS NOTES
Bilateral airspace disease could represent atelectasis or pneumonia. 2. Small right pleural effusion. 3. Coronary artery disease. 4. Pulmonary hypertension. 5. Mediastinal adenopathy, probably reactive given the relatively rapid   development. 6. Suspected acute nondisplaced bilateral anterior rib fractures without   pneumothorax. XR CHEST PORTABLE   Final Result   1. Endotracheal tube tip projects over the trachea, tip at the level of the   aortic knob, 3.7 cm superior to the erna. 2. Pulmonary edema evident. 3. Cardiomegaly. 4. No pneumothorax. 5. Nonspecific prominence of the azygos vein region may be related vascular   engorgement, adenopathy or underlying mass lesion.          XR CHEST PORTABLE   Final Result   Questionable right basilar atelectasis or pneumonia on a limited portable   exam.         XR CHEST PORTABLE    (Results Pending)           Assessment/Plan:    Active Hospital Problems    Diagnosis    Chronic atrial fibrillation ([facility]) [I48.2]     Priority: High    Pneumonia of right lower lobe due to infectious organism ([facility] [J18.1]    PEA (Pulseless electrical activity) ([facility]) [I46.9]    Ileus ([facility]) [K56.7]    Respiratory failure ([facility] [J96.90]    HCAP (healthcare-associated pneumonia) [J18.9]    Ventricular tachycardia ([facility]) [I47.2]    Shock circulatory ([facility] [R57.9]    Tachypnea [I22.13]    Neutrophilic leukocytosis [E43.5]    Chronic respiratory failure with hypoxia ([facility]) [J96.11]    Cardiac arrest ([facility] [I46.9]    Acute on chronic systolic HF (heart failure) ([facility]) [I50.23]    Acute pulmonary edema ([facility]) [J81.0]    CAD (coronary artery disease) [I25.10]    Morbid obesity due to excess calories ([facility]) [E66.01]    Acute renal failure (ARF) ([facility]) [N17.9]    Severe sepsis ([address] [A41.9, R65.20]    DMII (diabetes mellitus, type 2) ([facility]) [E11.9]    COPD exacerbation ([address] [J44.1]    Cardiomyopathy ([facility] [I42.9]    Hypotension [I95.9]    Chronic systolic CHF

## 2019-07-08 NOTE — PROGRESS NOTES
Intramuscular, PRN  dextrose 5 % solution, 100 mL/hr, Intravenous, PRN  ipratropium-albuterol (DUONEB) nebulizer solution 1 ampule, 1 ampule, Inhalation, Q4H  famotidine (PEPCID) injection 20 mg, 20 mg, Intravenous, Daily    Data reviewed:  Labs:  CBC:   Recent Labs     07/06/19  0544 07/07/19  0548 07/08/19  0549   WBC 25.2* 21.6* 12.9*   HGB 7.4* 7.8* 7.6*   HCT 23.1* 24.4* 24.0*   MCV 85.0 85.0 83.7   * 152 200     BMP:   Recent Labs     07/06/19  0544 07/07/19  0548 07/08/19  0549   * 138 136   K 3.7 3.6 3.5   CL 97* 97* 97*   CO2 26 29 28   PHOS 2.3* 2.1* 4.0   BUN 28* 25* 30*   CREATININE 1.0 1.6* 1.6*     LIVER PROFILE:   Recent Labs     07/06/19  0544 07/07/19  0548 07/08/19  0549   AST 24 26 26   ALT 54* 44* 33   BILIDIR 0.8* 0.7* 0.5*   BILITOT 1.2* 1.1* 0.8   ALKPHOS 110 124 106     PT/INR: No results for input(s): PROTIME, INR in the last 72 hours. APTT: No results for input(s): APTT in the last 72 hours. Cultures:   Blood culture (6/22 and 7/5): NGTD  Urine culture (6/23): Negative  Sputum culture (6/23): Normal reno      Films:  Chest images and reports were reviewed by me. My interpretation is:  CXR (7/4/19): RLL infiltrate; CVC and HD catheter in place      Assessment:     Acute hypoxic respiratory failure  Right lower lobe pneumonia  Nocturnal hypoxia  Acute kidney injury  Centrilobular emphysema  Atrial fibrillation   Cardiomyopathy (EF 30-35%)      Plan:    Acute hypoxic respiratory failure  - Seems to be due to a combination of pneumonia and pulmonary edema  - Wean supplemental oxygen as able to keep saturation>90%    Right lower lobe pneumonia  - Meropenem and vancomycin (day #3)  - Repeat CXR in a.m. Nocturnal hypoxia  - Continue supplemental oxygen to keep saturation>90%    Acute kidney injury  - HD per nephrology    Centrilobular emphysema  - Dulera  - Duonebs every 4 hours    Atrial fibrillation  - Continue amiodarone drip.  Transition to oral when he passes swallow

## 2019-07-08 NOTE — FLOWSHEET NOTE
Treatment time: 3.25 hours  Net UF: 2875 ml     Pre weight: 93.1 kg   Post weight: 90.3 kg  EDW: TBD kg     Access used: RIJ temporary dialysis catheter  Access function: Good with  ml/min     Medications or blood products given: Heparin for catheter dwells     Regular outpatient schedule: MWF     Summary of response to treatment:      07/08/19 1136   Vital Signs   /60   Temp 97.9 °F (36.6 °C)   Pulse 111   Resp (!) 34   SpO2 97 %   Weight 199 lb 1.2 oz (90.3 kg)   Weight Method Actual;Bed scale   Percent Weight Change -3.01   Pain Assessment   Pain Assessment 0-10   Pain Level 0   Post-Hemodialysis Assessment   Post-Treatment Procedures Blood returned;Catheter capped, clamped and heparinized x 2 ports   Machine Disinfection Process Exterior Machine Disinfection   Rinseback Volume (ml) 400 ml   Total Liters Processed (l/min) 69 l/min   Dialyzer Clearance Moderately streaked   Duration of Treatment (minutes) 196 minutes   Heparin amount administered during treatment (units) 0 units   Hemodialysis Intake (ml) 400 ml   Hemodialysis Output (ml) 3275 ml   NET Removed (ml) 2875 ml   Tolerated Treatment Good   Copy of dialysis treatment record placed in chart, to be scanned into EMR.  Report provided at bedside to [address], [address] [facility].
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
## 2019-07-08 NOTE — PROGRESS NOTES
included). Mild mitral regurgitation.     Echo 10/2018:  Suboptimal image quality. Definity contrast administered.   Patient appears to be in atrial fibrillation.   Mild Conc. LVH with EF  45%.   The left atrium is mildly dilated.   Mild mitral regurgitation.   Normal RV size with mildly reduced systolic function.   Trivial pulmonic regurgitation present. Telemetry:Atrial fib with controlled VR    Assessment/Plan:    1. Chronic atrial fibrillation  -VR controlled on IV amiodarone and metoprolol  -DCCV in the past with recurrent atrial fib  -remains off Xarelto (? Resume prior to discharge); still not taking po     2. Acute on chronic systolic heart failure/Cardiomyopathy  -LVEF 30-35% (decreased from prior echo)  -improving with HD  -continue BB  -no ACE-I/ARB/aldactone d/t elevated Cr  -remains on O2     3. REJI  -nephrology following  -Cr 1.6 today     4. Respiratory failure  -improving and O2 demand decreasing  -uses BiPap at night  -swallow eval noted: he refused NG for TF; awaiting repeat eval with speech therapy (change to po meds once taking po)    5. Anemia  -Hgb 7.6 today  -has been transfused this admit  -on Aranesp     6. S/P PEA arrest with VT (early in admission)  -no recurrent VT  -stable    Stable from cardiac standpoint. HR currently controlled on IV amiodarone and IV metoprolol. Change to po once he has been cleared. Will follow more peripherally while an inpatient.     Electronically signed by JOON Glez CNP on 7/8/2019 at 10:12 AM
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
## 2019-07-09 ENCOUNTER — APPOINTMENT (OUTPATIENT)
Dept: GENERAL RADIOLOGY | Age: 59
DRG: 720 | End: 2019-07-09
Payer: COMMERCIAL

## 2019-07-09 ENCOUNTER — TELEPHONE (OUTPATIENT)
Dept: INTERNAL MEDICINE CLINIC | Age: 59
End: 2019-07-09

## 2019-07-09 LAB
ALBUMIN SERPL-MCNC: 3.1 G/DL (ref 3.4–5)
ALP BLD-CCNC: 110 U/L (ref 40–129)
ALT SERPL-CCNC: 32 U/L (ref 10–40)
ANION GAP SERPL CALCULATED.3IONS-SCNC: 8 MMOL/L (ref 3–16)
AST SERPL-CCNC: 33 U/L (ref 15–37)
BASOPHILS ABSOLUTE: 0.2 K/UL (ref 0–0.2)
BASOPHILS RELATIVE PERCENT: 1.2 %
BILIRUB SERPL-MCNC: 0.8 MG/DL (ref 0–1)
BILIRUBIN DIRECT: 0.5 MG/DL (ref 0–0.3)
BILIRUBIN, INDIRECT: 0.3 MG/DL (ref 0–1)
BUN BLDV-MCNC: 21 MG/DL (ref 7–20)
CALCIUM SERPL-MCNC: 8.8 MG/DL (ref 8.3–10.6)
CHLORIDE BLD-SCNC: 94 MMOL/L (ref 99–110)
CO2: 29 MMOL/L (ref 21–32)
CREAT SERPL-MCNC: 1.8 MG/DL (ref 0.9–1.3)
EOSINOPHILS ABSOLUTE: 0.1 K/UL (ref 0–0.6)
EOSINOPHILS RELATIVE PERCENT: 0.8 %
GFR AFRICAN AMERICAN: 47
GFR NON-AFRICAN AMERICAN: 39
GLUCOSE BLD-MCNC: 110 MG/DL (ref 70–99)
GLUCOSE BLD-MCNC: 114 MG/DL (ref 70–99)
GLUCOSE BLD-MCNC: 119 MG/DL (ref 70–99)
GLUCOSE BLD-MCNC: 122 MG/DL (ref 70–99)
GLUCOSE BLD-MCNC: 124 MG/DL (ref 70–99)
GLUCOSE BLD-MCNC: 131 MG/DL (ref 70–99)
HCT VFR BLD CALC: 25.3 % (ref 40.5–52.5)
HEMOGLOBIN: 8.1 G/DL (ref 13.5–17.5)
LYMPHOCYTES ABSOLUTE: 1.3 K/UL (ref 1–5.1)
LYMPHOCYTES RELATIVE PERCENT: 9.8 %
MAGNESIUM: 2 MG/DL (ref 1.8–2.4)
MCH RBC QN AUTO: 26.8 PG (ref 26–34)
MCHC RBC AUTO-ENTMCNC: 31.8 G/DL (ref 31–36)
MCV RBC AUTO: 84.1 FL (ref 80–100)
MONOCYTES ABSOLUTE: 1.5 K/UL (ref 0–1.3)
MONOCYTES RELATIVE PERCENT: 11.1 %
NEUTROPHILS ABSOLUTE: 10.5 K/UL (ref 1.7–7.7)
NEUTROPHILS RELATIVE PERCENT: 77.1 %
PDW BLD-RTO: 17.5 % (ref 12.4–15.4)
PERFORMED ON: ABNORMAL
PHOSPHORUS: 3.4 MG/DL (ref 2.5–4.9)
PLATELET # BLD: 258 K/UL (ref 135–450)
PMV BLD AUTO: 8.9 FL (ref 5–10.5)
POTASSIUM SERPL-SCNC: 4.2 MMOL/L (ref 3.5–5.1)
PROCALCITONIN: 0.72 NG/ML (ref 0–0.15)
RBC # BLD: 3.01 M/UL (ref 4.2–5.9)
SODIUM BLD-SCNC: 131 MMOL/L (ref 136–145)
TOTAL PROTEIN: 7.5 G/DL (ref 6.4–8.2)
WBC # BLD: 13.6 K/UL (ref 4–11)

## 2019-07-09 PROCEDURE — 36415 COLL VENOUS BLD VENIPUNCTURE: CPT

## 2019-07-09 PROCEDURE — 0JH63XZ INSERTION OF TUNNELED VASCULAR ACCESS DEVICE INTO CHEST SUBCUTANEOUS TISSUE AND FASCIA, PERCUTANEOUS APPROACH: ICD-10-PCS | Performed by: INTERNAL MEDICINE

## 2019-07-09 PROCEDURE — 97530 THERAPEUTIC ACTIVITIES: CPT

## 2019-07-09 PROCEDURE — 80076 HEPATIC FUNCTION PANEL: CPT

## 2019-07-09 PROCEDURE — 2700000000 HC OXYGEN THERAPY PER DAY

## 2019-07-09 PROCEDURE — 94669 MECHANICAL CHEST WALL OSCILL: CPT

## 2019-07-09 PROCEDURE — 6370000000 HC RX 637 (ALT 250 FOR IP): Performed by: NURSE PRACTITIONER

## 2019-07-09 PROCEDURE — 94640 AIRWAY INHALATION TREATMENT: CPT

## 2019-07-09 PROCEDURE — 84145 PROCALCITONIN (PCT): CPT

## 2019-07-09 PROCEDURE — 99232 SBSQ HOSP IP/OBS MODERATE 35: CPT | Performed by: INTERNAL MEDICINE

## 2019-07-09 PROCEDURE — 6370000000 HC RX 637 (ALT 250 FOR IP): Performed by: INTERNAL MEDICINE

## 2019-07-09 PROCEDURE — 2580000003 HC RX 258: Performed by: INTERNAL MEDICINE

## 2019-07-09 PROCEDURE — 94660 CPAP INITIATION&MGMT: CPT

## 2019-07-09 PROCEDURE — 2500000003 HC RX 250 WO HCPCS: Performed by: INTERNAL MEDICINE

## 2019-07-09 PROCEDURE — 83735 ASSAY OF MAGNESIUM: CPT

## 2019-07-09 PROCEDURE — 85025 COMPLETE CBC W/AUTO DIFF WBC: CPT

## 2019-07-09 PROCEDURE — 6360000002 HC RX W HCPCS: Performed by: NURSE PRACTITIONER

## 2019-07-09 PROCEDURE — 99232 SBSQ HOSP IP/OBS MODERATE 35: CPT | Performed by: NURSE PRACTITIONER

## 2019-07-09 PROCEDURE — 80069 RENAL FUNCTION PANEL: CPT

## 2019-07-09 PROCEDURE — 2060000000 HC ICU INTERMEDIATE R&B

## 2019-07-09 PROCEDURE — 2580000003 HC RX 258: Performed by: NURSE PRACTITIONER

## 2019-07-09 PROCEDURE — 6360000002 HC RX W HCPCS: Performed by: INTERNAL MEDICINE

## 2019-07-09 PROCEDURE — 02H633Z INSERTION OF INFUSION DEVICE INTO RIGHT ATRIUM, PERCUTANEOUS APPROACH: ICD-10-PCS | Performed by: INTERNAL MEDICINE

## 2019-07-09 PROCEDURE — 2500000003 HC RX 250 WO HCPCS: Performed by: NURSE PRACTITIONER

## 2019-07-09 PROCEDURE — 71045 X-RAY EXAM CHEST 1 VIEW: CPT

## 2019-07-09 PROCEDURE — 94761 N-INVAS EAR/PLS OXIMETRY MLT: CPT

## 2019-07-09 RX ORDER — BUPIVACAINE HYDROCHLORIDE 5 MG/ML
2 INJECTION, SOLUTION EPIDURAL; INTRACAUDAL ONCE
Status: DISCONTINUED | OUTPATIENT
Start: 2019-07-09 | End: 2019-07-09

## 2019-07-09 RX ORDER — TRIAMCINOLONE ACETONIDE 40 MG/ML
80 INJECTION, SUSPENSION INTRA-ARTICULAR; INTRAMUSCULAR ONCE
Status: DISCONTINUED | OUTPATIENT
Start: 2019-07-09 | End: 2019-07-09

## 2019-07-09 RX ORDER — METOPROLOL SUCCINATE 50 MG/1
50 TABLET, EXTENDED RELEASE ORAL DAILY
Status: DISCONTINUED | OUTPATIENT
Start: 2019-07-09 | End: 2019-07-15

## 2019-07-09 RX ORDER — AMIODARONE HYDROCHLORIDE 200 MG/1
200 TABLET ORAL 2 TIMES DAILY
Status: DISCONTINUED | OUTPATIENT
Start: 2019-07-09 | End: 2019-07-12

## 2019-07-09 RX ADMIN — Medication 50 MG: at 14:39

## 2019-07-09 RX ADMIN — IPRATROPIUM BROMIDE AND ALBUTEROL SULFATE 1 AMPULE: .5; 3 SOLUTION RESPIRATORY (INHALATION) at 04:41

## 2019-07-09 RX ADMIN — Medication 10 ML: at 22:58

## 2019-07-09 RX ADMIN — Medication 50 MG: at 06:12

## 2019-07-09 RX ADMIN — MEROPENEM 500 MG: 500 INJECTION INTRAVENOUS at 08:50

## 2019-07-09 RX ADMIN — Medication 2 CAPSULE: at 08:20

## 2019-07-09 RX ADMIN — METOPROLOL TARTRATE 5 MG: 5 INJECTION INTRAVENOUS at 04:11

## 2019-07-09 RX ADMIN — AMIODARONE HYDROCHLORIDE 200 MG: 200 TABLET ORAL at 11:42

## 2019-07-09 RX ADMIN — METOPROLOL SUCCINATE 50 MG: 50 TABLET, EXTENDED RELEASE ORAL at 11:42

## 2019-07-09 RX ADMIN — IPRATROPIUM BROMIDE AND ALBUTEROL SULFATE 1 AMPULE: .5; 3 SOLUTION RESPIRATORY (INHALATION) at 08:20

## 2019-07-09 RX ADMIN — Medication 10 ML: at 08:51

## 2019-07-09 RX ADMIN — MOMETASONE FUROATE AND FORMOTEROL FUMARATE DIHYDRATE 2 PUFF: 100; 5 AEROSOL RESPIRATORY (INHALATION) at 20:16

## 2019-07-09 RX ADMIN — HEPARIN SODIUM 5000 UNITS: 5000 INJECTION INTRAVENOUS; SUBCUTANEOUS at 06:16

## 2019-07-09 RX ADMIN — IPRATROPIUM BROMIDE AND ALBUTEROL SULFATE 1 AMPULE: .5; 3 SOLUTION RESPIRATORY (INHALATION) at 12:05

## 2019-07-09 RX ADMIN — IPRATROPIUM BROMIDE AND ALBUTEROL SULFATE 1 AMPULE: .5; 3 SOLUTION RESPIRATORY (INHALATION) at 20:16

## 2019-07-09 RX ADMIN — FAMOTIDINE 20 MG: 10 INJECTION, SOLUTION INTRAVENOUS at 08:50

## 2019-07-09 RX ADMIN — HEPARIN SODIUM 5000 UNITS: 5000 INJECTION INTRAVENOUS; SUBCUTANEOUS at 22:40

## 2019-07-09 RX ADMIN — AMIODARONE HYDROCHLORIDE 200 MG: 200 TABLET ORAL at 22:40

## 2019-07-09 RX ADMIN — HEPARIN SODIUM 5000 UNITS: 5000 INJECTION INTRAVENOUS; SUBCUTANEOUS at 14:38

## 2019-07-09 RX ADMIN — Medication 50 MG: at 22:40

## 2019-07-09 RX ADMIN — METOPROLOL TARTRATE 5 MG: 5 INJECTION INTRAVENOUS at 08:50

## 2019-07-09 RX ADMIN — INSULIN GLARGINE 10 UNITS: 100 INJECTION, SOLUTION SUBCUTANEOUS at 22:40

## 2019-07-09 RX ADMIN — Medication 10 ML: at 22:41

## 2019-07-09 RX ADMIN — MOMETASONE FUROATE AND FORMOTEROL FUMARATE DIHYDRATE 2 PUFF: 100; 5 AEROSOL RESPIRATORY (INHALATION) at 08:30

## 2019-07-09 RX ADMIN — AMIODARONE HYDROCHLORIDE 0.5 MG/MIN: 50 INJECTION, SOLUTION INTRAVENOUS at 04:15

## 2019-07-09 ASSESSMENT — PAIN DESCRIPTION - ONSET
ONSET: ON-GOING

## 2019-07-09 ASSESSMENT — PAIN DESCRIPTION - PROGRESSION
CLINICAL_PROGRESSION: GRADUALLY WORSENING
CLINICAL_PROGRESSION: GRADUALLY IMPROVING
CLINICAL_PROGRESSION: GRADUALLY WORSENING

## 2019-07-09 ASSESSMENT — PAIN SCALES - WONG BAKER
WONGBAKER_NUMERICALRESPONSE: 2
WONGBAKER_NUMERICALRESPONSE: 0

## 2019-07-09 ASSESSMENT — PAIN - FUNCTIONAL ASSESSMENT
PAIN_FUNCTIONAL_ASSESSMENT: PREVENTS OR INTERFERES SOME ACTIVE ACTIVITIES AND ADLS

## 2019-07-09 ASSESSMENT — PAIN DESCRIPTION - LOCATION
LOCATION: BACK

## 2019-07-09 ASSESSMENT — PAIN DESCRIPTION - PAIN TYPE
TYPE: CHRONIC PAIN

## 2019-07-09 ASSESSMENT — PAIN DESCRIPTION - ORIENTATION
ORIENTATION: LOWER
ORIENTATION: MID
ORIENTATION: MID

## 2019-07-09 ASSESSMENT — PAIN DESCRIPTION - DESCRIPTORS
DESCRIPTORS: ACHING
DESCRIPTORS: SHARP;SHOOTING;ACHING
DESCRIPTORS: ACHING

## 2019-07-09 ASSESSMENT — PAIN DESCRIPTION - FREQUENCY
FREQUENCY: CONTINUOUS

## 2019-07-09 ASSESSMENT — PAIN SCALES - GENERAL
PAINLEVEL_OUTOF10: 5
PAINLEVEL_OUTOF10: 8
PAINLEVEL_OUTOF10: 8
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
## 2019-07-09 NOTE — PROGRESS NOTES
06:25 AM     CBC:  Lab Results   Component Value Date/Time    WBC 13.6 (H) 07/09/2019 06:25 AM    HGB 8.1 (L) 07/09/2019 06:25 AM    HCT 25.3 (L) 07/09/2019 06:25 AM     07/09/2019 06:25 AM       Assessment/Plan:  Reviewed old records and labs. 1) REJI -Cr unchanged since yesterday at 1.6 mg but UOP only 285 ml/24 hours - Cr 1.8 mg post HD  remains HD dependent;  Will arrange [facility] placement in IR   Next HD wednesday    2) Hypophosphatemia corrected    3) Septic Shock - off pressors Vancomycin dosing per pharmacy    4) Anemia - received 1 unit of PRBC 7/3/19 - HGB 8.1 gm Monitor continue DAVID with HD    5) Systolic CHF - challenge TW  with HD    6) A fib on 1 [facility]

## 2019-07-09 NOTE — PROGRESS NOTES
Physical Therapy  Facility/Department: [facility] PROGRESSIVE CARE  Daily Treatment Note/Cotx with OT   NAME: Kimani Daily  : 1960  MRN: [id number]    Date of Service: 2019  Discharge Recommendations:  Patient would benefit from continued therapy after discharge, 3-5 sessions per week   PT Equipment Recommendations  Equipment Needed: (defer to next level of care)    Assessment   Body structures, Functions, Activity limitations: Decreased functional mobility ; Decreased strength;Decreased endurance;Decreased balance;Decreased safe awareness  Assessment: 60 y/o male admit 19 with REJI, Septicemia, COPD Exac, Cardiac Arrest.  19 - 7/3/19 Pt Intubated. CKD (CRRT, now receiving HD). PMH as noted including CAD, A-Fib, MI, Angio with Stent, CHF, CKD (no HD pta, currently receiving HD), COPD, R Wrist Fx/Surg, R LE Fx/Surg, LE Vasc Surg, L AKA (2017), DM, Neuropathy. PTA pt resides with parents in home with ramp access and 1st foor bed/bath. Pt with h/o L AKA, and nonambulatory, although independent transfers and w/c mob. This date, pt was Max A x 2 supine>sit, and needed Max A x 2 for sit<>stand to [facility] with poor effort and limited tolerance to this activity. Continue to have unclear d/c plan at this time, and byron may need to consider SNF, lower intensity 3-5x wk skilled therapy setting at discharge--as it is byron his parents would be unable to physically assist him. Pt is well below his reported basline of transfers to/from W/C as he reports he did prior to admission. Will monitor & progress as tolerated. Prognosis: Fair;Guarded  History: 60 y/o male admit 19 with REJI, Septicemia, COPD Exac, Cardiac Arrest.  19 - 7/3/19 Pt Intubated. CKD (CRRT, now receiving HD). PMH as noted including CAD, A-Fib, MI, Angio with Stent, CHF, CKD (no HD pta, currently receiving HD), COPD, R Wrist Fx/Surg, R LE Fx/Surg, LE Vasc Surg, L AKA (2017), DM, Neuropathy.    Patient position/activity restrictions: NPO. O2 3L via NC (O2 2 1/2 pta). H/O L AKA (5/2017, W/C Independent). Subjective   General  Chart Reviewed: Yes  Additional Pertinent Hx: 62 y/o male admit 6/23/19 with REJI, Septicemia, COPD Exac, Cardiac Arrest.  6/23/19 - 7/3/19 Pt Intubated. CKD (CRRT, now receiving HD). PMH as noted including CAD, A-Fib, MI, Angio with Stent, CHF, CKD (no HD pta, currently receiving HD), COPD, R Wrist Fx/Surg, R LE Fx/Surg, LE Vasc Surg, L AKA (5/2017), DM, Neuropathy. Family / Caregiver Present: No  Referring Practitioner: Giuliana Carpenter NP. Subjective  Subjective: Pt in supine, therapy returned to assist pt up & oob to recliner. Pt reported back pain, but did not rate. Pt quiet, little talking noted. Orientation  Orientation  Overall Orientation Status: Impaired  Orientation Level: Oriented to person;Disoriented to time;Disoriented to situation     Objective   Bed mobility  Supine to Sit: Maximum assistance;2 Person assistance  Sit to Supine: Unable to assess(up to chair)  Transfers  Sit to Stand: Dependent/Total;Maximum Assistance;2 Person Assistance(to Sandery, pt with decreased effort and limited stance at lift equipment). Pt HR into 120s with/after activity/transfers and O2 sats were 90% or> throughout. Pt on 3L O2. Pt left oob on maxi move padding for ease of return back into bed. Stand to sit: Maximum Assistance;2 Person Assistance;Dependent/Total  Bed to Chair: Dependent/Total(via SaraStedy)  Ambulation  Ambulation?: No(non-ambulatory even PTA)     Balance  Sitting - Static: Fair  Sitting - Dynamic: Fair  Standing - Static: Poor  Standing - Dynamic: (MAURY)  Comments: CG/Min A sit balance at eob. Max A x 2 person for very brief stance in [employer].              AM-PAC Score  AM-PAC Inpatient Mobility Raw Score : 9 (07/09/19 1127)  AM-PAC Inpatient T-Scale Score : 30.55 (07/09/19 1127)  Mobility Inpatient CMS 0-100% Score: 81.38 (07/09/19

## 2019-07-09 NOTE — DISCHARGE INSTR - COC
(Fluzone 3 yrs and older or Afluria 5 yrs and older) 10/12/2016    Influenza, Rosary Skip, 6 mo and older, IM, PF (Flulaval, Fluarix) 10/17/2018    Pneumococcal Conjugate Vaccine 11/11/2017    Tdap (Boostrix, Adacel) 06/14/2015       Active Problems:  Patient Active Problem List   Diagnosis Code    Arthritis M19.90    Diabetes mellitus with hyperglycemia ([facility] E11.65    HBP (high blood pressure) I10    Hyperlipidemia E78.5    COPD (chronic obstructive pulmonary disease) ([facility]) J44.9    Viral hepatitis C B19.20    Back pain M54.9    PAD (peripheral artery disease) ([facility]) I73.9    Spinal stenosis of lumbar region M48.061    Tobacco use Z72.0    Atherosclerosis of native artery of left lower extremity with intermittent claudication ([facility]) I70.212    Chest pain R07.9    Pleural effusion due to CHF (congestive heart failure) ([facility]) I50.9    Pleural effusion, right J90    Alcohol abuse, continuous F10.10    Tachycardia R00.0    Atrial fibrillation with RVR ([facility]) I48.91    Hyponatremia E87.1    Congestive heart failure ([facility]) I50.9    REJI (acute kidney injury) ([facility] N17.9    Hypokalemia E87.6    Coronary artery disease involving autologous artery coronary bypass graft with angina pectoris ([facility]) I25.729    Essential hypertension I10    Chest pain of uncertain etiology G10.48    Acute on chronic combined systolic and diastolic HF (heart failure) ([facility]) I50.43    Acute hyperkalemia E87.5    Typical atrial flutter ([facility]) I48.3    Chronic systolic CHF (congestive heart failure), NYHA class 3 ([facility]) I50.22    Hypotension I95.9    Vasovagal syncope R55    Laceration of scalp without foreign body S01. 01XA    Cardiomyopathy ([facility] I42.9    Syncope and collapse R55    Ischemic foot I99.8    Diabetes mellitus with peripheral circulatory disorder ([facility]) E11.51    Limb ischemia I99.8    COPD exacerbation ([facility]) J44.1    Nonhealing surgical wound T81.89XA    Acromioclavicular joint arthritis M19.019    I47.2    Shock circulatory ([facility]) R57.9    Tachypnea S35.44    Neutrophilic leukocytosis V29.5    Chronic respiratory failure with hypoxia ([facility]) J96.11    Cardiac arrest ([facility]) I46.9    PEA (Pulseless electrical activity) ([facility]) I46.9    Ileus ([facility]) K56.7    Pneumonia of right lower lobe due to infectious organism ([facility]) J18.1       Isolation/Infection:   Isolation          No Isolation            Nurse Assessment:  Last Vital Signs: /75   Pulse 105   Temp 98.4 °F (36.9 °C) (Oral)   Resp 20   Ht 5' 7\" (1.702 m)   Wt 200 lb 6.4 oz (90.9 kg)   SpO2 97%   BMI 31.39 kg/m²     Last documented pain score (0-10 scale): Pain Level: 8  Last Weight:   Wt Readings from Last 1 Encounters:   07/09/19 200 lb 6.4 oz (90.9 kg)     Mental Status:  oriented and alert    IV Access:  - PICC - site  L Antecubital, insertion date: 7/2    Nursing Mobility/ADLs:  Walking   Dependent  Transfer  Dependent  Bathing  Assisted  Dressing  Assisted  Toileting  Assisted  Feeding  Independent  Med Admin  Dependent  Med Delivery   whole    Wound Care Documentation and Therapy:        Elimination:  Continence:   · Bowel: Yes  · Bladder: Yes  Urinary Catheter: None   Colostomy/Ileostomy/Ileal Conduit: No       Date of Last BM: 7/22    Intake/Output Summary (Last 24 hours) at 7/9/2019 0837  Last data filed at 7/9/2019 0635  Gross per 24 hour   Intake 1099 ml   Output 3510 ml   Net -2411 ml     I/O last 3 completed shifts: In: 9786 [P.O.:240; I.V.:209; IV Piggyback:250]  Out: 6200 [Urine:285]    Safety Concerns: At Risk for Falls    Impairments/Disabilities:      Amputation - left AKA    Nutrition Therapy:  Current Nutrition Therapy:   - Oral Diet:  Carb Control 5 carbs/meal (2000kcals/day) and Cardiac    Routes of Feeding: Oral  Liquids:  Thin Liquids  Daily Fluid Restriction: yes - amount 1500  Last Modified Barium Swallow with Video (Video Swallowing Test): not done    Treatments at the Time of Hospital Discharge:   Respiratory

## 2019-07-10 ENCOUNTER — APPOINTMENT (OUTPATIENT)
Dept: INTERVENTIONAL RADIOLOGY/VASCULAR | Age: 59
DRG: 720 | End: 2019-07-10
Payer: COMMERCIAL

## 2019-07-10 LAB
ALBUMIN SERPL-MCNC: 2.9 G/DL (ref 3.4–5)
ALP BLD-CCNC: 102 U/L (ref 40–129)
ALT SERPL-CCNC: 27 U/L (ref 10–40)
ANION GAP SERPL CALCULATED.3IONS-SCNC: 13 MMOL/L (ref 3–16)
AST SERPL-CCNC: 40 U/L (ref 15–37)
BASOPHILS ABSOLUTE: 0.2 K/UL (ref 0–0.2)
BASOPHILS RELATIVE PERCENT: 1.2 %
BILIRUB SERPL-MCNC: 0.6 MG/DL (ref 0–1)
BILIRUBIN DIRECT: 0.4 MG/DL (ref 0–0.3)
BILIRUBIN, INDIRECT: 0.2 MG/DL (ref 0–1)
BLOOD CULTURE, ROUTINE: NORMAL
BUN BLDV-MCNC: 26 MG/DL (ref 7–20)
C DIFF TOXIN/ANTIGEN: NORMAL
CALCIUM SERPL-MCNC: 8.9 MG/DL (ref 8.3–10.6)
CHLORIDE BLD-SCNC: 92 MMOL/L (ref 99–110)
CO2: 26 MMOL/L (ref 21–32)
CREAT SERPL-MCNC: 2 MG/DL (ref 0.9–1.3)
CULTURE, BLOOD 2: NORMAL
EOSINOPHILS ABSOLUTE: 0.1 K/UL (ref 0–0.6)
EOSINOPHILS RELATIVE PERCENT: 1 %
GFR AFRICAN AMERICAN: 42
GFR NON-AFRICAN AMERICAN: 34
GLUCOSE BLD-MCNC: 100 MG/DL (ref 70–99)
GLUCOSE BLD-MCNC: 131 MG/DL (ref 70–99)
GLUCOSE BLD-MCNC: 91 MG/DL (ref 70–99)
GLUCOSE BLD-MCNC: 93 MG/DL (ref 70–99)
GLUCOSE BLD-MCNC: 93 MG/DL (ref 70–99)
GLUCOSE BLD-MCNC: 95 MG/DL (ref 70–99)
HCT VFR BLD CALC: 23.3 % (ref 40.5–52.5)
HEMOGLOBIN: 7.3 G/DL (ref 13.5–17.5)
INR BLD: 1.18 (ref 0.86–1.14)
LYMPHOCYTES ABSOLUTE: 1.3 K/UL (ref 1–5.1)
LYMPHOCYTES RELATIVE PERCENT: 10.3 %
MAGNESIUM: 1.7 MG/DL (ref 1.8–2.4)
MCH RBC QN AUTO: 26.5 PG (ref 26–34)
MCHC RBC AUTO-ENTMCNC: 31.3 G/DL (ref 31–36)
MCV RBC AUTO: 84.7 FL (ref 80–100)
MONOCYTES ABSOLUTE: 1.4 K/UL (ref 0–1.3)
MONOCYTES RELATIVE PERCENT: 10.8 %
NEUTROPHILS ABSOLUTE: 9.7 K/UL (ref 1.7–7.7)
NEUTROPHILS RELATIVE PERCENT: 76.7 %
PDW BLD-RTO: 17.8 % (ref 12.4–15.4)
PERFORMED ON: ABNORMAL
PERFORMED ON: ABNORMAL
PERFORMED ON: NORMAL
PHOSPHORUS: 5.3 MG/DL (ref 2.5–4.9)
PLATELET # BLD: 330 K/UL (ref 135–450)
PMV BLD AUTO: 9 FL (ref 5–10.5)
POTASSIUM SERPL-SCNC: 4 MMOL/L (ref 3.5–5.1)
PROTHROMBIN TIME: 13.5 SEC (ref 9.8–13)
RBC # BLD: 2.76 M/UL (ref 4.2–5.9)
SODIUM BLD-SCNC: 131 MMOL/L (ref 136–145)
TOTAL PROTEIN: 7.1 G/DL (ref 6.4–8.2)
VANCOMYCIN RANDOM: 9.4 UG/ML
WBC # BLD: 12.7 K/UL (ref 4–11)

## 2019-07-10 PROCEDURE — 87449 NOS EACH ORGANISM AG IA: CPT

## 2019-07-10 PROCEDURE — 80202 ASSAY OF VANCOMYCIN: CPT

## 2019-07-10 PROCEDURE — 99232 SBSQ HOSP IP/OBS MODERATE 35: CPT | Performed by: INTERNAL MEDICINE

## 2019-07-10 PROCEDURE — 94640 AIRWAY INHALATION TREATMENT: CPT

## 2019-07-10 PROCEDURE — 85025 COMPLETE CBC W/AUTO DIFF WBC: CPT

## 2019-07-10 PROCEDURE — 94761 N-INVAS EAR/PLS OXIMETRY MLT: CPT

## 2019-07-10 PROCEDURE — 80069 RENAL FUNCTION PANEL: CPT

## 2019-07-10 PROCEDURE — 6370000000 HC RX 637 (ALT 250 FOR IP): Performed by: NURSE PRACTITIONER

## 2019-07-10 PROCEDURE — 87324 CLOSTRIDIUM AG IA: CPT

## 2019-07-10 PROCEDURE — 90935 HEMODIALYSIS ONE EVALUATION: CPT

## 2019-07-10 PROCEDURE — 2580000003 HC RX 258: Performed by: INTERNAL MEDICINE

## 2019-07-10 PROCEDURE — 94660 CPAP INITIATION&MGMT: CPT

## 2019-07-10 PROCEDURE — 9990000010 HC NO CHARGE VISIT

## 2019-07-10 PROCEDURE — 80076 HEPATIC FUNCTION PANEL: CPT

## 2019-07-10 PROCEDURE — 2709999900 IR TUNNELED CVC PLACE WO SQ PORT/PUMP > 5 YEARS

## 2019-07-10 PROCEDURE — 6370000000 HC RX 637 (ALT 250 FOR IP): Performed by: INTERNAL MEDICINE

## 2019-07-10 PROCEDURE — 6360000002 HC RX W HCPCS: Performed by: RADIOLOGY

## 2019-07-10 PROCEDURE — 2700000000 HC OXYGEN THERAPY PER DAY

## 2019-07-10 PROCEDURE — 2060000000 HC ICU INTERMEDIATE R&B

## 2019-07-10 PROCEDURE — 6360000002 HC RX W HCPCS: Performed by: INTERNAL MEDICINE

## 2019-07-10 PROCEDURE — 94668 MNPJ CHEST WALL SBSQ: CPT

## 2019-07-10 PROCEDURE — 83735 ASSAY OF MAGNESIUM: CPT

## 2019-07-10 PROCEDURE — 85610 PROTHROMBIN TIME: CPT

## 2019-07-10 PROCEDURE — 94669 MECHANICAL CHEST WALL OSCILL: CPT

## 2019-07-10 RX ORDER — FENTANYL CITRATE 50 UG/ML
INJECTION, SOLUTION INTRAMUSCULAR; INTRAVENOUS DAILY PRN
Status: COMPLETED | OUTPATIENT
Start: 2019-07-10 | End: 2019-07-10

## 2019-07-10 RX ORDER — MIDAZOLAM HYDROCHLORIDE 1 MG/ML
INJECTION INTRAMUSCULAR; INTRAVENOUS DAILY PRN
Status: COMPLETED | OUTPATIENT
Start: 2019-07-10 | End: 2019-07-10

## 2019-07-10 RX ADMIN — Medication 50 MG: at 22:51

## 2019-07-10 RX ADMIN — MOMETASONE FUROATE AND FORMOTEROL FUMARATE DIHYDRATE 2 PUFF: 100; 5 AEROSOL RESPIRATORY (INHALATION) at 13:16

## 2019-07-10 RX ADMIN — INSULIN GLARGINE 10 UNITS: 100 INJECTION, SOLUTION SUBCUTANEOUS at 21:03

## 2019-07-10 RX ADMIN — IPRATROPIUM BROMIDE AND ALBUTEROL SULFATE 1 AMPULE: .5; 3 SOLUTION RESPIRATORY (INHALATION) at 13:15

## 2019-07-10 RX ADMIN — MIDAZOLAM 1 MG: 1 INJECTION INTRAMUSCULAR; INTRAVENOUS at 14:30

## 2019-07-10 RX ADMIN — FENTANYL CITRATE 50 MCG: 50 INJECTION INTRAMUSCULAR; INTRAVENOUS at 14:30

## 2019-07-10 RX ADMIN — IPRATROPIUM BROMIDE AND ALBUTEROL SULFATE 1 AMPULE: .5; 3 SOLUTION RESPIRATORY (INHALATION) at 00:34

## 2019-07-10 RX ADMIN — Medication 2 CAPSULE: at 17:08

## 2019-07-10 RX ADMIN — AMIODARONE HYDROCHLORIDE 200 MG: 200 TABLET ORAL at 21:02

## 2019-07-10 RX ADMIN — MOMETASONE FUROATE AND FORMOTEROL FUMARATE DIHYDRATE 2 PUFF: 100; 5 AEROSOL RESPIRATORY (INHALATION) at 20:48

## 2019-07-10 RX ADMIN — HEPARIN SODIUM 5000 UNITS: 5000 INJECTION INTRAVENOUS; SUBCUTANEOUS at 21:03

## 2019-07-10 RX ADMIN — Medication 10 ML: at 22:46

## 2019-07-10 RX ADMIN — IPRATROPIUM BROMIDE AND ALBUTEROL SULFATE 1 AMPULE: .5; 3 SOLUTION RESPIRATORY (INHALATION) at 04:19

## 2019-07-10 RX ADMIN — IPRATROPIUM BROMIDE AND ALBUTEROL SULFATE 1 AMPULE: .5; 3 SOLUTION RESPIRATORY (INHALATION) at 20:48

## 2019-07-10 ASSESSMENT — PAIN DESCRIPTION - FREQUENCY: FREQUENCY: CONTINUOUS

## 2019-07-10 ASSESSMENT — PAIN SCALES - WONG BAKER
WONGBAKER_NUMERICALRESPONSE: 0

## 2019-07-10 ASSESSMENT — PAIN DESCRIPTION - PAIN TYPE: TYPE: CHRONIC PAIN

## 2019-07-10 ASSESSMENT — PAIN SCALES - GENERAL
PAINLEVEL_OUTOF10: 0
PAINLEVEL_OUTOF10: 3

## 2019-07-10 ASSESSMENT — PAIN DESCRIPTION - DESCRIPTORS: DESCRIPTORS: ACHING

## 2019-07-10 ASSESSMENT — PAIN - FUNCTIONAL ASSESSMENT: PAIN_FUNCTIONAL_ASSESSMENT: PREVENTS OR INTERFERES WITH ALL ACTIVE AND SOME PASSIVE ACTIVITIES

## 2019-07-10 ASSESSMENT — PAIN DESCRIPTION - LOCATION: LOCATION: BACK

## 2019-07-10 ASSESSMENT — PAIN DESCRIPTION - ONSET: ONSET: ON-GOING

## 2019-07-10 ASSESSMENT — PAIN DESCRIPTION - ORIENTATION: ORIENTATION: MID

## 2019-07-10 NOTE — PROGRESS NOTES
Pulmonary Progress Note    CC:  Follow up on respiratory failure    Subjective:  Currently on BIPAP  Says breathing is a little better today       Intake/Output Summary (Last 24 hours) at 7/10/2019 0720  Last data filed at 7/9/2019 2258  Gross per 24 hour   Intake 636 ml   Output 100 ml   Net 536 ml         PHYSICAL EXAM:  Blood pressure 111/66, pulse 85, temperature 97.4 °F (36.3 °C), temperature source Axillary, resp. rate 20, height 5' 7\" (1.702 m), weight 200 lb 9.9 oz (91 kg), SpO2 99 %.'  Gen: No distress. Obese, awake and alert on BIPAP  Eyes: PERRL. No sclera icterus. No conjunctival injection. ENT: No discharge. Pharynx clear  Neck: Trachea midline. No obvious mass. Large neck   Resp: Less rhonchi, diminished   CV: tachy, irregular No murmur or rub. GI:  to palpation   Skin: Warm, dry, normal texture and turgor. No nodule on exposed extremities. Lymph: No cervical LAD. No supraclavicular LAD. M/S: No cyanosis. No clubbing. No joint deformity. Neuro:  Moves all four extremities,  Ext:   + edema, Left AKA    Medications:    Scheduled Meds:   metoprolol succinate  50 mg Oral Daily    amiodarone  200 mg Oral BID    meropenem  500 mg Intravenous Q24H    vancomycin (VANCOCIN) intermittent dosing (placeholder)   Other RX Placeholder    polyethylene glycol  17 g Oral Daily    lactobacillus  2 capsule Oral BID     heparin (porcine)  5,000 Units Subcutaneous 3 times per day    sodium chloride flush  10 mL Intravenous 2 times per day    darbepoetin vinay-polysorbate  60 mcg Subcutaneous Weekly - Thursday    insulin glargine  10 Units Subcutaneous Nightly    gabapentin  50 mg Oral 3 times per day    insulin lispro  0-18 Units Subcutaneous Q4H    mometasone-formoterol  2 puff Inhalation BID    sodium chloride flush  10 mL Intravenous 2 times per day    ipratropium-albuterol  1 ampule Inhalation Q4H    famotidine (PEPCID) injection  20 mg Intravenous Daily       Continuous

## 2019-07-10 NOTE — FLOWSHEET NOTE
Treatment time: 4 hours  Net UF: 2200 ml     Pre weight: 91 kg   Post weight: 88.7 kg  EDW: 91 kg     Access used: RIJ temporary dialysis catheter  Access function: Positional with  ml/min     Medications or blood products given: Heparin for catheter dwells     Regular outpatient schedule: MWF     Summary of response to treatment:      07/10/19 0823 07/10/19 1233   Vital Signs   BP (!) 105/58 (!) 111/49   Temp 97.6 °F (36.4 °C) 97 °F (36.1 °C)   Pulse 81 89   Resp 16 18   Weight 200 lb 9.9 oz (91 kg) 195 lb 8.8 oz (88.7 kg)   Weight Method Actual;Bed scale Actual;Bed scale   Percent Weight Change 0 -2.53   Pain Assessment   Pain Assessment 0-10 0-10   Pain Level 0 0   Post-Hemodialysis Assessment   Post-Treatment Procedures  --  Blood returned;Catheter capped, clamped and heparinized x 2 ports   Machine Disinfection Process  --  Exterior Machine Disinfection   Rinseback Volume (ml)  --  400 ml   Total Liters Processed (l/min)  --  72 l/min   Dialyzer Clearance  --  Moderately streaked   Duration of Treatment (minutes)  --  250 minutes   Heparin amount administered during treatment (units)  --  0 units   Hemodialysis Intake (ml)  --  400 ml   Hemodialysis Output (ml)  --  2600 ml   NET Removed (ml)  --  2200 ml   Tolerated Treatment  --  Good   Copy of dialysis treatment record placed in chart, to be scanned into EMR.   Report called to Richelle Grimes RN.

## 2019-07-11 LAB
ALBUMIN SERPL-MCNC: 3 G/DL (ref 3.4–5)
ALP BLD-CCNC: 102 U/L (ref 40–129)
ALT SERPL-CCNC: 25 U/L (ref 10–40)
ANION GAP SERPL CALCULATED.3IONS-SCNC: 12 MMOL/L (ref 3–16)
AST SERPL-CCNC: 33 U/L (ref 15–37)
BASOPHILS ABSOLUTE: 0.1 K/UL (ref 0–0.2)
BASOPHILS RELATIVE PERCENT: 0.9 %
BILIRUB SERPL-MCNC: 0.6 MG/DL (ref 0–1)
BILIRUBIN DIRECT: 0.3 MG/DL (ref 0–0.3)
BILIRUBIN, INDIRECT: 0.3 MG/DL (ref 0–1)
BUN BLDV-MCNC: 18 MG/DL (ref 7–20)
CALCIUM SERPL-MCNC: 8.8 MG/DL (ref 8.3–10.6)
CHLORIDE BLD-SCNC: 95 MMOL/L (ref 99–110)
CO2: 27 MMOL/L (ref 21–32)
CREAT SERPL-MCNC: 1.5 MG/DL (ref 0.9–1.3)
EOSINOPHILS ABSOLUTE: 0.1 K/UL (ref 0–0.6)
EOSINOPHILS RELATIVE PERCENT: 1.1 %
GFR AFRICAN AMERICAN: 58
GFR NON-AFRICAN AMERICAN: 48
GLUCOSE BLD-MCNC: 136 MG/DL (ref 70–99)
GLUCOSE BLD-MCNC: 138 MG/DL (ref 70–99)
GLUCOSE BLD-MCNC: 141 MG/DL (ref 70–99)
GLUCOSE BLD-MCNC: 89 MG/DL (ref 70–99)
GLUCOSE BLD-MCNC: 98 MG/DL (ref 70–99)
HCT VFR BLD CALC: 22.3 % (ref 40.5–52.5)
HEMOGLOBIN: 7.2 G/DL (ref 13.5–17.5)
LYMPHOCYTES ABSOLUTE: 1.2 K/UL (ref 1–5.1)
LYMPHOCYTES RELATIVE PERCENT: 11.1 %
MAGNESIUM: 1.6 MG/DL (ref 1.8–2.4)
MCH RBC QN AUTO: 27.4 PG (ref 26–34)
MCHC RBC AUTO-ENTMCNC: 32.4 G/DL (ref 31–36)
MCV RBC AUTO: 84.5 FL (ref 80–100)
MONOCYTES ABSOLUTE: 1.1 K/UL (ref 0–1.3)
MONOCYTES RELATIVE PERCENT: 10 %
NEUTROPHILS ABSOLUTE: 8.3 K/UL (ref 1.7–7.7)
NEUTROPHILS RELATIVE PERCENT: 76.9 %
PDW BLD-RTO: 17.8 % (ref 12.4–15.4)
PERFORMED ON: ABNORMAL
PERFORMED ON: NORMAL
PHOSPHORUS: 4.5 MG/DL (ref 2.5–4.9)
PLATELET # BLD: 311 K/UL (ref 135–450)
PMV BLD AUTO: 8.7 FL (ref 5–10.5)
POTASSIUM SERPL-SCNC: 3.8 MMOL/L (ref 3.5–5.1)
RBC # BLD: 2.63 M/UL (ref 4.2–5.9)
SODIUM BLD-SCNC: 134 MMOL/L (ref 136–145)
TOTAL PROTEIN: 7.3 G/DL (ref 6.4–8.2)
WBC # BLD: 10.8 K/UL (ref 4–11)

## 2019-07-11 PROCEDURE — 80069 RENAL FUNCTION PANEL: CPT

## 2019-07-11 PROCEDURE — 2060000000 HC ICU INTERMEDIATE R&B

## 2019-07-11 PROCEDURE — 85025 COMPLETE CBC W/AUTO DIFF WBC: CPT

## 2019-07-11 PROCEDURE — 94660 CPAP INITIATION&MGMT: CPT

## 2019-07-11 PROCEDURE — 83735 ASSAY OF MAGNESIUM: CPT

## 2019-07-11 PROCEDURE — 6360000002 HC RX W HCPCS: Performed by: INTERNAL MEDICINE

## 2019-07-11 PROCEDURE — 94640 AIRWAY INHALATION TREATMENT: CPT

## 2019-07-11 PROCEDURE — 92526 ORAL FUNCTION THERAPY: CPT

## 2019-07-11 PROCEDURE — 6370000000 HC RX 637 (ALT 250 FOR IP): Performed by: INTERNAL MEDICINE

## 2019-07-11 PROCEDURE — 99232 SBSQ HOSP IP/OBS MODERATE 35: CPT | Performed by: INTERNAL MEDICINE

## 2019-07-11 PROCEDURE — 2580000003 HC RX 258: Performed by: INTERNAL MEDICINE

## 2019-07-11 PROCEDURE — 6370000000 HC RX 637 (ALT 250 FOR IP): Performed by: NURSE PRACTITIONER

## 2019-07-11 PROCEDURE — 36558 INSERT TUNNELED CV CATH: CPT

## 2019-07-11 PROCEDURE — 77001 FLUOROGUIDE FOR VEIN DEVICE: CPT

## 2019-07-11 PROCEDURE — 80076 HEPATIC FUNCTION PANEL: CPT

## 2019-07-11 PROCEDURE — 94761 N-INVAS EAR/PLS OXIMETRY MLT: CPT

## 2019-07-11 PROCEDURE — 94668 MNPJ CHEST WALL SBSQ: CPT

## 2019-07-11 PROCEDURE — 76937 US GUIDE VASCULAR ACCESS: CPT

## 2019-07-11 PROCEDURE — 2500000003 HC RX 250 WO HCPCS: Performed by: INTERNAL MEDICINE

## 2019-07-11 PROCEDURE — 2700000000 HC OXYGEN THERAPY PER DAY

## 2019-07-11 PROCEDURE — 99152 MOD SED SAME PHYS/QHP 5/>YRS: CPT

## 2019-07-11 RX ADMIN — Medication 10 ML: at 21:25

## 2019-07-11 RX ADMIN — Medication 10 ML: at 21:20

## 2019-07-11 RX ADMIN — Medication 50 MG: at 06:59

## 2019-07-11 RX ADMIN — MOMETASONE FUROATE AND FORMOTEROL FUMARATE DIHYDRATE 2 PUFF: 100; 5 AEROSOL RESPIRATORY (INHALATION) at 21:02

## 2019-07-11 RX ADMIN — INSULIN GLARGINE 10 UNITS: 100 INJECTION, SOLUTION SUBCUTANEOUS at 21:19

## 2019-07-11 RX ADMIN — FAMOTIDINE 20 MG: 10 INJECTION, SOLUTION INTRAVENOUS at 10:33

## 2019-07-11 RX ADMIN — IPRATROPIUM BROMIDE AND ALBUTEROL SULFATE 1 AMPULE: .5; 3 SOLUTION RESPIRATORY (INHALATION) at 08:20

## 2019-07-11 RX ADMIN — IPRATROPIUM BROMIDE AND ALBUTEROL SULFATE 1 AMPULE: .5; 3 SOLUTION RESPIRATORY (INHALATION) at 12:29

## 2019-07-11 RX ADMIN — Medication 10 ML: at 10:35

## 2019-07-11 RX ADMIN — Medication 10 ML: at 10:34

## 2019-07-11 RX ADMIN — Medication 50 MG: at 21:20

## 2019-07-11 RX ADMIN — AMIODARONE HYDROCHLORIDE 200 MG: 200 TABLET ORAL at 21:19

## 2019-07-11 RX ADMIN — HEPARIN SODIUM 5000 UNITS: 5000 INJECTION INTRAVENOUS; SUBCUTANEOUS at 14:39

## 2019-07-11 RX ADMIN — IPRATROPIUM BROMIDE AND ALBUTEROL SULFATE 1 AMPULE: .5; 3 SOLUTION RESPIRATORY (INHALATION) at 21:03

## 2019-07-11 RX ADMIN — IPRATROPIUM BROMIDE AND ALBUTEROL SULFATE 1 AMPULE: .5; 3 SOLUTION RESPIRATORY (INHALATION) at 16:25

## 2019-07-11 RX ADMIN — AMIODARONE HYDROCHLORIDE 200 MG: 200 TABLET ORAL at 10:14

## 2019-07-11 RX ADMIN — HEPARIN SODIUM 5000 UNITS: 5000 INJECTION INTRAVENOUS; SUBCUTANEOUS at 21:19

## 2019-07-11 RX ADMIN — Medication 2 CAPSULE: at 10:14

## 2019-07-11 RX ADMIN — Medication 50 MG: at 15:34

## 2019-07-11 RX ADMIN — DARBEPOETIN ALFA 60 MCG: 60 SOLUTION INTRAVENOUS; SUBCUTANEOUS at 10:33

## 2019-07-11 RX ADMIN — HEPARIN SODIUM 5000 UNITS: 5000 INJECTION INTRAVENOUS; SUBCUTANEOUS at 06:43

## 2019-07-11 RX ADMIN — Medication 10 ML: at 10:33

## 2019-07-11 RX ADMIN — IPRATROPIUM BROMIDE AND ALBUTEROL SULFATE 1 AMPULE: .5; 3 SOLUTION RESPIRATORY (INHALATION) at 00:35

## 2019-07-11 RX ADMIN — METOPROLOL SUCCINATE 50 MG: 50 TABLET, EXTENDED RELEASE ORAL at 10:14

## 2019-07-11 RX ADMIN — MOMETASONE FUROATE AND FORMOTEROL FUMARATE DIHYDRATE 2 PUFF: 100; 5 AEROSOL RESPIRATORY (INHALATION) at 08:21

## 2019-07-11 RX ADMIN — Medication 2 CAPSULE: at 17:33

## 2019-07-11 ASSESSMENT — PAIN SCALES - WONG BAKER
WONGBAKER_NUMERICALRESPONSE: 0

## 2019-07-11 ASSESSMENT — PAIN DESCRIPTION - PROGRESSION
CLINICAL_PROGRESSION: NOT CHANGED

## 2019-07-11 ASSESSMENT — PAIN SCALES - GENERAL
PAINLEVEL_OUTOF10: 3
PAINLEVEL_OUTOF10: 0
PAINLEVEL_OUTOF10: 3
PAINLEVEL_OUTOF10: 3

## 2019-07-11 ASSESSMENT — PAIN DESCRIPTION - DESCRIPTORS: DESCRIPTORS: ACHING

## 2019-07-11 ASSESSMENT — PAIN DESCRIPTION - ONSET: ONSET: ON-GOING

## 2019-07-11 ASSESSMENT — PAIN DESCRIPTION - PAIN TYPE: TYPE: CHRONIC PAIN

## 2019-07-11 ASSESSMENT — PAIN DESCRIPTION - FREQUENCY: FREQUENCY: CONTINUOUS

## 2019-07-11 ASSESSMENT — PAIN DESCRIPTION - LOCATION: LOCATION: BACK

## 2019-07-11 ASSESSMENT — PAIN DESCRIPTION - ORIENTATION: ORIENTATION: MID

## 2019-07-11 ASSESSMENT — PAIN - FUNCTIONAL ASSESSMENT: PAIN_FUNCTIONAL_ASSESSMENT: PREVENTS OR INTERFERES WITH MANY ACTIVE NOT PASSIVE ACTIVITIES

## 2019-07-11 NOTE — CARE COORDINATION
Sw met with patient to discuss discharge plans. Patient lives with his parents, normally returns home. He has been able to transfer in/out of his w/c at baseline but is unable to at this time. PT/OT recommending SNF level of care. Patient also in need of new HD placement. He had [facility]DC placement yesterday. Patient is aware that he is not safe to return home from hospital. He is in agreement with SNF. He is also aware that he will need HD arranged. He lives in [place], [place], would prefer a facility close. He would prefer a MWF spot, AM if possible. Provided SNF choice list, he would prefer [facility] as he has been there in the past.     Will fax referral to [facility] to find new HD spot. Albert spoke to Tracy Nielsen at Home at [address] [place] [address]), she will review patient's information.      Glenny Tripp, [address] [facility]  [phone]  7/11/19 at 9:20 AM

## 2019-07-11 NOTE — CONSULTS
HISTORY  Family History   Problem Relation Age of Onset    Cancer Maternal Grandmother     Diabetes Maternal Grandmother     Cancer Maternal Grandfather     High Cholesterol Mother     High Blood Pressure Father        SOCIAL HISTORY  Social History     Tobacco Use    Smoking status: Current Every Day Smoker     Packs/day: 0.50     Years: 40.00     Pack years: 20.00     Types: Cigarettes    Smokeless tobacco: Never Used    Tobacco comment: 5 cigs daily - pt states not really sure   Substance Use Topics    Alcohol use: Yes     Alcohol/week: 3.0 - 3.6 oz     Types: 5 - 6 Cans of beer per week    Drug use: No       ALLERGIES  Allergies   Allergen Reactions    Rocephin [Ceftriaxone] Other (See Comments)     Sloughing of skin (blisters) on hands and lower arms; pancytopenia       MEDICATIONS  No current facility-administered medications on file prior to encounter.       Current Outpatient Medications on File Prior to Encounter   Medication Sig Dispense Refill    insulin glargine (BASAGLAR KWIKPEN) 100 UNIT/ML injection pen Inject 35 Units into the skin nightly 5 pen 3    tamsulosin (FLOMAX) 0.4 MG capsule Take 1 capsule by mouth daily 30 capsule 0    torsemide (DEMADEX) 20 MG tablet Take 2 tablets by mouth 2 times daily 120 tablet 0    metFORMIN (GLUCOPHAGE) 500 MG tablet Take 2 tablets by mouth 2 times daily (with meals) 60 tablet 0    insulin glargine (LANTUS SOLOSTAR) 100 UNIT/ML injection pen Inject 35 Units into the skin nightly 5 pen 1    spironolactone (ALDACTONE) 25 MG tablet Take 1 tablet by mouth daily 30 tablet 3    potassium chloride (KLOR-CON) 8 MEQ extended release tablet Take 8 mEq by mouth 3 times daily      XARELTO 20 MG TABS tablet TAKE 1 TABLET BY MOUTH DAILY WITH BREAKFAST 30 tablet 0    DULoxetine (CYMBALTA) 30 MG extended release capsule TAKE 1 CAPSULE BY MOUTH DAILY 30 capsule 0    atorvastatin (LIPITOR) 40 MG tablet TAKE 1 TABLET BY MOUTH DAILY 30 tablet 0    traZODone

## 2019-07-11 NOTE — PROGRESS NOTES
Pulmonary Progress Note    CC:  Follow up on respiratory failure    Subjective: Tunneled HD cath placed yesterday  On 4 liters of oxygen with less use of BIPAP   Afebrile  Breathing seems to be ok      Intake/Output Summary (Last 24 hours) at 7/11/2019 0723  Last data filed at 7/10/2019 2119  Gross per 24 hour   Intake 400 ml   Output 3175 ml   Net -2775 ml         PHYSICAL EXAM:  Blood pressure 117/70, pulse 100, temperature 98.1 °F (36.7 °C), temperature source Oral, resp. rate 20, height 5' 7\" (1.702 m), weight 199 lb 4.7 oz (90.4 kg), SpO2 100 %.'  Gen: No distress. Obese  Eyes: PERRL. No sclera icterus. No conjunctival injection. ENT: No discharge. Pharynx clear  Neck: Trachea midline. No obvious mass. Large neck   Resp: Clearing up, diminished   CV: tachy, irregular No murmur or rub. GI:  to palpation   Skin: Warm, dry, normal texture and turgor. No nodule on exposed extremities. Lymph: No cervical LAD. No supraclavicular LAD. M/S: No cyanosis. No clubbing. No joint deformity. Neuro:  Moves all four extremities,  Ext:   + edema, Left AKA    Medications:    Scheduled Meds:   metoprolol succinate  50 mg Oral Daily    amiodarone  200 mg Oral BID    polyethylene glycol  17 g Oral Daily    lactobacillus  2 capsule Oral BID     heparin (porcine)  5,000 Units Subcutaneous 3 times per day    sodium chloride flush  10 mL Intravenous 2 times per day    darbepoetin vinay-polysorbate  60 mcg Subcutaneous Weekly - Thursday    insulin glargine  10 Units Subcutaneous Nightly    gabapentin  50 mg Oral 3 times per day    insulin lispro  0-18 Units Subcutaneous Q4H    mometasone-formoterol  2 puff Inhalation BID    sodium chloride flush  10 mL Intravenous 2 times per day    ipratropium-albuterol  1 ampule Inhalation Q4H    famotidine (PEPCID) injection  20 mg Intravenous Daily       Continuous Infusions:   sodium chloride 5 mL/hr at 06/28/19 1811    dextrose         PRN Meds:  albumin

## 2019-07-11 NOTE — PROGRESS NOTES
Hospitalist Progress Note      PCP: Susy Eubanks MD    Date of Admission: 6/22/2019    Subjective: seen during dialysis, SOB/cough improved today    Medications:  Reviewed    Infusion Medications    sodium chloride 5 mL/hr at 06/28/19 1811    dextrose       Scheduled Medications    metoprolol succinate  50 mg Oral Daily    amiodarone  200 mg Oral BID    meropenem  500 mg Intravenous Q24H    polyethylene glycol  17 g Oral Daily    lactobacillus  2 capsule Oral BID WC    heparin (porcine)  5,000 Units Subcutaneous 3 times per day    sodium chloride flush  10 mL Intravenous 2 times per day    darbepoetin vinay-polysorbate  60 mcg Subcutaneous Weekly - Thursday    insulin glargine  10 Units Subcutaneous Nightly    gabapentin  50 mg Oral 3 times per day    insulin lispro  0-18 Units Subcutaneous Q4H    mometasone-formoterol  2 puff Inhalation BID    sodium chloride flush  10 mL Intravenous 2 times per day    ipratropium-albuterol  1 ampule Inhalation Q4H    famotidine (PEPCID) injection  20 mg Intravenous Daily     PRN Meds: albumin human, heparin (porcine), metoprolol, perflutren lipid microspheres, lubrifresh P.M., sodium chloride flush, albuterol, sodium chloride flush, ondansetron, acetaminophen, glucose, dextrose, glucagon (rDNA), dextrose      Intake/Output Summary (Last 24 hours) at 7/10/2019 2112  Last data filed at 7/10/2019 1907  Gross per 24 hour   Intake 410 ml   Output 3075 ml   Net -2665 ml       Physical Exam Performed:    /75   Pulse 90   Temp 98.6 °F (37 °C) (Oral)   Resp 18   Ht 5' 7\" (1.702 m)   Wt 195 lb 8.8 oz (88.7 kg)   SpO2 95%   BMI 30.63 kg/m²       Gen: No distress. Obese, awake and alert   Eyes: PERRL. No sclera icterus. No conjunctival injection. ENT: No discharge. Pharynx clear  Neck: Trachea midline. No obvious mass.  Large neck   Resp: diminished b/l  CV: tachy, irregular No murmur or rub.    GI: soft, obese, BS +  Skin: Warm, dry, normal texture and Final Result   No acute process. Right jugular central venous catheter in place terminating in the right atrium         CT Head WO Contrast   Final Result   No acute intracranial abnormality. CT CHEST WO CONTRAST   Final Result   1. Bilateral airspace disease could represent atelectasis or pneumonia. 2. Small right pleural effusion. 3. Coronary artery disease. 4. Pulmonary hypertension. 5. Mediastinal adenopathy, probably reactive given the relatively rapid   development. 6. Suspected acute nondisplaced bilateral anterior rib fractures without   pneumothorax. XR CHEST PORTABLE   Final Result   1. Endotracheal tube tip projects over the trachea, tip at the level of the   aortic knob, 3.7 cm superior to the erna. 2. Pulmonary edema evident. 3. Cardiomegaly. 4. No pneumothorax. 5. Nonspecific prominence of the azygos vein region may be related vascular   engorgement, adenopathy or underlying mass lesion.          XR CHEST PORTABLE   Final Result   Questionable right basilar atelectasis or pneumonia on a limited portable   exam.                 Assessment/Plan:    Active Hospital Problems    Diagnosis    Chronic atrial fibrillation ([facility]) [I48.2]     Priority: High    Pneumonia of right lower lobe due to infectious organism ([address] [J18.1]    PEA (Pulseless electrical activity) ([facility]) [I46.9]    Ileus ([address] [K56.7]    Respiratory failure ([address] [J96.90]    HCAP (healthcare-associated pneumonia) [J18.9]    Ventricular tachycardia ([facility]) [I47.2]    Shock circulatory ([address] [R57.9]    Tachypnea [Y73.41]    Neutrophilic leukocytosis [R86.3]    Chronic respiratory failure with hypoxia ([facility]) [J96.11]    Cardiac arrest ([address] [I46.9]    Acute on chronic systolic HF (heart failure) ([facility]) [I50.23]    Acute pulmonary edema ([facility]) [J81.0]    CAD (coronary artery disease) [I25.10]    Morbid obesity due to excess calories ([address] [E66.01]    Acute renal failure (ARF) ([facility]) [N17.9]    Severe sepsis ([facility] [A41.9, R65.20]    DMII (diabetes mellitus, type 2) ([facility] [E11.9]    COPD exacerbation ([facility] [J44.1]    Cardiomyopathy ([facility] [I42.9]    Hypotension [I95.9]    Chronic systolic CHF (congestive heart failure), NYHA class 3 (HCC) [I50.22]    Acute hyperkalemia [E87.5]    Essential hypertension [I10]    Alcohol abuse, continuous [F10.10]    Tachycardia [R00.0]    Hyperlipidemia [E78.5]         Acute Hypercapnic / Hypoxemic resp failure  Pt intubated initially, extubated 7/3/19  On O2 NC, wean as able, on 4L     S/p Cardiac arrest/A fib with RVR  PEA-treated with amiodarone drip-discontinued  Patient in atrial fibrillation-metoprolol to control heart rate  Patient received 2 doses of digoxin so far  Cardiology following, given another amio bolus and was on amio gtt-->PO     Dysphagia - awaited SLP eval to start diet, pt refused NG tube/tube feeds. Reviewed MBS, diet per SLP     Anemia - hb dropped to 6.7, s/p blood transfusion 7/3. Suspect 2/2 renal failure, hb 7.6-->7.4-->7.6, monitor     Septic shock  resolved     Acute renal failure  CRRT per nephrology with fluid removal until saturday, UOP slowly improving  Will need intermittent HD per renal, appr renal recs. Plan for tunneled catheter placement today     Pneumonia  Likely gram-negative  Complete course of abx      DM II  - metformin d/obie  On 10 units Lantus, continue sliding scale     Elevated LFTs with abd distension - suspect ileus   Suspected C diff colitis - ruled out since pt had formed stools  reviewed CT abd   D/c PO vanco/flagyl IV     Acute on chronic systolic CHF  Low-dose beta-blocker  Cardiology following, fluid removal with CRRT  Started on milrinone gtt on 7/2, d/obie 7/4  CXR with pulm edema, needs more fluid removal with dialysis       Diet: DIET CARB CONTROL; Carb Control: 4 carb choices (60 gms)/meal; Low Sodium (2 GM);  Daily Fluid Restriction: 1500 ml  Code Status: Full Code    PT/OT Eval Status: ordered    Dispo - Wei Arshad MD

## 2019-07-11 NOTE — PROGRESS NOTES
Centrilobular emphysema (HCC)    Hypomagnesemia    Heart failure, acute on chronic, systolic and diastolic (HCC)    Persistent atrial fibrillation (HCC)    Anemia    DMII (diabetes mellitus, type 2) (HCC)    Constipation    Hypokalemia    Acute on chronic systolic heart failure (HCC)    Atrial fibrillation, rapid (HCC)    COPD with acute exacerbation (HCC)    Cocaine use    Severe sepsis (HCC)    Atrial fibrillation with RVR (HCC)    Hyponatremia    Acute on chronic respiratory failure with hypoxia (HCC)    Respiratory distress    CHF, left ventricular (HCC)    Nicotine dependence    Suspected sleep apnea    Coronary artery disease involving native coronary artery of native heart without angina pectoris    CAD (coronary artery disease)    Acute renal failure (ARF) (HCC)    Morbid obesity due to excess calories (HCC)    Acute respiratory failure with hypoxia (HCC)    Nocturnal hypoxia    Hypercapnemia    SOB (shortness of breath)    Acute on chronic respiratory failure with hypercapnia (HCC)    Chronic respiratory failure with hypercapnia (HCC)    Acute pulmonary edema (HCC)    Acute on chronic systolic HF (heart failure) (HCC)    Hx of AKA (above knee amputation), left (HCC)    Respiratory failure (HCC)    HCAP (healthcare-associated pneumonia)    Ventricular tachycardia (HCC)    Shock circulatory (HCC)    Tachypnea    Neutrophilic leukocytosis    Chronic respiratory failure with hypoxia (HCC)    Cardiac arrest (HCC)    PEA (Pulseless electrical activity) (HCC)    Ileus ([facility])    Pneumonia of right lower lobe due to infectious organism ([facility]     Allergies   Allergen Reactions    Rocephin [Ceftriaxone] Other (See Comments)     Sloughing of skin (blisters) on hands and lower arms; pancytopenia     Treatment Diagnosis: Dysphagia     Chart review:   6/23/2019 admitted with SOB.   Admission H&P HPI:  Pt seen just prior to intubation and again after intubation. Patient is a will tolerate recommended diet without observed clinical signs of aspiration --ongoing  5. The patient will improve swallow response via thermal gustatory stimulation and laryngeal/ pharyngeal ex 5/5 each. - ongoing- introduced this session and completed 10 reps with min cues    Assessment:   Impressions:   Dysphagia Diagnosis: Mild oral stage dysphagia, Mild-mod pharyngeal stage dysphagia characterized by prolonged, but functional mastication; functional but disorganized bolus formation and A-P oral transit regular food consistencies; delayed initiation of swallow. · Slight delayed cough with thin liquids which was present during MBS with no observed aspiration. Diet Recommendations:   Solid consistency: (continue as ordered by MD)   Liquid consistency: (continue as ordered by MD)       Recommended Form of Meds: PO    Strategies:   Compensatory Swallowing Strategies: Upright as possible for all oral intake, Small bites/sips, External pacing, Swallow 2 times per bite/sip, Remain upright for 30-45 minutes after meals    Education:  Consulted and agree with results and recommendations: Patient, RN  Patient Education: Completed on recommendations/plan  Patient Education Response: Needs reinforcement    Prognosis for Dysphagia:   Guarded    Plan:     Continue Dysphagia Therapy: Yes  Interventions: Therapeutic Interventions: Therapeutic PO trials with SLP, Patient/Family education, Bolus control exercises, Laryngeal exercises, Pharyngeal exercises  Duration/Frequency of therapy while on unit: Duration/Frequency of Treatment  Duration/Frequency of Treatment: ST to tx 3-5 times per week for dysphaiga during acute admission; will determine if additional therapy warranted at D/C  Discharge Instructions:   Anticipate Yes for further skilled Speech Therapy for Dysphagia at discharge    This note serves as a D/C Summary in the event that this patient is discharged prior to the next therapy session.     Coded treatment time: 0  Total treatment time: 25 minutes    Electronically signed by Oscar Astudillo M.S./[facility]-SL[facility] #[id number] on 7/11/2019 at 11:07 AM

## 2019-07-11 NOTE — PROGRESS NOTES
Nephrology ([facility]) Progress Note    CC:  REJI and hyperkalemia    Subjective:    HPI:  TDC placed yesterday in IR         ROS:  In bed. No fever not participating in therapy    [address] [place] [address]:  medications reviewed.  metoprolol succinate  50 mg Oral Daily    amiodarone  200 mg Oral BID    polyethylene glycol  17 g Oral Daily    lactobacillus  2 capsule Oral BID     heparin (porcine)  5,000 Units Subcutaneous 3 times per day    sodium chloride flush  10 mL Intravenous 2 times per day    darbepoetin vinay-polysorbate  60 mcg Subcutaneous Weekly - Thursday    insulin glargine  10 Units Subcutaneous Nightly    gabapentin  50 mg Oral 3 times per day    insulin lispro  0-18 Units Subcutaneous Q4H    mometasone-formoterol  2 puff Inhalation BID    sodium chloride flush  10 mL Intravenous 2 times per day    ipratropium-albuterol  1 ampule Inhalation Q4H    famotidine (PEPCID) injection  20 mg Intravenous Daily         Objective:  Blood pressure 104/70, pulse 102, temperature 98.1 °F (36.7 °C), temperature source Oral, resp. rate 20, height 5' 7\" (1.702 m), weight 199 lb 4.7 oz (90.4 kg), SpO2 97 %.     Intake/Output Summary (Last 24 hours) at 7/11/2019 1225  Last data filed at 7/11/2019 1201  Gross per 24 hour   Intake 640 ml   Output 3425 ml   Net -2785 ml     General:  NAD, On O2  Chest:  coarse BS bilaterally  CVS:  Irregular  Abdominal: mild generalized tenderness noted no masses  Extremities:  Trace edema  Skin:  no rash  R IJ TDC in place    Labs:  Renal panel:  Lab Results   Component Value Date/Time     (L) 07/11/2019 06:30 AM    K 3.8 07/11/2019 06:30 AM    K 3.6 06/11/2019 06:00 AM    CO2 27 07/11/2019 06:30 AM    BUN 18 07/11/2019 06:30 AM    CREATININE 1.5 (H) 07/11/2019 06:30 AM    CALCIUM 8.8 07/11/2019 06:30 AM    PHOS 4.5 07/11/2019 06:30 AM    MG 1.60 (L) 07/11/2019 06:30 AM     CBC:  Lab Results   Component Value Date/Time    WBC 10.8 07/11/2019 06:30 AM    HGB 7.2 (L)

## 2019-07-11 NOTE — PROGRESS NOTES
Hospitalist Progress Note      PCP: Diego Garcia MD    Date of Admission: 6/22/2019    Subjective:  SOB/cough improved today    Medications:  Reviewed    Infusion Medications    sodium chloride 5 mL/hr at 06/28/19 1811    dextrose       Scheduled Medications    metoprolol succinate  50 mg Oral Daily    amiodarone  200 mg Oral BID    polyethylene glycol  17 g Oral Daily    lactobacillus  2 capsule Oral BID WC    heparin (porcine)  5,000 Units Subcutaneous 3 times per day    sodium chloride flush  10 mL Intravenous 2 times per day    darbepoetin vinay-polysorbate  60 mcg Subcutaneous Weekly - Thursday    insulin glargine  10 Units Subcutaneous Nightly    gabapentin  50 mg Oral 3 times per day    insulin lispro  0-18 Units Subcutaneous Q4H    mometasone-formoterol  2 puff Inhalation BID    sodium chloride flush  10 mL Intravenous 2 times per day    ipratropium-albuterol  1 ampule Inhalation Q4H    famotidine (PEPCID) injection  20 mg Intravenous Daily     PRN Meds: albumin human, heparin (porcine), metoprolol, perflutren lipid microspheres, lubrifresh P.M., sodium chloride flush, albuterol, sodium chloride flush, ondansetron, acetaminophen, glucose, dextrose, glucagon (rDNA), dextrose      Intake/Output Summary (Last 24 hours) at 7/11/2019 1338  Last data filed at 7/11/2019 1201  Gross per 24 hour   Intake 240 ml   Output 825 ml   Net -585 ml       Physical Exam Performed:    /70   Pulse 102   Temp 98.1 °F (36.7 °C) (Oral)   Resp 20   Ht 5' 7\" (1.702 m)   Wt 199 lb 4.7 oz (90.4 kg)   SpO2 98%   BMI 31.21 kg/m²       Gen: No distress. Obese, awake and alert   Eyes: PERRL. No sclera icterus. No conjunctival injection. ENT: No discharge. Pharynx clear  Neck: Trachea midline. No obvious mass. Large neck   Resp: diminished b/l  CV: tachy, irregular No murmur or rub.    GI: soft, obese, BS +  Skin: Warm, dry, normal texture and turgor. No nodule on exposed extremities.    Lymph: No cervical Result   Pneumatosis of the splenic flexure of the colon. This is associated with a   generalized ileus. The appearance is concerning for colitis. (Interestingly, the patient had a transient presentation of portal venous gas   near the transverse colon on 05/22 to 5/25/2017. This was apparently benign.)      Small amount of ascites. Bibasilar pulmonary consolidations- pneumonia versus atelectasis with small   effusions. Anasarca. XR CHEST PORTABLE   Final Result   No significant interval change. XR ABDOMEN (KUB) (SINGLE AP VIEW)   Final Result   Gaseous distention of this scratch the distension of the stomach and small   bowel with relative paucity of colonic gas. Findings are suspicious for   small bowel obstruction. XR CHEST PORTABLE   Final Result   PICC line in good position. XR CHEST PORTABLE   Final Result   Stable support lines and tubes. No significant change in findings of pulmonary edema, including bilateral   pleural effusions. XR CHEST PORTABLE   Final Result   Increased severity of right basilar pulmonary disease, with increased   adjacent pleural effusion      Slightly increased interstitial edema of both lungs. Unchanged left basilar airspace disease         US ABDOMEN LIMITED   Final Result   1. Limited bedside examination as described above. 2. No evidence of ascites. 3. Limited evaluation of the liver demonstrates no gross stigmata of   cirrhosis. US RENAL COMPLETE   Final Result   Horseshoe kidney. No hydronephrosis. Stable, simple cyst of the superior pole of the right kidney. XR CHEST PORTABLE   Final Result   1. Right transjugular sign of these catheter again likely terminating in the   right atrium. 2. Left basilar opacities compatible with atelectasis versus pneumonia with a   possible small effusion. XR CHEST PORTABLE   Final Result   No acute process.       Right jugular central venous type 2) ([facility] [E11.9]    COPD exacerbation (HCC) [J44.1]    Cardiomyopathy ([facility] [I42.9]    Hypotension [I95.9]    Chronic systolic CHF (congestive heart failure), NYHA class 3 ([facility]) [I50.22]    Acute hyperkalemia [E87.5]    Essential hypertension [I10]    Alcohol abuse, continuous [F10.10]    Tachycardia [R00.0]    Hyperlipidemia [E78.5]         Acute Hypercapnic / Hypoxemic resp failure  Pt intubated initially, extubated 7/3/19  On O2 NC, wean as able, on 4L     S/p Cardiac arrest/A fib with RVR  PEA-treated with amiodarone drip-discontinued  Patient in atrial fibrillation-metoprolol to control heart rate  Patient received 2 doses of digoxin so far  Cardiology following, given another amio bolus and was on amio gtt-->PO     Dysphagia - awaited SLP eval to start diet, pt refused NG tube/tube feeds. Reviewed MBS, diet per SLP     Anemia - hb dropped to 6.7, s/p blood transfusion 7/3. Suspect 2/2 renal failure, hb 7.6-->7.4-->7.6, monitor     Septic shock  resolved     Acute renal failure  CRRT per nephrology with fluid removal until saturday, UOP slowly improving  Will need intermittent HD per renal, appr renal recs. Plan for tunneled catheter placement today     Pneumonia  Likely gram-negative  Complete course of abx      DM II  - metformin d/obie  On 10 units Lantus, continue sliding scale     Elevated LFTs with abd distension - suspect ileus   Suspected C diff colitis - ruled out since pt had formed stools  reviewed CT abd   D/c PO vanco/flagyl IV     Acute on chronic systolic CHF  Low-dose beta-blocker  Cardiology following, fluid removal with CRRT  Started on milrinone gtt on 7/2, d/obie 7/4      Diet: DIET CARB CONTROL; Carb Control: 4 carb choices (60 gms)/meal; Low Sodium (2 GM);  Daily Fluid Restriction: 1500 ml  Code Status: Full Code    PT/OT Eval Status: ordered    [facility] rich Herrera MD

## 2019-07-11 NOTE — PROGRESS NOTES
Occupational Therapy  Krunal Harry  [phone]  [id number]    Attempted to see for OT follow up session this date with PT. Patient supine in bed and refused OOB tasks. Education provided on importance of OOB tasks and sitting on edge of bed or up in chair. Patient continued to decline OOB tasks or getting up to chair. Offered encouragement and patient continued to decline. Will attempt on 7/12/19 as able. If discharged prior to next OT session please see last daily note for discharge status.     Electronically signed by ИВАН Quintero on 7/11/2019 at 11:51 AM

## 2019-07-12 LAB
ALBUMIN SERPL-MCNC: 3.1 G/DL (ref 3.4–5)
ALP BLD-CCNC: 97 U/L (ref 40–129)
ALT SERPL-CCNC: 21 U/L (ref 10–40)
ANION GAP SERPL CALCULATED.3IONS-SCNC: 11 MMOL/L (ref 3–16)
AST SERPL-CCNC: 27 U/L (ref 15–37)
BASOPHILS ABSOLUTE: 0.1 K/UL (ref 0–0.2)
BASOPHILS RELATIVE PERCENT: 1.3 %
BILIRUB SERPL-MCNC: 0.6 MG/DL (ref 0–1)
BILIRUBIN DIRECT: 0.3 MG/DL (ref 0–0.3)
BILIRUBIN, INDIRECT: 0.3 MG/DL (ref 0–1)
BUN BLDV-MCNC: 26 MG/DL (ref 7–20)
CALCIUM SERPL-MCNC: 9.3 MG/DL (ref 8.3–10.6)
CHLORIDE BLD-SCNC: 96 MMOL/L (ref 99–110)
CO2: 27 MMOL/L (ref 21–32)
CREAT SERPL-MCNC: 1.7 MG/DL (ref 0.9–1.3)
EOSINOPHILS ABSOLUTE: 0.1 K/UL (ref 0–0.6)
EOSINOPHILS RELATIVE PERCENT: 1 %
GFR AFRICAN AMERICAN: 50
GFR NON-AFRICAN AMERICAN: 41
GLUCOSE BLD-MCNC: 101 MG/DL (ref 70–99)
GLUCOSE BLD-MCNC: 104 MG/DL (ref 70–99)
GLUCOSE BLD-MCNC: 108 MG/DL (ref 70–99)
GLUCOSE BLD-MCNC: 136 MG/DL (ref 70–99)
GLUCOSE BLD-MCNC: 137 MG/DL (ref 70–99)
GLUCOSE BLD-MCNC: 96 MG/DL (ref 70–99)
HCT VFR BLD CALC: 22 % (ref 40.5–52.5)
HEMOGLOBIN: 7.2 G/DL (ref 13.5–17.5)
LYMPHOCYTES ABSOLUTE: 1.1 K/UL (ref 1–5.1)
LYMPHOCYTES RELATIVE PERCENT: 12 %
MAGNESIUM: 1.4 MG/DL (ref 1.8–2.4)
MCH RBC QN AUTO: 27.6 PG (ref 26–34)
MCHC RBC AUTO-ENTMCNC: 32.8 G/DL (ref 31–36)
MCV RBC AUTO: 84.3 FL (ref 80–100)
MONOCYTES ABSOLUTE: 1 K/UL (ref 0–1.3)
MONOCYTES RELATIVE PERCENT: 12 %
NEUTROPHILS ABSOLUTE: 6.4 K/UL (ref 1.7–7.7)
NEUTROPHILS RELATIVE PERCENT: 73.7 %
PDW BLD-RTO: 18.3 % (ref 12.4–15.4)
PERFORMED ON: ABNORMAL
PERFORMED ON: NORMAL
PHOSPHORUS: 4.8 MG/DL (ref 2.5–4.9)
PLATELET # BLD: 335 K/UL (ref 135–450)
PMV BLD AUTO: 8.8 FL (ref 5–10.5)
POTASSIUM SERPL-SCNC: 4.1 MMOL/L (ref 3.5–5.1)
RBC # BLD: 2.61 M/UL (ref 4.2–5.9)
SODIUM BLD-SCNC: 134 MMOL/L (ref 136–145)
TOTAL PROTEIN: 7.6 G/DL (ref 6.4–8.2)
WBC # BLD: 8.7 K/UL (ref 4–11)

## 2019-07-12 PROCEDURE — 94640 AIRWAY INHALATION TREATMENT: CPT

## 2019-07-12 PROCEDURE — 94761 N-INVAS EAR/PLS OXIMETRY MLT: CPT

## 2019-07-12 PROCEDURE — 99232 SBSQ HOSP IP/OBS MODERATE 35: CPT | Performed by: NURSE PRACTITIONER

## 2019-07-12 PROCEDURE — 85025 COMPLETE CBC W/AUTO DIFF WBC: CPT

## 2019-07-12 PROCEDURE — G0328 FECAL BLOOD SCRN IMMUNOASSAY: HCPCS

## 2019-07-12 PROCEDURE — 2060000000 HC ICU INTERMEDIATE R&B

## 2019-07-12 PROCEDURE — 6370000000 HC RX 637 (ALT 250 FOR IP): Performed by: NURSE PRACTITIONER

## 2019-07-12 PROCEDURE — 94668 MNPJ CHEST WALL SBSQ: CPT

## 2019-07-12 PROCEDURE — 83735 ASSAY OF MAGNESIUM: CPT

## 2019-07-12 PROCEDURE — 6360000002 HC RX W HCPCS: Performed by: INTERNAL MEDICINE

## 2019-07-12 PROCEDURE — 97530 THERAPEUTIC ACTIVITIES: CPT

## 2019-07-12 PROCEDURE — 2580000003 HC RX 258: Performed by: INTERNAL MEDICINE

## 2019-07-12 PROCEDURE — 94669 MECHANICAL CHEST WALL OSCILL: CPT

## 2019-07-12 PROCEDURE — 94660 CPAP INITIATION&MGMT: CPT

## 2019-07-12 PROCEDURE — 80076 HEPATIC FUNCTION PANEL: CPT

## 2019-07-12 PROCEDURE — 90935 HEMODIALYSIS ONE EVALUATION: CPT

## 2019-07-12 PROCEDURE — 6370000000 HC RX 637 (ALT 250 FOR IP): Performed by: INTERNAL MEDICINE

## 2019-07-12 PROCEDURE — 80069 RENAL FUNCTION PANEL: CPT

## 2019-07-12 PROCEDURE — 2700000000 HC OXYGEN THERAPY PER DAY

## 2019-07-12 RX ORDER — AMIODARONE HYDROCHLORIDE 200 MG/1
200 TABLET ORAL DAILY
Status: DISCONTINUED | OUTPATIENT
Start: 2019-07-13 | End: 2019-07-23 | Stop reason: HOSPADM

## 2019-07-12 RX ORDER — OMEPRAZOLE 20 MG/1
40 CAPSULE, DELAYED RELEASE ORAL DAILY
Status: DISCONTINUED | OUTPATIENT
Start: 2019-07-12 | End: 2019-07-23 | Stop reason: HOSPADM

## 2019-07-12 RX ORDER — HEPARIN SODIUM 1000 [USP'U]/ML
3800 INJECTION, SOLUTION INTRAVENOUS; SUBCUTANEOUS PRN
Status: DISCONTINUED | OUTPATIENT
Start: 2019-07-12 | End: 2019-07-23 | Stop reason: HOSPADM

## 2019-07-12 RX ADMIN — Medication 10 ML: at 14:40

## 2019-07-12 RX ADMIN — Medication 10 ML: at 20:46

## 2019-07-12 RX ADMIN — IPRATROPIUM BROMIDE AND ALBUTEROL SULFATE 1 AMPULE: .5; 3 SOLUTION RESPIRATORY (INHALATION) at 15:35

## 2019-07-12 RX ADMIN — HEPARIN SODIUM 5000 UNITS: 5000 INJECTION INTRAVENOUS; SUBCUTANEOUS at 06:42

## 2019-07-12 RX ADMIN — IPRATROPIUM BROMIDE AND ALBUTEROL SULFATE 1 AMPULE: .5; 3 SOLUTION RESPIRATORY (INHALATION) at 23:55

## 2019-07-12 RX ADMIN — Medication 50 MG: at 14:53

## 2019-07-12 RX ADMIN — INSULIN GLARGINE 10 UNITS: 100 INJECTION, SOLUTION SUBCUTANEOUS at 20:46

## 2019-07-12 RX ADMIN — HEPARIN SODIUM 5000 UNITS: 5000 INJECTION INTRAVENOUS; SUBCUTANEOUS at 20:46

## 2019-07-12 RX ADMIN — METOPROLOL SUCCINATE 50 MG: 50 TABLET, EXTENDED RELEASE ORAL at 14:39

## 2019-07-12 RX ADMIN — IPRATROPIUM BROMIDE AND ALBUTEROL SULFATE 1 AMPULE: .5; 3 SOLUTION RESPIRATORY (INHALATION) at 01:17

## 2019-07-12 RX ADMIN — IPRATROPIUM BROMIDE AND ALBUTEROL SULFATE 1 AMPULE: .5; 3 SOLUTION RESPIRATORY (INHALATION) at 04:39

## 2019-07-12 RX ADMIN — OMEPRAZOLE 40 MG: 20 CAPSULE, DELAYED RELEASE ORAL at 14:52

## 2019-07-12 RX ADMIN — Medication 2 CAPSULE: at 14:39

## 2019-07-12 RX ADMIN — MOMETASONE FUROATE AND FORMOTEROL FUMARATE DIHYDRATE 2 PUFF: 100; 5 AEROSOL RESPIRATORY (INHALATION) at 20:22

## 2019-07-12 RX ADMIN — HEPARIN SODIUM 5000 UNITS: 5000 INJECTION INTRAVENOUS; SUBCUTANEOUS at 14:41

## 2019-07-12 RX ADMIN — HEPARIN SODIUM 3800 UNITS: 1000 INJECTION, SOLUTION INTRAVENOUS; SUBCUTANEOUS at 12:40

## 2019-07-12 RX ADMIN — IPRATROPIUM BROMIDE AND ALBUTEROL SULFATE 1 AMPULE: .5; 3 SOLUTION RESPIRATORY (INHALATION) at 20:20

## 2019-07-12 RX ADMIN — Medication 50 MG: at 20:51

## 2019-07-12 RX ADMIN — IPRATROPIUM BROMIDE AND ALBUTEROL SULFATE 1 AMPULE: .5; 3 SOLUTION RESPIRATORY (INHALATION) at 12:31

## 2019-07-12 RX ADMIN — POLYETHYLENE GLYCOL 3350 17 G: 17 POWDER, FOR SOLUTION ORAL at 14:40

## 2019-07-12 RX ADMIN — Medication 50 MG: at 06:42

## 2019-07-12 RX ADMIN — Medication 10 ML: at 14:55

## 2019-07-12 ASSESSMENT — PAIN SCALES - GENERAL
PAINLEVEL_OUTOF10: 0

## 2019-07-12 NOTE — CARE COORDINATION
Per [facility] Juanita at [facility], they are able to accept patient, however, he would need to agree to stay at least 4-6 weeks d/t his limitations and needs. Patient has been there before and typically only stays a short stay. With him being a new HD patient and not at his baseline with transfers, they believe that he will need to stay longer than he typically does to be safe to return home. Sw spoke to patient in regards to this. He agreed that he needs more assist than he can get at home now. He is agreeable with staying 4-6 weeks. He states that with him being on new HD, he is worried about returning home right away and getting to/from dialysis at his current state. Albert updated [facility]. They will initiate [employer] pre-cert, will need updated therapy evals(therapy aware). Lisha Downs working on new HD spot for patient. Facility will need at least a days notice of HD slot time to be able to arrange transport to/from in time.       David Carlson, [facility]  [phone]  7/12/19 at 3:31 PM

## 2019-07-12 NOTE — PROGRESS NOTES
07/12/2019 06:50 AM    HCT 22.0 (L) 07/12/2019 06:50 AM     07/12/2019 06:50 AM       Assessment/Plan:  Reviewed old records and labs.     1) REJI -Cr unchanged since yesterday at 1.6 mg but UOP only 285 ml/24 hours - Cr 1.7  mg pre  HD but low UOP, 700 ml/24 hours   remains HD dependent; TDC placed Needs out patient HD spot   HD in progress 3 L bath UF gaol 2.5 tw 88 KG Continue to monitor renal function and UOP for recovery     2) Hypophosphatemia corrected    3) Septic Shock - off pressors Vancomycin dosing per pharmacy    4) Anemia - received 1 unit of PRBC 7/3/19 - HGB down to 7.2  gm Monitor continue DAVID with HD    5) Systolic CHF - FR with HD    6) A fib on 1 [facility]

## 2019-07-12 NOTE — PROGRESS NOTES
Impaired  Orientation Level: Oriented to person;Disoriented to time;Disoriented to situation     Objective      Bed mobility  Rolling to Left: Moderate assistance;Maximum assistance  Rolling to Right: Moderate assistance;Maximum assistance     Other Activities: Other (see comment)  Comment: Pt required Mod A to roll multiple trials L/R for placement of maxi-move sling. Pt was then lifted to commode using maxi-move (assist x 2). Pt had a BM (loose), then required total assist for mireya-care. He was transferred back to bed using maxi-move. Pt required max A to roll and re-position in bed. He was left in position of comfort with HOB elevated, call light in reach, alarm in place. AM-PAC Score  AM-PAC Inpatient Mobility Raw Score : 9 (07/12/19 1623)  AM-PAC Inpatient T-Scale Score : 30.55 (07/12/19 1623)  Mobility Inpatient CMS 0-100% Score: 81.38 (07/12/19 1623)  Mobility Inpatient CMS G-Code Modifier : CM (07/12/19 1623)          Goals  Short term goals  Time Frame for Short term goals: Upon d/c acute care setting. --goals ongoing 7/9, with little progress. Short term goal 1: Bed Mob Mod assist x 1   Short term goal 2: Transfer via Lift Equipt/Assist device Min/Mod assist x 2. Short term goal 3: Amb (n/a). Long term goals  Time Frame for Long term goals : tbd at next level of care. Patient Goals   Patient goals : Home with family assist/support. Plan    Plan  Times per week: 3-5x week while in acute care setting. Current Treatment Recommendations: Strengthening, Functional Mobility Training, Transfer Training, Safety Education & Training, Patient/Caregiver Education & Training  Safety Devices  Type of devices:  All fall risk precautions in place, Call light within reach, Bed alarm in place, Left in bed, Nurse notified  Restraints  Initially in place: No     Therapy Time   Individual Concurrent Group Co-treatment   Time In 1555         Time Out 1615         Minutes 20         Timed Code Treatment

## 2019-07-12 NOTE — PROGRESS NOTES
Nutrition Assessment    Type and Reason for Visit: Reassess    Nutrition Recommendations:   CC/2 gm Na/1500 ml fl res  Will monitor nutritional adequacy, nutrition-related labs, weights, BMs, and clinical progress     Nutrition Assessment: Pt with some improvement in nutrition status, has now been cleared for diet per SLP and is eating better. Pt remains at risk for nutrition compromise r/t cardiac, renal & endocrine dysfunction, known losses from dialysis, COPD, non compliance to MD orders. Continue current diet. Malnutrition Assessment:  · Malnutrition Status: At risk for malnutrition  · Context: Acute illness or injury  · Findings of the 6 clinical characteristics of malnutrition (Minimum of 2 out of 6 clinical characteristics is required to make the diagnosis of moderate or severe Protein Calorie Malnutrition based on AND/ASPEN Guidelines):  1. Energy Intake-Less than or equal to 50% of estimated energy requirement, Greater than or equal to 5 days    2. Weight Loss-Unable to assess(r/t edema),    3. Fat Loss-Unable to assess,    4. Muscle Loss-Unable to assess,    5. Fluid Accumulation-Moderate to severe fluid accumulation, Generalized  6.  Strength-Not measured    Nutrition Risk Level:  Moderate    Nutrient Needs:  · Estimated Daily Total Kcal: 6809-8396 (15-18 x ABW of 100 kg)  · Estimated Daily Protein (g):  (1.2-2 x IBW of 67 kg)  · Estimated Daily Total Fluid (ml/day): per MD    Nutrition Diagnosis:   · Problem: Inadequate energy intake  · Etiology: related to Insufficient energy/nutrient consumption     Signs and symptoms:  as evidenced by Diet history of poor intake    Objective Information:  · Nutrition-Focused Physical Findings: -23 L   · Wound Type: (Tx continues to skin tear left forearm)  · Current Nutrition Therapies:  · Oral Diet Orders: Carb Control 4 Carbs/Meal, 2gm Sodium, Fluid Restriction   · Oral Diet intake: %  · Oral Nutrition Supplement (ONS) Orders:

## 2019-07-12 NOTE — PROGRESS NOTES
WBC 12.7* 10.8 8.7   HGB 7.3* 7.2* 7.2*   HCT 23.3* 22.3* 22.0*   MCV 84.7 84.5 84.3    311 335     BMP:   Recent Labs     07/10/19  0510 07/11/19  0630 07/12/19  0650   * 134* 134*   K 4.0 3.8 4.1   CL 92* 95* 96*   CO2 26 27 27   PHOS 5.3* 4.5 4.8   BUN 26* 18 26*   CREATININE 2.0* 1.5* 1.7*     GLUCOSE:   Recent Labs     07/10/19  0510 07/11/19  0630 07/12/19  0650   GLUCOSE 93 98 108*     LIVER PROFILE:   Lab Results   Component Value Date    AST 27 07/12/2019    ALT 21 07/12/2019    LIPASE 16.0 07/31/2015    AMYLASE 32 07/31/2015    LABALBU 3.1 07/12/2019    BILIDIR 0.3 07/12/2019    BILITOT 0.6 07/12/2019    ALKPHOS 97 07/12/2019     PT/INR:   Lab Results   Component Value Date    PROTIME 13.5 07/10/2019    INR 1.18 07/10/2019     APTT:   Lab Results   Component Value Date    APTT 36.2 06/23/2019     Pro-BNP:    Lab Results   Component Value Date    PROBNP 3,354 06/22/2019    PROBNP 1,402 06/10/2019    PROBNP 2,366 06/06/2019     ENZYMES:  Lab Results   Component Value Date    CKTOTAL 116 06/26/2019    TROPONINI 0.02 06/23/2019     FASTING LIPID PANEL:  Lab Results   Component Value Date    CHOL 126 03/29/2019    HDL 27 03/29/2019    LDLCALC 76 03/29/2019    TRIG 770 06/26/2019       Diagnostics:    EKG: Atrial fibrillation with rapid ventricular responseLow voltage QRSST & T wave abnormality, consider inferolateral ischemiaAbnormal ECGWhen compared with ECG of 23-JUN-2019 08:18,Vent. rate has increased BY  70 BPMQuestionable change in QRS durationCriteria for Anterior infarct are no longer PresentCriteria for Anterolateral infarct are no longer PresentConfirmed by TOAN SANCHEZ, Jony Pennington ([id number]) on 7/2/2019 5:47:36 PM    ECHO:  Echo 7/1/2019:  Normal left ventricular size and wall thickness. Severe left ventricular dysfunction with global hypokinesis. Ejection fraction is visually estimated to be 30-35%. The right ventricle is severely enlarged with severely reduced function.   The left atrium is

## 2019-07-12 NOTE — PROGRESS NOTES
Occupational Therapy    Pt just returned from dialysis. Per report from social work he does not want to do any therapy at this time. Also has respiratory therapy in the room. Will attempt back as schedule permits.   Monica Ng, [address] [place] [address]

## 2019-07-12 NOTE — PROGRESS NOTES
Speech Language Pathology    Dysphagia follow-up attempt at 12:00 PM. Pt was out of room at dialysis. SLP to re-attempt as schedule permits. Thank you.     MARK Francis, Student SLP 7/12/19 12:09PM.

## 2019-07-13 LAB
ALBUMIN SERPL-MCNC: 3 G/DL (ref 3.4–5)
ALP BLD-CCNC: 100 U/L (ref 40–129)
ALT SERPL-CCNC: 20 U/L (ref 10–40)
ANION GAP SERPL CALCULATED.3IONS-SCNC: 11 MMOL/L (ref 3–16)
AST SERPL-CCNC: 28 U/L (ref 15–37)
BASOPHILS ABSOLUTE: 0.1 K/UL (ref 0–0.2)
BASOPHILS RELATIVE PERCENT: 1.3 %
BILIRUB SERPL-MCNC: 0.6 MG/DL (ref 0–1)
BILIRUBIN DIRECT: 0.3 MG/DL (ref 0–0.3)
BILIRUBIN, INDIRECT: 0.3 MG/DL (ref 0–1)
BUN BLDV-MCNC: 18 MG/DL (ref 7–20)
CALCIUM SERPL-MCNC: 8.7 MG/DL (ref 8.3–10.6)
CHLORIDE BLD-SCNC: 93 MMOL/L (ref 99–110)
CO2: 27 MMOL/L (ref 21–32)
CREAT SERPL-MCNC: 1.6 MG/DL (ref 0.9–1.3)
EOSINOPHILS ABSOLUTE: 0.1 K/UL (ref 0–0.6)
EOSINOPHILS RELATIVE PERCENT: 1.3 %
GFR AFRICAN AMERICAN: 54
GFR NON-AFRICAN AMERICAN: 44
GLUCOSE BLD-MCNC: 101 MG/DL (ref 70–99)
GLUCOSE BLD-MCNC: 109 MG/DL (ref 70–99)
GLUCOSE BLD-MCNC: 114 MG/DL (ref 70–99)
GLUCOSE BLD-MCNC: 119 MG/DL (ref 70–99)
GLUCOSE BLD-MCNC: 135 MG/DL (ref 70–99)
GLUCOSE BLD-MCNC: 138 MG/DL (ref 70–99)
HCT VFR BLD CALC: 22.9 % (ref 40.5–52.5)
HEMOGLOBIN: 7.4 G/DL (ref 13.5–17.5)
LYMPHOCYTES ABSOLUTE: 1.4 K/UL (ref 1–5.1)
LYMPHOCYTES RELATIVE PERCENT: 15.2 %
MAGNESIUM: 1.5 MG/DL (ref 1.8–2.4)
MCH RBC QN AUTO: 27.3 PG (ref 26–34)
MCHC RBC AUTO-ENTMCNC: 32.2 G/DL (ref 31–36)
MCV RBC AUTO: 84.6 FL (ref 80–100)
MONOCYTES ABSOLUTE: 1.3 K/UL (ref 0–1.3)
MONOCYTES RELATIVE PERCENT: 13.7 %
NEUTROPHILS ABSOLUTE: 6.3 K/UL (ref 1.7–7.7)
NEUTROPHILS RELATIVE PERCENT: 68.5 %
OCCULT BLOOD SCREENING: ABNORMAL
PDW BLD-RTO: 18.2 % (ref 12.4–15.4)
PERFORMED ON: ABNORMAL
PHOSPHORUS: 3.4 MG/DL (ref 2.5–4.9)
PLATELET # BLD: 343 K/UL (ref 135–450)
PMV BLD AUTO: 8.7 FL (ref 5–10.5)
POTASSIUM SERPL-SCNC: 3.8 MMOL/L (ref 3.5–5.1)
RBC # BLD: 2.71 M/UL (ref 4.2–5.9)
SODIUM BLD-SCNC: 131 MMOL/L (ref 136–145)
TOTAL PROTEIN: 7.5 G/DL (ref 6.4–8.2)
WBC # BLD: 9.2 K/UL (ref 4–11)

## 2019-07-13 PROCEDURE — 94669 MECHANICAL CHEST WALL OSCILL: CPT

## 2019-07-13 PROCEDURE — 6370000000 HC RX 637 (ALT 250 FOR IP): Performed by: INTERNAL MEDICINE

## 2019-07-13 PROCEDURE — 80069 RENAL FUNCTION PANEL: CPT

## 2019-07-13 PROCEDURE — 80076 HEPATIC FUNCTION PANEL: CPT

## 2019-07-13 PROCEDURE — 6360000002 HC RX W HCPCS: Performed by: INTERNAL MEDICINE

## 2019-07-13 PROCEDURE — 6370000000 HC RX 637 (ALT 250 FOR IP): Performed by: NURSE PRACTITIONER

## 2019-07-13 PROCEDURE — 94640 AIRWAY INHALATION TREATMENT: CPT

## 2019-07-13 PROCEDURE — 2580000003 HC RX 258: Performed by: INTERNAL MEDICINE

## 2019-07-13 PROCEDURE — 83735 ASSAY OF MAGNESIUM: CPT

## 2019-07-13 PROCEDURE — 94761 N-INVAS EAR/PLS OXIMETRY MLT: CPT

## 2019-07-13 PROCEDURE — 2700000000 HC OXYGEN THERAPY PER DAY

## 2019-07-13 PROCEDURE — 2060000000 HC ICU INTERMEDIATE R&B

## 2019-07-13 PROCEDURE — 85025 COMPLETE CBC W/AUTO DIFF WBC: CPT

## 2019-07-13 RX ORDER — GABAPENTIN 100 MG/1
100 CAPSULE ORAL 3 TIMES DAILY
Status: DISCONTINUED | OUTPATIENT
Start: 2019-07-13 | End: 2019-07-23 | Stop reason: HOSPADM

## 2019-07-13 RX ORDER — ACETAMINOPHEN 325 MG/1
650 TABLET ORAL EVERY 4 HOURS PRN
Status: DISCONTINUED | OUTPATIENT
Start: 2019-07-13 | End: 2019-07-23 | Stop reason: HOSPADM

## 2019-07-13 RX ORDER — TORSEMIDE 20 MG/1
20 TABLET ORAL DAILY
Status: DISCONTINUED | OUTPATIENT
Start: 2019-07-13 | End: 2019-07-20

## 2019-07-13 RX ADMIN — GABAPENTIN 100 MG: 100 CAPSULE ORAL at 16:50

## 2019-07-13 RX ADMIN — IPRATROPIUM BROMIDE AND ALBUTEROL SULFATE 1 AMPULE: .5; 3 SOLUTION RESPIRATORY (INHALATION) at 04:14

## 2019-07-13 RX ADMIN — IPRATROPIUM BROMIDE AND ALBUTEROL SULFATE 1 AMPULE: .5; 3 SOLUTION RESPIRATORY (INHALATION) at 19:56

## 2019-07-13 RX ADMIN — Medication 10 ML: at 11:57

## 2019-07-13 RX ADMIN — Medication 10 ML: at 21:51

## 2019-07-13 RX ADMIN — HEPARIN SODIUM 5000 UNITS: 5000 INJECTION INTRAVENOUS; SUBCUTANEOUS at 14:26

## 2019-07-13 RX ADMIN — OMEPRAZOLE 40 MG: 20 CAPSULE, DELAYED RELEASE ORAL at 06:41

## 2019-07-13 RX ADMIN — MOMETASONE FUROATE AND FORMOTEROL FUMARATE DIHYDRATE 2 PUFF: 100; 5 AEROSOL RESPIRATORY (INHALATION) at 08:31

## 2019-07-13 RX ADMIN — AMIODARONE HYDROCHLORIDE 200 MG: 200 TABLET ORAL at 08:03

## 2019-07-13 RX ADMIN — INSULIN GLARGINE 10 UNITS: 100 INJECTION, SOLUTION SUBCUTANEOUS at 21:50

## 2019-07-13 RX ADMIN — MOMETASONE FUROATE AND FORMOTEROL FUMARATE DIHYDRATE 2 PUFF: 100; 5 AEROSOL RESPIRATORY (INHALATION) at 19:56

## 2019-07-13 RX ADMIN — Medication 50 MG: at 06:00

## 2019-07-13 RX ADMIN — Medication 2 CAPSULE: at 16:50

## 2019-07-13 RX ADMIN — HEPARIN SODIUM 5000 UNITS: 5000 INJECTION INTRAVENOUS; SUBCUTANEOUS at 06:00

## 2019-07-13 RX ADMIN — METOPROLOL SUCCINATE 50 MG: 50 TABLET, EXTENDED RELEASE ORAL at 08:03

## 2019-07-13 RX ADMIN — Medication 10 ML: at 23:12

## 2019-07-13 RX ADMIN — GABAPENTIN 100 MG: 100 CAPSULE ORAL at 21:49

## 2019-07-13 RX ADMIN — TORSEMIDE 20 MG: 20 TABLET ORAL at 15:53

## 2019-07-13 RX ADMIN — POLYETHYLENE GLYCOL 3350 17 G: 17 POWDER, FOR SOLUTION ORAL at 09:00

## 2019-07-13 RX ADMIN — Medication 2 CAPSULE: at 08:03

## 2019-07-13 RX ADMIN — HEPARIN SODIUM 5000 UNITS: 5000 INJECTION INTRAVENOUS; SUBCUTANEOUS at 21:50

## 2019-07-13 RX ADMIN — IPRATROPIUM BROMIDE AND ALBUTEROL SULFATE 1 AMPULE: .5; 3 SOLUTION RESPIRATORY (INHALATION) at 12:12

## 2019-07-13 RX ADMIN — IPRATROPIUM BROMIDE AND ALBUTEROL SULFATE 1 AMPULE: .5; 3 SOLUTION RESPIRATORY (INHALATION) at 08:30

## 2019-07-13 RX ADMIN — ACETAMINOPHEN 650 MG: 325 TABLET ORAL at 17:24

## 2019-07-13 ASSESSMENT — PAIN DESCRIPTION - PROGRESSION
CLINICAL_PROGRESSION: NOT CHANGED
CLINICAL_PROGRESSION: NOT CHANGED

## 2019-07-13 ASSESSMENT — PAIN SCALES - GENERAL
PAINLEVEL_OUTOF10: 0
PAINLEVEL_OUTOF10: 5
PAINLEVEL_OUTOF10: 0
PAINLEVEL_OUTOF10: 10
PAINLEVEL_OUTOF10: 10

## 2019-07-13 ASSESSMENT — PAIN DESCRIPTION - ONSET
ONSET: ON-GOING
ONSET: ON-GOING

## 2019-07-13 ASSESSMENT — PAIN DESCRIPTION - ORIENTATION
ORIENTATION: LOWER
ORIENTATION: MID;LOWER;UPPER

## 2019-07-13 ASSESSMENT — PAIN DESCRIPTION - PAIN TYPE
TYPE: CHRONIC PAIN
TYPE: CHRONIC PAIN

## 2019-07-13 ASSESSMENT — PAIN DESCRIPTION - LOCATION
LOCATION: BACK
LOCATION: BACK

## 2019-07-13 ASSESSMENT — PAIN DESCRIPTION - DESCRIPTORS
DESCRIPTORS: DISCOMFORT
DESCRIPTORS: ACHING;DISCOMFORT

## 2019-07-13 ASSESSMENT — PAIN DESCRIPTION - FREQUENCY
FREQUENCY: CONTINUOUS
FREQUENCY: CONTINUOUS

## 2019-07-13 NOTE — PROGRESS NOTES
Hospitalist Progress Note    CC: Acute on chronic systolic HF (heart failure) [facility])    Hospital course:  60yo noncompliant WM with PMHx sig for DM, and CHF chronic systolic. He presents with acute resp failure and was emergently intubated. He then suffered a Vtach arrest and was defibrillated and requiried amiodarone drip. He then had acute kidney injury now requiring HD, septic shock, anemia due to renal failure and afib now on amiodarone. Now that pt will need ECF likely longer term, he will likely be compliant. Admit date: 6/22/2019  Days in hospital:  20    24 Hour Events: pt feels fair today    Subjective: pt says he had rough HD yesterday - SOB improved - awaiting outpt HD set up    ROS:   A comprehensive review of systems was negative except for: fatigue, weakness . Objective:    BP 93/66   Pulse 89   Temp 97.9 °F (36.6 °C) (Oral)   Resp 17   Ht 5' 7\" (1.702 m)   Wt 194 lb 3.6 oz (88.1 kg)   SpO2 97%   BMI 30.42 kg/m²     Gen: obese, ill appearing  HEENT: NC/AT, moist mucous membranes, no oropharyngeal erythema or exudate  Neck: supple, trachea midline, no anterior cervical or SC LAD  Heart:  Normal s1/s2, RRR, no murmurs, gallops, or rubs. 1+ leg edema, l AKA  Lungs:  diminished bilaterally, no wheeze, no rales, no rhonchi, no crackles, no use of accessory muscles  Abd: bowel sounds present, soft, nontender, nondistended, no masses  Extrem:  No clubbing, cyanosis,  1+ leg edema, L AKA  Skin: no rashes or lesions  Psych: A & O x3  Neuro: grossly intact, moves all four extremities.   U    Assessment:    Principal Problem:    Acute on chronic systolic HF (heart failure) (HCC)  Active Problems:    Chronic atrial fibrillation (HCC)    Hyperlipidemia    Alcohol abuse, continuous    Tachycardia    Essential hypertension    Acute hyperkalemia    Chronic systolic CHF (congestive heart failure), NYHA class 3 ([facility])    Hypotension    Cardiomyopathy

## 2019-07-14 LAB
ALBUMIN SERPL-MCNC: 3 G/DL (ref 3.4–5)
ALP BLD-CCNC: 100 U/L (ref 40–129)
ALT SERPL-CCNC: 18 U/L (ref 10–40)
ANION GAP SERPL CALCULATED.3IONS-SCNC: 11 MMOL/L (ref 3–16)
AST SERPL-CCNC: 23 U/L (ref 15–37)
BASOPHILS ABSOLUTE: 0.2 K/UL (ref 0–0.2)
BASOPHILS RELATIVE PERCENT: 1.4 %
BILIRUB SERPL-MCNC: 0.5 MG/DL (ref 0–1)
BILIRUBIN DIRECT: <0.2 MG/DL (ref 0–0.3)
BILIRUBIN, INDIRECT: ABNORMAL MG/DL (ref 0–1)
BUN BLDV-MCNC: 31 MG/DL (ref 7–20)
CALCIUM SERPL-MCNC: 8.9 MG/DL (ref 8.3–10.6)
CHLORIDE BLD-SCNC: 92 MMOL/L (ref 99–110)
CO2: 27 MMOL/L (ref 21–32)
CREAT SERPL-MCNC: 1.8 MG/DL (ref 0.9–1.3)
EOSINOPHILS ABSOLUTE: 0.1 K/UL (ref 0–0.6)
EOSINOPHILS RELATIVE PERCENT: 1.2 %
GFR AFRICAN AMERICAN: 47
GFR NON-AFRICAN AMERICAN: 39
GLUCOSE BLD-MCNC: 111 MG/DL (ref 70–99)
GLUCOSE BLD-MCNC: 114 MG/DL (ref 70–99)
GLUCOSE BLD-MCNC: 120 MG/DL (ref 70–99)
GLUCOSE BLD-MCNC: 123 MG/DL (ref 70–99)
GLUCOSE BLD-MCNC: 129 MG/DL (ref 70–99)
GLUCOSE BLD-MCNC: 135 MG/DL (ref 70–99)
GLUCOSE BLD-MCNC: 157 MG/DL (ref 70–99)
HCT VFR BLD CALC: 22.1 % (ref 40.5–52.5)
HEMOGLOBIN: 7.1 G/DL (ref 13.5–17.5)
LYMPHOCYTES ABSOLUTE: 1.5 K/UL (ref 1–5.1)
LYMPHOCYTES RELATIVE PERCENT: 13.6 %
MAGNESIUM: 1.3 MG/DL (ref 1.8–2.4)
MCH RBC QN AUTO: 26.5 PG (ref 26–34)
MCHC RBC AUTO-ENTMCNC: 32 G/DL (ref 31–36)
MCV RBC AUTO: 82.9 FL (ref 80–100)
MONOCYTES ABSOLUTE: 1.5 K/UL (ref 0–1.3)
MONOCYTES RELATIVE PERCENT: 14 %
NEUTROPHILS ABSOLUTE: 7.7 K/UL (ref 1.7–7.7)
NEUTROPHILS RELATIVE PERCENT: 69.8 %
PDW BLD-RTO: 18 % (ref 12.4–15.4)
PERFORMED ON: ABNORMAL
PHOSPHORUS: 4.9 MG/DL (ref 2.5–4.9)
PLATELET # BLD: 354 K/UL (ref 135–450)
PMV BLD AUTO: 8.4 FL (ref 5–10.5)
POTASSIUM SERPL-SCNC: 3.9 MMOL/L (ref 3.5–5.1)
RBC # BLD: 2.67 M/UL (ref 4.2–5.9)
SODIUM BLD-SCNC: 130 MMOL/L (ref 136–145)
TOTAL PROTEIN: 7.6 G/DL (ref 6.4–8.2)
WBC # BLD: 11.1 K/UL (ref 4–11)

## 2019-07-14 PROCEDURE — 6360000002 HC RX W HCPCS: Performed by: INTERNAL MEDICINE

## 2019-07-14 PROCEDURE — 6370000000 HC RX 637 (ALT 250 FOR IP): Performed by: NURSE PRACTITIONER

## 2019-07-14 PROCEDURE — 2580000003 HC RX 258: Performed by: INTERNAL MEDICINE

## 2019-07-14 PROCEDURE — 80076 HEPATIC FUNCTION PANEL: CPT

## 2019-07-14 PROCEDURE — 94640 AIRWAY INHALATION TREATMENT: CPT

## 2019-07-14 PROCEDURE — 2700000000 HC OXYGEN THERAPY PER DAY

## 2019-07-14 PROCEDURE — 94760 N-INVAS EAR/PLS OXIMETRY 1: CPT

## 2019-07-14 PROCEDURE — 6370000000 HC RX 637 (ALT 250 FOR IP): Performed by: INTERNAL MEDICINE

## 2019-07-14 PROCEDURE — 83735 ASSAY OF MAGNESIUM: CPT

## 2019-07-14 PROCEDURE — 2060000000 HC ICU INTERMEDIATE R&B

## 2019-07-14 PROCEDURE — 80069 RENAL FUNCTION PANEL: CPT

## 2019-07-14 PROCEDURE — 94669 MECHANICAL CHEST WALL OSCILL: CPT

## 2019-07-14 PROCEDURE — 85025 COMPLETE CBC W/AUTO DIFF WBC: CPT

## 2019-07-14 RX ORDER — MAGNESIUM SULFATE IN WATER 40 MG/ML
2 INJECTION, SOLUTION INTRAVENOUS ONCE
Status: COMPLETED | OUTPATIENT
Start: 2019-07-14 | End: 2019-07-14

## 2019-07-14 RX ORDER — TRAMADOL HYDROCHLORIDE 50 MG/1
50 TABLET ORAL EVERY 6 HOURS PRN
Status: DISCONTINUED | OUTPATIENT
Start: 2019-07-14 | End: 2019-07-20

## 2019-07-14 RX ADMIN — IPRATROPIUM BROMIDE AND ALBUTEROL SULFATE 1 AMPULE: .5; 3 SOLUTION RESPIRATORY (INHALATION) at 15:59

## 2019-07-14 RX ADMIN — IPRATROPIUM BROMIDE AND ALBUTEROL SULFATE 1 AMPULE: .5; 3 SOLUTION RESPIRATORY (INHALATION) at 12:28

## 2019-07-14 RX ADMIN — Medication 10 ML: at 12:50

## 2019-07-14 RX ADMIN — OMEPRAZOLE 40 MG: 20 CAPSULE, DELAYED RELEASE ORAL at 06:42

## 2019-07-14 RX ADMIN — HEPARIN SODIUM 5000 UNITS: 5000 INJECTION INTRAVENOUS; SUBCUTANEOUS at 06:42

## 2019-07-14 RX ADMIN — Medication 2 CAPSULE: at 08:39

## 2019-07-14 RX ADMIN — GABAPENTIN 100 MG: 100 CAPSULE ORAL at 08:40

## 2019-07-14 RX ADMIN — HEPARIN SODIUM 5000 UNITS: 5000 INJECTION INTRAVENOUS; SUBCUTANEOUS at 12:52

## 2019-07-14 RX ADMIN — IPRATROPIUM BROMIDE AND ALBUTEROL SULFATE 1 AMPULE: .5; 3 SOLUTION RESPIRATORY (INHALATION) at 04:05

## 2019-07-14 RX ADMIN — Medication 10 ML: at 08:40

## 2019-07-14 RX ADMIN — INSULIN GLARGINE 10 UNITS: 100 INJECTION, SOLUTION SUBCUTANEOUS at 22:19

## 2019-07-14 RX ADMIN — MAGNESIUM SULFATE HEPTAHYDRATE 2 G: 40 INJECTION, SOLUTION INTRAVENOUS at 12:52

## 2019-07-14 RX ADMIN — MOMETASONE FUROATE AND FORMOTEROL FUMARATE DIHYDRATE 2 PUFF: 100; 5 AEROSOL RESPIRATORY (INHALATION) at 12:32

## 2019-07-14 RX ADMIN — TORSEMIDE 20 MG: 20 TABLET ORAL at 08:39

## 2019-07-14 RX ADMIN — GABAPENTIN 100 MG: 100 CAPSULE ORAL at 22:18

## 2019-07-14 RX ADMIN — AMIODARONE HYDROCHLORIDE 200 MG: 200 TABLET ORAL at 08:39

## 2019-07-14 RX ADMIN — GABAPENTIN 100 MG: 100 CAPSULE ORAL at 15:14

## 2019-07-14 RX ADMIN — METOPROLOL SUCCINATE 50 MG: 50 TABLET, EXTENDED RELEASE ORAL at 08:39

## 2019-07-14 RX ADMIN — IPRATROPIUM BROMIDE AND ALBUTEROL SULFATE 1 AMPULE: .5; 3 SOLUTION RESPIRATORY (INHALATION) at 19:55

## 2019-07-14 RX ADMIN — MOMETASONE FUROATE AND FORMOTEROL FUMARATE DIHYDRATE 2 PUFF: 100; 5 AEROSOL RESPIRATORY (INHALATION) at 19:55

## 2019-07-14 RX ADMIN — HEPARIN SODIUM 5000 UNITS: 5000 INJECTION INTRAVENOUS; SUBCUTANEOUS at 22:19

## 2019-07-14 RX ADMIN — IPRATROPIUM BROMIDE AND ALBUTEROL SULFATE 1 AMPULE: .5; 3 SOLUTION RESPIRATORY (INHALATION) at 00:05

## 2019-07-14 RX ADMIN — Medication 10 ML: at 22:24

## 2019-07-14 ASSESSMENT — PAIN DESCRIPTION - FREQUENCY: FREQUENCY: CONTINUOUS

## 2019-07-14 ASSESSMENT — PAIN DESCRIPTION - PAIN TYPE: TYPE: CHRONIC PAIN

## 2019-07-14 ASSESSMENT — PAIN SCALES - GENERAL: PAINLEVEL_OUTOF10: 10

## 2019-07-14 ASSESSMENT — PAIN DESCRIPTION - ORIENTATION: ORIENTATION: MID;LOWER;UPPER

## 2019-07-14 ASSESSMENT — PAIN DESCRIPTION - ONSET: ONSET: ON-GOING

## 2019-07-14 ASSESSMENT — PAIN DESCRIPTION - LOCATION: LOCATION: BACK

## 2019-07-14 ASSESSMENT — PAIN - FUNCTIONAL ASSESSMENT: PAIN_FUNCTIONAL_ASSESSMENT: PREVENTS OR INTERFERES SOME ACTIVE ACTIVITIES AND ADLS

## 2019-07-14 ASSESSMENT — PAIN DESCRIPTION - PROGRESSION: CLINICAL_PROGRESSION: NOT CHANGED

## 2019-07-14 ASSESSMENT — PAIN DESCRIPTION - DESCRIPTORS: DESCRIPTORS: DISCOMFORT;ACHING

## 2019-07-14 NOTE — PROGRESS NOTES
Patient has been depressed since being in wheelchair this morning. Has only wanted to stay in bed with blinds drawn. Has had very poor appetite.

## 2019-07-14 NOTE — PROGRESS NOTES
Hyperlipidemia    Alcohol abuse, continuous    Tachycardia    Essential hypertension    Acute hyperkalemia    Chronic systolic CHF (congestive heart failure), NYHA class 3 (HCC)    Hypotension    Cardiomyopathy (HCC)    COPD exacerbation (HCC)    DMII (diabetes mellitus, type 2) (HCC)    Severe sepsis (HCC)    CAD (coronary artery disease)    Acute renal failure (ARF) ([facility]    Morbid obesity due to excess calories (HCC)    Acute pulmonary edema (HCC)    Respiratory failure (HCC)    HCAP (healthcare-associated pneumonia)    Ventricular tachycardia (HCC)    Shock circulatory (HCC)    Tachypnea    Neutrophilic leukocytosis    Chronic respiratory failure with hypoxia (HCC)    Cardiac arrest ([facility]    PEA (Pulseless electrical activity) ([facility]    Ileus (HCC)    Pneumonia of right lower lobe due to infectious organism [facility])  Resolved Problems:    * No resolved hospital problems. *      Plan:  1. S/p cardiac arrest - due to fluid overload and CHF - on amiodarone and HD for fluid removal  2. Acute hypercapnic and hypoxic resp failure - now down to 2.5L oxygen - extubated  3. Acute renal failure - pt on CRRT for fluid removal - UOP slowly improving - will likely need to continue as outpt - HD dependent overall  4. DMII with ESRD - off of metformin and on lantus now and SSI  5. Elevated LFTs and abd distention - likely ileus - no C diff due to formed stools - improved now  6. Acute on chronic systolic CHF - fluid removal helps - pt briefly on milrinone    Prognosis:  Guarded    Code status:  FULL    DVT prophylaxis: [] Lovenox  [x] SQ Heparin  [] SCDs because of  [] warfarin/oral direct thrombin inhibitor [] Encourage ambulation  GI prophylaxis: [] PPI/C0gubcyuh  [x] not indicated  Antibiotic prophylaxis indicated:   Yes  Diet:  DIET CARB CONTROL; Carb Control: 4 carb choices (60 gms)/meal; Low Sodium (2 GM);  Daily Fluid Restriction: 1500 ml    Disposition:  [] Home  [] Home with home health [] Rehab [] Psych [x] SNF  []

## 2019-07-15 ENCOUNTER — TELEPHONE (OUTPATIENT)
Dept: PULMONOLOGY | Age: 59
End: 2019-07-15

## 2019-07-15 LAB
ALBUMIN SERPL-MCNC: 3 G/DL (ref 3.4–5)
ANION GAP SERPL CALCULATED.3IONS-SCNC: 12 MMOL/L (ref 3–16)
BASOPHILS ABSOLUTE: 0.1 K/UL (ref 0–0.2)
BASOPHILS RELATIVE PERCENT: 1.4 %
BUN BLDV-MCNC: 36 MG/DL (ref 7–20)
CALCIUM SERPL-MCNC: 8.9 MG/DL (ref 8.3–10.6)
CHLORIDE BLD-SCNC: 93 MMOL/L (ref 99–110)
CO2: 27 MMOL/L (ref 21–32)
CREAT SERPL-MCNC: 1.7 MG/DL (ref 0.9–1.3)
EOSINOPHILS ABSOLUTE: 0.1 K/UL (ref 0–0.6)
EOSINOPHILS RELATIVE PERCENT: 1.5 %
GFR AFRICAN AMERICAN: 50
GFR NON-AFRICAN AMERICAN: 41
GLUCOSE BLD-MCNC: 109 MG/DL (ref 70–99)
GLUCOSE BLD-MCNC: 131 MG/DL (ref 70–99)
GLUCOSE BLD-MCNC: 146 MG/DL (ref 70–99)
GLUCOSE BLD-MCNC: 92 MG/DL (ref 70–99)
HCT VFR BLD CALC: 22 % (ref 40.5–52.5)
HEMOGLOBIN: 7.2 G/DL (ref 13.5–17.5)
LYMPHOCYTES ABSOLUTE: 1.5 K/UL (ref 1–5.1)
LYMPHOCYTES RELATIVE PERCENT: 14.8 %
MAGNESIUM: 1.6 MG/DL (ref 1.8–2.4)
MCH RBC QN AUTO: 27.5 PG (ref 26–34)
MCHC RBC AUTO-ENTMCNC: 32.5 G/DL (ref 31–36)
MCV RBC AUTO: 84.7 FL (ref 80–100)
MONOCYTES ABSOLUTE: 1.4 K/UL (ref 0–1.3)
MONOCYTES RELATIVE PERCENT: 14.7 %
NEUTROPHILS ABSOLUTE: 6.6 K/UL (ref 1.7–7.7)
NEUTROPHILS RELATIVE PERCENT: 67.6 %
PDW BLD-RTO: 18.3 % (ref 12.4–15.4)
PERFORMED ON: ABNORMAL
PERFORMED ON: ABNORMAL
PERFORMED ON: NORMAL
PHOSPHORUS: 5.1 MG/DL (ref 2.5–4.9)
PLATELET # BLD: 379 K/UL (ref 135–450)
PMV BLD AUTO: 8.8 FL (ref 5–10.5)
POTASSIUM SERPL-SCNC: 4 MMOL/L (ref 3.5–5.1)
RBC # BLD: 2.6 M/UL (ref 4.2–5.9)
SODIUM BLD-SCNC: 132 MMOL/L (ref 136–145)
WBC # BLD: 9.8 K/UL (ref 4–11)

## 2019-07-15 PROCEDURE — 6370000000 HC RX 637 (ALT 250 FOR IP): Performed by: NURSE PRACTITIONER

## 2019-07-15 PROCEDURE — 6360000002 HC RX W HCPCS: Performed by: INTERNAL MEDICINE

## 2019-07-15 PROCEDURE — 6370000000 HC RX 637 (ALT 250 FOR IP): Performed by: INTERNAL MEDICINE

## 2019-07-15 PROCEDURE — 2700000000 HC OXYGEN THERAPY PER DAY

## 2019-07-15 PROCEDURE — 2580000003 HC RX 258: Performed by: INTERNAL MEDICINE

## 2019-07-15 PROCEDURE — 94640 AIRWAY INHALATION TREATMENT: CPT

## 2019-07-15 PROCEDURE — 80069 RENAL FUNCTION PANEL: CPT

## 2019-07-15 PROCEDURE — 90935 HEMODIALYSIS ONE EVALUATION: CPT

## 2019-07-15 PROCEDURE — 36415 COLL VENOUS BLD VENIPUNCTURE: CPT

## 2019-07-15 PROCEDURE — 83735 ASSAY OF MAGNESIUM: CPT

## 2019-07-15 PROCEDURE — 6370000000 HC RX 637 (ALT 250 FOR IP)

## 2019-07-15 PROCEDURE — 94761 N-INVAS EAR/PLS OXIMETRY MLT: CPT

## 2019-07-15 PROCEDURE — 85025 COMPLETE CBC W/AUTO DIFF WBC: CPT

## 2019-07-15 PROCEDURE — 2060000000 HC ICU INTERMEDIATE R&B

## 2019-07-15 RX ORDER — IPRATROPIUM BROMIDE AND ALBUTEROL SULFATE 2.5; .5 MG/3ML; MG/3ML
SOLUTION RESPIRATORY (INHALATION)
Status: COMPLETED
Start: 2019-07-15 | End: 2019-07-15

## 2019-07-15 RX ORDER — METOPROLOL SUCCINATE 25 MG/1
25 TABLET, EXTENDED RELEASE ORAL DAILY
Status: DISCONTINUED | OUTPATIENT
Start: 2019-07-15 | End: 2019-07-23 | Stop reason: HOSPADM

## 2019-07-15 RX ORDER — CYCLOBENZAPRINE HCL 10 MG
10 TABLET ORAL 3 TIMES DAILY PRN
Status: DISCONTINUED | OUTPATIENT
Start: 2019-07-15 | End: 2019-07-23 | Stop reason: HOSPADM

## 2019-07-15 RX ADMIN — GABAPENTIN 100 MG: 100 CAPSULE ORAL at 20:12

## 2019-07-15 RX ADMIN — HEPARIN SODIUM 5000 UNITS: 5000 INJECTION INTRAVENOUS; SUBCUTANEOUS at 06:31

## 2019-07-15 RX ADMIN — MOMETASONE FUROATE AND FORMOTEROL FUMARATE DIHYDRATE 2 PUFF: 100; 5 AEROSOL RESPIRATORY (INHALATION) at 12:40

## 2019-07-15 RX ADMIN — HEPARIN SODIUM 5000 UNITS: 5000 INJECTION INTRAVENOUS; SUBCUTANEOUS at 13:34

## 2019-07-15 RX ADMIN — TORSEMIDE 20 MG: 20 TABLET ORAL at 12:53

## 2019-07-15 RX ADMIN — HEPARIN SODIUM 3800 UNITS: 1000 INJECTION, SOLUTION INTRAVENOUS; SUBCUTANEOUS at 11:31

## 2019-07-15 RX ADMIN — AMIODARONE HYDROCHLORIDE 200 MG: 200 TABLET ORAL at 12:53

## 2019-07-15 RX ADMIN — GABAPENTIN 100 MG: 100 CAPSULE ORAL at 12:53

## 2019-07-15 RX ADMIN — OMEPRAZOLE 40 MG: 20 CAPSULE, DELAYED RELEASE ORAL at 06:31

## 2019-07-15 RX ADMIN — ACETAMINOPHEN 650 MG: 325 TABLET ORAL at 20:12

## 2019-07-15 RX ADMIN — Medication 10 ML: at 20:13

## 2019-07-15 RX ADMIN — IPRATROPIUM BROMIDE AND ALBUTEROL SULFATE 1 AMPULE: .5; 3 SOLUTION RESPIRATORY (INHALATION) at 05:25

## 2019-07-15 RX ADMIN — INSULIN LISPRO 1 UNITS: 100 INJECTION, SOLUTION INTRAVENOUS; SUBCUTANEOUS at 21:54

## 2019-07-15 RX ADMIN — CYCLOBENZAPRINE HYDROCHLORIDE 10 MG: 10 TABLET, FILM COATED ORAL at 18:25

## 2019-07-15 RX ADMIN — METOPROLOL SUCCINATE 25 MG: 25 TABLET, EXTENDED RELEASE ORAL at 12:53

## 2019-07-15 RX ADMIN — IPRATROPIUM BROMIDE AND ALBUTEROL SULFATE 1 AMPULE: .5; 3 SOLUTION RESPIRATORY (INHALATION) at 12:40

## 2019-07-15 RX ADMIN — INSULIN GLARGINE 10 UNITS: 100 INJECTION, SOLUTION SUBCUTANEOUS at 21:54

## 2019-07-15 RX ADMIN — Medication 10 ML: at 17:37

## 2019-07-15 RX ADMIN — Medication 10 ML: at 00:11

## 2019-07-15 RX ADMIN — OMEPRAZOLE 40 MG: 20 CAPSULE, DELAYED RELEASE ORAL at 12:54

## 2019-07-15 RX ADMIN — IPRATROPIUM BROMIDE AND ALBUTEROL SULFATE 1 AMPULE: .5; 3 SOLUTION RESPIRATORY (INHALATION) at 00:46

## 2019-07-15 RX ADMIN — Medication 2 CAPSULE: at 17:36

## 2019-07-15 RX ADMIN — IPRATROPIUM BROMIDE AND ALBUTEROL SULFATE 1 AMPULE: .5; 3 SOLUTION RESPIRATORY (INHALATION) at 16:07

## 2019-07-15 RX ADMIN — HEPARIN SODIUM 5000 UNITS: 5000 INJECTION INTRAVENOUS; SUBCUTANEOUS at 21:55

## 2019-07-15 RX ADMIN — Medication 10 ML: at 17:38

## 2019-07-15 RX ADMIN — IPRATROPIUM BROMIDE AND ALBUTEROL SULFATE: .5; 3 SOLUTION RESPIRATORY (INHALATION) at 01:01

## 2019-07-15 ASSESSMENT — PAIN DESCRIPTION - DESCRIPTORS
DESCRIPTORS: ACHING;DISCOMFORT;CONSTANT
DESCRIPTORS: ACHING;DISCOMFORT

## 2019-07-15 ASSESSMENT — PAIN SCALES - GENERAL
PAINLEVEL_OUTOF10: 6
PAINLEVEL_OUTOF10: 10
PAINLEVEL_OUTOF10: 10

## 2019-07-15 ASSESSMENT — PAIN DESCRIPTION - PROGRESSION
CLINICAL_PROGRESSION: NOT CHANGED
CLINICAL_PROGRESSION: NOT CHANGED

## 2019-07-15 ASSESSMENT — PAIN DESCRIPTION - LOCATION
LOCATION: BACK
LOCATION: BACK

## 2019-07-15 ASSESSMENT — PAIN DESCRIPTION - PAIN TYPE
TYPE: CHRONIC PAIN
TYPE: CHRONIC PAIN

## 2019-07-15 ASSESSMENT — PAIN DESCRIPTION - ONSET
ONSET: ON-GOING
ONSET: ON-GOING

## 2019-07-15 ASSESSMENT — PAIN DESCRIPTION - FREQUENCY
FREQUENCY: CONTINUOUS
FREQUENCY: CONTINUOUS

## 2019-07-15 ASSESSMENT — PAIN DESCRIPTION - ORIENTATION
ORIENTATION: MID;LOWER;UPPER
ORIENTATION: MID;LOWER;UPPER

## 2019-07-15 NOTE — PROGRESS NOTES
Hospitalist Progress Note      PCP: Felicity Acosta MD    Date of Admission: 6/22/2019    Subjective: c/o back pain, requesting percocet, refusing to work with therapy today    Medications:  Reviewed    Infusion Medications    dextrose       Scheduled Medications    metoprolol succinate  25 mg Oral Daily    [START ON 7/18/2019] darbepoetin vinay-polysorbate  100 mcg Subcutaneous Weekly - Thursday    insulin lispro  0-6 Units Subcutaneous TID WC    insulin lispro  0-3 Units Subcutaneous Nightly    torsemide  20 mg Oral Daily    gabapentin  100 mg Oral TID    amiodarone  200 mg Oral Daily    omeprazole  40 mg Oral Daily    polyethylene glycol  17 g Oral Daily    lactobacillus  2 capsule Oral BID WC    heparin (porcine)  5,000 Units Subcutaneous 3 times per day    sodium chloride flush  10 mL Intravenous 2 times per day    insulin glargine  10 Units Subcutaneous Nightly    mometasone-formoterol  2 puff Inhalation BID    sodium chloride flush  10 mL Intravenous 2 times per day    ipratropium-albuterol  1 ampule Inhalation Q4H     PRN Meds: cyclobenzaprine, traMADol, acetaminophen, heparin (porcine), albumin human, metoprolol, perflutren lipid microspheres, lubrifresh P.M., sodium chloride flush, albuterol, sodium chloride flush, ondansetron, acetaminophen, glucose, dextrose, glucagon (rDNA), dextrose      Intake/Output Summary (Last 24 hours) at 7/15/2019 1846  Last data filed at 7/15/2019 1738  Gross per 24 hour   Intake 1920 ml   Output 1700 ml   Net 220 ml       Physical Exam Performed:    /65   Pulse 98   Temp 97.8 °F (36.6 °C) (Oral)   Resp 20   Ht 5' 7\" (1.702 m)   Wt 193 lb 9 oz (87.8 kg)   SpO2 95%   BMI 30.32 kg/m²     Gen: obese, ill appearing  HEENT: NC/AT, moist mucous membranes, no oropharyngeal erythema or exudate  Neck: supple, trachea midline, no anterior cervical or SC LAD  Heart:  Normal s1/s2, RRR, no murmurs, gallops, or rubs.  1+ leg edema, l AKA  Lungs:  diminished

## 2019-07-16 LAB
ALBUMIN SERPL-MCNC: 2.7 G/DL (ref 3.4–5)
ANION GAP SERPL CALCULATED.3IONS-SCNC: 11 MMOL/L (ref 3–16)
BUN BLDV-MCNC: 20 MG/DL (ref 7–20)
CALCIUM SERPL-MCNC: 8.3 MG/DL (ref 8.3–10.6)
CHLORIDE BLD-SCNC: 95 MMOL/L (ref 99–110)
CO2: 28 MMOL/L (ref 21–32)
CREAT SERPL-MCNC: 1.2 MG/DL (ref 0.9–1.3)
GFR AFRICAN AMERICAN: >60
GFR NON-AFRICAN AMERICAN: >60
GLUCOSE BLD-MCNC: 104 MG/DL (ref 70–99)
GLUCOSE BLD-MCNC: 111 MG/DL (ref 70–99)
GLUCOSE BLD-MCNC: 117 MG/DL (ref 70–99)
GLUCOSE BLD-MCNC: 122 MG/DL (ref 70–99)
GLUCOSE BLD-MCNC: 151 MG/DL (ref 70–99)
MAGNESIUM: 1.4 MG/DL (ref 1.8–2.4)
PERFORMED ON: ABNORMAL
PHOSPHORUS: 3.5 MG/DL (ref 2.5–4.9)
POTASSIUM SERPL-SCNC: 3.8 MMOL/L (ref 3.5–5.1)
SODIUM BLD-SCNC: 134 MMOL/L (ref 136–145)

## 2019-07-16 PROCEDURE — 2060000000 HC ICU INTERMEDIATE R&B

## 2019-07-16 PROCEDURE — 94761 N-INVAS EAR/PLS OXIMETRY MLT: CPT

## 2019-07-16 PROCEDURE — 80069 RENAL FUNCTION PANEL: CPT

## 2019-07-16 PROCEDURE — 94669 MECHANICAL CHEST WALL OSCILL: CPT

## 2019-07-16 PROCEDURE — 97530 THERAPEUTIC ACTIVITIES: CPT

## 2019-07-16 PROCEDURE — 6370000000 HC RX 637 (ALT 250 FOR IP): Performed by: INTERNAL MEDICINE

## 2019-07-16 PROCEDURE — 2580000003 HC RX 258: Performed by: INTERNAL MEDICINE

## 2019-07-16 PROCEDURE — 2700000000 HC OXYGEN THERAPY PER DAY

## 2019-07-16 PROCEDURE — 92526 ORAL FUNCTION THERAPY: CPT

## 2019-07-16 PROCEDURE — 94640 AIRWAY INHALATION TREATMENT: CPT

## 2019-07-16 PROCEDURE — 6360000002 HC RX W HCPCS: Performed by: INTERNAL MEDICINE

## 2019-07-16 PROCEDURE — 83735 ASSAY OF MAGNESIUM: CPT

## 2019-07-16 PROCEDURE — 6370000000 HC RX 637 (ALT 250 FOR IP): Performed by: NURSE PRACTITIONER

## 2019-07-16 PROCEDURE — 97530 THERAPEUTIC ACTIVITIES: CPT | Performed by: PHYSICAL THERAPIST

## 2019-07-16 RX ADMIN — IPRATROPIUM BROMIDE AND ALBUTEROL SULFATE 1 AMPULE: .5; 3 SOLUTION RESPIRATORY (INHALATION) at 11:45

## 2019-07-16 RX ADMIN — GABAPENTIN 100 MG: 100 CAPSULE ORAL at 08:43

## 2019-07-16 RX ADMIN — GABAPENTIN 100 MG: 100 CAPSULE ORAL at 20:24

## 2019-07-16 RX ADMIN — OMEPRAZOLE 40 MG: 20 CAPSULE, DELAYED RELEASE ORAL at 06:18

## 2019-07-16 RX ADMIN — IPRATROPIUM BROMIDE AND ALBUTEROL SULFATE 1 AMPULE: .5; 3 SOLUTION RESPIRATORY (INHALATION) at 08:35

## 2019-07-16 RX ADMIN — Medication 10 ML: at 08:46

## 2019-07-16 RX ADMIN — Medication 10 ML: at 20:25

## 2019-07-16 RX ADMIN — METOPROLOL SUCCINATE 25 MG: 25 TABLET, EXTENDED RELEASE ORAL at 08:44

## 2019-07-16 RX ADMIN — Medication 2 CAPSULE: at 08:44

## 2019-07-16 RX ADMIN — IPRATROPIUM BROMIDE AND ALBUTEROL SULFATE 1 AMPULE: .5; 3 SOLUTION RESPIRATORY (INHALATION) at 16:54

## 2019-07-16 RX ADMIN — INSULIN GLARGINE 10 UNITS: 100 INJECTION, SOLUTION SUBCUTANEOUS at 22:35

## 2019-07-16 RX ADMIN — HEPARIN SODIUM 5000 UNITS: 5000 INJECTION INTRAVENOUS; SUBCUTANEOUS at 06:18

## 2019-07-16 RX ADMIN — MOMETASONE FUROATE AND FORMOTEROL FUMARATE DIHYDRATE 2 PUFF: 100; 5 AEROSOL RESPIRATORY (INHALATION) at 08:35

## 2019-07-16 RX ADMIN — MOMETASONE FUROATE AND FORMOTEROL FUMARATE DIHYDRATE 2 PUFF: 100; 5 AEROSOL RESPIRATORY (INHALATION) at 21:08

## 2019-07-16 RX ADMIN — HEPARIN SODIUM 5000 UNITS: 5000 INJECTION INTRAVENOUS; SUBCUTANEOUS at 22:35

## 2019-07-16 RX ADMIN — Medication 10 ML: at 20:27

## 2019-07-16 RX ADMIN — GABAPENTIN 100 MG: 100 CAPSULE ORAL at 14:44

## 2019-07-16 RX ADMIN — TORSEMIDE 20 MG: 20 TABLET ORAL at 08:43

## 2019-07-16 RX ADMIN — CYCLOBENZAPRINE HYDROCHLORIDE 10 MG: 10 TABLET, FILM COATED ORAL at 11:35

## 2019-07-16 RX ADMIN — Medication 2 CAPSULE: at 18:48

## 2019-07-16 RX ADMIN — AMIODARONE HYDROCHLORIDE 200 MG: 200 TABLET ORAL at 08:44

## 2019-07-16 RX ADMIN — CYCLOBENZAPRINE HYDROCHLORIDE 10 MG: 10 TABLET, FILM COATED ORAL at 20:24

## 2019-07-16 RX ADMIN — IPRATROPIUM BROMIDE AND ALBUTEROL SULFATE 1 AMPULE: .5; 3 SOLUTION RESPIRATORY (INHALATION) at 21:08

## 2019-07-16 RX ADMIN — HEPARIN SODIUM 5000 UNITS: 5000 INJECTION INTRAVENOUS; SUBCUTANEOUS at 14:45

## 2019-07-16 ASSESSMENT — PAIN DESCRIPTION - PROGRESSION
CLINICAL_PROGRESSION: NOT CHANGED

## 2019-07-16 ASSESSMENT — PAIN DESCRIPTION - LOCATION
LOCATION: BACK

## 2019-07-16 ASSESSMENT — PAIN DESCRIPTION - ORIENTATION
ORIENTATION: MID;LOWER;UPPER
ORIENTATION: MID;LOWER;UPPER
ORIENTATION: MID
ORIENTATION: MID

## 2019-07-16 ASSESSMENT — PAIN DESCRIPTION - DESCRIPTORS
DESCRIPTORS: ACHING
DESCRIPTORS: ACHING;DISCOMFORT

## 2019-07-16 ASSESSMENT — PAIN - FUNCTIONAL ASSESSMENT
PAIN_FUNCTIONAL_ASSESSMENT: PREVENTS OR INTERFERES SOME ACTIVE ACTIVITIES AND ADLS

## 2019-07-16 ASSESSMENT — PAIN DESCRIPTION - ONSET
ONSET: ON-GOING

## 2019-07-16 ASSESSMENT — PAIN SCALES - GENERAL
PAINLEVEL_OUTOF10: 8
PAINLEVEL_OUTOF10: 9
PAINLEVEL_OUTOF10: 10

## 2019-07-16 ASSESSMENT — PAIN DESCRIPTION - PAIN TYPE
TYPE: CHRONIC PAIN

## 2019-07-16 ASSESSMENT — PAIN DESCRIPTION - FREQUENCY
FREQUENCY: CONTINUOUS

## 2019-07-16 ASSESSMENT — PAIN SCALES - WONG BAKER: WONGBAKER_NUMERICALRESPONSE: 6

## 2019-07-16 NOTE — PROGRESS NOTES
[facility]   Speech Therapy  Daily Dysphagia Treatment Note        Coy Cain  AGE: 61 y.o.    GENDER: male  : 1960  [id number]  EPISODE DATE:  2019  Patient Active Problem List   Diagnosis    Arthritis    Diabetes mellitus with hyperglycemia (HCC)    HBP (high blood pressure)    Hyperlipidemia    COPD (chronic obstructive pulmonary disease) (HCC)    Viral hepatitis C    Back pain    PAD (peripheral artery disease) ([address]    Spinal stenosis of lumbar region    Tobacco use    Atherosclerosis of native artery of left lower extremity with intermittent claudication (HCC)    Chest pain    Pleural effusion due to CHF (congestive heart failure) (HCC)    Pleural effusion, right    Alcohol abuse, continuous    Tachycardia    Atrial fibrillation with RVR (HCC)    Hyponatremia    Congestive heart failure (HCC)    REJI (acute kidney injury) ([address]    Hypokalemia    Coronary artery disease involving autologous artery coronary bypass graft with angina pectoris ([address]    Essential hypertension    Chest pain of uncertain etiology    Acute on chronic combined systolic and diastolic HF (heart failure) (HCC)    Acute hyperkalemia    Typical atrial flutter (HCC)    Chronic systolic CHF (congestive heart failure), NYHA class 3 (HCC)    Hypotension    Vasovagal syncope    Laceration of scalp without foreign body    Cardiomyopathy ([address]    Syncope and collapse    Ischemic foot    Diabetes mellitus with peripheral circulatory disorder (HCC)    Limb ischemia    COPD exacerbation (HCC)    Nonhealing surgical wound    Acromioclavicular joint arthritis    Bradycardia    Shortness of breath    Permanent atrial fibrillation (HCC)    Chronic atrial fibrillation (HCC)    Other specified injury of inferior mesenteric vein, initial encounter    Postprocedural hypotension    Acute respiratory failure with hypercapnia (HCC)    High anion gap metabolic acidosis    Centrilobular emphysema (HCC)    Hypomagnesemia    Heart failure, acute on chronic, systolic and diastolic (HCC)    Persistent atrial fibrillation (HCC)    Anemia    DMII (diabetes mellitus, type 2) (HCC)    Constipation    Hypokalemia    Acute on chronic systolic heart failure (HCC)    Atrial fibrillation, rapid (HCC)    COPD with acute exacerbation (HCC)    Cocaine use    Severe sepsis (HCC)    Atrial fibrillation with RVR (HCC)    Hyponatremia    Acute on chronic respiratory failure with hypoxia (HCC)    Respiratory distress    CHF, left ventricular (HCC)    Nicotine dependence    Suspected sleep apnea    Coronary artery disease involving native coronary artery of native heart without angina pectoris    CAD (coronary artery disease)    Acute renal failure (ARF) (HCC)    Morbid obesity due to excess calories (HCC)    Acute respiratory failure with hypoxia (HCC)    Nocturnal hypoxia    Hypercapnemia    SOB (shortness of breath)    Acute on chronic respiratory failure with hypercapnia (HCC)    Chronic respiratory failure with hypercapnia (HCC)    Acute pulmonary edema (HCC)    Acute on chronic systolic HF (heart failure) (HCC)    Hx of AKA (above knee amputation), left (HCC)    Respiratory failure (HCC)    HCAP (healthcare-associated pneumonia)    Ventricular tachycardia (HCC)    Shock circulatory (HCC)    Tachypnea    Neutrophilic leukocytosis    Chronic respiratory failure with hypoxia (HCC)    Cardiac arrest (HCC)    PEA (Pulseless electrical activity) (HCC)    Ileus ([facility])    Pneumonia of right lower lobe due to infectious organism ([facility]     Allergies   Allergen Reactions    Rocephin [Ceftriaxone] Other (See Comments)     Sloughing of skin (blisters) on hands and lower arms; pancytopenia     Treatment Diagnosis: Dysphagia     Chart review:   6/23/2019 admitted with SOB.   Admission H&P HPI:  Pt seen just prior to intubation and again after intubation. Patient is a 35-year-old man with a history of hypertension, hyperlipidemia, diabetes mellitus, coronary artery disease, COPD with chronic hypoxemic respiratory failure, chronic systolic congestive heart failure and morbid obesity. He presents to the emergency room for evaluation following a three day history of worsening shortness of breath. At the time of his arrival his shortness of breath was severe, made worse with minimal exertion and improved with rest. He was noted to be tripoding on arrival and to be hypoxic. In the emergency room he was found to have an acute COPD exacerbation any possible right basilar pneumonia. He required emergent intubation immediately after which he had an episode of ventricular tachycardia requiring initiation of ACLS protocol and an Amiodarone drip. He is being admitted for further evaluation and treatment. Associated signs and symptoms do not include fever or chills, nausea, vomiting, abdominal pain, hemoptysis, hematochezia, diarrhea, constipation or urinary symptoms  6/23/2019 intubated  7/3/2019 extubated  7/3/2019 BiPAP  7/5/2019 Pulmonology notes:  CXR with newly worsened RLL pneumonia, ?aspiration, SLP eval.  7/8/2019 MBS: Dysphagia characterized by prolonged, but functional mastication; functional but disorganized bolus formation and A-P oral transit regular food consistencies; delayed initiation of swallow.    Symptoms:  · Inconsistent premature loss of thin liquids and during mastication of regular solid food  · Pooling of multiple items to the valleculae prior to swallow  · Inconsistent pooling of thin and nectar thick thick to the pyriform sinus prior to swallow (appeared most symptomatic as pt fatigued)  · Post swallow oral residue of foods, pt was able to clear with clearance (1-2 clearance swallows)  · Inconsistent post swallow pyriform sinus residue which pt cleared with clearance swallow  · Pt with cough post swallow at times, but unclear etiology when SLP and radiology mastication; variable rate of A-P oral transit; suspect delayed swallow. · Regular food- pt declined    Dysphagia Tx:   · PO trials as described above  · F/U regarding dysphagia x emphasizing pharyngolaryngeal exercises provided 7/11/2019. Much encouragement needed for active participation. Number of reps decreased as compared to 7/11/2019 due to pt declining need to complete. Max review warranted: exaggerated yawn x10 second duration, [place] : >50% of attempts, falsetto : very minimal pitch variation with dysphonia, pitch glides ascending/descending with max models for target; attempted effortful swallow -pt response \"I can't\"  · Pt ed re: compensatory swallow / positioning at meals. Max review for rationale    Goals:   Dysphagia Goals:   1. The patient will improve oral preparation phase via bolus control/manipulation exercises to 5/5 each trial.-continue; unable to r/o premature loss during mastication   2. The patient will tolerate repeat BSE when able. --goal met; MBS completed 7/8   3. The patient will improve pharyngeal phase via pharyngolaryngeal exercises to 5/5 each. -pt did meet goal for 5/5 each  4. The patient will tolerate regular consistency food and thin liquid diet without observed clinical signs of aspiration --ongoing  5. The patient will improve swallow response via thermal gustatory stimulation and laryngeal/ pharyngeal ex 5/5 each. -thermal gustatory stimulation was not completed this date/time    Assessment:   Impressions:   Dysphagia Diagnosis: Mild oral stage dysphagia, Mild-mod pharyngeal stage dysphagia characterized by prolonged, but functional mastication; functional but disorganized bolus formation and A-P oral transit regular food consistencies; delayed initiation of swallow. · Slight delayed cough with thin liquids which was present during MBS with no observed aspiration.        Diet Recommendations:   Solid consistency: (continue as ordered by MD) Regular consistency with restrictions

## 2019-07-16 NOTE — PLAN OF CARE
Problem: Risk for Impaired Skin Integrity  Goal: Tissue integrity - skin and mucous membranes  Description  Structural intactness and normal physiological function of skin and  mucous membranes. Outcome: Ongoing  Skin assessment completed every shift. Pt assessed for incontinence, appropriate barrier cream applied prn. Pt encouraged to turn/rotate every 2 hours. Assistance provided if pt unable to do so themselves. Patient currently refusing turns; educated on importance of repositioning in bed in order to prevent pressure injuries. Patient verbalizes understanding but still refusing to be repositioned when offered. Problem: Falls - Risk of:  Goal: Will remain free from falls  Description  Will remain free from falls  Outcome: Ongoing  Fall risk precautions in place. Bed in lowest position with wheels locked,bed alarm in place and activated, non-skid socks on pt, fall risk ID on pt, call light in reach, pt encouraged to call before getting out of bed and for any other needs or complaints. Problem: FLUID AND ELECTROLYTE IMBALANCE  Goal: Fluid and electrolyte balance are achieved/maintained  Outcome: Ongoing  Patient educated on importance of maintaining fluid restriction. Patient updated on fluid intake limits and how much he is able to drink for the rest of the day. Give patient small cups of water in order to better monitor intake; patient uses urinal with assistance in order to more accurately measure output.

## 2019-07-16 NOTE — PROGRESS NOTES
Type II or unspecified type diabetes mellitus without mention of complication, not stated as uncontrolled. has a past surgical history that includes Leg Surgery (Right, 1980); Appendectomy; Hand surgery (Left); Wrist fracture surgery (Right, 1980); knee surgery; angioplasty (Bilateral, 1-15-15, 1/19/15); Coronary angioplasty with stent; Femoral-tibial Bypass Graft (Left, 5/2/16); Leg amputation through femur; and Tunneled venous catheter placement (Right, 07/10/2019). Restrictions  Restrictions/Precautions  Restrictions/Precautions: Fall Risk  Position Activity Restriction  Other position/activity restrictions: O2 3L via NC (O2 2 1/2 pta). H/O L AKA (5/2017, W/C Independent). Subjective   General  Chart Reviewed: Yes  Patient assessed for rehabilitation services?: Yes  Additional Pertinent Hx: 60 y/o male admit 6/23/19 with REJI, Septicemia, COPD Exac, Cardiac Arrest.  6/23/19 - 7/3/19 Pt Intubated. CKD (CRRT, now receiving HD). PMH as noted including CAD, A-Fib, MI, Angio with Stent, CHF, CKD (no HD pta, currently receiving HD), COPD, R Wrist Fx/Surg, R LE Fx/Surg, LE Vasc Surg, L AKA (5/2017), DM, Neuropathy. Family / Caregiver Present: No  Referring Practitioner: Jorden Lloyd DO  Diagnosis: REJI, Septicemia, COPD Exac, Cardiac Arrest.   H/O L AKA. Subjective  Subjective: Pt met bedside, required max encouragement to participate. Speaking softly and frustrated when he is not understood. Agreeable to OOB activity if therapy \"leave him alone\" after that. Continue to emphasize importance of OOB activity.    Vital Signs  Patient Currently in Pain: (\"hurts all the time, all over\" )     Orientation  Orientation  Orientation Level: Oriented to person;Oriented to place     Objective    Balance  Sitting Balance: Contact guard assistance(SBA/CGA at EOB)  Standing Balance  Activity: brief stance (5 sec or less) on gerda stedy     Functional Mobility  Assist Level: Dependent/Total  Functional

## 2019-07-17 LAB
ALBUMIN SERPL-MCNC: 2.7 G/DL (ref 3.4–5)
ANION GAP SERPL CALCULATED.3IONS-SCNC: 11 MMOL/L (ref 3–16)
BASOPHILS ABSOLUTE: 0.3 K/UL (ref 0–0.2)
BASOPHILS RELATIVE PERCENT: 2.9 %
BUN BLDV-MCNC: 27 MG/DL (ref 7–20)
CALCIUM SERPL-MCNC: 8.5 MG/DL (ref 8.3–10.6)
CHLORIDE BLD-SCNC: 96 MMOL/L (ref 99–110)
CO2: 28 MMOL/L (ref 21–32)
CREAT SERPL-MCNC: 1.2 MG/DL (ref 0.9–1.3)
EOSINOPHILS ABSOLUTE: 0.1 K/UL (ref 0–0.6)
EOSINOPHILS RELATIVE PERCENT: 1.2 %
GFR AFRICAN AMERICAN: >60
GFR NON-AFRICAN AMERICAN: >60
GLUCOSE BLD-MCNC: 113 MG/DL (ref 70–99)
GLUCOSE BLD-MCNC: 114 MG/DL (ref 70–99)
GLUCOSE BLD-MCNC: 132 MG/DL (ref 70–99)
GLUCOSE BLD-MCNC: 145 MG/DL (ref 70–99)
HCT VFR BLD CALC: 21.7 % (ref 40.5–52.5)
HEMOGLOBIN: 7 G/DL (ref 13.5–17.5)
LYMPHOCYTES ABSOLUTE: 1.7 K/UL (ref 1–5.1)
LYMPHOCYTES RELATIVE PERCENT: 16.9 %
MAGNESIUM: 1.1 MG/DL (ref 1.8–2.4)
MCH RBC QN AUTO: 27.1 PG (ref 26–34)
MCHC RBC AUTO-ENTMCNC: 32.2 G/DL (ref 31–36)
MCV RBC AUTO: 84.1 FL (ref 80–100)
MONOCYTES ABSOLUTE: 1.5 K/UL (ref 0–1.3)
MONOCYTES RELATIVE PERCENT: 14.4 %
NEUTROPHILS ABSOLUTE: 6.5 K/UL (ref 1.7–7.7)
NEUTROPHILS RELATIVE PERCENT: 64.6 %
PDW BLD-RTO: 18 % (ref 12.4–15.4)
PERFORMED ON: ABNORMAL
PHOSPHORUS: 4.4 MG/DL (ref 2.5–4.9)
PLATELET # BLD: 307 K/UL (ref 135–450)
PMV BLD AUTO: 8.1 FL (ref 5–10.5)
POTASSIUM SERPL-SCNC: 3.8 MMOL/L (ref 3.5–5.1)
RBC # BLD: 2.58 M/UL (ref 4.2–5.9)
SODIUM BLD-SCNC: 135 MMOL/L (ref 136–145)
WBC # BLD: 10.1 K/UL (ref 4–11)

## 2019-07-17 PROCEDURE — 6370000000 HC RX 637 (ALT 250 FOR IP): Performed by: INTERNAL MEDICINE

## 2019-07-17 PROCEDURE — 94669 MECHANICAL CHEST WALL OSCILL: CPT

## 2019-07-17 PROCEDURE — 80069 RENAL FUNCTION PANEL: CPT

## 2019-07-17 PROCEDURE — 85025 COMPLETE CBC W/AUTO DIFF WBC: CPT

## 2019-07-17 PROCEDURE — 9990000010 HC NO CHARGE VISIT

## 2019-07-17 PROCEDURE — 94761 N-INVAS EAR/PLS OXIMETRY MLT: CPT

## 2019-07-17 PROCEDURE — 6360000002 HC RX W HCPCS: Performed by: INTERNAL MEDICINE

## 2019-07-17 PROCEDURE — 94640 AIRWAY INHALATION TREATMENT: CPT

## 2019-07-17 PROCEDURE — 6370000000 HC RX 637 (ALT 250 FOR IP): Performed by: NURSE PRACTITIONER

## 2019-07-17 PROCEDURE — 2700000000 HC OXYGEN THERAPY PER DAY

## 2019-07-17 PROCEDURE — 2580000003 HC RX 258: Performed by: INTERNAL MEDICINE

## 2019-07-17 PROCEDURE — 83735 ASSAY OF MAGNESIUM: CPT

## 2019-07-17 PROCEDURE — 2060000000 HC ICU INTERMEDIATE R&B

## 2019-07-17 RX ORDER — MAGNESIUM SULFATE IN WATER 40 MG/ML
2 INJECTION, SOLUTION INTRAVENOUS
Status: DISCONTINUED | OUTPATIENT
Start: 2019-07-17 | End: 2019-07-17

## 2019-07-17 RX ORDER — MAGNESIUM SULFATE IN WATER 40 MG/ML
2 INJECTION, SOLUTION INTRAVENOUS
Status: COMPLETED | OUTPATIENT
Start: 2019-07-17 | End: 2019-07-17

## 2019-07-17 RX ADMIN — POLYETHYLENE GLYCOL 3350 17 G: 17 POWDER, FOR SOLUTION ORAL at 08:11

## 2019-07-17 RX ADMIN — GABAPENTIN 100 MG: 100 CAPSULE ORAL at 21:51

## 2019-07-17 RX ADMIN — TORSEMIDE 20 MG: 20 TABLET ORAL at 08:11

## 2019-07-17 RX ADMIN — HEPARIN SODIUM 5000 UNITS: 5000 INJECTION INTRAVENOUS; SUBCUTANEOUS at 21:51

## 2019-07-17 RX ADMIN — TRAMADOL HYDROCHLORIDE 50 MG: 50 TABLET, FILM COATED ORAL at 22:00

## 2019-07-17 RX ADMIN — Medication 10 ML: at 22:38

## 2019-07-17 RX ADMIN — IPRATROPIUM BROMIDE AND ALBUTEROL SULFATE 1 AMPULE: .5; 3 SOLUTION RESPIRATORY (INHALATION) at 08:10

## 2019-07-17 RX ADMIN — IPRATROPIUM BROMIDE AND ALBUTEROL SULFATE 1 AMPULE: .5; 3 SOLUTION RESPIRATORY (INHALATION) at 00:20

## 2019-07-17 RX ADMIN — IPRATROPIUM BROMIDE AND ALBUTEROL SULFATE 1 AMPULE: .5; 3 SOLUTION RESPIRATORY (INHALATION) at 20:14

## 2019-07-17 RX ADMIN — IPRATROPIUM BROMIDE AND ALBUTEROL SULFATE 1 AMPULE: .5; 3 SOLUTION RESPIRATORY (INHALATION) at 23:41

## 2019-07-17 RX ADMIN — GABAPENTIN 100 MG: 100 CAPSULE ORAL at 16:01

## 2019-07-17 RX ADMIN — Medication 2 CAPSULE: at 17:43

## 2019-07-17 RX ADMIN — IPRATROPIUM BROMIDE AND ALBUTEROL SULFATE 1 AMPULE: .5; 3 SOLUTION RESPIRATORY (INHALATION) at 16:04

## 2019-07-17 RX ADMIN — Medication 10 ML: at 08:13

## 2019-07-17 RX ADMIN — GABAPENTIN 100 MG: 100 CAPSULE ORAL at 08:11

## 2019-07-17 RX ADMIN — OMEPRAZOLE 40 MG: 20 CAPSULE, DELAYED RELEASE ORAL at 06:14

## 2019-07-17 RX ADMIN — MOMETASONE FUROATE AND FORMOTEROL FUMARATE DIHYDRATE 2 PUFF: 100; 5 AEROSOL RESPIRATORY (INHALATION) at 08:20

## 2019-07-17 RX ADMIN — CYCLOBENZAPRINE HYDROCHLORIDE 10 MG: 10 TABLET, FILM COATED ORAL at 22:00

## 2019-07-17 RX ADMIN — MOMETASONE FUROATE AND FORMOTEROL FUMARATE DIHYDRATE 2 PUFF: 100; 5 AEROSOL RESPIRATORY (INHALATION) at 20:14

## 2019-07-17 RX ADMIN — INSULIN LISPRO 1 UNITS: 100 INJECTION, SOLUTION INTRAVENOUS; SUBCUTANEOUS at 21:51

## 2019-07-17 RX ADMIN — HEPARIN SODIUM 5000 UNITS: 5000 INJECTION INTRAVENOUS; SUBCUTANEOUS at 15:44

## 2019-07-17 RX ADMIN — MAGNESIUM SULFATE HEPTAHYDRATE 2 G: 40 INJECTION, SOLUTION INTRAVENOUS at 17:37

## 2019-07-17 RX ADMIN — IPRATROPIUM BROMIDE AND ALBUTEROL SULFATE 1 AMPULE: .5; 3 SOLUTION RESPIRATORY (INHALATION) at 04:21

## 2019-07-17 RX ADMIN — Medication 2 CAPSULE: at 08:11

## 2019-07-17 RX ADMIN — HEPARIN SODIUM 5000 UNITS: 5000 INJECTION INTRAVENOUS; SUBCUTANEOUS at 06:13

## 2019-07-17 RX ADMIN — MAGNESIUM SULFATE HEPTAHYDRATE 2 G: 40 INJECTION, SOLUTION INTRAVENOUS at 15:31

## 2019-07-17 RX ADMIN — AMIODARONE HYDROCHLORIDE 200 MG: 200 TABLET ORAL at 08:11

## 2019-07-17 RX ADMIN — INSULIN GLARGINE 10 UNITS: 100 INJECTION, SOLUTION SUBCUTANEOUS at 21:51

## 2019-07-17 ASSESSMENT — PAIN DESCRIPTION - FREQUENCY
FREQUENCY: CONTINUOUS
FREQUENCY: CONTINUOUS

## 2019-07-17 ASSESSMENT — PAIN SCALES - WONG BAKER: WONGBAKER_NUMERICALRESPONSE: 0

## 2019-07-17 ASSESSMENT — PAIN DESCRIPTION - PROGRESSION
CLINICAL_PROGRESSION: NOT CHANGED
CLINICAL_PROGRESSION: NOT CHANGED

## 2019-07-17 ASSESSMENT — PAIN SCALES - GENERAL
PAINLEVEL_OUTOF10: 0
PAINLEVEL_OUTOF10: 5
PAINLEVEL_OUTOF10: 0
PAINLEVEL_OUTOF10: 0

## 2019-07-17 ASSESSMENT — PAIN DESCRIPTION - PAIN TYPE
TYPE: CHRONIC PAIN
TYPE: CHRONIC PAIN

## 2019-07-17 ASSESSMENT — PAIN DESCRIPTION - DESCRIPTORS
DESCRIPTORS: ACHING
DESCRIPTORS: ACHING

## 2019-07-17 ASSESSMENT — PAIN DESCRIPTION - ORIENTATION
ORIENTATION: MID
ORIENTATION: MID

## 2019-07-17 ASSESSMENT — PAIN DESCRIPTION - LOCATION
LOCATION: BACK
LOCATION: BACK

## 2019-07-17 ASSESSMENT — PAIN DESCRIPTION - ONSET
ONSET: ON-GOING
ONSET: ON-GOING

## 2019-07-17 NOTE — PROGRESS NOTES
rhonchi, no crackles, no use of accessory muscles  Abd: bowel sounds present, soft, nontender, nondistended, no masses  Extrem:  No clubbing, cyanosis,  1+ leg edema, L AKA  Skin: no rashes or lesions  Psych: A & O x3  Neuro: grossly intact, moves all four extremities. Labs:   Recent Labs     07/14/19  0650 07/15/19  0635   WBC 11.1* 9.8   HGB 7.1* 7.2*   HCT 22.1* 22.0*    379     Recent Labs     07/14/19  0650 07/15/19  0635 07/16/19  0615   * 132* 134*   K 3.9 4.0 3.8   CL 92* 93* 95*   CO2 27 27 28   BUN 31* 36* 20   CREATININE 1.8* 1.7* 1.2   CALCIUM 8.9 8.9 8.3   PHOS 4.9 5.1* 3.5     Recent Labs     07/14/19  0650   AST 23   ALT 18   BILIDIR <0.2   BILITOT 0.5   ALKPHOS 100     No results for input(s): INR in the last 72 hours. No results for input(s): Connie Lust in the last 72 hours. Urinalysis:      Lab Results   Component Value Date    NITRU Negative 06/23/2019    WBCUA 5 06/23/2019    RBCUA 8 06/23/2019    BLOODU MODERATE 06/23/2019    SPECGRAV 1.021 06/23/2019    GLUCOSEU Negative 06/23/2019       Radiology:  IR TUNNELED CVC PLACE WO SQ PORT/PUMP > 5 YEARS   Final Result   Successful ultrasound and fluoroscopy guided placement of a right internal   jugular 23 cm tip to cuff hemodialysis catheter. XR CHEST PORTABLE   Final Result   Interval development of left perihilar airspace disease. Improving aeration of right lung base. Fluoroscopy modified barium swallow with video   Final Result   Swallowing mechanism grossly within normal limits without evidence of   aspiration. Please see separate speech pathology report for full discussion of findings   and recommendations. XR CHEST PORTABLE   Final Result   Significant worsening in the appearance of the chest with significant   increase in the size of the right basilar pneumonia. Probable small right   pleural effusion. Slight vascular congestion.          CT ABDOMEN PELVIS W WO CONTRAST

## 2019-07-17 NOTE — CARE COORDINATION
Per Daniel Spears at [facility], patient has been approved for SNF by insurance. Once HD spot has been arranged, she will need to know a day in advance to be able to arrange HD transport. Marylin Goodwin working on HD spot.      Marcial Jaimes, [address] [facility]  [phone]  7/17/19 at 3:55 PM

## 2019-07-18 LAB
ALBUMIN SERPL-MCNC: 2.9 G/DL (ref 3.4–5)
ANION GAP SERPL CALCULATED.3IONS-SCNC: 13 MMOL/L (ref 3–16)
BUN BLDV-MCNC: 15 MG/DL (ref 7–20)
CALCIUM SERPL-MCNC: 8.5 MG/DL (ref 8.3–10.6)
CHLORIDE BLD-SCNC: 94 MMOL/L (ref 99–110)
CO2: 27 MMOL/L (ref 21–32)
CREAT SERPL-MCNC: 1.1 MG/DL (ref 0.9–1.3)
GFR AFRICAN AMERICAN: >60
GFR NON-AFRICAN AMERICAN: >60
GLUCOSE BLD-MCNC: 129 MG/DL (ref 70–99)
GLUCOSE BLD-MCNC: 144 MG/DL (ref 70–99)
GLUCOSE BLD-MCNC: 147 MG/DL (ref 70–99)
GLUCOSE BLD-MCNC: 150 MG/DL (ref 70–99)
GLUCOSE BLD-MCNC: 172 MG/DL (ref 70–99)
MAGNESIUM: 1.8 MG/DL (ref 1.8–2.4)
PERFORMED ON: ABNORMAL
PHOSPHORUS: 3.1 MG/DL (ref 2.5–4.9)
POTASSIUM SERPL-SCNC: 4.1 MMOL/L (ref 3.5–5.1)
SODIUM BLD-SCNC: 134 MMOL/L (ref 136–145)

## 2019-07-18 PROCEDURE — 2580000003 HC RX 258: Performed by: INTERNAL MEDICINE

## 2019-07-18 PROCEDURE — 83735 ASSAY OF MAGNESIUM: CPT

## 2019-07-18 PROCEDURE — 6370000000 HC RX 637 (ALT 250 FOR IP): Performed by: INTERNAL MEDICINE

## 2019-07-18 PROCEDURE — 97530 THERAPEUTIC ACTIVITIES: CPT | Performed by: PHYSICAL THERAPIST

## 2019-07-18 PROCEDURE — 6370000000 HC RX 637 (ALT 250 FOR IP): Performed by: NURSE PRACTITIONER

## 2019-07-18 PROCEDURE — 94640 AIRWAY INHALATION TREATMENT: CPT

## 2019-07-18 PROCEDURE — 2060000000 HC ICU INTERMEDIATE R&B

## 2019-07-18 PROCEDURE — 2700000000 HC OXYGEN THERAPY PER DAY

## 2019-07-18 PROCEDURE — 6360000002 HC RX W HCPCS: Performed by: INTERNAL MEDICINE

## 2019-07-18 PROCEDURE — 80069 RENAL FUNCTION PANEL: CPT

## 2019-07-18 PROCEDURE — 97530 THERAPEUTIC ACTIVITIES: CPT

## 2019-07-18 PROCEDURE — 92526 ORAL FUNCTION THERAPY: CPT

## 2019-07-18 PROCEDURE — 82575 CREATININE CLEARANCE TEST: CPT

## 2019-07-18 PROCEDURE — 94761 N-INVAS EAR/PLS OXIMETRY MLT: CPT

## 2019-07-18 RX ORDER — LACTOBACILLUS RHAMNOSUS GG 10B CELL
2 CAPSULE ORAL 2 TIMES DAILY WITH MEALS
Qty: 30 CAPSULE | Refills: 0 | Status: ON HOLD | OUTPATIENT
Start: 2019-07-18 | End: 2019-08-23 | Stop reason: HOSPADM

## 2019-07-18 RX ORDER — METOPROLOL SUCCINATE 50 MG/1
50 TABLET, EXTENDED RELEASE ORAL DAILY
Qty: 30 TABLET | Refills: 1 | Status: SHIPPED | OUTPATIENT
Start: 2019-07-19 | End: 2019-10-24 | Stop reason: SDUPTHER

## 2019-07-18 RX ORDER — CYCLOBENZAPRINE HCL 10 MG
10 TABLET ORAL 3 TIMES DAILY PRN
Qty: 15 TABLET | Refills: 0 | Status: SHIPPED | OUTPATIENT
Start: 2019-07-18 | End: 2019-07-28

## 2019-07-18 RX ORDER — POLYETHYLENE GLYCOL 3350 17 G/17G
17 POWDER, FOR SOLUTION ORAL DAILY
Qty: 527 G | Refills: 1 | Status: ON HOLD | OUTPATIENT
Start: 2019-07-19 | End: 2019-08-23

## 2019-07-18 RX ORDER — AMIODARONE HYDROCHLORIDE 200 MG/1
200 TABLET ORAL DAILY
Qty: 30 TABLET | Refills: 1 | Status: SHIPPED | OUTPATIENT
Start: 2019-07-18

## 2019-07-18 RX ORDER — TORSEMIDE 20 MG/1
20 TABLET ORAL DAILY
Qty: 30 TABLET | Refills: 3 | Status: SHIPPED | OUTPATIENT
Start: 2019-07-19 | End: 2019-07-23 | Stop reason: HOSPADM

## 2019-07-18 RX ORDER — TRAZODONE HYDROCHLORIDE 50 MG/1
50 TABLET ORAL NIGHTLY
Status: DISCONTINUED | OUTPATIENT
Start: 2019-07-18 | End: 2019-07-23 | Stop reason: HOSPADM

## 2019-07-18 RX ADMIN — HEPARIN SODIUM 5000 UNITS: 5000 INJECTION INTRAVENOUS; SUBCUTANEOUS at 21:08

## 2019-07-18 RX ADMIN — INSULIN GLARGINE 10 UNITS: 100 INJECTION, SOLUTION SUBCUTANEOUS at 21:08

## 2019-07-18 RX ADMIN — Medication 2 CAPSULE: at 08:19

## 2019-07-18 RX ADMIN — CYCLOBENZAPRINE HYDROCHLORIDE 10 MG: 10 TABLET, FILM COATED ORAL at 21:07

## 2019-07-18 RX ADMIN — IPRATROPIUM BROMIDE AND ALBUTEROL SULFATE 1 AMPULE: .5; 3 SOLUTION RESPIRATORY (INHALATION) at 16:35

## 2019-07-18 RX ADMIN — HEPARIN SODIUM 5000 UNITS: 5000 INJECTION INTRAVENOUS; SUBCUTANEOUS at 06:08

## 2019-07-18 RX ADMIN — IPRATROPIUM BROMIDE AND ALBUTEROL SULFATE 1 AMPULE: .5; 3 SOLUTION RESPIRATORY (INHALATION) at 08:05

## 2019-07-18 RX ADMIN — INSULIN LISPRO 1 UNITS: 100 INJECTION, SOLUTION INTRAVENOUS; SUBCUTANEOUS at 12:13

## 2019-07-18 RX ADMIN — IPRATROPIUM BROMIDE AND ALBUTEROL SULFATE 1 AMPULE: .5; 3 SOLUTION RESPIRATORY (INHALATION) at 20:16

## 2019-07-18 RX ADMIN — METOPROLOL SUCCINATE 25 MG: 25 TABLET, EXTENDED RELEASE ORAL at 08:19

## 2019-07-18 RX ADMIN — IPRATROPIUM BROMIDE AND ALBUTEROL SULFATE 1 AMPULE: .5; 3 SOLUTION RESPIRATORY (INHALATION) at 04:06

## 2019-07-18 RX ADMIN — AMIODARONE HYDROCHLORIDE 200 MG: 200 TABLET ORAL at 08:19

## 2019-07-18 RX ADMIN — IPRATROPIUM BROMIDE AND ALBUTEROL SULFATE 1 AMPULE: .5; 3 SOLUTION RESPIRATORY (INHALATION) at 12:15

## 2019-07-18 RX ADMIN — TORSEMIDE 20 MG: 20 TABLET ORAL at 08:19

## 2019-07-18 RX ADMIN — HEPARIN SODIUM 5000 UNITS: 5000 INJECTION INTRAVENOUS; SUBCUTANEOUS at 14:06

## 2019-07-18 RX ADMIN — TRAZODONE HYDROCHLORIDE 50 MG: 50 TABLET ORAL at 21:07

## 2019-07-18 RX ADMIN — GABAPENTIN 100 MG: 100 CAPSULE ORAL at 08:19

## 2019-07-18 RX ADMIN — INSULIN LISPRO 1 UNITS: 100 INJECTION, SOLUTION INTRAVENOUS; SUBCUTANEOUS at 08:25

## 2019-07-18 RX ADMIN — OMEPRAZOLE 40 MG: 20 CAPSULE, DELAYED RELEASE ORAL at 06:08

## 2019-07-18 RX ADMIN — INSULIN LISPRO 1 UNITS: 100 INJECTION, SOLUTION INTRAVENOUS; SUBCUTANEOUS at 21:08

## 2019-07-18 RX ADMIN — GABAPENTIN 100 MG: 100 CAPSULE ORAL at 14:06

## 2019-07-18 RX ADMIN — Medication 2 CAPSULE: at 17:49

## 2019-07-18 RX ADMIN — GABAPENTIN 100 MG: 100 CAPSULE ORAL at 21:07

## 2019-07-18 RX ADMIN — Medication 10 ML: at 08:24

## 2019-07-18 RX ADMIN — Medication 10 ML: at 08:25

## 2019-07-18 RX ADMIN — TRAMADOL HYDROCHLORIDE 50 MG: 50 TABLET, FILM COATED ORAL at 14:29

## 2019-07-18 RX ADMIN — MOMETASONE FUROATE AND FORMOTEROL FUMARATE DIHYDRATE 2 PUFF: 100; 5 AEROSOL RESPIRATORY (INHALATION) at 08:15

## 2019-07-18 RX ADMIN — Medication 10 ML: at 21:08

## 2019-07-18 RX ADMIN — MOMETASONE FUROATE AND FORMOTEROL FUMARATE DIHYDRATE 2 PUFF: 100; 5 AEROSOL RESPIRATORY (INHALATION) at 20:16

## 2019-07-18 RX ADMIN — DARBEPOETIN ALFA 100 MCG: 100 SOLUTION INTRAVENOUS; SUBCUTANEOUS at 08:19

## 2019-07-18 ASSESSMENT — PAIN DESCRIPTION - PAIN TYPE
TYPE: CHRONIC PAIN
TYPE: CHRONIC PAIN

## 2019-07-18 ASSESSMENT — PAIN DESCRIPTION - FREQUENCY
FREQUENCY: INTERMITTENT
FREQUENCY: INTERMITTENT

## 2019-07-18 ASSESSMENT — PAIN DESCRIPTION - ONSET
ONSET: ON-GOING
ONSET: OTHER (COMMENT)

## 2019-07-18 ASSESSMENT — PAIN DESCRIPTION - PROGRESSION
CLINICAL_PROGRESSION: NOT CHANGED
CLINICAL_PROGRESSION: NOT CHANGED

## 2019-07-18 ASSESSMENT — PAIN SCALES - GENERAL
PAINLEVEL_OUTOF10: 10
PAINLEVEL_OUTOF10: 0
PAINLEVEL_OUTOF10: 0
PAINLEVEL_OUTOF10: 6
PAINLEVEL_OUTOF10: 0
PAINLEVEL_OUTOF10: 4
PAINLEVEL_OUTOF10: 0
PAINLEVEL_OUTOF10: 10

## 2019-07-18 ASSESSMENT — PAIN DESCRIPTION - LOCATION
LOCATION: BACK;CHEST
LOCATION: BACK

## 2019-07-18 ASSESSMENT — PAIN DESCRIPTION - ORIENTATION: ORIENTATION: MID

## 2019-07-18 ASSESSMENT — PAIN - FUNCTIONAL ASSESSMENT
PAIN_FUNCTIONAL_ASSESSMENT: ACTIVITIES ARE NOT PREVENTED
PAIN_FUNCTIONAL_ASSESSMENT: PREVENTS OR INTERFERES SOME ACTIVE ACTIVITIES AND ADLS

## 2019-07-18 ASSESSMENT — PAIN DESCRIPTION - DESCRIPTORS
DESCRIPTORS: ACHING;DISCOMFORT
DESCRIPTORS: ACHING;DISCOMFORT

## 2019-07-18 ASSESSMENT — PAIN SCALES - WONG BAKER: WONGBAKER_NUMERICALRESPONSE: 2

## 2019-07-18 NOTE — PROGRESS NOTES
Hospitalist Progress Note      PCP: Rivas Jaramillo MD    Date of Admission: 6/22/2019    Subjective: breathing better, awaiting outpt dialysis spot    Medications:  Reviewed    Infusion Medications    dextrose       Scheduled Medications    metoprolol succinate  25 mg Oral Daily    [START ON 7/18/2019] darbepoetin vinay-polysorbate  100 mcg Subcutaneous Weekly - Thursday    insulin lispro  0-6 Units Subcutaneous TID WC    insulin lispro  0-3 Units Subcutaneous Nightly    torsemide  20 mg Oral Daily    gabapentin  100 mg Oral TID    amiodarone  200 mg Oral Daily    omeprazole  40 mg Oral Daily    polyethylene glycol  17 g Oral Daily    lactobacillus  2 capsule Oral BID WC    heparin (porcine)  5,000 Units Subcutaneous 3 times per day    sodium chloride flush  10 mL Intravenous 2 times per day    insulin glargine  10 Units Subcutaneous Nightly    mometasone-formoterol  2 puff Inhalation BID    sodium chloride flush  10 mL Intravenous 2 times per day    ipratropium-albuterol  1 ampule Inhalation Q4H     PRN Meds: cyclobenzaprine, traMADol, acetaminophen, heparin (porcine), albumin human, metoprolol, perflutren lipid microspheres, lubrifresh P.M., sodium chloride flush, albuterol, sodium chloride flush, ondansetron, acetaminophen, glucose, dextrose, glucagon (rDNA), dextrose      Intake/Output Summary (Last 24 hours) at 7/17/2019 2000  Last data filed at 7/17/2019 1845  Gross per 24 hour   Intake 1120 ml   Output 2600 ml   Net -1480 ml       Physical Exam Performed:    BP 99/64   Pulse 92   Temp 97.9 °F (36.6 °C) (Oral)   Resp 18   Ht 5' 7\" (1.702 m)   Wt 181 lb 10.5 oz (82.4 kg)   SpO2 97%   BMI 28.45 kg/m²     Gen: obese, NAD  HEENT: NC/AT, moist mucous membranes, no oropharyngeal erythema or exudate  Neck: supple, trachea midline, no anterior cervical or SC LAD  Heart:  Normal s1/s2, RRR, no murmurs, gallops, or rubs.  1+ leg edema, l AKA  Lungs:  diminished bilaterally, no wheeze, no rales, no rhonchi, no crackles, no use of accessory muscles  Abd: bowel sounds present, soft, nontender, nondistended, no masses  Extrem:  No clubbing, cyanosis,  1+ leg edema, L AKA  Skin: no rashes or lesions  Psych: A & O x3  Neuro: grossly intact, moves all four extremities. Labs:   Recent Labs     07/15/19  0635 07/17/19  0647   WBC 9.8 10.1   HGB 7.2* 7.0*   HCT 22.0* 21.7*    307     Recent Labs     07/15/19  0635 07/16/19  0615 07/17/19  0620   * 134* 135*   K 4.0 3.8 3.8   CL 93* 95* 96*   CO2 27 28 28   BUN 36* 20 27*   CREATININE 1.7* 1.2 1.2   CALCIUM 8.9 8.3 8.5   PHOS 5.1* 3.5 4.4     No results for input(s): AST, ALT, BILIDIR, BILITOT, ALKPHOS in the last 72 hours. No results for input(s): INR in the last 72 hours. No results for input(s): Wayne Sink in the last 72 hours. Urinalysis:      Lab Results   Component Value Date    NITRU Negative 06/23/2019    WBCUA 5 06/23/2019    RBCUA 8 06/23/2019    BLOODU MODERATE 06/23/2019    SPECGRAV 1.021 06/23/2019    GLUCOSEU Negative 06/23/2019       Radiology:  IR TUNNELED CVC PLACE WO SQ PORT/PUMP > 5 YEARS   Final Result   Successful ultrasound and fluoroscopy guided placement of a right internal   jugular 23 cm tip to cuff hemodialysis catheter. XR CHEST PORTABLE   Final Result   Interval development of left perihilar airspace disease. Improving aeration of right lung base. Fluoroscopy modified barium swallow with video   Final Result   Swallowing mechanism grossly within normal limits without evidence of   aspiration. Please see separate speech pathology report for full discussion of findings   and recommendations. XR CHEST PORTABLE   Final Result   Significant worsening in the appearance of the chest with significant   increase in the size of the right basilar pneumonia. Probable small right   pleural effusion. Slight vascular congestion.          CT ABDOMEN PELVIS W WO CONTRAST Additional Contrast?

## 2019-07-18 NOTE — PROGRESS NOTES
rash      Labs:  Renal panel:  Lab Results   Component Value Date/Time     (L) 07/18/2019 06:00 AM    K 4.1 07/18/2019 06:00 AM    K 3.6 06/11/2019 06:00 AM    CO2 27 07/18/2019 06:00 AM    BUN 15 07/18/2019 06:00 AM    CREATININE 1.1 07/18/2019 06:00 AM    CALCIUM 8.5 07/18/2019 06:00 AM    PHOS 3.1 07/18/2019 06:00 AM    MG 1.80 07/18/2019 06:00 AM     CBC:  Lab Results   Component Value Date/Time    WBC 10.1 07/17/2019 06:47 AM    HGB 7.0 (L) 07/17/2019 06:47 AM    HCT 21.7 (L) 07/17/2019 06:47 AM     07/17/2019 06:47 AM       Assessment/Plan:  .  1 REJI   - creatinine stable and low (? loss of muscle mass) but UO remains minimal   -  remains HD dependent     - TDC placed in IR 7/10/19 - HD MWF     -  DW 88 kg                -  Needs out patient HD spot      - social work investigating outpatient options - [employer] limiting options - Waiting on response from either [facility] or [facility]  - Ordered 24 hour urine collection for creatinine clearance  - preserving R arm (L arm has PICC in place)    2 HTN  - BP  Low at times  - improved with less metoprolol    3 Septic Shock   - resolved    4 Anemia   - received 1 unit of PRBC 7/3/19   - hemoglobin low  - increased aransep to 100 mcg weekly    5 Systolic CHF   - decrease DW with HD prn  - minimal UOP with oral Torsemide     6 A fib   - on Amio   - Cardiology following

## 2019-07-18 NOTE — PROGRESS NOTES
Physical Therapy  Facility/Department: [address] PROGRESSIVE CARE  Daily Treatment Note  NAME: Kimi Pavon  : 1960  MRN: [id number]    Date of Service: 2019    Discharge Recommendations:  Patient would benefit from continued therapy after discharge, 3-5 sessions per week   PT Equipment Recommendations  Other: defer to next level of care  Kimi Pavon scored a 8/24 on the AM-PAC short mobility form. Current research shows that an AM-PAC score of 17 or less is typically not associated with a discharge to the patient's home setting. Based on the patients AM-PAC score and their current functional mobility deficits, it is recommended that the patient have 3-5 sessions per week of Physical Therapy at d/c to increase the patients independence. Assessment   Body structures, Functions, Activity limitations: Decreased functional mobility ; Decreased strength;Decreased endurance;Decreased balance;Decreased safe awareness  Assessment: pt well below his baseline of being Ind with transfers and Ind from w/c level; pt currently needing assist of 2 for bed mob and transfers with lift equipment; pt will need extensive therapy at discharge to assist pt in attaining his max potential  Patient Education: reviewed need for participation in therapy; reviewed call light and  not getting up without assist  REQUIRES PT FOLLOW UP: Yes  Activity Tolerance  Activity Tolerance: Patient limited by fatigue;Patient limited by endurance     Patient Diagnosis(es): The primary encounter diagnosis was REJI (acute kidney injury) ([address] Diagnoses of Septicemia ([address] HCAP (healthcare-associated pneumonia), COPD exacerbation ([address] Hypomagnesemia, Acute on chronic respiratory failure with hypoxia ([address] PEA (Pulseless electrical activity) ([address] Ventricular fibrillation ([address] Acute hyperkalemia, Acute respiratory failure with hypoxia and hypercapnia ([address] and Shock circulatory ([address] were also pertinent to this visit.      has a Wall work. Restrictions  Restrictions/Precautions  Restrictions/Precautions: Fall Risk  Position Activity Restriction  Other position/activity restrictions: O2 3L via NC (O2 2 1/2 pta). H/O L AKA (5/2017, W/C Independent). Subjective   General  Chart Reviewed: Yes  Additional Pertinent Hx: 60 y/o male admit 6/23/19 with REJI, Septicemia, COPD Exac, Cardiac Arrest.  6/23/19 - 7/3/19 Pt Intubated. CKD (CRRT, now receiving HD). PMH as noted including CAD, A-Fib, MI, Angio with Stent, CHF, CKD (no HD pta, currently receiving HD), COPD, R Wrist Fx/Surg, R LE Fx/Surg, LE Vasc Surg, L AKA (5/2017), DM, Neuropathy. Response To Previous Treatment: Patient with no complaints from previous session. Family / Caregiver Present: No  Referring Practitioner: Samantha Winters NP. Subjective  Subjective: pt refused earlier to be seen by therapy but now agreeable to getting up; however after standing up with gerda delong refused to do any additional standing on stedy; only agreeable to sitting in chair; maxi move sling under pt in chair          Orientation  Orientation  Overall Orientation Status: Impaired(decreased insight into current health issues and need to work with therapy to get stronger)  Orientation Level: Oriented to person;Disoriented to time;Disoriented to situation;Oriented to place(partial situation)  Cognition   Cognition  Overall Cognitive Status: Exceptions  Arousal/Alertness: Delayed responses to stimuli  Following Commands:  Follows one step commands consistently  Attention Span: Appears intact  Memory: Appears intact  Safety Judgement: Decreased awareness of need for safety;Decreased awareness of need for assistance  Problem Solving: Assistance required to identify errors made;Assistance required to correct errors made  Insights: Decreased awareness of deficits  Initiation: Requires cues for some  Sequencing: Requires cues for some  Cognition Comment: very soft spoken, and communicating very little -

## 2019-07-18 NOTE — PROGRESS NOTES
BY MOUTH DAILY     DULoxetine 30 MG extended release capsule  Commonly known as:  CYMBALTA  TAKE 1 CAPSULE BY MOUTH DAILY     gabapentin 100 MG capsule  Commonly known as:  NEURONTIN     Insulin Pen Needle 31G X 6 MM Misc  1 each by Does not apply route daily     MAGNESIUM-OXIDE 400 (241.3 Mg) MG Tabs tablet  Generic drug:  magnesium oxide  TAKE 1 TABLET BY MOUTH TWICE DAILY     mometasone-formoterol 100-5 MCG/ACT inhaler  Commonly known as:  DULERA  Inhale 2 puffs into the lungs 2 times daily     omeprazole 40 MG delayed release capsule  Commonly known as:  PRILOSEC  Take 1 capsule by mouth daily (with breakfast)     spironolactone 25 MG tablet  Commonly known as:  ALDACTONE  Take 1 tablet by mouth daily     tamsulosin 0.4 MG capsule  Commonly known as:  FLOMAX  Take 1 capsule by mouth daily     traZODone 50 MG tablet  Commonly known as:  DESYREL  TAKE 1 TABLET BY MOUTH DAILY FOR INSOMNIA            STOP taking these medications      amiodarone 200 MG tablet  Commonly known as:  CORDARONE     diltiazem 240 MG extended release capsule  Commonly known as:  CARDIZEM CD     metFORMIN 500 MG tablet  Commonly known as:  GLUCOPHAGE     potassium chloride 8 MEQ extended release tablet  Commonly known as:  KLOR-CON     XARELTO 20 MG Tabs tablet  Generic drug:  rivaroxaban               Where to Get Your Medications        These medications were sent to [address] [place] [address] & [place] [address], [address] [place] [address]      Phone:  [phone]   cyclobenzaprine 10 MG tablet  insulin glargine 100 UNIT/ML injection pen  lactobacillus capsule  metoprolol succinate 50 MG extended release tablet  polyethylene glycol packet  torsemide 20 MG tablet         Arti Greer PharmD  Heart Failure Discharge Medication Reconciliation Program  [phone]

## 2019-07-18 NOTE — PROGRESS NOTES
RN informed patient of plan for 24 hour urine collection needed per nephrology. Patient was offered a turpin catheter to obtain an accurate number but the patient refused. RN stressed the importance of needing an accurate count of urine and the need to call for assistance with the urinal. Patient verbalized understanding. 24 hour urine collection container placed in the bathroom and placed signs around room and bathroom to make sure no other staff empty urinal into toilet. Will continue to monitor.

## 2019-07-18 NOTE — PROGRESS NOTES
laryngeal/ pharyngeal ex 5/5 each. -thermal gustatory stimulation was not completed this date/time    Assessment:   Impressions:   Dysphagia Diagnosis: Mild oral stage dysphagia, Mild-mod pharyngeal stage dysphagia characterized by prolonged, but functional mastication; functional but disorganized bolus formation and A-P oral transit regular food consistencies; delayed initiation of swallow. · PO trials declined by patient  · Dysphaiga ex declined by patient  · Patient ed completed on dyspahgia and rationale/benefit of tx; patient continues to deny impairment and need for tx. Recommend ST to reinforce benefit of tx with potneital disocnitnuation of services if patient continues to decline tx.      Diet Recommendations:   Solid consistency: (continue as ordered by MD) Regular consistency with restrictions s of carb control and low sodium  Liquid consistency: (continue as ordered by MD) Thin liquids with fluid restrictions  Recommended Form of Meds: PO    Strategies:   Compensatory Swallowing Strategies: Upright as possible for all oral intake, Small bites/sips, External pacing, Swallow 2 times per bite/sip, Remain upright for 30-45 minutes after meals    Education:  Consulted and agree with results and recommendations: Patient, RN  Patient Education: Completed on treatment plan of care  Patient Education Response: Needs reinforcement and review of rationale    Prognosis for Dysphagia:   Guarded 2/2 to medical co-morbidities and pt pain complaints and suspect pt motivation    Plan:     Continue Dysphagia Therapy: Yes  Interventions: Therapeutic Interventions: Therapeutic PO trials with SLP, Patient/Family education, Bolus control exercises, Laryngeal exercises, Pharyngeal exercises  Duration/Frequency of therapy while on unit: Duration/Frequency of Treatment  Duration/Frequency of Treatment: ST to tx 3-5 times per week for dysphaiga during acute admission; will determine if additional therapy warranted at

## 2019-07-18 NOTE — PROGRESS NOTES
Discharge order noted. Patient has received >60 mins of HF education. HF instructions have been added to [address]. Facility MD will complete required 7 day follow up. Pt also has cardiology follow up 7/31 with Alfa Momin NP. Pharmacy notified of need for discharge med rec.

## 2019-07-18 NOTE — CARE COORDINATION
Case Management:  Erica Grullon at  [facility] to follow up on status of op hd slot, left message for her to call me.   Electronically signed by Lisa Daniel on 7/18/2019 at 8:33 [id number]

## 2019-07-18 NOTE — PROGRESS NOTES
Psychiatric problem, PVD (peripheral vascular disease) ([facility] Type 2 diabetes mellitus without complication ([facility] and Type II or unspecified type diabetes mellitus without mention of complication, not stated as uncontrolled. has a past surgical history that includes Leg Surgery (Right, 1980); Appendectomy; Hand surgery (Left); Wrist fracture surgery (Right, 1980); knee surgery; angioplasty (Bilateral, 1-15-15, 1/19/15); Coronary angioplasty with stent; Femoral-tibial Bypass Graft (Left, 5/2/16); Leg amputation through femur; and Tunneled venous catheter placement (Right, 07/10/2019). Restrictions  Restrictions/Precautions  Restrictions/Precautions: Fall Risk  Position Activity Restriction  Other position/activity restrictions: O2 3L via NC (O2 2 1/2 pta). H/O L AKA (5/2017, W/C Independent). Subjective   General  Chart Reviewed: Yes  Patient assessed for rehabilitation services?: Yes  Additional Pertinent Hx: 62 y/o male admit 6/23/19 with REJI, Septicemia, COPD Exac, Cardiac Arrest.  6/23/19 - 7/3/19 Pt Intubated. CKD (CRRT, now receiving HD). PMH as noted including CAD, A-Fib, MI, Angio with Stent, CHF, CKD (no HD pta, currently receiving HD), COPD, R Wrist Fx/Surg, R LE Fx/Surg, LE Vasc Surg, L AKA (5/2017), DM, Neuropathy. Family / Caregiver Present: No  Referring Practitioner: Jake Briceno DO  Diagnosis: REJI, Septicemia, COPD Exac, Cardiac Arrest.   H/O L AKA. Subjective  Subjective: Pt met bedside earlier this date - declines until this afternoon. Returned to pt room. Pt resting supine in bed. Pt agreeable to OOB activity however upon suggestions for further treatment session pt declines. Vital Signs  Patient Currently in Pain: (Did not state pain throughout session)        Objective    ADL  Additional Comments: Declines completing ADL needs at this time.          Balance  Sitting Balance: Stand by assistance  Standing Balance: Minimal assistance(only tolerates brief (not

## 2019-07-19 LAB
24HR URINE VOLUME (ML): 1000 ML
ALBUMIN SERPL-MCNC: 3 G/DL (ref 3.4–5)
ANION GAP SERPL CALCULATED.3IONS-SCNC: 11 MMOL/L (ref 3–16)
BASOPHILS ABSOLUTE: 0.3 K/UL (ref 0–0.2)
BASOPHILS RELATIVE PERCENT: 3.5 %
BUN BLDV-MCNC: 22 MG/DL (ref 7–20)
CALCIUM SERPL-MCNC: 8.7 MG/DL (ref 8.3–10.6)
CHLORIDE BLD-SCNC: 96 MMOL/L (ref 99–110)
CO2: 29 MMOL/L (ref 21–32)
CREAT SERPL-MCNC: 0.7 MG/DL (ref 0.8–1.3)
CREAT SERPL-MCNC: 1.2 MG/DL (ref 0.9–1.3)
CREATININE 24 HOUR URINE: 1.1 G/24HR (ref 0.6–2.5)
CREATININE CLEARANCE: 94 ML/MIN (ref 100–140)
EOSINOPHILS ABSOLUTE: 0.2 K/UL (ref 0–0.6)
EOSINOPHILS RELATIVE PERCENT: 2.1 %
GFR AFRICAN AMERICAN: >60
GFR NON-AFRICAN AMERICAN: >60
GLUCOSE BLD-MCNC: 105 MG/DL (ref 70–99)
GLUCOSE BLD-MCNC: 117 MG/DL (ref 70–99)
GLUCOSE BLD-MCNC: 181 MG/DL (ref 70–99)
GLUCOSE BLD-MCNC: 199 MG/DL (ref 70–99)
HCT VFR BLD CALC: 21.9 % (ref 40.5–52.5)
HEMOGLOBIN: 7.1 G/DL (ref 13.5–17.5)
LYMPHOCYTES ABSOLUTE: 1.6 K/UL (ref 1–5.1)
LYMPHOCYTES RELATIVE PERCENT: 18.3 %
Lab: 24 HR
MAGNESIUM: 1.1 MG/DL (ref 1.8–2.4)
MCH RBC QN AUTO: 26.9 PG (ref 26–34)
MCHC RBC AUTO-ENTMCNC: 32.4 G/DL (ref 31–36)
MCV RBC AUTO: 83 FL (ref 80–100)
MONOCYTES ABSOLUTE: 1.3 K/UL (ref 0–1.3)
MONOCYTES RELATIVE PERCENT: 14.3 %
NEUTROPHILS ABSOLUTE: 5.4 K/UL (ref 1.7–7.7)
NEUTROPHILS RELATIVE PERCENT: 61.8 %
PDW BLD-RTO: 17.8 % (ref 12.4–15.4)
PERFORMED ON: ABNORMAL
PHOSPHORUS: 4.1 MG/DL (ref 2.5–4.9)
PLATELET # BLD: 262 K/UL (ref 135–450)
PMV BLD AUTO: 8.1 FL (ref 5–10.5)
POTASSIUM SERPL-SCNC: 4.3 MMOL/L (ref 3.5–5.1)
RBC # BLD: 2.63 M/UL (ref 4.2–5.9)
SODIUM BLD-SCNC: 136 MMOL/L (ref 136–145)
WBC # BLD: 8.7 K/UL (ref 4–11)

## 2019-07-19 PROCEDURE — 6370000000 HC RX 637 (ALT 250 FOR IP): Performed by: NURSE PRACTITIONER

## 2019-07-19 PROCEDURE — 6360000002 HC RX W HCPCS: Performed by: INTERNAL MEDICINE

## 2019-07-19 PROCEDURE — 80069 RENAL FUNCTION PANEL: CPT

## 2019-07-19 PROCEDURE — 6370000000 HC RX 637 (ALT 250 FOR IP): Performed by: INTERNAL MEDICINE

## 2019-07-19 PROCEDURE — 2580000003 HC RX 258: Performed by: INTERNAL MEDICINE

## 2019-07-19 PROCEDURE — 90935 HEMODIALYSIS ONE EVALUATION: CPT

## 2019-07-19 PROCEDURE — P9047 ALBUMIN (HUMAN), 25%, 50ML: HCPCS | Performed by: INTERNAL MEDICINE

## 2019-07-19 PROCEDURE — 94640 AIRWAY INHALATION TREATMENT: CPT

## 2019-07-19 PROCEDURE — 94761 N-INVAS EAR/PLS OXIMETRY MLT: CPT

## 2019-07-19 PROCEDURE — 83735 ASSAY OF MAGNESIUM: CPT

## 2019-07-19 PROCEDURE — 2060000000 HC ICU INTERMEDIATE R&B

## 2019-07-19 PROCEDURE — 2700000000 HC OXYGEN THERAPY PER DAY

## 2019-07-19 PROCEDURE — 85025 COMPLETE CBC W/AUTO DIFF WBC: CPT

## 2019-07-19 RX ORDER — MAGNESIUM SULFATE IN WATER 40 MG/ML
4 INJECTION, SOLUTION INTRAVENOUS ONCE
Status: COMPLETED | OUTPATIENT
Start: 2019-07-19 | End: 2019-07-19

## 2019-07-19 RX ADMIN — ALBUMIN (HUMAN) 25 G: 0.25 INJECTION, SOLUTION INTRAVENOUS at 08:33

## 2019-07-19 RX ADMIN — AMIODARONE HYDROCHLORIDE 200 MG: 200 TABLET ORAL at 11:58

## 2019-07-19 RX ADMIN — RIVAROXABAN 15 MG: 15 TABLET, FILM COATED ORAL at 17:05

## 2019-07-19 RX ADMIN — IPRATROPIUM BROMIDE AND ALBUTEROL SULFATE 1 AMPULE: .5; 3 SOLUTION RESPIRATORY (INHALATION) at 16:59

## 2019-07-19 RX ADMIN — IPRATROPIUM BROMIDE AND ALBUTEROL SULFATE 1 AMPULE: .5; 3 SOLUTION RESPIRATORY (INHALATION) at 12:18

## 2019-07-19 RX ADMIN — TRAMADOL HYDROCHLORIDE 50 MG: 50 TABLET, FILM COATED ORAL at 08:46

## 2019-07-19 RX ADMIN — Medication 2 CAPSULE: at 17:05

## 2019-07-19 RX ADMIN — MAGNESIUM SULFATE HEPTAHYDRATE 4 G: 40 INJECTION, SOLUTION INTRAVENOUS at 14:19

## 2019-07-19 RX ADMIN — INSULIN GLARGINE 10 UNITS: 100 INJECTION, SOLUTION SUBCUTANEOUS at 22:13

## 2019-07-19 RX ADMIN — Medication 10 ML: at 22:49

## 2019-07-19 RX ADMIN — TORSEMIDE 20 MG: 20 TABLET ORAL at 11:58

## 2019-07-19 RX ADMIN — Medication 10 ML: at 12:07

## 2019-07-19 RX ADMIN — HEPARIN SODIUM 5000 UNITS: 5000 INJECTION INTRAVENOUS; SUBCUTANEOUS at 14:20

## 2019-07-19 RX ADMIN — MOMETASONE FUROATE AND FORMOTEROL FUMARATE DIHYDRATE 2 PUFF: 100; 5 AEROSOL RESPIRATORY (INHALATION) at 20:19

## 2019-07-19 RX ADMIN — HEPARIN SODIUM 5000 UNITS: 5000 INJECTION INTRAVENOUS; SUBCUTANEOUS at 06:43

## 2019-07-19 RX ADMIN — GABAPENTIN 100 MG: 100 CAPSULE ORAL at 22:13

## 2019-07-19 RX ADMIN — INSULIN LISPRO 1 UNITS: 100 INJECTION, SOLUTION INTRAVENOUS; SUBCUTANEOUS at 22:12

## 2019-07-19 RX ADMIN — GABAPENTIN 100 MG: 100 CAPSULE ORAL at 14:19

## 2019-07-19 RX ADMIN — IPRATROPIUM BROMIDE AND ALBUTEROL SULFATE 1 AMPULE: .5; 3 SOLUTION RESPIRATORY (INHALATION) at 20:19

## 2019-07-19 RX ADMIN — METOPROLOL SUCCINATE 25 MG: 25 TABLET, EXTENDED RELEASE ORAL at 11:58

## 2019-07-19 RX ADMIN — OMEPRAZOLE 40 MG: 20 CAPSULE, DELAYED RELEASE ORAL at 06:43

## 2019-07-19 RX ADMIN — INSULIN LISPRO 1 UNITS: 100 INJECTION, SOLUTION INTRAVENOUS; SUBCUTANEOUS at 17:06

## 2019-07-19 RX ADMIN — Medication 10 ML: at 12:06

## 2019-07-19 RX ADMIN — IPRATROPIUM BROMIDE AND ALBUTEROL SULFATE 1 AMPULE: .5; 3 SOLUTION RESPIRATORY (INHALATION) at 00:33

## 2019-07-19 RX ADMIN — MOMETASONE FUROATE AND FORMOTEROL FUMARATE DIHYDRATE 2 PUFF: 100; 5 AEROSOL RESPIRATORY (INHALATION) at 12:18

## 2019-07-19 RX ADMIN — TRAZODONE HYDROCHLORIDE 50 MG: 50 TABLET ORAL at 22:13

## 2019-07-19 ASSESSMENT — PAIN DESCRIPTION - LOCATION
LOCATION: BACK
LOCATION: BACK;BUTTOCKS

## 2019-07-19 ASSESSMENT — PAIN SCALES - GENERAL
PAINLEVEL_OUTOF10: 0
PAINLEVEL_OUTOF10: 6
PAINLEVEL_OUTOF10: 4
PAINLEVEL_OUTOF10: 2
PAINLEVEL_OUTOF10: 4
PAINLEVEL_OUTOF10: 9
PAINLEVEL_OUTOF10: 0

## 2019-07-19 ASSESSMENT — PAIN DESCRIPTION - ORIENTATION
ORIENTATION: MID;LOWER
ORIENTATION: MID;LOWER

## 2019-07-19 ASSESSMENT — PAIN DESCRIPTION - FREQUENCY
FREQUENCY: INTERMITTENT
FREQUENCY: INTERMITTENT

## 2019-07-19 ASSESSMENT — PAIN DESCRIPTION - PROGRESSION
CLINICAL_PROGRESSION: NOT CHANGED
CLINICAL_PROGRESSION: NOT CHANGED

## 2019-07-19 ASSESSMENT — PAIN DESCRIPTION - PAIN TYPE
TYPE: CHRONIC PAIN
TYPE: CHRONIC PAIN

## 2019-07-19 ASSESSMENT — PAIN DESCRIPTION - DESCRIPTORS
DESCRIPTORS: ACHING
DESCRIPTORS: ACHING;DISCOMFORT

## 2019-07-19 ASSESSMENT — PAIN DESCRIPTION - ONSET
ONSET: ON-GOING
ONSET: ON-GOING

## 2019-07-19 ASSESSMENT — PAIN - FUNCTIONAL ASSESSMENT: PAIN_FUNCTIONAL_ASSESSMENT: PREVENTS OR INTERFERES SOME ACTIVE ACTIVITIES AND ADLS

## 2019-07-19 NOTE — PLAN OF CARE
PRN medication per orders. Will continue to monitor.    Electronically signed by Etta Del Castillo RN on 7/18/2019 at 11:49 PM

## 2019-07-19 NOTE — PLAN OF CARE
Problem: Risk for Impaired Skin Integrity  Goal: Tissue integrity - skin and mucous membranes  Description  Structural intactness and normal physiological function of skin and  mucous membranes. 7/19/2019 0931 by Jonnathan Pandey RN  Outcome: Ongoing   Patient's skin has been assessed per unit protocol and the patient is being repositioned or encouraged to turn every two hours to prevent skin breakdown and promote healing. Problem: Falls - Risk of:  Goal: Will remain free from falls  Description  Will remain free from falls  7/19/2019 0931 by Jonnathan Pandey RN  Outcome: Ongoing   Fall risk assessment completed per unit protocol. Patient's bed is in the lowest position, call light is within reach and the patient's room is free of clutter. The patient has been instructed to call for assistance before getting out of bed or the chair. Problem: OXYGENATION/RESPIRATORY FUNCTION  Goal: Patient will maintain patent airway  7/19/2019 0931 by Jonnathan Pandey RN  Outcome: Ongoing   Patient is able to breathe comfortably on 3L of oxygen per nasal cannula. Problem: HEMODYNAMIC STATUS  Goal: Patient has stable vital signs and fluid balance  7/19/2019 0931 by Jonnathan Pandey RN  Outcome: Ongoing   VSS    Problem: Nutrition  Goal: Optimal nutrition therapy  Outcome: Ongoing   Patient's appetite is poor this AM.     Problem: Pain:  Goal: Pain level will decrease  Description  Pain level will decrease  7/19/2019 0931 by Jonnathan Pandey RN  Outcome: Ongoing   Pain/discomfort being managed with PRN analgesics per MD orders. Pt able to express presence and absence of pain and rate pain appropriately using numerical scale.

## 2019-07-19 NOTE — PROGRESS NOTES
edema, l AKA  Lungs:  diminished bilaterally, no wheeze, no rales, no rhonchi, no crackles, no use of accessory muscles  Abd: bowel sounds present, soft, nontender, nondistended, no masses  Extrem:  No clubbing, cyanosis,  1+ leg edema, L AKA  Skin: no rashes or lesions  Psych: A & O x3  Neuro: grossly intact, moves all four extremities. Labs:   Recent Labs     07/17/19  0647   WBC 10.1   HGB 7.0*   HCT 21.7*        Recent Labs     07/16/19  0615 07/17/19  0620 07/18/19  0600   * 135* 134*   K 3.8 3.8 4.1   CL 95* 96* 94*   CO2 28 28 27   BUN 20 27* 15   CREATININE 1.2 1.2 1.1   CALCIUM 8.3 8.5 8.5   PHOS 3.5 4.4 3.1     No results for input(s): AST, ALT, BILIDIR, BILITOT, ALKPHOS in the last 72 hours. No results for input(s): INR in the last 72 hours. No results for input(s): Gar Elliott in the last 72 hours. Urinalysis:      Lab Results   Component Value Date    NITRU Negative 06/23/2019    WBCUA 5 06/23/2019    RBCUA 8 06/23/2019    BLOODU MODERATE 06/23/2019    SPECGRAV 1.021 06/23/2019    GLUCOSEU Negative 06/23/2019       Radiology:  IR TUNNELED CVC PLACE WO SQ PORT/PUMP > 5 YEARS   Final Result   Successful ultrasound and fluoroscopy guided placement of a right internal   jugular 23 cm tip to cuff hemodialysis catheter. XR CHEST PORTABLE   Final Result   Interval development of left perihilar airspace disease. Improving aeration of right lung base. Fluoroscopy modified barium swallow with video   Final Result   Swallowing mechanism grossly within normal limits without evidence of   aspiration. Please see separate speech pathology report for full discussion of findings   and recommendations. XR CHEST PORTABLE   Final Result   Significant worsening in the appearance of the chest with significant   increase in the size of the right basilar pneumonia. Probable small right   pleural effusion. Slight vascular congestion.          CT ABDOMEN PELVIS W WO process. Right jugular central venous catheter in place terminating in the right atrium         CT Head WO Contrast   Final Result   No acute intracranial abnormality. CT CHEST WO CONTRAST   Final Result   1. Bilateral airspace disease could represent atelectasis or pneumonia. 2. Small right pleural effusion. 3. Coronary artery disease. 4. Pulmonary hypertension. 5. Mediastinal adenopathy, probably reactive given the relatively rapid   development. 6. Suspected acute nondisplaced bilateral anterior rib fractures without   pneumothorax. XR CHEST PORTABLE   Final Result   1. Endotracheal tube tip projects over the trachea, tip at the level of the   aortic knob, 3.7 cm superior to the erna. 2. Pulmonary edema evident. 3. Cardiomegaly. 4. No pneumothorax. 5. Nonspecific prominence of the azygos vein region may be related vascular   engorgement, adenopathy or underlying mass lesion.          XR CHEST PORTABLE   Final Result   Questionable right basilar atelectasis or pneumonia on a limited portable   exam.                 Assessment/Plan:    Active Hospital Problems    Diagnosis    Chronic atrial fibrillation ([facility]) [I48.2]     Priority: High    Pneumonia of right lower lobe due to infectious organism ([address] [J18.1]    PEA (Pulseless electrical activity) ([facility]) [I46.9]    Ileus ([address] [K56.7]    Respiratory failure ([address] [J96.90]    HCAP (healthcare-associated pneumonia) [J18.9]    Ventricular tachycardia ([facility]) [I47.2]    Shock circulatory ([address] [R57.9]    Tachypnea [M31.60]    Neutrophilic leukocytosis [P66.0]    Chronic respiratory failure with hypoxia ([facility]) [J96.11]    Cardiac arrest ([address] [I46.9]    Acute on chronic systolic HF (heart failure) ([facility]) [I50.23]    Acute pulmonary edema ([facility]) [J81.0]    CAD (coronary artery disease) [I25.10]    Morbid obesity due to excess calories ([address] [E66.01]    Acute renal failure (ARF) ([facility]) [N17.9]    Severe sepsis ([facility])

## 2019-07-20 LAB
ALBUMIN SERPL-MCNC: 3.2 G/DL (ref 3.4–5)
ANION GAP SERPL CALCULATED.3IONS-SCNC: 13 MMOL/L (ref 3–16)
BUN BLDV-MCNC: 13 MG/DL (ref 7–20)
CALCIUM SERPL-MCNC: 8.8 MG/DL (ref 8.3–10.6)
CHLORIDE BLD-SCNC: 92 MMOL/L (ref 99–110)
CO2: 26 MMOL/L (ref 21–32)
CREAT SERPL-MCNC: 1.1 MG/DL (ref 0.9–1.3)
GFR AFRICAN AMERICAN: >60
GFR NON-AFRICAN AMERICAN: >60
GLUCOSE BLD-MCNC: 118 MG/DL (ref 70–99)
GLUCOSE BLD-MCNC: 129 MG/DL (ref 70–99)
GLUCOSE BLD-MCNC: 131 MG/DL (ref 70–99)
GLUCOSE BLD-MCNC: 140 MG/DL (ref 70–99)
GLUCOSE BLD-MCNC: 150 MG/DL (ref 70–99)
MAGNESIUM: 2.1 MG/DL (ref 1.8–2.4)
PERFORMED ON: ABNORMAL
PHOSPHORUS: 3.1 MG/DL (ref 2.5–4.9)
POTASSIUM SERPL-SCNC: 4.6 MMOL/L (ref 3.5–5.1)
SODIUM BLD-SCNC: 131 MMOL/L (ref 136–145)

## 2019-07-20 PROCEDURE — 94640 AIRWAY INHALATION TREATMENT: CPT

## 2019-07-20 PROCEDURE — 83735 ASSAY OF MAGNESIUM: CPT

## 2019-07-20 PROCEDURE — 6370000000 HC RX 637 (ALT 250 FOR IP): Performed by: INTERNAL MEDICINE

## 2019-07-20 PROCEDURE — 2060000000 HC ICU INTERMEDIATE R&B

## 2019-07-20 PROCEDURE — 2580000003 HC RX 258: Performed by: INTERNAL MEDICINE

## 2019-07-20 PROCEDURE — 2700000000 HC OXYGEN THERAPY PER DAY

## 2019-07-20 PROCEDURE — 94761 N-INVAS EAR/PLS OXIMETRY MLT: CPT

## 2019-07-20 PROCEDURE — 6370000000 HC RX 637 (ALT 250 FOR IP): Performed by: NURSE PRACTITIONER

## 2019-07-20 PROCEDURE — 80069 RENAL FUNCTION PANEL: CPT

## 2019-07-20 RX ORDER — DIPHENHYDRAMINE HCL 25 MG
25 TABLET ORAL EVERY 8 HOURS PRN
Status: DISCONTINUED | OUTPATIENT
Start: 2019-07-20 | End: 2019-07-23 | Stop reason: HOSPADM

## 2019-07-20 RX ADMIN — Medication 2 CAPSULE: at 17:34

## 2019-07-20 RX ADMIN — IPRATROPIUM BROMIDE AND ALBUTEROL SULFATE 1 AMPULE: .5; 3 SOLUTION RESPIRATORY (INHALATION) at 11:51

## 2019-07-20 RX ADMIN — IPRATROPIUM BROMIDE AND ALBUTEROL SULFATE 1 AMPULE: .5; 3 SOLUTION RESPIRATORY (INHALATION) at 20:22

## 2019-07-20 RX ADMIN — METOPROLOL SUCCINATE 25 MG: 25 TABLET, EXTENDED RELEASE ORAL at 08:31

## 2019-07-20 RX ADMIN — Medication 10 ML: at 08:32

## 2019-07-20 RX ADMIN — IPRATROPIUM BROMIDE AND ALBUTEROL SULFATE 1 AMPULE: .5; 3 SOLUTION RESPIRATORY (INHALATION) at 16:00

## 2019-07-20 RX ADMIN — Medication 2 CAPSULE: at 08:31

## 2019-07-20 RX ADMIN — IPRATROPIUM BROMIDE AND ALBUTEROL SULFATE 1 AMPULE: .5; 3 SOLUTION RESPIRATORY (INHALATION) at 08:22

## 2019-07-20 RX ADMIN — RIVAROXABAN 15 MG: 15 TABLET, FILM COATED ORAL at 17:34

## 2019-07-20 RX ADMIN — OMEPRAZOLE 40 MG: 20 CAPSULE, DELAYED RELEASE ORAL at 07:22

## 2019-07-20 RX ADMIN — INSULIN GLARGINE 10 UNITS: 100 INJECTION, SOLUTION SUBCUTANEOUS at 21:09

## 2019-07-20 RX ADMIN — GABAPENTIN 100 MG: 100 CAPSULE ORAL at 08:31

## 2019-07-20 RX ADMIN — IPRATROPIUM BROMIDE AND ALBUTEROL SULFATE 1 AMPULE: .5; 3 SOLUTION RESPIRATORY (INHALATION) at 04:38

## 2019-07-20 RX ADMIN — TORSEMIDE 20 MG: 20 TABLET ORAL at 08:31

## 2019-07-20 RX ADMIN — AMIODARONE HYDROCHLORIDE 200 MG: 200 TABLET ORAL at 08:31

## 2019-07-20 RX ADMIN — GABAPENTIN 100 MG: 100 CAPSULE ORAL at 21:09

## 2019-07-20 RX ADMIN — INSULIN LISPRO 1 UNITS: 100 INJECTION, SOLUTION INTRAVENOUS; SUBCUTANEOUS at 21:09

## 2019-07-20 RX ADMIN — TRAZODONE HYDROCHLORIDE 50 MG: 50 TABLET ORAL at 21:09

## 2019-07-20 RX ADMIN — GABAPENTIN 100 MG: 100 CAPSULE ORAL at 14:56

## 2019-07-20 RX ADMIN — CYCLOBENZAPRINE HYDROCHLORIDE 10 MG: 10 TABLET, FILM COATED ORAL at 21:16

## 2019-07-20 RX ADMIN — Medication 10 ML: at 21:09

## 2019-07-20 RX ADMIN — INSULIN LISPRO 1 UNITS: 100 INJECTION, SOLUTION INTRAVENOUS; SUBCUTANEOUS at 12:22

## 2019-07-20 RX ADMIN — MOMETASONE FUROATE AND FORMOTEROL FUMARATE DIHYDRATE 2 PUFF: 100; 5 AEROSOL RESPIRATORY (INHALATION) at 20:22

## 2019-07-20 RX ADMIN — MOMETASONE FUROATE AND FORMOTEROL FUMARATE DIHYDRATE 2 PUFF: 100; 5 AEROSOL RESPIRATORY (INHALATION) at 08:22

## 2019-07-20 RX ADMIN — IPRATROPIUM BROMIDE AND ALBUTEROL SULFATE 1 AMPULE: .5; 3 SOLUTION RESPIRATORY (INHALATION) at 00:13

## 2019-07-20 ASSESSMENT — PAIN DESCRIPTION - ORIENTATION
ORIENTATION: LOWER
ORIENTATION: LOWER

## 2019-07-20 ASSESSMENT — PAIN SCALES - GENERAL
PAINLEVEL_OUTOF10: 2

## 2019-07-20 ASSESSMENT — PAIN DESCRIPTION - ONSET
ONSET: ON-GOING
ONSET: ON-GOING

## 2019-07-20 ASSESSMENT — PAIN DESCRIPTION - DESCRIPTORS
DESCRIPTORS: ACHING;CONSTANT
DESCRIPTORS: ACHING;CONSTANT;DISCOMFORT

## 2019-07-20 ASSESSMENT — PAIN DESCRIPTION - FREQUENCY
FREQUENCY: CONTINUOUS
FREQUENCY: CONTINUOUS

## 2019-07-20 ASSESSMENT — PAIN DESCRIPTION - PROGRESSION
CLINICAL_PROGRESSION: NOT CHANGED
CLINICAL_PROGRESSION: NOT CHANGED

## 2019-07-20 ASSESSMENT — PAIN DESCRIPTION - PAIN TYPE
TYPE: CHRONIC PAIN

## 2019-07-20 ASSESSMENT — PAIN DESCRIPTION - LOCATION
LOCATION: BACK
LOCATION: BACK;GENERALIZED

## 2019-07-20 NOTE — PROGRESS NOTES
[address] [place] [address]    Phone:  [phone]   · rivaroxaban 15 MG Tabs tablet         Allie Girard, PharmD, BCPS  7/20/2019  12:49 PM      Heart Failure Discharge Medication Reconciliation Program  [phone]

## 2019-07-20 NOTE — PLAN OF CARE
Problem: Risk for Impaired Skin Integrity  Goal: Tissue integrity - skin and mucous membranes  Description  Structural intactness and normal physiological function of skin and  mucous membranes. Outcome: Ongoing   Skin assessment completed every shift. Pt assessed for incontinence, appropriate barrier cream applied prn. Pt encouraged to turn/rotate every 2 hours. Assistance provided if pt unable to do so themselves. Problem: HEMODYNAMIC STATUS  Goal: Patient has stable vital signs and fluid balance  Outcome: Ongoing     Vitals:    07/20/19 0313   BP: 107/67   Pulse: 83   Resp: 16   Temp: 98.1 °F (36.7 °C)   SpO2: 94%       Problem: Falls - Risk of:  Goal: Will remain free from falls  Description  Will remain free from falls  Outcome: Ongoing  Goal: Absence of physical injury  Description  Absence of physical injury  Outcome: Ongoing   Patient has remained free from falls and physical injury this shift. Patient calls appropriately for help. Call light within reach, bed in lowest position with brakes on, fall visuals posted. Will continue to monitor. Problem: OXYGENATION/RESPIRATORY FUNCTION  Goal: Patient will achieve/maintain normal respiratory rate/effort  Description  Respiratory rate and effort will be within normal limits for the patient  Outcome: Ongoing   Patient has had WNL oxygen saturations. Respirations unlabored, only dyspnea with exertion.    Electronically signed by Clau Arrington RN on 7/20/2019 at 4:25 AM

## 2019-07-20 NOTE — PROGRESS NOTES
([facility] [E11.9]    COPD exacerbation ([facility] [J44.1]    Cardiomyopathy ([facility] [I42.9]    Hypotension [I95.9]    Chronic systolic CHF (congestive heart failure), NYHA class 3 (HCC) [I50.22]    Acute hyperkalemia [E87.5]    Essential hypertension [I10]    Alcohol abuse, continuous [F10.10]    Tachycardia [R00.0]    Hyperlipidemia [E78.5]       1.  S/p cardiac arrest - due to fluid overload and CHF - on amiodarone and HD for fluid removal    2.  Acute hypercapnic and hypoxic resp failure - initially intubated then extubated - now down to 3L oxygen     3. Suspected allergic reaction with skin peeling in fingers - present since 1 week, recently started on torsemide - will d/c and monitor response, prn benadryl    4. Hypomag - repleted  5. Par a fib - discussed with felix/cardio on phone. Rec to cont amio at 200mng daily and xarelto renally dosed. Off cardizem 2/2 hypotension  6.  Acute renal failure - pt on CRRT for fluid removal - UOP slowly improving - will likely need to continue as outpt - HD dependent overall, awaiting outpt HD spot for d/c  7.  DMII with ESRD - off of metformin and on lantus now and SSI, controlled  8.  Elevated LFTs and abd distention - likely ileus - no C diff due to formed stools - improved now  9.  Acute on chronic systolic CHF - fluid removal helps - pt briefly on milrinone, now on demadex  10. Anemia - hb 7.2-->7.0-->7.1, suspect 2/2 renal failure       Diet: DIET CARB CONTROL; Carb Control: 4 carb choices (60 gms)/meal; Low Sodium (2 GM);  Daily Fluid Restriction: 1500 ml  Code Status: Full Code    PT/OT Eval Status: ordered    Amanda Staples MD

## 2019-07-20 NOTE — PROGRESS NOTES
place)    2 HTN  - BP  Low at times  - improved with less metoprolol    3 Septic Shock   - resolved    4 Anemia   - received 1 unit of PRBC 7/3/19   - hemoglobin low  - increased aransep to 100 mcg weekly    5 Systolic CHF   - decrease DW with HD prn  - minimal UOP with oral Torsemide     6 A fib   - on Amio   - Cardiology following

## 2019-07-21 LAB
ALBUMIN SERPL-MCNC: 3.2 G/DL (ref 3.4–5)
ANION GAP SERPL CALCULATED.3IONS-SCNC: 11 MMOL/L (ref 3–16)
BUN BLDV-MCNC: 23 MG/DL (ref 7–20)
CALCIUM SERPL-MCNC: 8.9 MG/DL (ref 8.3–10.6)
CHLORIDE BLD-SCNC: 95 MMOL/L (ref 99–110)
CO2: 29 MMOL/L (ref 21–32)
CREAT SERPL-MCNC: 1.1 MG/DL (ref 0.9–1.3)
GFR AFRICAN AMERICAN: >60
GFR NON-AFRICAN AMERICAN: >60
GLUCOSE BLD-MCNC: 140 MG/DL (ref 70–99)
GLUCOSE BLD-MCNC: 147 MG/DL (ref 70–99)
GLUCOSE BLD-MCNC: 158 MG/DL (ref 70–99)
GLUCOSE BLD-MCNC: 161 MG/DL (ref 70–99)
GLUCOSE BLD-MCNC: 168 MG/DL (ref 70–99)
MAGNESIUM: 1.3 MG/DL (ref 1.8–2.4)
PERFORMED ON: ABNORMAL
PHOSPHORUS: 4.7 MG/DL (ref 2.5–4.9)
POTASSIUM SERPL-SCNC: 4.2 MMOL/L (ref 3.5–5.1)
SODIUM BLD-SCNC: 135 MMOL/L (ref 136–145)

## 2019-07-21 PROCEDURE — 6370000000 HC RX 637 (ALT 250 FOR IP): Performed by: INTERNAL MEDICINE

## 2019-07-21 PROCEDURE — 2580000003 HC RX 258: Performed by: INTERNAL MEDICINE

## 2019-07-21 PROCEDURE — 6370000000 HC RX 637 (ALT 250 FOR IP): Performed by: NURSE PRACTITIONER

## 2019-07-21 PROCEDURE — 94640 AIRWAY INHALATION TREATMENT: CPT

## 2019-07-21 PROCEDURE — 83735 ASSAY OF MAGNESIUM: CPT

## 2019-07-21 PROCEDURE — 6360000002 HC RX W HCPCS: Performed by: INTERNAL MEDICINE

## 2019-07-21 PROCEDURE — 94761 N-INVAS EAR/PLS OXIMETRY MLT: CPT

## 2019-07-21 PROCEDURE — 80069 RENAL FUNCTION PANEL: CPT

## 2019-07-21 PROCEDURE — 2060000000 HC ICU INTERMEDIATE R&B

## 2019-07-21 RX ORDER — MAGNESIUM SULFATE IN WATER 40 MG/ML
2 INJECTION, SOLUTION INTRAVENOUS
Status: COMPLETED | OUTPATIENT
Start: 2019-07-21 | End: 2019-07-21

## 2019-07-21 RX ORDER — DICYCLOMINE HYDROCHLORIDE 10 MG/1
10 CAPSULE ORAL
Status: DISCONTINUED | OUTPATIENT
Start: 2019-07-21 | End: 2019-07-23 | Stop reason: HOSPADM

## 2019-07-21 RX ADMIN — METOPROLOL SUCCINATE 25 MG: 25 TABLET, EXTENDED RELEASE ORAL at 08:48

## 2019-07-21 RX ADMIN — MOMETASONE FUROATE AND FORMOTEROL FUMARATE DIHYDRATE 2 PUFF: 100; 5 AEROSOL RESPIRATORY (INHALATION) at 07:55

## 2019-07-21 RX ADMIN — GABAPENTIN 100 MG: 100 CAPSULE ORAL at 13:54

## 2019-07-21 RX ADMIN — RIVAROXABAN 15 MG: 15 TABLET, FILM COATED ORAL at 17:08

## 2019-07-21 RX ADMIN — MAGNESIUM SULFATE HEPTAHYDRATE 2 G: 40 INJECTION, SOLUTION INTRAVENOUS at 12:17

## 2019-07-21 RX ADMIN — INSULIN LISPRO 1 UNITS: 100 INJECTION, SOLUTION INTRAVENOUS; SUBCUTANEOUS at 17:09

## 2019-07-21 RX ADMIN — Medication 10 ML: at 20:22

## 2019-07-21 RX ADMIN — Medication 10 ML: at 08:49

## 2019-07-21 RX ADMIN — INSULIN LISPRO 1 UNITS: 100 INJECTION, SOLUTION INTRAVENOUS; SUBCUTANEOUS at 12:17

## 2019-07-21 RX ADMIN — IPRATROPIUM BROMIDE AND ALBUTEROL SULFATE 1 AMPULE: .5; 3 SOLUTION RESPIRATORY (INHALATION) at 01:00

## 2019-07-21 RX ADMIN — GABAPENTIN 100 MG: 100 CAPSULE ORAL at 08:48

## 2019-07-21 RX ADMIN — IPRATROPIUM BROMIDE AND ALBUTEROL SULFATE 1 AMPULE: .5; 3 SOLUTION RESPIRATORY (INHALATION) at 12:26

## 2019-07-21 RX ADMIN — IPRATROPIUM BROMIDE AND ALBUTEROL SULFATE 1 AMPULE: .5; 3 SOLUTION RESPIRATORY (INHALATION) at 16:29

## 2019-07-21 RX ADMIN — AMIODARONE HYDROCHLORIDE 200 MG: 200 TABLET ORAL at 08:48

## 2019-07-21 RX ADMIN — OMEPRAZOLE 40 MG: 20 CAPSULE, DELAYED RELEASE ORAL at 06:16

## 2019-07-21 RX ADMIN — DICYCLOMINE HYDROCHLORIDE 10 MG: 10 CAPSULE ORAL at 04:05

## 2019-07-21 RX ADMIN — TRAZODONE HYDROCHLORIDE 50 MG: 50 TABLET ORAL at 21:26

## 2019-07-21 RX ADMIN — CYCLOBENZAPRINE HYDROCHLORIDE 10 MG: 10 TABLET, FILM COATED ORAL at 21:26

## 2019-07-21 RX ADMIN — INSULIN LISPRO 1 UNITS: 100 INJECTION, SOLUTION INTRAVENOUS; SUBCUTANEOUS at 21:26

## 2019-07-21 RX ADMIN — Medication 2 CAPSULE: at 08:49

## 2019-07-21 RX ADMIN — Medication 2 CAPSULE: at 17:08

## 2019-07-21 RX ADMIN — IPRATROPIUM BROMIDE AND ALBUTEROL SULFATE 1 AMPULE: .5; 3 SOLUTION RESPIRATORY (INHALATION) at 04:50

## 2019-07-21 RX ADMIN — Medication 10 ML: at 11:08

## 2019-07-21 RX ADMIN — CYCLOBENZAPRINE HYDROCHLORIDE 10 MG: 10 TABLET, FILM COATED ORAL at 17:11

## 2019-07-21 RX ADMIN — INSULIN GLARGINE 10 UNITS: 100 INJECTION, SOLUTION SUBCUTANEOUS at 21:26

## 2019-07-21 RX ADMIN — DICYCLOMINE HYDROCHLORIDE 10 MG: 10 CAPSULE ORAL at 11:07

## 2019-07-21 RX ADMIN — MAGNESIUM SULFATE HEPTAHYDRATE 2 G: 40 INJECTION, SOLUTION INTRAVENOUS at 14:31

## 2019-07-21 RX ADMIN — DICYCLOMINE HYDROCHLORIDE 10 MG: 10 CAPSULE ORAL at 17:08

## 2019-07-21 RX ADMIN — IPRATROPIUM BROMIDE AND ALBUTEROL SULFATE 1 AMPULE: .5; 3 SOLUTION RESPIRATORY (INHALATION) at 07:54

## 2019-07-21 RX ADMIN — INSULIN LISPRO 1 UNITS: 100 INJECTION, SOLUTION INTRAVENOUS; SUBCUTANEOUS at 08:04

## 2019-07-21 RX ADMIN — GABAPENTIN 100 MG: 100 CAPSULE ORAL at 21:26

## 2019-07-21 ASSESSMENT — PAIN SCALES - WONG BAKER
WONGBAKER_NUMERICALRESPONSE: 2

## 2019-07-21 ASSESSMENT — PAIN DESCRIPTION - ORIENTATION
ORIENTATION: LOWER
ORIENTATION: LOWER
ORIENTATION: OTHER (COMMENT)
ORIENTATION: OTHER (COMMENT)

## 2019-07-21 ASSESSMENT — PAIN DESCRIPTION - PROGRESSION
CLINICAL_PROGRESSION: NOT CHANGED

## 2019-07-21 ASSESSMENT — PAIN DESCRIPTION - DESCRIPTORS
DESCRIPTORS: ACHING;CONSTANT

## 2019-07-21 ASSESSMENT — PAIN DESCRIPTION - LOCATION
LOCATION: BACK
LOCATION: GENERALIZED
LOCATION: BACK
LOCATION: GENERALIZED

## 2019-07-21 ASSESSMENT — PAIN DESCRIPTION - FREQUENCY
FREQUENCY: CONTINUOUS

## 2019-07-21 ASSESSMENT — PAIN - FUNCTIONAL ASSESSMENT
PAIN_FUNCTIONAL_ASSESSMENT: PREVENTS OR INTERFERES SOME ACTIVE ACTIVITIES AND ADLS

## 2019-07-21 ASSESSMENT — PAIN DESCRIPTION - PAIN TYPE
TYPE: CHRONIC PAIN

## 2019-07-21 ASSESSMENT — PAIN DESCRIPTION - ONSET
ONSET: ON-GOING

## 2019-07-21 ASSESSMENT — PAIN SCALES - GENERAL
PAINLEVEL_OUTOF10: 10
PAINLEVEL_OUTOF10: 2
PAINLEVEL_OUTOF10: 2
PAINLEVEL_OUTOF10: 10

## 2019-07-21 NOTE — PROGRESS NOTES
7/3/19   - hemoglobin low  - increased aransep to 100 mcg weekly    5 Systolic CHF   Better     6 A fib   - on Amio   - Cardiology following

## 2019-07-21 NOTE — PLAN OF CARE
Problem: Risk for Impaired Skin Integrity  Goal: Tissue integrity - skin and mucous membranes  Description  Structural intactness and normal physiological function of skin and  mucous membranes. 7/20/2019 2307 by Erika Flanagan RN  Outcome: Ongoing   Skin assessment completed every shift. Pt assessed for incontinence, appropriate barrier cream applied prn. Patient turned Q2 hours. Will continue to monitor. Problem: Falls - Risk of:  Goal: Will remain free from falls  Description  Will remain free from falls  7/20/2019 2307 by Erika Flanagan RN  Outcome: Ongoing     Problem: Falls - Risk of:  Goal: Absence of physical injury  Description  Absence of physical injury  7/20/2019 2307 by Erika Flanagan RN  Outcome: Ongoing   Patient has remained free from falls and physical injury this shift. Patient calls appropriately for help. Call light within reach, bed in lowest position with brakes on, fall socks on and other fall visuals posted. Will continue to monitor. Problem: OXYGENATION/RESPIRATORY FUNCTION  Goal: Patient will achieve/maintain normal respiratory rate/effort  Description  Respiratory rate and effort will be within normal limits for the patient  7/20/2019 2307 by Erika Flanagan RN  Outcome: Ongoing   Patient has maintained WNL respiratory rate and unlabored effort this shift. Will continue to monitor. Problem: HEMODYNAMIC STATUS  Goal: Patient has stable vital signs and fluid balance  7/20/2019 2307 by Erika Flanagan RN  Outcome: Ongoing  Patient has maintained vital signs WNL this shift. Intake and output recorded strictly to monitor fluid balance. Will continue to monitor. Problem: Pain:  Goal: Pain level will decrease  Description  Pain level will decrease  7/20/2019 2307 by Erika Flanagan RN  Outcome: Ongoing   Patient asked for his PRN cyclobenzaprine for back spasms, no other complaints of pain this shift. Resting comfortably, satisfied with current interventions.  Will continue to monitor.    Electronically signed by Flora Hernandez RN on 7/20/2019 at 11:16 PM

## 2019-07-22 LAB
ABO/RH: NORMAL
ALBUMIN SERPL-MCNC: 3 G/DL (ref 3.4–5)
ANION GAP SERPL CALCULATED.3IONS-SCNC: 14 MMOL/L (ref 3–16)
ANTIBODY SCREEN: NORMAL
BASOPHILS ABSOLUTE: 0.3 K/UL (ref 0–0.2)
BASOPHILS RELATIVE PERCENT: 3.4 %
BLOOD BANK DISPENSE STATUS: NORMAL
BLOOD BANK PRODUCT CODE: NORMAL
BPU ID: NORMAL
BUN BLDV-MCNC: 22 MG/DL (ref 7–20)
CALCIUM SERPL-MCNC: 8.6 MG/DL (ref 8.3–10.6)
CHLORIDE BLD-SCNC: 95 MMOL/L (ref 99–110)
CO2: 28 MMOL/L (ref 21–32)
CREAT SERPL-MCNC: 0.9 MG/DL (ref 0.9–1.3)
DESCRIPTION BLOOD BANK: NORMAL
EOSINOPHILS ABSOLUTE: 0.1 K/UL (ref 0–0.6)
EOSINOPHILS RELATIVE PERCENT: 1.3 %
GFR AFRICAN AMERICAN: >60
GFR NON-AFRICAN AMERICAN: >60
GLUCOSE BLD-MCNC: 124 MG/DL (ref 70–99)
GLUCOSE BLD-MCNC: 135 MG/DL (ref 70–99)
GLUCOSE BLD-MCNC: 143 MG/DL (ref 70–99)
GLUCOSE BLD-MCNC: 161 MG/DL (ref 70–99)
GLUCOSE BLD-MCNC: 203 MG/DL (ref 70–99)
HCT VFR BLD CALC: 21.6 % (ref 40.5–52.5)
HEMOGLOBIN: 6.9 G/DL (ref 13.5–17.5)
LYMPHOCYTES ABSOLUTE: 1.5 K/UL (ref 1–5.1)
LYMPHOCYTES RELATIVE PERCENT: 17.2 %
MAGNESIUM: 1.5 MG/DL (ref 1.8–2.4)
MCH RBC QN AUTO: 26.7 PG (ref 26–34)
MCHC RBC AUTO-ENTMCNC: 31.9 G/DL (ref 31–36)
MCV RBC AUTO: 83.8 FL (ref 80–100)
MONOCYTES ABSOLUTE: 1.1 K/UL (ref 0–1.3)
MONOCYTES RELATIVE PERCENT: 12.8 %
NEUTROPHILS ABSOLUTE: 5.7 K/UL (ref 1.7–7.7)
NEUTROPHILS RELATIVE PERCENT: 65.3 %
PDW BLD-RTO: 18.3 % (ref 12.4–15.4)
PERFORMED ON: ABNORMAL
PHOSPHORUS: 4 MG/DL (ref 2.5–4.9)
PLATELET # BLD: 250 K/UL (ref 135–450)
PMV BLD AUTO: 8.2 FL (ref 5–10.5)
POTASSIUM SERPL-SCNC: 4.2 MMOL/L (ref 3.5–5.1)
RBC # BLD: 2.58 M/UL (ref 4.2–5.9)
SODIUM BLD-SCNC: 137 MMOL/L (ref 136–145)
WBC # BLD: 8.8 K/UL (ref 4–11)

## 2019-07-22 PROCEDURE — 85025 COMPLETE CBC W/AUTO DIFF WBC: CPT

## 2019-07-22 PROCEDURE — 36415 COLL VENOUS BLD VENIPUNCTURE: CPT

## 2019-07-22 PROCEDURE — 83735 ASSAY OF MAGNESIUM: CPT

## 2019-07-22 PROCEDURE — 94761 N-INVAS EAR/PLS OXIMETRY MLT: CPT

## 2019-07-22 PROCEDURE — 6370000000 HC RX 637 (ALT 250 FOR IP): Performed by: INTERNAL MEDICINE

## 2019-07-22 PROCEDURE — P9016 RBC LEUKOCYTES REDUCED: HCPCS

## 2019-07-22 PROCEDURE — 80069 RENAL FUNCTION PANEL: CPT

## 2019-07-22 PROCEDURE — 92526 ORAL FUNCTION THERAPY: CPT

## 2019-07-22 PROCEDURE — 86900 BLOOD TYPING SEROLOGIC ABO: CPT

## 2019-07-22 PROCEDURE — 2580000003 HC RX 258: Performed by: INTERNAL MEDICINE

## 2019-07-22 PROCEDURE — 6370000000 HC RX 637 (ALT 250 FOR IP): Performed by: NURSE PRACTITIONER

## 2019-07-22 PROCEDURE — 86850 RBC ANTIBODY SCREEN: CPT

## 2019-07-22 PROCEDURE — 2060000000 HC ICU INTERMEDIATE R&B

## 2019-07-22 PROCEDURE — 94640 AIRWAY INHALATION TREATMENT: CPT

## 2019-07-22 PROCEDURE — 86923 COMPATIBILITY TEST ELECTRIC: CPT

## 2019-07-22 PROCEDURE — 2580000003 HC RX 258: Performed by: FAMILY MEDICINE

## 2019-07-22 PROCEDURE — 86901 BLOOD TYPING SEROLOGIC RH(D): CPT

## 2019-07-22 PROCEDURE — 6360000002 HC RX W HCPCS: Performed by: FAMILY MEDICINE

## 2019-07-22 PROCEDURE — 2700000000 HC OXYGEN THERAPY PER DAY

## 2019-07-22 RX ORDER — MAGNESIUM SULFATE IN WATER 40 MG/ML
2 INJECTION, SOLUTION INTRAVENOUS
Status: DISPENSED | OUTPATIENT
Start: 2019-07-22 | End: 2019-07-22

## 2019-07-22 RX ORDER — 0.9 % SODIUM CHLORIDE 0.9 %
250 INTRAVENOUS SOLUTION INTRAVENOUS ONCE
Status: COMPLETED | OUTPATIENT
Start: 2019-07-22 | End: 2019-07-23

## 2019-07-22 RX ADMIN — CYCLOBENZAPRINE HYDROCHLORIDE 10 MG: 10 TABLET, FILM COATED ORAL at 20:47

## 2019-07-22 RX ADMIN — METOPROLOL SUCCINATE 25 MG: 25 TABLET, EXTENDED RELEASE ORAL at 09:26

## 2019-07-22 RX ADMIN — MOMETASONE FUROATE AND FORMOTEROL FUMARATE DIHYDRATE 2 PUFF: 100; 5 AEROSOL RESPIRATORY (INHALATION) at 20:37

## 2019-07-22 RX ADMIN — RIVAROXABAN 15 MG: 15 TABLET, FILM COATED ORAL at 17:22

## 2019-07-22 RX ADMIN — Medication 10 ML: at 09:30

## 2019-07-22 RX ADMIN — IPRATROPIUM BROMIDE AND ALBUTEROL SULFATE 1 AMPULE: .5; 3 SOLUTION RESPIRATORY (INHALATION) at 04:31

## 2019-07-22 RX ADMIN — AMIODARONE HYDROCHLORIDE 200 MG: 200 TABLET ORAL at 09:26

## 2019-07-22 RX ADMIN — TRAZODONE HYDROCHLORIDE 50 MG: 50 TABLET ORAL at 20:48

## 2019-07-22 RX ADMIN — INSULIN LISPRO 2 UNITS: 100 INJECTION, SOLUTION INTRAVENOUS; SUBCUTANEOUS at 12:24

## 2019-07-22 RX ADMIN — MOMETASONE FUROATE AND FORMOTEROL FUMARATE DIHYDRATE 2 PUFF: 100; 5 AEROSOL RESPIRATORY (INHALATION) at 09:03

## 2019-07-22 RX ADMIN — INSULIN GLARGINE 10 UNITS: 100 INJECTION, SOLUTION SUBCUTANEOUS at 20:47

## 2019-07-22 RX ADMIN — MAGNESIUM SULFATE HEPTAHYDRATE 2 G: 40 INJECTION, SOLUTION INTRAVENOUS at 17:22

## 2019-07-22 RX ADMIN — DICYCLOMINE HYDROCHLORIDE 10 MG: 10 CAPSULE ORAL at 12:23

## 2019-07-22 RX ADMIN — INSULIN LISPRO 1 UNITS: 100 INJECTION, SOLUTION INTRAVENOUS; SUBCUTANEOUS at 20:47

## 2019-07-22 RX ADMIN — IPRATROPIUM BROMIDE AND ALBUTEROL SULFATE 1 AMPULE: .5; 3 SOLUTION RESPIRATORY (INHALATION) at 01:53

## 2019-07-22 RX ADMIN — Medication 10 ML: at 20:53

## 2019-07-22 RX ADMIN — Medication 2 CAPSULE: at 09:26

## 2019-07-22 RX ADMIN — SODIUM CHLORIDE 250 ML: 9 INJECTION, SOLUTION INTRAVENOUS at 14:42

## 2019-07-22 RX ADMIN — DICYCLOMINE HYDROCHLORIDE 10 MG: 10 CAPSULE ORAL at 06:21

## 2019-07-22 RX ADMIN — OMEPRAZOLE 40 MG: 20 CAPSULE, DELAYED RELEASE ORAL at 06:21

## 2019-07-22 RX ADMIN — IPRATROPIUM BROMIDE AND ALBUTEROL SULFATE 1 AMPULE: .5; 3 SOLUTION RESPIRATORY (INHALATION) at 09:03

## 2019-07-22 RX ADMIN — MAGNESIUM SULFATE HEPTAHYDRATE 2 G: 40 INJECTION, SOLUTION INTRAVENOUS at 11:06

## 2019-07-22 RX ADMIN — GABAPENTIN 100 MG: 100 CAPSULE ORAL at 14:41

## 2019-07-22 RX ADMIN — GABAPENTIN 100 MG: 100 CAPSULE ORAL at 20:48

## 2019-07-22 RX ADMIN — Medication 2 CAPSULE: at 16:38

## 2019-07-22 RX ADMIN — GABAPENTIN 100 MG: 100 CAPSULE ORAL at 09:26

## 2019-07-22 RX ADMIN — DICYCLOMINE HYDROCHLORIDE 10 MG: 10 CAPSULE ORAL at 16:38

## 2019-07-22 RX ADMIN — IPRATROPIUM BROMIDE AND ALBUTEROL SULFATE 1 AMPULE: .5; 3 SOLUTION RESPIRATORY (INHALATION) at 20:37

## 2019-07-22 RX ADMIN — IPRATROPIUM BROMIDE AND ALBUTEROL SULFATE 1 AMPULE: .5; 3 SOLUTION RESPIRATORY (INHALATION) at 16:18

## 2019-07-22 ASSESSMENT — PAIN DESCRIPTION - FREQUENCY
FREQUENCY: CONTINUOUS

## 2019-07-22 ASSESSMENT — PAIN - FUNCTIONAL ASSESSMENT
PAIN_FUNCTIONAL_ASSESSMENT: PREVENTS OR INTERFERES SOME ACTIVE ACTIVITIES AND ADLS

## 2019-07-22 ASSESSMENT — PAIN DESCRIPTION - PAIN TYPE
TYPE: CHRONIC PAIN

## 2019-07-22 ASSESSMENT — PAIN DESCRIPTION - ONSET
ONSET: ON-GOING

## 2019-07-22 ASSESSMENT — PAIN DESCRIPTION - DESCRIPTORS
DESCRIPTORS: ACHING;CONSTANT
DESCRIPTORS: CONSTANT;DISCOMFORT
DESCRIPTORS: CONSTANT

## 2019-07-22 ASSESSMENT — PAIN DESCRIPTION - PROGRESSION
CLINICAL_PROGRESSION: NOT CHANGED

## 2019-07-22 ASSESSMENT — PAIN SCALES - GENERAL
PAINLEVEL_OUTOF10: 10
PAINLEVEL_OUTOF10: 4
PAINLEVEL_OUTOF10: 4
PAINLEVEL_OUTOF10: 8

## 2019-07-22 ASSESSMENT — PAIN DESCRIPTION - ORIENTATION
ORIENTATION: LOWER

## 2019-07-22 ASSESSMENT — PAIN DESCRIPTION - LOCATION
LOCATION: BACK

## 2019-07-22 NOTE — CARE COORDINATION
Patient continues to refuse therapy. Per Pattie Smith at [facility] at [facility], they can accept him under Medicaid and bill therapy as outpatient as long as patient agreeable that he will not get as much therapy. They need new pre-cert for SNF, so unless pateint participates with therapy, will not be able to get approval. Patient is agreeable to going to ecf with intermediate care. Home at [facility] still has to obtain approval through [facility] for LTC, which they have sent request. Thais Thornton completed [place] PASRR. Patient has a new HD spot at [facility]. [facility] MWF at 1:00pm--notified facility to arrange transport as they need to arrange this ahead of time. Almita Aviles in discussion with Dr. Shanthi Amador to determine when patient is ready for d/c. There was some discussion that patient may possibly not need HD and nephrology wants to keep until tomorrow.      Edwin Number, [address] [facility]  [phone]  7/22/19 at 3:27 PM

## 2019-07-22 NOTE — TELEPHONE ENCOUNTER
Patients mother called back, patient has been in the hospital for 33 days. And she states he will not be at his appointment, she states he will go to a nursing home once he is released from here.

## 2019-07-22 NOTE — PROGRESS NOTES
Hospitalist Progress Note      PCP: Yimi Salazar MD    Date of Admission: 6/22/2019    Subjective: Pt S/E. No new complaints. Medications:  Reviewed    Infusion Medications    dextrose       Scheduled Medications    dicyclomine  10 mg Oral TID AC    rivaroxaban  15 mg Oral Daily    traZODone  50 mg Oral Nightly    metoprolol succinate  25 mg Oral Daily    darbepoetin vinay-polysorbate  100 mcg Subcutaneous Weekly - Thursday    insulin lispro  0-6 Units Subcutaneous TID WC    insulin lispro  0-3 Units Subcutaneous Nightly    gabapentin  100 mg Oral TID    amiodarone  200 mg Oral Daily    omeprazole  40 mg Oral Daily    polyethylene glycol  17 g Oral Daily    lactobacillus  2 capsule Oral BID WC    sodium chloride flush  10 mL Intravenous 2 times per day    insulin glargine  10 Units Subcutaneous Nightly    mometasone-formoterol  2 puff Inhalation BID    sodium chloride flush  10 mL Intravenous 2 times per day    ipratropium-albuterol  1 ampule Inhalation Q4H     PRN Meds: diphenhydrAMINE, cyclobenzaprine, acetaminophen, heparin (porcine), albumin human, metoprolol, perflutren lipid microspheres, lubrifresh P.M., sodium chloride flush, albuterol, sodium chloride flush, ondansetron, acetaminophen, glucose, dextrose, glucagon (rDNA), dextrose      Intake/Output Summary (Last 24 hours) at 7/22/2019 0942  Last data filed at 7/22/2019 0930  Gross per 24 hour   Intake 1335 ml   Output 1060 ml   Net 275 ml       Physical Exam Performed:    /71   Pulse 88   Temp 97.7 °F (36.5 °C) (Oral)   Resp 18   Ht 5' 7\" (1.702 m)   Wt 200 lb 6.4 oz (90.9 kg)   SpO2 96%   BMI 31.39 kg/m²     Gen: obese, NAD  HEENT: NC/AT, moist mucous membranes  Neck: supple, trachea midline, no anterior cervical or SC LAD  Heart:  Normal s1/s2, RRR, no murmurs, gallops, or rubs.    Lungs:  diminished bilaterally, no wheeze, no rales, no rhonchi, no crackles, no use of accessory muscles  Abd: bowel sounds present, soft, nontender, nondistended, no masses  Extrem:  No clubbing, cyanosis,  1+ leg edema, L AKA  Skin: no rashes or lesions  Psych: A & O x3, affect pleasant  Neuro: grossly intact, moves all four extremities. No focal deficits       Labs:   No results for input(s): WBC, HGB, HCT, PLT in the last 72 hours. Recent Labs     07/20/19  0546 07/21/19  0615 07/22/19  0620   * 135* 137   K 4.6 4.2 4.2   CL 92* 95* 95*   CO2 26 29 28   BUN 13 23* 22*   CREATININE 1.1 1.1 0.9   CALCIUM 8.8 8.9 8.6   PHOS 3.1 4.7 4.0     No results for input(s): AST, ALT, BILIDIR, BILITOT, ALKPHOS in the last 72 hours. No results for input(s): INR in the last 72 hours. No results for input(s): Juanito Ganser in the last 72 hours. Urinalysis:      Lab Results   Component Value Date    NITRU Negative 06/23/2019    WBCUA 5 06/23/2019    RBCUA 8 06/23/2019    BLOODU MODERATE 06/23/2019    SPECGRAV 1.021 06/23/2019    GLUCOSEU Negative 06/23/2019       Radiology:  IR TUNNELED CVC PLACE WO SQ PORT/PUMP > 5 YEARS   Final Result   Successful ultrasound and fluoroscopy guided placement of a right internal   jugular 23 cm tip to cuff hemodialysis catheter. XR CHEST PORTABLE   Final Result   Interval development of left perihilar airspace disease. Improving aeration of right lung base. Fluoroscopy modified barium swallow with video   Final Result   Swallowing mechanism grossly within normal limits without evidence of   aspiration. Please see separate speech pathology report for full discussion of findings   and recommendations. XR CHEST PORTABLE   Final Result   Significant worsening in the appearance of the chest with significant   increase in the size of the right basilar pneumonia. Probable small right   pleural effusion. Slight vascular congestion. CT ABDOMEN PELVIS W WO CONTRAST Additional Contrast? Oral   Final Result   Pneumatosis of the splenic flexure of the colon.   This is associated Final Result   No acute intracranial abnormality. CT CHEST WO CONTRAST   Final Result   1. Bilateral airspace disease could represent atelectasis or pneumonia. 2. Small right pleural effusion. 3. Coronary artery disease. 4. Pulmonary hypertension. 5. Mediastinal adenopathy, probably reactive given the relatively rapid   development. 6. Suspected acute nondisplaced bilateral anterior rib fractures without   pneumothorax. XR CHEST PORTABLE   Final Result   1. Endotracheal tube tip projects over the trachea, tip at the level of the   aortic knob, 3.7 cm superior to the erna. 2. Pulmonary edema evident. 3. Cardiomegaly. 4. No pneumothorax. 5. Nonspecific prominence of the azygos vein region may be related vascular   engorgement, adenopathy or underlying mass lesion.          XR CHEST PORTABLE   Final Result   Questionable right basilar atelectasis or pneumonia on a limited portable   exam.                 Assessment/Plan:    Active Hospital Problems    Diagnosis    Chronic atrial fibrillation ([facility]) [I48.2]     Priority: High    Pneumonia of right lower lobe due to infectious organism ([address] [J18.1]    PEA (Pulseless electrical activity) ([facility]) [I46.9]    Ileus ([address] [K56.7]    Respiratory failure ([address] [J96.90]    HCAP (healthcare-associated pneumonia) [J18.9]    Ventricular tachycardia ([facility]) [I47.2]    Shock circulatory ([address] [R57.9]    Tachypnea [L19.23]    Neutrophilic leukocytosis [Z44.2]    Chronic respiratory failure with hypoxia ([facility]) [J96.11]    Cardiac arrest ([address] [I46.9]    Acute on chronic systolic HF (heart failure) ([facility]) [I50.23]    Acute pulmonary edema ([facility]) [J81.0]    CAD (coronary artery disease) [I25.10]    Morbid obesity due to excess calories ([facility]) [E66.01]    Acute renal failure (ARF) ([facility]) [N17.9]    Severe sepsis ([facility]) [A41.9, R65.20]    DMII (diabetes mellitus, type 2) ([facility]) [E11.9]    COPD exacerbation ([address] [J44.1]    Cardiomyopathy

## 2019-07-22 NOTE — PLAN OF CARE
Problem: Risk for Impaired Skin Integrity  Goal: Tissue integrity - skin and mucous membranes  Description  Structural intactness and normal physiological function of skin and  mucous membranes. Outcome: Ongoing  Skin assessment completed every shift. Pt assessed for incontinence, appropriate barrier cream applied prn. Pt encouraged to turn/rotate every 2 hours. Assistance provided if pt unable to do so themselves. Patient requested to only have pillows placed under both hips; refused any other turns throughout the night. RN explained importance of turning every 2 hours; patient still adamant that he does not want to turn. No skin breakdown noted. Problem: Falls - Risk of:  Goal: Will remain free from falls  Description  Will remain free from falls  Outcome: Ongoing  Fall risk precautions in place. Bed in lowest position with wheels locked,bed alarm in place and activated, non-skid socks on pt, fall risk ID on pt, call light in reach, pt encouraged to call before getting out of bed and for any other needs or complaints. Patient uses call light appropriately when he needs something; does not attempt to get out of bed unsupervised. Problem: OXYGENATION/RESPIRATORY FUNCTION  Goal: Patient will achieve/maintain normal respiratory rate/effort  Description  Respiratory rate and effort will be within normal limits for the patient  Outcome: Ongoing  Oxygen being administered per protocol. On 4,L, being monitored by respiratory therapy. Breathing treatments being administered as ordered by MD. Pt instructed to notify nurse of increase SOB. Assess mucosa for signs of cyanosis, monitor O2 sats per protocol. Patient reports some SOB, repositioned, elevated HOB; no further complaints of SOB.

## 2019-07-23 ENCOUNTER — APPOINTMENT (OUTPATIENT)
Dept: INTERVENTIONAL RADIOLOGY/VASCULAR | Age: 59
DRG: 720 | End: 2019-07-23
Payer: COMMERCIAL

## 2019-07-23 VITALS
DIASTOLIC BLOOD PRESSURE: 74 MMHG | RESPIRATION RATE: 18 BRPM | HEIGHT: 67 IN | WEIGHT: 200.62 LBS | BODY MASS INDEX: 31.49 KG/M2 | SYSTOLIC BLOOD PRESSURE: 110 MMHG | HEART RATE: 101 BPM | TEMPERATURE: 97.9 F | OXYGEN SATURATION: 99 %

## 2019-07-23 LAB
ALBUMIN SERPL-MCNC: 3 G/DL (ref 3.4–5)
ANION GAP SERPL CALCULATED.3IONS-SCNC: 9 MMOL/L (ref 3–16)
BUN BLDV-MCNC: 16 MG/DL (ref 7–20)
CALCIUM SERPL-MCNC: 8.7 MG/DL (ref 8.3–10.6)
CHLORIDE BLD-SCNC: 97 MMOL/L (ref 99–110)
CO2: 30 MMOL/L (ref 21–32)
CREAT SERPL-MCNC: 0.7 MG/DL (ref 0.9–1.3)
GFR AFRICAN AMERICAN: >60
GFR NON-AFRICAN AMERICAN: >60
GLUCOSE BLD-MCNC: 127 MG/DL (ref 70–99)
GLUCOSE BLD-MCNC: 129 MG/DL (ref 70–99)
GLUCOSE BLD-MCNC: 134 MG/DL (ref 70–99)
GLUCOSE BLD-MCNC: 175 MG/DL (ref 70–99)
HCT VFR BLD CALC: 23.8 % (ref 40.5–52.5)
HEMOGLOBIN: 7.4 G/DL (ref 13.5–17.5)
MAGNESIUM: 1.7 MG/DL (ref 1.8–2.4)
PERFORMED ON: ABNORMAL
PHOSPHORUS: 3.7 MG/DL (ref 2.5–4.9)
POTASSIUM SERPL-SCNC: 4.4 MMOL/L (ref 3.5–5.1)
SODIUM BLD-SCNC: 136 MMOL/L (ref 136–145)

## 2019-07-23 PROCEDURE — 2580000003 HC RX 258: Performed by: INTERNAL MEDICINE

## 2019-07-23 PROCEDURE — 83735 ASSAY OF MAGNESIUM: CPT

## 2019-07-23 PROCEDURE — 6370000000 HC RX 637 (ALT 250 FOR IP): Performed by: INTERNAL MEDICINE

## 2019-07-23 PROCEDURE — 85018 HEMOGLOBIN: CPT

## 2019-07-23 PROCEDURE — 6370000000 HC RX 637 (ALT 250 FOR IP): Performed by: NURSE PRACTITIONER

## 2019-07-23 PROCEDURE — 94761 N-INVAS EAR/PLS OXIMETRY MLT: CPT

## 2019-07-23 PROCEDURE — 2700000000 HC OXYGEN THERAPY PER DAY

## 2019-07-23 PROCEDURE — 80069 RENAL FUNCTION PANEL: CPT

## 2019-07-23 PROCEDURE — 94640 AIRWAY INHALATION TREATMENT: CPT

## 2019-07-23 PROCEDURE — 36415 COLL VENOUS BLD VENIPUNCTURE: CPT

## 2019-07-23 PROCEDURE — 85014 HEMATOCRIT: CPT

## 2019-07-23 RX ORDER — DEXTROSE MONOHYDRATE 50 MG/ML
100 INJECTION, SOLUTION INTRAVENOUS PRN
Status: DISCONTINUED | OUTPATIENT
Start: 2019-07-23 | End: 2019-07-23 | Stop reason: HOSPADM

## 2019-07-23 RX ORDER — TORSEMIDE 20 MG/1
20 TABLET ORAL DAILY
Qty: 30 TABLET | Refills: 3 | Status: SHIPPED | OUTPATIENT
Start: 2019-07-23 | End: 2019-08-05 | Stop reason: SDUPTHER

## 2019-07-23 RX ORDER — DEXTROSE MONOHYDRATE 25 G/50ML
12.5 INJECTION, SOLUTION INTRAVENOUS PRN
Status: DISCONTINUED | OUTPATIENT
Start: 2019-07-23 | End: 2019-07-23 | Stop reason: HOSPADM

## 2019-07-23 RX ORDER — NICOTINE POLACRILEX 4 MG
15 LOZENGE BUCCAL PRN
Status: DISCONTINUED | OUTPATIENT
Start: 2019-07-23 | End: 2019-07-23 | Stop reason: HOSPADM

## 2019-07-23 RX ADMIN — IPRATROPIUM BROMIDE AND ALBUTEROL SULFATE 1 AMPULE: .5; 3 SOLUTION RESPIRATORY (INHALATION) at 01:08

## 2019-07-23 RX ADMIN — POLYETHYLENE GLYCOL 3350 17 G: 17 POWDER, FOR SOLUTION ORAL at 07:42

## 2019-07-23 RX ADMIN — Medication 2 CAPSULE: at 16:55

## 2019-07-23 RX ADMIN — IPRATROPIUM BROMIDE AND ALBUTEROL SULFATE 1 AMPULE: .5; 3 SOLUTION RESPIRATORY (INHALATION) at 13:06

## 2019-07-23 RX ADMIN — Medication 2 CAPSULE: at 07:42

## 2019-07-23 RX ADMIN — METOPROLOL SUCCINATE 25 MG: 25 TABLET, EXTENDED RELEASE ORAL at 07:42

## 2019-07-23 RX ADMIN — OMEPRAZOLE 40 MG: 20 CAPSULE, DELAYED RELEASE ORAL at 06:44

## 2019-07-23 RX ADMIN — INSULIN LISPRO 1 UNITS: 100 INJECTION, SOLUTION INTRAVENOUS; SUBCUTANEOUS at 12:11

## 2019-07-23 RX ADMIN — IPRATROPIUM BROMIDE AND ALBUTEROL SULFATE 1 AMPULE: .5; 3 SOLUTION RESPIRATORY (INHALATION) at 16:47

## 2019-07-23 RX ADMIN — GABAPENTIN 100 MG: 100 CAPSULE ORAL at 14:33

## 2019-07-23 RX ADMIN — RIVAROXABAN 15 MG: 15 TABLET, FILM COATED ORAL at 16:55

## 2019-07-23 RX ADMIN — GABAPENTIN 100 MG: 100 CAPSULE ORAL at 07:42

## 2019-07-23 RX ADMIN — DICYCLOMINE HYDROCHLORIDE 10 MG: 10 CAPSULE ORAL at 06:44

## 2019-07-23 RX ADMIN — Medication 10 ML: at 07:44

## 2019-07-23 RX ADMIN — IPRATROPIUM BROMIDE AND ALBUTEROL SULFATE 1 AMPULE: .5; 3 SOLUTION RESPIRATORY (INHALATION) at 07:45

## 2019-07-23 RX ADMIN — MOMETASONE FUROATE AND FORMOTEROL FUMARATE DIHYDRATE 2 PUFF: 100; 5 AEROSOL RESPIRATORY (INHALATION) at 07:55

## 2019-07-23 RX ADMIN — AMIODARONE HYDROCHLORIDE 200 MG: 200 TABLET ORAL at 07:42

## 2019-07-23 ASSESSMENT — PAIN SCALES - GENERAL
PAINLEVEL_OUTOF10: 1
PAINLEVEL_OUTOF10: 0

## 2019-07-23 NOTE — DISCHARGE SUMMARY
Hospital Medicine Discharge Summary    Patient: Zachary Ledezma     Gender: male  : 1960   Age: 61 y.o. MRN: [id number]    Code Status: Full Code     Primary Care Provider: Adam Gao MD    Admit Date: 2019   Discharge Date:   2019    Admitting Physician: Pat Nielsen MD  Discharge Physician: Renetta Hernadez DO     Discharge Diagnoses: Active Hospital Problems    Diagnosis Date Noted    Chronic atrial fibrillation ([address] [I48.2]      Priority: High    Pneumonia of right lower lobe due to infectious organism ([address] [J18.1]     PEA (Pulseless electrical activity) ([address] [I46.9]     Ileus ([address] [K56.7]     Respiratory failure ([address] [J96.90] 2019    HCAP (healthcare-associated pneumonia) [J18.9] 2019    Ventricular tachycardia ([address] [I47.2] 2019    Shock circulatory ([address] [R57.9] 2019    Tachypnea [R06.82] 10/62/3166    Neutrophilic leukocytosis [X13.8] 2019    Chronic respiratory failure with hypoxia ([facility]) [J96.11] 2019    Cardiac arrest ([address] [I46.9]     Acute on chronic systolic HF (heart failure) ([facility]) [I50.23] 2019    Acute pulmonary edema ([facility]) [J81.0]     CAD (coronary artery disease) [I25.10] 2018    Morbid obesity due to excess calories ([address] [E66.01] 2018    Acute renal failure (ARF) ([address] [N17.9] 2018    Severe sepsis ([address] [A41.9, R65.20] 2018    DMII (diabetes mellitus, type 2) ([address] [E11.9] 2017    COPD exacerbation ([address] [J44.1]     Cardiomyopathy ([address] [I42.9] 2015    Hypotension [I95.9] 10/28/2015    Chronic systolic CHF (congestive heart failure), NYHA class 3 (HCC) [I50.22]     Acute hyperkalemia [E87.5]     Essential hypertension [I10]     Alcohol abuse, continuous [F10.10] 2015    Tachycardia [R00.0] 2015    Hyperlipidemia [E78.5] 2014       Hospital Course: A 62 yo male admitted with shortness of breath.  At the time of his arrival his shortness of breath was severe, made worse with minimal exertion and improved with rest. He was noted to be tripoding on arrival and to be hypoxic. In the emergency room he was found to have an acute COPD exacerbation any possible right basilar pneumonia. He required emergent intubation immediately after which he had an episode of ventricular tachycardia and finally PEA requiring initiation of ACLS protocol and an Amiodarone drip. 1.  S/p cardiac arrest - due to fluid overload and CHF - on amiodarone and HD for fluid removal     2.  Acute hypercapnic and hypoxic resp failure - initially intubated then extubated - now down to 2 L oxygen and stable     3. Suspected allergic reaction with skin peeling in fingers - present since 1 week - d/c and monitor response, prn benadryl     4. Hypomag - repleted  5. PAF - Rec to cont amio at 200mng daily and xarelto renally dosed, per cardiology. Off cardizem 2/2 hypotension     6.  Acute renal failure - was on CRRT for fluid removal - UOP improved  - HD has been on hold per nephrology, and now stopped as his crt is .7 and uop is good       7.  DMII with ESRD - off of metformin and on lantus now and SSI, controlled  8.  Elevated LFTs and abd distention - likely ileus - no C diff due to formed stools - improved now  9.  Acute on chronic systolic CHF - fluid removal helps - pt briefly on milrinone, now on demadex  10. Anemia - hb 7.2-> -> 6.9 today, suspect 2/2 renal failure. - S/p trqansfusion prbcs 7/3, and 7/22 with good response  - cont aranesp weekly    Disposition:  Skilled Facility    Exam:     /87   Pulse 88   Temp 98.8 °F (37.1 °C) (Oral)   Resp 19   Ht 5' 7\" (1.702 m)   Wt 200 lb 9.9 oz (91 kg)   SpO2 99%   BMI 31.42 kg/m²     General appearance:  No apparent distress, appears stated age and cooperative. HEENT:  Normal cephalic, atraumatic without obvious deformity. Pupils equal, round, and reactive to light. Extra ocular muscles intact.  Conjunctivae/corneas clear.  Neck: Supple, with full range of motion. No jugular venous distention. Trachea midline. Respiratory:  Normal respiratory effort. Clear to auscultation, bilaterally without Rales/Wheezes/Rhonchi. Cardiovascular:  Regular rate and rhythm with normal S1/S2 without murmurs, rubs or gallops. Abdomen: Soft, non-tender, non-distended with normal bowel sounds. Musculoskeletal:  No clubbing, cyanosis or edema. lt bka  Skin: Skin color, texture, turgor normal.  No rashes or lesions. Neurologic:  Neurovascularly intact without any focal sensory/motor deficits. Cranial nerves: II-XII intact, grossly non-focal.  Psychiatric:  Alert and oriented, thought content appropriate, normal insight    Consults:     IP CONSULT TO CRITICAL CARE  IP CONSULT TO NEPHROLOGY  IP CONSULT TO NEPHROLOGY  IP CONSULT TO CARDIOLOGY  IP CONSULT TO PALLIATIVE CARE  IP CONSULT TO HEART FAILURE NURSE/COORDINATOR  IP CONSULT TO DIETITIAN  IP CONSULT TO RADIOLOGY    Labs: For convenience and continuity at follow-up the following most recent labs are provided:    Lab Results   Component Value Date    WBC 8.8 07/22/2019    HGB 7.4 07/23/2019    HCT 23.8 07/23/2019    MCV 83.8 07/22/2019     07/22/2019     07/23/2019    K 4.4 07/23/2019    K 3.6 06/11/2019    CL 97 07/23/2019    CO2 30 07/23/2019    BUN 16 07/23/2019    CREATININE 0.7 07/23/2019    CALCIUM 8.7 07/23/2019    PHOS 3.7 07/23/2019    ALKPHOS 100 07/14/2019    ALT 18 07/14/2019    AST 23 07/14/2019    BILITOT 0.5 07/14/2019    BILIDIR <0.2 07/14/2019    LABALBU 3.0 07/23/2019    LDLCALC 76 03/29/2019    TRIG 770 06/26/2019     Lab Results   Component Value Date    INR 1.18 (H) 07/10/2019    INR 1.50 (H) 06/27/2019    INR 4.70 (HH) 06/24/2019       Radiology:  IR TUNNELED CVC PLACE WO SQ PORT/PUMP > 5 YEARS   Final Result   Successful ultrasound and fluoroscopy guided placement of a right internal   jugular 23 cm tip to cuff hemodialysis catheter.          XR CHEST PORTABLE US ABDOMEN LIMITED   Final Result   1. Limited bedside examination as described above. 2. No evidence of ascites. 3. Limited evaluation of the liver demonstrates no gross stigmata of   cirrhosis. US RENAL COMPLETE   Final Result   Horseshoe kidney. No hydronephrosis. Stable, simple cyst of the superior pole of the right kidney. XR CHEST PORTABLE   Final Result   1. Right transjugular sign of these catheter again likely terminating in the   right atrium. 2. Left basilar opacities compatible with atelectasis versus pneumonia with a   possible small effusion. XR CHEST PORTABLE   Final Result   No acute process. Right jugular central venous catheter in place terminating in the right atrium         CT Head WO Contrast   Final Result   No acute intracranial abnormality. CT CHEST WO CONTRAST   Final Result   1. Bilateral airspace disease could represent atelectasis or pneumonia. 2. Small right pleural effusion. 3. Coronary artery disease. 4. Pulmonary hypertension. 5. Mediastinal adenopathy, probably reactive given the relatively rapid   development. 6. Suspected acute nondisplaced bilateral anterior rib fractures without   pneumothorax. XR CHEST PORTABLE   Final Result   1. Endotracheal tube tip projects over the trachea, tip at the level of the   aortic knob, 3.7 cm superior to the erna. 2. Pulmonary edema evident. 3. Cardiomegaly. 4. No pneumothorax. 5. Nonspecific prominence of the azygos vein region may be related vascular   engorgement, adenopathy or underlying mass lesion.          XR CHEST PORTABLE   Final Result   Questionable right basilar atelectasis or pneumonia on a limited portable   exam.         IR REMOVAL OF TUNNELED PLEURAL CATH W CUFF    (Results Pending)       Discharge Medications:   Current Discharge Medication List      START taking these medications    Details   lactobacillus (CULTURELLE) capsule Take 2 capsules by

## 2019-07-23 NOTE — PROGRESS NOTES
Occupational Therapy  Unable to see pt at this time due to pt continues to refuse therapy stating \"I'm already going some place\" \"I didn't get any sleep last night\" \"I'm not doing anything\". Pt educated on importance of OOB activity with pt still refusing. Will follow up with pt as time allows. Continue with POC.     Oneyda Rowe OTR/MARINO

## 2019-07-23 NOTE — CARE COORDINATION
DISCHARGE SUMMARY     DATE OF DISCHARGE: 7/23/19    DISCHARGE DESTINATION: Home at [address] [place] [address] Street: Intermediate with therapy    Report Number: [id number]    PASRR completed    TRANSPORTATION: [address] Name:  [address] up Time: 6:00pm    Phone Number: [phone]    COMMENTS: Patient not needing HD anymore, IR removed TDC today. Per Liberty Cheema at [facility], they have approval for intermediate care and can accept today. Patient is aware he will need to stay at Home at [facility] for atleast 4-6 weeks(facility request) to ensure that he is safe to return home as he is not close to his baseline. Patient, RN and facility aware of d/c plan.      Miguel Garcia, [address] [facility]  [phone]  7/23/19 at 3:48 PM

## 2019-07-23 NOTE — PROGRESS NOTES
Pt brought to IR for removal of Permacath. The dressing was removed, sutures were released and the Permacath was removed by Dr. Amadou Rodriguez who also held pressure at the patient's RIJ access site. Once pressure has been held a dressing will be applied and pt will be returned to his room.      Thank you,    Mendez Steven, BSN, RN

## 2019-07-23 NOTE — PROGRESS NOTES
Nephrology ([facility]) Progress Note        Subjective:  CC: REJI and hyperkalemia    HPI: Breathing comfortably. Good UO.  ROS: In bed. [address]:  medications reviewed. Objective:  Blood pressure 105/87, pulse 88, temperature 98.8 °F (37.1 °C), temperature source Oral, resp. rate 19, height 5' 7\" (1.702 m), weight 200 lb 9.9 oz (91 kg), SpO2 99 %. Intake/Output Summary (Last 24 hours) at 7/23/2019 1502  Last data filed at 7/23/2019 1309  Gross per 24 hour   Intake 1060 ml   Output 975 ml   Net 85 ml     General:  No distress  Neck:  RIJ TDC  Chest:  diminished BS bilaterally, unlabored  CVS:  Irregular  Abdominal: mild generalized tenderness noted no masses  Extremities:  Trace edema. L AKA  Skin:  no rash      Labs:  Renal panel:  Lab Results   Component Value Date/Time     07/23/2019 04:37 AM    K 4.4 07/23/2019 04:37 AM    K 3.6 06/11/2019 06:00 AM    CO2 30 07/23/2019 04:37 AM    BUN 16 07/23/2019 04:37 AM    CREATININE 0.7 (L) 07/23/2019 04:37 AM    CALCIUM 8.7 07/23/2019 04:37 AM    PHOS 3.7 07/23/2019 04:37 AM    MG 1.70 (L) 07/23/2019 04:37 AM     CBC:  Lab Results   Component Value Date/Time    WBC 8.8 07/22/2019 10:17 AM    HGB 7.4 (L) 07/23/2019 09:48 AM    HCT 23.8 (L) 07/23/2019 09:48 AM     07/22/2019 10:17 AM       Assessment/Plan:  .  1 REJI   - TDC placed in IR 7/10/19 -   - no further dialysis necessary  - recovered  - will ask IR to remove TDC    2 HTN  - BP  Low at times  - improved with less metoprolol    3 Septic Shock   - resolved    4 Anemia   - received 1 unit of PRBC 7/3/19   - hemoglobin low  - increased aransep to 100 mcg weekly    5 Systolic CHF   Better     6 A fib   - on Amio   - Cardiology following    Patient can be discharged from renal standpoint. No renal follow-up necessary.

## 2019-08-05 ENCOUNTER — OFFICE VISIT (OUTPATIENT)
Dept: CARDIOLOGY CLINIC | Age: 59
End: 2019-08-05
Payer: COMMERCIAL

## 2019-08-05 VITALS
WEIGHT: 217 LBS | HEIGHT: 67 IN | HEART RATE: 86 BPM | SYSTOLIC BLOOD PRESSURE: 96 MMHG | OXYGEN SATURATION: 98 % | BODY MASS INDEX: 34.06 KG/M2 | DIASTOLIC BLOOD PRESSURE: 56 MMHG

## 2019-08-05 DIAGNOSIS — Z72.0 TOBACCO ABUSE: ICD-10-CM

## 2019-08-05 DIAGNOSIS — I48.20 CHRONIC ATRIAL FIBRILLATION (HCC): Primary | ICD-10-CM

## 2019-08-05 DIAGNOSIS — J44.9 CHRONIC OBSTRUCTIVE PULMONARY DISEASE, UNSPECIFIED COPD TYPE (HCC): ICD-10-CM

## 2019-08-05 DIAGNOSIS — I73.9 PAD (PERIPHERAL ARTERY DISEASE) (HCC): ICD-10-CM

## 2019-08-05 DIAGNOSIS — I50.42 CHRONIC COMBINED SYSTOLIC AND DIASTOLIC HF (HEART FAILURE) (HCC): ICD-10-CM

## 2019-08-05 DIAGNOSIS — N18.6 ESRD (END STAGE RENAL DISEASE) (HCC): ICD-10-CM

## 2019-08-05 DIAGNOSIS — I42.8 NONISCHEMIC CARDIOMYOPATHY (HCC): ICD-10-CM

## 2019-08-05 PROCEDURE — 1036F TOBACCO NON-USER: CPT | Performed by: NURSE PRACTITIONER

## 2019-08-05 PROCEDURE — 99214 OFFICE O/P EST MOD 30 MIN: CPT | Performed by: NURSE PRACTITIONER

## 2019-08-05 PROCEDURE — 3017F COLORECTAL CA SCREEN DOC REV: CPT | Performed by: NURSE PRACTITIONER

## 2019-08-05 PROCEDURE — 3023F SPIROM DOC REV: CPT | Performed by: NURSE PRACTITIONER

## 2019-08-05 PROCEDURE — G8926 SPIRO NO PERF OR DOC: HCPCS | Performed by: NURSE PRACTITIONER

## 2019-08-05 PROCEDURE — G8427 DOCREV CUR MEDS BY ELIG CLIN: HCPCS | Performed by: NURSE PRACTITIONER

## 2019-08-05 PROCEDURE — G8417 CALC BMI ABV UP PARAM F/U: HCPCS | Performed by: NURSE PRACTITIONER

## 2019-08-05 PROCEDURE — G8598 ASA/ANTIPLAT THER USED: HCPCS | Performed by: NURSE PRACTITIONER

## 2019-08-05 PROCEDURE — 1111F DSCHRG MED/CURRENT MED MERGE: CPT | Performed by: NURSE PRACTITIONER

## 2019-08-05 RX ORDER — OXYCODONE HYDROCHLORIDE AND ACETAMINOPHEN 5; 325 MG/1; MG/1
1 TABLET ORAL EVERY 6 HOURS PRN
Status: ON HOLD | COMMUNITY
End: 2019-08-23 | Stop reason: SDUPTHER

## 2019-08-05 RX ORDER — TORSEMIDE 20 MG/1
TABLET ORAL
Qty: 30 TABLET | Refills: 3 | Status: ON HOLD | OUTPATIENT
Start: 2019-08-05 | End: 2019-08-23 | Stop reason: HOSPADM

## 2019-08-05 RX ORDER — CYCLOBENZAPRINE HCL 10 MG
10 TABLET ORAL 3 TIMES DAILY PRN
COMMUNITY
End: 2019-09-04 | Stop reason: ALTCHOICE

## 2019-08-05 NOTE — PROGRESS NOTES
[facility]  Office Visit    Es Méndez  1960 August 5, 2019    CC:   Chief Complaint   Patient presents with    Follow-Up from Hospital     [facility] 7/23/19; CHF; Patient declined EKG    Chest Pain     when laying flat    Shortness of Breath     wears oxygen 24/7 for COPD     HPI:  The patient is 61 y.o. male with a past medical history significant for morbid obesity, CAD, PAD, nonischemic cardiomyopathy, CHF, COPD, HLP, CKD, diabetes mellitus, untreated sleep apnea, ETOH abuse, smoker, PAF (S/P DCCV in 2/2019) and noncompliance who is here for hospital follow up. He was hospitalized from 6/22/2019-7/23/2019 with SOB, COPD exacerbation, R basilar PNA,(emergently intubated), VT/PEA cardiac arrest, PAF, Renal failure on HD, anemia (transfused) and eventually sent to rehab at [facility]. He was taken off HD prior to discharge. ? Weight gain since discharge. He states they weigh him everyday at [facility] and noted 12# wt gain overnight (he states he does not believe the scale as he has not had any worsening symptoms). Has occasional productive cough. Has remained off cigarettes. Gets constipated at times. Continues with physical therapy 2x daily. Goal is to get him into a wheelchair. He feels fatigued at times. Has occasional chest pain only when he lies flat (that comes and goes). Has chronic issues lying flat d/t SOB. Denies palpitations, , syncope or claudication. + occasional dizziness. Monitoring sodium and fluid intake closely. Review of Systems:  Constitutional: Denies night sweats or fever. + fatigue and weakness  HEENT: Denies new visual changes, ringing in ears, nosebleeds, nasal congestion  Respiratory: + chronic SOB; productive cough, orthopnea  Cardiovascular: see HPI  GI: Denies N/V, diarrhea, abdominal pain, change in bowel habits, melena or hematochezia   + constipation  : Denies urinary frequency, urgency, incontinence, hematuria or dysuria.   Skin: Denies rash, Social History     Tobacco Use    Smoking status: Former Smoker     Packs/day: 0.50     Years: 40.00     Pack years: 20.00     Types: Cigarettes     Last attempt to quit: 2019     Years since quittin.1    Smokeless tobacco: Never Used    Tobacco comment: Has not smoked since last hospital stay   Substance Use Topics    Alcohol use: Yes     Alcohol/week: 5.0 - 6.0 standard drinks     Types: 5 - 6 Cans of beer per week    Drug use: No       Allergies   Allergen Reactions    Rocephin [Ceftriaxone] Other (See Comments)     Sloughing of skin (blisters) on hands and lower arms; pancytopenia    Demadex [Torsemide] Other (See Comments)     Skin sloughing on hands and feet     Current Outpatient Medications   Medication Sig Dispense Refill    Lactobacillus (ACIDOPHILUS PO) Take by mouth 2 times daily      cyclobenzaprine (FLEXERIL) 10 MG tablet Take 10 mg by mouth 3 times daily as needed for Muscle spasms      oxyCODONE-acetaminophen (PERCOCET) 5-325 MG per tablet Take 1 tablet by mouth every 6 hours as needed for Pain.       torsemide (DEMADEX) 20 MG tablet Take torsemide 20 mg tablet twice a day x 5 days and then decrease to 20 mg tablet daily 30 tablet 3    insulin glargine (BASAGLAR KWIKPEN) 100 UNIT/ML injection pen Inject 12 Units into the skin nightly 5 pen 3    rivaroxaban (XARELTO) 15 MG TABS tablet Take 1 tablet by mouth daily 30 tablet 2    lactobacillus (CULTURELLE) capsule Take 2 capsules by mouth 2 times daily (with meals) 30 capsule 0    metoprolol succinate (TOPROL XL) 50 MG extended release tablet Take 1 tablet by mouth daily 30 tablet 1    polyethylene glycol (GLYCOLAX) packet Take 17 g by mouth daily 527 g 1    amiodarone (CORDARONE) 200 MG tablet Take 1 tablet by mouth daily 30 tablet 1    tamsulosin (FLOMAX) 0.4 MG capsule Take 1 capsule by mouth daily 30 capsule 0    Insulin Pen Needle ([employer] PEN NEEDLES) 31G X 6 MM MISC 1 each by Does not apply route daily 100 each 1   

## 2019-08-05 NOTE — PATIENT INSTRUCTIONS
Increase Torsemide to 20 mg tablet twice a day x 5 days and then decrease to 20 mg daily    Continue other medications    He should follow up with nephrology    Lab work in 5 days: JOHANNY

## 2019-08-05 NOTE — LETTER
Norm Thomas  Phone: [phone]  Fax: [phone]    JOON Godinez - SAMANTHA        August 5, 2019     Yimi Salazar MD  [address] [place] [address]    Patient: Kia Cruz  MR Number: [id number]  YOB: 1960  Date of Visit: 8/5/2019    Dear Dr. Yimi Salazar: Thank you for the request for consultation for Hillaryortiz Vicente. If you have questions, please do not hesitate to call me. I look forward to following Dieudonne Vázquez along with you.     Sincerely,        JOON Godinez - CNP

## 2019-08-06 ENCOUNTER — CARE COORDINATION (OUTPATIENT)
Dept: CARE COORDINATION | Age: 59
End: 2019-08-06

## 2019-08-13 ENCOUNTER — HOSPITAL ENCOUNTER (INPATIENT)
Age: 59
LOS: 10 days | Discharge: NURSING, REHAB OR LONG-TERM CARE | DRG: 194 | End: 2019-08-23
Attending: EMERGENCY MEDICINE | Admitting: INTERNAL MEDICINE
Payer: COMMERCIAL

## 2019-08-13 ENCOUNTER — APPOINTMENT (OUTPATIENT)
Dept: GENERAL RADIOLOGY | Age: 59
DRG: 194 | End: 2019-08-13
Payer: COMMERCIAL

## 2019-08-13 DIAGNOSIS — D64.9 ANEMIA, UNSPECIFIED TYPE: ICD-10-CM

## 2019-08-13 DIAGNOSIS — G89.29 CHRONIC BACK PAIN, UNSPECIFIED BACK LOCATION, UNSPECIFIED BACK PAIN LATERALITY: ICD-10-CM

## 2019-08-13 DIAGNOSIS — J81.0 ACUTE PULMONARY EDEMA (HCC): ICD-10-CM

## 2019-08-13 DIAGNOSIS — I50.9 ACUTE ON CHRONIC CONGESTIVE HEART FAILURE, UNSPECIFIED HEART FAILURE TYPE (HCC): Primary | ICD-10-CM

## 2019-08-13 DIAGNOSIS — M54.9 CHRONIC BACK PAIN, UNSPECIFIED BACK LOCATION, UNSPECIFIED BACK PAIN LATERALITY: ICD-10-CM

## 2019-08-13 DIAGNOSIS — M19.90 ARTHRITIS: ICD-10-CM

## 2019-08-13 LAB
A/G RATIO: 0.7 (ref 1.1–2.2)
ABO/RH: NORMAL
ALBUMIN SERPL-MCNC: 3 G/DL (ref 3.4–5)
ALP BLD-CCNC: 90 U/L (ref 40–129)
ALT SERPL-CCNC: 11 U/L (ref 10–40)
ANION GAP SERPL CALCULATED.3IONS-SCNC: 12 MMOL/L (ref 3–16)
ANTIBODY SCREEN: NORMAL
AST SERPL-CCNC: 18 U/L (ref 15–37)
BASOPHILS ABSOLUTE: 0.1 K/UL (ref 0–0.2)
BASOPHILS RELATIVE PERCENT: 1.5 %
BILIRUB SERPL-MCNC: 0.3 MG/DL (ref 0–1)
BLOOD BANK DISPENSE STATUS: NORMAL
BLOOD BANK PRODUCT CODE: NORMAL
BPU ID: NORMAL
BUN BLDV-MCNC: 21 MG/DL (ref 7–20)
CALCIUM SERPL-MCNC: 8.5 MG/DL (ref 8.3–10.6)
CHLORIDE BLD-SCNC: 95 MMOL/L (ref 99–110)
CO2: 33 MMOL/L (ref 21–32)
CREAT SERPL-MCNC: 0.9 MG/DL (ref 0.9–1.3)
DESCRIPTION BLOOD BANK: NORMAL
EKG ATRIAL RATE: 174 BPM
EKG DIAGNOSIS: NORMAL
EKG Q-T INTERVAL: 350 MS
EKG QRS DURATION: 88 MS
EKG QTC CALCULATION (BAZETT): 439 MS
EKG R AXIS: 111 DEGREES
EKG T AXIS: 227 DEGREES
EKG VENTRICULAR RATE: 95 BPM
EOSINOPHILS ABSOLUTE: 0.2 K/UL (ref 0–0.6)
EOSINOPHILS RELATIVE PERCENT: 2.4 %
ESTIMATED AVERAGE GLUCOSE: 142.7 MG/DL
GFR AFRICAN AMERICAN: >60
GFR NON-AFRICAN AMERICAN: >60
GLOBULIN: 4.3 G/DL
GLUCOSE BLD-MCNC: 126 MG/DL (ref 70–99)
GLUCOSE BLD-MCNC: 131 MG/DL (ref 70–99)
GLUCOSE BLD-MCNC: 145 MG/DL (ref 70–99)
GLUCOSE BLD-MCNC: 146 MG/DL (ref 70–99)
HBA1C MFR BLD: 6.6 %
HCT VFR BLD CALC: 21.4 % (ref 40.5–52.5)
HCT VFR BLD CALC: 21.5 % (ref 40.5–52.5)
HEMOGLOBIN: 6.6 G/DL (ref 13.5–17.5)
HEMOGLOBIN: 6.8 G/DL (ref 13.5–17.5)
IRON SATURATION: 6 % (ref 20–50)
IRON: 19 UG/DL (ref 59–158)
LYMPHOCYTES ABSOLUTE: 1.1 K/UL (ref 1–5.1)
LYMPHOCYTES RELATIVE PERCENT: 16.6 %
MCH RBC QN AUTO: 24.8 PG (ref 26–34)
MCHC RBC AUTO-ENTMCNC: 31.4 G/DL (ref 31–36)
MCV RBC AUTO: 79 FL (ref 80–100)
MONOCYTES ABSOLUTE: 1 K/UL (ref 0–1.3)
MONOCYTES RELATIVE PERCENT: 15.8 %
NEUTROPHILS ABSOLUTE: 4.1 K/UL (ref 1.7–7.7)
NEUTROPHILS RELATIVE PERCENT: 63.7 %
PDW BLD-RTO: 17.7 % (ref 12.4–15.4)
PERFORMED ON: ABNORMAL
PLATELET # BLD: 239 K/UL (ref 135–450)
PMV BLD AUTO: 8.6 FL (ref 5–10.5)
POTASSIUM SERPL-SCNC: 3.4 MMOL/L (ref 3.5–5.1)
PRO-BNP: 3642 PG/ML (ref 0–124)
RBC # BLD: 2.72 M/UL (ref 4.2–5.9)
SODIUM BLD-SCNC: 140 MMOL/L (ref 136–145)
TOTAL IRON BINDING CAPACITY: 331 UG/DL (ref 260–445)
TOTAL PROTEIN: 7.3 G/DL (ref 6.4–8.2)
TROPONIN: 0.02 NG/ML
WBC # BLD: 6.5 K/UL (ref 4–11)

## 2019-08-13 PROCEDURE — 6370000000 HC RX 637 (ALT 250 FOR IP): Performed by: INTERNAL MEDICINE

## 2019-08-13 PROCEDURE — 6360000002 HC RX W HCPCS: Performed by: EMERGENCY MEDICINE

## 2019-08-13 PROCEDURE — 99285 EMERGENCY DEPT VISIT HI MDM: CPT

## 2019-08-13 PROCEDURE — 86900 BLOOD TYPING SEROLOGIC ABO: CPT

## 2019-08-13 PROCEDURE — 93005 ELECTROCARDIOGRAM TRACING: CPT | Performed by: EMERGENCY MEDICINE

## 2019-08-13 PROCEDURE — 84484 ASSAY OF TROPONIN QUANT: CPT

## 2019-08-13 PROCEDURE — 85014 HEMATOCRIT: CPT

## 2019-08-13 PROCEDURE — 97162 PT EVAL MOD COMPLEX 30 MIN: CPT | Performed by: PHYSICAL THERAPIST

## 2019-08-13 PROCEDURE — 97166 OT EVAL MOD COMPLEX 45 MIN: CPT

## 2019-08-13 PROCEDURE — 94640 AIRWAY INHALATION TREATMENT: CPT

## 2019-08-13 PROCEDURE — 83880 ASSAY OF NATRIURETIC PEPTIDE: CPT

## 2019-08-13 PROCEDURE — 80053 COMPREHEN METABOLIC PANEL: CPT

## 2019-08-13 PROCEDURE — 36430 TRANSFUSION BLD/BLD COMPNT: CPT

## 2019-08-13 PROCEDURE — 86901 BLOOD TYPING SEROLOGIC RH(D): CPT

## 2019-08-13 PROCEDURE — 2700000000 HC OXYGEN THERAPY PER DAY

## 2019-08-13 PROCEDURE — 2580000003 HC RX 258: Performed by: INTERNAL MEDICINE

## 2019-08-13 PROCEDURE — 6360000002 HC RX W HCPCS: Performed by: INTERNAL MEDICINE

## 2019-08-13 PROCEDURE — 94760 N-INVAS EAR/PLS OXIMETRY 1: CPT

## 2019-08-13 PROCEDURE — 86850 RBC ANTIBODY SCREEN: CPT

## 2019-08-13 PROCEDURE — 83540 ASSAY OF IRON: CPT

## 2019-08-13 PROCEDURE — 99255 IP/OBS CONSLTJ NEW/EST HI 80: CPT | Performed by: INTERNAL MEDICINE

## 2019-08-13 PROCEDURE — 2060000000 HC ICU INTERMEDIATE R&B

## 2019-08-13 PROCEDURE — 6370000000 HC RX 637 (ALT 250 FOR IP): Performed by: EMERGENCY MEDICINE

## 2019-08-13 PROCEDURE — 97530 THERAPEUTIC ACTIVITIES: CPT

## 2019-08-13 PROCEDURE — 85025 COMPLETE CBC W/AUTO DIFF WBC: CPT

## 2019-08-13 PROCEDURE — 85018 HEMOGLOBIN: CPT

## 2019-08-13 PROCEDURE — 83036 HEMOGLOBIN GLYCOSYLATED A1C: CPT

## 2019-08-13 PROCEDURE — 71046 X-RAY EXAM CHEST 2 VIEWS: CPT

## 2019-08-13 PROCEDURE — 96374 THER/PROPH/DIAG INJ IV PUSH: CPT

## 2019-08-13 PROCEDURE — 97530 THERAPEUTIC ACTIVITIES: CPT | Performed by: PHYSICAL THERAPIST

## 2019-08-13 PROCEDURE — 36415 COLL VENOUS BLD VENIPUNCTURE: CPT

## 2019-08-13 PROCEDURE — 86923 COMPATIBILITY TEST ELECTRIC: CPT

## 2019-08-13 PROCEDURE — 83550 IRON BINDING TEST: CPT

## 2019-08-13 PROCEDURE — 93010 ELECTROCARDIOGRAM REPORT: CPT | Performed by: INTERNAL MEDICINE

## 2019-08-13 PROCEDURE — P9016 RBC LEUKOCYTES REDUCED: HCPCS

## 2019-08-13 RX ORDER — ALBUTEROL SULFATE 90 UG/1
2 AEROSOL, METERED RESPIRATORY (INHALATION) EVERY 4 HOURS PRN
Status: DISCONTINUED | OUTPATIENT
Start: 2019-08-13 | End: 2019-08-23 | Stop reason: HOSPADM

## 2019-08-13 RX ORDER — DEXTROSE MONOHYDRATE 50 MG/ML
100 INJECTION, SOLUTION INTRAVENOUS PRN
Status: DISCONTINUED | OUTPATIENT
Start: 2019-08-13 | End: 2019-08-23 | Stop reason: HOSPADM

## 2019-08-13 RX ORDER — POLYETHYLENE GLYCOL 3350 17 G/17G
17 POWDER, FOR SOLUTION ORAL DAILY
Status: DISCONTINUED | OUTPATIENT
Start: 2019-08-13 | End: 2019-08-23 | Stop reason: HOSPADM

## 2019-08-13 RX ORDER — DEXTROSE MONOHYDRATE 25 G/50ML
12.5 INJECTION, SOLUTION INTRAVENOUS PRN
Status: DISCONTINUED | OUTPATIENT
Start: 2019-08-13 | End: 2019-08-23 | Stop reason: HOSPADM

## 2019-08-13 RX ORDER — OXYCODONE HYDROCHLORIDE AND ACETAMINOPHEN 5; 325 MG/1; MG/1
1 TABLET ORAL ONCE
Status: COMPLETED | OUTPATIENT
Start: 2019-08-13 | End: 2019-08-13

## 2019-08-13 RX ORDER — LANOLIN ALCOHOL/MO/W.PET/CERES
400 CREAM (GRAM) TOPICAL DAILY
Status: DISCONTINUED | OUTPATIENT
Start: 2019-08-13 | End: 2019-08-23 | Stop reason: HOSPADM

## 2019-08-13 RX ORDER — FUROSEMIDE 10 MG/ML
40 INJECTION INTRAMUSCULAR; INTRAVENOUS 2 TIMES DAILY
Status: DISCONTINUED | OUTPATIENT
Start: 2019-08-13 | End: 2019-08-13

## 2019-08-13 RX ORDER — PANTOPRAZOLE SODIUM 40 MG/1
40 TABLET, DELAYED RELEASE ORAL
Status: DISCONTINUED | OUTPATIENT
Start: 2019-08-13 | End: 2019-08-23 | Stop reason: HOSPADM

## 2019-08-13 RX ORDER — FUROSEMIDE 10 MG/ML
40 INJECTION INTRAMUSCULAR; INTRAVENOUS ONCE
Status: COMPLETED | OUTPATIENT
Start: 2019-08-13 | End: 2019-08-13

## 2019-08-13 RX ORDER — 0.9 % SODIUM CHLORIDE 0.9 %
250 INTRAVENOUS SOLUTION INTRAVENOUS ONCE
Status: DISCONTINUED | OUTPATIENT
Start: 2019-08-13 | End: 2019-08-23 | Stop reason: HOSPADM

## 2019-08-13 RX ORDER — GABAPENTIN 100 MG/1
100 CAPSULE ORAL 3 TIMES DAILY
Status: DISCONTINUED | OUTPATIENT
Start: 2019-08-13 | End: 2019-08-23 | Stop reason: HOSPADM

## 2019-08-13 RX ORDER — AMIODARONE HYDROCHLORIDE 200 MG/1
200 TABLET ORAL DAILY
Status: DISCONTINUED | OUTPATIENT
Start: 2019-08-13 | End: 2019-08-23 | Stop reason: HOSPADM

## 2019-08-13 RX ORDER — SODIUM CHLORIDE 0.9 % (FLUSH) 0.9 %
10 SYRINGE (ML) INJECTION PRN
Status: DISCONTINUED | OUTPATIENT
Start: 2019-08-13 | End: 2019-08-23 | Stop reason: HOSPADM

## 2019-08-13 RX ORDER — TAMSULOSIN HYDROCHLORIDE 0.4 MG/1
0.4 CAPSULE ORAL DAILY
Status: DISCONTINUED | OUTPATIENT
Start: 2019-08-13 | End: 2019-08-23 | Stop reason: HOSPADM

## 2019-08-13 RX ORDER — SODIUM CHLORIDE 0.9 % (FLUSH) 0.9 %
10 SYRINGE (ML) INJECTION EVERY 12 HOURS SCHEDULED
Status: DISCONTINUED | OUTPATIENT
Start: 2019-08-13 | End: 2019-08-23 | Stop reason: HOSPADM

## 2019-08-13 RX ORDER — INSULIN GLARGINE 100 [IU]/ML
12 INJECTION, SOLUTION SUBCUTANEOUS NIGHTLY
Status: DISCONTINUED | OUTPATIENT
Start: 2019-08-13 | End: 2019-08-16

## 2019-08-13 RX ORDER — ATORVASTATIN CALCIUM 40 MG/1
40 TABLET, FILM COATED ORAL DAILY
Status: DISCONTINUED | OUTPATIENT
Start: 2019-08-13 | End: 2019-08-23 | Stop reason: HOSPADM

## 2019-08-13 RX ORDER — LISINOPRIL 5 MG/1
2.5 TABLET ORAL DAILY
Status: DISCONTINUED | OUTPATIENT
Start: 2019-08-13 | End: 2019-08-20

## 2019-08-13 RX ORDER — METOPROLOL SUCCINATE 50 MG/1
50 TABLET, EXTENDED RELEASE ORAL DAILY
Status: DISCONTINUED | OUTPATIENT
Start: 2019-08-13 | End: 2019-08-23 | Stop reason: HOSPADM

## 2019-08-13 RX ORDER — ONDANSETRON 2 MG/ML
4 INJECTION INTRAMUSCULAR; INTRAVENOUS EVERY 6 HOURS PRN
Status: DISCONTINUED | OUTPATIENT
Start: 2019-08-13 | End: 2019-08-23 | Stop reason: HOSPADM

## 2019-08-13 RX ORDER — FUROSEMIDE 10 MG/ML
80 INJECTION INTRAMUSCULAR; INTRAVENOUS 2 TIMES DAILY
Status: DISCONTINUED | OUTPATIENT
Start: 2019-08-13 | End: 2019-08-14

## 2019-08-13 RX ORDER — LACTOBACILLUS RHAMNOSUS GG 10B CELL
1 CAPSULE ORAL 2 TIMES DAILY
Status: DISCONTINUED | OUTPATIENT
Start: 2019-08-13 | End: 2019-08-23 | Stop reason: HOSPADM

## 2019-08-13 RX ORDER — OXYCODONE HYDROCHLORIDE AND ACETAMINOPHEN 5; 325 MG/1; MG/1
1 TABLET ORAL EVERY 6 HOURS PRN
Status: DISCONTINUED | OUTPATIENT
Start: 2019-08-13 | End: 2019-08-23 | Stop reason: HOSPADM

## 2019-08-13 RX ORDER — TRAZODONE HYDROCHLORIDE 50 MG/1
50 TABLET ORAL NIGHTLY
Status: DISCONTINUED | OUTPATIENT
Start: 2019-08-13 | End: 2019-08-23 | Stop reason: HOSPADM

## 2019-08-13 RX ORDER — NICOTINE POLACRILEX 4 MG
15 LOZENGE BUCCAL PRN
Status: DISCONTINUED | OUTPATIENT
Start: 2019-08-13 | End: 2019-08-23 | Stop reason: HOSPADM

## 2019-08-13 RX ORDER — CYCLOBENZAPRINE HCL 10 MG
10 TABLET ORAL 3 TIMES DAILY PRN
Status: DISCONTINUED | OUTPATIENT
Start: 2019-08-13 | End: 2019-08-23 | Stop reason: HOSPADM

## 2019-08-13 RX ORDER — IPRATROPIUM BROMIDE AND ALBUTEROL SULFATE 2.5; .5 MG/3ML; MG/3ML
1 SOLUTION RESPIRATORY (INHALATION) EVERY 4 HOURS PRN
Status: DISCONTINUED | OUTPATIENT
Start: 2019-08-13 | End: 2019-08-23 | Stop reason: HOSPADM

## 2019-08-13 RX ADMIN — AMIODARONE HYDROCHLORIDE 200 MG: 200 TABLET ORAL at 10:09

## 2019-08-13 RX ADMIN — Medication 1 CAPSULE: at 10:09

## 2019-08-13 RX ADMIN — Medication 2 PUFF: at 19:29

## 2019-08-13 RX ADMIN — FUROSEMIDE 40 MG: 10 INJECTION, SOLUTION INTRAMUSCULAR; INTRAVENOUS at 05:33

## 2019-08-13 RX ADMIN — OXYCODONE HYDROCHLORIDE AND ACETAMINOPHEN 1 TABLET: 5; 325 TABLET ORAL at 11:38

## 2019-08-13 RX ADMIN — TRAZODONE HYDROCHLORIDE 50 MG: 50 TABLET ORAL at 21:43

## 2019-08-13 RX ADMIN — IPRATROPIUM BROMIDE AND ALBUTEROL SULFATE 1 AMPULE: .5; 3 SOLUTION RESPIRATORY (INHALATION) at 19:29

## 2019-08-13 RX ADMIN — GABAPENTIN 100 MG: 100 CAPSULE ORAL at 16:05

## 2019-08-13 RX ADMIN — PANTOPRAZOLE SODIUM 40 MG: 40 TABLET, DELAYED RELEASE ORAL at 10:09

## 2019-08-13 RX ADMIN — Medication 10 ML: at 10:09

## 2019-08-13 RX ADMIN — Medication 1 CAPSULE: at 21:43

## 2019-08-13 RX ADMIN — Medication 10 ML: at 21:42

## 2019-08-13 RX ADMIN — GABAPENTIN 100 MG: 100 CAPSULE ORAL at 10:10

## 2019-08-13 RX ADMIN — OXYCODONE HYDROCHLORIDE AND ACETAMINOPHEN 1 TABLET: 5; 325 TABLET ORAL at 18:31

## 2019-08-13 RX ADMIN — METOPROLOL SUCCINATE 50 MG: 50 TABLET, EXTENDED RELEASE ORAL at 10:20

## 2019-08-13 RX ADMIN — Medication 400 MG: at 10:12

## 2019-08-13 RX ADMIN — FUROSEMIDE 80 MG: 10 INJECTION, SOLUTION INTRAMUSCULAR; INTRAVENOUS at 18:31

## 2019-08-13 RX ADMIN — POTASSIUM BICARBONATE 40 MEQ: 782 TABLET, EFFERVESCENT ORAL at 11:49

## 2019-08-13 RX ADMIN — LISINOPRIL 2.5 MG: 5 TABLET ORAL at 10:09

## 2019-08-13 RX ADMIN — Medication 2 PUFF: at 11:19

## 2019-08-13 RX ADMIN — ATORVASTATIN CALCIUM 40 MG: 40 TABLET, FILM COATED ORAL at 10:09

## 2019-08-13 RX ADMIN — INSULIN GLARGINE 12 UNITS: 100 INJECTION, SOLUTION SUBCUTANEOUS at 21:43

## 2019-08-13 RX ADMIN — GABAPENTIN 100 MG: 100 CAPSULE ORAL at 21:43

## 2019-08-13 RX ADMIN — TAMSULOSIN HYDROCHLORIDE 0.4 MG: 0.4 CAPSULE ORAL at 10:09

## 2019-08-13 RX ADMIN — OXYCODONE HYDROCHLORIDE AND ACETAMINOPHEN 1 TABLET: 5; 325 TABLET ORAL at 05:32

## 2019-08-13 RX ADMIN — RIVAROXABAN 15 MG: 15 TABLET, FILM COATED ORAL at 10:09

## 2019-08-13 RX ADMIN — INSULIN LISPRO 1 UNITS: 100 INJECTION, SOLUTION INTRAVENOUS; SUBCUTANEOUS at 21:43

## 2019-08-13 ASSESSMENT — ENCOUNTER SYMPTOMS
ABDOMINAL PAIN: 0
SHORTNESS OF BREATH: 1
VOMITING: 0
BACK PAIN: 0
COLOR CHANGE: 0

## 2019-08-13 ASSESSMENT — PAIN DESCRIPTION - LOCATION
LOCATION: GENERALIZED

## 2019-08-13 ASSESSMENT — PAIN DESCRIPTION - PROGRESSION
CLINICAL_PROGRESSION: NOT CHANGED

## 2019-08-13 ASSESSMENT — PAIN SCALES - GENERAL
PAINLEVEL_OUTOF10: 9
PAINLEVEL_OUTOF10: 10
PAINLEVEL_OUTOF10: 10
PAINLEVEL_OUTOF10: 0
PAINLEVEL_OUTOF10: 9
PAINLEVEL_OUTOF10: 8
PAINLEVEL_OUTOF10: 8
PAINLEVEL_OUTOF10: 5

## 2019-08-13 ASSESSMENT — PAIN - FUNCTIONAL ASSESSMENT
PAIN_FUNCTIONAL_ASSESSMENT: ACTIVITIES ARE NOT PREVENTED

## 2019-08-13 ASSESSMENT — PAIN DESCRIPTION - ONSET
ONSET: ON-GOING

## 2019-08-13 ASSESSMENT — PAIN DESCRIPTION - PAIN TYPE
TYPE: CHRONIC PAIN

## 2019-08-13 ASSESSMENT — PAIN DESCRIPTION - ORIENTATION
ORIENTATION: OTHER (COMMENT)
ORIENTATION: OTHER (COMMENT)

## 2019-08-13 ASSESSMENT — PAIN DESCRIPTION - FREQUENCY
FREQUENCY: CONTINUOUS

## 2019-08-13 ASSESSMENT — PAIN DESCRIPTION - DESCRIPTORS
DESCRIPTORS: PATIENT UNABLE TO DESCRIBE
DESCRIPTORS: PATIENT UNABLE TO DESCRIBE
DESCRIPTORS: DISCOMFORT
DESCRIPTORS: DISCOMFORT
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
## 2019-08-13 NOTE — ED PROVIDER NOTES
CREATININE 0.7 (*)     Alb 2.9 (*)     Albumin/Globulin Ratio 0.7 (*)     ALT 9 (*)     All other components within normal limits    Narrative:     Performed at:  [facility]  [address] [place] [facility] [address]   Phone [phone]   POCT GLUCOSE - Abnormal; Notable for the following components:    POC Glucose 145 (*)     All other components within normal limits    Narrative:     Performed at:  [facility]  [address] [place] [facility] [address]   Phone [phone]   POCT GLUCOSE - Abnormal; Notable for the following components:    POC Glucose 131 (*)     All other components within normal limits    Narrative:     Performed at:  [facility]  [address] [place] [facility] [address]   Phone [phone]   POCT GLUCOSE - Abnormal; Notable for the following components:    POC Glucose 146 (*)     All other components within normal limits    Narrative:     Performed at:  [facility]  [address] [place] [employer] [address]   Phone [phone]   POCT GLUCOSE - Abnormal; Notable for the following components:    POC Glucose 116 (*)     All other components within normal limits    Narrative:     Performed at:  [facility]  [address] [place] [facility] [address]   Phone [phone]   POCT GLUCOSE - Abnormal; Notable for the following components:    POC Glucose 108 (*)     All other components within normal limits    Narrative:     Performed at:  [facility]  [address] [employer] [address]   Phone [phone]   POCT GLUCOSE - Abnormal; Notable for the following components:    POC Glucose 136 (*)     All other components within normal limits    Narrative:     Performed at:  [address] [facility] [address]   Phone [phone]

## 2019-08-13 NOTE — DISCHARGE INSTR - COC
Shortness of breath R06.02    Permanent atrial fibrillation ([facility]) I48.2    Chronic atrial fibrillation ([facility]) I48.2    Other specified injury of inferior mesenteric vein, initial encounter S35.348A    Postprocedural hypotension I95.81    Acute respiratory failure with hypercapnia ([facility]) J96.02    High anion gap metabolic acidosis A87.0    Centrilobular emphysema ([facility]) J43.2    Hypomagnesemia E83.42    Heart failure, acute on chronic, systolic and diastolic ([facility]) I89.09    Persistent atrial fibrillation ([facility]) I48.1    Anemia D64.9    DMII (diabetes mellitus, type 2) ([facility]) E11.9    Constipation K59.00    Hypokalemia E87.6    Acute on chronic systolic heart failure ([facility]) I50.23    Atrial fibrillation, rapid ([facility]) I48.91    COPD with acute exacerbation ([facility]) J44.1    Cocaine use F14.90    Severe sepsis ([facility]) A41.9, R65.20    Atrial fibrillation with RVR ([facility]) I48.91    Hyponatremia E87.1    Acute on chronic respiratory failure with hypoxia ([facility]) J96.21    Respiratory distress R06.03    CHF, left ventricular ([facility]) I50.9    Nicotine dependence F17.200    Suspected sleep apnea R29.818    Coronary artery disease involving native coronary artery of native heart without angina pectoris I25.10    CAD (coronary artery disease) I25.10    Acute renal failure (ARF) ([facility]) N17.9    Morbid obesity due to excess calories ([facility]) E66.01    Acute respiratory failure with hypoxia ([facility]) J96.01    Nocturnal hypoxia G47.34    Hypercapnemia R06.89    SOB (shortness of breath) R06.02    Acute on chronic respiratory failure with hypercapnia ([facility]) J96.22    Chronic respiratory failure with hypercapnia ([facility]) J96.12    Acute pulmonary edema ([facility]) J81.0    Acute on chronic systolic HF (heart failure) ([facility]) I50.23    Hx of AKA (above knee amputation), left ([facility]) Z89.612    Respiratory failure ([facility]) J96.90    HCAP (healthcare-associated pneumonia) J18.9    Ventricular tachycardia ([facility]) I47.2    Shock advance directives [phone].  Consider: having the facility MD complete required 7 day follow up and palliative care consult for ongoing goals of care, end of life, and/or chronic disease management discussions. Patient's personal belongings (please select all that are sent with patient):  None    RN SIGNATURE:  Electronically signed by Luis Salomon RN on 8/23/19 at 2:39 PM    CASE MANAGEMENT/SOCIAL WORK SECTION    Inpatient Status Date: 8/13/19    Readmission Risk Assessment Score:  Readmission Risk              Risk of Unplanned Readmission:        52           Discharging to Facility/ Agency   · Name: [facility]  · Address:  · Phone:  [phone]  · Fax:         / signature: Electronically signed by Paco Moeller on 8/23/2019 at 3:32 PM      PHYSICIAN SECTION    Prognosis: Fair    Condition at Discharge: Stable    Rehab Potential (if transferring to Rehab): Fair    Recommended Labs or Other Treatments After Discharge: pt/ot    Physician Certification: I certify the above information and transfer of Xiomy Sears  is necessary for the continuing treatment of the diagnosis listed and that he requires [facility] for less 30 days.      Update Admission H&P: No change in H&P    PHYSICIAN SIGNATURE:  Electronically signed by Anny Mohan DO on 8/23/19 at 4:59 PM

## 2019-08-14 PROBLEM — I50.82 BIVENTRICULAR HF (HEART FAILURE) (HCC): Status: ACTIVE | Noted: 2018-10-16

## 2019-08-14 LAB
ANION GAP SERPL CALCULATED.3IONS-SCNC: 10 MMOL/L (ref 3–16)
BUN BLDV-MCNC: 19 MG/DL (ref 7–20)
CALCIUM SERPL-MCNC: 8.4 MG/DL (ref 8.3–10.6)
CHLORIDE BLD-SCNC: 97 MMOL/L (ref 99–110)
CHOLESTEROL, TOTAL: 47 MG/DL (ref 0–199)
CO2: 32 MMOL/L (ref 21–32)
CREAT SERPL-MCNC: 0.8 MG/DL (ref 0.9–1.3)
GFR AFRICAN AMERICAN: >60
GFR NON-AFRICAN AMERICAN: >60
GLUCOSE BLD-MCNC: 108 MG/DL (ref 70–99)
GLUCOSE BLD-MCNC: 116 MG/DL (ref 70–99)
GLUCOSE BLD-MCNC: 136 MG/DL (ref 70–99)
GLUCOSE BLD-MCNC: 94 MG/DL (ref 70–99)
GLUCOSE BLD-MCNC: 99 MG/DL (ref 70–99)
HCT VFR BLD CALC: 23.5 % (ref 40.5–52.5)
HDLC SERPL-MCNC: 20 MG/DL (ref 40–60)
HEMOGLOBIN: 7.6 G/DL (ref 13.5–17.5)
LDL CHOLESTEROL CALCULATED: 16 MG/DL
MAGNESIUM: 1.7 MG/DL (ref 1.8–2.4)
PERFORMED ON: ABNORMAL
PERFORMED ON: NORMAL
POTASSIUM SERPL-SCNC: 3.4 MMOL/L (ref 3.5–5.1)
SODIUM BLD-SCNC: 139 MMOL/L (ref 136–145)
TRIGL SERPL-MCNC: 57 MG/DL (ref 0–150)
VLDLC SERPL CALC-MCNC: 11 MG/DL

## 2019-08-14 PROCEDURE — 80048 BASIC METABOLIC PNL TOTAL CA: CPT

## 2019-08-14 PROCEDURE — 6360000002 HC RX W HCPCS: Performed by: INTERNAL MEDICINE

## 2019-08-14 PROCEDURE — 83735 ASSAY OF MAGNESIUM: CPT

## 2019-08-14 PROCEDURE — 2700000000 HC OXYGEN THERAPY PER DAY

## 2019-08-14 PROCEDURE — 94640 AIRWAY INHALATION TREATMENT: CPT

## 2019-08-14 PROCEDURE — 97530 THERAPEUTIC ACTIVITIES: CPT | Performed by: PHYSICAL THERAPIST

## 2019-08-14 PROCEDURE — 36415 COLL VENOUS BLD VENIPUNCTURE: CPT

## 2019-08-14 PROCEDURE — 6360000002 HC RX W HCPCS: Performed by: NURSE PRACTITIONER

## 2019-08-14 PROCEDURE — 80061 LIPID PANEL: CPT

## 2019-08-14 PROCEDURE — 85014 HEMATOCRIT: CPT

## 2019-08-14 PROCEDURE — 99232 SBSQ HOSP IP/OBS MODERATE 35: CPT | Performed by: NURSE PRACTITIONER

## 2019-08-14 PROCEDURE — 6370000000 HC RX 637 (ALT 250 FOR IP): Performed by: INTERNAL MEDICINE

## 2019-08-14 PROCEDURE — 94760 N-INVAS EAR/PLS OXIMETRY 1: CPT

## 2019-08-14 PROCEDURE — 97530 THERAPEUTIC ACTIVITIES: CPT

## 2019-08-14 PROCEDURE — 2580000003 HC RX 258: Performed by: NURSE PRACTITIONER

## 2019-08-14 PROCEDURE — 2060000000 HC ICU INTERMEDIATE R&B

## 2019-08-14 PROCEDURE — 85018 HEMOGLOBIN: CPT

## 2019-08-14 PROCEDURE — 2580000003 HC RX 258: Performed by: INTERNAL MEDICINE

## 2019-08-14 RX ORDER — POTASSIUM CHLORIDE 1.5 G/1.77G
40 POWDER, FOR SOLUTION ORAL ONCE
Status: DISCONTINUED | OUTPATIENT
Start: 2019-08-14 | End: 2019-08-14 | Stop reason: SDUPTHER

## 2019-08-14 RX ORDER — POTASSIUM CHLORIDE 750 MG/1
40 TABLET, FILM COATED, EXTENDED RELEASE ORAL ONCE
Status: COMPLETED | OUTPATIENT
Start: 2019-08-14 | End: 2019-08-14

## 2019-08-14 RX ORDER — MAGNESIUM SULFATE IN WATER 40 MG/ML
2 INJECTION, SOLUTION INTRAVENOUS ONCE
Status: COMPLETED | OUTPATIENT
Start: 2019-08-14 | End: 2019-08-14

## 2019-08-14 RX ADMIN — GABAPENTIN 100 MG: 100 CAPSULE ORAL at 10:52

## 2019-08-14 RX ADMIN — FUROSEMIDE 10 MG/HR: 10 INJECTION, SOLUTION INTRAMUSCULAR; INTRAVENOUS at 16:18

## 2019-08-14 RX ADMIN — IRON SUCROSE 100 MG: 20 INJECTION, SOLUTION INTRAVENOUS at 10:53

## 2019-08-14 RX ADMIN — OXYCODONE HYDROCHLORIDE AND ACETAMINOPHEN 1 TABLET: 5; 325 TABLET ORAL at 00:46

## 2019-08-14 RX ADMIN — METOPROLOL SUCCINATE 50 MG: 50 TABLET, EXTENDED RELEASE ORAL at 10:52

## 2019-08-14 RX ADMIN — OXYCODONE HYDROCHLORIDE AND ACETAMINOPHEN 1 TABLET: 5; 325 TABLET ORAL at 06:48

## 2019-08-14 RX ADMIN — POTASSIUM CHLORIDE 40 MEQ: 750 TABLET, EXTENDED RELEASE ORAL at 11:18

## 2019-08-14 RX ADMIN — ATORVASTATIN CALCIUM 40 MG: 40 TABLET, FILM COATED ORAL at 10:51

## 2019-08-14 RX ADMIN — IPRATROPIUM BROMIDE AND ALBUTEROL SULFATE 1 AMPULE: .5; 3 SOLUTION RESPIRATORY (INHALATION) at 20:16

## 2019-08-14 RX ADMIN — LISINOPRIL 2.5 MG: 5 TABLET ORAL at 10:52

## 2019-08-14 RX ADMIN — Medication 1 CAPSULE: at 10:51

## 2019-08-14 RX ADMIN — IPRATROPIUM BROMIDE AND ALBUTEROL SULFATE 1 AMPULE: .5; 3 SOLUTION RESPIRATORY (INHALATION) at 08:55

## 2019-08-14 RX ADMIN — Medication 10 ML: at 11:07

## 2019-08-14 RX ADMIN — PANTOPRAZOLE SODIUM 40 MG: 40 TABLET, DELAYED RELEASE ORAL at 05:07

## 2019-08-14 RX ADMIN — AMIODARONE HYDROCHLORIDE 200 MG: 200 TABLET ORAL at 10:52

## 2019-08-14 RX ADMIN — IPRATROPIUM BROMIDE AND ALBUTEROL SULFATE 1 AMPULE: .5; 3 SOLUTION RESPIRATORY (INHALATION) at 01:00

## 2019-08-14 RX ADMIN — TAMSULOSIN HYDROCHLORIDE 0.4 MG: 0.4 CAPSULE ORAL at 10:52

## 2019-08-14 RX ADMIN — Medication 2 PUFF: at 20:15

## 2019-08-14 RX ADMIN — GABAPENTIN 100 MG: 100 CAPSULE ORAL at 13:16

## 2019-08-14 RX ADMIN — OXYCODONE HYDROCHLORIDE AND ACETAMINOPHEN 1 TABLET: 5; 325 TABLET ORAL at 13:16

## 2019-08-14 RX ADMIN — Medication 400 MG: at 10:52

## 2019-08-14 RX ADMIN — IPRATROPIUM BROMIDE AND ALBUTEROL SULFATE 1 AMPULE: .5; 3 SOLUTION RESPIRATORY (INHALATION) at 16:16

## 2019-08-14 RX ADMIN — MAGNESIUM SULFATE HEPTAHYDRATE 2 G: 40 INJECTION, SOLUTION INTRAVENOUS at 10:53

## 2019-08-14 RX ADMIN — INSULIN GLARGINE 12 UNITS: 100 INJECTION, SOLUTION SUBCUTANEOUS at 22:08

## 2019-08-14 RX ADMIN — TRAZODONE HYDROCHLORIDE 50 MG: 50 TABLET ORAL at 22:07

## 2019-08-14 RX ADMIN — FUROSEMIDE 80 MG: 10 INJECTION, SOLUTION INTRAMUSCULAR; INTRAVENOUS at 10:52

## 2019-08-14 RX ADMIN — GABAPENTIN 100 MG: 100 CAPSULE ORAL at 22:08

## 2019-08-14 RX ADMIN — Medication 2 PUFF: at 09:37

## 2019-08-14 RX ADMIN — OXYCODONE HYDROCHLORIDE AND ACETAMINOPHEN 1 TABLET: 5; 325 TABLET ORAL at 19:07

## 2019-08-14 RX ADMIN — Medication 1 CAPSULE: at 22:07

## 2019-08-14 ASSESSMENT — PAIN DESCRIPTION - DESCRIPTORS
DESCRIPTORS: DISCOMFORT

## 2019-08-14 ASSESSMENT — PAIN DESCRIPTION - ORIENTATION
ORIENTATION: OTHER (COMMENT)

## 2019-08-14 ASSESSMENT — PAIN DESCRIPTION - LOCATION
LOCATION: GENERALIZED

## 2019-08-14 ASSESSMENT — PAIN DESCRIPTION - ONSET
ONSET: ON-GOING

## 2019-08-14 ASSESSMENT — PAIN DESCRIPTION - PROGRESSION
CLINICAL_PROGRESSION: NOT CHANGED
CLINICAL_PROGRESSION: NOT CHANGED
CLINICAL_PROGRESSION: GRADUALLY IMPROVING
CLINICAL_PROGRESSION: NOT CHANGED

## 2019-08-14 ASSESSMENT — PAIN SCALES - WONG BAKER: WONGBAKER_NUMERICALRESPONSE: 0

## 2019-08-14 ASSESSMENT — PAIN DESCRIPTION - FREQUENCY
FREQUENCY: CONTINUOUS

## 2019-08-14 ASSESSMENT — PAIN SCALES - GENERAL
PAINLEVEL_OUTOF10: 8
PAINLEVEL_OUTOF10: 8
PAINLEVEL_OUTOF10: 7
PAINLEVEL_OUTOF10: 8
PAINLEVEL_OUTOF10: 0
PAINLEVEL_OUTOF10: 10

## 2019-08-14 ASSESSMENT — PAIN DESCRIPTION - PAIN TYPE
TYPE: CHRONIC PAIN

## 2019-08-14 ASSESSMENT — PAIN - FUNCTIONAL ASSESSMENT
PAIN_FUNCTIONAL_ASSESSMENT: ACTIVITIES ARE NOT PREVENTED

## 2019-08-14 NOTE — PROGRESS NOTES
Hospitalist Progress Note      PCP: Nubia Contreras MD    Date of Admission: 8/13/2019      Subjective: feels ok, no chest pain or SOB at rest, no fever or chills. Medications:  Reviewed    Infusion Medications    dextrose       Scheduled Medications    potassium chloride  40 mEq Oral Once    magnesium sulfate  2 g Intravenous Once    iron sucrose  100 mg Intravenous Daily    amiodarone  200 mg Oral Daily    atorvastatin  40 mg Oral Daily    gabapentin  100 mg Oral TID    insulin glargine  12 Units Subcutaneous Nightly    lactobacillus  1 capsule Oral BID    magnesium oxide  400 mg Oral Daily    metoprolol succinate  50 mg Oral Daily    mometasone-formoterol  2 puff Inhalation BID    pantoprazole  40 mg Oral QAM AC    polyethylene glycol  17 g Oral Daily    tamsulosin  0.4 mg Oral Daily    traZODone  50 mg Oral Nightly    sodium chloride flush  10 mL Intravenous 2 times per day    lisinopril  2.5 mg Oral Daily    insulin lispro  0-6 Units Subcutaneous TID WC    insulin lispro  0-3 Units Subcutaneous Nightly    sodium chloride  250 mL Intravenous Once    [START ON 8/15/2019] rivaroxaban  15 mg Oral Daily    furosemide  80 mg Intravenous BID     PRN Meds: albuterol sulfate HFA, cyclobenzaprine, oxyCODONE-acetaminophen, sodium chloride flush, magnesium hydroxide, ondansetron, glucose, dextrose, glucagon (rDNA), dextrose, ipratropium-albuterol      Intake/Output Summary (Last 24 hours) at 8/14/2019 0900  Last data filed at 8/14/2019 8411  Gross per 24 hour   Intake 2808.5 ml   Output 1600 ml   Net 1208.5 ml       Physical Exam Performed:    BP 92/62   Pulse 78   Temp 97.6 °F (36.4 °C) (Oral)   Resp 20   Ht 5' 7\" (1.702 m)   Wt 230 lb 13.2 oz (104.7 kg)   SpO2 98%   BMI 36.15 kg/m²     General appearance: No apparent distress  Neck: Supple  Respiratory:  Improved breath sounds   Cardiovascular: Regular rate and rhythm with normal S1/S2 without murmurs, rubs or gallops.   Abdomen: anemia, transfused, will add iv venofer, will consult GI for recommendations, patient on [address] 4.  Ventricular tachycardia, amiodarone, cardiology consulted. 5.  Diabetes mellitus, sliding scale  6. Essential hypertension, resume p.o. Medication  7. Atrial fibrillation, on Xarelto  8. Hypomagnesemia, will replace with iv supplements. DVT Prophylaxis: xarelto   Diet: DIET CARB CONTROL; Low Sodium (2 GM);  Daily Fluid Restriction: 1500 ml  Code Status: Full Code        Dispo - 2-3 days    Brian Paulino MD

## 2019-08-14 NOTE — PROGRESS NOTES
Intravenous Daily    amiodarone  200 mg Oral Daily    atorvastatin  40 mg Oral Daily    gabapentin  100 mg Oral TID    insulin glargine  12 Units Subcutaneous Nightly    lactobacillus  1 capsule Oral BID    magnesium oxide  400 mg Oral Daily    metoprolol succinate  50 mg Oral Daily    mometasone-formoterol  2 puff Inhalation BID    pantoprazole  40 mg Oral QAM AC    polyethylene glycol  17 g Oral Daily    tamsulosin  0.4 mg Oral Daily    traZODone  50 mg Oral Nightly    sodium chloride flush  10 mL Intravenous 2 times per day    lisinopril  2.5 mg Oral Daily    insulin lispro  0-6 Units Subcutaneous TID WC    insulin lispro  0-3 Units Subcutaneous Nightly    sodium chloride  250 mL Intravenous Once    [START ON 8/15/2019] rivaroxaban  15 mg Oral Daily    furosemide  80 mg Intravenous BID      dextrose       albuterol sulfate HFA, cyclobenzaprine, oxyCODONE-acetaminophen, sodium chloride flush, magnesium hydroxide, ondansetron, glucose, dextrose, glucagon (rDNA), dextrose, ipratropium-albuterol    Lab Data:  CBC:   Recent Labs     08/13/19  0356 08/13/19  1315 08/14/19  0523   WBC 6.5  --   --    HGB 6.8* 6.6* 7.6*     --   --      BMP:    Recent Labs     08/13/19  0356 08/14/19  0523    139   K 3.4* 3.4*   CO2 33* 32   BUN 21* 19   CREATININE 0.9 0.8*     LIVR:   Recent Labs     08/13/19  0356   AST 18   ALT 11     Results for Renate Larios (MRN [id number]) as of 8/14/2019 14:00   Ref.  Range 8/13/2019 03:56 8/14/2019 05:23   Pro-BNP Latest Ref Range: 0 - 124 pg/mL 3,642 (H)    Cholesterol, Total Latest Ref Range: 0 - 199 mg/dL  47   HDL Cholesterol Latest Ref Range: 40 - 60 mg/dL  20 (L)   LDL Calculated Latest Ref Range: <100 mg/dL  16   Triglycerides Latest Ref Range: 0 - 150 mg/dL  57   VLDL Cholesterol Calculated Latest Ref Range: Not Established mg/dL  11   Troponin Latest Ref Range: <0.01 ng/mL 0.02 (H)      CXR 8/13/2019:  Mild pulmonary vascular congestion with small

## 2019-08-14 NOTE — PROGRESS NOTES
per week: 3-5  Current Treatment Recommendations: Functional Mobility Training  Safety Devices  Type of devices: All fall risk precautions in place, Call light within reach, Left in chair, Chair alarm in place, Nurse notified     Therapy Time   Individual Concurrent Group Co-treatment   Time In 0945         Time Out 8392         Minutes 50              If patient is discharged prior to the next physical therapy visit;  Please see last written PT note for discharge status.     Arti Townsend, PT, [id number]   ARTI TOWNSEND, PT

## 2019-08-14 NOTE — PROGRESS NOTES
extremity edema, increased abdominal girth increased shortness of breath worsening with any kind of ambulation, associated with dry cough, no fever or chills, associated also with increased weight, presented to emergency department found to have worsening anemia, pulmonary edema, patient being admitted for further management and treatment\"  Family / Caregiver Present: No  Referring Practitioner: Wiliam Guevara MD  Diagnosis: Anemia, Heart failure  Subjective  Subjective: Pt met Bs. Pt in bed and agreeable to OT/PT co-tx. \"I want to get up in the chair\". Pt reporting pain, \"all over\", no pain number given. General Comment  Comments: RN cleared pt for therapy      Orientation  Orientation  Overall Orientation Status: Within Functional Limits  Objective    ADL  Toileting: Dependent/Total(in stance on gerda stedy with assist x2 for stance, for PCA to clean posterior (BM smears))        Balance  Sitting Balance: Contact guard assistance(on EOB, with UE support)  Standing Balance  Activity: brief stance (partial stance, as pt leaning onto stedy bar) on gerda stedy for hygiene, Max A x2  Comment: sit><stand with Max A x2  Wheelchair Bed Transfers  Equipment Used: Bed(to recliner)  Level of Asssistance: Dependent/Total  Wheelchair Transfers Comments: via gerda stedy lift. Max A x2 for sit><stands  Bed mobility  Supine to Sit: Moderate assistance(HOB x1; HOB elevated and pt pulling on therapist)   UE ex's- pt encouraged to complete AROM ex's -ale to hands, to decrease edema. Cognition  Overall Cognitive Status: Exceptions  Safety Judgement: Decreased awareness of need for assistance;Decreased awareness of need for safety  Cognition Comment: decreased insight; self-limiting. Needs encouragement to complete tasks. Second session: returned to room, pt in recliner, requesting to return to bed, \"I want to stay up in the chair, but I think my bowels are going to move\".  PCA present and assisted with pt back to bed via Max move lift. Bed mobility-rolling to both side for lift pad removal, with Max A x1-2. Pt repositioned up in bed. Pt declined need for having BM. Pt remained in bed, alarm on and needs in reach. Cont with poc.               Plan   Plan  Times per week: 3-5  Times per day: Daily  Current Treatment Recommendations: Strengthening, Functional Mobility Training, Endurance Training, ROM, Balance Training, Self-Care / ADL, Safety Education & Training    AM-PAC Score        AM-PAC Inpatient Daily Activity Raw Score: 12 (08/14/19 1036)  AM-PAC Inpatient ADL T-Scale Score : 30.6 (08/14/19 1036)  ADL Inpatient CMS 0-100% Score: 66.57 (08/14/19 1036)  ADL Inpatient CMS G-Code Modifier : CL (08/14/19 1036)    Goals  Short term goals  Time Frame for Short term goals: Status:  goals ongoing  Short term goal 1: Pt will perform bed mobility with min Ax2  Short term goal 2: Pt will groom with setup  Short term goal 3: Pt will perform functional transfer to/from ADL surfaces with min Ax2  Short term goal 4: Pt will tolerate 10 reps therex to increase strength and endurance  Short term goal 5: Pt will increase strength and endurance to achieve the above goals  Long term goals  Time Frame for Long term goals : LTG=STG  Patient Goals   Patient goals : None stated       Therapy Time   Individual   Time In 0945   Time Out 1025   Minutes 40       Therapy Time     Individual   Time In 1131   Time Out 1140   Minutes 9           Liya DALLAS/L,515  This note to serve as discharge summary if pt d/c'd prior to next session

## 2019-08-15 LAB
A/G RATIO: 0.7 (ref 1.1–2.2)
ALBUMIN SERPL-MCNC: 2.9 G/DL (ref 3.4–5)
ALP BLD-CCNC: 83 U/L (ref 40–129)
ALT SERPL-CCNC: 9 U/L (ref 10–40)
ANION GAP SERPL CALCULATED.3IONS-SCNC: 9 MMOL/L (ref 3–16)
AST SERPL-CCNC: 16 U/L (ref 15–37)
BASOPHILS ABSOLUTE: 0.1 K/UL (ref 0–0.2)
BASOPHILS RELATIVE PERCENT: 1 %
BILIRUB SERPL-MCNC: 0.4 MG/DL (ref 0–1)
BUN BLDV-MCNC: 14 MG/DL (ref 7–20)
CALCIUM SERPL-MCNC: 8.3 MG/DL (ref 8.3–10.6)
CHLORIDE BLD-SCNC: 94 MMOL/L (ref 99–110)
CO2: 37 MMOL/L (ref 21–32)
CREAT SERPL-MCNC: 0.7 MG/DL (ref 0.9–1.3)
EOSINOPHILS ABSOLUTE: 0.2 K/UL (ref 0–0.6)
EOSINOPHILS RELATIVE PERCENT: 2.7 %
GFR AFRICAN AMERICAN: >60
GFR NON-AFRICAN AMERICAN: >60
GLOBULIN: 4.2 G/DL
GLUCOSE BLD-MCNC: 100 MG/DL (ref 70–99)
GLUCOSE BLD-MCNC: 107 MG/DL (ref 70–99)
GLUCOSE BLD-MCNC: 113 MG/DL (ref 70–99)
GLUCOSE BLD-MCNC: 128 MG/DL (ref 70–99)
GLUCOSE BLD-MCNC: 135 MG/DL (ref 70–99)
HCT VFR BLD CALC: 25.2 % (ref 40.5–52.5)
HEMOGLOBIN: 7.9 G/DL (ref 13.5–17.5)
LYMPHOCYTES ABSOLUTE: 1 K/UL (ref 1–5.1)
LYMPHOCYTES RELATIVE PERCENT: 15.9 %
MAGNESIUM: 1.7 MG/DL (ref 1.8–2.4)
MCH RBC QN AUTO: 25.2 PG (ref 26–34)
MCHC RBC AUTO-ENTMCNC: 31.2 G/DL (ref 31–36)
MCV RBC AUTO: 80.6 FL (ref 80–100)
MONOCYTES ABSOLUTE: 0.9 K/UL (ref 0–1.3)
MONOCYTES RELATIVE PERCENT: 14.1 %
NEUTROPHILS ABSOLUTE: 4.4 K/UL (ref 1.7–7.7)
NEUTROPHILS RELATIVE PERCENT: 66.3 %
PDW BLD-RTO: 18.6 % (ref 12.4–15.4)
PERFORMED ON: ABNORMAL
PLATELET # BLD: 220 K/UL (ref 135–450)
PMV BLD AUTO: 8.5 FL (ref 5–10.5)
POTASSIUM SERPL-SCNC: 3.2 MMOL/L (ref 3.5–5.1)
PRO-BNP: 2591 PG/ML (ref 0–124)
RBC # BLD: 3.12 M/UL (ref 4.2–5.9)
SODIUM BLD-SCNC: 140 MMOL/L (ref 136–145)
TOTAL PROTEIN: 7.1 G/DL (ref 6.4–8.2)
WBC # BLD: 6.6 K/UL (ref 4–11)

## 2019-08-15 PROCEDURE — 6370000000 HC RX 637 (ALT 250 FOR IP): Performed by: NURSE PRACTITIONER

## 2019-08-15 PROCEDURE — 6360000002 HC RX W HCPCS: Performed by: INTERNAL MEDICINE

## 2019-08-15 PROCEDURE — 6370000000 HC RX 637 (ALT 250 FOR IP): Performed by: INTERNAL MEDICINE

## 2019-08-15 PROCEDURE — 36415 COLL VENOUS BLD VENIPUNCTURE: CPT

## 2019-08-15 PROCEDURE — 2580000003 HC RX 258: Performed by: INTERNAL MEDICINE

## 2019-08-15 PROCEDURE — 97530 THERAPEUTIC ACTIVITIES: CPT

## 2019-08-15 PROCEDURE — 83880 ASSAY OF NATRIURETIC PEPTIDE: CPT

## 2019-08-15 PROCEDURE — 99232 SBSQ HOSP IP/OBS MODERATE 35: CPT | Performed by: NURSE PRACTITIONER

## 2019-08-15 PROCEDURE — 2700000000 HC OXYGEN THERAPY PER DAY

## 2019-08-15 PROCEDURE — 6360000002 HC RX W HCPCS: Performed by: NURSE PRACTITIONER

## 2019-08-15 PROCEDURE — 94640 AIRWAY INHALATION TREATMENT: CPT

## 2019-08-15 PROCEDURE — 94761 N-INVAS EAR/PLS OXIMETRY MLT: CPT

## 2019-08-15 PROCEDURE — 85025 COMPLETE CBC W/AUTO DIFF WBC: CPT

## 2019-08-15 PROCEDURE — 2060000000 HC ICU INTERMEDIATE R&B

## 2019-08-15 PROCEDURE — 83735 ASSAY OF MAGNESIUM: CPT

## 2019-08-15 PROCEDURE — 80053 COMPREHEN METABOLIC PANEL: CPT

## 2019-08-15 PROCEDURE — 2580000003 HC RX 258: Performed by: NURSE PRACTITIONER

## 2019-08-15 RX ORDER — METOLAZONE 5 MG/1
5 TABLET ORAL ONCE
Status: COMPLETED | OUTPATIENT
Start: 2019-08-15 | End: 2019-08-15

## 2019-08-15 RX ORDER — POTASSIUM CHLORIDE 20 MEQ/1
40 TABLET, EXTENDED RELEASE ORAL 2 TIMES DAILY WITH MEALS
Status: DISCONTINUED | OUTPATIENT
Start: 2019-08-15 | End: 2019-08-23 | Stop reason: HOSPADM

## 2019-08-15 RX ORDER — MAGNESIUM SULFATE IN WATER 40 MG/ML
2 INJECTION, SOLUTION INTRAVENOUS ONCE
Status: COMPLETED | OUTPATIENT
Start: 2019-08-15 | End: 2019-08-15

## 2019-08-15 RX ADMIN — GABAPENTIN 100 MG: 100 CAPSULE ORAL at 21:10

## 2019-08-15 RX ADMIN — IPRATROPIUM BROMIDE AND ALBUTEROL SULFATE 1 AMPULE: .5; 3 SOLUTION RESPIRATORY (INHALATION) at 09:10

## 2019-08-15 RX ADMIN — RIVAROXABAN 15 MG: 15 TABLET, FILM COATED ORAL at 09:22

## 2019-08-15 RX ADMIN — Medication 1 CAPSULE: at 09:22

## 2019-08-15 RX ADMIN — OXYCODONE HYDROCHLORIDE AND ACETAMINOPHEN 1 TABLET: 5; 325 TABLET ORAL at 22:45

## 2019-08-15 RX ADMIN — FUROSEMIDE 10 MG/HR: 10 INJECTION, SOLUTION INTRAMUSCULAR; INTRAVENOUS at 13:00

## 2019-08-15 RX ADMIN — MAGNESIUM SULFATE HEPTAHYDRATE 2 G: 40 INJECTION, SOLUTION INTRAVENOUS at 09:22

## 2019-08-15 RX ADMIN — POTASSIUM CHLORIDE 40 MEQ: 20 TABLET, EXTENDED RELEASE ORAL at 16:18

## 2019-08-15 RX ADMIN — OXYCODONE HYDROCHLORIDE AND ACETAMINOPHEN 1 TABLET: 5; 325 TABLET ORAL at 09:22

## 2019-08-15 RX ADMIN — Medication 1 CAPSULE: at 21:10

## 2019-08-15 RX ADMIN — TRAZODONE HYDROCHLORIDE 50 MG: 50 TABLET ORAL at 21:10

## 2019-08-15 RX ADMIN — TAMSULOSIN HYDROCHLORIDE 0.4 MG: 0.4 CAPSULE ORAL at 09:22

## 2019-08-15 RX ADMIN — OXYCODONE HYDROCHLORIDE AND ACETAMINOPHEN 1 TABLET: 5; 325 TABLET ORAL at 16:18

## 2019-08-15 RX ADMIN — METOLAZONE 5 MG: 5 TABLET ORAL at 16:29

## 2019-08-15 RX ADMIN — Medication 10 ML: at 09:21

## 2019-08-15 RX ADMIN — LISINOPRIL 2.5 MG: 5 TABLET ORAL at 09:22

## 2019-08-15 RX ADMIN — AMIODARONE HYDROCHLORIDE 200 MG: 200 TABLET ORAL at 09:22

## 2019-08-15 RX ADMIN — ATORVASTATIN CALCIUM 40 MG: 40 TABLET, FILM COATED ORAL at 09:22

## 2019-08-15 RX ADMIN — POTASSIUM CHLORIDE 40 MEQ: 20 TABLET, EXTENDED RELEASE ORAL at 09:22

## 2019-08-15 RX ADMIN — IPRATROPIUM BROMIDE AND ALBUTEROL SULFATE 1 AMPULE: .5; 3 SOLUTION RESPIRATORY (INHALATION) at 20:02

## 2019-08-15 RX ADMIN — GABAPENTIN 100 MG: 100 CAPSULE ORAL at 09:22

## 2019-08-15 RX ADMIN — Medication 400 MG: at 09:22

## 2019-08-15 RX ADMIN — INSULIN GLARGINE 12 UNITS: 100 INJECTION, SOLUTION SUBCUTANEOUS at 21:10

## 2019-08-15 RX ADMIN — PANTOPRAZOLE SODIUM 40 MG: 40 TABLET, DELAYED RELEASE ORAL at 06:20

## 2019-08-15 RX ADMIN — IRON SUCROSE 100 MG: 20 INJECTION, SOLUTION INTRAVENOUS at 09:23

## 2019-08-15 RX ADMIN — FUROSEMIDE 10 MG/HR: 10 INJECTION, SOLUTION INTRAMUSCULAR; INTRAVENOUS at 01:18

## 2019-08-15 RX ADMIN — METOPROLOL SUCCINATE 50 MG: 50 TABLET, EXTENDED RELEASE ORAL at 09:22

## 2019-08-15 RX ADMIN — GABAPENTIN 100 MG: 100 CAPSULE ORAL at 13:34

## 2019-08-15 ASSESSMENT — PAIN DESCRIPTION - PROGRESSION
CLINICAL_PROGRESSION: NOT CHANGED

## 2019-08-15 ASSESSMENT — PAIN SCALES - GENERAL
PAINLEVEL_OUTOF10: 10
PAINLEVEL_OUTOF10: 10
PAINLEVEL_OUTOF10: 8
PAINLEVEL_OUTOF10: 10
PAINLEVEL_OUTOF10: 0

## 2019-08-15 ASSESSMENT — PAIN - FUNCTIONAL ASSESSMENT
PAIN_FUNCTIONAL_ASSESSMENT: ACTIVITIES ARE NOT PREVENTED
PAIN_FUNCTIONAL_ASSESSMENT: PREVENTS OR INTERFERES SOME ACTIVE ACTIVITIES AND ADLS
PAIN_FUNCTIONAL_ASSESSMENT: PREVENTS OR INTERFERES SOME ACTIVE ACTIVITIES AND ADLS
PAIN_FUNCTIONAL_ASSESSMENT: ACTIVITIES ARE NOT PREVENTED

## 2019-08-15 ASSESSMENT — PAIN DESCRIPTION - DESCRIPTORS
DESCRIPTORS: DISCOMFORT
DESCRIPTORS: ACHING;DISCOMFORT
DESCRIPTORS: ACHING
DESCRIPTORS: ACHING

## 2019-08-15 ASSESSMENT — PAIN DESCRIPTION - FREQUENCY
FREQUENCY: CONTINUOUS

## 2019-08-15 ASSESSMENT — PAIN DESCRIPTION - PAIN TYPE
TYPE: CHRONIC PAIN

## 2019-08-15 ASSESSMENT — PAIN DESCRIPTION - LOCATION
LOCATION: GENERALIZED
LOCATION: GENERALIZED
LOCATION: GENERALIZED;BACK
LOCATION: BACK

## 2019-08-15 ASSESSMENT — PAIN DESCRIPTION - ORIENTATION
ORIENTATION: OTHER (COMMENT)
ORIENTATION: OTHER (COMMENT)

## 2019-08-15 ASSESSMENT — PAIN DESCRIPTION - ONSET
ONSET: ON-GOING

## 2019-08-15 NOTE — PROGRESS NOTES
I&O, telemetry monitoring, patient had a history of ventricular tachycardia and cardiac arrest at some point during last admission, will monitor clinically, his Hg dropped so we transfused one unit, transfusing iron, continue to monitor. 2.  Hypokalemia, we will replace again this am  3. Iron dif anemia, transfused, added iv venofer, consulted GI, recommendations noted, patient on [address]. No immediate need for interventions or procedures. 4.  Ventricular tachycardia, amiodarone, cardiology consulted. 5.  Diabetes mellitus, sliding scale  6.  Essential hypertension, resume p.o. Medication  7.  Atrial fibrillation, on Xarelto  8. Hypomagnesemia, will replace with iv supplements      Diet: DIET CARB CONTROL; Low Sodium (2 GM);  Daily Fluid Restriction: 1500 ml  Code Status: Full Code        Dispo - ongoing management     Dorothy Winters MD

## 2019-08-15 NOTE — PROGRESS NOTES
[facility] [address]   Daily Progress Note      Admit Date:  8/13/2019    Reason for follow up visit: CHF; SOB    CC: \" I don't feel much better. \"    60 y/o male well known to us with PMH significant for multiple hospital stays, NICM, CHF, COPD, PAD, CKD, diabetes mellitus, untreated sleep apnea and H/O of ETOH abuse who was admitted from the nursing home with recurrent SOB and CHF. Also noted to be rather anemia with Hgb < 7. Seen by GI and will continue with conservative management (deemed not to be good candidate for endoscopy. Interval History:  Pt. seen and examined; records reviewed  BP stable. Now on lasix infusion  Wt today 230#. Net diuresis -1.0 L  Remains SOB. Denies chest pain  K+ 3.2  Magnesium 1.70  Pro BNP today 2591  Hgb 7.9 (receiving venofer)    Subjective:  Pt with no acute overnight cardiac events. Denies chest pain, palpitations or dizziness  + SOB without much improvement per pt    Review of Systems:   · Constitutional: no unanticipated weight loss. There's been no change in energy level, sleep pattern, or activity level. No fevers, chills. · Eyes: No visual changes or diplopia. No scleral icterus. · ENT: No Headaches, hearing loss or vertigo. No mouth sores or sore throat. · Cardiovascular: as reviewed in HPI + generalized edema  · Respiratory: No cough or wheezing, no sputum production. No hematemesis.  + SOB  · Gastrointestinal: No abdominal pain, appetite loss, blood in stools. No change in bowel or bladder habits. · Genitourinary: No dysuria, trouble voiding, or hematuria. · Musculoskeletal: + does not walk; wheelchair or bed confined  · Integumentary: No rash or pruritis. · Neurological: No headache, diplopia, change in muscle strength, numbness or tingling. · Psychiatric: No anxiety or depression. · Endocrine: No temperature intolerance. No excessive thirst, fluid intake, or urination. No tremor.   · Hematologic/Lymphatic: No abnormal bruising or bleeding, chloride  250 mL Intravenous Once    rivaroxaban  15 mg Oral Daily      furosemide (LASIX) 1mg/ml infusion 10 mg/hr (08/15/19 0118)    dextrose       albuterol sulfate HFA, cyclobenzaprine, oxyCODONE-acetaminophen, sodium chloride flush, magnesium hydroxide, ondansetron, glucose, dextrose, glucagon (rDNA), dextrose, ipratropium-albuterol    Lab Data:  CBC:   Recent Labs     08/13/19 0356 08/13/19  1315 08/14/19  0523 08/15/19  0546   WBC 6.5  --   --  6.6   HGB 6.8* 6.6* 7.6* 7.9*     --   --  220     BMP:    Recent Labs     08/13/19 0356 08/14/19  0523 08/15/19  0546    139 140   K 3.4* 3.4* 3.2*   CO2 33* 32 37*   BUN 21* 19 14   CREATININE 0.9 0.8* 0.7*     LIVR:   Recent Labs     08/13/19  0356 08/15/19  0546   AST 18 16   ALT 11 9*     Results for Griffin Vera (MRN [id number]) as of 8/15/2019 14:10   Ref. Range 8/13/2019 03:56 8/14/2019 05:23 8/15/2019 05:46   Pro-BNP Latest Ref Range: 0 - 124 pg/mL 3,642 (H)  2,591 (H)   Cholesterol, Total Latest Ref Range: 0 - 199 mg/dL  47    HDL Cholesterol Latest Ref Range: 40 - 60 mg/dL  20 (L)    LDL Calculated Latest Ref Range: <100 mg/dL  16    Triglycerides Latest Ref Range: 0 - 150 mg/dL  57    VLDL Cholesterol Calculated Latest Ref Range: Not Established mg/dL  11    Troponin Latest Ref Range: <0.01 ng/mL 0.02 (H)       Results for Griffin Vera (MRN [id number]) as of 8/15/2019 14:10   Ref. Range 8/13/2019 03:56 8/13/2019 13:15 8/14/2019 05:23 8/15/2019 05:46   Hemoglobin Quant Latest Ref Range: 13.5 - 17.5 g/dL 6.8 (LL) 6.6 (LL) 7.6 (L) 7.9 (L)     Magnesium 1.70    CXR 8/13/2019:  Mild pulmonary vascular congestion with small right pleural effusion.     ECG: Atrial fibrillation with premature ventricular or aberrantly conducted complexesRight axis deviationNonspecific T wave abnormalityLow voltage QRSSeptal infarct seen      Echo 6/22/2019:  Normal left ventricular size and wall thickness.   Severe left ventricular dysfunction with wants everything done.     Electronically signed by JOON Valadez CNP on 8/15/2019 at 9:07 AM

## 2019-08-15 NOTE — PROGRESS NOTES
113/76 (!) 93/57 118/71   Pulse:  82 81 79   Resp:  18 16 16   Temp:  97.4 °F (36.3 °C) 97.7 °F (36.5 °C) 97.4 °F (36.3 °C)   TempSrc:   Oral Oral   SpO2: 95% 97% 94% 96%   Weight:    230 lb 9.6 oz (104.6 kg)   Height:           I/O last 3 completed shifts: In: 7247 [P.O.:1560; I.V.:57; IV Piggyback:50]  Out: 2150 [Urine:2150]    Physical Exam:  General appearance: alert, cooperative, no distress, appears stated age  Eyes: Anicteric  Head: Normocephalic, without obvious abnormality  Lungs: decreased BS. On 3L O2  Heart: regular rate and rhythm, normal S1 and S2, no murmurs or rubs  Abdomen: soft, obese, non-tender. Bowel sounds normal. No massesExtremities: 2+ LE edema, Lf AKA  Skin: warm and dry, no jaundice  Neuro: Grossly intact, A&OX3    LABS AND IMAGING   Laboratory   Recent Labs     08/13/19  0356 08/13/19  1315 08/14/19  0523 08/15/19  0546   WBC 6.5  --   --  6.6   HGB 6.8* 6.6* 7.6* 7.9*   HCT 21.5* 21.4* 23.5* 25.2*   MCV 79.0*  --   --  80.6     --   --  220     Recent Labs     08/13/19  0356 08/14/19  0523 08/15/19  0546    139 140   K 3.4* 3.4* 3.2*   CL 95* 97* 94*   CO2 33* 32 37*   BUN 21* 19 14   CREATININE 0.9 0.8* 0.7*     Recent Labs     08/13/19  0356 08/15/19  0546   AST 18 16   ALT 11 9*   BILITOT 0.3 0.4   ALKPHOS 90 83     No results for input(s): LIPASE, AMYLASE in the last 72 hours. No results for input(s): PROTIME, INR in the last 72 hours. Imaging  XR CHEST STANDARD (2 VW)   Final Result   Mild pulmonary vascular congestion with small right pleural effusion. Endoscopy      ASSESSMENT AND RECOMMENDATIONS   Scranton Orts is a 61 y.o. male with PMH of nonischemic cardiomyopathy, CHF, PAF, CAD, PAD s/p angioplasties, left leg amputation, obesity, COPD, sleep apnea, CKD, DM, ETOH abuse, and hepatitis C who presented 8/13/2019 with acute CHF exacerbation. We have been consulted regarding anemia.       1.  Anemia: No overt bleeding. hgb stable post

## 2019-08-15 NOTE — PROGRESS NOTES
Occupational Therapy  Facility/Department: [address] PROGRESSIVE CARE  Daily Treatment Note  NAME: Xiomy Sears  : 1960  MRN: [id number]    Date of Service: 8/15/2019    Discharge Recommendations:  Patient would benefit from continued therapy after discharge, 3-5 sessions per week    Xiomy Sears scored a 12/24 on the AM-PAC ADL Inpatient form. Current research shows that an AM-PAC score of 17 or less is typically not associated with a discharge to the patient's home setting. Based on the patients AM-PAC score and their current ADL deficits, it is recommended that the patient have 3-5 sessions per week of Occupational Therapy at d/c to increase the patients independence. Assessment   Performance deficits / Impairments: Decreased functional mobility ; Decreased strength;Decreased endurance;Decreased ADL status; Decreased cognition;Decreased balance  Assessment: Discussed with OTR: Pt tolerated session fair this am, limited by above performance deficits. Pt required Mod/Max A x1 today for bed mob-rolling side to side and use of rail. Pt agreeable to OOB to chair and required use of Maxi move lift,  A x2. Anticipate pt to require Mod/Max A for all ADL's. Pt would benefit from continued OT prior to returning home (not safe to return home). Cont with poc to maximize function. Prognosis: Fair  OT Education: OT Role;Plan of Care  Patient Education: importance of working with therapy  Barriers to Learning: cognition, insight, self-limiting  REQUIRES OT FOLLOW UP: Yes  Activity Tolerance  Activity Tolerance: Patient Tolerated treatment well;Patient limited by fatigue  Activity Tolerance: self-limiting. Safety Devices  Safety Devices in place: Yes(RN aware)  Type of devices: Call light within reach; Left in chair;Chair alarm in place;Gait belt         Patient Diagnosis(es): The primary encounter diagnosis was Acute on chronic congestive heart failure, unspecified heart failure type ([facility] [address]  Diagnoses of pulmonary edema, patient being admitted for further management and treatment\"  Family / Caregiver Present: No  Referring Practitioner: Shimon Mariano MD  Diagnosis: Anemia, Heart failure  Subjective  Subjective: Pt seen BS. Pt in bed, agreeable to OT and eventually to sitting up in recliner chair (PCA assisted w/transfer-use of Maxi move lift)  General Comment  Comments: ok to see per nursing      Orientation  Orientation  Overall Orientation Status: Within Functional Limits  Objective             Wheelchair Bed Transfers  Equipment Used: Bed(to recliner)  Level of Asssistance: Dependent/Total  Wheelchair Transfers Comments: Maxi move lift, bed to chair, x2 assist  Bed mobility  Rolling to Left: Moderate assistance;Maximum assistance(x1)  Rolling to Right: Moderate assistance;Maximum assistance(x1)            Cognition  Overall Cognitive Status: Exceptions  Safety Judgement: Decreased awareness of need for assistance;Decreased awareness of need for safety  Cognition Comment: decreased insight; self-limiting            Type of ROM/Therapeutic Exercise  Comment: encouraged completing BUE ex's.             Plan   Plan  Times per week: 3-5  Times per day: Daily  Current Treatment Recommendations: Strengthening, Functional Mobility Training, Endurance Training, ROM, Balance Training, Self-Care / ADL, Safety Education & Training    AM-PAC Score        AM-[facility] Inpatient Daily Activity Raw Score: 12 (08/15/19 1331)  AM-PAC Inpatient ADL T-Scale Score : 30.6 (08/15/19 1331)  ADL Inpatient CMS 0-100% Score: 66.57 (08/15/19 1331)  ADL Inpatient CMS G-Code Modifier : CL (08/15/19 1331)    Goals  Short term goals  Time Frame for Short term goals: Status:  goals ongoing  Short term goal 1: Pt will perform bed mobility with min Ax2  Short term goal 2: Pt will groom with setup  Short term goal 3: Pt will perform functional transfer to/from ADL surfaces with min Ax2  Short term goal 4: Pt will tolerate 10 reps therex to increase

## 2019-08-16 LAB
ANION GAP SERPL CALCULATED.3IONS-SCNC: 9 MMOL/L (ref 3–16)
BUN BLDV-MCNC: 11 MG/DL (ref 7–20)
CALCIUM SERPL-MCNC: 8.6 MG/DL (ref 8.3–10.6)
CHLORIDE BLD-SCNC: 90 MMOL/L (ref 99–110)
CO2: 40 MMOL/L (ref 21–32)
CREAT SERPL-MCNC: 0.8 MG/DL (ref 0.9–1.3)
GFR AFRICAN AMERICAN: >60
GFR NON-AFRICAN AMERICAN: >60
GLUCOSE BLD-MCNC: 132 MG/DL (ref 70–99)
GLUCOSE BLD-MCNC: 132 MG/DL (ref 70–99)
GLUCOSE BLD-MCNC: 148 MG/DL (ref 70–99)
GLUCOSE BLD-MCNC: 58 MG/DL (ref 70–99)
GLUCOSE BLD-MCNC: 65 MG/DL (ref 70–99)
GLUCOSE BLD-MCNC: 70 MG/DL (ref 70–99)
GLUCOSE BLD-MCNC: 85 MG/DL (ref 70–99)
HCT VFR BLD CALC: 25.2 % (ref 40.5–52.5)
HEMOGLOBIN: 8.1 G/DL (ref 13.5–17.5)
MAGNESIUM: 1.7 MG/DL (ref 1.8–2.4)
MCH RBC QN AUTO: 25.6 PG (ref 26–34)
MCHC RBC AUTO-ENTMCNC: 32.1 G/DL (ref 31–36)
MCV RBC AUTO: 79.6 FL (ref 80–100)
PDW BLD-RTO: 19 % (ref 12.4–15.4)
PERFORMED ON: ABNORMAL
PERFORMED ON: NORMAL
PERFORMED ON: NORMAL
PLATELET # BLD: 250 K/UL (ref 135–450)
PMV BLD AUTO: 8.7 FL (ref 5–10.5)
POTASSIUM SERPL-SCNC: 3.2 MMOL/L (ref 3.5–5.1)
RBC # BLD: 3.17 M/UL (ref 4.2–5.9)
SODIUM BLD-SCNC: 139 MMOL/L (ref 136–145)
WBC # BLD: 8.5 K/UL (ref 4–11)

## 2019-08-16 PROCEDURE — 36415 COLL VENOUS BLD VENIPUNCTURE: CPT

## 2019-08-16 PROCEDURE — 97110 THERAPEUTIC EXERCISES: CPT

## 2019-08-16 PROCEDURE — 2060000000 HC ICU INTERMEDIATE R&B

## 2019-08-16 PROCEDURE — 6370000000 HC RX 637 (ALT 250 FOR IP): Performed by: INTERNAL MEDICINE

## 2019-08-16 PROCEDURE — 2580000003 HC RX 258: Performed by: NURSE PRACTITIONER

## 2019-08-16 PROCEDURE — 99233 SBSQ HOSP IP/OBS HIGH 50: CPT | Performed by: NURSE PRACTITIONER

## 2019-08-16 PROCEDURE — 97530 THERAPEUTIC ACTIVITIES: CPT

## 2019-08-16 PROCEDURE — 94640 AIRWAY INHALATION TREATMENT: CPT

## 2019-08-16 PROCEDURE — 2700000000 HC OXYGEN THERAPY PER DAY

## 2019-08-16 PROCEDURE — 85027 COMPLETE CBC AUTOMATED: CPT

## 2019-08-16 PROCEDURE — 6370000000 HC RX 637 (ALT 250 FOR IP): Performed by: NURSE PRACTITIONER

## 2019-08-16 PROCEDURE — 2580000003 HC RX 258

## 2019-08-16 PROCEDURE — 6360000002 HC RX W HCPCS: Performed by: NURSE PRACTITIONER

## 2019-08-16 PROCEDURE — 6360000002 HC RX W HCPCS: Performed by: INTERNAL MEDICINE

## 2019-08-16 PROCEDURE — 83735 ASSAY OF MAGNESIUM: CPT

## 2019-08-16 PROCEDURE — 6360000002 HC RX W HCPCS

## 2019-08-16 PROCEDURE — 94761 N-INVAS EAR/PLS OXIMETRY MLT: CPT

## 2019-08-16 PROCEDURE — 80048 BASIC METABOLIC PNL TOTAL CA: CPT

## 2019-08-16 RX ORDER — INSULIN GLARGINE 100 [IU]/ML
8 INJECTION, SOLUTION SUBCUTANEOUS NIGHTLY
Status: DISCONTINUED | OUTPATIENT
Start: 2019-08-16 | End: 2019-08-23 | Stop reason: HOSPADM

## 2019-08-16 RX ORDER — POTASSIUM CHLORIDE 20 MEQ/1
40 TABLET, EXTENDED RELEASE ORAL ONCE
Status: COMPLETED | OUTPATIENT
Start: 2019-08-16 | End: 2019-08-16

## 2019-08-16 RX ORDER — MAGNESIUM SULFATE IN WATER 40 MG/ML
2 INJECTION, SOLUTION INTRAVENOUS ONCE
Status: COMPLETED | OUTPATIENT
Start: 2019-08-16 | End: 2019-08-16

## 2019-08-16 RX ORDER — MILRINONE LACTATE 0.2 MG/ML
0.2 INJECTION, SOLUTION INTRAVENOUS CONTINUOUS
Status: DISCONTINUED | OUTPATIENT
Start: 2019-08-16 | End: 2019-08-21

## 2019-08-16 RX ADMIN — IRON SUCROSE 100 MG: 20 INJECTION, SOLUTION INTRAVENOUS at 08:49

## 2019-08-16 RX ADMIN — METOPROLOL SUCCINATE 50 MG: 50 TABLET, EXTENDED RELEASE ORAL at 08:48

## 2019-08-16 RX ADMIN — ATORVASTATIN CALCIUM 40 MG: 40 TABLET, FILM COATED ORAL at 08:47

## 2019-08-16 RX ADMIN — GABAPENTIN 100 MG: 100 CAPSULE ORAL at 08:47

## 2019-08-16 RX ADMIN — FUROSEMIDE 10 MG/HR: 10 INJECTION, SOLUTION INTRAMUSCULAR; INTRAVENOUS at 02:55

## 2019-08-16 RX ADMIN — MAGNESIUM SULFATE HEPTAHYDRATE 2 G: 40 INJECTION, SOLUTION INTRAVENOUS at 13:25

## 2019-08-16 RX ADMIN — Medication 2 PUFF: at 21:12

## 2019-08-16 RX ADMIN — IPRATROPIUM BROMIDE AND ALBUTEROL SULFATE 1 AMPULE: .5; 3 SOLUTION RESPIRATORY (INHALATION) at 21:07

## 2019-08-16 RX ADMIN — POTASSIUM CHLORIDE 40 MEQ: 20 TABLET, EXTENDED RELEASE ORAL at 18:50

## 2019-08-16 RX ADMIN — AMIODARONE HYDROCHLORIDE 200 MG: 200 TABLET ORAL at 08:48

## 2019-08-16 RX ADMIN — GABAPENTIN 100 MG: 100 CAPSULE ORAL at 13:24

## 2019-08-16 RX ADMIN — OXYCODONE HYDROCHLORIDE AND ACETAMINOPHEN 1 TABLET: 5; 325 TABLET ORAL at 21:19

## 2019-08-16 RX ADMIN — PANTOPRAZOLE SODIUM 40 MG: 40 TABLET, DELAYED RELEASE ORAL at 05:11

## 2019-08-16 RX ADMIN — OXYCODONE HYDROCHLORIDE AND ACETAMINOPHEN 1 TABLET: 5; 325 TABLET ORAL at 13:25

## 2019-08-16 RX ADMIN — Medication 1 CAPSULE: at 21:19

## 2019-08-16 RX ADMIN — MILRINONE LACTATE IN DEXTROSE 0.38 MCG/KG/MIN: 200 INJECTION, SOLUTION INTRAVENOUS at 22:10

## 2019-08-16 RX ADMIN — RIVAROXABAN 15 MG: 15 TABLET, FILM COATED ORAL at 08:48

## 2019-08-16 RX ADMIN — OXYCODONE HYDROCHLORIDE AND ACETAMINOPHEN 1 TABLET: 5; 325 TABLET ORAL at 05:10

## 2019-08-16 RX ADMIN — Medication 2 PUFF: at 07:40

## 2019-08-16 RX ADMIN — POLYETHYLENE GLYCOL 3350 17 G: 17 POWDER, FOR SOLUTION ORAL at 08:48

## 2019-08-16 RX ADMIN — Medication 400 MG: at 08:47

## 2019-08-16 RX ADMIN — IPRATROPIUM BROMIDE AND ALBUTEROL SULFATE 1 AMPULE: .5; 3 SOLUTION RESPIRATORY (INHALATION) at 07:30

## 2019-08-16 RX ADMIN — Medication 1 CAPSULE: at 08:47

## 2019-08-16 RX ADMIN — POTASSIUM CHLORIDE 40 MEQ: 1500 TABLET, EXTENDED RELEASE ORAL at 13:24

## 2019-08-16 RX ADMIN — TAMSULOSIN HYDROCHLORIDE 0.4 MG: 0.4 CAPSULE ORAL at 08:47

## 2019-08-16 RX ADMIN — LISINOPRIL 2.5 MG: 5 TABLET ORAL at 08:48

## 2019-08-16 RX ADMIN — INSULIN GLARGINE 8 UNITS: 100 INJECTION, SOLUTION SUBCUTANEOUS at 21:20

## 2019-08-16 RX ADMIN — POTASSIUM CHLORIDE 40 MEQ: 20 TABLET, EXTENDED RELEASE ORAL at 08:48

## 2019-08-16 RX ADMIN — TRAZODONE HYDROCHLORIDE 50 MG: 50 TABLET ORAL at 21:19

## 2019-08-16 RX ADMIN — GABAPENTIN 100 MG: 100 CAPSULE ORAL at 21:19

## 2019-08-16 ASSESSMENT — PAIN SCALES - GENERAL
PAINLEVEL_OUTOF10: 3
PAINLEVEL_OUTOF10: 0
PAINLEVEL_OUTOF10: 4
PAINLEVEL_OUTOF10: 0
PAINLEVEL_OUTOF10: 7
PAINLEVEL_OUTOF10: 9
PAINLEVEL_OUTOF10: 8
PAINLEVEL_OUTOF10: 10
PAINLEVEL_OUTOF10: 0

## 2019-08-16 ASSESSMENT — PAIN DESCRIPTION - FREQUENCY
FREQUENCY: CONTINUOUS

## 2019-08-16 ASSESSMENT — PAIN DESCRIPTION - DESCRIPTORS
DESCRIPTORS: ACHING

## 2019-08-16 ASSESSMENT — PAIN - FUNCTIONAL ASSESSMENT
PAIN_FUNCTIONAL_ASSESSMENT: PREVENTS OR INTERFERES SOME ACTIVE ACTIVITIES AND ADLS
PAIN_FUNCTIONAL_ASSESSMENT: PREVENTS OR INTERFERES WITH ALL ACTIVE AND SOME PASSIVE ACTIVITIES

## 2019-08-16 ASSESSMENT — PAIN SCALES - WONG BAKER: WONGBAKER_NUMERICALRESPONSE: 0

## 2019-08-16 ASSESSMENT — PAIN DESCRIPTION - PAIN TYPE
TYPE: CHRONIC PAIN

## 2019-08-16 ASSESSMENT — PAIN DESCRIPTION - PROGRESSION
CLINICAL_PROGRESSION: GRADUALLY WORSENING
CLINICAL_PROGRESSION: NOT CHANGED
CLINICAL_PROGRESSION: GRADUALLY WORSENING
CLINICAL_PROGRESSION: GRADUALLY WORSENING

## 2019-08-16 ASSESSMENT — PAIN DESCRIPTION - LOCATION
LOCATION: BACK
LOCATION: BACK;GENERALIZED
LOCATION: GENERALIZED
LOCATION: BACK;GENERALIZED

## 2019-08-16 ASSESSMENT — PAIN DESCRIPTION - ONSET
ONSET: ON-GOING

## 2019-08-16 NOTE — PROGRESS NOTES
Physical Therapy  Tonny Pelon  [id number]  [id number]     Attempted to see for PT however nursing requested to defer as pt requesting to sleep.   Will continue to follow and attempt later as schedule allows  [address] [facility], [place], [address]

## 2019-08-16 NOTE — CONSULTS
Pt is very familiar to HF team. This is his 8th admission in the past year. Compliance and being agreeable in going to a SNF/LTC is an on-going issue. Pt did go to 'Home at [facility]' after his recent admission with the understanding he is to stay long enough to get the therapy he needs and be agreeable to do the therapy. I called the facility this afternoon and spoke with nurse on floor who assisted in his care ( [id number] # [phone]). She states compliance remains an issue with his therapy sessions. \"You'd hear him arguing with the therapist all the time. \" Pt is essentially bed/ wc bound. Per our therapy notes, he's only mostly participated in bed exercises if agreeable. Pt was recently here 6/23-7/23 and during that visit he was initially treated for sepsis and then went into resp distress. He was intubated and then went into bradycardia and PEA. CPR was initiated and ROSC was obtained. Then approx 30 mins later pt had pulseles VT and again was coded. He required multiple pressor support. CRRT and HD were also initiated last admission. At time of dc, it was felt he no longer needed HD and his line was removed. Today he came in from LTC bed at [address] for c/o worsening LE edema, increase in abd girth, worsening SOB and pulm edema. Pt follows with [facility] and Nephrology. He was seen at [facility] on 8/5 and at that time it was felt he was slightly volume overloaded. His wt was 217 lbs at that visit (fully clothed etc). His wt at dc on 7/23 was 200 lbs. At this OV his diuretic was doubled, labs were ordered, pt was instructed to f/u with Neph and orders were written for nursing home. His admit wt today is 231 lbs. While speaking with the facility nurse, she was aware he was daily wts, on low Na Carb control diet and fluid restriction. She was unable to answer my concern of his wt gain, worsening edema, abd girth etc or if new orders were written by facility MD/NP.  I told her if pt
[] Needs Reinforcement     []  Other:      Teaching Time:  1hours  0 minutes     Plan        Social Service Consult Made:  Yes  Assistance filling out Living Will/HPOA:  No      Discharge Plan:  Education/support to patient  Code status clarified: Full Code    Discharge Environment:  [] Hospice Consult Agency:  [] Inpatient Hospice    [] [facility] [place]   [] ECF with Hospice  [] ECF skilled care with Hospice to follow   [] Other:    Discussion: Patient left here 20 days ago back again for shortness of breath. We have discussed his goals of care. He plans on going home and states his mother along with home care will be able to take care of him. We have discussed his inability to get up on his own, not following fluid/diet restrictions (he denies) and his lack of participating in therapy. He feels if he can go home he will do better and get back to using his w/c and power chair. We have discussed code status and he wants to remain full code at this time. He did state he will discuss goals of care and disposition with his family this weekend. I will continue to follow Mr. Tori Santa care as needed. Thank you for allowing me to participate in the care of Mr. Felipa Amaya .      Electronically signed by Leeanne Albright RN, [address] on 8/16/2019 at 2:02 PM  [address] [place] [address]  Office: [phone]
fibrillation:  On xarelto. Further input and recommendations by Dr. Sheppard Promise to follow    If you have any questions or need any further information, please feel free to contact our consult team.  Thank you for allowing us to participate in the care of Helen Faith.     Storm Johnson PA-C

## 2019-08-16 NOTE — PROGRESS NOTES
Hospitalist Progress Note      PCP: Alvarez Yousif MD    Date of Admission: 8/13/2019        Subjective: feels better, less edema, no chest pain or SOB at rest.       Medications:  Reviewed    Infusion Medications    furosemide (LASIX) 1mg/ml infusion 10 mg/hr (08/16/19 0255)    dextrose       Scheduled Medications    potassium chloride  40 mEq Oral Once    insulin glargine  8 Units Subcutaneous Nightly    magnesium sulfate  2 g Intravenous Once    potassium chloride  40 mEq Oral BID WC    amiodarone  200 mg Oral Daily    atorvastatin  40 mg Oral Daily    gabapentin  100 mg Oral TID    lactobacillus  1 capsule Oral BID    magnesium oxide  400 mg Oral Daily    metoprolol succinate  50 mg Oral Daily    mometasone-formoterol  2 puff Inhalation BID    pantoprazole  40 mg Oral QAM AC    polyethylene glycol  17 g Oral Daily    tamsulosin  0.4 mg Oral Daily    traZODone  50 mg Oral Nightly    sodium chloride flush  10 mL Intravenous 2 times per day    lisinopril  2.5 mg Oral Daily    insulin lispro  0-6 Units Subcutaneous TID WC    insulin lispro  0-3 Units Subcutaneous Nightly    sodium chloride  250 mL Intravenous Once    rivaroxaban  15 mg Oral Daily     PRN Meds: albuterol sulfate HFA, cyclobenzaprine, oxyCODONE-acetaminophen, sodium chloride flush, magnesium hydroxide, ondansetron, glucose, dextrose, glucagon (rDNA), dextrose, ipratropium-albuterol      Intake/Output Summary (Last 24 hours) at 8/16/2019 0944  Last data filed at 8/16/2019 0943  Gross per 24 hour   Intake 1230 ml   Output 3975 ml   Net -2745 ml       Physical Exam Performed:    /67   Pulse 103   Temp 98 °F (36.7 °C) (Oral)   Resp 18   Ht 5' 7\" (1.702 m)   Wt 230 lb 6.1 oz (104.5 kg)   SpO2 98%   BMI 36.08 kg/m²     General appearance: No apparent distress  Neck: Supple  Respiratory:  Normal respiratory effort. Clear to auscultation, bilaterally without Rales/Wheezes/Rhonchi.   Cardiovascular: Regular rate and rhythm with normal S1/S2 without murmurs, rubs or gallops. Abdomen: Soft, non-tender, non-distended with normal bowel sounds. Musculoskeletal: improved edema   Skin: Skin color, texture, turgor normal.  No rashes or lesions. Neurologic:  No focal weakness   Psychiatric: Alert and oriented  Capillary Refill: Brisk,< 3 seconds   Peripheral Pulses: +2 palpable, equal bilaterally       Labs:   Recent Labs     08/14/19  0523 08/15/19  0546 08/16/19  0500   WBC  --  6.6 8.5   HGB 7.6* 7.9* 8.1*   HCT 23.5* 25.2* 25.2*   PLT  --  220 250     Recent Labs     08/14/19  0523 08/15/19  0546 08/16/19  0500    140 139   K 3.4* 3.2* 3.2*   CL 97* 94* 90*   CO2 32 37* 40*   BUN 19 14 11   CREATININE 0.8* 0.7* 0.8*   CALCIUM 8.4 8.3 8.6     Recent Labs     08/15/19  0546   AST 16   ALT 9*   BILITOT 0.4   ALKPHOS 83     No results for input(s): INR in the last 72 hours. No results for input(s): [place] Due in the last 72 hours. Urinalysis:      Lab Results   Component Value Date    NITRU Negative 06/23/2019    WBCUA 5 06/23/2019    RBCUA 8 06/23/2019    BLOODU MODERATE 06/23/2019    SPECGRAV 1.021 06/23/2019    GLUCOSEU Negative 06/23/2019       Radiology:  XR CHEST STANDARD (2 VW)   Final Result   Mild pulmonary vascular congestion with small right pleural effusion.                  Assessment/Plan:    Active Hospital Problems    Diagnosis    Anemia [D64.9]     Priority: High    Heart failure, acute on chronic, systolic and diastolic (HCC) [G66.49]     Priority: High    Atrial fibrillation, controlled ([address] [I48.91]    Ventricular tachycardia (HCC) [I47.2]    Biventricular HF (heart failure) (HCC) [I50.82]    DMII (diabetes mellitus, type 2) ([address] [E11.9]    Hypokalemia [E87.6]    Essential hypertension [I10]    Tobacco use [Z72.0]     1.  Acute on chronic systolic and diastolic heart failure, started on Lasix 40 mg IV twice daily, changed to lasix drip, negative for 3000 cc, strict I&O, telemetry

## 2019-08-16 NOTE — PROGRESS NOTES
Physical Therapy  Session Note  8/16/2019    Merle Bird  [id number]  [id number]  Therapist into room per nursing request, as patient had a question for therapist.  Patient asking about attempting stand pivot transfer for returning from chair back to bed instead of use of Maxi Move. Per chart review, patient required assist of 2 for sit to stand transfer to [facility] at previous therapy session. Patient acknowledges that he hasn't been able to perform stand pivot transfer for ~3 months. This therapist Instructs patient that one way to improve UE strength and activity tolerance is to do Chair push ups. This will  improve tolerance to stand pivot transfers. Therapist demonstrates to patient, and offers to have patient perform some reps with my assistance, but patient declines, stating, \"I don't want to do two sessions of therapy today. \"     Electronically signed by Tera Oliveira, PT [id number] on 8/16/2019 at 3:14 PM

## 2019-08-16 NOTE — PROGRESS NOTES
nodes.  · Allergic/Immunologic: No nasal congestion or hives. Objective:   /67   Pulse 103   Temp 98 °F (36.7 °C) (Oral)   Resp 18   Ht 5' 7\" (1.702 m)   Wt 230 lb 6.1 oz (104.5 kg)   SpO2 98%   BMI 36.08 kg/m²       Intake/Output Summary (Last 24 hours) at 8/16/2019 1340  Last data filed at 8/16/2019 1307  Gross per 24 hour   Intake 750 ml   Output 4550 ml   Net -3800 ml     Wt Readings from Last 3 Encounters:   08/16/19 230 lb 6.1 oz (104.5 kg)   08/05/19 217 lb (98.4 kg)   07/23/19 200 lb 9.9 oz (91 kg)       Physical Exam:  General: In no acute distress. Awake, alert, and oriented X4. Up in chair. Appears to be breathing easier   Skin:  Warm and dry. No new appearing rashes or lesions. Neck:  Supple and thick. ++ JVD. Carotids without bruits  Chest: Lungs with markedly diminished breath sounds. No wheezes/rhonchi/rales  Cardiovascular:  Irreg, irreg; normal S1 and S2; systolic murmur without change  Abdomen: distended with pittng edema lower abdominal wall .  Nontender. + bowel sounds  Extremities: + anasarca; L AKA; RLE with chronic stasis changes    Medications:    insulin glargine  8 Units Subcutaneous Nightly    magnesium sulfate  2 g Intravenous Once    potassium chloride  40 mEq Oral BID WC    amiodarone  200 mg Oral Daily    atorvastatin  40 mg Oral Daily    gabapentin  100 mg Oral TID    lactobacillus  1 capsule Oral BID    magnesium oxide  400 mg Oral Daily    metoprolol succinate  50 mg Oral Daily    mometasone-formoterol  2 puff Inhalation BID    pantoprazole  40 mg Oral QAM AC    polyethylene glycol  17 g Oral Daily    tamsulosin  0.4 mg Oral Daily    traZODone  50 mg Oral Nightly    sodium chloride flush  10 mL Intravenous 2 times per day    lisinopril  2.5 mg Oral Daily    insulin lispro  0-6 Units Subcutaneous TID WC    insulin lispro  0-3 Units Subcutaneous Nightly    sodium chloride  250 mL Intravenous Once    rivaroxaban  15 mg Oral Daily      furosemide 10/17/2018:  Suboptimal image quality. Definity contrast administered.   Patient appears to be in atrial fibrillation.   Mild Conc. LVH with EF  45%.   The left atrium is mildly dilated.   Mild mitral regurgitation.   Normal RV size with mildly reduced systolic function.   Trivial pulmonic regurgitation present.     [facility] 2/8/2017:  OVERALL IMPRESSION:  1. Significantly elevated right heart pressures and capillary wedge pressures  consistent with congestive heart failure. 2. Normal cardiac output and indices.     [facility] 8/10/2015:  1. Right coronary artery arises from the right sinus Valsalva. It is a dominant artery, gives rise to the posterior descending and posterolateral branches. Right coronary artery and its branches are free of atherosclerosis.    2. Left main trunk arises from the right sinus Valsalva. It divides into the left and right descending artery and left circumflex artery. Left main trunk is free of atherosclerosis. 3. Left anterior descending artery: Moderate sized artery and descends into the intraventricular sulcus and wraps around the apex of the heart. The left anterior descending artery and its branches are free of atherosclerosis. 4. Left circumflex artery arises from the left main trunk. It is a moderate-sized artery. It gives rise to a moderate-sized obtuse marginal branch and is free of atherosclerosis. Left anterior descending artery as described earlier is also free of any atherosclerosis.    Left ventriculogram reveals a global left ventricular systolic function with estimated EF of approximately 20% with global hypokinesis. There was no mitral regurgitation seen. Telemetry: Atrial fib with controlled VR    Assessment/Plan:    1.  Acute on chronic biventricular heart failure  -acute on chronic systolic HF; LVEF 87-67%  -continues with anasarca despite lasix gtt  -will add Milrinone to try and enhance diuresis  -has had few doses metolazone (will hold to day to replenish K+)  -continue

## 2019-08-16 NOTE — PROGRESS NOTES
Pt refusing dulera at this time. Claims it dose not help him and he doesn't need it, asking for PRN so gave him that at this time.

## 2019-08-17 LAB
ANION GAP SERPL CALCULATED.3IONS-SCNC: 8 MMOL/L (ref 3–16)
BUN BLDV-MCNC: 13 MG/DL (ref 7–20)
CALCIUM SERPL-MCNC: 8.9 MG/DL (ref 8.3–10.6)
CHLORIDE BLD-SCNC: 88 MMOL/L (ref 99–110)
CO2: 43 MMOL/L (ref 21–32)
CREAT SERPL-MCNC: 0.9 MG/DL (ref 0.9–1.3)
GFR AFRICAN AMERICAN: >60
GFR NON-AFRICAN AMERICAN: >60
GLUCOSE BLD-MCNC: 123 MG/DL (ref 70–99)
GLUCOSE BLD-MCNC: 144 MG/DL (ref 70–99)
GLUCOSE BLD-MCNC: 176 MG/DL (ref 70–99)
GLUCOSE BLD-MCNC: 181 MG/DL (ref 70–99)
GLUCOSE BLD-MCNC: 191 MG/DL (ref 70–99)
GLUCOSE BLD-MCNC: 195 MG/DL (ref 70–99)
MAGNESIUM: 1.8 MG/DL (ref 1.8–2.4)
PERFORMED ON: ABNORMAL
POTASSIUM SERPL-SCNC: 3.5 MMOL/L (ref 3.5–5.1)
PRO-BNP: 2100 PG/ML (ref 0–124)
SODIUM BLD-SCNC: 139 MMOL/L (ref 136–145)

## 2019-08-17 PROCEDURE — 83880 ASSAY OF NATRIURETIC PEPTIDE: CPT

## 2019-08-17 PROCEDURE — 6370000000 HC RX 637 (ALT 250 FOR IP): Performed by: INTERNAL MEDICINE

## 2019-08-17 PROCEDURE — 94760 N-INVAS EAR/PLS OXIMETRY 1: CPT

## 2019-08-17 PROCEDURE — 6360000002 HC RX W HCPCS: Performed by: INTERNAL MEDICINE

## 2019-08-17 PROCEDURE — 6370000000 HC RX 637 (ALT 250 FOR IP): Performed by: NURSE PRACTITIONER

## 2019-08-17 PROCEDURE — 2060000000 HC ICU INTERMEDIATE R&B

## 2019-08-17 PROCEDURE — 80048 BASIC METABOLIC PNL TOTAL CA: CPT

## 2019-08-17 PROCEDURE — 2580000003 HC RX 258: Performed by: INTERNAL MEDICINE

## 2019-08-17 PROCEDURE — 6360000002 HC RX W HCPCS: Performed by: NURSE PRACTITIONER

## 2019-08-17 PROCEDURE — 83735 ASSAY OF MAGNESIUM: CPT

## 2019-08-17 PROCEDURE — 2580000003 HC RX 258: Performed by: NURSE PRACTITIONER

## 2019-08-17 PROCEDURE — 99233 SBSQ HOSP IP/OBS HIGH 50: CPT | Performed by: INTERNAL MEDICINE

## 2019-08-17 PROCEDURE — 36415 COLL VENOUS BLD VENIPUNCTURE: CPT

## 2019-08-17 PROCEDURE — 94640 AIRWAY INHALATION TREATMENT: CPT

## 2019-08-17 PROCEDURE — 2700000000 HC OXYGEN THERAPY PER DAY

## 2019-08-17 RX ORDER — POTASSIUM CHLORIDE 20 MEQ/1
20 TABLET, EXTENDED RELEASE ORAL ONCE
Status: COMPLETED | OUTPATIENT
Start: 2019-08-17 | End: 2019-08-17

## 2019-08-17 RX ORDER — FERROUS SULFATE TAB EC 324 MG (65 MG FE EQUIVALENT) 324 (65 FE) MG
324 TABLET DELAYED RESPONSE ORAL
Status: DISCONTINUED | OUTPATIENT
Start: 2019-08-17 | End: 2019-08-23 | Stop reason: HOSPADM

## 2019-08-17 RX ORDER — MAGNESIUM SULFATE 1 G/100ML
1 INJECTION INTRAVENOUS ONCE
Status: COMPLETED | OUTPATIENT
Start: 2019-08-17 | End: 2019-08-17

## 2019-08-17 RX ADMIN — LISINOPRIL 2.5 MG: 5 TABLET ORAL at 10:34

## 2019-08-17 RX ADMIN — FERROUS SULFATE TAB EC 324 MG (65 MG FE EQUIVALENT) 324 MG: 324 (65 FE) TABLET DELAYED RESPONSE at 13:27

## 2019-08-17 RX ADMIN — INSULIN LISPRO 1 UNITS: 100 INJECTION, SOLUTION INTRAVENOUS; SUBCUTANEOUS at 18:45

## 2019-08-17 RX ADMIN — GABAPENTIN 100 MG: 100 CAPSULE ORAL at 13:15

## 2019-08-17 RX ADMIN — Medication 1 CAPSULE: at 10:34

## 2019-08-17 RX ADMIN — INSULIN LISPRO 1 UNITS: 100 INJECTION, SOLUTION INTRAVENOUS; SUBCUTANEOUS at 21:56

## 2019-08-17 RX ADMIN — ATORVASTATIN CALCIUM 40 MG: 40 TABLET, FILM COATED ORAL at 10:33

## 2019-08-17 RX ADMIN — IPRATROPIUM BROMIDE AND ALBUTEROL SULFATE 1 AMPULE: .5; 3 SOLUTION RESPIRATORY (INHALATION) at 16:43

## 2019-08-17 RX ADMIN — Medication 2 PUFF: at 20:29

## 2019-08-17 RX ADMIN — IPRATROPIUM BROMIDE AND ALBUTEROL SULFATE 1 AMPULE: .5; 3 SOLUTION RESPIRATORY (INHALATION) at 12:58

## 2019-08-17 RX ADMIN — FUROSEMIDE 10 MG/HR: 10 INJECTION, SOLUTION INTRAMUSCULAR; INTRAVENOUS at 11:58

## 2019-08-17 RX ADMIN — INSULIN GLARGINE 8 UNITS: 100 INJECTION, SOLUTION SUBCUTANEOUS at 21:57

## 2019-08-17 RX ADMIN — AMIODARONE HYDROCHLORIDE 200 MG: 200 TABLET ORAL at 10:33

## 2019-08-17 RX ADMIN — POTASSIUM CHLORIDE 40 MEQ: 20 TABLET, EXTENDED RELEASE ORAL at 18:45

## 2019-08-17 RX ADMIN — Medication 10 ML: at 10:38

## 2019-08-17 RX ADMIN — TAMSULOSIN HYDROCHLORIDE 0.4 MG: 0.4 CAPSULE ORAL at 13:26

## 2019-08-17 RX ADMIN — MILRINONE LACTATE IN DEXTROSE 0.38 MCG/KG/MIN: 200 INJECTION, SOLUTION INTRAVENOUS at 13:41

## 2019-08-17 RX ADMIN — TRAZODONE HYDROCHLORIDE 50 MG: 50 TABLET ORAL at 21:56

## 2019-08-17 RX ADMIN — POTASSIUM CHLORIDE 40 MEQ: 20 TABLET, EXTENDED RELEASE ORAL at 10:33

## 2019-08-17 RX ADMIN — FUROSEMIDE 10 MG/HR: 10 INJECTION, SOLUTION INTRAMUSCULAR; INTRAVENOUS at 22:03

## 2019-08-17 RX ADMIN — RIVAROXABAN 15 MG: 15 TABLET, FILM COATED ORAL at 10:50

## 2019-08-17 RX ADMIN — OXYCODONE HYDROCHLORIDE AND ACETAMINOPHEN 1 TABLET: 5; 325 TABLET ORAL at 18:53

## 2019-08-17 RX ADMIN — PANTOPRAZOLE SODIUM 40 MG: 40 TABLET, DELAYED RELEASE ORAL at 06:23

## 2019-08-17 RX ADMIN — INSULIN LISPRO 1 UNITS: 100 INJECTION, SOLUTION INTRAVENOUS; SUBCUTANEOUS at 13:19

## 2019-08-17 RX ADMIN — OXYCODONE HYDROCHLORIDE AND ACETAMINOPHEN 1 TABLET: 5; 325 TABLET ORAL at 06:36

## 2019-08-17 RX ADMIN — Medication 2 PUFF: at 12:59

## 2019-08-17 RX ADMIN — GABAPENTIN 100 MG: 100 CAPSULE ORAL at 10:33

## 2019-08-17 RX ADMIN — MILRINONE LACTATE IN DEXTROSE 0.38 MCG/KG/MIN: 200 INJECTION, SOLUTION INTRAVENOUS at 22:52

## 2019-08-17 RX ADMIN — MILRINONE LACTATE IN DEXTROSE 0.38 MCG/KG/MIN: 200 INJECTION, SOLUTION INTRAVENOUS at 04:48

## 2019-08-17 RX ADMIN — POTASSIUM CHLORIDE 20 MEQ: 1500 TABLET, EXTENDED RELEASE ORAL at 13:16

## 2019-08-17 RX ADMIN — Medication 400 MG: at 10:33

## 2019-08-17 RX ADMIN — FUROSEMIDE 10 MG/HR: 10 INJECTION, SOLUTION INTRAMUSCULAR; INTRAVENOUS at 01:43

## 2019-08-17 RX ADMIN — METOPROLOL SUCCINATE 50 MG: 50 TABLET, EXTENDED RELEASE ORAL at 10:33

## 2019-08-17 RX ADMIN — GABAPENTIN 100 MG: 100 CAPSULE ORAL at 21:56

## 2019-08-17 RX ADMIN — MAGNESIUM SULFATE HEPTAHYDRATE 1 G: 1 INJECTION, SOLUTION INTRAVENOUS at 13:19

## 2019-08-17 RX ADMIN — Medication 1 CAPSULE: at 21:56

## 2019-08-17 RX ADMIN — IPRATROPIUM BROMIDE AND ALBUTEROL SULFATE 1 AMPULE: .5; 3 SOLUTION RESPIRATORY (INHALATION) at 20:29

## 2019-08-17 RX ADMIN — OXYCODONE HYDROCHLORIDE AND ACETAMINOPHEN 1 TABLET: 5; 325 TABLET ORAL at 13:15

## 2019-08-17 ASSESSMENT — PAIN DESCRIPTION - FREQUENCY
FREQUENCY: CONTINUOUS

## 2019-08-17 ASSESSMENT — PAIN DESCRIPTION - PAIN TYPE
TYPE: ACUTE PAIN;CHRONIC PAIN
TYPE: CHRONIC PAIN;ACUTE PAIN
TYPE: CHRONIC PAIN
TYPE: CHRONIC PAIN;ACUTE PAIN
TYPE: ACUTE PAIN;CHRONIC PAIN
TYPE: CHRONIC PAIN

## 2019-08-17 ASSESSMENT — PAIN DESCRIPTION - PROGRESSION
CLINICAL_PROGRESSION: NOT CHANGED

## 2019-08-17 ASSESSMENT — PAIN DESCRIPTION - DESCRIPTORS
DESCRIPTORS: ACHING

## 2019-08-17 ASSESSMENT — PAIN DESCRIPTION - DIRECTION
RADIATING_TOWARDS: NO

## 2019-08-17 ASSESSMENT — PAIN DESCRIPTION - LOCATION
LOCATION: GENERALIZED

## 2019-08-17 ASSESSMENT — PAIN DESCRIPTION - ORIENTATION
ORIENTATION: OTHER (COMMENT)

## 2019-08-17 ASSESSMENT — PAIN SCALES - GENERAL
PAINLEVEL_OUTOF10: 8
PAINLEVEL_OUTOF10: 8
PAINLEVEL_OUTOF10: 10
PAINLEVEL_OUTOF10: 8
PAINLEVEL_OUTOF10: 7
PAINLEVEL_OUTOF10: 5
PAINLEVEL_OUTOF10: 8

## 2019-08-17 ASSESSMENT — PAIN DESCRIPTION - ONSET
ONSET: ON-GOING

## 2019-08-17 NOTE — PROGRESS NOTES
Hospitalist Progress Note      PCP: Anjana Foster MD    Date of Admission: 8/13/2019      Subjective: feels ok, no chest pain or SOB at rest, no muscle cramps, no nausea, vomiting or diarrhea. Medications:  Reviewed    Infusion Medications    milrinone 0.375 mcg/kg/min (08/17/19 0448)    furosemide (LASIX) 1mg/ml infusion 10 mg/hr (08/17/19 0143)    dextrose       Scheduled Medications    insulin glargine  8 Units Subcutaneous Nightly    potassium chloride  40 mEq Oral BID WC    amiodarone  200 mg Oral Daily    atorvastatin  40 mg Oral Daily    gabapentin  100 mg Oral TID    lactobacillus  1 capsule Oral BID    magnesium oxide  400 mg Oral Daily    metoprolol succinate  50 mg Oral Daily    mometasone-formoterol  2 puff Inhalation BID    pantoprazole  40 mg Oral QAM AC    polyethylene glycol  17 g Oral Daily    tamsulosin  0.4 mg Oral Daily    traZODone  50 mg Oral Nightly    sodium chloride flush  10 mL Intravenous 2 times per day    lisinopril  2.5 mg Oral Daily    insulin lispro  0-6 Units Subcutaneous TID WC    insulin lispro  0-3 Units Subcutaneous Nightly    sodium chloride  250 mL Intravenous Once    rivaroxaban  15 mg Oral Daily     PRN Meds: albuterol sulfate HFA, cyclobenzaprine, oxyCODONE-acetaminophen, sodium chloride flush, magnesium hydroxide, ondansetron, glucose, dextrose, glucagon (rDNA), dextrose, ipratropium-albuterol      Intake/Output Summary (Last 24 hours) at 8/17/2019 0819  Last data filed at 8/17/2019 0720  Gross per 24 hour   Intake 545.33 ml   Output 4575 ml   Net -4029.67 ml       Physical Exam Performed:    /65   Pulse 116   Temp 98.2 °F (36.8 °C) (Oral)   Resp (!) 106   Ht 5' 7\" (1.702 m)   Wt 240 lb 4.8 oz (109 kg)   SpO2 90%   BMI 37.64 kg/m²     General appearance: No apparent distress  Neck: Supple  Respiratory:  Normal respiratory effort. Clear to auscultation, bilaterally without Rales/Wheezes/Rhonchi.   Cardiovascular: Regular rate and

## 2019-08-17 NOTE — PROGRESS NOTES
Pt stated that he is having skyline cones brought in for lunch  Attempted to explained  furosemide and milrinone drips pt verbally  cutting staff attempted to explain water retention and removal pt resistant to education

## 2019-08-17 NOTE — PROGRESS NOTES
[facility]   Daily Progress Note      Admit Date:  8/13/2019    Reason for follow up visit: CHF    CC: \"I don't know that I feel any better. \"    60 y/o male well known to us with PMH significant for multiple hospital stays, NICM, CHF, COPD, PAD, CKD, diabetes mellitus, untreated sleep apnea and H/O of ETOH abuse who was admitted from the nursing home with recurrent SOB and CHF.  Also noted to be rather anemia with Hgb < 7.  Seen by GI and will continue with conservative management (deemed not to be good candidate for endoscopy. Interval History:  Pt. seen and examined; records reviewed  BP noted. Remains on lasix & milrinone  Infusion  C/o lower abdominal cramps   Wt 218#  K+ 3.5  Magnesium 1.80      SOB without much change     Subjective:  Pt with no acute overnight cardiac events. Denies chest pain, palpitations or dizziness  Still with significant edema    Review of Systems:   · Constitutional: no unanticipated weight loss. There's been no change in energy level, sleep pattern, or activity level. No fevers, chills. · Eyes: No visual changes or diplopia. No scleral icterus. · ENT: No Headaches, hearing loss or vertigo. No mouth sores or sore throat. · Cardiovascular: as reviewed in HPI  + anasarca  · Respiratory: No cough or wheezing, no sputum production. No hematemesis.  + SOB   · Gastrointestinal: No abdominal pain, appetite loss, blood in stools. No change in bowel or bladder habits. · Genitourinary: No dysuria, trouble voiding, or hematuria. · Musculoskeletal:  + wheelchair bound  · Integumentary: No rash or pruritis. · Neurological: No headache, diplopia, change in muscle strength, numbness or tingling. · Psychiatric: No anxiety or depression. · Endocrine: No temperature intolerance. No excessive thirst, fluid intake, or urination. No tremor. · Hematologic/Lymphatic: No abnormal bruising or bleeding, blood clots or swollen lymph nodes.   · Allergic/Immunologic: No nasal aldactone d/t hypotension  -Re enforce low sodium diet and fluid restriction( Asking for skyline)     2. Pulmonary HTN  -moderate pulmonary HTN noted on last echo in 7/2019  -continue to diurese  -continue O2 (avoid hypoxia)  -suspect secondary to COPD     3. Chronic atrial fibrillation  -rate controlled on BB and amiodarone  -continue Xarelto (monitor Hgb)  -previous DCCV with return to atrial fib (while on Amiodarone)    4. Chronic anemia  -some component of iron deficiency; on venofer  -GI consulted and continuing with conservative treatment  -transfuse as needed; would aim to keep Hgb > 8.0 given his CHF   - will add oral Fe supplement  5. Hypomagnesemia  -IV magnesium sulfate  -also on po Mag Ox     6. Hypokalemia  -secondary to diuretic therapy  -continue K+ replacement     7. Hyperlipidemia  -continue statin     8. Diabetes Mellitus  -Rx per Primary team     9. Untreated sleep apnea  -pt refuses CPAP     10.  COPD  -on home O2    High complexity medical problems    Electronically signed by Yuri Webster MD on 8/17/2019 at 12:23 PM

## 2019-08-18 LAB
ABO/RH: NORMAL
ANION GAP SERPL CALCULATED.3IONS-SCNC: 10 MMOL/L (ref 3–16)
ANTIBODY SCREEN: NORMAL
BASOPHILS ABSOLUTE: 0.1 K/UL (ref 0–0.2)
BASOPHILS RELATIVE PERCENT: 1.4 %
BUN BLDV-MCNC: 15 MG/DL (ref 7–20)
CALCIUM SERPL-MCNC: 8.6 MG/DL (ref 8.3–10.6)
CHLORIDE BLD-SCNC: 82 MMOL/L (ref 99–110)
CO2: 43 MMOL/L (ref 21–32)
CREAT SERPL-MCNC: 0.9 MG/DL (ref 0.9–1.3)
EOSINOPHILS ABSOLUTE: 0.2 K/UL (ref 0–0.6)
EOSINOPHILS RELATIVE PERCENT: 2.6 %
GFR AFRICAN AMERICAN: >60
GFR NON-AFRICAN AMERICAN: >60
GLUCOSE BLD-MCNC: 119 MG/DL (ref 70–99)
GLUCOSE BLD-MCNC: 131 MG/DL (ref 70–99)
GLUCOSE BLD-MCNC: 145 MG/DL (ref 70–99)
GLUCOSE BLD-MCNC: 153 MG/DL (ref 70–99)
GLUCOSE BLD-MCNC: 194 MG/DL (ref 70–99)
HCT VFR BLD CALC: 24.2 % (ref 40.5–52.5)
HEMOGLOBIN: 7.8 G/DL (ref 13.5–17.5)
LYMPHOCYTES ABSOLUTE: 1.1 K/UL (ref 1–5.1)
LYMPHOCYTES RELATIVE PERCENT: 15.8 %
MAGNESIUM: 1.8 MG/DL (ref 1.8–2.4)
MCH RBC QN AUTO: 25.6 PG (ref 26–34)
MCHC RBC AUTO-ENTMCNC: 32.1 G/DL (ref 31–36)
MCV RBC AUTO: 79.8 FL (ref 80–100)
MONOCYTES ABSOLUTE: 1 K/UL (ref 0–1.3)
MONOCYTES RELATIVE PERCENT: 14.7 %
NEUTROPHILS ABSOLUTE: 4.5 K/UL (ref 1.7–7.7)
NEUTROPHILS RELATIVE PERCENT: 65.5 %
PDW BLD-RTO: 19.3 % (ref 12.4–15.4)
PERFORMED ON: ABNORMAL
PLATELET # BLD: 219 K/UL (ref 135–450)
PMV BLD AUTO: 8.2 FL (ref 5–10.5)
POTASSIUM SERPL-SCNC: 3.3 MMOL/L (ref 3.5–5.1)
RBC # BLD: 3.04 M/UL (ref 4.2–5.9)
SODIUM BLD-SCNC: 135 MMOL/L (ref 136–145)
WBC # BLD: 6.9 K/UL (ref 4–11)

## 2019-08-18 PROCEDURE — 6370000000 HC RX 637 (ALT 250 FOR IP): Performed by: INTERNAL MEDICINE

## 2019-08-18 PROCEDURE — 99232 SBSQ HOSP IP/OBS MODERATE 35: CPT | Performed by: NURSE PRACTITIONER

## 2019-08-18 PROCEDURE — 6370000000 HC RX 637 (ALT 250 FOR IP): Performed by: NURSE PRACTITIONER

## 2019-08-18 PROCEDURE — 2580000003 HC RX 258

## 2019-08-18 PROCEDURE — 86900 BLOOD TYPING SEROLOGIC ABO: CPT

## 2019-08-18 PROCEDURE — 94760 N-INVAS EAR/PLS OXIMETRY 1: CPT

## 2019-08-18 PROCEDURE — 6360000002 HC RX W HCPCS

## 2019-08-18 PROCEDURE — 85025 COMPLETE CBC W/AUTO DIFF WBC: CPT

## 2019-08-18 PROCEDURE — 86850 RBC ANTIBODY SCREEN: CPT

## 2019-08-18 PROCEDURE — 80048 BASIC METABOLIC PNL TOTAL CA: CPT

## 2019-08-18 PROCEDURE — 2700000000 HC OXYGEN THERAPY PER DAY

## 2019-08-18 PROCEDURE — 83735 ASSAY OF MAGNESIUM: CPT

## 2019-08-18 PROCEDURE — 6360000002 HC RX W HCPCS: Performed by: INTERNAL MEDICINE

## 2019-08-18 PROCEDURE — 94640 AIRWAY INHALATION TREATMENT: CPT

## 2019-08-18 PROCEDURE — 2580000003 HC RX 258: Performed by: NURSE PRACTITIONER

## 2019-08-18 PROCEDURE — 86901 BLOOD TYPING SEROLOGIC RH(D): CPT

## 2019-08-18 PROCEDURE — 36415 COLL VENOUS BLD VENIPUNCTURE: CPT

## 2019-08-18 PROCEDURE — 6360000002 HC RX W HCPCS: Performed by: NURSE PRACTITIONER

## 2019-08-18 PROCEDURE — 2060000000 HC ICU INTERMEDIATE R&B

## 2019-08-18 PROCEDURE — 2580000003 HC RX 258: Performed by: INTERNAL MEDICINE

## 2019-08-18 RX ORDER — POTASSIUM CHLORIDE 7.45 MG/ML
10 INJECTION INTRAVENOUS
Status: ACTIVE | OUTPATIENT
Start: 2019-08-18 | End: 2019-08-18

## 2019-08-18 RX ORDER — ACETAZOLAMIDE 250 MG/1
500 TABLET ORAL 2 TIMES DAILY
Status: COMPLETED | OUTPATIENT
Start: 2019-08-18 | End: 2019-08-19

## 2019-08-18 RX ORDER — POTASSIUM CHLORIDE 7.45 MG/ML
10 INJECTION INTRAVENOUS
Status: COMPLETED | OUTPATIENT
Start: 2019-08-18 | End: 2019-08-18

## 2019-08-18 RX ORDER — 0.9 % SODIUM CHLORIDE 0.9 %
250 INTRAVENOUS SOLUTION INTRAVENOUS ONCE
Status: DISCONTINUED | OUTPATIENT
Start: 2019-08-18 | End: 2019-08-23 | Stop reason: HOSPADM

## 2019-08-18 RX ADMIN — IPRATROPIUM BROMIDE AND ALBUTEROL SULFATE 1 AMPULE: .5; 3 SOLUTION RESPIRATORY (INHALATION) at 18:44

## 2019-08-18 RX ADMIN — Medication 1 CAPSULE: at 08:54

## 2019-08-18 RX ADMIN — TRAZODONE HYDROCHLORIDE 50 MG: 50 TABLET ORAL at 21:19

## 2019-08-18 RX ADMIN — INSULIN LISPRO 1 UNITS: 100 INJECTION, SOLUTION INTRAVENOUS; SUBCUTANEOUS at 18:23

## 2019-08-18 RX ADMIN — POTASSIUM CHLORIDE 10 MEQ: 7.46 INJECTION, SOLUTION INTRAVENOUS at 13:12

## 2019-08-18 RX ADMIN — ACETAZOLAMIDE 500 MG: 250 TABLET ORAL at 18:21

## 2019-08-18 RX ADMIN — POTASSIUM CHLORIDE 10 MEQ: 7.46 INJECTION, SOLUTION INTRAVENOUS at 13:55

## 2019-08-18 RX ADMIN — MILRINONE LACTATE IN DEXTROSE 0.38 MCG/KG/MIN: 200 INJECTION, SOLUTION INTRAVENOUS at 16:32

## 2019-08-18 RX ADMIN — ACETAZOLAMIDE 500 MG: 250 TABLET ORAL at 21:19

## 2019-08-18 RX ADMIN — GABAPENTIN 100 MG: 100 CAPSULE ORAL at 08:54

## 2019-08-18 RX ADMIN — Medication 10 ML: at 21:19

## 2019-08-18 RX ADMIN — MILRINONE LACTATE IN DEXTROSE 0.38 MCG/KG/MIN: 200 INJECTION, SOLUTION INTRAVENOUS at 08:45

## 2019-08-18 RX ADMIN — PANTOPRAZOLE SODIUM 40 MG: 40 TABLET, DELAYED RELEASE ORAL at 06:32

## 2019-08-18 RX ADMIN — INSULIN GLARGINE 8 UNITS: 100 INJECTION, SOLUTION SUBCUTANEOUS at 21:19

## 2019-08-18 RX ADMIN — TAMSULOSIN HYDROCHLORIDE 0.4 MG: 0.4 CAPSULE ORAL at 08:54

## 2019-08-18 RX ADMIN — INSULIN LISPRO 1 UNITS: 100 INJECTION, SOLUTION INTRAVENOUS; SUBCUTANEOUS at 21:19

## 2019-08-18 RX ADMIN — POTASSIUM CHLORIDE 40 MEQ: 20 TABLET, EXTENDED RELEASE ORAL at 18:21

## 2019-08-18 RX ADMIN — FERROUS SULFATE TAB EC 324 MG (65 MG FE EQUIVALENT) 324 MG: 324 (65 FE) TABLET DELAYED RESPONSE at 08:54

## 2019-08-18 RX ADMIN — LISINOPRIL 2.5 MG: 5 TABLET ORAL at 08:54

## 2019-08-18 RX ADMIN — Medication 10 ML: at 16:33

## 2019-08-18 RX ADMIN — Medication 2 PUFF: at 21:00

## 2019-08-18 RX ADMIN — RIVAROXABAN 15 MG: 15 TABLET, FILM COATED ORAL at 08:54

## 2019-08-18 RX ADMIN — GABAPENTIN 100 MG: 100 CAPSULE ORAL at 13:09

## 2019-08-18 RX ADMIN — METOPROLOL SUCCINATE 50 MG: 50 TABLET, EXTENDED RELEASE ORAL at 08:54

## 2019-08-18 RX ADMIN — Medication 1 CAPSULE: at 21:19

## 2019-08-18 RX ADMIN — OXYCODONE HYDROCHLORIDE AND ACETAMINOPHEN 1 TABLET: 5; 325 TABLET ORAL at 13:14

## 2019-08-18 RX ADMIN — OXYCODONE HYDROCHLORIDE AND ACETAMINOPHEN 1 TABLET: 5; 325 TABLET ORAL at 00:53

## 2019-08-18 RX ADMIN — Medication 400 MG: at 08:53

## 2019-08-18 RX ADMIN — FUROSEMIDE 10 MG/HR: 10 INJECTION, SOLUTION INTRAMUSCULAR; INTRAVENOUS at 08:45

## 2019-08-18 RX ADMIN — GABAPENTIN 100 MG: 100 CAPSULE ORAL at 21:19

## 2019-08-18 RX ADMIN — AMIODARONE HYDROCHLORIDE 200 MG: 200 TABLET ORAL at 08:54

## 2019-08-18 RX ADMIN — OXYCODONE HYDROCHLORIDE AND ACETAMINOPHEN 1 TABLET: 5; 325 TABLET ORAL at 19:21

## 2019-08-18 RX ADMIN — ATORVASTATIN CALCIUM 40 MG: 40 TABLET, FILM COATED ORAL at 08:54

## 2019-08-18 RX ADMIN — POTASSIUM CHLORIDE 40 MEQ: 20 TABLET, EXTENDED RELEASE ORAL at 08:54

## 2019-08-18 RX ADMIN — OXYCODONE HYDROCHLORIDE AND ACETAMINOPHEN 1 TABLET: 5; 325 TABLET ORAL at 06:32

## 2019-08-18 ASSESSMENT — PAIN DESCRIPTION - PAIN TYPE
TYPE: ACUTE PAIN;CHRONIC PAIN
TYPE: ACUTE PAIN;CHRONIC PAIN
TYPE: CHRONIC PAIN;ACUTE PAIN
TYPE: ACUTE PAIN;CHRONIC PAIN

## 2019-08-18 ASSESSMENT — PAIN - FUNCTIONAL ASSESSMENT
PAIN_FUNCTIONAL_ASSESSMENT: PREVENTS OR INTERFERES SOME ACTIVE ACTIVITIES AND ADLS

## 2019-08-18 ASSESSMENT — PAIN SCALES - GENERAL
PAINLEVEL_OUTOF10: 8
PAINLEVEL_OUTOF10: 10
PAINLEVEL_OUTOF10: 8
PAINLEVEL_OUTOF10: 6
PAINLEVEL_OUTOF10: 5
PAINLEVEL_OUTOF10: 8
PAINLEVEL_OUTOF10: 8

## 2019-08-18 ASSESSMENT — PAIN DESCRIPTION - PROGRESSION
CLINICAL_PROGRESSION: NOT CHANGED

## 2019-08-18 ASSESSMENT — PAIN DESCRIPTION - DESCRIPTORS
DESCRIPTORS: ACHING

## 2019-08-18 ASSESSMENT — PAIN DESCRIPTION - DIRECTION
RADIATING_TOWARDS: NO

## 2019-08-18 ASSESSMENT — PAIN DESCRIPTION - LOCATION
LOCATION: GENERALIZED

## 2019-08-18 ASSESSMENT — PAIN DESCRIPTION - FREQUENCY
FREQUENCY: CONTINUOUS

## 2019-08-18 ASSESSMENT — PAIN DESCRIPTION - ORIENTATION
ORIENTATION: OTHER (COMMENT)

## 2019-08-18 ASSESSMENT — PAIN DESCRIPTION - ONSET
ONSET: ON-GOING

## 2019-08-18 NOTE — PROGRESS NOTES
cc, strict I&O, telemetry monitoring, patient had a history of ventricular tachycardia and cardiac arrest at some point during last admission, will monitor clinically, his Hg dropped so we transfused one unit, transfusing iron, continue to monitor. Hb 7.8, if further drop, will consider transfusion. 2.  Hypokalemia, despite daily supplements, due to iv lasix drip, will add 20 meq iv. 3. Iron dif anemia, transfused, given iv venofer, started on po iron. consulted GI, recommendations noted, patient on OAC. No immediate need for interventions or procedures.   4.  Ventricular tachycardia, amiodarone, cardiology consulted. 5.  Diabetes mellitus, sliding scale and lantus, adjusted yesterday for hypoglycemia episode. 6.  Essential hypertension, resumed p.o. Medication  7.  Atrial fibrillation, on Xarelto  8. Chronic respiratory failure with hypoxia, on home oxygen, POA, stable for now.       Diet: DIET CARB CONTROL; Low Sodium (2 GM); Daily Fluid Restriction: 1500 ml  Code Status: Full Code        Dispo - ongoing management on iv lasix.     Lucinda Lopes MD

## 2019-08-19 LAB
ANION GAP SERPL CALCULATED.3IONS-SCNC: 11 MMOL/L (ref 3–16)
BASOPHILS ABSOLUTE: 0.1 K/UL (ref 0–0.2)
BASOPHILS RELATIVE PERCENT: 1.5 %
BUN BLDV-MCNC: 16 MG/DL (ref 7–20)
CALCIUM SERPL-MCNC: 9 MG/DL (ref 8.3–10.6)
CHLORIDE BLD-SCNC: 85 MMOL/L (ref 99–110)
CO2: 38 MMOL/L (ref 21–32)
CREAT SERPL-MCNC: 1 MG/DL (ref 0.9–1.3)
EOSINOPHILS ABSOLUTE: 0.2 K/UL (ref 0–0.6)
EOSINOPHILS RELATIVE PERCENT: 2.8 %
GFR AFRICAN AMERICAN: >60
GFR NON-AFRICAN AMERICAN: >60
GLUCOSE BLD-MCNC: 137 MG/DL (ref 70–99)
GLUCOSE BLD-MCNC: 145 MG/DL (ref 70–99)
GLUCOSE BLD-MCNC: 151 MG/DL (ref 70–99)
GLUCOSE BLD-MCNC: 162 MG/DL (ref 70–99)
GLUCOSE BLD-MCNC: 170 MG/DL (ref 70–99)
HCT VFR BLD CALC: 24.9 % (ref 40.5–52.5)
HEMOGLOBIN: 7.9 G/DL (ref 13.5–17.5)
LYMPHOCYTES ABSOLUTE: 1.1 K/UL (ref 1–5.1)
LYMPHOCYTES RELATIVE PERCENT: 14.3 %
MCH RBC QN AUTO: 26 PG (ref 26–34)
MCHC RBC AUTO-ENTMCNC: 31.8 G/DL (ref 31–36)
MCV RBC AUTO: 81.6 FL (ref 80–100)
MONOCYTES ABSOLUTE: 1 K/UL (ref 0–1.3)
MONOCYTES RELATIVE PERCENT: 13.4 %
NEUTROPHILS ABSOLUTE: 5.2 K/UL (ref 1.7–7.7)
NEUTROPHILS RELATIVE PERCENT: 68 %
PDW BLD-RTO: 19.8 % (ref 12.4–15.4)
PERFORMED ON: ABNORMAL
PLATELET # BLD: 221 K/UL (ref 135–450)
PMV BLD AUTO: 8.4 FL (ref 5–10.5)
POTASSIUM SERPL-SCNC: 3.9 MMOL/L (ref 3.5–5.1)
PRO-BNP: 1213 PG/ML (ref 0–124)
RBC # BLD: 3.05 M/UL (ref 4.2–5.9)
SODIUM BLD-SCNC: 134 MMOL/L (ref 136–145)
WBC # BLD: 7.7 K/UL (ref 4–11)

## 2019-08-19 PROCEDURE — 6360000002 HC RX W HCPCS

## 2019-08-19 PROCEDURE — 6370000000 HC RX 637 (ALT 250 FOR IP): Performed by: NURSE PRACTITIONER

## 2019-08-19 PROCEDURE — 6370000000 HC RX 637 (ALT 250 FOR IP): Performed by: INTERNAL MEDICINE

## 2019-08-19 PROCEDURE — 2060000000 HC ICU INTERMEDIATE R&B

## 2019-08-19 PROCEDURE — 94760 N-INVAS EAR/PLS OXIMETRY 1: CPT

## 2019-08-19 PROCEDURE — 36415 COLL VENOUS BLD VENIPUNCTURE: CPT

## 2019-08-19 PROCEDURE — 80048 BASIC METABOLIC PNL TOTAL CA: CPT

## 2019-08-19 PROCEDURE — 97535 SELF CARE MNGMENT TRAINING: CPT

## 2019-08-19 PROCEDURE — 94640 AIRWAY INHALATION TREATMENT: CPT

## 2019-08-19 PROCEDURE — 97530 THERAPEUTIC ACTIVITIES: CPT | Performed by: PHYSICAL THERAPIST

## 2019-08-19 PROCEDURE — 6360000002 HC RX W HCPCS: Performed by: NURSE PRACTITIONER

## 2019-08-19 PROCEDURE — 2580000003 HC RX 258: Performed by: INTERNAL MEDICINE

## 2019-08-19 PROCEDURE — 83880 ASSAY OF NATRIURETIC PEPTIDE: CPT

## 2019-08-19 PROCEDURE — 2700000000 HC OXYGEN THERAPY PER DAY

## 2019-08-19 PROCEDURE — 97530 THERAPEUTIC ACTIVITIES: CPT

## 2019-08-19 PROCEDURE — 85025 COMPLETE CBC W/AUTO DIFF WBC: CPT

## 2019-08-19 PROCEDURE — 2580000003 HC RX 258: Performed by: NURSE PRACTITIONER

## 2019-08-19 PROCEDURE — 99233 SBSQ HOSP IP/OBS HIGH 50: CPT | Performed by: NURSE PRACTITIONER

## 2019-08-19 RX ADMIN — ACETAZOLAMIDE 500 MG: 250 TABLET ORAL at 08:15

## 2019-08-19 RX ADMIN — INSULIN LISPRO 1 UNITS: 100 INJECTION, SOLUTION INTRAVENOUS; SUBCUTANEOUS at 21:24

## 2019-08-19 RX ADMIN — OXYCODONE HYDROCHLORIDE AND ACETAMINOPHEN 1 TABLET: 5; 325 TABLET ORAL at 00:56

## 2019-08-19 RX ADMIN — Medication 2 PUFF: at 20:17

## 2019-08-19 RX ADMIN — Medication 1 CAPSULE: at 20:46

## 2019-08-19 RX ADMIN — Medication 10 ML: at 20:47

## 2019-08-19 RX ADMIN — GABAPENTIN 100 MG: 100 CAPSULE ORAL at 20:46

## 2019-08-19 RX ADMIN — OXYCODONE HYDROCHLORIDE AND ACETAMINOPHEN 1 TABLET: 5; 325 TABLET ORAL at 20:46

## 2019-08-19 RX ADMIN — Medication 1 CAPSULE: at 08:15

## 2019-08-19 RX ADMIN — TAMSULOSIN HYDROCHLORIDE 0.4 MG: 0.4 CAPSULE ORAL at 08:12

## 2019-08-19 RX ADMIN — LISINOPRIL 2.5 MG: 5 TABLET ORAL at 08:12

## 2019-08-19 RX ADMIN — FUROSEMIDE 10 MG/HR: 10 INJECTION, SOLUTION INTRAMUSCULAR; INTRAVENOUS at 18:11

## 2019-08-19 RX ADMIN — MILRINONE LACTATE IN DEXTROSE 0.38 MCG/KG/MIN: 200 INJECTION, SOLUTION INTRAVENOUS at 08:49

## 2019-08-19 RX ADMIN — AMIODARONE HYDROCHLORIDE 200 MG: 200 TABLET ORAL at 08:12

## 2019-08-19 RX ADMIN — GABAPENTIN 100 MG: 100 CAPSULE ORAL at 13:46

## 2019-08-19 RX ADMIN — FERROUS SULFATE TAB EC 324 MG (65 MG FE EQUIVALENT) 324 MG: 324 (65 FE) TABLET DELAYED RESPONSE at 08:15

## 2019-08-19 RX ADMIN — FUROSEMIDE 10 MG/HR: 10 INJECTION, SOLUTION INTRAMUSCULAR; INTRAVENOUS at 08:46

## 2019-08-19 RX ADMIN — IPRATROPIUM BROMIDE AND ALBUTEROL SULFATE 1 AMPULE: .5; 3 SOLUTION RESPIRATORY (INHALATION) at 13:30

## 2019-08-19 RX ADMIN — RIVAROXABAN 15 MG: 15 TABLET, FILM COATED ORAL at 08:12

## 2019-08-19 RX ADMIN — Medication 10 ML: at 08:16

## 2019-08-19 RX ADMIN — GABAPENTIN 100 MG: 100 CAPSULE ORAL at 08:12

## 2019-08-19 RX ADMIN — INSULIN GLARGINE 8 UNITS: 100 INJECTION, SOLUTION SUBCUTANEOUS at 21:24

## 2019-08-19 RX ADMIN — INSULIN LISPRO 1 UNITS: 100 INJECTION, SOLUTION INTRAVENOUS; SUBCUTANEOUS at 17:33

## 2019-08-19 RX ADMIN — PANTOPRAZOLE SODIUM 40 MG: 40 TABLET, DELAYED RELEASE ORAL at 06:16

## 2019-08-19 RX ADMIN — POTASSIUM CHLORIDE 40 MEQ: 20 TABLET, EXTENDED RELEASE ORAL at 08:12

## 2019-08-19 RX ADMIN — TRAZODONE HYDROCHLORIDE 50 MG: 50 TABLET ORAL at 20:47

## 2019-08-19 RX ADMIN — ATORVASTATIN CALCIUM 40 MG: 40 TABLET, FILM COATED ORAL at 08:12

## 2019-08-19 RX ADMIN — IPRATROPIUM BROMIDE AND ALBUTEROL SULFATE 1 AMPULE: .5; 3 SOLUTION RESPIRATORY (INHALATION) at 20:17

## 2019-08-19 RX ADMIN — OXYCODONE HYDROCHLORIDE AND ACETAMINOPHEN 1 TABLET: 5; 325 TABLET ORAL at 13:46

## 2019-08-19 RX ADMIN — OXYCODONE HYDROCHLORIDE AND ACETAMINOPHEN 1 TABLET: 5; 325 TABLET ORAL at 07:06

## 2019-08-19 RX ADMIN — METOPROLOL SUCCINATE 50 MG: 50 TABLET, EXTENDED RELEASE ORAL at 08:12

## 2019-08-19 RX ADMIN — POTASSIUM CHLORIDE 40 MEQ: 20 TABLET, EXTENDED RELEASE ORAL at 17:30

## 2019-08-19 RX ADMIN — MILRINONE LACTATE IN DEXTROSE 0.38 MCG/KG/MIN: 200 INJECTION, SOLUTION INTRAVENOUS at 17:30

## 2019-08-19 RX ADMIN — Medication 400 MG: at 08:12

## 2019-08-19 ASSESSMENT — PAIN DESCRIPTION - FREQUENCY
FREQUENCY: CONTINUOUS

## 2019-08-19 ASSESSMENT — PAIN DESCRIPTION - DESCRIPTORS
DESCRIPTORS: ACHING

## 2019-08-19 ASSESSMENT — PAIN DESCRIPTION - ORIENTATION
ORIENTATION: MID;LOWER
ORIENTATION: OTHER (COMMENT)

## 2019-08-19 ASSESSMENT — PAIN SCALES - GENERAL
PAINLEVEL_OUTOF10: 9
PAINLEVEL_OUTOF10: 2
PAINLEVEL_OUTOF10: 3
PAINLEVEL_OUTOF10: 7
PAINLEVEL_OUTOF10: 5
PAINLEVEL_OUTOF10: 8
PAINLEVEL_OUTOF10: 8
PAINLEVEL_OUTOF10: 6
PAINLEVEL_OUTOF10: 8

## 2019-08-19 ASSESSMENT — PAIN DESCRIPTION - LOCATION
LOCATION: GENERALIZED
LOCATION: GENERALIZED
LOCATION: BACK
LOCATION: GENERALIZED
LOCATION: GENERALIZED

## 2019-08-19 ASSESSMENT — PAIN DESCRIPTION - PROGRESSION
CLINICAL_PROGRESSION: GRADUALLY WORSENING
CLINICAL_PROGRESSION: NOT CHANGED
CLINICAL_PROGRESSION: GRADUALLY WORSENING
CLINICAL_PROGRESSION: NOT CHANGED
CLINICAL_PROGRESSION: GRADUALLY WORSENING

## 2019-08-19 ASSESSMENT — PAIN DESCRIPTION - PAIN TYPE
TYPE: CHRONIC PAIN
TYPE: ACUTE PAIN;CHRONIC PAIN
TYPE: ACUTE PAIN;CHRONIC PAIN
TYPE: CHRONIC PAIN;ACUTE PAIN
TYPE: CHRONIC PAIN;ACUTE PAIN

## 2019-08-19 ASSESSMENT — PAIN DESCRIPTION - ONSET
ONSET: AWAKENED FROM SLEEP
ONSET: ON-GOING

## 2019-08-19 NOTE — PROGRESS NOTES
intolerance. No excessive thirst, fluid intake, or urination. No tremor. · Hematologic/Lymphatic: No abnormal bruising or bleeding, blood clots or swollen lymph nodes. · Allergic/Immunologic: No nasal congestion or hives. Objective:   BP (!) 91/53   Pulse 105   Temp 97.4 °F (36.3 °C) (Oral)   Resp 16   Ht 5' 7\" (1.702 m)   Wt 214 lb 1.1 oz (97.1 kg)   SpO2 97%   BMI 33.53 kg/m²       Intake/Output Summary (Last 24 hours) at 8/19/2019 0851  Last data filed at 8/19/2019 0801  Gross per 24 hour   Intake 780 ml   Output 2800 ml   Net -2020 ml     Wt Readings from Last 3 Encounters:   08/19/19 214 lb 1.1 oz (97.1 kg)   08/05/19 217 lb (98.4 kg)   07/23/19 200 lb 9.9 oz (91 kg)       Physical Exam:  General: In no acute distress. Awake, alert, and oriented X4. SOB much improved. On O2  Skin:  Warm and dry. No new appearing rashes or lesions. Neck:  Supple. JVD improving. Carotids without bruits. Chest: Lungs clear with minimally diminished breath sounds posteriorly. No wheezes/rhonchi/rales  Cardiovascular: irreg, irreg; normal S1 and S2; soft systolic murmur   Abdomen: obese, soft, nontender, +bowel sounds.    Extremities:  RLE edema improved; L AKA; chronic stasis changes; anasarca improving    Medications:    sodium chloride  250 mL Intravenous Once    ferrous sulfate  324 mg Oral Daily with breakfast    insulin glargine  8 Units Subcutaneous Nightly    potassium chloride  40 mEq Oral BID WC    amiodarone  200 mg Oral Daily    atorvastatin  40 mg Oral Daily    gabapentin  100 mg Oral TID    lactobacillus  1 capsule Oral BID    magnesium oxide  400 mg Oral Daily    metoprolol succinate  50 mg Oral Daily    mometasone-formoterol  2 puff Inhalation BID    pantoprazole  40 mg Oral QAM AC    polyethylene glycol  17 g Oral Daily    tamsulosin  0.4 mg Oral Daily    traZODone  50 mg Oral Nightly    sodium chloride flush  10 mL Intravenous 2 times per day    lisinopril  2.5 mg Oral Daily    insulin lispro  0-6 Units Subcutaneous TID WC    insulin lispro  0-3 Units Subcutaneous Nightly    sodium chloride  250 mL Intravenous Once    rivaroxaban  15 mg Oral Daily      milrinone 0.375 mcg/kg/min (08/19/19 0849)    furosemide (LASIX) 1mg/ml infusion 10 mg/hr (08/19/19 0846)    dextrose       albuterol sulfate HFA, cyclobenzaprine, oxyCODONE-acetaminophen, sodium chloride flush, magnesium hydroxide, ondansetron, glucose, dextrose, glucagon (rDNA), dextrose, ipratropium-albuterol    Lab Data:  CBC:   Recent Labs     08/18/19  0457 08/19/19  0510   WBC 6.9 7.7   HGB 7.8* 7.9*    221     BMP:    Recent Labs     08/17/19  0501 08/18/19  0457    135*   K 3.5 3.3*   CO2 43* 43*   BUN 13 15   CREATININE 0.9 0.9     Results for Clarice Rogers (MRN [id number]) as of 8/19/2019 11:48   Ref. Range 8/13/2019 03:56 8/14/2019 05:23 8/15/2019 05:46 8/17/2019 05:01 8/19/2019 05:10   Pro-BNP Latest Ref Range: 0 - 124 pg/mL 3,642 (H)  2,591 (H) 2,100 (H) 1,213 (H)   Cholesterol, Total Latest Ref Range: 0 - 199 mg/dL  47      HDL Cholesterol Latest Ref Range: 40 - 60 mg/dL  20 (L)      LDL Calculated Latest Ref Range: <100 mg/dL  16      Triglycerides Latest Ref Range: 0 - 150 mg/dL  57      VLDL Cholesterol Calculated Latest Ref Range: Not Established mg/dL  11        ECG: Atrial fibrillation with premature ventricular or aberrantly conducted complexesRight axis deviationNonspecific T wave abnormalityLow voltage QRSSeptal infarct seen      Echo 6/22/2019:  Normal left ventricular size and wall thickness. Severe left ventricular dysfunction with global hypokinesis. Ejection fraction is visually estimated to be 30-35%. The right ventricle is severely enlarged with severely reduced function. The left atrium is mildly dilated. Mild mitral regurgitation.   There is mild tricuspid regurgitation with the systolic pulmonary artery  pressure (SPAP) estimated at 54 mmHg (estimated RA pressure of 15
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
## 2019-08-20 LAB
ANION GAP SERPL CALCULATED.3IONS-SCNC: 9 MMOL/L (ref 3–16)
BUN BLDV-MCNC: 19 MG/DL (ref 7–20)
CALCIUM SERPL-MCNC: 8.8 MG/DL (ref 8.3–10.6)
CHLORIDE BLD-SCNC: 87 MMOL/L (ref 99–110)
CO2: 36 MMOL/L (ref 21–32)
CREAT SERPL-MCNC: 1.2 MG/DL (ref 0.9–1.3)
GFR AFRICAN AMERICAN: >60
GFR NON-AFRICAN AMERICAN: >60
GLUCOSE BLD-MCNC: 134 MG/DL (ref 70–99)
GLUCOSE BLD-MCNC: 137 MG/DL (ref 70–99)
GLUCOSE BLD-MCNC: 144 MG/DL (ref 70–99)
GLUCOSE BLD-MCNC: 146 MG/DL (ref 70–99)
GLUCOSE BLD-MCNC: 150 MG/DL (ref 70–99)
PERFORMED ON: ABNORMAL
POTASSIUM SERPL-SCNC: 3.7 MMOL/L (ref 3.5–5.1)
SODIUM BLD-SCNC: 132 MMOL/L (ref 136–145)

## 2019-08-20 PROCEDURE — 6370000000 HC RX 637 (ALT 250 FOR IP): Performed by: INTERNAL MEDICINE

## 2019-08-20 PROCEDURE — 6370000000 HC RX 637 (ALT 250 FOR IP): Performed by: NURSE PRACTITIONER

## 2019-08-20 PROCEDURE — 94640 AIRWAY INHALATION TREATMENT: CPT

## 2019-08-20 PROCEDURE — 2700000000 HC OXYGEN THERAPY PER DAY

## 2019-08-20 PROCEDURE — 2580000003 HC RX 258: Performed by: INTERNAL MEDICINE

## 2019-08-20 PROCEDURE — 6360000002 HC RX W HCPCS: Performed by: NURSE PRACTITIONER

## 2019-08-20 PROCEDURE — 36415 COLL VENOUS BLD VENIPUNCTURE: CPT

## 2019-08-20 PROCEDURE — 97530 THERAPEUTIC ACTIVITIES: CPT

## 2019-08-20 PROCEDURE — 99232 SBSQ HOSP IP/OBS MODERATE 35: CPT | Performed by: NURSE PRACTITIONER

## 2019-08-20 PROCEDURE — 94761 N-INVAS EAR/PLS OXIMETRY MLT: CPT

## 2019-08-20 PROCEDURE — 97530 THERAPEUTIC ACTIVITIES: CPT | Performed by: PHYSICAL THERAPIST

## 2019-08-20 PROCEDURE — 80048 BASIC METABOLIC PNL TOTAL CA: CPT

## 2019-08-20 PROCEDURE — 2580000003 HC RX 258: Performed by: NURSE PRACTITIONER

## 2019-08-20 PROCEDURE — 2060000000 HC ICU INTERMEDIATE R&B

## 2019-08-20 RX ADMIN — OXYCODONE HYDROCHLORIDE AND ACETAMINOPHEN 1 TABLET: 5; 325 TABLET ORAL at 20:15

## 2019-08-20 RX ADMIN — POTASSIUM CHLORIDE 40 MEQ: 20 TABLET, EXTENDED RELEASE ORAL at 17:11

## 2019-08-20 RX ADMIN — INSULIN LISPRO 1 UNITS: 100 INJECTION, SOLUTION INTRAVENOUS; SUBCUTANEOUS at 08:23

## 2019-08-20 RX ADMIN — IPRATROPIUM BROMIDE AND ALBUTEROL SULFATE 1 AMPULE: .5; 3 SOLUTION RESPIRATORY (INHALATION) at 08:13

## 2019-08-20 RX ADMIN — FERROUS SULFATE TAB EC 324 MG (65 MG FE EQUIVALENT) 324 MG: 324 (65 FE) TABLET DELAYED RESPONSE at 08:19

## 2019-08-20 RX ADMIN — INSULIN LISPRO 1 UNITS: 100 INJECTION, SOLUTION INTRAVENOUS; SUBCUTANEOUS at 17:12

## 2019-08-20 RX ADMIN — OXYCODONE HYDROCHLORIDE AND ACETAMINOPHEN 1 TABLET: 5; 325 TABLET ORAL at 12:50

## 2019-08-20 RX ADMIN — MILRINONE LACTATE IN DEXTROSE 0.38 MCG/KG/MIN: 200 INJECTION, SOLUTION INTRAVENOUS at 11:10

## 2019-08-20 RX ADMIN — MILRINONE LACTATE IN DEXTROSE 0.38 MCG/KG/MIN: 200 INJECTION, SOLUTION INTRAVENOUS at 03:04

## 2019-08-20 RX ADMIN — POTASSIUM CHLORIDE 40 MEQ: 20 TABLET, EXTENDED RELEASE ORAL at 08:20

## 2019-08-20 RX ADMIN — GABAPENTIN 100 MG: 100 CAPSULE ORAL at 20:16

## 2019-08-20 RX ADMIN — Medication 400 MG: at 08:20

## 2019-08-20 RX ADMIN — AMIODARONE HYDROCHLORIDE 200 MG: 200 TABLET ORAL at 08:20

## 2019-08-20 RX ADMIN — FUROSEMIDE 10 MG/HR: 10 INJECTION, SOLUTION INTRAMUSCULAR; INTRAVENOUS at 03:34

## 2019-08-20 RX ADMIN — IPRATROPIUM BROMIDE AND ALBUTEROL SULFATE 1 AMPULE: .5; 3 SOLUTION RESPIRATORY (INHALATION) at 00:23

## 2019-08-20 RX ADMIN — FUROSEMIDE 10 MG/HR: 10 INJECTION, SOLUTION INTRAMUSCULAR; INTRAVENOUS at 22:55

## 2019-08-20 RX ADMIN — TAMSULOSIN HYDROCHLORIDE 0.4 MG: 0.4 CAPSULE ORAL at 08:19

## 2019-08-20 RX ADMIN — Medication 2 PUFF: at 20:06

## 2019-08-20 RX ADMIN — TRAZODONE HYDROCHLORIDE 50 MG: 50 TABLET ORAL at 20:15

## 2019-08-20 RX ADMIN — INSULIN GLARGINE 8 UNITS: 100 INJECTION, SOLUTION SUBCUTANEOUS at 23:23

## 2019-08-20 RX ADMIN — METOPROLOL SUCCINATE 50 MG: 50 TABLET, EXTENDED RELEASE ORAL at 08:20

## 2019-08-20 RX ADMIN — INSULIN LISPRO 1 UNITS: 100 INJECTION, SOLUTION INTRAVENOUS; SUBCUTANEOUS at 23:23

## 2019-08-20 RX ADMIN — LISINOPRIL 2.5 MG: 5 TABLET ORAL at 08:19

## 2019-08-20 RX ADMIN — Medication 2 PUFF: at 08:23

## 2019-08-20 RX ADMIN — IPRATROPIUM BROMIDE AND ALBUTEROL SULFATE 1 AMPULE: .5; 3 SOLUTION RESPIRATORY (INHALATION) at 11:55

## 2019-08-20 RX ADMIN — FUROSEMIDE 10 MG/HR: 10 INJECTION, SOLUTION INTRAMUSCULAR; INTRAVENOUS at 12:08

## 2019-08-20 RX ADMIN — Medication 10 ML: at 20:16

## 2019-08-20 RX ADMIN — Medication 10 ML: at 08:21

## 2019-08-20 RX ADMIN — ATORVASTATIN CALCIUM 40 MG: 40 TABLET, FILM COATED ORAL at 08:19

## 2019-08-20 RX ADMIN — Medication 1 CAPSULE: at 08:19

## 2019-08-20 RX ADMIN — OXYCODONE HYDROCHLORIDE AND ACETAMINOPHEN 1 TABLET: 5; 325 TABLET ORAL at 05:23

## 2019-08-20 RX ADMIN — GABAPENTIN 100 MG: 100 CAPSULE ORAL at 12:50

## 2019-08-20 RX ADMIN — PANTOPRAZOLE SODIUM 40 MG: 40 TABLET, DELAYED RELEASE ORAL at 05:55

## 2019-08-20 RX ADMIN — RIVAROXABAN 15 MG: 15 TABLET, FILM COATED ORAL at 08:20

## 2019-08-20 RX ADMIN — Medication 1 CAPSULE: at 20:15

## 2019-08-20 RX ADMIN — GABAPENTIN 100 MG: 100 CAPSULE ORAL at 08:19

## 2019-08-20 ASSESSMENT — PAIN DESCRIPTION - PAIN TYPE
TYPE: CHRONIC PAIN
TYPE: ACUTE PAIN;CHRONIC PAIN
TYPE: CHRONIC PAIN

## 2019-08-20 ASSESSMENT — PAIN DESCRIPTION - DESCRIPTORS
DESCRIPTORS: ACHING

## 2019-08-20 ASSESSMENT — PAIN DESCRIPTION - LOCATION
LOCATION: BACK
LOCATION: GENERALIZED
LOCATION: BACK

## 2019-08-20 ASSESSMENT — PAIN DESCRIPTION - ONSET
ONSET: ON-GOING

## 2019-08-20 ASSESSMENT — PAIN DESCRIPTION - FREQUENCY
FREQUENCY: CONTINUOUS

## 2019-08-20 ASSESSMENT — PAIN - FUNCTIONAL ASSESSMENT
PAIN_FUNCTIONAL_ASSESSMENT: PREVENTS OR INTERFERES SOME ACTIVE ACTIVITIES AND ADLS

## 2019-08-20 ASSESSMENT — PAIN DESCRIPTION - ORIENTATION: ORIENTATION: LOWER;MID

## 2019-08-20 ASSESSMENT — PAIN SCALES - GENERAL
PAINLEVEL_OUTOF10: 4
PAINLEVEL_OUTOF10: 6
PAINLEVEL_OUTOF10: 8
PAINLEVEL_OUTOF10: 7
PAINLEVEL_OUTOF10: 8
PAINLEVEL_OUTOF10: 0

## 2019-08-20 ASSESSMENT — PAIN DESCRIPTION - PROGRESSION
CLINICAL_PROGRESSION: NOT CHANGED
CLINICAL_PROGRESSION: GRADUALLY WORSENING
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
## 2019-08-20 NOTE — PROGRESS NOTES
Hospitalist Progress Note      PCP: Vinny Kelley MD    Date of Admission: 8/13/2019      Subjective: Pt S/E. No new complaints. Blood sugars well controlled. Medications:  Reviewed    Infusion Medications    milrinone 0.375 mcg/kg/min (08/20/19 0304)    furosemide (LASIX) 1mg/ml infusion 10 mg/hr (08/20/19 0334)    dextrose       Scheduled Medications    sodium chloride  250 mL Intravenous Once    ferrous sulfate  324 mg Oral Daily with breakfast    insulin glargine  8 Units Subcutaneous Nightly    potassium chloride  40 mEq Oral BID WC    amiodarone  200 mg Oral Daily    atorvastatin  40 mg Oral Daily    gabapentin  100 mg Oral TID    lactobacillus  1 capsule Oral BID    magnesium oxide  400 mg Oral Daily    metoprolol succinate  50 mg Oral Daily    mometasone-formoterol  2 puff Inhalation BID    pantoprazole  40 mg Oral QAM AC    polyethylene glycol  17 g Oral Daily    tamsulosin  0.4 mg Oral Daily    traZODone  50 mg Oral Nightly    sodium chloride flush  10 mL Intravenous 2 times per day    lisinopril  2.5 mg Oral Daily    insulin lispro  0-6 Units Subcutaneous TID WC    insulin lispro  0-3 Units Subcutaneous Nightly    sodium chloride  250 mL Intravenous Once    rivaroxaban  15 mg Oral Daily     PRN Meds: albuterol sulfate HFA, cyclobenzaprine, oxyCODONE-acetaminophen, sodium chloride flush, magnesium hydroxide, ondansetron, glucose, dextrose, glucagon (rDNA), dextrose, ipratropium-albuterol      Intake/Output Summary (Last 24 hours) at 8/20/2019 0915  Last data filed at 8/20/2019 0914  Gross per 24 hour   Intake 240 ml   Output 2125 ml   Net -1885 ml       Physical Exam Performed:    BP (!) 94/54   Pulse 96   Temp 98.8 °F (37.1 °C) (Oral)   Resp 18   Ht 5' 7\" (1.702 m)   Wt 214 lb 11.7 oz (97.4 kg)   SpO2 98%   BMI 33.63 kg/m²     General appearance: No apparent distress, appears comfortable  Neck: Supple, trachea midline   Respiratory:  Normal respiratory effort.
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
## 2019-08-20 NOTE — PROGRESS NOTES
intolerance. No excessive thirst, fluid intake, or urination. No tremor. · Hematologic/Lymphatic: No abnormal bruising or bleeding, blood clots or swollen lymph nodes. · Allergic/Immunologic: No nasal congestion or hives. Objective:   BP (!) 94/54   Pulse 96   Temp 98.8 °F (37.1 °C) (Oral)   Resp 18   Ht 5' 7\" (1.702 m)   Wt 214 lb 11.7 oz (97.4 kg)   SpO2 98%   BMI 33.63 kg/m²       Intake/Output Summary (Last 24 hours) at 8/20/2019 1107  Last data filed at 8/20/2019 0914  Gross per 24 hour   Intake 240 ml   Output 2125 ml   Net -1885 ml     Wt Readings from Last 3 Encounters:   08/20/19 214 lb 11.7 oz (97.4 kg)   08/05/19 217 lb (98.4 kg)   07/23/19 200 lb 9.9 oz (91 kg)       Physical Exam:  General: In no acute distress. Awake, alert, and oriented X4. Lying in bed  Skin:  Warm and dry. No new appearing rashes or lesions. Neck:  Supple and thick. + JVD. Carotids without bruits  Chest: Lungs clear with diminished breath sounds. No wheezes/rhonchi/rales  Cardiovascular:  Irreg; irreg; Normal S1 and S2. I/VI systolic murmur  Abdomen: obese, soft, nontender, +bowel sounds. Less distended. Extremities:  Persistent bilateral hip and upper thigh edema (suspect large dependent component d/t lack of activity); R leg edema much improved. Chronic stasis changes. Upper extremity edema resolved.  2+ bilateral radial pulses    Medications:    sodium chloride  250 mL Intravenous Once    ferrous sulfate  324 mg Oral Daily with breakfast    insulin glargine  8 Units Subcutaneous Nightly    potassium chloride  40 mEq Oral BID WC    amiodarone  200 mg Oral Daily    atorvastatin  40 mg Oral Daily    gabapentin  100 mg Oral TID    lactobacillus  1 capsule Oral BID    magnesium oxide  400 mg Oral Daily    metoprolol succinate  50 mg Oral Daily    mometasone-formoterol  2 puff Inhalation BID    pantoprazole  40 mg Oral QAM AC    polyethylene glycol  17 g Oral Daily    tamsulosin  0.4 mg Oral Daily   
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
## 2019-08-21 LAB
ANION GAP SERPL CALCULATED.3IONS-SCNC: 10 MMOL/L (ref 3–16)
BUN BLDV-MCNC: 20 MG/DL (ref 7–20)
CALCIUM SERPL-MCNC: 8.7 MG/DL (ref 8.3–10.6)
CHLORIDE BLD-SCNC: 91 MMOL/L (ref 99–110)
CO2: 34 MMOL/L (ref 21–32)
CREAT SERPL-MCNC: 1 MG/DL (ref 0.9–1.3)
GFR AFRICAN AMERICAN: >60
GFR NON-AFRICAN AMERICAN: >60
GLUCOSE BLD-MCNC: 126 MG/DL (ref 70–99)
GLUCOSE BLD-MCNC: 144 MG/DL (ref 70–99)
GLUCOSE BLD-MCNC: 148 MG/DL (ref 70–99)
GLUCOSE BLD-MCNC: 152 MG/DL (ref 70–99)
GLUCOSE BLD-MCNC: 153 MG/DL (ref 70–99)
HCT VFR BLD CALC: 26.4 % (ref 40.5–52.5)
HEMOGLOBIN: 8.4 G/DL (ref 13.5–17.5)
MAGNESIUM: 2.1 MG/DL (ref 1.8–2.4)
MCH RBC QN AUTO: 26.1 PG (ref 26–34)
MCHC RBC AUTO-ENTMCNC: 31.9 G/DL (ref 31–36)
MCV RBC AUTO: 81.7 FL (ref 80–100)
PDW BLD-RTO: 21 % (ref 12.4–15.4)
PERFORMED ON: ABNORMAL
PLATELET # BLD: 218 K/UL (ref 135–450)
PMV BLD AUTO: 9.8 FL (ref 5–10.5)
POTASSIUM SERPL-SCNC: 3.8 MMOL/L (ref 3.5–5.1)
PRO-BNP: 1728 PG/ML (ref 0–124)
RBC # BLD: 3.23 M/UL (ref 4.2–5.9)
SODIUM BLD-SCNC: 135 MMOL/L (ref 136–145)
WBC # BLD: 7.4 K/UL (ref 4–11)

## 2019-08-21 PROCEDURE — 2580000003 HC RX 258: Performed by: NURSE PRACTITIONER

## 2019-08-21 PROCEDURE — 80048 BASIC METABOLIC PNL TOTAL CA: CPT

## 2019-08-21 PROCEDURE — 2580000003 HC RX 258: Performed by: INTERNAL MEDICINE

## 2019-08-21 PROCEDURE — 36415 COLL VENOUS BLD VENIPUNCTURE: CPT

## 2019-08-21 PROCEDURE — 2060000000 HC ICU INTERMEDIATE R&B

## 2019-08-21 PROCEDURE — 85027 COMPLETE CBC AUTOMATED: CPT

## 2019-08-21 PROCEDURE — 6370000000 HC RX 637 (ALT 250 FOR IP): Performed by: INTERNAL MEDICINE

## 2019-08-21 PROCEDURE — 94640 AIRWAY INHALATION TREATMENT: CPT

## 2019-08-21 PROCEDURE — 97110 THERAPEUTIC EXERCISES: CPT

## 2019-08-21 PROCEDURE — 6360000002 HC RX W HCPCS

## 2019-08-21 PROCEDURE — 99232 SBSQ HOSP IP/OBS MODERATE 35: CPT | Performed by: NURSE PRACTITIONER

## 2019-08-21 PROCEDURE — 83880 ASSAY OF NATRIURETIC PEPTIDE: CPT

## 2019-08-21 PROCEDURE — 94761 N-INVAS EAR/PLS OXIMETRY MLT: CPT

## 2019-08-21 PROCEDURE — 6360000002 HC RX W HCPCS: Performed by: NURSE PRACTITIONER

## 2019-08-21 PROCEDURE — 2580000003 HC RX 258

## 2019-08-21 PROCEDURE — 83735 ASSAY OF MAGNESIUM: CPT

## 2019-08-21 PROCEDURE — 97530 THERAPEUTIC ACTIVITIES: CPT

## 2019-08-21 PROCEDURE — 6370000000 HC RX 637 (ALT 250 FOR IP): Performed by: NURSE PRACTITIONER

## 2019-08-21 PROCEDURE — 2700000000 HC OXYGEN THERAPY PER DAY

## 2019-08-21 RX ADMIN — Medication 10 ML: at 20:54

## 2019-08-21 RX ADMIN — OXYCODONE HYDROCHLORIDE AND ACETAMINOPHEN 1 TABLET: 5; 325 TABLET ORAL at 21:03

## 2019-08-21 RX ADMIN — INSULIN LISPRO 1 UNITS: 100 INJECTION, SOLUTION INTRAVENOUS; SUBCUTANEOUS at 13:24

## 2019-08-21 RX ADMIN — IPRATROPIUM BROMIDE AND ALBUTEROL SULFATE 1 AMPULE: .5; 3 SOLUTION RESPIRATORY (INHALATION) at 20:36

## 2019-08-21 RX ADMIN — IPRATROPIUM BROMIDE AND ALBUTEROL SULFATE 1 AMPULE: .5; 3 SOLUTION RESPIRATORY (INHALATION) at 07:45

## 2019-08-21 RX ADMIN — Medication 2 PUFF: at 20:36

## 2019-08-21 RX ADMIN — POTASSIUM CHLORIDE 40 MEQ: 20 TABLET, EXTENDED RELEASE ORAL at 19:36

## 2019-08-21 RX ADMIN — TRAZODONE HYDROCHLORIDE 50 MG: 50 TABLET ORAL at 20:54

## 2019-08-21 RX ADMIN — GABAPENTIN 100 MG: 100 CAPSULE ORAL at 20:54

## 2019-08-21 RX ADMIN — GABAPENTIN 100 MG: 100 CAPSULE ORAL at 08:14

## 2019-08-21 RX ADMIN — PANTOPRAZOLE SODIUM 40 MG: 40 TABLET, DELAYED RELEASE ORAL at 05:37

## 2019-08-21 RX ADMIN — TAMSULOSIN HYDROCHLORIDE 0.4 MG: 0.4 CAPSULE ORAL at 08:15

## 2019-08-21 RX ADMIN — POTASSIUM CHLORIDE 40 MEQ: 20 TABLET, EXTENDED RELEASE ORAL at 08:15

## 2019-08-21 RX ADMIN — Medication 400 MG: at 08:19

## 2019-08-21 RX ADMIN — RIVAROXABAN 15 MG: 15 TABLET, FILM COATED ORAL at 08:14

## 2019-08-21 RX ADMIN — IPRATROPIUM BROMIDE AND ALBUTEROL SULFATE 1 AMPULE: .5; 3 SOLUTION RESPIRATORY (INHALATION) at 11:59

## 2019-08-21 RX ADMIN — FERROUS SULFATE TAB EC 324 MG (65 MG FE EQUIVALENT) 324 MG: 324 (65 FE) TABLET DELAYED RESPONSE at 08:15

## 2019-08-21 RX ADMIN — INSULIN GLARGINE 8 UNITS: 100 INJECTION, SOLUTION SUBCUTANEOUS at 21:52

## 2019-08-21 RX ADMIN — IPRATROPIUM BROMIDE AND ALBUTEROL SULFATE 1 AMPULE: .5; 3 SOLUTION RESPIRATORY (INHALATION) at 16:01

## 2019-08-21 RX ADMIN — FUROSEMIDE 10 MG/HR: 10 INJECTION, SOLUTION INTRAMUSCULAR; INTRAVENOUS at 20:52

## 2019-08-21 RX ADMIN — METOPROLOL SUCCINATE 50 MG: 50 TABLET, EXTENDED RELEASE ORAL at 08:14

## 2019-08-21 RX ADMIN — OXYCODONE HYDROCHLORIDE AND ACETAMINOPHEN 1 TABLET: 5; 325 TABLET ORAL at 03:45

## 2019-08-21 RX ADMIN — INSULIN LISPRO 1 UNITS: 100 INJECTION, SOLUTION INTRAVENOUS; SUBCUTANEOUS at 08:15

## 2019-08-21 RX ADMIN — Medication 2 PUFF: at 07:45

## 2019-08-21 RX ADMIN — INSULIN LISPRO 1 UNITS: 100 INJECTION, SOLUTION INTRAVENOUS; SUBCUTANEOUS at 21:52

## 2019-08-21 RX ADMIN — GABAPENTIN 100 MG: 100 CAPSULE ORAL at 13:29

## 2019-08-21 RX ADMIN — Medication 1 CAPSULE: at 08:14

## 2019-08-21 RX ADMIN — POLYETHYLENE GLYCOL 3350 17 G: 17 POWDER, FOR SOLUTION ORAL at 08:19

## 2019-08-21 RX ADMIN — Medication 10 ML: at 08:20

## 2019-08-21 RX ADMIN — Medication 1 CAPSULE: at 20:54

## 2019-08-21 RX ADMIN — MILRINONE LACTATE IN DEXTROSE 0.2 MCG/KG/MIN: 200 INJECTION, SOLUTION INTRAVENOUS at 01:26

## 2019-08-21 RX ADMIN — AMIODARONE HYDROCHLORIDE 200 MG: 200 TABLET ORAL at 08:14

## 2019-08-21 RX ADMIN — ATORVASTATIN CALCIUM 40 MG: 40 TABLET, FILM COATED ORAL at 08:15

## 2019-08-21 RX ADMIN — OXYCODONE HYDROCHLORIDE AND ACETAMINOPHEN 1 TABLET: 5; 325 TABLET ORAL at 13:29

## 2019-08-21 ASSESSMENT — PAIN DESCRIPTION - PAIN TYPE
TYPE: CHRONIC PAIN

## 2019-08-21 ASSESSMENT — PAIN DESCRIPTION - DESCRIPTORS
DESCRIPTORS: ACHING

## 2019-08-21 ASSESSMENT — PAIN DESCRIPTION - PROGRESSION
CLINICAL_PROGRESSION: NOT CHANGED

## 2019-08-21 ASSESSMENT — PAIN SCALES - GENERAL
PAINLEVEL_OUTOF10: 4
PAINLEVEL_OUTOF10: 8
PAINLEVEL_OUTOF10: 0

## 2019-08-21 ASSESSMENT — PAIN DESCRIPTION - ONSET
ONSET: ON-GOING
ONSET: ON-GOING

## 2019-08-21 ASSESSMENT — PAIN DESCRIPTION - ORIENTATION
ORIENTATION: LOWER;MID
ORIENTATION: LOWER;MID
ORIENTATION: OTHER (COMMENT)

## 2019-08-21 ASSESSMENT — PAIN DESCRIPTION - FREQUENCY
FREQUENCY: CONTINUOUS

## 2019-08-21 ASSESSMENT — PAIN DESCRIPTION - LOCATION
LOCATION: BACK;GENERALIZED
LOCATION: BACK
LOCATION: BACK

## 2019-08-21 NOTE — PROGRESS NOTES
safety  Cognition Comment: decreased insight         Upper Extremity Function  UE Strengthin yellow theraband ex's x10 reps       Plan   Plan  Times per week: 3-5  Times per day: Daily  Current Treatment Recommendations: Strengthening, Functional Mobility Training, Endurance Training, ROM, Balance Training, Self-Care / ADL, Safety Education & Training         AM-PAC Score        AM-[facility] Inpatient Daily Activity Raw Score: 12 (19)  AM-PAC Inpatient ADL T-Scale Score : 30.6 (19)  ADL Inpatient CMS 0-100% Score: 66.57 (19)  ADL Inpatient CMS G-Code Modifier : CL (19)    Goals  Short term goals  Time Frame for Short term goals: Status:  goals ongoing  Short term goal 1: Pt will perform bed mobility with min Ax2- partially met, able to complete assist x1 for supine to sit.   Short term goal 2: Pt will groom with setup  Short term goal 3: Pt will perform functional transfer to/from ADL surfaces with min Ax2  Short term goal 4: Pt will tolerate 10 reps therex to increase strength and endurance  Short term goal 5: Pt will increase strength and endurance to achieve the above goals  Long term goals  Time Frame for Long term goals : LTG=STG  Patient Goals   Patient goals : None stated       Therapy Time   Individual Concurrent Group Co-treatment   Time In 1300         Time Out 1340         Minutes 40              Liya DALLAS/L,[id number]  This note to serve as discharge summary if pt d/c'd prior to next session

## 2019-08-21 NOTE — PROGRESS NOTES
[facility]   Daily Progress Note      Admit Date:  8/13/2019    Reason for follow up visit: CHF    CC: \"I'm doing okay. \"    60 y/o male well known to us with PMH significant for multiple hospital stays, NICM, CHF, COPD, PAD, CKD, diabetes mellitus, untreated sleep apnea and H/O of ETOH abuse who was admitted from the nursing home with recurrent SOB and CHF.  Also noted to be rather anemia with Hgb < 7.  Seen by GI and will continue with conservative management (deemed not to be good candidate for endoscopy).  He has been on Lasix gtt and Milrinone and diuresing well. Plan is for discharge to SNF. Interval History:  Pt. seen and examined; records reviewed  Wt today 209#. Remains on milrinone and lasix gtt  Milrinone decreased yesterday  Net diuresis -9.8L since admit  Pro-BNP today 1728    Subjective:  Pt with no acute overnight cardiac events. Denies chest pain, productive cough, palpitations, PND, dizziness/syncope    Review of Systems:   · Constitutional: no unanticipated weight loss. There's been no change in energy level, sleep pattern, or activity level. No fevers, chills. · Eyes: No visual changes or diplopia. No scleral icterus. · ENT: No Headaches, hearing loss or vertigo. No mouth sores or sore throat. · Cardiovascular: as reviewed in HPI  · Respiratory: No cough or wheezing, no sputum production. No hematemesis.  + SOB at baseline  · Gastrointestinal: No abdominal pain, appetite loss, blood in stools. No change in bowel or bladder habits. · Genitourinary: No dysuria, trouble voiding, or hematuria. · Musculoskeletal:  No gait disturbance, no joint complaints. + L AKA; + wheelchair bound  · Integumentary: No rash or pruritis. · Neurological: No headache, diplopia, change in muscle strength, numbness or tingling. · Psychiatric: No anxiety or depression. · Endocrine: No temperature intolerance. No excessive thirst, fluid intake, or urination.  No noted on  echo in 7/2019  -avoid hypoxia and diurese     3. Chronic atrial fibrillation  -VR controlled on Toprol and amiodarone  -continue Xarelto  -prior DCCV om amiodarone with return to atrial fib  -VR controlled    4. Chronic anemia  -some component of iron deficiency and on iron supplement  -received venofer this admit  -now on po iron supplement  -GI has seen and continuing with conservative treatment given his multiple comorbidities  -transfuse as needed; would aim to keep Hgb >  7.5 given his CHF  -Hgb improving     5. Hypomagnesemia  -on magnesium supplement  -normal today     6. Hypokalemia  -resolved  -secondary to diuretic therapy  -normalized and will continue with K+ supplement     7. Hyperlipidemia  -continue statin     8. Diabetes Mellitus  -Rx per Primary team     9. Untreated sleep apnea  -pt refuses CPAP: again discussed importance of use of CPAP     10. COPD  -on home O2    Appears to be improving from CHF standpoint. Keeping him out of hospital will be difficult given his H/O noncompliance. Plan is for discharge to SNF: he will need close monitoring of fluid intake and daily weights. Will stop milrinone today, continue IV lasix and hopefully in the AM change to po diuretic. Would add Metolazone weekly or 2x/week to help with fluid control. Monitor Cr closely.   Hopefully discharge on Friday to SNF (?).        Electronically signed by JOON Gallego CNP on 8/21/2019 at 12:32 PM

## 2019-08-21 NOTE — PLAN OF CARE
Fall risk assessment completed every shift. All precautions in place. Pt has call light within reach at all times. Room clear of clutter. Pt aware to call for assistance when getting up. Skin assessment complete. No breakdown noted. Interventions in place. See doc flowsheet for interventions initiated. Pt encouraged to turn.  Will continue to monitor for additional needs and skin breakdown
Problem: Falls - Risk of:  Goal: Will remain free from falls  Description  Will remain free from falls  8/15/2019 0148 by Sangeeta Momin RN  Outcome: Ongoing  8/14/2019 1201 by Francis Mora RN  Outcome: Ongoing  Goal: Absence of physical injury  Description  Absence of physical injury  8/15/2019 0148 by Sangeeta Momin RN  Outcome: Ongoing  8/14/2019 1201 by Francis Mora RN  Outcome: Ongoing     Problem: Risk for Impaired Skin Integrity  Goal: Tissue integrity - skin and mucous membranes  Description  Structural intactness and normal physiological function of skin and  mucous membranes.   8/15/2019 0148 by Sangeeta Momin RN  Outcome: Ongoing  8/14/2019 1201 by Francis Mora RN  Outcome: Ongoing     Problem: OXYGENATION/RESPIRATORY FUNCTION  Goal: Patient will maintain patent airway  8/15/2019 0148 by Sangeeta Momin RN  Outcome: Ongoing  8/14/2019 1201 by Francis Mora RN  Outcome: Ongoing  Goal: Patient will achieve/maintain normal respiratory rate/effort  Description  Respiratory rate and effort will be within normal limits for the patient  8/15/2019 0148 by Sangeeta Momin RN  Outcome: Ongoing  8/14/2019 1201 by Francis Mora RN  Outcome: Ongoing     Problem: HEMODYNAMIC STATUS  Goal: Patient has stable vital signs and fluid balance  8/15/2019 0148 by Sangeeta Momin RN  Outcome: Ongoing  8/14/2019 1201 by Francis Mora RN  Outcome: Ongoing     Problem: FLUID AND ELECTROLYTE IMBALANCE  Goal: Fluid and electrolyte balance are achieved/maintained  8/15/2019 0148 by Sangeeta Momin RN  Outcome: Ongoing  8/14/2019 1201 by Francis Mora RN  Outcome: Ongoing     Problem: ACTIVITY INTOLERANCE/IMPAIRED MOBILITY  Goal: Mobility/activity is maintained at optimum level for patient  8/15/2019 0148 by Sangeeta Momin RN  Outcome: Ongoing  8/14/2019 1201 by Francis Mora RN  Outcome: Ongoing     Problem: Pain:  Goal: Pain level will decrease  Description  Pain level will decrease  8/15/2019 0148 by
Problem: Falls - Risk of:  Goal: Will remain free from falls  Description  Will remain free from falls  8/17/2019 2220 by Veronica Figueroa RN  Outcome: Ongoing    Goal: Absence of physical injury  Description  Absence of physical injury  8/17/2019 2220 by Veronica Figueroa RN  Outcome: Ongoing  Problem: Risk for Impaired Skin Integrity  Goal: Tissue integrity - skin and mucous membranes  Description  Structural intactness and normal physiological function of skin and  mucous membranes. 8/17/2019 2220 by Veronica Figueroa RN  Outcome: Ongoing    Problem: Risk for Impaired Skin Integrity  Goal: Tissue integrity - skin and mucous membranes. Specialty bed provided. Description  Structural intactness and normal physiological function of skin and  mucous membranes. 8/17/2019 2220 by Veronica Figueroa RN  Outcome: Ongoing  Problem: OXYGENATION/RESPIRATORY FUNCTION  Goal: Patient will maintain patent airway  8/17/2019 2220 by Veronica Figueroa RN  Outcome: Ongoing  8/17/2019 1128 by Alysa Wright RN  Outcome: Ongoing  Goal: Patient will achieve/maintain normal respiratory rate/effort  Description  Respiratory rate and effort will be within normal limits for the patient  8/17/2019 2220 by Veronica Figueroa RN  Outcome: Ongoing  8/17/2019 1128 by Alysa Wright RN  Outcome: Ongoing     Problem: HEMODYNAMIC STATUS  Goal: Patient has stable vital signs and fluid balance  8/17/2019 2220 by eVronica Figueroa RN  Outcome: Ongoing  8/17/2019 1128 by Alysa Wright RN  Outcome: Ongoing     Problem: FLUID AND ELECTROLYTE IMBALANCE  Goal: Fluid and electrolyte balance are achieved/maintained  8/17/2019 2220 by Veronica Figueroa RN  Outcome: Ongoing. Lasix and Primacor drips continue. Fluid restriction continues.   8/17/2019 1128 by Alysa Wright RN  Outcome: Ongoing     Problem: Pain:  Goal: Pain level will decrease  Description  Pain level will
Problem: Falls - Risk of:  Goal: Will remain free from falls  Description  Will remain free from falls  8/19/2019 2151 by Maggie Iniguez RN  Outcome: Ongoing     Problem: Falls - Risk of:  Goal: Absence of physical injury  Description  Absence of physical injury  8/19/2019 2151 by Maggie Iniguez RN  Outcome: Ongoing  Note:   Patient is wearing nonskid socks. Bed is alarmed on , locked, and in lowest position. Room is free of clutter. Call light is within reach and used appropriately. Fall risk assessed per protocol. Will continue to monitor. Problem: Risk for Impaired Skin Integrity  Goal: Tissue integrity - skin and mucous membranes  Description  Structural intactness and normal physiological function of skin and  mucous membranes. 8/19/2019 2151 by Maggie Iniguez RN  Outcome: Ongoing  Note:   Skin assessment completed every shift per protocol. Pt assessed for incontinence, appropriate barrier cream applied as needed. Pt encouraged to turn/rotate every 2 hours. Assistance provided if pt unable to do so themselves. Will continue to monitor.        Problem: OXYGENATION/RESPIRATORY FUNCTION  Goal: Patient will maintain patent airway  8/19/2019 2151 by Maggie Iniguez RN  Outcome: Ongoing     Problem: OXYGENATION/RESPIRATORY FUNCTION  Goal: Patient will achieve/maintain normal respiratory rate/effort  Description  Respiratory rate and effort will be within normal limits for the patient  8/19/2019 2151 by Maggie Iniguez RN  Outcome: Ongoing  Note:   Will monitor fluid intake and SOB     Problem: HEMODYNAMIC STATUS  Goal: Patient has stable vital signs and fluid balance  8/19/2019 2151 by Maggie Iniguez RN  Outcome: Ongoing  Note:   VSS     Problem: FLUID AND ELECTROLYTE IMBALANCE  Goal: Fluid and electrolyte balance are achieved/maintained  8/19/2019 2151 by Maggie Iniguze RN  Outcome: Ongoing  Note:   Strict I/O     Problem: ACTIVITY INTOLERANCE/IMPAIRED MOBILITY  Goal:
Jasmin Baker RN  Outcome: Ongoing  8/15/2019 1817 by Lucy Lee RN  Outcome: Ongoing  Goal: Control of acute pain  Description  Control of acute pain  8/16/2019 0158 by Jasmin Baker RN  Outcome: Ongoing  8/15/2019 1817 by Lucy Lee RN  Outcome: Ongoing  Goal: Control of chronic pain  Description  Control of chronic pain  8/16/2019 0158 by Jasmin Baker RN  Outcome: Ongoing  8/15/2019 1817 by Lucy Lee RN  Outcome: Ongoing
with wheels locked, bed alarm in place and activated, non-skid socks on pt, fall risk ID on pt, call dont fall sign posted. Pt has call light within reach at all times. Room clear of clutter. Pt aware to call for assistance when getting up. Will continue to monitor. Pain/discomfort being managed with PRN analgesics per MD orders. Pt able to express presence and absence of pain and rate pain appropriately using numerical scale. Patient's ability assessed to perform self care and independent activity encouraged according to that ability. Assisted with ADL's as needed. Risk for skin breakdown assessed. Potential discharge needs assessed. Patient and family included in daily care decisions.

## 2019-08-21 NOTE — PROGRESS NOTES
Normal respiratory effort. No wheezing   Cardiovascular: Regular rate and rhythm with normal S1/S2 without murmurs, rubs or gallops. Abdomen: Soft, non-tender, non-distended with normal bowel sounds. Musculoskeletal:  diffuse edema improving, AKA on the left   Skin: Skin color, texture, turgor normal.   Neurologic:  No focal deficits, no motor or sensory deficits  Psychiatric: Alert and oriented x3, affect is depressed  Capillary Refill: Brisk,< 3 seconds   Peripheral Pulses: +2 palpable, equal bilaterally       Labs:   Recent Labs     08/19/19  0510   WBC 7.7   HGB 7.9*   HCT 24.9*        Recent Labs     08/19/19  0510 08/20/19  0514 08/21/19  0520   * 132* 135*   K 3.9 3.7 3.8   CL 85* 87* 91*   CO2 38* 36* 34*   BUN 16 19 20   CREATININE 1.0 1.2 1.0   CALCIUM 9.0 8.8 8.7     No results for input(s): AST, ALT, BILIDIR, BILITOT, ALKPHOS in the last 72 hours. No results for input(s): INR in the last 72 hours. No results for input(s): [place] in the last 72 hours. Urinalysis:      Lab Results   Component Value Date    NITRU Negative 06/23/2019    WBCUA 5 06/23/2019    RBCUA 8 06/23/2019    BLOODU MODERATE 06/23/2019    SPECGRAV 1.021 06/23/2019    GLUCOSEU Negative 06/23/2019       Radiology:  XR CHEST STANDARD (2 VW)   Final Result   Mild pulmonary vascular congestion with small right pleural effusion.                  Assessment/Plan:    Active Hospital Problems    Diagnosis    Anemia [D64.9]     Priority: High    Heart failure, acute on chronic, systolic and diastolic (HCC) [Y37.39]     Priority: High    Atrial fibrillation, controlled ([facility] [I48.91]    Ventricular tachycardia (HCC) [I47.2]    Biventricular HF (heart failure) (HCC) [I50.82]    DMII (diabetes mellitus, type 2) ([facility] [E11.9]    Hypokalemia [E87.6]    Essential hypertension [I10]    Tobacco use [Z72.0]     patient had a history of ventricular tachycardia and cardiac arrest at some point during last

## 2019-08-22 LAB
ANION GAP SERPL CALCULATED.3IONS-SCNC: 12 MMOL/L (ref 3–16)
BUN BLDV-MCNC: 18 MG/DL (ref 7–20)
CALCIUM SERPL-MCNC: 8.7 MG/DL (ref 8.3–10.6)
CHLORIDE BLD-SCNC: 93 MMOL/L (ref 99–110)
CO2: 33 MMOL/L (ref 21–32)
CREAT SERPL-MCNC: 0.9 MG/DL (ref 0.9–1.3)
GFR AFRICAN AMERICAN: >60
GFR NON-AFRICAN AMERICAN: >60
GLUCOSE BLD-MCNC: 108 MG/DL (ref 70–99)
GLUCOSE BLD-MCNC: 115 MG/DL (ref 70–99)
GLUCOSE BLD-MCNC: 129 MG/DL (ref 70–99)
GLUCOSE BLD-MCNC: 146 MG/DL (ref 70–99)
GLUCOSE BLD-MCNC: 177 MG/DL (ref 70–99)
HCT VFR BLD CALC: 26.5 % (ref 40.5–52.5)
HCT VFR BLD CALC: 28.9 % (ref 40.5–52.5)
HEMOGLOBIN: 8.5 G/DL (ref 13.5–17.5)
HEMOGLOBIN: 9.2 G/DL (ref 13.5–17.5)
MCH RBC QN AUTO: 25.9 PG (ref 26–34)
MCH RBC QN AUTO: 26.5 PG (ref 26–34)
MCHC RBC AUTO-ENTMCNC: 31.8 G/DL (ref 31–36)
MCHC RBC AUTO-ENTMCNC: 32.2 G/DL (ref 31–36)
MCV RBC AUTO: 81.6 FL (ref 80–100)
MCV RBC AUTO: 82.5 FL (ref 80–100)
PDW BLD-RTO: 20.4 % (ref 12.4–15.4)
PDW BLD-RTO: 21.7 % (ref 12.4–15.4)
PERFORMED ON: ABNORMAL
PLATELET # BLD: 199 K/UL (ref 135–450)
PLATELET # BLD: 244 K/UL (ref 135–450)
PMV BLD AUTO: 8.3 FL (ref 5–10.5)
PMV BLD AUTO: 8.5 FL (ref 5–10.5)
POTASSIUM SERPL-SCNC: 4.6 MMOL/L (ref 3.5–5.1)
PRO-BNP: 2252 PG/ML (ref 0–124)
RBC # BLD: 3.22 M/UL (ref 4.2–5.9)
RBC # BLD: 3.54 M/UL (ref 4.2–5.9)
SODIUM BLD-SCNC: 138 MMOL/L (ref 136–145)
WBC # BLD: 6.8 K/UL (ref 4–11)
WBC # BLD: 7.6 K/UL (ref 4–11)

## 2019-08-22 PROCEDURE — 36415 COLL VENOUS BLD VENIPUNCTURE: CPT

## 2019-08-22 PROCEDURE — 6370000000 HC RX 637 (ALT 250 FOR IP): Performed by: INTERNAL MEDICINE

## 2019-08-22 PROCEDURE — 2580000003 HC RX 258: Performed by: NURSE PRACTITIONER

## 2019-08-22 PROCEDURE — 94640 AIRWAY INHALATION TREATMENT: CPT

## 2019-08-22 PROCEDURE — 97530 THERAPEUTIC ACTIVITIES: CPT | Performed by: PHYSICAL THERAPIST

## 2019-08-22 PROCEDURE — 97535 SELF CARE MNGMENT TRAINING: CPT | Performed by: PHYSICIAN ASSISTANT

## 2019-08-22 PROCEDURE — 6370000000 HC RX 637 (ALT 250 FOR IP): Performed by: NURSE PRACTITIONER

## 2019-08-22 PROCEDURE — 80048 BASIC METABOLIC PNL TOTAL CA: CPT

## 2019-08-22 PROCEDURE — 2060000000 HC ICU INTERMEDIATE R&B

## 2019-08-22 PROCEDURE — 99232 SBSQ HOSP IP/OBS MODERATE 35: CPT | Performed by: NURSE PRACTITIONER

## 2019-08-22 PROCEDURE — 2580000003 HC RX 258: Performed by: INTERNAL MEDICINE

## 2019-08-22 PROCEDURE — 85027 COMPLETE CBC AUTOMATED: CPT

## 2019-08-22 PROCEDURE — 6360000002 HC RX W HCPCS: Performed by: NURSE PRACTITIONER

## 2019-08-22 PROCEDURE — 83880 ASSAY OF NATRIURETIC PEPTIDE: CPT

## 2019-08-22 PROCEDURE — 97530 THERAPEUTIC ACTIVITIES: CPT | Performed by: PHYSICIAN ASSISTANT

## 2019-08-22 RX ORDER — METOLAZONE 5 MG/1
2.5 TABLET ORAL ONCE
Status: COMPLETED | OUTPATIENT
Start: 2019-08-22 | End: 2019-08-22

## 2019-08-22 RX ORDER — TORSEMIDE 20 MG/1
40 TABLET ORAL DAILY
Status: DISCONTINUED | OUTPATIENT
Start: 2019-08-22 | End: 2019-08-23 | Stop reason: HOSPADM

## 2019-08-22 RX ORDER — METOLAZONE 5 MG/1
5 TABLET ORAL ONCE
Status: DISCONTINUED | OUTPATIENT
Start: 2019-08-22 | End: 2019-08-22

## 2019-08-22 RX ADMIN — GABAPENTIN 100 MG: 100 CAPSULE ORAL at 12:30

## 2019-08-22 RX ADMIN — GABAPENTIN 100 MG: 100 CAPSULE ORAL at 21:21

## 2019-08-22 RX ADMIN — IPRATROPIUM BROMIDE AND ALBUTEROL SULFATE 1 AMPULE: .5; 3 SOLUTION RESPIRATORY (INHALATION) at 11:55

## 2019-08-22 RX ADMIN — Medication 400 MG: at 12:42

## 2019-08-22 RX ADMIN — FERROUS SULFATE TAB EC 324 MG (65 MG FE EQUIVALENT) 324 MG: 324 (65 FE) TABLET DELAYED RESPONSE at 12:30

## 2019-08-22 RX ADMIN — Medication 2 PUFF: at 11:55

## 2019-08-22 RX ADMIN — POTASSIUM CHLORIDE 40 MEQ: 20 TABLET, EXTENDED RELEASE ORAL at 12:30

## 2019-08-22 RX ADMIN — ATORVASTATIN CALCIUM 40 MG: 40 TABLET, FILM COATED ORAL at 12:33

## 2019-08-22 RX ADMIN — INSULIN GLARGINE 8 UNITS: 100 INJECTION, SOLUTION SUBCUTANEOUS at 22:28

## 2019-08-22 RX ADMIN — METOLAZONE 2.5 MG: 5 TABLET ORAL at 12:42

## 2019-08-22 RX ADMIN — POTASSIUM CHLORIDE 40 MEQ: 20 TABLET, EXTENDED RELEASE ORAL at 18:28

## 2019-08-22 RX ADMIN — IPRATROPIUM BROMIDE AND ALBUTEROL SULFATE 1 AMPULE: .5; 3 SOLUTION RESPIRATORY (INHALATION) at 15:34

## 2019-08-22 RX ADMIN — RIVAROXABAN 15 MG: 15 TABLET, FILM COATED ORAL at 12:32

## 2019-08-22 RX ADMIN — OXYCODONE HYDROCHLORIDE AND ACETAMINOPHEN 1 TABLET: 5; 325 TABLET ORAL at 21:21

## 2019-08-22 RX ADMIN — TAMSULOSIN HYDROCHLORIDE 0.4 MG: 0.4 CAPSULE ORAL at 12:45

## 2019-08-22 RX ADMIN — PANTOPRAZOLE SODIUM 40 MG: 40 TABLET, DELAYED RELEASE ORAL at 05:48

## 2019-08-22 RX ADMIN — Medication 1 CAPSULE: at 21:21

## 2019-08-22 RX ADMIN — Medication 10 ML: at 21:22

## 2019-08-22 RX ADMIN — METOPROLOL SUCCINATE 50 MG: 50 TABLET, EXTENDED RELEASE ORAL at 12:42

## 2019-08-22 RX ADMIN — Medication 1 CAPSULE: at 12:32

## 2019-08-22 RX ADMIN — FUROSEMIDE 10 MG/HR: 10 INJECTION, SOLUTION INTRAMUSCULAR; INTRAVENOUS at 05:46

## 2019-08-22 RX ADMIN — Medication 10 ML: at 12:44

## 2019-08-22 RX ADMIN — OXYCODONE HYDROCHLORIDE AND ACETAMINOPHEN 1 TABLET: 5; 325 TABLET ORAL at 12:29

## 2019-08-22 RX ADMIN — TRAZODONE HYDROCHLORIDE 50 MG: 50 TABLET ORAL at 21:21

## 2019-08-22 RX ADMIN — IPRATROPIUM BROMIDE AND ALBUTEROL SULFATE 1 AMPULE: .5; 3 SOLUTION RESPIRATORY (INHALATION) at 20:46

## 2019-08-22 RX ADMIN — AMIODARONE HYDROCHLORIDE 200 MG: 200 TABLET ORAL at 12:30

## 2019-08-22 RX ADMIN — Medication 2 PUFF: at 20:46

## 2019-08-22 RX ADMIN — TORSEMIDE 40 MG: 20 TABLET ORAL at 18:28

## 2019-08-22 ASSESSMENT — PAIN SCALES - WONG BAKER: WONGBAKER_NUMERICALRESPONSE: 0

## 2019-08-22 ASSESSMENT — PAIN - FUNCTIONAL ASSESSMENT
PAIN_FUNCTIONAL_ASSESSMENT: PREVENTS OR INTERFERES SOME ACTIVE ACTIVITIES AND ADLS
PAIN_FUNCTIONAL_ASSESSMENT: ACTIVITIES ARE NOT PREVENTED

## 2019-08-22 ASSESSMENT — PAIN SCALES - GENERAL
PAINLEVEL_OUTOF10: 10
PAINLEVEL_OUTOF10: 8
PAINLEVEL_OUTOF10: 0

## 2019-08-22 ASSESSMENT — PAIN DESCRIPTION - FREQUENCY
FREQUENCY: INTERMITTENT
FREQUENCY: CONTINUOUS

## 2019-08-22 ASSESSMENT — PAIN DESCRIPTION - DESCRIPTORS
DESCRIPTORS: ACHING
DESCRIPTORS: ACHING

## 2019-08-22 ASSESSMENT — PAIN DESCRIPTION - ORIENTATION: ORIENTATION: OTHER (COMMENT)

## 2019-08-22 ASSESSMENT — PAIN DESCRIPTION - ONSET: ONSET: ON-GOING

## 2019-08-22 ASSESSMENT — PAIN DESCRIPTION - PAIN TYPE
TYPE: CHRONIC PAIN
TYPE: CHRONIC PAIN

## 2019-08-22 ASSESSMENT — PAIN DESCRIPTION - LOCATION
LOCATION: BACK
LOCATION: GENERALIZED
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
## 2019-08-22 NOTE — PROGRESS NOTES
Hospitalist Progress Note      PCP: Felicity Acosta MD    Date of Admission: 8/13/2019      Subjective: Pt S/E. Pt reports feeling the same. Lasix gtt stopped today. Medications:  Reviewed    Infusion Medications    furosemide (LASIX) 1mg/ml infusion 10 mg/hr (08/22/19 0546)    dextrose       Scheduled Medications    metOLazone  2.5 mg Oral Once    sodium chloride  250 mL Intravenous Once    ferrous sulfate  324 mg Oral Daily with breakfast    insulin glargine  8 Units Subcutaneous Nightly    potassium chloride  40 mEq Oral BID WC    amiodarone  200 mg Oral Daily    atorvastatin  40 mg Oral Daily    gabapentin  100 mg Oral TID    lactobacillus  1 capsule Oral BID    magnesium oxide  400 mg Oral Daily    metoprolol succinate  50 mg Oral Daily    mometasone-formoterol  2 puff Inhalation BID    pantoprazole  40 mg Oral QAM AC    polyethylene glycol  17 g Oral Daily    tamsulosin  0.4 mg Oral Daily    traZODone  50 mg Oral Nightly    sodium chloride flush  10 mL Intravenous 2 times per day    insulin lispro  0-6 Units Subcutaneous TID WC    insulin lispro  0-3 Units Subcutaneous Nightly    sodium chloride  250 mL Intravenous Once    rivaroxaban  15 mg Oral Daily     PRN Meds: albuterol sulfate HFA, cyclobenzaprine, oxyCODONE-acetaminophen, sodium chloride flush, magnesium hydroxide, ondansetron, glucose, dextrose, glucagon (rDNA), dextrose, ipratropium-albuterol      Intake/Output Summary (Last 24 hours) at 8/22/2019 0935  Last data filed at 8/22/2019 0522  Gross per 24 hour   Intake 760 ml   Output 2175 ml   Net -1415 ml       Physical Exam Performed:    /63   Pulse 88   Temp 98.3 °F (36.8 °C) (Oral)   Resp 16   Ht 5' 7\" (1.702 m)   Wt 209 lb 7 oz (95 kg)   SpO2 91%   BMI 32.80 kg/m²     General appearance: No apparent distress, appears comfortable  Neck: Supple, trachea midline   Respiratory:  Normal respiratory effort.  No wheezing, no rales  Cardiovascular: Regular rate and

## 2019-08-22 NOTE — PROGRESS NOTES
[facility] [address]   Daily Progress Note      Admit Date:  8/13/2019    Reason for follow up visit: CHF    CC: \"I feel pretty good. You all are starving me since I can't have anything I like. \"    60 y/o male well known to us with PMH significant for multiple hospital stays, NICM, CHF, COPD, PAD, CKD, diabetes mellitus, untreated sleep apnea and H/O of ETOH abuse who was admitted from the nursing home with recurrent SOB and CHF.  Also noted to be rather anemia with Hgb < 7.  Seen by GI and will continue with conservative management (deemed not to be good candidate for endoscopy).  He had been on Lasix gtt and Milrinone and diuresing well. Milrinone dc'd yesterday. Plan is for discharge to SNF.     Interval History:  Pt. seen and examined; records reviewed  BP reviewed. Remains on O2 @ 3L  Wt 209#  Net diuresis -10.9L since admit  Pro BNP noted  SOB much improved. Edema improved (persistent dependent edema in hips and upper thighs)  Denies chest pain    Subjective:  Pt with no acute overnight cardiac events. Denies chest pain, cough, palpitations, dizziness  SOB and edema improving    Review of Systems:   · Constitutional: no unanticipated weight loss. There's been no change in energy level, sleep pattern, or activity level. No fevers, chills. · Eyes: No visual changes or diplopia. No scleral icterus. · ENT: No Headaches, hearing loss or vertigo. No mouth sores or sore throat. · Cardiovascular: as reviewed in HPI  · Respiratory: No cough or wheezing, no sputum production. No hematemesis.  + SOB (chronic and at his baseline per pt)  · Gastrointestinal: No abdominal pain, appetite loss, blood in stools. No change in bowel or bladder habits. · Genitourinary: No dysuria, trouble voiding, or hematuria. · Musculoskeletal:  + chronic pain in L stump; + L AKA; + wheelchair bound  · Integumentary: No rash or pruritis. · Neurological: No headache, diplopia, change in muscle strength, numbness or tingling. · Psychiatric: No anxiety or depression. · Endocrine: No temperature intolerance. No excessive thirst, fluid intake, or urination. No tremor. · Hematologic/Lymphatic: No abnormal bruising or bleeding, blood clots or swollen lymph nodes. · Allergic/Immunologic: No nasal congestion or hives. Objective:   /63   Pulse 88   Temp 98.3 °F (36.8 °C) (Oral)   Resp 16   Ht 5' 7\" (1.702 m)   Wt 209 lb 7 oz (95 kg)   SpO2 91%   BMI 32.80 kg/m²       Intake/Output Summary (Last 24 hours) at 8/22/2019 0830  Last data filed at 8/22/2019 0522  Gross per 24 hour   Intake 1000 ml   Output 2325 ml   Net -1325 ml     Wt Readings from Last 3 Encounters:   08/22/19 209 lb 7 oz (95 kg)   08/05/19 217 lb (98.4 kg)   07/23/19 200 lb 9.9 oz (91 kg)       Physical Exam:  General: In no acute distress. Awake, alert, and oriented X4. Up in chair and visiting with his sister and niece  Skin:  Warm and dry. No new appearing rashes or lesions. Neck:  Supple. No JVD or carotid bruit appreciated. Chest:  Lungs clear with mildly diminished breath sounds in posterior bases. No wheezes/rhonchi/rales  Cardiovascular:  Irreg, irreg; normal S1 and S2; I/VI systolic murmur  Abdomen:  soft, nontender, +bowel sounds.  Obese; abdomen slightly distended  Extremities: RLE without edema except R hip (dependent); L AKA; 2+ bilateral radial pulses    Medications:    metOLazone  2.5 mg Oral Once    sodium chloride  250 mL Intravenous Once    ferrous sulfate  324 mg Oral Daily with breakfast    insulin glargine  8 Units Subcutaneous Nightly    potassium chloride  40 mEq Oral BID WC    amiodarone  200 mg Oral Daily    atorvastatin  40 mg Oral Daily    gabapentin  100 mg Oral TID    lactobacillus  1 capsule Oral BID    magnesium oxide  400 mg Oral Daily    metoprolol succinate  50 mg Oral Daily    mometasone-formoterol  2 puff Inhalation BID    pantoprazole  40 mg Oral QAM AC    polyethylene glycol  17 g Oral Daily    tamsulosin 0.4 mg Oral Daily    traZODone  50 mg Oral Nightly    sodium chloride flush  10 mL Intravenous 2 times per day    insulin lispro  0-6 Units Subcutaneous TID WC    insulin lispro  0-3 Units Subcutaneous Nightly    sodium chloride  250 mL Intravenous Once    rivaroxaban  15 mg Oral Daily      furosemide (LASIX) 1mg/ml infusion 10 mg/hr (08/22/19 0546)    dextrose       albuterol sulfate HFA, cyclobenzaprine, oxyCODONE-acetaminophen, sodium chloride flush, magnesium hydroxide, ondansetron, glucose, dextrose, glucagon (rDNA), dextrose, ipratropium-albuterol    Lab Data:  CBC:   Recent Labs     08/21/19  0519 08/22/19  0531   WBC 7.4 6.8   HGB 8.4* 8.5*    199     BMP:    Recent Labs     08/20/19  0514 08/21/19 0520 08/22/19 0531   * 135* 138   K 3.7 3.8 4.6   CO2 36* 34* 33*   BUN 19 20 18   CREATININE 1.2 1.0 0.9     Results for Sydney Bills (MRN [id number]) as of 8/22/2019 15:10   Ref. Range 8/15/2019 05:46 8/17/2019 05:01 8/19/2019 05:10 8/21/2019 05:20 8/22/2019 05:31   Pro-BNP Latest Ref Range: 0 - 124 pg/mL 2,591 (H) 2,100 (H) 1,213 (H) 1,728 (H) 2,252 (H)     ECG: Atrial fibrillation with premature ventricular or aberrantly conducted complexesRight axis deviationNonspecific T wave abnormalityLow voltage QRSSeptal infarct seen      Echo 6/22/2019:  Normal left ventricular size and wall thickness. Severe left ventricular dysfunction with global hypokinesis. Ejection fraction is visually estimated to be 30-35%. The right ventricle is severely enlarged with severely reduced function. The left atrium is mildly dilated. Mild mitral regurgitation. There is mild tricuspid regurgitation with the systolic pulmonary artery  pressure (SPAP) estimated at 54 mmHg (estimated RA pressure of 15 mHg   included).   Mild mitral regurgitation.     Echo 10/17/2018:  Suboptimal image quality. Definity contrast administered.   Patient appears to be in atrial fibrillation.   Mild Conc.  LVH with EF

## 2019-08-23 VITALS
BODY MASS INDEX: 31.63 KG/M2 | HEIGHT: 67 IN | OXYGEN SATURATION: 96 % | HEART RATE: 107 BPM | WEIGHT: 201.5 LBS | SYSTOLIC BLOOD PRESSURE: 116 MMHG | TEMPERATURE: 98 F | DIASTOLIC BLOOD PRESSURE: 73 MMHG | RESPIRATION RATE: 16 BRPM

## 2019-08-23 PROBLEM — E87.6 HYPOKALEMIA: Status: RESOLVED | Noted: 2017-11-13 | Resolved: 2019-08-23

## 2019-08-23 LAB
ANION GAP SERPL CALCULATED.3IONS-SCNC: 13 MMOL/L (ref 3–16)
BUN BLDV-MCNC: 19 MG/DL (ref 7–20)
CALCIUM SERPL-MCNC: 9.1 MG/DL (ref 8.3–10.6)
CHLORIDE BLD-SCNC: 88 MMOL/L (ref 99–110)
CO2: 37 MMOL/L (ref 21–32)
CREAT SERPL-MCNC: 0.9 MG/DL (ref 0.9–1.3)
GFR AFRICAN AMERICAN: >60
GFR NON-AFRICAN AMERICAN: >60
GLUCOSE BLD-MCNC: 114 MG/DL (ref 70–99)
GLUCOSE BLD-MCNC: 117 MG/DL (ref 70–99)
GLUCOSE BLD-MCNC: 159 MG/DL (ref 70–99)
HCT VFR BLD CALC: 27.6 % (ref 40.5–52.5)
HEMOGLOBIN: 8.8 G/DL (ref 13.5–17.5)
MCH RBC QN AUTO: 26.5 PG (ref 26–34)
MCHC RBC AUTO-ENTMCNC: 31.8 G/DL (ref 31–36)
MCV RBC AUTO: 83.3 FL (ref 80–100)
PDW BLD-RTO: 21.3 % (ref 12.4–15.4)
PERFORMED ON: ABNORMAL
PERFORMED ON: ABNORMAL
PLATELET # BLD: 242 K/UL (ref 135–450)
PMV BLD AUTO: 9.9 FL (ref 5–10.5)
POTASSIUM SERPL-SCNC: 3.7 MMOL/L (ref 3.5–5.1)
RBC # BLD: 3.31 M/UL (ref 4.2–5.9)
SODIUM BLD-SCNC: 138 MMOL/L (ref 136–145)
WBC # BLD: 7.5 K/UL (ref 4–11)

## 2019-08-23 PROCEDURE — 2580000003 HC RX 258: Performed by: INTERNAL MEDICINE

## 2019-08-23 PROCEDURE — 6370000000 HC RX 637 (ALT 250 FOR IP): Performed by: INTERNAL MEDICINE

## 2019-08-23 PROCEDURE — 94640 AIRWAY INHALATION TREATMENT: CPT

## 2019-08-23 PROCEDURE — 36415 COLL VENOUS BLD VENIPUNCTURE: CPT

## 2019-08-23 PROCEDURE — 80048 BASIC METABOLIC PNL TOTAL CA: CPT

## 2019-08-23 PROCEDURE — 6370000000 HC RX 637 (ALT 250 FOR IP): Performed by: NURSE PRACTITIONER

## 2019-08-23 PROCEDURE — 94761 N-INVAS EAR/PLS OXIMETRY MLT: CPT

## 2019-08-23 PROCEDURE — 99232 SBSQ HOSP IP/OBS MODERATE 35: CPT | Performed by: NURSE PRACTITIONER

## 2019-08-23 PROCEDURE — 85027 COMPLETE CBC AUTOMATED: CPT

## 2019-08-23 PROCEDURE — 2700000000 HC OXYGEN THERAPY PER DAY

## 2019-08-23 RX ORDER — OXYCODONE HYDROCHLORIDE AND ACETAMINOPHEN 5; 325 MG/1; MG/1
1 TABLET ORAL EVERY 6 HOURS PRN
Qty: 12 TABLET | Refills: 0 | Status: SHIPPED | OUTPATIENT
Start: 2019-08-23 | End: 2019-08-26

## 2019-08-23 RX ORDER — TORSEMIDE 20 MG/1
40 TABLET ORAL DAILY
Qty: 30 TABLET | Refills: 3 | Status: ON HOLD | OUTPATIENT
Start: 2019-08-24 | End: 2020-02-14 | Stop reason: SDUPTHER

## 2019-08-23 RX ORDER — POLYETHYLENE GLYCOL 3350 17 G/17G
17 POWDER, FOR SOLUTION ORAL DAILY
Qty: 527 G | Refills: 1 | Status: SHIPPED | OUTPATIENT
Start: 2019-08-23 | End: 2019-09-22

## 2019-08-23 RX ORDER — METOLAZONE 5 MG/1
5 TABLET ORAL WEEKLY
Qty: 10 TABLET | Refills: 0 | Status: ON HOLD | OUTPATIENT
Start: 2019-08-29 | End: 2020-02-14 | Stop reason: HOSPADM

## 2019-08-23 RX ORDER — POTASSIUM CHLORIDE 20 MEQ/1
40 TABLET, EXTENDED RELEASE ORAL DAILY
Qty: 60 TABLET | Refills: 3 | Status: SHIPPED | OUTPATIENT
Start: 2019-08-23 | End: 2019-09-09

## 2019-08-23 RX ORDER — FERROUS SULFATE TAB EC 324 MG (65 MG FE EQUIVALENT) 324 (65 FE) MG
324 TABLET DELAYED RESPONSE ORAL
Qty: 30 TABLET | Refills: 1 | Status: SHIPPED | OUTPATIENT
Start: 2019-08-24 | End: 2019-12-04

## 2019-08-23 RX ADMIN — TAMSULOSIN HYDROCHLORIDE 0.4 MG: 0.4 CAPSULE ORAL at 10:33

## 2019-08-23 RX ADMIN — IPRATROPIUM BROMIDE AND ALBUTEROL SULFATE 1 AMPULE: .5; 3 SOLUTION RESPIRATORY (INHALATION) at 07:50

## 2019-08-23 RX ADMIN — PANTOPRAZOLE SODIUM 40 MG: 40 TABLET, DELAYED RELEASE ORAL at 07:10

## 2019-08-23 RX ADMIN — Medication 1 CAPSULE: at 10:32

## 2019-08-23 RX ADMIN — RIVAROXABAN 15 MG: 15 TABLET, FILM COATED ORAL at 10:32

## 2019-08-23 RX ADMIN — ATORVASTATIN CALCIUM 40 MG: 40 TABLET, FILM COATED ORAL at 10:32

## 2019-08-23 RX ADMIN — IPRATROPIUM BROMIDE AND ALBUTEROL SULFATE 1 AMPULE: .5; 3 SOLUTION RESPIRATORY (INHALATION) at 11:41

## 2019-08-23 RX ADMIN — FERROUS SULFATE TAB EC 324 MG (65 MG FE EQUIVALENT) 324 MG: 324 (65 FE) TABLET DELAYED RESPONSE at 10:36

## 2019-08-23 RX ADMIN — OXYCODONE HYDROCHLORIDE AND ACETAMINOPHEN 1 TABLET: 5; 325 TABLET ORAL at 10:41

## 2019-08-23 RX ADMIN — GABAPENTIN 100 MG: 100 CAPSULE ORAL at 14:48

## 2019-08-23 RX ADMIN — TORSEMIDE 40 MG: 20 TABLET ORAL at 10:32

## 2019-08-23 RX ADMIN — GABAPENTIN 100 MG: 100 CAPSULE ORAL at 10:32

## 2019-08-23 RX ADMIN — Medication 2 PUFF: at 07:50

## 2019-08-23 RX ADMIN — Medication 10 ML: at 10:37

## 2019-08-23 RX ADMIN — POTASSIUM CHLORIDE 40 MEQ: 20 TABLET, EXTENDED RELEASE ORAL at 10:36

## 2019-08-23 RX ADMIN — AMIODARONE HYDROCHLORIDE 200 MG: 200 TABLET ORAL at 10:32

## 2019-08-23 RX ADMIN — METOPROLOL SUCCINATE 50 MG: 50 TABLET, EXTENDED RELEASE ORAL at 10:32

## 2019-08-23 ASSESSMENT — PAIN DESCRIPTION - DESCRIPTORS: DESCRIPTORS: PATIENT UNABLE TO DESCRIBE

## 2019-08-23 ASSESSMENT — PAIN DESCRIPTION - FREQUENCY: FREQUENCY: CONTINUOUS

## 2019-08-23 ASSESSMENT — PAIN DESCRIPTION - ONSET: ONSET: UNABLE TO TELL

## 2019-08-23 ASSESSMENT — PAIN SCALES - GENERAL
PAINLEVEL_OUTOF10: 10
PAINLEVEL_OUTOF10: 0

## 2019-08-23 ASSESSMENT — PAIN DESCRIPTION - PAIN TYPE: TYPE: CHRONIC PAIN

## 2019-08-23 ASSESSMENT — PAIN - FUNCTIONAL ASSESSMENT: PAIN_FUNCTIONAL_ASSESSMENT: PREVENTS OR INTERFERES SOME ACTIVE ACTIVITIES AND ADLS

## 2019-08-23 ASSESSMENT — PAIN DESCRIPTION - LOCATION: LOCATION: GENERALIZED

## 2019-08-23 NOTE — PROGRESS NOTES
his prognosis (I suspect it is limited)     2. Pulmonary HTN  -continue O2 and diuretic  -avoid hypoxia and continue diuresis  -moderate pulmonary HTN noted on  echo in 7/2019     3. Chronic atrial fibrillation  -VR controlled on Amiodarone and Toprol  -continue Xarelto  -DCCV in past on amiodarone with return to atrial fib     4. Chronic anemia  -some component of iron deficiency and on iron supplement  -per GI evaluation: continue with conservative treatment given his multiple comorbidities  -Hgb gradually improving (now at 9.2)     5. Hypomagnesemia  -resolved and will continue magnesium supplement     6. Hypokalemia  -resolved and normalized  -continue K+ supplement  -secondary to diuretic therapy    7. Hyperlipidemia  -continue statin     8. Diabetes Mellitus  -Rx per Primary team     9. Untreated sleep apnea  -pt refuses CPAP: again discussed importance of use of CPAP     10. COPD  -on home O2    Stable from cardiac standpoint and ok to transfer to SNF     Follow up with cardiology: scheduled on 8/28/2019 @ 11:40 AM with D. Enzweiler, CNP    Emphasized and discussed low-fat/low sodium diet, monitoring of daily weights, fluid restriction, worsening signs and symptoms of heart failure and when to call, and the importance of regular exercise and activity.     Check BMP in 5-7 days    Discharge Cardiac Meds:  Metolazone 5 mg weekly on Thursday  Amiodarone 200 mg daily  Atorvastatin 40 mg daily  Ferrous sulfate 324 mg daily  Mag Ox 400 mg tablet daily  Toprol XL 50 mg daily  Klor Con 40 mEq BID  Xarelto 15 mg daily  Torsemide 40 mg daily    Electronically signed by JOON Downey CNP on 8/23/2019 at 11:18 AM

## 2019-08-23 NOTE — DISCHARGE SUMMARY
Current Discharge Medication List      CONTINUE these medications which have NOT CHANGED    Details   Lactobacillus (ACIDOPHILUS PO) Take by mouth 2 times daily      cyclobenzaprine (FLEXERIL) 10 MG tablet Take 10 mg by mouth 3 times daily as needed for Muscle spasms      rivaroxaban (XARELTO) 15 MG TABS tablet Take 1 tablet by mouth daily  Qty: 30 tablet, Refills: 2      metoprolol succinate (TOPROL XL) 50 MG extended release tablet Take 1 tablet by mouth daily  Qty: 30 tablet, Refills: 1      amiodarone (CORDARONE) 200 MG tablet Take 1 tablet by mouth daily  Qty: 30 tablet, Refills: 1      tamsulosin (FLOMAX) 0.4 MG capsule Take 1 capsule by mouth daily  Qty: 30 capsule, Refills: 0      atorvastatin (LIPITOR) 40 MG tablet TAKE 1 TABLET BY MOUTH DAILY  Qty: 30 tablet, Refills: 0      traZODone (DESYREL) 50 MG tablet TAKE 1 TABLET BY MOUTH DAILY FOR INSOMNIA  Qty: 30 tablet, Refills: 0    Associated Diagnoses: Chronic bronchitis, unspecified chronic bronchitis type ([facility] Primary insomnia      MAGNESIUM-OXIDE 400 (241.3 Mg) MG TABS tablet TAKE 1 TABLET BY MOUTH TWICE DAILY  Qty: 60 tablet, Refills: 2      albuterol sulfate HFA (PROVENTIL HFA) 108 (90 Base) MCG/ACT inhaler Inhale 2 puffs into the lungs every 4 hours as needed for Wheezing or Shortness of Breath (Space out to every 6 hours as symptoms improve) Space out to every 6 hours as symptoms improve. Qty: 1 Inhaler, Refills: 0    Associated Diagnoses: Chronic bronchitis, unspecified chronic bronchitis type ([facility])      gabapentin (NEURONTIN) 100 MG capsule Take 100 mg by mouth 3 times daily.  .      mometasone-formoterol (DULERA) 100-5 MCG/ACT inhaler Inhale 2 puffs into the lungs 2 times daily  Qty: 1 Inhaler, Refills: 0      omeprazole (PRILOSEC) 40 MG delayed release capsule Take 1 capsule by mouth daily (with breakfast)  Qty: 90 capsule, Refills: 3    Associated Diagnoses: Gastroesophageal reflux disease with esophagitis           Current Discharge Medication List      STOP taking these medications       insulin glargine (BASAGLAR KWIKPEN) 100 UNIT/ML injection pen Comments:   Reason for Stopping:         lactobacillus (CULTURELLE) capsule Comments:   Reason for Stopping:         Insulin Pen Needle ([employer] PEN NEEDLES) 31G X 6 MM MISC Comments:   Reason for Stopping: Follow-up appointments:  two weeks    Provider Follow-up:    pcp    Condition at Discharge:  Stable    The patient was seen and examined on day of discharge and this discharge summary is in conjunction with any daily progress note from day of discharge. Time Spent on discharge is 45 minutes  in the examination, evaluation, counseling and review of medications and discharge plan. Signed:    Jony Escobar DO   8/23/2019      Thank you Hector Ruelas MD for the opportunity to be involved in this patient's care. If you have any questions or concerns please feel free to contact me at [phone].

## 2019-08-23 NOTE — PROGRESS NOTES
Pt has dc order. He is now down to his DRY WEIGHT of 201 lbs. He is now agreeable to return to a SNF (a different one than prior). Cardiology NP has had extensive conversations with Mr Rodriguez Walls and his mom and sister. Pt was trying to get someone to bring him in [place] last night. The staff, MDs, NPs etc have spoken with pt at length and continue to reiterate HF education (> 60 mins). HF dc instructions are in AVS as well as HEENA. Pt will now be going to [facility], AN AFFILIATE OF [facility] for SNF.  Cardiology f/u appt is scheduled for Wed 8/28 at 11:40 with Juliette Philippe NP.

## 2019-08-23 NOTE — CARE COORDINATION
Case Management:  Received insurance approval for [facility], AN AFFILIATE OF [facility]. Patient aware and agrees. [address] Transport to  patient at 5:00pm.  Patient tells cm he will notify his family once he gets to the facility. Informed facility of transport time. Report [phone] ask for first floor nurse. Veverly Skip will retrieve paperwork from [employer]. Transport form on chart.   Electronically signed by Boo Fisher on 8/23/2019 at 2:34 [id number]

## 2019-08-26 ENCOUNTER — CARE COORDINATION (OUTPATIENT)
Dept: CARE COORDINATION | Age: 59
End: 2019-08-26

## 2019-09-03 NOTE — PROGRESS NOTES
[address]  Office Visit    Frieda Benedict  1960 September 4, 2019    CC:   Chief Complaint   Patient presents with    New Patient     AFib, PAD, CHF, CAD, PAG, HTN, DM, Hyperlipidemia / no edema    Chest Pain     couple of days ago - right side - \"stiff feeling\"    Shortness of Breath     always (COPD)    Other     Lipitor questions     HPI:  The patient is 61 y.o. male with a past medical history significant for morbid obesity, CAD, PAD, nonischemic cardiomyopathy, CHF, COPD, HLP, CKD, diabetes mellitus, untreated sleep apnea, ETOH abuse, smoker, PAF (S/P DCCV in 2/2019) and noncompliance who is here for hospital follow up. He was hospitalized from 6/22/2019-7/23/2019 with SOB, COPD exacerbation, R basilar PNA,(emergently intubated), VT/PEA cardiac arrest, PAF, Renal failure on HD, anemia (transfused) and eventually sent to rehab at [facility]. He was taken off HD prior to discharge. He was seen in follow up on 8/5/2019 at which he was noted to have some increased edema. His diuretic was adjusted, however he was admitted again from 8/13/2019-8/23/2019 after presenting with anemia (Hgb < 7), SOB and CHF. Seen by GI and continued with conservative management (deemed not to be good candidate for endoscopy).  He was treated with Lasix gtt and Milrinone and diuresing well (-12L). Pooja Bell Discharged to SNF. Overall feeling okay. Continues with PT. Plan is for discharge in few weeks to home. Sister asking about sodium and fluid restrictions at home. Has not smoked nor had ETOH since last hospital stay. Remains on O2 and will be following with Dr. Lopez Left as outpt (Pulmonary). Has chronic rotator cuff pain bilaterally (chronic and unchanged). Has very infrequent transient pain (< 1-2 seconds) R inframammary area. Has chronic SOB which currently is at baseline. Having issues with tenderness coccyx (being followed by MD at facility). Edema much improved.  Denies anginal chest

## 2019-09-04 ENCOUNTER — OFFICE VISIT (OUTPATIENT)
Dept: CARDIOLOGY CLINIC | Age: 59
End: 2019-09-04
Payer: COMMERCIAL

## 2019-09-04 VITALS
OXYGEN SATURATION: 97 % | HEIGHT: 67 IN | DIASTOLIC BLOOD PRESSURE: 70 MMHG | SYSTOLIC BLOOD PRESSURE: 110 MMHG | BODY MASS INDEX: 31.56 KG/M2 | HEART RATE: 79 BPM

## 2019-09-04 DIAGNOSIS — I42.8 NONISCHEMIC CARDIOMYOPATHY (HCC): ICD-10-CM

## 2019-09-04 DIAGNOSIS — I48.20 CHRONIC ATRIAL FIBRILLATION (HCC): ICD-10-CM

## 2019-09-04 DIAGNOSIS — I27.20 PULMONARY HTN (HCC): ICD-10-CM

## 2019-09-04 DIAGNOSIS — I50.82 BIVENTRICULAR HEART FAILURE (HCC): Primary | ICD-10-CM

## 2019-09-04 DIAGNOSIS — Z72.0 TOBACCO ABUSE: ICD-10-CM

## 2019-09-04 DIAGNOSIS — I73.9 PAD (PERIPHERAL ARTERY DISEASE) (HCC): ICD-10-CM

## 2019-09-04 PROCEDURE — 1036F TOBACCO NON-USER: CPT | Performed by: NURSE PRACTITIONER

## 2019-09-04 PROCEDURE — G8598 ASA/ANTIPLAT THER USED: HCPCS | Performed by: NURSE PRACTITIONER

## 2019-09-04 PROCEDURE — 99214 OFFICE O/P EST MOD 30 MIN: CPT | Performed by: NURSE PRACTITIONER

## 2019-09-04 PROCEDURE — 1111F DSCHRG MED/CURRENT MED MERGE: CPT | Performed by: NURSE PRACTITIONER

## 2019-09-04 PROCEDURE — G8417 CALC BMI ABV UP PARAM F/U: HCPCS | Performed by: NURSE PRACTITIONER

## 2019-09-04 PROCEDURE — 93000 ELECTROCARDIOGRAM COMPLETE: CPT | Performed by: NURSE PRACTITIONER

## 2019-09-04 PROCEDURE — 3017F COLORECTAL CA SCREEN DOC REV: CPT | Performed by: NURSE PRACTITIONER

## 2019-09-04 PROCEDURE — G8427 DOCREV CUR MEDS BY ELIG CLIN: HCPCS | Performed by: NURSE PRACTITIONER

## 2019-09-09 ENCOUNTER — TELEPHONE (OUTPATIENT)
Dept: CARDIOLOGY CLINIC | Age: 59
End: 2019-09-09

## 2019-09-09 DIAGNOSIS — I42.8 NONISCHEMIC CARDIOMYOPATHY (HCC): Primary | ICD-10-CM

## 2019-09-09 RX ORDER — POTASSIUM CHLORIDE 20 MEQ/1
TABLET, EXTENDED RELEASE ORAL
Qty: 60 TABLET | Refills: 3
Start: 2019-09-09 | End: 2019-12-04

## 2019-09-17 ENCOUNTER — INITIAL CONSULT (OUTPATIENT)
Dept: SURGERY | Age: 59
End: 2019-09-17
Payer: COMMERCIAL

## 2019-09-17 VITALS
BODY MASS INDEX: 29.91 KG/M2 | SYSTOLIC BLOOD PRESSURE: 97 MMHG | HEART RATE: 49 BPM | DIASTOLIC BLOOD PRESSURE: 62 MMHG | WEIGHT: 191 LBS

## 2019-09-17 DIAGNOSIS — Z89.612 HX OF AKA (ABOVE KNEE AMPUTATION), LEFT (HCC): Primary | ICD-10-CM

## 2019-09-17 DIAGNOSIS — I73.9 PAD (PERIPHERAL ARTERY DISEASE) (HCC): ICD-10-CM

## 2019-09-17 DIAGNOSIS — R29.898 WEAKNESS OF RIGHT LOWER EXTREMITY: ICD-10-CM

## 2019-09-17 DIAGNOSIS — R06.02 SHORTNESS OF BREATH: ICD-10-CM

## 2019-09-17 PROCEDURE — 99214 OFFICE O/P EST MOD 30 MIN: CPT | Performed by: SURGERY

## 2019-09-17 PROCEDURE — G8598 ASA/ANTIPLAT THER USED: HCPCS | Performed by: SURGERY

## 2019-09-17 PROCEDURE — 1036F TOBACCO NON-USER: CPT | Performed by: SURGERY

## 2019-09-17 PROCEDURE — 1111F DSCHRG MED/CURRENT MED MERGE: CPT | Performed by: SURGERY

## 2019-09-17 PROCEDURE — G8417 CALC BMI ABV UP PARAM F/U: HCPCS | Performed by: SURGERY

## 2019-09-17 PROCEDURE — 3017F COLORECTAL CA SCREEN DOC REV: CPT | Performed by: SURGERY

## 2019-09-17 PROCEDURE — G8427 DOCREV CUR MEDS BY ELIG CLIN: HCPCS | Performed by: SURGERY

## 2019-09-17 RX ORDER — PEN NEEDLE, DIABETIC 31 GX5/16"
NEEDLE, DISPOSABLE MISCELLANEOUS
Refills: 1 | COMMUNITY
Start: 2019-06-11

## 2019-09-17 ASSESSMENT — ENCOUNTER SYMPTOMS
EYES NEGATIVE: 1
COLOR CHANGE: 1
ALLERGIC/IMMUNOLOGIC NEGATIVE: 1
ABDOMINAL DISTENTION: 1
SHORTNESS OF BREATH: 1
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
## 2019-09-17 NOTE — PATIENT INSTRUCTIONS
After checking, his arteries in his right leg really are no different than before. He should follow up with Dr. Cely Robertson in 6 months for recheck of leg.

## 2019-09-30 ENCOUNTER — TELEPHONE (OUTPATIENT)
Dept: CARDIOLOGY CLINIC | Age: 59
End: 2019-09-30

## 2019-09-30 NOTE — TELEPHONE ENCOUNTER
Patient was last seen 9/4   \"Biventricular heart failure ([address]  -chronic combined systolic and diastolic heart failure  -currently much better compensated after recent hospital stay  -has been maintaining low sodium diet and fluid restriction  -LVEF 30-35%  -continue torsemide, weekly metolazone, Toprol  -not on ACE-I/ARB/aldactone d/t hypotension and elevated Cr\"    Last BMP 9/6/2019

## 2019-10-09 ENCOUNTER — HOSPITAL ENCOUNTER (INPATIENT)
Age: 59
LOS: 15 days | Discharge: NURSING, REHAB OR LONG-TERM CARE | DRG: 231 | End: 2019-10-24
Attending: EMERGENCY MEDICINE | Admitting: INTERNAL MEDICINE
Payer: COMMERCIAL

## 2019-10-09 ENCOUNTER — APPOINTMENT (OUTPATIENT)
Dept: CT IMAGING | Age: 59
DRG: 231 | End: 2019-10-09
Payer: COMMERCIAL

## 2019-10-09 DIAGNOSIS — R07.9 CHEST PAIN OF UNCERTAIN ETIOLOGY: ICD-10-CM

## 2019-10-09 DIAGNOSIS — K56.609 LARGE BOWEL OBSTRUCTION (HCC): Primary | ICD-10-CM

## 2019-10-09 LAB
A/G RATIO: 0.8 (ref 1.1–2.2)
ALBUMIN SERPL-MCNC: 3.7 G/DL (ref 3.4–5)
ALP BLD-CCNC: 106 U/L (ref 40–129)
ALT SERPL-CCNC: 49 U/L (ref 10–40)
AMMONIA: 26 UMOL/L (ref 16–60)
ANION GAP SERPL CALCULATED.3IONS-SCNC: 12 MMOL/L (ref 3–16)
AST SERPL-CCNC: 43 U/L (ref 15–37)
BASOPHILS ABSOLUTE: 0.1 K/UL (ref 0–0.2)
BASOPHILS RELATIVE PERCENT: 0.7 %
BILIRUB SERPL-MCNC: 0.3 MG/DL (ref 0–1)
BILIRUBIN URINE: NEGATIVE
BLOOD, URINE: NEGATIVE
BUN BLDV-MCNC: 28 MG/DL (ref 7–20)
CALCIUM SERPL-MCNC: 9.2 MG/DL (ref 8.3–10.6)
CHLORIDE BLD-SCNC: 94 MMOL/L (ref 99–110)
CLARITY: CLEAR
CO2: 28 MMOL/L (ref 21–32)
COLOR: YELLOW
CREAT SERPL-MCNC: 0.9 MG/DL (ref 0.9–1.3)
EOSINOPHILS ABSOLUTE: 0.2 K/UL (ref 0–0.6)
EOSINOPHILS RELATIVE PERCENT: 2 %
GFR AFRICAN AMERICAN: >60
GFR NON-AFRICAN AMERICAN: >60
GLOBULIN: 4.5 G/DL
GLUCOSE BLD-MCNC: 120 MG/DL (ref 70–99)
GLUCOSE BLD-MCNC: 154 MG/DL (ref 70–99)
GLUCOSE URINE: NEGATIVE MG/DL
HCT VFR BLD CALC: 33.6 % (ref 40.5–52.5)
HEMOGLOBIN: 11 G/DL (ref 13.5–17.5)
INR BLD: 1.4 (ref 0.86–1.14)
KETONES, URINE: NEGATIVE MG/DL
LEUKOCYTE ESTERASE, URINE: NEGATIVE
LIPASE: 18 U/L (ref 13–60)
LYMPHOCYTES ABSOLUTE: 1.6 K/UL (ref 1–5.1)
LYMPHOCYTES RELATIVE PERCENT: 14.9 %
MCH RBC QN AUTO: 27.6 PG (ref 26–34)
MCHC RBC AUTO-ENTMCNC: 32.8 G/DL (ref 31–36)
MCV RBC AUTO: 84 FL (ref 80–100)
MICROSCOPIC EXAMINATION: NORMAL
MONOCYTES ABSOLUTE: 1.3 K/UL (ref 0–1.3)
MONOCYTES RELATIVE PERCENT: 11.6 %
NEUTROPHILS ABSOLUTE: 7.7 K/UL (ref 1.7–7.7)
NEUTROPHILS RELATIVE PERCENT: 70.8 %
NITRITE, URINE: NEGATIVE
PDW BLD-RTO: 20.4 % (ref 12.4–15.4)
PERFORMED ON: ABNORMAL
PH UA: 5 (ref 5–8)
PLATELET # BLD: 213 K/UL (ref 135–450)
PMV BLD AUTO: 8.6 FL (ref 5–10.5)
POTASSIUM REFLEX MAGNESIUM: 5 MMOL/L (ref 3.5–5.1)
PROTEIN UA: NEGATIVE MG/DL
PROTHROMBIN TIME: 16 SEC (ref 9.8–13)
RBC # BLD: 4 M/UL (ref 4.2–5.9)
SODIUM BLD-SCNC: 134 MMOL/L (ref 136–145)
SPECIFIC GRAVITY UA: 1.01 (ref 1–1.03)
TOTAL PROTEIN: 8.2 G/DL (ref 6.4–8.2)
URINE REFLEX TO CULTURE: NORMAL
URINE TYPE: NORMAL
UROBILINOGEN, URINE: 0.2 E.U./DL
WBC # BLD: 10.9 K/UL (ref 4–11)

## 2019-10-09 PROCEDURE — 74176 CT ABD & PELVIS W/O CONTRAST: CPT

## 2019-10-09 PROCEDURE — 1200000000 HC SEMI PRIVATE

## 2019-10-09 PROCEDURE — 96374 THER/PROPH/DIAG INJ IV PUSH: CPT

## 2019-10-09 PROCEDURE — 82140 ASSAY OF AMMONIA: CPT

## 2019-10-09 PROCEDURE — 80053 COMPREHEN METABOLIC PANEL: CPT

## 2019-10-09 PROCEDURE — 6360000002 HC RX W HCPCS: Performed by: INTERNAL MEDICINE

## 2019-10-09 PROCEDURE — 6370000000 HC RX 637 (ALT 250 FOR IP): Performed by: INTERNAL MEDICINE

## 2019-10-09 PROCEDURE — 85610 PROTHROMBIN TIME: CPT

## 2019-10-09 PROCEDURE — 2580000003 HC RX 258: Performed by: EMERGENCY MEDICINE

## 2019-10-09 PROCEDURE — 96361 HYDRATE IV INFUSION ADD-ON: CPT

## 2019-10-09 PROCEDURE — 85025 COMPLETE CBC W/AUTO DIFF WBC: CPT

## 2019-10-09 PROCEDURE — 96376 TX/PRO/DX INJ SAME DRUG ADON: CPT

## 2019-10-09 PROCEDURE — 2580000003 HC RX 258: Performed by: INTERNAL MEDICINE

## 2019-10-09 PROCEDURE — 94640 AIRWAY INHALATION TREATMENT: CPT

## 2019-10-09 PROCEDURE — 83690 ASSAY OF LIPASE: CPT

## 2019-10-09 PROCEDURE — 99285 EMERGENCY DEPT VISIT HI MDM: CPT

## 2019-10-09 PROCEDURE — 96375 TX/PRO/DX INJ NEW DRUG ADDON: CPT

## 2019-10-09 PROCEDURE — 6360000002 HC RX W HCPCS: Performed by: EMERGENCY MEDICINE

## 2019-10-09 PROCEDURE — 81003 URINALYSIS AUTO W/O SCOPE: CPT

## 2019-10-09 RX ORDER — TRAZODONE HYDROCHLORIDE 50 MG/1
50 TABLET ORAL NIGHTLY PRN
Status: DISCONTINUED | OUTPATIENT
Start: 2019-10-09 | End: 2019-10-24 | Stop reason: HOSPADM

## 2019-10-09 RX ORDER — MORPHINE SULFATE 2 MG/ML
2 INJECTION, SOLUTION INTRAMUSCULAR; INTRAVENOUS ONCE
Status: COMPLETED | OUTPATIENT
Start: 2019-10-09 | End: 2019-10-09

## 2019-10-09 RX ORDER — AMIODARONE HYDROCHLORIDE 200 MG/1
200 TABLET ORAL DAILY
Status: DISCONTINUED | OUTPATIENT
Start: 2019-10-10 | End: 2019-10-24 | Stop reason: HOSPADM

## 2019-10-09 RX ORDER — SODIUM PHOSPHATE, DIBASIC AND SODIUM PHOSPHATE, MONOBASIC 7; 19 G/133ML; G/133ML
1 ENEMA RECTAL DAILY PRN
Status: DISCONTINUED | OUTPATIENT
Start: 2019-10-09 | End: 2019-10-13

## 2019-10-09 RX ORDER — LANOLIN ALCOHOL/MO/W.PET/CERES
400 CREAM (GRAM) TOPICAL 2 TIMES DAILY
Status: DISCONTINUED | OUTPATIENT
Start: 2019-10-09 | End: 2019-10-24 | Stop reason: HOSPADM

## 2019-10-09 RX ORDER — GABAPENTIN 100 MG/1
100 CAPSULE ORAL 3 TIMES DAILY
Status: DISCONTINUED | OUTPATIENT
Start: 2019-10-09 | End: 2019-10-14

## 2019-10-09 RX ORDER — TAMSULOSIN HYDROCHLORIDE 0.4 MG/1
0.4 CAPSULE ORAL DAILY
Status: DISCONTINUED | OUTPATIENT
Start: 2019-10-10 | End: 2019-10-24 | Stop reason: HOSPADM

## 2019-10-09 RX ORDER — SODIUM CHLORIDE 9 MG/ML
INJECTION, SOLUTION INTRAVENOUS CONTINUOUS
Status: DISCONTINUED | OUTPATIENT
Start: 2019-10-09 | End: 2019-10-09

## 2019-10-09 RX ORDER — BISACODYL 10 MG
10 SUPPOSITORY, RECTAL RECTAL DAILY PRN
Status: DISCONTINUED | OUTPATIENT
Start: 2019-10-09 | End: 2019-10-24 | Stop reason: HOSPADM

## 2019-10-09 RX ORDER — ONDANSETRON 2 MG/ML
4 INJECTION INTRAMUSCULAR; INTRAVENOUS EVERY 6 HOURS PRN
Status: DISCONTINUED | OUTPATIENT
Start: 2019-10-09 | End: 2019-10-24 | Stop reason: HOSPADM

## 2019-10-09 RX ORDER — 0.9 % SODIUM CHLORIDE 0.9 %
1000 INTRAVENOUS SOLUTION INTRAVENOUS ONCE
Status: COMPLETED | OUTPATIENT
Start: 2019-10-09 | End: 2019-10-09

## 2019-10-09 RX ORDER — PANTOPRAZOLE SODIUM 40 MG/10ML
40 INJECTION, POWDER, LYOPHILIZED, FOR SOLUTION INTRAVENOUS DAILY
Status: DISCONTINUED | OUTPATIENT
Start: 2019-10-10 | End: 2019-10-20

## 2019-10-09 RX ORDER — 0.9 % SODIUM CHLORIDE 0.9 %
10 VIAL (ML) INJECTION DAILY
Status: DISCONTINUED | OUTPATIENT
Start: 2019-10-10 | End: 2019-10-20

## 2019-10-09 RX ORDER — ALBUTEROL SULFATE 90 UG/1
2 AEROSOL, METERED RESPIRATORY (INHALATION) EVERY 4 HOURS PRN
Status: DISCONTINUED | OUTPATIENT
Start: 2019-10-09 | End: 2019-10-24 | Stop reason: HOSPADM

## 2019-10-09 RX ORDER — SODIUM CHLORIDE 0.9 % (FLUSH) 0.9 %
10 SYRINGE (ML) INJECTION PRN
Status: DISCONTINUED | OUTPATIENT
Start: 2019-10-09 | End: 2019-10-24 | Stop reason: HOSPADM

## 2019-10-09 RX ORDER — MORPHINE SULFATE 2 MG/ML
2 INJECTION, SOLUTION INTRAMUSCULAR; INTRAVENOUS EVERY 4 HOURS PRN
Status: DISCONTINUED | OUTPATIENT
Start: 2019-10-09 | End: 2019-10-13

## 2019-10-09 RX ORDER — SODIUM CHLORIDE 0.9 % (FLUSH) 0.9 %
10 SYRINGE (ML) INJECTION EVERY 12 HOURS SCHEDULED
Status: DISCONTINUED | OUTPATIENT
Start: 2019-10-09 | End: 2019-10-24 | Stop reason: HOSPADM

## 2019-10-09 RX ORDER — METOPROLOL SUCCINATE 50 MG/1
50 TABLET, EXTENDED RELEASE ORAL DAILY
Status: DISCONTINUED | OUTPATIENT
Start: 2019-10-10 | End: 2019-10-14

## 2019-10-09 RX ORDER — ACETAMINOPHEN 325 MG/1
650 TABLET ORAL EVERY 4 HOURS PRN
Status: DISCONTINUED | OUTPATIENT
Start: 2019-10-09 | End: 2019-10-24 | Stop reason: HOSPADM

## 2019-10-09 RX ORDER — ONDANSETRON 2 MG/ML
4 INJECTION INTRAMUSCULAR; INTRAVENOUS ONCE
Status: COMPLETED | OUTPATIENT
Start: 2019-10-09 | End: 2019-10-09

## 2019-10-09 RX ORDER — ATORVASTATIN CALCIUM 40 MG/1
40 TABLET, FILM COATED ORAL DAILY
Status: DISCONTINUED | OUTPATIENT
Start: 2019-10-10 | End: 2019-10-24 | Stop reason: HOSPADM

## 2019-10-09 RX ORDER — MORPHINE SULFATE 4 MG/ML
4 INJECTION, SOLUTION INTRAMUSCULAR; INTRAVENOUS ONCE
Status: COMPLETED | OUTPATIENT
Start: 2019-10-09 | End: 2019-10-09

## 2019-10-09 RX ADMIN — MOMETASONE FUROATE AND FORMOTEROL FUMARATE DIHYDRATE 2 PUFF: 100; 5 AEROSOL RESPIRATORY (INHALATION) at 22:15

## 2019-10-09 RX ADMIN — GABAPENTIN 100 MG: 100 CAPSULE ORAL at 23:16

## 2019-10-09 RX ADMIN — MAGNESIUM GLUCONATE 500 MG ORAL TABLET 400 MG: 500 TABLET ORAL at 23:15

## 2019-10-09 RX ADMIN — MORPHINE SULFATE 2 MG: 2 INJECTION, SOLUTION INTRAMUSCULAR; INTRAVENOUS at 20:53

## 2019-10-09 RX ADMIN — SODIUM CHLORIDE 1000 ML: 9 INJECTION, SOLUTION INTRAVENOUS at 18:29

## 2019-10-09 RX ADMIN — ONDANSETRON 4 MG: 2 INJECTION INTRAMUSCULAR; INTRAVENOUS at 18:29

## 2019-10-09 RX ADMIN — MORPHINE SULFATE 2 MG: 2 INJECTION, SOLUTION INTRAMUSCULAR; INTRAVENOUS at 23:14

## 2019-10-09 RX ADMIN — MORPHINE SULFATE 4 MG: 4 INJECTION, SOLUTION INTRAMUSCULAR; INTRAVENOUS at 18:29

## 2019-10-09 RX ADMIN — SODIUM CHLORIDE: 9 INJECTION, SOLUTION INTRAVENOUS at 19:54

## 2019-10-09 RX ADMIN — Medication 10 ML: at 22:01

## 2019-10-09 RX ADMIN — ENOXAPARIN SODIUM 90 MG: 100 INJECTION SUBCUTANEOUS at 23:15

## 2019-10-09 ASSESSMENT — PAIN DESCRIPTION - DESCRIPTORS
DESCRIPTORS_2: PRESSURE;CRAMPING
DESCRIPTORS: PRESSURE;DISCOMFORT
DESCRIPTORS: SHARP;ACHING
DESCRIPTORS: ACHING
DESCRIPTORS: CRAMPING;PRESSURE
DESCRIPTORS: SHARP
DESCRIPTORS: PRESSURE

## 2019-10-09 ASSESSMENT — PAIN SCALES - GENERAL
PAINLEVEL_OUTOF10: 6
PAINLEVEL_OUTOF10: 3
PAINLEVEL_OUTOF10: 9
PAINLEVEL_OUTOF10: 10
PAINLEVEL_OUTOF10: 6
PAINLEVEL_OUTOF10: 10
PAINLEVEL_OUTOF10: 8
PAINLEVEL_OUTOF10: 9
PAINLEVEL_OUTOF10: 8

## 2019-10-09 ASSESSMENT — PAIN - FUNCTIONAL ASSESSMENT
PAIN_FUNCTIONAL_ASSESSMENT: ACTIVITIES ARE NOT PREVENTED
PAIN_FUNCTIONAL_ASSESSMENT: PREVENTS OR INTERFERES WITH MANY ACTIVE NOT PASSIVE ACTIVITIES

## 2019-10-09 ASSESSMENT — PAIN DESCRIPTION - ORIENTATION
ORIENTATION: LOWER
ORIENTATION: MID
ORIENTATION: LOWER;MID
ORIENTATION_2: MID

## 2019-10-09 ASSESSMENT — PAIN DESCRIPTION - PAIN TYPE
TYPE: ACUTE PAIN
TYPE: CHRONIC PAIN
TYPE_2: ACUTE PAIN
TYPE: ACUTE PAIN

## 2019-10-09 ASSESSMENT — PAIN DESCRIPTION - LOCATION
LOCATION_2: ABDOMEN
LOCATION: ABDOMEN
LOCATION: ABDOMEN
LOCATION: BACK
LOCATION: ABDOMEN
LOCATION: ABDOMEN
LOCATION: BACK

## 2019-10-09 ASSESSMENT — PAIN DESCRIPTION - ONSET
ONSET: ON-GOING
ONSET_2: ON-GOING
ONSET: ON-GOING
ONSET: ON-GOING

## 2019-10-09 ASSESSMENT — PAIN DESCRIPTION - FREQUENCY
FREQUENCY: CONTINUOUS

## 2019-10-09 ASSESSMENT — PAIN DESCRIPTION - PROGRESSION
CLINICAL_PROGRESSION: NOT CHANGED
CLINICAL_PROGRESSION: GRADUALLY IMPROVING
CLINICAL_PROGRESSION_2: NOT CHANGED
CLINICAL_PROGRESSION: NOT CHANGED

## 2019-10-09 ASSESSMENT — PAIN DESCRIPTION - INTENSITY: RATING_2: 3

## 2019-10-09 ASSESSMENT — PAIN DESCRIPTION - DURATION: DURATION_2: INTERMITTENT

## 2019-10-10 ENCOUNTER — APPOINTMENT (OUTPATIENT)
Dept: GENERAL RADIOLOGY | Age: 59
DRG: 231 | End: 2019-10-10
Payer: COMMERCIAL

## 2019-10-10 PROBLEM — E87.1 HYPONATREMIA: Status: RESOLVED | Noted: 2018-07-15 | Resolved: 2019-10-10

## 2019-10-10 LAB
ANION GAP SERPL CALCULATED.3IONS-SCNC: 11 MMOL/L (ref 3–16)
BASOPHILS ABSOLUTE: 0.1 K/UL (ref 0–0.2)
BASOPHILS RELATIVE PERCENT: 1.3 %
BUN BLDV-MCNC: 30 MG/DL (ref 7–20)
CALCIUM SERPL-MCNC: 9.2 MG/DL (ref 8.3–10.6)
CHLORIDE BLD-SCNC: 95 MMOL/L (ref 99–110)
CO2: 31 MMOL/L (ref 21–32)
CREAT SERPL-MCNC: 1.1 MG/DL (ref 0.9–1.3)
EOSINOPHILS ABSOLUTE: 0.3 K/UL (ref 0–0.6)
EOSINOPHILS RELATIVE PERCENT: 4.1 %
GFR AFRICAN AMERICAN: >60
GFR NON-AFRICAN AMERICAN: >60
GLUCOSE BLD-MCNC: 92 MG/DL (ref 70–99)
GLUCOSE BLD-MCNC: 93 MG/DL (ref 70–99)
GLUCOSE BLD-MCNC: 93 MG/DL (ref 70–99)
GLUCOSE BLD-MCNC: 98 MG/DL (ref 70–99)
GLUCOSE BLD-MCNC: 98 MG/DL (ref 70–99)
HCT VFR BLD CALC: 32.8 % (ref 40.5–52.5)
HEMOGLOBIN: 10.9 G/DL (ref 13.5–17.5)
LYMPHOCYTES ABSOLUTE: 1.6 K/UL (ref 1–5.1)
LYMPHOCYTES RELATIVE PERCENT: 23.8 %
MAGNESIUM: 2 MG/DL (ref 1.8–2.4)
MCH RBC QN AUTO: 27.9 PG (ref 26–34)
MCHC RBC AUTO-ENTMCNC: 33.1 G/DL (ref 31–36)
MCV RBC AUTO: 84.1 FL (ref 80–100)
MONOCYTES ABSOLUTE: 0.9 K/UL (ref 0–1.3)
MONOCYTES RELATIVE PERCENT: 13.6 %
NEUTROPHILS ABSOLUTE: 3.8 K/UL (ref 1.7–7.7)
NEUTROPHILS RELATIVE PERCENT: 57.2 %
PDW BLD-RTO: 20.3 % (ref 12.4–15.4)
PERFORMED ON: NORMAL
PLATELET # BLD: 204 K/UL (ref 135–450)
PMV BLD AUTO: 8.3 FL (ref 5–10.5)
POTASSIUM REFLEX MAGNESIUM: 4.6 MMOL/L (ref 3.5–5.1)
RBC # BLD: 3.9 M/UL (ref 4.2–5.9)
SODIUM BLD-SCNC: 137 MMOL/L (ref 136–145)
WBC # BLD: 6.6 K/UL (ref 4–11)

## 2019-10-10 PROCEDURE — 94640 AIRWAY INHALATION TREATMENT: CPT

## 2019-10-10 PROCEDURE — 2580000003 HC RX 258: Performed by: INTERNAL MEDICINE

## 2019-10-10 PROCEDURE — 1200000000 HC SEMI PRIVATE

## 2019-10-10 PROCEDURE — 6360000002 HC RX W HCPCS: Performed by: INTERNAL MEDICINE

## 2019-10-10 PROCEDURE — 6370000000 HC RX 637 (ALT 250 FOR IP): Performed by: INTERNAL MEDICINE

## 2019-10-10 PROCEDURE — 80048 BASIC METABOLIC PNL TOTAL CA: CPT

## 2019-10-10 PROCEDURE — 83735 ASSAY OF MAGNESIUM: CPT

## 2019-10-10 PROCEDURE — 36415 COLL VENOUS BLD VENIPUNCTURE: CPT

## 2019-10-10 PROCEDURE — C9113 INJ PANTOPRAZOLE SODIUM, VIA: HCPCS | Performed by: INTERNAL MEDICINE

## 2019-10-10 PROCEDURE — 74018 RADEX ABDOMEN 1 VIEW: CPT

## 2019-10-10 PROCEDURE — 85025 COMPLETE CBC W/AUTO DIFF WBC: CPT

## 2019-10-10 RX ORDER — DEXTROSE MONOHYDRATE 25 G/50ML
12.5 INJECTION, SOLUTION INTRAVENOUS PRN
Status: DISCONTINUED | OUTPATIENT
Start: 2019-10-10 | End: 2019-10-24 | Stop reason: HOSPADM

## 2019-10-10 RX ORDER — SPIRONOLACTONE 50 MG/1
50 TABLET, FILM COATED ORAL DAILY
COMMUNITY
End: 2019-10-24 | Stop reason: HOSPADM

## 2019-10-10 RX ORDER — 0.9 % SODIUM CHLORIDE 0.9 %
1000 INTRAVENOUS SOLUTION INTRAVENOUS ONCE
Status: DISCONTINUED | OUTPATIENT
Start: 2019-10-10 | End: 2019-10-10

## 2019-10-10 RX ORDER — SODIUM CHLORIDE 9 MG/ML
INJECTION, SOLUTION INTRAVENOUS CONTINUOUS
Status: ACTIVE | OUTPATIENT
Start: 2019-10-10 | End: 2019-10-11

## 2019-10-10 RX ORDER — CYCLOBENZAPRINE HCL 10 MG
10 TABLET ORAL 3 TIMES DAILY PRN
Status: ON HOLD | COMMUNITY
End: 2020-02-14 | Stop reason: HOSPADM

## 2019-10-10 RX ORDER — DULOXETIN HYDROCHLORIDE 30 MG/1
30 CAPSULE, DELAYED RELEASE ORAL DAILY
Status: ON HOLD | COMMUNITY
End: 2020-02-14 | Stop reason: HOSPADM

## 2019-10-10 RX ORDER — NICOTINE POLACRILEX 4 MG
15 LOZENGE BUCCAL PRN
Status: DISCONTINUED | OUTPATIENT
Start: 2019-10-10 | End: 2019-10-24 | Stop reason: HOSPADM

## 2019-10-10 RX ORDER — ACETAMINOPHEN 325 MG/1
650 TABLET ORAL EVERY 6 HOURS PRN
COMMUNITY

## 2019-10-10 RX ORDER — 0.9 % SODIUM CHLORIDE 0.9 %
500 INTRAVENOUS SOLUTION INTRAVENOUS ONCE
Status: COMPLETED | OUTPATIENT
Start: 2019-10-10 | End: 2019-10-10

## 2019-10-10 RX ORDER — DEXTROSE MONOHYDRATE 50 MG/ML
100 INJECTION, SOLUTION INTRAVENOUS PRN
Status: DISCONTINUED | OUTPATIENT
Start: 2019-10-10 | End: 2019-10-24 | Stop reason: HOSPADM

## 2019-10-10 RX ORDER — POLYETHYLENE GLYCOL 3350 17 G/17G
17 POWDER, FOR SOLUTION ORAL DAILY PRN
Status: ON HOLD | COMMUNITY
End: 2019-12-31

## 2019-10-10 RX ORDER — OXYCODONE HYDROCHLORIDE AND ACETAMINOPHEN 5; 325 MG/1; MG/1
1 TABLET ORAL EVERY 4 HOURS PRN
Status: ON HOLD | COMMUNITY
End: 2019-11-22 | Stop reason: SDUPTHER

## 2019-10-10 RX ADMIN — GABAPENTIN 100 MG: 100 CAPSULE ORAL at 21:24

## 2019-10-10 RX ADMIN — AMIODARONE HYDROCHLORIDE 200 MG: 200 TABLET ORAL at 08:01

## 2019-10-10 RX ADMIN — MORPHINE SULFATE 2 MG: 2 INJECTION, SOLUTION INTRAMUSCULAR; INTRAVENOUS at 03:03

## 2019-10-10 RX ADMIN — MAGNESIUM GLUCONATE 500 MG ORAL TABLET 400 MG: 500 TABLET ORAL at 07:59

## 2019-10-10 RX ADMIN — ENOXAPARIN SODIUM 90 MG: 100 INJECTION SUBCUTANEOUS at 07:57

## 2019-10-10 RX ADMIN — MOMETASONE FUROATE AND FORMOTEROL FUMARATE DIHYDRATE 2 PUFF: 100; 5 AEROSOL RESPIRATORY (INHALATION) at 20:34

## 2019-10-10 RX ADMIN — ATORVASTATIN CALCIUM 40 MG: 40 TABLET, FILM COATED ORAL at 08:01

## 2019-10-10 RX ADMIN — MAGNESIUM GLUCONATE 500 MG ORAL TABLET 400 MG: 500 TABLET ORAL at 21:24

## 2019-10-10 RX ADMIN — MOMETASONE FUROATE AND FORMOTEROL FUMARATE DIHYDRATE 2 PUFF: 100; 5 AEROSOL RESPIRATORY (INHALATION) at 08:08

## 2019-10-10 RX ADMIN — SODIUM CHLORIDE, PRESERVATIVE FREE 10 ML: 5 INJECTION INTRAVENOUS at 07:59

## 2019-10-10 RX ADMIN — ENOXAPARIN SODIUM 90 MG: 100 INJECTION SUBCUTANEOUS at 21:24

## 2019-10-10 RX ADMIN — SODIUM CHLORIDE: 9 INJECTION, SOLUTION INTRAVENOUS at 11:11

## 2019-10-10 RX ADMIN — PANTOPRAZOLE SODIUM 40 MG: 40 INJECTION, POWDER, FOR SOLUTION INTRAVENOUS at 08:00

## 2019-10-10 RX ADMIN — Medication 10 ML: at 03:02

## 2019-10-10 RX ADMIN — Medication 10 ML: at 08:01

## 2019-10-10 RX ADMIN — MORPHINE SULFATE 2 MG: 2 INJECTION, SOLUTION INTRAMUSCULAR; INTRAVENOUS at 10:44

## 2019-10-10 RX ADMIN — TAMSULOSIN HYDROCHLORIDE 0.4 MG: 0.4 CAPSULE ORAL at 08:00

## 2019-10-10 RX ADMIN — ALBUTEROL SULFATE 2 PUFF: 90 AEROSOL, METERED RESPIRATORY (INHALATION) at 08:08

## 2019-10-10 RX ADMIN — MORPHINE SULFATE 2 MG: 2 INJECTION, SOLUTION INTRAMUSCULAR; INTRAVENOUS at 21:24

## 2019-10-10 RX ADMIN — Medication 10 ML: at 06:44

## 2019-10-10 RX ADMIN — MORPHINE SULFATE 2 MG: 2 INJECTION, SOLUTION INTRAMUSCULAR; INTRAVENOUS at 15:18

## 2019-10-10 RX ADMIN — MORPHINE SULFATE 2 MG: 2 INJECTION, SOLUTION INTRAMUSCULAR; INTRAVENOUS at 06:44

## 2019-10-10 RX ADMIN — ALBUTEROL SULFATE 2 PUFF: 90 AEROSOL, METERED RESPIRATORY (INHALATION) at 20:34

## 2019-10-10 RX ADMIN — GABAPENTIN 100 MG: 100 CAPSULE ORAL at 14:23

## 2019-10-10 RX ADMIN — GABAPENTIN 100 MG: 100 CAPSULE ORAL at 08:01

## 2019-10-10 RX ADMIN — SODIUM CHLORIDE 500 ML: 9 INJECTION, SOLUTION INTRAVENOUS at 11:12

## 2019-10-10 ASSESSMENT — PAIN DESCRIPTION - PROGRESSION
CLINICAL_PROGRESSION: GRADUALLY IMPROVING
CLINICAL_PROGRESSION: NOT CHANGED
CLINICAL_PROGRESSION: GRADUALLY IMPROVING
CLINICAL_PROGRESSION: NOT CHANGED
CLINICAL_PROGRESSION: NOT CHANGED

## 2019-10-10 ASSESSMENT — PAIN DESCRIPTION - LOCATION
LOCATION: ABDOMEN
LOCATION: BACK
LOCATION: BACK
LOCATION: ABDOMEN
LOCATION: BACK
LOCATION: ABDOMEN

## 2019-10-10 ASSESSMENT — PAIN DESCRIPTION - PAIN TYPE
TYPE: ACUTE PAIN
TYPE: ACUTE PAIN
TYPE: CHRONIC PAIN
TYPE: ACUTE PAIN;CHRONIC PAIN
TYPE: ACUTE PAIN;CHRONIC PAIN
TYPE: CHRONIC PAIN
TYPE: ACUTE PAIN
TYPE: ACUTE PAIN

## 2019-10-10 ASSESSMENT — PAIN SCALES - GENERAL
PAINLEVEL_OUTOF10: 8
PAINLEVEL_OUTOF10: 0
PAINLEVEL_OUTOF10: 8
PAINLEVEL_OUTOF10: 8
PAINLEVEL_OUTOF10: 9
PAINLEVEL_OUTOF10: 2
PAINLEVEL_OUTOF10: 8
PAINLEVEL_OUTOF10: 9
PAINLEVEL_OUTOF10: 9
PAINLEVEL_OUTOF10: 0

## 2019-10-10 ASSESSMENT — PAIN DESCRIPTION - ORIENTATION
ORIENTATION: MID
ORIENTATION: MID;LOWER
ORIENTATION: MID;LOWER
ORIENTATION: MID
ORIENTATION: MID;LOWER
ORIENTATION: LOWER;MID
ORIENTATION: LOWER
ORIENTATION: MID;LOWER

## 2019-10-10 ASSESSMENT — PAIN DESCRIPTION - DESCRIPTORS
DESCRIPTORS: SHARP
DESCRIPTORS: ACHING;SHARP

## 2019-10-10 ASSESSMENT — PAIN DESCRIPTION - FREQUENCY
FREQUENCY: CONTINUOUS

## 2019-10-10 ASSESSMENT — PAIN DESCRIPTION - ONSET
ONSET: ON-GOING

## 2019-10-10 ASSESSMENT — PAIN - FUNCTIONAL ASSESSMENT
PAIN_FUNCTIONAL_ASSESSMENT: PREVENTS OR INTERFERES SOME ACTIVE ACTIVITIES AND ADLS

## 2019-10-11 ENCOUNTER — APPOINTMENT (OUTPATIENT)
Dept: GENERAL RADIOLOGY | Age: 59
DRG: 231 | End: 2019-10-11
Payer: COMMERCIAL

## 2019-10-11 LAB
A/G RATIO: 0.9 (ref 1.1–2.2)
ALBUMIN SERPL-MCNC: 3.5 G/DL (ref 3.4–5)
ALP BLD-CCNC: 99 U/L (ref 40–129)
ALT SERPL-CCNC: 42 U/L (ref 10–40)
ANION GAP SERPL CALCULATED.3IONS-SCNC: 10 MMOL/L (ref 3–16)
AST SERPL-CCNC: 44 U/L (ref 15–37)
BILIRUB SERPL-MCNC: 0.5 MG/DL (ref 0–1)
BUN BLDV-MCNC: 17 MG/DL (ref 7–20)
CALCIUM SERPL-MCNC: 8.9 MG/DL (ref 8.3–10.6)
CHLORIDE BLD-SCNC: 98 MMOL/L (ref 99–110)
CO2: 30 MMOL/L (ref 21–32)
CREAT SERPL-MCNC: 0.9 MG/DL (ref 0.9–1.3)
GFR AFRICAN AMERICAN: >60
GFR NON-AFRICAN AMERICAN: >60
GLOBULIN: 4.1 G/DL
GLUCOSE BLD-MCNC: 84 MG/DL (ref 70–99)
GLUCOSE BLD-MCNC: 92 MG/DL (ref 70–99)
GLUCOSE BLD-MCNC: 92 MG/DL (ref 70–99)
GLUCOSE BLD-MCNC: 96 MG/DL (ref 70–99)
GLUCOSE BLD-MCNC: 97 MG/DL (ref 70–99)
HCT VFR BLD CALC: 33.9 % (ref 40.5–52.5)
HEMOGLOBIN: 10.8 G/DL (ref 13.5–17.5)
MAGNESIUM: 2.1 MG/DL (ref 1.8–2.4)
MCH RBC QN AUTO: 27.3 PG (ref 26–34)
MCHC RBC AUTO-ENTMCNC: 32 G/DL (ref 31–36)
MCV RBC AUTO: 85.3 FL (ref 80–100)
PDW BLD-RTO: 19.5 % (ref 12.4–15.4)
PERFORMED ON: NORMAL
PHOSPHORUS: 3.5 MG/DL (ref 2.5–4.9)
PLATELET # BLD: 181 K/UL (ref 135–450)
PMV BLD AUTO: 8.5 FL (ref 5–10.5)
POTASSIUM SERPL-SCNC: 4.8 MMOL/L (ref 3.5–5.1)
RBC # BLD: 3.97 M/UL (ref 4.2–5.9)
SODIUM BLD-SCNC: 138 MMOL/L (ref 136–145)
TOTAL PROTEIN: 7.6 G/DL (ref 6.4–8.2)
WBC # BLD: 6.7 K/UL (ref 4–11)

## 2019-10-11 PROCEDURE — 84100 ASSAY OF PHOSPHORUS: CPT

## 2019-10-11 PROCEDURE — 36415 COLL VENOUS BLD VENIPUNCTURE: CPT

## 2019-10-11 PROCEDURE — 94760 N-INVAS EAR/PLS OXIMETRY 1: CPT

## 2019-10-11 PROCEDURE — 74018 RADEX ABDOMEN 1 VIEW: CPT

## 2019-10-11 PROCEDURE — 6370000000 HC RX 637 (ALT 250 FOR IP): Performed by: INTERNAL MEDICINE

## 2019-10-11 PROCEDURE — 83735 ASSAY OF MAGNESIUM: CPT

## 2019-10-11 PROCEDURE — 1200000000 HC SEMI PRIVATE

## 2019-10-11 PROCEDURE — 6360000002 HC RX W HCPCS: Performed by: INTERNAL MEDICINE

## 2019-10-11 PROCEDURE — 2580000003 HC RX 258: Performed by: INTERNAL MEDICINE

## 2019-10-11 PROCEDURE — 85027 COMPLETE CBC AUTOMATED: CPT

## 2019-10-11 PROCEDURE — 2700000000 HC OXYGEN THERAPY PER DAY

## 2019-10-11 PROCEDURE — 80053 COMPREHEN METABOLIC PANEL: CPT

## 2019-10-11 PROCEDURE — C9113 INJ PANTOPRAZOLE SODIUM, VIA: HCPCS | Performed by: INTERNAL MEDICINE

## 2019-10-11 PROCEDURE — 94640 AIRWAY INHALATION TREATMENT: CPT

## 2019-10-11 RX ADMIN — MAGNESIUM GLUCONATE 500 MG ORAL TABLET 400 MG: 500 TABLET ORAL at 08:22

## 2019-10-11 RX ADMIN — GABAPENTIN 100 MG: 100 CAPSULE ORAL at 13:39

## 2019-10-11 RX ADMIN — MORPHINE SULFATE 2 MG: 2 INJECTION, SOLUTION INTRAMUSCULAR; INTRAVENOUS at 21:57

## 2019-10-11 RX ADMIN — Medication 10 ML: at 08:23

## 2019-10-11 RX ADMIN — MORPHINE SULFATE 2 MG: 2 INJECTION, SOLUTION INTRAMUSCULAR; INTRAVENOUS at 09:21

## 2019-10-11 RX ADMIN — MORPHINE SULFATE 2 MG: 2 INJECTION, SOLUTION INTRAMUSCULAR; INTRAVENOUS at 17:43

## 2019-10-11 RX ADMIN — SODIUM CHLORIDE, PRESERVATIVE FREE 10 ML: 5 INJECTION INTRAVENOUS at 21:23

## 2019-10-11 RX ADMIN — ALBUTEROL SULFATE 2 PUFF: 90 AEROSOL, METERED RESPIRATORY (INHALATION) at 20:31

## 2019-10-11 RX ADMIN — SODIUM CHLORIDE, PRESERVATIVE FREE 10 ML: 5 INJECTION INTRAVENOUS at 08:23

## 2019-10-11 RX ADMIN — ENOXAPARIN SODIUM 90 MG: 100 INJECTION SUBCUTANEOUS at 08:21

## 2019-10-11 RX ADMIN — MOMETASONE FUROATE AND FORMOTEROL FUMARATE DIHYDRATE 2 PUFF: 100; 5 AEROSOL RESPIRATORY (INHALATION) at 09:05

## 2019-10-11 RX ADMIN — TAMSULOSIN HYDROCHLORIDE 0.4 MG: 0.4 CAPSULE ORAL at 08:22

## 2019-10-11 RX ADMIN — AMIODARONE HYDROCHLORIDE 200 MG: 200 TABLET ORAL at 08:22

## 2019-10-11 RX ADMIN — MOMETASONE FUROATE AND FORMOTEROL FUMARATE DIHYDRATE 2 PUFF: 100; 5 AEROSOL RESPIRATORY (INHALATION) at 20:31

## 2019-10-11 RX ADMIN — PANTOPRAZOLE SODIUM 40 MG: 40 INJECTION, POWDER, FOR SOLUTION INTRAVENOUS at 08:23

## 2019-10-11 RX ADMIN — GABAPENTIN 100 MG: 100 CAPSULE ORAL at 21:23

## 2019-10-11 RX ADMIN — GABAPENTIN 100 MG: 100 CAPSULE ORAL at 08:22

## 2019-10-11 RX ADMIN — ENOXAPARIN SODIUM 90 MG: 100 INJECTION SUBCUTANEOUS at 21:23

## 2019-10-11 RX ADMIN — SODIUM CHLORIDE: 9 INJECTION, SOLUTION INTRAVENOUS at 08:25

## 2019-10-11 RX ADMIN — MORPHINE SULFATE 2 MG: 2 INJECTION, SOLUTION INTRAMUSCULAR; INTRAVENOUS at 13:39

## 2019-10-11 RX ADMIN — ATORVASTATIN CALCIUM 40 MG: 40 TABLET, FILM COATED ORAL at 08:22

## 2019-10-11 RX ADMIN — MORPHINE SULFATE 2 MG: 2 INJECTION, SOLUTION INTRAMUSCULAR; INTRAVENOUS at 04:50

## 2019-10-11 RX ADMIN — MAGNESIUM GLUCONATE 500 MG ORAL TABLET 400 MG: 500 TABLET ORAL at 21:23

## 2019-10-11 ASSESSMENT — PAIN SCALES - GENERAL
PAINLEVEL_OUTOF10: 6
PAINLEVEL_OUTOF10: 6
PAINLEVEL_OUTOF10: 10
PAINLEVEL_OUTOF10: 8
PAINLEVEL_OUTOF10: 0
PAINLEVEL_OUTOF10: 8
PAINLEVEL_OUTOF10: 2

## 2019-10-11 ASSESSMENT — PAIN DESCRIPTION - PAIN TYPE
TYPE: ACUTE PAIN
TYPE: ACUTE PAIN;CHRONIC PAIN
TYPE: ACUTE PAIN;CHRONIC PAIN
TYPE: ACUTE PAIN
TYPE: ACUTE PAIN
TYPE: ACUTE PAIN;CHRONIC PAIN

## 2019-10-11 ASSESSMENT — PAIN DESCRIPTION - DESCRIPTORS
DESCRIPTORS: SHARP

## 2019-10-11 ASSESSMENT — PAIN DESCRIPTION - LOCATION
LOCATION: ABDOMEN
LOCATION: BACK;SHOULDER
LOCATION: ABDOMEN;BACK;SHOULDER
LOCATION: BACK
LOCATION: ABDOMEN
LOCATION: ABDOMEN;BACK;SHOULDER

## 2019-10-11 ASSESSMENT — PAIN DESCRIPTION - FREQUENCY
FREQUENCY: INTERMITTENT
FREQUENCY: CONTINUOUS

## 2019-10-11 ASSESSMENT — PAIN DESCRIPTION - ORIENTATION
ORIENTATION: RIGHT;LEFT;UPPER
ORIENTATION: MID
ORIENTATION: MID
ORIENTATION: MID;LOWER
ORIENTATION: MID
ORIENTATION: RIGHT;LEFT;UPPER

## 2019-10-11 ASSESSMENT — PAIN - FUNCTIONAL ASSESSMENT
PAIN_FUNCTIONAL_ASSESSMENT: PREVENTS OR INTERFERES SOME ACTIVE ACTIVITIES AND ADLS

## 2019-10-11 ASSESSMENT — PAIN DESCRIPTION - PROGRESSION
CLINICAL_PROGRESSION: GRADUALLY IMPROVING

## 2019-10-11 ASSESSMENT — PAIN DESCRIPTION - ONSET
ONSET: ON-GOING

## 2019-10-12 ENCOUNTER — APPOINTMENT (OUTPATIENT)
Dept: GENERAL RADIOLOGY | Age: 59
DRG: 231 | End: 2019-10-12
Payer: COMMERCIAL

## 2019-10-12 ENCOUNTER — ANESTHESIA EVENT (OUTPATIENT)
Dept: OPERATING ROOM | Age: 59
DRG: 231 | End: 2019-10-12
Payer: COMMERCIAL

## 2019-10-12 ENCOUNTER — ANESTHESIA (OUTPATIENT)
Dept: OPERATING ROOM | Age: 59
DRG: 231 | End: 2019-10-12
Payer: COMMERCIAL

## 2019-10-12 ENCOUNTER — APPOINTMENT (OUTPATIENT)
Dept: CT IMAGING | Age: 59
DRG: 231 | End: 2019-10-12
Payer: COMMERCIAL

## 2019-10-12 VITALS
RESPIRATION RATE: 1 BRPM | DIASTOLIC BLOOD PRESSURE: 66 MMHG | SYSTOLIC BLOOD PRESSURE: 98 MMHG | TEMPERATURE: 96.1 F | OXYGEN SATURATION: 100 %

## 2019-10-12 LAB
ABO/RH: NORMAL
ANION GAP SERPL CALCULATED.3IONS-SCNC: 12 MMOL/L (ref 3–16)
ANION GAP SERPL CALCULATED.3IONS-SCNC: 8 MMOL/L (ref 3–16)
ANTIBODY SCREEN: NORMAL
BASE EXCESS ARTERIAL: -4.4 MMOL/L (ref -3–3)
BASE EXCESS ARTERIAL: 0.5 MMOL/L (ref -3–3)
BASOPHILS ABSOLUTE: 0.1 K/UL (ref 0–0.2)
BASOPHILS RELATIVE PERCENT: 1.1 %
BLOOD BANK DISPENSE STATUS: NORMAL
BLOOD BANK DISPENSE STATUS: NORMAL
BLOOD BANK PRODUCT CODE: NORMAL
BLOOD BANK PRODUCT CODE: NORMAL
BPU ID: NORMAL
BPU ID: NORMAL
BUN BLDV-MCNC: 7 MG/DL (ref 7–20)
BUN BLDV-MCNC: 9 MG/DL (ref 7–20)
CALCIUM SERPL-MCNC: 5.4 MG/DL (ref 8.3–10.6)
CALCIUM SERPL-MCNC: 9 MG/DL (ref 8.3–10.6)
CARBOXYHEMOGLOBIN ARTERIAL: 0.1 % (ref 0–1.5)
CARBOXYHEMOGLOBIN ARTERIAL: <0 % (ref 0–1.5)
CHLORIDE BLD-SCNC: 100 MMOL/L (ref 99–110)
CHLORIDE BLD-SCNC: 113 MMOL/L (ref 99–110)
CO2: 15 MMOL/L (ref 21–32)
CO2: 30 MMOL/L (ref 21–32)
CREAT SERPL-MCNC: 0.8 MG/DL (ref 0.9–1.3)
CREAT SERPL-MCNC: <0.5 MG/DL (ref 0.9–1.3)
DESCRIPTION BLOOD BANK: NORMAL
DESCRIPTION BLOOD BANK: NORMAL
EOSINOPHILS ABSOLUTE: 0.1 K/UL (ref 0–0.6)
EOSINOPHILS RELATIVE PERCENT: 2.2 %
GFR AFRICAN AMERICAN: >60
GFR AFRICAN AMERICAN: >60
GFR NON-AFRICAN AMERICAN: >60
GFR NON-AFRICAN AMERICAN: >60
GLUCOSE BLD-MCNC: 130 MG/DL (ref 70–99)
GLUCOSE BLD-MCNC: 132 MG/DL (ref 70–99)
GLUCOSE BLD-MCNC: 215 MG/DL (ref 70–99)
GLUCOSE BLD-MCNC: 84 MG/DL (ref 70–99)
GLUCOSE BLD-MCNC: 86 MG/DL (ref 70–99)
GLUCOSE BLD-MCNC: 90 MG/DL (ref 70–99)
GLUCOSE BLD-MCNC: 90 MG/DL (ref 70–99)
HCO3 ARTERIAL: 20.4 MMOL/L (ref 21–29)
HCO3 ARTERIAL: 22.6 MMOL/L (ref 21–29)
HCT VFR BLD CALC: 17.3 % (ref 40.5–52.5)
HCT VFR BLD CALC: 32.5 % (ref 40.5–52.5)
HEMOGLOBIN, ART, EXTENDED: 6.9 G/DL (ref 13.5–17.5)
HEMOGLOBIN, ART, EXTENDED: 9.5 G/DL (ref 13.5–17.5)
HEMOGLOBIN: 10.5 G/DL (ref 13.5–17.5)
HEMOGLOBIN: 5.6 G/DL (ref 13.5–17.5)
INR BLD: 1.19 (ref 0.86–1.14)
LYMPHOCYTES ABSOLUTE: 1.1 K/UL (ref 1–5.1)
LYMPHOCYTES RELATIVE PERCENT: 19.2 %
MCH RBC QN AUTO: 27.4 PG (ref 26–34)
MCHC RBC AUTO-ENTMCNC: 32.2 G/DL (ref 31–36)
MCV RBC AUTO: 84.9 FL (ref 80–100)
METHEMOGLOBIN ARTERIAL: 0.7 %
METHEMOGLOBIN ARTERIAL: 1 %
MONOCYTES ABSOLUTE: 0.8 K/UL (ref 0–1.3)
MONOCYTES RELATIVE PERCENT: 13 %
NEUTROPHILS ABSOLUTE: 3.8 K/UL (ref 1.7–7.7)
NEUTROPHILS RELATIVE PERCENT: 64.5 %
O2 CONTENT ARTERIAL: 10 ML/DL
O2 CONTENT ARTERIAL: 13 ML/DL
O2 SAT, ARTERIAL: 97.3 %
O2 SAT, ARTERIAL: 97.8 %
O2 THERAPY: ABNORMAL
O2 THERAPY: ABNORMAL
PCO2 ARTERIAL: 28.2 MMHG (ref 35–45)
PCO2 ARTERIAL: 39.1 MMHG (ref 35–45)
PDW BLD-RTO: 20.1 % (ref 12.4–15.4)
PERFORMED ON: ABNORMAL
PERFORMED ON: ABNORMAL
PERFORMED ON: NORMAL
PH ARTERIAL: 7.34 (ref 7.35–7.45)
PH ARTERIAL: 7.51 (ref 7.35–7.45)
PLATELET # BLD: 184 K/UL (ref 135–450)
PMV BLD AUTO: 8.4 FL (ref 5–10.5)
PO2 ARTERIAL: 197 MMHG (ref 75–108)
PO2 ARTERIAL: 296 MMHG (ref 75–108)
POTASSIUM SERPL-SCNC: 3.2 MMOL/L (ref 3.5–5.1)
POTASSIUM SERPL-SCNC: 4.4 MMOL/L (ref 3.5–5.1)
PROTHROMBIN TIME: 13.6 SEC (ref 9.8–13)
RBC # BLD: 3.82 M/UL (ref 4.2–5.9)
SODIUM BLD-SCNC: 138 MMOL/L (ref 136–145)
SODIUM BLD-SCNC: 140 MMOL/L (ref 136–145)
TCO2 ARTERIAL: 21.6 MMOL/L
TCO2 ARTERIAL: 23.4 MMOL/L
WBC # BLD: 5.8 K/UL (ref 4–11)

## 2019-10-12 PROCEDURE — 6360000002 HC RX W HCPCS: Performed by: SURGERY

## 2019-10-12 PROCEDURE — 02HV33Z INSERTION OF INFUSION DEVICE INTO SUPERIOR VENA CAVA, PERCUTANEOUS APPROACH: ICD-10-PCS | Performed by: INTERNAL MEDICINE

## 2019-10-12 PROCEDURE — 94640 AIRWAY INHALATION TREATMENT: CPT

## 2019-10-12 PROCEDURE — 82803 BLOOD GASES ANY COMBINATION: CPT

## 2019-10-12 PROCEDURE — 0D1L0Z4 BYPASS TRANSVERSE COLON TO CUTANEOUS, OPEN APPROACH: ICD-10-PCS | Performed by: SURGERY

## 2019-10-12 PROCEDURE — 36415 COLL VENOUS BLD VENIPUNCTURE: CPT

## 2019-10-12 PROCEDURE — 88342 IMHCHEM/IMCYTCHM 1ST ANTB: CPT

## 2019-10-12 PROCEDURE — 2000000000 HC ICU R&B

## 2019-10-12 PROCEDURE — 97606 NEG PRS WND THER DME>50 SQCM: CPT | Performed by: SURGERY

## 2019-10-12 PROCEDURE — 2709999900 HC NON-CHARGEABLE SUPPLY: Performed by: SURGERY

## 2019-10-12 PROCEDURE — 6360000002 HC RX W HCPCS: Performed by: HOSPITALIST

## 2019-10-12 PROCEDURE — 6370000000 HC RX 637 (ALT 250 FOR IP): Performed by: INTERNAL MEDICINE

## 2019-10-12 PROCEDURE — 6360000002 HC RX W HCPCS: Performed by: INTERNAL MEDICINE

## 2019-10-12 PROCEDURE — APPNB180 APP NON BILLABLE TIME > 60 MINS: Performed by: PHYSICIAN ASSISTANT

## 2019-10-12 PROCEDURE — 86900 BLOOD TYPING SEROLOGIC ABO: CPT

## 2019-10-12 PROCEDURE — 2580000003 HC RX 258: Performed by: NURSE PRACTITIONER

## 2019-10-12 PROCEDURE — APPSS180 APP SPLIT SHARED TIME > 60 MINUTES: Performed by: PHYSICIAN ASSISTANT

## 2019-10-12 PROCEDURE — 94002 VENT MGMT INPAT INIT DAY: CPT

## 2019-10-12 PROCEDURE — 2500000003 HC RX 250 WO HCPCS: Performed by: ANESTHESIOLOGY

## 2019-10-12 PROCEDURE — 86901 BLOOD TYPING SEROLOGIC RH(D): CPT

## 2019-10-12 PROCEDURE — 2580000003 HC RX 258: Performed by: SURGERY

## 2019-10-12 PROCEDURE — 94761 N-INVAS EAR/PLS OXIMETRY MLT: CPT

## 2019-10-12 PROCEDURE — 2580000003 HC RX 258: Performed by: INTERNAL MEDICINE

## 2019-10-12 PROCEDURE — 3700000000 HC ANESTHESIA ATTENDED CARE: Performed by: SURGERY

## 2019-10-12 PROCEDURE — 0DTG0ZZ RESECTION OF LEFT LARGE INTESTINE, OPEN APPROACH: ICD-10-PCS | Performed by: SURGERY

## 2019-10-12 PROCEDURE — 3600000004 HC SURGERY LEVEL 4 BASE: Performed by: SURGERY

## 2019-10-12 PROCEDURE — 44139 MOBILIZATION OF COLON: CPT | Performed by: SURGERY

## 2019-10-12 PROCEDURE — 6360000004 HC RX CONTRAST MEDICATION: Performed by: SURGERY

## 2019-10-12 PROCEDURE — 85018 HEMOGLOBIN: CPT

## 2019-10-12 PROCEDURE — 86923 COMPATIBILITY TEST ELECTRIC: CPT

## 2019-10-12 PROCEDURE — 86850 RBC ANTIBODY SCREEN: CPT

## 2019-10-12 PROCEDURE — 85025 COMPLETE CBC W/AUTO DIFF WBC: CPT

## 2019-10-12 PROCEDURE — 3600000014 HC SURGERY LEVEL 4 ADDTL 15MIN: Performed by: SURGERY

## 2019-10-12 PROCEDURE — 36430 TRANSFUSION BLD/BLD COMPNT: CPT

## 2019-10-12 PROCEDURE — 6360000002 HC RX W HCPCS: Performed by: ANESTHESIOLOGY

## 2019-10-12 PROCEDURE — 74018 RADEX ABDOMEN 1 VIEW: CPT

## 2019-10-12 PROCEDURE — 2500000003 HC RX 250 WO HCPCS

## 2019-10-12 PROCEDURE — P9045 ALBUMIN (HUMAN), 5%, 250 ML: HCPCS | Performed by: ANESTHESIOLOGY

## 2019-10-12 PROCEDURE — 80048 BASIC METABOLIC PNL TOTAL CA: CPT

## 2019-10-12 PROCEDURE — 5A1935Z RESPIRATORY VENTILATION, LESS THAN 24 CONSECUTIVE HOURS: ICD-10-PCS | Performed by: INTERNAL MEDICINE

## 2019-10-12 PROCEDURE — 3700000001 HC ADD 15 MINUTES (ANESTHESIA): Performed by: SURGERY

## 2019-10-12 PROCEDURE — 2720000010 HC SURG SUPPLY STERILE: Performed by: SURGERY

## 2019-10-12 PROCEDURE — 2580000003 HC RX 258: Performed by: ANESTHESIOLOGY

## 2019-10-12 PROCEDURE — 44143 PARTIAL REMOVAL OF COLON: CPT | Performed by: SURGERY

## 2019-10-12 PROCEDURE — 74177 CT ABD & PELVIS W/CONTRAST: CPT

## 2019-10-12 PROCEDURE — 6360000002 HC RX W HCPCS: Performed by: NURSE PRACTITIONER

## 2019-10-12 PROCEDURE — 2580000003 HC RX 258: Performed by: HOSPITALIST

## 2019-10-12 PROCEDURE — 88307 TISSUE EXAM BY PATHOLOGIST: CPT

## 2019-10-12 PROCEDURE — P9016 RBC LEUKOCYTES REDUCED: HCPCS

## 2019-10-12 PROCEDURE — 99223 1ST HOSP IP/OBS HIGH 75: CPT | Performed by: SURGERY

## 2019-10-12 PROCEDURE — 85014 HEMATOCRIT: CPT

## 2019-10-12 PROCEDURE — C9113 INJ PANTOPRAZOLE SODIUM, VIA: HCPCS | Performed by: INTERNAL MEDICINE

## 2019-10-12 PROCEDURE — 85610 PROTHROMBIN TIME: CPT

## 2019-10-12 RX ORDER — SUCCINYLCHOLINE/SOD CL,ISO/PF 200MG/10ML
SYRINGE (ML) INTRAVENOUS PRN
Status: DISCONTINUED | OUTPATIENT
Start: 2019-10-12 | End: 2019-10-12 | Stop reason: SDUPTHER

## 2019-10-12 RX ORDER — ESMOLOL HYDROCHLORIDE 10 MG/ML
INJECTION INTRAVENOUS PRN
Status: DISCONTINUED | OUTPATIENT
Start: 2019-10-12 | End: 2019-10-12 | Stop reason: SDUPTHER

## 2019-10-12 RX ORDER — 0.9 % SODIUM CHLORIDE 0.9 %
1000 INTRAVENOUS SOLUTION INTRAVENOUS ONCE
Status: COMPLETED | OUTPATIENT
Start: 2019-10-12 | End: 2019-10-13

## 2019-10-12 RX ORDER — FENTANYL CITRATE 50 UG/ML
50 INJECTION, SOLUTION INTRAMUSCULAR; INTRAVENOUS ONCE
Status: COMPLETED | OUTPATIENT
Start: 2019-10-12 | End: 2019-10-12

## 2019-10-12 RX ORDER — KETAMINE HCL IN NACL, ISO-OSM 100MG/10ML
SYRINGE (ML) INJECTION PRN
Status: DISCONTINUED | OUTPATIENT
Start: 2019-10-12 | End: 2019-10-12 | Stop reason: SDUPTHER

## 2019-10-12 RX ORDER — MIDAZOLAM HYDROCHLORIDE 1 MG/ML
INJECTION INTRAMUSCULAR; INTRAVENOUS PRN
Status: DISCONTINUED | OUTPATIENT
Start: 2019-10-12 | End: 2019-10-12 | Stop reason: SDUPTHER

## 2019-10-12 RX ORDER — 0.9 % SODIUM CHLORIDE 0.9 %
250 INTRAVENOUS SOLUTION INTRAVENOUS ONCE
Status: COMPLETED | OUTPATIENT
Start: 2019-10-12 | End: 2019-10-12

## 2019-10-12 RX ORDER — ROCURONIUM BROMIDE 10 MG/ML
INJECTION, SOLUTION INTRAVENOUS PRN
Status: DISCONTINUED | OUTPATIENT
Start: 2019-10-12 | End: 2019-10-12 | Stop reason: SDUPTHER

## 2019-10-12 RX ORDER — ALBUMIN, HUMAN INJ 5% 5 %
SOLUTION INTRAVENOUS PRN
Status: DISCONTINUED | OUTPATIENT
Start: 2019-10-12 | End: 2019-10-12 | Stop reason: SDUPTHER

## 2019-10-12 RX ORDER — CHLORHEXIDINE GLUCONATE 0.12 MG/ML
15 RINSE ORAL 2 TIMES DAILY
Status: DISCONTINUED | OUTPATIENT
Start: 2019-10-12 | End: 2019-10-13

## 2019-10-12 RX ORDER — PHENYLEPHRINE HCL IN 0.9% NACL 1 MG/10 ML
SYRINGE (ML) INTRAVENOUS PRN
Status: DISCONTINUED | OUTPATIENT
Start: 2019-10-12 | End: 2019-10-12 | Stop reason: SDUPTHER

## 2019-10-12 RX ORDER — FENTANYL CITRATE 50 UG/ML
INJECTION, SOLUTION INTRAMUSCULAR; INTRAVENOUS PRN
Status: DISCONTINUED | OUTPATIENT
Start: 2019-10-12 | End: 2019-10-12 | Stop reason: SDUPTHER

## 2019-10-12 RX ORDER — MAGNESIUM HYDROXIDE 1200 MG/15ML
LIQUID ORAL CONTINUOUS PRN
Status: COMPLETED | OUTPATIENT
Start: 2019-10-12 | End: 2019-10-12

## 2019-10-12 RX ORDER — PROPOFOL 10 MG/ML
INJECTION, EMULSION INTRAVENOUS PRN
Status: DISCONTINUED | OUTPATIENT
Start: 2019-10-12 | End: 2019-10-12 | Stop reason: SDUPTHER

## 2019-10-12 RX ORDER — PROPOFOL 10 MG/ML
10 INJECTION, EMULSION INTRAVENOUS
Status: DISCONTINUED | OUTPATIENT
Start: 2019-10-12 | End: 2019-10-13

## 2019-10-12 RX ORDER — SODIUM CHLORIDE 9 MG/ML
INJECTION, SOLUTION INTRAVENOUS CONTINUOUS PRN
Status: DISCONTINUED | OUTPATIENT
Start: 2019-10-12 | End: 2019-10-12 | Stop reason: SDUPTHER

## 2019-10-12 RX ORDER — FENTANYL CITRATE 50 UG/ML
25 INJECTION, SOLUTION INTRAMUSCULAR; INTRAVENOUS
Status: DISCONTINUED | OUTPATIENT
Start: 2019-10-12 | End: 2019-10-13

## 2019-10-12 RX ADMIN — SODIUM CHLORIDE 1000 ML: 9 INJECTION, SOLUTION INTRAVENOUS at 22:03

## 2019-10-12 RX ADMIN — FENTANYL CITRATE 50 MCG: 50 INJECTION, SOLUTION INTRAMUSCULAR; INTRAVENOUS at 22:03

## 2019-10-12 RX ADMIN — ESMOLOL HYDROCHLORIDE 20 MG: 100 INJECTION, SOLUTION INTRAVENOUS at 15:50

## 2019-10-12 RX ADMIN — Medication 100 MCG: at 15:28

## 2019-10-12 RX ADMIN — AMIODARONE HYDROCHLORIDE 200 MG: 200 TABLET ORAL at 08:13

## 2019-10-12 RX ADMIN — SODIUM CHLORIDE: 9 INJECTION, SOLUTION INTRAVENOUS at 14:10

## 2019-10-12 RX ADMIN — FENTANYL CITRATE 50 MCG: 50 INJECTION INTRAMUSCULAR; INTRAVENOUS at 14:32

## 2019-10-12 RX ADMIN — ROCURONIUM BROMIDE 40 MG: 10 INJECTION INTRAVENOUS at 14:04

## 2019-10-12 RX ADMIN — MIDAZOLAM 2 MG: 1 INJECTION INTRAMUSCULAR; INTRAVENOUS at 13:57

## 2019-10-12 RX ADMIN — MOMETASONE FUROATE AND FORMOTEROL FUMARATE DIHYDRATE 2 PUFF: 100; 5 AEROSOL RESPIRATORY (INHALATION) at 21:17

## 2019-10-12 RX ADMIN — ALBUMIN (HUMAN) 250 ML: 12.5 INJECTION, SOLUTION INTRAVENOUS at 16:42

## 2019-10-12 RX ADMIN — FENTANYL CITRATE 100 MCG: 50 INJECTION INTRAMUSCULAR; INTRAVENOUS at 13:57

## 2019-10-12 RX ADMIN — ALBUTEROL SULFATE 2 PUFF: 90 AEROSOL, METERED RESPIRATORY (INHALATION) at 09:05

## 2019-10-12 RX ADMIN — PROPOFOL 150 MG: 10 INJECTION, EMULSION INTRAVENOUS at 13:59

## 2019-10-12 RX ADMIN — AMIODARONE HYDROCHLORIDE 1 MG/MIN: 50 INJECTION, SOLUTION INTRAVENOUS at 15:56

## 2019-10-12 RX ADMIN — Medication 100 MCG: at 14:43

## 2019-10-12 RX ADMIN — GABAPENTIN 100 MG: 100 CAPSULE ORAL at 20:15

## 2019-10-12 RX ADMIN — Medication 200 MCG: at 16:13

## 2019-10-12 RX ADMIN — Medication 25 MCG/HR: at 18:12

## 2019-10-12 RX ADMIN — ALBUMIN (HUMAN) 250 ML: 12.5 INJECTION, SOLUTION INTRAVENOUS at 16:28

## 2019-10-12 RX ADMIN — Medication 100 MCG: at 14:53

## 2019-10-12 RX ADMIN — ESMOLOL HYDROCHLORIDE 10 MG: 100 INJECTION, SOLUTION INTRAVENOUS at 16:00

## 2019-10-12 RX ADMIN — CHLORHEXIDINE GLUCONATE 15 ML: 0.12 RINSE ORAL at 20:15

## 2019-10-12 RX ADMIN — AMIODARONE HYDROCHLORIDE 1 MG/MIN: 50 INJECTION, SOLUTION INTRAVENOUS at 22:03

## 2019-10-12 RX ADMIN — ESMOLOL HYDROCHLORIDE 10 MG: 100 INJECTION, SOLUTION INTRAVENOUS at 15:12

## 2019-10-12 RX ADMIN — DIATRIZOATE MEGLUMINE AND DIATRIZOATE SODIUM 30 ML: 600; 100 SOLUTION ORAL; RECTAL at 11:41

## 2019-10-12 RX ADMIN — PANTOPRAZOLE SODIUM 40 MG: 40 INJECTION, POWDER, FOR SOLUTION INTRAVENOUS at 08:13

## 2019-10-12 RX ADMIN — Medication 100 MCG: at 15:47

## 2019-10-12 RX ADMIN — Medication 100 MCG: at 15:04

## 2019-10-12 RX ADMIN — PROPOFOL 10 MCG/KG/MIN: 10 INJECTION, EMULSION INTRAVENOUS at 22:28

## 2019-10-12 RX ADMIN — ESMOLOL HYDROCHLORIDE 10 MG: 100 INJECTION, SOLUTION INTRAVENOUS at 14:52

## 2019-10-12 RX ADMIN — Medication 10 ML: at 08:17

## 2019-10-12 RX ADMIN — NOREPINEPHRINE BITARTRATE 3 MCG/MIN: 1 INJECTION INTRAVENOUS at 17:50

## 2019-10-12 RX ADMIN — Medication 15 MG: at 17:17

## 2019-10-12 RX ADMIN — MAGNESIUM GLUCONATE 500 MG ORAL TABLET 400 MG: 500 TABLET ORAL at 20:15

## 2019-10-12 RX ADMIN — ESMOLOL HYDROCHLORIDE 20 MG: 100 INJECTION, SOLUTION INTRAVENOUS at 16:16

## 2019-10-12 RX ADMIN — MORPHINE SULFATE 2 MG: 2 INJECTION, SOLUTION INTRAMUSCULAR; INTRAVENOUS at 10:13

## 2019-10-12 RX ADMIN — MOMETASONE FUROATE AND FORMOTEROL FUMARATE DIHYDRATE 2 PUFF: 100; 5 AEROSOL RESPIRATORY (INHALATION) at 09:05

## 2019-10-12 RX ADMIN — Medication 100 MCG: at 15:21

## 2019-10-12 RX ADMIN — SODIUM CHLORIDE, PRESERVATIVE FREE 10 ML: 5 INJECTION INTRAVENOUS at 08:22

## 2019-10-12 RX ADMIN — MIDAZOLAM 2 MG: 1 INJECTION INTRAMUSCULAR; INTRAVENOUS at 14:17

## 2019-10-12 RX ADMIN — Medication 50 MCG/HR: at 20:06

## 2019-10-12 RX ADMIN — Medication 100 MCG: at 15:12

## 2019-10-12 RX ADMIN — ROCURONIUM BROMIDE 40 MG: 10 INJECTION INTRAVENOUS at 14:44

## 2019-10-12 RX ADMIN — ENOXAPARIN SODIUM 90 MG: 100 INJECTION SUBCUTANEOUS at 20:15

## 2019-10-12 RX ADMIN — Medication 15 MG: at 14:17

## 2019-10-12 RX ADMIN — PIPERACILLIN AND TAZOBACTAM 3.38 G: 3; .375 INJECTION, POWDER, LYOPHILIZED, FOR SOLUTION INTRAVENOUS at 18:15

## 2019-10-12 RX ADMIN — ENOXAPARIN SODIUM 90 MG: 100 INJECTION SUBCUTANEOUS at 08:13

## 2019-10-12 RX ADMIN — Medication 4 MCG/MIN: at 15:59

## 2019-10-12 RX ADMIN — IOPAMIDOL 75 ML: 755 INJECTION, SOLUTION INTRAVENOUS at 11:41

## 2019-10-12 RX ADMIN — INSULIN LISPRO 1 UNITS: 100 INJECTION, SOLUTION INTRAVENOUS; SUBCUTANEOUS at 20:33

## 2019-10-12 RX ADMIN — FENTANYL CITRATE 25 MCG: 50 INJECTION, SOLUTION INTRAMUSCULAR; INTRAVENOUS at 20:06

## 2019-10-12 RX ADMIN — SODIUM CHLORIDE 250 ML: 9 INJECTION, SOLUTION INTRAVENOUS at 20:42

## 2019-10-12 RX ADMIN — MORPHINE SULFATE 2 MG: 2 INJECTION, SOLUTION INTRAMUSCULAR; INTRAVENOUS at 13:23

## 2019-10-12 RX ADMIN — CALCIUM GLUCONATE 1 G: 98 INJECTION, SOLUTION INTRAVENOUS at 19:30

## 2019-10-12 RX ADMIN — TAMSULOSIN HYDROCHLORIDE 0.4 MG: 0.4 CAPSULE ORAL at 08:13

## 2019-10-12 RX ADMIN — SODIUM CHLORIDE, PRESERVATIVE FREE 10 ML: 5 INJECTION INTRAVENOUS at 20:15

## 2019-10-12 RX ADMIN — ATORVASTATIN CALCIUM 40 MG: 40 TABLET, FILM COATED ORAL at 08:13

## 2019-10-12 RX ADMIN — ESMOLOL HYDROCHLORIDE 10 MG: 100 INJECTION, SOLUTION INTRAVENOUS at 14:44

## 2019-10-12 RX ADMIN — ESMOLOL HYDROCHLORIDE 10 MG: 100 INJECTION, SOLUTION INTRAVENOUS at 14:37

## 2019-10-12 RX ADMIN — AMIODARONE HYDROCHLORIDE 150 MG: 50 INJECTION, SOLUTION INTRAVENOUS at 15:19

## 2019-10-12 RX ADMIN — Medication 120 MG: at 13:59

## 2019-10-12 RX ADMIN — MORPHINE SULFATE 2 MG: 2 INJECTION, SOLUTION INTRAMUSCULAR; INTRAVENOUS at 02:16

## 2019-10-12 RX ADMIN — GABAPENTIN 100 MG: 100 CAPSULE ORAL at 08:13

## 2019-10-12 RX ADMIN — MORPHINE SULFATE 2 MG: 2 INJECTION, SOLUTION INTRAMUSCULAR; INTRAVENOUS at 06:13

## 2019-10-12 RX ADMIN — MAGNESIUM GLUCONATE 500 MG ORAL TABLET 400 MG: 500 TABLET ORAL at 08:13

## 2019-10-12 RX ADMIN — FENTANYL CITRATE 50 MCG: 50 INJECTION INTRAMUSCULAR; INTRAVENOUS at 14:34

## 2019-10-12 RX ADMIN — Medication 200 MCG: at 15:38

## 2019-10-12 ASSESSMENT — PULMONARY FUNCTION TESTS
PIF_VALUE: 20
PIF_VALUE: 21
PIF_VALUE: 22
PIF_VALUE: 21
PIF_VALUE: 21
PIF_VALUE: 22
PIF_VALUE: 20
PIF_VALUE: 20
PIF_VALUE: 0
PIF_VALUE: 21
PIF_VALUE: 22
PIF_VALUE: 20
PIF_VALUE: 21
PIF_VALUE: 21
PIF_VALUE: 1
PIF_VALUE: 21
PIF_VALUE: 26
PIF_VALUE: 21
PIF_VALUE: 23
PIF_VALUE: 23
PIF_VALUE: 21
PIF_VALUE: 22
PIF_VALUE: 20
PIF_VALUE: 22
PIF_VALUE: 21
PIF_VALUE: 20
PIF_VALUE: 23
PIF_VALUE: 21
PIF_VALUE: 22
PIF_VALUE: 21
PIF_VALUE: 20
PIF_VALUE: 23
PIF_VALUE: 22
PIF_VALUE: 25
PIF_VALUE: 21
PIF_VALUE: 20
PIF_VALUE: 20
PIF_VALUE: 1
PIF_VALUE: 20
PIF_VALUE: 21
PIF_VALUE: 21
PIF_VALUE: 22
PIF_VALUE: 19
PIF_VALUE: 20
PIF_VALUE: 21
PIF_VALUE: 25
PIF_VALUE: 21
PIF_VALUE: 22
PIF_VALUE: 21
PIF_VALUE: 20
PIF_VALUE: 20
PIF_VALUE: 21
PIF_VALUE: 25
PIF_VALUE: 22
PIF_VALUE: 21
PIF_VALUE: 21
PIF_VALUE: 1
PIF_VALUE: 20
PIF_VALUE: 22
PIF_VALUE: 22
PIF_VALUE: 21
PIF_VALUE: 22
PIF_VALUE: 20
PIF_VALUE: 17
PIF_VALUE: 22
PIF_VALUE: 22
PIF_VALUE: 21
PIF_VALUE: 21
PIF_VALUE: 20
PIF_VALUE: 22
PIF_VALUE: 21
PIF_VALUE: 21
PIF_VALUE: 20
PIF_VALUE: 20
PIF_VALUE: 21
PIF_VALUE: 21
PIF_VALUE: 22
PIF_VALUE: 20
PIF_VALUE: 21
PIF_VALUE: 20
PIF_VALUE: 21
PIF_VALUE: 21
PIF_VALUE: 0
PIF_VALUE: 20
PIF_VALUE: 20
PIF_VALUE: 17
PIF_VALUE: 22
PIF_VALUE: 25
PIF_VALUE: 21
PIF_VALUE: 29
PIF_VALUE: 20
PIF_VALUE: 21
PIF_VALUE: 23
PIF_VALUE: 21
PIF_VALUE: 20
PIF_VALUE: 20
PIF_VALUE: 21
PIF_VALUE: 21
PIF_VALUE: 10
PIF_VALUE: 20
PIF_VALUE: 21
PIF_VALUE: 20
PIF_VALUE: 25
PIF_VALUE: 21
PIF_VALUE: 21
PIF_VALUE: 24
PIF_VALUE: 20
PIF_VALUE: 19
PIF_VALUE: 21
PIF_VALUE: 21
PIF_VALUE: 20
PIF_VALUE: 22
PIF_VALUE: 21
PIF_VALUE: 21
PIF_VALUE: 23
PIF_VALUE: 20
PIF_VALUE: 21
PIF_VALUE: 25
PIF_VALUE: 22
PIF_VALUE: 1
PIF_VALUE: 25
PIF_VALUE: 21
PIF_VALUE: 20
PIF_VALUE: 21
PIF_VALUE: 23
PIF_VALUE: 20
PIF_VALUE: 21
PIF_VALUE: 21
PIF_VALUE: 20
PIF_VALUE: 20
PIF_VALUE: 22
PIF_VALUE: 1
PIF_VALUE: 21
PIF_VALUE: 21
PIF_VALUE: 19
PIF_VALUE: 21
PIF_VALUE: 22
PIF_VALUE: 21
PIF_VALUE: 21
PIF_VALUE: 18
PIF_VALUE: 21
PIF_VALUE: 21
PIF_VALUE: 22
PIF_VALUE: 21
PIF_VALUE: 23
PIF_VALUE: 21
PIF_VALUE: 25
PIF_VALUE: 21
PIF_VALUE: 22
PIF_VALUE: 20
PIF_VALUE: 21
PIF_VALUE: 21
PIF_VALUE: 23
PIF_VALUE: 20
PIF_VALUE: 21
PIF_VALUE: 20
PIF_VALUE: 21
PIF_VALUE: 22
PIF_VALUE: 19
PIF_VALUE: 22
PIF_VALUE: 21
PIF_VALUE: 21
PIF_VALUE: 20
PIF_VALUE: 21
PIF_VALUE: 20
PIF_VALUE: 21
PIF_VALUE: 22
PIF_VALUE: 21
PIF_VALUE: 34
PIF_VALUE: 21
PIF_VALUE: 21
PIF_VALUE: 17
PIF_VALUE: 22
PIF_VALUE: 21
PIF_VALUE: 25
PIF_VALUE: 22
PIF_VALUE: 21
PIF_VALUE: 19
PIF_VALUE: 22
PIF_VALUE: 22
PIF_VALUE: 21
PIF_VALUE: 23
PIF_VALUE: 21
PIF_VALUE: 20
PIF_VALUE: 20
PIF_VALUE: 21
PIF_VALUE: 1
PIF_VALUE: 18
PIF_VALUE: 20
PIF_VALUE: 21
PIF_VALUE: 21
PIF_VALUE: 27
PIF_VALUE: 25
PIF_VALUE: 20
PIF_VALUE: 21
PIF_VALUE: 22
PIF_VALUE: 22
PIF_VALUE: 21
PIF_VALUE: 21
PIF_VALUE: 20
PIF_VALUE: 25
PIF_VALUE: 21
PIF_VALUE: 19
PIF_VALUE: 21
PIF_VALUE: 20

## 2019-10-12 ASSESSMENT — COPD QUESTIONNAIRES: CAT_SEVERITY: MODERATE

## 2019-10-12 ASSESSMENT — PAIN DESCRIPTION - PAIN TYPE
TYPE: ACUTE PAIN

## 2019-10-12 ASSESSMENT — PAIN DESCRIPTION - LOCATION
LOCATION: ABDOMEN

## 2019-10-12 ASSESSMENT — PAIN DESCRIPTION - DESCRIPTORS
DESCRIPTORS: SHARP
DESCRIPTORS: ACHING
DESCRIPTORS: CRAMPING
DESCRIPTORS: SHARP
DESCRIPTORS: SHARP

## 2019-10-12 ASSESSMENT — PAIN DESCRIPTION - FREQUENCY
FREQUENCY: INTERMITTENT
FREQUENCY: INTERMITTENT

## 2019-10-12 ASSESSMENT — PAIN SCALES - GENERAL
PAINLEVEL_OUTOF10: 0
PAINLEVEL_OUTOF10: 8
PAINLEVEL_OUTOF10: 10
PAINLEVEL_OUTOF10: 7
PAINLEVEL_OUTOF10: 0
PAINLEVEL_OUTOF10: 10
PAINLEVEL_OUTOF10: 0
PAINLEVEL_OUTOF10: 0
PAINLEVEL_OUTOF10: 10
PAINLEVEL_OUTOF10: 8
PAINLEVEL_OUTOF10: 0

## 2019-10-12 ASSESSMENT — ENCOUNTER SYMPTOMS: SHORTNESS OF BREATH: 1

## 2019-10-12 ASSESSMENT — PAIN DESCRIPTION - ORIENTATION
ORIENTATION: RIGHT;LEFT;UPPER
ORIENTATION: RIGHT;LEFT;UPPER

## 2019-10-13 ENCOUNTER — APPOINTMENT (OUTPATIENT)
Dept: GENERAL RADIOLOGY | Age: 59
DRG: 231 | End: 2019-10-13
Payer: COMMERCIAL

## 2019-10-13 LAB
ANION GAP SERPL CALCULATED.3IONS-SCNC: 12 MMOL/L (ref 3–16)
BACTERIA: ABNORMAL /HPF
BASE EXCESS ARTERIAL: -2.7 MMOL/L (ref -3–3)
BILIRUBIN URINE: ABNORMAL
BLOOD, URINE: ABNORMAL
BUN BLDV-MCNC: 10 MG/DL (ref 7–20)
CALCIUM SERPL-MCNC: 7.3 MG/DL (ref 8.3–10.6)
CARBOXYHEMOGLOBIN ARTERIAL: 1.2 % (ref 0–1.5)
CASTS 2: ABNORMAL /LPF
CASTS: ABNORMAL /LPF
CHLORIDE BLD-SCNC: 103 MMOL/L (ref 99–110)
CLARITY: ABNORMAL
CO2: 20 MMOL/L (ref 21–32)
COLOR: ABNORMAL
COMMENT UA: ABNORMAL
CREAT SERPL-MCNC: 0.8 MG/DL (ref 0.9–1.3)
EPITHELIAL CELLS, UA: 4 /HPF (ref 0–5)
GFR AFRICAN AMERICAN: >60
GFR NON-AFRICAN AMERICAN: >60
GLUCOSE BLD-MCNC: 136 MG/DL (ref 70–99)
GLUCOSE BLD-MCNC: 138 MG/DL (ref 70–99)
GLUCOSE BLD-MCNC: 140 MG/DL (ref 70–99)
GLUCOSE BLD-MCNC: 142 MG/DL (ref 70–99)
GLUCOSE BLD-MCNC: 146 MG/DL (ref 70–99)
GLUCOSE BLD-MCNC: 165 MG/DL (ref 70–99)
GLUCOSE BLD-MCNC: 166 MG/DL (ref 70–99)
GLUCOSE BLD-MCNC: 189 MG/DL (ref 70–99)
GLUCOSE URINE: NEGATIVE MG/DL
HCO3 ARTERIAL: 22.2 MMOL/L (ref 21–29)
HCT VFR BLD CALC: 31.4 % (ref 40.5–52.5)
HEMOGLOBIN, ART, EXTENDED: 9.7 G/DL (ref 13.5–17.5)
HEMOGLOBIN: 10.2 G/DL (ref 13.5–17.5)
KETONES, URINE: ABNORMAL MG/DL
LACTIC ACID: 1.1 MMOL/L (ref 0.4–2)
LEUKOCYTE ESTERASE, URINE: ABNORMAL
MAGNESIUM: 1.8 MG/DL (ref 1.8–2.4)
MCH RBC QN AUTO: 28.5 PG (ref 26–34)
MCHC RBC AUTO-ENTMCNC: 32.5 G/DL (ref 31–36)
MCV RBC AUTO: 87.8 FL (ref 80–100)
METHEMOGLOBIN ARTERIAL: 1.3 %
MICROSCOPIC EXAMINATION: YES
NITRITE, URINE: NEGATIVE
O2 CONTENT ARTERIAL: 13 ML/DL
O2 SAT, ARTERIAL: 98.4 %
O2 THERAPY: ABNORMAL
PCO2 ARTERIAL: 41.5 MMHG (ref 35–45)
PDW BLD-RTO: 17.7 % (ref 12.4–15.4)
PERFORMED ON: ABNORMAL
PH ARTERIAL: 7.35 (ref 7.35–7.45)
PH UA: 5 (ref 5–8)
PHOSPHORUS: 3 MG/DL (ref 2.5–4.9)
PLATELET # BLD: 180 K/UL (ref 135–450)
PMV BLD AUTO: 8.6 FL (ref 5–10.5)
PO2 ARTERIAL: 111 MMHG (ref 75–108)
POTASSIUM SERPL-SCNC: 4.2 MMOL/L (ref 3.5–5.1)
PROTEIN UA: 30 MG/DL
RBC # BLD: 3.58 M/UL (ref 4.2–5.9)
RBC UA: 53 /HPF (ref 0–4)
SODIUM BLD-SCNC: 135 MMOL/L (ref 136–145)
SPECIFIC GRAVITY UA: >1.03 (ref 1–1.03)
TCO2 ARTERIAL: 23.5 MMOL/L
URINE REFLEX TO CULTURE: YES
URINE TYPE: ABNORMAL
UROBILINOGEN, URINE: 1 E.U./DL
WBC # BLD: 17.3 K/UL (ref 4–11)
WBC UA: 11 /HPF (ref 0–5)

## 2019-10-13 PROCEDURE — 71045 X-RAY EXAM CHEST 1 VIEW: CPT

## 2019-10-13 PROCEDURE — 87641 MR-STAPH DNA AMP PROBE: CPT

## 2019-10-13 PROCEDURE — 6370000000 HC RX 637 (ALT 250 FOR IP): Performed by: HOSPITALIST

## 2019-10-13 PROCEDURE — 99291 CRITICAL CARE FIRST HOUR: CPT | Performed by: INTERNAL MEDICINE

## 2019-10-13 PROCEDURE — 2580000003 HC RX 258: Performed by: HOSPITALIST

## 2019-10-13 PROCEDURE — 87205 SMEAR GRAM STAIN: CPT

## 2019-10-13 PROCEDURE — 6370000000 HC RX 637 (ALT 250 FOR IP): Performed by: INTERNAL MEDICINE

## 2019-10-13 PROCEDURE — C9113 INJ PANTOPRAZOLE SODIUM, VIA: HCPCS | Performed by: INTERNAL MEDICINE

## 2019-10-13 PROCEDURE — 36592 COLLECT BLOOD FROM PICC: CPT

## 2019-10-13 PROCEDURE — 6360000002 HC RX W HCPCS: Performed by: INTERNAL MEDICINE

## 2019-10-13 PROCEDURE — 83605 ASSAY OF LACTIC ACID: CPT

## 2019-10-13 PROCEDURE — 2580000003 HC RX 258: Performed by: INTERNAL MEDICINE

## 2019-10-13 PROCEDURE — 94003 VENT MGMT INPAT SUBQ DAY: CPT

## 2019-10-13 PROCEDURE — 99024 POSTOP FOLLOW-UP VISIT: CPT | Performed by: SURGERY

## 2019-10-13 PROCEDURE — 87086 URINE CULTURE/COLONY COUNT: CPT

## 2019-10-13 PROCEDURE — 6360000002 HC RX W HCPCS: Performed by: NURSE PRACTITIONER

## 2019-10-13 PROCEDURE — 2580000003 HC RX 258: Performed by: NURSE PRACTITIONER

## 2019-10-13 PROCEDURE — 99255 IP/OBS CONSLTJ NEW/EST HI 80: CPT | Performed by: INTERNAL MEDICINE

## 2019-10-13 PROCEDURE — 81001 URINALYSIS AUTO W/SCOPE: CPT

## 2019-10-13 PROCEDURE — 85027 COMPLETE CBC AUTOMATED: CPT

## 2019-10-13 PROCEDURE — 84100 ASSAY OF PHOSPHORUS: CPT

## 2019-10-13 PROCEDURE — 37799 UNLISTED PX VASCULAR SURGERY: CPT

## 2019-10-13 PROCEDURE — 2000000000 HC ICU R&B

## 2019-10-13 PROCEDURE — 36415 COLL VENOUS BLD VENIPUNCTURE: CPT

## 2019-10-13 PROCEDURE — 87077 CULTURE AEROBIC IDENTIFY: CPT

## 2019-10-13 PROCEDURE — 87040 BLOOD CULTURE FOR BACTERIA: CPT

## 2019-10-13 PROCEDURE — 82803 BLOOD GASES ANY COMBINATION: CPT

## 2019-10-13 PROCEDURE — 83735 ASSAY OF MAGNESIUM: CPT

## 2019-10-13 PROCEDURE — 87186 SC STD MICRODIL/AGAR DIL: CPT

## 2019-10-13 PROCEDURE — APPNB15 APP NON BILLABLE TIME 0-15 MINS: Performed by: NURSE PRACTITIONER

## 2019-10-13 PROCEDURE — 6360000002 HC RX W HCPCS: Performed by: HOSPITALIST

## 2019-10-13 PROCEDURE — 2700000000 HC OXYGEN THERAPY PER DAY

## 2019-10-13 PROCEDURE — 94761 N-INVAS EAR/PLS OXIMETRY MLT: CPT

## 2019-10-13 PROCEDURE — 80048 BASIC METABOLIC PNL TOTAL CA: CPT

## 2019-10-13 PROCEDURE — 87070 CULTURE OTHR SPECIMN AEROBIC: CPT

## 2019-10-13 PROCEDURE — 94640 AIRWAY INHALATION TREATMENT: CPT

## 2019-10-13 RX ORDER — FENTANYL CITRATE 50 UG/ML
INJECTION, SOLUTION INTRAMUSCULAR; INTRAVENOUS
Status: DISPENSED
Start: 2019-10-13 | End: 2019-10-13

## 2019-10-13 RX ORDER — MORPHINE SULFATE 4 MG/ML
4 INJECTION, SOLUTION INTRAMUSCULAR; INTRAVENOUS EVERY 4 HOURS PRN
Status: DISCONTINUED | OUTPATIENT
Start: 2019-10-13 | End: 2019-10-24 | Stop reason: HOSPADM

## 2019-10-13 RX ORDER — MAGNESIUM SULFATE IN WATER 40 MG/ML
2 INJECTION, SOLUTION INTRAVENOUS ONCE
Status: COMPLETED | OUTPATIENT
Start: 2019-10-13 | End: 2019-10-13

## 2019-10-13 RX ORDER — FENTANYL CITRATE 50 UG/ML
50 INJECTION, SOLUTION INTRAMUSCULAR; INTRAVENOUS
Status: DISCONTINUED | OUTPATIENT
Start: 2019-10-13 | End: 2019-10-13

## 2019-10-13 RX ORDER — MORPHINE SULFATE 2 MG/ML
2 INJECTION, SOLUTION INTRAMUSCULAR; INTRAVENOUS EVERY 4 HOURS PRN
Status: DISCONTINUED | OUTPATIENT
Start: 2019-10-13 | End: 2019-10-13

## 2019-10-13 RX ORDER — SODIUM CHLORIDE 9 MG/ML
INJECTION, SOLUTION INTRAVENOUS ONCE
Status: COMPLETED | OUTPATIENT
Start: 2019-10-13 | End: 2019-10-13

## 2019-10-13 RX ORDER — FUROSEMIDE 10 MG/ML
40 INJECTION INTRAMUSCULAR; INTRAVENOUS ONCE
Status: COMPLETED | OUTPATIENT
Start: 2019-10-13 | End: 2019-10-13

## 2019-10-13 RX ADMIN — MOMETASONE FUROATE AND FORMOTEROL FUMARATE DIHYDRATE 2 PUFF: 100; 5 AEROSOL RESPIRATORY (INHALATION) at 09:16

## 2019-10-13 RX ADMIN — ATORVASTATIN CALCIUM 40 MG: 40 TABLET, FILM COATED ORAL at 07:59

## 2019-10-13 RX ADMIN — AMIODARONE HYDROCHLORIDE 1 MG/MIN: 50 INJECTION, SOLUTION INTRAVENOUS at 22:59

## 2019-10-13 RX ADMIN — ENOXAPARIN SODIUM 90 MG: 100 INJECTION SUBCUTANEOUS at 07:58

## 2019-10-13 RX ADMIN — INSULIN LISPRO 1 UNITS: 100 INJECTION, SOLUTION INTRAVENOUS; SUBCUTANEOUS at 03:17

## 2019-10-13 RX ADMIN — INSULIN LISPRO 1 UNITS: 100 INJECTION, SOLUTION INTRAVENOUS; SUBCUTANEOUS at 23:51

## 2019-10-13 RX ADMIN — GABAPENTIN 100 MG: 100 CAPSULE ORAL at 07:59

## 2019-10-13 RX ADMIN — FENTANYL CITRATE 25 MCG: 50 INJECTION, SOLUTION INTRAMUSCULAR; INTRAVENOUS at 02:49

## 2019-10-13 RX ADMIN — GABAPENTIN 100 MG: 100 CAPSULE ORAL at 13:41

## 2019-10-13 RX ADMIN — MAGNESIUM SULFATE HEPTAHYDRATE 2 G: 40 INJECTION, SOLUTION INTRAVENOUS at 10:05

## 2019-10-13 RX ADMIN — FUROSEMIDE 40 MG: 10 INJECTION, SOLUTION INTRAMUSCULAR; INTRAVENOUS at 11:37

## 2019-10-13 RX ADMIN — Medication 75 MCG/HR: at 02:16

## 2019-10-13 RX ADMIN — ENOXAPARIN SODIUM 90 MG: 100 INJECTION SUBCUTANEOUS at 20:01

## 2019-10-13 RX ADMIN — Medication 10 ML: at 08:00

## 2019-10-13 RX ADMIN — GABAPENTIN 100 MG: 100 CAPSULE ORAL at 20:01

## 2019-10-13 RX ADMIN — CALCIUM GLUCONATE 1 G: 98 INJECTION, SOLUTION INTRAVENOUS at 12:10

## 2019-10-13 RX ADMIN — INSULIN LISPRO 1 UNITS: 100 INJECTION, SOLUTION INTRAVENOUS; SUBCUTANEOUS at 00:35

## 2019-10-13 RX ADMIN — FENTANYL CITRATE 50 MCG: 50 INJECTION, SOLUTION INTRAMUSCULAR; INTRAVENOUS at 18:08

## 2019-10-13 RX ADMIN — CHLORHEXIDINE GLUCONATE 15 ML: 0.12 RINSE ORAL at 09:04

## 2019-10-13 RX ADMIN — ACETAMINOPHEN 650 MG: 325 TABLET ORAL at 08:31

## 2019-10-13 RX ADMIN — PIPERACILLIN AND TAZOBACTAM 3.38 G: 3; .375 INJECTION, POWDER, LYOPHILIZED, FOR SOLUTION INTRAVENOUS at 17:54

## 2019-10-13 RX ADMIN — INSULIN LISPRO 1 UNITS: 100 INJECTION, SOLUTION INTRAVENOUS; SUBCUTANEOUS at 12:18

## 2019-10-13 RX ADMIN — PIPERACILLIN AND TAZOBACTAM 3.38 G: 3; .375 INJECTION, POWDER, LYOPHILIZED, FOR SOLUTION INTRAVENOUS at 10:05

## 2019-10-13 RX ADMIN — FENTANYL CITRATE 50 MCG: 50 INJECTION, SOLUTION INTRAMUSCULAR; INTRAVENOUS at 13:40

## 2019-10-13 RX ADMIN — SODIUM CHLORIDE, PRESERVATIVE FREE 10 ML: 5 INJECTION INTRAVENOUS at 20:06

## 2019-10-13 RX ADMIN — FENTANYL CITRATE 50 MCG: 50 INJECTION, SOLUTION INTRAMUSCULAR; INTRAVENOUS at 11:37

## 2019-10-13 RX ADMIN — MORPHINE SULFATE 2 MG: 2 INJECTION, SOLUTION INTRAMUSCULAR; INTRAVENOUS at 19:48

## 2019-10-13 RX ADMIN — MORPHINE SULFATE 4 MG: 4 INJECTION INTRAVENOUS at 22:59

## 2019-10-13 RX ADMIN — PANTOPRAZOLE SODIUM 40 MG: 40 INJECTION, POWDER, FOR SOLUTION INTRAVENOUS at 07:59

## 2019-10-13 RX ADMIN — MORPHINE SULFATE 2 MG: 2 INJECTION, SOLUTION INTRAMUSCULAR; INTRAVENOUS at 08:12

## 2019-10-13 RX ADMIN — SODIUM CHLORIDE, PRESERVATIVE FREE 10 ML: 5 INJECTION INTRAVENOUS at 08:00

## 2019-10-13 RX ADMIN — SODIUM CHLORIDE 250 ML: 9 INJECTION, SOLUTION INTRAVENOUS at 22:47

## 2019-10-13 RX ADMIN — MAGNESIUM GLUCONATE 500 MG ORAL TABLET 400 MG: 500 TABLET ORAL at 07:59

## 2019-10-13 RX ADMIN — FENTANYL CITRATE 50 MCG: 50 INJECTION, SOLUTION INTRAMUSCULAR; INTRAVENOUS at 16:08

## 2019-10-13 RX ADMIN — MAGNESIUM GLUCONATE 500 MG ORAL TABLET 400 MG: 500 TABLET ORAL at 20:01

## 2019-10-13 RX ADMIN — INSULIN LISPRO 1 UNITS: 100 INJECTION, SOLUTION INTRAVENOUS; SUBCUTANEOUS at 20:03

## 2019-10-13 RX ADMIN — AMIODARONE HYDROCHLORIDE 1 MG/MIN: 50 INJECTION, SOLUTION INTRAVENOUS at 15:02

## 2019-10-13 ASSESSMENT — PAIN DESCRIPTION - PROGRESSION
CLINICAL_PROGRESSION: GRADUALLY WORSENING
CLINICAL_PROGRESSION: NOT CHANGED
CLINICAL_PROGRESSION: NOT CHANGED
CLINICAL_PROGRESSION: GRADUALLY WORSENING
CLINICAL_PROGRESSION: NOT CHANGED

## 2019-10-13 ASSESSMENT — PAIN DESCRIPTION - PAIN TYPE
TYPE: ACUTE PAIN;SURGICAL PAIN
TYPE: ACUTE PAIN
TYPE: ACUTE PAIN;SURGICAL PAIN
TYPE: ACUTE PAIN;SURGICAL PAIN
TYPE: ACUTE PAIN

## 2019-10-13 ASSESSMENT — PULMONARY FUNCTION TESTS
PIF_VALUE: 18
PIF_VALUE: 21
PIF_VALUE: 15
PIF_VALUE: 21
PIF_VALUE: 17
PIF_VALUE: 20
PIF_VALUE: 22
PIF_VALUE: 19
PIF_VALUE: 20
PIF_VALUE: 20
PIF_VALUE: 21

## 2019-10-13 ASSESSMENT — PAIN DESCRIPTION - LOCATION
LOCATION: ABDOMEN

## 2019-10-13 ASSESSMENT — PAIN SCALES - GENERAL
PAINLEVEL_OUTOF10: 10
PAINLEVEL_OUTOF10: 8
PAINLEVEL_OUTOF10: 10
PAINLEVEL_OUTOF10: 10
PAINLEVEL_OUTOF10: 0
PAINLEVEL_OUTOF10: 5
PAINLEVEL_OUTOF10: 0
PAINLEVEL_OUTOF10: 10
PAINLEVEL_OUTOF10: 10
PAINLEVEL_OUTOF10: 9
PAINLEVEL_OUTOF10: 5
PAINLEVEL_OUTOF10: 8
PAINLEVEL_OUTOF10: 9
PAINLEVEL_OUTOF10: 5
PAINLEVEL_OUTOF10: 10
PAINLEVEL_OUTOF10: 6
PAINLEVEL_OUTOF10: 10
PAINLEVEL_OUTOF10: 8
PAINLEVEL_OUTOF10: 0
PAINLEVEL_OUTOF10: 9
PAINLEVEL_OUTOF10: 9

## 2019-10-13 ASSESSMENT — PAIN DESCRIPTION - DESCRIPTORS
DESCRIPTORS: STABBING
DESCRIPTORS: CONSTANT;CRAMPING
DESCRIPTORS: SHARP;SHOOTING
DESCRIPTORS: STABBING
DESCRIPTORS: CONSTANT;CRAMPING
DESCRIPTORS: STABBING
DESCRIPTORS: CONSTANT;CRAMPING
DESCRIPTORS: STABBING
DESCRIPTORS: STABBING

## 2019-10-13 ASSESSMENT — PAIN DESCRIPTION - ORIENTATION
ORIENTATION: LEFT;MID
ORIENTATION: MID
ORIENTATION: LEFT;MID
ORIENTATION: LEFT
ORIENTATION: MID
ORIENTATION: LEFT;MID

## 2019-10-13 ASSESSMENT — PAIN DESCRIPTION - ONSET
ONSET: ON-GOING

## 2019-10-13 ASSESSMENT — PAIN - FUNCTIONAL ASSESSMENT
PAIN_FUNCTIONAL_ASSESSMENT: PREVENTS OR INTERFERES WITH MANY ACTIVE NOT PASSIVE ACTIVITIES
PAIN_FUNCTIONAL_ASSESSMENT: PREVENTS OR INTERFERES WITH MANY ACTIVE NOT PASSIVE ACTIVITIES
PAIN_FUNCTIONAL_ASSESSMENT: PREVENTS OR INTERFERES SOME ACTIVE ACTIVITIES AND ADLS

## 2019-10-13 ASSESSMENT — PAIN DESCRIPTION - FREQUENCY
FREQUENCY: CONTINUOUS
FREQUENCY: CONTINUOUS
FREQUENCY: INTERMITTENT
FREQUENCY: INTERMITTENT
FREQUENCY: CONTINUOUS
FREQUENCY: CONTINUOUS
FREQUENCY: INTERMITTENT
FREQUENCY: INTERMITTENT
FREQUENCY: CONTINUOUS

## 2019-10-14 LAB
ANION GAP SERPL CALCULATED.3IONS-SCNC: 13 MMOL/L (ref 3–16)
BASOPHILS ABSOLUTE: 0 K/UL (ref 0–0.2)
BASOPHILS ABSOLUTE: 0.2 K/UL (ref 0–0.2)
BASOPHILS RELATIVE PERCENT: 0.1 %
BASOPHILS RELATIVE PERCENT: 0.6 %
BUN BLDV-MCNC: 14 MG/DL (ref 7–20)
CALCIUM SERPL-MCNC: 7.4 MG/DL (ref 8.3–10.6)
CHLORIDE BLD-SCNC: 95 MMOL/L (ref 99–110)
CO2: 21 MMOL/L (ref 21–32)
CREAT SERPL-MCNC: 1 MG/DL (ref 0.9–1.3)
EOSINOPHILS ABSOLUTE: 0 K/UL (ref 0–0.6)
EOSINOPHILS ABSOLUTE: 0 K/UL (ref 0–0.6)
EOSINOPHILS RELATIVE PERCENT: 0 %
EOSINOPHILS RELATIVE PERCENT: 0.1 %
GFR AFRICAN AMERICAN: >60
GFR NON-AFRICAN AMERICAN: >60
GLUCOSE BLD-MCNC: 126 MG/DL (ref 70–99)
GLUCOSE BLD-MCNC: 127 MG/DL (ref 70–99)
GLUCOSE BLD-MCNC: 130 MG/DL (ref 70–99)
GLUCOSE BLD-MCNC: 135 MG/DL (ref 70–99)
GLUCOSE BLD-MCNC: 149 MG/DL (ref 70–99)
GLUCOSE BLD-MCNC: 154 MG/DL (ref 70–99)
GLUCOSE BLD-MCNC: 159 MG/DL (ref 70–99)
HCT VFR BLD CALC: 27.5 % (ref 40.5–52.5)
HCT VFR BLD CALC: 27.9 % (ref 40.5–52.5)
HEMOGLOBIN: 9 G/DL (ref 13.5–17.5)
HEMOGLOBIN: 9.2 G/DL (ref 13.5–17.5)
LACTATE DEHYDROGENASE: 212 U/L (ref 100–190)
LACTIC ACID: 0.8 MMOL/L (ref 0.4–2)
LIPASE: 49 U/L (ref 13–60)
LYMPHOCYTES ABSOLUTE: 0.8 K/UL (ref 1–5.1)
LYMPHOCYTES ABSOLUTE: 0.9 K/UL (ref 1–5.1)
LYMPHOCYTES RELATIVE PERCENT: 3 %
LYMPHOCYTES RELATIVE PERCENT: 3.3 %
MAGNESIUM: 2.1 MG/DL (ref 1.8–2.4)
MCH RBC QN AUTO: 28.7 PG (ref 26–34)
MCH RBC QN AUTO: 28.7 PG (ref 26–34)
MCHC RBC AUTO-ENTMCNC: 32.9 G/DL (ref 31–36)
MCHC RBC AUTO-ENTMCNC: 32.9 G/DL (ref 31–36)
MCV RBC AUTO: 87.2 FL (ref 80–100)
MCV RBC AUTO: 87.3 FL (ref 80–100)
MONOCYTES ABSOLUTE: 1.6 K/UL (ref 0–1.3)
MONOCYTES ABSOLUTE: 1.9 K/UL (ref 0–1.3)
MONOCYTES RELATIVE PERCENT: 6.2 %
MONOCYTES RELATIVE PERCENT: 6.8 %
MRSA SCREEN RT-PCR: NORMAL
NEUTROPHILS ABSOLUTE: 23.5 K/UL (ref 1.7–7.7)
NEUTROPHILS ABSOLUTE: 24.5 K/UL (ref 1.7–7.7)
NEUTROPHILS RELATIVE PERCENT: 89.2 %
NEUTROPHILS RELATIVE PERCENT: 90.7 %
PDW BLD-RTO: 18.1 % (ref 12.4–15.4)
PDW BLD-RTO: 18.4 % (ref 12.4–15.4)
PERFORMED ON: ABNORMAL
PHOSPHORUS: 2.5 MG/DL (ref 2.5–4.9)
PLATELET # BLD: 190 K/UL (ref 135–450)
PLATELET # BLD: 195 K/UL (ref 135–450)
PMV BLD AUTO: 8.4 FL (ref 5–10.5)
PMV BLD AUTO: 8.5 FL (ref 5–10.5)
POTASSIUM SERPL-SCNC: 3.5 MMOL/L (ref 3.5–5.1)
PROCALCITONIN: 1.41 NG/ML (ref 0–0.15)
RBC # BLD: 3.15 M/UL (ref 4.2–5.9)
RBC # BLD: 3.19 M/UL (ref 4.2–5.9)
SODIUM BLD-SCNC: 129 MMOL/L (ref 136–145)
TOTAL CK: 162 U/L (ref 39–308)
URINE CULTURE, ROUTINE: NORMAL
WBC # BLD: 25.9 K/UL (ref 4–11)
WBC # BLD: 27.4 K/UL (ref 4–11)

## 2019-10-14 PROCEDURE — 2700000000 HC OXYGEN THERAPY PER DAY

## 2019-10-14 PROCEDURE — 99232 SBSQ HOSP IP/OBS MODERATE 35: CPT | Performed by: NURSE PRACTITIONER

## 2019-10-14 PROCEDURE — 99024 POSTOP FOLLOW-UP VISIT: CPT | Performed by: SURGERY

## 2019-10-14 PROCEDURE — 85025 COMPLETE CBC W/AUTO DIFF WBC: CPT

## 2019-10-14 PROCEDURE — C9113 INJ PANTOPRAZOLE SODIUM, VIA: HCPCS | Performed by: INTERNAL MEDICINE

## 2019-10-14 PROCEDURE — 37799 UNLISTED PX VASCULAR SURGERY: CPT | Performed by: NURSE PRACTITIONER

## 2019-10-14 PROCEDURE — 2580000003 HC RX 258: Performed by: INTERNAL MEDICINE

## 2019-10-14 PROCEDURE — 84145 PROCALCITONIN (PCT): CPT

## 2019-10-14 PROCEDURE — 6360000002 HC RX W HCPCS: Performed by: INTERNAL MEDICINE

## 2019-10-14 PROCEDURE — 83615 LACTATE (LD) (LDH) ENZYME: CPT

## 2019-10-14 PROCEDURE — 94761 N-INVAS EAR/PLS OXIMETRY MLT: CPT

## 2019-10-14 PROCEDURE — 36415 COLL VENOUS BLD VENIPUNCTURE: CPT

## 2019-10-14 PROCEDURE — 83690 ASSAY OF LIPASE: CPT

## 2019-10-14 PROCEDURE — 99232 SBSQ HOSP IP/OBS MODERATE 35: CPT | Performed by: INTERNAL MEDICINE

## 2019-10-14 PROCEDURE — 94640 AIRWAY INHALATION TREATMENT: CPT

## 2019-10-14 PROCEDURE — 2580000003 HC RX 258: Performed by: NURSE PRACTITIONER

## 2019-10-14 PROCEDURE — APPNB30 APP NON BILLABLE TIME 0-30 MINS: Performed by: PHYSICIAN ASSISTANT

## 2019-10-14 PROCEDURE — 83735 ASSAY OF MAGNESIUM: CPT

## 2019-10-14 PROCEDURE — 84100 ASSAY OF PHOSPHORUS: CPT

## 2019-10-14 PROCEDURE — 6360000002 HC RX W HCPCS: Performed by: NURSE PRACTITIONER

## 2019-10-14 PROCEDURE — 36620 INSERTION CATHETER ARTERY: CPT | Performed by: NURSE PRACTITIONER

## 2019-10-14 PROCEDURE — 97530 THERAPEUTIC ACTIVITIES: CPT

## 2019-10-14 PROCEDURE — 6370000000 HC RX 637 (ALT 250 FOR IP): Performed by: INTERNAL MEDICINE

## 2019-10-14 PROCEDURE — 36556 INSERT NON-TUNNEL CV CATH: CPT | Performed by: NURSE PRACTITIONER

## 2019-10-14 PROCEDURE — 2000000000 HC ICU R&B

## 2019-10-14 PROCEDURE — 83605 ASSAY OF LACTIC ACID: CPT

## 2019-10-14 PROCEDURE — 2580000003 HC RX 258: Performed by: HOSPITALIST

## 2019-10-14 PROCEDURE — 51702 INSERT TEMP BLADDER CATH: CPT | Performed by: NURSE PRACTITIONER

## 2019-10-14 PROCEDURE — 97163 PT EVAL HIGH COMPLEX 45 MIN: CPT

## 2019-10-14 PROCEDURE — 80048 BASIC METABOLIC PNL TOTAL CA: CPT

## 2019-10-14 PROCEDURE — APPSS15 APP SPLIT SHARED TIME 0-15 MINUTES: Performed by: PHYSICIAN ASSISTANT

## 2019-10-14 PROCEDURE — 82550 ASSAY OF CK (CPK): CPT

## 2019-10-14 PROCEDURE — 6360000002 HC RX W HCPCS: Performed by: HOSPITALIST

## 2019-10-14 PROCEDURE — 6370000000 HC RX 637 (ALT 250 FOR IP): Performed by: HOSPITALIST

## 2019-10-14 PROCEDURE — 6370000000 HC RX 637 (ALT 250 FOR IP): Performed by: NURSE PRACTITIONER

## 2019-10-14 RX ORDER — DULOXETIN HYDROCHLORIDE 30 MG/1
30 CAPSULE, DELAYED RELEASE ORAL DAILY
Status: DISCONTINUED | OUTPATIENT
Start: 2019-10-14 | End: 2019-10-24 | Stop reason: HOSPADM

## 2019-10-14 RX ORDER — GABAPENTIN 100 MG/1
200 CAPSULE ORAL 3 TIMES DAILY
Status: DISCONTINUED | OUTPATIENT
Start: 2019-10-14 | End: 2019-10-24 | Stop reason: HOSPADM

## 2019-10-14 RX ORDER — SODIUM CHLORIDE 9 MG/ML
INJECTION, SOLUTION INTRAVENOUS ONCE
Status: COMPLETED | OUTPATIENT
Start: 2019-10-14 | End: 2019-10-14

## 2019-10-14 RX ORDER — OXYCODONE HYDROCHLORIDE 10 MG/1
10 TABLET ORAL EVERY 4 HOURS PRN
Status: DISCONTINUED | OUTPATIENT
Start: 2019-10-14 | End: 2019-10-24 | Stop reason: HOSPADM

## 2019-10-14 RX ORDER — GABAPENTIN 100 MG/1
200 CAPSULE ORAL 3 TIMES DAILY
Status: DISCONTINUED | OUTPATIENT
Start: 2019-10-14 | End: 2019-10-14

## 2019-10-14 RX ORDER — OXYCODONE HYDROCHLORIDE 5 MG/1
5 TABLET ORAL EVERY 4 HOURS PRN
Status: DISCONTINUED | OUTPATIENT
Start: 2019-10-14 | End: 2019-10-24 | Stop reason: HOSPADM

## 2019-10-14 RX ADMIN — PIPERACILLIN AND TAZOBACTAM 3.38 G: 3; .375 INJECTION, POWDER, LYOPHILIZED, FOR SOLUTION INTRAVENOUS at 01:36

## 2019-10-14 RX ADMIN — AMIODARONE HYDROCHLORIDE 1 MG/MIN: 50 INJECTION, SOLUTION INTRAVENOUS at 19:49

## 2019-10-14 RX ADMIN — PHENYLEPHRINE HYDROCHLORIDE 125 MCG/MIN: 10 INJECTION INTRAVENOUS at 19:49

## 2019-10-14 RX ADMIN — OXYCODONE HYDROCHLORIDE 10 MG: 10 TABLET ORAL at 22:48

## 2019-10-14 RX ADMIN — DULOXETINE HYDROCHLORIDE 30 MG: 30 CAPSULE, DELAYED RELEASE ORAL at 14:52

## 2019-10-14 RX ADMIN — GABAPENTIN 100 MG: 100 CAPSULE ORAL at 08:45

## 2019-10-14 RX ADMIN — SODIUM CHLORIDE: 9 INJECTION, SOLUTION INTRAVENOUS at 04:22

## 2019-10-14 RX ADMIN — PIPERACILLIN AND TAZOBACTAM 3.38 G: 3; .375 INJECTION, POWDER, LYOPHILIZED, FOR SOLUTION INTRAVENOUS at 08:43

## 2019-10-14 RX ADMIN — INSULIN LISPRO 1 UNITS: 100 INJECTION, SOLUTION INTRAVENOUS; SUBCUTANEOUS at 08:44

## 2019-10-14 RX ADMIN — AMIODARONE HYDROCHLORIDE 1 MG/MIN: 50 INJECTION, SOLUTION INTRAVENOUS at 05:59

## 2019-10-14 RX ADMIN — ENOXAPARIN SODIUM 90 MG: 100 INJECTION SUBCUTANEOUS at 21:29

## 2019-10-14 RX ADMIN — INSULIN LISPRO 1 UNITS: 100 INJECTION, SOLUTION INTRAVENOUS; SUBCUTANEOUS at 04:26

## 2019-10-14 RX ADMIN — SODIUM CHLORIDE, PRESERVATIVE FREE 10 ML: 5 INJECTION INTRAVENOUS at 21:30

## 2019-10-14 RX ADMIN — GABAPENTIN 200 MG: 100 CAPSULE ORAL at 14:52

## 2019-10-14 RX ADMIN — CALCIUM GLUCONATE 1 G: 94 INJECTION, SOLUTION INTRAVENOUS at 08:43

## 2019-10-14 RX ADMIN — METOPROLOL TARTRATE 25 MG: 25 TABLET ORAL at 08:45

## 2019-10-14 RX ADMIN — MAGNESIUM GLUCONATE 500 MG ORAL TABLET 400 MG: 500 TABLET ORAL at 08:45

## 2019-10-14 RX ADMIN — MORPHINE SULFATE 4 MG: 4 INJECTION INTRAVENOUS at 16:06

## 2019-10-14 RX ADMIN — MORPHINE SULFATE 4 MG: 4 INJECTION INTRAVENOUS at 03:00

## 2019-10-14 RX ADMIN — ENOXAPARIN SODIUM 90 MG: 100 INJECTION SUBCUTANEOUS at 08:44

## 2019-10-14 RX ADMIN — TAMSULOSIN HYDROCHLORIDE 0.4 MG: 0.4 CAPSULE ORAL at 08:46

## 2019-10-14 RX ADMIN — Medication 10 ML: at 08:48

## 2019-10-14 RX ADMIN — SODIUM CHLORIDE, PRESERVATIVE FREE 10 ML: 5 INJECTION INTRAVENOUS at 08:43

## 2019-10-14 RX ADMIN — MORPHINE SULFATE 4 MG: 4 INJECTION INTRAVENOUS at 07:18

## 2019-10-14 RX ADMIN — ATORVASTATIN CALCIUM 40 MG: 40 TABLET, FILM COATED ORAL at 08:45

## 2019-10-14 RX ADMIN — OXYCODONE HYDROCHLORIDE 10 MG: 10 TABLET ORAL at 08:45

## 2019-10-14 RX ADMIN — OXYCODONE HYDROCHLORIDE 10 MG: 10 TABLET ORAL at 12:48

## 2019-10-14 RX ADMIN — OXYCODONE HYDROCHLORIDE 10 MG: 10 TABLET ORAL at 18:19

## 2019-10-14 RX ADMIN — MORPHINE SULFATE 4 MG: 4 INJECTION INTRAVENOUS at 21:29

## 2019-10-14 RX ADMIN — PANTOPRAZOLE SODIUM 40 MG: 40 INJECTION, POWDER, FOR SOLUTION INTRAVENOUS at 08:42

## 2019-10-14 RX ADMIN — AMIODARONE HYDROCHLORIDE 200 MG: 200 TABLET ORAL at 08:46

## 2019-10-14 RX ADMIN — MAGNESIUM GLUCONATE 500 MG ORAL TABLET 400 MG: 500 TABLET ORAL at 21:29

## 2019-10-14 RX ADMIN — PIPERACILLIN AND TAZOBACTAM 3.38 G: 3; .375 INJECTION, POWDER, LYOPHILIZED, FOR SOLUTION INTRAVENOUS at 18:19

## 2019-10-14 RX ADMIN — AMIODARONE HYDROCHLORIDE 1 MG/MIN: 50 INJECTION, SOLUTION INTRAVENOUS at 13:08

## 2019-10-14 RX ADMIN — MOMETASONE FUROATE AND FORMOTEROL FUMARATE DIHYDRATE 2 PUFF: 100; 5 AEROSOL RESPIRATORY (INHALATION) at 19:52

## 2019-10-14 RX ADMIN — PHENYLEPHRINE HYDROCHLORIDE 100 MCG/MIN: 10 INJECTION INTRAVENOUS at 13:32

## 2019-10-14 RX ADMIN — GABAPENTIN 200 MG: 100 CAPSULE ORAL at 21:29

## 2019-10-14 ASSESSMENT — PAIN DESCRIPTION - PROGRESSION
CLINICAL_PROGRESSION: GRADUALLY WORSENING
CLINICAL_PROGRESSION: GRADUALLY WORSENING
CLINICAL_PROGRESSION: NOT CHANGED
CLINICAL_PROGRESSION: GRADUALLY WORSENING

## 2019-10-14 ASSESSMENT — PAIN DESCRIPTION - ORIENTATION
ORIENTATION: MID

## 2019-10-14 ASSESSMENT — PAIN - FUNCTIONAL ASSESSMENT
PAIN_FUNCTIONAL_ASSESSMENT: INTOLERABLE, UNABLE TO DO ANY ACTIVE OR PASSIVE ACTIVITIES
PAIN_FUNCTIONAL_ASSESSMENT: PREVENTS OR INTERFERES WITH ALL ACTIVE AND SOME PASSIVE ACTIVITIES
PAIN_FUNCTIONAL_ASSESSMENT: PREVENTS OR INTERFERES SOME ACTIVE ACTIVITIES AND ADLS
PAIN_FUNCTIONAL_ASSESSMENT: PREVENTS OR INTERFERES WITH ALL ACTIVE AND SOME PASSIVE ACTIVITIES
PAIN_FUNCTIONAL_ASSESSMENT: PREVENTS OR INTERFERES WITH ALL ACTIVE AND SOME PASSIVE ACTIVITIES
PAIN_FUNCTIONAL_ASSESSMENT: PREVENTS OR INTERFERES SOME ACTIVE ACTIVITIES AND ADLS
PAIN_FUNCTIONAL_ASSESSMENT: PREVENTS OR INTERFERES SOME ACTIVE ACTIVITIES AND ADLS
PAIN_FUNCTIONAL_ASSESSMENT: INTOLERABLE, UNABLE TO DO ANY ACTIVE OR PASSIVE ACTIVITIES

## 2019-10-14 ASSESSMENT — PAIN SCALES - GENERAL
PAINLEVEL_OUTOF10: 8
PAINLEVEL_OUTOF10: 10
PAINLEVEL_OUTOF10: 10
PAINLEVEL_OUTOF10: 8
PAINLEVEL_OUTOF10: 10
PAINLEVEL_OUTOF10: 0
PAINLEVEL_OUTOF10: 6
PAINLEVEL_OUTOF10: 7
PAINLEVEL_OUTOF10: 6
PAINLEVEL_OUTOF10: 6
PAINLEVEL_OUTOF10: 8
PAINLEVEL_OUTOF10: 8
PAINLEVEL_OUTOF10: 10
PAINLEVEL_OUTOF10: 6
PAINLEVEL_OUTOF10: 10
PAINLEVEL_OUTOF10: 8
PAINLEVEL_OUTOF10: 10
PAINLEVEL_OUTOF10: 8

## 2019-10-14 ASSESSMENT — PAIN DESCRIPTION - ONSET
ONSET: ON-GOING
ONSET: GRADUAL
ONSET: ON-GOING
ONSET: GRADUAL
ONSET: ON-GOING

## 2019-10-14 ASSESSMENT — PAIN DESCRIPTION - DESCRIPTORS
DESCRIPTORS: CONSTANT;CRAMPING

## 2019-10-14 ASSESSMENT — PAIN DESCRIPTION - FREQUENCY
FREQUENCY: CONTINUOUS
FREQUENCY: INTERMITTENT

## 2019-10-14 ASSESSMENT — PAIN DESCRIPTION - PAIN TYPE
TYPE: ACUTE PAIN;SURGICAL PAIN
TYPE: ACUTE PAIN
TYPE: ACUTE PAIN;SURGICAL PAIN
TYPE: ACUTE PAIN
TYPE: ACUTE PAIN;SURGICAL PAIN

## 2019-10-14 ASSESSMENT — PAIN DESCRIPTION - LOCATION
LOCATION: ABDOMEN

## 2019-10-15 ENCOUNTER — APPOINTMENT (OUTPATIENT)
Dept: CT IMAGING | Age: 59
DRG: 231 | End: 2019-10-15
Payer: COMMERCIAL

## 2019-10-15 LAB
ANION GAP SERPL CALCULATED.3IONS-SCNC: 12 MMOL/L (ref 3–16)
ANISOCYTOSIS: ABNORMAL
BANDED NEUTROPHILS RELATIVE PERCENT: 2 % (ref 0–7)
BASOPHILS ABSOLUTE: 0 K/UL (ref 0–0.2)
BASOPHILS RELATIVE PERCENT: 0 %
BUN BLDV-MCNC: 21 MG/DL (ref 7–20)
CALCIUM SERPL-MCNC: 7.2 MG/DL (ref 8.3–10.6)
CHLORIDE BLD-SCNC: 92 MMOL/L (ref 99–110)
CO2: 21 MMOL/L (ref 21–32)
CREAT SERPL-MCNC: 1.1 MG/DL (ref 0.9–1.3)
CULTURE, RESPIRATORY: ABNORMAL
CULTURE, RESPIRATORY: ABNORMAL
EOSINOPHILS ABSOLUTE: 0 K/UL (ref 0–0.6)
EOSINOPHILS RELATIVE PERCENT: 0 %
GFR AFRICAN AMERICAN: >60
GFR NON-AFRICAN AMERICAN: >60
GLUCOSE BLD-MCNC: 139 MG/DL (ref 70–99)
GLUCOSE BLD-MCNC: 142 MG/DL (ref 70–99)
GLUCOSE BLD-MCNC: 142 MG/DL (ref 70–99)
GLUCOSE BLD-MCNC: 144 MG/DL (ref 70–99)
GLUCOSE BLD-MCNC: 152 MG/DL (ref 70–99)
GLUCOSE BLD-MCNC: 159 MG/DL (ref 70–99)
GRAM STAIN RESULT: ABNORMAL
HCT VFR BLD CALC: 25.6 % (ref 40.5–52.5)
HEMOGLOBIN: 8.3 G/DL (ref 13.5–17.5)
LYMPHOCYTES ABSOLUTE: 1.8 K/UL (ref 1–5.1)
LYMPHOCYTES RELATIVE PERCENT: 5 %
MAGNESIUM: 2.2 MG/DL (ref 1.8–2.4)
MCH RBC QN AUTO: 28.4 PG (ref 26–34)
MCHC RBC AUTO-ENTMCNC: 32.3 G/DL (ref 31–36)
MCV RBC AUTO: 87.9 FL (ref 80–100)
MONOCYTES ABSOLUTE: 2.9 K/UL (ref 0–1.3)
MONOCYTES RELATIVE PERCENT: 8 %
NEUTROPHILS ABSOLUTE: 31.4 K/UL (ref 1.7–7.7)
NEUTROPHILS RELATIVE PERCENT: 85 %
ORGANISM: ABNORMAL
OVALOCYTES: ABNORMAL
PDW BLD-RTO: 18.3 % (ref 12.4–15.4)
PERFORMED ON: ABNORMAL
PHOSPHORUS: 2.7 MG/DL (ref 2.5–4.9)
PLATELET # BLD: 212 K/UL (ref 135–450)
PMV BLD AUTO: 8.4 FL (ref 5–10.5)
POIKILOCYTES: ABNORMAL
POTASSIUM SERPL-SCNC: 3.6 MMOL/L (ref 3.5–5.1)
PRO-BNP: ABNORMAL PG/ML (ref 0–124)
PROCALCITONIN: 1.09 NG/ML (ref 0–0.15)
RBC # BLD: 2.92 M/UL (ref 4.2–5.9)
SODIUM BLD-SCNC: 125 MMOL/L (ref 136–145)
WBC # BLD: 36.1 K/UL (ref 4–11)

## 2019-10-15 PROCEDURE — 94760 N-INVAS EAR/PLS OXIMETRY 1: CPT

## 2019-10-15 PROCEDURE — 97110 THERAPEUTIC EXERCISES: CPT

## 2019-10-15 PROCEDURE — 6360000002 HC RX W HCPCS: Performed by: INTERNAL MEDICINE

## 2019-10-15 PROCEDURE — 6360000002 HC RX W HCPCS: Performed by: HOSPITALIST

## 2019-10-15 PROCEDURE — 80048 BASIC METABOLIC PNL TOTAL CA: CPT

## 2019-10-15 PROCEDURE — APPNB15 APP NON BILLABLE TIME 0-15 MINS: Performed by: NURSE PRACTITIONER

## 2019-10-15 PROCEDURE — 6360000002 HC RX W HCPCS: Performed by: NURSE PRACTITIONER

## 2019-10-15 PROCEDURE — 85025 COMPLETE CBC W/AUTO DIFF WBC: CPT

## 2019-10-15 PROCEDURE — 2700000000 HC OXYGEN THERAPY PER DAY

## 2019-10-15 PROCEDURE — 99232 SBSQ HOSP IP/OBS MODERATE 35: CPT | Performed by: NURSE PRACTITIONER

## 2019-10-15 PROCEDURE — 97530 THERAPEUTIC ACTIVITIES: CPT

## 2019-10-15 PROCEDURE — APPNB30 APP NON BILLABLE TIME 0-30 MINS: Performed by: PHYSICIAN ASSISTANT

## 2019-10-15 PROCEDURE — 6370000000 HC RX 637 (ALT 250 FOR IP): Performed by: INTERNAL MEDICINE

## 2019-10-15 PROCEDURE — 74177 CT ABD & PELVIS W/CONTRAST: CPT

## 2019-10-15 PROCEDURE — 2000000000 HC ICU R&B

## 2019-10-15 PROCEDURE — 99024 POSTOP FOLLOW-UP VISIT: CPT | Performed by: PHYSICIAN ASSISTANT

## 2019-10-15 PROCEDURE — 84100 ASSAY OF PHOSPHORUS: CPT

## 2019-10-15 PROCEDURE — 83880 ASSAY OF NATRIURETIC PEPTIDE: CPT

## 2019-10-15 PROCEDURE — 97166 OT EVAL MOD COMPLEX 45 MIN: CPT

## 2019-10-15 PROCEDURE — 84145 PROCALCITONIN (PCT): CPT

## 2019-10-15 PROCEDURE — 99024 POSTOP FOLLOW-UP VISIT: CPT | Performed by: SURGERY

## 2019-10-15 PROCEDURE — 2580000003 HC RX 258: Performed by: INTERNAL MEDICINE

## 2019-10-15 PROCEDURE — 94640 AIRWAY INHALATION TREATMENT: CPT

## 2019-10-15 PROCEDURE — 83735 ASSAY OF MAGNESIUM: CPT

## 2019-10-15 PROCEDURE — 6370000000 HC RX 637 (ALT 250 FOR IP): Performed by: HOSPITALIST

## 2019-10-15 PROCEDURE — 2580000003 HC RX 258: Performed by: NURSE PRACTITIONER

## 2019-10-15 PROCEDURE — 6360000004 HC RX CONTRAST MEDICATION

## 2019-10-15 PROCEDURE — 99291 CRITICAL CARE FIRST HOUR: CPT | Performed by: INTERNAL MEDICINE

## 2019-10-15 PROCEDURE — 6360000004 HC RX CONTRAST MEDICATION: Performed by: HOSPITALIST

## 2019-10-15 PROCEDURE — C9113 INJ PANTOPRAZOLE SODIUM, VIA: HCPCS | Performed by: INTERNAL MEDICINE

## 2019-10-15 PROCEDURE — 6370000000 HC RX 637 (ALT 250 FOR IP): Performed by: NURSE PRACTITIONER

## 2019-10-15 PROCEDURE — APPSS15 APP SPLIT SHARED TIME 0-15 MINUTES: Performed by: PHYSICIAN ASSISTANT

## 2019-10-15 RX ORDER — FUROSEMIDE 10 MG/ML
20 INJECTION INTRAMUSCULAR; INTRAVENOUS ONCE
Status: COMPLETED | OUTPATIENT
Start: 2019-10-15 | End: 2019-10-15

## 2019-10-15 RX ADMIN — MOMETASONE FUROATE AND FORMOTEROL FUMARATE DIHYDRATE 2 PUFF: 100; 5 AEROSOL RESPIRATORY (INHALATION) at 08:47

## 2019-10-15 RX ADMIN — PIPERACILLIN AND TAZOBACTAM 3.38 G: 3; .375 INJECTION, POWDER, LYOPHILIZED, FOR SOLUTION INTRAVENOUS at 02:24

## 2019-10-15 RX ADMIN — Medication 10 ML: at 09:47

## 2019-10-15 RX ADMIN — FUROSEMIDE 20 MG: 10 INJECTION, SOLUTION INTRAMUSCULAR; INTRAVENOUS at 10:07

## 2019-10-15 RX ADMIN — GABAPENTIN 200 MG: 100 CAPSULE ORAL at 13:04

## 2019-10-15 RX ADMIN — ATORVASTATIN CALCIUM 40 MG: 40 TABLET, FILM COATED ORAL at 08:26

## 2019-10-15 RX ADMIN — SODIUM CHLORIDE, PRESERVATIVE FREE 10 ML: 5 INJECTION INTRAVENOUS at 09:46

## 2019-10-15 RX ADMIN — AMIODARONE HYDROCHLORIDE 200 MG: 200 TABLET ORAL at 08:26

## 2019-10-15 RX ADMIN — AMIODARONE HYDROCHLORIDE 1 MG/MIN: 50 INJECTION, SOLUTION INTRAVENOUS at 03:03

## 2019-10-15 RX ADMIN — TAMSULOSIN HYDROCHLORIDE 0.4 MG: 0.4 CAPSULE ORAL at 08:26

## 2019-10-15 RX ADMIN — GABAPENTIN 200 MG: 100 CAPSULE ORAL at 20:31

## 2019-10-15 RX ADMIN — IOPAMIDOL 75 ML: 755 INJECTION, SOLUTION INTRAVENOUS at 11:08

## 2019-10-15 RX ADMIN — OXYCODONE HYDROCHLORIDE 10 MG: 10 TABLET ORAL at 20:42

## 2019-10-15 RX ADMIN — PIPERACILLIN AND TAZOBACTAM 3.38 G: 3; .375 INJECTION, POWDER, LYOPHILIZED, FOR SOLUTION INTRAVENOUS at 09:44

## 2019-10-15 RX ADMIN — MORPHINE SULFATE 4 MG: 4 INJECTION INTRAVENOUS at 17:25

## 2019-10-15 RX ADMIN — MORPHINE SULFATE 4 MG: 4 INJECTION INTRAVENOUS at 22:14

## 2019-10-15 RX ADMIN — PIPERACILLIN AND TAZOBACTAM 3.38 G: 3; .375 INJECTION, POWDER, LYOPHILIZED, FOR SOLUTION INTRAVENOUS at 17:35

## 2019-10-15 RX ADMIN — ENOXAPARIN SODIUM 90 MG: 100 INJECTION SUBCUTANEOUS at 08:25

## 2019-10-15 RX ADMIN — PHENYLEPHRINE HYDROCHLORIDE 75 MCG/MIN: 10 INJECTION INTRAVENOUS at 09:30

## 2019-10-15 RX ADMIN — HYDROCORTISONE SODIUM SUCCINATE 100 MG: 100 INJECTION, POWDER, FOR SOLUTION INTRAMUSCULAR; INTRAVENOUS at 09:45

## 2019-10-15 RX ADMIN — SODIUM CHLORIDE, PRESERVATIVE FREE 10 ML: 5 INJECTION INTRAVENOUS at 20:19

## 2019-10-15 RX ADMIN — MORPHINE SULFATE 4 MG: 4 INJECTION INTRAVENOUS at 06:02

## 2019-10-15 RX ADMIN — PANTOPRAZOLE SODIUM 40 MG: 40 INJECTION, POWDER, FOR SOLUTION INTRAVENOUS at 08:26

## 2019-10-15 RX ADMIN — INSULIN LISPRO 1 UNITS: 100 INJECTION, SOLUTION INTRAVENOUS; SUBCUTANEOUS at 17:23

## 2019-10-15 RX ADMIN — DULOXETINE HYDROCHLORIDE 30 MG: 30 CAPSULE, DELAYED RELEASE ORAL at 08:25

## 2019-10-15 RX ADMIN — OXYCODONE HYDROCHLORIDE 10 MG: 10 TABLET ORAL at 15:47

## 2019-10-15 RX ADMIN — ENOXAPARIN SODIUM 90 MG: 100 INJECTION SUBCUTANEOUS at 20:31

## 2019-10-15 RX ADMIN — OXYCODONE HYDROCHLORIDE 10 MG: 10 TABLET ORAL at 10:08

## 2019-10-15 RX ADMIN — MAGNESIUM GLUCONATE 500 MG ORAL TABLET 400 MG: 500 TABLET ORAL at 08:26

## 2019-10-15 RX ADMIN — MAGNESIUM GLUCONATE 500 MG ORAL TABLET 400 MG: 500 TABLET ORAL at 20:31

## 2019-10-15 RX ADMIN — PHENYLEPHRINE HYDROCHLORIDE 125 MCG/MIN: 10 INJECTION INTRAVENOUS at 03:03

## 2019-10-15 RX ADMIN — INSULIN LISPRO 1 UNITS: 100 INJECTION, SOLUTION INTRAVENOUS; SUBCUTANEOUS at 03:45

## 2019-10-15 RX ADMIN — GABAPENTIN 200 MG: 100 CAPSULE ORAL at 08:26

## 2019-10-15 RX ADMIN — HYDROCORTISONE SODIUM SUCCINATE 100 MG: 100 INJECTION, POWDER, FOR SOLUTION INTRAMUSCULAR; INTRAVENOUS at 18:08

## 2019-10-15 RX ADMIN — IOHEXOL 50 ML: 240 INJECTION, SOLUTION INTRATHECAL; INTRAVASCULAR; INTRAVENOUS; ORAL at 09:46

## 2019-10-15 RX ADMIN — INSULIN LISPRO 1 UNITS: 100 INJECTION, SOLUTION INTRAVENOUS; SUBCUTANEOUS at 13:04

## 2019-10-15 RX ADMIN — INSULIN LISPRO 1 UNITS: 100 INJECTION, SOLUTION INTRAVENOUS; SUBCUTANEOUS at 20:30

## 2019-10-15 RX ADMIN — CALCIUM GLUCONATE 1 G: 98 INJECTION, SOLUTION INTRAVENOUS at 10:01

## 2019-10-15 RX ADMIN — MORPHINE SULFATE 4 MG: 4 INJECTION INTRAVENOUS at 13:11

## 2019-10-15 ASSESSMENT — PAIN SCALES - GENERAL
PAINLEVEL_OUTOF10: 8
PAINLEVEL_OUTOF10: 7
PAINLEVEL_OUTOF10: 8
PAINLEVEL_OUTOF10: 6
PAINLEVEL_OUTOF10: 3
PAINLEVEL_OUTOF10: 8
PAINLEVEL_OUTOF10: 8
PAINLEVEL_OUTOF10: 3
PAINLEVEL_OUTOF10: 6
PAINLEVEL_OUTOF10: 8

## 2019-10-15 ASSESSMENT — PAIN - FUNCTIONAL ASSESSMENT
PAIN_FUNCTIONAL_ASSESSMENT: PREVENTS OR INTERFERES SOME ACTIVE ACTIVITIES AND ADLS
PAIN_FUNCTIONAL_ASSESSMENT: PREVENTS OR INTERFERES WITH ALL ACTIVE AND SOME PASSIVE ACTIVITIES
PAIN_FUNCTIONAL_ASSESSMENT: PREVENTS OR INTERFERES SOME ACTIVE ACTIVITIES AND ADLS
PAIN_FUNCTIONAL_ASSESSMENT: PREVENTS OR INTERFERES SOME ACTIVE ACTIVITIES AND ADLS
PAIN_FUNCTIONAL_ASSESSMENT: PREVENTS OR INTERFERES WITH ALL ACTIVE AND SOME PASSIVE ACTIVITIES
PAIN_FUNCTIONAL_ASSESSMENT: PREVENTS OR INTERFERES SOME ACTIVE ACTIVITIES AND ADLS
PAIN_FUNCTIONAL_ASSESSMENT: PREVENTS OR INTERFERES WITH ALL ACTIVE AND SOME PASSIVE ACTIVITIES

## 2019-10-15 ASSESSMENT — PAIN DESCRIPTION - FREQUENCY
FREQUENCY: CONTINUOUS
FREQUENCY: INTERMITTENT

## 2019-10-15 ASSESSMENT — PAIN DESCRIPTION - ORIENTATION
ORIENTATION: MID

## 2019-10-15 ASSESSMENT — PAIN DESCRIPTION - DESCRIPTORS
DESCRIPTORS: SHARP;SORE
DESCRIPTORS: SORE;SHARP
DESCRIPTORS: SHARP;SORE
DESCRIPTORS: CRAMPING
DESCRIPTORS: SORE;SHARP
DESCRIPTORS: SORE;SHARP
DESCRIPTORS: CONSTANT;CRAMPING

## 2019-10-15 ASSESSMENT — PAIN DESCRIPTION - PROGRESSION
CLINICAL_PROGRESSION: GRADUALLY WORSENING

## 2019-10-15 ASSESSMENT — PAIN DESCRIPTION - LOCATION
LOCATION: ABDOMEN

## 2019-10-15 ASSESSMENT — PAIN DESCRIPTION - PAIN TYPE
TYPE: SURGICAL PAIN
TYPE: ACUTE PAIN;SURGICAL PAIN
TYPE: SURGICAL PAIN

## 2019-10-15 ASSESSMENT — PAIN DESCRIPTION - ONSET
ONSET: GRADUAL
ONSET: ON-GOING
ONSET: ON-GOING
ONSET: PROGRESSIVE

## 2019-10-16 LAB
ANION GAP SERPL CALCULATED.3IONS-SCNC: 13 MMOL/L (ref 3–16)
ANISOCYTOSIS: ABNORMAL
BANDED NEUTROPHILS RELATIVE PERCENT: 1 % (ref 0–7)
BASOPHILS ABSOLUTE: 0 K/UL (ref 0–0.2)
BASOPHILS RELATIVE PERCENT: 0 %
BUN BLDV-MCNC: 22 MG/DL (ref 7–20)
CALCIUM SERPL-MCNC: 7.3 MG/DL (ref 8.3–10.6)
CHLORIDE BLD-SCNC: 93 MMOL/L (ref 99–110)
CO2: 23 MMOL/L (ref 21–32)
CREAT SERPL-MCNC: 0.8 MG/DL (ref 0.9–1.3)
EOSINOPHILS ABSOLUTE: 0 K/UL (ref 0–0.6)
EOSINOPHILS RELATIVE PERCENT: 0 %
GFR AFRICAN AMERICAN: >60
GFR NON-AFRICAN AMERICAN: >60
GLUCOSE BLD-MCNC: 119 MG/DL (ref 70–99)
GLUCOSE BLD-MCNC: 124 MG/DL (ref 70–99)
GLUCOSE BLD-MCNC: 130 MG/DL (ref 70–99)
GLUCOSE BLD-MCNC: 133 MG/DL (ref 70–99)
GLUCOSE BLD-MCNC: 133 MG/DL (ref 70–99)
GLUCOSE BLD-MCNC: 134 MG/DL (ref 70–99)
GLUCOSE BLD-MCNC: 139 MG/DL (ref 70–99)
GLUCOSE BLD-MCNC: 146 MG/DL (ref 70–99)
HCT VFR BLD CALC: 22 % (ref 40.5–52.5)
HEMOGLOBIN: 7.2 G/DL (ref 13.5–17.5)
LYMPHOCYTES ABSOLUTE: 0.3 K/UL (ref 1–5.1)
LYMPHOCYTES RELATIVE PERCENT: 1 %
MAGNESIUM: 2.3 MG/DL (ref 1.8–2.4)
MCH RBC QN AUTO: 28.6 PG (ref 26–34)
MCHC RBC AUTO-ENTMCNC: 32.8 G/DL (ref 31–36)
MCV RBC AUTO: 87.2 FL (ref 80–100)
MONOCYTES ABSOLUTE: 1.1 K/UL (ref 0–1.3)
MONOCYTES RELATIVE PERCENT: 4 %
NEUTROPHILS ABSOLUTE: 25.3 K/UL (ref 1.7–7.7)
NEUTROPHILS RELATIVE PERCENT: 94 %
OVALOCYTES: ABNORMAL
PDW BLD-RTO: 18.5 % (ref 12.4–15.4)
PERFORMED ON: ABNORMAL
PHOSPHORUS: 3.3 MG/DL (ref 2.5–4.9)
PLATELET # BLD: 151 K/UL (ref 135–450)
PMV BLD AUTO: 8.2 FL (ref 5–10.5)
POIKILOCYTES: ABNORMAL
POLYCHROMASIA: ABNORMAL
POTASSIUM SERPL-SCNC: 3.6 MMOL/L (ref 3.5–5.1)
PROCALCITONIN: 0.89 NG/ML (ref 0–0.15)
RBC # BLD: 2.52 M/UL (ref 4.2–5.9)
SODIUM BLD-SCNC: 129 MMOL/L (ref 136–145)
WBC # BLD: 26.6 K/UL (ref 4–11)

## 2019-10-16 PROCEDURE — 97530 THERAPEUTIC ACTIVITIES: CPT

## 2019-10-16 PROCEDURE — 80048 BASIC METABOLIC PNL TOTAL CA: CPT

## 2019-10-16 PROCEDURE — 94640 AIRWAY INHALATION TREATMENT: CPT

## 2019-10-16 PROCEDURE — 97535 SELF CARE MNGMENT TRAINING: CPT

## 2019-10-16 PROCEDURE — 6360000002 HC RX W HCPCS: Performed by: NURSE PRACTITIONER

## 2019-10-16 PROCEDURE — 6370000000 HC RX 637 (ALT 250 FOR IP): Performed by: HOSPITALIST

## 2019-10-16 PROCEDURE — 85025 COMPLETE CBC W/AUTO DIFF WBC: CPT

## 2019-10-16 PROCEDURE — 83735 ASSAY OF MAGNESIUM: CPT

## 2019-10-16 PROCEDURE — 2580000003 HC RX 258: Performed by: NURSE PRACTITIONER

## 2019-10-16 PROCEDURE — 99024 POSTOP FOLLOW-UP VISIT: CPT | Performed by: SURGERY

## 2019-10-16 PROCEDURE — APPNB30 APP NON BILLABLE TIME 0-30 MINS: Performed by: PHYSICIAN ASSISTANT

## 2019-10-16 PROCEDURE — 2580000003 HC RX 258: Performed by: INTERNAL MEDICINE

## 2019-10-16 PROCEDURE — 84145 PROCALCITONIN (PCT): CPT

## 2019-10-16 PROCEDURE — C9113 INJ PANTOPRAZOLE SODIUM, VIA: HCPCS | Performed by: INTERNAL MEDICINE

## 2019-10-16 PROCEDURE — 6360000002 HC RX W HCPCS: Performed by: HOSPITALIST

## 2019-10-16 PROCEDURE — 2060000000 HC ICU INTERMEDIATE R&B

## 2019-10-16 PROCEDURE — 6370000000 HC RX 637 (ALT 250 FOR IP): Performed by: NURSE PRACTITIONER

## 2019-10-16 PROCEDURE — 97110 THERAPEUTIC EXERCISES: CPT

## 2019-10-16 PROCEDURE — APPNB15 APP NON BILLABLE TIME 0-15 MINS: Performed by: NURSE PRACTITIONER

## 2019-10-16 PROCEDURE — 2700000000 HC OXYGEN THERAPY PER DAY

## 2019-10-16 PROCEDURE — 84100 ASSAY OF PHOSPHORUS: CPT

## 2019-10-16 PROCEDURE — 99233 SBSQ HOSP IP/OBS HIGH 50: CPT | Performed by: INTERNAL MEDICINE

## 2019-10-16 PROCEDURE — APPSS15 APP SPLIT SHARED TIME 0-15 MINUTES: Performed by: PHYSICIAN ASSISTANT

## 2019-10-16 PROCEDURE — 6370000000 HC RX 637 (ALT 250 FOR IP): Performed by: INTERNAL MEDICINE

## 2019-10-16 PROCEDURE — 94760 N-INVAS EAR/PLS OXIMETRY 1: CPT

## 2019-10-16 PROCEDURE — 99024 POSTOP FOLLOW-UP VISIT: CPT | Performed by: PHYSICIAN ASSISTANT

## 2019-10-16 PROCEDURE — 6360000002 HC RX W HCPCS: Performed by: INTERNAL MEDICINE

## 2019-10-16 PROCEDURE — 99232 SBSQ HOSP IP/OBS MODERATE 35: CPT | Performed by: NURSE PRACTITIONER

## 2019-10-16 RX ORDER — FUROSEMIDE 10 MG/ML
20 INJECTION INTRAMUSCULAR; INTRAVENOUS ONCE
Status: COMPLETED | OUTPATIENT
Start: 2019-10-16 | End: 2019-10-16

## 2019-10-16 RX ORDER — ALBUTEROL SULFATE 2.5 MG/3ML
2.5 SOLUTION RESPIRATORY (INHALATION) EVERY 6 HOURS PRN
Status: DISCONTINUED | OUTPATIENT
Start: 2019-10-16 | End: 2019-10-24 | Stop reason: HOSPADM

## 2019-10-16 RX ADMIN — OXYCODONE HYDROCHLORIDE 10 MG: 10 TABLET ORAL at 07:32

## 2019-10-16 RX ADMIN — MORPHINE SULFATE 4 MG: 4 INJECTION INTRAVENOUS at 16:51

## 2019-10-16 RX ADMIN — HYDROCORTISONE SODIUM SUCCINATE 100 MG: 100 INJECTION, POWDER, FOR SOLUTION INTRAMUSCULAR; INTRAVENOUS at 01:32

## 2019-10-16 RX ADMIN — AMIODARONE HYDROCHLORIDE 200 MG: 200 TABLET ORAL at 09:16

## 2019-10-16 RX ADMIN — ALBUTEROL SULFATE 2 PUFF: 90 AEROSOL, METERED RESPIRATORY (INHALATION) at 08:46

## 2019-10-16 RX ADMIN — MAGNESIUM GLUCONATE 500 MG ORAL TABLET 400 MG: 500 TABLET ORAL at 21:37

## 2019-10-16 RX ADMIN — PIPERACILLIN AND TAZOBACTAM 3.38 G: 3; .375 INJECTION, POWDER, LYOPHILIZED, FOR SOLUTION INTRAVENOUS at 10:13

## 2019-10-16 RX ADMIN — DULOXETINE HYDROCHLORIDE 30 MG: 30 CAPSULE, DELAYED RELEASE ORAL at 09:16

## 2019-10-16 RX ADMIN — MORPHINE SULFATE 4 MG: 4 INJECTION INTRAVENOUS at 03:54

## 2019-10-16 RX ADMIN — GABAPENTIN 200 MG: 100 CAPSULE ORAL at 09:16

## 2019-10-16 RX ADMIN — METOPROLOL TARTRATE 12.5 MG: 25 TABLET ORAL at 21:38

## 2019-10-16 RX ADMIN — SODIUM CHLORIDE, PRESERVATIVE FREE 10 ML: 5 INJECTION INTRAVENOUS at 09:17

## 2019-10-16 RX ADMIN — MORPHINE SULFATE 4 MG: 4 INJECTION INTRAVENOUS at 09:31

## 2019-10-16 RX ADMIN — CALCIUM GLUCONATE 1 G: 98 INJECTION, SOLUTION INTRAVENOUS at 09:42

## 2019-10-16 RX ADMIN — INSULIN LISPRO 1 UNITS: 100 INJECTION, SOLUTION INTRAVENOUS; SUBCUTANEOUS at 13:10

## 2019-10-16 RX ADMIN — PIPERACILLIN AND TAZOBACTAM 3.38 G: 3; .375 INJECTION, POWDER, LYOPHILIZED, FOR SOLUTION INTRAVENOUS at 16:54

## 2019-10-16 RX ADMIN — SODIUM CHLORIDE, PRESERVATIVE FREE 10 ML: 5 INJECTION INTRAVENOUS at 21:38

## 2019-10-16 RX ADMIN — HYDROCORTISONE SODIUM SUCCINATE 100 MG: 100 INJECTION, POWDER, FOR SOLUTION INTRAMUSCULAR; INTRAVENOUS at 09:16

## 2019-10-16 RX ADMIN — GABAPENTIN 200 MG: 100 CAPSULE ORAL at 14:16

## 2019-10-16 RX ADMIN — ENOXAPARIN SODIUM 90 MG: 100 INJECTION SUBCUTANEOUS at 21:38

## 2019-10-16 RX ADMIN — PIPERACILLIN AND TAZOBACTAM 3.38 G: 3; .375 INJECTION, POWDER, LYOPHILIZED, FOR SOLUTION INTRAVENOUS at 01:32

## 2019-10-16 RX ADMIN — ENOXAPARIN SODIUM 90 MG: 100 INJECTION SUBCUTANEOUS at 09:15

## 2019-10-16 RX ADMIN — ATORVASTATIN CALCIUM 40 MG: 40 TABLET, FILM COATED ORAL at 09:16

## 2019-10-16 RX ADMIN — MOMETASONE FUROATE AND FORMOTEROL FUMARATE DIHYDRATE 2 PUFF: 100; 5 AEROSOL RESPIRATORY (INHALATION) at 20:52

## 2019-10-16 RX ADMIN — FUROSEMIDE 20 MG: 10 INJECTION, SOLUTION INTRAMUSCULAR; INTRAVENOUS at 16:51

## 2019-10-16 RX ADMIN — Medication 10 ML: at 09:12

## 2019-10-16 RX ADMIN — MAGNESIUM GLUCONATE 500 MG ORAL TABLET 400 MG: 500 TABLET ORAL at 09:16

## 2019-10-16 RX ADMIN — HYDROCORTISONE SODIUM SUCCINATE 100 MG: 100 INJECTION, POWDER, FOR SOLUTION INTRAMUSCULAR; INTRAVENOUS at 16:51

## 2019-10-16 RX ADMIN — GABAPENTIN 200 MG: 100 CAPSULE ORAL at 21:37

## 2019-10-16 RX ADMIN — TAMSULOSIN HYDROCHLORIDE 0.4 MG: 0.4 CAPSULE ORAL at 09:16

## 2019-10-16 RX ADMIN — METOPROLOL TARTRATE 12.5 MG: 25 TABLET ORAL at 09:30

## 2019-10-16 RX ADMIN — ALBUTEROL SULFATE 2.5 MG: 2.5 SOLUTION RESPIRATORY (INHALATION) at 11:55

## 2019-10-16 RX ADMIN — PANTOPRAZOLE SODIUM 40 MG: 40 INJECTION, POWDER, FOR SOLUTION INTRAVENOUS at 09:16

## 2019-10-16 ASSESSMENT — PAIN DESCRIPTION - LOCATION
LOCATION: ABDOMEN

## 2019-10-16 ASSESSMENT — PAIN DESCRIPTION - ORIENTATION
ORIENTATION: MID

## 2019-10-16 ASSESSMENT — PAIN SCALES - GENERAL
PAINLEVEL_OUTOF10: 3
PAINLEVEL_OUTOF10: 6
PAINLEVEL_OUTOF10: 8
PAINLEVEL_OUTOF10: 3
PAINLEVEL_OUTOF10: 3
PAINLEVEL_OUTOF10: 6
PAINLEVEL_OUTOF10: 8
PAINLEVEL_OUTOF10: 6
PAINLEVEL_OUTOF10: 0

## 2019-10-16 ASSESSMENT — PAIN DESCRIPTION - FREQUENCY
FREQUENCY: INTERMITTENT

## 2019-10-16 ASSESSMENT — PAIN DESCRIPTION - ONSET
ONSET: PROGRESSIVE
ONSET: PROGRESSIVE
ONSET: GRADUAL

## 2019-10-16 ASSESSMENT — PAIN DESCRIPTION - DESCRIPTORS
DESCRIPTORS: CRAMPING
DESCRIPTORS: ACHING
DESCRIPTORS: CRAMPING

## 2019-10-16 ASSESSMENT — PAIN DESCRIPTION - PROGRESSION
CLINICAL_PROGRESSION: GRADUALLY WORSENING

## 2019-10-16 ASSESSMENT — PAIN DESCRIPTION - PAIN TYPE
TYPE: SURGICAL PAIN

## 2019-10-16 ASSESSMENT — PAIN - FUNCTIONAL ASSESSMENT
PAIN_FUNCTIONAL_ASSESSMENT: PREVENTS OR INTERFERES SOME ACTIVE ACTIVITIES AND ADLS
PAIN_FUNCTIONAL_ASSESSMENT: PREVENTS OR INTERFERES WITH ALL ACTIVE AND SOME PASSIVE ACTIVITIES
PAIN_FUNCTIONAL_ASSESSMENT: PREVENTS OR INTERFERES SOME ACTIVE ACTIVITIES AND ADLS
PAIN_FUNCTIONAL_ASSESSMENT: PREVENTS OR INTERFERES WITH ALL ACTIVE AND SOME PASSIVE ACTIVITIES
PAIN_FUNCTIONAL_ASSESSMENT: PREVENTS OR INTERFERES SOME ACTIVE ACTIVITIES AND ADLS

## 2019-10-17 LAB
ABO/RH: NORMAL
ANION GAP SERPL CALCULATED.3IONS-SCNC: 15 MMOL/L (ref 3–16)
ANISOCYTOSIS: ABNORMAL
ANTIBODY SCREEN: NORMAL
BASOPHILS ABSOLUTE: 0 K/UL (ref 0–0.2)
BASOPHILS RELATIVE PERCENT: 0 %
BLOOD BANK DISPENSE STATUS: NORMAL
BLOOD BANK PRODUCT CODE: NORMAL
BPU ID: NORMAL
BUN BLDV-MCNC: 26 MG/DL (ref 7–20)
CALCIUM SERPL-MCNC: 7.9 MG/DL (ref 8.3–10.6)
CHLORIDE BLD-SCNC: 92 MMOL/L (ref 99–110)
CO2: 22 MMOL/L (ref 21–32)
CREAT SERPL-MCNC: 0.8 MG/DL (ref 0.9–1.3)
DESCRIPTION BLOOD BANK: NORMAL
EOSINOPHILS ABSOLUTE: 0 K/UL (ref 0–0.6)
EOSINOPHILS RELATIVE PERCENT: 0 %
GFR AFRICAN AMERICAN: >60
GFR NON-AFRICAN AMERICAN: >60
GLUCOSE BLD-MCNC: 132 MG/DL (ref 70–99)
GLUCOSE BLD-MCNC: 134 MG/DL (ref 70–99)
GLUCOSE BLD-MCNC: 137 MG/DL (ref 70–99)
GLUCOSE BLD-MCNC: 138 MG/DL (ref 70–99)
GLUCOSE BLD-MCNC: 144 MG/DL (ref 70–99)
GLUCOSE BLD-MCNC: 146 MG/DL (ref 70–99)
GLUCOSE BLD-MCNC: 156 MG/DL (ref 70–99)
GLUCOSE BLD-MCNC: 160 MG/DL (ref 70–99)
HCT VFR BLD CALC: 20.8 % (ref 40.5–52.5)
HCT VFR BLD CALC: 21.3 % (ref 40.5–52.5)
HCT VFR BLD CALC: 23.8 % (ref 40.5–52.5)
HEMOGLOBIN: 6.8 G/DL (ref 13.5–17.5)
HEMOGLOBIN: 7 G/DL (ref 13.5–17.5)
HEMOGLOBIN: 7.9 G/DL (ref 13.5–17.5)
LYMPHOCYTES ABSOLUTE: 0.6 K/UL (ref 1–5.1)
LYMPHOCYTES RELATIVE PERCENT: 3 %
MAGNESIUM: 2.4 MG/DL (ref 1.8–2.4)
MCH RBC QN AUTO: 28.9 PG (ref 26–34)
MCHC RBC AUTO-ENTMCNC: 33 G/DL (ref 31–36)
MCV RBC AUTO: 87.5 FL (ref 80–100)
MONOCYTES ABSOLUTE: 0.6 K/UL (ref 0–1.3)
MONOCYTES RELATIVE PERCENT: 3 %
NEUTROPHILS ABSOLUTE: 20 K/UL (ref 1.7–7.7)
NEUTROPHILS RELATIVE PERCENT: 94 %
PDW BLD-RTO: 18.7 % (ref 12.4–15.4)
PERFORMED ON: ABNORMAL
PHOSPHORUS: 3.3 MG/DL (ref 2.5–4.9)
PLATELET # BLD: 166 K/UL (ref 135–450)
PMV BLD AUTO: 8.7 FL (ref 5–10.5)
POLYCHROMASIA: ABNORMAL
POTASSIUM SERPL-SCNC: 3.3 MMOL/L (ref 3.5–5.1)
PROCALCITONIN: 0.63 NG/ML (ref 0–0.15)
RBC # BLD: 2.43 M/UL (ref 4.2–5.9)
SODIUM BLD-SCNC: 129 MMOL/L (ref 136–145)
WBC # BLD: 21.3 K/UL (ref 4–11)

## 2019-10-17 PROCEDURE — 94640 AIRWAY INHALATION TREATMENT: CPT

## 2019-10-17 PROCEDURE — 83735 ASSAY OF MAGNESIUM: CPT

## 2019-10-17 PROCEDURE — 36430 TRANSFUSION BLD/BLD COMPNT: CPT

## 2019-10-17 PROCEDURE — 6370000000 HC RX 637 (ALT 250 FOR IP): Performed by: NURSE PRACTITIONER

## 2019-10-17 PROCEDURE — 2580000003 HC RX 258: Performed by: INTERNAL MEDICINE

## 2019-10-17 PROCEDURE — P9016 RBC LEUKOCYTES REDUCED: HCPCS

## 2019-10-17 PROCEDURE — 99024 POSTOP FOLLOW-UP VISIT: CPT | Performed by: SURGERY

## 2019-10-17 PROCEDURE — 85018 HEMOGLOBIN: CPT

## 2019-10-17 PROCEDURE — 84145 PROCALCITONIN (PCT): CPT

## 2019-10-17 PROCEDURE — 86850 RBC ANTIBODY SCREEN: CPT

## 2019-10-17 PROCEDURE — 99232 SBSQ HOSP IP/OBS MODERATE 35: CPT | Performed by: INTERNAL MEDICINE

## 2019-10-17 PROCEDURE — 6360000002 HC RX W HCPCS: Performed by: NURSE PRACTITIONER

## 2019-10-17 PROCEDURE — 86900 BLOOD TYPING SEROLOGIC ABO: CPT

## 2019-10-17 PROCEDURE — 85014 HEMATOCRIT: CPT

## 2019-10-17 PROCEDURE — 6360000002 HC RX W HCPCS: Performed by: FAMILY MEDICINE

## 2019-10-17 PROCEDURE — 2060000000 HC ICU INTERMEDIATE R&B

## 2019-10-17 PROCEDURE — 2700000000 HC OXYGEN THERAPY PER DAY

## 2019-10-17 PROCEDURE — 6360000002 HC RX W HCPCS: Performed by: HOSPITALIST

## 2019-10-17 PROCEDURE — 2580000003 HC RX 258: Performed by: NURSE PRACTITIONER

## 2019-10-17 PROCEDURE — 6370000000 HC RX 637 (ALT 250 FOR IP): Performed by: INTERNAL MEDICINE

## 2019-10-17 PROCEDURE — 86923 COMPATIBILITY TEST ELECTRIC: CPT

## 2019-10-17 PROCEDURE — 84100 ASSAY OF PHOSPHORUS: CPT

## 2019-10-17 PROCEDURE — 94760 N-INVAS EAR/PLS OXIMETRY 1: CPT

## 2019-10-17 PROCEDURE — 36415 COLL VENOUS BLD VENIPUNCTURE: CPT

## 2019-10-17 PROCEDURE — 86901 BLOOD TYPING SEROLOGIC RH(D): CPT

## 2019-10-17 PROCEDURE — APPSS30 APP SPLIT SHARED TIME 16-30 MINUTES: Performed by: NURSE PRACTITIONER

## 2019-10-17 PROCEDURE — 85025 COMPLETE CBC W/AUTO DIFF WBC: CPT

## 2019-10-17 PROCEDURE — 6360000002 HC RX W HCPCS: Performed by: INTERNAL MEDICINE

## 2019-10-17 PROCEDURE — C9113 INJ PANTOPRAZOLE SODIUM, VIA: HCPCS | Performed by: INTERNAL MEDICINE

## 2019-10-17 PROCEDURE — 6370000000 HC RX 637 (ALT 250 FOR IP): Performed by: HOSPITALIST

## 2019-10-17 PROCEDURE — 99232 SBSQ HOSP IP/OBS MODERATE 35: CPT | Performed by: NURSE PRACTITIONER

## 2019-10-17 PROCEDURE — 97530 THERAPEUTIC ACTIVITIES: CPT

## 2019-10-17 PROCEDURE — APPNB30 APP NON BILLABLE TIME 0-30 MINS: Performed by: NURSE PRACTITIONER

## 2019-10-17 PROCEDURE — 80048 BASIC METABOLIC PNL TOTAL CA: CPT

## 2019-10-17 PROCEDURE — 2580000003 HC RX 258: Performed by: FAMILY MEDICINE

## 2019-10-17 RX ORDER — 0.9 % SODIUM CHLORIDE 0.9 %
250 INTRAVENOUS SOLUTION INTRAVENOUS ONCE
Status: COMPLETED | OUTPATIENT
Start: 2019-10-17 | End: 2019-10-17

## 2019-10-17 RX ORDER — TORSEMIDE 20 MG/1
40 TABLET ORAL DAILY
Status: DISCONTINUED | OUTPATIENT
Start: 2019-10-17 | End: 2019-10-22

## 2019-10-17 RX ORDER — OXYCODONE HYDROCHLORIDE 5 MG/1
5 TABLET ORAL ONCE
Status: COMPLETED | OUTPATIENT
Start: 2019-10-17 | End: 2019-10-17

## 2019-10-17 RX ADMIN — OXYCODONE HYDROCHLORIDE 10 MG: 10 TABLET ORAL at 00:08

## 2019-10-17 RX ADMIN — ENOXAPARIN SODIUM 90 MG: 100 INJECTION SUBCUTANEOUS at 08:50

## 2019-10-17 RX ADMIN — HYDROCORTISONE SODIUM SUCCINATE 50 MG: 100 INJECTION, POWDER, FOR SOLUTION INTRAMUSCULAR; INTRAVENOUS at 10:38

## 2019-10-17 RX ADMIN — HYDROCORTISONE SODIUM SUCCINATE 50 MG: 100 INJECTION, POWDER, FOR SOLUTION INTRAMUSCULAR; INTRAVENOUS at 18:06

## 2019-10-17 RX ADMIN — SODIUM CHLORIDE, PRESERVATIVE FREE 10 ML: 5 INJECTION INTRAVENOUS at 08:52

## 2019-10-17 RX ADMIN — INSULIN LISPRO 1 UNITS: 100 INJECTION, SOLUTION INTRAVENOUS; SUBCUTANEOUS at 20:32

## 2019-10-17 RX ADMIN — OXYCODONE HYDROCHLORIDE 10 MG: 10 TABLET ORAL at 18:06

## 2019-10-17 RX ADMIN — OXYCODONE HYDROCHLORIDE 10 MG: 10 TABLET ORAL at 11:02

## 2019-10-17 RX ADMIN — OXYCODONE HYDROCHLORIDE 5 MG: 5 TABLET ORAL at 08:49

## 2019-10-17 RX ADMIN — AMIODARONE HYDROCHLORIDE 200 MG: 200 TABLET ORAL at 08:50

## 2019-10-17 RX ADMIN — MORPHINE SULFATE 4 MG: 4 INJECTION INTRAVENOUS at 14:52

## 2019-10-17 RX ADMIN — MAGNESIUM GLUCONATE 500 MG ORAL TABLET 400 MG: 500 TABLET ORAL at 08:47

## 2019-10-17 RX ADMIN — SODIUM CHLORIDE 250 ML: 9 INJECTION, SOLUTION INTRAVENOUS at 18:15

## 2019-10-17 RX ADMIN — IRON SUCROSE 100 MG: 20 INJECTION, SOLUTION INTRAVENOUS at 18:06

## 2019-10-17 RX ADMIN — PANTOPRAZOLE SODIUM 40 MG: 40 INJECTION, POWDER, FOR SOLUTION INTRAVENOUS at 08:50

## 2019-10-17 RX ADMIN — POTASSIUM BICARBONATE 40 MEQ: 782 TABLET, EFFERVESCENT ORAL at 14:52

## 2019-10-17 RX ADMIN — METOPROLOL TARTRATE 12.5 MG: 25 TABLET ORAL at 20:32

## 2019-10-17 RX ADMIN — MORPHINE SULFATE 4 MG: 4 INJECTION INTRAVENOUS at 03:55

## 2019-10-17 RX ADMIN — SODIUM CHLORIDE, PRESERVATIVE FREE 10 ML: 5 INJECTION INTRAVENOUS at 20:34

## 2019-10-17 RX ADMIN — INSULIN LISPRO 1 UNITS: 100 INJECTION, SOLUTION INTRAVENOUS; SUBCUTANEOUS at 23:56

## 2019-10-17 RX ADMIN — GABAPENTIN 200 MG: 100 CAPSULE ORAL at 20:32

## 2019-10-17 RX ADMIN — HYDROCORTISONE SODIUM SUCCINATE 100 MG: 100 INJECTION, POWDER, FOR SOLUTION INTRAMUSCULAR; INTRAVENOUS at 02:08

## 2019-10-17 RX ADMIN — DULOXETINE HYDROCHLORIDE 30 MG: 30 CAPSULE, DELAYED RELEASE ORAL at 08:47

## 2019-10-17 RX ADMIN — GABAPENTIN 200 MG: 100 CAPSULE ORAL at 14:52

## 2019-10-17 RX ADMIN — OXYCODONE HYDROCHLORIDE 10 MG: 10 TABLET ORAL at 23:56

## 2019-10-17 RX ADMIN — PIPERACILLIN AND TAZOBACTAM 3.38 G: 3; .375 INJECTION, POWDER, LYOPHILIZED, FOR SOLUTION INTRAVENOUS at 02:08

## 2019-10-17 RX ADMIN — MORPHINE SULFATE 4 MG: 4 INJECTION INTRAVENOUS at 08:15

## 2019-10-17 RX ADMIN — Medication 10 ML: at 08:51

## 2019-10-17 RX ADMIN — METOPROLOL TARTRATE 12.5 MG: 25 TABLET ORAL at 08:48

## 2019-10-17 RX ADMIN — TORSEMIDE 40 MG: 20 TABLET ORAL at 11:02

## 2019-10-17 RX ADMIN — ALBUTEROL SULFATE 2.5 MG: 2.5 SOLUTION RESPIRATORY (INHALATION) at 07:41

## 2019-10-17 RX ADMIN — PIPERACILLIN AND TAZOBACTAM 3.38 G: 3; .375 INJECTION, POWDER, LYOPHILIZED, FOR SOLUTION INTRAVENOUS at 10:38

## 2019-10-17 RX ADMIN — TAMSULOSIN HYDROCHLORIDE 0.4 MG: 0.4 CAPSULE ORAL at 08:50

## 2019-10-17 RX ADMIN — GABAPENTIN 200 MG: 100 CAPSULE ORAL at 08:48

## 2019-10-17 RX ADMIN — MAGNESIUM GLUCONATE 500 MG ORAL TABLET 400 MG: 500 TABLET ORAL at 20:32

## 2019-10-17 RX ADMIN — PIPERACILLIN AND TAZOBACTAM 3.38 G: 3; .375 INJECTION, POWDER, LYOPHILIZED, FOR SOLUTION INTRAVENOUS at 18:15

## 2019-10-17 RX ADMIN — ATORVASTATIN CALCIUM 40 MG: 40 TABLET, FILM COATED ORAL at 08:50

## 2019-10-17 RX ADMIN — INSULIN LISPRO 1 UNITS: 100 INJECTION, SOLUTION INTRAVENOUS; SUBCUTANEOUS at 12:58

## 2019-10-17 ASSESSMENT — PAIN DESCRIPTION - PAIN TYPE
TYPE: ACUTE PAIN;SURGICAL PAIN
TYPE: SURGICAL PAIN
TYPE: ACUTE PAIN;SURGICAL PAIN
TYPE: SURGICAL PAIN

## 2019-10-17 ASSESSMENT — PAIN DESCRIPTION - PROGRESSION
CLINICAL_PROGRESSION: GRADUALLY WORSENING

## 2019-10-17 ASSESSMENT — PAIN DESCRIPTION - DESCRIPTORS
DESCRIPTORS: ACHING
DESCRIPTORS: ACHING;SHARP
DESCRIPTORS: ACHING
DESCRIPTORS: ACHING

## 2019-10-17 ASSESSMENT — PAIN DESCRIPTION - ONSET
ONSET: ON-GOING

## 2019-10-17 ASSESSMENT — PAIN SCALES - GENERAL
PAINLEVEL_OUTOF10: 0
PAINLEVEL_OUTOF10: 7
PAINLEVEL_OUTOF10: 0
PAINLEVEL_OUTOF10: 7
PAINLEVEL_OUTOF10: 8
PAINLEVEL_OUTOF10: 0
PAINLEVEL_OUTOF10: 0
PAINLEVEL_OUTOF10: 8
PAINLEVEL_OUTOF10: 8
PAINLEVEL_OUTOF10: 0
PAINLEVEL_OUTOF10: 8
PAINLEVEL_OUTOF10: 0
PAINLEVEL_OUTOF10: 7
PAINLEVEL_OUTOF10: 0

## 2019-10-17 ASSESSMENT — PAIN DESCRIPTION - ORIENTATION
ORIENTATION: MID

## 2019-10-17 ASSESSMENT — PAIN DESCRIPTION - FREQUENCY
FREQUENCY: CONTINUOUS

## 2019-10-17 ASSESSMENT — PAIN DESCRIPTION - LOCATION
LOCATION: ABDOMEN

## 2019-10-17 ASSESSMENT — PAIN - FUNCTIONAL ASSESSMENT
PAIN_FUNCTIONAL_ASSESSMENT: PREVENTS OR INTERFERES WITH ALL ACTIVE AND SOME PASSIVE ACTIVITIES
PAIN_FUNCTIONAL_ASSESSMENT: PREVENTS OR INTERFERES SOME ACTIVE ACTIVITIES AND ADLS

## 2019-10-18 LAB
ANION GAP SERPL CALCULATED.3IONS-SCNC: 14 MMOL/L (ref 3–16)
BASOPHILS ABSOLUTE: 0.2 K/UL (ref 0–0.2)
BASOPHILS RELATIVE PERCENT: 0.8 %
BLOOD CULTURE, ROUTINE: NORMAL
BUN BLDV-MCNC: 32 MG/DL (ref 7–20)
CALCIUM SERPL-MCNC: 7.7 MG/DL (ref 8.3–10.6)
CHLORIDE BLD-SCNC: 90 MMOL/L (ref 99–110)
CO2: 24 MMOL/L (ref 21–32)
CREAT SERPL-MCNC: 0.9 MG/DL (ref 0.9–1.3)
CULTURE, BLOOD 2: NORMAL
EOSINOPHILS ABSOLUTE: 0 K/UL (ref 0–0.6)
EOSINOPHILS RELATIVE PERCENT: 0 %
GFR AFRICAN AMERICAN: >60
GFR NON-AFRICAN AMERICAN: >60
GLUCOSE BLD-MCNC: 126 MG/DL (ref 70–99)
GLUCOSE BLD-MCNC: 130 MG/DL (ref 70–99)
GLUCOSE BLD-MCNC: 133 MG/DL (ref 70–99)
GLUCOSE BLD-MCNC: 149 MG/DL (ref 70–99)
GLUCOSE BLD-MCNC: 161 MG/DL (ref 70–99)
GLUCOSE BLD-MCNC: 203 MG/DL (ref 70–99)
HCT VFR BLD CALC: 24.3 % (ref 40.5–52.5)
HCT VFR BLD CALC: 31.2 % (ref 40.5–52.5)
HEMOGLOBIN: 10.2 G/DL (ref 13.5–17.5)
HEMOGLOBIN: 8.1 G/DL (ref 13.5–17.5)
LYMPHOCYTES ABSOLUTE: 0.5 K/UL (ref 1–5.1)
LYMPHOCYTES RELATIVE PERCENT: 2.5 %
MAGNESIUM: 2.3 MG/DL (ref 1.8–2.4)
MCH RBC QN AUTO: 29.3 PG (ref 26–34)
MCHC RBC AUTO-ENTMCNC: 33.4 G/DL (ref 31–36)
MCV RBC AUTO: 87.8 FL (ref 80–100)
MONOCYTES ABSOLUTE: 1 K/UL (ref 0–1.3)
MONOCYTES RELATIVE PERCENT: 5.1 %
NEUTROPHILS ABSOLUTE: 18.3 K/UL (ref 1.7–7.7)
NEUTROPHILS RELATIVE PERCENT: 91.6 %
PDW BLD-RTO: 17.6 % (ref 12.4–15.4)
PERFORMED ON: ABNORMAL
PHOSPHORUS: 2.8 MG/DL (ref 2.5–4.9)
PLATELET # BLD: 182 K/UL (ref 135–450)
PMV BLD AUTO: 8.9 FL (ref 5–10.5)
POTASSIUM SERPL-SCNC: 3.6 MMOL/L (ref 3.5–5.1)
PROCALCITONIN: 0.54 NG/ML (ref 0–0.15)
RBC # BLD: 2.76 M/UL (ref 4.2–5.9)
SODIUM BLD-SCNC: 128 MMOL/L (ref 136–145)
WBC # BLD: 20 K/UL (ref 4–11)

## 2019-10-18 PROCEDURE — 83735 ASSAY OF MAGNESIUM: CPT

## 2019-10-18 PROCEDURE — 6370000000 HC RX 637 (ALT 250 FOR IP): Performed by: INTERNAL MEDICINE

## 2019-10-18 PROCEDURE — 6370000000 HC RX 637 (ALT 250 FOR IP): Performed by: FAMILY MEDICINE

## 2019-10-18 PROCEDURE — 99232 SBSQ HOSP IP/OBS MODERATE 35: CPT | Performed by: NURSE PRACTITIONER

## 2019-10-18 PROCEDURE — 2580000003 HC RX 258: Performed by: INTERNAL MEDICINE

## 2019-10-18 PROCEDURE — 99024 POSTOP FOLLOW-UP VISIT: CPT | Performed by: SURGERY

## 2019-10-18 PROCEDURE — 99232 SBSQ HOSP IP/OBS MODERATE 35: CPT | Performed by: INTERNAL MEDICINE

## 2019-10-18 PROCEDURE — 6370000000 HC RX 637 (ALT 250 FOR IP): Performed by: NURSE PRACTITIONER

## 2019-10-18 PROCEDURE — 94640 AIRWAY INHALATION TREATMENT: CPT

## 2019-10-18 PROCEDURE — 6370000000 HC RX 637 (ALT 250 FOR IP): Performed by: HOSPITALIST

## 2019-10-18 PROCEDURE — 6360000002 HC RX W HCPCS: Performed by: INTERNAL MEDICINE

## 2019-10-18 PROCEDURE — 6360000002 HC RX W HCPCS: Performed by: NURSE PRACTITIONER

## 2019-10-18 PROCEDURE — 85018 HEMOGLOBIN: CPT

## 2019-10-18 PROCEDURE — 97530 THERAPEUTIC ACTIVITIES: CPT

## 2019-10-18 PROCEDURE — 85014 HEMATOCRIT: CPT

## 2019-10-18 PROCEDURE — 84145 PROCALCITONIN (PCT): CPT

## 2019-10-18 PROCEDURE — C9113 INJ PANTOPRAZOLE SODIUM, VIA: HCPCS | Performed by: INTERNAL MEDICINE

## 2019-10-18 PROCEDURE — APPSS30 APP SPLIT SHARED TIME 16-30 MINUTES: Performed by: NURSE PRACTITIONER

## 2019-10-18 PROCEDURE — 80048 BASIC METABOLIC PNL TOTAL CA: CPT

## 2019-10-18 PROCEDURE — 2580000003 HC RX 258: Performed by: NURSE PRACTITIONER

## 2019-10-18 PROCEDURE — APPNB30 APP NON BILLABLE TIME 0-30 MINS: Performed by: NURSE PRACTITIONER

## 2019-10-18 PROCEDURE — 2060000000 HC ICU INTERMEDIATE R&B

## 2019-10-18 PROCEDURE — 6360000002 HC RX W HCPCS: Performed by: HOSPITALIST

## 2019-10-18 PROCEDURE — 85025 COMPLETE CBC W/AUTO DIFF WBC: CPT

## 2019-10-18 PROCEDURE — 84100 ASSAY OF PHOSPHORUS: CPT

## 2019-10-18 PROCEDURE — 2700000000 HC OXYGEN THERAPY PER DAY

## 2019-10-18 PROCEDURE — 6360000002 HC RX W HCPCS: Performed by: FAMILY MEDICINE

## 2019-10-18 RX ORDER — POTASSIUM CHLORIDE 20 MEQ/1
20 TABLET, EXTENDED RELEASE ORAL
Status: DISCONTINUED | OUTPATIENT
Start: 2019-10-18 | End: 2019-10-22

## 2019-10-18 RX ADMIN — INSULIN LISPRO 2 UNITS: 100 INJECTION, SOLUTION INTRAVENOUS; SUBCUTANEOUS at 13:01

## 2019-10-18 RX ADMIN — MORPHINE SULFATE 4 MG: 4 INJECTION INTRAVENOUS at 16:19

## 2019-10-18 RX ADMIN — HYDROCORTISONE SODIUM SUCCINATE 50 MG: 100 INJECTION, POWDER, FOR SOLUTION INTRAMUSCULAR; INTRAVENOUS at 09:25

## 2019-10-18 RX ADMIN — OXYCODONE HYDROCHLORIDE 10 MG: 10 TABLET ORAL at 09:24

## 2019-10-18 RX ADMIN — OXYCODONE HYDROCHLORIDE 10 MG: 10 TABLET ORAL at 13:28

## 2019-10-18 RX ADMIN — DULOXETINE HYDROCHLORIDE 30 MG: 30 CAPSULE, DELAYED RELEASE ORAL at 09:24

## 2019-10-18 RX ADMIN — GABAPENTIN 200 MG: 100 CAPSULE ORAL at 13:28

## 2019-10-18 RX ADMIN — ATORVASTATIN CALCIUM 40 MG: 40 TABLET, FILM COATED ORAL at 09:24

## 2019-10-18 RX ADMIN — MAGNESIUM GLUCONATE 500 MG ORAL TABLET 400 MG: 500 TABLET ORAL at 09:23

## 2019-10-18 RX ADMIN — TORSEMIDE 40 MG: 20 TABLET ORAL at 09:23

## 2019-10-18 RX ADMIN — HYDROCORTISONE SODIUM SUCCINATE 50 MG: 100 INJECTION, POWDER, FOR SOLUTION INTRAMUSCULAR; INTRAVENOUS at 02:00

## 2019-10-18 RX ADMIN — PIPERACILLIN AND TAZOBACTAM 3.38 G: 3; .375 INJECTION, POWDER, LYOPHILIZED, FOR SOLUTION INTRAVENOUS at 09:25

## 2019-10-18 RX ADMIN — TRAZODONE HYDROCHLORIDE 50 MG: 50 TABLET ORAL at 21:31

## 2019-10-18 RX ADMIN — Medication 10 ML: at 09:37

## 2019-10-18 RX ADMIN — METOPROLOL TARTRATE 12.5 MG: 25 TABLET ORAL at 21:31

## 2019-10-18 RX ADMIN — IRON SUCROSE 100 MG: 20 INJECTION, SOLUTION INTRAVENOUS at 16:19

## 2019-10-18 RX ADMIN — AMIODARONE HYDROCHLORIDE 200 MG: 200 TABLET ORAL at 09:24

## 2019-10-18 RX ADMIN — GABAPENTIN 200 MG: 100 CAPSULE ORAL at 21:31

## 2019-10-18 RX ADMIN — PANTOPRAZOLE SODIUM 40 MG: 40 INJECTION, POWDER, FOR SOLUTION INTRAVENOUS at 09:25

## 2019-10-18 RX ADMIN — MAGNESIUM GLUCONATE 500 MG ORAL TABLET 400 MG: 500 TABLET ORAL at 21:31

## 2019-10-18 RX ADMIN — PIPERACILLIN AND TAZOBACTAM 3.38 G: 3; .375 INJECTION, POWDER, LYOPHILIZED, FOR SOLUTION INTRAVENOUS at 02:00

## 2019-10-18 RX ADMIN — INSULIN LISPRO 1 UNITS: 100 INJECTION, SOLUTION INTRAVENOUS; SUBCUTANEOUS at 21:31

## 2019-10-18 RX ADMIN — METOPROLOL TARTRATE 12.5 MG: 25 TABLET ORAL at 09:23

## 2019-10-18 RX ADMIN — TAMSULOSIN HYDROCHLORIDE 0.4 MG: 0.4 CAPSULE ORAL at 09:24

## 2019-10-18 RX ADMIN — MORPHINE SULFATE 4 MG: 4 INJECTION INTRAVENOUS at 21:44

## 2019-10-18 RX ADMIN — POTASSIUM CHLORIDE 20 MEQ: 20 TABLET, EXTENDED RELEASE ORAL at 09:41

## 2019-10-18 RX ADMIN — SODIUM CHLORIDE, PRESERVATIVE FREE 10 ML: 5 INJECTION INTRAVENOUS at 09:30

## 2019-10-18 RX ADMIN — ALBUTEROL SULFATE 2.5 MG: 2.5 SOLUTION RESPIRATORY (INHALATION) at 07:57

## 2019-10-18 RX ADMIN — SODIUM CHLORIDE, PRESERVATIVE FREE 10 ML: 5 INJECTION INTRAVENOUS at 21:31

## 2019-10-18 RX ADMIN — OXYCODONE HYDROCHLORIDE 10 MG: 10 TABLET ORAL at 04:55

## 2019-10-18 RX ADMIN — PIPERACILLIN AND TAZOBACTAM 3.38 G: 3; .375 INJECTION, POWDER, LYOPHILIZED, FOR SOLUTION INTRAVENOUS at 18:31

## 2019-10-18 RX ADMIN — GABAPENTIN 200 MG: 100 CAPSULE ORAL at 09:24

## 2019-10-18 RX ADMIN — INSULIN LISPRO 1 UNITS: 100 INJECTION, SOLUTION INTRAVENOUS; SUBCUTANEOUS at 18:31

## 2019-10-18 ASSESSMENT — PAIN DESCRIPTION - ORIENTATION
ORIENTATION: MID

## 2019-10-18 ASSESSMENT — PAIN - FUNCTIONAL ASSESSMENT
PAIN_FUNCTIONAL_ASSESSMENT: PREVENTS OR INTERFERES WITH ALL ACTIVE AND SOME PASSIVE ACTIVITIES

## 2019-10-18 ASSESSMENT — PAIN DESCRIPTION - DESCRIPTORS
DESCRIPTORS: ACHING;CONSTANT
DESCRIPTORS: ACHING;DISCOMFORT
DESCRIPTORS: ACHING;CONSTANT
DESCRIPTORS: ACHING;DISCOMFORT
DESCRIPTORS: ACHING

## 2019-10-18 ASSESSMENT — PAIN DESCRIPTION - PROGRESSION
CLINICAL_PROGRESSION: GRADUALLY WORSENING

## 2019-10-18 ASSESSMENT — PAIN SCALES - GENERAL
PAINLEVEL_OUTOF10: 0
PAINLEVEL_OUTOF10: 10
PAINLEVEL_OUTOF10: 0
PAINLEVEL_OUTOF10: 10
PAINLEVEL_OUTOF10: 0
PAINLEVEL_OUTOF10: 8
PAINLEVEL_OUTOF10: 0
PAINLEVEL_OUTOF10: 10
PAINLEVEL_OUTOF10: 8
PAINLEVEL_OUTOF10: 10
PAINLEVEL_OUTOF10: 10
PAINLEVEL_OUTOF10: 0

## 2019-10-18 ASSESSMENT — PAIN DESCRIPTION - LOCATION
LOCATION: ABDOMEN

## 2019-10-18 ASSESSMENT — PAIN DESCRIPTION - PAIN TYPE
TYPE: SURGICAL PAIN;ACUTE PAIN
TYPE: ACUTE PAIN;SURGICAL PAIN
TYPE: SURGICAL PAIN;ACUTE PAIN

## 2019-10-18 ASSESSMENT — PAIN DESCRIPTION - ONSET
ONSET: ON-GOING

## 2019-10-18 ASSESSMENT — PAIN DESCRIPTION - FREQUENCY
FREQUENCY: CONTINUOUS

## 2019-10-19 LAB
ANION GAP SERPL CALCULATED.3IONS-SCNC: 13 MMOL/L (ref 3–16)
ANISOCYTOSIS: ABNORMAL
BANDED NEUTROPHILS RELATIVE PERCENT: 4 % (ref 0–7)
BASOPHILS ABSOLUTE: 0 K/UL (ref 0–0.2)
BASOPHILS RELATIVE PERCENT: 0 %
BUN BLDV-MCNC: 38 MG/DL (ref 7–20)
CALCIUM SERPL-MCNC: 7.7 MG/DL (ref 8.3–10.6)
CHLORIDE BLD-SCNC: 97 MMOL/L (ref 99–110)
CO2: 26 MMOL/L (ref 21–32)
CREAT SERPL-MCNC: 1.1 MG/DL (ref 0.9–1.3)
DOHLE BODIES: PRESENT
EOSINOPHILS ABSOLUTE: 0 K/UL (ref 0–0.6)
EOSINOPHILS RELATIVE PERCENT: 0 %
GFR AFRICAN AMERICAN: >60
GFR NON-AFRICAN AMERICAN: >60
GLUCOSE BLD-MCNC: 131 MG/DL (ref 70–99)
GLUCOSE BLD-MCNC: 132 MG/DL (ref 70–99)
GLUCOSE BLD-MCNC: 138 MG/DL (ref 70–99)
GLUCOSE BLD-MCNC: 147 MG/DL (ref 70–99)
GLUCOSE BLD-MCNC: 167 MG/DL (ref 70–99)
HCT VFR BLD CALC: 28.3 % (ref 40.5–52.5)
HCT VFR BLD CALC: 28.8 % (ref 40.5–52.5)
HEMOGLOBIN: 9.3 G/DL (ref 13.5–17.5)
HEMOGLOBIN: 9.5 G/DL (ref 13.5–17.5)
LYMPHOCYTES ABSOLUTE: 1.2 K/UL (ref 1–5.1)
LYMPHOCYTES RELATIVE PERCENT: 5 %
MAGNESIUM: 2.2 MG/DL (ref 1.8–2.4)
MCH RBC QN AUTO: 29.2 PG (ref 26–34)
MCHC RBC AUTO-ENTMCNC: 32.9 G/DL (ref 31–36)
MCV RBC AUTO: 88.8 FL (ref 80–100)
MONOCYTES ABSOLUTE: 2.1 K/UL (ref 0–1.3)
MONOCYTES RELATIVE PERCENT: 9 %
NEUTROPHILS ABSOLUTE: 20.2 K/UL (ref 1.7–7.7)
NEUTROPHILS RELATIVE PERCENT: 82 %
NUCLEATED RED BLOOD CELLS: 2 /100 WBC
NUCLEATED RED BLOOD CELLS: 2 /100 WBC
OVALOCYTES: ABNORMAL
PDW BLD-RTO: 17.9 % (ref 12.4–15.4)
PERFORMED ON: ABNORMAL
PHOSPHORUS: 2 MG/DL (ref 2.5–4.9)
PLATELET # BLD: 248 K/UL (ref 135–450)
PLATELET SLIDE REVIEW: ADEQUATE
PMV BLD AUTO: 8.4 FL (ref 5–10.5)
POIKILOCYTES: ABNORMAL
POLYCHROMASIA: ABNORMAL
POTASSIUM SERPL-SCNC: 3.2 MMOL/L (ref 3.5–5.1)
RBC # BLD: 3.19 M/UL (ref 4.2–5.9)
SLIDE REVIEW: ABNORMAL
SODIUM BLD-SCNC: 136 MMOL/L (ref 136–145)
TOXIC GRANULATION: PRESENT
VACUOLATED NEUTROPHILS: PRESENT
WBC # BLD: 23.5 K/UL (ref 4–11)

## 2019-10-19 PROCEDURE — 2580000003 HC RX 258: Performed by: NURSE PRACTITIONER

## 2019-10-19 PROCEDURE — 6370000000 HC RX 637 (ALT 250 FOR IP): Performed by: NURSE PRACTITIONER

## 2019-10-19 PROCEDURE — 99024 POSTOP FOLLOW-UP VISIT: CPT | Performed by: SURGERY

## 2019-10-19 PROCEDURE — 80048 BASIC METABOLIC PNL TOTAL CA: CPT

## 2019-10-19 PROCEDURE — 83735 ASSAY OF MAGNESIUM: CPT

## 2019-10-19 PROCEDURE — 84100 ASSAY OF PHOSPHORUS: CPT

## 2019-10-19 PROCEDURE — 6360000002 HC RX W HCPCS: Performed by: HOSPITALIST

## 2019-10-19 PROCEDURE — 6360000002 HC RX W HCPCS: Performed by: NURSE PRACTITIONER

## 2019-10-19 PROCEDURE — C9113 INJ PANTOPRAZOLE SODIUM, VIA: HCPCS | Performed by: INTERNAL MEDICINE

## 2019-10-19 PROCEDURE — 6360000002 HC RX W HCPCS: Performed by: FAMILY MEDICINE

## 2019-10-19 PROCEDURE — 6370000000 HC RX 637 (ALT 250 FOR IP): Performed by: INTERNAL MEDICINE

## 2019-10-19 PROCEDURE — 2700000000 HC OXYGEN THERAPY PER DAY

## 2019-10-19 PROCEDURE — 85014 HEMATOCRIT: CPT

## 2019-10-19 PROCEDURE — 2580000003 HC RX 258: Performed by: INTERNAL MEDICINE

## 2019-10-19 PROCEDURE — 2580000003 HC RX 258: Performed by: FAMILY MEDICINE

## 2019-10-19 PROCEDURE — 2060000000 HC ICU INTERMEDIATE R&B

## 2019-10-19 PROCEDURE — 6360000002 HC RX W HCPCS: Performed by: INTERNAL MEDICINE

## 2019-10-19 PROCEDURE — 99233 SBSQ HOSP IP/OBS HIGH 50: CPT | Performed by: NURSE PRACTITIONER

## 2019-10-19 PROCEDURE — 85018 HEMOGLOBIN: CPT

## 2019-10-19 PROCEDURE — 99232 SBSQ HOSP IP/OBS MODERATE 35: CPT | Performed by: INTERNAL MEDICINE

## 2019-10-19 PROCEDURE — 85025 COMPLETE CBC W/AUTO DIFF WBC: CPT

## 2019-10-19 PROCEDURE — 6370000000 HC RX 637 (ALT 250 FOR IP): Performed by: FAMILY MEDICINE

## 2019-10-19 RX ORDER — POTASSIUM CHLORIDE 20 MEQ/1
20 TABLET, EXTENDED RELEASE ORAL ONCE
Status: COMPLETED | OUTPATIENT
Start: 2019-10-19 | End: 2019-10-19

## 2019-10-19 RX ADMIN — TORSEMIDE 40 MG: 20 TABLET ORAL at 08:26

## 2019-10-19 RX ADMIN — PIPERACILLIN AND TAZOBACTAM 3.38 G: 3; .375 INJECTION, POWDER, LYOPHILIZED, FOR SOLUTION INTRAVENOUS at 17:18

## 2019-10-19 RX ADMIN — Medication 10 ML: at 08:27

## 2019-10-19 RX ADMIN — GABAPENTIN 200 MG: 100 CAPSULE ORAL at 08:25

## 2019-10-19 RX ADMIN — INSULIN LISPRO 1 UNITS: 100 INJECTION, SOLUTION INTRAVENOUS; SUBCUTANEOUS at 17:19

## 2019-10-19 RX ADMIN — PIPERACILLIN AND TAZOBACTAM 3.38 G: 3; .375 INJECTION, POWDER, LYOPHILIZED, FOR SOLUTION INTRAVENOUS at 13:05

## 2019-10-19 RX ADMIN — OXYCODONE HYDROCHLORIDE 10 MG: 10 TABLET ORAL at 00:22

## 2019-10-19 RX ADMIN — POTASSIUM CHLORIDE 20 MEQ: 20 TABLET, EXTENDED RELEASE ORAL at 08:25

## 2019-10-19 RX ADMIN — RIVAROXABAN 15 MG: 15 TABLET, FILM COATED ORAL at 17:16

## 2019-10-19 RX ADMIN — METOPROLOL TARTRATE 12.5 MG: 25 TABLET ORAL at 08:26

## 2019-10-19 RX ADMIN — PANTOPRAZOLE SODIUM 40 MG: 40 INJECTION, POWDER, FOR SOLUTION INTRAVENOUS at 08:26

## 2019-10-19 RX ADMIN — POTASSIUM CHLORIDE 20 MEQ: 20 TABLET, EXTENDED RELEASE ORAL at 13:06

## 2019-10-19 RX ADMIN — SODIUM CHLORIDE, PRESERVATIVE FREE 10 ML: 5 INJECTION INTRAVENOUS at 22:26

## 2019-10-19 RX ADMIN — RIVAROXABAN 15 MG: 15 TABLET, FILM COATED ORAL at 00:22

## 2019-10-19 RX ADMIN — IRON SUCROSE 100 MG: 20 INJECTION, SOLUTION INTRAVENOUS at 17:31

## 2019-10-19 RX ADMIN — MAGNESIUM GLUCONATE 500 MG ORAL TABLET 400 MG: 500 TABLET ORAL at 22:26

## 2019-10-19 RX ADMIN — GABAPENTIN 200 MG: 100 CAPSULE ORAL at 13:05

## 2019-10-19 RX ADMIN — METOPROLOL TARTRATE 12.5 MG: 25 TABLET ORAL at 22:26

## 2019-10-19 RX ADMIN — SODIUM CHLORIDE, PRESERVATIVE FREE 10 ML: 5 INJECTION INTRAVENOUS at 08:26

## 2019-10-19 RX ADMIN — MORPHINE SULFATE 4 MG: 4 INJECTION INTRAVENOUS at 22:26

## 2019-10-19 RX ADMIN — OXYCODONE HYDROCHLORIDE 10 MG: 10 TABLET ORAL at 13:06

## 2019-10-19 RX ADMIN — MORPHINE SULFATE 4 MG: 4 INJECTION INTRAVENOUS at 01:52

## 2019-10-19 RX ADMIN — CALCIUM GLUCONATE 1 G: 98 INJECTION, SOLUTION INTRAVENOUS at 16:22

## 2019-10-19 RX ADMIN — DULOXETINE HYDROCHLORIDE 30 MG: 30 CAPSULE, DELAYED RELEASE ORAL at 08:26

## 2019-10-19 RX ADMIN — GABAPENTIN 200 MG: 100 CAPSULE ORAL at 22:26

## 2019-10-19 RX ADMIN — INSULIN LISPRO 1 UNITS: 100 INJECTION, SOLUTION INTRAVENOUS; SUBCUTANEOUS at 06:55

## 2019-10-19 RX ADMIN — TRAZODONE HYDROCHLORIDE 50 MG: 50 TABLET ORAL at 22:27

## 2019-10-19 RX ADMIN — ATORVASTATIN CALCIUM 40 MG: 40 TABLET, FILM COATED ORAL at 08:25

## 2019-10-19 RX ADMIN — AMIODARONE HYDROCHLORIDE 200 MG: 200 TABLET ORAL at 08:26

## 2019-10-19 RX ADMIN — TAMSULOSIN HYDROCHLORIDE 0.4 MG: 0.4 CAPSULE ORAL at 08:26

## 2019-10-19 RX ADMIN — PIPERACILLIN AND TAZOBACTAM 3.38 G: 3; .375 INJECTION, POWDER, LYOPHILIZED, FOR SOLUTION INTRAVENOUS at 01:50

## 2019-10-19 RX ADMIN — MAGNESIUM GLUCONATE 500 MG ORAL TABLET 400 MG: 500 TABLET ORAL at 08:26

## 2019-10-19 RX ADMIN — OYSTER SHELL CALCIUM WITH VITAMIN D 1 TABLET: 500; 200 TABLET, FILM COATED ORAL at 17:30

## 2019-10-19 ASSESSMENT — PAIN DESCRIPTION - ONSET
ONSET: ON-GOING

## 2019-10-19 ASSESSMENT — PAIN DESCRIPTION - PROGRESSION
CLINICAL_PROGRESSION: NOT CHANGED
CLINICAL_PROGRESSION: GRADUALLY WORSENING
CLINICAL_PROGRESSION: NOT CHANGED
CLINICAL_PROGRESSION: GRADUALLY WORSENING
CLINICAL_PROGRESSION: NOT CHANGED

## 2019-10-19 ASSESSMENT — PAIN DESCRIPTION - DESCRIPTORS
DESCRIPTORS: ACHING;CONSTANT
DESCRIPTORS: ACHING
DESCRIPTORS: ACHING
DESCRIPTORS: ACHING;CONSTANT
DESCRIPTORS: ACHING

## 2019-10-19 ASSESSMENT — PAIN SCALES - GENERAL
PAINLEVEL_OUTOF10: 9
PAINLEVEL_OUTOF10: 0
PAINLEVEL_OUTOF10: 10
PAINLEVEL_OUTOF10: 10
PAINLEVEL_OUTOF10: 5
PAINLEVEL_OUTOF10: 8
PAINLEVEL_OUTOF10: 0
PAINLEVEL_OUTOF10: 10
PAINLEVEL_OUTOF10: 7
PAINLEVEL_OUTOF10: 10

## 2019-10-19 ASSESSMENT — PAIN DESCRIPTION - LOCATION
LOCATION: ABDOMEN
LOCATION: ABDOMEN
LOCATION_2: OTHER (COMMENT)
LOCATION: ABDOMEN

## 2019-10-19 ASSESSMENT — PAIN DESCRIPTION - FREQUENCY
FREQUENCY: CONTINUOUS

## 2019-10-19 ASSESSMENT — ENCOUNTER SYMPTOMS
VOMITING: 0
NAUSEA: 0

## 2019-10-19 ASSESSMENT — PAIN DESCRIPTION - DIRECTION
RADIATING_TOWARDS: NO

## 2019-10-19 ASSESSMENT — PAIN DESCRIPTION - PAIN TYPE
TYPE: ACUTE PAIN
TYPE: SURGICAL PAIN;ACUTE PAIN
TYPE: ACUTE PAIN;CHRONIC PAIN
TYPE: ACUTE PAIN
TYPE: ACUTE PAIN;CHRONIC PAIN
TYPE: ACUTE PAIN
TYPE: SURGICAL PAIN;ACUTE PAIN

## 2019-10-19 ASSESSMENT — PAIN DESCRIPTION - ORIENTATION
ORIENTATION: MID

## 2019-10-19 ASSESSMENT — PAIN DESCRIPTION - INTENSITY: RATING_2: 0

## 2019-10-20 ENCOUNTER — APPOINTMENT (OUTPATIENT)
Dept: GENERAL RADIOLOGY | Age: 59
DRG: 231 | End: 2019-10-20
Payer: COMMERCIAL

## 2019-10-20 LAB
ANION GAP SERPL CALCULATED.3IONS-SCNC: 12 MMOL/L (ref 3–16)
ANISOCYTOSIS: ABNORMAL
BANDED NEUTROPHILS RELATIVE PERCENT: 7 % (ref 0–7)
BASOPHILS ABSOLUTE: 0 K/UL (ref 0–0.2)
BASOPHILS RELATIVE PERCENT: 0 %
BUN BLDV-MCNC: 31 MG/DL (ref 7–20)
CALCIUM SERPL-MCNC: 7.9 MG/DL (ref 8.3–10.6)
CHLORIDE BLD-SCNC: 94 MMOL/L (ref 99–110)
CO2: 27 MMOL/L (ref 21–32)
CREAT SERPL-MCNC: 0.9 MG/DL (ref 0.9–1.3)
DOHLE BODIES: PRESENT
EOSINOPHILS ABSOLUTE: 0.4 K/UL (ref 0–0.6)
EOSINOPHILS RELATIVE PERCENT: 2 %
GFR AFRICAN AMERICAN: >60
GFR NON-AFRICAN AMERICAN: >60
GLUCOSE BLD-MCNC: 103 MG/DL (ref 70–99)
GLUCOSE BLD-MCNC: 108 MG/DL (ref 70–99)
GLUCOSE BLD-MCNC: 108 MG/DL (ref 70–99)
GLUCOSE BLD-MCNC: 122 MG/DL (ref 70–99)
GLUCOSE BLD-MCNC: 127 MG/DL (ref 70–99)
GLUCOSE BLD-MCNC: 171 MG/DL (ref 70–99)
HCT VFR BLD CALC: 26.6 % (ref 40.5–52.5)
HCT VFR BLD CALC: 26.7 % (ref 40.5–52.5)
HEMOGLOBIN: 8.6 G/DL (ref 13.5–17.5)
HEMOGLOBIN: 8.7 G/DL (ref 13.5–17.5)
LYMPHOCYTES ABSOLUTE: 0.4 K/UL (ref 1–5.1)
LYMPHOCYTES RELATIVE PERCENT: 2 %
MAGNESIUM: 2 MG/DL (ref 1.8–2.4)
MCH RBC QN AUTO: 29.2 PG (ref 26–34)
MCHC RBC AUTO-ENTMCNC: 32.8 G/DL (ref 31–36)
MCV RBC AUTO: 89.3 FL (ref 80–100)
MONOCYTES ABSOLUTE: 0.8 K/UL (ref 0–1.3)
MONOCYTES RELATIVE PERCENT: 4 %
NEUTROPHILS ABSOLUTE: 19.3 K/UL (ref 1.7–7.7)
NEUTROPHILS RELATIVE PERCENT: 85 %
OVALOCYTES: ABNORMAL
PDW BLD-RTO: 18.2 % (ref 12.4–15.4)
PERFORMED ON: ABNORMAL
PHOSPHORUS: 2.1 MG/DL (ref 2.5–4.9)
PLATELET # BLD: 228 K/UL (ref 135–450)
PLATELET SLIDE REVIEW: ADEQUATE
PMV BLD AUTO: 8.1 FL (ref 5–10.5)
POIKILOCYTES: ABNORMAL
POLYCHROMASIA: ABNORMAL
POTASSIUM SERPL-SCNC: 3.2 MMOL/L (ref 3.5–5.1)
PROCALCITONIN: 0.33 NG/ML (ref 0–0.15)
RBC # BLD: 2.98 M/UL (ref 4.2–5.9)
SLIDE REVIEW: ABNORMAL
SODIUM BLD-SCNC: 133 MMOL/L (ref 136–145)
TOXIC GRANULATION: PRESENT
VACUOLATED NEUTROPHILS: PRESENT
WBC # BLD: 21 K/UL (ref 4–11)

## 2019-10-20 PROCEDURE — 6360000002 HC RX W HCPCS: Performed by: INTERNAL MEDICINE

## 2019-10-20 PROCEDURE — 6370000000 HC RX 637 (ALT 250 FOR IP): Performed by: FAMILY MEDICINE

## 2019-10-20 PROCEDURE — 94640 AIRWAY INHALATION TREATMENT: CPT

## 2019-10-20 PROCEDURE — 80048 BASIC METABOLIC PNL TOTAL CA: CPT

## 2019-10-20 PROCEDURE — 85025 COMPLETE CBC W/AUTO DIFF WBC: CPT

## 2019-10-20 PROCEDURE — 6370000000 HC RX 637 (ALT 250 FOR IP): Performed by: INTERNAL MEDICINE

## 2019-10-20 PROCEDURE — 6370000000 HC RX 637 (ALT 250 FOR IP): Performed by: NURSE PRACTITIONER

## 2019-10-20 PROCEDURE — 83735 ASSAY OF MAGNESIUM: CPT

## 2019-10-20 PROCEDURE — 2060000000 HC ICU INTERMEDIATE R&B

## 2019-10-20 PROCEDURE — 85018 HEMOGLOBIN: CPT

## 2019-10-20 PROCEDURE — 2580000003 HC RX 258: Performed by: INTERNAL MEDICINE

## 2019-10-20 PROCEDURE — 99024 POSTOP FOLLOW-UP VISIT: CPT | Performed by: SURGERY

## 2019-10-20 PROCEDURE — 71046 X-RAY EXAM CHEST 2 VIEWS: CPT

## 2019-10-20 PROCEDURE — C9113 INJ PANTOPRAZOLE SODIUM, VIA: HCPCS | Performed by: INTERNAL MEDICINE

## 2019-10-20 PROCEDURE — 94760 N-INVAS EAR/PLS OXIMETRY 1: CPT

## 2019-10-20 PROCEDURE — 2700000000 HC OXYGEN THERAPY PER DAY

## 2019-10-20 PROCEDURE — 6360000002 HC RX W HCPCS: Performed by: HOSPITALIST

## 2019-10-20 PROCEDURE — 84145 PROCALCITONIN (PCT): CPT

## 2019-10-20 PROCEDURE — 84100 ASSAY OF PHOSPHORUS: CPT

## 2019-10-20 PROCEDURE — 85014 HEMATOCRIT: CPT

## 2019-10-20 PROCEDURE — 99233 SBSQ HOSP IP/OBS HIGH 50: CPT | Performed by: NURSE PRACTITIONER

## 2019-10-20 PROCEDURE — 6360000002 HC RX W HCPCS: Performed by: NURSE PRACTITIONER

## 2019-10-20 PROCEDURE — 2580000003 HC RX 258: Performed by: NURSE PRACTITIONER

## 2019-10-20 RX ORDER — POTASSIUM CHLORIDE 20 MEQ/1
20 TABLET, EXTENDED RELEASE ORAL ONCE
Status: COMPLETED | OUTPATIENT
Start: 2019-10-20 | End: 2019-10-20

## 2019-10-20 RX ORDER — PANTOPRAZOLE SODIUM 40 MG/1
40 TABLET, DELAYED RELEASE ORAL
Status: DISCONTINUED | OUTPATIENT
Start: 2019-10-21 | End: 2019-10-24 | Stop reason: HOSPADM

## 2019-10-20 RX ORDER — FUROSEMIDE 10 MG/ML
20 INJECTION INTRAMUSCULAR; INTRAVENOUS ONCE
Status: COMPLETED | OUTPATIENT
Start: 2019-10-20 | End: 2019-10-20

## 2019-10-20 RX ADMIN — PIPERACILLIN AND TAZOBACTAM 3.38 G: 3; .375 INJECTION, POWDER, LYOPHILIZED, FOR SOLUTION INTRAVENOUS at 18:44

## 2019-10-20 RX ADMIN — ATORVASTATIN CALCIUM 40 MG: 40 TABLET, FILM COATED ORAL at 09:44

## 2019-10-20 RX ADMIN — Medication 10 ML: at 09:59

## 2019-10-20 RX ADMIN — AMIODARONE HYDROCHLORIDE 200 MG: 200 TABLET ORAL at 09:44

## 2019-10-20 RX ADMIN — PIPERACILLIN AND TAZOBACTAM 3.38 G: 3; .375 INJECTION, POWDER, LYOPHILIZED, FOR SOLUTION INTRAVENOUS at 03:20

## 2019-10-20 RX ADMIN — METOPROLOL TARTRATE 12.5 MG: 25 TABLET ORAL at 22:47

## 2019-10-20 RX ADMIN — OXYCODONE HYDROCHLORIDE 5 MG: 5 TABLET ORAL at 06:57

## 2019-10-20 RX ADMIN — POTASSIUM CHLORIDE 20 MEQ: 20 TABLET, EXTENDED RELEASE ORAL at 14:35

## 2019-10-20 RX ADMIN — ALBUTEROL SULFATE 2.5 MG: 2.5 SOLUTION RESPIRATORY (INHALATION) at 08:22

## 2019-10-20 RX ADMIN — OXYCODONE HYDROCHLORIDE 10 MG: 10 TABLET ORAL at 22:48

## 2019-10-20 RX ADMIN — RIVAROXABAN 15 MG: 15 TABLET, FILM COATED ORAL at 18:44

## 2019-10-20 RX ADMIN — TRAZODONE HYDROCHLORIDE 50 MG: 50 TABLET ORAL at 22:48

## 2019-10-20 RX ADMIN — PIPERACILLIN AND TAZOBACTAM 3.38 G: 3; .375 INJECTION, POWDER, LYOPHILIZED, FOR SOLUTION INTRAVENOUS at 14:35

## 2019-10-20 RX ADMIN — GABAPENTIN 200 MG: 100 CAPSULE ORAL at 14:35

## 2019-10-20 RX ADMIN — DULOXETINE HYDROCHLORIDE 30 MG: 30 CAPSULE, DELAYED RELEASE ORAL at 09:44

## 2019-10-20 RX ADMIN — FUROSEMIDE 20 MG: 10 INJECTION, SOLUTION INTRAMUSCULAR; INTRAVENOUS at 14:35

## 2019-10-20 RX ADMIN — SODIUM CHLORIDE, PRESERVATIVE FREE 10 ML: 5 INJECTION INTRAVENOUS at 22:44

## 2019-10-20 RX ADMIN — TAMSULOSIN HYDROCHLORIDE 0.4 MG: 0.4 CAPSULE ORAL at 09:45

## 2019-10-20 RX ADMIN — METOPROLOL TARTRATE 12.5 MG: 25 TABLET ORAL at 09:44

## 2019-10-20 RX ADMIN — TORSEMIDE 40 MG: 20 TABLET ORAL at 09:43

## 2019-10-20 RX ADMIN — SODIUM CHLORIDE, PRESERVATIVE FREE 10 ML: 5 INJECTION INTRAVENOUS at 10:08

## 2019-10-20 RX ADMIN — MORPHINE SULFATE 4 MG: 4 INJECTION INTRAVENOUS at 03:20

## 2019-10-20 RX ADMIN — MAGNESIUM GLUCONATE 500 MG ORAL TABLET 400 MG: 500 TABLET ORAL at 22:47

## 2019-10-20 RX ADMIN — POTASSIUM CHLORIDE 20 MEQ: 20 TABLET, EXTENDED RELEASE ORAL at 09:44

## 2019-10-20 RX ADMIN — OYSTER SHELL CALCIUM WITH VITAMIN D 1 TABLET: 500; 200 TABLET, FILM COATED ORAL at 09:43

## 2019-10-20 RX ADMIN — MAGNESIUM GLUCONATE 500 MG ORAL TABLET 400 MG: 500 TABLET ORAL at 09:43

## 2019-10-20 RX ADMIN — PANTOPRAZOLE SODIUM 40 MG: 40 INJECTION, POWDER, FOR SOLUTION INTRAVENOUS at 09:58

## 2019-10-20 RX ADMIN — GABAPENTIN 200 MG: 100 CAPSULE ORAL at 22:47

## 2019-10-20 RX ADMIN — GABAPENTIN 200 MG: 100 CAPSULE ORAL at 09:43

## 2019-10-20 RX ADMIN — INSULIN LISPRO 1 UNITS: 100 INJECTION, SOLUTION INTRAVENOUS; SUBCUTANEOUS at 14:36

## 2019-10-20 ASSESSMENT — PAIN DESCRIPTION - PAIN TYPE
TYPE: ACUTE PAIN
TYPE: ACUTE PAIN
TYPE: ACUTE PAIN;SURGICAL PAIN
TYPE: ACUTE PAIN;CHRONIC PAIN

## 2019-10-20 ASSESSMENT — PAIN DESCRIPTION - PROGRESSION

## 2019-10-20 ASSESSMENT — PAIN SCALES - GENERAL
PAINLEVEL_OUTOF10: 6
PAINLEVEL_OUTOF10: 0
PAINLEVEL_OUTOF10: 9
PAINLEVEL_OUTOF10: 0
PAINLEVEL_OUTOF10: 8
PAINLEVEL_OUTOF10: 9
PAINLEVEL_OUTOF10: 8

## 2019-10-20 ASSESSMENT — PAIN - FUNCTIONAL ASSESSMENT
PAIN_FUNCTIONAL_ASSESSMENT: PREVENTS OR INTERFERES SOME ACTIVE ACTIVITIES AND ADLS

## 2019-10-20 ASSESSMENT — PAIN DESCRIPTION - DESCRIPTORS
DESCRIPTORS: ACHING
DESCRIPTORS: ACHING;CONSTANT
DESCRIPTORS: ACHING;CONSTANT
DESCRIPTORS: ACHING;BURNING

## 2019-10-20 ASSESSMENT — PAIN DESCRIPTION - FREQUENCY
FREQUENCY: CONTINUOUS

## 2019-10-20 ASSESSMENT — PAIN DESCRIPTION - LOCATION
LOCATION: ABDOMEN

## 2019-10-20 ASSESSMENT — PAIN DESCRIPTION - ORIENTATION
ORIENTATION: MID

## 2019-10-20 ASSESSMENT — PAIN DESCRIPTION - ONSET
ONSET: ON-GOING

## 2019-10-20 ASSESSMENT — ENCOUNTER SYMPTOMS: NAUSEA: 0

## 2019-10-20 ASSESSMENT — PAIN DESCRIPTION - DIRECTION: RADIATING_TOWARDS: NO

## 2019-10-21 LAB
ANION GAP SERPL CALCULATED.3IONS-SCNC: 9 MMOL/L (ref 3–16)
ANISOCYTOSIS: ABNORMAL
BASOPHILS ABSOLUTE: 0 K/UL (ref 0–0.2)
BASOPHILS RELATIVE PERCENT: 0.2 %
BUN BLDV-MCNC: 21 MG/DL (ref 7–20)
CALCIUM SERPL-MCNC: 7.7 MG/DL (ref 8.3–10.6)
CHLORIDE BLD-SCNC: 97 MMOL/L (ref 99–110)
CO2: 31 MMOL/L (ref 21–32)
CREAT SERPL-MCNC: 0.6 MG/DL (ref 0.9–1.3)
EOSINOPHILS ABSOLUTE: 0.2 K/UL (ref 0–0.6)
EOSINOPHILS RELATIVE PERCENT: 1.1 %
GFR AFRICAN AMERICAN: >60
GFR NON-AFRICAN AMERICAN: >60
GLUCOSE BLD-MCNC: 109 MG/DL (ref 70–99)
GLUCOSE BLD-MCNC: 114 MG/DL (ref 70–99)
GLUCOSE BLD-MCNC: 146 MG/DL (ref 70–99)
GLUCOSE BLD-MCNC: 146 MG/DL (ref 70–99)
GLUCOSE BLD-MCNC: 154 MG/DL (ref 70–99)
HCT VFR BLD CALC: 25.1 % (ref 40.5–52.5)
HCT VFR BLD CALC: 26.4 % (ref 40.5–52.5)
HEMATOLOGY PATH CONSULT: NO
HEMOGLOBIN: 8.2 G/DL (ref 13.5–17.5)
HEMOGLOBIN: 8.7 G/DL (ref 13.5–17.5)
LYMPHOCYTES ABSOLUTE: 0.9 K/UL (ref 1–5.1)
LYMPHOCYTES RELATIVE PERCENT: 5.4 %
MAGNESIUM: 1.8 MG/DL (ref 1.8–2.4)
MCH RBC QN AUTO: 29.3 PG (ref 26–34)
MCHC RBC AUTO-ENTMCNC: 32.8 G/DL (ref 31–36)
MCV RBC AUTO: 89.4 FL (ref 80–100)
MONOCYTES ABSOLUTE: 1.8 K/UL (ref 0–1.3)
MONOCYTES RELATIVE PERCENT: 10.9 %
NEUTROPHILS ABSOLUTE: 13.9 K/UL (ref 1.7–7.7)
NEUTROPHILS RELATIVE PERCENT: 82.4 %
PDW BLD-RTO: 18.3 % (ref 12.4–15.4)
PERFORMED ON: ABNORMAL
PHOSPHORUS: 2.6 MG/DL (ref 2.5–4.9)
PLATELET # BLD: 238 K/UL (ref 135–450)
PLATELET SLIDE REVIEW: ADEQUATE
PMV BLD AUTO: 8.1 FL (ref 5–10.5)
POTASSIUM SERPL-SCNC: 2.9 MMOL/L (ref 3.5–5.1)
PRO-BNP: 8691 PG/ML (ref 0–124)
RBC # BLD: 2.8 M/UL (ref 4.2–5.9)
SLIDE REVIEW: ABNORMAL
SODIUM BLD-SCNC: 137 MMOL/L (ref 136–145)
WBC # BLD: 16.8 K/UL (ref 4–11)

## 2019-10-21 PROCEDURE — 94150 VITAL CAPACITY TEST: CPT

## 2019-10-21 PROCEDURE — 6360000002 HC RX W HCPCS: Performed by: INTERNAL MEDICINE

## 2019-10-21 PROCEDURE — 6360000002 HC RX W HCPCS: Performed by: NURSE PRACTITIONER

## 2019-10-21 PROCEDURE — 6370000000 HC RX 637 (ALT 250 FOR IP): Performed by: INTERNAL MEDICINE

## 2019-10-21 PROCEDURE — 84100 ASSAY OF PHOSPHORUS: CPT

## 2019-10-21 PROCEDURE — 94669 MECHANICAL CHEST WALL OSCILL: CPT

## 2019-10-21 PROCEDURE — 6370000000 HC RX 637 (ALT 250 FOR IP): Performed by: NURSE PRACTITIONER

## 2019-10-21 PROCEDURE — 2060000000 HC ICU INTERMEDIATE R&B

## 2019-10-21 PROCEDURE — 2700000000 HC OXYGEN THERAPY PER DAY

## 2019-10-21 PROCEDURE — 85014 HEMATOCRIT: CPT

## 2019-10-21 PROCEDURE — 2580000003 HC RX 258: Performed by: NURSE PRACTITIONER

## 2019-10-21 PROCEDURE — 94640 AIRWAY INHALATION TREATMENT: CPT

## 2019-10-21 PROCEDURE — 80048 BASIC METABOLIC PNL TOTAL CA: CPT

## 2019-10-21 PROCEDURE — 85018 HEMOGLOBIN: CPT

## 2019-10-21 PROCEDURE — 94761 N-INVAS EAR/PLS OXIMETRY MLT: CPT

## 2019-10-21 PROCEDURE — 6370000000 HC RX 637 (ALT 250 FOR IP): Performed by: FAMILY MEDICINE

## 2019-10-21 PROCEDURE — 83880 ASSAY OF NATRIURETIC PEPTIDE: CPT

## 2019-10-21 PROCEDURE — 2580000003 HC RX 258: Performed by: INTERNAL MEDICINE

## 2019-10-21 PROCEDURE — 83735 ASSAY OF MAGNESIUM: CPT

## 2019-10-21 PROCEDURE — 99024 POSTOP FOLLOW-UP VISIT: CPT | Performed by: SURGERY

## 2019-10-21 PROCEDURE — 99232 SBSQ HOSP IP/OBS MODERATE 35: CPT | Performed by: NURSE PRACTITIONER

## 2019-10-21 PROCEDURE — 85025 COMPLETE CBC W/AUTO DIFF WBC: CPT

## 2019-10-21 RX ORDER — POTASSIUM CHLORIDE 7.45 MG/ML
10 INJECTION INTRAVENOUS PRN
Status: DISCONTINUED | OUTPATIENT
Start: 2019-10-21 | End: 2019-10-24 | Stop reason: HOSPADM

## 2019-10-21 RX ORDER — FORMOTEROL FUMARATE 20 UG/2ML
20 SOLUTION RESPIRATORY (INHALATION) 2 TIMES DAILY
Status: DISCONTINUED | OUTPATIENT
Start: 2019-10-21 | End: 2019-10-24 | Stop reason: HOSPADM

## 2019-10-21 RX ORDER — POTASSIUM CHLORIDE 20 MEQ/1
40 TABLET, EXTENDED RELEASE ORAL PRN
Status: DISCONTINUED | OUTPATIENT
Start: 2019-10-21 | End: 2019-10-24 | Stop reason: HOSPADM

## 2019-10-21 RX ORDER — BUDESONIDE 0.5 MG/2ML
0.5 INHALANT ORAL 2 TIMES DAILY
Status: DISCONTINUED | OUTPATIENT
Start: 2019-10-21 | End: 2019-10-24 | Stop reason: HOSPADM

## 2019-10-21 RX ADMIN — METOPROLOL TARTRATE 12.5 MG: 25 TABLET ORAL at 08:45

## 2019-10-21 RX ADMIN — MAGNESIUM GLUCONATE 500 MG ORAL TABLET 400 MG: 500 TABLET ORAL at 08:46

## 2019-10-21 RX ADMIN — GABAPENTIN 200 MG: 100 CAPSULE ORAL at 21:00

## 2019-10-21 RX ADMIN — PANTOPRAZOLE SODIUM 40 MG: 40 TABLET, DELAYED RELEASE ORAL at 08:46

## 2019-10-21 RX ADMIN — RIVAROXABAN 15 MG: 15 TABLET, FILM COATED ORAL at 17:00

## 2019-10-21 RX ADMIN — GABAPENTIN 200 MG: 100 CAPSULE ORAL at 14:26

## 2019-10-21 RX ADMIN — TRAZODONE HYDROCHLORIDE 50 MG: 50 TABLET ORAL at 21:04

## 2019-10-21 RX ADMIN — AMIODARONE HYDROCHLORIDE 200 MG: 200 TABLET ORAL at 08:46

## 2019-10-21 RX ADMIN — POTASSIUM CHLORIDE 10 MEQ: 10 INJECTION, SOLUTION INTRAVENOUS at 14:26

## 2019-10-21 RX ADMIN — GABAPENTIN 200 MG: 100 CAPSULE ORAL at 08:46

## 2019-10-21 RX ADMIN — OXYCODONE HYDROCHLORIDE 10 MG: 10 TABLET ORAL at 08:46

## 2019-10-21 RX ADMIN — SODIUM CHLORIDE, PRESERVATIVE FREE 10 ML: 5 INJECTION INTRAVENOUS at 21:01

## 2019-10-21 RX ADMIN — POTASSIUM CHLORIDE 10 MEQ: 10 INJECTION, SOLUTION INTRAVENOUS at 10:56

## 2019-10-21 RX ADMIN — POTASSIUM CHLORIDE 10 MEQ: 10 INJECTION, SOLUTION INTRAVENOUS at 12:05

## 2019-10-21 RX ADMIN — POTASSIUM CHLORIDE 10 MEQ: 10 INJECTION, SOLUTION INTRAVENOUS at 17:00

## 2019-10-21 RX ADMIN — OYSTER SHELL CALCIUM WITH VITAMIN D 1 TABLET: 500; 200 TABLET, FILM COATED ORAL at 08:45

## 2019-10-21 RX ADMIN — POTASSIUM CHLORIDE 20 MEQ: 20 TABLET, EXTENDED RELEASE ORAL at 08:46

## 2019-10-21 RX ADMIN — TORSEMIDE 40 MG: 20 TABLET ORAL at 08:46

## 2019-10-21 RX ADMIN — ATORVASTATIN CALCIUM 40 MG: 40 TABLET, FILM COATED ORAL at 08:45

## 2019-10-21 RX ADMIN — OXYCODONE HYDROCHLORIDE 10 MG: 10 TABLET ORAL at 18:31

## 2019-10-21 RX ADMIN — SODIUM CHLORIDE, PRESERVATIVE FREE 10 ML: 5 INJECTION INTRAVENOUS at 08:46

## 2019-10-21 RX ADMIN — DULOXETINE HYDROCHLORIDE 30 MG: 30 CAPSULE, DELAYED RELEASE ORAL at 08:46

## 2019-10-21 RX ADMIN — FORMOTEROL FUMARATE DIHYDRATE 20 MCG: 20 SOLUTION RESPIRATORY (INHALATION) at 20:25

## 2019-10-21 RX ADMIN — POTASSIUM CHLORIDE 10 MEQ: 10 INJECTION, SOLUTION INTRAVENOUS at 13:20

## 2019-10-21 RX ADMIN — FORMOTEROL FUMARATE DIHYDRATE 20 MCG: 20 SOLUTION RESPIRATORY (INHALATION) at 13:22

## 2019-10-21 RX ADMIN — PIPERACILLIN AND TAZOBACTAM 3.38 G: 3; .375 INJECTION, POWDER, LYOPHILIZED, FOR SOLUTION INTRAVENOUS at 02:45

## 2019-10-21 RX ADMIN — OXYCODONE HYDROCHLORIDE 10 MG: 10 TABLET ORAL at 14:26

## 2019-10-21 RX ADMIN — BUDESONIDE 500 MCG: 0.5 SUSPENSION RESPIRATORY (INHALATION) at 13:21

## 2019-10-21 RX ADMIN — BUDESONIDE 500 MCG: 0.5 SUSPENSION RESPIRATORY (INHALATION) at 20:25

## 2019-10-21 RX ADMIN — POTASSIUM CHLORIDE 10 MEQ: 10 INJECTION, SOLUTION INTRAVENOUS at 15:33

## 2019-10-21 RX ADMIN — TAMSULOSIN HYDROCHLORIDE 0.4 MG: 0.4 CAPSULE ORAL at 08:46

## 2019-10-21 RX ADMIN — MAGNESIUM GLUCONATE 500 MG ORAL TABLET 400 MG: 500 TABLET ORAL at 21:00

## 2019-10-21 ASSESSMENT — PAIN DESCRIPTION - LOCATION
LOCATION: ABDOMEN

## 2019-10-21 ASSESSMENT — PAIN DESCRIPTION - DESCRIPTORS
DESCRIPTORS: ACHING;DISCOMFORT
DESCRIPTORS: ACHING;CONSTANT;DISCOMFORT
DESCRIPTORS: ACHING;DISCOMFORT

## 2019-10-21 ASSESSMENT — PAIN - FUNCTIONAL ASSESSMENT
PAIN_FUNCTIONAL_ASSESSMENT: PREVENTS OR INTERFERES SOME ACTIVE ACTIVITIES AND ADLS

## 2019-10-21 ASSESSMENT — PAIN SCALES - GENERAL
PAINLEVEL_OUTOF10: 10
PAINLEVEL_OUTOF10: 8
PAINLEVEL_OUTOF10: 8
PAINLEVEL_OUTOF10: 5
PAINLEVEL_OUTOF10: 5
PAINLEVEL_OUTOF10: 8
PAINLEVEL_OUTOF10: 10

## 2019-10-21 ASSESSMENT — PAIN DESCRIPTION - ORIENTATION
ORIENTATION: MID

## 2019-10-21 ASSESSMENT — PAIN DESCRIPTION - ONSET
ONSET: ON-GOING

## 2019-10-21 ASSESSMENT — PAIN DESCRIPTION - FREQUENCY
FREQUENCY: CONTINUOUS

## 2019-10-21 ASSESSMENT — PAIN DESCRIPTION - PAIN TYPE
TYPE: ACUTE PAIN

## 2019-10-21 ASSESSMENT — ENCOUNTER SYMPTOMS: NAUSEA: 0

## 2019-10-22 ENCOUNTER — APPOINTMENT (OUTPATIENT)
Dept: CT IMAGING | Age: 59
DRG: 231 | End: 2019-10-22
Payer: COMMERCIAL

## 2019-10-22 LAB
ANION GAP SERPL CALCULATED.3IONS-SCNC: 13 MMOL/L (ref 3–16)
ANISOCYTOSIS: ABNORMAL
BASOPHILS ABSOLUTE: 0.2 K/UL (ref 0–0.2)
BASOPHILS RELATIVE PERCENT: 1.2 %
BUN BLDV-MCNC: 15 MG/DL (ref 7–20)
CALCIUM SERPL-MCNC: 8 MG/DL (ref 8.3–10.6)
CHLORIDE BLD-SCNC: 96 MMOL/L (ref 99–110)
CO2: 32 MMOL/L (ref 21–32)
CREAT SERPL-MCNC: 0.5 MG/DL (ref 0.9–1.3)
EOSINOPHILS ABSOLUTE: 0.2 K/UL (ref 0–0.6)
EOSINOPHILS RELATIVE PERCENT: 1.7 %
GFR AFRICAN AMERICAN: >60
GFR NON-AFRICAN AMERICAN: >60
GLUCOSE BLD-MCNC: 106 MG/DL (ref 70–99)
GLUCOSE BLD-MCNC: 107 MG/DL (ref 70–99)
GLUCOSE BLD-MCNC: 108 MG/DL (ref 70–99)
GLUCOSE BLD-MCNC: 134 MG/DL (ref 70–99)
GLUCOSE BLD-MCNC: 141 MG/DL (ref 70–99)
HCT VFR BLD CALC: 24.3 % (ref 40.5–52.5)
HCT VFR BLD CALC: 26.6 % (ref 40.5–52.5)
HEMATOLOGY PATH CONSULT: NO
HEMOGLOBIN: 7.9 G/DL (ref 13.5–17.5)
HEMOGLOBIN: 8.8 G/DL (ref 13.5–17.5)
LYMPHOCYTES ABSOLUTE: 1.2 K/UL (ref 1–5.1)
LYMPHOCYTES RELATIVE PERCENT: 8.2 %
MAGNESIUM: 1.7 MG/DL (ref 1.8–2.4)
MCH RBC QN AUTO: 29.4 PG (ref 26–34)
MCHC RBC AUTO-ENTMCNC: 33.1 G/DL (ref 31–36)
MCV RBC AUTO: 88.9 FL (ref 80–100)
MONOCYTES ABSOLUTE: 1.8 K/UL (ref 0–1.3)
MONOCYTES RELATIVE PERCENT: 12.9 %
NEUTROPHILS ABSOLUTE: 10.8 K/UL (ref 1.7–7.7)
NEUTROPHILS RELATIVE PERCENT: 76 %
PDW BLD-RTO: 18.1 % (ref 12.4–15.4)
PERFORMED ON: ABNORMAL
PHOSPHORUS: 2.6 MG/DL (ref 2.5–4.9)
PLATELET # BLD: 270 K/UL (ref 135–450)
PLATELET SLIDE REVIEW: ADEQUATE
PMV BLD AUTO: 8.1 FL (ref 5–10.5)
POLYCHROMASIA: ABNORMAL
POTASSIUM SERPL-SCNC: 3.2 MMOL/L (ref 3.5–5.1)
RBC # BLD: 2.99 M/UL (ref 4.2–5.9)
SODIUM BLD-SCNC: 141 MMOL/L (ref 136–145)
TOXIC GRANULATION: PRESENT
VACUOLATED NEUTROPHILS: PRESENT
WBC # BLD: 14.3 K/UL (ref 4–11)

## 2019-10-22 PROCEDURE — 97530 THERAPEUTIC ACTIVITIES: CPT | Performed by: PHYSICAL THERAPIST

## 2019-10-22 PROCEDURE — 6360000002 HC RX W HCPCS: Performed by: NURSE PRACTITIONER

## 2019-10-22 PROCEDURE — 6370000000 HC RX 637 (ALT 250 FOR IP): Performed by: INTERNAL MEDICINE

## 2019-10-22 PROCEDURE — APPSS15 APP SPLIT SHARED TIME 0-15 MINUTES: Performed by: PHYSICIAN ASSISTANT

## 2019-10-22 PROCEDURE — 97530 THERAPEUTIC ACTIVITIES: CPT

## 2019-10-22 PROCEDURE — 74177 CT ABD & PELVIS W/CONTRAST: CPT

## 2019-10-22 PROCEDURE — 6360000004 HC RX CONTRAST MEDICATION

## 2019-10-22 PROCEDURE — 97110 THERAPEUTIC EXERCISES: CPT

## 2019-10-22 PROCEDURE — 2060000000 HC ICU INTERMEDIATE R&B

## 2019-10-22 PROCEDURE — APPNB30 APP NON BILLABLE TIME 0-30 MINS: Performed by: PHYSICIAN ASSISTANT

## 2019-10-22 PROCEDURE — 6370000000 HC RX 637 (ALT 250 FOR IP): Performed by: NURSE PRACTITIONER

## 2019-10-22 PROCEDURE — 99024 POSTOP FOLLOW-UP VISIT: CPT | Performed by: SURGERY

## 2019-10-22 PROCEDURE — 85014 HEMATOCRIT: CPT

## 2019-10-22 PROCEDURE — 85018 HEMOGLOBIN: CPT

## 2019-10-22 PROCEDURE — 6370000000 HC RX 637 (ALT 250 FOR IP): Performed by: FAMILY MEDICINE

## 2019-10-22 PROCEDURE — 99024 POSTOP FOLLOW-UP VISIT: CPT | Performed by: PHYSICIAN ASSISTANT

## 2019-10-22 PROCEDURE — 6360000002 HC RX W HCPCS: Performed by: INTERNAL MEDICINE

## 2019-10-22 PROCEDURE — 85025 COMPLETE CBC W/AUTO DIFF WBC: CPT

## 2019-10-22 PROCEDURE — 83735 ASSAY OF MAGNESIUM: CPT

## 2019-10-22 PROCEDURE — 94640 AIRWAY INHALATION TREATMENT: CPT

## 2019-10-22 PROCEDURE — 84100 ASSAY OF PHOSPHORUS: CPT

## 2019-10-22 PROCEDURE — 2580000003 HC RX 258: Performed by: INTERNAL MEDICINE

## 2019-10-22 PROCEDURE — 80048 BASIC METABOLIC PNL TOTAL CA: CPT

## 2019-10-22 PROCEDURE — 99232 SBSQ HOSP IP/OBS MODERATE 35: CPT | Performed by: NURSE PRACTITIONER

## 2019-10-22 PROCEDURE — 94669 MECHANICAL CHEST WALL OSCILL: CPT

## 2019-10-22 PROCEDURE — 6360000004 HC RX CONTRAST MEDICATION: Performed by: SURGERY

## 2019-10-22 RX ORDER — FUROSEMIDE 10 MG/ML
40 INJECTION INTRAMUSCULAR; INTRAVENOUS ONCE
Status: DISCONTINUED | OUTPATIENT
Start: 2019-10-22 | End: 2019-10-22

## 2019-10-22 RX ORDER — POTASSIUM CHLORIDE 20 MEQ/1
20 TABLET, EXTENDED RELEASE ORAL 2 TIMES DAILY WITH MEALS
Status: DISCONTINUED | OUTPATIENT
Start: 2019-10-22 | End: 2019-10-24 | Stop reason: HOSPADM

## 2019-10-22 RX ORDER — FUROSEMIDE 10 MG/ML
40 INJECTION INTRAMUSCULAR; INTRAVENOUS 2 TIMES DAILY
Status: DISCONTINUED | OUTPATIENT
Start: 2019-10-22 | End: 2019-10-24 | Stop reason: HOSPADM

## 2019-10-22 RX ORDER — POTASSIUM CHLORIDE 20 MEQ/1
20 TABLET, EXTENDED RELEASE ORAL ONCE
Status: DISCONTINUED | OUTPATIENT
Start: 2019-10-22 | End: 2019-10-24 | Stop reason: HOSPADM

## 2019-10-22 RX ORDER — MAGNESIUM SULFATE IN WATER 40 MG/ML
2 INJECTION, SOLUTION INTRAVENOUS ONCE
Status: COMPLETED | OUTPATIENT
Start: 2019-10-22 | End: 2019-10-22

## 2019-10-22 RX ADMIN — MAGNESIUM SULFATE HEPTAHYDRATE 2 G: 40 INJECTION, SOLUTION INTRAVENOUS at 14:26

## 2019-10-22 RX ADMIN — POTASSIUM CHLORIDE 20 MEQ: 20 TABLET, EXTENDED RELEASE ORAL at 09:30

## 2019-10-22 RX ADMIN — OYSTER SHELL CALCIUM WITH VITAMIN D 1 TABLET: 500; 200 TABLET, FILM COATED ORAL at 09:29

## 2019-10-22 RX ADMIN — BUDESONIDE 500 MCG: 0.5 SUSPENSION RESPIRATORY (INHALATION) at 20:29

## 2019-10-22 RX ADMIN — IOPAMIDOL 75 ML: 755 INJECTION, SOLUTION INTRAVENOUS at 11:14

## 2019-10-22 RX ADMIN — IOHEXOL 50 ML: 240 INJECTION, SOLUTION INTRATHECAL; INTRAVASCULAR; INTRAVENOUS; ORAL at 09:28

## 2019-10-22 RX ADMIN — FORMOTEROL FUMARATE DIHYDRATE 20 MCG: 20 SOLUTION RESPIRATORY (INHALATION) at 08:37

## 2019-10-22 RX ADMIN — FUROSEMIDE 40 MG: 10 INJECTION, SOLUTION INTRAMUSCULAR; INTRAVENOUS at 22:59

## 2019-10-22 RX ADMIN — GABAPENTIN 200 MG: 100 CAPSULE ORAL at 14:26

## 2019-10-22 RX ADMIN — METOPROLOL TARTRATE 12.5 MG: 25 TABLET ORAL at 09:29

## 2019-10-22 RX ADMIN — POTASSIUM CHLORIDE 20 MEQ: 1500 TABLET, EXTENDED RELEASE ORAL at 18:00

## 2019-10-22 RX ADMIN — OXYCODONE HYDROCHLORIDE 5 MG: 5 TABLET ORAL at 14:26

## 2019-10-22 RX ADMIN — AMIODARONE HYDROCHLORIDE 200 MG: 200 TABLET ORAL at 09:29

## 2019-10-22 RX ADMIN — MAGNESIUM GLUCONATE 500 MG ORAL TABLET 400 MG: 500 TABLET ORAL at 21:13

## 2019-10-22 RX ADMIN — GABAPENTIN 200 MG: 100 CAPSULE ORAL at 21:13

## 2019-10-22 RX ADMIN — OXYCODONE HYDROCHLORIDE 10 MG: 10 TABLET ORAL at 06:16

## 2019-10-22 RX ADMIN — ATORVASTATIN CALCIUM 40 MG: 40 TABLET, FILM COATED ORAL at 09:29

## 2019-10-22 RX ADMIN — TORSEMIDE 40 MG: 20 TABLET ORAL at 09:29

## 2019-10-22 RX ADMIN — OXYCODONE HYDROCHLORIDE 10 MG: 10 TABLET ORAL at 02:09

## 2019-10-22 RX ADMIN — TAMSULOSIN HYDROCHLORIDE 0.4 MG: 0.4 CAPSULE ORAL at 09:29

## 2019-10-22 RX ADMIN — GABAPENTIN 200 MG: 100 CAPSULE ORAL at 09:29

## 2019-10-22 RX ADMIN — METOPROLOL TARTRATE 12.5 MG: 25 TABLET ORAL at 21:14

## 2019-10-22 RX ADMIN — FORMOTEROL FUMARATE DIHYDRATE 20 MCG: 20 SOLUTION RESPIRATORY (INHALATION) at 20:29

## 2019-10-22 RX ADMIN — TRAZODONE HYDROCHLORIDE 50 MG: 50 TABLET ORAL at 21:13

## 2019-10-22 RX ADMIN — OXYCODONE HYDROCHLORIDE 10 MG: 10 TABLET ORAL at 21:31

## 2019-10-22 RX ADMIN — PANTOPRAZOLE SODIUM 40 MG: 40 TABLET, DELAYED RELEASE ORAL at 06:16

## 2019-10-22 RX ADMIN — RIVAROXABAN 20 MG: 20 TABLET, FILM COATED ORAL at 18:00

## 2019-10-22 RX ADMIN — POTASSIUM CHLORIDE 40 MEQ: 1500 TABLET, EXTENDED RELEASE ORAL at 09:57

## 2019-10-22 RX ADMIN — SODIUM CHLORIDE, PRESERVATIVE FREE 10 ML: 5 INJECTION INTRAVENOUS at 21:17

## 2019-10-22 RX ADMIN — DULOXETINE HYDROCHLORIDE 30 MG: 30 CAPSULE, DELAYED RELEASE ORAL at 09:29

## 2019-10-22 RX ADMIN — SODIUM CHLORIDE, PRESERVATIVE FREE 10 ML: 5 INJECTION INTRAVENOUS at 09:37

## 2019-10-22 RX ADMIN — BUDESONIDE 500 MCG: 0.5 SUSPENSION RESPIRATORY (INHALATION) at 08:37

## 2019-10-22 RX ADMIN — MAGNESIUM GLUCONATE 500 MG ORAL TABLET 400 MG: 500 TABLET ORAL at 09:29

## 2019-10-22 ASSESSMENT — PAIN DESCRIPTION - PROGRESSION
CLINICAL_PROGRESSION: NOT CHANGED
CLINICAL_PROGRESSION: GRADUALLY WORSENING

## 2019-10-22 ASSESSMENT — PAIN - FUNCTIONAL ASSESSMENT
PAIN_FUNCTIONAL_ASSESSMENT: PREVENTS OR INTERFERES SOME ACTIVE ACTIVITIES AND ADLS

## 2019-10-22 ASSESSMENT — PAIN DESCRIPTION - DESCRIPTORS
DESCRIPTORS: ACHING;CONSTANT;DISCOMFORT
DESCRIPTORS: ACHING
DESCRIPTORS: ACHING;DISCOMFORT

## 2019-10-22 ASSESSMENT — PAIN DESCRIPTION - ONSET
ONSET: ON-GOING

## 2019-10-22 ASSESSMENT — PAIN DESCRIPTION - LOCATION
LOCATION: ABDOMEN

## 2019-10-22 ASSESSMENT — PAIN DESCRIPTION - ORIENTATION
ORIENTATION: MID;LOWER
ORIENTATION: LOWER;MID
ORIENTATION: MID

## 2019-10-22 ASSESSMENT — PAIN SCALES - GENERAL
PAINLEVEL_OUTOF10: 0
PAINLEVEL_OUTOF10: 9
PAINLEVEL_OUTOF10: 0
PAINLEVEL_OUTOF10: 8
PAINLEVEL_OUTOF10: 0
PAINLEVEL_OUTOF10: 9
PAINLEVEL_OUTOF10: 4
PAINLEVEL_OUTOF10: 7
PAINLEVEL_OUTOF10: 0

## 2019-10-22 ASSESSMENT — PAIN SCALES - WONG BAKER
WONGBAKER_NUMERICALRESPONSE: 0

## 2019-10-22 ASSESSMENT — PAIN DESCRIPTION - PAIN TYPE
TYPE: SURGICAL PAIN
TYPE: ACUTE PAIN
TYPE: SURGICAL PAIN

## 2019-10-22 ASSESSMENT — PAIN DESCRIPTION - FREQUENCY
FREQUENCY: CONTINUOUS

## 2019-10-23 LAB
ANION GAP SERPL CALCULATED.3IONS-SCNC: 10 MMOL/L (ref 3–16)
ANISOCYTOSIS: ABNORMAL
BASOPHILS ABSOLUTE: 0 K/UL (ref 0–0.2)
BASOPHILS RELATIVE PERCENT: 0 %
BUN BLDV-MCNC: 11 MG/DL (ref 7–20)
CALCIUM SERPL-MCNC: 7.9 MG/DL (ref 8.3–10.6)
CHLORIDE BLD-SCNC: 93 MMOL/L (ref 99–110)
CO2: 35 MMOL/L (ref 21–32)
CREAT SERPL-MCNC: <0.5 MG/DL (ref 0.9–1.3)
EOSINOPHILS ABSOLUTE: 0.3 K/UL (ref 0–0.6)
EOSINOPHILS RELATIVE PERCENT: 2 %
GFR AFRICAN AMERICAN: >60
GFR NON-AFRICAN AMERICAN: >60
GLUCOSE BLD-MCNC: 112 MG/DL (ref 70–99)
GLUCOSE BLD-MCNC: 116 MG/DL (ref 70–99)
GLUCOSE BLD-MCNC: 119 MG/DL (ref 70–99)
GLUCOSE BLD-MCNC: 121 MG/DL (ref 70–99)
GLUCOSE BLD-MCNC: 142 MG/DL (ref 70–99)
HCT VFR BLD CALC: 24.7 % (ref 40.5–52.5)
HEMOGLOBIN: 8.2 G/DL (ref 13.5–17.5)
LYMPHOCYTES ABSOLUTE: 0.5 K/UL (ref 1–5.1)
LYMPHOCYTES RELATIVE PERCENT: 4 %
MAGNESIUM: 1.9 MG/DL (ref 1.8–2.4)
MCH RBC QN AUTO: 29.3 PG (ref 26–34)
MCHC RBC AUTO-ENTMCNC: 33.1 G/DL (ref 31–36)
MCV RBC AUTO: 88.6 FL (ref 80–100)
MONOCYTES ABSOLUTE: 1.3 K/UL (ref 0–1.3)
MONOCYTES RELATIVE PERCENT: 10 %
NEUTROPHILS ABSOLUTE: 10.8 K/UL (ref 1.7–7.7)
NEUTROPHILS RELATIVE PERCENT: 84 %
PDW BLD-RTO: 17.9 % (ref 12.4–15.4)
PERFORMED ON: ABNORMAL
PHOSPHORUS: 3.6 MG/DL (ref 2.5–4.9)
PLATELET # BLD: 260 K/UL (ref 135–450)
PLATELET SLIDE REVIEW: ADEQUATE
PMV BLD AUTO: 7.8 FL (ref 5–10.5)
POLYCHROMASIA: ABNORMAL
POTASSIUM SERPL-SCNC: 3.4 MMOL/L (ref 3.5–5.1)
RBC # BLD: 2.78 M/UL (ref 4.2–5.9)
SLIDE REVIEW: ABNORMAL
SODIUM BLD-SCNC: 138 MMOL/L (ref 136–145)
VACUOLATED NEUTROPHILS: PRESENT
WBC # BLD: 12.8 K/UL (ref 4–11)

## 2019-10-23 PROCEDURE — 2700000000 HC OXYGEN THERAPY PER DAY

## 2019-10-23 PROCEDURE — 85025 COMPLETE CBC W/AUTO DIFF WBC: CPT

## 2019-10-23 PROCEDURE — 6370000000 HC RX 637 (ALT 250 FOR IP): Performed by: NURSE PRACTITIONER

## 2019-10-23 PROCEDURE — 97530 THERAPEUTIC ACTIVITIES: CPT | Performed by: PHYSICAL THERAPIST

## 2019-10-23 PROCEDURE — 2060000000 HC ICU INTERMEDIATE R&B

## 2019-10-23 PROCEDURE — 97530 THERAPEUTIC ACTIVITIES: CPT

## 2019-10-23 PROCEDURE — 83735 ASSAY OF MAGNESIUM: CPT

## 2019-10-23 PROCEDURE — 80048 BASIC METABOLIC PNL TOTAL CA: CPT

## 2019-10-23 PROCEDURE — 94669 MECHANICAL CHEST WALL OSCILL: CPT

## 2019-10-23 PROCEDURE — 94761 N-INVAS EAR/PLS OXIMETRY MLT: CPT

## 2019-10-23 PROCEDURE — 6360000002 HC RX W HCPCS: Performed by: NURSE PRACTITIONER

## 2019-10-23 PROCEDURE — 85018 HEMOGLOBIN: CPT

## 2019-10-23 PROCEDURE — 6360000002 HC RX W HCPCS: Performed by: INTERNAL MEDICINE

## 2019-10-23 PROCEDURE — 6370000000 HC RX 637 (ALT 250 FOR IP): Performed by: INTERNAL MEDICINE

## 2019-10-23 PROCEDURE — 6370000000 HC RX 637 (ALT 250 FOR IP): Performed by: FAMILY MEDICINE

## 2019-10-23 PROCEDURE — 85014 HEMATOCRIT: CPT

## 2019-10-23 PROCEDURE — 2580000003 HC RX 258: Performed by: INTERNAL MEDICINE

## 2019-10-23 PROCEDURE — APPSS15 APP SPLIT SHARED TIME 0-15 MINUTES: Performed by: PHYSICIAN ASSISTANT

## 2019-10-23 PROCEDURE — 84100 ASSAY OF PHOSPHORUS: CPT

## 2019-10-23 PROCEDURE — APPNB30 APP NON BILLABLE TIME 0-30 MINS: Performed by: PHYSICIAN ASSISTANT

## 2019-10-23 PROCEDURE — 97110 THERAPEUTIC EXERCISES: CPT

## 2019-10-23 PROCEDURE — 36592 COLLECT BLOOD FROM PICC: CPT

## 2019-10-23 PROCEDURE — 94640 AIRWAY INHALATION TREATMENT: CPT

## 2019-10-23 PROCEDURE — 99024 POSTOP FOLLOW-UP VISIT: CPT | Performed by: SURGERY

## 2019-10-23 RX ORDER — METOPROLOL SUCCINATE 25 MG/1
25 TABLET, EXTENDED RELEASE ORAL DAILY
Status: DISCONTINUED | OUTPATIENT
Start: 2019-10-23 | End: 2019-10-24 | Stop reason: HOSPADM

## 2019-10-23 RX ADMIN — OXYCODONE HYDROCHLORIDE 10 MG: 10 TABLET ORAL at 10:10

## 2019-10-23 RX ADMIN — OXYCODONE HYDROCHLORIDE 10 MG: 10 TABLET ORAL at 17:43

## 2019-10-23 RX ADMIN — MAGNESIUM GLUCONATE 500 MG ORAL TABLET 400 MG: 500 TABLET ORAL at 10:10

## 2019-10-23 RX ADMIN — PANTOPRAZOLE SODIUM 40 MG: 40 TABLET, DELAYED RELEASE ORAL at 06:28

## 2019-10-23 RX ADMIN — SODIUM CHLORIDE, PRESERVATIVE FREE 10 ML: 5 INJECTION INTRAVENOUS at 21:55

## 2019-10-23 RX ADMIN — FUROSEMIDE 40 MG: 10 INJECTION, SOLUTION INTRAMUSCULAR; INTRAVENOUS at 10:11

## 2019-10-23 RX ADMIN — OXYCODONE HYDROCHLORIDE 5 MG: 5 TABLET ORAL at 23:42

## 2019-10-23 RX ADMIN — OXYCODONE HYDROCHLORIDE 10 MG: 10 TABLET ORAL at 03:05

## 2019-10-23 RX ADMIN — METOPROLOL TARTRATE 12.5 MG: 25 TABLET ORAL at 10:11

## 2019-10-23 RX ADMIN — RIVAROXABAN 20 MG: 20 TABLET, FILM COATED ORAL at 17:34

## 2019-10-23 RX ADMIN — ATORVASTATIN CALCIUM 40 MG: 40 TABLET, FILM COATED ORAL at 10:11

## 2019-10-23 RX ADMIN — BUDESONIDE 500 MCG: 0.5 SUSPENSION RESPIRATORY (INHALATION) at 08:55

## 2019-10-23 RX ADMIN — MAGNESIUM GLUCONATE 500 MG ORAL TABLET 400 MG: 500 TABLET ORAL at 21:55

## 2019-10-23 RX ADMIN — FUROSEMIDE 40 MG: 10 INJECTION, SOLUTION INTRAMUSCULAR; INTRAVENOUS at 17:34

## 2019-10-23 RX ADMIN — GABAPENTIN 200 MG: 100 CAPSULE ORAL at 21:55

## 2019-10-23 RX ADMIN — OYSTER SHELL CALCIUM WITH VITAMIN D 1 TABLET: 500; 200 TABLET, FILM COATED ORAL at 10:10

## 2019-10-23 RX ADMIN — FORMOTEROL FUMARATE DIHYDRATE 20 MCG: 20 SOLUTION RESPIRATORY (INHALATION) at 09:02

## 2019-10-23 RX ADMIN — POTASSIUM CHLORIDE 20 MEQ: 1500 TABLET, EXTENDED RELEASE ORAL at 17:34

## 2019-10-23 RX ADMIN — GABAPENTIN 200 MG: 100 CAPSULE ORAL at 14:37

## 2019-10-23 RX ADMIN — GABAPENTIN 200 MG: 100 CAPSULE ORAL at 10:10

## 2019-10-23 RX ADMIN — FORMOTEROL FUMARATE DIHYDRATE 20 MCG: 20 SOLUTION RESPIRATORY (INHALATION) at 20:15

## 2019-10-23 RX ADMIN — AMIODARONE HYDROCHLORIDE 200 MG: 200 TABLET ORAL at 10:10

## 2019-10-23 RX ADMIN — BUDESONIDE 500 MCG: 0.5 SUSPENSION RESPIRATORY (INHALATION) at 20:15

## 2019-10-23 RX ADMIN — POTASSIUM CHLORIDE 20 MEQ: 1500 TABLET, EXTENDED RELEASE ORAL at 10:10

## 2019-10-23 RX ADMIN — DULOXETINE HYDROCHLORIDE 30 MG: 30 CAPSULE, DELAYED RELEASE ORAL at 10:10

## 2019-10-23 RX ADMIN — SODIUM CHLORIDE, PRESERVATIVE FREE 10 ML: 5 INJECTION INTRAVENOUS at 10:11

## 2019-10-23 RX ADMIN — INSULIN LISPRO 1 UNITS: 100 INJECTION, SOLUTION INTRAVENOUS; SUBCUTANEOUS at 14:37

## 2019-10-23 RX ADMIN — TAMSULOSIN HYDROCHLORIDE 0.4 MG: 0.4 CAPSULE ORAL at 10:10

## 2019-10-23 ASSESSMENT — PAIN DESCRIPTION - ORIENTATION
ORIENTATION: MID;LOWER
ORIENTATION: MID
ORIENTATION: MID
ORIENTATION: MID;LOWER
ORIENTATION: LOWER;MID
ORIENTATION: MID

## 2019-10-23 ASSESSMENT — PAIN DESCRIPTION - PROGRESSION
CLINICAL_PROGRESSION: NOT CHANGED
CLINICAL_PROGRESSION: GRADUALLY IMPROVING
CLINICAL_PROGRESSION: NOT CHANGED
CLINICAL_PROGRESSION: NOT CHANGED
CLINICAL_PROGRESSION: GRADUALLY IMPROVING
CLINICAL_PROGRESSION: NOT CHANGED
CLINICAL_PROGRESSION: NOT CHANGED

## 2019-10-23 ASSESSMENT — PAIN DESCRIPTION - FREQUENCY
FREQUENCY: CONTINUOUS
FREQUENCY: CONTINUOUS
FREQUENCY: INTERMITTENT
FREQUENCY: CONTINUOUS
FREQUENCY: CONTINUOUS
FREQUENCY: INTERMITTENT

## 2019-10-23 ASSESSMENT — PAIN SCALES - WONG BAKER
WONGBAKER_NUMERICALRESPONSE: 0
WONGBAKER_NUMERICALRESPONSE: 0
WONGBAKER_NUMERICALRESPONSE: 8
WONGBAKER_NUMERICALRESPONSE: 0
WONGBAKER_NUMERICALRESPONSE: 4
WONGBAKER_NUMERICALRESPONSE: 0
WONGBAKER_NUMERICALRESPONSE: 10
WONGBAKER_NUMERICALRESPONSE: 0
WONGBAKER_NUMERICALRESPONSE: 6
WONGBAKER_NUMERICALRESPONSE: 0
WONGBAKER_NUMERICALRESPONSE: 2

## 2019-10-23 ASSESSMENT — PAIN SCALES - GENERAL
PAINLEVEL_OUTOF10: 5
PAINLEVEL_OUTOF10: 9
PAINLEVEL_OUTOF10: 4
PAINLEVEL_OUTOF10: 5
PAINLEVEL_OUTOF10: 8
PAINLEVEL_OUTOF10: 7
PAINLEVEL_OUTOF10: 4
PAINLEVEL_OUTOF10: 9
PAINLEVEL_OUTOF10: 8

## 2019-10-23 ASSESSMENT — PAIN - FUNCTIONAL ASSESSMENT
PAIN_FUNCTIONAL_ASSESSMENT: PREVENTS OR INTERFERES SOME ACTIVE ACTIVITIES AND ADLS
PAIN_FUNCTIONAL_ASSESSMENT: PREVENTS OR INTERFERES SOME ACTIVE ACTIVITIES AND ADLS
PAIN_FUNCTIONAL_ASSESSMENT: PREVENTS OR INTERFERES WITH ALL ACTIVE AND SOME PASSIVE ACTIVITIES
PAIN_FUNCTIONAL_ASSESSMENT: PREVENTS OR INTERFERES SOME ACTIVE ACTIVITIES AND ADLS

## 2019-10-23 ASSESSMENT — PAIN DESCRIPTION - DESCRIPTORS
DESCRIPTORS: ACHING
DESCRIPTORS: CRAMPING;ACHING
DESCRIPTORS: ACHING
DESCRIPTORS: ACHING
DESCRIPTORS: BURNING
DESCRIPTORS: CRAMPING

## 2019-10-23 ASSESSMENT — PAIN DESCRIPTION - PAIN TYPE
TYPE: SURGICAL PAIN

## 2019-10-23 ASSESSMENT — PAIN DESCRIPTION - ONSET
ONSET: ON-GOING

## 2019-10-23 ASSESSMENT — PAIN DESCRIPTION - LOCATION
LOCATION: ABDOMEN

## 2019-10-24 VITALS
BODY MASS INDEX: 26.78 KG/M2 | HEART RATE: 94 BPM | OXYGEN SATURATION: 93 % | HEIGHT: 67 IN | RESPIRATION RATE: 16 BRPM | DIASTOLIC BLOOD PRESSURE: 65 MMHG | SYSTOLIC BLOOD PRESSURE: 93 MMHG | TEMPERATURE: 97.5 F | WEIGHT: 170.64 LBS

## 2019-10-24 LAB
ANION GAP SERPL CALCULATED.3IONS-SCNC: 10 MMOL/L (ref 3–16)
BASOPHILS ABSOLUTE: 0.1 K/UL (ref 0–0.2)
BASOPHILS RELATIVE PERCENT: 1.1 %
BUN BLDV-MCNC: 8 MG/DL (ref 7–20)
CALCIUM SERPL-MCNC: 8.1 MG/DL (ref 8.3–10.6)
CHLORIDE BLD-SCNC: 90 MMOL/L (ref 99–110)
CO2: 38 MMOL/L (ref 21–32)
CREAT SERPL-MCNC: <0.5 MG/DL (ref 0.9–1.3)
EOSINOPHILS ABSOLUTE: 0.2 K/UL (ref 0–0.6)
EOSINOPHILS RELATIVE PERCENT: 2.1 %
GFR AFRICAN AMERICAN: >60
GFR NON-AFRICAN AMERICAN: >60
GLUCOSE BLD-MCNC: 101 MG/DL (ref 70–99)
GLUCOSE BLD-MCNC: 104 MG/DL (ref 70–99)
GLUCOSE BLD-MCNC: 172 MG/DL (ref 70–99)
HCT VFR BLD CALC: 26 % (ref 40.5–52.5)
HCT VFR BLD CALC: 26.5 % (ref 40.5–52.5)
HEMATOLOGY PATH CONSULT: NO
HEMOGLOBIN: 8.6 G/DL (ref 13.5–17.5)
HEMOGLOBIN: 8.8 G/DL (ref 13.5–17.5)
LYMPHOCYTES ABSOLUTE: 1 K/UL (ref 1–5.1)
LYMPHOCYTES RELATIVE PERCENT: 9 %
MAGNESIUM: 1.8 MG/DL (ref 1.8–2.4)
MCH RBC QN AUTO: 29.2 PG (ref 26–34)
MCHC RBC AUTO-ENTMCNC: 33.2 G/DL (ref 31–36)
MCV RBC AUTO: 88 FL (ref 80–100)
MONOCYTES ABSOLUTE: 1.7 K/UL (ref 0–1.3)
MONOCYTES RELATIVE PERCENT: 14.8 %
NEUTROPHILS ABSOLUTE: 8.3 K/UL (ref 1.7–7.7)
NEUTROPHILS RELATIVE PERCENT: 73 %
PDW BLD-RTO: 17.4 % (ref 12.4–15.4)
PERFORMED ON: ABNORMAL
PERFORMED ON: ABNORMAL
PHOSPHORUS: 4 MG/DL (ref 2.5–4.9)
PLATELET # BLD: 263 K/UL (ref 135–450)
PMV BLD AUTO: 8.1 FL (ref 5–10.5)
POTASSIUM SERPL-SCNC: 3.4 MMOL/L (ref 3.5–5.1)
RBC # BLD: 2.95 M/UL (ref 4.2–5.9)
SODIUM BLD-SCNC: 138 MMOL/L (ref 136–145)
WBC # BLD: 11.3 K/UL (ref 4–11)

## 2019-10-24 PROCEDURE — 6370000000 HC RX 637 (ALT 250 FOR IP): Performed by: NURSE PRACTITIONER

## 2019-10-24 PROCEDURE — 84100 ASSAY OF PHOSPHORUS: CPT

## 2019-10-24 PROCEDURE — APPSS30 APP SPLIT SHARED TIME 16-30 MINUTES: Performed by: NURSE PRACTITIONER

## 2019-10-24 PROCEDURE — 94669 MECHANICAL CHEST WALL OSCILL: CPT

## 2019-10-24 PROCEDURE — 6360000002 HC RX W HCPCS: Performed by: NURSE PRACTITIONER

## 2019-10-24 PROCEDURE — 6370000000 HC RX 637 (ALT 250 FOR IP): Performed by: FAMILY MEDICINE

## 2019-10-24 PROCEDURE — 2700000000 HC OXYGEN THERAPY PER DAY

## 2019-10-24 PROCEDURE — 85025 COMPLETE CBC W/AUTO DIFF WBC: CPT

## 2019-10-24 PROCEDURE — 83735 ASSAY OF MAGNESIUM: CPT

## 2019-10-24 PROCEDURE — 94640 AIRWAY INHALATION TREATMENT: CPT

## 2019-10-24 PROCEDURE — APPNB30 APP NON BILLABLE TIME 0-30 MINS: Performed by: NURSE PRACTITIONER

## 2019-10-24 PROCEDURE — 94761 N-INVAS EAR/PLS OXIMETRY MLT: CPT

## 2019-10-24 PROCEDURE — 6370000000 HC RX 637 (ALT 250 FOR IP): Performed by: INTERNAL MEDICINE

## 2019-10-24 PROCEDURE — 80048 BASIC METABOLIC PNL TOTAL CA: CPT

## 2019-10-24 PROCEDURE — 2580000003 HC RX 258: Performed by: INTERNAL MEDICINE

## 2019-10-24 PROCEDURE — 36592 COLLECT BLOOD FROM PICC: CPT

## 2019-10-24 PROCEDURE — 6360000002 HC RX W HCPCS: Performed by: INTERNAL MEDICINE

## 2019-10-24 RX ORDER — OXYCODONE HYDROCHLORIDE AND ACETAMINOPHEN 5; 325 MG/1; MG/1
1 TABLET ORAL EVERY 6 HOURS PRN
Qty: 12 TABLET | Refills: 0 | Status: SHIPPED | OUTPATIENT
Start: 2019-10-24 | End: 2019-10-27

## 2019-10-24 RX ORDER — METOPROLOL SUCCINATE 25 MG/1
25 TABLET, EXTENDED RELEASE ORAL DAILY
Qty: 30 TABLET | Refills: 1 | Status: ON HOLD | OUTPATIENT
Start: 2019-10-24 | End: 2019-11-11

## 2019-10-24 RX ORDER — BISACODYL 10 MG
10 SUPPOSITORY, RECTAL RECTAL DAILY PRN
Qty: 10 SUPPOSITORY | Refills: 1 | Status: SHIPPED | OUTPATIENT
Start: 2019-10-24 | End: 2019-11-23

## 2019-10-24 RX ADMIN — FORMOTEROL FUMARATE DIHYDRATE 20 MCG: 20 SOLUTION RESPIRATORY (INHALATION) at 08:46

## 2019-10-24 RX ADMIN — AMIODARONE HYDROCHLORIDE 200 MG: 200 TABLET ORAL at 11:25

## 2019-10-24 RX ADMIN — METOPROLOL SUCCINATE 25 MG: 25 TABLET, EXTENDED RELEASE ORAL at 11:24

## 2019-10-24 RX ADMIN — FUROSEMIDE 40 MG: 10 INJECTION, SOLUTION INTRAMUSCULAR; INTRAVENOUS at 11:25

## 2019-10-24 RX ADMIN — DULOXETINE HYDROCHLORIDE 30 MG: 30 CAPSULE, DELAYED RELEASE ORAL at 11:24

## 2019-10-24 RX ADMIN — GABAPENTIN 200 MG: 100 CAPSULE ORAL at 11:24

## 2019-10-24 RX ADMIN — OXYCODONE HYDROCHLORIDE 5 MG: 5 TABLET ORAL at 05:43

## 2019-10-24 RX ADMIN — ATORVASTATIN CALCIUM 40 MG: 40 TABLET, FILM COATED ORAL at 11:24

## 2019-10-24 RX ADMIN — OYSTER SHELL CALCIUM WITH VITAMIN D 1 TABLET: 500; 200 TABLET, FILM COATED ORAL at 11:25

## 2019-10-24 RX ADMIN — POTASSIUM CHLORIDE 20 MEQ: 1500 TABLET, EXTENDED RELEASE ORAL at 11:24

## 2019-10-24 RX ADMIN — TAMSULOSIN HYDROCHLORIDE 0.4 MG: 0.4 CAPSULE ORAL at 11:24

## 2019-10-24 RX ADMIN — SODIUM CHLORIDE, PRESERVATIVE FREE 10 ML: 5 INJECTION INTRAVENOUS at 11:25

## 2019-10-24 RX ADMIN — BUDESONIDE 500 MCG: 0.5 SUSPENSION RESPIRATORY (INHALATION) at 08:46

## 2019-10-24 RX ADMIN — PANTOPRAZOLE SODIUM 40 MG: 40 TABLET, DELAYED RELEASE ORAL at 06:22

## 2019-10-24 RX ADMIN — INSULIN LISPRO 1 UNITS: 100 INJECTION, SOLUTION INTRAVENOUS; SUBCUTANEOUS at 13:22

## 2019-10-24 RX ADMIN — MAGNESIUM GLUCONATE 500 MG ORAL TABLET 400 MG: 500 TABLET ORAL at 11:25

## 2019-10-24 RX ADMIN — OXYCODONE HYDROCHLORIDE 10 MG: 10 TABLET ORAL at 13:23

## 2019-10-24 ASSESSMENT — PAIN DESCRIPTION - PAIN TYPE: TYPE: SURGICAL PAIN

## 2019-10-24 ASSESSMENT — PAIN SCALES - GENERAL
PAINLEVEL_OUTOF10: 0
PAINLEVEL_OUTOF10: 5
PAINLEVEL_OUTOF10: 9
PAINLEVEL_OUTOF10: 0

## 2019-10-24 ASSESSMENT — PAIN DESCRIPTION - LOCATION: LOCATION: ABDOMEN

## 2019-10-24 ASSESSMENT — PAIN DESCRIPTION - FREQUENCY: FREQUENCY: INTERMITTENT

## 2019-10-24 ASSESSMENT — PAIN SCALES - WONG BAKER
WONGBAKER_NUMERICALRESPONSE: 0
WONGBAKER_NUMERICALRESPONSE: 2
WONGBAKER_NUMERICALRESPONSE: 0

## 2019-10-24 ASSESSMENT — PAIN DESCRIPTION - PROGRESSION: CLINICAL_PROGRESSION: GRADUALLY IMPROVING

## 2019-10-24 ASSESSMENT — PAIN DESCRIPTION - DESCRIPTORS: DESCRIPTORS: ACHING

## 2019-10-24 ASSESSMENT — PAIN - FUNCTIONAL ASSESSMENT: PAIN_FUNCTIONAL_ASSESSMENT: PREVENTS OR INTERFERES SOME ACTIVE ACTIVITIES AND ADLS

## 2019-10-24 ASSESSMENT — PAIN DESCRIPTION - ORIENTATION: ORIENTATION: MID

## 2019-10-24 ASSESSMENT — PAIN DESCRIPTION - ONSET: ONSET: ON-GOING

## 2019-11-10 ENCOUNTER — APPOINTMENT (OUTPATIENT)
Dept: CT IMAGING | Age: 59
DRG: 721 | End: 2019-11-10
Payer: COMMERCIAL

## 2019-11-10 ENCOUNTER — HOSPITAL ENCOUNTER (INPATIENT)
Age: 59
LOS: 12 days | Discharge: NURSING, REHAB OR LONG-TERM CARE | DRG: 721 | End: 2019-11-22
Attending: EMERGENCY MEDICINE | Admitting: INTERNAL MEDICINE
Payer: COMMERCIAL

## 2019-11-10 DIAGNOSIS — T81.43XA POSTOPERATIVE INTRA-ABDOMINAL ABSCESS: ICD-10-CM

## 2019-11-10 DIAGNOSIS — R79.89 ELEVATED LACTIC ACID LEVEL: ICD-10-CM

## 2019-11-10 DIAGNOSIS — R18.8 INTRA-ABDOMINAL FLUID COLLECTION: Primary | ICD-10-CM

## 2019-11-10 DIAGNOSIS — A41.9 SEPTICEMIA (HCC): ICD-10-CM

## 2019-11-10 PROBLEM — K65.1 INTRA-ABDOMINAL ABSCESS (HCC): Status: ACTIVE | Noted: 2019-11-10

## 2019-11-10 LAB
A/G RATIO: 0.7 (ref 1.1–2.2)
ALBUMIN SERPL-MCNC: 3.4 G/DL (ref 3.4–5)
ALP BLD-CCNC: 177 U/L (ref 40–129)
ALT SERPL-CCNC: 26 U/L (ref 10–40)
ANION GAP SERPL CALCULATED.3IONS-SCNC: 14 MMOL/L (ref 3–16)
AST SERPL-CCNC: 46 U/L (ref 15–37)
BASOPHILS ABSOLUTE: 0.1 K/UL (ref 0–0.2)
BASOPHILS RELATIVE PERCENT: 0.7 %
BILIRUB SERPL-MCNC: 0.4 MG/DL (ref 0–1)
BILIRUBIN URINE: NEGATIVE
BLOOD, URINE: NEGATIVE
BUN BLDV-MCNC: 16 MG/DL (ref 7–20)
CALCIUM SERPL-MCNC: 8.5 MG/DL (ref 8.3–10.6)
CHLORIDE BLD-SCNC: 83 MMOL/L (ref 99–110)
CLARITY: CLEAR
CO2: 35 MMOL/L (ref 21–32)
COLOR: YELLOW
CREAT SERPL-MCNC: 0.9 MG/DL (ref 0.9–1.3)
EOSINOPHILS ABSOLUTE: 0.1 K/UL (ref 0–0.6)
EOSINOPHILS RELATIVE PERCENT: 0.6 %
GFR AFRICAN AMERICAN: >60
GFR NON-AFRICAN AMERICAN: >60
GLOBULIN: 4.7 G/DL
GLUCOSE BLD-MCNC: 108 MG/DL (ref 70–99)
GLUCOSE BLD-MCNC: 216 MG/DL (ref 70–99)
GLUCOSE URINE: NEGATIVE MG/DL
HCT VFR BLD CALC: 29.8 % (ref 40.5–52.5)
HEMOGLOBIN: 9.9 G/DL (ref 13.5–17.5)
KETONES, URINE: NEGATIVE MG/DL
LACTIC ACID, SEPSIS: 1.5 MMOL/L (ref 0.4–1.9)
LACTIC ACID, SEPSIS: 2.4 MMOL/L (ref 0.4–1.9)
LEUKOCYTE ESTERASE, URINE: NEGATIVE
LIPASE: 33 U/L (ref 13–60)
LYMPHOCYTES ABSOLUTE: 1.3 K/UL (ref 1–5.1)
LYMPHOCYTES RELATIVE PERCENT: 9.4 %
MAGNESIUM: 1.5 MG/DL (ref 1.8–2.4)
MCH RBC QN AUTO: 28.8 PG (ref 26–34)
MCHC RBC AUTO-ENTMCNC: 33.2 G/DL (ref 31–36)
MCV RBC AUTO: 86.7 FL (ref 80–100)
MICROSCOPIC EXAMINATION: NORMAL
MONOCYTES ABSOLUTE: 1.3 K/UL (ref 0–1.3)
MONOCYTES RELATIVE PERCENT: 9.7 %
NEUTROPHILS ABSOLUTE: 10.8 K/UL (ref 1.7–7.7)
NEUTROPHILS RELATIVE PERCENT: 79.6 %
NITRITE, URINE: NEGATIVE
PDW BLD-RTO: 16.2 % (ref 12.4–15.4)
PERFORMED ON: ABNORMAL
PH UA: 5.5 (ref 5–8)
PLATELET # BLD: 356 K/UL (ref 135–450)
PMV BLD AUTO: 8.4 FL (ref 5–10.5)
POTASSIUM REFLEX MAGNESIUM: 3.3 MMOL/L (ref 3.5–5.1)
PROTEIN UA: NEGATIVE MG/DL
RBC # BLD: 3.44 M/UL (ref 4.2–5.9)
SODIUM BLD-SCNC: 132 MMOL/L (ref 136–145)
SPECIFIC GRAVITY UA: 1.02 (ref 1–1.03)
TOTAL PROTEIN: 8.1 G/DL (ref 6.4–8.2)
URINE REFLEX TO CULTURE: NORMAL
URINE TRICHOMONAS EVALUATION: NORMAL
URINE TYPE: NORMAL
UROBILINOGEN, URINE: 0.2 E.U./DL
WBC # BLD: 13.5 K/UL (ref 4–11)

## 2019-11-10 PROCEDURE — 2580000003 HC RX 258: Performed by: EMERGENCY MEDICINE

## 2019-11-10 PROCEDURE — 2060000000 HC ICU INTERMEDIATE R&B

## 2019-11-10 PROCEDURE — 87040 BLOOD CULTURE FOR BACTERIA: CPT

## 2019-11-10 PROCEDURE — 94760 N-INVAS EAR/PLS OXIMETRY 1: CPT

## 2019-11-10 PROCEDURE — 81001 URINALYSIS AUTO W/SCOPE: CPT

## 2019-11-10 PROCEDURE — 87591 N.GONORRHOEAE DNA AMP PROB: CPT

## 2019-11-10 PROCEDURE — 96375 TX/PRO/DX INJ NEW DRUG ADDON: CPT

## 2019-11-10 PROCEDURE — 83605 ASSAY OF LACTIC ACID: CPT

## 2019-11-10 PROCEDURE — 6360000004 HC RX CONTRAST MEDICATION: Performed by: EMERGENCY MEDICINE

## 2019-11-10 PROCEDURE — 2580000003 HC RX 258: Performed by: INTERNAL MEDICINE

## 2019-11-10 PROCEDURE — 96365 THER/PROPH/DIAG IV INF INIT: CPT

## 2019-11-10 PROCEDURE — 85025 COMPLETE CBC W/AUTO DIFF WBC: CPT

## 2019-11-10 PROCEDURE — 99285 EMERGENCY DEPT VISIT HI MDM: CPT

## 2019-11-10 PROCEDURE — 96361 HYDRATE IV INFUSION ADD-ON: CPT

## 2019-11-10 PROCEDURE — 6360000002 HC RX W HCPCS: Performed by: EMERGENCY MEDICINE

## 2019-11-10 PROCEDURE — 80053 COMPREHEN METABOLIC PANEL: CPT

## 2019-11-10 PROCEDURE — 83735 ASSAY OF MAGNESIUM: CPT

## 2019-11-10 PROCEDURE — 96376 TX/PRO/DX INJ SAME DRUG ADON: CPT

## 2019-11-10 PROCEDURE — 2700000000 HC OXYGEN THERAPY PER DAY

## 2019-11-10 PROCEDURE — 6370000000 HC RX 637 (ALT 250 FOR IP): Performed by: INTERNAL MEDICINE

## 2019-11-10 PROCEDURE — 6360000002 HC RX W HCPCS: Performed by: INTERNAL MEDICINE

## 2019-11-10 PROCEDURE — 83690 ASSAY OF LIPASE: CPT

## 2019-11-10 PROCEDURE — 74177 CT ABD & PELVIS W/CONTRAST: CPT

## 2019-11-10 PROCEDURE — 87491 CHLMYD TRACH DNA AMP PROBE: CPT

## 2019-11-10 RX ORDER — OXYCODONE HYDROCHLORIDE AND ACETAMINOPHEN 5; 325 MG/1; MG/1
1 TABLET ORAL EVERY 6 HOURS PRN
Status: DISCONTINUED | OUTPATIENT
Start: 2019-11-10 | End: 2019-11-11

## 2019-11-10 RX ORDER — GABAPENTIN 100 MG/1
200 CAPSULE ORAL 3 TIMES DAILY
Status: DISCONTINUED | OUTPATIENT
Start: 2019-11-10 | End: 2019-11-22 | Stop reason: HOSPADM

## 2019-11-10 RX ORDER — MAGNESIUM SULFATE IN WATER 40 MG/ML
2 INJECTION, SOLUTION INTRAVENOUS ONCE
Status: COMPLETED | OUTPATIENT
Start: 2019-11-10 | End: 2019-11-10

## 2019-11-10 RX ORDER — SODIUM CHLORIDE 0.9 % (FLUSH) 0.9 %
10 SYRINGE (ML) INJECTION EVERY 12 HOURS SCHEDULED
Status: DISCONTINUED | OUTPATIENT
Start: 2019-11-10 | End: 2019-11-14

## 2019-11-10 RX ORDER — TRAZODONE HYDROCHLORIDE 50 MG/1
50 TABLET ORAL NIGHTLY
Status: DISCONTINUED | OUTPATIENT
Start: 2019-11-10 | End: 2019-11-22 | Stop reason: HOSPADM

## 2019-11-10 RX ORDER — AMIODARONE HYDROCHLORIDE 200 MG/1
200 TABLET ORAL DAILY
Status: DISCONTINUED | OUTPATIENT
Start: 2019-11-11 | End: 2019-11-10

## 2019-11-10 RX ORDER — DEXTROSE MONOHYDRATE 50 MG/ML
100 INJECTION, SOLUTION INTRAVENOUS PRN
Status: DISCONTINUED | OUTPATIENT
Start: 2019-11-10 | End: 2019-11-22 | Stop reason: HOSPADM

## 2019-11-10 RX ORDER — DULOXETIN HYDROCHLORIDE 30 MG/1
30 CAPSULE, DELAYED RELEASE ORAL DAILY
Status: DISCONTINUED | OUTPATIENT
Start: 2019-11-11 | End: 2019-11-22 | Stop reason: HOSPADM

## 2019-11-10 RX ORDER — POTASSIUM CHLORIDE 750 MG/1
40 TABLET, FILM COATED, EXTENDED RELEASE ORAL ONCE
Status: COMPLETED | OUTPATIENT
Start: 2019-11-10 | End: 2019-11-10

## 2019-11-10 RX ORDER — MORPHINE SULFATE 4 MG/ML
4 INJECTION, SOLUTION INTRAMUSCULAR; INTRAVENOUS ONCE
Status: COMPLETED | OUTPATIENT
Start: 2019-11-10 | End: 2019-11-10

## 2019-11-10 RX ORDER — TAMSULOSIN HYDROCHLORIDE 0.4 MG/1
0.4 CAPSULE ORAL DAILY
Status: DISCONTINUED | OUTPATIENT
Start: 2019-11-11 | End: 2019-11-22 | Stop reason: HOSPADM

## 2019-11-10 RX ORDER — NICOTINE POLACRILEX 4 MG
15 LOZENGE BUCCAL PRN
Status: DISCONTINUED | OUTPATIENT
Start: 2019-11-10 | End: 2019-11-22 | Stop reason: HOSPADM

## 2019-11-10 RX ORDER — 0.9 % SODIUM CHLORIDE 0.9 %
1000 INTRAVENOUS SOLUTION INTRAVENOUS ONCE
Status: COMPLETED | OUTPATIENT
Start: 2019-11-10 | End: 2019-11-10

## 2019-11-10 RX ORDER — SODIUM CHLORIDE 0.9 % (FLUSH) 0.9 %
10 SYRINGE (ML) INJECTION PRN
Status: DISCONTINUED | OUTPATIENT
Start: 2019-11-10 | End: 2019-11-14

## 2019-11-10 RX ORDER — LACTOBACILLUS RHAMNOSUS GG 10B CELL
1 CAPSULE ORAL 2 TIMES DAILY
Status: DISCONTINUED | OUTPATIENT
Start: 2019-11-10 | End: 2019-11-22 | Stop reason: HOSPADM

## 2019-11-10 RX ORDER — ALBUTEROL SULFATE 90 UG/1
2 AEROSOL, METERED RESPIRATORY (INHALATION) EVERY 4 HOURS PRN
Status: DISCONTINUED | OUTPATIENT
Start: 2019-11-10 | End: 2019-11-22 | Stop reason: HOSPADM

## 2019-11-10 RX ORDER — METOPROLOL SUCCINATE 25 MG/1
25 TABLET, EXTENDED RELEASE ORAL DAILY
Status: DISCONTINUED | OUTPATIENT
Start: 2019-11-11 | End: 2019-11-22 | Stop reason: HOSPADM

## 2019-11-10 RX ORDER — ONDANSETRON 2 MG/ML
4 INJECTION INTRAMUSCULAR; INTRAVENOUS ONCE
Status: COMPLETED | OUTPATIENT
Start: 2019-11-10 | End: 2019-11-10

## 2019-11-10 RX ORDER — DEXTROSE MONOHYDRATE 25 G/50ML
12.5 INJECTION, SOLUTION INTRAVENOUS PRN
Status: DISCONTINUED | OUTPATIENT
Start: 2019-11-10 | End: 2019-11-22 | Stop reason: HOSPADM

## 2019-11-10 RX ORDER — BISACODYL 10 MG
10 SUPPOSITORY, RECTAL RECTAL DAILY PRN
Status: DISCONTINUED | OUTPATIENT
Start: 2019-11-10 | End: 2019-11-22 | Stop reason: HOSPADM

## 2019-11-10 RX ORDER — AMIODARONE HYDROCHLORIDE 200 MG/1
200 TABLET ORAL NIGHTLY
Status: DISCONTINUED | OUTPATIENT
Start: 2019-11-10 | End: 2019-11-22 | Stop reason: HOSPADM

## 2019-11-10 RX ORDER — ONDANSETRON 2 MG/ML
4 INJECTION INTRAMUSCULAR; INTRAVENOUS EVERY 6 HOURS PRN
Status: DISCONTINUED | OUTPATIENT
Start: 2019-11-10 | End: 2019-11-22 | Stop reason: HOSPADM

## 2019-11-10 RX ORDER — PANTOPRAZOLE SODIUM 40 MG/1
40 TABLET, DELAYED RELEASE ORAL
Status: DISCONTINUED | OUTPATIENT
Start: 2019-11-11 | End: 2019-11-22 | Stop reason: HOSPADM

## 2019-11-10 RX ORDER — ATORVASTATIN CALCIUM 40 MG/1
40 TABLET, FILM COATED ORAL DAILY
Status: DISCONTINUED | OUTPATIENT
Start: 2019-11-11 | End: 2019-11-22 | Stop reason: HOSPADM

## 2019-11-10 RX ORDER — 0.9 % SODIUM CHLORIDE 0.9 %
1600 INTRAVENOUS SOLUTION INTRAVENOUS ONCE
Status: COMPLETED | OUTPATIENT
Start: 2019-11-10 | End: 2019-11-10

## 2019-11-10 RX ADMIN — MORPHINE SULFATE 4 MG: 4 INJECTION, SOLUTION INTRAMUSCULAR; INTRAVENOUS at 15:40

## 2019-11-10 RX ADMIN — PIPERACILLIN AND TAZOBACTAM 3.38 G: 3; .375 INJECTION, POWDER, LYOPHILIZED, FOR SOLUTION INTRAVENOUS at 23:36

## 2019-11-10 RX ADMIN — Medication 1 CAPSULE: at 20:41

## 2019-11-10 RX ADMIN — IOPAMIDOL 75 ML: 755 INJECTION, SOLUTION INTRAVENOUS at 16:00

## 2019-11-10 RX ADMIN — MORPHINE SULFATE 4 MG: 4 INJECTION, SOLUTION INTRAMUSCULAR; INTRAVENOUS at 17:17

## 2019-11-10 RX ADMIN — ONDANSETRON 4 MG: 2 INJECTION INTRAMUSCULAR; INTRAVENOUS at 15:40

## 2019-11-10 RX ADMIN — PIPERACILLIN AND TAZOBACTAM 3.38 G: 3; .375 INJECTION, POWDER, LYOPHILIZED, FOR SOLUTION INTRAVENOUS at 17:02

## 2019-11-10 RX ADMIN — POTASSIUM CHLORIDE 40 MEQ: 750 TABLET, FILM COATED, EXTENDED RELEASE ORAL at 20:40

## 2019-11-10 RX ADMIN — SODIUM CHLORIDE 1000 ML: 9 INJECTION, SOLUTION INTRAVENOUS at 15:39

## 2019-11-10 RX ADMIN — OXYCODONE HYDROCHLORIDE AND ACETAMINOPHEN 1 TABLET: 5; 325 TABLET ORAL at 20:41

## 2019-11-10 RX ADMIN — VANCOMYCIN HYDROCHLORIDE 1500 MG: 10 INJECTION, POWDER, LYOPHILIZED, FOR SOLUTION INTRAVENOUS at 20:44

## 2019-11-10 RX ADMIN — TRAZODONE HYDROCHLORIDE 50 MG: 50 TABLET ORAL at 20:41

## 2019-11-10 RX ADMIN — AMIODARONE HYDROCHLORIDE 200 MG: 200 TABLET ORAL at 23:38

## 2019-11-10 RX ADMIN — MAGNESIUM SULFATE HEPTAHYDRATE 2 G: 40 INJECTION, SOLUTION INTRAVENOUS at 20:41

## 2019-11-10 RX ADMIN — GABAPENTIN 200 MG: 100 CAPSULE ORAL at 20:41

## 2019-11-10 RX ADMIN — SODIUM CHLORIDE 1600 ML: 9 INJECTION, SOLUTION INTRAVENOUS at 16:58

## 2019-11-10 ASSESSMENT — PAIN - FUNCTIONAL ASSESSMENT: PAIN_FUNCTIONAL_ASSESSMENT: PREVENTS OR INTERFERES SOME ACTIVE ACTIVITIES AND ADLS

## 2019-11-10 ASSESSMENT — PAIN SCALES - GENERAL
PAINLEVEL_OUTOF10: 8
PAINLEVEL_OUTOF10: 8
PAINLEVEL_OUTOF10: 5
PAINLEVEL_OUTOF10: 10
PAINLEVEL_OUTOF10: 0
PAINLEVEL_OUTOF10: 10
PAINLEVEL_OUTOF10: 0

## 2019-11-10 ASSESSMENT — PAIN DESCRIPTION - PAIN TYPE: TYPE: ACUTE PAIN

## 2019-11-10 ASSESSMENT — PAIN DESCRIPTION - DESCRIPTORS: DESCRIPTORS: ACHING

## 2019-11-10 ASSESSMENT — PAIN DESCRIPTION - ONSET: ONSET: GRADUAL

## 2019-11-10 ASSESSMENT — PAIN DESCRIPTION - LOCATION
LOCATION: ABDOMEN
LOCATION: ABDOMEN

## 2019-11-10 ASSESSMENT — PAIN DESCRIPTION - PROGRESSION: CLINICAL_PROGRESSION: GRADUALLY WORSENING

## 2019-11-10 ASSESSMENT — PAIN DESCRIPTION - ORIENTATION: ORIENTATION: MID

## 2019-11-10 ASSESSMENT — PAIN DESCRIPTION - FREQUENCY: FREQUENCY: INTERMITTENT

## 2019-11-11 ENCOUNTER — APPOINTMENT (OUTPATIENT)
Dept: ULTRASOUND IMAGING | Age: 59
DRG: 721 | End: 2019-11-11
Payer: COMMERCIAL

## 2019-11-11 ENCOUNTER — APPOINTMENT (OUTPATIENT)
Dept: CT IMAGING | Age: 59
DRG: 721 | End: 2019-11-11
Payer: COMMERCIAL

## 2019-11-11 LAB
A/G RATIO: 0.5 (ref 1.1–2.2)
ALBUMIN SERPL-MCNC: 2.3 G/DL (ref 3.4–5)
ALP BLD-CCNC: 178 U/L (ref 40–129)
ALT SERPL-CCNC: 21 U/L (ref 10–40)
ANION GAP SERPL CALCULATED.3IONS-SCNC: 11 MMOL/L (ref 3–16)
AST SERPL-CCNC: 34 U/L (ref 15–37)
BASOPHILS ABSOLUTE: 0.1 K/UL (ref 0–0.2)
BASOPHILS RELATIVE PERCENT: 0.5 %
BILIRUB SERPL-MCNC: 0.4 MG/DL (ref 0–1)
BUN BLDV-MCNC: 13 MG/DL (ref 7–20)
CALCIUM SERPL-MCNC: 8 MG/DL (ref 8.3–10.6)
CHLORIDE BLD-SCNC: 90 MMOL/L (ref 99–110)
CO2: 35 MMOL/L (ref 21–32)
CREAT SERPL-MCNC: 0.8 MG/DL (ref 0.9–1.3)
EOSINOPHILS ABSOLUTE: 0.1 K/UL (ref 0–0.6)
EOSINOPHILS RELATIVE PERCENT: 0.4 %
ESTIMATED AVERAGE GLUCOSE: 114 MG/DL
GFR AFRICAN AMERICAN: >60
GFR NON-AFRICAN AMERICAN: >60
GLOBULIN: 5 G/DL
GLUCOSE BLD-MCNC: 113 MG/DL (ref 70–99)
GLUCOSE BLD-MCNC: 137 MG/DL (ref 70–99)
GLUCOSE BLD-MCNC: 141 MG/DL (ref 70–99)
GLUCOSE BLD-MCNC: 156 MG/DL (ref 70–99)
GLUCOSE BLD-MCNC: 158 MG/DL (ref 70–99)
HBA1C MFR BLD: 5.6 %
HCT VFR BLD CALC: 28.4 % (ref 40.5–52.5)
HEMATOLOGY PATH CONSULT: NO
HEMOGLOBIN: 9.4 G/DL (ref 13.5–17.5)
LYMPHOCYTES ABSOLUTE: 0.9 K/UL (ref 1–5.1)
LYMPHOCYTES RELATIVE PERCENT: 5.7 %
MAGNESIUM: 2 MG/DL (ref 1.8–2.4)
MCH RBC QN AUTO: 28.7 PG (ref 26–34)
MCHC RBC AUTO-ENTMCNC: 32.9 G/DL (ref 31–36)
MCV RBC AUTO: 87.3 FL (ref 80–100)
MONOCYTES ABSOLUTE: 1.9 K/UL (ref 0–1.3)
MONOCYTES RELATIVE PERCENT: 12.1 %
NEUTROPHILS ABSOLUTE: 12.6 K/UL (ref 1.7–7.7)
NEUTROPHILS RELATIVE PERCENT: 81.3 %
PDW BLD-RTO: 15.9 % (ref 12.4–15.4)
PERFORMED ON: ABNORMAL
PLATELET # BLD: 318 K/UL (ref 135–450)
PMV BLD AUTO: 8.7 FL (ref 5–10.5)
POTASSIUM REFLEX MAGNESIUM: 2.8 MMOL/L (ref 3.5–5.1)
POTASSIUM SERPL-SCNC: 3.7 MMOL/L (ref 3.5–5.1)
RBC # BLD: 3.26 M/UL (ref 4.2–5.9)
SODIUM BLD-SCNC: 136 MMOL/L (ref 136–145)
TOTAL PROTEIN: 7.3 G/DL (ref 6.4–8.2)
VANCOMYCIN RANDOM: 14.2 UG/ML
WBC # BLD: 15.6 K/UL (ref 4–11)

## 2019-11-11 PROCEDURE — 6360000002 HC RX W HCPCS: Performed by: INTERNAL MEDICINE

## 2019-11-11 PROCEDURE — 83735 ASSAY OF MAGNESIUM: CPT

## 2019-11-11 PROCEDURE — 6360000002 HC RX W HCPCS: Performed by: PHARMACIST

## 2019-11-11 PROCEDURE — 87076 CULTURE ANAEROBE IDENT EACH: CPT

## 2019-11-11 PROCEDURE — 87075 CULTR BACTERIA EXCEPT BLOOD: CPT

## 2019-11-11 PROCEDURE — 87102 FUNGUS ISOLATION CULTURE: CPT

## 2019-11-11 PROCEDURE — 6360000002 HC RX W HCPCS: Performed by: RADIOLOGY

## 2019-11-11 PROCEDURE — 85025 COMPLETE CBC W/AUTO DIFF WBC: CPT

## 2019-11-11 PROCEDURE — 0W9F3ZX DRAINAGE OF ABDOMINAL WALL, PERCUTANEOUS APPROACH, DIAGNOSTIC: ICD-10-PCS | Performed by: RADIOLOGY

## 2019-11-11 PROCEDURE — 2580000003 HC RX 258: Performed by: PHARMACIST

## 2019-11-11 PROCEDURE — 94761 N-INVAS EAR/PLS OXIMETRY MLT: CPT

## 2019-11-11 PROCEDURE — 2580000003 HC RX 258: Performed by: INTERNAL MEDICINE

## 2019-11-11 PROCEDURE — 6370000000 HC RX 637 (ALT 250 FOR IP): Performed by: INTERNAL MEDICINE

## 2019-11-11 PROCEDURE — 6370000000 HC RX 637 (ALT 250 FOR IP): Performed by: NURSE PRACTITIONER

## 2019-11-11 PROCEDURE — 0W9F30Z DRAINAGE OF ABDOMINAL WALL WITH DRAINAGE DEVICE, PERCUTANEOUS APPROACH: ICD-10-PCS | Performed by: RADIOLOGY

## 2019-11-11 PROCEDURE — 36415 COLL VENOUS BLD VENIPUNCTURE: CPT

## 2019-11-11 PROCEDURE — 2709999900 CT ABSCESS DRAINAGE SOFT TISSUE

## 2019-11-11 PROCEDURE — 80053 COMPREHEN METABOLIC PANEL: CPT

## 2019-11-11 PROCEDURE — 77012 CT SCAN FOR NEEDLE BIOPSY: CPT

## 2019-11-11 PROCEDURE — 83036 HEMOGLOBIN GLYCOSYLATED A1C: CPT

## 2019-11-11 PROCEDURE — 87070 CULTURE OTHR SPECIMN AEROBIC: CPT

## 2019-11-11 PROCEDURE — 87205 SMEAR GRAM STAIN: CPT

## 2019-11-11 PROCEDURE — 84132 ASSAY OF SERUM POTASSIUM: CPT

## 2019-11-11 PROCEDURE — 2060000000 HC ICU INTERMEDIATE R&B

## 2019-11-11 PROCEDURE — 80202 ASSAY OF VANCOMYCIN: CPT

## 2019-11-11 PROCEDURE — 99255 IP/OBS CONSLTJ NEW/EST HI 80: CPT | Performed by: INTERNAL MEDICINE

## 2019-11-11 PROCEDURE — 2700000000 HC OXYGEN THERAPY PER DAY

## 2019-11-11 PROCEDURE — 97162 PT EVAL MOD COMPLEX 30 MIN: CPT

## 2019-11-11 RX ORDER — MIDAZOLAM HYDROCHLORIDE 1 MG/ML
INJECTION INTRAMUSCULAR; INTRAVENOUS DAILY PRN
Status: COMPLETED | OUTPATIENT
Start: 2019-11-11 | End: 2019-11-11

## 2019-11-11 RX ORDER — FENTANYL CITRATE 50 UG/ML
INJECTION, SOLUTION INTRAMUSCULAR; INTRAVENOUS DAILY PRN
Status: COMPLETED | OUTPATIENT
Start: 2019-11-11 | End: 2019-11-11

## 2019-11-11 RX ORDER — POTASSIUM CHLORIDE 7.45 MG/ML
10 INJECTION INTRAVENOUS PRN
Status: DISCONTINUED | OUTPATIENT
Start: 2019-11-11 | End: 2019-11-22 | Stop reason: HOSPADM

## 2019-11-11 RX ORDER — SIMETHICONE 80 MG
80 TABLET,CHEWABLE ORAL 3 TIMES DAILY PRN
COMMUNITY

## 2019-11-11 RX ORDER — ALBUTEROL SULFATE 2.5 MG/3ML
2.5 SOLUTION RESPIRATORY (INHALATION) EVERY 6 HOURS PRN
COMMUNITY

## 2019-11-11 RX ORDER — OXYCODONE HYDROCHLORIDE AND ACETAMINOPHEN 5; 325 MG/1; MG/1
1 TABLET ORAL EVERY 4 HOURS PRN
Status: DISCONTINUED | OUTPATIENT
Start: 2019-11-11 | End: 2019-11-22 | Stop reason: HOSPADM

## 2019-11-11 RX ORDER — FLUCONAZOLE 2 MG/ML
200 INJECTION, SOLUTION INTRAVENOUS EVERY 24 HOURS
Status: DISCONTINUED | OUTPATIENT
Start: 2019-11-11 | End: 2019-11-18

## 2019-11-11 RX ORDER — POTASSIUM CHLORIDE 20 MEQ/1
40 TABLET, EXTENDED RELEASE ORAL PRN
Status: DISCONTINUED | OUTPATIENT
Start: 2019-11-11 | End: 2019-11-22 | Stop reason: HOSPADM

## 2019-11-11 RX ADMIN — GABAPENTIN 200 MG: 100 CAPSULE ORAL at 20:25

## 2019-11-11 RX ADMIN — FLUCONAZOLE 200 MG: 200 INJECTION, SOLUTION INTRAVENOUS at 13:12

## 2019-11-11 RX ADMIN — GABAPENTIN 200 MG: 100 CAPSULE ORAL at 09:16

## 2019-11-11 RX ADMIN — Medication 1 CAPSULE: at 20:25

## 2019-11-11 RX ADMIN — OXYCODONE HYDROCHLORIDE AND ACETAMINOPHEN 1 TABLET: 5; 325 TABLET ORAL at 02:42

## 2019-11-11 RX ADMIN — INSULIN LISPRO 1 UNITS: 100 INJECTION, SOLUTION INTRAVENOUS; SUBCUTANEOUS at 22:39

## 2019-11-11 RX ADMIN — VANCOMYCIN HYDROCHLORIDE 1000 MG: 1 INJECTION, POWDER, LYOPHILIZED, FOR SOLUTION INTRAVENOUS at 12:06

## 2019-11-11 RX ADMIN — MIDAZOLAM 1 MG: 1 INJECTION INTRAMUSCULAR; INTRAVENOUS at 11:15

## 2019-11-11 RX ADMIN — POTASSIUM CHLORIDE 10 MEQ: 10 INJECTION, SOLUTION INTRAVENOUS at 16:39

## 2019-11-11 RX ADMIN — OXYCODONE HYDROCHLORIDE AND ACETAMINOPHEN 1 TABLET: 5; 325 TABLET ORAL at 18:38

## 2019-11-11 RX ADMIN — Medication 1 CAPSULE: at 09:16

## 2019-11-11 RX ADMIN — SODIUM CHLORIDE, PRESERVATIVE FREE 10 ML: 5 INJECTION INTRAVENOUS at 10:13

## 2019-11-11 RX ADMIN — FENTANYL CITRATE 50 MCG: 50 INJECTION INTRAMUSCULAR; INTRAVENOUS at 11:15

## 2019-11-11 RX ADMIN — SODIUM CHLORIDE, PRESERVATIVE FREE 10 ML: 5 INJECTION INTRAVENOUS at 20:25

## 2019-11-11 RX ADMIN — OXYCODONE HYDROCHLORIDE AND ACETAMINOPHEN 1 TABLET: 5; 325 TABLET ORAL at 14:45

## 2019-11-11 RX ADMIN — ATORVASTATIN CALCIUM 40 MG: 40 TABLET, FILM COATED ORAL at 09:16

## 2019-11-11 RX ADMIN — OXYCODONE HYDROCHLORIDE AND ACETAMINOPHEN 1 TABLET: 5; 325 TABLET ORAL at 22:51

## 2019-11-11 RX ADMIN — TAMSULOSIN HYDROCHLORIDE 0.4 MG: 0.4 CAPSULE ORAL at 09:16

## 2019-11-11 RX ADMIN — PIPERACILLIN AND TAZOBACTAM 3.38 G: 3; .375 INJECTION, POWDER, LYOPHILIZED, FOR SOLUTION INTRAVENOUS at 16:39

## 2019-11-11 RX ADMIN — FENTANYL CITRATE 50 MCG: 50 INJECTION INTRAMUSCULAR; INTRAVENOUS at 11:33

## 2019-11-11 RX ADMIN — VANCOMYCIN HYDROCHLORIDE 1000 MG: 1 INJECTION, POWDER, LYOPHILIZED, FOR SOLUTION INTRAVENOUS at 22:53

## 2019-11-11 RX ADMIN — POTASSIUM CHLORIDE 10 MEQ: 10 INJECTION, SOLUTION INTRAVENOUS at 09:16

## 2019-11-11 RX ADMIN — PIPERACILLIN AND TAZOBACTAM 3.38 G: 3; .375 INJECTION, POWDER, LYOPHILIZED, FOR SOLUTION INTRAVENOUS at 10:33

## 2019-11-11 RX ADMIN — POTASSIUM CHLORIDE 10 MEQ: 10 INJECTION, SOLUTION INTRAVENOUS at 12:07

## 2019-11-11 RX ADMIN — POTASSIUM CHLORIDE 10 MEQ: 10 INJECTION, SOLUTION INTRAVENOUS at 15:30

## 2019-11-11 RX ADMIN — AMIODARONE HYDROCHLORIDE 200 MG: 200 TABLET ORAL at 20:25

## 2019-11-11 RX ADMIN — OXYCODONE HYDROCHLORIDE AND ACETAMINOPHEN 1 TABLET: 5; 325 TABLET ORAL at 09:16

## 2019-11-11 RX ADMIN — POTASSIUM CHLORIDE 10 MEQ: 10 INJECTION, SOLUTION INTRAVENOUS at 13:12

## 2019-11-11 RX ADMIN — METOPROLOL SUCCINATE 25 MG: 25 TABLET, EXTENDED RELEASE ORAL at 09:16

## 2019-11-11 RX ADMIN — POTASSIUM CHLORIDE 40 MEQ: 20 TABLET, EXTENDED RELEASE ORAL at 10:15

## 2019-11-11 RX ADMIN — PIPERACILLIN AND TAZOBACTAM 3.38 G: 3; .375 INJECTION, POWDER, LYOPHILIZED, FOR SOLUTION INTRAVENOUS at 05:59

## 2019-11-11 RX ADMIN — TRAZODONE HYDROCHLORIDE 50 MG: 50 TABLET ORAL at 20:25

## 2019-11-11 RX ADMIN — GABAPENTIN 200 MG: 100 CAPSULE ORAL at 13:16

## 2019-11-11 RX ADMIN — POTASSIUM CHLORIDE 10 MEQ: 10 INJECTION, SOLUTION INTRAVENOUS at 17:53

## 2019-11-11 RX ADMIN — DULOXETINE HYDROCHLORIDE 30 MG: 30 CAPSULE, DELAYED RELEASE ORAL at 09:16

## 2019-11-11 ASSESSMENT — PAIN - FUNCTIONAL ASSESSMENT
PAIN_FUNCTIONAL_ASSESSMENT: PREVENTS OR INTERFERES SOME ACTIVE ACTIVITIES AND ADLS
PAIN_FUNCTIONAL_ASSESSMENT: PREVENTS OR INTERFERES SOME ACTIVE ACTIVITIES AND ADLS
PAIN_FUNCTIONAL_ASSESSMENT: 0-10
PAIN_FUNCTIONAL_ASSESSMENT: PREVENTS OR INTERFERES SOME ACTIVE ACTIVITIES AND ADLS

## 2019-11-11 ASSESSMENT — PAIN DESCRIPTION - PROGRESSION
CLINICAL_PROGRESSION: GRADUALLY IMPROVING
CLINICAL_PROGRESSION: NOT CHANGED
CLINICAL_PROGRESSION: GRADUALLY WORSENING
CLINICAL_PROGRESSION: NOT CHANGED

## 2019-11-11 ASSESSMENT — PAIN SCALES - WONG BAKER
WONGBAKER_NUMERICALRESPONSE: 8
WONGBAKER_NUMERICALRESPONSE: 4

## 2019-11-11 ASSESSMENT — PAIN DESCRIPTION - PAIN TYPE
TYPE: ACUTE PAIN

## 2019-11-11 ASSESSMENT — PAIN SCALES - GENERAL
PAINLEVEL_OUTOF10: 8
PAINLEVEL_OUTOF10: 0
PAINLEVEL_OUTOF10: 8
PAINLEVEL_OUTOF10: 0
PAINLEVEL_OUTOF10: 10
PAINLEVEL_OUTOF10: 0
PAINLEVEL_OUTOF10: 8
PAINLEVEL_OUTOF10: 0
PAINLEVEL_OUTOF10: 7
PAINLEVEL_OUTOF10: 8
PAINLEVEL_OUTOF10: 6
PAINLEVEL_OUTOF10: 10
PAINLEVEL_OUTOF10: 0
PAINLEVEL_OUTOF10: 0
PAINLEVEL_OUTOF10: 5
PAINLEVEL_OUTOF10: 5

## 2019-11-11 ASSESSMENT — PAIN DESCRIPTION - ONSET
ONSET: ON-GOING

## 2019-11-11 ASSESSMENT — PAIN DESCRIPTION - DESCRIPTORS
DESCRIPTORS: ACHING
DESCRIPTORS: DISCOMFORT
DESCRIPTORS: ACHING
DESCRIPTORS: DISCOMFORT;NAGGING;JABBING
DESCRIPTORS: ACHING
DESCRIPTORS: DISCOMFORT;NAGGING;JABBING

## 2019-11-11 ASSESSMENT — PAIN DESCRIPTION - ORIENTATION
ORIENTATION: LOWER
ORIENTATION: LOWER
ORIENTATION: MID
ORIENTATION: LOWER

## 2019-11-11 ASSESSMENT — PAIN DESCRIPTION - LOCATION
LOCATION: ABDOMEN

## 2019-11-11 ASSESSMENT — PAIN DESCRIPTION - FREQUENCY
FREQUENCY: CONTINUOUS

## 2019-11-12 LAB
CREAT SERPL-MCNC: 0.7 MG/DL (ref 0.9–1.3)
GFR AFRICAN AMERICAN: >60
GFR NON-AFRICAN AMERICAN: >60
GLUCOSE BLD-MCNC: 108 MG/DL (ref 70–99)
GLUCOSE BLD-MCNC: 177 MG/DL (ref 70–99)
GLUCOSE BLD-MCNC: 177 MG/DL (ref 70–99)
GLUCOSE BLD-MCNC: 232 MG/DL (ref 70–99)
PERFORMED ON: ABNORMAL
VANCOMYCIN TROUGH: 14.1 UG/ML (ref 10–20)

## 2019-11-12 PROCEDURE — 6370000000 HC RX 637 (ALT 250 FOR IP): Performed by: INTERNAL MEDICINE

## 2019-11-12 PROCEDURE — 97530 THERAPEUTIC ACTIVITIES: CPT

## 2019-11-12 PROCEDURE — 6360000002 HC RX W HCPCS: Performed by: INTERNAL MEDICINE

## 2019-11-12 PROCEDURE — 6370000000 HC RX 637 (ALT 250 FOR IP): Performed by: NURSE PRACTITIONER

## 2019-11-12 PROCEDURE — 2580000003 HC RX 258: Performed by: INTERNAL MEDICINE

## 2019-11-12 PROCEDURE — 2700000000 HC OXYGEN THERAPY PER DAY

## 2019-11-12 PROCEDURE — 2060000000 HC ICU INTERMEDIATE R&B

## 2019-11-12 PROCEDURE — 80202 ASSAY OF VANCOMYCIN: CPT

## 2019-11-12 PROCEDURE — 94760 N-INVAS EAR/PLS OXIMETRY 1: CPT

## 2019-11-12 PROCEDURE — 6360000002 HC RX W HCPCS: Performed by: PHARMACIST

## 2019-11-12 PROCEDURE — 2580000003 HC RX 258: Performed by: PHARMACIST

## 2019-11-12 PROCEDURE — 99233 SBSQ HOSP IP/OBS HIGH 50: CPT | Performed by: INTERNAL MEDICINE

## 2019-11-12 PROCEDURE — 36415 COLL VENOUS BLD VENIPUNCTURE: CPT

## 2019-11-12 PROCEDURE — 97166 OT EVAL MOD COMPLEX 45 MIN: CPT

## 2019-11-12 PROCEDURE — 82565 ASSAY OF CREATININE: CPT

## 2019-11-12 RX ORDER — FUROSEMIDE 10 MG/ML
40 INJECTION INTRAMUSCULAR; INTRAVENOUS DAILY
Status: DISCONTINUED | OUTPATIENT
Start: 2019-11-12 | End: 2019-11-15

## 2019-11-12 RX ADMIN — Medication 1 CAPSULE: at 08:34

## 2019-11-12 RX ADMIN — ATORVASTATIN CALCIUM 40 MG: 40 TABLET, FILM COATED ORAL at 08:34

## 2019-11-12 RX ADMIN — INSULIN LISPRO 1 UNITS: 100 INJECTION, SOLUTION INTRAVENOUS; SUBCUTANEOUS at 09:14

## 2019-11-12 RX ADMIN — OXYCODONE HYDROCHLORIDE AND ACETAMINOPHEN 1 TABLET: 5; 325 TABLET ORAL at 08:34

## 2019-11-12 RX ADMIN — VANCOMYCIN HYDROCHLORIDE 1000 MG: 1 INJECTION, POWDER, LYOPHILIZED, FOR SOLUTION INTRAVENOUS at 23:57

## 2019-11-12 RX ADMIN — TAMSULOSIN HYDROCHLORIDE 0.4 MG: 0.4 CAPSULE ORAL at 08:34

## 2019-11-12 RX ADMIN — RIVAROXABAN 15 MG: 15 TABLET, FILM COATED ORAL at 18:10

## 2019-11-12 RX ADMIN — INSULIN LISPRO 2 UNITS: 100 INJECTION, SOLUTION INTRAVENOUS; SUBCUTANEOUS at 12:06

## 2019-11-12 RX ADMIN — Medication 1 CAPSULE: at 20:27

## 2019-11-12 RX ADMIN — OXYCODONE HYDROCHLORIDE AND ACETAMINOPHEN 1 TABLET: 5; 325 TABLET ORAL at 04:30

## 2019-11-12 RX ADMIN — OXYCODONE HYDROCHLORIDE AND ACETAMINOPHEN 1 TABLET: 5; 325 TABLET ORAL at 16:36

## 2019-11-12 RX ADMIN — SODIUM CHLORIDE, PRESERVATIVE FREE 10 ML: 5 INJECTION INTRAVENOUS at 20:29

## 2019-11-12 RX ADMIN — VANCOMYCIN HYDROCHLORIDE 1000 MG: 1 INJECTION, POWDER, LYOPHILIZED, FOR SOLUTION INTRAVENOUS at 11:25

## 2019-11-12 RX ADMIN — PIPERACILLIN AND TAZOBACTAM 3.38 G: 3; .375 INJECTION, POWDER, LYOPHILIZED, FOR SOLUTION INTRAVENOUS at 16:36

## 2019-11-12 RX ADMIN — SODIUM CHLORIDE, PRESERVATIVE FREE 10 ML: 5 INJECTION INTRAVENOUS at 09:25

## 2019-11-12 RX ADMIN — TRAZODONE HYDROCHLORIDE 50 MG: 50 TABLET ORAL at 20:27

## 2019-11-12 RX ADMIN — INSULIN LISPRO 1 UNITS: 100 INJECTION, SOLUTION INTRAVENOUS; SUBCUTANEOUS at 22:02

## 2019-11-12 RX ADMIN — GABAPENTIN 200 MG: 100 CAPSULE ORAL at 20:27

## 2019-11-12 RX ADMIN — DULOXETINE HYDROCHLORIDE 30 MG: 30 CAPSULE, DELAYED RELEASE ORAL at 08:34

## 2019-11-12 RX ADMIN — FLUCONAZOLE 200 MG: 200 INJECTION, SOLUTION INTRAVENOUS at 12:28

## 2019-11-12 RX ADMIN — GABAPENTIN 200 MG: 100 CAPSULE ORAL at 12:28

## 2019-11-12 RX ADMIN — AMIODARONE HYDROCHLORIDE 200 MG: 200 TABLET ORAL at 20:27

## 2019-11-12 RX ADMIN — GABAPENTIN 200 MG: 100 CAPSULE ORAL at 08:34

## 2019-11-12 RX ADMIN — PIPERACILLIN AND TAZOBACTAM 3.38 G: 3; .375 INJECTION, POWDER, LYOPHILIZED, FOR SOLUTION INTRAVENOUS at 00:18

## 2019-11-12 RX ADMIN — PIPERACILLIN AND TAZOBACTAM 3.38 G: 3; .375 INJECTION, POWDER, LYOPHILIZED, FOR SOLUTION INTRAVENOUS at 08:34

## 2019-11-12 RX ADMIN — FUROSEMIDE 40 MG: 10 INJECTION, SOLUTION INTRAMUSCULAR; INTRAVENOUS at 16:37

## 2019-11-12 RX ADMIN — PANTOPRAZOLE SODIUM 40 MG: 40 TABLET, DELAYED RELEASE ORAL at 06:42

## 2019-11-12 RX ADMIN — METOPROLOL SUCCINATE 25 MG: 25 TABLET, EXTENDED RELEASE ORAL at 08:34

## 2019-11-12 RX ADMIN — OXYCODONE HYDROCHLORIDE AND ACETAMINOPHEN 1 TABLET: 5; 325 TABLET ORAL at 20:26

## 2019-11-12 RX ADMIN — OXYCODONE HYDROCHLORIDE AND ACETAMINOPHEN 1 TABLET: 5; 325 TABLET ORAL at 12:28

## 2019-11-12 ASSESSMENT — PAIN DESCRIPTION - DESCRIPTORS
DESCRIPTORS: ACHING
DESCRIPTORS: ACHING;DISCOMFORT
DESCRIPTORS: ACHING;DISCOMFORT
DESCRIPTORS: ACHING
DESCRIPTORS: DISCOMFORT;NAGGING;JABBING

## 2019-11-12 ASSESSMENT — PAIN SCALES - GENERAL
PAINLEVEL_OUTOF10: 5
PAINLEVEL_OUTOF10: 8
PAINLEVEL_OUTOF10: 8
PAINLEVEL_OUTOF10: 1
PAINLEVEL_OUTOF10: 8
PAINLEVEL_OUTOF10: 5
PAINLEVEL_OUTOF10: 6
PAINLEVEL_OUTOF10: 0
PAINLEVEL_OUTOF10: 6
PAINLEVEL_OUTOF10: 0
PAINLEVEL_OUTOF10: 8
PAINLEVEL_OUTOF10: 8
PAINLEVEL_OUTOF10: 10
PAINLEVEL_OUTOF10: 10

## 2019-11-12 ASSESSMENT — PAIN DESCRIPTION - ONSET
ONSET: ON-GOING

## 2019-11-12 ASSESSMENT — PAIN DESCRIPTION - ORIENTATION
ORIENTATION: LOWER
ORIENTATION: LOWER;RIGHT;LEFT
ORIENTATION: RIGHT;LEFT;LOWER
ORIENTATION: MID;LOWER;RIGHT
ORIENTATION: RIGHT;LEFT;MID

## 2019-11-12 ASSESSMENT — PAIN DESCRIPTION - PROGRESSION
CLINICAL_PROGRESSION: GRADUALLY WORSENING
CLINICAL_PROGRESSION: NOT CHANGED

## 2019-11-12 ASSESSMENT — PAIN DESCRIPTION - PAIN TYPE
TYPE: ACUTE PAIN

## 2019-11-12 ASSESSMENT — PAIN DESCRIPTION - LOCATION
LOCATION: ABDOMEN

## 2019-11-12 ASSESSMENT — PAIN SCALES - WONG BAKER
WONGBAKER_NUMERICALRESPONSE: 8
WONGBAKER_NUMERICALRESPONSE: 8
WONGBAKER_NUMERICALRESPONSE: 6
WONGBAKER_NUMERICALRESPONSE: 6
WONGBAKER_NUMERICALRESPONSE: 8

## 2019-11-12 ASSESSMENT — PAIN DESCRIPTION - FREQUENCY
FREQUENCY: CONTINUOUS

## 2019-11-13 LAB
BASOPHILS ABSOLUTE: 0.1 K/UL (ref 0–0.2)
BASOPHILS RELATIVE PERCENT: 0.5 %
C-REACTIVE PROTEIN: 171.8 MG/L (ref 0–5.1)
C. TRACHOMATIS DNA ,URINE: NEGATIVE
EOSINOPHILS ABSOLUTE: 0.3 K/UL (ref 0–0.6)
EOSINOPHILS RELATIVE PERCENT: 2.7 %
GLUCOSE BLD-MCNC: 125 MG/DL (ref 70–99)
GLUCOSE BLD-MCNC: 148 MG/DL (ref 70–99)
GLUCOSE BLD-MCNC: 159 MG/DL (ref 70–99)
GLUCOSE BLD-MCNC: 166 MG/DL (ref 70–99)
HCT VFR BLD CALC: 24.4 % (ref 40.5–52.5)
HEMOGLOBIN: 8 G/DL (ref 13.5–17.5)
LYMPHOCYTES ABSOLUTE: 0.9 K/UL (ref 1–5.1)
LYMPHOCYTES RELATIVE PERCENT: 8.6 %
MCH RBC QN AUTO: 28.6 PG (ref 26–34)
MCHC RBC AUTO-ENTMCNC: 32.8 G/DL (ref 31–36)
MCV RBC AUTO: 87.2 FL (ref 80–100)
MONOCYTES ABSOLUTE: 1.2 K/UL (ref 0–1.3)
MONOCYTES RELATIVE PERCENT: 12 %
N. GONORRHOEAE DNA, URINE: NEGATIVE
NEUTROPHILS ABSOLUTE: 7.5 K/UL (ref 1.7–7.7)
NEUTROPHILS RELATIVE PERCENT: 76.2 %
PDW BLD-RTO: 15.6 % (ref 12.4–15.4)
PERFORMED ON: ABNORMAL
PLATELET # BLD: 289 K/UL (ref 135–450)
PMV BLD AUTO: 8.7 FL (ref 5–10.5)
RBC # BLD: 2.8 M/UL (ref 4.2–5.9)
SEDIMENTATION RATE, ERYTHROCYTE: 110 MM/HR (ref 0–20)
WBC # BLD: 9.9 K/UL (ref 4–11)

## 2019-11-13 PROCEDURE — 2580000003 HC RX 258: Performed by: INTERNAL MEDICINE

## 2019-11-13 PROCEDURE — 6370000000 HC RX 637 (ALT 250 FOR IP): Performed by: INTERNAL MEDICINE

## 2019-11-13 PROCEDURE — 97530 THERAPEUTIC ACTIVITIES: CPT

## 2019-11-13 PROCEDURE — 6360000002 HC RX W HCPCS: Performed by: INTERNAL MEDICINE

## 2019-11-13 PROCEDURE — 99233 SBSQ HOSP IP/OBS HIGH 50: CPT | Performed by: INTERNAL MEDICINE

## 2019-11-13 PROCEDURE — 2060000000 HC ICU INTERMEDIATE R&B

## 2019-11-13 PROCEDURE — 6370000000 HC RX 637 (ALT 250 FOR IP): Performed by: NURSE PRACTITIONER

## 2019-11-13 PROCEDURE — 2700000000 HC OXYGEN THERAPY PER DAY

## 2019-11-13 PROCEDURE — 36415 COLL VENOUS BLD VENIPUNCTURE: CPT

## 2019-11-13 PROCEDURE — 94761 N-INVAS EAR/PLS OXIMETRY MLT: CPT

## 2019-11-13 PROCEDURE — 6360000002 HC RX W HCPCS: Performed by: PHARMACIST

## 2019-11-13 PROCEDURE — 85025 COMPLETE CBC W/AUTO DIFF WBC: CPT

## 2019-11-13 PROCEDURE — APPSS15 APP SPLIT SHARED TIME 0-15 MINUTES: Performed by: PHYSICIAN ASSISTANT

## 2019-11-13 PROCEDURE — APPNB30 APP NON BILLABLE TIME 0-30 MINS: Performed by: PHYSICIAN ASSISTANT

## 2019-11-13 PROCEDURE — 2580000003 HC RX 258: Performed by: PHARMACIST

## 2019-11-13 PROCEDURE — 85652 RBC SED RATE AUTOMATED: CPT

## 2019-11-13 PROCEDURE — 86140 C-REACTIVE PROTEIN: CPT

## 2019-11-13 RX ADMIN — PANTOPRAZOLE SODIUM 40 MG: 40 TABLET, DELAYED RELEASE ORAL at 05:35

## 2019-11-13 RX ADMIN — OXYCODONE HYDROCHLORIDE AND ACETAMINOPHEN 1 TABLET: 5; 325 TABLET ORAL at 21:28

## 2019-11-13 RX ADMIN — INSULIN LISPRO 1 UNITS: 100 INJECTION, SOLUTION INTRAVENOUS; SUBCUTANEOUS at 12:19

## 2019-11-13 RX ADMIN — PIPERACILLIN AND TAZOBACTAM 3.38 G: 3; .375 INJECTION, POWDER, LYOPHILIZED, FOR SOLUTION INTRAVENOUS at 01:12

## 2019-11-13 RX ADMIN — INSULIN LISPRO 1 UNITS: 100 INJECTION, SOLUTION INTRAVENOUS; SUBCUTANEOUS at 08:44

## 2019-11-13 RX ADMIN — TAMSULOSIN HYDROCHLORIDE 0.4 MG: 0.4 CAPSULE ORAL at 08:43

## 2019-11-13 RX ADMIN — OXYCODONE HYDROCHLORIDE AND ACETAMINOPHEN 1 TABLET: 5; 325 TABLET ORAL at 09:22

## 2019-11-13 RX ADMIN — SODIUM CHLORIDE, PRESERVATIVE FREE 10 ML: 5 INJECTION INTRAVENOUS at 20:47

## 2019-11-13 RX ADMIN — DULOXETINE HYDROCHLORIDE 30 MG: 30 CAPSULE, DELAYED RELEASE ORAL at 08:43

## 2019-11-13 RX ADMIN — METOPROLOL SUCCINATE 25 MG: 25 TABLET, EXTENDED RELEASE ORAL at 08:43

## 2019-11-13 RX ADMIN — Medication 1 CAPSULE: at 08:43

## 2019-11-13 RX ADMIN — OXYCODONE HYDROCHLORIDE AND ACETAMINOPHEN 1 TABLET: 5; 325 TABLET ORAL at 05:35

## 2019-11-13 RX ADMIN — ATORVASTATIN CALCIUM 40 MG: 40 TABLET, FILM COATED ORAL at 08:43

## 2019-11-13 RX ADMIN — PIPERACILLIN AND TAZOBACTAM 3.38 G: 3; .375 INJECTION, POWDER, LYOPHILIZED, FOR SOLUTION INTRAVENOUS at 17:37

## 2019-11-13 RX ADMIN — FUROSEMIDE 40 MG: 10 INJECTION, SOLUTION INTRAMUSCULAR; INTRAVENOUS at 08:44

## 2019-11-13 RX ADMIN — PIPERACILLIN AND TAZOBACTAM 3.38 G: 3; .375 INJECTION, POWDER, LYOPHILIZED, FOR SOLUTION INTRAVENOUS at 08:44

## 2019-11-13 RX ADMIN — VANCOMYCIN HYDROCHLORIDE 1000 MG: 1 INJECTION, POWDER, LYOPHILIZED, FOR SOLUTION INTRAVENOUS at 11:25

## 2019-11-13 RX ADMIN — AMIODARONE HYDROCHLORIDE 200 MG: 200 TABLET ORAL at 20:44

## 2019-11-13 RX ADMIN — SODIUM CHLORIDE, PRESERVATIVE FREE 10 ML: 5 INJECTION INTRAVENOUS at 08:51

## 2019-11-13 RX ADMIN — OXYCODONE HYDROCHLORIDE AND ACETAMINOPHEN 1 TABLET: 5; 325 TABLET ORAL at 13:48

## 2019-11-13 RX ADMIN — GABAPENTIN 200 MG: 100 CAPSULE ORAL at 08:43

## 2019-11-13 RX ADMIN — Medication 1 CAPSULE: at 20:44

## 2019-11-13 RX ADMIN — GABAPENTIN 200 MG: 100 CAPSULE ORAL at 13:49

## 2019-11-13 RX ADMIN — FLUCONAZOLE 200 MG: 200 INJECTION, SOLUTION INTRAVENOUS at 13:49

## 2019-11-13 RX ADMIN — INSULIN LISPRO 1 UNITS: 100 INJECTION, SOLUTION INTRAVENOUS; SUBCUTANEOUS at 17:36

## 2019-11-13 RX ADMIN — TRAZODONE HYDROCHLORIDE 50 MG: 50 TABLET ORAL at 20:44

## 2019-11-13 RX ADMIN — OXYCODONE HYDROCHLORIDE AND ACETAMINOPHEN 1 TABLET: 5; 325 TABLET ORAL at 01:15

## 2019-11-13 RX ADMIN — OXYCODONE HYDROCHLORIDE AND ACETAMINOPHEN 1 TABLET: 5; 325 TABLET ORAL at 17:36

## 2019-11-13 RX ADMIN — RIVAROXABAN 15 MG: 15 TABLET, FILM COATED ORAL at 17:36

## 2019-11-13 RX ADMIN — GABAPENTIN 200 MG: 100 CAPSULE ORAL at 20:44

## 2019-11-13 ASSESSMENT — PAIN DESCRIPTION - PAIN TYPE
TYPE: ACUTE PAIN
TYPE: ACUTE PAIN

## 2019-11-13 ASSESSMENT — PAIN SCALES - GENERAL
PAINLEVEL_OUTOF10: 0
PAINLEVEL_OUTOF10: 8
PAINLEVEL_OUTOF10: 0
PAINLEVEL_OUTOF10: 0
PAINLEVEL_OUTOF10: 3
PAINLEVEL_OUTOF10: 4
PAINLEVEL_OUTOF10: 7
PAINLEVEL_OUTOF10: 7
PAINLEVEL_OUTOF10: 0
PAINLEVEL_OUTOF10: 8
PAINLEVEL_OUTOF10: 0
PAINLEVEL_OUTOF10: 10
PAINLEVEL_OUTOF10: 2
PAINLEVEL_OUTOF10: 0
PAINLEVEL_OUTOF10: 7
PAINLEVEL_OUTOF10: 8

## 2019-11-13 ASSESSMENT — PAIN DESCRIPTION - ORIENTATION
ORIENTATION: MID;LOWER;RIGHT
ORIENTATION: LOWER;MID
ORIENTATION: MID;LOWER
ORIENTATION: RIGHT;LEFT
ORIENTATION: LOWER;MID

## 2019-11-13 ASSESSMENT — PAIN DESCRIPTION - PROGRESSION
CLINICAL_PROGRESSION: GRADUALLY WORSENING

## 2019-11-13 ASSESSMENT — PAIN DESCRIPTION - LOCATION
LOCATION: ABDOMEN
LOCATION: ABDOMEN
LOCATION: BACK;SHOULDER
LOCATION: BACK
LOCATION: GENERALIZED
LOCATION: BACK

## 2019-11-13 ASSESSMENT — PAIN - FUNCTIONAL ASSESSMENT
PAIN_FUNCTIONAL_ASSESSMENT: PREVENTS OR INTERFERES WITH MANY ACTIVE NOT PASSIVE ACTIVITIES
PAIN_FUNCTIONAL_ASSESSMENT: PREVENTS OR INTERFERES WITH MANY ACTIVE NOT PASSIVE ACTIVITIES
PAIN_FUNCTIONAL_ASSESSMENT: PREVENTS OR INTERFERES SOME ACTIVE ACTIVITIES AND ADLS
PAIN_FUNCTIONAL_ASSESSMENT: PREVENTS OR INTERFERES WITH MANY ACTIVE NOT PASSIVE ACTIVITIES
PAIN_FUNCTIONAL_ASSESSMENT: ACTIVITIES ARE NOT PREVENTED

## 2019-11-13 ASSESSMENT — PAIN SCALES - WONG BAKER
WONGBAKER_NUMERICALRESPONSE: 6
WONGBAKER_NUMERICALRESPONSE: 6
WONGBAKER_NUMERICALRESPONSE: 4

## 2019-11-13 ASSESSMENT — PAIN DESCRIPTION - ONSET
ONSET: ON-GOING

## 2019-11-13 ASSESSMENT — PAIN DESCRIPTION - FREQUENCY
FREQUENCY: CONTINUOUS

## 2019-11-13 ASSESSMENT — PAIN DESCRIPTION - DESCRIPTORS
DESCRIPTORS: ACHING
DESCRIPTORS: ACHING;DISCOMFORT
DESCRIPTORS: ACHING;DISCOMFORT

## 2019-11-14 ENCOUNTER — TELEPHONE (OUTPATIENT)
Dept: INTERNAL MEDICINE CLINIC | Age: 59
End: 2019-11-14

## 2019-11-14 ENCOUNTER — APPOINTMENT (OUTPATIENT)
Dept: GENERAL RADIOLOGY | Age: 59
DRG: 721 | End: 2019-11-14
Payer: COMMERCIAL

## 2019-11-14 LAB
ANAEROBIC CULTURE: ABNORMAL
CREAT SERPL-MCNC: 1.2 MG/DL (ref 0.9–1.3)
GFR AFRICAN AMERICAN: >60
GFR NON-AFRICAN AMERICAN: >60
GLUCOSE BLD-MCNC: 143 MG/DL (ref 70–99)
GLUCOSE BLD-MCNC: 150 MG/DL (ref 70–99)
GLUCOSE BLD-MCNC: 156 MG/DL (ref 70–99)
GLUCOSE BLD-MCNC: 187 MG/DL (ref 70–99)
GRAM STAIN RESULT: ABNORMAL
ORGANISM: ABNORMAL
PERFORMED ON: ABNORMAL
WOUND/ABSCESS: ABNORMAL

## 2019-11-14 PROCEDURE — 82565 ASSAY OF CREATININE: CPT

## 2019-11-14 PROCEDURE — 94761 N-INVAS EAR/PLS OXIMETRY MLT: CPT

## 2019-11-14 PROCEDURE — 6370000000 HC RX 637 (ALT 250 FOR IP): Performed by: INTERNAL MEDICINE

## 2019-11-14 PROCEDURE — 71045 X-RAY EXAM CHEST 1 VIEW: CPT

## 2019-11-14 PROCEDURE — 99233 SBSQ HOSP IP/OBS HIGH 50: CPT | Performed by: INTERNAL MEDICINE

## 2019-11-14 PROCEDURE — 6360000002 HC RX W HCPCS: Performed by: INTERNAL MEDICINE

## 2019-11-14 PROCEDURE — APPNB30 APP NON BILLABLE TIME 0-30 MINS: Performed by: NURSE PRACTITIONER

## 2019-11-14 PROCEDURE — 36569 INSJ PICC 5 YR+ W/O IMAGING: CPT

## 2019-11-14 PROCEDURE — 36415 COLL VENOUS BLD VENIPUNCTURE: CPT

## 2019-11-14 PROCEDURE — 97530 THERAPEUTIC ACTIVITIES: CPT

## 2019-11-14 PROCEDURE — 6370000000 HC RX 637 (ALT 250 FOR IP): Performed by: NURSE PRACTITIONER

## 2019-11-14 PROCEDURE — 2060000000 HC ICU INTERMEDIATE R&B

## 2019-11-14 PROCEDURE — 6360000002 HC RX W HCPCS: Performed by: NURSE PRACTITIONER

## 2019-11-14 PROCEDURE — APPSS30 APP SPLIT SHARED TIME 16-30 MINUTES: Performed by: NURSE PRACTITIONER

## 2019-11-14 PROCEDURE — C1751 CATH, INF, PER/CENT/MIDLINE: HCPCS

## 2019-11-14 PROCEDURE — 2580000003 HC RX 258: Performed by: INTERNAL MEDICINE

## 2019-11-14 PROCEDURE — 94640 AIRWAY INHALATION TREATMENT: CPT

## 2019-11-14 PROCEDURE — 02HV33Z INSERTION OF INFUSION DEVICE INTO SUPERIOR VENA CAVA, PERCUTANEOUS APPROACH: ICD-10-PCS | Performed by: SURGERY

## 2019-11-14 PROCEDURE — 2700000000 HC OXYGEN THERAPY PER DAY

## 2019-11-14 PROCEDURE — 76937 US GUIDE VASCULAR ACCESS: CPT

## 2019-11-14 RX ORDER — SODIUM CHLORIDE 0.9 % (FLUSH) 0.9 %
10 SYRINGE (ML) INJECTION EVERY 12 HOURS SCHEDULED
Status: DISCONTINUED | OUTPATIENT
Start: 2019-11-14 | End: 2019-11-22 | Stop reason: HOSPADM

## 2019-11-14 RX ORDER — ALBUTEROL SULFATE 2.5 MG/3ML
2.5 SOLUTION RESPIRATORY (INHALATION)
Status: DISCONTINUED | OUTPATIENT
Start: 2019-11-14 | End: 2019-11-22 | Stop reason: HOSPADM

## 2019-11-14 RX ORDER — LIDOCAINE HYDROCHLORIDE 10 MG/ML
5 INJECTION, SOLUTION EPIDURAL; INFILTRATION; INTRACAUDAL; PERINEURAL ONCE
Status: DISCONTINUED | OUTPATIENT
Start: 2019-11-14 | End: 2019-11-22 | Stop reason: HOSPADM

## 2019-11-14 RX ORDER — SODIUM CHLORIDE 0.9 % (FLUSH) 0.9 %
10 SYRINGE (ML) INJECTION PRN
Status: DISCONTINUED | OUTPATIENT
Start: 2019-11-14 | End: 2019-11-22 | Stop reason: HOSPADM

## 2019-11-14 RX ADMIN — ALBUTEROL SULFATE 2.5 MG: 2.5 SOLUTION RESPIRATORY (INHALATION) at 20:09

## 2019-11-14 RX ADMIN — INSULIN LISPRO 1 UNITS: 100 INJECTION, SOLUTION INTRAVENOUS; SUBCUTANEOUS at 18:09

## 2019-11-14 RX ADMIN — INSULIN LISPRO 1 UNITS: 100 INJECTION, SOLUTION INTRAVENOUS; SUBCUTANEOUS at 09:44

## 2019-11-14 RX ADMIN — AMIODARONE HYDROCHLORIDE 200 MG: 200 TABLET ORAL at 21:21

## 2019-11-14 RX ADMIN — ALBUTEROL SULFATE 2.5 MG: 2.5 SOLUTION RESPIRATORY (INHALATION) at 01:26

## 2019-11-14 RX ADMIN — PIPERACILLIN AND TAZOBACTAM 3.38 G: 3; .375 INJECTION, POWDER, LYOPHILIZED, FOR SOLUTION INTRAVENOUS at 00:57

## 2019-11-14 RX ADMIN — OXYCODONE HYDROCHLORIDE AND ACETAMINOPHEN 1 TABLET: 5; 325 TABLET ORAL at 23:59

## 2019-11-14 RX ADMIN — OXYCODONE HYDROCHLORIDE AND ACETAMINOPHEN 1 TABLET: 5; 325 TABLET ORAL at 18:09

## 2019-11-14 RX ADMIN — INSULIN LISPRO 1 UNITS: 100 INJECTION, SOLUTION INTRAVENOUS; SUBCUTANEOUS at 21:22

## 2019-11-14 RX ADMIN — TRAZODONE HYDROCHLORIDE 50 MG: 50 TABLET ORAL at 21:21

## 2019-11-14 RX ADMIN — ALBUTEROL SULFATE 2.5 MG: 2.5 SOLUTION RESPIRATORY (INHALATION) at 09:26

## 2019-11-14 RX ADMIN — METOPROLOL SUCCINATE 25 MG: 25 TABLET, EXTENDED RELEASE ORAL at 09:44

## 2019-11-14 RX ADMIN — FUROSEMIDE 40 MG: 10 INJECTION, SOLUTION INTRAMUSCULAR; INTRAVENOUS at 09:44

## 2019-11-14 RX ADMIN — Medication 1 CAPSULE: at 21:21

## 2019-11-14 RX ADMIN — FLUCONAZOLE 200 MG: 200 INJECTION, SOLUTION INTRAVENOUS at 14:00

## 2019-11-14 RX ADMIN — INSULIN LISPRO 1 UNITS: 100 INJECTION, SOLUTION INTRAVENOUS; SUBCUTANEOUS at 13:59

## 2019-11-14 RX ADMIN — Medication 1 CAPSULE: at 09:44

## 2019-11-14 RX ADMIN — SODIUM CHLORIDE, PRESERVATIVE FREE 10 ML: 5 INJECTION INTRAVENOUS at 09:44

## 2019-11-14 RX ADMIN — OXYCODONE HYDROCHLORIDE AND ACETAMINOPHEN 1 TABLET: 5; 325 TABLET ORAL at 05:31

## 2019-11-14 RX ADMIN — RIVAROXABAN 15 MG: 15 TABLET, FILM COATED ORAL at 18:08

## 2019-11-14 RX ADMIN — OXYCODONE HYDROCHLORIDE AND ACETAMINOPHEN 1 TABLET: 5; 325 TABLET ORAL at 09:44

## 2019-11-14 RX ADMIN — PIPERACILLIN AND TAZOBACTAM 3.38 G: 3; .375 INJECTION, POWDER, LYOPHILIZED, FOR SOLUTION INTRAVENOUS at 09:45

## 2019-11-14 RX ADMIN — ATORVASTATIN CALCIUM 40 MG: 40 TABLET, FILM COATED ORAL at 09:44

## 2019-11-14 RX ADMIN — PIPERACILLIN AND TAZOBACTAM 3.38 G: 3; .375 INJECTION, POWDER, LYOPHILIZED, FOR SOLUTION INTRAVENOUS at 18:08

## 2019-11-14 RX ADMIN — PANTOPRAZOLE SODIUM 40 MG: 40 TABLET, DELAYED RELEASE ORAL at 05:31

## 2019-11-14 RX ADMIN — GABAPENTIN 200 MG: 100 CAPSULE ORAL at 13:59

## 2019-11-14 RX ADMIN — GABAPENTIN 200 MG: 100 CAPSULE ORAL at 21:21

## 2019-11-14 RX ADMIN — OXYCODONE HYDROCHLORIDE AND ACETAMINOPHEN 1 TABLET: 5; 325 TABLET ORAL at 13:59

## 2019-11-14 RX ADMIN — DULOXETINE HYDROCHLORIDE 30 MG: 30 CAPSULE, DELAYED RELEASE ORAL at 09:44

## 2019-11-14 RX ADMIN — SODIUM CHLORIDE, PRESERVATIVE FREE 10 ML: 5 INJECTION INTRAVENOUS at 23:56

## 2019-11-14 RX ADMIN — OXYCODONE HYDROCHLORIDE AND ACETAMINOPHEN 1 TABLET: 5; 325 TABLET ORAL at 01:19

## 2019-11-14 RX ADMIN — TAMSULOSIN HYDROCHLORIDE 0.4 MG: 0.4 CAPSULE ORAL at 09:44

## 2019-11-14 RX ADMIN — GABAPENTIN 200 MG: 100 CAPSULE ORAL at 09:44

## 2019-11-14 ASSESSMENT — PAIN SCALES - GENERAL
PAINLEVEL_OUTOF10: 4
PAINLEVEL_OUTOF10: 9
PAINLEVEL_OUTOF10: 8
PAINLEVEL_OUTOF10: 4
PAINLEVEL_OUTOF10: 9
PAINLEVEL_OUTOF10: 9
PAINLEVEL_OUTOF10: 8
PAINLEVEL_OUTOF10: 9
PAINLEVEL_OUTOF10: 3
PAINLEVEL_OUTOF10: 3
PAINLEVEL_OUTOF10: 9
PAINLEVEL_OUTOF10: 2
PAINLEVEL_OUTOF10: 10

## 2019-11-14 ASSESSMENT — PAIN DESCRIPTION - FREQUENCY: FREQUENCY: CONTINUOUS

## 2019-11-14 ASSESSMENT — PAIN - FUNCTIONAL ASSESSMENT: PAIN_FUNCTIONAL_ASSESSMENT: PREVENTS OR INTERFERES SOME ACTIVE ACTIVITIES AND ADLS

## 2019-11-14 ASSESSMENT — PAIN DESCRIPTION - PROGRESSION
CLINICAL_PROGRESSION: GRADUALLY WORSENING
CLINICAL_PROGRESSION: GRADUALLY WORSENING
CLINICAL_PROGRESSION: NOT CHANGED
CLINICAL_PROGRESSION: GRADUALLY WORSENING
CLINICAL_PROGRESSION: GRADUALLY WORSENING

## 2019-11-14 ASSESSMENT — PAIN DESCRIPTION - LOCATION: LOCATION: ABDOMEN

## 2019-11-14 ASSESSMENT — PAIN DESCRIPTION - DESCRIPTORS: DESCRIPTORS: ACHING

## 2019-11-14 ASSESSMENT — PAIN DESCRIPTION - ORIENTATION: ORIENTATION: MID

## 2019-11-14 ASSESSMENT — PAIN DESCRIPTION - PAIN TYPE: TYPE: ACUTE PAIN

## 2019-11-14 ASSESSMENT — PAIN DESCRIPTION - ONSET: ONSET: ON-GOING

## 2019-11-15 ENCOUNTER — APPOINTMENT (OUTPATIENT)
Dept: CT IMAGING | Age: 59
DRG: 721 | End: 2019-11-15
Payer: COMMERCIAL

## 2019-11-15 PROBLEM — E44.0 MODERATE MALNUTRITION (HCC): Status: ACTIVE | Noted: 2019-11-15

## 2019-11-15 LAB
ANION GAP SERPL CALCULATED.3IONS-SCNC: 10 MMOL/L (ref 3–16)
BASOPHILS ABSOLUTE: 0.1 K/UL (ref 0–0.2)
BASOPHILS RELATIVE PERCENT: 0.6 %
BLOOD CULTURE, ROUTINE: NORMAL
BUN BLDV-MCNC: 20 MG/DL (ref 7–20)
CALCIUM SERPL-MCNC: 8.6 MG/DL (ref 8.3–10.6)
CHLORIDE BLD-SCNC: 87 MMOL/L (ref 99–110)
CO2: 38 MMOL/L (ref 21–32)
CREAT SERPL-MCNC: 1.4 MG/DL (ref 0.9–1.3)
CULTURE, BLOOD 2: NORMAL
EOSINOPHILS ABSOLUTE: 0.3 K/UL (ref 0–0.6)
EOSINOPHILS RELATIVE PERCENT: 2.6 %
GFR AFRICAN AMERICAN: >60
GFR NON-AFRICAN AMERICAN: 52
GLUCOSE BLD-MCNC: 118 MG/DL (ref 70–99)
GLUCOSE BLD-MCNC: 126 MG/DL (ref 70–99)
GLUCOSE BLD-MCNC: 130 MG/DL (ref 70–99)
GLUCOSE BLD-MCNC: 138 MG/DL (ref 70–99)
GLUCOSE BLD-MCNC: 147 MG/DL (ref 70–99)
HCT VFR BLD CALC: 25.1 % (ref 40.5–52.5)
HEMOGLOBIN: 8.2 G/DL (ref 13.5–17.5)
LYMPHOCYTES ABSOLUTE: 1.1 K/UL (ref 1–5.1)
LYMPHOCYTES RELATIVE PERCENT: 11.8 %
MAGNESIUM: 1.9 MG/DL (ref 1.8–2.4)
MCH RBC QN AUTO: 28.4 PG (ref 26–34)
MCHC RBC AUTO-ENTMCNC: 32.8 G/DL (ref 31–36)
MCV RBC AUTO: 86.6 FL (ref 80–100)
MONOCYTES ABSOLUTE: 1.1 K/UL (ref 0–1.3)
MONOCYTES RELATIVE PERCENT: 11.3 %
NEUTROPHILS ABSOLUTE: 7 K/UL (ref 1.7–7.7)
NEUTROPHILS RELATIVE PERCENT: 73.7 %
PDW BLD-RTO: 15.9 % (ref 12.4–15.4)
PERFORMED ON: ABNORMAL
PLATELET # BLD: 258 K/UL (ref 135–450)
PMV BLD AUTO: 8.5 FL (ref 5–10.5)
POTASSIUM REFLEX MAGNESIUM: 2.8 MMOL/L (ref 3.5–5.1)
RBC # BLD: 2.89 M/UL (ref 4.2–5.9)
SODIUM BLD-SCNC: 135 MMOL/L (ref 136–145)
WBC # BLD: 9.5 K/UL (ref 4–11)

## 2019-11-15 PROCEDURE — APPSS30 APP SPLIT SHARED TIME 16-30 MINUTES: Performed by: NURSE PRACTITIONER

## 2019-11-15 PROCEDURE — 6360000004 HC RX CONTRAST MEDICATION: Performed by: SURGERY

## 2019-11-15 PROCEDURE — 80048 BASIC METABOLIC PNL TOTAL CA: CPT

## 2019-11-15 PROCEDURE — 6360000002 HC RX W HCPCS: Performed by: INTERNAL MEDICINE

## 2019-11-15 PROCEDURE — 99233 SBSQ HOSP IP/OBS HIGH 50: CPT | Performed by: INTERNAL MEDICINE

## 2019-11-15 PROCEDURE — 6370000000 HC RX 637 (ALT 250 FOR IP): Performed by: INTERNAL MEDICINE

## 2019-11-15 PROCEDURE — 2580000003 HC RX 258: Performed by: INTERNAL MEDICINE

## 2019-11-15 PROCEDURE — 6370000000 HC RX 637 (ALT 250 FOR IP): Performed by: NURSE PRACTITIONER

## 2019-11-15 PROCEDURE — APPNB30 APP NON BILLABLE TIME 0-30 MINS: Performed by: NURSE PRACTITIONER

## 2019-11-15 PROCEDURE — 83735 ASSAY OF MAGNESIUM: CPT

## 2019-11-15 PROCEDURE — 2060000000 HC ICU INTERMEDIATE R&B

## 2019-11-15 PROCEDURE — 74177 CT ABD & PELVIS W/CONTRAST: CPT

## 2019-11-15 PROCEDURE — 2700000000 HC OXYGEN THERAPY PER DAY

## 2019-11-15 PROCEDURE — 94761 N-INVAS EAR/PLS OXIMETRY MLT: CPT

## 2019-11-15 PROCEDURE — 36592 COLLECT BLOOD FROM PICC: CPT

## 2019-11-15 PROCEDURE — 85025 COMPLETE CBC W/AUTO DIFF WBC: CPT

## 2019-11-15 RX ADMIN — POTASSIUM CHLORIDE 10 MEQ: 10 INJECTION, SOLUTION INTRAVENOUS at 13:11

## 2019-11-15 RX ADMIN — GABAPENTIN 200 MG: 100 CAPSULE ORAL at 21:00

## 2019-11-15 RX ADMIN — OXYCODONE HYDROCHLORIDE AND ACETAMINOPHEN 1 TABLET: 5; 325 TABLET ORAL at 13:19

## 2019-11-15 RX ADMIN — TAMSULOSIN HYDROCHLORIDE 0.4 MG: 0.4 CAPSULE ORAL at 08:47

## 2019-11-15 RX ADMIN — POTASSIUM CHLORIDE 10 MEQ: 10 INJECTION, SOLUTION INTRAVENOUS at 10:44

## 2019-11-15 RX ADMIN — OXYCODONE HYDROCHLORIDE AND ACETAMINOPHEN 1 TABLET: 5; 325 TABLET ORAL at 17:38

## 2019-11-15 RX ADMIN — POTASSIUM CHLORIDE 10 MEQ: 10 INJECTION, SOLUTION INTRAVENOUS at 15:22

## 2019-11-15 RX ADMIN — AMIODARONE HYDROCHLORIDE 200 MG: 200 TABLET ORAL at 21:00

## 2019-11-15 RX ADMIN — INSULIN LISPRO 1 UNITS: 100 INJECTION, SOLUTION INTRAVENOUS; SUBCUTANEOUS at 21:20

## 2019-11-15 RX ADMIN — FLUCONAZOLE 200 MG: 200 INJECTION, SOLUTION INTRAVENOUS at 13:12

## 2019-11-15 RX ADMIN — IOPAMIDOL 75 ML: 755 INJECTION, SOLUTION INTRAVENOUS at 11:08

## 2019-11-15 RX ADMIN — SODIUM CHLORIDE, PRESERVATIVE FREE 10 ML: 5 INJECTION INTRAVENOUS at 08:47

## 2019-11-15 RX ADMIN — POTASSIUM CHLORIDE 10 MEQ: 10 INJECTION, SOLUTION INTRAVENOUS at 14:18

## 2019-11-15 RX ADMIN — POTASSIUM CHLORIDE 10 MEQ: 10 INJECTION, SOLUTION INTRAVENOUS at 16:30

## 2019-11-15 RX ADMIN — DULOXETINE HYDROCHLORIDE 30 MG: 30 CAPSULE, DELAYED RELEASE ORAL at 08:47

## 2019-11-15 RX ADMIN — Medication 1 CAPSULE: at 08:47

## 2019-11-15 RX ADMIN — POTASSIUM CHLORIDE 10 MEQ: 10 INJECTION, SOLUTION INTRAVENOUS at 12:01

## 2019-11-15 RX ADMIN — FUROSEMIDE 40 MG: 10 INJECTION, SOLUTION INTRAMUSCULAR; INTRAVENOUS at 08:46

## 2019-11-15 RX ADMIN — ATORVASTATIN CALCIUM 40 MG: 40 TABLET, FILM COATED ORAL at 08:46

## 2019-11-15 RX ADMIN — OXYCODONE HYDROCHLORIDE AND ACETAMINOPHEN 1 TABLET: 5; 325 TABLET ORAL at 22:00

## 2019-11-15 RX ADMIN — OXYCODONE HYDROCHLORIDE AND ACETAMINOPHEN 1 TABLET: 5; 325 TABLET ORAL at 05:04

## 2019-11-15 RX ADMIN — TRAZODONE HYDROCHLORIDE 50 MG: 50 TABLET ORAL at 21:00

## 2019-11-15 RX ADMIN — SODIUM CHLORIDE, PRESERVATIVE FREE 10 ML: 5 INJECTION INTRAVENOUS at 21:00

## 2019-11-15 RX ADMIN — PIPERACILLIN AND TAZOBACTAM 3.38 G: 3; .375 INJECTION, POWDER, LYOPHILIZED, FOR SOLUTION INTRAVENOUS at 08:47

## 2019-11-15 RX ADMIN — METOPROLOL SUCCINATE 25 MG: 25 TABLET, EXTENDED RELEASE ORAL at 08:47

## 2019-11-15 RX ADMIN — GABAPENTIN 200 MG: 100 CAPSULE ORAL at 13:10

## 2019-11-15 RX ADMIN — PIPERACILLIN AND TAZOBACTAM 3.38 G: 3; .375 INJECTION, POWDER, LYOPHILIZED, FOR SOLUTION INTRAVENOUS at 00:45

## 2019-11-15 RX ADMIN — GABAPENTIN 200 MG: 100 CAPSULE ORAL at 08:47

## 2019-11-15 RX ADMIN — RIVAROXABAN 15 MG: 15 TABLET, FILM COATED ORAL at 17:38

## 2019-11-15 RX ADMIN — PANTOPRAZOLE SODIUM 40 MG: 40 TABLET, DELAYED RELEASE ORAL at 06:27

## 2019-11-15 RX ADMIN — OXYCODONE HYDROCHLORIDE AND ACETAMINOPHEN 1 TABLET: 5; 325 TABLET ORAL at 08:55

## 2019-11-15 RX ADMIN — Medication 1 CAPSULE: at 21:00

## 2019-11-15 RX ADMIN — PIPERACILLIN AND TAZOBACTAM 3.38 G: 3; .375 INJECTION, POWDER, LYOPHILIZED, FOR SOLUTION INTRAVENOUS at 16:30

## 2019-11-15 ASSESSMENT — PAIN DESCRIPTION - FREQUENCY
FREQUENCY: CONTINUOUS

## 2019-11-15 ASSESSMENT — PAIN DESCRIPTION - ORIENTATION
ORIENTATION: MID

## 2019-11-15 ASSESSMENT — PAIN DESCRIPTION - ONSET
ONSET: ON-GOING
ONSET: ON-GOING
ONSET: GRADUAL
ONSET: ON-GOING
ONSET: ON-GOING

## 2019-11-15 ASSESSMENT — PAIN DESCRIPTION - PROGRESSION
CLINICAL_PROGRESSION: GRADUALLY WORSENING
CLINICAL_PROGRESSION: NOT CHANGED
CLINICAL_PROGRESSION: GRADUALLY WORSENING

## 2019-11-15 ASSESSMENT — PAIN DESCRIPTION - PAIN TYPE
TYPE: ACUTE PAIN

## 2019-11-15 ASSESSMENT — PAIN DESCRIPTION - LOCATION
LOCATION: ABDOMEN

## 2019-11-15 ASSESSMENT — PAIN SCALES - GENERAL
PAINLEVEL_OUTOF10: 7
PAINLEVEL_OUTOF10: 7
PAINLEVEL_OUTOF10: 0
PAINLEVEL_OUTOF10: 7
PAINLEVEL_OUTOF10: 5
PAINLEVEL_OUTOF10: 0
PAINLEVEL_OUTOF10: 0
PAINLEVEL_OUTOF10: 7
PAINLEVEL_OUTOF10: 10
PAINLEVEL_OUTOF10: 7

## 2019-11-15 ASSESSMENT — PAIN DESCRIPTION - DESCRIPTORS
DESCRIPTORS: ACHING

## 2019-11-16 LAB
ANAEROBIC CULTURE: NORMAL
ANION GAP SERPL CALCULATED.3IONS-SCNC: 12 MMOL/L (ref 3–16)
BASOPHILS ABSOLUTE: 0.1 K/UL (ref 0–0.2)
BASOPHILS RELATIVE PERCENT: 1.3 %
BUN BLDV-MCNC: 21 MG/DL (ref 7–20)
CALCIUM SERPL-MCNC: 8.5 MG/DL (ref 8.3–10.6)
CHLORIDE BLD-SCNC: 88 MMOL/L (ref 99–110)
CO2: 35 MMOL/L (ref 21–32)
CREAT SERPL-MCNC: 1.5 MG/DL (ref 0.9–1.3)
EOSINOPHILS ABSOLUTE: 0.3 K/UL (ref 0–0.6)
EOSINOPHILS RELATIVE PERCENT: 3 %
GFR AFRICAN AMERICAN: 58
GFR NON-AFRICAN AMERICAN: 48
GLUCOSE BLD-MCNC: 108 MG/DL (ref 70–99)
GLUCOSE BLD-MCNC: 120 MG/DL (ref 70–99)
GLUCOSE BLD-MCNC: 143 MG/DL (ref 70–99)
GLUCOSE BLD-MCNC: 154 MG/DL (ref 70–99)
GLUCOSE BLD-MCNC: 158 MG/DL (ref 70–99)
GRAM STAIN RESULT: NORMAL
HCT VFR BLD CALC: 23.3 % (ref 40.5–52.5)
HEMOGLOBIN: 7.5 G/DL (ref 13.5–17.5)
LYMPHOCYTES ABSOLUTE: 1.2 K/UL (ref 1–5.1)
LYMPHOCYTES RELATIVE PERCENT: 11.9 %
MAGNESIUM: 1.8 MG/DL (ref 1.8–2.4)
MCH RBC QN AUTO: 28.2 PG (ref 26–34)
MCHC RBC AUTO-ENTMCNC: 32.5 G/DL (ref 31–36)
MCV RBC AUTO: 86.9 FL (ref 80–100)
MONOCYTES ABSOLUTE: 1.1 K/UL (ref 0–1.3)
MONOCYTES RELATIVE PERCENT: 11 %
NEUTROPHILS ABSOLUTE: 7.5 K/UL (ref 1.7–7.7)
NEUTROPHILS RELATIVE PERCENT: 72.8 %
PDW BLD-RTO: 15.7 % (ref 12.4–15.4)
PERFORMED ON: ABNORMAL
PLATELET # BLD: 272 K/UL (ref 135–450)
PMV BLD AUTO: 8.6 FL (ref 5–10.5)
POTASSIUM REFLEX MAGNESIUM: 3.1 MMOL/L (ref 3.5–5.1)
RBC # BLD: 2.68 M/UL (ref 4.2–5.9)
SODIUM BLD-SCNC: 135 MMOL/L (ref 136–145)
WBC # BLD: 10.3 K/UL (ref 4–11)
WOUND/ABSCESS: NORMAL

## 2019-11-16 PROCEDURE — 6370000000 HC RX 637 (ALT 250 FOR IP): Performed by: INTERNAL MEDICINE

## 2019-11-16 PROCEDURE — APPSS30 APP SPLIT SHARED TIME 16-30 MINUTES: Performed by: NURSE PRACTITIONER

## 2019-11-16 PROCEDURE — 80048 BASIC METABOLIC PNL TOTAL CA: CPT

## 2019-11-16 PROCEDURE — 6360000002 HC RX W HCPCS: Performed by: INTERNAL MEDICINE

## 2019-11-16 PROCEDURE — 94761 N-INVAS EAR/PLS OXIMETRY MLT: CPT

## 2019-11-16 PROCEDURE — 83735 ASSAY OF MAGNESIUM: CPT

## 2019-11-16 PROCEDURE — 85025 COMPLETE CBC W/AUTO DIFF WBC: CPT

## 2019-11-16 PROCEDURE — 6370000000 HC RX 637 (ALT 250 FOR IP): Performed by: NURSE PRACTITIONER

## 2019-11-16 PROCEDURE — 2580000003 HC RX 258: Performed by: INTERNAL MEDICINE

## 2019-11-16 PROCEDURE — 2060000000 HC ICU INTERMEDIATE R&B

## 2019-11-16 PROCEDURE — 2700000000 HC OXYGEN THERAPY PER DAY

## 2019-11-16 PROCEDURE — APPNB30 APP NON BILLABLE TIME 0-30 MINS: Performed by: NURSE PRACTITIONER

## 2019-11-16 PROCEDURE — 6360000002 HC RX W HCPCS: Performed by: NURSE PRACTITIONER

## 2019-11-16 PROCEDURE — 99024 POSTOP FOLLOW-UP VISIT: CPT | Performed by: SURGERY

## 2019-11-16 PROCEDURE — 94640 AIRWAY INHALATION TREATMENT: CPT

## 2019-11-16 RX ADMIN — OXYCODONE HYDROCHLORIDE AND ACETAMINOPHEN 1 TABLET: 5; 325 TABLET ORAL at 02:51

## 2019-11-16 RX ADMIN — GABAPENTIN 200 MG: 100 CAPSULE ORAL at 13:25

## 2019-11-16 RX ADMIN — PIPERACILLIN AND TAZOBACTAM 3.38 G: 3; .375 INJECTION, POWDER, LYOPHILIZED, FOR SOLUTION INTRAVENOUS at 15:51

## 2019-11-16 RX ADMIN — POTASSIUM CHLORIDE 40 MEQ: 20 TABLET, EXTENDED RELEASE ORAL at 13:25

## 2019-11-16 RX ADMIN — SODIUM CHLORIDE, PRESERVATIVE FREE 10 ML: 5 INJECTION INTRAVENOUS at 21:02

## 2019-11-16 RX ADMIN — TAMSULOSIN HYDROCHLORIDE 0.4 MG: 0.4 CAPSULE ORAL at 08:55

## 2019-11-16 RX ADMIN — OXYCODONE HYDROCHLORIDE AND ACETAMINOPHEN 1 TABLET: 5; 325 TABLET ORAL at 06:56

## 2019-11-16 RX ADMIN — ALBUTEROL SULFATE 2.5 MG: 2.5 SOLUTION RESPIRATORY (INHALATION) at 08:21

## 2019-11-16 RX ADMIN — METOPROLOL SUCCINATE 25 MG: 25 TABLET, EXTENDED RELEASE ORAL at 08:55

## 2019-11-16 RX ADMIN — DULOXETINE HYDROCHLORIDE 30 MG: 30 CAPSULE, DELAYED RELEASE ORAL at 08:55

## 2019-11-16 RX ADMIN — GABAPENTIN 200 MG: 100 CAPSULE ORAL at 08:55

## 2019-11-16 RX ADMIN — ALBUTEROL SULFATE 2.5 MG: 2.5 SOLUTION RESPIRATORY (INHALATION) at 12:58

## 2019-11-16 RX ADMIN — RIVAROXABAN 15 MG: 15 TABLET, FILM COATED ORAL at 17:49

## 2019-11-16 RX ADMIN — OXYCODONE HYDROCHLORIDE AND ACETAMINOPHEN 1 TABLET: 5; 325 TABLET ORAL at 21:03

## 2019-11-16 RX ADMIN — Medication 1 CAPSULE: at 21:03

## 2019-11-16 RX ADMIN — SODIUM CHLORIDE, PRESERVATIVE FREE 10 ML: 5 INJECTION INTRAVENOUS at 08:55

## 2019-11-16 RX ADMIN — OXYCODONE HYDROCHLORIDE AND ACETAMINOPHEN 1 TABLET: 5; 325 TABLET ORAL at 15:51

## 2019-11-16 RX ADMIN — ALBUTEROL SULFATE 2.5 MG: 2.5 SOLUTION RESPIRATORY (INHALATION) at 19:52

## 2019-11-16 RX ADMIN — FLUCONAZOLE 200 MG: 200 INJECTION, SOLUTION INTRAVENOUS at 13:25

## 2019-11-16 RX ADMIN — AMIODARONE HYDROCHLORIDE 200 MG: 200 TABLET ORAL at 21:03

## 2019-11-16 RX ADMIN — INSULIN LISPRO 1 UNITS: 100 INJECTION, SOLUTION INTRAVENOUS; SUBCUTANEOUS at 11:47

## 2019-11-16 RX ADMIN — PIPERACILLIN AND TAZOBACTAM 3.38 G: 3; .375 INJECTION, POWDER, LYOPHILIZED, FOR SOLUTION INTRAVENOUS at 01:06

## 2019-11-16 RX ADMIN — PANTOPRAZOLE SODIUM 40 MG: 40 TABLET, DELAYED RELEASE ORAL at 05:56

## 2019-11-16 RX ADMIN — PIPERACILLIN AND TAZOBACTAM 3.38 G: 3; .375 INJECTION, POWDER, LYOPHILIZED, FOR SOLUTION INTRAVENOUS at 08:54

## 2019-11-16 RX ADMIN — ATORVASTATIN CALCIUM 40 MG: 40 TABLET, FILM COATED ORAL at 08:55

## 2019-11-16 RX ADMIN — GABAPENTIN 200 MG: 100 CAPSULE ORAL at 21:02

## 2019-11-16 RX ADMIN — Medication 1 CAPSULE: at 08:55

## 2019-11-16 RX ADMIN — INSULIN LISPRO 1 UNITS: 100 INJECTION, SOLUTION INTRAVENOUS; SUBCUTANEOUS at 22:11

## 2019-11-16 RX ADMIN — OXYCODONE HYDROCHLORIDE AND ACETAMINOPHEN 1 TABLET: 5; 325 TABLET ORAL at 11:47

## 2019-11-16 RX ADMIN — TRAZODONE HYDROCHLORIDE 50 MG: 50 TABLET ORAL at 21:03

## 2019-11-16 ASSESSMENT — PAIN DESCRIPTION - PROGRESSION
CLINICAL_PROGRESSION: GRADUALLY WORSENING

## 2019-11-16 ASSESSMENT — PAIN DESCRIPTION - PAIN TYPE
TYPE: ACUTE PAIN

## 2019-11-16 ASSESSMENT — PAIN SCALES - GENERAL
PAINLEVEL_OUTOF10: 0
PAINLEVEL_OUTOF10: 7
PAINLEVEL_OUTOF10: 0
PAINLEVEL_OUTOF10: 0
PAINLEVEL_OUTOF10: 8
PAINLEVEL_OUTOF10: 0
PAINLEVEL_OUTOF10: 7
PAINLEVEL_OUTOF10: 0
PAINLEVEL_OUTOF10: 0
PAINLEVEL_OUTOF10: 7
PAINLEVEL_OUTOF10: 0
PAINLEVEL_OUTOF10: 10
PAINLEVEL_OUTOF10: 0

## 2019-11-16 ASSESSMENT — PAIN DESCRIPTION - DESCRIPTORS
DESCRIPTORS: ACHING

## 2019-11-16 ASSESSMENT — PAIN DESCRIPTION - LOCATION
LOCATION: ABDOMEN

## 2019-11-16 ASSESSMENT — PAIN - FUNCTIONAL ASSESSMENT
PAIN_FUNCTIONAL_ASSESSMENT: ACTIVITIES ARE NOT PREVENTED

## 2019-11-16 ASSESSMENT — PAIN DESCRIPTION - ONSET
ONSET: GRADUAL
ONSET: GRADUAL
ONSET: AWAKENED FROM SLEEP
ONSET: AWAKENED FROM SLEEP
ONSET: GRADUAL
ONSET: GRADUAL

## 2019-11-16 ASSESSMENT — PAIN DESCRIPTION - FREQUENCY
FREQUENCY: CONTINUOUS

## 2019-11-16 ASSESSMENT — PAIN DESCRIPTION - ORIENTATION
ORIENTATION: MID

## 2019-11-17 LAB
ANION GAP SERPL CALCULATED.3IONS-SCNC: 11 MMOL/L (ref 3–16)
BASOPHILS ABSOLUTE: 0.3 K/UL (ref 0–0.2)
BASOPHILS RELATIVE PERCENT: 3 %
BUN BLDV-MCNC: 21 MG/DL (ref 7–20)
CALCIUM SERPL-MCNC: 8.6 MG/DL (ref 8.3–10.6)
CHLORIDE BLD-SCNC: 92 MMOL/L (ref 99–110)
CO2: 34 MMOL/L (ref 21–32)
CREAT SERPL-MCNC: 1.4 MG/DL (ref 0.9–1.3)
EOSINOPHILS ABSOLUTE: 0.2 K/UL (ref 0–0.6)
EOSINOPHILS RELATIVE PERCENT: 2.4 %
GFR AFRICAN AMERICAN: >60
GFR NON-AFRICAN AMERICAN: 52
GLUCOSE BLD-MCNC: 144 MG/DL (ref 70–99)
GLUCOSE BLD-MCNC: 151 MG/DL (ref 70–99)
GLUCOSE BLD-MCNC: 155 MG/DL (ref 70–99)
GLUCOSE BLD-MCNC: 171 MG/DL (ref 70–99)
GLUCOSE BLD-MCNC: 184 MG/DL (ref 70–99)
HCT VFR BLD CALC: 22.8 % (ref 40.5–52.5)
HEMOGLOBIN: 7.5 G/DL (ref 13.5–17.5)
LYMPHOCYTES ABSOLUTE: 1.1 K/UL (ref 1–5.1)
LYMPHOCYTES RELATIVE PERCENT: 11.7 %
MCH RBC QN AUTO: 28.5 PG (ref 26–34)
MCHC RBC AUTO-ENTMCNC: 33 G/DL (ref 31–36)
MCV RBC AUTO: 86.4 FL (ref 80–100)
MONOCYTES ABSOLUTE: 0.9 K/UL (ref 0–1.3)
MONOCYTES RELATIVE PERCENT: 10 %
NEUTROPHILS ABSOLUTE: 6.8 K/UL (ref 1.7–7.7)
NEUTROPHILS RELATIVE PERCENT: 72.9 %
PDW BLD-RTO: 15.8 % (ref 12.4–15.4)
PERFORMED ON: ABNORMAL
PLATELET # BLD: 230 K/UL (ref 135–450)
PMV BLD AUTO: 8.2 FL (ref 5–10.5)
POTASSIUM REFLEX MAGNESIUM: 3.6 MMOL/L (ref 3.5–5.1)
RBC # BLD: 2.64 M/UL (ref 4.2–5.9)
SODIUM BLD-SCNC: 137 MMOL/L (ref 136–145)
WBC # BLD: 8.8 K/UL (ref 4–11)

## 2019-11-17 PROCEDURE — 6360000002 HC RX W HCPCS: Performed by: NURSE PRACTITIONER

## 2019-11-17 PROCEDURE — 6360000002 HC RX W HCPCS: Performed by: INTERNAL MEDICINE

## 2019-11-17 PROCEDURE — 2580000003 HC RX 258: Performed by: INTERNAL MEDICINE

## 2019-11-17 PROCEDURE — 6370000000 HC RX 637 (ALT 250 FOR IP): Performed by: INTERNAL MEDICINE

## 2019-11-17 PROCEDURE — 6370000000 HC RX 637 (ALT 250 FOR IP): Performed by: NURSE PRACTITIONER

## 2019-11-17 PROCEDURE — 85025 COMPLETE CBC W/AUTO DIFF WBC: CPT

## 2019-11-17 PROCEDURE — 80048 BASIC METABOLIC PNL TOTAL CA: CPT

## 2019-11-17 PROCEDURE — 2060000000 HC ICU INTERMEDIATE R&B

## 2019-11-17 PROCEDURE — 2700000000 HC OXYGEN THERAPY PER DAY

## 2019-11-17 PROCEDURE — 94760 N-INVAS EAR/PLS OXIMETRY 1: CPT

## 2019-11-17 PROCEDURE — 36592 COLLECT BLOOD FROM PICC: CPT

## 2019-11-17 PROCEDURE — 99024 POSTOP FOLLOW-UP VISIT: CPT | Performed by: SURGERY

## 2019-11-17 PROCEDURE — 1200000000 HC SEMI PRIVATE

## 2019-11-17 PROCEDURE — 94640 AIRWAY INHALATION TREATMENT: CPT

## 2019-11-17 RX ADMIN — SODIUM CHLORIDE, PRESERVATIVE FREE 10 ML: 5 INJECTION INTRAVENOUS at 21:45

## 2019-11-17 RX ADMIN — AMIODARONE HYDROCHLORIDE 200 MG: 200 TABLET ORAL at 21:45

## 2019-11-17 RX ADMIN — OXYCODONE HYDROCHLORIDE AND ACETAMINOPHEN 1 TABLET: 5; 325 TABLET ORAL at 04:17

## 2019-11-17 RX ADMIN — ATORVASTATIN CALCIUM 40 MG: 40 TABLET, FILM COATED ORAL at 08:36

## 2019-11-17 RX ADMIN — PIPERACILLIN AND TAZOBACTAM 3.38 G: 3; .375 INJECTION, POWDER, LYOPHILIZED, FOR SOLUTION INTRAVENOUS at 08:37

## 2019-11-17 RX ADMIN — Medication 1 CAPSULE: at 08:25

## 2019-11-17 RX ADMIN — TRAZODONE HYDROCHLORIDE 50 MG: 50 TABLET ORAL at 21:46

## 2019-11-17 RX ADMIN — OXYCODONE HYDROCHLORIDE AND ACETAMINOPHEN 1 TABLET: 5; 325 TABLET ORAL at 17:38

## 2019-11-17 RX ADMIN — GABAPENTIN 200 MG: 100 CAPSULE ORAL at 21:45

## 2019-11-17 RX ADMIN — PIPERACILLIN AND TAZOBACTAM 3.38 G: 3; .375 INJECTION, POWDER, LYOPHILIZED, FOR SOLUTION INTRAVENOUS at 17:38

## 2019-11-17 RX ADMIN — METOPROLOL SUCCINATE 25 MG: 25 TABLET, EXTENDED RELEASE ORAL at 11:34

## 2019-11-17 RX ADMIN — DULOXETINE HYDROCHLORIDE 30 MG: 30 CAPSULE, DELAYED RELEASE ORAL at 08:36

## 2019-11-17 RX ADMIN — INSULIN LISPRO 1 UNITS: 100 INJECTION, SOLUTION INTRAVENOUS; SUBCUTANEOUS at 21:46

## 2019-11-17 RX ADMIN — GABAPENTIN 200 MG: 100 CAPSULE ORAL at 14:02

## 2019-11-17 RX ADMIN — OXYCODONE HYDROCHLORIDE AND ACETAMINOPHEN 1 TABLET: 5; 325 TABLET ORAL at 08:37

## 2019-11-17 RX ADMIN — OXYCODONE HYDROCHLORIDE AND ACETAMINOPHEN 1 TABLET: 5; 325 TABLET ORAL at 21:45

## 2019-11-17 RX ADMIN — ALBUTEROL SULFATE 2.5 MG: 2.5 SOLUTION RESPIRATORY (INHALATION) at 15:26

## 2019-11-17 RX ADMIN — RIVAROXABAN 15 MG: 15 TABLET, FILM COATED ORAL at 17:38

## 2019-11-17 RX ADMIN — INSULIN LISPRO 1 UNITS: 100 INJECTION, SOLUTION INTRAVENOUS; SUBCUTANEOUS at 17:38

## 2019-11-17 RX ADMIN — TAMSULOSIN HYDROCHLORIDE 0.4 MG: 0.4 CAPSULE ORAL at 08:36

## 2019-11-17 RX ADMIN — OXYCODONE HYDROCHLORIDE AND ACETAMINOPHEN 1 TABLET: 5; 325 TABLET ORAL at 12:50

## 2019-11-17 RX ADMIN — SODIUM CHLORIDE, PRESERVATIVE FREE 10 ML: 5 INJECTION INTRAVENOUS at 08:25

## 2019-11-17 RX ADMIN — Medication 1 CAPSULE: at 21:46

## 2019-11-17 RX ADMIN — FLUCONAZOLE 200 MG: 200 INJECTION, SOLUTION INTRAVENOUS at 14:02

## 2019-11-17 RX ADMIN — INSULIN LISPRO 1 UNITS: 100 INJECTION, SOLUTION INTRAVENOUS; SUBCUTANEOUS at 08:37

## 2019-11-17 RX ADMIN — INSULIN LISPRO 1 UNITS: 100 INJECTION, SOLUTION INTRAVENOUS; SUBCUTANEOUS at 12:54

## 2019-11-17 RX ADMIN — PANTOPRAZOLE SODIUM 40 MG: 40 TABLET, DELAYED RELEASE ORAL at 06:45

## 2019-11-17 RX ADMIN — PIPERACILLIN AND TAZOBACTAM 3.38 G: 3; .375 INJECTION, POWDER, LYOPHILIZED, FOR SOLUTION INTRAVENOUS at 00:02

## 2019-11-17 ASSESSMENT — PAIN SCALES - GENERAL
PAINLEVEL_OUTOF10: 7
PAINLEVEL_OUTOF10: 0
PAINLEVEL_OUTOF10: 7
PAINLEVEL_OUTOF10: 8
PAINLEVEL_OUTOF10: 0
PAINLEVEL_OUTOF10: 7
PAINLEVEL_OUTOF10: 8
PAINLEVEL_OUTOF10: 0
PAINLEVEL_OUTOF10: 7
PAINLEVEL_OUTOF10: 0
PAINLEVEL_OUTOF10: 7
PAINLEVEL_OUTOF10: 0

## 2019-11-17 ASSESSMENT — PAIN DESCRIPTION - PROGRESSION
CLINICAL_PROGRESSION: GRADUALLY WORSENING

## 2019-11-17 ASSESSMENT — PAIN DESCRIPTION - FREQUENCY
FREQUENCY: CONTINUOUS

## 2019-11-17 ASSESSMENT — PAIN DESCRIPTION - DESCRIPTORS
DESCRIPTORS: ACHING

## 2019-11-17 ASSESSMENT — PAIN DESCRIPTION - LOCATION
LOCATION: ABDOMEN

## 2019-11-17 ASSESSMENT — PAIN - FUNCTIONAL ASSESSMENT
PAIN_FUNCTIONAL_ASSESSMENT: PREVENTS OR INTERFERES SOME ACTIVE ACTIVITIES AND ADLS
PAIN_FUNCTIONAL_ASSESSMENT: PREVENTS OR INTERFERES SOME ACTIVE ACTIVITIES AND ADLS
PAIN_FUNCTIONAL_ASSESSMENT: ACTIVITIES ARE NOT PREVENTED
PAIN_FUNCTIONAL_ASSESSMENT: ACTIVITIES ARE NOT PREVENTED
PAIN_FUNCTIONAL_ASSESSMENT: PREVENTS OR INTERFERES SOME ACTIVE ACTIVITIES AND ADLS

## 2019-11-17 ASSESSMENT — PAIN DESCRIPTION - ORIENTATION
ORIENTATION: MID

## 2019-11-17 ASSESSMENT — PAIN DESCRIPTION - ONSET
ONSET: GRADUAL

## 2019-11-17 ASSESSMENT — PAIN DESCRIPTION - PAIN TYPE
TYPE: ACUTE PAIN

## 2019-11-18 LAB
ANION GAP SERPL CALCULATED.3IONS-SCNC: 11 MMOL/L (ref 3–16)
BASOPHILS ABSOLUTE: 0.1 K/UL (ref 0–0.2)
BASOPHILS RELATIVE PERCENT: 1.3 %
BUN BLDV-MCNC: 19 MG/DL (ref 7–20)
CALCIUM SERPL-MCNC: 8.4 MG/DL (ref 8.3–10.6)
CHLORIDE BLD-SCNC: 94 MMOL/L (ref 99–110)
CO2: 32 MMOL/L (ref 21–32)
CREAT SERPL-MCNC: 1.4 MG/DL (ref 0.9–1.3)
EOSINOPHILS ABSOLUTE: 0.2 K/UL (ref 0–0.6)
EOSINOPHILS RELATIVE PERCENT: 2.8 %
GFR AFRICAN AMERICAN: >60
GFR NON-AFRICAN AMERICAN: 52
GLUCOSE BLD-MCNC: 108 MG/DL (ref 70–99)
GLUCOSE BLD-MCNC: 137 MG/DL (ref 70–99)
GLUCOSE BLD-MCNC: 140 MG/DL (ref 70–99)
GLUCOSE BLD-MCNC: 156 MG/DL (ref 70–99)
GLUCOSE BLD-MCNC: 180 MG/DL (ref 70–99)
HCT VFR BLD CALC: 22.3 % (ref 40.5–52.5)
HEMOGLOBIN: 7.3 G/DL (ref 13.5–17.5)
LYMPHOCYTES ABSOLUTE: 1.2 K/UL (ref 1–5.1)
LYMPHOCYTES RELATIVE PERCENT: 14.2 %
MAGNESIUM: 1.9 MG/DL (ref 1.8–2.4)
MCH RBC QN AUTO: 28.5 PG (ref 26–34)
MCHC RBC AUTO-ENTMCNC: 32.8 G/DL (ref 31–36)
MCV RBC AUTO: 86.9 FL (ref 80–100)
MONOCYTES ABSOLUTE: 1.1 K/UL (ref 0–1.3)
MONOCYTES RELATIVE PERCENT: 12.6 %
NEUTROPHILS ABSOLUTE: 5.9 K/UL (ref 1.7–7.7)
NEUTROPHILS RELATIVE PERCENT: 69.1 %
PDW BLD-RTO: 16 % (ref 12.4–15.4)
PERFORMED ON: ABNORMAL
PLATELET # BLD: 227 K/UL (ref 135–450)
PMV BLD AUTO: 8.3 FL (ref 5–10.5)
POTASSIUM REFLEX MAGNESIUM: 3.3 MMOL/L (ref 3.5–5.1)
RBC # BLD: 2.57 M/UL (ref 4.2–5.9)
SODIUM BLD-SCNC: 137 MMOL/L (ref 136–145)
WBC # BLD: 8.5 K/UL (ref 4–11)

## 2019-11-18 PROCEDURE — 94640 AIRWAY INHALATION TREATMENT: CPT

## 2019-11-18 PROCEDURE — 6360000002 HC RX W HCPCS: Performed by: INTERNAL MEDICINE

## 2019-11-18 PROCEDURE — APPSS15 APP SPLIT SHARED TIME 0-15 MINUTES: Performed by: PHYSICIAN ASSISTANT

## 2019-11-18 PROCEDURE — 94760 N-INVAS EAR/PLS OXIMETRY 1: CPT

## 2019-11-18 PROCEDURE — 6370000000 HC RX 637 (ALT 250 FOR IP): Performed by: INTERNAL MEDICINE

## 2019-11-18 PROCEDURE — 99024 POSTOP FOLLOW-UP VISIT: CPT | Performed by: SURGERY

## 2019-11-18 PROCEDURE — 83735 ASSAY OF MAGNESIUM: CPT

## 2019-11-18 PROCEDURE — 2580000003 HC RX 258: Performed by: INTERNAL MEDICINE

## 2019-11-18 PROCEDURE — 97530 THERAPEUTIC ACTIVITIES: CPT

## 2019-11-18 PROCEDURE — APPNB30 APP NON BILLABLE TIME 0-30 MINS: Performed by: PHYSICIAN ASSISTANT

## 2019-11-18 PROCEDURE — 85025 COMPLETE CBC W/AUTO DIFF WBC: CPT

## 2019-11-18 PROCEDURE — 99232 SBSQ HOSP IP/OBS MODERATE 35: CPT | Performed by: INTERNAL MEDICINE

## 2019-11-18 PROCEDURE — 6370000000 HC RX 637 (ALT 250 FOR IP): Performed by: NURSE PRACTITIONER

## 2019-11-18 PROCEDURE — 80048 BASIC METABOLIC PNL TOTAL CA: CPT

## 2019-11-18 PROCEDURE — 2060000000 HC ICU INTERMEDIATE R&B

## 2019-11-18 PROCEDURE — 36592 COLLECT BLOOD FROM PICC: CPT

## 2019-11-18 PROCEDURE — 6360000002 HC RX W HCPCS: Performed by: NURSE PRACTITIONER

## 2019-11-18 PROCEDURE — 2700000000 HC OXYGEN THERAPY PER DAY

## 2019-11-18 RX ORDER — FLUCONAZOLE 100 MG/1
200 TABLET ORAL DAILY
Status: DISCONTINUED | OUTPATIENT
Start: 2019-11-18 | End: 2019-11-22 | Stop reason: HOSPADM

## 2019-11-18 RX ADMIN — PIPERACILLIN AND TAZOBACTAM 3.38 G: 3; .375 INJECTION, POWDER, LYOPHILIZED, FOR SOLUTION INTRAVENOUS at 16:17

## 2019-11-18 RX ADMIN — Medication 1 CAPSULE: at 08:13

## 2019-11-18 RX ADMIN — SODIUM CHLORIDE, PRESERVATIVE FREE 10 ML: 5 INJECTION INTRAVENOUS at 08:16

## 2019-11-18 RX ADMIN — PANTOPRAZOLE SODIUM 40 MG: 40 TABLET, DELAYED RELEASE ORAL at 05:17

## 2019-11-18 RX ADMIN — TAMSULOSIN HYDROCHLORIDE 0.4 MG: 0.4 CAPSULE ORAL at 08:13

## 2019-11-18 RX ADMIN — RIVAROXABAN 15 MG: 15 TABLET, FILM COATED ORAL at 17:17

## 2019-11-18 RX ADMIN — OXYCODONE HYDROCHLORIDE AND ACETAMINOPHEN 1 TABLET: 5; 325 TABLET ORAL at 08:09

## 2019-11-18 RX ADMIN — PIPERACILLIN AND TAZOBACTAM 3.38 G: 3; .375 INJECTION, POWDER, LYOPHILIZED, FOR SOLUTION INTRAVENOUS at 08:14

## 2019-11-18 RX ADMIN — ALBUTEROL SULFATE 2.5 MG: 2.5 SOLUTION RESPIRATORY (INHALATION) at 20:35

## 2019-11-18 RX ADMIN — GABAPENTIN 200 MG: 100 CAPSULE ORAL at 08:14

## 2019-11-18 RX ADMIN — Medication 1 CAPSULE: at 20:28

## 2019-11-18 RX ADMIN — OXYCODONE HYDROCHLORIDE AND ACETAMINOPHEN 1 TABLET: 5; 325 TABLET ORAL at 12:05

## 2019-11-18 RX ADMIN — POTASSIUM CHLORIDE 40 MEQ: 20 TABLET, EXTENDED RELEASE ORAL at 08:36

## 2019-11-18 RX ADMIN — PIPERACILLIN AND TAZOBACTAM 3.38 G: 3; .375 INJECTION, POWDER, LYOPHILIZED, FOR SOLUTION INTRAVENOUS at 00:35

## 2019-11-18 RX ADMIN — GABAPENTIN 200 MG: 100 CAPSULE ORAL at 14:32

## 2019-11-18 RX ADMIN — FLUCONAZOLE 200 MG: 100 TABLET ORAL at 12:05

## 2019-11-18 RX ADMIN — METOPROLOL SUCCINATE 25 MG: 25 TABLET, EXTENDED RELEASE ORAL at 08:13

## 2019-11-18 RX ADMIN — AMIODARONE HYDROCHLORIDE 200 MG: 200 TABLET ORAL at 20:28

## 2019-11-18 RX ADMIN — OXYCODONE HYDROCHLORIDE AND ACETAMINOPHEN 1 TABLET: 5; 325 TABLET ORAL at 16:17

## 2019-11-18 RX ADMIN — OXYCODONE HYDROCHLORIDE AND ACETAMINOPHEN 1 TABLET: 5; 325 TABLET ORAL at 02:53

## 2019-11-18 RX ADMIN — OXYCODONE HYDROCHLORIDE AND ACETAMINOPHEN 1 TABLET: 5; 325 TABLET ORAL at 20:28

## 2019-11-18 RX ADMIN — GABAPENTIN 200 MG: 100 CAPSULE ORAL at 20:28

## 2019-11-18 RX ADMIN — INSULIN LISPRO 1 UNITS: 100 INJECTION, SOLUTION INTRAVENOUS; SUBCUTANEOUS at 21:26

## 2019-11-18 RX ADMIN — ATORVASTATIN CALCIUM 40 MG: 40 TABLET, FILM COATED ORAL at 08:13

## 2019-11-18 RX ADMIN — INSULIN LISPRO 1 UNITS: 100 INJECTION, SOLUTION INTRAVENOUS; SUBCUTANEOUS at 12:06

## 2019-11-18 RX ADMIN — SODIUM CHLORIDE, PRESERVATIVE FREE 10 ML: 5 INJECTION INTRAVENOUS at 20:29

## 2019-11-18 RX ADMIN — TRAZODONE HYDROCHLORIDE 50 MG: 50 TABLET ORAL at 20:28

## 2019-11-18 RX ADMIN — DULOXETINE HYDROCHLORIDE 30 MG: 30 CAPSULE, DELAYED RELEASE ORAL at 08:14

## 2019-11-18 ASSESSMENT — PAIN DESCRIPTION - PAIN TYPE
TYPE: ACUTE PAIN

## 2019-11-18 ASSESSMENT — PAIN - FUNCTIONAL ASSESSMENT
PAIN_FUNCTIONAL_ASSESSMENT: ACTIVITIES ARE NOT PREVENTED
PAIN_FUNCTIONAL_ASSESSMENT: ACTIVITIES ARE NOT PREVENTED
PAIN_FUNCTIONAL_ASSESSMENT: PREVENTS OR INTERFERES SOME ACTIVE ACTIVITIES AND ADLS
PAIN_FUNCTIONAL_ASSESSMENT: ACTIVITIES ARE NOT PREVENTED
PAIN_FUNCTIONAL_ASSESSMENT: ACTIVITIES ARE NOT PREVENTED

## 2019-11-18 ASSESSMENT — PAIN SCALES - GENERAL
PAINLEVEL_OUTOF10: 0
PAINLEVEL_OUTOF10: 0
PAINLEVEL_OUTOF10: 8
PAINLEVEL_OUTOF10: 0
PAINLEVEL_OUTOF10: 0
PAINLEVEL_OUTOF10: 9
PAINLEVEL_OUTOF10: 10
PAINLEVEL_OUTOF10: 7
PAINLEVEL_OUTOF10: 7

## 2019-11-18 ASSESSMENT — PAIN DESCRIPTION - LOCATION
LOCATION: ABDOMEN

## 2019-11-18 ASSESSMENT — PAIN DESCRIPTION - FREQUENCY
FREQUENCY: CONTINUOUS

## 2019-11-18 ASSESSMENT — PAIN DESCRIPTION - DESCRIPTORS
DESCRIPTORS: ACHING;DISCOMFORT
DESCRIPTORS: ACHING
DESCRIPTORS: ACHING;DISCOMFORT
DESCRIPTORS: ACHING;DISCOMFORT
DESCRIPTORS: ACHING

## 2019-11-18 ASSESSMENT — PAIN DESCRIPTION - ONSET
ONSET: ON-GOING
ONSET: ON-GOING
ONSET: GRADUAL
ONSET: AWAKENED FROM SLEEP
ONSET: ON-GOING

## 2019-11-18 ASSESSMENT — PAIN DESCRIPTION - ORIENTATION
ORIENTATION: MID

## 2019-11-18 ASSESSMENT — PAIN DESCRIPTION - PROGRESSION
CLINICAL_PROGRESSION: GRADUALLY WORSENING

## 2019-11-19 LAB
ANION GAP SERPL CALCULATED.3IONS-SCNC: 8 MMOL/L (ref 3–16)
BASOPHILS ABSOLUTE: 0.1 K/UL (ref 0–0.2)
BASOPHILS RELATIVE PERCENT: 1.5 %
BUN BLDV-MCNC: 18 MG/DL (ref 7–20)
CALCIUM SERPL-MCNC: 8.6 MG/DL (ref 8.3–10.6)
CHLORIDE BLD-SCNC: 94 MMOL/L (ref 99–110)
CO2: 32 MMOL/L (ref 21–32)
CREAT SERPL-MCNC: 1.3 MG/DL (ref 0.9–1.3)
EOSINOPHILS ABSOLUTE: 0.2 K/UL (ref 0–0.6)
EOSINOPHILS RELATIVE PERCENT: 2.9 %
GFR AFRICAN AMERICAN: >60
GFR NON-AFRICAN AMERICAN: 56
GLUCOSE BLD-MCNC: 107 MG/DL (ref 70–99)
GLUCOSE BLD-MCNC: 108 MG/DL (ref 70–99)
GLUCOSE BLD-MCNC: 110 MG/DL (ref 70–99)
GLUCOSE BLD-MCNC: 121 MG/DL (ref 70–99)
GLUCOSE BLD-MCNC: 204 MG/DL (ref 70–99)
HCT VFR BLD CALC: 23.7 % (ref 40.5–52.5)
HEMOGLOBIN: 7.7 G/DL (ref 13.5–17.5)
LYMPHOCYTES ABSOLUTE: 1.2 K/UL (ref 1–5.1)
LYMPHOCYTES RELATIVE PERCENT: 15.1 %
MCH RBC QN AUTO: 28.4 PG (ref 26–34)
MCHC RBC AUTO-ENTMCNC: 32.6 G/DL (ref 31–36)
MCV RBC AUTO: 87 FL (ref 80–100)
MONOCYTES ABSOLUTE: 0.8 K/UL (ref 0–1.3)
MONOCYTES RELATIVE PERCENT: 9.3 %
NEUTROPHILS ABSOLUTE: 5.8 K/UL (ref 1.7–7.7)
NEUTROPHILS RELATIVE PERCENT: 71.2 %
PDW BLD-RTO: 16.2 % (ref 12.4–15.4)
PERFORMED ON: ABNORMAL
PLATELET # BLD: 247 K/UL (ref 135–450)
PMV BLD AUTO: 8.3 FL (ref 5–10.5)
POTASSIUM REFLEX MAGNESIUM: 3.9 MMOL/L (ref 3.5–5.1)
RBC # BLD: 2.72 M/UL (ref 4.2–5.9)
SODIUM BLD-SCNC: 134 MMOL/L (ref 136–145)
WBC # BLD: 8.1 K/UL (ref 4–11)

## 2019-11-19 PROCEDURE — 2060000000 HC ICU INTERMEDIATE R&B

## 2019-11-19 PROCEDURE — 6370000000 HC RX 637 (ALT 250 FOR IP): Performed by: INTERNAL MEDICINE

## 2019-11-19 PROCEDURE — 97530 THERAPEUTIC ACTIVITIES: CPT

## 2019-11-19 PROCEDURE — 6370000000 HC RX 637 (ALT 250 FOR IP): Performed by: NURSE PRACTITIONER

## 2019-11-19 PROCEDURE — 2580000003 HC RX 258: Performed by: INTERNAL MEDICINE

## 2019-11-19 PROCEDURE — APPNB30 APP NON BILLABLE TIME 0-30 MINS: Performed by: NURSE PRACTITIONER

## 2019-11-19 PROCEDURE — 6360000002 HC RX W HCPCS: Performed by: INTERNAL MEDICINE

## 2019-11-19 PROCEDURE — 80048 BASIC METABOLIC PNL TOTAL CA: CPT

## 2019-11-19 PROCEDURE — APPSS30 APP SPLIT SHARED TIME 16-30 MINUTES: Performed by: NURSE PRACTITIONER

## 2019-11-19 PROCEDURE — 85025 COMPLETE CBC W/AUTO DIFF WBC: CPT

## 2019-11-19 PROCEDURE — 2700000000 HC OXYGEN THERAPY PER DAY

## 2019-11-19 PROCEDURE — 36592 COLLECT BLOOD FROM PICC: CPT

## 2019-11-19 PROCEDURE — 99232 SBSQ HOSP IP/OBS MODERATE 35: CPT | Performed by: INTERNAL MEDICINE

## 2019-11-19 PROCEDURE — 94640 AIRWAY INHALATION TREATMENT: CPT

## 2019-11-19 PROCEDURE — 6360000002 HC RX W HCPCS: Performed by: NURSE PRACTITIONER

## 2019-11-19 RX ADMIN — OXYCODONE HYDROCHLORIDE AND ACETAMINOPHEN 1 TABLET: 5; 325 TABLET ORAL at 21:44

## 2019-11-19 RX ADMIN — PANTOPRAZOLE SODIUM 40 MG: 40 TABLET, DELAYED RELEASE ORAL at 06:31

## 2019-11-19 RX ADMIN — DULOXETINE HYDROCHLORIDE 30 MG: 30 CAPSULE, DELAYED RELEASE ORAL at 08:27

## 2019-11-19 RX ADMIN — OXYCODONE HYDROCHLORIDE AND ACETAMINOPHEN 1 TABLET: 5; 325 TABLET ORAL at 13:13

## 2019-11-19 RX ADMIN — SODIUM CHLORIDE, PRESERVATIVE FREE 10 ML: 5 INJECTION INTRAVENOUS at 08:28

## 2019-11-19 RX ADMIN — METOPROLOL SUCCINATE 25 MG: 25 TABLET, EXTENDED RELEASE ORAL at 08:28

## 2019-11-19 RX ADMIN — RIVAROXABAN 15 MG: 15 TABLET, FILM COATED ORAL at 17:39

## 2019-11-19 RX ADMIN — AMIODARONE HYDROCHLORIDE 200 MG: 200 TABLET ORAL at 21:44

## 2019-11-19 RX ADMIN — Medication 1 CAPSULE: at 08:27

## 2019-11-19 RX ADMIN — SODIUM CHLORIDE, PRESERVATIVE FREE 10 ML: 5 INJECTION INTRAVENOUS at 21:45

## 2019-11-19 RX ADMIN — INSULIN LISPRO 2 UNITS: 100 INJECTION, SOLUTION INTRAVENOUS; SUBCUTANEOUS at 11:44

## 2019-11-19 RX ADMIN — PIPERACILLIN AND TAZOBACTAM 3.38 G: 3; .375 INJECTION, POWDER, LYOPHILIZED, FOR SOLUTION INTRAVENOUS at 00:09

## 2019-11-19 RX ADMIN — OXYCODONE HYDROCHLORIDE AND ACETAMINOPHEN 1 TABLET: 5; 325 TABLET ORAL at 00:09

## 2019-11-19 RX ADMIN — ATORVASTATIN CALCIUM 40 MG: 40 TABLET, FILM COATED ORAL at 08:27

## 2019-11-19 RX ADMIN — Medication 1 CAPSULE: at 21:44

## 2019-11-19 RX ADMIN — ALBUTEROL SULFATE 2.5 MG: 2.5 SOLUTION RESPIRATORY (INHALATION) at 08:54

## 2019-11-19 RX ADMIN — FLUCONAZOLE 200 MG: 100 TABLET ORAL at 08:27

## 2019-11-19 RX ADMIN — PIPERACILLIN AND TAZOBACTAM 3.38 G: 3; .375 INJECTION, POWDER, LYOPHILIZED, FOR SOLUTION INTRAVENOUS at 08:28

## 2019-11-19 RX ADMIN — OXYCODONE HYDROCHLORIDE AND ACETAMINOPHEN 1 TABLET: 5; 325 TABLET ORAL at 04:09

## 2019-11-19 RX ADMIN — PIPERACILLIN AND TAZOBACTAM 3.38 G: 3; .375 INJECTION, POWDER, LYOPHILIZED, FOR SOLUTION INTRAVENOUS at 15:52

## 2019-11-19 RX ADMIN — TAMSULOSIN HYDROCHLORIDE 0.4 MG: 0.4 CAPSULE ORAL at 08:27

## 2019-11-19 RX ADMIN — GABAPENTIN 200 MG: 100 CAPSULE ORAL at 08:27

## 2019-11-19 RX ADMIN — OXYCODONE HYDROCHLORIDE AND ACETAMINOPHEN 1 TABLET: 5; 325 TABLET ORAL at 17:39

## 2019-11-19 RX ADMIN — TRAZODONE HYDROCHLORIDE 50 MG: 50 TABLET ORAL at 21:44

## 2019-11-19 RX ADMIN — GABAPENTIN 200 MG: 100 CAPSULE ORAL at 13:13

## 2019-11-19 RX ADMIN — GABAPENTIN 200 MG: 100 CAPSULE ORAL at 21:44

## 2019-11-19 RX ADMIN — OXYCODONE HYDROCHLORIDE AND ACETAMINOPHEN 1 TABLET: 5; 325 TABLET ORAL at 08:27

## 2019-11-19 ASSESSMENT — PAIN DESCRIPTION - PROGRESSION
CLINICAL_PROGRESSION: NOT CHANGED
CLINICAL_PROGRESSION: GRADUALLY WORSENING
CLINICAL_PROGRESSION: NOT CHANGED
CLINICAL_PROGRESSION: NOT CHANGED

## 2019-11-19 ASSESSMENT — PAIN DESCRIPTION - DESCRIPTORS
DESCRIPTORS: ACHING;SORE
DESCRIPTORS: ACHING;DISCOMFORT
DESCRIPTORS: ACHING

## 2019-11-19 ASSESSMENT — PAIN DESCRIPTION - PAIN TYPE
TYPE: ACUTE PAIN
TYPE: SURGICAL PAIN
TYPE: ACUTE PAIN
TYPE: SURGICAL PAIN

## 2019-11-19 ASSESSMENT — PAIN - FUNCTIONAL ASSESSMENT
PAIN_FUNCTIONAL_ASSESSMENT: ACTIVITIES ARE NOT PREVENTED
PAIN_FUNCTIONAL_ASSESSMENT: PREVENTS OR INTERFERES SOME ACTIVE ACTIVITIES AND ADLS
PAIN_FUNCTIONAL_ASSESSMENT: ACTIVITIES ARE NOT PREVENTED
PAIN_FUNCTIONAL_ASSESSMENT: PREVENTS OR INTERFERES SOME ACTIVE ACTIVITIES AND ADLS
PAIN_FUNCTIONAL_ASSESSMENT: ACTIVITIES ARE NOT PREVENTED

## 2019-11-19 ASSESSMENT — PAIN SCALES - GENERAL
PAINLEVEL_OUTOF10: 7
PAINLEVEL_OUTOF10: 7
PAINLEVEL_OUTOF10: 8
PAINLEVEL_OUTOF10: 7
PAINLEVEL_OUTOF10: 7
PAINLEVEL_OUTOF10: 9
PAINLEVEL_OUTOF10: 0
PAINLEVEL_OUTOF10: 10
PAINLEVEL_OUTOF10: 0
PAINLEVEL_OUTOF10: 7
PAINLEVEL_OUTOF10: 5

## 2019-11-19 ASSESSMENT — PAIN DESCRIPTION - ONSET
ONSET: ON-GOING

## 2019-11-19 ASSESSMENT — PAIN DESCRIPTION - ORIENTATION
ORIENTATION: MID;RIGHT
ORIENTATION: MID
ORIENTATION: MID;RIGHT
ORIENTATION: MID
ORIENTATION: MID
ORIENTATION: MID;RIGHT
ORIENTATION: MID

## 2019-11-19 ASSESSMENT — PAIN DESCRIPTION - LOCATION
LOCATION: ABDOMEN

## 2019-11-19 ASSESSMENT — PAIN DESCRIPTION - FREQUENCY
FREQUENCY: CONTINUOUS

## 2019-11-19 ASSESSMENT — PAIN SCALES - WONG BAKER
WONGBAKER_NUMERICALRESPONSE: 0
WONGBAKER_NUMERICALRESPONSE: 4

## 2019-11-20 ENCOUNTER — APPOINTMENT (OUTPATIENT)
Dept: CT IMAGING | Age: 59
DRG: 721 | End: 2019-11-20
Payer: COMMERCIAL

## 2019-11-20 LAB
ANION GAP SERPL CALCULATED.3IONS-SCNC: 11 MMOL/L (ref 3–16)
BASOPHILS ABSOLUTE: 0 K/UL (ref 0–0.2)
BASOPHILS RELATIVE PERCENT: 0.2 %
BUN BLDV-MCNC: 17 MG/DL (ref 7–20)
CALCIUM SERPL-MCNC: 8.7 MG/DL (ref 8.3–10.6)
CHLORIDE BLD-SCNC: 99 MMOL/L (ref 99–110)
CO2: 30 MMOL/L (ref 21–32)
CREAT SERPL-MCNC: 1.3 MG/DL (ref 0.9–1.3)
EOSINOPHILS ABSOLUTE: 0.2 K/UL (ref 0–0.6)
EOSINOPHILS RELATIVE PERCENT: 2.6 %
GFR AFRICAN AMERICAN: >60
GFR NON-AFRICAN AMERICAN: 56
GLUCOSE BLD-MCNC: 108 MG/DL (ref 70–99)
GLUCOSE BLD-MCNC: 131 MG/DL (ref 70–99)
GLUCOSE BLD-MCNC: 134 MG/DL (ref 70–99)
GLUCOSE BLD-MCNC: 142 MG/DL (ref 70–99)
HCT VFR BLD CALC: 22.8 % (ref 40.5–52.5)
HEMOGLOBIN: 7.4 G/DL (ref 13.5–17.5)
LYMPHOCYTES ABSOLUTE: 1.1 K/UL (ref 1–5.1)
LYMPHOCYTES RELATIVE PERCENT: 15.8 %
MCH RBC QN AUTO: 28.2 PG (ref 26–34)
MCHC RBC AUTO-ENTMCNC: 32.3 G/DL (ref 31–36)
MCV RBC AUTO: 87 FL (ref 80–100)
MONOCYTES ABSOLUTE: 0.6 K/UL (ref 0–1.3)
MONOCYTES RELATIVE PERCENT: 9 %
NEUTROPHILS ABSOLUTE: 5.1 K/UL (ref 1.7–7.7)
NEUTROPHILS RELATIVE PERCENT: 72.4 %
PDW BLD-RTO: 16.2 % (ref 12.4–15.4)
PERFORMED ON: ABNORMAL
PLATELET # BLD: 224 K/UL (ref 135–450)
PMV BLD AUTO: 8.2 FL (ref 5–10.5)
POTASSIUM REFLEX MAGNESIUM: 4.1 MMOL/L (ref 3.5–5.1)
RBC # BLD: 2.62 M/UL (ref 4.2–5.9)
SODIUM BLD-SCNC: 140 MMOL/L (ref 136–145)
WBC # BLD: 7 K/UL (ref 4–11)

## 2019-11-20 PROCEDURE — 6360000004 HC RX CONTRAST MEDICATION: Performed by: SURGERY

## 2019-11-20 PROCEDURE — 6370000000 HC RX 637 (ALT 250 FOR IP): Performed by: INTERNAL MEDICINE

## 2019-11-20 PROCEDURE — 6360000004 HC RX CONTRAST MEDICATION

## 2019-11-20 PROCEDURE — 6360000002 HC RX W HCPCS: Performed by: INTERNAL MEDICINE

## 2019-11-20 PROCEDURE — APPNB30 APP NON BILLABLE TIME 0-30 MINS: Performed by: NURSE PRACTITIONER

## 2019-11-20 PROCEDURE — 2060000000 HC ICU INTERMEDIATE R&B

## 2019-11-20 PROCEDURE — 2580000003 HC RX 258: Performed by: INTERNAL MEDICINE

## 2019-11-20 PROCEDURE — 2700000000 HC OXYGEN THERAPY PER DAY

## 2019-11-20 PROCEDURE — APPSS30 APP SPLIT SHARED TIME 16-30 MINUTES: Performed by: NURSE PRACTITIONER

## 2019-11-20 PROCEDURE — 99232 SBSQ HOSP IP/OBS MODERATE 35: CPT | Performed by: INTERNAL MEDICINE

## 2019-11-20 PROCEDURE — 6370000000 HC RX 637 (ALT 250 FOR IP): Performed by: NURSE PRACTITIONER

## 2019-11-20 PROCEDURE — 80048 BASIC METABOLIC PNL TOTAL CA: CPT

## 2019-11-20 PROCEDURE — 94761 N-INVAS EAR/PLS OXIMETRY MLT: CPT

## 2019-11-20 PROCEDURE — 85025 COMPLETE CBC W/AUTO DIFF WBC: CPT

## 2019-11-20 PROCEDURE — 74177 CT ABD & PELVIS W/CONTRAST: CPT

## 2019-11-20 PROCEDURE — 9990000010 HC NO CHARGE VISIT

## 2019-11-20 RX ORDER — FUROSEMIDE 10 MG/ML
40 INJECTION INTRAMUSCULAR; INTRAVENOUS ONCE
Status: COMPLETED | OUTPATIENT
Start: 2019-11-20 | End: 2019-11-20

## 2019-11-20 RX ADMIN — FLUCONAZOLE 200 MG: 100 TABLET ORAL at 08:53

## 2019-11-20 RX ADMIN — METOPROLOL SUCCINATE 25 MG: 25 TABLET, EXTENDED RELEASE ORAL at 08:54

## 2019-11-20 RX ADMIN — PIPERACILLIN AND TAZOBACTAM 3.38 G: 3; .375 INJECTION, POWDER, LYOPHILIZED, FOR SOLUTION INTRAVENOUS at 08:53

## 2019-11-20 RX ADMIN — GABAPENTIN 200 MG: 100 CAPSULE ORAL at 12:59

## 2019-11-20 RX ADMIN — ATORVASTATIN CALCIUM 40 MG: 40 TABLET, FILM COATED ORAL at 08:53

## 2019-11-20 RX ADMIN — IOPAMIDOL 75 ML: 755 INJECTION, SOLUTION INTRAVENOUS at 10:22

## 2019-11-20 RX ADMIN — OXYCODONE HYDROCHLORIDE AND ACETAMINOPHEN 1 TABLET: 5; 325 TABLET ORAL at 04:59

## 2019-11-20 RX ADMIN — GABAPENTIN 200 MG: 100 CAPSULE ORAL at 22:06

## 2019-11-20 RX ADMIN — OXYCODONE HYDROCHLORIDE AND ACETAMINOPHEN 1 TABLET: 5; 325 TABLET ORAL at 15:39

## 2019-11-20 RX ADMIN — DULOXETINE HYDROCHLORIDE 30 MG: 30 CAPSULE, DELAYED RELEASE ORAL at 08:54

## 2019-11-20 RX ADMIN — AMIODARONE HYDROCHLORIDE 200 MG: 200 TABLET ORAL at 22:06

## 2019-11-20 RX ADMIN — PIPERACILLIN AND TAZOBACTAM 3.38 G: 3; .375 INJECTION, POWDER, LYOPHILIZED, FOR SOLUTION INTRAVENOUS at 00:24

## 2019-11-20 RX ADMIN — INSULIN LISPRO 1 UNITS: 100 INJECTION, SOLUTION INTRAVENOUS; SUBCUTANEOUS at 13:00

## 2019-11-20 RX ADMIN — TRAZODONE HYDROCHLORIDE 50 MG: 50 TABLET ORAL at 22:06

## 2019-11-20 RX ADMIN — SODIUM CHLORIDE, PRESERVATIVE FREE 10 ML: 5 INJECTION INTRAVENOUS at 08:54

## 2019-11-20 RX ADMIN — IOHEXOL 50 ML: 240 INJECTION, SOLUTION INTRATHECAL; INTRAVASCULAR; INTRAVENOUS; ORAL at 08:52

## 2019-11-20 RX ADMIN — FUROSEMIDE 40 MG: 10 INJECTION, SOLUTION INTRAMUSCULAR; INTRAVENOUS at 15:39

## 2019-11-20 RX ADMIN — SODIUM CHLORIDE, PRESERVATIVE FREE 10 ML: 5 INJECTION INTRAVENOUS at 22:07

## 2019-11-20 RX ADMIN — Medication 1 CAPSULE: at 22:06

## 2019-11-20 RX ADMIN — PIPERACILLIN AND TAZOBACTAM 3.38 G: 3; .375 INJECTION, POWDER, LYOPHILIZED, FOR SOLUTION INTRAVENOUS at 15:39

## 2019-11-20 RX ADMIN — Medication 1 CAPSULE: at 08:54

## 2019-11-20 RX ADMIN — PANTOPRAZOLE SODIUM 40 MG: 40 TABLET, DELAYED RELEASE ORAL at 06:15

## 2019-11-20 RX ADMIN — OXYCODONE HYDROCHLORIDE AND ACETAMINOPHEN 1 TABLET: 5; 325 TABLET ORAL at 20:00

## 2019-11-20 RX ADMIN — GABAPENTIN 200 MG: 100 CAPSULE ORAL at 08:54

## 2019-11-20 RX ADMIN — TAMSULOSIN HYDROCHLORIDE 0.4 MG: 0.4 CAPSULE ORAL at 08:54

## 2019-11-20 RX ADMIN — RIVAROXABAN 15 MG: 15 TABLET, FILM COATED ORAL at 18:37

## 2019-11-20 ASSESSMENT — PAIN SCALES - GENERAL
PAINLEVEL_OUTOF10: 5
PAINLEVEL_OUTOF10: 6
PAINLEVEL_OUTOF10: 7
PAINLEVEL_OUTOF10: 0
PAINLEVEL_OUTOF10: 8
PAINLEVEL_OUTOF10: 8
PAINLEVEL_OUTOF10: 5
PAINLEVEL_OUTOF10: 0

## 2019-11-20 ASSESSMENT — PAIN DESCRIPTION - PAIN TYPE
TYPE: SURGICAL PAIN

## 2019-11-20 ASSESSMENT — PAIN DESCRIPTION - ORIENTATION
ORIENTATION: MID
ORIENTATION: MID

## 2019-11-20 ASSESSMENT — PAIN DESCRIPTION - PROGRESSION
CLINICAL_PROGRESSION: GRADUALLY WORSENING
CLINICAL_PROGRESSION: GRADUALLY WORSENING
CLINICAL_PROGRESSION: NOT CHANGED
CLINICAL_PROGRESSION: RAPIDLY WORSENING
CLINICAL_PROGRESSION: GRADUALLY IMPROVING

## 2019-11-20 ASSESSMENT — PAIN DESCRIPTION - ONSET
ONSET: ON-GOING
ONSET: ON-GOING
ONSET: PROGRESSIVE
ONSET: ON-GOING

## 2019-11-20 ASSESSMENT — PAIN DESCRIPTION - DIRECTION: RADIATING_TOWARDS: RIGHT

## 2019-11-20 ASSESSMENT — PAIN SCALES - WONG BAKER
WONGBAKER_NUMERICALRESPONSE: 0

## 2019-11-20 ASSESSMENT — PAIN DESCRIPTION - DESCRIPTORS
DESCRIPTORS: ACHING

## 2019-11-20 ASSESSMENT — PAIN DESCRIPTION - LOCATION
LOCATION: ABDOMEN

## 2019-11-20 ASSESSMENT — PAIN DESCRIPTION - FREQUENCY
FREQUENCY: CONTINUOUS
FREQUENCY: INTERMITTENT
FREQUENCY: INTERMITTENT
FREQUENCY: CONTINUOUS

## 2019-11-21 LAB
ANION GAP SERPL CALCULATED.3IONS-SCNC: 10 MMOL/L (ref 3–16)
BASOPHILS ABSOLUTE: 0.1 K/UL (ref 0–0.2)
BASOPHILS RELATIVE PERCENT: 1 %
BUN BLDV-MCNC: 15 MG/DL (ref 7–20)
CALCIUM SERPL-MCNC: 8.6 MG/DL (ref 8.3–10.6)
CHLORIDE BLD-SCNC: 95 MMOL/L (ref 99–110)
CO2: 32 MMOL/L (ref 21–32)
CREAT SERPL-MCNC: 1.2 MG/DL (ref 0.9–1.3)
EOSINOPHILS ABSOLUTE: 0.3 K/UL (ref 0–0.6)
EOSINOPHILS RELATIVE PERCENT: 3.4 %
GFR AFRICAN AMERICAN: >60
GFR NON-AFRICAN AMERICAN: >60
GLUCOSE BLD-MCNC: 128 MG/DL (ref 70–99)
GLUCOSE BLD-MCNC: 132 MG/DL (ref 70–99)
GLUCOSE BLD-MCNC: 163 MG/DL (ref 70–99)
GLUCOSE BLD-MCNC: 172 MG/DL (ref 70–99)
GLUCOSE BLD-MCNC: 93 MG/DL (ref 70–99)
HCT VFR BLD CALC: 22.1 % (ref 40.5–52.5)
HEMOGLOBIN: 7.2 G/DL (ref 13.5–17.5)
LYMPHOCYTES ABSOLUTE: 1.5 K/UL (ref 1–5.1)
LYMPHOCYTES RELATIVE PERCENT: 18.6 %
MCH RBC QN AUTO: 28.1 PG (ref 26–34)
MCHC RBC AUTO-ENTMCNC: 32.4 G/DL (ref 31–36)
MCV RBC AUTO: 86.5 FL (ref 80–100)
MONOCYTES ABSOLUTE: 0.7 K/UL (ref 0–1.3)
MONOCYTES RELATIVE PERCENT: 9.2 %
NEUTROPHILS ABSOLUTE: 5.5 K/UL (ref 1.7–7.7)
NEUTROPHILS RELATIVE PERCENT: 67.8 %
PDW BLD-RTO: 15.9 % (ref 12.4–15.4)
PERFORMED ON: ABNORMAL
PLATELET # BLD: 245 K/UL (ref 135–450)
PMV BLD AUTO: 8.4 FL (ref 5–10.5)
POTASSIUM REFLEX MAGNESIUM: 3.7 MMOL/L (ref 3.5–5.1)
RBC # BLD: 2.55 M/UL (ref 4.2–5.9)
SODIUM BLD-SCNC: 137 MMOL/L (ref 136–145)
WBC # BLD: 8 K/UL (ref 4–11)

## 2019-11-21 PROCEDURE — 2580000003 HC RX 258: Performed by: INTERNAL MEDICINE

## 2019-11-21 PROCEDURE — 6370000000 HC RX 637 (ALT 250 FOR IP): Performed by: INTERNAL MEDICINE

## 2019-11-21 PROCEDURE — 6370000000 HC RX 637 (ALT 250 FOR IP): Performed by: NURSE PRACTITIONER

## 2019-11-21 PROCEDURE — 6360000002 HC RX W HCPCS: Performed by: NURSE PRACTITIONER

## 2019-11-21 PROCEDURE — 94761 N-INVAS EAR/PLS OXIMETRY MLT: CPT

## 2019-11-21 PROCEDURE — 2700000000 HC OXYGEN THERAPY PER DAY

## 2019-11-21 PROCEDURE — 6360000002 HC RX W HCPCS: Performed by: INTERNAL MEDICINE

## 2019-11-21 PROCEDURE — 85025 COMPLETE CBC W/AUTO DIFF WBC: CPT

## 2019-11-21 PROCEDURE — APPSS30 APP SPLIT SHARED TIME 16-30 MINUTES: Performed by: NURSE PRACTITIONER

## 2019-11-21 PROCEDURE — 2060000000 HC ICU INTERMEDIATE R&B

## 2019-11-21 PROCEDURE — APPNB30 APP NON BILLABLE TIME 0-30 MINS: Performed by: NURSE PRACTITIONER

## 2019-11-21 PROCEDURE — 94640 AIRWAY INHALATION TREATMENT: CPT

## 2019-11-21 PROCEDURE — 99232 SBSQ HOSP IP/OBS MODERATE 35: CPT | Performed by: INTERNAL MEDICINE

## 2019-11-21 PROCEDURE — 80048 BASIC METABOLIC PNL TOTAL CA: CPT

## 2019-11-21 PROCEDURE — 99024 POSTOP FOLLOW-UP VISIT: CPT | Performed by: SURGERY

## 2019-11-21 RX ORDER — TORSEMIDE 20 MG/1
20 TABLET ORAL DAILY
Status: DISCONTINUED | OUTPATIENT
Start: 2019-11-21 | End: 2019-11-22 | Stop reason: HOSPADM

## 2019-11-21 RX ADMIN — PIPERACILLIN AND TAZOBACTAM 3.38 G: 3; .375 INJECTION, POWDER, LYOPHILIZED, FOR SOLUTION INTRAVENOUS at 09:09

## 2019-11-21 RX ADMIN — GABAPENTIN 200 MG: 100 CAPSULE ORAL at 14:13

## 2019-11-21 RX ADMIN — TAMSULOSIN HYDROCHLORIDE 0.4 MG: 0.4 CAPSULE ORAL at 09:10

## 2019-11-21 RX ADMIN — OXYCODONE HYDROCHLORIDE AND ACETAMINOPHEN 1 TABLET: 5; 325 TABLET ORAL at 21:42

## 2019-11-21 RX ADMIN — ALBUTEROL SULFATE 2.5 MG: 2.5 SOLUTION RESPIRATORY (INHALATION) at 13:07

## 2019-11-21 RX ADMIN — ATORVASTATIN CALCIUM 40 MG: 40 TABLET, FILM COATED ORAL at 09:23

## 2019-11-21 RX ADMIN — TORSEMIDE 20 MG: 20 TABLET ORAL at 15:36

## 2019-11-21 RX ADMIN — PIPERACILLIN AND TAZOBACTAM 3.38 G: 3; .375 INJECTION, POWDER, LYOPHILIZED, FOR SOLUTION INTRAVENOUS at 00:33

## 2019-11-21 RX ADMIN — PIPERACILLIN AND TAZOBACTAM 3.38 G: 3; .375 INJECTION, POWDER, LYOPHILIZED, FOR SOLUTION INTRAVENOUS at 16:05

## 2019-11-21 RX ADMIN — INSULIN LISPRO 1 UNITS: 100 INJECTION, SOLUTION INTRAVENOUS; SUBCUTANEOUS at 13:32

## 2019-11-21 RX ADMIN — GABAPENTIN 200 MG: 100 CAPSULE ORAL at 09:10

## 2019-11-21 RX ADMIN — OXYCODONE HYDROCHLORIDE AND ACETAMINOPHEN 1 TABLET: 5; 325 TABLET ORAL at 13:32

## 2019-11-21 RX ADMIN — FLUCONAZOLE 200 MG: 100 TABLET ORAL at 09:10

## 2019-11-21 RX ADMIN — Medication 1 CAPSULE: at 09:10

## 2019-11-21 RX ADMIN — AMIODARONE HYDROCHLORIDE 200 MG: 200 TABLET ORAL at 21:42

## 2019-11-21 RX ADMIN — Medication 1 CAPSULE: at 21:42

## 2019-11-21 RX ADMIN — PANTOPRAZOLE SODIUM 40 MG: 40 TABLET, DELAYED RELEASE ORAL at 09:10

## 2019-11-21 RX ADMIN — OXYCODONE HYDROCHLORIDE AND ACETAMINOPHEN 1 TABLET: 5; 325 TABLET ORAL at 04:20

## 2019-11-21 RX ADMIN — INSULIN LISPRO 1 UNITS: 100 INJECTION, SOLUTION INTRAVENOUS; SUBCUTANEOUS at 17:22

## 2019-11-21 RX ADMIN — OXYCODONE HYDROCHLORIDE AND ACETAMINOPHEN 1 TABLET: 5; 325 TABLET ORAL at 17:37

## 2019-11-21 RX ADMIN — RIVAROXABAN 15 MG: 15 TABLET, FILM COATED ORAL at 17:37

## 2019-11-21 RX ADMIN — ALBUTEROL SULFATE 2.5 MG: 2.5 SOLUTION RESPIRATORY (INHALATION) at 23:56

## 2019-11-21 RX ADMIN — GABAPENTIN 200 MG: 100 CAPSULE ORAL at 21:42

## 2019-11-21 RX ADMIN — SODIUM CHLORIDE, PRESERVATIVE FREE 10 ML: 5 INJECTION INTRAVENOUS at 21:47

## 2019-11-21 RX ADMIN — OXYCODONE HYDROCHLORIDE AND ACETAMINOPHEN 1 TABLET: 5; 325 TABLET ORAL at 09:10

## 2019-11-21 RX ADMIN — DULOXETINE HYDROCHLORIDE 30 MG: 30 CAPSULE, DELAYED RELEASE ORAL at 09:10

## 2019-11-21 RX ADMIN — TRAZODONE HYDROCHLORIDE 50 MG: 50 TABLET ORAL at 21:42

## 2019-11-21 RX ADMIN — OXYCODONE HYDROCHLORIDE AND ACETAMINOPHEN 1 TABLET: 5; 325 TABLET ORAL at 00:20

## 2019-11-21 ASSESSMENT — PAIN DESCRIPTION - PROGRESSION
CLINICAL_PROGRESSION: NOT CHANGED

## 2019-11-21 ASSESSMENT — PAIN SCALES - GENERAL
PAINLEVEL_OUTOF10: 8
PAINLEVEL_OUTOF10: 5
PAINLEVEL_OUTOF10: 6
PAINLEVEL_OUTOF10: 5
PAINLEVEL_OUTOF10: 8
PAINLEVEL_OUTOF10: 5
PAINLEVEL_OUTOF10: 7
PAINLEVEL_OUTOF10: 8

## 2019-11-21 ASSESSMENT — PAIN DESCRIPTION - LOCATION
LOCATION: ABDOMEN

## 2019-11-21 ASSESSMENT — PAIN DESCRIPTION - FREQUENCY
FREQUENCY: CONTINUOUS

## 2019-11-21 ASSESSMENT — PAIN - FUNCTIONAL ASSESSMENT
PAIN_FUNCTIONAL_ASSESSMENT: PREVENTS OR INTERFERES SOME ACTIVE ACTIVITIES AND ADLS
PAIN_FUNCTIONAL_ASSESSMENT: ACTIVITIES ARE NOT PREVENTED
PAIN_FUNCTIONAL_ASSESSMENT: PREVENTS OR INTERFERES SOME ACTIVE ACTIVITIES AND ADLS
PAIN_FUNCTIONAL_ASSESSMENT: ACTIVITIES ARE NOT PREVENTED
PAIN_FUNCTIONAL_ASSESSMENT: ACTIVITIES ARE NOT PREVENTED
PAIN_FUNCTIONAL_ASSESSMENT: PREVENTS OR INTERFERES SOME ACTIVE ACTIVITIES AND ADLS

## 2019-11-21 ASSESSMENT — PAIN DESCRIPTION - ORIENTATION
ORIENTATION: MID
ORIENTATION: MID
ORIENTATION: RIGHT;LEFT
ORIENTATION: MID

## 2019-11-21 ASSESSMENT — PAIN DESCRIPTION - DESCRIPTORS
DESCRIPTORS: ACHING

## 2019-11-21 ASSESSMENT — PAIN DESCRIPTION - ONSET
ONSET: ON-GOING

## 2019-11-21 ASSESSMENT — PAIN DESCRIPTION - PAIN TYPE
TYPE: SURGICAL PAIN

## 2019-11-21 ASSESSMENT — PAIN DESCRIPTION - DIRECTION
RADIATING_TOWARDS: RIGHT
RADIATING_TOWARDS: RIGHT

## 2019-11-21 ASSESSMENT — PAIN SCALES - WONG BAKER: WONGBAKER_NUMERICALRESPONSE: 0

## 2019-11-22 VITALS
WEIGHT: 190.92 LBS | SYSTOLIC BLOOD PRESSURE: 116 MMHG | DIASTOLIC BLOOD PRESSURE: 68 MMHG | HEART RATE: 85 BPM | BODY MASS INDEX: 29.97 KG/M2 | HEIGHT: 67 IN | OXYGEN SATURATION: 98 % | RESPIRATION RATE: 16 BRPM | TEMPERATURE: 97.9 F

## 2019-11-22 LAB
ANION GAP SERPL CALCULATED.3IONS-SCNC: 10 MMOL/L (ref 3–16)
BASOPHILS ABSOLUTE: 0 K/UL (ref 0–0.2)
BASOPHILS RELATIVE PERCENT: 0.2 %
BUN BLDV-MCNC: 15 MG/DL (ref 7–20)
C-REACTIVE PROTEIN: 37.1 MG/L (ref 0–5.1)
CALCIUM SERPL-MCNC: 8.5 MG/DL (ref 8.3–10.6)
CHLORIDE BLD-SCNC: 96 MMOL/L (ref 99–110)
CO2: 32 MMOL/L (ref 21–32)
CREAT SERPL-MCNC: 1.3 MG/DL (ref 0.9–1.3)
EOSINOPHILS ABSOLUTE: 0.3 K/UL (ref 0–0.6)
EOSINOPHILS RELATIVE PERCENT: 2.8 %
GFR AFRICAN AMERICAN: >60
GFR NON-AFRICAN AMERICAN: 56
GLUCOSE BLD-MCNC: 121 MG/DL (ref 70–99)
GLUCOSE BLD-MCNC: 129 MG/DL (ref 70–99)
GLUCOSE BLD-MCNC: 140 MG/DL (ref 70–99)
HCT VFR BLD CALC: 21.8 % (ref 40.5–52.5)
HEMOGLOBIN: 7.2 G/DL (ref 13.5–17.5)
LYMPHOCYTES ABSOLUTE: 1.3 K/UL (ref 1–5.1)
LYMPHOCYTES RELATIVE PERCENT: 13.6 %
MCH RBC QN AUTO: 28.7 PG (ref 26–34)
MCHC RBC AUTO-ENTMCNC: 33.2 G/DL (ref 31–36)
MCV RBC AUTO: 86.4 FL (ref 80–100)
MONOCYTES ABSOLUTE: 0.7 K/UL (ref 0–1.3)
MONOCYTES RELATIVE PERCENT: 7.2 %
NEUTROPHILS ABSOLUTE: 7.1 K/UL (ref 1.7–7.7)
NEUTROPHILS RELATIVE PERCENT: 76.2 %
PDW BLD-RTO: 16.2 % (ref 12.4–15.4)
PERFORMED ON: ABNORMAL
PERFORMED ON: ABNORMAL
PLATELET # BLD: 230 K/UL (ref 135–450)
PMV BLD AUTO: 8.1 FL (ref 5–10.5)
POTASSIUM REFLEX MAGNESIUM: 3.6 MMOL/L (ref 3.5–5.1)
RBC # BLD: 2.52 M/UL (ref 4.2–5.9)
SEDIMENTATION RATE, ERYTHROCYTE: >120 MM/HR (ref 0–20)
SODIUM BLD-SCNC: 138 MMOL/L (ref 136–145)
WBC # BLD: 9.3 K/UL (ref 4–11)

## 2019-11-22 PROCEDURE — 85025 COMPLETE CBC W/AUTO DIFF WBC: CPT

## 2019-11-22 PROCEDURE — 99232 SBSQ HOSP IP/OBS MODERATE 35: CPT | Performed by: INTERNAL MEDICINE

## 2019-11-22 PROCEDURE — 2580000003 HC RX 258: Performed by: INTERNAL MEDICINE

## 2019-11-22 PROCEDURE — 6370000000 HC RX 637 (ALT 250 FOR IP): Performed by: INTERNAL MEDICINE

## 2019-11-22 PROCEDURE — APPNB30 APP NON BILLABLE TIME 0-30 MINS: Performed by: NURSE PRACTITIONER

## 2019-11-22 PROCEDURE — 94640 AIRWAY INHALATION TREATMENT: CPT

## 2019-11-22 PROCEDURE — 2700000000 HC OXYGEN THERAPY PER DAY

## 2019-11-22 PROCEDURE — 6370000000 HC RX 637 (ALT 250 FOR IP): Performed by: NURSE PRACTITIONER

## 2019-11-22 PROCEDURE — 6360000002 HC RX W HCPCS: Performed by: INTERNAL MEDICINE

## 2019-11-22 PROCEDURE — APPSS30 APP SPLIT SHARED TIME 16-30 MINUTES: Performed by: NURSE PRACTITIONER

## 2019-11-22 PROCEDURE — 86140 C-REACTIVE PROTEIN: CPT

## 2019-11-22 PROCEDURE — 94760 N-INVAS EAR/PLS OXIMETRY 1: CPT

## 2019-11-22 PROCEDURE — 80048 BASIC METABOLIC PNL TOTAL CA: CPT

## 2019-11-22 PROCEDURE — 85652 RBC SED RATE AUTOMATED: CPT

## 2019-11-22 PROCEDURE — 6360000002 HC RX W HCPCS: Performed by: NURSE PRACTITIONER

## 2019-11-22 RX ORDER — FLUCONAZOLE 200 MG/1
200 TABLET ORAL DAILY
Qty: 7 TABLET | Refills: 0 | Status: SHIPPED | OUTPATIENT
Start: 2019-11-23 | End: 2019-11-30

## 2019-11-22 RX ORDER — OXYCODONE HYDROCHLORIDE AND ACETAMINOPHEN 5; 325 MG/1; MG/1
1 TABLET ORAL EVERY 4 HOURS PRN
Qty: 42 TABLET | Refills: 0 | Status: SHIPPED | OUTPATIENT
Start: 2019-11-22 | End: 2019-11-29

## 2019-11-22 RX ORDER — METOPROLOL SUCCINATE 25 MG/1
25 TABLET, EXTENDED RELEASE ORAL DAILY
Qty: 30 TABLET | Refills: 3 | Status: ON HOLD | OUTPATIENT
Start: 2019-11-23 | End: 2020-02-14 | Stop reason: HOSPADM

## 2019-11-22 RX ADMIN — METOPROLOL SUCCINATE 25 MG: 25 TABLET, EXTENDED RELEASE ORAL at 09:24

## 2019-11-22 RX ADMIN — TORSEMIDE 20 MG: 20 TABLET ORAL at 09:23

## 2019-11-22 RX ADMIN — ATORVASTATIN CALCIUM 40 MG: 40 TABLET, FILM COATED ORAL at 09:24

## 2019-11-22 RX ADMIN — PIPERACILLIN AND TAZOBACTAM 3.38 G: 3; .375 INJECTION, POWDER, LYOPHILIZED, FOR SOLUTION INTRAVENOUS at 00:27

## 2019-11-22 RX ADMIN — DULOXETINE HYDROCHLORIDE 30 MG: 30 CAPSULE, DELAYED RELEASE ORAL at 09:24

## 2019-11-22 RX ADMIN — PANTOPRAZOLE SODIUM 40 MG: 40 TABLET, DELAYED RELEASE ORAL at 06:04

## 2019-11-22 RX ADMIN — GABAPENTIN 200 MG: 100 CAPSULE ORAL at 09:24

## 2019-11-22 RX ADMIN — GABAPENTIN 200 MG: 100 CAPSULE ORAL at 16:10

## 2019-11-22 RX ADMIN — TAMSULOSIN HYDROCHLORIDE 0.4 MG: 0.4 CAPSULE ORAL at 09:24

## 2019-11-22 RX ADMIN — PIPERACILLIN AND TAZOBACTAM 3.38 G: 3; .375 INJECTION, POWDER, LYOPHILIZED, FOR SOLUTION INTRAVENOUS at 09:23

## 2019-11-22 RX ADMIN — SODIUM CHLORIDE, PRESERVATIVE FREE 10 ML: 5 INJECTION INTRAVENOUS at 09:30

## 2019-11-22 RX ADMIN — OXYCODONE HYDROCHLORIDE AND ACETAMINOPHEN 1 TABLET: 5; 325 TABLET ORAL at 01:47

## 2019-11-22 RX ADMIN — ALBUTEROL SULFATE 2.5 MG: 2.5 SOLUTION RESPIRATORY (INHALATION) at 16:27

## 2019-11-22 RX ADMIN — OXYCODONE HYDROCHLORIDE AND ACETAMINOPHEN 1 TABLET: 5; 325 TABLET ORAL at 06:04

## 2019-11-22 RX ADMIN — OXYCODONE HYDROCHLORIDE AND ACETAMINOPHEN 1 TABLET: 5; 325 TABLET ORAL at 12:58

## 2019-11-22 RX ADMIN — Medication 1 CAPSULE: at 09:24

## 2019-11-22 RX ADMIN — FLUCONAZOLE 200 MG: 100 TABLET ORAL at 09:24

## 2019-11-22 ASSESSMENT — PAIN DESCRIPTION - ORIENTATION
ORIENTATION: RIGHT;LEFT
ORIENTATION: RIGHT;LEFT
ORIENTATION: RIGHT

## 2019-11-22 ASSESSMENT — PAIN DESCRIPTION - FREQUENCY
FREQUENCY: CONTINUOUS

## 2019-11-22 ASSESSMENT — PAIN SCALES - GENERAL
PAINLEVEL_OUTOF10: 7
PAINLEVEL_OUTOF10: 8
PAINLEVEL_OUTOF10: 8
PAINLEVEL_OUTOF10: 0
PAINLEVEL_OUTOF10: 5
PAINLEVEL_OUTOF10: 0

## 2019-11-22 ASSESSMENT — PAIN DESCRIPTION - LOCATION
LOCATION: ABDOMEN

## 2019-11-22 ASSESSMENT — PAIN DESCRIPTION - ONSET
ONSET: ON-GOING

## 2019-11-22 ASSESSMENT — PAIN - FUNCTIONAL ASSESSMENT
PAIN_FUNCTIONAL_ASSESSMENT: PREVENTS OR INTERFERES SOME ACTIVE ACTIVITIES AND ADLS

## 2019-11-22 ASSESSMENT — PAIN DESCRIPTION - PAIN TYPE
TYPE: SURGICAL PAIN

## 2019-11-22 ASSESSMENT — PAIN DESCRIPTION - PROGRESSION
CLINICAL_PROGRESSION: NOT CHANGED
CLINICAL_PROGRESSION: GRADUALLY WORSENING
CLINICAL_PROGRESSION: NOT CHANGED

## 2019-11-22 ASSESSMENT — PAIN DESCRIPTION - DESCRIPTORS
DESCRIPTORS: ACHING

## 2019-12-02 LAB
BUN BLDV-MCNC: 26 MG/DL
CALCIUM SERPL-MCNC: 8.5 MG/DL
CHLORIDE BLD-SCNC: 87 MMOL/L
CO2: 36 MMOL/L
CREAT SERPL-MCNC: 1.5 MG/DL
GFR CALCULATED: NORMAL
GLUCOSE BLD-MCNC: 98 MG/DL
HCT VFR BLD CALC: 25 % (ref 41–53)
HEMOGLOBIN: 8.2 G/DL (ref 13.5–17.5)
PLATELET # BLD: 237 K/ΜL
POTASSIUM SERPL-SCNC: 3.8 MMOL/L
SODIUM BLD-SCNC: 133 MMOL/L
WBC # BLD: 8.9 10^3/ML

## 2019-12-04 ENCOUNTER — ANESTHESIA EVENT (OUTPATIENT)
Dept: ENDOSCOPY | Age: 59
DRG: 247 | End: 2019-12-04
Payer: COMMERCIAL

## 2019-12-04 ENCOUNTER — OFFICE VISIT (OUTPATIENT)
Dept: CARDIOLOGY CLINIC | Age: 59
End: 2019-12-04
Payer: COMMERCIAL

## 2019-12-04 ENCOUNTER — APPOINTMENT (OUTPATIENT)
Dept: CT IMAGING | Age: 59
DRG: 247 | End: 2019-12-04
Payer: COMMERCIAL

## 2019-12-04 ENCOUNTER — TELEPHONE (OUTPATIENT)
Dept: OTHER | Facility: CLINIC | Age: 59
End: 2019-12-04

## 2019-12-04 ENCOUNTER — APPOINTMENT (OUTPATIENT)
Dept: GENERAL RADIOLOGY | Age: 59
DRG: 247 | End: 2019-12-04
Payer: COMMERCIAL

## 2019-12-04 ENCOUNTER — HOSPITAL ENCOUNTER (INPATIENT)
Age: 59
LOS: 15 days | Discharge: NURSING, REHAB OR LONG-TERM CARE | DRG: 247 | End: 2019-12-20
Attending: EMERGENCY MEDICINE | Admitting: INTERNAL MEDICINE
Payer: COMMERCIAL

## 2019-12-04 ENCOUNTER — ANESTHESIA (OUTPATIENT)
Dept: ENDOSCOPY | Age: 59
DRG: 247 | End: 2019-12-04
Payer: COMMERCIAL

## 2019-12-04 VITALS
HEART RATE: 80 BPM | SYSTOLIC BLOOD PRESSURE: 110 MMHG | HEIGHT: 67 IN | WEIGHT: 182 LBS | BODY MASS INDEX: 28.56 KG/M2 | DIASTOLIC BLOOD PRESSURE: 64 MMHG | OXYGEN SATURATION: 98 %

## 2019-12-04 VITALS
SYSTOLIC BLOOD PRESSURE: 114 MMHG | DIASTOLIC BLOOD PRESSURE: 66 MMHG | OXYGEN SATURATION: 100 % | RESPIRATION RATE: 8 BRPM

## 2019-12-04 DIAGNOSIS — I50.82 BIVENTRICULAR HEART FAILURE (HCC): ICD-10-CM

## 2019-12-04 DIAGNOSIS — J96.11 CHRONIC RESPIRATORY FAILURE WITH HYPOXIA (HCC): ICD-10-CM

## 2019-12-04 DIAGNOSIS — I42.8 NONISCHEMIC CARDIOMYOPATHY (HCC): ICD-10-CM

## 2019-12-04 DIAGNOSIS — G47.33 OBSTRUCTIVE SLEEP APNEA SYNDROME: ICD-10-CM

## 2019-12-04 DIAGNOSIS — N17.9 AKI (ACUTE KIDNEY INJURY) (HCC): ICD-10-CM

## 2019-12-04 DIAGNOSIS — R10.84 GENERALIZED ABDOMINAL PAIN: ICD-10-CM

## 2019-12-04 DIAGNOSIS — M19.90 ARTHRITIS: ICD-10-CM

## 2019-12-04 DIAGNOSIS — I73.9 PAD (PERIPHERAL ARTERY DISEASE) (HCC): ICD-10-CM

## 2019-12-04 DIAGNOSIS — D64.9 ANEMIA, UNSPECIFIED TYPE: Primary | ICD-10-CM

## 2019-12-04 DIAGNOSIS — I48.20 CHRONIC ATRIAL FIBRILLATION (HCC): Primary | ICD-10-CM

## 2019-12-04 LAB
A/G RATIO: 0.6 (ref 1.1–2.2)
ABO/RH: NORMAL
ALBUMIN SERPL-MCNC: 3.1 G/DL (ref 3.4–5)
ALP BLD-CCNC: 86 U/L (ref 40–129)
ALT SERPL-CCNC: 18 U/L (ref 10–40)
ANION GAP SERPL CALCULATED.3IONS-SCNC: 14 MMOL/L (ref 3–16)
ANISOCYTOSIS: ABNORMAL
ANTIBODY SCREEN: NORMAL
AST SERPL-CCNC: 33 U/L (ref 15–37)
BASOPHILS ABSOLUTE: 0.2 K/UL (ref 0–0.2)
BASOPHILS RELATIVE PERCENT: 1 %
BILIRUB SERPL-MCNC: 0.4 MG/DL (ref 0–1)
BLOOD BANK DISPENSE STATUS: NORMAL
BLOOD BANK DISPENSE STATUS: NORMAL
BLOOD BANK PRODUCT CODE: NORMAL
BLOOD BANK PRODUCT CODE: NORMAL
BPU ID: NORMAL
BPU ID: NORMAL
BUN BLDV-MCNC: 27 MG/DL (ref 7–20)
CALCIUM SERPL-MCNC: 8.8 MG/DL (ref 8.3–10.6)
CHLORIDE BLD-SCNC: 86 MMOL/L (ref 99–110)
CO2: 33 MMOL/L (ref 21–32)
CREAT SERPL-MCNC: 1.5 MG/DL (ref 0.9–1.3)
DESCRIPTION BLOOD BANK: NORMAL
DESCRIPTION BLOOD BANK: NORMAL
EKG ATRIAL RATE: 94 BPM
EKG DIAGNOSIS: NORMAL
EKG Q-T INTERVAL: 454 MS
EKG QRS DURATION: 102 MS
EKG QTC CALCULATION (BAZETT): 546 MS
EKG R AXIS: 95 DEGREES
EKG T AXIS: 219 DEGREES
EKG VENTRICULAR RATE: 87 BPM
EOSINOPHILS ABSOLUTE: 0.5 K/UL (ref 0–0.6)
EOSINOPHILS RELATIVE PERCENT: 3 %
GFR AFRICAN AMERICAN: 58
GFR NON-AFRICAN AMERICAN: 48
GLOBULIN: 5.5 G/DL
GLUCOSE BLD-MCNC: 110 MG/DL (ref 70–99)
GLUCOSE BLD-MCNC: 120 MG/DL (ref 70–99)
GLUCOSE BLD-MCNC: 125 MG/DL (ref 70–99)
HCT VFR BLD CALC: 11.8 % (ref 40.5–52.5)
HCT VFR BLD CALC: 22.2 % (ref 40.5–52.5)
HEMATOLOGY PATH CONSULT: YES
HEMOGLOBIN: 3.9 G/DL (ref 13.5–17.5)
HEMOGLOBIN: 7.2 G/DL (ref 13.5–17.5)
LACTIC ACID, SEPSIS: 0.6 MMOL/L (ref 0.4–1.9)
LACTIC ACID, SEPSIS: 1.1 MMOL/L (ref 0.4–1.9)
LIPASE: 21 U/L (ref 13–60)
LYMPHOCYTES ABSOLUTE: 1.4 K/UL (ref 1–5.1)
LYMPHOCYTES RELATIVE PERCENT: 9 %
MAGNESIUM: 1.9 MG/DL (ref 1.8–2.4)
MCH RBC QN AUTO: 28.5 PG (ref 26–34)
MCHC RBC AUTO-ENTMCNC: 32.7 G/DL (ref 31–36)
MCV RBC AUTO: 87.3 FL (ref 80–100)
MONOCYTES ABSOLUTE: 0.5 K/UL (ref 0–1.3)
MONOCYTES RELATIVE PERCENT: 3 %
NEUTROPHILS ABSOLUTE: 12.7 K/UL (ref 1.7–7.7)
NEUTROPHILS RELATIVE PERCENT: 84 %
PDW BLD-RTO: 16.4 % (ref 12.4–15.4)
PERFORMED ON: ABNORMAL
PERFORMED ON: ABNORMAL
PLATELET # BLD: 244 K/UL (ref 135–450)
PLATELET SLIDE REVIEW: ADEQUATE
PMV BLD AUTO: 9.2 FL (ref 5–10.5)
POTASSIUM REFLEX MAGNESIUM: 3 MMOL/L (ref 3.5–5.1)
RBC # BLD: 1.35 M/UL (ref 4.2–5.9)
SLIDE REVIEW: ABNORMAL
SODIUM BLD-SCNC: 133 MMOL/L (ref 136–145)
TOTAL PROTEIN: 8.6 G/DL (ref 6.4–8.2)
TROPONIN: 0.05 NG/ML
WBC # BLD: 15.1 K/UL (ref 4–11)

## 2019-12-04 PROCEDURE — 86850 RBC ANTIBODY SCREEN: CPT

## 2019-12-04 PROCEDURE — 86923 COMPATIBILITY TEST ELECTRIC: CPT

## 2019-12-04 PROCEDURE — 6370000000 HC RX 637 (ALT 250 FOR IP): Performed by: NURSE PRACTITIONER

## 2019-12-04 PROCEDURE — 2580000003 HC RX 258

## 2019-12-04 PROCEDURE — 93000 ELECTROCARDIOGRAM COMPLETE: CPT | Performed by: NURSE PRACTITIONER

## 2019-12-04 PROCEDURE — G8598 ASA/ANTIPLAT THER USED: HCPCS | Performed by: NURSE PRACTITIONER

## 2019-12-04 PROCEDURE — 2580000003 HC RX 258: Performed by: EMERGENCY MEDICINE

## 2019-12-04 PROCEDURE — 36430 TRANSFUSION BLD/BLD COMPNT: CPT

## 2019-12-04 PROCEDURE — 3017F COLORECTAL CA SCREEN DOC REV: CPT | Performed by: NURSE PRACTITIONER

## 2019-12-04 PROCEDURE — 7100000000 HC PACU RECOVERY - FIRST 15 MIN: Performed by: INTERNAL MEDICINE

## 2019-12-04 PROCEDURE — 80053 COMPREHEN METABOLIC PANEL: CPT

## 2019-12-04 PROCEDURE — 83735 ASSAY OF MAGNESIUM: CPT

## 2019-12-04 PROCEDURE — G8427 DOCREV CUR MEDS BY ELIG CLIN: HCPCS | Performed by: NURSE PRACTITIONER

## 2019-12-04 PROCEDURE — 83605 ASSAY OF LACTIC ACID: CPT

## 2019-12-04 PROCEDURE — 74174 CTA ABD&PLVS W/CONTRAST: CPT

## 2019-12-04 PROCEDURE — 86900 BLOOD TYPING SEROLOGIC ABO: CPT

## 2019-12-04 PROCEDURE — 93005 ELECTROCARDIOGRAM TRACING: CPT | Performed by: EMERGENCY MEDICINE

## 2019-12-04 PROCEDURE — 1036F TOBACCO NON-USER: CPT | Performed by: NURSE PRACTITIONER

## 2019-12-04 PROCEDURE — 96361 HYDRATE IV INFUSION ADD-ON: CPT

## 2019-12-04 PROCEDURE — G8417 CALC BMI ABV UP PARAM F/U: HCPCS | Performed by: NURSE PRACTITIONER

## 2019-12-04 PROCEDURE — 4500000025 HC ED LEVEL 5 PROCEDURE

## 2019-12-04 PROCEDURE — 6360000004 HC RX CONTRAST MEDICATION: Performed by: EMERGENCY MEDICINE

## 2019-12-04 PROCEDURE — P9016 RBC LEUKOCYTES REDUCED: HCPCS

## 2019-12-04 PROCEDURE — 2709999900 HC NON-CHARGEABLE SUPPLY: Performed by: INTERNAL MEDICINE

## 2019-12-04 PROCEDURE — 02HV33Z INSERTION OF INFUSION DEVICE INTO SUPERIOR VENA CAVA, PERCUTANEOUS APPROACH: ICD-10-PCS | Performed by: HOSPITALIST

## 2019-12-04 PROCEDURE — G8484 FLU IMMUNIZE NO ADMIN: HCPCS | Performed by: NURSE PRACTITIONER

## 2019-12-04 PROCEDURE — 85025 COMPLETE CBC W/AUTO DIFF WBC: CPT

## 2019-12-04 PROCEDURE — 84484 ASSAY OF TROPONIN QUANT: CPT

## 2019-12-04 PROCEDURE — 0DJ08ZZ INSPECTION OF UPPER INTESTINAL TRACT, VIA NATURAL OR ARTIFICIAL OPENING ENDOSCOPIC: ICD-10-PCS | Performed by: INTERNAL MEDICINE

## 2019-12-04 PROCEDURE — 36415 COLL VENOUS BLD VENIPUNCTURE: CPT

## 2019-12-04 PROCEDURE — 1111F DSCHRG MED/CURRENT MED MERGE: CPT | Performed by: NURSE PRACTITIONER

## 2019-12-04 PROCEDURE — 83690 ASSAY OF LIPASE: CPT

## 2019-12-04 PROCEDURE — 3700000001 HC ADD 15 MINUTES (ANESTHESIA): Performed by: INTERNAL MEDICINE

## 2019-12-04 PROCEDURE — 6370000000 HC RX 637 (ALT 250 FOR IP): Performed by: EMERGENCY MEDICINE

## 2019-12-04 PROCEDURE — 2500000003 HC RX 250 WO HCPCS

## 2019-12-04 PROCEDURE — 86901 BLOOD TYPING SEROLOGIC RH(D): CPT

## 2019-12-04 PROCEDURE — 96360 HYDRATION IV INFUSION INIT: CPT

## 2019-12-04 PROCEDURE — 6360000002 HC RX W HCPCS

## 2019-12-04 PROCEDURE — 85014 HEMATOCRIT: CPT

## 2019-12-04 PROCEDURE — 2580000003 HC RX 258: Performed by: NURSE PRACTITIONER

## 2019-12-04 PROCEDURE — 3609017100 HC EGD: Performed by: INTERNAL MEDICINE

## 2019-12-04 PROCEDURE — 71046 X-RAY EXAM CHEST 2 VIEWS: CPT

## 2019-12-04 PROCEDURE — 6360000002 HC RX W HCPCS: Performed by: EMERGENCY MEDICINE

## 2019-12-04 PROCEDURE — 3700000000 HC ANESTHESIA ATTENDED CARE: Performed by: INTERNAL MEDICINE

## 2019-12-04 PROCEDURE — 7100000001 HC PACU RECOVERY - ADDTL 15 MIN: Performed by: INTERNAL MEDICINE

## 2019-12-04 PROCEDURE — C9113 INJ PANTOPRAZOLE SODIUM, VIA: HCPCS | Performed by: EMERGENCY MEDICINE

## 2019-12-04 PROCEDURE — 93010 ELECTROCARDIOGRAM REPORT: CPT | Performed by: INTERNAL MEDICINE

## 2019-12-04 PROCEDURE — 99285 EMERGENCY DEPT VISIT HI MDM: CPT

## 2019-12-04 PROCEDURE — 99214 OFFICE O/P EST MOD 30 MIN: CPT | Performed by: NURSE PRACTITIONER

## 2019-12-04 PROCEDURE — 85018 HEMOGLOBIN: CPT

## 2019-12-04 RX ORDER — GABAPENTIN 100 MG/1
200 CAPSULE ORAL 3 TIMES DAILY
Status: DISCONTINUED | OUTPATIENT
Start: 2019-12-04 | End: 2019-12-20 | Stop reason: HOSPADM

## 2019-12-04 RX ORDER — 0.9 % SODIUM CHLORIDE 0.9 %
1000 INTRAVENOUS SOLUTION INTRAVENOUS ONCE
Status: COMPLETED | OUTPATIENT
Start: 2019-12-04 | End: 2019-12-04

## 2019-12-04 RX ORDER — OXYCODONE HYDROCHLORIDE AND ACETAMINOPHEN 5; 325 MG/1; MG/1
2 TABLET ORAL EVERY 4 HOURS PRN
Status: DISCONTINUED | OUTPATIENT
Start: 2019-12-04 | End: 2019-12-20 | Stop reason: HOSPADM

## 2019-12-04 RX ORDER — DOXYCYCLINE HYCLATE 50 MG/1
324 CAPSULE, GELATIN COATED ORAL
Status: DISCONTINUED | OUTPATIENT
Start: 2019-12-05 | End: 2019-12-20 | Stop reason: HOSPADM

## 2019-12-04 RX ORDER — 0.9 % SODIUM CHLORIDE 0.9 %
250 INTRAVENOUS SOLUTION INTRAVENOUS ONCE
Status: DISCONTINUED | OUTPATIENT
Start: 2019-12-04 | End: 2019-12-14

## 2019-12-04 RX ORDER — ONDANSETRON 2 MG/ML
4 INJECTION INTRAMUSCULAR; INTRAVENOUS EVERY 6 HOURS PRN
Status: DISCONTINUED | OUTPATIENT
Start: 2019-12-04 | End: 2019-12-20 | Stop reason: HOSPADM

## 2019-12-04 RX ORDER — DULOXETIN HYDROCHLORIDE 30 MG/1
30 CAPSULE, DELAYED RELEASE ORAL DAILY
Status: DISCONTINUED | OUTPATIENT
Start: 2019-12-05 | End: 2019-12-20 | Stop reason: HOSPADM

## 2019-12-04 RX ORDER — OXYCODONE AND ACETAMINOPHEN 10; 325 MG/1; MG/1
1 TABLET ORAL ONCE
Status: COMPLETED | OUTPATIENT
Start: 2019-12-04 | End: 2019-12-04

## 2019-12-04 RX ORDER — SODIUM CHLORIDE 0.9 % (FLUSH) 0.9 %
10 SYRINGE (ML) INJECTION PRN
Status: DISCONTINUED | OUTPATIENT
Start: 2019-12-04 | End: 2019-12-20 | Stop reason: HOSPADM

## 2019-12-04 RX ORDER — SODIUM CHLORIDE, SODIUM LACTATE, POTASSIUM CHLORIDE, CALCIUM CHLORIDE 600; 310; 30; 20 MG/100ML; MG/100ML; MG/100ML; MG/100ML
INJECTION, SOLUTION INTRAVENOUS CONTINUOUS
Status: DISCONTINUED | OUTPATIENT
Start: 2019-12-04 | End: 2019-12-12

## 2019-12-04 RX ORDER — ONDANSETRON 2 MG/ML
4 INJECTION INTRAMUSCULAR; INTRAVENOUS
Status: ACTIVE | OUTPATIENT
Start: 2019-12-04 | End: 2019-12-04

## 2019-12-04 RX ORDER — KETAMINE HCL IN NACL, ISO-OSM 100MG/10ML
SYRINGE (ML) INJECTION PRN
Status: DISCONTINUED | OUTPATIENT
Start: 2019-12-04 | End: 2019-12-04 | Stop reason: SDUPTHER

## 2019-12-04 RX ORDER — METOPROLOL SUCCINATE 25 MG/1
25 TABLET, EXTENDED RELEASE ORAL DAILY
Status: DISCONTINUED | OUTPATIENT
Start: 2019-12-05 | End: 2019-12-20 | Stop reason: HOSPADM

## 2019-12-04 RX ORDER — OXYCODONE HYDROCHLORIDE AND ACETAMINOPHEN 5; 325 MG/1; MG/1
1 TABLET ORAL EVERY 4 HOURS PRN
Status: DISCONTINUED | OUTPATIENT
Start: 2019-12-04 | End: 2019-12-20 | Stop reason: HOSPADM

## 2019-12-04 RX ORDER — SUCCINYLCHOLINE/SOD CL,ISO/PF 200MG/10ML
SYRINGE (ML) INTRAVENOUS PRN
Status: DISCONTINUED | OUTPATIENT
Start: 2019-12-04 | End: 2019-12-04 | Stop reason: SDUPTHER

## 2019-12-04 RX ORDER — TAMSULOSIN HYDROCHLORIDE 0.4 MG/1
0.4 CAPSULE ORAL DAILY
Status: DISCONTINUED | OUTPATIENT
Start: 2019-12-05 | End: 2019-12-20 | Stop reason: HOSPADM

## 2019-12-04 RX ORDER — ACETAMINOPHEN 325 MG/1
650 TABLET ORAL EVERY 4 HOURS PRN
Status: DISCONTINUED | OUTPATIENT
Start: 2019-12-04 | End: 2019-12-20 | Stop reason: HOSPADM

## 2019-12-04 RX ORDER — SELENIUM 50 MCG
1 TABLET ORAL 2 TIMES DAILY
Status: DISCONTINUED | OUTPATIENT
Start: 2019-12-04 | End: 2019-12-04 | Stop reason: CLARIF

## 2019-12-04 RX ORDER — DOXYCYCLINE HYCLATE 50 MG/1
324 CAPSULE, GELATIN COATED ORAL
Status: ON HOLD | COMMUNITY
End: 2020-02-14 | Stop reason: HOSPADM

## 2019-12-04 RX ORDER — ATORVASTATIN CALCIUM 40 MG/1
40 TABLET, FILM COATED ORAL DAILY
Status: DISCONTINUED | OUTPATIENT
Start: 2019-12-05 | End: 2019-12-20 | Stop reason: HOSPADM

## 2019-12-04 RX ORDER — TRAZODONE HYDROCHLORIDE 50 MG/1
50 TABLET ORAL NIGHTLY
Status: DISCONTINUED | OUTPATIENT
Start: 2019-12-04 | End: 2019-12-20 | Stop reason: HOSPADM

## 2019-12-04 RX ORDER — LIDOCAINE HYDROCHLORIDE 20 MG/ML
INJECTION, SOLUTION EPIDURAL; INFILTRATION; INTRACAUDAL; PERINEURAL PRN
Status: DISCONTINUED | OUTPATIENT
Start: 2019-12-04 | End: 2019-12-04 | Stop reason: SDUPTHER

## 2019-12-04 RX ORDER — PROPOFOL 10 MG/ML
INJECTION, EMULSION INTRAVENOUS PRN
Status: DISCONTINUED | OUTPATIENT
Start: 2019-12-04 | End: 2019-12-04 | Stop reason: SDUPTHER

## 2019-12-04 RX ORDER — LACTOBACILLUS RHAMNOSUS GG 10B CELL
1 CAPSULE ORAL 2 TIMES DAILY WITH MEALS
Status: DISCONTINUED | OUTPATIENT
Start: 2019-12-05 | End: 2019-12-20 | Stop reason: HOSPADM

## 2019-12-04 RX ORDER — POTASSIUM CHLORIDE 7.45 MG/ML
10 INJECTION INTRAVENOUS PRN
Status: DISCONTINUED | OUTPATIENT
Start: 2019-12-04 | End: 2019-12-20 | Stop reason: HOSPADM

## 2019-12-04 RX ORDER — SODIUM CHLORIDE 9 MG/ML
INJECTION, SOLUTION INTRAVENOUS CONTINUOUS PRN
Status: DISCONTINUED | OUTPATIENT
Start: 2019-12-04 | End: 2019-12-04 | Stop reason: SDUPTHER

## 2019-12-04 RX ORDER — AMIODARONE HYDROCHLORIDE 200 MG/1
200 TABLET ORAL DAILY
Status: DISCONTINUED | OUTPATIENT
Start: 2019-12-05 | End: 2019-12-20 | Stop reason: HOSPADM

## 2019-12-04 RX ORDER — LANOLIN ALCOHOL/MO/W.PET/CERES
400 CREAM (GRAM) TOPICAL 2 TIMES DAILY WITH MEALS
Status: DISCONTINUED | OUTPATIENT
Start: 2019-12-04 | End: 2019-12-20 | Stop reason: HOSPADM

## 2019-12-04 RX ORDER — POTASSIUM CHLORIDE 750 MG/1
20 TABLET, FILM COATED, EXTENDED RELEASE ORAL ONCE
Status: COMPLETED | OUTPATIENT
Start: 2019-12-04 | End: 2019-12-04

## 2019-12-04 RX ORDER — POTASSIUM CHLORIDE 20 MEQ/1
40 TABLET, EXTENDED RELEASE ORAL PRN
Status: DISCONTINUED | OUTPATIENT
Start: 2019-12-04 | End: 2019-12-20 | Stop reason: HOSPADM

## 2019-12-04 RX ORDER — SODIUM CHLORIDE 0.9 % (FLUSH) 0.9 %
10 SYRINGE (ML) INJECTION EVERY 12 HOURS SCHEDULED
Status: DISCONTINUED | OUTPATIENT
Start: 2019-12-04 | End: 2019-12-20 | Stop reason: HOSPADM

## 2019-12-04 RX ORDER — CYCLOBENZAPRINE HCL 10 MG
10 TABLET ORAL 3 TIMES DAILY PRN
Status: DISCONTINUED | OUTPATIENT
Start: 2019-12-04 | End: 2019-12-20 | Stop reason: HOSPADM

## 2019-12-04 RX ADMIN — OYSTER SHELL CALCIUM WITH VITAMIN D 1 TABLET: 500; 200 TABLET, FILM COATED ORAL at 22:05

## 2019-12-04 RX ADMIN — PHENYLEPHRINE HYDROCHLORIDE 100 MCG: 10 INJECTION INTRAVENOUS at 18:14

## 2019-12-04 RX ADMIN — IOPAMIDOL 75 ML: 755 INJECTION, SOLUTION INTRAVENOUS at 14:11

## 2019-12-04 RX ADMIN — Medication 120 MG: at 18:14

## 2019-12-04 RX ADMIN — Medication 30 MG: at 18:14

## 2019-12-04 RX ADMIN — GABAPENTIN 200 MG: 100 CAPSULE ORAL at 22:05

## 2019-12-04 RX ADMIN — Medication 400 MG: at 22:05

## 2019-12-04 RX ADMIN — PHENYLEPHRINE HYDROCHLORIDE 200 MCG: 10 INJECTION INTRAVENOUS at 18:20

## 2019-12-04 RX ADMIN — PROPOFOL 25 MG: 10 INJECTION, EMULSION INTRAVENOUS at 18:15

## 2019-12-04 RX ADMIN — SODIUM CHLORIDE, POTASSIUM CHLORIDE, SODIUM LACTATE AND CALCIUM CHLORIDE: 600; 310; 30; 20 INJECTION, SOLUTION INTRAVENOUS at 22:05

## 2019-12-04 RX ADMIN — SODIUM CHLORIDE 80 MG: 9 INJECTION, SOLUTION INTRAVENOUS at 16:47

## 2019-12-04 RX ADMIN — TRAZODONE HYDROCHLORIDE 50 MG: 50 TABLET ORAL at 22:05

## 2019-12-04 RX ADMIN — SODIUM CHLORIDE 1000 ML: 9 INJECTION, SOLUTION INTRAVENOUS at 14:45

## 2019-12-04 RX ADMIN — OXYCODONE HYDROCHLORIDE AND ACETAMINOPHEN 2 TABLET: 5; 325 TABLET ORAL at 22:40

## 2019-12-04 RX ADMIN — SODIUM CHLORIDE 8 MG/HR: 9 INJECTION, SOLUTION INTRAVENOUS at 16:44

## 2019-12-04 RX ADMIN — PROPOFOL 25 MG: 10 INJECTION, EMULSION INTRAVENOUS at 18:14

## 2019-12-04 RX ADMIN — SODIUM CHLORIDE: 9 INJECTION, SOLUTION INTRAVENOUS at 17:51

## 2019-12-04 RX ADMIN — POTASSIUM CHLORIDE 20 MEQ: 750 TABLET, EXTENDED RELEASE ORAL at 15:51

## 2019-12-04 RX ADMIN — LIDOCAINE HYDROCHLORIDE 100 MG: 20 INJECTION, SOLUTION EPIDURAL; INFILTRATION; INTRACAUDAL; PERINEURAL at 18:14

## 2019-12-04 RX ADMIN — OXYCODONE HYDROCHLORIDE AND ACETAMINOPHEN 1 TABLET: 10; 325 TABLET ORAL at 13:19

## 2019-12-04 ASSESSMENT — PAIN DESCRIPTION - ONSET
ONSET: ON-GOING
ONSET: ON-GOING
ONSET: GRADUAL
ONSET: ON-GOING
ONSET: GRADUAL

## 2019-12-04 ASSESSMENT — PAIN DESCRIPTION - LOCATION
LOCATION: ABDOMEN

## 2019-12-04 ASSESSMENT — PULMONARY FUNCTION TESTS
PIF_VALUE: 32
PIF_VALUE: 6
PIF_VALUE: 20
PIF_VALUE: 2
PIF_VALUE: 4
PIF_VALUE: 0
PIF_VALUE: 0
PIF_VALUE: 33
PIF_VALUE: 8
PIF_VALUE: 2
PIF_VALUE: 22
PIF_VALUE: 30
PIF_VALUE: 0
PIF_VALUE: 32
PIF_VALUE: 1
PIF_VALUE: 21
PIF_VALUE: 0
PIF_VALUE: 0
PIF_VALUE: 4
PIF_VALUE: 34
PIF_VALUE: 3
PIF_VALUE: 22
PIF_VALUE: 29
PIF_VALUE: 26
PIF_VALUE: 33
PIF_VALUE: 0
PIF_VALUE: 19
PIF_VALUE: 3

## 2019-12-04 ASSESSMENT — PAIN SCALES - GENERAL
PAINLEVEL_OUTOF10: 8
PAINLEVEL_OUTOF10: 8
PAINLEVEL_OUTOF10: 6
PAINLEVEL_OUTOF10: 8
PAINLEVEL_OUTOF10: 3
PAINLEVEL_OUTOF10: 8
PAINLEVEL_OUTOF10: 3
PAINLEVEL_OUTOF10: 0
PAINLEVEL_OUTOF10: 6

## 2019-12-04 ASSESSMENT — PAIN DESCRIPTION - FREQUENCY
FREQUENCY: INTERMITTENT

## 2019-12-04 ASSESSMENT — PAIN DESCRIPTION - DESCRIPTORS
DESCRIPTORS: CRAMPING

## 2019-12-04 ASSESSMENT — ENCOUNTER SYMPTOMS: SHORTNESS OF BREATH: 1

## 2019-12-04 ASSESSMENT — PAIN DESCRIPTION - ORIENTATION
ORIENTATION: RIGHT

## 2019-12-04 ASSESSMENT — PAIN DESCRIPTION - PAIN TYPE
TYPE: ACUTE PAIN

## 2019-12-04 ASSESSMENT — PAIN DESCRIPTION - PROGRESSION
CLINICAL_PROGRESSION: NOT CHANGED
CLINICAL_PROGRESSION: GRADUALLY IMPROVING
CLINICAL_PROGRESSION: NOT CHANGED

## 2019-12-05 ENCOUNTER — TELEPHONE (OUTPATIENT)
Dept: CARDIOLOGY CLINIC | Age: 59
End: 2019-12-05

## 2019-12-05 LAB
ANION GAP SERPL CALCULATED.3IONS-SCNC: 11 MMOL/L (ref 3–16)
BASOPHILS ABSOLUTE: 0 K/UL (ref 0–0.2)
BASOPHILS RELATIVE PERCENT: 0.2 %
BUN BLDV-MCNC: 23 MG/DL (ref 7–20)
CALCIUM SERPL-MCNC: 8.8 MG/DL (ref 8.3–10.6)
CHLORIDE BLD-SCNC: 92 MMOL/L (ref 99–110)
CO2: 32 MMOL/L (ref 21–32)
CREAT SERPL-MCNC: 1.2 MG/DL (ref 0.9–1.3)
EOSINOPHILS ABSOLUTE: 0.3 K/UL (ref 0–0.6)
EOSINOPHILS RELATIVE PERCENT: 4.1 %
GFR AFRICAN AMERICAN: >60
GFR NON-AFRICAN AMERICAN: >60
GLUCOSE BLD-MCNC: 121 MG/DL (ref 70–99)
GLUCOSE BLD-MCNC: 134 MG/DL (ref 70–99)
GLUCOSE BLD-MCNC: 138 MG/DL (ref 70–99)
GLUCOSE BLD-MCNC: 85 MG/DL (ref 70–99)
GLUCOSE BLD-MCNC: 95 MG/DL (ref 70–99)
HCT VFR BLD CALC: 24.1 % (ref 40.5–52.5)
HCT VFR BLD CALC: 24.2 % (ref 40.5–52.5)
HCT VFR BLD CALC: 24.9 % (ref 40.5–52.5)
HCT VFR BLD CALC: 27.6 % (ref 40.5–52.5)
HEMATOLOGY PATH CONSULT: NORMAL
HEMOGLOBIN: 7.9 G/DL (ref 13.5–17.5)
HEMOGLOBIN: 7.9 G/DL (ref 13.5–17.5)
HEMOGLOBIN: 8.2 G/DL (ref 13.5–17.5)
HEMOGLOBIN: 9.1 G/DL (ref 13.5–17.5)
LYMPHOCYTES ABSOLUTE: 0.9 K/UL (ref 1–5.1)
LYMPHOCYTES RELATIVE PERCENT: 12.1 %
MAGNESIUM: 1.7 MG/DL (ref 1.8–2.4)
MCH RBC QN AUTO: 28.8 PG (ref 26–34)
MCHC RBC AUTO-ENTMCNC: 32.8 G/DL (ref 31–36)
MCV RBC AUTO: 87.6 FL (ref 80–100)
MONOCYTES ABSOLUTE: 0.9 K/UL (ref 0–1.3)
MONOCYTES RELATIVE PERCENT: 11.5 %
NEUTROPHILS ABSOLUTE: 5.6 K/UL (ref 1.7–7.7)
NEUTROPHILS RELATIVE PERCENT: 72.1 %
PDW BLD-RTO: 16.7 % (ref 12.4–15.4)
PERFORMED ON: ABNORMAL
PERFORMED ON: NORMAL
PLATELET # BLD: 161 K/UL (ref 135–450)
PMV BLD AUTO: 9.3 FL (ref 5–10.5)
POTASSIUM REFLEX MAGNESIUM: 3 MMOL/L (ref 3.5–5.1)
RBC # BLD: 3.15 M/UL (ref 4.2–5.9)
SODIUM BLD-SCNC: 135 MMOL/L (ref 136–145)
TROPONIN: 0.02 NG/ML
WBC # BLD: 7.7 K/UL (ref 4–11)

## 2019-12-05 PROCEDURE — 6360000002 HC RX W HCPCS: Performed by: NURSE PRACTITIONER

## 2019-12-05 PROCEDURE — 85014 HEMATOCRIT: CPT

## 2019-12-05 PROCEDURE — 84484 ASSAY OF TROPONIN QUANT: CPT

## 2019-12-05 PROCEDURE — G0008 ADMIN INFLUENZA VIRUS VAC: HCPCS | Performed by: INTERNAL MEDICINE

## 2019-12-05 PROCEDURE — 6360000002 HC RX W HCPCS: Performed by: INTERNAL MEDICINE

## 2019-12-05 PROCEDURE — 85018 HEMOGLOBIN: CPT

## 2019-12-05 PROCEDURE — 83735 ASSAY OF MAGNESIUM: CPT

## 2019-12-05 PROCEDURE — 99254 IP/OBS CNSLTJ NEW/EST MOD 60: CPT | Performed by: INTERNAL MEDICINE

## 2019-12-05 PROCEDURE — 85025 COMPLETE CBC W/AUTO DIFF WBC: CPT

## 2019-12-05 PROCEDURE — 6370000000 HC RX 637 (ALT 250 FOR IP): Performed by: NURSE PRACTITIONER

## 2019-12-05 PROCEDURE — 2580000003 HC RX 258: Performed by: NURSE PRACTITIONER

## 2019-12-05 PROCEDURE — C9113 INJ PANTOPRAZOLE SODIUM, VIA: HCPCS | Performed by: EMERGENCY MEDICINE

## 2019-12-05 PROCEDURE — 1200000000 HC SEMI PRIVATE

## 2019-12-05 PROCEDURE — 2580000003 HC RX 258: Performed by: EMERGENCY MEDICINE

## 2019-12-05 PROCEDURE — 87338 HPYLORI STOOL AG IA: CPT

## 2019-12-05 PROCEDURE — 2700000000 HC OXYGEN THERAPY PER DAY

## 2019-12-05 PROCEDURE — 90686 IIV4 VACC NO PRSV 0.5 ML IM: CPT | Performed by: INTERNAL MEDICINE

## 2019-12-05 PROCEDURE — 6360000002 HC RX W HCPCS: Performed by: EMERGENCY MEDICINE

## 2019-12-05 PROCEDURE — 94640 AIRWAY INHALATION TREATMENT: CPT

## 2019-12-05 PROCEDURE — 80048 BASIC METABOLIC PNL TOTAL CA: CPT

## 2019-12-05 RX ORDER — PANTOPRAZOLE SODIUM 40 MG/1
40 TABLET, DELAYED RELEASE ORAL
Status: DISCONTINUED | OUTPATIENT
Start: 2019-12-06 | End: 2019-12-20 | Stop reason: HOSPADM

## 2019-12-05 RX ORDER — MAGNESIUM SULFATE 1 G/100ML
1 INJECTION INTRAVENOUS ONCE
Status: COMPLETED | OUTPATIENT
Start: 2019-12-05 | End: 2019-12-05

## 2019-12-05 RX ADMIN — INFLUENZA A VIRUS A/BRISBANE/02/2018 IVR-190 (H1N1) ANTIGEN (PROPIOLACTONE INACTIVATED), INFLUENZA A VIRUS A/KANSAS/14/2017 X-327 (H3N2) ANTIGEN (PROPIOLACTONE INACTIVATED), INFLUENZA B VIRUS B/MARYLAND/15/2016 ANTIGEN (PROPIOLACTONE INACTIVATED), INFLUENZA B VIRUS B/PHUKET/3073/2013 BVR-1B ANTIGEN (PROPIOLACTONE INACTIVATED) 0.5 ML: 15; 15; 15; 15 INJECTION, SUSPENSION INTRAMUSCULAR at 08:45

## 2019-12-05 RX ADMIN — SODIUM CHLORIDE, POTASSIUM CHLORIDE, SODIUM LACTATE AND CALCIUM CHLORIDE: 600; 310; 30; 20 INJECTION, SOLUTION INTRAVENOUS at 15:11

## 2019-12-05 RX ADMIN — OXYCODONE HYDROCHLORIDE AND ACETAMINOPHEN 2 TABLET: 5; 325 TABLET ORAL at 22:15

## 2019-12-05 RX ADMIN — Medication 10 MEQ: at 15:11

## 2019-12-05 RX ADMIN — SODIUM CHLORIDE 8 MG/HR: 9 INJECTION, SOLUTION INTRAVENOUS at 05:12

## 2019-12-05 RX ADMIN — OYSTER SHELL CALCIUM WITH VITAMIN D 1 TABLET: 500; 200 TABLET, FILM COATED ORAL at 08:41

## 2019-12-05 RX ADMIN — Medication 10 MEQ: at 22:00

## 2019-12-05 RX ADMIN — Medication 10 MEQ: at 18:45

## 2019-12-05 RX ADMIN — LINAGLIPTIN 5 MG: 5 TABLET, FILM COATED ORAL at 08:41

## 2019-12-05 RX ADMIN — GABAPENTIN 200 MG: 100 CAPSULE ORAL at 08:40

## 2019-12-05 RX ADMIN — TRAZODONE HYDROCHLORIDE 50 MG: 50 TABLET ORAL at 22:15

## 2019-12-05 RX ADMIN — Medication 10 MEQ: at 12:31

## 2019-12-05 RX ADMIN — RIVAROXABAN 15 MG: 15 TABLET, FILM COATED ORAL at 08:40

## 2019-12-05 RX ADMIN — ATORVASTATIN CALCIUM 40 MG: 40 TABLET, FILM COATED ORAL at 08:41

## 2019-12-05 RX ADMIN — Medication 10 MEQ: at 16:07

## 2019-12-05 RX ADMIN — GABAPENTIN 200 MG: 100 CAPSULE ORAL at 15:11

## 2019-12-05 RX ADMIN — TAMSULOSIN HYDROCHLORIDE 0.4 MG: 0.4 CAPSULE ORAL at 08:41

## 2019-12-05 RX ADMIN — DULOXETINE HYDROCHLORIDE 30 MG: 30 CAPSULE, DELAYED RELEASE ORAL at 08:41

## 2019-12-05 RX ADMIN — FERROUS GLUCONATE 324 MG: 324 TABLET ORAL at 08:41

## 2019-12-05 RX ADMIN — OXYCODONE HYDROCHLORIDE AND ACETAMINOPHEN 2 TABLET: 5; 325 TABLET ORAL at 08:39

## 2019-12-05 RX ADMIN — OXYCODONE HYDROCHLORIDE AND ACETAMINOPHEN 2 TABLET: 5; 325 TABLET ORAL at 16:48

## 2019-12-05 RX ADMIN — OXYCODONE HYDROCHLORIDE AND ACETAMINOPHEN 2 TABLET: 5; 325 TABLET ORAL at 12:42

## 2019-12-05 RX ADMIN — Medication 1 CAPSULE: at 08:41

## 2019-12-05 RX ADMIN — MAGNESIUM SULFATE HEPTAHYDRATE 1 G: 1 INJECTION, SOLUTION INTRAVENOUS at 11:17

## 2019-12-05 RX ADMIN — Medication 400 MG: at 16:07

## 2019-12-05 RX ADMIN — Medication 10 MEQ: at 11:17

## 2019-12-05 RX ADMIN — METOPROLOL SUCCINATE 25 MG: 25 TABLET, EXTENDED RELEASE ORAL at 08:40

## 2019-12-05 RX ADMIN — AMIODARONE HYDROCHLORIDE 200 MG: 200 TABLET ORAL at 08:40

## 2019-12-05 RX ADMIN — OYSTER SHELL CALCIUM WITH VITAMIN D 1 TABLET: 500; 200 TABLET, FILM COATED ORAL at 16:07

## 2019-12-05 RX ADMIN — Medication 400 MG: at 08:40

## 2019-12-05 RX ADMIN — GABAPENTIN 200 MG: 100 CAPSULE ORAL at 22:14

## 2019-12-05 RX ADMIN — Medication 1 CAPSULE: at 16:07

## 2019-12-05 ASSESSMENT — PAIN DESCRIPTION - PROGRESSION
CLINICAL_PROGRESSION: NOT CHANGED

## 2019-12-05 ASSESSMENT — PAIN SCALES - GENERAL
PAINLEVEL_OUTOF10: 10
PAINLEVEL_OUTOF10: 3
PAINLEVEL_OUTOF10: 7
PAINLEVEL_OUTOF10: 8
PAINLEVEL_OUTOF10: 4
PAINLEVEL_OUTOF10: 10
PAINLEVEL_OUTOF10: 7

## 2019-12-05 ASSESSMENT — PAIN DESCRIPTION - LOCATION: LOCATION: ABDOMEN

## 2019-12-05 ASSESSMENT — PAIN DESCRIPTION - FREQUENCY: FREQUENCY: CONTINUOUS

## 2019-12-05 ASSESSMENT — PAIN DESCRIPTION - DESCRIPTORS: DESCRIPTORS: PATIENT UNABLE TO DESCRIBE

## 2019-12-05 ASSESSMENT — PAIN DESCRIPTION - PAIN TYPE: TYPE: ACUTE PAIN

## 2019-12-06 ENCOUNTER — APPOINTMENT (OUTPATIENT)
Dept: GENERAL RADIOLOGY | Age: 59
DRG: 247 | End: 2019-12-06
Payer: COMMERCIAL

## 2019-12-06 LAB
A/G RATIO: 0.6 (ref 1.1–2.2)
ALBUMIN SERPL-MCNC: 2.8 G/DL (ref 3.4–5)
ALP BLD-CCNC: 88 U/L (ref 40–129)
ALT SERPL-CCNC: 13 U/L (ref 10–40)
ANION GAP SERPL CALCULATED.3IONS-SCNC: 11 MMOL/L (ref 3–16)
AST SERPL-CCNC: 26 U/L (ref 15–37)
BASOPHILS ABSOLUTE: 0.1 K/UL (ref 0–0.2)
BASOPHILS RELATIVE PERCENT: 0.9 %
BILIRUB SERPL-MCNC: 0.5 MG/DL (ref 0–1)
BUN BLDV-MCNC: 17 MG/DL (ref 7–20)
CALCIUM SERPL-MCNC: 8.9 MG/DL (ref 8.3–10.6)
CHLORIDE BLD-SCNC: 92 MMOL/L (ref 99–110)
CO2: 31 MMOL/L (ref 21–32)
CREAT SERPL-MCNC: 0.9 MG/DL (ref 0.9–1.3)
EOSINOPHILS ABSOLUTE: 0 K/UL (ref 0–0.6)
EOSINOPHILS RELATIVE PERCENT: 0.2 %
GFR AFRICAN AMERICAN: >60
GFR NON-AFRICAN AMERICAN: >60
GLOBULIN: 4.8 G/DL
GLUCOSE BLD-MCNC: 116 MG/DL (ref 70–99)
GLUCOSE BLD-MCNC: 131 MG/DL (ref 70–99)
GLUCOSE BLD-MCNC: 131 MG/DL (ref 70–99)
GLUCOSE BLD-MCNC: 140 MG/DL (ref 70–99)
GLUCOSE BLD-MCNC: 141 MG/DL (ref 70–99)
HCT VFR BLD CALC: 23 % (ref 40.5–52.5)
HEMATOLOGY PATH CONSULT: NO
HEMOGLOBIN: 7.5 G/DL (ref 13.5–17.5)
LYMPHOCYTES ABSOLUTE: 0.8 K/UL (ref 1–5.1)
LYMPHOCYTES RELATIVE PERCENT: 5.8 %
MCH RBC QN AUTO: 28.5 PG (ref 26–34)
MCHC RBC AUTO-ENTMCNC: 32.8 G/DL (ref 31–36)
MCV RBC AUTO: 86.7 FL (ref 80–100)
MONOCYTES ABSOLUTE: 1.7 K/UL (ref 0–1.3)
MONOCYTES RELATIVE PERCENT: 12.1 %
NEUTROPHILS ABSOLUTE: 11 K/UL (ref 1.7–7.7)
NEUTROPHILS RELATIVE PERCENT: 81 %
PDW BLD-RTO: 16.1 % (ref 12.4–15.4)
PERFORMED ON: ABNORMAL
PLATELET # BLD: 212 K/UL (ref 135–450)
PMV BLD AUTO: 9 FL (ref 5–10.5)
POTASSIUM SERPL-SCNC: 3.9 MMOL/L (ref 3.5–5.1)
RBC # BLD: 2.65 M/UL (ref 4.2–5.9)
SODIUM BLD-SCNC: 134 MMOL/L (ref 136–145)
TOTAL PROTEIN: 7.6 G/DL (ref 6.4–8.2)
WBC # BLD: 13.7 K/UL (ref 4–11)

## 2019-12-06 PROCEDURE — 2580000003 HC RX 258: Performed by: NURSE PRACTITIONER

## 2019-12-06 PROCEDURE — 85025 COMPLETE CBC W/AUTO DIFF WBC: CPT

## 2019-12-06 PROCEDURE — 6370000000 HC RX 637 (ALT 250 FOR IP): Performed by: INTERNAL MEDICINE

## 2019-12-06 PROCEDURE — 6370000000 HC RX 637 (ALT 250 FOR IP): Performed by: NURSE PRACTITIONER

## 2019-12-06 PROCEDURE — 6360000002 HC RX W HCPCS: Performed by: NURSE PRACTITIONER

## 2019-12-06 PROCEDURE — 80053 COMPREHEN METABOLIC PANEL: CPT

## 2019-12-06 PROCEDURE — 1200000000 HC SEMI PRIVATE

## 2019-12-06 PROCEDURE — 74019 RADEX ABDOMEN 2 VIEWS: CPT

## 2019-12-06 RX ADMIN — OXYCODONE HYDROCHLORIDE AND ACETAMINOPHEN 2 TABLET: 5; 325 TABLET ORAL at 03:08

## 2019-12-06 RX ADMIN — Medication 1 CAPSULE: at 07:52

## 2019-12-06 RX ADMIN — HYDROMORPHONE HYDROCHLORIDE 0.5 MG: 1 INJECTION, SOLUTION INTRAMUSCULAR; INTRAVENOUS; SUBCUTANEOUS at 13:24

## 2019-12-06 RX ADMIN — OXYCODONE HYDROCHLORIDE AND ACETAMINOPHEN 2 TABLET: 5; 325 TABLET ORAL at 15:45

## 2019-12-06 RX ADMIN — OXYCODONE HYDROCHLORIDE AND ACETAMINOPHEN 2 TABLET: 5; 325 TABLET ORAL at 22:35

## 2019-12-06 RX ADMIN — DULOXETINE HYDROCHLORIDE 30 MG: 30 CAPSULE, DELAYED RELEASE ORAL at 07:52

## 2019-12-06 RX ADMIN — AMIODARONE HYDROCHLORIDE 200 MG: 200 TABLET ORAL at 07:52

## 2019-12-06 RX ADMIN — TAMSULOSIN HYDROCHLORIDE 0.4 MG: 0.4 CAPSULE ORAL at 07:52

## 2019-12-06 RX ADMIN — OXYCODONE HYDROCHLORIDE AND ACETAMINOPHEN 2 TABLET: 5; 325 TABLET ORAL at 11:45

## 2019-12-06 RX ADMIN — HYDROMORPHONE HYDROCHLORIDE 0.5 MG: 1 INJECTION, SOLUTION INTRAMUSCULAR; INTRAVENOUS; SUBCUTANEOUS at 08:32

## 2019-12-06 RX ADMIN — GABAPENTIN 200 MG: 100 CAPSULE ORAL at 07:52

## 2019-12-06 RX ADMIN — Medication 1 CAPSULE: at 16:47

## 2019-12-06 RX ADMIN — OXYCODONE HYDROCHLORIDE AND ACETAMINOPHEN 2 TABLET: 5; 325 TABLET ORAL at 06:54

## 2019-12-06 RX ADMIN — METOPROLOL SUCCINATE 25 MG: 25 TABLET, EXTENDED RELEASE ORAL at 07:52

## 2019-12-06 RX ADMIN — GABAPENTIN 200 MG: 100 CAPSULE ORAL at 13:24

## 2019-12-06 RX ADMIN — FERROUS GLUCONATE 324 MG: 324 TABLET ORAL at 07:52

## 2019-12-06 RX ADMIN — OYSTER SHELL CALCIUM WITH VITAMIN D 1 TABLET: 500; 200 TABLET, FILM COATED ORAL at 07:52

## 2019-12-06 RX ADMIN — LINAGLIPTIN 5 MG: 5 TABLET, FILM COATED ORAL at 07:52

## 2019-12-06 RX ADMIN — TRAZODONE HYDROCHLORIDE 50 MG: 50 TABLET ORAL at 21:12

## 2019-12-06 RX ADMIN — Medication 400 MG: at 16:47

## 2019-12-06 RX ADMIN — GABAPENTIN 200 MG: 100 CAPSULE ORAL at 21:12

## 2019-12-06 RX ADMIN — SODIUM CHLORIDE, POTASSIUM CHLORIDE, SODIUM LACTATE AND CALCIUM CHLORIDE: 600; 310; 30; 20 INJECTION, SOLUTION INTRAVENOUS at 13:24

## 2019-12-06 RX ADMIN — Medication 400 MG: at 07:52

## 2019-12-06 RX ADMIN — PANTOPRAZOLE SODIUM 40 MG: 40 TABLET, DELAYED RELEASE ORAL at 06:49

## 2019-12-06 RX ADMIN — OYSTER SHELL CALCIUM WITH VITAMIN D 1 TABLET: 500; 200 TABLET, FILM COATED ORAL at 16:47

## 2019-12-06 RX ADMIN — HYDROMORPHONE HYDROCHLORIDE 0.5 MG: 1 INJECTION, SOLUTION INTRAMUSCULAR; INTRAVENOUS; SUBCUTANEOUS at 16:57

## 2019-12-06 RX ADMIN — ATORVASTATIN CALCIUM 40 MG: 40 TABLET, FILM COATED ORAL at 07:52

## 2019-12-06 RX ADMIN — HYDROMORPHONE HYDROCHLORIDE 0.5 MG: 1 INJECTION, SOLUTION INTRAMUSCULAR; INTRAVENOUS; SUBCUTANEOUS at 21:12

## 2019-12-06 ASSESSMENT — PAIN DESCRIPTION - FREQUENCY
FREQUENCY: INTERMITTENT
FREQUENCY: INTERMITTENT
FREQUENCY: CONTINUOUS
FREQUENCY: INTERMITTENT
FREQUENCY: CONTINUOUS

## 2019-12-06 ASSESSMENT — PAIN DESCRIPTION - ONSET
ONSET: AWAKENED FROM SLEEP
ONSET: ON-GOING

## 2019-12-06 ASSESSMENT — PAIN DESCRIPTION - PAIN TYPE
TYPE: ACUTE PAIN

## 2019-12-06 ASSESSMENT — PAIN DESCRIPTION - ORIENTATION
ORIENTATION: LEFT
ORIENTATION: UPPER
ORIENTATION: UPPER
ORIENTATION: RIGHT;LOWER;UPPER
ORIENTATION: UPPER
ORIENTATION: RIGHT;UPPER

## 2019-12-06 ASSESSMENT — PAIN - FUNCTIONAL ASSESSMENT
PAIN_FUNCTIONAL_ASSESSMENT: PREVENTS OR INTERFERES SOME ACTIVE ACTIVITIES AND ADLS

## 2019-12-06 ASSESSMENT — PAIN DESCRIPTION - DESCRIPTORS
DESCRIPTORS: PATIENT UNABLE TO DESCRIBE
DESCRIPTORS: ACHING
DESCRIPTORS: PATIENT UNABLE TO DESCRIBE

## 2019-12-06 ASSESSMENT — PAIN DESCRIPTION - LOCATION
LOCATION: ABDOMEN
LOCATION: LEG
LOCATION: ABDOMEN
LOCATION: ABDOMEN;GROIN
LOCATION: ABDOMEN;NECK

## 2019-12-06 ASSESSMENT — PAIN SCALES - GENERAL
PAINLEVEL_OUTOF10: 6
PAINLEVEL_OUTOF10: 3
PAINLEVEL_OUTOF10: 7
PAINLEVEL_OUTOF10: 7
PAINLEVEL_OUTOF10: 8
PAINLEVEL_OUTOF10: 7
PAINLEVEL_OUTOF10: 5
PAINLEVEL_OUTOF10: 7
PAINLEVEL_OUTOF10: 7
PAINLEVEL_OUTOF10: 9
PAINLEVEL_OUTOF10: 3
PAINLEVEL_OUTOF10: 8
PAINLEVEL_OUTOF10: 7
PAINLEVEL_OUTOF10: 7

## 2019-12-06 ASSESSMENT — ENCOUNTER SYMPTOMS
NAUSEA: 1
NAUSEA: 1

## 2019-12-06 ASSESSMENT — PAIN DESCRIPTION - PROGRESSION
CLINICAL_PROGRESSION: NOT CHANGED

## 2019-12-07 LAB
A/G RATIO: 0.5 (ref 1.1–2.2)
ALBUMIN SERPL-MCNC: 2.6 G/DL (ref 3.4–5)
ALP BLD-CCNC: 82 U/L (ref 40–129)
ALT SERPL-CCNC: 10 U/L (ref 10–40)
ANION GAP SERPL CALCULATED.3IONS-SCNC: 11 MMOL/L (ref 3–16)
AST SERPL-CCNC: 23 U/L (ref 15–37)
BASOPHILS ABSOLUTE: 0.1 K/UL (ref 0–0.2)
BASOPHILS RELATIVE PERCENT: 0.7 %
BILIRUB SERPL-MCNC: 0.3 MG/DL (ref 0–1)
BLOOD BANK DISPENSE STATUS: NORMAL
BLOOD BANK PRODUCT CODE: NORMAL
BPU ID: NORMAL
BUN BLDV-MCNC: 17 MG/DL (ref 7–20)
CALCIUM SERPL-MCNC: 8.7 MG/DL (ref 8.3–10.6)
CHLORIDE BLD-SCNC: 89 MMOL/L (ref 99–110)
CO2: 31 MMOL/L (ref 21–32)
CREAT SERPL-MCNC: 0.8 MG/DL (ref 0.9–1.3)
DESCRIPTION BLOOD BANK: NORMAL
EOSINOPHILS ABSOLUTE: 0.3 K/UL (ref 0–0.6)
EOSINOPHILS RELATIVE PERCENT: 2.6 %
GFR AFRICAN AMERICAN: >60
GFR NON-AFRICAN AMERICAN: >60
GLOBULIN: 5 G/DL
GLUCOSE BLD-MCNC: 107 MG/DL (ref 70–99)
H PYLORI ANTIGEN STOOL: NEGATIVE
HCT VFR BLD CALC: 19.8 % (ref 40.5–52.5)
HCT VFR BLD CALC: 22.4 % (ref 40.5–52.5)
HEMOGLOBIN: 6.4 G/DL (ref 13.5–17.5)
HEMOGLOBIN: 7.3 G/DL (ref 13.5–17.5)
LYMPHOCYTES ABSOLUTE: 1 K/UL (ref 1–5.1)
LYMPHOCYTES RELATIVE PERCENT: 8.3 %
MCH RBC QN AUTO: 28.2 PG (ref 26–34)
MCHC RBC AUTO-ENTMCNC: 32.3 G/DL (ref 31–36)
MCV RBC AUTO: 87.1 FL (ref 80–100)
MONOCYTES ABSOLUTE: 1.3 K/UL (ref 0–1.3)
MONOCYTES RELATIVE PERCENT: 11 %
NEUTROPHILS ABSOLUTE: 9 K/UL (ref 1.7–7.7)
NEUTROPHILS RELATIVE PERCENT: 77.4 %
PDW BLD-RTO: 15.9 % (ref 12.4–15.4)
PLATELET # BLD: 197 K/UL (ref 135–450)
PMV BLD AUTO: 8.8 FL (ref 5–10.5)
POTASSIUM SERPL-SCNC: 4 MMOL/L (ref 3.5–5.1)
RBC # BLD: 2.27 M/UL (ref 4.2–5.9)
SODIUM BLD-SCNC: 131 MMOL/L (ref 136–145)
TOTAL PROTEIN: 7.6 G/DL (ref 6.4–8.2)
WBC # BLD: 11.7 K/UL (ref 4–11)

## 2019-12-07 PROCEDURE — 94760 N-INVAS EAR/PLS OXIMETRY 1: CPT

## 2019-12-07 PROCEDURE — 36430 TRANSFUSION BLD/BLD COMPNT: CPT

## 2019-12-07 PROCEDURE — 36592 COLLECT BLOOD FROM PICC: CPT

## 2019-12-07 PROCEDURE — 6360000002 HC RX W HCPCS: Performed by: NURSE PRACTITIONER

## 2019-12-07 PROCEDURE — 36591 DRAW BLOOD OFF VENOUS DEVICE: CPT

## 2019-12-07 PROCEDURE — 6370000000 HC RX 637 (ALT 250 FOR IP): Performed by: INTERNAL MEDICINE

## 2019-12-07 PROCEDURE — 6370000000 HC RX 637 (ALT 250 FOR IP): Performed by: NURSE PRACTITIONER

## 2019-12-07 PROCEDURE — 99024 POSTOP FOLLOW-UP VISIT: CPT | Performed by: SURGERY

## 2019-12-07 PROCEDURE — 80053 COMPREHEN METABOLIC PANEL: CPT

## 2019-12-07 PROCEDURE — 2580000003 HC RX 258: Performed by: NURSE PRACTITIONER

## 2019-12-07 PROCEDURE — 36415 COLL VENOUS BLD VENIPUNCTURE: CPT

## 2019-12-07 PROCEDURE — 1200000000 HC SEMI PRIVATE

## 2019-12-07 PROCEDURE — P9016 RBC LEUKOCYTES REDUCED: HCPCS

## 2019-12-07 PROCEDURE — 2700000000 HC OXYGEN THERAPY PER DAY

## 2019-12-07 PROCEDURE — 2580000003 HC RX 258: Performed by: INTERNAL MEDICINE

## 2019-12-07 PROCEDURE — 85014 HEMATOCRIT: CPT

## 2019-12-07 PROCEDURE — 85018 HEMOGLOBIN: CPT

## 2019-12-07 PROCEDURE — 85025 COMPLETE CBC W/AUTO DIFF WBC: CPT

## 2019-12-07 RX ORDER — 0.9 % SODIUM CHLORIDE 0.9 %
250 INTRAVENOUS SOLUTION INTRAVENOUS ONCE
Status: COMPLETED | OUTPATIENT
Start: 2019-12-07 | End: 2019-12-07

## 2019-12-07 RX ADMIN — METOPROLOL SUCCINATE 25 MG: 25 TABLET, EXTENDED RELEASE ORAL at 09:39

## 2019-12-07 RX ADMIN — DULOXETINE HYDROCHLORIDE 30 MG: 30 CAPSULE, DELAYED RELEASE ORAL at 09:40

## 2019-12-07 RX ADMIN — PANTOPRAZOLE SODIUM 40 MG: 40 TABLET, DELAYED RELEASE ORAL at 04:38

## 2019-12-07 RX ADMIN — GABAPENTIN 200 MG: 100 CAPSULE ORAL at 09:39

## 2019-12-07 RX ADMIN — HYDROMORPHONE HYDROCHLORIDE 0.5 MG: 1 INJECTION, SOLUTION INTRAMUSCULAR; INTRAVENOUS; SUBCUTANEOUS at 22:35

## 2019-12-07 RX ADMIN — SODIUM CHLORIDE, POTASSIUM CHLORIDE, SODIUM LACTATE AND CALCIUM CHLORIDE: 600; 310; 30; 20 INJECTION, SOLUTION INTRAVENOUS at 06:30

## 2019-12-07 RX ADMIN — Medication 1 CAPSULE: at 09:40

## 2019-12-07 RX ADMIN — Medication 400 MG: at 16:00

## 2019-12-07 RX ADMIN — OXYCODONE HYDROCHLORIDE AND ACETAMINOPHEN 2 TABLET: 5; 325 TABLET ORAL at 14:42

## 2019-12-07 RX ADMIN — OYSTER SHELL CALCIUM WITH VITAMIN D 1 TABLET: 500; 200 TABLET, FILM COATED ORAL at 16:00

## 2019-12-07 RX ADMIN — OXYCODONE HYDROCHLORIDE AND ACETAMINOPHEN 2 TABLET: 5; 325 TABLET ORAL at 08:50

## 2019-12-07 RX ADMIN — GABAPENTIN 200 MG: 100 CAPSULE ORAL at 14:38

## 2019-12-07 RX ADMIN — HYDROMORPHONE HYDROCHLORIDE 0.5 MG: 1 INJECTION, SOLUTION INTRAMUSCULAR; INTRAVENOUS; SUBCUTANEOUS at 02:03

## 2019-12-07 RX ADMIN — ATORVASTATIN CALCIUM 40 MG: 40 TABLET, FILM COATED ORAL at 09:39

## 2019-12-07 RX ADMIN — TAMSULOSIN HYDROCHLORIDE 0.4 MG: 0.4 CAPSULE ORAL at 09:40

## 2019-12-07 RX ADMIN — SODIUM CHLORIDE 250 ML: 9 INJECTION, SOLUTION INTRAVENOUS at 11:48

## 2019-12-07 RX ADMIN — OYSTER SHELL CALCIUM WITH VITAMIN D 1 TABLET: 500; 200 TABLET, FILM COATED ORAL at 09:40

## 2019-12-07 RX ADMIN — GABAPENTIN 200 MG: 100 CAPSULE ORAL at 22:35

## 2019-12-07 RX ADMIN — LINAGLIPTIN 5 MG: 5 TABLET, FILM COATED ORAL at 09:40

## 2019-12-07 RX ADMIN — OXYCODONE HYDROCHLORIDE AND ACETAMINOPHEN 2 TABLET: 5; 325 TABLET ORAL at 04:38

## 2019-12-07 RX ADMIN — CYCLOBENZAPRINE 10 MG: 10 TABLET, FILM COATED ORAL at 22:35

## 2019-12-07 RX ADMIN — HYDROMORPHONE HYDROCHLORIDE 0.5 MG: 1 INJECTION, SOLUTION INTRAMUSCULAR; INTRAVENOUS; SUBCUTANEOUS at 06:30

## 2019-12-07 RX ADMIN — FERROUS GLUCONATE 324 MG: 324 TABLET ORAL at 09:39

## 2019-12-07 RX ADMIN — ACETAMINOPHEN 650 MG: 325 TABLET ORAL at 22:37

## 2019-12-07 RX ADMIN — HYDROMORPHONE HYDROCHLORIDE 0.5 MG: 1 INJECTION, SOLUTION INTRAMUSCULAR; INTRAVENOUS; SUBCUTANEOUS at 11:48

## 2019-12-07 RX ADMIN — Medication 1 CAPSULE: at 16:00

## 2019-12-07 RX ADMIN — AMIODARONE HYDROCHLORIDE 200 MG: 200 TABLET ORAL at 09:40

## 2019-12-07 RX ADMIN — Medication 400 MG: at 09:39

## 2019-12-07 RX ADMIN — TRAZODONE HYDROCHLORIDE 50 MG: 50 TABLET ORAL at 22:36

## 2019-12-07 RX ADMIN — HYDROMORPHONE HYDROCHLORIDE 0.5 MG: 1 INJECTION, SOLUTION INTRAMUSCULAR; INTRAVENOUS; SUBCUTANEOUS at 16:00

## 2019-12-07 ASSESSMENT — PAIN DESCRIPTION - DESCRIPTORS
DESCRIPTORS: ACHING
DESCRIPTORS: ACHING;DISCOMFORT;DULL
DESCRIPTORS: ACHING

## 2019-12-07 ASSESSMENT — PAIN DESCRIPTION - PROGRESSION
CLINICAL_PROGRESSION: NOT CHANGED

## 2019-12-07 ASSESSMENT — PAIN DESCRIPTION - DIRECTION: RADIATING_TOWARDS: RIGHT

## 2019-12-07 ASSESSMENT — PAIN DESCRIPTION - FREQUENCY
FREQUENCY: CONTINUOUS
FREQUENCY: INTERMITTENT
FREQUENCY: CONTINUOUS

## 2019-12-07 ASSESSMENT — PAIN DESCRIPTION - ONSET
ONSET: ON-GOING

## 2019-12-07 ASSESSMENT — PAIN SCALES - GENERAL
PAINLEVEL_OUTOF10: 0
PAINLEVEL_OUTOF10: 0
PAINLEVEL_OUTOF10: 3
PAINLEVEL_OUTOF10: 7
PAINLEVEL_OUTOF10: 8
PAINLEVEL_OUTOF10: 7
PAINLEVEL_OUTOF10: 0
PAINLEVEL_OUTOF10: 8
PAINLEVEL_OUTOF10: 8
PAINLEVEL_OUTOF10: 0
PAINLEVEL_OUTOF10: 7
PAINLEVEL_OUTOF10: 6
PAINLEVEL_OUTOF10: 8
PAINLEVEL_OUTOF10: 7

## 2019-12-07 ASSESSMENT — PAIN DESCRIPTION - LOCATION
LOCATION: ABDOMEN

## 2019-12-07 ASSESSMENT — PAIN DESCRIPTION - PAIN TYPE
TYPE: ACUTE PAIN

## 2019-12-07 ASSESSMENT — PAIN DESCRIPTION - ORIENTATION
ORIENTATION: UPPER
ORIENTATION: RIGHT;UPPER
ORIENTATION: RIGHT
ORIENTATION: UPPER
ORIENTATION: RIGHT
ORIENTATION: RIGHT
ORIENTATION: UPPER;RIGHT

## 2019-12-07 ASSESSMENT — PAIN SCALES - WONG BAKER: WONGBAKER_NUMERICALRESPONSE: 0

## 2019-12-08 LAB
A/G RATIO: 0.5 (ref 1.1–2.2)
ALBUMIN SERPL-MCNC: 2.7 G/DL (ref 3.4–5)
ALP BLD-CCNC: 85 U/L (ref 40–129)
ALT SERPL-CCNC: 9 U/L (ref 10–40)
ANION GAP SERPL CALCULATED.3IONS-SCNC: 13 MMOL/L (ref 3–16)
AST SERPL-CCNC: 21 U/L (ref 15–37)
BASOPHILS ABSOLUTE: 0.1 K/UL (ref 0–0.2)
BASOPHILS RELATIVE PERCENT: 1.3 %
BILIRUB SERPL-MCNC: 0.5 MG/DL (ref 0–1)
BUN BLDV-MCNC: 17 MG/DL (ref 7–20)
CALCIUM SERPL-MCNC: 8.6 MG/DL (ref 8.3–10.6)
CHLORIDE BLD-SCNC: 91 MMOL/L (ref 99–110)
CO2: 29 MMOL/L (ref 21–32)
CREAT SERPL-MCNC: 0.8 MG/DL (ref 0.9–1.3)
EOSINOPHILS ABSOLUTE: 0.3 K/UL (ref 0–0.6)
EOSINOPHILS RELATIVE PERCENT: 2.6 %
GFR AFRICAN AMERICAN: >60
GFR NON-AFRICAN AMERICAN: >60
GLOBULIN: 5 G/DL
GLUCOSE BLD-MCNC: 122 MG/DL (ref 70–99)
HCT VFR BLD CALC: 21.2 % (ref 40.5–52.5)
HCT VFR BLD CALC: 21.8 % (ref 40.5–52.5)
HCT VFR BLD CALC: 22.2 % (ref 40.5–52.5)
HCT VFR BLD CALC: 23 % (ref 40.5–52.5)
HEMOGLOBIN: 7 G/DL (ref 13.5–17.5)
HEMOGLOBIN: 7.2 G/DL (ref 13.5–17.5)
HEMOGLOBIN: 7.3 G/DL (ref 13.5–17.5)
HEMOGLOBIN: 7.4 G/DL (ref 13.5–17.5)
LYMPHOCYTES ABSOLUTE: 1 K/UL (ref 1–5.1)
LYMPHOCYTES RELATIVE PERCENT: 8.5 %
MCH RBC QN AUTO: 27.9 PG (ref 26–34)
MCHC RBC AUTO-ENTMCNC: 32.1 G/DL (ref 31–36)
MCV RBC AUTO: 87.1 FL (ref 80–100)
MONOCYTES ABSOLUTE: 1.2 K/UL (ref 0–1.3)
MONOCYTES RELATIVE PERCENT: 10.7 %
NEUTROPHILS ABSOLUTE: 8.7 K/UL (ref 1.7–7.7)
NEUTROPHILS RELATIVE PERCENT: 76.9 %
PDW BLD-RTO: 16 % (ref 12.4–15.4)
PLATELET # BLD: 199 K/UL (ref 135–450)
PMV BLD AUTO: 9.1 FL (ref 5–10.5)
POTASSIUM SERPL-SCNC: 3.9 MMOL/L (ref 3.5–5.1)
RBC # BLD: 2.64 M/UL (ref 4.2–5.9)
SODIUM BLD-SCNC: 133 MMOL/L (ref 136–145)
TOTAL PROTEIN: 7.7 G/DL (ref 6.4–8.2)
WBC # BLD: 11.4 K/UL (ref 4–11)

## 2019-12-08 PROCEDURE — 97162 PT EVAL MOD COMPLEX 30 MIN: CPT

## 2019-12-08 PROCEDURE — 2700000000 HC OXYGEN THERAPY PER DAY

## 2019-12-08 PROCEDURE — 2580000003 HC RX 258: Performed by: NURSE PRACTITIONER

## 2019-12-08 PROCEDURE — 85025 COMPLETE CBC W/AUTO DIFF WBC: CPT

## 2019-12-08 PROCEDURE — 6360000002 HC RX W HCPCS: Performed by: NURSE PRACTITIONER

## 2019-12-08 PROCEDURE — 85018 HEMOGLOBIN: CPT

## 2019-12-08 PROCEDURE — 99024 POSTOP FOLLOW-UP VISIT: CPT | Performed by: SURGERY

## 2019-12-08 PROCEDURE — 85014 HEMATOCRIT: CPT

## 2019-12-08 PROCEDURE — 6370000000 HC RX 637 (ALT 250 FOR IP): Performed by: NURSE PRACTITIONER

## 2019-12-08 PROCEDURE — 94760 N-INVAS EAR/PLS OXIMETRY 1: CPT

## 2019-12-08 PROCEDURE — 6370000000 HC RX 637 (ALT 250 FOR IP): Performed by: INTERNAL MEDICINE

## 2019-12-08 PROCEDURE — 1200000000 HC SEMI PRIVATE

## 2019-12-08 PROCEDURE — 97530 THERAPEUTIC ACTIVITIES: CPT

## 2019-12-08 PROCEDURE — 80053 COMPREHEN METABOLIC PANEL: CPT

## 2019-12-08 RX ADMIN — OYSTER SHELL CALCIUM WITH VITAMIN D 1 TABLET: 500; 200 TABLET, FILM COATED ORAL at 09:23

## 2019-12-08 RX ADMIN — HYDROMORPHONE HYDROCHLORIDE 0.5 MG: 1 INJECTION, SOLUTION INTRAMUSCULAR; INTRAVENOUS; SUBCUTANEOUS at 17:56

## 2019-12-08 RX ADMIN — OYSTER SHELL CALCIUM WITH VITAMIN D 1 TABLET: 500; 200 TABLET, FILM COATED ORAL at 17:07

## 2019-12-08 RX ADMIN — Medication 1 CAPSULE: at 09:22

## 2019-12-08 RX ADMIN — TRAZODONE HYDROCHLORIDE 50 MG: 50 TABLET ORAL at 20:09

## 2019-12-08 RX ADMIN — LINAGLIPTIN 5 MG: 5 TABLET, FILM COATED ORAL at 09:22

## 2019-12-08 RX ADMIN — METOPROLOL SUCCINATE 25 MG: 25 TABLET, EXTENDED RELEASE ORAL at 09:23

## 2019-12-08 RX ADMIN — HYDROMORPHONE HYDROCHLORIDE 0.5 MG: 1 INJECTION, SOLUTION INTRAMUSCULAR; INTRAVENOUS; SUBCUTANEOUS at 13:20

## 2019-12-08 RX ADMIN — OXYCODONE HYDROCHLORIDE AND ACETAMINOPHEN 2 TABLET: 5; 325 TABLET ORAL at 14:27

## 2019-12-08 RX ADMIN — PANTOPRAZOLE SODIUM 40 MG: 40 TABLET, DELAYED RELEASE ORAL at 06:22

## 2019-12-08 RX ADMIN — HYDROMORPHONE HYDROCHLORIDE 0.5 MG: 1 INJECTION, SOLUTION INTRAMUSCULAR; INTRAVENOUS; SUBCUTANEOUS at 02:30

## 2019-12-08 RX ADMIN — GABAPENTIN 200 MG: 100 CAPSULE ORAL at 09:23

## 2019-12-08 RX ADMIN — AMIODARONE HYDROCHLORIDE 200 MG: 200 TABLET ORAL at 09:23

## 2019-12-08 RX ADMIN — GABAPENTIN 200 MG: 100 CAPSULE ORAL at 14:28

## 2019-12-08 RX ADMIN — Medication 400 MG: at 09:22

## 2019-12-08 RX ADMIN — OXYCODONE HYDROCHLORIDE AND ACETAMINOPHEN 2 TABLET: 5; 325 TABLET ORAL at 20:11

## 2019-12-08 RX ADMIN — GABAPENTIN 200 MG: 100 CAPSULE ORAL at 20:08

## 2019-12-08 RX ADMIN — DULOXETINE HYDROCHLORIDE 30 MG: 30 CAPSULE, DELAYED RELEASE ORAL at 09:23

## 2019-12-08 RX ADMIN — TAMSULOSIN HYDROCHLORIDE 0.4 MG: 0.4 CAPSULE ORAL at 09:22

## 2019-12-08 RX ADMIN — Medication 400 MG: at 17:07

## 2019-12-08 RX ADMIN — SODIUM CHLORIDE, POTASSIUM CHLORIDE, SODIUM LACTATE AND CALCIUM CHLORIDE: 600; 310; 30; 20 INJECTION, SOLUTION INTRAVENOUS at 23:28

## 2019-12-08 RX ADMIN — HYDROMORPHONE HYDROCHLORIDE 0.5 MG: 1 INJECTION, SOLUTION INTRAMUSCULAR; INTRAVENOUS; SUBCUTANEOUS at 23:27

## 2019-12-08 RX ADMIN — Medication 1 CAPSULE: at 17:07

## 2019-12-08 RX ADMIN — ATORVASTATIN CALCIUM 40 MG: 40 TABLET, FILM COATED ORAL at 09:22

## 2019-12-08 RX ADMIN — FERROUS GLUCONATE 324 MG: 324 TABLET ORAL at 09:22

## 2019-12-08 RX ADMIN — HYDROMORPHONE HYDROCHLORIDE 0.5 MG: 1 INJECTION, SOLUTION INTRAMUSCULAR; INTRAVENOUS; SUBCUTANEOUS at 09:23

## 2019-12-08 RX ADMIN — OXYCODONE HYDROCHLORIDE AND ACETAMINOPHEN 1 TABLET: 5; 325 TABLET ORAL at 06:25

## 2019-12-08 RX ADMIN — SODIUM CHLORIDE, POTASSIUM CHLORIDE, SODIUM LACTATE AND CALCIUM CHLORIDE: 600; 310; 30; 20 INJECTION, SOLUTION INTRAVENOUS at 06:25

## 2019-12-08 ASSESSMENT — PAIN DESCRIPTION - ORIENTATION
ORIENTATION: RIGHT;MID
ORIENTATION: RIGHT;MID
ORIENTATION: RIGHT
ORIENTATION: RIGHT;MID

## 2019-12-08 ASSESSMENT — PAIN DESCRIPTION - ONSET
ONSET: ON-GOING

## 2019-12-08 ASSESSMENT — PAIN - FUNCTIONAL ASSESSMENT
PAIN_FUNCTIONAL_ASSESSMENT: PREVENTS OR INTERFERES SOME ACTIVE ACTIVITIES AND ADLS

## 2019-12-08 ASSESSMENT — PAIN DESCRIPTION - LOCATION
LOCATION: ABDOMEN
LOCATION: ABDOMEN;CHEST
LOCATION: ABDOMEN

## 2019-12-08 ASSESSMENT — PAIN DESCRIPTION - PROGRESSION
CLINICAL_PROGRESSION: NOT CHANGED

## 2019-12-08 ASSESSMENT — PAIN DESCRIPTION - FREQUENCY
FREQUENCY: INTERMITTENT

## 2019-12-08 ASSESSMENT — PAIN DESCRIPTION - PAIN TYPE
TYPE: ACUTE PAIN

## 2019-12-08 ASSESSMENT — PAIN SCALES - GENERAL
PAINLEVEL_OUTOF10: 6
PAINLEVEL_OUTOF10: 0
PAINLEVEL_OUTOF10: 5
PAINLEVEL_OUTOF10: 4
PAINLEVEL_OUTOF10: 0
PAINLEVEL_OUTOF10: 6
PAINLEVEL_OUTOF10: 0
PAINLEVEL_OUTOF10: 7
PAINLEVEL_OUTOF10: 6
PAINLEVEL_OUTOF10: 6
PAINLEVEL_OUTOF10: 7

## 2019-12-08 ASSESSMENT — PAIN DESCRIPTION - DESCRIPTORS
DESCRIPTORS: ACHING;DISCOMFORT
DESCRIPTORS: ACHING;DISCOMFORT
DESCRIPTORS: ACHING;DISCOMFORT;DULL
DESCRIPTORS: ACHING;DISCOMFORT

## 2019-12-08 ASSESSMENT — PAIN SCALES - WONG BAKER: WONGBAKER_NUMERICALRESPONSE: 0

## 2019-12-08 ASSESSMENT — PAIN DESCRIPTION - DIRECTION
RADIATING_TOWARDS: MID
RADIATING_TOWARDS: MIDDLE
RADIATING_TOWARDS: MIDDLE
RADIATING_TOWARDS: MID

## 2019-12-09 LAB
A/G RATIO: 0.6 (ref 1.1–2.2)
ALBUMIN SERPL-MCNC: 2.8 G/DL (ref 3.4–5)
ALP BLD-CCNC: 85 U/L (ref 40–129)
ALT SERPL-CCNC: 9 U/L (ref 10–40)
ANION GAP SERPL CALCULATED.3IONS-SCNC: 11 MMOL/L (ref 3–16)
AST SERPL-CCNC: 24 U/L (ref 15–37)
BASOPHILS ABSOLUTE: 0.2 K/UL (ref 0–0.2)
BASOPHILS RELATIVE PERCENT: 1.5 %
BILIRUB SERPL-MCNC: 0.3 MG/DL (ref 0–1)
BUN BLDV-MCNC: 17 MG/DL (ref 7–20)
CALCIUM SERPL-MCNC: 8.7 MG/DL (ref 8.3–10.6)
CHLORIDE BLD-SCNC: 91 MMOL/L (ref 99–110)
CO2: 31 MMOL/L (ref 21–32)
CREAT SERPL-MCNC: 0.8 MG/DL (ref 0.9–1.3)
EOSINOPHILS ABSOLUTE: 0.7 K/UL (ref 0–0.6)
EOSINOPHILS RELATIVE PERCENT: 6 %
GFR AFRICAN AMERICAN: >60
GFR NON-AFRICAN AMERICAN: >60
GLOBULIN: 4.7 G/DL
GLUCOSE BLD-MCNC: 124 MG/DL (ref 70–99)
GLUCOSE BLD-MCNC: 86 MG/DL (ref 70–99)
HCT VFR BLD CALC: 22.6 % (ref 40.5–52.5)
HCT VFR BLD CALC: 22.8 % (ref 40.5–52.5)
HCT VFR BLD CALC: 23.1 % (ref 40.5–52.5)
HEMOGLOBIN: 7.3 G/DL (ref 13.5–17.5)
HEMOGLOBIN: 7.4 G/DL (ref 13.5–17.5)
HEMOGLOBIN: 7.6 G/DL (ref 13.5–17.5)
LYMPHOCYTES ABSOLUTE: 1.2 K/UL (ref 1–5.1)
LYMPHOCYTES RELATIVE PERCENT: 10.8 %
MCH RBC QN AUTO: 28.1 PG (ref 26–34)
MCHC RBC AUTO-ENTMCNC: 32.3 G/DL (ref 31–36)
MCV RBC AUTO: 87 FL (ref 80–100)
MONOCYTES ABSOLUTE: 1.2 K/UL (ref 0–1.3)
MONOCYTES RELATIVE PERCENT: 10.7 %
NEUTROPHILS ABSOLUTE: 7.9 K/UL (ref 1.7–7.7)
NEUTROPHILS RELATIVE PERCENT: 71 %
PDW BLD-RTO: 15.9 % (ref 12.4–15.4)
PERFORMED ON: ABNORMAL
PLATELET # BLD: 253 K/UL (ref 135–450)
PMV BLD AUTO: 8.8 FL (ref 5–10.5)
POTASSIUM SERPL-SCNC: 3.9 MMOL/L (ref 3.5–5.1)
RBC # BLD: 2.6 M/UL (ref 4.2–5.9)
SODIUM BLD-SCNC: 133 MMOL/L (ref 136–145)
TOTAL PROTEIN: 7.5 G/DL (ref 6.4–8.2)
WBC # BLD: 11.1 K/UL (ref 4–11)

## 2019-12-09 PROCEDURE — 6370000000 HC RX 637 (ALT 250 FOR IP): Performed by: NURSE PRACTITIONER

## 2019-12-09 PROCEDURE — 85014 HEMATOCRIT: CPT

## 2019-12-09 PROCEDURE — 97167 OT EVAL HIGH COMPLEX 60 MIN: CPT

## 2019-12-09 PROCEDURE — 97530 THERAPEUTIC ACTIVITIES: CPT

## 2019-12-09 PROCEDURE — 6370000000 HC RX 637 (ALT 250 FOR IP): Performed by: INTERNAL MEDICINE

## 2019-12-09 PROCEDURE — 2700000000 HC OXYGEN THERAPY PER DAY

## 2019-12-09 PROCEDURE — 1200000000 HC SEMI PRIVATE

## 2019-12-09 PROCEDURE — 94760 N-INVAS EAR/PLS OXIMETRY 1: CPT

## 2019-12-09 PROCEDURE — APPSS15 APP SPLIT SHARED TIME 0-15 MINUTES: Performed by: PHYSICIAN ASSISTANT

## 2019-12-09 PROCEDURE — 80053 COMPREHEN METABOLIC PANEL: CPT

## 2019-12-09 PROCEDURE — 99024 POSTOP FOLLOW-UP VISIT: CPT | Performed by: PHYSICIAN ASSISTANT

## 2019-12-09 PROCEDURE — 85025 COMPLETE CBC W/AUTO DIFF WBC: CPT

## 2019-12-09 PROCEDURE — 85018 HEMOGLOBIN: CPT

## 2019-12-09 PROCEDURE — 6360000002 HC RX W HCPCS: Performed by: NURSE PRACTITIONER

## 2019-12-09 PROCEDURE — APPNB30 APP NON BILLABLE TIME 0-30 MINS: Performed by: PHYSICIAN ASSISTANT

## 2019-12-09 RX ADMIN — HYDROMORPHONE HYDROCHLORIDE 0.5 MG: 1 INJECTION, SOLUTION INTRAMUSCULAR; INTRAVENOUS; SUBCUTANEOUS at 05:21

## 2019-12-09 RX ADMIN — GABAPENTIN 200 MG: 100 CAPSULE ORAL at 09:51

## 2019-12-09 RX ADMIN — GABAPENTIN 200 MG: 100 CAPSULE ORAL at 22:46

## 2019-12-09 RX ADMIN — Medication 1 CAPSULE: at 09:51

## 2019-12-09 RX ADMIN — ATORVASTATIN CALCIUM 40 MG: 40 TABLET, FILM COATED ORAL at 09:52

## 2019-12-09 RX ADMIN — Medication 400 MG: at 09:51

## 2019-12-09 RX ADMIN — DULOXETINE HYDROCHLORIDE 30 MG: 30 CAPSULE, DELAYED RELEASE ORAL at 09:51

## 2019-12-09 RX ADMIN — OYSTER SHELL CALCIUM WITH VITAMIN D 1 TABLET: 500; 200 TABLET, FILM COATED ORAL at 16:50

## 2019-12-09 RX ADMIN — TAMSULOSIN HYDROCHLORIDE 0.4 MG: 0.4 CAPSULE ORAL at 09:51

## 2019-12-09 RX ADMIN — OXYCODONE HYDROCHLORIDE AND ACETAMINOPHEN 2 TABLET: 5; 325 TABLET ORAL at 01:08

## 2019-12-09 RX ADMIN — METOPROLOL SUCCINATE 25 MG: 25 TABLET, EXTENDED RELEASE ORAL at 09:51

## 2019-12-09 RX ADMIN — OYSTER SHELL CALCIUM WITH VITAMIN D 1 TABLET: 500; 200 TABLET, FILM COATED ORAL at 09:51

## 2019-12-09 RX ADMIN — FERROUS GLUCONATE 324 MG: 324 TABLET ORAL at 09:51

## 2019-12-09 RX ADMIN — HYDROMORPHONE HYDROCHLORIDE 0.5 MG: 1 INJECTION, SOLUTION INTRAMUSCULAR; INTRAVENOUS; SUBCUTANEOUS at 14:45

## 2019-12-09 RX ADMIN — TRAZODONE HYDROCHLORIDE 50 MG: 50 TABLET ORAL at 22:46

## 2019-12-09 RX ADMIN — LINAGLIPTIN 5 MG: 5 TABLET, FILM COATED ORAL at 09:51

## 2019-12-09 RX ADMIN — HYDROMORPHONE HYDROCHLORIDE 0.5 MG: 1 INJECTION, SOLUTION INTRAMUSCULAR; INTRAVENOUS; SUBCUTANEOUS at 22:53

## 2019-12-09 RX ADMIN — AMIODARONE HYDROCHLORIDE 200 MG: 200 TABLET ORAL at 09:51

## 2019-12-09 RX ADMIN — HYDROMORPHONE HYDROCHLORIDE 0.5 MG: 1 INJECTION, SOLUTION INTRAMUSCULAR; INTRAVENOUS; SUBCUTANEOUS at 09:50

## 2019-12-09 RX ADMIN — Medication 1 CAPSULE: at 16:50

## 2019-12-09 RX ADMIN — HYDROMORPHONE HYDROCHLORIDE 0.5 MG: 1 INJECTION, SOLUTION INTRAMUSCULAR; INTRAVENOUS; SUBCUTANEOUS at 18:41

## 2019-12-09 RX ADMIN — OXYCODONE HYDROCHLORIDE AND ACETAMINOPHEN 2 TABLET: 5; 325 TABLET ORAL at 12:27

## 2019-12-09 RX ADMIN — GABAPENTIN 200 MG: 100 CAPSULE ORAL at 14:45

## 2019-12-09 RX ADMIN — Medication 400 MG: at 16:50

## 2019-12-09 RX ADMIN — PANTOPRAZOLE SODIUM 40 MG: 40 TABLET, DELAYED RELEASE ORAL at 05:21

## 2019-12-09 ASSESSMENT — PAIN DESCRIPTION - LOCATION
LOCATION: ABDOMEN

## 2019-12-09 ASSESSMENT — PAIN DESCRIPTION - PAIN TYPE
TYPE: ACUTE PAIN

## 2019-12-09 ASSESSMENT — PAIN DESCRIPTION - ORIENTATION
ORIENTATION: RIGHT;LEFT
ORIENTATION: RIGHT;MID

## 2019-12-09 ASSESSMENT — PAIN DESCRIPTION - FREQUENCY
FREQUENCY: INTERMITTENT

## 2019-12-09 ASSESSMENT — PAIN - FUNCTIONAL ASSESSMENT
PAIN_FUNCTIONAL_ASSESSMENT: PREVENTS OR INTERFERES SOME ACTIVE ACTIVITIES AND ADLS

## 2019-12-09 ASSESSMENT — PAIN DESCRIPTION - ONSET
ONSET: ON-GOING

## 2019-12-09 ASSESSMENT — PAIN SCALES - GENERAL
PAINLEVEL_OUTOF10: 8
PAINLEVEL_OUTOF10: 7
PAINLEVEL_OUTOF10: 6
PAINLEVEL_OUTOF10: 6
PAINLEVEL_OUTOF10: 4
PAINLEVEL_OUTOF10: 7
PAINLEVEL_OUTOF10: 5
PAINLEVEL_OUTOF10: 8
PAINLEVEL_OUTOF10: 7
PAINLEVEL_OUTOF10: 8
PAINLEVEL_OUTOF10: 5
PAINLEVEL_OUTOF10: 8

## 2019-12-09 ASSESSMENT — PAIN DESCRIPTION - DESCRIPTORS
DESCRIPTORS: ACHING;DISCOMFORT

## 2019-12-09 ASSESSMENT — PAIN DESCRIPTION - PROGRESSION
CLINICAL_PROGRESSION: NOT CHANGED

## 2019-12-10 LAB
A/G RATIO: 0.5 (ref 1.1–2.2)
ALBUMIN SERPL-MCNC: 2.5 G/DL (ref 3.4–5)
ALP BLD-CCNC: 84 U/L (ref 40–129)
ALT SERPL-CCNC: 8 U/L (ref 10–40)
ANION GAP SERPL CALCULATED.3IONS-SCNC: 12 MMOL/L (ref 3–16)
AST SERPL-CCNC: 23 U/L (ref 15–37)
BASOPHILS ABSOLUTE: 0.1 K/UL (ref 0–0.2)
BASOPHILS RELATIVE PERCENT: 1.4 %
BILIRUB SERPL-MCNC: 0.3 MG/DL (ref 0–1)
BUN BLDV-MCNC: 14 MG/DL (ref 7–20)
CALCIUM SERPL-MCNC: 8.4 MG/DL (ref 8.3–10.6)
CHLORIDE BLD-SCNC: 91 MMOL/L (ref 99–110)
CO2: 30 MMOL/L (ref 21–32)
CREAT SERPL-MCNC: 0.8 MG/DL (ref 0.9–1.3)
EOSINOPHILS ABSOLUTE: 0.4 K/UL (ref 0–0.6)
EOSINOPHILS RELATIVE PERCENT: 4.8 %
GFR AFRICAN AMERICAN: >60
GFR NON-AFRICAN AMERICAN: >60
GLOBULIN: 4.6 G/DL
GLUCOSE BLD-MCNC: 102 MG/DL (ref 70–99)
HCT VFR BLD CALC: 21.5 % (ref 40.5–52.5)
HCT VFR BLD CALC: 22.6 % (ref 40.5–52.5)
HCT VFR BLD CALC: 22.9 % (ref 40.5–52.5)
HEMOGLOBIN: 7.1 G/DL (ref 13.5–17.5)
HEMOGLOBIN: 7.3 G/DL (ref 13.5–17.5)
HEMOGLOBIN: 7.4 G/DL (ref 13.5–17.5)
LYMPHOCYTES ABSOLUTE: 0.9 K/UL (ref 1–5.1)
LYMPHOCYTES RELATIVE PERCENT: 10.1 %
MCH RBC QN AUTO: 28.7 PG (ref 26–34)
MCHC RBC AUTO-ENTMCNC: 32.8 G/DL (ref 31–36)
MCV RBC AUTO: 87.5 FL (ref 80–100)
MONOCYTES ABSOLUTE: 0.9 K/UL (ref 0–1.3)
MONOCYTES RELATIVE PERCENT: 10.5 %
NEUTROPHILS ABSOLUTE: 6.5 K/UL (ref 1.7–7.7)
NEUTROPHILS RELATIVE PERCENT: 73.2 %
PDW BLD-RTO: 15.7 % (ref 12.4–15.4)
PLATELET # BLD: 268 K/UL (ref 135–450)
PMV BLD AUTO: 8.7 FL (ref 5–10.5)
POTASSIUM SERPL-SCNC: 3.6 MMOL/L (ref 3.5–5.1)
RBC # BLD: 2.46 M/UL (ref 4.2–5.9)
SODIUM BLD-SCNC: 133 MMOL/L (ref 136–145)
TOTAL PROTEIN: 7.1 G/DL (ref 6.4–8.2)
WBC # BLD: 8.9 K/UL (ref 4–11)

## 2019-12-10 PROCEDURE — 6360000002 HC RX W HCPCS: Performed by: NURSE PRACTITIONER

## 2019-12-10 PROCEDURE — 6370000000 HC RX 637 (ALT 250 FOR IP): Performed by: NURSE PRACTITIONER

## 2019-12-10 PROCEDURE — 1200000000 HC SEMI PRIVATE

## 2019-12-10 PROCEDURE — 85018 HEMOGLOBIN: CPT

## 2019-12-10 PROCEDURE — 85025 COMPLETE CBC W/AUTO DIFF WBC: CPT

## 2019-12-10 PROCEDURE — 97530 THERAPEUTIC ACTIVITIES: CPT

## 2019-12-10 PROCEDURE — 80053 COMPREHEN METABOLIC PANEL: CPT

## 2019-12-10 PROCEDURE — 2700000000 HC OXYGEN THERAPY PER DAY

## 2019-12-10 PROCEDURE — 85014 HEMATOCRIT: CPT

## 2019-12-10 PROCEDURE — 94760 N-INVAS EAR/PLS OXIMETRY 1: CPT

## 2019-12-10 PROCEDURE — 6370000000 HC RX 637 (ALT 250 FOR IP): Performed by: INTERNAL MEDICINE

## 2019-12-10 RX ADMIN — OXYCODONE HYDROCHLORIDE AND ACETAMINOPHEN 2 TABLET: 5; 325 TABLET ORAL at 12:00

## 2019-12-10 RX ADMIN — FERROUS GLUCONATE 324 MG: 324 TABLET ORAL at 09:39

## 2019-12-10 RX ADMIN — OYSTER SHELL CALCIUM WITH VITAMIN D 1 TABLET: 500; 200 TABLET, FILM COATED ORAL at 09:39

## 2019-12-10 RX ADMIN — LINAGLIPTIN 5 MG: 5 TABLET, FILM COATED ORAL at 09:39

## 2019-12-10 RX ADMIN — HYDROMORPHONE HYDROCHLORIDE 0.5 MG: 1 INJECTION, SOLUTION INTRAMUSCULAR; INTRAVENOUS; SUBCUTANEOUS at 14:31

## 2019-12-10 RX ADMIN — HYDROMORPHONE HYDROCHLORIDE 0.5 MG: 1 INJECTION, SOLUTION INTRAMUSCULAR; INTRAVENOUS; SUBCUTANEOUS at 05:11

## 2019-12-10 RX ADMIN — PANTOPRAZOLE SODIUM 40 MG: 40 TABLET, DELAYED RELEASE ORAL at 07:04

## 2019-12-10 RX ADMIN — ATORVASTATIN CALCIUM 40 MG: 40 TABLET, FILM COATED ORAL at 09:39

## 2019-12-10 RX ADMIN — Medication 400 MG: at 09:39

## 2019-12-10 RX ADMIN — DULOXETINE HYDROCHLORIDE 30 MG: 30 CAPSULE, DELAYED RELEASE ORAL at 09:39

## 2019-12-10 RX ADMIN — Medication 1 CAPSULE: at 09:39

## 2019-12-10 RX ADMIN — OXYCODONE HYDROCHLORIDE AND ACETAMINOPHEN 2 TABLET: 5; 325 TABLET ORAL at 22:29

## 2019-12-10 RX ADMIN — HYDROMORPHONE HYDROCHLORIDE 0.5 MG: 1 INJECTION, SOLUTION INTRAMUSCULAR; INTRAVENOUS; SUBCUTANEOUS at 09:40

## 2019-12-10 RX ADMIN — OXYCODONE HYDROCHLORIDE AND ACETAMINOPHEN 2 TABLET: 5; 325 TABLET ORAL at 07:07

## 2019-12-10 RX ADMIN — Medication 1 CAPSULE: at 16:21

## 2019-12-10 RX ADMIN — GABAPENTIN 200 MG: 100 CAPSULE ORAL at 09:39

## 2019-12-10 RX ADMIN — TAMSULOSIN HYDROCHLORIDE 0.4 MG: 0.4 CAPSULE ORAL at 09:39

## 2019-12-10 RX ADMIN — AMIODARONE HYDROCHLORIDE 200 MG: 200 TABLET ORAL at 09:40

## 2019-12-10 RX ADMIN — Medication 400 MG: at 16:21

## 2019-12-10 RX ADMIN — GABAPENTIN 200 MG: 100 CAPSULE ORAL at 14:28

## 2019-12-10 RX ADMIN — METOPROLOL SUCCINATE 25 MG: 25 TABLET, EXTENDED RELEASE ORAL at 09:40

## 2019-12-10 RX ADMIN — TRAZODONE HYDROCHLORIDE 50 MG: 50 TABLET ORAL at 20:17

## 2019-12-10 RX ADMIN — OYSTER SHELL CALCIUM WITH VITAMIN D 1 TABLET: 500; 200 TABLET, FILM COATED ORAL at 16:21

## 2019-12-10 RX ADMIN — HYDROMORPHONE HYDROCHLORIDE 0.5 MG: 1 INJECTION, SOLUTION INTRAMUSCULAR; INTRAVENOUS; SUBCUTANEOUS at 20:22

## 2019-12-10 RX ADMIN — OXYCODONE HYDROCHLORIDE AND ACETAMINOPHEN 2 TABLET: 5; 325 TABLET ORAL at 16:21

## 2019-12-10 RX ADMIN — GABAPENTIN 200 MG: 100 CAPSULE ORAL at 20:17

## 2019-12-10 RX ADMIN — OXYCODONE HYDROCHLORIDE AND ACETAMINOPHEN 2 TABLET: 5; 325 TABLET ORAL at 02:16

## 2019-12-10 ASSESSMENT — PAIN DESCRIPTION - PROGRESSION
CLINICAL_PROGRESSION: NOT CHANGED

## 2019-12-10 ASSESSMENT — PAIN DESCRIPTION - FREQUENCY
FREQUENCY: INTERMITTENT

## 2019-12-10 ASSESSMENT — PAIN DESCRIPTION - ORIENTATION
ORIENTATION: RIGHT;LEFT;MID
ORIENTATION: RIGHT;LEFT
ORIENTATION: RIGHT;LEFT;MID
ORIENTATION: RIGHT;LEFT
ORIENTATION: RIGHT;LEFT

## 2019-12-10 ASSESSMENT — PAIN DESCRIPTION - PAIN TYPE
TYPE: ACUTE PAIN

## 2019-12-10 ASSESSMENT — PAIN - FUNCTIONAL ASSESSMENT
PAIN_FUNCTIONAL_ASSESSMENT: PREVENTS OR INTERFERES SOME ACTIVE ACTIVITIES AND ADLS

## 2019-12-10 ASSESSMENT — PAIN SCALES - GENERAL
PAINLEVEL_OUTOF10: 7
PAINLEVEL_OUTOF10: 6
PAINLEVEL_OUTOF10: 5
PAINLEVEL_OUTOF10: 7

## 2019-12-10 ASSESSMENT — PAIN DESCRIPTION - LOCATION
LOCATION: ABDOMEN

## 2019-12-10 ASSESSMENT — PAIN DESCRIPTION - DESCRIPTORS
DESCRIPTORS: ACHING;CRAMPING
DESCRIPTORS: CRAMPING
DESCRIPTORS: ACHING;CRAMPING

## 2019-12-10 ASSESSMENT — PAIN DESCRIPTION - ONSET
ONSET: ON-GOING

## 2019-12-11 LAB
A/G RATIO: 0.5 (ref 1.1–2.2)
ALBUMIN SERPL-MCNC: 2.5 G/DL (ref 3.4–5)
ALP BLD-CCNC: 84 U/L (ref 40–129)
ALT SERPL-CCNC: 7 U/L (ref 10–40)
ANION GAP SERPL CALCULATED.3IONS-SCNC: 12 MMOL/L (ref 3–16)
AST SERPL-CCNC: 23 U/L (ref 15–37)
BASOPHILS ABSOLUTE: 0.3 K/UL (ref 0–0.2)
BASOPHILS RELATIVE PERCENT: 3.5 %
BILIRUB SERPL-MCNC: 0.3 MG/DL (ref 0–1)
BUN BLDV-MCNC: 11 MG/DL (ref 7–20)
CALCIUM SERPL-MCNC: 8.2 MG/DL (ref 8.3–10.6)
CHLORIDE BLD-SCNC: 93 MMOL/L (ref 99–110)
CO2: 31 MMOL/L (ref 21–32)
CREAT SERPL-MCNC: 0.8 MG/DL (ref 0.9–1.3)
EOSINOPHILS ABSOLUTE: 0.4 K/UL (ref 0–0.6)
EOSINOPHILS RELATIVE PERCENT: 4.6 %
GFR AFRICAN AMERICAN: >60
GFR NON-AFRICAN AMERICAN: >60
GLOBULIN: 4.6 G/DL
GLUCOSE BLD-MCNC: 119 MG/DL (ref 70–99)
HCT VFR BLD CALC: 22.4 % (ref 40.5–52.5)
HEMOGLOBIN: 7.3 G/DL (ref 13.5–17.5)
LYMPHOCYTES ABSOLUTE: 1.1 K/UL (ref 1–5.1)
LYMPHOCYTES RELATIVE PERCENT: 13.5 %
MCH RBC QN AUTO: 28.6 PG (ref 26–34)
MCHC RBC AUTO-ENTMCNC: 32.7 G/DL (ref 31–36)
MCV RBC AUTO: 87.5 FL (ref 80–100)
MONOCYTES ABSOLUTE: 1.1 K/UL (ref 0–1.3)
MONOCYTES RELATIVE PERCENT: 13.6 %
NEUTROPHILS ABSOLUTE: 5.1 K/UL (ref 1.7–7.7)
NEUTROPHILS RELATIVE PERCENT: 64.8 %
PDW BLD-RTO: 15.8 % (ref 12.4–15.4)
PLATELET # BLD: 247 K/UL (ref 135–450)
PMV BLD AUTO: 8.6 FL (ref 5–10.5)
POTASSIUM SERPL-SCNC: 3.9 MMOL/L (ref 3.5–5.1)
RBC # BLD: 2.57 M/UL (ref 4.2–5.9)
SODIUM BLD-SCNC: 136 MMOL/L (ref 136–145)
TOTAL PROTEIN: 7.1 G/DL (ref 6.4–8.2)
WBC # BLD: 7.8 K/UL (ref 4–11)

## 2019-12-11 PROCEDURE — 2700000000 HC OXYGEN THERAPY PER DAY

## 2019-12-11 PROCEDURE — 6360000002 HC RX W HCPCS: Performed by: NURSE PRACTITIONER

## 2019-12-11 PROCEDURE — 85025 COMPLETE CBC W/AUTO DIFF WBC: CPT

## 2019-12-11 PROCEDURE — 1200000000 HC SEMI PRIVATE

## 2019-12-11 PROCEDURE — 94760 N-INVAS EAR/PLS OXIMETRY 1: CPT

## 2019-12-11 PROCEDURE — 6370000000 HC RX 637 (ALT 250 FOR IP): Performed by: NURSE PRACTITIONER

## 2019-12-11 PROCEDURE — 6370000000 HC RX 637 (ALT 250 FOR IP): Performed by: INTERNAL MEDICINE

## 2019-12-11 PROCEDURE — APPSS15 APP SPLIT SHARED TIME 0-15 MINUTES: Performed by: PHYSICIAN ASSISTANT

## 2019-12-11 PROCEDURE — 2580000003 HC RX 258: Performed by: NURSE PRACTITIONER

## 2019-12-11 PROCEDURE — APPNB30 APP NON BILLABLE TIME 0-30 MINS: Performed by: PHYSICIAN ASSISTANT

## 2019-12-11 PROCEDURE — 80053 COMPREHEN METABOLIC PANEL: CPT

## 2019-12-11 RX ORDER — SIMETHICONE 80 MG
80 TABLET,CHEWABLE ORAL EVERY 6 HOURS PRN
Status: DISCONTINUED | OUTPATIENT
Start: 2019-12-11 | End: 2019-12-20 | Stop reason: HOSPADM

## 2019-12-11 RX ORDER — DICYCLOMINE HYDROCHLORIDE 10 MG/1
10 CAPSULE ORAL
Status: DISCONTINUED | OUTPATIENT
Start: 2019-12-11 | End: 2019-12-20 | Stop reason: HOSPADM

## 2019-12-11 RX ADMIN — OXYCODONE HYDROCHLORIDE AND ACETAMINOPHEN 2 TABLET: 5; 325 TABLET ORAL at 19:08

## 2019-12-11 RX ADMIN — Medication 1 CAPSULE: at 09:31

## 2019-12-11 RX ADMIN — HYDROMORPHONE HYDROCHLORIDE 0.5 MG: 1 INJECTION, SOLUTION INTRAMUSCULAR; INTRAVENOUS; SUBCUTANEOUS at 05:01

## 2019-12-11 RX ADMIN — Medication 400 MG: at 19:06

## 2019-12-11 RX ADMIN — HYDROMORPHONE HYDROCHLORIDE 0.5 MG: 1 INJECTION, SOLUTION INTRAMUSCULAR; INTRAVENOUS; SUBCUTANEOUS at 16:41

## 2019-12-11 RX ADMIN — PANTOPRAZOLE SODIUM 40 MG: 40 TABLET, DELAYED RELEASE ORAL at 05:01

## 2019-12-11 RX ADMIN — GABAPENTIN 200 MG: 100 CAPSULE ORAL at 09:31

## 2019-12-11 RX ADMIN — OXYCODONE HYDROCHLORIDE AND ACETAMINOPHEN 2 TABLET: 5; 325 TABLET ORAL at 23:10

## 2019-12-11 RX ADMIN — OXYCODONE HYDROCHLORIDE AND ACETAMINOPHEN 2 TABLET: 5; 325 TABLET ORAL at 09:38

## 2019-12-11 RX ADMIN — HYDROMORPHONE HYDROCHLORIDE 0.5 MG: 1 INJECTION, SOLUTION INTRAMUSCULAR; INTRAVENOUS; SUBCUTANEOUS at 21:26

## 2019-12-11 RX ADMIN — OYSTER SHELL CALCIUM WITH VITAMIN D 1 TABLET: 500; 200 TABLET, FILM COATED ORAL at 19:06

## 2019-12-11 RX ADMIN — SODIUM CHLORIDE, POTASSIUM CHLORIDE, SODIUM LACTATE AND CALCIUM CHLORIDE: 600; 310; 30; 20 INJECTION, SOLUTION INTRAVENOUS at 09:42

## 2019-12-11 RX ADMIN — OXYCODONE HYDROCHLORIDE AND ACETAMINOPHEN 2 TABLET: 5; 325 TABLET ORAL at 14:41

## 2019-12-11 RX ADMIN — ATORVASTATIN CALCIUM 40 MG: 40 TABLET, FILM COATED ORAL at 09:31

## 2019-12-11 RX ADMIN — SIMETHICONE CHEW TAB 80 MG 80 MG: 80 TABLET ORAL at 14:37

## 2019-12-11 RX ADMIN — AMIODARONE HYDROCHLORIDE 200 MG: 200 TABLET ORAL at 09:31

## 2019-12-11 RX ADMIN — Medication 1 CAPSULE: at 19:06

## 2019-12-11 RX ADMIN — LINAGLIPTIN 5 MG: 5 TABLET, FILM COATED ORAL at 09:31

## 2019-12-11 RX ADMIN — GABAPENTIN 200 MG: 100 CAPSULE ORAL at 14:36

## 2019-12-11 RX ADMIN — GABAPENTIN 200 MG: 100 CAPSULE ORAL at 21:26

## 2019-12-11 RX ADMIN — FERROUS GLUCONATE 324 MG: 324 TABLET ORAL at 09:31

## 2019-12-11 RX ADMIN — Medication 400 MG: at 09:31

## 2019-12-11 RX ADMIN — OYSTER SHELL CALCIUM WITH VITAMIN D 1 TABLET: 500; 200 TABLET, FILM COATED ORAL at 09:30

## 2019-12-11 RX ADMIN — TRAZODONE HYDROCHLORIDE 50 MG: 50 TABLET ORAL at 21:26

## 2019-12-11 RX ADMIN — TAMSULOSIN HYDROCHLORIDE 0.4 MG: 0.4 CAPSULE ORAL at 09:31

## 2019-12-11 RX ADMIN — METOPROLOL SUCCINATE 25 MG: 25 TABLET, EXTENDED RELEASE ORAL at 09:31

## 2019-12-11 RX ADMIN — DULOXETINE HYDROCHLORIDE 30 MG: 30 CAPSULE, DELAYED RELEASE ORAL at 09:31

## 2019-12-11 RX ADMIN — SODIUM CHLORIDE, PRESERVATIVE FREE 10 ML: 5 INJECTION INTRAVENOUS at 21:27

## 2019-12-11 RX ADMIN — DICYCLOMINE HYDROCHLORIDE 10 MG: 10 CAPSULE ORAL at 14:36

## 2019-12-11 ASSESSMENT — PAIN DESCRIPTION - PROGRESSION
CLINICAL_PROGRESSION: NOT CHANGED

## 2019-12-11 ASSESSMENT — PAIN SCALES - GENERAL
PAINLEVEL_OUTOF10: 7
PAINLEVEL_OUTOF10: 7
PAINLEVEL_OUTOF10: 6
PAINLEVEL_OUTOF10: 7
PAINLEVEL_OUTOF10: 5
PAINLEVEL_OUTOF10: 7
PAINLEVEL_OUTOF10: 5
PAINLEVEL_OUTOF10: 7
PAINLEVEL_OUTOF10: 7
PAINLEVEL_OUTOF10: 4
PAINLEVEL_OUTOF10: 0
PAINLEVEL_OUTOF10: 7

## 2019-12-11 ASSESSMENT — PAIN DESCRIPTION - DESCRIPTORS
DESCRIPTORS: ACHING

## 2019-12-11 ASSESSMENT — PAIN DESCRIPTION - ONSET
ONSET: ON-GOING

## 2019-12-11 ASSESSMENT — PAIN - FUNCTIONAL ASSESSMENT
PAIN_FUNCTIONAL_ASSESSMENT: PREVENTS OR INTERFERES SOME ACTIVE ACTIVITIES AND ADLS

## 2019-12-11 ASSESSMENT — PAIN DESCRIPTION - LOCATION
LOCATION: GENERALIZED

## 2019-12-11 ASSESSMENT — PAIN DESCRIPTION - PAIN TYPE
TYPE: CHRONIC PAIN

## 2019-12-11 ASSESSMENT — PAIN DESCRIPTION - FREQUENCY
FREQUENCY: INTERMITTENT
FREQUENCY: CONTINUOUS
FREQUENCY: CONTINUOUS

## 2019-12-12 ENCOUNTER — APPOINTMENT (OUTPATIENT)
Dept: GENERAL RADIOLOGY | Age: 59
DRG: 247 | End: 2019-12-12
Payer: COMMERCIAL

## 2019-12-12 LAB
A/G RATIO: 0.6 (ref 1.1–2.2)
ALBUMIN SERPL-MCNC: 2.8 G/DL (ref 3.4–5)
ALP BLD-CCNC: 89 U/L (ref 40–129)
ALT SERPL-CCNC: 7 U/L (ref 10–40)
ANION GAP SERPL CALCULATED.3IONS-SCNC: 10 MMOL/L (ref 3–16)
AST SERPL-CCNC: 22 U/L (ref 15–37)
BASOPHILS ABSOLUTE: 0 K/UL (ref 0–0.2)
BASOPHILS RELATIVE PERCENT: 0.5 %
BILIRUB SERPL-MCNC: 0.3 MG/DL (ref 0–1)
BUN BLDV-MCNC: 10 MG/DL (ref 7–20)
CALCIUM SERPL-MCNC: 8.7 MG/DL (ref 8.3–10.6)
CHLORIDE BLD-SCNC: 94 MMOL/L (ref 99–110)
CO2: 32 MMOL/L (ref 21–32)
CREAT SERPL-MCNC: 0.8 MG/DL (ref 0.9–1.3)
EOSINOPHILS ABSOLUTE: 0.3 K/UL (ref 0–0.6)
EOSINOPHILS RELATIVE PERCENT: 5 %
GFR AFRICAN AMERICAN: >60
GFR NON-AFRICAN AMERICAN: >60
GLOBULIN: 4.4 G/DL
GLUCOSE BLD-MCNC: 120 MG/DL (ref 70–99)
HCT VFR BLD CALC: 22.7 % (ref 40.5–52.5)
HEMOGLOBIN: 7.5 G/DL (ref 13.5–17.5)
LYMPHOCYTES ABSOLUTE: 1.1 K/UL (ref 1–5.1)
LYMPHOCYTES RELATIVE PERCENT: 17.9 %
MCH RBC QN AUTO: 29 PG (ref 26–34)
MCHC RBC AUTO-ENTMCNC: 33.2 G/DL (ref 31–36)
MCV RBC AUTO: 87.5 FL (ref 80–100)
MONOCYTES ABSOLUTE: 0.8 K/UL (ref 0–1.3)
MONOCYTES RELATIVE PERCENT: 13.1 %
NEUTROPHILS ABSOLUTE: 4 K/UL (ref 1.7–7.7)
NEUTROPHILS RELATIVE PERCENT: 63.5 %
PDW BLD-RTO: 16.1 % (ref 12.4–15.4)
PLATELET # BLD: 236 K/UL (ref 135–450)
PMV BLD AUTO: 8.3 FL (ref 5–10.5)
POTASSIUM SERPL-SCNC: 3.7 MMOL/L (ref 3.5–5.1)
RBC # BLD: 2.59 M/UL (ref 4.2–5.9)
SODIUM BLD-SCNC: 136 MMOL/L (ref 136–145)
TOTAL PROTEIN: 7.2 G/DL (ref 6.4–8.2)
WBC # BLD: 6.4 K/UL (ref 4–11)

## 2019-12-12 PROCEDURE — 6360000002 HC RX W HCPCS: Performed by: INTERNAL MEDICINE

## 2019-12-12 PROCEDURE — 6370000000 HC RX 637 (ALT 250 FOR IP): Performed by: INTERNAL MEDICINE

## 2019-12-12 PROCEDURE — 6360000002 HC RX W HCPCS: Performed by: NURSE PRACTITIONER

## 2019-12-12 PROCEDURE — 94760 N-INVAS EAR/PLS OXIMETRY 1: CPT

## 2019-12-12 PROCEDURE — 6370000000 HC RX 637 (ALT 250 FOR IP): Performed by: NURSE PRACTITIONER

## 2019-12-12 PROCEDURE — 97530 THERAPEUTIC ACTIVITIES: CPT

## 2019-12-12 PROCEDURE — 2580000003 HC RX 258: Performed by: NURSE PRACTITIONER

## 2019-12-12 PROCEDURE — APPSS30 APP SPLIT SHARED TIME 16-30 MINUTES: Performed by: NURSE PRACTITIONER

## 2019-12-12 PROCEDURE — 94640 AIRWAY INHALATION TREATMENT: CPT

## 2019-12-12 PROCEDURE — 71046 X-RAY EXAM CHEST 2 VIEWS: CPT

## 2019-12-12 PROCEDURE — 85025 COMPLETE CBC W/AUTO DIFF WBC: CPT

## 2019-12-12 PROCEDURE — 2700000000 HC OXYGEN THERAPY PER DAY

## 2019-12-12 PROCEDURE — APPNB30 APP NON BILLABLE TIME 0-30 MINS: Performed by: NURSE PRACTITIONER

## 2019-12-12 PROCEDURE — 80053 COMPREHEN METABOLIC PANEL: CPT

## 2019-12-12 PROCEDURE — 74019 RADEX ABDOMEN 2 VIEWS: CPT

## 2019-12-12 PROCEDURE — 1200000000 HC SEMI PRIVATE

## 2019-12-12 RX ORDER — FUROSEMIDE 10 MG/ML
20 INJECTION INTRAMUSCULAR; INTRAVENOUS ONCE
Status: COMPLETED | OUTPATIENT
Start: 2019-12-12 | End: 2019-12-12

## 2019-12-12 RX ORDER — ALBUTEROL SULFATE 2.5 MG/3ML
2.5 SOLUTION RESPIRATORY (INHALATION) EVERY 6 HOURS PRN
Status: DISCONTINUED | OUTPATIENT
Start: 2019-12-12 | End: 2019-12-20 | Stop reason: HOSPADM

## 2019-12-12 RX ORDER — TORSEMIDE 20 MG/1
40 TABLET ORAL DAILY
Status: DISCONTINUED | OUTPATIENT
Start: 2019-12-13 | End: 2019-12-14

## 2019-12-12 RX ORDER — ALBUTEROL SULFATE 2.5 MG/3ML
SOLUTION RESPIRATORY (INHALATION)
Status: DISPENSED
Start: 2019-12-12 | End: 2019-12-13

## 2019-12-12 RX ADMIN — OXYCODONE HYDROCHLORIDE AND ACETAMINOPHEN 2 TABLET: 5; 325 TABLET ORAL at 07:48

## 2019-12-12 RX ADMIN — Medication 1 CAPSULE: at 17:46

## 2019-12-12 RX ADMIN — ALBUTEROL SULFATE 2.5 MG: 2.5 SOLUTION RESPIRATORY (INHALATION) at 12:48

## 2019-12-12 RX ADMIN — OXYCODONE HYDROCHLORIDE AND ACETAMINOPHEN 2 TABLET: 5; 325 TABLET ORAL at 17:46

## 2019-12-12 RX ADMIN — FUROSEMIDE 20 MG: 10 INJECTION, SOLUTION INTRAMUSCULAR; INTRAVENOUS at 15:07

## 2019-12-12 RX ADMIN — DICYCLOMINE HYDROCHLORIDE 10 MG: 10 CAPSULE ORAL at 06:05

## 2019-12-12 RX ADMIN — GABAPENTIN 200 MG: 100 CAPSULE ORAL at 20:44

## 2019-12-12 RX ADMIN — Medication 400 MG: at 09:12

## 2019-12-12 RX ADMIN — DICYCLOMINE HYDROCHLORIDE 10 MG: 10 CAPSULE ORAL at 15:07

## 2019-12-12 RX ADMIN — PANTOPRAZOLE SODIUM 40 MG: 40 TABLET, DELAYED RELEASE ORAL at 06:05

## 2019-12-12 RX ADMIN — Medication 1 CAPSULE: at 09:13

## 2019-12-12 RX ADMIN — GABAPENTIN 200 MG: 100 CAPSULE ORAL at 09:12

## 2019-12-12 RX ADMIN — METOPROLOL SUCCINATE 25 MG: 25 TABLET, EXTENDED RELEASE ORAL at 09:12

## 2019-12-12 RX ADMIN — HYDROMORPHONE HYDROCHLORIDE 0.5 MG: 1 INJECTION, SOLUTION INTRAMUSCULAR; INTRAVENOUS; SUBCUTANEOUS at 06:05

## 2019-12-12 RX ADMIN — LINAGLIPTIN 5 MG: 5 TABLET, FILM COATED ORAL at 09:11

## 2019-12-12 RX ADMIN — SODIUM CHLORIDE, PRESERVATIVE FREE 10 ML: 5 INJECTION INTRAVENOUS at 20:45

## 2019-12-12 RX ADMIN — HYDROMORPHONE HYDROCHLORIDE 0.5 MG: 1 INJECTION, SOLUTION INTRAMUSCULAR; INTRAVENOUS; SUBCUTANEOUS at 09:18

## 2019-12-12 RX ADMIN — OYSTER SHELL CALCIUM WITH VITAMIN D 1 TABLET: 500; 200 TABLET, FILM COATED ORAL at 09:12

## 2019-12-12 RX ADMIN — HYDROMORPHONE HYDROCHLORIDE 0.5 MG: 1 INJECTION, SOLUTION INTRAMUSCULAR; INTRAVENOUS; SUBCUTANEOUS at 20:45

## 2019-12-12 RX ADMIN — TAMSULOSIN HYDROCHLORIDE 0.4 MG: 0.4 CAPSULE ORAL at 09:13

## 2019-12-12 RX ADMIN — HYDROMORPHONE HYDROCHLORIDE 0.5 MG: 1 INJECTION, SOLUTION INTRAMUSCULAR; INTRAVENOUS; SUBCUTANEOUS at 15:07

## 2019-12-12 RX ADMIN — Medication 400 MG: at 17:46

## 2019-12-12 RX ADMIN — SODIUM CHLORIDE, PRESERVATIVE FREE 10 ML: 5 INJECTION INTRAVENOUS at 09:17

## 2019-12-12 RX ADMIN — GABAPENTIN 200 MG: 100 CAPSULE ORAL at 15:49

## 2019-12-12 RX ADMIN — FERROUS GLUCONATE 324 MG: 324 TABLET ORAL at 09:12

## 2019-12-12 RX ADMIN — OXYCODONE HYDROCHLORIDE AND ACETAMINOPHEN 2 TABLET: 5; 325 TABLET ORAL at 21:55

## 2019-12-12 RX ADMIN — OYSTER SHELL CALCIUM WITH VITAMIN D 1 TABLET: 500; 200 TABLET, FILM COATED ORAL at 17:46

## 2019-12-12 RX ADMIN — TRAZODONE HYDROCHLORIDE 50 MG: 50 TABLET ORAL at 20:45

## 2019-12-12 RX ADMIN — DULOXETINE HYDROCHLORIDE 30 MG: 30 CAPSULE, DELAYED RELEASE ORAL at 09:11

## 2019-12-12 RX ADMIN — DICYCLOMINE HYDROCHLORIDE 10 MG: 10 CAPSULE ORAL at 11:15

## 2019-12-12 RX ADMIN — ATORVASTATIN CALCIUM 40 MG: 40 TABLET, FILM COATED ORAL at 09:12

## 2019-12-12 RX ADMIN — AMIODARONE HYDROCHLORIDE 200 MG: 200 TABLET ORAL at 09:12

## 2019-12-12 RX ADMIN — ALBUTEROL SULFATE 2.5 MG: 2.5 SOLUTION RESPIRATORY (INHALATION) at 21:38

## 2019-12-12 RX ADMIN — CYCLOBENZAPRINE 10 MG: 10 TABLET, FILM COATED ORAL at 20:45

## 2019-12-12 ASSESSMENT — PAIN SCALES - GENERAL
PAINLEVEL_OUTOF10: 5
PAINLEVEL_OUTOF10: 7
PAINLEVEL_OUTOF10: 7
PAINLEVEL_OUTOF10: 3
PAINLEVEL_OUTOF10: 5
PAINLEVEL_OUTOF10: 7
PAINLEVEL_OUTOF10: 0
PAINLEVEL_OUTOF10: 7
PAINLEVEL_OUTOF10: 7
PAINLEVEL_OUTOF10: 3
PAINLEVEL_OUTOF10: 3
PAINLEVEL_OUTOF10: 0
PAINLEVEL_OUTOF10: 7
PAINLEVEL_OUTOF10: 7
PAINLEVEL_OUTOF10: 5
PAINLEVEL_OUTOF10: 7
PAINLEVEL_OUTOF10: 7
PAINLEVEL_OUTOF10: 5

## 2019-12-12 ASSESSMENT — PAIN DESCRIPTION - PAIN TYPE
TYPE: CHRONIC PAIN
TYPE: CHRONIC PAIN
TYPE: ACUTE PAIN
TYPE: ACUTE PAIN
TYPE: CHRONIC PAIN

## 2019-12-12 ASSESSMENT — PAIN DESCRIPTION - FREQUENCY
FREQUENCY: CONTINUOUS

## 2019-12-12 ASSESSMENT — PAIN DESCRIPTION - ORIENTATION
ORIENTATION: UPPER;MID
ORIENTATION: UPPER;MID

## 2019-12-12 ASSESSMENT — PAIN DESCRIPTION - DESCRIPTORS
DESCRIPTORS: ACHING
DESCRIPTORS: CRAMPING;ACHING
DESCRIPTORS: ACHING
DESCRIPTORS: CRAMPING;ACHING
DESCRIPTORS: ACHING

## 2019-12-12 ASSESSMENT — PAIN DESCRIPTION - ONSET
ONSET: ON-GOING

## 2019-12-12 ASSESSMENT — PAIN DESCRIPTION - LOCATION
LOCATION: ABDOMEN
LOCATION: GENERALIZED

## 2019-12-12 ASSESSMENT — PAIN DESCRIPTION - PROGRESSION
CLINICAL_PROGRESSION: NOT CHANGED

## 2019-12-12 ASSESSMENT — PAIN - FUNCTIONAL ASSESSMENT
PAIN_FUNCTIONAL_ASSESSMENT: PREVENTS OR INTERFERES SOME ACTIVE ACTIVITIES AND ADLS

## 2019-12-13 LAB
A/G RATIO: 0.6 (ref 1.1–2.2)
ALBUMIN SERPL-MCNC: 2.6 G/DL (ref 3.4–5)
ALP BLD-CCNC: 83 U/L (ref 40–129)
ALT SERPL-CCNC: 7 U/L (ref 10–40)
ANION GAP SERPL CALCULATED.3IONS-SCNC: 11 MMOL/L (ref 3–16)
AST SERPL-CCNC: 21 U/L (ref 15–37)
BASOPHILS ABSOLUTE: 0 K/UL (ref 0–0.2)
BASOPHILS RELATIVE PERCENT: 0.3 %
BILIRUB SERPL-MCNC: <0.2 MG/DL (ref 0–1)
BUN BLDV-MCNC: 11 MG/DL (ref 7–20)
CALCIUM SERPL-MCNC: 8.4 MG/DL (ref 8.3–10.6)
CHLORIDE BLD-SCNC: 95 MMOL/L (ref 99–110)
CO2: 31 MMOL/L (ref 21–32)
CREAT SERPL-MCNC: 0.8 MG/DL (ref 0.9–1.3)
EOSINOPHILS ABSOLUTE: 0.3 K/UL (ref 0–0.6)
EOSINOPHILS RELATIVE PERCENT: 3.1 %
GFR AFRICAN AMERICAN: >60
GFR NON-AFRICAN AMERICAN: >60
GLOBULIN: 4.6 G/DL
GLUCOSE BLD-MCNC: 110 MG/DL (ref 70–99)
HCT VFR BLD CALC: 22.3 % (ref 40.5–52.5)
HEMOGLOBIN: 7.2 G/DL (ref 13.5–17.5)
LYMPHOCYTES ABSOLUTE: 1.2 K/UL (ref 1–5.1)
LYMPHOCYTES RELATIVE PERCENT: 14.3 %
MCH RBC QN AUTO: 28.6 PG (ref 26–34)
MCHC RBC AUTO-ENTMCNC: 32.4 G/DL (ref 31–36)
MCV RBC AUTO: 88.3 FL (ref 80–100)
MONOCYTES ABSOLUTE: 1 K/UL (ref 0–1.3)
MONOCYTES RELATIVE PERCENT: 12.4 %
NEUTROPHILS ABSOLUTE: 5.8 K/UL (ref 1.7–7.7)
NEUTROPHILS RELATIVE PERCENT: 69.9 %
PDW BLD-RTO: 15.8 % (ref 12.4–15.4)
PLATELET # BLD: 223 K/UL (ref 135–450)
PMV BLD AUTO: 7.9 FL (ref 5–10.5)
POTASSIUM SERPL-SCNC: 3.6 MMOL/L (ref 3.5–5.1)
PROCALCITONIN: 0.17 NG/ML (ref 0–0.15)
RBC # BLD: 2.53 M/UL (ref 4.2–5.9)
SODIUM BLD-SCNC: 137 MMOL/L (ref 136–145)
TOTAL PROTEIN: 7.2 G/DL (ref 6.4–8.2)
WBC # BLD: 8.3 K/UL (ref 4–11)

## 2019-12-13 PROCEDURE — 6360000002 HC RX W HCPCS: Performed by: NURSE PRACTITIONER

## 2019-12-13 PROCEDURE — 6370000000 HC RX 637 (ALT 250 FOR IP): Performed by: INTERNAL MEDICINE

## 2019-12-13 PROCEDURE — 94761 N-INVAS EAR/PLS OXIMETRY MLT: CPT

## 2019-12-13 PROCEDURE — 85025 COMPLETE CBC W/AUTO DIFF WBC: CPT

## 2019-12-13 PROCEDURE — 97530 THERAPEUTIC ACTIVITIES: CPT

## 2019-12-13 PROCEDURE — 6370000000 HC RX 637 (ALT 250 FOR IP): Performed by: NURSE PRACTITIONER

## 2019-12-13 PROCEDURE — 84145 PROCALCITONIN (PCT): CPT

## 2019-12-13 PROCEDURE — 1200000000 HC SEMI PRIVATE

## 2019-12-13 PROCEDURE — 2700000000 HC OXYGEN THERAPY PER DAY

## 2019-12-13 PROCEDURE — 6360000002 HC RX W HCPCS

## 2019-12-13 PROCEDURE — 2580000003 HC RX 258: Performed by: NURSE PRACTITIONER

## 2019-12-13 PROCEDURE — 80053 COMPREHEN METABOLIC PANEL: CPT

## 2019-12-13 PROCEDURE — 94640 AIRWAY INHALATION TREATMENT: CPT

## 2019-12-13 RX ORDER — ALBUTEROL SULFATE 2.5 MG/3ML
SOLUTION RESPIRATORY (INHALATION)
Status: COMPLETED
Start: 2019-12-13 | End: 2019-12-13

## 2019-12-13 RX ADMIN — HYDROMORPHONE HYDROCHLORIDE 0.5 MG: 1 INJECTION, SOLUTION INTRAMUSCULAR; INTRAVENOUS; SUBCUTANEOUS at 16:19

## 2019-12-13 RX ADMIN — FERROUS GLUCONATE 324 MG: 324 TABLET ORAL at 08:54

## 2019-12-13 RX ADMIN — OXYCODONE HYDROCHLORIDE AND ACETAMINOPHEN 2 TABLET: 5; 325 TABLET ORAL at 22:38

## 2019-12-13 RX ADMIN — TORSEMIDE 40 MG: 20 TABLET ORAL at 08:54

## 2019-12-13 RX ADMIN — ATORVASTATIN CALCIUM 40 MG: 40 TABLET, FILM COATED ORAL at 08:54

## 2019-12-13 RX ADMIN — DICYCLOMINE HYDROCHLORIDE 10 MG: 10 CAPSULE ORAL at 06:37

## 2019-12-13 RX ADMIN — AMIODARONE HYDROCHLORIDE 200 MG: 200 TABLET ORAL at 08:54

## 2019-12-13 RX ADMIN — SODIUM CHLORIDE, PRESERVATIVE FREE 10 ML: 5 INJECTION INTRAVENOUS at 21:06

## 2019-12-13 RX ADMIN — HYDROMORPHONE HYDROCHLORIDE 0.5 MG: 1 INJECTION, SOLUTION INTRAMUSCULAR; INTRAVENOUS; SUBCUTANEOUS at 11:09

## 2019-12-13 RX ADMIN — Medication 1 CAPSULE: at 08:54

## 2019-12-13 RX ADMIN — LINAGLIPTIN 5 MG: 5 TABLET, FILM COATED ORAL at 08:53

## 2019-12-13 RX ADMIN — OXYCODONE HYDROCHLORIDE AND ACETAMINOPHEN 2 TABLET: 5; 325 TABLET ORAL at 09:01

## 2019-12-13 RX ADMIN — ALBUTEROL SULFATE 2.5 MG: 2.5 SOLUTION RESPIRATORY (INHALATION) at 13:45

## 2019-12-13 RX ADMIN — HYDROMORPHONE HYDROCHLORIDE 0.5 MG: 1 INJECTION, SOLUTION INTRAMUSCULAR; INTRAVENOUS; SUBCUTANEOUS at 03:24

## 2019-12-13 RX ADMIN — TAMSULOSIN HYDROCHLORIDE 0.4 MG: 0.4 CAPSULE ORAL at 08:54

## 2019-12-13 RX ADMIN — ALBUTEROL SULFATE 2.5 MG: 2.5 SOLUTION RESPIRATORY (INHALATION) at 09:13

## 2019-12-13 RX ADMIN — GABAPENTIN 200 MG: 100 CAPSULE ORAL at 14:01

## 2019-12-13 RX ADMIN — GABAPENTIN 200 MG: 100 CAPSULE ORAL at 08:53

## 2019-12-13 RX ADMIN — GABAPENTIN 200 MG: 100 CAPSULE ORAL at 21:06

## 2019-12-13 RX ADMIN — OYSTER SHELL CALCIUM WITH VITAMIN D 1 TABLET: 500; 200 TABLET, FILM COATED ORAL at 08:54

## 2019-12-13 RX ADMIN — HYDROMORPHONE HYDROCHLORIDE 0.5 MG: 1 INJECTION, SOLUTION INTRAMUSCULAR; INTRAVENOUS; SUBCUTANEOUS at 06:37

## 2019-12-13 RX ADMIN — ALBUTEROL SULFATE 2.5 MG: 2.5 SOLUTION RESPIRATORY (INHALATION) at 00:56

## 2019-12-13 RX ADMIN — DICYCLOMINE HYDROCHLORIDE 10 MG: 10 CAPSULE ORAL at 16:19

## 2019-12-13 RX ADMIN — Medication 400 MG: at 08:54

## 2019-12-13 RX ADMIN — HYDROMORPHONE HYDROCHLORIDE 0.5 MG: 1 INJECTION, SOLUTION INTRAMUSCULAR; INTRAVENOUS; SUBCUTANEOUS at 21:07

## 2019-12-13 RX ADMIN — DULOXETINE HYDROCHLORIDE 30 MG: 30 CAPSULE, DELAYED RELEASE ORAL at 08:53

## 2019-12-13 RX ADMIN — Medication 400 MG: at 16:19

## 2019-12-13 RX ADMIN — DICYCLOMINE HYDROCHLORIDE 10 MG: 10 CAPSULE ORAL at 11:09

## 2019-12-13 RX ADMIN — PANTOPRAZOLE SODIUM 40 MG: 40 TABLET, DELAYED RELEASE ORAL at 06:37

## 2019-12-13 RX ADMIN — OXYCODONE HYDROCHLORIDE AND ACETAMINOPHEN 2 TABLET: 5; 325 TABLET ORAL at 04:30

## 2019-12-13 RX ADMIN — TRAZODONE HYDROCHLORIDE 50 MG: 50 TABLET ORAL at 21:07

## 2019-12-13 RX ADMIN — METOPROLOL SUCCINATE 25 MG: 25 TABLET, EXTENDED RELEASE ORAL at 08:54

## 2019-12-13 RX ADMIN — Medication 1 CAPSULE: at 16:19

## 2019-12-13 RX ADMIN — OXYCODONE HYDROCHLORIDE AND ACETAMINOPHEN 2 TABLET: 5; 325 TABLET ORAL at 14:01

## 2019-12-13 RX ADMIN — OYSTER SHELL CALCIUM WITH VITAMIN D 1 TABLET: 500; 200 TABLET, FILM COATED ORAL at 16:19

## 2019-12-13 RX ADMIN — SODIUM CHLORIDE, PRESERVATIVE FREE 10 ML: 5 INJECTION INTRAVENOUS at 08:56

## 2019-12-13 RX ADMIN — ALBUTEROL SULFATE 2.5 MG: 2.5 SOLUTION RESPIRATORY (INHALATION) at 20:08

## 2019-12-13 ASSESSMENT — PAIN DESCRIPTION - FREQUENCY
FREQUENCY: CONTINUOUS

## 2019-12-13 ASSESSMENT — PAIN DESCRIPTION - ONSET
ONSET: ON-GOING

## 2019-12-13 ASSESSMENT — PAIN DESCRIPTION - DESCRIPTORS
DESCRIPTORS: ACHING;OTHER (COMMENT)
DESCRIPTORS: ACHING;CRAMPING
DESCRIPTORS: ACHING;OTHER (COMMENT)
DESCRIPTORS: ACHING;CRAMPING
DESCRIPTORS: ACHING;OTHER (COMMENT)
DESCRIPTORS: ACHING;OTHER (COMMENT)

## 2019-12-13 ASSESSMENT — PAIN - FUNCTIONAL ASSESSMENT
PAIN_FUNCTIONAL_ASSESSMENT: PREVENTS OR INTERFERES SOME ACTIVE ACTIVITIES AND ADLS

## 2019-12-13 ASSESSMENT — PAIN SCALES - GENERAL
PAINLEVEL_OUTOF10: 5
PAINLEVEL_OUTOF10: 5
PAINLEVEL_OUTOF10: 8
PAINLEVEL_OUTOF10: 7
PAINLEVEL_OUTOF10: 8
PAINLEVEL_OUTOF10: 7
PAINLEVEL_OUTOF10: 5
PAINLEVEL_OUTOF10: 5
PAINLEVEL_OUTOF10: 8
PAINLEVEL_OUTOF10: 5
PAINLEVEL_OUTOF10: 8
PAINLEVEL_OUTOF10: 8

## 2019-12-13 ASSESSMENT — PAIN DESCRIPTION - PROGRESSION
CLINICAL_PROGRESSION: NOT CHANGED

## 2019-12-13 ASSESSMENT — PAIN DESCRIPTION - PAIN TYPE
TYPE: CHRONIC PAIN
TYPE: ACUTE PAIN
TYPE: CHRONIC PAIN
TYPE: ACUTE PAIN

## 2019-12-13 ASSESSMENT — PAIN DESCRIPTION - LOCATION
LOCATION: ABDOMEN

## 2019-12-13 ASSESSMENT — PAIN DESCRIPTION - ORIENTATION
ORIENTATION: RIGHT;UPPER

## 2019-12-14 ENCOUNTER — APPOINTMENT (OUTPATIENT)
Dept: CT IMAGING | Age: 59
DRG: 247 | End: 2019-12-14
Payer: COMMERCIAL

## 2019-12-14 LAB
A/G RATIO: 0.5 (ref 1.1–2.2)
ALBUMIN SERPL-MCNC: 2.5 G/DL (ref 3.4–5)
ALP BLD-CCNC: 96 U/L (ref 40–129)
ALT SERPL-CCNC: 7 U/L (ref 10–40)
ANION GAP SERPL CALCULATED.3IONS-SCNC: 12 MMOL/L (ref 3–16)
AST SERPL-CCNC: 22 U/L (ref 15–37)
BASOPHILS ABSOLUTE: 0.1 K/UL (ref 0–0.2)
BASOPHILS RELATIVE PERCENT: 1.2 %
BILIRUB SERPL-MCNC: 0.3 MG/DL (ref 0–1)
BUN BLDV-MCNC: 14 MG/DL (ref 7–20)
CALCIUM SERPL-MCNC: 8.7 MG/DL (ref 8.3–10.6)
CHLORIDE BLD-SCNC: 94 MMOL/L (ref 99–110)
CO2: 30 MMOL/L (ref 21–32)
CREAT SERPL-MCNC: 0.9 MG/DL (ref 0.9–1.3)
D DIMER: >5250 NG/ML DDU (ref 0–229)
EOSINOPHILS ABSOLUTE: 0.2 K/UL (ref 0–0.6)
EOSINOPHILS RELATIVE PERCENT: 2 %
GFR AFRICAN AMERICAN: >60
GFR NON-AFRICAN AMERICAN: >60
GLOBULIN: 5.2 G/DL
GLUCOSE BLD-MCNC: 112 MG/DL (ref 70–99)
HCT VFR BLD CALC: 24.8 % (ref 40.5–52.5)
HEMOGLOBIN: 8 G/DL (ref 13.5–17.5)
LYMPHOCYTES ABSOLUTE: 1 K/UL (ref 1–5.1)
LYMPHOCYTES RELATIVE PERCENT: 8.8 %
MCH RBC QN AUTO: 28.2 PG (ref 26–34)
MCHC RBC AUTO-ENTMCNC: 32.1 G/DL (ref 31–36)
MCV RBC AUTO: 87.8 FL (ref 80–100)
MONOCYTES ABSOLUTE: 1 K/UL (ref 0–1.3)
MONOCYTES RELATIVE PERCENT: 8.4 %
NEUTROPHILS ABSOLUTE: 9.2 K/UL (ref 1.7–7.7)
NEUTROPHILS RELATIVE PERCENT: 79.6 %
PDW BLD-RTO: 16.6 % (ref 12.4–15.4)
PLATELET # BLD: 321 K/UL (ref 135–450)
PMV BLD AUTO: 8.4 FL (ref 5–10.5)
POTASSIUM SERPL-SCNC: 4.4 MMOL/L (ref 3.5–5.1)
RBC # BLD: 2.83 M/UL (ref 4.2–5.9)
SODIUM BLD-SCNC: 136 MMOL/L (ref 136–145)
TOTAL PROTEIN: 7.7 G/DL (ref 6.4–8.2)
WBC # BLD: 11.5 K/UL (ref 4–11)

## 2019-12-14 PROCEDURE — 85379 FIBRIN DEGRADATION QUANT: CPT

## 2019-12-14 PROCEDURE — 94760 N-INVAS EAR/PLS OXIMETRY 1: CPT

## 2019-12-14 PROCEDURE — 6360000002 HC RX W HCPCS: Performed by: NURSE PRACTITIONER

## 2019-12-14 PROCEDURE — 2700000000 HC OXYGEN THERAPY PER DAY

## 2019-12-14 PROCEDURE — 1200000000 HC SEMI PRIVATE

## 2019-12-14 PROCEDURE — 6370000000 HC RX 637 (ALT 250 FOR IP): Performed by: INTERNAL MEDICINE

## 2019-12-14 PROCEDURE — 80053 COMPREHEN METABOLIC PANEL: CPT

## 2019-12-14 PROCEDURE — 93005 ELECTROCARDIOGRAM TRACING: CPT | Performed by: INTERNAL MEDICINE

## 2019-12-14 PROCEDURE — 6360000004 HC RX CONTRAST MEDICATION: Performed by: INTERNAL MEDICINE

## 2019-12-14 PROCEDURE — 6370000000 HC RX 637 (ALT 250 FOR IP): Performed by: NURSE PRACTITIONER

## 2019-12-14 PROCEDURE — 71260 CT THORAX DX C+: CPT

## 2019-12-14 PROCEDURE — 71275 CT ANGIOGRAPHY CHEST: CPT

## 2019-12-14 PROCEDURE — 99024 POSTOP FOLLOW-UP VISIT: CPT | Performed by: SURGERY

## 2019-12-14 PROCEDURE — 85025 COMPLETE CBC W/AUTO DIFF WBC: CPT

## 2019-12-14 PROCEDURE — 6360000002 HC RX W HCPCS: Performed by: INTERNAL MEDICINE

## 2019-12-14 PROCEDURE — 2580000003 HC RX 258: Performed by: NURSE PRACTITIONER

## 2019-12-14 RX ORDER — TORSEMIDE 20 MG/1
40 TABLET ORAL 2 TIMES DAILY
Status: DISCONTINUED | OUTPATIENT
Start: 2019-12-14 | End: 2019-12-17

## 2019-12-14 RX ORDER — DIGOXIN 0.25 MG/ML
250 INJECTION INTRAMUSCULAR; INTRAVENOUS ONCE
Status: COMPLETED | OUTPATIENT
Start: 2019-12-14 | End: 2019-12-14

## 2019-12-14 RX ADMIN — DULOXETINE HYDROCHLORIDE 30 MG: 30 CAPSULE, DELAYED RELEASE ORAL at 08:48

## 2019-12-14 RX ADMIN — SODIUM CHLORIDE, PRESERVATIVE FREE 10 ML: 5 INJECTION INTRAVENOUS at 20:06

## 2019-12-14 RX ADMIN — GABAPENTIN 200 MG: 100 CAPSULE ORAL at 20:06

## 2019-12-14 RX ADMIN — LINAGLIPTIN 5 MG: 5 TABLET, FILM COATED ORAL at 08:48

## 2019-12-14 RX ADMIN — HYDROMORPHONE HYDROCHLORIDE 0.5 MG: 1 INJECTION, SOLUTION INTRAMUSCULAR; INTRAVENOUS; SUBCUTANEOUS at 08:49

## 2019-12-14 RX ADMIN — OXYCODONE HYDROCHLORIDE AND ACETAMINOPHEN 2 TABLET: 5; 325 TABLET ORAL at 20:12

## 2019-12-14 RX ADMIN — OXYCODONE HYDROCHLORIDE AND ACETAMINOPHEN 2 TABLET: 5; 325 TABLET ORAL at 11:23

## 2019-12-14 RX ADMIN — HYDROMORPHONE HYDROCHLORIDE 0.5 MG: 1 INJECTION, SOLUTION INTRAMUSCULAR; INTRAVENOUS; SUBCUTANEOUS at 19:05

## 2019-12-14 RX ADMIN — DICYCLOMINE HYDROCHLORIDE 10 MG: 10 CAPSULE ORAL at 16:14

## 2019-12-14 RX ADMIN — DIGOXIN 250 MCG: 0.25 INJECTION INTRAMUSCULAR; INTRAVENOUS at 09:14

## 2019-12-14 RX ADMIN — Medication 1 CAPSULE: at 08:49

## 2019-12-14 RX ADMIN — HYDROMORPHONE HYDROCHLORIDE 0.5 MG: 1 INJECTION, SOLUTION INTRAMUSCULAR; INTRAVENOUS; SUBCUTANEOUS at 13:37

## 2019-12-14 RX ADMIN — Medication 10 ML: at 03:49

## 2019-12-14 RX ADMIN — OXYCODONE HYDROCHLORIDE AND ACETAMINOPHEN 2 TABLET: 5; 325 TABLET ORAL at 16:14

## 2019-12-14 RX ADMIN — METOPROLOL SUCCINATE 25 MG: 25 TABLET, EXTENDED RELEASE ORAL at 08:59

## 2019-12-14 RX ADMIN — OYSTER SHELL CALCIUM WITH VITAMIN D 1 TABLET: 500; 200 TABLET, FILM COATED ORAL at 08:48

## 2019-12-14 RX ADMIN — FERROUS GLUCONATE 324 MG: 324 TABLET ORAL at 08:49

## 2019-12-14 RX ADMIN — TORSEMIDE 40 MG: 20 TABLET ORAL at 08:48

## 2019-12-14 RX ADMIN — GABAPENTIN 200 MG: 100 CAPSULE ORAL at 08:48

## 2019-12-14 RX ADMIN — DICYCLOMINE HYDROCHLORIDE 10 MG: 10 CAPSULE ORAL at 05:09

## 2019-12-14 RX ADMIN — Medication 400 MG: at 08:49

## 2019-12-14 RX ADMIN — AMIODARONE HYDROCHLORIDE 200 MG: 200 TABLET ORAL at 08:48

## 2019-12-14 RX ADMIN — HYDROMORPHONE HYDROCHLORIDE 0.5 MG: 1 INJECTION, SOLUTION INTRAMUSCULAR; INTRAVENOUS; SUBCUTANEOUS at 03:48

## 2019-12-14 RX ADMIN — OXYCODONE HYDROCHLORIDE AND ACETAMINOPHEN 2 TABLET: 5; 325 TABLET ORAL at 04:54

## 2019-12-14 RX ADMIN — GABAPENTIN 200 MG: 100 CAPSULE ORAL at 13:37

## 2019-12-14 RX ADMIN — TRAZODONE HYDROCHLORIDE 50 MG: 50 TABLET ORAL at 20:06

## 2019-12-14 RX ADMIN — PANTOPRAZOLE SODIUM 40 MG: 40 TABLET, DELAYED RELEASE ORAL at 05:08

## 2019-12-14 RX ADMIN — HYDROMORPHONE HYDROCHLORIDE 0.5 MG: 1 INJECTION, SOLUTION INTRAMUSCULAR; INTRAVENOUS; SUBCUTANEOUS at 00:22

## 2019-12-14 RX ADMIN — DICYCLOMINE HYDROCHLORIDE 10 MG: 10 CAPSULE ORAL at 10:35

## 2019-12-14 RX ADMIN — TAMSULOSIN HYDROCHLORIDE 0.4 MG: 0.4 CAPSULE ORAL at 08:49

## 2019-12-14 RX ADMIN — TORSEMIDE 40 MG: 20 TABLET ORAL at 20:06

## 2019-12-14 RX ADMIN — ATORVASTATIN CALCIUM 40 MG: 40 TABLET, FILM COATED ORAL at 08:49

## 2019-12-14 RX ADMIN — OYSTER SHELL CALCIUM WITH VITAMIN D 1 TABLET: 500; 200 TABLET, FILM COATED ORAL at 16:14

## 2019-12-14 RX ADMIN — Medication 1 CAPSULE: at 16:14

## 2019-12-14 RX ADMIN — Medication 400 MG: at 16:14

## 2019-12-14 RX ADMIN — IOPAMIDOL 75 ML: 755 INJECTION, SOLUTION INTRAVENOUS at 13:09

## 2019-12-14 RX ADMIN — SODIUM CHLORIDE, PRESERVATIVE FREE 10 ML: 5 INJECTION INTRAVENOUS at 08:56

## 2019-12-14 ASSESSMENT — PAIN DESCRIPTION - ONSET
ONSET: ON-GOING

## 2019-12-14 ASSESSMENT — PAIN DESCRIPTION - PAIN TYPE
TYPE: CHRONIC PAIN

## 2019-12-14 ASSESSMENT — PAIN SCALES - GENERAL
PAINLEVEL_OUTOF10: 0
PAINLEVEL_OUTOF10: 0
PAINLEVEL_OUTOF10: 8
PAINLEVEL_OUTOF10: 0
PAINLEVEL_OUTOF10: 0
PAINLEVEL_OUTOF10: 8
PAINLEVEL_OUTOF10: 8
PAINLEVEL_OUTOF10: 7
PAINLEVEL_OUTOF10: 8
PAINLEVEL_OUTOF10: 6
PAINLEVEL_OUTOF10: 8
PAINLEVEL_OUTOF10: 9
PAINLEVEL_OUTOF10: 5
PAINLEVEL_OUTOF10: 4
PAINLEVEL_OUTOF10: 8
PAINLEVEL_OUTOF10: 0

## 2019-12-14 ASSESSMENT — PAIN DESCRIPTION - DESCRIPTORS
DESCRIPTORS: ACHING;CRAMPING
DESCRIPTORS: ACHING

## 2019-12-14 ASSESSMENT — PAIN DESCRIPTION - PROGRESSION
CLINICAL_PROGRESSION: NOT CHANGED

## 2019-12-14 ASSESSMENT — PAIN - FUNCTIONAL ASSESSMENT
PAIN_FUNCTIONAL_ASSESSMENT: PREVENTS OR INTERFERES SOME ACTIVE ACTIVITIES AND ADLS

## 2019-12-14 ASSESSMENT — PAIN DESCRIPTION - DIRECTION: RADIATING_TOWARDS: TO LEFT

## 2019-12-14 ASSESSMENT — PAIN DESCRIPTION - FREQUENCY
FREQUENCY: CONTINUOUS

## 2019-12-14 ASSESSMENT — PAIN DESCRIPTION - LOCATION
LOCATION: ABDOMEN

## 2019-12-14 ASSESSMENT — PAIN DESCRIPTION - ORIENTATION
ORIENTATION: RIGHT;UPPER

## 2019-12-15 LAB
A/G RATIO: 0.5 (ref 1.1–2.2)
ALBUMIN SERPL-MCNC: 2.5 G/DL (ref 3.4–5)
ALP BLD-CCNC: 85 U/L (ref 40–129)
ALT SERPL-CCNC: 7 U/L (ref 10–40)
ANION GAP SERPL CALCULATED.3IONS-SCNC: 11 MMOL/L (ref 3–16)
AST SERPL-CCNC: 22 U/L (ref 15–37)
BASOPHILS ABSOLUTE: 0 K/UL (ref 0–0.2)
BASOPHILS RELATIVE PERCENT: 0.3 %
BILIRUB SERPL-MCNC: 0.3 MG/DL (ref 0–1)
BUN BLDV-MCNC: 18 MG/DL (ref 7–20)
CALCIUM SERPL-MCNC: 8.1 MG/DL (ref 8.3–10.6)
CHLORIDE BLD-SCNC: 94 MMOL/L (ref 99–110)
CO2: 33 MMOL/L (ref 21–32)
CREAT SERPL-MCNC: 0.9 MG/DL (ref 0.9–1.3)
EOSINOPHILS ABSOLUTE: 0.4 K/UL (ref 0–0.6)
EOSINOPHILS RELATIVE PERCENT: 4.2 %
GFR AFRICAN AMERICAN: >60
GFR NON-AFRICAN AMERICAN: >60
GLOBULIN: 4.8 G/DL
GLUCOSE BLD-MCNC: 97 MG/DL (ref 70–99)
HCT VFR BLD CALC: 22.9 % (ref 40.5–52.5)
HEMOGLOBIN: 7.5 G/DL (ref 13.5–17.5)
LYMPHOCYTES ABSOLUTE: 0.8 K/UL (ref 1–5.1)
LYMPHOCYTES RELATIVE PERCENT: 8.7 %
MCH RBC QN AUTO: 28.9 PG (ref 26–34)
MCHC RBC AUTO-ENTMCNC: 32.8 G/DL (ref 31–36)
MCV RBC AUTO: 88 FL (ref 80–100)
MONOCYTES ABSOLUTE: 1.1 K/UL (ref 0–1.3)
MONOCYTES RELATIVE PERCENT: 11.8 %
NEUTROPHILS ABSOLUTE: 6.9 K/UL (ref 1.7–7.7)
NEUTROPHILS RELATIVE PERCENT: 75 %
PDW BLD-RTO: 16.3 % (ref 12.4–15.4)
PLATELET # BLD: 257 K/UL (ref 135–450)
PMV BLD AUTO: 8.1 FL (ref 5–10.5)
POTASSIUM SERPL-SCNC: 3.7 MMOL/L (ref 3.5–5.1)
RBC # BLD: 2.61 M/UL (ref 4.2–5.9)
SODIUM BLD-SCNC: 138 MMOL/L (ref 136–145)
TOTAL PROTEIN: 7.3 G/DL (ref 6.4–8.2)
WBC # BLD: 9.2 K/UL (ref 4–11)

## 2019-12-15 PROCEDURE — 2700000000 HC OXYGEN THERAPY PER DAY

## 2019-12-15 PROCEDURE — 6360000002 HC RX W HCPCS: Performed by: NURSE PRACTITIONER

## 2019-12-15 PROCEDURE — 6370000000 HC RX 637 (ALT 250 FOR IP): Performed by: INTERNAL MEDICINE

## 2019-12-15 PROCEDURE — 85025 COMPLETE CBC W/AUTO DIFF WBC: CPT

## 2019-12-15 PROCEDURE — 6370000000 HC RX 637 (ALT 250 FOR IP): Performed by: NURSE PRACTITIONER

## 2019-12-15 PROCEDURE — 1200000000 HC SEMI PRIVATE

## 2019-12-15 PROCEDURE — 99024 POSTOP FOLLOW-UP VISIT: CPT | Performed by: SURGERY

## 2019-12-15 PROCEDURE — 6360000002 HC RX W HCPCS: Performed by: INTERNAL MEDICINE

## 2019-12-15 PROCEDURE — 6360000002 HC RX W HCPCS

## 2019-12-15 PROCEDURE — 2580000003 HC RX 258: Performed by: NURSE PRACTITIONER

## 2019-12-15 PROCEDURE — 94640 AIRWAY INHALATION TREATMENT: CPT

## 2019-12-15 PROCEDURE — 80053 COMPREHEN METABOLIC PANEL: CPT

## 2019-12-15 RX ORDER — FUROSEMIDE 10 MG/ML
20 INJECTION INTRAMUSCULAR; INTRAVENOUS ONCE
Status: COMPLETED | OUTPATIENT
Start: 2019-12-15 | End: 2019-12-15

## 2019-12-15 RX ORDER — FUROSEMIDE 10 MG/ML
INJECTION INTRAMUSCULAR; INTRAVENOUS
Status: DISPENSED
Start: 2019-12-15 | End: 2019-12-15

## 2019-12-15 RX ADMIN — OXYCODONE HYDROCHLORIDE AND ACETAMINOPHEN 2 TABLET: 5; 325 TABLET ORAL at 14:12

## 2019-12-15 RX ADMIN — ALBUTEROL SULFATE 2.5 MG: 2.5 SOLUTION RESPIRATORY (INHALATION) at 09:09

## 2019-12-15 RX ADMIN — ALBUTEROL SULFATE 2.5 MG: 2.5 SOLUTION RESPIRATORY (INHALATION) at 21:10

## 2019-12-15 RX ADMIN — PANTOPRAZOLE SODIUM 40 MG: 40 TABLET, DELAYED RELEASE ORAL at 05:47

## 2019-12-15 RX ADMIN — TRAZODONE HYDROCHLORIDE 50 MG: 50 TABLET ORAL at 21:05

## 2019-12-15 RX ADMIN — Medication 1 CAPSULE: at 16:05

## 2019-12-15 RX ADMIN — Medication 1 CAPSULE: at 08:41

## 2019-12-15 RX ADMIN — HYDROMORPHONE HYDROCHLORIDE 0.5 MG: 1 INJECTION, SOLUTION INTRAMUSCULAR; INTRAVENOUS; SUBCUTANEOUS at 00:08

## 2019-12-15 RX ADMIN — DULOXETINE HYDROCHLORIDE 30 MG: 30 CAPSULE, DELAYED RELEASE ORAL at 08:41

## 2019-12-15 RX ADMIN — GABAPENTIN 200 MG: 100 CAPSULE ORAL at 21:05

## 2019-12-15 RX ADMIN — TAMSULOSIN HYDROCHLORIDE 0.4 MG: 0.4 CAPSULE ORAL at 08:42

## 2019-12-15 RX ADMIN — HYDROMORPHONE HYDROCHLORIDE 0.5 MG: 1 INJECTION, SOLUTION INTRAMUSCULAR; INTRAVENOUS; SUBCUTANEOUS at 05:57

## 2019-12-15 RX ADMIN — OYSTER SHELL CALCIUM WITH VITAMIN D 1 TABLET: 500; 200 TABLET, FILM COATED ORAL at 16:05

## 2019-12-15 RX ADMIN — LINAGLIPTIN 5 MG: 5 TABLET, FILM COATED ORAL at 08:41

## 2019-12-15 RX ADMIN — OXYCODONE HYDROCHLORIDE AND ACETAMINOPHEN 2 TABLET: 5; 325 TABLET ORAL at 08:42

## 2019-12-15 RX ADMIN — Medication 400 MG: at 08:42

## 2019-12-15 RX ADMIN — GABAPENTIN 200 MG: 100 CAPSULE ORAL at 08:42

## 2019-12-15 RX ADMIN — DICYCLOMINE HYDROCHLORIDE 10 MG: 10 CAPSULE ORAL at 11:40

## 2019-12-15 RX ADMIN — OYSTER SHELL CALCIUM WITH VITAMIN D 1 TABLET: 500; 200 TABLET, FILM COATED ORAL at 08:42

## 2019-12-15 RX ADMIN — GABAPENTIN 200 MG: 100 CAPSULE ORAL at 14:11

## 2019-12-15 RX ADMIN — FERROUS GLUCONATE 324 MG: 324 TABLET ORAL at 08:41

## 2019-12-15 RX ADMIN — HYDROMORPHONE HYDROCHLORIDE 0.5 MG: 1 INJECTION, SOLUTION INTRAMUSCULAR; INTRAVENOUS; SUBCUTANEOUS at 16:05

## 2019-12-15 RX ADMIN — HYDROMORPHONE HYDROCHLORIDE 0.5 MG: 1 INJECTION, SOLUTION INTRAMUSCULAR; INTRAVENOUS; SUBCUTANEOUS at 11:48

## 2019-12-15 RX ADMIN — HYDROMORPHONE HYDROCHLORIDE 0.5 MG: 1 INJECTION, SOLUTION INTRAMUSCULAR; INTRAVENOUS; SUBCUTANEOUS at 21:05

## 2019-12-15 RX ADMIN — Medication 400 MG: at 16:05

## 2019-12-15 RX ADMIN — ENOXAPARIN SODIUM 40 MG: 40 INJECTION SUBCUTANEOUS at 14:12

## 2019-12-15 RX ADMIN — FUROSEMIDE 20 MG: 10 INJECTION, SOLUTION INTRAMUSCULAR; INTRAVENOUS at 11:47

## 2019-12-15 RX ADMIN — DICYCLOMINE HYDROCHLORIDE 10 MG: 10 CAPSULE ORAL at 16:05

## 2019-12-15 RX ADMIN — SODIUM CHLORIDE, PRESERVATIVE FREE 10 ML: 5 INJECTION INTRAVENOUS at 08:42

## 2019-12-15 RX ADMIN — Medication 10 ML: at 11:47

## 2019-12-15 RX ADMIN — DICYCLOMINE HYDROCHLORIDE 10 MG: 10 CAPSULE ORAL at 05:47

## 2019-12-15 RX ADMIN — TORSEMIDE 40 MG: 20 TABLET ORAL at 08:42

## 2019-12-15 RX ADMIN — ATORVASTATIN CALCIUM 40 MG: 40 TABLET, FILM COATED ORAL at 08:42

## 2019-12-15 RX ADMIN — ENOXAPARIN SODIUM 40 MG: 40 INJECTION SUBCUTANEOUS at 11:47

## 2019-12-15 RX ADMIN — OXYCODONE HYDROCHLORIDE AND ACETAMINOPHEN 2 TABLET: 5; 325 TABLET ORAL at 18:36

## 2019-12-15 RX ADMIN — SODIUM CHLORIDE, PRESERVATIVE FREE 10 ML: 5 INJECTION INTRAVENOUS at 21:06

## 2019-12-15 RX ADMIN — METOPROLOL SUCCINATE 25 MG: 25 TABLET, EXTENDED RELEASE ORAL at 08:41

## 2019-12-15 RX ADMIN — AMIODARONE HYDROCHLORIDE 200 MG: 200 TABLET ORAL at 08:42

## 2019-12-15 RX ADMIN — TORSEMIDE 40 MG: 20 TABLET ORAL at 21:05

## 2019-12-15 ASSESSMENT — PAIN DESCRIPTION - DESCRIPTORS
DESCRIPTORS: ACHING

## 2019-12-15 ASSESSMENT — PAIN DESCRIPTION - LOCATION
LOCATION: ABDOMEN

## 2019-12-15 ASSESSMENT — PAIN SCALES - GENERAL
PAINLEVEL_OUTOF10: 3
PAINLEVEL_OUTOF10: 2
PAINLEVEL_OUTOF10: 0
PAINLEVEL_OUTOF10: 7
PAINLEVEL_OUTOF10: 3
PAINLEVEL_OUTOF10: 0
PAINLEVEL_OUTOF10: 7
PAINLEVEL_OUTOF10: 8
PAINLEVEL_OUTOF10: 4
PAINLEVEL_OUTOF10: 8
PAINLEVEL_OUTOF10: 7
PAINLEVEL_OUTOF10: 0
PAINLEVEL_OUTOF10: 7

## 2019-12-15 ASSESSMENT — PAIN DESCRIPTION - FREQUENCY
FREQUENCY: CONTINUOUS

## 2019-12-15 ASSESSMENT — PAIN DESCRIPTION - PAIN TYPE
TYPE: CHRONIC PAIN

## 2019-12-15 ASSESSMENT — PAIN DESCRIPTION - PROGRESSION
CLINICAL_PROGRESSION: NOT CHANGED

## 2019-12-15 ASSESSMENT — PAIN DESCRIPTION - ORIENTATION
ORIENTATION: RIGHT

## 2019-12-15 ASSESSMENT — PAIN DESCRIPTION - ONSET
ONSET: ON-GOING

## 2019-12-16 ENCOUNTER — APPOINTMENT (OUTPATIENT)
Dept: GENERAL RADIOLOGY | Age: 59
DRG: 247 | End: 2019-12-16
Payer: COMMERCIAL

## 2019-12-16 ENCOUNTER — TELEPHONE (OUTPATIENT)
Dept: CARDIOLOGY CLINIC | Age: 59
End: 2019-12-16

## 2019-12-16 LAB
A/G RATIO: 0.5 (ref 1.1–2.2)
ALBUMIN SERPL-MCNC: 2.4 G/DL (ref 3.4–5)
ALP BLD-CCNC: 84 U/L (ref 40–129)
ALT SERPL-CCNC: <5 U/L (ref 10–40)
ANION GAP SERPL CALCULATED.3IONS-SCNC: 12 MMOL/L (ref 3–16)
ANISOCYTOSIS: ABNORMAL
AST SERPL-CCNC: 20 U/L (ref 15–37)
BASOPHILS ABSOLUTE: 0 K/UL (ref 0–0.2)
BASOPHILS RELATIVE PERCENT: 0 %
BILIRUB SERPL-MCNC: <0.2 MG/DL (ref 0–1)
BUN BLDV-MCNC: 17 MG/DL (ref 7–20)
CALCIUM SERPL-MCNC: 8 MG/DL (ref 8.3–10.6)
CHLORIDE BLD-SCNC: 93 MMOL/L (ref 99–110)
CO2: 35 MMOL/L (ref 21–32)
CREAT SERPL-MCNC: 0.9 MG/DL (ref 0.9–1.3)
EKG ATRIAL RATE: 136 BPM
EKG DIAGNOSIS: NORMAL
EKG Q-T INTERVAL: 264 MS
EKG QRS DURATION: 98 MS
EKG QTC CALCULATION (BAZETT): 382 MS
EKG R AXIS: 120 DEGREES
EKG T AXIS: -63 DEGREES
EKG VENTRICULAR RATE: 126 BPM
EOSINOPHILS ABSOLUTE: 0.2 K/UL (ref 0–0.6)
EOSINOPHILS RELATIVE PERCENT: 2 %
FUNGUS (MYCOLOGY) CULTURE: NORMAL
FUNGUS (MYCOLOGY) CULTURE: NORMAL
FUNGUS STAIN: NORMAL
FUNGUS STAIN: NORMAL
GFR AFRICAN AMERICAN: >60
GFR NON-AFRICAN AMERICAN: >60
GLOBULIN: 5 G/DL
GLUCOSE BLD-MCNC: 113 MG/DL (ref 70–99)
HCT VFR BLD CALC: 22.7 % (ref 40.5–52.5)
HEMOGLOBIN: 7.4 G/DL (ref 13.5–17.5)
LYMPHOCYTES ABSOLUTE: 1 K/UL (ref 1–5.1)
LYMPHOCYTES RELATIVE PERCENT: 9 %
MCH RBC QN AUTO: 28.5 PG (ref 26–34)
MCHC RBC AUTO-ENTMCNC: 32.5 G/DL (ref 31–36)
MCV RBC AUTO: 87.7 FL (ref 80–100)
MONOCYTES ABSOLUTE: 0.6 K/UL (ref 0–1.3)
MONOCYTES RELATIVE PERCENT: 5 %
NEUTROPHILS ABSOLUTE: 9.2 K/UL (ref 1.7–7.7)
NEUTROPHILS RELATIVE PERCENT: 84 %
PDW BLD-RTO: 16.6 % (ref 12.4–15.4)
PLATELET # BLD: 231 K/UL (ref 135–450)
PLATELET SLIDE REVIEW: ADEQUATE
PMV BLD AUTO: 8 FL (ref 5–10.5)
POTASSIUM SERPL-SCNC: 3.4 MMOL/L (ref 3.5–5.1)
RBC # BLD: 2.59 M/UL (ref 4.2–5.9)
SLIDE REVIEW: ABNORMAL
SODIUM BLD-SCNC: 140 MMOL/L (ref 136–145)
TOTAL PROTEIN: 7.4 G/DL (ref 6.4–8.2)
WBC # BLD: 11 K/UL (ref 4–11)

## 2019-12-16 PROCEDURE — APPNB30 APP NON BILLABLE TIME 0-30 MINS: Performed by: PHYSICIAN ASSISTANT

## 2019-12-16 PROCEDURE — 6370000000 HC RX 637 (ALT 250 FOR IP): Performed by: INTERNAL MEDICINE

## 2019-12-16 PROCEDURE — 6370000000 HC RX 637 (ALT 250 FOR IP): Performed by: NURSE PRACTITIONER

## 2019-12-16 PROCEDURE — 85025 COMPLETE CBC W/AUTO DIFF WBC: CPT

## 2019-12-16 PROCEDURE — 93010 ELECTROCARDIOGRAM REPORT: CPT | Performed by: INTERNAL MEDICINE

## 2019-12-16 PROCEDURE — 6360000002 HC RX W HCPCS: Performed by: NURSE PRACTITIONER

## 2019-12-16 PROCEDURE — APPSS15 APP SPLIT SHARED TIME 0-15 MINUTES: Performed by: PHYSICIAN ASSISTANT

## 2019-12-16 PROCEDURE — 71046 X-RAY EXAM CHEST 2 VIEWS: CPT

## 2019-12-16 PROCEDURE — 99024 POSTOP FOLLOW-UP VISIT: CPT | Performed by: PHYSICIAN ASSISTANT

## 2019-12-16 PROCEDURE — 6360000002 HC RX W HCPCS: Performed by: INTERNAL MEDICINE

## 2019-12-16 PROCEDURE — 2580000003 HC RX 258: Performed by: NURSE PRACTITIONER

## 2019-12-16 PROCEDURE — 94640 AIRWAY INHALATION TREATMENT: CPT

## 2019-12-16 PROCEDURE — 1200000000 HC SEMI PRIVATE

## 2019-12-16 PROCEDURE — 80053 COMPREHEN METABOLIC PANEL: CPT

## 2019-12-16 RX ADMIN — Medication 400 MG: at 09:28

## 2019-12-16 RX ADMIN — HYDROMORPHONE HYDROCHLORIDE 0.5 MG: 1 INJECTION, SOLUTION INTRAMUSCULAR; INTRAVENOUS; SUBCUTANEOUS at 20:33

## 2019-12-16 RX ADMIN — OXYCODONE HYDROCHLORIDE AND ACETAMINOPHEN 2 TABLET: 5; 325 TABLET ORAL at 09:15

## 2019-12-16 RX ADMIN — HYDROMORPHONE HYDROCHLORIDE 0.5 MG: 1 INJECTION, SOLUTION INTRAMUSCULAR; INTRAVENOUS; SUBCUTANEOUS at 12:54

## 2019-12-16 RX ADMIN — TORSEMIDE 40 MG: 20 TABLET ORAL at 20:25

## 2019-12-16 RX ADMIN — SODIUM CHLORIDE, PRESERVATIVE FREE 10 ML: 5 INJECTION INTRAVENOUS at 09:30

## 2019-12-16 RX ADMIN — FERROUS GLUCONATE 324 MG: 324 TABLET ORAL at 09:28

## 2019-12-16 RX ADMIN — TAMSULOSIN HYDROCHLORIDE 0.4 MG: 0.4 CAPSULE ORAL at 09:15

## 2019-12-16 RX ADMIN — SODIUM CHLORIDE, PRESERVATIVE FREE 10 ML: 5 INJECTION INTRAVENOUS at 20:26

## 2019-12-16 RX ADMIN — OXYCODONE HYDROCHLORIDE AND ACETAMINOPHEN 2 TABLET: 5; 325 TABLET ORAL at 22:42

## 2019-12-16 RX ADMIN — DULOXETINE HYDROCHLORIDE 30 MG: 30 CAPSULE, DELAYED RELEASE ORAL at 09:15

## 2019-12-16 RX ADMIN — TORSEMIDE 40 MG: 20 TABLET ORAL at 09:15

## 2019-12-16 RX ADMIN — LINAGLIPTIN 5 MG: 5 TABLET, FILM COATED ORAL at 09:15

## 2019-12-16 RX ADMIN — Medication 400 MG: at 16:19

## 2019-12-16 RX ADMIN — OYSTER SHELL CALCIUM WITH VITAMIN D 1 TABLET: 500; 200 TABLET, FILM COATED ORAL at 09:28

## 2019-12-16 RX ADMIN — ENOXAPARIN SODIUM 40 MG: 40 INJECTION SUBCUTANEOUS at 09:24

## 2019-12-16 RX ADMIN — DICYCLOMINE HYDROCHLORIDE 10 MG: 10 CAPSULE ORAL at 05:33

## 2019-12-16 RX ADMIN — DICYCLOMINE HYDROCHLORIDE 10 MG: 10 CAPSULE ORAL at 16:19

## 2019-12-16 RX ADMIN — Medication 10 ML: at 12:56

## 2019-12-16 RX ADMIN — GABAPENTIN 200 MG: 100 CAPSULE ORAL at 09:15

## 2019-12-16 RX ADMIN — GABAPENTIN 200 MG: 100 CAPSULE ORAL at 20:25

## 2019-12-16 RX ADMIN — HYDROMORPHONE HYDROCHLORIDE 0.5 MG: 1 INJECTION, SOLUTION INTRAMUSCULAR; INTRAVENOUS; SUBCUTANEOUS at 16:57

## 2019-12-16 RX ADMIN — TRAZODONE HYDROCHLORIDE 50 MG: 50 TABLET ORAL at 20:25

## 2019-12-16 RX ADMIN — Medication 1 CAPSULE: at 09:29

## 2019-12-16 RX ADMIN — ALBUTEROL SULFATE 2.5 MG: 2.5 SOLUTION RESPIRATORY (INHALATION) at 19:52

## 2019-12-16 RX ADMIN — POTASSIUM CHLORIDE 40 MEQ: 1500 TABLET, EXTENDED RELEASE ORAL at 09:28

## 2019-12-16 RX ADMIN — OXYCODONE HYDROCHLORIDE AND ACETAMINOPHEN 2 TABLET: 5; 325 TABLET ORAL at 01:29

## 2019-12-16 RX ADMIN — ATORVASTATIN CALCIUM 40 MG: 40 TABLET, FILM COATED ORAL at 09:15

## 2019-12-16 RX ADMIN — HYDROMORPHONE HYDROCHLORIDE 0.5 MG: 1 INJECTION, SOLUTION INTRAMUSCULAR; INTRAVENOUS; SUBCUTANEOUS at 05:33

## 2019-12-16 RX ADMIN — PANTOPRAZOLE SODIUM 40 MG: 40 TABLET, DELAYED RELEASE ORAL at 05:33

## 2019-12-16 RX ADMIN — OYSTER SHELL CALCIUM WITH VITAMIN D 1 TABLET: 500; 200 TABLET, FILM COATED ORAL at 16:19

## 2019-12-16 RX ADMIN — OXYCODONE HYDROCHLORIDE AND ACETAMINOPHEN 2 TABLET: 5; 325 TABLET ORAL at 15:23

## 2019-12-16 RX ADMIN — METOPROLOL SUCCINATE 25 MG: 25 TABLET, EXTENDED RELEASE ORAL at 09:15

## 2019-12-16 RX ADMIN — GABAPENTIN 200 MG: 100 CAPSULE ORAL at 12:54

## 2019-12-16 RX ADMIN — Medication 1 CAPSULE: at 16:19

## 2019-12-16 RX ADMIN — AMIODARONE HYDROCHLORIDE 200 MG: 200 TABLET ORAL at 09:15

## 2019-12-16 RX ADMIN — DICYCLOMINE HYDROCHLORIDE 10 MG: 10 CAPSULE ORAL at 09:15

## 2019-12-16 ASSESSMENT — PAIN - FUNCTIONAL ASSESSMENT
PAIN_FUNCTIONAL_ASSESSMENT: PREVENTS OR INTERFERES SOME ACTIVE ACTIVITIES AND ADLS

## 2019-12-16 ASSESSMENT — PAIN DESCRIPTION - DESCRIPTORS
DESCRIPTORS: ACHING

## 2019-12-16 ASSESSMENT — PAIN DESCRIPTION - FREQUENCY
FREQUENCY: CONTINUOUS

## 2019-12-16 ASSESSMENT — PAIN SCALES - GENERAL
PAINLEVEL_OUTOF10: 7
PAINLEVEL_OUTOF10: 6
PAINLEVEL_OUTOF10: 0
PAINLEVEL_OUTOF10: 4
PAINLEVEL_OUTOF10: 7
PAINLEVEL_OUTOF10: 7
PAINLEVEL_OUTOF10: 5
PAINLEVEL_OUTOF10: 7
PAINLEVEL_OUTOF10: 4
PAINLEVEL_OUTOF10: 5
PAINLEVEL_OUTOF10: 6
PAINLEVEL_OUTOF10: 7
PAINLEVEL_OUTOF10: 0
PAINLEVEL_OUTOF10: 7

## 2019-12-16 ASSESSMENT — PAIN DESCRIPTION - ORIENTATION
ORIENTATION: RIGHT
ORIENTATION: RIGHT
ORIENTATION: RIGHT;UPPER;LOWER
ORIENTATION: RIGHT;UPPER;LOWER

## 2019-12-16 ASSESSMENT — PAIN DESCRIPTION - PAIN TYPE
TYPE: CHRONIC PAIN

## 2019-12-16 ASSESSMENT — PAIN DESCRIPTION - LOCATION
LOCATION: ABDOMEN

## 2019-12-16 ASSESSMENT — PAIN DESCRIPTION - ONSET
ONSET: ON-GOING

## 2019-12-16 ASSESSMENT — PAIN DESCRIPTION - PROGRESSION
CLINICAL_PROGRESSION: NOT CHANGED
CLINICAL_PROGRESSION: NOT CHANGED

## 2019-12-16 NOTE — TELEPHONE ENCOUNTER
Left message for Sara España: we are not following Gunjan All as an inpt (surgery).   Advised to direct questions to his attending MD.

## 2019-12-16 NOTE — TELEPHONE ENCOUNTER
Jonathan Cuevas stopped in this morning wanting to talk to Dionna Chisholm NP and let her know that Taylor Lagunas is in the hospital. She would like Dionna Chisholm NP to maybe stop in and see him, he is in room 4275.       You can reach Jonathan Cuevas at #[phone]

## 2019-12-17 LAB
A/G RATIO: 0.5 (ref 1.1–2.2)
ALBUMIN SERPL-MCNC: 2.3 G/DL (ref 3.4–5)
ALP BLD-CCNC: 79 U/L (ref 40–129)
ALT SERPL-CCNC: <5 U/L (ref 10–40)
ANION GAP SERPL CALCULATED.3IONS-SCNC: 10 MMOL/L (ref 3–16)
AST SERPL-CCNC: 19 U/L (ref 15–37)
BASOPHILS ABSOLUTE: 0.1 K/UL (ref 0–0.2)
BASOPHILS RELATIVE PERCENT: 0.8 %
BILIRUB SERPL-MCNC: 0.3 MG/DL (ref 0–1)
BUN BLDV-MCNC: 16 MG/DL (ref 7–20)
CALCIUM SERPL-MCNC: 8 MG/DL (ref 8.3–10.6)
CHLORIDE BLD-SCNC: 89 MMOL/L (ref 99–110)
CO2: 37 MMOL/L (ref 21–32)
CREAT SERPL-MCNC: 1 MG/DL (ref 0.9–1.3)
EOSINOPHILS ABSOLUTE: 0.2 K/UL (ref 0–0.6)
EOSINOPHILS RELATIVE PERCENT: 1.7 %
GFR AFRICAN AMERICAN: >60
GFR NON-AFRICAN AMERICAN: >60
GLOBULIN: 4.8 G/DL
GLUCOSE BLD-MCNC: 86 MG/DL (ref 70–99)
HCT VFR BLD CALC: 22.4 % (ref 40.5–52.5)
HEMOGLOBIN: 7.2 G/DL (ref 13.5–17.5)
LYMPHOCYTES ABSOLUTE: 0.8 K/UL (ref 1–5.1)
LYMPHOCYTES RELATIVE PERCENT: 7.9 %
MAGNESIUM: 1.6 MG/DL (ref 1.8–2.4)
MCH RBC QN AUTO: 28.1 PG (ref 26–34)
MCHC RBC AUTO-ENTMCNC: 32.1 G/DL (ref 31–36)
MCV RBC AUTO: 87.6 FL (ref 80–100)
MONOCYTES ABSOLUTE: 0.9 K/UL (ref 0–1.3)
MONOCYTES RELATIVE PERCENT: 9.2 %
NEUTROPHILS ABSOLUTE: 7.9 K/UL (ref 1.7–7.7)
NEUTROPHILS RELATIVE PERCENT: 80.4 %
PDW BLD-RTO: 16.1 % (ref 12.4–15.4)
PLATELET # BLD: 223 K/UL (ref 135–450)
PMV BLD AUTO: 8.1 FL (ref 5–10.5)
POTASSIUM REFLEX MAGNESIUM: 3.4 MMOL/L (ref 3.5–5.1)
POTASSIUM SERPL-SCNC: ABNORMAL MMOL/L (ref 3.5–5.1)
PROCALCITONIN: 0.17 NG/ML (ref 0–0.15)
RBC # BLD: 2.55 M/UL (ref 4.2–5.9)
SODIUM BLD-SCNC: 136 MMOL/L (ref 136–145)
TOTAL PROTEIN: 7.1 G/DL (ref 6.4–8.2)
WBC # BLD: 9.9 K/UL (ref 4–11)

## 2019-12-17 PROCEDURE — 85025 COMPLETE CBC W/AUTO DIFF WBC: CPT

## 2019-12-17 PROCEDURE — 1200000000 HC SEMI PRIVATE

## 2019-12-17 PROCEDURE — 6370000000 HC RX 637 (ALT 250 FOR IP): Performed by: HOSPITALIST

## 2019-12-17 PROCEDURE — 6370000000 HC RX 637 (ALT 250 FOR IP): Performed by: INTERNAL MEDICINE

## 2019-12-17 PROCEDURE — 94640 AIRWAY INHALATION TREATMENT: CPT

## 2019-12-17 PROCEDURE — 6370000000 HC RX 637 (ALT 250 FOR IP): Performed by: NURSE PRACTITIONER

## 2019-12-17 PROCEDURE — 6360000002 HC RX W HCPCS: Performed by: INTERNAL MEDICINE

## 2019-12-17 PROCEDURE — 6360000002 HC RX W HCPCS: Performed by: NURSE PRACTITIONER

## 2019-12-17 PROCEDURE — 84145 PROCALCITONIN (PCT): CPT

## 2019-12-17 PROCEDURE — 83735 ASSAY OF MAGNESIUM: CPT

## 2019-12-17 PROCEDURE — APPNB30 APP NON BILLABLE TIME 0-30 MINS: Performed by: NURSE PRACTITIONER

## 2019-12-17 PROCEDURE — 2700000000 HC OXYGEN THERAPY PER DAY

## 2019-12-17 PROCEDURE — 36415 COLL VENOUS BLD VENIPUNCTURE: CPT

## 2019-12-17 PROCEDURE — 80053 COMPREHEN METABOLIC PANEL: CPT

## 2019-12-17 PROCEDURE — 6360000002 HC RX W HCPCS: Performed by: HOSPITALIST

## 2019-12-17 PROCEDURE — 94761 N-INVAS EAR/PLS OXIMETRY MLT: CPT

## 2019-12-17 PROCEDURE — 2580000003 HC RX 258: Performed by: NURSE PRACTITIONER

## 2019-12-17 PROCEDURE — APPSS30 APP SPLIT SHARED TIME 16-30 MINUTES: Performed by: NURSE PRACTITIONER

## 2019-12-17 RX ORDER — MAGNESIUM SULFATE 1 G/100ML
1 INJECTION INTRAVENOUS PRN
Status: DISCONTINUED | OUTPATIENT
Start: 2019-12-17 | End: 2019-12-20 | Stop reason: HOSPADM

## 2019-12-17 RX ORDER — FUROSEMIDE 10 MG/ML
40 INJECTION INTRAMUSCULAR; INTRAVENOUS 2 TIMES DAILY
Status: DISCONTINUED | OUTPATIENT
Start: 2019-12-17 | End: 2019-12-19

## 2019-12-17 RX ADMIN — RIVAROXABAN 20 MG: 20 TABLET, FILM COATED ORAL at 17:57

## 2019-12-17 RX ADMIN — FUROSEMIDE 40 MG: 10 INJECTION, SOLUTION INTRAMUSCULAR; INTRAVENOUS at 17:57

## 2019-12-17 RX ADMIN — TAMSULOSIN HYDROCHLORIDE 0.4 MG: 0.4 CAPSULE ORAL at 09:24

## 2019-12-17 RX ADMIN — ENOXAPARIN SODIUM 40 MG: 40 INJECTION SUBCUTANEOUS at 09:25

## 2019-12-17 RX ADMIN — DICYCLOMINE HYDROCHLORIDE 10 MG: 10 CAPSULE ORAL at 15:52

## 2019-12-17 RX ADMIN — OXYCODONE HYDROCHLORIDE AND ACETAMINOPHEN 2 TABLET: 5; 325 TABLET ORAL at 03:02

## 2019-12-17 RX ADMIN — ALBUTEROL SULFATE 2.5 MG: 2.5 SOLUTION RESPIRATORY (INHALATION) at 20:42

## 2019-12-17 RX ADMIN — HYDROMORPHONE HYDROCHLORIDE 0.5 MG: 1 INJECTION, SOLUTION INTRAMUSCULAR; INTRAVENOUS; SUBCUTANEOUS at 15:53

## 2019-12-17 RX ADMIN — TRAZODONE HYDROCHLORIDE 50 MG: 50 TABLET ORAL at 20:22

## 2019-12-17 RX ADMIN — OXYCODONE HYDROCHLORIDE AND ACETAMINOPHEN 2 TABLET: 5; 325 TABLET ORAL at 22:02

## 2019-12-17 RX ADMIN — LINAGLIPTIN 5 MG: 5 TABLET, FILM COATED ORAL at 09:24

## 2019-12-17 RX ADMIN — OYSTER SHELL CALCIUM WITH VITAMIN D 1 TABLET: 500; 200 TABLET, FILM COATED ORAL at 15:52

## 2019-12-17 RX ADMIN — HYDROMORPHONE HYDROCHLORIDE 0.5 MG: 1 INJECTION, SOLUTION INTRAMUSCULAR; INTRAVENOUS; SUBCUTANEOUS at 00:05

## 2019-12-17 RX ADMIN — OXYCODONE HYDROCHLORIDE AND ACETAMINOPHEN 2 TABLET: 5; 325 TABLET ORAL at 14:45

## 2019-12-17 RX ADMIN — OYSTER SHELL CALCIUM WITH VITAMIN D 1 TABLET: 500; 200 TABLET, FILM COATED ORAL at 09:24

## 2019-12-17 RX ADMIN — SODIUM CHLORIDE, PRESERVATIVE FREE 10 ML: 5 INJECTION INTRAVENOUS at 20:47

## 2019-12-17 RX ADMIN — HYDROMORPHONE HYDROCHLORIDE 0.5 MG: 1 INJECTION, SOLUTION INTRAMUSCULAR; INTRAVENOUS; SUBCUTANEOUS at 11:05

## 2019-12-17 RX ADMIN — MAGNESIUM SULFATE HEPTAHYDRATE 1 G: 1 INJECTION, SOLUTION INTRAVENOUS at 11:05

## 2019-12-17 RX ADMIN — GABAPENTIN 200 MG: 100 CAPSULE ORAL at 20:22

## 2019-12-17 RX ADMIN — PANTOPRAZOLE SODIUM 40 MG: 40 TABLET, DELAYED RELEASE ORAL at 05:22

## 2019-12-17 RX ADMIN — GABAPENTIN 200 MG: 100 CAPSULE ORAL at 13:23

## 2019-12-17 RX ADMIN — MAGNESIUM SULFATE HEPTAHYDRATE 1 G: 1 INJECTION, SOLUTION INTRAVENOUS at 12:17

## 2019-12-17 RX ADMIN — ATORVASTATIN CALCIUM 40 MG: 40 TABLET, FILM COATED ORAL at 09:24

## 2019-12-17 RX ADMIN — FERROUS GLUCONATE 324 MG: 324 TABLET ORAL at 09:24

## 2019-12-17 RX ADMIN — OXYCODONE HYDROCHLORIDE AND ACETAMINOPHEN 2 TABLET: 5; 325 TABLET ORAL at 09:35

## 2019-12-17 RX ADMIN — Medication 400 MG: at 15:53

## 2019-12-17 RX ADMIN — DULOXETINE HYDROCHLORIDE 30 MG: 30 CAPSULE, DELAYED RELEASE ORAL at 09:25

## 2019-12-17 RX ADMIN — Medication 1 CAPSULE: at 09:24

## 2019-12-17 RX ADMIN — DICYCLOMINE HYDROCHLORIDE 10 MG: 10 CAPSULE ORAL at 11:05

## 2019-12-17 RX ADMIN — HYDROMORPHONE HYDROCHLORIDE 0.5 MG: 1 INJECTION, SOLUTION INTRAMUSCULAR; INTRAVENOUS; SUBCUTANEOUS at 05:23

## 2019-12-17 RX ADMIN — FUROSEMIDE 40 MG: 10 INJECTION, SOLUTION INTRAMUSCULAR; INTRAVENOUS at 09:24

## 2019-12-17 RX ADMIN — POTASSIUM CHLORIDE 40 MEQ: 1500 TABLET, EXTENDED RELEASE ORAL at 09:24

## 2019-12-17 RX ADMIN — GABAPENTIN 200 MG: 100 CAPSULE ORAL at 09:24

## 2019-12-17 RX ADMIN — Medication 1 CAPSULE: at 15:52

## 2019-12-17 RX ADMIN — ALBUTEROL SULFATE 2.5 MG: 2.5 SOLUTION RESPIRATORY (INHALATION) at 09:25

## 2019-12-17 RX ADMIN — AMIODARONE HYDROCHLORIDE 200 MG: 200 TABLET ORAL at 09:24

## 2019-12-17 RX ADMIN — DICYCLOMINE HYDROCHLORIDE 10 MG: 10 CAPSULE ORAL at 05:22

## 2019-12-17 RX ADMIN — SODIUM CHLORIDE, PRESERVATIVE FREE 10 ML: 5 INJECTION INTRAVENOUS at 09:26

## 2019-12-17 RX ADMIN — Medication 400 MG: at 09:24

## 2019-12-17 RX ADMIN — HYDROMORPHONE HYDROCHLORIDE 0.5 MG: 1 INJECTION, SOLUTION INTRAMUSCULAR; INTRAVENOUS; SUBCUTANEOUS at 20:22

## 2019-12-17 RX ADMIN — METOPROLOL SUCCINATE 25 MG: 25 TABLET, EXTENDED RELEASE ORAL at 09:24

## 2019-12-17 ASSESSMENT — PAIN SCALES - GENERAL
PAINLEVEL_OUTOF10: 8
PAINLEVEL_OUTOF10: 8
PAINLEVEL_OUTOF10: 6
PAINLEVEL_OUTOF10: 8
PAINLEVEL_OUTOF10: 7
PAINLEVEL_OUTOF10: 7
PAINLEVEL_OUTOF10: 3
PAINLEVEL_OUTOF10: 5
PAINLEVEL_OUTOF10: 8
PAINLEVEL_OUTOF10: 7
PAINLEVEL_OUTOF10: 6
PAINLEVEL_OUTOF10: 5
PAINLEVEL_OUTOF10: 7
PAINLEVEL_OUTOF10: 7
PAINLEVEL_OUTOF10: 6

## 2019-12-17 ASSESSMENT — PAIN DESCRIPTION - FREQUENCY
FREQUENCY: CONTINUOUS

## 2019-12-17 ASSESSMENT — PAIN DESCRIPTION - DESCRIPTORS
DESCRIPTORS: ACHING

## 2019-12-17 ASSESSMENT — PAIN DESCRIPTION - ONSET
ONSET: ON-GOING

## 2019-12-17 ASSESSMENT — PAIN DESCRIPTION - LOCATION
LOCATION: ABDOMEN

## 2019-12-17 ASSESSMENT — PAIN DESCRIPTION - ORIENTATION
ORIENTATION: RIGHT

## 2019-12-17 ASSESSMENT — PAIN DESCRIPTION - PAIN TYPE
TYPE: CHRONIC PAIN

## 2019-12-17 ASSESSMENT — PAIN DESCRIPTION - PROGRESSION
CLINICAL_PROGRESSION: NOT CHANGED

## 2019-12-18 LAB
A/G RATIO: 0.5 (ref 1.1–2.2)
ALBUMIN SERPL-MCNC: 2.3 G/DL (ref 3.4–5)
ALP BLD-CCNC: 81 U/L (ref 40–129)
ALT SERPL-CCNC: <5 U/L (ref 10–40)
ANION GAP SERPL CALCULATED.3IONS-SCNC: 8 MMOL/L (ref 3–16)
AST SERPL-CCNC: 19 U/L (ref 15–37)
BASOPHILS ABSOLUTE: 0.1 K/UL (ref 0–0.2)
BASOPHILS RELATIVE PERCENT: 0.9 %
BILIRUB SERPL-MCNC: 0.3 MG/DL (ref 0–1)
BUN BLDV-MCNC: 16 MG/DL (ref 7–20)
CALCIUM SERPL-MCNC: 8 MG/DL (ref 8.3–10.6)
CHLORIDE BLD-SCNC: 87 MMOL/L (ref 99–110)
CO2: 38 MMOL/L (ref 21–32)
CREAT SERPL-MCNC: 0.8 MG/DL (ref 0.9–1.3)
EOSINOPHILS ABSOLUTE: 0.1 K/UL (ref 0–0.6)
EOSINOPHILS RELATIVE PERCENT: 1.5 %
GFR AFRICAN AMERICAN: >60
GFR NON-AFRICAN AMERICAN: >60
GLOBULIN: 4.8 G/DL
GLUCOSE BLD-MCNC: 112 MG/DL (ref 70–99)
HCT VFR BLD CALC: 22.4 % (ref 40.5–52.5)
HEMOGLOBIN: 7.2 G/DL (ref 13.5–17.5)
LYMPHOCYTES ABSOLUTE: 0.8 K/UL (ref 1–5.1)
LYMPHOCYTES RELATIVE PERCENT: 10 %
MCH RBC QN AUTO: 28.2 PG (ref 26–34)
MCHC RBC AUTO-ENTMCNC: 32.3 G/DL (ref 31–36)
MCV RBC AUTO: 87.2 FL (ref 80–100)
MONOCYTES ABSOLUTE: 0.7 K/UL (ref 0–1.3)
MONOCYTES RELATIVE PERCENT: 7.8 %
NEUTROPHILS ABSOLUTE: 6.7 K/UL (ref 1.7–7.7)
NEUTROPHILS RELATIVE PERCENT: 79.8 %
PDW BLD-RTO: 16.3 % (ref 12.4–15.4)
PLATELET # BLD: 233 K/UL (ref 135–450)
PMV BLD AUTO: 7.8 FL (ref 5–10.5)
POTASSIUM SERPL-SCNC: 3.7 MMOL/L (ref 3.5–5.1)
RBC # BLD: 2.57 M/UL (ref 4.2–5.9)
SODIUM BLD-SCNC: 133 MMOL/L (ref 136–145)
TOTAL PROTEIN: 7.1 G/DL (ref 6.4–8.2)
WBC # BLD: 8.5 K/UL (ref 4–11)

## 2019-12-18 PROCEDURE — 6370000000 HC RX 637 (ALT 250 FOR IP): Performed by: INTERNAL MEDICINE

## 2019-12-18 PROCEDURE — 1200000000 HC SEMI PRIVATE

## 2019-12-18 PROCEDURE — 80053 COMPREHEN METABOLIC PANEL: CPT

## 2019-12-18 PROCEDURE — 85025 COMPLETE CBC W/AUTO DIFF WBC: CPT

## 2019-12-18 PROCEDURE — 6370000000 HC RX 637 (ALT 250 FOR IP): Performed by: NURSE PRACTITIONER

## 2019-12-18 PROCEDURE — 6370000000 HC RX 637 (ALT 250 FOR IP): Performed by: HOSPITALIST

## 2019-12-18 PROCEDURE — 94640 AIRWAY INHALATION TREATMENT: CPT

## 2019-12-18 PROCEDURE — 6360000002 HC RX W HCPCS: Performed by: NURSE PRACTITIONER

## 2019-12-18 PROCEDURE — 94761 N-INVAS EAR/PLS OXIMETRY MLT: CPT

## 2019-12-18 PROCEDURE — 2580000003 HC RX 258: Performed by: NURSE PRACTITIONER

## 2019-12-18 PROCEDURE — 2700000000 HC OXYGEN THERAPY PER DAY

## 2019-12-18 PROCEDURE — 6360000002 HC RX W HCPCS: Performed by: HOSPITALIST

## 2019-12-18 RX ADMIN — Medication 400 MG: at 10:38

## 2019-12-18 RX ADMIN — Medication 1 CAPSULE: at 10:38

## 2019-12-18 RX ADMIN — Medication 1 CAPSULE: at 18:14

## 2019-12-18 RX ADMIN — GABAPENTIN 200 MG: 100 CAPSULE ORAL at 20:43

## 2019-12-18 RX ADMIN — HYDROMORPHONE HYDROCHLORIDE 0.5 MG: 1 INJECTION, SOLUTION INTRAMUSCULAR; INTRAVENOUS; SUBCUTANEOUS at 00:10

## 2019-12-18 RX ADMIN — TAMSULOSIN HYDROCHLORIDE 0.4 MG: 0.4 CAPSULE ORAL at 10:38

## 2019-12-18 RX ADMIN — HYDROMORPHONE HYDROCHLORIDE 0.5 MG: 1 INJECTION, SOLUTION INTRAMUSCULAR; INTRAVENOUS; SUBCUTANEOUS at 04:29

## 2019-12-18 RX ADMIN — HYDROMORPHONE HYDROCHLORIDE 0.5 MG: 1 INJECTION, SOLUTION INTRAMUSCULAR; INTRAVENOUS; SUBCUTANEOUS at 18:15

## 2019-12-18 RX ADMIN — SODIUM CHLORIDE, PRESERVATIVE FREE 10 ML: 5 INJECTION INTRAVENOUS at 10:47

## 2019-12-18 RX ADMIN — PANTOPRAZOLE SODIUM 40 MG: 40 TABLET, DELAYED RELEASE ORAL at 05:10

## 2019-12-18 RX ADMIN — LINAGLIPTIN 5 MG: 5 TABLET, FILM COATED ORAL at 10:38

## 2019-12-18 RX ADMIN — RIVAROXABAN 20 MG: 20 TABLET, FILM COATED ORAL at 18:14

## 2019-12-18 RX ADMIN — AMIODARONE HYDROCHLORIDE 200 MG: 200 TABLET ORAL at 10:38

## 2019-12-18 RX ADMIN — FUROSEMIDE 40 MG: 10 INJECTION, SOLUTION INTRAMUSCULAR; INTRAVENOUS at 18:13

## 2019-12-18 RX ADMIN — OXYCODONE HYDROCHLORIDE AND ACETAMINOPHEN 2 TABLET: 5; 325 TABLET ORAL at 13:56

## 2019-12-18 RX ADMIN — DICYCLOMINE HYDROCHLORIDE 10 MG: 10 CAPSULE ORAL at 10:38

## 2019-12-18 RX ADMIN — TRAZODONE HYDROCHLORIDE 50 MG: 50 TABLET ORAL at 20:43

## 2019-12-18 RX ADMIN — OXYCODONE HYDROCHLORIDE AND ACETAMINOPHEN 2 TABLET: 5; 325 TABLET ORAL at 22:01

## 2019-12-18 RX ADMIN — METOPROLOL SUCCINATE 25 MG: 25 TABLET, EXTENDED RELEASE ORAL at 10:38

## 2019-12-18 RX ADMIN — Medication 400 MG: at 18:14

## 2019-12-18 RX ADMIN — OYSTER SHELL CALCIUM WITH VITAMIN D 1 TABLET: 500; 200 TABLET, FILM COATED ORAL at 10:38

## 2019-12-18 RX ADMIN — FERROUS GLUCONATE 324 MG: 324 TABLET ORAL at 10:38

## 2019-12-18 RX ADMIN — DICYCLOMINE HYDROCHLORIDE 10 MG: 10 CAPSULE ORAL at 05:10

## 2019-12-18 RX ADMIN — DULOXETINE HYDROCHLORIDE 30 MG: 30 CAPSULE, DELAYED RELEASE ORAL at 10:38

## 2019-12-18 RX ADMIN — GABAPENTIN 200 MG: 100 CAPSULE ORAL at 10:38

## 2019-12-18 RX ADMIN — GABAPENTIN 200 MG: 100 CAPSULE ORAL at 13:56

## 2019-12-18 RX ADMIN — FUROSEMIDE 40 MG: 10 INJECTION, SOLUTION INTRAMUSCULAR; INTRAVENOUS at 10:37

## 2019-12-18 RX ADMIN — ATORVASTATIN CALCIUM 40 MG: 40 TABLET, FILM COATED ORAL at 10:37

## 2019-12-18 RX ADMIN — ALBUTEROL SULFATE 2.5 MG: 2.5 SOLUTION RESPIRATORY (INHALATION) at 04:43

## 2019-12-18 RX ADMIN — ALBUTEROL SULFATE 2.5 MG: 2.5 SOLUTION RESPIRATORY (INHALATION) at 10:08

## 2019-12-18 RX ADMIN — HYDROMORPHONE HYDROCHLORIDE 0.5 MG: 1 INJECTION, SOLUTION INTRAMUSCULAR; INTRAVENOUS; SUBCUTANEOUS at 10:37

## 2019-12-18 RX ADMIN — OYSTER SHELL CALCIUM WITH VITAMIN D 1 TABLET: 500; 200 TABLET, FILM COATED ORAL at 18:14

## 2019-12-18 RX ADMIN — DICYCLOMINE HYDROCHLORIDE 10 MG: 10 CAPSULE ORAL at 18:14

## 2019-12-18 RX ADMIN — SODIUM CHLORIDE, PRESERVATIVE FREE 10 ML: 5 INJECTION INTRAVENOUS at 20:44

## 2019-12-18 ASSESSMENT — PAIN SCALES - WONG BAKER
WONGBAKER_NUMERICALRESPONSE: 0

## 2019-12-18 ASSESSMENT — PAIN DESCRIPTION - PAIN TYPE
TYPE: CHRONIC PAIN
TYPE: ACUTE PAIN;CHRONIC PAIN
TYPE: CHRONIC PAIN;ACUTE PAIN

## 2019-12-18 ASSESSMENT — PAIN DESCRIPTION - LOCATION
LOCATION: ABDOMEN
LOCATION: ABDOMEN;BACK;GENERALIZED
LOCATION: ABDOMEN;BACK;GENERALIZED

## 2019-12-18 ASSESSMENT — PAIN DESCRIPTION - FREQUENCY
FREQUENCY: CONTINUOUS

## 2019-12-18 ASSESSMENT — PAIN SCALES - GENERAL
PAINLEVEL_OUTOF10: 8
PAINLEVEL_OUTOF10: 8
PAINLEVEL_OUTOF10: 4
PAINLEVEL_OUTOF10: 7
PAINLEVEL_OUTOF10: 7
PAINLEVEL_OUTOF10: 3
PAINLEVEL_OUTOF10: 5
PAINLEVEL_OUTOF10: 7
PAINLEVEL_OUTOF10: 8

## 2019-12-18 ASSESSMENT — PAIN DESCRIPTION - ORIENTATION
ORIENTATION: RIGHT
ORIENTATION: RIGHT;LEFT
ORIENTATION: RIGHT;LEFT;MID
ORIENTATION: RIGHT
ORIENTATION: RIGHT

## 2019-12-18 ASSESSMENT — PAIN DESCRIPTION - ONSET
ONSET: ON-GOING

## 2019-12-18 ASSESSMENT — PAIN DESCRIPTION - DESCRIPTORS
DESCRIPTORS: ACHING

## 2019-12-18 ASSESSMENT — PAIN DESCRIPTION - DIRECTION: RADIATING_TOWARDS: LEFT

## 2019-12-19 ENCOUNTER — APPOINTMENT (OUTPATIENT)
Dept: GENERAL RADIOLOGY | Age: 59
DRG: 247 | End: 2019-12-19
Payer: COMMERCIAL

## 2019-12-19 LAB
A/G RATIO: 0.6 (ref 1.1–2.2)
ALBUMIN SERPL-MCNC: 2.6 G/DL (ref 3.4–5)
ALP BLD-CCNC: 86 U/L (ref 40–129)
ALT SERPL-CCNC: 6 U/L (ref 10–40)
ANION GAP SERPL CALCULATED.3IONS-SCNC: 9 MMOL/L (ref 3–16)
AST SERPL-CCNC: 22 U/L (ref 15–37)
BASOPHILS ABSOLUTE: 0.1 K/UL (ref 0–0.2)
BASOPHILS RELATIVE PERCENT: 0.9 %
BILIRUB SERPL-MCNC: 0.3 MG/DL (ref 0–1)
BUN BLDV-MCNC: 13 MG/DL (ref 7–20)
CALCIUM SERPL-MCNC: 8.2 MG/DL (ref 8.3–10.6)
CHLORIDE BLD-SCNC: 88 MMOL/L (ref 99–110)
CO2: 39 MMOL/L (ref 21–32)
CREAT SERPL-MCNC: 0.8 MG/DL (ref 0.9–1.3)
EOSINOPHILS ABSOLUTE: 0.1 K/UL (ref 0–0.6)
EOSINOPHILS RELATIVE PERCENT: 1.7 %
GFR AFRICAN AMERICAN: >60
GFR NON-AFRICAN AMERICAN: >60
GLOBULIN: 4.7 G/DL
GLUCOSE BLD-MCNC: 102 MG/DL (ref 70–99)
HCT VFR BLD CALC: 22.4 % (ref 40.5–52.5)
HEMOGLOBIN: 7.3 G/DL (ref 13.5–17.5)
LYMPHOCYTES ABSOLUTE: 0.8 K/UL (ref 1–5.1)
LYMPHOCYTES RELATIVE PERCENT: 10 %
MAGNESIUM: 1.9 MG/DL (ref 1.8–2.4)
MCH RBC QN AUTO: 28.1 PG (ref 26–34)
MCHC RBC AUTO-ENTMCNC: 32.4 G/DL (ref 31–36)
MCV RBC AUTO: 86.7 FL (ref 80–100)
MONOCYTES ABSOLUTE: 0.7 K/UL (ref 0–1.3)
MONOCYTES RELATIVE PERCENT: 8.8 %
NEUTROPHILS ABSOLUTE: 6.6 K/UL (ref 1.7–7.7)
NEUTROPHILS RELATIVE PERCENT: 78.6 %
PDW BLD-RTO: 16.2 % (ref 12.4–15.4)
PLATELET # BLD: 247 K/UL (ref 135–450)
PMV BLD AUTO: 8.3 FL (ref 5–10.5)
POTASSIUM REFLEX MAGNESIUM: 3.4 MMOL/L (ref 3.5–5.1)
RBC # BLD: 2.59 M/UL (ref 4.2–5.9)
SODIUM BLD-SCNC: 136 MMOL/L (ref 136–145)
TOTAL PROTEIN: 7.3 G/DL (ref 6.4–8.2)
WBC # BLD: 8.4 K/UL (ref 4–11)

## 2019-12-19 PROCEDURE — 85025 COMPLETE CBC W/AUTO DIFF WBC: CPT

## 2019-12-19 PROCEDURE — 6370000000 HC RX 637 (ALT 250 FOR IP): Performed by: NURSE PRACTITIONER

## 2019-12-19 PROCEDURE — 6370000000 HC RX 637 (ALT 250 FOR IP): Performed by: INTERNAL MEDICINE

## 2019-12-19 PROCEDURE — 2580000003 HC RX 258: Performed by: NURSE PRACTITIONER

## 2019-12-19 PROCEDURE — 1200000000 HC SEMI PRIVATE

## 2019-12-19 PROCEDURE — 71046 X-RAY EXAM CHEST 2 VIEWS: CPT

## 2019-12-19 PROCEDURE — 2700000000 HC OXYGEN THERAPY PER DAY

## 2019-12-19 PROCEDURE — 6360000002 HC RX W HCPCS: Performed by: NURSE PRACTITIONER

## 2019-12-19 PROCEDURE — 6370000000 HC RX 637 (ALT 250 FOR IP): Performed by: HOSPITALIST

## 2019-12-19 PROCEDURE — 83735 ASSAY OF MAGNESIUM: CPT

## 2019-12-19 PROCEDURE — 94640 AIRWAY INHALATION TREATMENT: CPT

## 2019-12-19 PROCEDURE — 80053 COMPREHEN METABOLIC PANEL: CPT

## 2019-12-19 PROCEDURE — 94760 N-INVAS EAR/PLS OXIMETRY 1: CPT

## 2019-12-19 RX ORDER — TORSEMIDE 20 MG/1
40 TABLET ORAL DAILY
Status: DISCONTINUED | OUTPATIENT
Start: 2019-12-19 | End: 2019-12-20 | Stop reason: HOSPADM

## 2019-12-19 RX ORDER — OXYCODONE HYDROCHLORIDE AND ACETAMINOPHEN 5; 325 MG/1; MG/1
1 TABLET ORAL EVERY 4 HOURS PRN
Qty: 12 TABLET | Refills: 0 | Status: SHIPPED | OUTPATIENT
Start: 2019-12-19 | End: 2019-12-22

## 2019-12-19 RX ADMIN — FERROUS GLUCONATE 324 MG: 324 TABLET ORAL at 08:47

## 2019-12-19 RX ADMIN — OYSTER SHELL CALCIUM WITH VITAMIN D 1 TABLET: 500; 200 TABLET, FILM COATED ORAL at 08:47

## 2019-12-19 RX ADMIN — SODIUM CHLORIDE, PRESERVATIVE FREE 10 ML: 5 INJECTION INTRAVENOUS at 21:30

## 2019-12-19 RX ADMIN — DULOXETINE HYDROCHLORIDE 30 MG: 30 CAPSULE, DELAYED RELEASE ORAL at 08:47

## 2019-12-19 RX ADMIN — DICYCLOMINE HYDROCHLORIDE 10 MG: 10 CAPSULE ORAL at 10:38

## 2019-12-19 RX ADMIN — POTASSIUM CHLORIDE 40 MEQ: 1500 TABLET, EXTENDED RELEASE ORAL at 08:57

## 2019-12-19 RX ADMIN — GABAPENTIN 200 MG: 100 CAPSULE ORAL at 21:30

## 2019-12-19 RX ADMIN — GABAPENTIN 200 MG: 100 CAPSULE ORAL at 13:52

## 2019-12-19 RX ADMIN — GABAPENTIN 200 MG: 100 CAPSULE ORAL at 08:47

## 2019-12-19 RX ADMIN — ATORVASTATIN CALCIUM 40 MG: 40 TABLET, FILM COATED ORAL at 08:47

## 2019-12-19 RX ADMIN — OXYCODONE HYDROCHLORIDE AND ACETAMINOPHEN 2 TABLET: 5; 325 TABLET ORAL at 13:56

## 2019-12-19 RX ADMIN — HYDROMORPHONE HYDROCHLORIDE 0.5 MG: 1 INJECTION, SOLUTION INTRAMUSCULAR; INTRAVENOUS; SUBCUTANEOUS at 08:57

## 2019-12-19 RX ADMIN — OYSTER SHELL CALCIUM WITH VITAMIN D 1 TABLET: 500; 200 TABLET, FILM COATED ORAL at 17:17

## 2019-12-19 RX ADMIN — SODIUM CHLORIDE, PRESERVATIVE FREE 10 ML: 5 INJECTION INTRAVENOUS at 08:48

## 2019-12-19 RX ADMIN — METOPROLOL SUCCINATE 25 MG: 25 TABLET, EXTENDED RELEASE ORAL at 08:47

## 2019-12-19 RX ADMIN — OXYCODONE HYDROCHLORIDE AND ACETAMINOPHEN 2 TABLET: 5; 325 TABLET ORAL at 19:00

## 2019-12-19 RX ADMIN — AMIODARONE HYDROCHLORIDE 200 MG: 200 TABLET ORAL at 08:47

## 2019-12-19 RX ADMIN — OXYCODONE HYDROCHLORIDE AND ACETAMINOPHEN 2 TABLET: 5; 325 TABLET ORAL at 23:58

## 2019-12-19 RX ADMIN — OXYCODONE HYDROCHLORIDE AND ACETAMINOPHEN 2 TABLET: 5; 325 TABLET ORAL at 06:12

## 2019-12-19 RX ADMIN — ALBUTEROL SULFATE 2.5 MG: 2.5 SOLUTION RESPIRATORY (INHALATION) at 09:29

## 2019-12-19 RX ADMIN — TRAZODONE HYDROCHLORIDE 50 MG: 50 TABLET ORAL at 21:30

## 2019-12-19 RX ADMIN — LINAGLIPTIN 5 MG: 5 TABLET, FILM COATED ORAL at 08:47

## 2019-12-19 RX ADMIN — Medication 400 MG: at 08:47

## 2019-12-19 RX ADMIN — DICYCLOMINE HYDROCHLORIDE 10 MG: 10 CAPSULE ORAL at 17:17

## 2019-12-19 RX ADMIN — DICYCLOMINE HYDROCHLORIDE 10 MG: 10 CAPSULE ORAL at 06:12

## 2019-12-19 RX ADMIN — HYDROMORPHONE HYDROCHLORIDE 0.5 MG: 1 INJECTION, SOLUTION INTRAMUSCULAR; INTRAVENOUS; SUBCUTANEOUS at 15:08

## 2019-12-19 RX ADMIN — HYDROMORPHONE HYDROCHLORIDE 0.5 MG: 1 INJECTION, SOLUTION INTRAMUSCULAR; INTRAVENOUS; SUBCUTANEOUS at 02:29

## 2019-12-19 RX ADMIN — TAMSULOSIN HYDROCHLORIDE 0.4 MG: 0.4 CAPSULE ORAL at 08:47

## 2019-12-19 RX ADMIN — Medication 1 CAPSULE: at 17:17

## 2019-12-19 RX ADMIN — Medication 400 MG: at 17:17

## 2019-12-19 RX ADMIN — RIVAROXABAN 20 MG: 20 TABLET, FILM COATED ORAL at 17:17

## 2019-12-19 RX ADMIN — Medication 1 CAPSULE: at 08:47

## 2019-12-19 RX ADMIN — PANTOPRAZOLE SODIUM 40 MG: 40 TABLET, DELAYED RELEASE ORAL at 06:12

## 2019-12-19 RX ADMIN — TORSEMIDE 40 MG: 20 TABLET ORAL at 08:47

## 2019-12-19 ASSESSMENT — PAIN DESCRIPTION - ORIENTATION
ORIENTATION: RIGHT;LEFT;MID

## 2019-12-19 ASSESSMENT — PAIN DESCRIPTION - PAIN TYPE
TYPE: ACUTE PAIN;CHRONIC PAIN
TYPE: ACUTE PAIN;CHRONIC PAIN
TYPE: CHRONIC PAIN;ACUTE PAIN
TYPE: ACUTE PAIN;CHRONIC PAIN
TYPE: CHRONIC PAIN;ACUTE PAIN
TYPE: ACUTE PAIN;CHRONIC PAIN

## 2019-12-19 ASSESSMENT — PAIN SCALES - GENERAL
PAINLEVEL_OUTOF10: 7
PAINLEVEL_OUTOF10: 7
PAINLEVEL_OUTOF10: 4
PAINLEVEL_OUTOF10: 8
PAINLEVEL_OUTOF10: 4
PAINLEVEL_OUTOF10: 8
PAINLEVEL_OUTOF10: 6
PAINLEVEL_OUTOF10: 5
PAINLEVEL_OUTOF10: 7
PAINLEVEL_OUTOF10: 7
PAINLEVEL_OUTOF10: 8
PAINLEVEL_OUTOF10: 7
PAINLEVEL_OUTOF10: 0

## 2019-12-19 ASSESSMENT — PAIN DESCRIPTION - DESCRIPTORS
DESCRIPTORS: ACHING;DISCOMFORT
DESCRIPTORS: ACHING;DISCOMFORT
DESCRIPTORS: ACHING

## 2019-12-19 ASSESSMENT — PAIN DESCRIPTION - LOCATION
LOCATION: ABDOMEN;BACK

## 2019-12-19 ASSESSMENT — PAIN DESCRIPTION - FREQUENCY
FREQUENCY: CONTINUOUS

## 2019-12-19 ASSESSMENT — PAIN DESCRIPTION - PROGRESSION
CLINICAL_PROGRESSION: NOT CHANGED

## 2019-12-19 ASSESSMENT — PAIN DESCRIPTION - ONSET
ONSET: ON-GOING

## 2019-12-20 ENCOUNTER — APPOINTMENT (OUTPATIENT)
Dept: GENERAL RADIOLOGY | Age: 59
DRG: 247 | End: 2019-12-20
Payer: COMMERCIAL

## 2019-12-20 VITALS
OXYGEN SATURATION: 97 % | WEIGHT: 210.32 LBS | BODY MASS INDEX: 33.01 KG/M2 | DIASTOLIC BLOOD PRESSURE: 71 MMHG | RESPIRATION RATE: 16 BRPM | HEART RATE: 83 BPM | SYSTOLIC BLOOD PRESSURE: 109 MMHG | HEIGHT: 67 IN | TEMPERATURE: 97.4 F

## 2019-12-20 LAB
A/G RATIO: 0.5 (ref 1.1–2.2)
ALBUMIN SERPL-MCNC: 2.5 G/DL (ref 3.4–5)
ALP BLD-CCNC: 91 U/L (ref 40–129)
ALT SERPL-CCNC: <5 U/L (ref 10–40)
ANION GAP SERPL CALCULATED.3IONS-SCNC: 6 MMOL/L (ref 3–16)
AST SERPL-CCNC: 23 U/L (ref 15–37)
BASOPHILS ABSOLUTE: 0.1 K/UL (ref 0–0.2)
BASOPHILS RELATIVE PERCENT: 0.8 %
BILIRUB SERPL-MCNC: 0.3 MG/DL (ref 0–1)
BUN BLDV-MCNC: 12 MG/DL (ref 7–20)
CALCIUM SERPL-MCNC: 8.2 MG/DL (ref 8.3–10.6)
CHLORIDE BLD-SCNC: 91 MMOL/L (ref 99–110)
CO2: 43 MMOL/L (ref 21–32)
CREAT SERPL-MCNC: 0.9 MG/DL (ref 0.9–1.3)
EOSINOPHILS ABSOLUTE: 0.1 K/UL (ref 0–0.6)
EOSINOPHILS RELATIVE PERCENT: 1.4 %
GFR AFRICAN AMERICAN: >60
GFR NON-AFRICAN AMERICAN: >60
GLOBULIN: 5.1 G/DL
GLUCOSE BLD-MCNC: 119 MG/DL (ref 70–99)
HCT VFR BLD CALC: 22.8 % (ref 40.5–52.5)
HEMOGLOBIN: 7.4 G/DL (ref 13.5–17.5)
LYMPHOCYTES ABSOLUTE: 0.8 K/UL (ref 1–5.1)
LYMPHOCYTES RELATIVE PERCENT: 10.5 %
MCH RBC QN AUTO: 28.2 PG (ref 26–34)
MCHC RBC AUTO-ENTMCNC: 32.3 G/DL (ref 31–36)
MCV RBC AUTO: 87.2 FL (ref 80–100)
MONOCYTES ABSOLUTE: 0.6 K/UL (ref 0–1.3)
MONOCYTES RELATIVE PERCENT: 7.2 %
NEUTROPHILS ABSOLUTE: 6.5 K/UL (ref 1.7–7.7)
NEUTROPHILS RELATIVE PERCENT: 80.1 %
PDW BLD-RTO: 16.4 % (ref 12.4–15.4)
PLATELET # BLD: 249 K/UL (ref 135–450)
PMV BLD AUTO: 7.8 FL (ref 5–10.5)
POTASSIUM SERPL-SCNC: 3.8 MMOL/L (ref 3.5–5.1)
RBC # BLD: 2.62 M/UL (ref 4.2–5.9)
SODIUM BLD-SCNC: 140 MMOL/L (ref 136–145)
TOTAL PROTEIN: 7.6 G/DL (ref 6.4–8.2)
WBC # BLD: 8.1 K/UL (ref 4–11)

## 2019-12-20 PROCEDURE — 6360000002 HC RX W HCPCS: Performed by: NURSE PRACTITIONER

## 2019-12-20 PROCEDURE — 6370000000 HC RX 637 (ALT 250 FOR IP): Performed by: NURSE PRACTITIONER

## 2019-12-20 PROCEDURE — 80053 COMPREHEN METABOLIC PANEL: CPT

## 2019-12-20 PROCEDURE — 94761 N-INVAS EAR/PLS OXIMETRY MLT: CPT

## 2019-12-20 PROCEDURE — 6370000000 HC RX 637 (ALT 250 FOR IP): Performed by: INTERNAL MEDICINE

## 2019-12-20 PROCEDURE — 2700000000 HC OXYGEN THERAPY PER DAY

## 2019-12-20 PROCEDURE — 85025 COMPLETE CBC W/AUTO DIFF WBC: CPT

## 2019-12-20 PROCEDURE — 2580000003 HC RX 258: Performed by: NURSE PRACTITIONER

## 2019-12-20 PROCEDURE — 74018 RADEX ABDOMEN 1 VIEW: CPT

## 2019-12-20 PROCEDURE — 6370000000 HC RX 637 (ALT 250 FOR IP): Performed by: HOSPITALIST

## 2019-12-20 PROCEDURE — 94640 AIRWAY INHALATION TREATMENT: CPT

## 2019-12-20 RX ADMIN — ATORVASTATIN CALCIUM 40 MG: 40 TABLET, FILM COATED ORAL at 09:28

## 2019-12-20 RX ADMIN — HYDROMORPHONE HYDROCHLORIDE 0.5 MG: 1 INJECTION, SOLUTION INTRAMUSCULAR; INTRAVENOUS; SUBCUTANEOUS at 12:48

## 2019-12-20 RX ADMIN — METOPROLOL SUCCINATE 25 MG: 25 TABLET, EXTENDED RELEASE ORAL at 09:29

## 2019-12-20 RX ADMIN — HYDROMORPHONE HYDROCHLORIDE 0.5 MG: 1 INJECTION, SOLUTION INTRAMUSCULAR; INTRAVENOUS; SUBCUTANEOUS at 03:49

## 2019-12-20 RX ADMIN — DULOXETINE HYDROCHLORIDE 30 MG: 30 CAPSULE, DELAYED RELEASE ORAL at 09:28

## 2019-12-20 RX ADMIN — DICYCLOMINE HYDROCHLORIDE 10 MG: 10 CAPSULE ORAL at 16:03

## 2019-12-20 RX ADMIN — Medication 1 CAPSULE: at 09:28

## 2019-12-20 RX ADMIN — SODIUM CHLORIDE, PRESERVATIVE FREE 10 ML: 5 INJECTION INTRAVENOUS at 09:29

## 2019-12-20 RX ADMIN — HYDROMORPHONE HYDROCHLORIDE 0.5 MG: 1 INJECTION, SOLUTION INTRAMUSCULAR; INTRAVENOUS; SUBCUTANEOUS at 15:57

## 2019-12-20 RX ADMIN — HYDROMORPHONE HYDROCHLORIDE 0.25 MG: 1 INJECTION, SOLUTION INTRAMUSCULAR; INTRAVENOUS; SUBCUTANEOUS at 09:36

## 2019-12-20 RX ADMIN — TAMSULOSIN HYDROCHLORIDE 0.4 MG: 0.4 CAPSULE ORAL at 09:28

## 2019-12-20 RX ADMIN — OYSTER SHELL CALCIUM WITH VITAMIN D 1 TABLET: 500; 200 TABLET, FILM COATED ORAL at 09:28

## 2019-12-20 RX ADMIN — GABAPENTIN 200 MG: 100 CAPSULE ORAL at 15:04

## 2019-12-20 RX ADMIN — ALBUTEROL SULFATE 2.5 MG: 2.5 SOLUTION RESPIRATORY (INHALATION) at 08:56

## 2019-12-20 RX ADMIN — GABAPENTIN 200 MG: 100 CAPSULE ORAL at 09:28

## 2019-12-20 RX ADMIN — OXYCODONE HYDROCHLORIDE AND ACETAMINOPHEN 2 TABLET: 5; 325 TABLET ORAL at 12:06

## 2019-12-20 RX ADMIN — PANTOPRAZOLE SODIUM 40 MG: 40 TABLET, DELAYED RELEASE ORAL at 05:06

## 2019-12-20 RX ADMIN — TORSEMIDE 40 MG: 20 TABLET ORAL at 09:28

## 2019-12-20 RX ADMIN — AMIODARONE HYDROCHLORIDE 200 MG: 200 TABLET ORAL at 09:28

## 2019-12-20 RX ADMIN — DICYCLOMINE HYDROCHLORIDE 10 MG: 10 CAPSULE ORAL at 05:06

## 2019-12-20 RX ADMIN — Medication 400 MG: at 09:28

## 2019-12-20 RX ADMIN — LINAGLIPTIN 5 MG: 5 TABLET, FILM COATED ORAL at 09:28

## 2019-12-20 RX ADMIN — FERROUS GLUCONATE 324 MG: 324 TABLET ORAL at 09:28

## 2019-12-20 ASSESSMENT — PAIN DESCRIPTION - PAIN TYPE
TYPE: CHRONIC PAIN
TYPE: ACUTE PAIN;CHRONIC PAIN

## 2019-12-20 ASSESSMENT — PAIN SCALES - GENERAL
PAINLEVEL_OUTOF10: 6
PAINLEVEL_OUTOF10: 8
PAINLEVEL_OUTOF10: 6
PAINLEVEL_OUTOF10: 3

## 2019-12-20 ASSESSMENT — PAIN DESCRIPTION - LOCATION
LOCATION: ABDOMEN;BACK
LOCATION: BACK

## 2019-12-20 ASSESSMENT — PAIN DESCRIPTION - DESCRIPTORS
DESCRIPTORS: ACHING
DESCRIPTORS: ACHING;DISCOMFORT

## 2019-12-20 ASSESSMENT — PAIN DESCRIPTION - FREQUENCY
FREQUENCY: CONTINUOUS
FREQUENCY: CONTINUOUS

## 2019-12-20 ASSESSMENT — PAIN DESCRIPTION - ORIENTATION: ORIENTATION: INNER

## 2019-12-20 ASSESSMENT — PAIN DESCRIPTION - ONSET
ONSET: ON-GOING
ONSET: ON-GOING

## 2019-12-31 ENCOUNTER — APPOINTMENT (OUTPATIENT)
Dept: GENERAL RADIOLOGY | Age: 59
DRG: 190 | End: 2019-12-31
Payer: COMMERCIAL

## 2019-12-31 ENCOUNTER — HOSPITAL ENCOUNTER (INPATIENT)
Age: 59
LOS: 3 days | Discharge: NURSING, REHAB OR LONG-TERM CARE | DRG: 190 | End: 2020-01-03
Attending: EMERGENCY MEDICINE | Admitting: INTERNAL MEDICINE
Payer: COMMERCIAL

## 2019-12-31 LAB
A/G RATIO: 0.5 (ref 1.1–2.2)
ALBUMIN SERPL-MCNC: 3.2 G/DL (ref 3.4–5)
ALBUMIN SERPL-MCNC: 3.2 G/DL (ref 3.4–5)
ALP BLD-CCNC: 105 U/L (ref 40–129)
ALT SERPL-CCNC: 28 U/L (ref 10–40)
ANION GAP SERPL CALCULATED.3IONS-SCNC: 14 MMOL/L (ref 3–16)
ANION GAP SERPL CALCULATED.3IONS-SCNC: 16 MMOL/L (ref 3–16)
APTT: 42.8 SEC (ref 24.2–36.2)
AST SERPL-CCNC: 41 U/L (ref 15–37)
BASOPHILS ABSOLUTE: 0 K/UL (ref 0–0.2)
BASOPHILS RELATIVE PERCENT: 0.4 %
BILIRUB SERPL-MCNC: 0.3 MG/DL (ref 0–1)
BUN BLDV-MCNC: 36 MG/DL (ref 7–20)
BUN BLDV-MCNC: 38 MG/DL (ref 7–20)
CALCIUM SERPL-MCNC: 9.2 MG/DL (ref 8.3–10.6)
CALCIUM SERPL-MCNC: 9.3 MG/DL (ref 8.3–10.6)
CHLORIDE BLD-SCNC: 82 MMOL/L (ref 99–110)
CHLORIDE BLD-SCNC: 83 MMOL/L (ref 99–110)
CO2: 35 MMOL/L (ref 21–32)
CO2: 38 MMOL/L (ref 21–32)
CREAT SERPL-MCNC: 1 MG/DL (ref 0.9–1.3)
CREAT SERPL-MCNC: 1.1 MG/DL (ref 0.9–1.3)
EKG ATRIAL RATE: 78 BPM
EKG DIAGNOSIS: NORMAL
EKG Q-T INTERVAL: 330 MS
EKG QRS DURATION: 98 MS
EKG QTC CALCULATION (BAZETT): 392 MS
EKG R AXIS: 106 DEGREES
EKG T AXIS: 254 DEGREES
EKG VENTRICULAR RATE: 85 BPM
EOSINOPHILS ABSOLUTE: 0.3 K/UL (ref 0–0.6)
EOSINOPHILS RELATIVE PERCENT: 3.1 %
GFR AFRICAN AMERICAN: >60
GFR AFRICAN AMERICAN: >60
GFR NON-AFRICAN AMERICAN: >60
GFR NON-AFRICAN AMERICAN: >60
GLOBULIN: 6.1 G/DL
GLUCOSE BLD-MCNC: 118 MG/DL (ref 70–99)
GLUCOSE BLD-MCNC: 131 MG/DL (ref 70–99)
GLUCOSE BLD-MCNC: 146 MG/DL (ref 70–99)
GLUCOSE BLD-MCNC: 148 MG/DL (ref 70–99)
GLUCOSE BLD-MCNC: 149 MG/DL (ref 70–99)
HCT VFR BLD CALC: 27.7 % (ref 40.5–52.5)
HEMOGLOBIN: 9.1 G/DL (ref 13.5–17.5)
LYMPHOCYTES ABSOLUTE: 1.4 K/UL (ref 1–5.1)
LYMPHOCYTES RELATIVE PERCENT: 14.6 %
MAGNESIUM: 1.8 MG/DL (ref 1.8–2.4)
MCH RBC QN AUTO: 27.6 PG (ref 26–34)
MCHC RBC AUTO-ENTMCNC: 32.8 G/DL (ref 31–36)
MCV RBC AUTO: 84.3 FL (ref 80–100)
MONOCYTES ABSOLUTE: 0.8 K/UL (ref 0–1.3)
MONOCYTES RELATIVE PERCENT: 9 %
NEUTROPHILS ABSOLUTE: 6.8 K/UL (ref 1.7–7.7)
NEUTROPHILS RELATIVE PERCENT: 72.9 %
PDW BLD-RTO: 15.6 % (ref 12.4–15.4)
PERFORMED ON: ABNORMAL
PHOSPHORUS: 3.2 MG/DL (ref 2.5–4.9)
PLATELET # BLD: 342 K/UL (ref 135–450)
PMV BLD AUTO: 8.5 FL (ref 5–10.5)
POTASSIUM REFLEX MAGNESIUM: 3.4 MMOL/L (ref 3.5–5.1)
POTASSIUM SERPL-SCNC: 3 MMOL/L (ref 3.5–5.1)
RBC # BLD: 3.28 M/UL (ref 4.2–5.9)
SODIUM BLD-SCNC: 134 MMOL/L (ref 136–145)
SODIUM BLD-SCNC: 134 MMOL/L (ref 136–145)
TOTAL PROTEIN: 9.3 G/DL (ref 6.4–8.2)
TROPONIN: 0.04 NG/ML
TROPONIN: 0.04 NG/ML
TROPONIN: 0.05 NG/ML
WBC # BLD: 9.3 K/UL (ref 4–11)

## 2019-12-31 PROCEDURE — 2060000000 HC ICU INTERMEDIATE R&B

## 2019-12-31 PROCEDURE — 85730 THROMBOPLASTIN TIME PARTIAL: CPT

## 2019-12-31 PROCEDURE — 6370000000 HC RX 637 (ALT 250 FOR IP): Performed by: INTERNAL MEDICINE

## 2019-12-31 PROCEDURE — 80053 COMPREHEN METABOLIC PANEL: CPT

## 2019-12-31 PROCEDURE — 6360000002 HC RX W HCPCS: Performed by: INTERNAL MEDICINE

## 2019-12-31 PROCEDURE — 85025 COMPLETE CBC W/AUTO DIFF WBC: CPT

## 2019-12-31 PROCEDURE — 6370000000 HC RX 637 (ALT 250 FOR IP): Performed by: EMERGENCY MEDICINE

## 2019-12-31 PROCEDURE — 2700000000 HC OXYGEN THERAPY PER DAY

## 2019-12-31 PROCEDURE — 71045 X-RAY EXAM CHEST 1 VIEW: CPT

## 2019-12-31 PROCEDURE — 93010 ELECTROCARDIOGRAM REPORT: CPT | Performed by: INTERNAL MEDICINE

## 2019-12-31 PROCEDURE — 2580000003 HC RX 258: Performed by: INTERNAL MEDICINE

## 2019-12-31 PROCEDURE — 99255 IP/OBS CONSLTJ NEW/EST HI 80: CPT | Performed by: INTERNAL MEDICINE

## 2019-12-31 PROCEDURE — 36415 COLL VENOUS BLD VENIPUNCTURE: CPT

## 2019-12-31 PROCEDURE — 6360000002 HC RX W HCPCS: Performed by: EMERGENCY MEDICINE

## 2019-12-31 PROCEDURE — 99285 EMERGENCY DEPT VISIT HI MDM: CPT

## 2019-12-31 PROCEDURE — 94761 N-INVAS EAR/PLS OXIMETRY MLT: CPT

## 2019-12-31 PROCEDURE — 83735 ASSAY OF MAGNESIUM: CPT

## 2019-12-31 PROCEDURE — 93005 ELECTROCARDIOGRAM TRACING: CPT | Performed by: EMERGENCY MEDICINE

## 2019-12-31 PROCEDURE — 96365 THER/PROPH/DIAG IV INF INIT: CPT

## 2019-12-31 PROCEDURE — 96366 THER/PROPH/DIAG IV INF ADDON: CPT

## 2019-12-31 PROCEDURE — 84484 ASSAY OF TROPONIN QUANT: CPT

## 2019-12-31 RX ORDER — NITROGLYCERIN 0.4 MG/1
0.4 TABLET SUBLINGUAL EVERY 5 MIN PRN
Status: DISCONTINUED | OUTPATIENT
Start: 2019-12-31 | End: 2020-01-03 | Stop reason: HOSPADM

## 2019-12-31 RX ORDER — TRAZODONE HYDROCHLORIDE 50 MG/1
50 TABLET ORAL NIGHTLY PRN
Status: DISCONTINUED | OUTPATIENT
Start: 2019-12-31 | End: 2020-01-03 | Stop reason: HOSPADM

## 2019-12-31 RX ORDER — HEPARIN SODIUM 1000 [USP'U]/ML
30 INJECTION, SOLUTION INTRAVENOUS; SUBCUTANEOUS PRN
Status: DISCONTINUED | OUTPATIENT
Start: 2019-12-31 | End: 2019-12-31 | Stop reason: SDUPTHER

## 2019-12-31 RX ORDER — HEPARIN SODIUM 10000 [USP'U]/100ML
10 INJECTION, SOLUTION INTRAVENOUS CONTINUOUS
Status: DISCONTINUED | OUTPATIENT
Start: 2019-12-31 | End: 2019-12-31

## 2019-12-31 RX ORDER — HEPARIN SODIUM 10000 [USP'U]/100ML
12 INJECTION, SOLUTION INTRAVENOUS CONTINUOUS
Status: DISCONTINUED | OUTPATIENT
Start: 2019-12-31 | End: 2019-12-31 | Stop reason: SDUPTHER

## 2019-12-31 RX ORDER — METOPROLOL SUCCINATE 25 MG/1
25 TABLET, EXTENDED RELEASE ORAL DAILY
Status: DISCONTINUED | OUTPATIENT
Start: 2019-12-31 | End: 2020-01-01

## 2019-12-31 RX ORDER — POTASSIUM CHLORIDE 7.45 MG/ML
10 INJECTION INTRAVENOUS PRN
Status: DISCONTINUED | OUTPATIENT
Start: 2019-12-31 | End: 2020-01-03 | Stop reason: HOSPADM

## 2019-12-31 RX ORDER — HEPARIN SODIUM 1000 [USP'U]/ML
4000 INJECTION, SOLUTION INTRAVENOUS; SUBCUTANEOUS ONCE
Status: COMPLETED | OUTPATIENT
Start: 2019-12-31 | End: 2019-12-31

## 2019-12-31 RX ORDER — 0.9 % SODIUM CHLORIDE 0.9 %
1000 INTRAVENOUS SOLUTION INTRAVENOUS ONCE
Status: DISCONTINUED | OUTPATIENT
Start: 2019-12-31 | End: 2020-01-03 | Stop reason: HOSPADM

## 2019-12-31 RX ORDER — ASPIRIN 81 MG/1
324 TABLET, CHEWABLE ORAL ONCE
Status: COMPLETED | OUTPATIENT
Start: 2019-12-31 | End: 2019-12-31

## 2019-12-31 RX ORDER — POLYETHYLENE GLYCOL 3350 17 G/17G
17 POWDER, FOR SOLUTION ORAL DAILY PRN
COMMUNITY

## 2019-12-31 RX ORDER — PANTOPRAZOLE SODIUM 40 MG/1
40 TABLET, DELAYED RELEASE ORAL
Status: DISCONTINUED | OUTPATIENT
Start: 2019-12-31 | End: 2020-01-03 | Stop reason: HOSPADM

## 2019-12-31 RX ORDER — INSULIN GLARGINE 100 [IU]/ML
15 INJECTION, SOLUTION SUBCUTANEOUS NIGHTLY
Status: DISCONTINUED | OUTPATIENT
Start: 2019-12-31 | End: 2020-01-03 | Stop reason: HOSPADM

## 2019-12-31 RX ORDER — OXYCODONE HYDROCHLORIDE AND ACETAMINOPHEN 5; 325 MG/1; MG/1
1 TABLET ORAL EVERY 4 HOURS PRN
Status: ON HOLD | COMMUNITY
End: 2020-01-02 | Stop reason: SDUPTHER

## 2019-12-31 RX ORDER — TAMSULOSIN HYDROCHLORIDE 0.4 MG/1
0.4 CAPSULE ORAL DAILY
Status: DISCONTINUED | OUTPATIENT
Start: 2019-12-31 | End: 2020-01-03 | Stop reason: HOSPADM

## 2019-12-31 RX ORDER — DEXTROSE MONOHYDRATE 25 G/50ML
12.5 INJECTION, SOLUTION INTRAVENOUS PRN
Status: DISCONTINUED | OUTPATIENT
Start: 2019-12-31 | End: 2020-01-03 | Stop reason: HOSPADM

## 2019-12-31 RX ORDER — ONDANSETRON 2 MG/ML
4 INJECTION INTRAMUSCULAR; INTRAVENOUS EVERY 6 HOURS PRN
Status: DISCONTINUED | OUTPATIENT
Start: 2019-12-31 | End: 2020-01-03 | Stop reason: HOSPADM

## 2019-12-31 RX ORDER — ATORVASTATIN CALCIUM 40 MG/1
1 TABLET, FILM COATED ORAL DAILY
Status: DISCONTINUED | OUTPATIENT
Start: 2019-12-31 | End: 2019-12-31 | Stop reason: SDUPTHER

## 2019-12-31 RX ORDER — OXYCODONE HYDROCHLORIDE AND ACETAMINOPHEN 5; 325 MG/1; MG/1
1 TABLET ORAL EVERY 4 HOURS PRN
Status: DISCONTINUED | OUTPATIENT
Start: 2019-12-31 | End: 2020-01-03 | Stop reason: HOSPADM

## 2019-12-31 RX ORDER — SODIUM CHLORIDE 0.9 % (FLUSH) 0.9 %
10 SYRINGE (ML) INJECTION PRN
Status: DISCONTINUED | OUTPATIENT
Start: 2019-12-31 | End: 2020-01-03 | Stop reason: HOSPADM

## 2019-12-31 RX ORDER — ASPIRIN 81 MG/1
81 TABLET, CHEWABLE ORAL DAILY
Status: DISCONTINUED | OUTPATIENT
Start: 2020-01-01 | End: 2020-01-03 | Stop reason: HOSPADM

## 2019-12-31 RX ORDER — AMIODARONE HYDROCHLORIDE 200 MG/1
200 TABLET ORAL DAILY
Status: DISCONTINUED | OUTPATIENT
Start: 2019-12-31 | End: 2020-01-03 | Stop reason: HOSPADM

## 2019-12-31 RX ORDER — HEPARIN SODIUM 1000 [USP'U]/ML
60 INJECTION, SOLUTION INTRAVENOUS; SUBCUTANEOUS ONCE
Status: DISCONTINUED | OUTPATIENT
Start: 2019-12-31 | End: 2019-12-31 | Stop reason: SDUPTHER

## 2019-12-31 RX ORDER — HEPARIN SODIUM 1000 [USP'U]/ML
60 INJECTION, SOLUTION INTRAVENOUS; SUBCUTANEOUS PRN
Status: DISCONTINUED | OUTPATIENT
Start: 2019-12-31 | End: 2019-12-31 | Stop reason: SDUPTHER

## 2019-12-31 RX ORDER — OYSTER SHELL CALCIUM WITH VITAMIN D 500; 200 MG/1; [IU]/1
1 TABLET, FILM COATED ORAL DAILY
COMMUNITY
End: 2020-01-22 | Stop reason: SDUPTHER

## 2019-12-31 RX ORDER — DEXTROSE MONOHYDRATE 50 MG/ML
100 INJECTION, SOLUTION INTRAVENOUS PRN
Status: DISCONTINUED | OUTPATIENT
Start: 2019-12-31 | End: 2020-01-03 | Stop reason: HOSPADM

## 2019-12-31 RX ORDER — GABAPENTIN 100 MG/1
200 CAPSULE ORAL 3 TIMES DAILY
Status: DISCONTINUED | OUTPATIENT
Start: 2019-12-31 | End: 2020-01-03 | Stop reason: HOSPADM

## 2019-12-31 RX ORDER — POLYETHYLENE GLYCOL 3350 17 G/17G
17 POWDER, FOR SOLUTION ORAL DAILY PRN
Status: DISCONTINUED | OUTPATIENT
Start: 2019-12-31 | End: 2020-01-03 | Stop reason: HOSPADM

## 2019-12-31 RX ORDER — MAGNESIUM SULFATE 1 G/100ML
1 INJECTION INTRAVENOUS PRN
Status: DISCONTINUED | OUTPATIENT
Start: 2019-12-31 | End: 2020-01-03 | Stop reason: HOSPADM

## 2019-12-31 RX ORDER — HEPARIN SODIUM 1000 [USP'U]/ML
4000 INJECTION, SOLUTION INTRAVENOUS; SUBCUTANEOUS PRN
Status: DISCONTINUED | OUTPATIENT
Start: 2019-12-31 | End: 2019-12-31

## 2019-12-31 RX ORDER — SODIUM CHLORIDE 0.9 % (FLUSH) 0.9 %
10 SYRINGE (ML) INJECTION EVERY 12 HOURS SCHEDULED
Status: DISCONTINUED | OUTPATIENT
Start: 2019-12-31 | End: 2020-01-03 | Stop reason: HOSPADM

## 2019-12-31 RX ORDER — HEPARIN SODIUM 1000 [USP'U]/ML
2000 INJECTION, SOLUTION INTRAVENOUS; SUBCUTANEOUS PRN
Status: DISCONTINUED | OUTPATIENT
Start: 2019-12-31 | End: 2019-12-31

## 2019-12-31 RX ORDER — ATORVASTATIN CALCIUM 40 MG/1
40 TABLET, FILM COATED ORAL NIGHTLY
Status: DISCONTINUED | OUTPATIENT
Start: 2019-12-31 | End: 2020-01-03 | Stop reason: HOSPADM

## 2019-12-31 RX ORDER — NICOTINE POLACRILEX 4 MG
15 LOZENGE BUCCAL PRN
Status: DISCONTINUED | OUTPATIENT
Start: 2019-12-31 | End: 2020-01-03 | Stop reason: HOSPADM

## 2019-12-31 RX ORDER — DULOXETIN HYDROCHLORIDE 30 MG/1
30 CAPSULE, DELAYED RELEASE ORAL DAILY
Status: DISCONTINUED | OUTPATIENT
Start: 2019-12-31 | End: 2020-01-03 | Stop reason: HOSPADM

## 2019-12-31 RX ADMIN — TAMSULOSIN HYDROCHLORIDE 0.4 MG: 0.4 CAPSULE ORAL at 09:04

## 2019-12-31 RX ADMIN — Medication 10 MEQ: at 12:01

## 2019-12-31 RX ADMIN — Medication 10 MEQ: at 20:40

## 2019-12-31 RX ADMIN — INSULIN LISPRO 1 UNITS: 100 INJECTION, SOLUTION INTRAVENOUS; SUBCUTANEOUS at 12:00

## 2019-12-31 RX ADMIN — GABAPENTIN 200 MG: 100 CAPSULE ORAL at 14:59

## 2019-12-31 RX ADMIN — PANTOPRAZOLE SODIUM 40 MG: 40 TABLET, DELAYED RELEASE ORAL at 09:04

## 2019-12-31 RX ADMIN — INSULIN LISPRO 1 UNITS: 100 INJECTION, SOLUTION INTRAVENOUS; SUBCUTANEOUS at 21:59

## 2019-12-31 RX ADMIN — HEPARIN SODIUM 4000 UNITS: 1000 INJECTION INTRAVENOUS; SUBCUTANEOUS at 06:48

## 2019-12-31 RX ADMIN — ATORVASTATIN CALCIUM 40 MG: 40 TABLET, FILM COATED ORAL at 20:39

## 2019-12-31 RX ADMIN — Medication 10 MEQ: at 18:42

## 2019-12-31 RX ADMIN — OXYCODONE HYDROCHLORIDE AND ACETAMINOPHEN 1 TABLET: 5; 325 TABLET ORAL at 16:12

## 2019-12-31 RX ADMIN — SODIUM CHLORIDE, PRESERVATIVE FREE 10 ML: 5 INJECTION INTRAVENOUS at 12:33

## 2019-12-31 RX ADMIN — ASPIRIN 81 MG 324 MG: 81 TABLET ORAL at 05:12

## 2019-12-31 RX ADMIN — SODIUM CHLORIDE, PRESERVATIVE FREE 10 ML: 5 INJECTION INTRAVENOUS at 20:40

## 2019-12-31 RX ADMIN — Medication 10 MEQ: at 14:59

## 2019-12-31 RX ADMIN — GABAPENTIN 200 MG: 100 CAPSULE ORAL at 20:39

## 2019-12-31 RX ADMIN — Medication 10 MEQ: at 14:48

## 2019-12-31 RX ADMIN — INSULIN LISPRO 1 UNITS: 100 INJECTION, SOLUTION INTRAVENOUS; SUBCUTANEOUS at 17:06

## 2019-12-31 RX ADMIN — OXYCODONE HYDROCHLORIDE AND ACETAMINOPHEN 1 TABLET: 5; 325 TABLET ORAL at 20:39

## 2019-12-31 RX ADMIN — HEPARIN SODIUM 10 ML/HR: 10000 INJECTION, SOLUTION INTRAVENOUS at 06:49

## 2019-12-31 RX ADMIN — Medication 10 MEQ: at 16:50

## 2019-12-31 RX ADMIN — INSULIN GLARGINE 15 UNITS: 100 INJECTION, SOLUTION SUBCUTANEOUS at 21:58

## 2019-12-31 RX ADMIN — OXYCODONE HYDROCHLORIDE AND ACETAMINOPHEN 1 TABLET: 5; 325 TABLET ORAL at 09:05

## 2019-12-31 ASSESSMENT — PAIN DESCRIPTION - PROGRESSION
CLINICAL_PROGRESSION: RESOLVED
CLINICAL_PROGRESSION: RESOLVED
CLINICAL_PROGRESSION: NOT CHANGED
CLINICAL_PROGRESSION: RESOLVED

## 2019-12-31 ASSESSMENT — PAIN SCALES - GENERAL
PAINLEVEL_OUTOF10: 3
PAINLEVEL_OUTOF10: 3
PAINLEVEL_OUTOF10: 6
PAINLEVEL_OUTOF10: 6
PAINLEVEL_OUTOF10: 5
PAINLEVEL_OUTOF10: 7
PAINLEVEL_OUTOF10: 7
PAINLEVEL_OUTOF10: 3
PAINLEVEL_OUTOF10: 3
PAINLEVEL_OUTOF10: 6

## 2019-12-31 ASSESSMENT — PAIN DESCRIPTION - DESCRIPTORS
DESCRIPTORS: DISCOMFORT

## 2019-12-31 ASSESSMENT — PAIN DESCRIPTION - PAIN TYPE
TYPE: ACUTE PAIN
TYPE: CHRONIC PAIN
TYPE: ACUTE PAIN

## 2019-12-31 ASSESSMENT — PAIN DESCRIPTION - LOCATION
LOCATION: CHEST

## 2019-12-31 ASSESSMENT — PAIN DESCRIPTION - ORIENTATION
ORIENTATION: LEFT

## 2019-12-31 ASSESSMENT — PAIN - FUNCTIONAL ASSESSMENT
PAIN_FUNCTIONAL_ASSESSMENT: ACTIVITIES ARE NOT PREVENTED

## 2019-12-31 ASSESSMENT — PAIN DESCRIPTION - FREQUENCY
FREQUENCY: INTERMITTENT

## 2019-12-31 ASSESSMENT — PAIN DESCRIPTION - ONSET
ONSET: ON-GOING
ONSET: GRADUAL

## 2019-12-31 ASSESSMENT — PAIN SCALES - WONG BAKER
WONGBAKER_NUMERICALRESPONSE: 0
WONGBAKER_NUMERICALRESPONSE: 0
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
## 2019-12-31 NOTE — ED NOTES
Patient resting with eyes closed, denies other needs at this time.       Baron Anoop RN  12/31/19 1958

## 2019-12-31 NOTE — PROGRESS NOTES
Pharmacy Medication Reconciliation Note     List of medications patient is currently taking is complete. Source of information:   1. Nursing Home MAR from [facility], AN [facility]FFILIATE OF [facility]  2.  EMR    Allergies   Allergen Reactions    Rocephin [Ceftriaxone] Other (See Comments)     Sloughing of skin (blisters) on hands and lower arms; pancytopenia    Demadex [Torsemide] Other (See Comments)     Skin sloughing on hands and feet         Ceci Waldron PharmD, BCPS  12/31/2019  3:02 PM
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
## 2019-12-31 NOTE — DISCHARGE INSTR - COC
Continuity of Care Form    Patient Name: Jin Alejandra   :  1960  MRN:  [id number]    Admit date:  2019  Discharge date:  1/3/2020    Code Status Order: Full Code   Advance Directives:   Advance Care Flowsheet Documentation     Date/Time Healthcare Directive Type of Healthcare Directive Copy in [address] Agent's Name Healthcare Agent's Phone Number    [phone]  Yes, patient has an advance directive for healthcare treatment  Durable power of  for health care  No, copy requested from family  Healthcare power of   Breana Flavio  [phone]          Admitting Physician:  Staci Mccormick MD  PCP: Lubna Quispe MD    Discharging Nurse: Laurel Marion RN  [address] Unit/Room#: 27 [facility] Unit Phone Number: [phone]    Emergency Contact:   Extended Emergency Contact Information  Primary Emergency Contact: Tawana Cutler  Address: [address] [place] [address] Phone: [phone]  Mobile Phone: [phone]  Relation: Parent  Secondary Emergency Contact: Barb [address] [place] [address] Phone: [phone]  Mobile Phone: [phone]  Relation: Brother/Sister    Past Surgical History:  Past Surgical History:   Procedure Laterality Date    ANGIOPLASTY Bilateral 1-15-15, 1/19/15    APPENDECTOMY      CARDIAC SURGERY      ANGIOPLASTY     COLONOSCOPY      CORONARY ANGIOPLASTY WITH STENT PLACEMENT      DILATATION, ESOPHAGUS Right     COLOSTOMY BAG     FEMORAL-TIBIAL BYPASS GRAFT Left 16    FRACTURE SURGERY Bilateral      BILATERAL HIPS    FRACTURE SURGERY Right     HIP    HAND SURGERY Left     JOINT REPLACEMENT Bilateral     HIPS    KNEE SURGERY      LAPAROTOMY EXPLORATORY N/A 10/12/2019    LAPAROTOMY EXPLORATORY, LEFT COLECTOMY END COLOSTOMY, (HARTMANS PROCEDURE) performed by Agus Saldana MD at [place] [address] I50.43    Acute hyperkalemia E87.5    Typical atrial flutter ([facility]) I48.3    Chronic systolic HF (heart failure) ([facility]) I50.22    Hypotension I95.9    Vasovagal syncope R55    Laceration of scalp without foreign body S01. Sharlene Ced Cardiomyopathy ([facility] I42.9    Syncope and collapse R55    Ischemic foot I99.8    Diabetes mellitus with peripheral circulatory disorder ([facility]) E11.51    Limb ischemia I99.8    COPD exacerbation ([facility]) J44.1    Nonhealing surgical wound T81.89XA    Acromioclavicular joint arthritis M19.019    Bradycardia R00.1    Shortness of breath R06.02    Permanent atrial fibrillation I48.21    Chronic atrial fibrillation I48.20    Other specified injury of inferior mesenteric vein, initial encounter S35.348A    Postprocedural hypotension I95.81    Acute respiratory failure with hypercapnia ([facility]) J96.02    High anion gap metabolic acidosis W54.6    Centrilobular emphysema ([facility]) J43.2    Hypomagnesemia E83.42    Heart failure, acute on chronic, systolic and diastolic ([facility]) M84.34    Atrial fibrillation, persistent I48.19    Anemia D64.9    Elevated troponin R79.89    Diabetes mellitus type 2, controlled ([facility]) E11.9    Constipation K59.00    Acute on chronic systolic heart failure ([facility]) I50.23    Atrial fibrillation, rapid ([facility]) I48.91    COPD with acute exacerbation ([facility]) J44.1    Cocaine use F14.90    Severe sepsis ([facility]) A41.9, R65.20    Atrial fibrillation with RVR ([facility]) I48.91    Acute on chronic respiratory failure with hypoxia ([facility]) J96.21    Respiratory distress R06.03    Biventricular HF (heart failure) ([facility]) I50.82    Nicotine dependence F17.200    Suspected sleep apnea R29.818    Coronary artery disease involving native coronary artery of native heart without angina pectoris I25.10    CAD (coronary artery disease) I25.10    Acute renal failure (ARF) ([facility]) N17.9    Morbid obesity due to excess calories ([facility]) E66.01    Acute respiratory failure with hypoxia ([facility] J96.01    Nocturnal hypoxia G47.34    Hypercapnemia R06.89    SOB (shortness of breath) R06.02    Acute on chronic respiratory failure with hypercapnia ([facility]) J96.22    Chronic respiratory failure with hypercapnia ([facility]) J96.12    Acute pulmonary edema ([facility]) J81.0    Acute on chronic systolic HF (heart failure) ([facility]) I50.23    Hx of AKA (above knee amputation), left ([facility]) Z89.612    Respiratory failure ([facility]) J96.90    HCAP (healthcare-associated pneumonia) J18.9    Ventricular tachycardia ([facility]) I47.2    Shock circulatory ([facility]) R57.9    Tachypnea X52.62    Neutrophilic leukocytosis I66.0    Chronic respiratory failure with hypoxia ([facility]) J96.11    Cardiac arrest ([facility]) I46.9    PEA (Pulseless electrical activity) ([facility]) I46.9    Ileus ([facility]) K56.7    Pneumonia of right lower lobe due to infectious organism ([facility]) J18.1    Atrial fibrillation, controlled ([facility]) I48.91    Pulmonary HTN ([facility]) I27.20    Weakness of right lower extremity R29.898    Large bowel obstruction ([facility]) K56.609    Tobacco abuse Z72.0    Intra-abdominal abscess ([facility]) K65.1    Intra-abdominal fluid collection R18.8    Moderate malnutrition ([facility]) E44.0    NSTEMI (non-ST elevated myocardial infarction) ([facility] I21.4       Isolation/Infection:   Isolation          No Isolation        Patient Infection Status     None to display          Nurse Assessment:  Last Vital Signs: /72   Pulse 75   Temp 97.9 °F (36.6 °C) (Oral)   Resp 14   Ht 5' 7\" (1.702 m)   Wt 180 lb 12.4 oz (82 kg)   SpO2 97%   BMI 28.31 kg/m²     Last documented pain score (0-10 scale): Pain Level: 3  Last Weight:   Wt Readings from Last 1 Encounters:   12/31/19 180 lb 12.4 oz (82 kg)     Mental Status:  oriented and alert    IV Access:  - None    Nursing Mobility/ADLs:  Walking   Dependent  Transfer  Dependent  Bathing  Assisted  Dressing  Assisted  Toileting  Assisted  Feeding  Independent  Med Admin  Assisted  Med Delivery   whole    Wound Care Documentation and Therapy:  Wound 10/09/19 Buttocks Right Redness bilateral buttock, sml stg II right butt (Active)   Number of days: 82        Elimination:  Continence:   · Bowel: Yes  · Bladder: Yes  Urinary Catheter: None   Colostomy/Ileostomy/Ileal Conduit: Yes  Colostomy RLQ-Output (mL): 150 ml    Date of Last BM: 1/2/2020    Intake/Output Summary (Last 24 hours) at 12/31/2019 1526  Last data filed at 12/31/2019 1404  Gross per 24 hour   Intake 300 ml   Output 450 ml   Net -150 ml     I/O last 3 completed shifts: In: 300 [P.O.:300]  Out: 450 [Stool:450]    Safety Concerns: At Risk for Skin Breakdown    Impairments/Disabilities:      Amputation - L leg    Nutrition Therapy:  Current Nutrition Therapy:   - Oral Diet:  Carb Control 4 carbs/meal (1800kcals/day) and Low Sodium (2gm)    Routes of Feeding: Oral  Liquids: No Restrictions  Daily Fluid Restriction: yes - amount 2000mL  Last Modified Barium Swallow with Video (Video Swallowing Test): not done    Treatments at the Time of Hospital Discharge:   Respiratory Treatments:   Oxygen Therapy:  is not on home oxygen therapy. Ventilator:    - No ventilator support     Heart Failure Instructions for Daily Management  Patient was treated for chronic systolic heart failure. he  will require the following:     Please weigh daily on the same scale and approximately the same time of day. Report weight gain of 3 pounds/day or 5 pounds/week to : james SANCHEZ and [facility] [address] [phone].  Please use hospital discharge weight as baseline reference.  Please monitor for signs and symptoms of and report to MD:  o Worsening Heart Failure: sudden weight gain, shortness of breath, lower extremity or general edema/swelling, abdominal bloating/swelling, inability to lie flat, intolerance to usual activity, or cough (especially at night).  Report these finding even if no increase in weight.  o Dehydration:  having difficulty or a decrease in urination, dizziness, worsening fatigue, or new onset/worsening of generalized weakness.  Please continue a LOW SODIUM diet and LIMIT fluid intake to 48 - 64 ounces ( 1.5 - 2 liters) per day.  Call facility MD and [facility] [address] [phone] and/or Maximo Yip @ [phone] with any questions or concerns.  Please continue heart failure education to patient and family/support system.  See After Visit Summary for hospital follow up appointment details.  Consider spiritual care referral for support and/or completion of advance directives [phone].  Consider: having the facility MD complete required 7 day follow up, palliative care consult for ongoing goals of care, end of life, and/or chronic disease management discussions and referral to [facility] [phone]) once SNF/HHC complete.     Rehab Therapies: Physical Therapy and Occupational Therapy  Weight Bearing Status/Restrictions: No weight bearing restirctions  Other Medical Equipment (for information only, NOT a DME order):  braces L leg  Other Treatments:     Patient's personal belongings (please select all that are sent with patient):  None    RN SIGNATURE:  Electronically signed by Julienne Saldaña RN on 1/2/20 at 1:55 PM    CASE MANAGEMENT/SOCIAL WORK SECTION    Inpatient Status Date: 1/3/20    Readmission Risk Assessment Score:  Readmission Risk              Risk of Unplanned Readmission:        66           Discharging to Facility/ Agency   · Name:   · Address:  · Phone:  · Fax:    Dialysis Facility (if applicable)   · Name:  · Address:  · Dialysis Schedule:  · Phone:  · Fax:    / signature: {Esignature:[id number]}    PHYSICIAN SECTION    Prognosis: {Prognosis:5706514472}    Condition at Discharge: 50Mickie Cyr Patient Condition:[id number]}    Rehab Potential (if transferring to Rehab): {Prognosis:1454188385}    Recommended Labs or Other Treatments After Discharge:

## 2019-12-31 NOTE — ED NOTES
ED SBAR report provider to [facility]. Patient to be transported to Room 5269 via stretcher by RN. Patient transported with bedside cardiac monitor and with IV medications infusing. IV site clean, dry, and intact. MEWS score and pain assessed and documented. Updated patient on plan of care.      Ely Novak RN  12/31/19 0003

## 2019-12-31 NOTE — CONSULTS
[address] [facility]                                  CONSULTATION    PATIENT NAME: Cynthia Almanza                  :        1960  MED REC NO:   [id number]                          ROOM:       5264  ACCOUNT NO:   [de-identified]                           ADMIT DATE: 2019  PROVIDER:     Rita Ludwig MD    CARDIAC CONSULTATION    CONSULT DATE:  2019    HISTORY OF PRESENT ILLNESS:  This is a 63-year-old male well known to me  for numerous hospital visits and admissions with a very complex cardiac  history of CAD, peripheral artery disease, cardiomyopathy, congestive  heart failure, hyperlipidemia, diabetes, EtOH abuse, atrial  fibrillation, who presents to the hospital with a right-sided sharp  chest pain. Pain woke him up from the sleep about 2 o'clock in the  morning, and according to him, pain goes back and forth between left and  the right side. He had no additional shortness of breath. He had no  diaphoresis. He had no nausea or vomiting. He presented to the  emergency room one more time. His workup showed a borderline troponin  elevation leading to this cardiac consultation. REVIEW OF SYSTEMS:  Please see HPI. All other systems are reviewed and  they are negative. PAST MEDICAL HISTORY:  1. The patient has history of coronary artery disease. The details of  his coronary artery disease currently not available for review. 2.  History of atrial fibrillation and multiple cardioversions in the  past.  3.  Chronic kidney disease in the past.  4.  Diabetes mellitus. 5.  Peripheral artery disease status post left BKA. 6.  History of recurrent heart failures. 7.  Untreated sleep apnea. 8.  History of smoking abuse. 9.  History of hepatitis C.    SOCIAL HISTORY:  The patient has a history of smoking for a long time,  but apparently, he has stopped smoking in the last few weeks.   History  of alcohol abuse, uses about 5 to 6 cans of beer daily. PAST SURGICAL HISTORY:  The patient had coronary angioplasty and stent  in the past, hand surgery, joint replacement, left leg amputation,  appendectomy. CURRENT MEDICATIONS:  Have been reviewed. ALLERGIES:  Have been reviewed. PHYSICAL EXAMINATION:  VITAL SIGNS:  His current pulse is about 70 and irregular, blood  pressure is 110/70, respirations are 18, temperature is normal.  CONSTITUTIONAL:  He is alert and oriented and comfortable. HEENT:  Neck is supple. I could not appreciate any jugular venous  distention. No thyromegaly. No carotid bruits are heard. Eyes show no  pale conjunctivae or icterus. CARDIAC:  Irregularly irregular heart rhythm. LUNGS:  Largely clear to auscultation and palpation. ABDOMEN:  Soft and nontender. Bowel sounds are present. EXTREMITIES:  The patient has evidence of left below-knee amputation. No evidence of edema on the right lower extremity. Pulses are markedly  diminished. NEUROLOGICAL:  He is alert and oriented. Cranial nerves II through XII  are intact. No focal deficit. SKIN:  No rashes. LABORATORY DATA:  Sodium 134, potassium 3.4, chloride 82, bicarb 38, BUN  36, creatinine 1.1. His troponin first set showed 0.05. Albumin is  3.2. Hemoglobin is 9.1, hematocrit is 27, white count is 9.3. His EKG shows atrial fibrillation with controlled ventricular rate. No  acute ST-T wave changes noted. IMPRESSION:  1. This is a 70-year-old male who has presented with a sharp left-sided  and right-sided chest pain. His pain appears atypical for angina. He  does have troponin elevation which raises the possibility of  non-ST-elevation myocardial infarction. He apparently had a history of  coronary artery disease in the past and a progression is likely, but  with his multiple comorbid conditions, he may not be an ideal candidate  for aggressive treatment in the form of intervention.   2.  Atrial

## 2020-01-01 LAB
ANION GAP SERPL CALCULATED.3IONS-SCNC: 12 MMOL/L (ref 3–16)
BUN BLDV-MCNC: 30 MG/DL (ref 7–20)
CALCIUM SERPL-MCNC: 8.6 MG/DL (ref 8.3–10.6)
CHLORIDE BLD-SCNC: 86 MMOL/L (ref 99–110)
CO2: 33 MMOL/L (ref 21–32)
CREAT SERPL-MCNC: 1 MG/DL (ref 0.9–1.3)
GFR AFRICAN AMERICAN: >60
GFR NON-AFRICAN AMERICAN: >60
GLUCOSE BLD-MCNC: 138 MG/DL (ref 70–99)
GLUCOSE BLD-MCNC: 138 MG/DL (ref 70–99)
GLUCOSE BLD-MCNC: 140 MG/DL (ref 70–99)
GLUCOSE BLD-MCNC: 152 MG/DL (ref 70–99)
GLUCOSE BLD-MCNC: 170 MG/DL (ref 70–99)
HCT VFR BLD CALC: 27.3 % (ref 40.5–52.5)
HEMOGLOBIN: 9 G/DL (ref 13.5–17.5)
LACTIC ACID: 1.2 MMOL/L (ref 0.4–2)
MCH RBC QN AUTO: 27.6 PG (ref 26–34)
MCHC RBC AUTO-ENTMCNC: 32.9 G/DL (ref 31–36)
MCV RBC AUTO: 83.9 FL (ref 80–100)
PDW BLD-RTO: 16.3 % (ref 12.4–15.4)
PERFORMED ON: ABNORMAL
PLATELET # BLD: 327 K/UL (ref 135–450)
PMV BLD AUTO: 8.8 FL (ref 5–10.5)
POTASSIUM SERPL-SCNC: 3.9 MMOL/L (ref 3.5–5.1)
RBC # BLD: 3.26 M/UL (ref 4.2–5.9)
SODIUM BLD-SCNC: 131 MMOL/L (ref 136–145)
WBC # BLD: 12.8 K/UL (ref 4–11)

## 2020-01-01 PROCEDURE — 6370000000 HC RX 637 (ALT 250 FOR IP): Performed by: INTERNAL MEDICINE

## 2020-01-01 PROCEDURE — 87040 BLOOD CULTURE FOR BACTERIA: CPT

## 2020-01-01 PROCEDURE — 85027 COMPLETE CBC AUTOMATED: CPT

## 2020-01-01 PROCEDURE — 36415 COLL VENOUS BLD VENIPUNCTURE: CPT

## 2020-01-01 PROCEDURE — 80048 BASIC METABOLIC PNL TOTAL CA: CPT

## 2020-01-01 PROCEDURE — 83605 ASSAY OF LACTIC ACID: CPT

## 2020-01-01 PROCEDURE — 99233 SBSQ HOSP IP/OBS HIGH 50: CPT | Performed by: INTERNAL MEDICINE

## 2020-01-01 PROCEDURE — 2700000000 HC OXYGEN THERAPY PER DAY

## 2020-01-01 PROCEDURE — 87252 VIRUS INOCULATION TISSUE: CPT

## 2020-01-01 PROCEDURE — 2060000000 HC ICU INTERMEDIATE R&B

## 2020-01-01 PROCEDURE — 2580000003 HC RX 258: Performed by: INTERNAL MEDICINE

## 2020-01-01 PROCEDURE — 94760 N-INVAS EAR/PLS OXIMETRY 1: CPT

## 2020-01-01 RX ORDER — METOPROLOL SUCCINATE 25 MG/1
25 TABLET, EXTENDED RELEASE ORAL ONCE
Status: COMPLETED | OUTPATIENT
Start: 2020-01-01 | End: 2020-01-01

## 2020-01-01 RX ORDER — POTASSIUM CHLORIDE 20 MEQ/1
40 TABLET, EXTENDED RELEASE ORAL ONCE
Status: COMPLETED | OUTPATIENT
Start: 2020-01-01 | End: 2020-01-01

## 2020-01-01 RX ORDER — METOPROLOL SUCCINATE 50 MG/1
50 TABLET, EXTENDED RELEASE ORAL DAILY
Status: DISCONTINUED | OUTPATIENT
Start: 2020-01-02 | End: 2020-01-03 | Stop reason: HOSPADM

## 2020-01-01 RX ORDER — RANOLAZINE 500 MG/1
500 TABLET, EXTENDED RELEASE ORAL 2 TIMES DAILY
Status: DISCONTINUED | OUTPATIENT
Start: 2020-01-01 | End: 2020-01-03 | Stop reason: HOSPADM

## 2020-01-01 RX ADMIN — INSULIN GLARGINE 15 UNITS: 100 INJECTION, SOLUTION SUBCUTANEOUS at 21:50

## 2020-01-01 RX ADMIN — ASPIRIN 81 MG 81 MG: 81 TABLET ORAL at 08:08

## 2020-01-01 RX ADMIN — METOPROLOL SUCCINATE 25 MG: 25 TABLET, EXTENDED RELEASE ORAL at 16:27

## 2020-01-01 RX ADMIN — OXYCODONE HYDROCHLORIDE AND ACETAMINOPHEN 1 TABLET: 5; 325 TABLET ORAL at 16:27

## 2020-01-01 RX ADMIN — OXYCODONE HYDROCHLORIDE AND ACETAMINOPHEN 1 TABLET: 5; 325 TABLET ORAL at 01:36

## 2020-01-01 RX ADMIN — OXYCODONE HYDROCHLORIDE AND ACETAMINOPHEN 1 TABLET: 5; 325 TABLET ORAL at 05:40

## 2020-01-01 RX ADMIN — INSULIN LISPRO 1 UNITS: 100 INJECTION, SOLUTION INTRAVENOUS; SUBCUTANEOUS at 13:04

## 2020-01-01 RX ADMIN — POTASSIUM CHLORIDE 40 MEQ: 1500 TABLET, EXTENDED RELEASE ORAL at 18:56

## 2020-01-01 RX ADMIN — GABAPENTIN 200 MG: 100 CAPSULE ORAL at 08:08

## 2020-01-01 RX ADMIN — RANOLAZINE 500 MG: 500 TABLET, FILM COATED, EXTENDED RELEASE ORAL at 20:25

## 2020-01-01 RX ADMIN — SODIUM CHLORIDE, PRESERVATIVE FREE 10 ML: 5 INJECTION INTRAVENOUS at 20:26

## 2020-01-01 RX ADMIN — INSULIN LISPRO 1 UNITS: 100 INJECTION, SOLUTION INTRAVENOUS; SUBCUTANEOUS at 21:51

## 2020-01-01 RX ADMIN — OXYCODONE HYDROCHLORIDE AND ACETAMINOPHEN 1 TABLET: 5; 325 TABLET ORAL at 20:25

## 2020-01-01 RX ADMIN — GABAPENTIN 200 MG: 100 CAPSULE ORAL at 20:25

## 2020-01-01 RX ADMIN — RIVAROXABAN 15 MG: 15 TABLET, FILM COATED ORAL at 18:21

## 2020-01-01 RX ADMIN — ATORVASTATIN CALCIUM 40 MG: 40 TABLET, FILM COATED ORAL at 20:25

## 2020-01-01 RX ADMIN — RANOLAZINE 500 MG: 500 TABLET, FILM COATED, EXTENDED RELEASE ORAL at 14:47

## 2020-01-01 RX ADMIN — METOPROLOL SUCCINATE 25 MG: 25 TABLET, EXTENDED RELEASE ORAL at 08:08

## 2020-01-01 RX ADMIN — DULOXETINE HYDROCHLORIDE 30 MG: 30 CAPSULE, DELAYED RELEASE ORAL at 08:08

## 2020-01-01 RX ADMIN — AMIODARONE HYDROCHLORIDE 200 MG: 200 TABLET ORAL at 08:08

## 2020-01-01 RX ADMIN — SODIUM CHLORIDE, PRESERVATIVE FREE 10 ML: 5 INJECTION INTRAVENOUS at 08:09

## 2020-01-01 RX ADMIN — OXYCODONE HYDROCHLORIDE AND ACETAMINOPHEN 1 TABLET: 5; 325 TABLET ORAL at 12:06

## 2020-01-01 RX ADMIN — GABAPENTIN 200 MG: 100 CAPSULE ORAL at 13:04

## 2020-01-01 RX ADMIN — TAMSULOSIN HYDROCHLORIDE 0.4 MG: 0.4 CAPSULE ORAL at 08:08

## 2020-01-01 RX ADMIN — PANTOPRAZOLE SODIUM 40 MG: 40 TABLET, DELAYED RELEASE ORAL at 05:40

## 2020-01-01 RX ADMIN — INSULIN LISPRO 1 UNITS: 100 INJECTION, SOLUTION INTRAVENOUS; SUBCUTANEOUS at 18:21

## 2020-01-01 ASSESSMENT — PAIN SCALES - GENERAL
PAINLEVEL_OUTOF10: 3
PAINLEVEL_OUTOF10: 6
PAINLEVEL_OUTOF10: 7
PAINLEVEL_OUTOF10: 4
PAINLEVEL_OUTOF10: 4
PAINLEVEL_OUTOF10: 6
PAINLEVEL_OUTOF10: 0
PAINLEVEL_OUTOF10: 6
PAINLEVEL_OUTOF10: 4
PAINLEVEL_OUTOF10: 6
PAINLEVEL_OUTOF10: 2

## 2020-01-01 ASSESSMENT — PAIN DESCRIPTION - LOCATION
LOCATION: BACK
LOCATION: BACK;CHEST
LOCATION: BACK;GENERALIZED

## 2020-01-01 ASSESSMENT — PAIN DESCRIPTION - DESCRIPTORS
DESCRIPTORS: DISCOMFORT;CONSTANT
DESCRIPTORS: DISCOMFORT;CONSTANT
DESCRIPTORS: DISCOMFORT

## 2020-01-01 ASSESSMENT — PAIN DESCRIPTION - FREQUENCY
FREQUENCY: CONTINUOUS

## 2020-01-01 ASSESSMENT — PAIN DESCRIPTION - ONSET
ONSET: ON-GOING

## 2020-01-01 ASSESSMENT — PAIN - FUNCTIONAL ASSESSMENT
PAIN_FUNCTIONAL_ASSESSMENT: ACTIVITIES ARE NOT PREVENTED

## 2020-01-01 ASSESSMENT — PAIN DESCRIPTION - PROGRESSION
CLINICAL_PROGRESSION: NOT CHANGED

## 2020-01-01 ASSESSMENT — PAIN DESCRIPTION - PAIN TYPE
TYPE: CHRONIC PAIN

## 2020-01-01 NOTE — PROGRESS NOTES
[facility] [address]   Daily Progress Note      Admit Date:  12/31/2019    CC: \"Chest pain    This is a 63-year-old male well known to me  for numerous hospital visits and admissions with a very complex cardiac  history of CAD, peripheral artery disease, cardiomyopathy, congestive  heart failure, hyperlipidemia, diabetes, EtOH abuse, atrial  fibrillation, who presents to the hospital with a right-sided sharp  chest pain. Pain woke him up from the sleep about 2 o'clock in the  morning, and according to him, pain goes back and forth between left and  the right side. He had no additional shortness of breath. He had no  diaphoresis. He had no nausea or vomiting. He presented to the  emergency room one more time. His workup showed a borderline troponin  elevation leading to this cardiac consultation  Subjective:  Pt denies chest pain but had rapid A fib earlier . H rate is better after he received his BB      Objective:   /72   Pulse 109   Temp 97.4 °F (36.3 °C) (Oral)   Resp 18   Ht 5' 7\" (1.702 m)   Wt 182 lb 12.2 oz (82.9 kg)   SpO2 93%   BMI 28.62 kg/m²       Intake/Output Summary (Last 24 hours) at 1/1/2020 1407  Last data filed at 1/1/2020 1144  Gross per 24 hour   Intake 480 ml   Output 900 ml   Net -420 ml     Wt Readings from Last 3 Encounters:   01/01/20 182 lb 12.2 oz (82.9 kg)   12/20/19 210 lb 5.1 oz (95.4 kg)   12/04/19 182 lb (82.6 kg)     Telemetry: A fib    Physical Exam:  General:  NAD, Awake, alert and oriented X4  Skin:  Warm and dry  Neck:  Supple, no JVP appreciated, no bruit  Chest:  Clear to auscultation, no wheezes/rhonchi/rales  Cardiovascular:   Irregularly irregular.  S1S2  Abdomen:  Soft, nontender, +bowel sounds  Extremities:   Lt BKA    Cardiac Diagnosis:  diabetes, hypertension, hyperlipidemia, coronary artery disease, CHF and atrial fibrillation    Medications:    amiodarone  200 mg Oral Daily    DULoxetine  30 mg Oral Daily    metoprolol succinate  25 mg Oral Daily    mometasone-formoterol  2 puff Inhalation BID    pantoprazole  40 mg Oral QAM AC    sodium chloride flush  10 mL Intravenous 2 times per day    atorvastatin  40 mg Oral Nightly    aspirin  81 mg Oral Daily    0.9 % sodium chloride  1,000 mL Intravenous Once    gabapentin  200 mg Oral TID    tamsulosin  0.4 mg Oral Daily    insulin glargine  15 Units Subcutaneous Nightly    insulin lispro  0-6 Units Subcutaneous TID WC    insulin lispro  0-3 Units Subcutaneous Nightly      dextrose       nitroGLYCERIN, sodium chloride flush, ondansetron, potassium chloride, magnesium sulfate, polyethylene glycol, traZODone, oxyCODONE-acetaminophen, glucose, dextrose, glucagon (rDNA), dextrose    Lab Data:  CBC:   Recent Labs     12/31/19 0446 01/01/20  0620   WBC 9.3 12.8*   HGB 9.1* 9.0*    327     BMP:    Recent Labs     12/31/19 0446 12/31/19  0908   * 134*   K 3.4* 3.0*   CO2 38* 35*   BUN 36* 38*   CREATININE 1.1 1.0     LIVR:   Recent Labs     12/31/19 0446   AST 41*   ALT 28     INR:  No results for input(s): INR in the last 72 hours. APTT:   Recent Labs     12/31/19 0446   APTT 42.8*     BNP:  No results for input(s): BNP in the last 72 hours. Imaging:    Assessment:Plan:  NSTEMI  - Troponin are trending down  - with his multiple comorbid conditions will treat him medically  - will start renexa since his BP is soft  - will consider further cardiac work up only if he fails medical therapy    Hypokalemia  -  K 3.0.will recheck & replace if still <4.0    A fib  - Had RVR earlier bur rate is better now.   - Will restart his xarelto            Electronically signed by Evelia Saha MD on 1/1/2020 at 2:07 PM

## 2020-01-01 NOTE — PLAN OF CARE
Problem: Pain:  Goal: Pain level will decrease  Description  Pain level will decrease  1/1/2020 1719 by Rafi Christina RN  Outcome: Ongoing  1/1/2020 0330 by Hortensia Pham RN  Outcome: Ongoing   Pain/discomfort being managed with PRN analgesics per MD orders. Pt able to express presence and absence of pain and rate pain appropriately using numerical scale. Problem: HEMODYNAMIC STATUS  Goal: Patient has stable vital signs and fluid balance  1/1/2020 1719 by Rafi Christina RN  Outcome: Ongoing  1/1/2020 0330 by Hortensia Pham RN  Outcome: Ongoing   Vitals completed q4h and assessed by RN. I&O charted and assessed per MD order. Pt tolerating adequate fluid intake. Problem: FLUID AND ELECTROLYTE IMBALANCE  Goal: Fluid and electrolyte balance are achieved/maintained  1/1/2020 1719 by Rafi Christina RN  Outcome: Ongoing  1/1/2020 0330 by Hortensia Pham RN  Outcome: Ongoing   Pt electrolytes drawn and assessed per MD order. Replaced as needed per orders. I&O charted and assessed. Pt tolerating adequate fluid intake    Problem: ACTIVITY INTOLERANCE/IMPAIRED MOBILITY  Goal: Mobility/activity is maintained at optimum level for patient  1/1/2020 1719 by Rafi Christina RN  Outcome: Ongoing  1/1/2020 0330 by Hortensia Pham RN  Outcome: Ongoing   Assess pts mobility status and compare to baseline. Determine if pt uses crutches/cane/walker and make sure they are within reach. Provide bedside commode to conserve pt energy. Encourage adeqaute rest periods, especially before meals, other activities of daily living, exercise sessions, and ambulation. Keep items pt uses frequently within reach. Problem: Risk for Impaired Skin Integrity  Goal: Tissue integrity - skin and mucous membranes  Description  Structural intactness and normal physiological function of skin and  mucous membranes.   1/1/2020 1719 by Rafi Christina RN  Outcome: Ongoing  1/1/2020 0330 by Hortensia Pham RN  Outcome:

## 2020-01-01 NOTE — PROGRESS NOTES
Hospitalist Progress Note      PCP: Emperatriz Perry MD    Date of Admission: 12/31/2019    Chief Complaint: Chest pain    Hospital Course: The patient is a 61 y.o. male who presents to [facility] with chest pain. Patient states he was woken this morning at 2 AM by left-sided chest pain. Patient states the pain at first was not radiated but now shoots over to his right side. He describes the pain as sharp and stabbing. Patient denies any exacerbation with movement or with palpation. There is no association with food. Patient states that nothing so far is helped take away the pain. Upon presenting to the emergency department his troponin was found to be slightly more elevated than his baseline. His EKG was nonacute with just atrial fibrillation. The patient was started on a heparin drip due to his increase in troponin and now acute chest pain. Cardiology has been consulted    Subjective: Patient seen and examined. Still with some chest pain, sporadically. White count jumped, infectious workup in process.        Medications:  Reviewed    Infusion Medications    dextrose       Scheduled Medications    amiodarone  200 mg Oral Daily    DULoxetine  30 mg Oral Daily    metoprolol succinate  25 mg Oral Daily    mometasone-formoterol  2 puff Inhalation BID    pantoprazole  40 mg Oral QAM AC    sodium chloride flush  10 mL Intravenous 2 times per day    atorvastatin  40 mg Oral Nightly    aspirin  81 mg Oral Daily    0.9 % sodium chloride  1,000 mL Intravenous Once    gabapentin  200 mg Oral TID    tamsulosin  0.4 mg Oral Daily    insulin glargine  15 Units Subcutaneous Nightly    insulin lispro  0-6 Units Subcutaneous TID WC    insulin lispro  0-3 Units Subcutaneous Nightly     PRN Meds: nitroGLYCERIN, sodium chloride flush, ondansetron, potassium chloride, magnesium sulfate, polyethylene glycol, traZODone, oxyCODONE-acetaminophen, glucose, dextrose, glucagon (rDNA), dextrose      Intake/Output Summary (Last 24 hours) at 1/1/2020 0833  Last data filed at 1/1/2020 0654  Gross per 24 hour   Intake 420 ml   Output 850 ml   Net -430 ml       Physical Exam Performed:    /64   Pulse 137   Temp 98.4 °F (36.9 °C) (Oral)   Resp 18   Ht 5' 7\" (1.702 m)   Wt 182 lb 12.2 oz (82.9 kg)   SpO2 92%   BMI 28.62 kg/m²     General appearance: No apparent distress, appears stated age and cooperative. HEENT: Pupils equal, round, and reactive to light. Conjunctivae/corneas clear. Neck: Supple, with full range of motion. No jugular venous distention. Trachea midline. Respiratory:  Normal respiratory effort. Clear to auscultation, bilaterally without Rales/Wheezes/Rhonchi. Cardiovascular: Regular rate and rhythm with normal S1/S2 without murmurs, rubs or gallops. Abdomen: Soft, non-tender, non-distended with normal bowel sounds. Musculoskeletal: Left AKA  Skin: Skin color, texture, turgor normal.  No rashes or lesions. Neurologic:  Neurovascularly intact without any focal sensory/motor deficits. Cranial nerves: II-XII intact, grossly non-focal.  Psychiatric: Alert and oriented, thought content appropriate, normal insight  Capillary Refill: Brisk,< 3 seconds   Peripheral Pulses: +2 palpable, equal bilaterally       Labs:   Recent Labs     12/31/19 0446 01/01/20  0620   WBC 9.3 12.8*   HGB 9.1* 9.0*   HCT 27.7* 27.3*    327     Recent Labs     12/31/19 0446 12/31/19  0908   * 134*   K 3.4* 3.0*   CL 82* 83*   CO2 38* 35*   BUN 36* 38*   CREATININE 1.1 1.0   CALCIUM 9.3 9.2   PHOS  --  3.2     Recent Labs     12/31/19 0446   AST 41*   ALT 28   BILITOT 0.3   ALKPHOS 105     No results for input(s): INR in the last 72 hours.   Recent Labs     12/31/19  0446 12/31/19  0908 12/31/19  1123   TROPONINI 0.05* 0.04* 0.04*       Urinalysis:      Lab Results   Component Value Date    NITRU Negative 11/10/2019    WBCUA 11 10/13/2019    BACTERIA RARE 10/13/2019    RBCUA 53 10/13/2019    BLOODU Negative 11/10/2019    SPECGRAV 1.018 11/10/2019    GLUCOSEU Negative 11/10/2019       Radiology:  XR CHEST PORTABLE   Final Result   Improving pulmonary edema and left effusion. Assessment/Plan:    NSTEMI  Elevated troponin from baseline  Trend troponin: stable  Stop heparin, start xarelto  Cardiology consult     Atrial fibrillation  Continue home rate and rhythm control  Restart xarelto      Chronic pain  Continue home gabapentin and Percocet     Chronic respiratory failure with hypoxia  Patient baseline is 3 L nasal cannula  Chest x-ray clear     Anemia  Higher than baseline  No need to trend     Prior colectomy with colostomy in October 5218:  Complicated with postop abscesses in November. Drain was removed during previous hospitalization     COPD  Not in acute exacerbation  Markedly elevated CO2, appears to be his baseline    DVT Prophylaxis: xarelto  Diet: DIET CARB CONTROL; Low Sodium (2 GM);  Daily Fluid Restriction: 2000 ml  Code Status: Full Code    PT/OT Eval Status: EMILE Delgado MD

## 2020-01-02 LAB
ANION GAP SERPL CALCULATED.3IONS-SCNC: 11 MMOL/L (ref 3–16)
BUN BLDV-MCNC: 34 MG/DL (ref 7–20)
CALCIUM SERPL-MCNC: 8.9 MG/DL (ref 8.3–10.6)
CHLORIDE BLD-SCNC: 87 MMOL/L (ref 99–110)
CO2: 32 MMOL/L (ref 21–32)
CREAT SERPL-MCNC: 0.9 MG/DL (ref 0.9–1.3)
GFR AFRICAN AMERICAN: >60
GFR NON-AFRICAN AMERICAN: >60
GLUCOSE BLD-MCNC: 107 MG/DL (ref 70–99)
GLUCOSE BLD-MCNC: 117 MG/DL (ref 70–99)
GLUCOSE BLD-MCNC: 122 MG/DL (ref 70–99)
GLUCOSE BLD-MCNC: 145 MG/DL (ref 70–99)
GLUCOSE BLD-MCNC: 190 MG/DL (ref 70–99)
HCT VFR BLD CALC: 26.2 % (ref 40.5–52.5)
HEMOGLOBIN: 8.7 G/DL (ref 13.5–17.5)
MCH RBC QN AUTO: 28.1 PG (ref 26–34)
MCHC RBC AUTO-ENTMCNC: 33.2 G/DL (ref 31–36)
MCV RBC AUTO: 84.7 FL (ref 80–100)
PDW BLD-RTO: 16.4 % (ref 12.4–15.4)
PERFORMED ON: ABNORMAL
PLATELET # BLD: 318 K/UL (ref 135–450)
PMV BLD AUTO: 9.3 FL (ref 5–10.5)
POTASSIUM SERPL-SCNC: 4.3 MMOL/L (ref 3.5–5.1)
RBC # BLD: 3.1 M/UL (ref 4.2–5.9)
SODIUM BLD-SCNC: 130 MMOL/L (ref 136–145)
WBC # BLD: 9.7 K/UL (ref 4–11)

## 2020-01-02 PROCEDURE — 6370000000 HC RX 637 (ALT 250 FOR IP): Performed by: INTERNAL MEDICINE

## 2020-01-02 PROCEDURE — 99232 SBSQ HOSP IP/OBS MODERATE 35: CPT | Performed by: NURSE PRACTITIONER

## 2020-01-02 PROCEDURE — 36415 COLL VENOUS BLD VENIPUNCTURE: CPT

## 2020-01-02 PROCEDURE — 2060000000 HC ICU INTERMEDIATE R&B

## 2020-01-02 PROCEDURE — 80048 BASIC METABOLIC PNL TOTAL CA: CPT

## 2020-01-02 PROCEDURE — 2700000000 HC OXYGEN THERAPY PER DAY

## 2020-01-02 PROCEDURE — 85027 COMPLETE CBC AUTOMATED: CPT

## 2020-01-02 PROCEDURE — 94760 N-INVAS EAR/PLS OXIMETRY 1: CPT

## 2020-01-02 PROCEDURE — 2580000003 HC RX 258: Performed by: INTERNAL MEDICINE

## 2020-01-02 RX ORDER — NITROGLYCERIN 0.4 MG/1
TABLET SUBLINGUAL
Qty: 25 TABLET | Refills: 3 | Status: ON HOLD | DISCHARGE
Start: 2020-01-02 | End: 2020-02-25 | Stop reason: HOSPADM

## 2020-01-02 RX ORDER — ASPIRIN 81 MG/1
81 TABLET, CHEWABLE ORAL DAILY
Qty: 30 TABLET | Refills: 3 | Status: ON HOLD | DISCHARGE
Start: 2020-01-03 | End: 2020-02-25 | Stop reason: HOSPADM

## 2020-01-02 RX ORDER — OXYCODONE HYDROCHLORIDE AND ACETAMINOPHEN 5; 325 MG/1; MG/1
1 TABLET ORAL EVERY 4 HOURS PRN
Qty: 12 TABLET | Refills: 0 | Status: SHIPPED | OUTPATIENT
Start: 2020-01-02 | End: 2020-01-05

## 2020-01-02 RX ORDER — RANOLAZINE 500 MG/1
500 TABLET, EXTENDED RELEASE ORAL 2 TIMES DAILY
Qty: 60 TABLET | Refills: 3 | DISCHARGE
Start: 2020-01-02

## 2020-01-02 RX ADMIN — RIVAROXABAN 15 MG: 15 TABLET, FILM COATED ORAL at 18:10

## 2020-01-02 RX ADMIN — INSULIN LISPRO 1 UNITS: 100 INJECTION, SOLUTION INTRAVENOUS; SUBCUTANEOUS at 21:12

## 2020-01-02 RX ADMIN — INSULIN GLARGINE 15 UNITS: 100 INJECTION, SOLUTION SUBCUTANEOUS at 21:11

## 2020-01-02 RX ADMIN — SODIUM CHLORIDE, PRESERVATIVE FREE 10 ML: 5 INJECTION INTRAVENOUS at 08:40

## 2020-01-02 RX ADMIN — TRAZODONE HYDROCHLORIDE 50 MG: 50 TABLET ORAL at 21:11

## 2020-01-02 RX ADMIN — SODIUM CHLORIDE, PRESERVATIVE FREE 10 ML: 5 INJECTION INTRAVENOUS at 22:00

## 2020-01-02 RX ADMIN — PANTOPRAZOLE SODIUM 40 MG: 40 TABLET, DELAYED RELEASE ORAL at 06:26

## 2020-01-02 RX ADMIN — GABAPENTIN 200 MG: 100 CAPSULE ORAL at 08:40

## 2020-01-02 RX ADMIN — OXYCODONE HYDROCHLORIDE AND ACETAMINOPHEN 1 TABLET: 5; 325 TABLET ORAL at 16:23

## 2020-01-02 RX ADMIN — AMIODARONE HYDROCHLORIDE 200 MG: 200 TABLET ORAL at 08:39

## 2020-01-02 RX ADMIN — OXYCODONE HYDROCHLORIDE AND ACETAMINOPHEN 1 TABLET: 5; 325 TABLET ORAL at 21:11

## 2020-01-02 RX ADMIN — INSULIN LISPRO 1 UNITS: 100 INJECTION, SOLUTION INTRAVENOUS; SUBCUTANEOUS at 12:23

## 2020-01-02 RX ADMIN — GABAPENTIN 200 MG: 100 CAPSULE ORAL at 14:32

## 2020-01-02 RX ADMIN — ATORVASTATIN CALCIUM 40 MG: 40 TABLET, FILM COATED ORAL at 21:11

## 2020-01-02 RX ADMIN — TAMSULOSIN HYDROCHLORIDE 0.4 MG: 0.4 CAPSULE ORAL at 08:39

## 2020-01-02 RX ADMIN — GABAPENTIN 200 MG: 100 CAPSULE ORAL at 21:11

## 2020-01-02 RX ADMIN — ASPIRIN 81 MG 81 MG: 81 TABLET ORAL at 08:39

## 2020-01-02 RX ADMIN — RANOLAZINE 500 MG: 500 TABLET, FILM COATED, EXTENDED RELEASE ORAL at 08:39

## 2020-01-02 RX ADMIN — RANOLAZINE 500 MG: 500 TABLET, FILM COATED, EXTENDED RELEASE ORAL at 21:11

## 2020-01-02 RX ADMIN — DULOXETINE HYDROCHLORIDE 30 MG: 30 CAPSULE, DELAYED RELEASE ORAL at 08:39

## 2020-01-02 RX ADMIN — OXYCODONE HYDROCHLORIDE AND ACETAMINOPHEN 1 TABLET: 5; 325 TABLET ORAL at 12:23

## 2020-01-02 RX ADMIN — OXYCODONE HYDROCHLORIDE AND ACETAMINOPHEN 1 TABLET: 5; 325 TABLET ORAL at 04:12

## 2020-01-02 RX ADMIN — METOPROLOL SUCCINATE 50 MG: 50 TABLET, EXTENDED RELEASE ORAL at 08:39

## 2020-01-02 ASSESSMENT — PAIN DESCRIPTION - ONSET
ONSET: ON-GOING
ONSET: ON-GOING
ONSET: GRADUAL
ONSET: GRADUAL

## 2020-01-02 ASSESSMENT — PAIN DESCRIPTION - PROGRESSION
CLINICAL_PROGRESSION: NOT CHANGED
CLINICAL_PROGRESSION: NOT CHANGED
CLINICAL_PROGRESSION: GRADUALLY WORSENING
CLINICAL_PROGRESSION: NOT CHANGED
CLINICAL_PROGRESSION: GRADUALLY IMPROVING
CLINICAL_PROGRESSION: NOT CHANGED
CLINICAL_PROGRESSION: NOT CHANGED

## 2020-01-02 ASSESSMENT — PAIN SCALES - GENERAL
PAINLEVEL_OUTOF10: 7
PAINLEVEL_OUTOF10: 6
PAINLEVEL_OUTOF10: 3
PAINLEVEL_OUTOF10: 4
PAINLEVEL_OUTOF10: 6
PAINLEVEL_OUTOF10: 4
PAINLEVEL_OUTOF10: 3
PAINLEVEL_OUTOF10: 7
PAINLEVEL_OUTOF10: 0
PAINLEVEL_OUTOF10: 6

## 2020-01-02 ASSESSMENT — PAIN DESCRIPTION - ORIENTATION
ORIENTATION: LOWER

## 2020-01-02 ASSESSMENT — PAIN DESCRIPTION - LOCATION
LOCATION: BACK
LOCATION: BACK;CHEST
LOCATION: BACK
LOCATION: BACK

## 2020-01-02 ASSESSMENT — PAIN DESCRIPTION - PAIN TYPE
TYPE: CHRONIC PAIN

## 2020-01-02 ASSESSMENT — PAIN DESCRIPTION - FREQUENCY
FREQUENCY: CONTINUOUS

## 2020-01-02 ASSESSMENT — PAIN DESCRIPTION - DESCRIPTORS
DESCRIPTORS: ACHING
DESCRIPTORS: DISCOMFORT
DESCRIPTORS: CONSTANT;DISCOMFORT
DESCRIPTORS: CONSTANT;DISCOMFORT

## 2020-01-02 NOTE — CONSULTS
file prior to encounter.       Current Outpatient Medications on File Prior to Encounter   Medication Sig Dispense Refill    calcium-vitamin D (OSCAL-500) 500-200 MG-UNIT per tablet Take 1 tablet by mouth daily      polyethylene glycol (MIRALAX) PACK packet Take 17 g by mouth daily as needed      ferrous gluconate (FERGON) 324 (38 Fe) MG tablet Take 324 mg by mouth daily (with breakfast)      metoprolol succinate (TOPROL XL) 25 MG extended release tablet Take 1 tablet by mouth daily 30 tablet 3    albuterol (PROVENTIL) (2.5 MG/3ML) 0.083% nebulizer solution Take 2.5 mg by nebulization every 6 hours as needed for Wheezing      simethicone (MYLICON) 80 MG chewable tablet Take 80 mg by mouth 3 times daily as needed for Flatulence      Calcium Carb-Cholecalciferol (CALCIUM-VITAMIN D) 500-200 MG-UNIT per tablet Take 1 tablet by mouth daily (Patient taking differently: Take 1 tablet by mouth 2 times daily ) 60 tablet 0    cyclobenzaprine (FLEXERIL) 10 MG tablet Take 10 mg by mouth 3 times daily as needed for Muscle spasms      DULoxetine (CYMBALTA) 30 MG extended release capsule Take 30 mg by mouth daily      linagliptin (TRADJENTA) 5 MG tablet Take 5 mg by mouth daily      acetaminophen (TYLENOL) 325 MG tablet Take 650 mg by mouth every 6 hours as needed for Pain or Fever      B-D UF III MINI PEN NEEDLES 31G X 5 MM MISC USE D  1    torsemide (DEMADEX) 20 MG tablet Take 2 tablets by mouth daily 30 tablet 3    metOLazone (ZAROXOLYN) 5 MG tablet Take 1 tablet by mouth once a week 10 tablet 0    Lactobacillus (ACIDOPHILUS PO) Take by mouth 2 times daily      rivaroxaban (XARELTO) 15 MG TABS tablet Take 1 tablet by mouth daily 30 tablet 2    amiodarone (CORDARONE) 200 MG tablet Take 1 tablet by mouth daily 30 tablet 1    tamsulosin (FLOMAX) 0.4 MG capsule Take 1 capsule by mouth daily 30 capsule 0    atorvastatin (LIPITOR) 40 MG tablet TAKE 1 TABLET BY MOUTH DAILY 30 tablet 0    traZODone (DESYREL) 50 MG tablet TAKE 1 TABLET BY MOUTH DAILY FOR INSOMNIA 30 tablet 0    MAGNESIUM-OXIDE 400 (241.3 Mg) MG TABS tablet TAKE 1 TABLET BY MOUTH TWICE DAILY 60 tablet 2    gabapentin (NEURONTIN) 100 MG capsule Take 200 mg by mouth 3 times daily.  omeprazole (PRILOSEC) 40 MG delayed release capsule Take 1 capsule by mouth daily (with breakfast) 90 capsule 3       Objective         BP 99/63   Pulse 96   Temp 97.9 °F (36.6 °C) (Oral)   Resp 18   Ht 5' 7\" (1.702 m)   Wt 181 lb 14.1 oz (82.5 kg)   SpO2 95%   BMI 28.49 kg/m²     Code Status: Full Code    Advanced Directives: completed requested copy his sister is BRICE Schultz [phone]    Assessment        Management and Education    Persons available for education:     [x] Self     [] Caregiver       [] Spouse       [] Other Family Member   []  Other    Spiritual History:  notified: Yes,     Does the patient have a Primary Care Physician? Yes     Level of patient/caregiver understanding able to:       [x] Verbalize Understanding   [] Demonstrate Understanding       [] Teach Back       [] Needs Reinforcement     []  Other:      Teaching Time:  0hours  30 minutes     Plan        Social Service Consult Made:  Yes  Assistance filling out Living Will/HPOA:  No      Discharge Plan:  Education/support to patient  Clarification of medical condition to patient and family  Code status clarified: Full Code  Palliative care orders introduced    Discharge Environment:     [x] [facility] long term care    Discussion: I met with patient, to discuss goals of care. I have again requested copy of HPOA paperwork. We discussed advanced care planning and what that entails. He wants to remain full code at this time. I did ask what he wants in event he is on ventilator and can not be weaned he did not have answer today, I encouraged conversation with his sister/HPOA about his end of life wishes. I will continue to follow Mr. Dejuan Sifuentes care as needed.       Thank you for allowing me to participate in the care of Mr. Hilda Wright .      Electronically signed by Braden Cockayne, RN, [facility] on 1/2/2020 at 1:26 PM  [address] [place] [address]  Office: [phone]

## 2020-01-02 NOTE — PLAN OF CARE
Problem: OXYGENATION/RESPIRATORY FUNCTION  Goal: Patient will maintain patent airway  1/2/2020 0047 by Sharmaine Lewis RN  Outcome: Met This Shift  1/1/2020 1719 by Ranjith Fuentes RN  Outcome: Ongoing  Goal: Patient will achieve/maintain normal respiratory rate/effort  Description  Respiratory rate and effort will be within normal limits for the patient  1/2/2020 0047 by Sharmaine Lewis RN  Outcome: Met This Shift  1/1/2020 1719 by Ranjith Fuentes RN  Outcome: Ongoing   Alert and oriented x4 Resp. Easy and even Sats. WNL on 1L O2 per n/c Lungs diminished Cough and deep breathing exercises encouraged Frequently medicated for c/o pain with good short term effect. Refuses turning frequently even with encouragement and explanation.  Free from fall/injury

## 2020-01-02 NOTE — CARE COORDINATION
Noted d/c order. [employer] reports even though he is long term care still needs [facility] and they will initiate with [facility]. This SW emailed contact at [employer] as well to possibly expedite the process.    Electronically signed by Evaristo Gonzalez on 1/2/20 at 2:02 PM  #[phone]

## 2020-01-02 NOTE — PROGRESS NOTES
5' 7\" (1.702 m)   Wt 181 lb 14.1 oz (82.5 kg)   SpO2 95%   BMI 28.49 kg/m²       Intake/Output Summary (Last 24 hours) at 1/2/2020 1424  Last data filed at 1/2/2020 1324  Gross per 24 hour   Intake 460 ml   Output 1000 ml   Net -540 ml     Wt Readings from Last 3 Encounters:   01/02/20 181 lb 14.1 oz (82.5 kg)   12/20/19 210 lb 5.1 oz (95.4 kg)   12/04/19 182 lb (82.6 kg)       Physical Exam:  General: In no acute distress. Awake, alert, and oriented X4. Flat affect. Skin:  Warm and dry. No new appearing rashes or lesions. Neck:  Supple and thick. No JVD or carotid bruit appreciated. Chest: Lungs with decreased breath sounds anteriorly. No wheezes/rhonchi/rales  Cardiovascular:  Irreg, irreg; normal S1 and S2; soft systolic murmur  Abdomen:  soft, nontender, nondistended,+bowel sounds. Extremities:  RLE without edema. No clubbing or cyanosis. Faint R DP/PT pulses palpable.  L AKA    Medications:    rivaroxaban  15 mg Oral Daily    ranolazine  500 mg Oral BID    metoprolol succinate  50 mg Oral Daily    amiodarone  200 mg Oral Daily    DULoxetine  30 mg Oral Daily    mometasone-formoterol  2 puff Inhalation BID    pantoprazole  40 mg Oral QAM AC    sodium chloride flush  10 mL Intravenous 2 times per day    atorvastatin  40 mg Oral Nightly    aspirin  81 mg Oral Daily    0.9 % sodium chloride  1,000 mL Intravenous Once    gabapentin  200 mg Oral TID    tamsulosin  0.4 mg Oral Daily    insulin glargine  15 Units Subcutaneous Nightly    insulin lispro  0-6 Units Subcutaneous TID WC    insulin lispro  0-3 Units Subcutaneous Nightly      dextrose       nitroGLYCERIN, sodium chloride flush, ondansetron, potassium chloride, magnesium sulfate, polyethylene glycol, traZODone, oxyCODONE-acetaminophen, glucose, dextrose, glucagon (rDNA), dextrose    Lab Data:  CBC:   Recent Labs     12/31/19  0446 01/01/20  0620 01/02/20  0715   WBC 9.3 12.8* 9.7   HGB 9.1* 9.0* 8.7*    327 318     BMP: Recent Labs     12/31/19  0908 01/01/20  1310 01/02/20  0714   * 131* 130*   K 3.0* 3.9 4.3   CO2 35* 33* 32   BUN 38* 30* 34*   CREATININE 1.0 1.0 0.9     LIVR:   Recent Labs     12/31/19  0446   AST 41*   ALT 28     INR:  No results for input(s): INR in the last 72 hours. APTT:   Recent Labs     12/31/19  0446   APTT 42.8*     Results for Tomas Charles (MRN [id number]) as of 1/2/2020 15:17   Ref. Range 12/31/2019 04:46 12/31/2019 09:08 12/31/2019 11:23   Troponin Latest Ref Range: <0.01 ng/mL 0.05 (H) 0.04 (H) 0.04 (H)     ECG 12/31/2019:  Atrial fibrillation  Rightward axis  Anteroseptal infarct (cited on or before 19-SEP-2017)  Abnormal ECG  When compared with ECG of 14-DEC-2019 08:45,    Echo 6/22/2019:  Normal left ventricular size and wall thickness. Severe left ventricular dysfunction with global hypokinesis. Ejection fraction is visually estimated to be 30-35%. The right ventricle is severely enlarged with severely reduced function. The left atrium is mildly dilated. Mild mitral regurgitation. There is mild tricuspid regurgitation with the systolic pulmonary artery   pressure (SPAP) estimated at 54 mmHg (estimated RA pressure of 15 mmHg   included). Mild mitral regurgitation. [facility] 8/10/2015:  1. Right coronary artery arises from the right sinus Valsalva. It is a dominant artery, gives rise to the posterior descending and posterolateral branches. Right coronary artery and its branches are free of atherosclerosis.    2. Left main trunk arises from the right sinus Valsalva. It divides into the left and right descending artery and left circumflex artery. Left main trunk is free of atherosclerosis. 3. Left anterior descending artery: Moderate sized artery and descends into the intraventricular sulcus and wraps around the apex of the heart. The left anterior descending artery and its branches are free of atherosclerosis. 4. Left circumflex artery arises from the left main trunk. It is a moderate-sized artery. It gives rise to a moderate-sized obtuse marginal branch and is free of atherosclerosis. Left anterior descending artery as described earlier is also free of any atherosclerosis.    Left ventriculogram reveals a global left ventricular systolic function with estimated EF of approximately 20% with global hypokinesis. There was no mitral regurgitation seen. Telemetry: Atrial fib with controlled VR    Assessment/Plan:    1. NSTEMI  -no further chest pain  -given his multiple comorbidities, will continue with conservative medical management  -continue ASA, statin and BB  -continue Ranexa   -no plans for further evaluation unless conservative management fails    2. Hypokalemia  -resolved and normalized     3. Chronic atrial fib  -initially with RVR, now VR controlled  -continue BB and Amiodarone  -continue long term anticoagulation with Xarelto ([id number] VAsc score 4)    4. PAD  -S/P L AKA  -continue ASA and statin    5. Cardiomyopathy (Nonischemic)  -LVEF 30-35%  -chronic biventricular HF with combined systolic and diastolic LV dysfunction but without overt sxs of CHF  -continue BB  -not on ACE/ARB/aldactone d/t hypotension      Stable from cardiac standpoint and ok to discharge when ok with admitting MD    Recommend follow up with cardiology in 1-2 weeks (D. Enzweiler, CNP)    Check BMP in 5 days given his low sodium    Emphasized and discussed low-fat/low sodium diet, monitoring of daily weights, fluid restriction, worsening signs and symptoms of heart failure and when to call, and the importance of regular exercise and activity.     Continue Amiodarone, ASA, statin, Toprol, Ranexa and Xarelto @ discharge    Electronically signed by JOON Holman - CNP on 1/2/2020 at 2:24 PM

## 2020-01-02 NOTE — PLAN OF CARE
Problem: Pain:  Goal: Pain level will decrease  Description  Pain level will decrease  1/2/2020 1348 by Amelie Goldsmith RN  Outcome: Ongoing  1/2/2020 0047 by Rocío Pacheco RN  Outcome: Ongoing   Pain/discomfort being managed with PRN analgesics per MD orders. Pt able to express presence and absence of pain and rate pain appropriately using numerical scale. Problem: HEMODYNAMIC STATUS  Goal: Patient has stable vital signs and fluid balance  1/2/2020 1348 by Amelie Goldsmith RN  Outcome: Ongoing  1/2/2020 0047 by Rocío Pacheco RN  Outcome: Ongoing   Vitals completed q4h and assessed by RN. I&O charted and assessed per MD order. Pt tolerating adequate fluid intake. Problem: FLUID AND ELECTROLYTE IMBALANCE  Goal: Fluid and electrolyte balance are achieved/maintained  1/2/2020 1348 by Amelie Goldsmith RN  Outcome: Ongoing  1/2/2020 0047 by Rocío Pacheco RN  Outcome: Ongoing   Pt electrolytes drawn and assessed per MD order. Replaced as needed per orders. I&O charted and assessed. Pt tolerating adequate fluid intake    Problem: ACTIVITY INTOLERANCE/IMPAIRED MOBILITY  Goal: Mobility/activity is maintained at optimum level for patient  1/2/2020 1348 by Amelie Goldsmith RN  Outcome: Ongoing  1/2/2020 0047 by Rocío Pacheco RN  Outcome: Ongoing   Assess pts mobility status and compare to baseline. Determine if pt uses crutches/cane/walker and make sure they are within reach. Provide bedside commode to conserve pt energy. Encourage adeqaute rest periods, especially before meals, other activities of daily living, exercise sessions, and ambulation. Keep items pt uses frequently within reach. Problem: Risk for Impaired Skin Integrity  Goal: Tissue integrity - skin and mucous membranes  Description  Structural intactness and normal physiological function of skin and  mucous membranes.   1/2/2020 1348 by Amelie Goldsmith RN  Outcome: Ongoing  1/2/2020 0047 by Rocío Pacheco RN  Outcome: Ongoing   Skin assessment completed every shift. Pt assessed for incontinence, appropriate barrier cream applied prn. Pt encouraged to turn/rotate every 2 hours. Assistance provided if pt unable to do so themselves.

## 2020-01-02 NOTE — DISCHARGE SUMMARY
Hospital Medicine Discharge Summary    Patient ID: Kt Rojas      Patient's PCP: Geeta Hansen MD    Admit Date: 12/31/2019     Discharge Date:   1/2/20    Admitting Physician: Mlaathi Yang MD     Discharge Physician: Malathi Yang MD     Discharge Diagnoses: Active Hospital Problems    Diagnosis Date Noted    Atrial fibrillation, persistent [I48.19]     Chest pain [R07.9] 06/21/2015       The patient was seen and examined on day of discharge and this discharge summary is in conjunction with any daily progress note from day of discharge. Hospital Course: The patient is a 56 y. o. male who [facility] with chest pain.  Patient states he was woken this morning at 2 AM by left-sided chest pain.  Patient states the pain at first was not radiated but now shoots over to his right side.  He describes the pain as sharp and stabbing.  Patient denies any exacerbation with movement or with palpation. Jacqualine Anil is no association with food.  Patient states that nothing so far is helped take away the pain.  Upon presenting to the emergency department his troponin was found to be slightly more elevated than his baseline.  His EKG was nonacute with just atrial fibrillation.  The patient was started on a heparin drip due to his increase in troponin and now acute chest pain.  Cardiology has been consulted    NSTEMI  Elevated troponin from baseline  Trend troponin: stable  Stop heparin, start xarelto  Cardiology consult  resolved     Atrial fibrillation  Continue home rate and rhythm control  Restart xarelto      Chronic pain  Continue home gabapentin and Percocet     Chronic respiratory failure with hypoxia  Patient baseline is 3 L nasal cannula  Chest x-ray clear     Anemia  Higher than baseline  No need to trend     Prior colectomy with colostomy in October 1824:  Complicated with postop abscesses in November.   Drain was removed during previous hospitalization     COPD  Not in acute Long-term care facility    Condition at Discharge: Stable    Discharge Instructions/Follow-up: Primary care physician, cardiology    Code Status:  Full Code     Activity: activity as tolerated    Diet: cardiac diet      Labs: For convenience and continuity at follow-up the following most recent labs are provided:      CBC:    Lab Results   Component Value Date    WBC 9.7 01/02/2020    HGB 8.7 01/02/2020    HCT 26.2 01/02/2020     01/02/2020       Renal:    Lab Results   Component Value Date     01/02/2020    K 4.3 01/02/2020    K 3.4 12/31/2019    CL 87 01/02/2020    CO2 32 01/02/2020    BUN 34 01/02/2020    CREATININE 0.9 01/02/2020    CALCIUM 8.9 01/02/2020    PHOS 3.2 12/31/2019       Discharge Medications:     Current Discharge Medication List           Details   aspirin 81 MG chewable tablet Take 1 tablet by mouth daily  Qty: 30 tablet, Refills: 3      ranolazine (RANEXA) 500 MG extended release tablet Take 1 tablet by mouth 2 times daily  Qty: 60 tablet, Refills: 3      nitroGLYCERIN (NITROSTAT) 0.4 MG SL tablet up to max of 3 total doses. If no relief after 1 dose, call 911. Qty: 25 tablet, Refills: 3              Details   oxyCODONE-acetaminophen (PERCOCET) 5-325 MG per tablet Take 1 tablet by mouth every 4 hours as needed for Pain for up to 3 days.   Qty: 12 tablet, Refills: 0    Comments: Reduce doses taken as pain becomes manageable  Associated Diagnoses: Chest pain, unspecified type              Details   calcium-vitamin D (OSCAL-500) 500-200 MG-UNIT per tablet Take 1 tablet by mouth daily      polyethylene glycol (MIRALAX) PACK packet Take 17 g by mouth daily as needed      ferrous gluconate (FERGON) 324 (38 Fe) MG tablet Take 324 mg by mouth daily (with breakfast)      metoprolol succinate (TOPROL XL) 25 MG extended release tablet Take 1 tablet by mouth daily  Qty: 30 tablet, Refills: 3      albuterol (PROVENTIL) (2.5 MG/3ML) 0.083% nebulizer solution Take 2.5 mg by nebulization every 6 reflux disease with esophagitis             No future appointments. Time Spent on discharge is more than 30 minutes in the examination, evaluation, counseling and review of medications and discharge plan. Signed:    Jeni Braswell MD   1/2/2020      Thank you Sulma Daily MD for the opportunity to be involved in this patient's care. If you have any questions or concerns please feel free to contact me at [phone].

## 2020-01-03 VITALS
BODY MASS INDEX: 28.37 KG/M2 | DIASTOLIC BLOOD PRESSURE: 69 MMHG | TEMPERATURE: 97.8 F | WEIGHT: 180.78 LBS | RESPIRATION RATE: 16 BRPM | SYSTOLIC BLOOD PRESSURE: 105 MMHG | HEIGHT: 67 IN | OXYGEN SATURATION: 97 % | HEART RATE: 80 BPM

## 2020-01-03 LAB
ANION GAP SERPL CALCULATED.3IONS-SCNC: 9 MMOL/L (ref 3–16)
BUN BLDV-MCNC: 33 MG/DL (ref 7–20)
CALCIUM SERPL-MCNC: 9.1 MG/DL (ref 8.3–10.6)
CHLORIDE BLD-SCNC: 89 MMOL/L (ref 99–110)
CO2: 35 MMOL/L (ref 21–32)
CREAT SERPL-MCNC: 1 MG/DL (ref 0.9–1.3)
GFR AFRICAN AMERICAN: >60
GFR NON-AFRICAN AMERICAN: >60
GLUCOSE BLD-MCNC: 118 MG/DL (ref 70–99)
GLUCOSE BLD-MCNC: 168 MG/DL (ref 70–99)
GLUCOSE BLD-MCNC: 97 MG/DL (ref 70–99)
HCT VFR BLD CALC: 24.3 % (ref 40.5–52.5)
HEMOGLOBIN: 8.3 G/DL (ref 13.5–17.5)
MCH RBC QN AUTO: 28.7 PG (ref 26–34)
MCHC RBC AUTO-ENTMCNC: 34 G/DL (ref 31–36)
MCV RBC AUTO: 84.4 FL (ref 80–100)
PDW BLD-RTO: 16.7 % (ref 12.4–15.4)
PERFORMED ON: ABNORMAL
PERFORMED ON: ABNORMAL
PLATELET # BLD: 308 K/UL (ref 135–450)
PMV BLD AUTO: 8.6 FL (ref 5–10.5)
POTASSIUM SERPL-SCNC: 4.4 MMOL/L (ref 3.5–5.1)
RBC # BLD: 2.88 M/UL (ref 4.2–5.9)
SODIUM BLD-SCNC: 133 MMOL/L (ref 136–145)
WBC # BLD: 8.5 K/UL (ref 4–11)

## 2020-01-03 PROCEDURE — 36415 COLL VENOUS BLD VENIPUNCTURE: CPT

## 2020-01-03 PROCEDURE — 6370000000 HC RX 637 (ALT 250 FOR IP): Performed by: INTERNAL MEDICINE

## 2020-01-03 PROCEDURE — 2580000003 HC RX 258: Performed by: INTERNAL MEDICINE

## 2020-01-03 PROCEDURE — 80048 BASIC METABOLIC PNL TOTAL CA: CPT

## 2020-01-03 PROCEDURE — 85027 COMPLETE CBC AUTOMATED: CPT

## 2020-01-03 RX ADMIN — GABAPENTIN 200 MG: 100 CAPSULE ORAL at 07:58

## 2020-01-03 RX ADMIN — INSULIN LISPRO 1 UNITS: 100 INJECTION, SOLUTION INTRAVENOUS; SUBCUTANEOUS at 12:05

## 2020-01-03 RX ADMIN — PANTOPRAZOLE SODIUM 40 MG: 40 TABLET, DELAYED RELEASE ORAL at 06:36

## 2020-01-03 RX ADMIN — AMIODARONE HYDROCHLORIDE 200 MG: 200 TABLET ORAL at 07:58

## 2020-01-03 RX ADMIN — GABAPENTIN 200 MG: 100 CAPSULE ORAL at 13:28

## 2020-01-03 RX ADMIN — OXYCODONE HYDROCHLORIDE AND ACETAMINOPHEN 1 TABLET: 5; 325 TABLET ORAL at 03:17

## 2020-01-03 RX ADMIN — RANOLAZINE 500 MG: 500 TABLET, FILM COATED, EXTENDED RELEASE ORAL at 07:58

## 2020-01-03 RX ADMIN — OXYCODONE HYDROCHLORIDE AND ACETAMINOPHEN 1 TABLET: 5; 325 TABLET ORAL at 07:58

## 2020-01-03 RX ADMIN — METOPROLOL SUCCINATE 50 MG: 50 TABLET, EXTENDED RELEASE ORAL at 07:58

## 2020-01-03 RX ADMIN — SODIUM CHLORIDE, PRESERVATIVE FREE 10 ML: 5 INJECTION INTRAVENOUS at 08:00

## 2020-01-03 RX ADMIN — OXYCODONE HYDROCHLORIDE AND ACETAMINOPHEN 1 TABLET: 5; 325 TABLET ORAL at 12:21

## 2020-01-03 RX ADMIN — TAMSULOSIN HYDROCHLORIDE 0.4 MG: 0.4 CAPSULE ORAL at 07:58

## 2020-01-03 RX ADMIN — ASPIRIN 81 MG 81 MG: 81 TABLET ORAL at 07:57

## 2020-01-03 RX ADMIN — DULOXETINE HYDROCHLORIDE 30 MG: 30 CAPSULE, DELAYED RELEASE ORAL at 07:58

## 2020-01-03 ASSESSMENT — PAIN SCALES - GENERAL
PAINLEVEL_OUTOF10: 6
PAINLEVEL_OUTOF10: 7
PAINLEVEL_OUTOF10: 7
PAINLEVEL_OUTOF10: 4
PAINLEVEL_OUTOF10: 4
PAINLEVEL_OUTOF10: 6
PAINLEVEL_OUTOF10: 4

## 2020-01-03 ASSESSMENT — PAIN DESCRIPTION - PROGRESSION
CLINICAL_PROGRESSION: GRADUALLY WORSENING

## 2020-01-03 ASSESSMENT — PAIN - FUNCTIONAL ASSESSMENT: PAIN_FUNCTIONAL_ASSESSMENT: PREVENTS OR INTERFERES SOME ACTIVE ACTIVITIES AND ADLS

## 2020-01-03 ASSESSMENT — PAIN DESCRIPTION - LOCATION: LOCATION: BACK

## 2020-01-03 ASSESSMENT — PAIN DESCRIPTION - ORIENTATION: ORIENTATION: LOWER

## 2020-01-03 ASSESSMENT — PAIN DESCRIPTION - DESCRIPTORS: DESCRIPTORS: ACHING;CONSTANT;DISCOMFORT

## 2020-01-03 ASSESSMENT — PAIN DESCRIPTION - PAIN TYPE: TYPE: CHRONIC PAIN

## 2020-01-03 ASSESSMENT — PAIN DESCRIPTION - FREQUENCY: FREQUENCY: CONTINUOUS

## 2020-01-03 ASSESSMENT — PAIN DESCRIPTION - ONSET: ONSET: GRADUAL

## 2020-01-03 NOTE — PLAN OF CARE
Problem: Falls - Risk of:  Goal: Will remain free from falls  Description  Will remain free from falls  Outcome: Ongoing     Problem: Falls - Risk of:  Goal: Absence of physical injury  Description  Absence of physical injury  Outcome: Ongoing   Socks applied, call light in reach. Bed in lowest position.

## 2020-01-03 NOTE — CARE COORDINATION
Discharge Plan:  Patient to return to [facility], [facility]. Juan F Mcgraw in admissions she reports that she should precert by this afternoon and she agreed to 3pm transport time. Juan F Mcgraw will get tiburcio out of chart. RN to call report to [phone].

## 2020-01-03 NOTE — DISCHARGE SUMMARY
Hospital Medicine Discharge Summary    Patient ID: Femi Kirby      Patient's PCP: Wendi Castellon MD    Admit Date: 12/31/2019     Discharge Date:   1/3/20    Admitting Physician: Baylee Joy MD     Discharge Physician: Baylee Joy MD     Discharge Diagnoses: Active Hospital Problems    Diagnosis Date Noted    Atrial fibrillation, persistent [I48.19]     Nonischemic cardiomyopathy ([address] [I42.8] 11/30/2015    Chest pain [R07.9] 06/21/2015       The patient was seen and examined on day of discharge and this discharge summary is in conjunction with any daily progress note from day of discharge. Hospital Course: The patient is a 56 y. o. male who presents to [facility] with chest pain.  Patient states he was woken this morning at 2 AM by left-sided chest pain.  Patient states the pain at first was not radiated but now shoots over to his right side.  He describes the pain as sharp and stabbing.  Patient denies any exacerbation with movement or with palpation. Laurence Distel is no association with food.  Patient states that nothing so far is helped take away the pain.  Upon presenting to the emergency department his troponin was found to be slightly more elevated than his baseline.  His EKG was nonacute with just atrial fibrillation.  The patient was started on a heparin drip due to his increase in troponin and now acute chest pain.  Cardiology has been consulted    NSTEMI  Elevated troponin from baseline  Trend troponin: stable  Stop heparin, start xarelto  Cardiology consult  resolved     Atrial fibrillation  Continue home rate and rhythm control  Restart xarelto      Chronic pain  Continue home gabapentin and Percocet     Chronic respiratory failure with hypoxia  Patient baseline is 3 L nasal cannula  Chest x-ray clear     Anemia  Higher than baseline  No need to trend     Prior colectomy with colostomy in October 1515:  Complicated with postop abscesses in November.   Drain was removed during previous hospitalization     COPD  Not in acute exacerbation  Markedly elevated CO2, appears to be his baseline    Patient initially presented to the hospital with acute chest pain and a slightly elevated troponin. After being placed on the heparin drip a repeat troponin showed a downturn in the troponin. Cardiology was consulted and transition him from heparin drip back to his home Xarelto. The chest pain quickly resolved. On the second day of admission his white count was slightly elevated and he was feeling a little more ill. Infectious work-up was completed and was negative for any acute pathology. Patient is stable for discharge back to his long-term care facility. Avoidable days awaiting precert for him to return to [facility]  Exam:     /66   Pulse 73   Temp 97.9 °F (36.6 °C) (Oral)   Resp 16   Ht 5' 7\" (1.702 m)   Wt 180 lb 12.4 oz (82 kg)   SpO2 96%   BMI 28.31 kg/m²     General appearance: No apparent distress, appears stated age and cooperative. HEENT: Pupils equal, round, and reactive to light. Conjunctivae/corneas clear. Neck: Supple, with full range of motion. No jugular venous distention. Trachea midline. Respiratory:  Normal respiratory effort. Clear to auscultation, bilaterally without Rales/Wheezes/Rhonchi. Cardiovascular: Regular rate and rhythm with normal S1/S2 without murmurs, rubs or gallops. Abdomen: Soft, non-tender, non-distended with normal bowel sounds. Musculoskeletal: No clubbing, cyanosis or edema bilaterally. Full range of motion without deformity. Skin: Skin color, texture, turgor normal.  No rashes or lesions. Neurologic:  Neurovascularly intact without any focal sensory/motor deficits.  Cranial nerves: II-XII intact, grossly non-focal.  Psychiatric: Alert and oriented, thought content appropriate, normal insight      Consults:     IP CONSULT TO HOSPITALIST  IP CONSULT TO CARDIOLOGY  IP CONSULT TO PALLIATIVE CARE    Significant Diagnostic Studies:     XR CHEST PORTABLE   Final Result   Improving pulmonary edema and left effusion. Disposition: Long-term care facility    Condition at Discharge: Stable    Discharge Instructions/Follow-up: Primary care physician, cardiology    Code Status:  Full Code     Activity: activity as tolerated    Diet: cardiac diet      Labs: For convenience and continuity at follow-up the following most recent labs are provided:      CBC:    Lab Results   Component Value Date    WBC 8.5 01/03/2020    HGB 8.3 01/03/2020    HCT 24.3 01/03/2020     01/03/2020       Renal:    Lab Results   Component Value Date     01/02/2020    K 4.3 01/02/2020    K 3.4 12/31/2019    CL 87 01/02/2020    CO2 32 01/02/2020    BUN 34 01/02/2020    CREATININE 0.9 01/02/2020    CALCIUM 8.9 01/02/2020    PHOS 3.2 12/31/2019       Discharge Medications:     Current Discharge Medication List           Details   aspirin 81 MG chewable tablet Take 1 tablet by mouth daily  Qty: 30 tablet, Refills: 3      ranolazine (RANEXA) 500 MG extended release tablet Take 1 tablet by mouth 2 times daily  Qty: 60 tablet, Refills: 3      nitroGLYCERIN (NITROSTAT) 0.4 MG SL tablet up to max of 3 total doses. If no relief after 1 dose, call 911. Qty: 25 tablet, Refills: 3              Details   oxyCODONE-acetaminophen (PERCOCET) 5-325 MG per tablet Take 1 tablet by mouth every 4 hours as needed for Pain for up to 3 days.   Qty: 12 tablet, Refills: 0    Comments: Reduce doses taken as pain becomes manageable  Associated Diagnoses: Chest pain, unspecified type              Details   calcium-vitamin D (OSCAL-500) 500-200 MG-UNIT per tablet Take 1 tablet by mouth daily      polyethylene glycol (MIRALAX) PACK packet Take 17 g by mouth daily as needed      ferrous gluconate (FERGON) 324 (38 Fe) MG tablet Take 324 mg by mouth daily (with breakfast)      metoprolol succinate (TOPROL XL) 25 MG extended release tablet Take 1 tablet by mouth daily  Qty: 30 tablet, Refills: 3 albuterol (PROVENTIL) (2.5 MG/3ML) 0.083% nebulizer solution Take 2.5 mg by nebulization every 6 hours as needed for Wheezing      simethicone (MYLICON) 80 MG chewable tablet Take 80 mg by mouth 3 times daily as needed for Flatulence      Calcium Carb-Cholecalciferol (CALCIUM-VITAMIN D) 500-200 MG-UNIT per tablet Take 1 tablet by mouth daily  Qty: 60 tablet, Refills: 0      cyclobenzaprine (FLEXERIL) 10 MG tablet Take 10 mg by mouth 3 times daily as needed for Muscle spasms      DULoxetine (CYMBALTA) 30 MG extended release capsule Take 30 mg by mouth daily      linagliptin (TRADJENTA) 5 MG tablet Take 5 mg by mouth daily      acetaminophen (TYLENOL) 325 MG tablet Take 650 mg by mouth every 6 hours as needed for Pain or Fever      B-D UF III MINI PEN NEEDLES 31G X 5 MM MISC USE D  Refills: 1      torsemide (DEMADEX) 20 MG tablet Take 2 tablets by mouth daily  Qty: 30 tablet, Refills: 3      metOLazone (ZAROXOLYN) 5 MG tablet Take 1 tablet by mouth once a week  Qty: 10 tablet, Refills: 0      Lactobacillus (ACIDOPHILUS PO) Take by mouth 2 times daily      rivaroxaban (XARELTO) 15 MG TABS tablet Take 1 tablet by mouth daily  Qty: 30 tablet, Refills: 2      amiodarone (CORDARONE) 200 MG tablet Take 1 tablet by mouth daily  Qty: 30 tablet, Refills: 1      tamsulosin (FLOMAX) 0.4 MG capsule Take 1 capsule by mouth daily  Qty: 30 capsule, Refills: 0      atorvastatin (LIPITOR) 40 MG tablet TAKE 1 TABLET BY MOUTH DAILY  Qty: 30 tablet, Refills: 0      traZODone (DESYREL) 50 MG tablet TAKE 1 TABLET BY MOUTH DAILY FOR INSOMNIA  Qty: 30 tablet, Refills: 0    Associated Diagnoses: Chronic bronchitis, unspecified chronic bronchitis type ([facility] [address] Primary insomnia      MAGNESIUM-OXIDE 400 (241.3 Mg) MG TABS tablet TAKE 1 TABLET BY MOUTH TWICE DAILY  Qty: 60 tablet, Refills: 2      gabapentin (NEURONTIN) 100 MG capsule Take 200 mg by mouth 3 times daily.        omeprazole (PRILOSEC) 40 MG delayed release capsule Take 1 capsule by mouth daily (with breakfast)  Qty: 90 capsule, Refills: 3    Associated Diagnoses: Gastroesophageal reflux disease with esophagitis             Future Appointments   Date Time Provider Kiran Kat   1/24/2020 11:00 AM Chrissy Tony Speaker, APRN - CNP [facility] [place]       Time Spent on discharge is more than 30 minutes in the examination, evaluation, counseling and review of medications and discharge plan. Signed:    Eloisa Ratliff MD   1/3/2020      Thank you Anne-Marie Reeves MD for the opportunity to be involved in this patient's care. If you have any questions or concerns please feel free to contact me at [phone].

## 2020-01-03 NOTE — PROGRESS NOTES
IV site and heart monitor removed per protocol, no complications. Patient's colostomy was completely changed, patient tolerated well.

## 2020-01-05 LAB — BLOOD CULTURE, ROUTINE: NORMAL

## 2020-01-13 LAB
FINAL REPORT: NORMAL
PRELIMINARY: NORMAL

## 2020-01-22 ENCOUNTER — HOSPITAL ENCOUNTER (INPATIENT)
Age: 60
LOS: 22 days | Discharge: NURSING, REHAB OR LONG-TERM CARE | DRG: 194 | End: 2020-02-14
Attending: EMERGENCY MEDICINE | Admitting: INTERNAL MEDICINE
Payer: COMMERCIAL

## 2020-01-22 ENCOUNTER — APPOINTMENT (OUTPATIENT)
Dept: CT IMAGING | Age: 60
DRG: 194 | End: 2020-01-22
Payer: COMMERCIAL

## 2020-01-22 ENCOUNTER — APPOINTMENT (OUTPATIENT)
Dept: GENERAL RADIOLOGY | Age: 60
DRG: 194 | End: 2020-01-22
Payer: COMMERCIAL

## 2020-01-22 LAB
A/G RATIO: 0.5 (ref 1.1–2.2)
ALBUMIN SERPL-MCNC: 2.7 G/DL (ref 3.4–5)
ALP BLD-CCNC: 103 U/L (ref 40–129)
ALT SERPL-CCNC: 16 U/L (ref 10–40)
ANION GAP SERPL CALCULATED.3IONS-SCNC: 12 MMOL/L (ref 3–16)
AST SERPL-CCNC: 26 U/L (ref 15–37)
BASE EXCESS VENOUS: 14.1 MMOL/L
BASOPHILS ABSOLUTE: 0.1 K/UL (ref 0–0.2)
BASOPHILS RELATIVE PERCENT: 1.2 %
BILIRUB SERPL-MCNC: 0.3 MG/DL (ref 0–1)
BILIRUBIN URINE: NEGATIVE
BLOOD, URINE: NEGATIVE
BUN BLDV-MCNC: 15 MG/DL (ref 7–20)
CALCIUM SERPL-MCNC: 8.3 MG/DL (ref 8.3–10.6)
CARBOXYHEMOGLOBIN: 3.1 %
CHLORIDE BLD-SCNC: 92 MMOL/L (ref 99–110)
CLARITY: CLEAR
CO2: 33 MMOL/L (ref 21–32)
COLOR: YELLOW
CREAT SERPL-MCNC: 0.7 MG/DL (ref 0.9–1.3)
EKG ATRIAL RATE: 127 BPM
EKG DIAGNOSIS: NORMAL
EKG Q-T INTERVAL: 316 MS
EKG QRS DURATION: 100 MS
EKG QTC CALCULATION (BAZETT): 409 MS
EKG R AXIS: 89 DEGREES
EKG T AXIS: -42 DEGREES
EKG VENTRICULAR RATE: 101 BPM
EOSINOPHILS ABSOLUTE: 0.3 K/UL (ref 0–0.6)
EOSINOPHILS RELATIVE PERCENT: 2.8 %
GFR AFRICAN AMERICAN: >60
GFR NON-AFRICAN AMERICAN: >60
GLOBULIN: 6 G/DL
GLUCOSE BLD-MCNC: 115 MG/DL (ref 70–99)
GLUCOSE URINE: NEGATIVE MG/DL
HCO3 VENOUS: 40 MMOL/L (ref 23–29)
HCT VFR BLD CALC: 24.6 % (ref 40.5–52.5)
HEMOGLOBIN: 8 G/DL (ref 13.5–17.5)
KETONES, URINE: NEGATIVE MG/DL
LACTIC ACID, SEPSIS: 3.8 MMOL/L (ref 0.4–1.9)
LACTIC ACID: 0.7 MMOL/L (ref 0.4–2)
LEUKOCYTE ESTERASE, URINE: NEGATIVE
LYMPHOCYTES ABSOLUTE: 1 K/UL (ref 1–5.1)
LYMPHOCYTES RELATIVE PERCENT: 10.9 %
MCH RBC QN AUTO: 27.8 PG (ref 26–34)
MCHC RBC AUTO-ENTMCNC: 32.5 G/DL (ref 31–36)
MCV RBC AUTO: 85.5 FL (ref 80–100)
METHEMOGLOBIN VENOUS: 0.6 %
MICROSCOPIC EXAMINATION: NORMAL
MONOCYTES ABSOLUTE: 0.8 K/UL (ref 0–1.3)
MONOCYTES RELATIVE PERCENT: 9.1 %
NEUTROPHILS ABSOLUTE: 6.8 K/UL (ref 1.7–7.7)
NEUTROPHILS RELATIVE PERCENT: 76 %
NITRITE, URINE: NEGATIVE
O2 CONTENT, VEN: 11 ML/DL
O2 SAT, VEN: 100 %
O2 THERAPY: ABNORMAL
PCO2, VEN: 63.4 MMHG (ref 40–50)
PDW BLD-RTO: 16.8 % (ref 12.4–15.4)
PH UA: 5 (ref 5–8)
PH VENOUS: 7.42 (ref 7.35–7.45)
PLATELET # BLD: 279 K/UL (ref 135–450)
PMV BLD AUTO: 7.8 FL (ref 5–10.5)
PO2, VEN: 149 MMHG
POTASSIUM REFLEX MAGNESIUM: 4.6 MMOL/L (ref 3.5–5.1)
PRO-BNP: 7141 PG/ML (ref 0–124)
PROTEIN UA: NEGATIVE MG/DL
RBC # BLD: 2.87 M/UL (ref 4.2–5.9)
SODIUM BLD-SCNC: 137 MMOL/L (ref 136–145)
SPECIFIC GRAVITY UA: 1.01 (ref 1–1.03)
TCO2 CALC VENOUS: 42 MMOL/L
TOTAL PROTEIN: 8.7 G/DL (ref 6.4–8.2)
TROPONIN: 0.02 NG/ML
URINE REFLEX TO CULTURE: NORMAL
URINE TYPE: NORMAL
UROBILINOGEN, URINE: 0.2 E.U./DL
WBC # BLD: 9 K/UL (ref 4–11)

## 2020-01-22 PROCEDURE — 83605 ASSAY OF LACTIC ACID: CPT

## 2020-01-22 PROCEDURE — 96365 THER/PROPH/DIAG IV INF INIT: CPT

## 2020-01-22 PROCEDURE — 93010 ELECTROCARDIOGRAM REPORT: CPT | Performed by: INTERNAL MEDICINE

## 2020-01-22 PROCEDURE — 93005 ELECTROCARDIOGRAM TRACING: CPT | Performed by: PHYSICIAN ASSISTANT

## 2020-01-22 PROCEDURE — 85025 COMPLETE CBC W/AUTO DIFF WBC: CPT

## 2020-01-22 PROCEDURE — 94761 N-INVAS EAR/PLS OXIMETRY MLT: CPT

## 2020-01-22 PROCEDURE — 6370000000 HC RX 637 (ALT 250 FOR IP): Performed by: PHYSICIAN ASSISTANT

## 2020-01-22 PROCEDURE — 6370000000 HC RX 637 (ALT 250 FOR IP)

## 2020-01-22 PROCEDURE — 96375 TX/PRO/DX INJ NEW DRUG ADDON: CPT

## 2020-01-22 PROCEDURE — 84484 ASSAY OF TROPONIN QUANT: CPT

## 2020-01-22 PROCEDURE — 96367 TX/PROPH/DG ADDL SEQ IV INF: CPT

## 2020-01-22 PROCEDURE — 96374 THER/PROPH/DIAG INJ IV PUSH: CPT

## 2020-01-22 PROCEDURE — 2580000003 HC RX 258: Performed by: INTERNAL MEDICINE

## 2020-01-22 PROCEDURE — 6370000000 HC RX 637 (ALT 250 FOR IP): Performed by: INTERNAL MEDICINE

## 2020-01-22 PROCEDURE — 36415 COLL VENOUS BLD VENIPUNCTURE: CPT

## 2020-01-22 PROCEDURE — G0378 HOSPITAL OBSERVATION PER HR: HCPCS

## 2020-01-22 PROCEDURE — 80053 COMPREHEN METABOLIC PANEL: CPT

## 2020-01-22 PROCEDURE — 6360000002 HC RX W HCPCS: Performed by: PHYSICIAN ASSISTANT

## 2020-01-22 PROCEDURE — 71045 X-RAY EXAM CHEST 1 VIEW: CPT

## 2020-01-22 PROCEDURE — 99285 EMERGENCY DEPT VISIT HI MDM: CPT

## 2020-01-22 PROCEDURE — 82803 BLOOD GASES ANY COMBINATION: CPT

## 2020-01-22 PROCEDURE — 2700000000 HC OXYGEN THERAPY PER DAY

## 2020-01-22 PROCEDURE — 71250 CT THORAX DX C-: CPT

## 2020-01-22 PROCEDURE — 87040 BLOOD CULTURE FOR BACTERIA: CPT

## 2020-01-22 PROCEDURE — 6360000002 HC RX W HCPCS: Performed by: INTERNAL MEDICINE

## 2020-01-22 PROCEDURE — 83880 ASSAY OF NATRIURETIC PEPTIDE: CPT

## 2020-01-22 PROCEDURE — 94640 AIRWAY INHALATION TREATMENT: CPT

## 2020-01-22 PROCEDURE — 81003 URINALYSIS AUTO W/O SCOPE: CPT

## 2020-01-22 RX ORDER — ATORVASTATIN CALCIUM 40 MG/1
40 TABLET, FILM COATED ORAL NIGHTLY
Status: DISCONTINUED | OUTPATIENT
Start: 2020-01-22 | End: 2020-02-14 | Stop reason: HOSPADM

## 2020-01-22 RX ORDER — IPRATROPIUM BROMIDE AND ALBUTEROL SULFATE 2.5; .5 MG/3ML; MG/3ML
1 SOLUTION RESPIRATORY (INHALATION)
Status: COMPLETED | OUTPATIENT
Start: 2020-01-22 | End: 2020-01-22

## 2020-01-22 RX ORDER — NITROGLYCERIN 0.4 MG/1
0.4 TABLET SUBLINGUAL EVERY 5 MIN PRN
Status: DISCONTINUED | OUTPATIENT
Start: 2020-01-22 | End: 2020-02-14 | Stop reason: HOSPADM

## 2020-01-22 RX ORDER — SODIUM CHLORIDE 0.9 % (FLUSH) 0.9 %
10 SYRINGE (ML) INJECTION EVERY 12 HOURS SCHEDULED
Status: DISCONTINUED | OUTPATIENT
Start: 2020-01-22 | End: 2020-01-29 | Stop reason: SDUPTHER

## 2020-01-22 RX ORDER — LEVOFLOXACIN 5 MG/ML
500 INJECTION, SOLUTION INTRAVENOUS EVERY 24 HOURS
Status: DISCONTINUED | OUTPATIENT
Start: 2020-01-22 | End: 2020-01-23

## 2020-01-22 RX ORDER — POLYETHYLENE GLYCOL 3350 17 G/17G
17 POWDER, FOR SOLUTION ORAL 2 TIMES DAILY
Status: DISCONTINUED | OUTPATIENT
Start: 2020-01-22 | End: 2020-02-14 | Stop reason: HOSPADM

## 2020-01-22 RX ORDER — FUROSEMIDE 10 MG/ML
40 INJECTION INTRAMUSCULAR; INTRAVENOUS ONCE
Status: COMPLETED | OUTPATIENT
Start: 2020-01-22 | End: 2020-01-22

## 2020-01-22 RX ORDER — IPRATROPIUM BROMIDE AND ALBUTEROL SULFATE 2.5; .5 MG/3ML; MG/3ML
1 SOLUTION RESPIRATORY (INHALATION)
Status: DISCONTINUED | OUTPATIENT
Start: 2020-01-22 | End: 2020-02-14 | Stop reason: HOSPADM

## 2020-01-22 RX ORDER — SODIUM CHLORIDE 0.9 % (FLUSH) 0.9 %
10 SYRINGE (ML) INJECTION PRN
Status: DISCONTINUED | OUTPATIENT
Start: 2020-01-22 | End: 2020-01-29 | Stop reason: SDUPTHER

## 2020-01-22 RX ORDER — SELENIUM 50 MCG
2 TABLET ORAL 2 TIMES DAILY
Status: DISCONTINUED | OUTPATIENT
Start: 2020-01-22 | End: 2020-01-22

## 2020-01-22 RX ORDER — ASPIRIN 81 MG/1
81 TABLET, CHEWABLE ORAL DAILY
Status: DISCONTINUED | OUTPATIENT
Start: 2020-01-23 | End: 2020-02-14 | Stop reason: HOSPADM

## 2020-01-22 RX ORDER — OXYCODONE AND ACETAMINOPHEN 7.5; 325 MG/1; MG/1
1 TABLET ORAL ONCE
Status: COMPLETED | OUTPATIENT
Start: 2020-01-22 | End: 2020-01-22

## 2020-01-22 RX ORDER — AMIODARONE HYDROCHLORIDE 200 MG/1
200 TABLET ORAL DAILY
Status: DISCONTINUED | OUTPATIENT
Start: 2020-01-23 | End: 2020-02-14 | Stop reason: HOSPADM

## 2020-01-22 RX ORDER — POTASSIUM CHLORIDE 1500 MG/1
40 TABLET, FILM COATED, EXTENDED RELEASE ORAL 3 TIMES DAILY
Status: ON HOLD | COMMUNITY
End: 2020-02-14 | Stop reason: HOSPADM

## 2020-01-22 RX ORDER — OXYCODONE AND ACETAMINOPHEN 7.5; 325 MG/1; MG/1
1 TABLET ORAL EVERY 4 HOURS PRN
Status: ON HOLD | COMMUNITY
End: 2020-02-14 | Stop reason: SDUPTHER

## 2020-01-22 RX ORDER — IPRATROPIUM BROMIDE AND ALBUTEROL SULFATE 2.5; .5 MG/3ML; MG/3ML
SOLUTION RESPIRATORY (INHALATION)
Status: COMPLETED
Start: 2020-01-22 | End: 2020-01-22

## 2020-01-22 RX ORDER — METHYLPREDNISOLONE SODIUM SUCCINATE 125 MG/2ML
125 INJECTION, POWDER, LYOPHILIZED, FOR SOLUTION INTRAMUSCULAR; INTRAVENOUS ONCE
Status: COMPLETED | OUTPATIENT
Start: 2020-01-22 | End: 2020-01-22

## 2020-01-22 RX ORDER — OXYCODONE AND ACETAMINOPHEN 7.5; 325 MG/1; MG/1
1 TABLET ORAL EVERY 4 HOURS PRN
Status: DISCONTINUED | OUTPATIENT
Start: 2020-01-22 | End: 2020-02-14 | Stop reason: HOSPADM

## 2020-01-22 RX ORDER — TAMSULOSIN HYDROCHLORIDE 0.4 MG/1
0.4 CAPSULE ORAL DAILY
Status: DISCONTINUED | OUTPATIENT
Start: 2020-01-23 | End: 2020-02-14 | Stop reason: HOSPADM

## 2020-01-22 RX ORDER — RANOLAZINE 500 MG/1
500 TABLET, EXTENDED RELEASE ORAL 2 TIMES DAILY
Status: DISCONTINUED | OUTPATIENT
Start: 2020-01-22 | End: 2020-02-14 | Stop reason: HOSPADM

## 2020-01-22 RX ADMIN — RANOLAZINE 500 MG: 500 TABLET, FILM COATED, EXTENDED RELEASE ORAL at 22:18

## 2020-01-22 RX ADMIN — IPRATROPIUM BROMIDE AND ALBUTEROL SULFATE 1 AMPULE: .5; 3 SOLUTION RESPIRATORY (INHALATION) at 15:34

## 2020-01-22 RX ADMIN — OXYCODONE HYDROCHLORIDE AND ACETAMINOPHEN 1 TABLET: 7.5; 325 TABLET ORAL at 19:52

## 2020-01-22 RX ADMIN — ATORVASTATIN CALCIUM 40 MG: 40 TABLET, FILM COATED ORAL at 22:18

## 2020-01-22 RX ADMIN — METHYLPREDNISOLONE SODIUM SUCCINATE 125 MG: 125 INJECTION, POWDER, FOR SOLUTION INTRAMUSCULAR; INTRAVENOUS at 16:50

## 2020-01-22 RX ADMIN — IPRATROPIUM BROMIDE AND ALBUTEROL SULFATE 1 AMPULE: .5; 3 SOLUTION RESPIRATORY (INHALATION) at 20:14

## 2020-01-22 RX ADMIN — SODIUM CHLORIDE, PRESERVATIVE FREE 10 ML: 5 INJECTION INTRAVENOUS at 22:19

## 2020-01-22 RX ADMIN — IPRATROPIUM BROMIDE AND ALBUTEROL SULFATE 1 AMPULE: .5; 3 SOLUTION RESPIRATORY (INHALATION) at 15:01

## 2020-01-22 RX ADMIN — FUROSEMIDE 40 MG: 10 INJECTION, SOLUTION INTRAMUSCULAR; INTRAVENOUS at 19:44

## 2020-01-22 RX ADMIN — LEVOFLOXACIN 500 MG: 5 INJECTION, SOLUTION INTRAVENOUS at 22:18

## 2020-01-22 RX ADMIN — IPRATROPIUM BROMIDE AND ALBUTEROL SULFATE 1 AMPULE: .5; 3 SOLUTION RESPIRATORY (INHALATION) at 15:39

## 2020-01-22 RX ADMIN — Medication 1250 MG: at 23:31

## 2020-01-22 ASSESSMENT — PAIN DESCRIPTION - PAIN TYPE: TYPE: CHRONIC PAIN

## 2020-01-22 ASSESSMENT — PAIN DESCRIPTION - DESCRIPTORS: DESCRIPTORS: CONSTANT

## 2020-01-22 ASSESSMENT — PAIN DESCRIPTION - ORIENTATION: ORIENTATION: LOWER

## 2020-01-22 ASSESSMENT — PAIN DESCRIPTION - LOCATION: LOCATION: BACK

## 2020-01-22 ASSESSMENT — PAIN SCALES - GENERAL
PAINLEVEL_OUTOF10: 6
PAINLEVEL_OUTOF10: 0

## 2020-01-22 ASSESSMENT — PAIN DESCRIPTION - FREQUENCY: FREQUENCY: CONTINUOUS

## 2020-01-22 NOTE — ED NOTES
Patient resting comfortably, respirations easy, unlabored. Patient in no acute distress. Denies needs at this time. Call light within reach, bed in lowest position, side rails up x 2.         Khris Bowling RN  01/22/20 [id number]

## 2020-01-22 NOTE — ED NOTES
Pt complained about breathing tx the entire time he took it. He stated that it tasted weird and he didn't know if he was physically able to hold the [facility].

## 2020-01-22 NOTE — ED TRIAGE NOTES
Patient to ED from [facility] via EMS for Shortness of breath x4 days. Patient has history of COPD and is on 3L NC all the time. Patient expiratory wheezing. Patient 97% on 3L NC, /79, RR 22 and patient is A-fib on the monitor. Patient has history of a-fib. 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 Patient talking in complete sentences at this time. Skin warm and dry and appropriate for ethnicity. No acute distress noted at this time. Patient placed on continuous telemetry and pulse ox upon arrival to room.
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
## 2020-01-22 NOTE — ED PROVIDER NOTES
[address] [place] [address]  eMERGENCY dEPARTMENT eNCOUnter   Physician Attestation    Pt Name: Sharon Lerma  MRN: [id number]  Dickgfelisa 1960  Date of evaluation: 1/22/20        Physician Note:    I havepersonally performed and/or participated in the history, exam and medical decision making and agree with all pertinent clinical information. I have also reviewed and agree with the past medical, family and social historyunless otherwise noted. I have personally performed a face to face diagnostic evaluation onthis patient. I have reviewed the mid-levels findings and agree. History: This is a 51-year-old male with multiple medical problems. Currently lives in a nursing home. He has a history of CHF COPD peripheral vascular disease. Status post left above-the-knee amputation. Comes in for increasing dyspnea over the past 3 to 4 days. Physical Exam  Constitutional:       Appearance: He is well-developed. He is ill-appearing. He is not diaphoretic. HENT:      Head: Normocephalic and atraumatic. Right Ear: External ear normal.      Left Ear: External ear normal.   Eyes:      General: No scleral icterus. Right eye: No discharge. Left eye: No discharge. Neck:      Musculoskeletal: Normal range of motion. Thyroid: No thyromegaly. Vascular: No JVD. Trachea: No tracheal deviation. Cardiovascular:      Rate and Rhythm: Tachycardia present. Rhythm irregularly irregular. Heart sounds: Heart sounds are distant. No murmur. No friction rub. No gallop. Pulmonary:      Effort: Tachypnea and respiratory distress present. Breath sounds: No stridor. Examination of the left-upper field reveals decreased breath sounds. Examination of the right-middle field reveals wheezing. Examination of the left-middle field reveals decreased breath sounds and wheezing. Examination of the right-lower field reveals wheezing.  Examination of the left-lower field reveals decreased breath sounds and wheezing. Decreased breath sounds and wheezing present. No rales. Abdominal:      General: There is no distension. Palpations: Abdomen is soft. Tenderness: There is no tenderness. There is no guarding or rebound. Comments: Patient has a colostomy in the right mid to lower quadrant   Musculoskeletal:         General: No tenderness. Comments: Left AKA. Chronic edema and stasis changes in the right leg. Skin:     General: Skin is warm and dry. Findings: No rash (On exposed body surfaces). Neurological:      Mental Status: He is alert and oriented to person, place, and time. Coordination: Coordination normal.   Psychiatric:         Behavior: Behavior normal.         Thought Content: Thought content normal.       EKG visualized preliminarily interpreted by myself shows atrial fibrillation with a rate of 101. Low voltage EKG throughout. Evidence of previous septal wall infarct. Nonspecific T wave flattening throughout. Axis is 89. No significant difference compared to an EKG from December 31 of 2019.    1. Acute on chronic congestive heart failure, unspecified heart failure type ([address]    2. Bilateral pleural effusion          DISPOSITION/PLAN  PATIENT REFERRED TO:  Shai Foley MD  [facility].   Kat Lowe  [phone]          DISCHARGE MEDICATIONS:  Current Discharge Medication List            MD Jodie Tracey MD  01/22/20 9777

## 2020-01-22 NOTE — PROGRESS NOTES
Medication Reconciliation     List of medications patient is currently taking is complete. Source of information: 1. Conversation with patient at bedside                                      2. EPIC records                                       [address] E[facility] med list     Allergies  Rocephin [ceftriaxone] and Demadex [torsemide]     Notes regarding home medications:   1. Patient has received his home medications prior to arrival to the emergency department today. 2. Patient is on Xarelto for atrial fibrillation.     Driss Kim, Pharmacy Intern  1/22/2020 4:27 PM

## 2020-01-23 PROBLEM — I25.10 CAD (CORONARY ARTERY DISEASE): Chronic | Status: ACTIVE | Noted: 2018-12-24

## 2020-01-23 PROBLEM — I50.20 SYSTOLIC CHF (HCC): Status: ACTIVE | Noted: 2020-01-23

## 2020-01-23 PROBLEM — E11.9 DIABETES MELLITUS TYPE 2, CONTROLLED (HCC): Chronic | Status: ACTIVE | Noted: 2017-11-13

## 2020-01-23 LAB
ANION GAP SERPL CALCULATED.3IONS-SCNC: 12 MMOL/L (ref 3–16)
BASOPHILS ABSOLUTE: 0 K/UL (ref 0–0.2)
BASOPHILS RELATIVE PERCENT: 0.1 %
BUN BLDV-MCNC: 15 MG/DL (ref 7–20)
CALCIUM SERPL-MCNC: 8.9 MG/DL (ref 8.3–10.6)
CHLORIDE BLD-SCNC: 92 MMOL/L (ref 99–110)
CO2: 34 MMOL/L (ref 21–32)
CREAT SERPL-MCNC: 0.7 MG/DL (ref 0.9–1.3)
EOSINOPHILS ABSOLUTE: 0 K/UL (ref 0–0.6)
EOSINOPHILS RELATIVE PERCENT: 0 %
GFR AFRICAN AMERICAN: >60
GFR NON-AFRICAN AMERICAN: >60
GLUCOSE BLD-MCNC: 128 MG/DL (ref 70–99)
GLUCOSE BLD-MCNC: 140 MG/DL (ref 70–99)
GLUCOSE BLD-MCNC: 141 MG/DL (ref 70–99)
HCT VFR BLD CALC: 25.3 % (ref 40.5–52.5)
HEMOGLOBIN: 8.2 G/DL (ref 13.5–17.5)
LACTIC ACID, SEPSIS: 2.5 MMOL/L (ref 0.4–1.9)
LYMPHOCYTES ABSOLUTE: 0.5 K/UL (ref 1–5.1)
LYMPHOCYTES RELATIVE PERCENT: 6.4 %
MAGNESIUM: 1.7 MG/DL (ref 1.8–2.4)
MCH RBC QN AUTO: 27.6 PG (ref 26–34)
MCHC RBC AUTO-ENTMCNC: 32.4 G/DL (ref 31–36)
MCV RBC AUTO: 85 FL (ref 80–100)
MONOCYTES ABSOLUTE: 0.2 K/UL (ref 0–1.3)
MONOCYTES RELATIVE PERCENT: 2.5 %
NEUTROPHILS ABSOLUTE: 6.6 K/UL (ref 1.7–7.7)
NEUTROPHILS RELATIVE PERCENT: 91 %
PDW BLD-RTO: 17 % (ref 12.4–15.4)
PERFORMED ON: ABNORMAL
PERFORMED ON: ABNORMAL
PLATELET # BLD: 306 K/UL (ref 135–450)
PMV BLD AUTO: 8.2 FL (ref 5–10.5)
POTASSIUM REFLEX MAGNESIUM: 5.1 MMOL/L (ref 3.5–5.1)
RBC # BLD: 2.97 M/UL (ref 4.2–5.9)
SODIUM BLD-SCNC: 138 MMOL/L (ref 136–145)
WBC # BLD: 7.3 K/UL (ref 4–11)

## 2020-01-23 PROCEDURE — 6360000002 HC RX W HCPCS: Performed by: INTERNAL MEDICINE

## 2020-01-23 PROCEDURE — 83735 ASSAY OF MAGNESIUM: CPT

## 2020-01-23 PROCEDURE — 36415 COLL VENOUS BLD VENIPUNCTURE: CPT

## 2020-01-23 PROCEDURE — 85025 COMPLETE CBC W/AUTO DIFF WBC: CPT

## 2020-01-23 PROCEDURE — 2700000000 HC OXYGEN THERAPY PER DAY

## 2020-01-23 PROCEDURE — 94640 AIRWAY INHALATION TREATMENT: CPT

## 2020-01-23 PROCEDURE — 94760 N-INVAS EAR/PLS OXIMETRY 1: CPT

## 2020-01-23 PROCEDURE — 2580000003 HC RX 258: Performed by: INTERNAL MEDICINE

## 2020-01-23 PROCEDURE — 6370000000 HC RX 637 (ALT 250 FOR IP): Performed by: INTERNAL MEDICINE

## 2020-01-23 PROCEDURE — 96375 TX/PRO/DX INJ NEW DRUG ADDON: CPT

## 2020-01-23 PROCEDURE — 96366 THER/PROPH/DIAG IV INF ADDON: CPT

## 2020-01-23 PROCEDURE — 1200000000 HC SEMI PRIVATE

## 2020-01-23 PROCEDURE — 83605 ASSAY OF LACTIC ACID: CPT

## 2020-01-23 PROCEDURE — 87086 URINE CULTURE/COLONY COUNT: CPT

## 2020-01-23 PROCEDURE — 87449 NOS EACH ORGANISM AG IA: CPT

## 2020-01-23 PROCEDURE — 80048 BASIC METABOLIC PNL TOTAL CA: CPT

## 2020-01-23 RX ORDER — MAGNESIUM SULFATE 1 G/100ML
1 INJECTION INTRAVENOUS PRN
Status: DISCONTINUED | OUTPATIENT
Start: 2020-01-23 | End: 2020-02-07

## 2020-01-23 RX ORDER — DEXTROSE MONOHYDRATE 50 MG/ML
100 INJECTION, SOLUTION INTRAVENOUS PRN
Status: DISCONTINUED | OUTPATIENT
Start: 2020-01-23 | End: 2020-01-23 | Stop reason: SDUPTHER

## 2020-01-23 RX ORDER — SPIRONOLACTONE 25 MG/1
50 TABLET ORAL DAILY
Status: DISCONTINUED | OUTPATIENT
Start: 2020-01-23 | End: 2020-01-30

## 2020-01-23 RX ORDER — DEXTROSE MONOHYDRATE 50 MG/ML
100 INJECTION, SOLUTION INTRAVENOUS PRN
Status: DISCONTINUED | OUTPATIENT
Start: 2020-01-23 | End: 2020-02-14 | Stop reason: HOSPADM

## 2020-01-23 RX ORDER — INSULIN GLARGINE 100 [IU]/ML
30 INJECTION, SOLUTION SUBCUTANEOUS NIGHTLY
Status: DISCONTINUED | OUTPATIENT
Start: 2020-01-23 | End: 2020-01-31

## 2020-01-23 RX ORDER — FUROSEMIDE 10 MG/ML
80 INJECTION INTRAMUSCULAR; INTRAVENOUS 2 TIMES DAILY
Status: DISCONTINUED | OUTPATIENT
Start: 2020-01-23 | End: 2020-01-29

## 2020-01-23 RX ORDER — NICOTINE POLACRILEX 4 MG
15 LOZENGE BUCCAL PRN
Status: DISCONTINUED | OUTPATIENT
Start: 2020-01-23 | End: 2020-02-14 | Stop reason: HOSPADM

## 2020-01-23 RX ORDER — NICOTINE POLACRILEX 4 MG
15 LOZENGE BUCCAL PRN
Status: DISCONTINUED | OUTPATIENT
Start: 2020-01-23 | End: 2020-01-23 | Stop reason: SDUPTHER

## 2020-01-23 RX ORDER — METOLAZONE 5 MG/1
5 TABLET ORAL ONCE
Status: COMPLETED | OUTPATIENT
Start: 2020-01-23 | End: 2020-01-23

## 2020-01-23 RX ORDER — DEXTROSE MONOHYDRATE 25 G/50ML
12.5 INJECTION, SOLUTION INTRAVENOUS PRN
Status: DISCONTINUED | OUTPATIENT
Start: 2020-01-23 | End: 2020-02-14 | Stop reason: HOSPADM

## 2020-01-23 RX ORDER — DEXTROSE MONOHYDRATE 25 G/50ML
12.5 INJECTION, SOLUTION INTRAVENOUS PRN
Status: DISCONTINUED | OUTPATIENT
Start: 2020-01-23 | End: 2020-01-23 | Stop reason: SDUPTHER

## 2020-01-23 RX ADMIN — OYSTER SHELL CALCIUM WITH VITAMIN D 1 TABLET: 500; 200 TABLET, FILM COATED ORAL at 09:28

## 2020-01-23 RX ADMIN — AMIODARONE HYDROCHLORIDE 200 MG: 200 TABLET ORAL at 09:28

## 2020-01-23 RX ADMIN — TAMSULOSIN HYDROCHLORIDE 0.4 MG: 0.4 CAPSULE ORAL at 09:28

## 2020-01-23 RX ADMIN — FUROSEMIDE 80 MG: 10 INJECTION, SOLUTION INTRAMUSCULAR; INTRAVENOUS at 17:50

## 2020-01-23 RX ADMIN — IPRATROPIUM BROMIDE AND ALBUTEROL SULFATE 1 AMPULE: .5; 3 SOLUTION RESPIRATORY (INHALATION) at 16:01

## 2020-01-23 RX ADMIN — IPRATROPIUM BROMIDE AND ALBUTEROL SULFATE 1 AMPULE: .5; 3 SOLUTION RESPIRATORY (INHALATION) at 03:05

## 2020-01-23 RX ADMIN — RANOLAZINE 500 MG: 500 TABLET, FILM COATED, EXTENDED RELEASE ORAL at 09:28

## 2020-01-23 RX ADMIN — IPRATROPIUM BROMIDE AND ALBUTEROL SULFATE 1 AMPULE: .5; 3 SOLUTION RESPIRATORY (INHALATION) at 08:02

## 2020-01-23 RX ADMIN — FUROSEMIDE 80 MG: 10 INJECTION, SOLUTION INTRAMUSCULAR; INTRAVENOUS at 09:28

## 2020-01-23 RX ADMIN — IPRATROPIUM BROMIDE AND ALBUTEROL SULFATE 1 AMPULE: .5; 3 SOLUTION RESPIRATORY (INHALATION) at 21:42

## 2020-01-23 RX ADMIN — ENOXAPARIN SODIUM 40 MG: 40 INJECTION SUBCUTANEOUS at 09:28

## 2020-01-23 RX ADMIN — RANOLAZINE 500 MG: 500 TABLET, FILM COATED, EXTENDED RELEASE ORAL at 20:22

## 2020-01-23 RX ADMIN — OXYCODONE HYDROCHLORIDE AND ACETAMINOPHEN 1 TABLET: 7.5; 325 TABLET ORAL at 20:22

## 2020-01-23 RX ADMIN — SODIUM CHLORIDE, PRESERVATIVE FREE 10 ML: 5 INJECTION INTRAVENOUS at 20:23

## 2020-01-23 RX ADMIN — ATORVASTATIN CALCIUM 40 MG: 40 TABLET, FILM COATED ORAL at 20:22

## 2020-01-23 RX ADMIN — SODIUM CHLORIDE, PRESERVATIVE FREE 10 ML: 5 INJECTION INTRAVENOUS at 09:29

## 2020-01-23 RX ADMIN — ASPIRIN 81 MG 81 MG: 81 TABLET ORAL at 09:28

## 2020-01-23 RX ADMIN — METOLAZONE 5 MG: 5 TABLET ORAL at 06:26

## 2020-01-23 RX ADMIN — IPRATROPIUM BROMIDE AND ALBUTEROL SULFATE 1 AMPULE: .5; 3 SOLUTION RESPIRATORY (INHALATION) at 12:14

## 2020-01-23 RX ADMIN — SPIRONOLACTONE 50 MG: 25 TABLET ORAL at 09:28

## 2020-01-23 ASSESSMENT — PAIN DESCRIPTION - PAIN TYPE: TYPE: CHRONIC PAIN

## 2020-01-23 ASSESSMENT — PAIN DESCRIPTION - PROGRESSION: CLINICAL_PROGRESSION: GRADUALLY WORSENING

## 2020-01-23 ASSESSMENT — PAIN DESCRIPTION - ONSET: ONSET: GRADUAL

## 2020-01-23 ASSESSMENT — PAIN SCALES - GENERAL
PAINLEVEL_OUTOF10: 7
PAINLEVEL_OUTOF10: 0

## 2020-01-23 ASSESSMENT — PAIN DESCRIPTION - DESCRIPTORS: DESCRIPTORS: CONSTANT

## 2020-01-23 ASSESSMENT — PAIN DESCRIPTION - LOCATION: LOCATION: BACK

## 2020-01-23 ASSESSMENT — PAIN DESCRIPTION - FREQUENCY: FREQUENCY: CONTINUOUS

## 2020-01-23 ASSESSMENT — PAIN - FUNCTIONAL ASSESSMENT: PAIN_FUNCTIONAL_ASSESSMENT: ACTIVITIES ARE NOT PREVENTED

## 2020-01-23 ASSESSMENT — PAIN DESCRIPTION - ORIENTATION: ORIENTATION: LOWER

## 2020-01-23 NOTE — PROGRESS NOTES
Hospitalist Progress Note  1/23/2020 10:24 AM    PCP: Santos Bustillo MD    [phone]     Date of Admission: 1/22/2020                                                                                                                     HOSPITAL COURSE    Patient demographics:  The patient  Basilio Gordon is a 61 y.o. male     Significant past medical history:   Patient Active Problem List   Diagnosis    Arthritis    Diabetes mellitus with hyperglycemia ([facility]    HBP (high blood pressure)    Hyperlipidemia    COPD (chronic obstructive pulmonary disease) (HCC)    Viral hepatitis C    Back pain    PAD (peripheral artery disease) ([facility]    Spinal stenosis of lumbar region    Tobacco use    Atherosclerosis of native artery of left lower extremity with intermittent claudication (HCC)    Chest pain    Pleural effusion due to CHF (congestive heart failure) ([facility])    Pleural effusion, right    Alcohol abuse, continuous    Tachycardia    Atrial fibrillation with RVR ([facility])    Hyponatremia    Congestive heart failure ([facility])    REJI (acute kidney injury) ([facility]    Hypokalemia    Coronary artery disease involving autologous artery coronary bypass graft with angina pectoris ([facility]    Essential hypertension    Chest pain of uncertain etiology    Acute on chronic combined systolic and diastolic HF (heart failure) (HCC)    Acute hyperkalemia    Typical atrial flutter (HCC)    Chronic systolic HF (heart failure) (HCC)    Hypotension    Vasovagal syncope    Laceration of scalp without foreign body    Nonischemic cardiomyopathy (HCC)    Syncope and collapse    Ischemic foot    Diabetes mellitus with peripheral circulatory disorder (HCC)    Limb ischemia    COPD exacerbation (HCC)    Nonhealing surgical wound    Acromioclavicular joint arthritis    Bradycardia    Shortness of breath    Permanent atrial fibrillation    Chronic atrial fibrillation    Other specified injury of inferior mesenteric vein, initial encounter    Postprocedural hypotension    Acute respiratory failure with hypercapnia (HCC)    High anion gap metabolic acidosis    Centrilobular emphysema (HCC)    Hypomagnesemia    Heart failure, acute on chronic, systolic and diastolic (HCC)    Atrial fibrillation, persistent    Anemia    Diabetes mellitus type 2, controlled (HCC)    Constipation    Acute on chronic systolic heart failure (HCC)    Atrial fibrillation, rapid (HCC)    COPD with acute exacerbation (HCC)    Cocaine use    Severe sepsis (HCC)    Atrial fibrillation with RVR (HCC)    Acute on chronic respiratory failure with hypoxia (HCC)    Respiratory distress    Biventricular HF (heart failure) (HCC)    Nicotine dependence    Suspected sleep apnea    Coronary artery disease involving native coronary artery of native heart without angina pectoris    CAD (coronary artery disease)    Acute renal failure (ARF) (HCC)    Morbid obesity due to excess calories (HCC)    Acute respiratory failure with hypoxia (HCC)    Nocturnal hypoxia    Hypercapnemia    SOB (shortness of breath)    Acute on chronic respiratory failure with hypercapnia (HCC)    Chronic respiratory failure with hypercapnia (HCC)    Acute pulmonary edema (HCC)    Acute on chronic systolic HF (heart failure) (HCC)    Hx of AKA (above knee amputation), left (HCC)    Respiratory failure (HCC)    HCAP (healthcare-associated pneumonia)    Ventricular tachycardia (HCC)    Shock circulatory (HCC)    Tachypnea    Neutrophilic leukocytosis    Chronic respiratory failure with hypoxia (HCC)    Cardiac arrest (HCC)    PEA (Pulseless electrical activity) (HCC)    Ileus (HCC)    Pneumonia of right lower lobe due to infectious organism [facility])    Atrial fibrillation, controlled ([address]    Pulmonary HTN ([address]    Weakness of right lower extremity    Large bowel obstruction ([address]    Tobacco abuse    Intra-abdominal abscess ([address]    Intra-abdominal without foreign body    Nonischemic cardiomyopathy [facility]    Syncope and collapse    Ischemic foot    Diabetes mellitus with peripheral circulatory disorder (HCC)    Limb ischemia    COPD exacerbation (HCC)    Nonhealing surgical wound    Acromioclavicular joint arthritis    Bradycardia    Shortness of breath    Permanent atrial fibrillation    Chronic atrial fibrillation    Other specified injury of inferior mesenteric vein, initial encounter    Postprocedural hypotension    Acute respiratory failure with hypercapnia (HCC)    High anion gap metabolic acidosis    Centrilobular emphysema (HCC)    Hypomagnesemia    Heart failure, acute on chronic, systolic and diastolic (HCC)    Atrial fibrillation, persistent    Anemia    Diabetes mellitus type 2, controlled ([facility]    Constipation    Acute on chronic systolic heart failure (HCC)    Atrial fibrillation, rapid (HCC)    COPD with acute exacerbation (HCC)    Cocaine use    Severe sepsis (HCC)    Atrial fibrillation with RVR ([facility])    Acute on chronic respiratory failure with hypoxia ([facility])    Respiratory distress    Biventricular HF (heart failure) ([facility])    Nicotine dependence    Suspected sleep apnea    Coronary artery disease involving native coronary artery of native heart without angina pectoris    CAD (coronary artery disease)    Acute renal failure (ARF) (HCC)    Morbid obesity due to excess calories (HCC)    Acute respiratory failure with hypoxia (HCC)    Nocturnal hypoxia    Hypercapnemia    SOB (shortness of breath)    Acute on chronic respiratory failure with hypercapnia (HCC)    Chronic respiratory failure with hypercapnia (HCC)    Acute pulmonary edema (HCC)    Acute on chronic systolic HF (heart failure) ([facility])    Hx of AKA (above knee amputation), left ([facility])    Respiratory failure ([facility]    HCAP (healthcare-associated pneumonia)    Ventricular tachycardia (HCC)    Shock circulatory ([facility])    Tachypnea    Neutrophilic leukocytosis    Chronic respiratory failure with hypoxia (HCC)    Cardiac arrest (HCC)    PEA (Pulseless electrical activity) (HCC)    Ileus (HCC)    Pneumonia of right lower lobe due to infectious organism [facility])    Atrial fibrillation, controlled ([facility]    Pulmonary HTN ([facility]    Weakness of right lower extremity    Large bowel obstruction (HCC)    Tobacco abuse    Intra-abdominal abscess (HCC)    Intra-abdominal fluid collection    Moderate malnutrition ([facility]    NSTEMI (non-ST elevated myocardial infarction) ([facility])       Allergies  Rocephin [ceftriaxone] and Demadex [torsemide]    Medications    Scheduled Meds:   furosemide  80 mg Intravenous BID    spironolactone  50 mg Oral Daily    ipratropium-albuterol  1 ampule Inhalation Q4H WA    amiodarone  200 mg Oral Daily    aspirin  81 mg Oral Daily    atorvastatin  40 mg Oral Nightly    calcium-vitamin D  1 tablet Oral Daily    polyethylene glycol  17 g Oral BID    ranolazine  500 mg Oral BID    tamsulosin  0.4 mg Oral Daily    sodium chloride flush  10 mL Intravenous 2 times per day    enoxaparin  40 mg Subcutaneous Daily     Continuous Infusions:  PRN Meds:  nitroGLYCERIN, oxyCODONE-acetaminophen, sodium chloride flush    Vitals   Vitals /wt   Patient Vitals for the past 8 hrs:   BP Temp Temp src Pulse Resp SpO2 Weight   01/23/20 0924 126/89 97.5 °F (36.4 °C) Oral 118 18 (!) 89 % --   01/23/20 0805 -- -- -- -- 18 96 % --   01/23/20 0503 123/88 97.3 °F (36.3 °C) Oral 120 16 90 % 197 lb 8.5 oz (89.6 kg)   01/23/20 0305 -- -- -- -- 18 94 % --        72HR INTAKE/OUTPUT:      Intake/Output Summary (Last 24 hours) at 1/23/2020 1024  Last data filed at 1/23/2020 1015  Gross per 24 hour   Intake 80 ml   Output 725 ml   Net -645 ml       Exam:    Gen:   Alert and oriented ×3  Eyes: PERRL. No sclera icterus. No conjunctival injection. ENT: No discharge. Pharynx clear. External appearance of ears and nose normal.  Neck: Trachea midline.  No obvious mass. Resp: Decreased breath sounds bilaterally  CV: Regular rate. Regular rhythm. No murmur or rub. No edema. GI: Non-tender. Non-distended. No hernia. Skin: Warm, dry, normal texture and turgor. Lymph: No cervical LAD. No supraclavicular LAD. M/S: / Ext. No cyanosis. No clubbing. No joint deformity. Neuro: CN 2-12 are intact,  no neurologic deficits noted. PT/INR: No results for input(s): PROTIME, INR in the last 72 hours. APTT: No results for input(s): APTT in the last 72 hours. CBC:   Recent Labs     01/22/20  1629 01/23/20  0451   WBC 9.0 7.3   HGB 8.0* 8.2*   HCT 24.6* 25.3*   MCV 85.5 85.0    306       BMP:   Recent Labs     01/22/20  1629 01/23/20  0451    138   K 4.6 5.1   CL 92* 92*   CO2 33* 34*   BUN 15 15   CREATININE 0.7* 0.7*       LIVER PROFILE:   Recent Labs     01/22/20 1629   ALKPHOS 103   AST 26   ALT 16   BILITOT 0.3     No results for input(s): AMYLASE in the last 72 hours. No results for input(s): LIPASE in the last 72 hours. UA:No results for input(s): NITRITE, LABCAST, WBCUA, RBCUA, MUCUS in the last 72 hours. TROPONIN:   Recent Labs     01/22/20 1629   TROPONINI 0.02*       Lab Results   Component Value Date/Time    URRFLXCULT Not Indicated 01/22/2020 07:38 PM       No results for input(s): TSHREFLEX in the last 72 hours. No components found for: WPD9000  POC GLUCOSE:  No results for input(s): POCGLU in the last 72 hours. No results for input(s): LABA1C in the last 72 hours.    Lab Results   Component Value Date    LABA1C 5.6 11/11/2019         ASSESSMENT AND PLAN    Acute systolic CHF  IV Lasix, metolazone, spironolactone, low-sodium diet, heart rate and blood pressure control, continuous cardiac monitoring, I/O, daily weights  Titrate oxygen for saturations greater than or equal to 90%      Bilateral pleural effusions  Cardiomegaly  Likely related to CHF  Continue on diuretics and a repeat chest x-ray    COPD unspecified J44.9  Continue with the bronchodilators      Type 2 diabetes mellitus E11.9  hold OHA  Insulin sliding scale      Essential primary hypertension I10  Continue patient on home medication    Anxiety depression F 41.9  Continue on home medication    Atrial fibrillation I48.91  Heart rate is controlled  Continue patient on xarelto           Code Status: Full Code        Dispo -Continue care        The patient and / or the family were informed of the results of any tests, a time was given to answer questions, a plan was proposed and they agreed with plan. Emily Sawyer MD    This note was transcribed using [id number] [employer]. Please disregard any translational errors.

## 2020-01-23 NOTE — ED NOTES
Received call from [facility] at [facility]. She was requesting update on patient and if he was being admitted or not.  Informed RN there was not a decision made at this time     Ronny Mcpherson RN  01/22/20 5826

## 2020-01-23 NOTE — PLAN OF CARE
Problem: Risk for Impaired Skin Integrity  Goal: Tissue integrity - skin and mucous membranes  Description  Structural intactness and normal physiological function of skin and  mucous membranes. 1/23/2020 1645 by Kandace Leos RN  Outcome: Ongoing   Patient's skin assessed at beginning and throughout shift. No s/s breakdown visualized. Patient turned every 2 hours. Patient on specialty mattress. Gown and linens kept clean and dry.      Problem: Gas Exchange - Impaired:  Goal: Levels of oxygenation will improve  Description  Levels of oxygenation will improve  1/23/2020 1645 by Kandace Leos RN  Outcome: Ongoing

## 2020-01-23 NOTE — PROGRESS NOTES
4 Eyes Skin Assessment     The patient is being assess for  Admission    I agree that 2 RN's have performed a thorough Head to Toe Skin Assessment on the patient. ALL assessment sites listed below have been assessed. Areas assessed by both nurses:   [x]   Head, Face, and Ears   [x]   Shoulders, Back, and Chest  [x]   Arms, Elbows, and Hands   [x]   Coccyx, Sacrum, and IschIum  [x]   Legs, Feet, and Heels        Does the Patient have Skin Breakdown?   No         Carlos Alberto Prevention initiated:  Yes   Wound Care Orders initiated:  No      [facility] nurse consulted for Pressure Injury (Stage 3,4, Unstageable, DTI, NWPT, and Complex wounds), New and Established Ostomies:  No      Nurse 1 eSignature: Electronically signed by Jaden Townsend RN on 1/23/20 at 6:36 AM    **SHARE this note so that the co-signing nurse is able to place an eSignature**    Nurse 2 eSignature: Electronically signed by Vickey Espitia RN on 1/25/20 at 6:19 AM

## 2020-01-23 NOTE — CONSULTS
Clinical Pharmacy Note  Vancomycin Consult    Nimesh Guzman is a 61 y.o. male ordered Vancomycin for sepsis; consult received from Dr. Tj Foreman to manage therapy. Also receiving levaquin.     Patient Active Problem List   Diagnosis    Arthritis    Diabetes mellitus with hyperglycemia (HCC)    HBP (high blood pressure)    Hyperlipidemia    COPD (chronic obstructive pulmonary disease) (HCC)    Viral hepatitis C    Back pain    PAD (peripheral artery disease) ([address]    Spinal stenosis of lumbar region    Tobacco use    Atherosclerosis of native artery of left lower extremity with intermittent claudication (HCC)    Chest pain    Pleural effusion due to CHF (congestive heart failure) [facility]    Pleural effusion, right    Alcohol abuse, continuous    Tachycardia    Atrial fibrillation with RVR (HCC)    Hyponatremia    Congestive heart failure ([facility]    REJI (acute kidney injury) ([address]    Hypokalemia    Coronary artery disease involving autologous artery coronary bypass graft with angina pectoris ([facility]    Essential hypertension    Chest pain of uncertain etiology    Acute on chronic combined systolic and diastolic HF (heart failure) (HCC)    Acute hyperkalemia    Typical atrial flutter (HCC)    Chronic systolic HF (heart failure) (HCC)    Hypotension    Vasovagal syncope    Laceration of scalp without foreign body    Nonischemic cardiomyopathy (HCC)    Syncope and collapse    Ischemic foot    Diabetes mellitus with peripheral circulatory disorder (HCC)    Limb ischemia    COPD exacerbation (HCC)    Nonhealing surgical wound    Acromioclavicular joint arthritis    Bradycardia    Shortness of breath    Permanent atrial fibrillation    Chronic atrial fibrillation    Other specified injury of inferior mesenteric vein, initial encounter    Postprocedural hypotension    Acute respiratory failure with hypercapnia (HCC)    High anion gap metabolic acidosis    Centrilobular emphysema ([address]    Hypomagnesemia    Heart failure, acute on chronic, systolic and diastolic (HCC)    Atrial fibrillation, persistent    Anemia    Diabetes mellitus type 2, controlled ([address]    Constipation    Acute on chronic systolic heart failure (HCC)    Atrial fibrillation, rapid (HCC)    COPD with acute exacerbation (HCC)    Cocaine use    Severe sepsis (HCC)    Atrial fibrillation with RVR (HCC)    Acute on chronic respiratory failure with hypoxia (HCC)    Respiratory distress    Biventricular HF (heart failure) ([facility]    Nicotine dependence    Suspected sleep apnea    Coronary artery disease involving native coronary artery of native heart without angina pectoris    CAD (coronary artery disease)    Acute renal failure (ARF) (HCC)    Morbid obesity due to excess calories (HCC)    Acute respiratory failure with hypoxia (HCC)    Nocturnal hypoxia    Hypercapnemia    SOB (shortness of breath)    Acute on chronic respiratory failure with hypercapnia (HCC)    Chronic respiratory failure with hypercapnia (HCC)    Acute pulmonary edema (HCC)    Acute on chronic systolic HF (heart failure) ([facility]    Hx of AKA (above knee amputation), left (HCC)    Respiratory failure ([facility]    HCAP (healthcare-associated pneumonia)    Ventricular tachycardia (HCC)    Shock circulatory (HCC)    Tachypnea    Neutrophilic leukocytosis    Chronic respiratory failure with hypoxia (HCC)    Cardiac arrest (HCC)    PEA (Pulseless electrical activity) ([facility]    Ileus ([facility]    Pneumonia of right lower lobe due to infectious organism [facility])    Atrial fibrillation, controlled ([address]    Pulmonary HTN ([address]    Weakness of right lower extremity    Large bowel obstruction ([address]    Tobacco abuse    Intra-abdominal abscess ([address]    Intra-abdominal fluid collection    Moderate malnutrition ([address]    NSTEMI (non-ST elevated myocardial infarction) ([address]       Allergies:  Rocephin [ceftriaxone] and Demadex [torsemide] Temp max:  Temp (24hrs), Av.7 °F (36.5 °C), Min:97.4 °F (36.3 °C), Max:98 °F (36.7 °C)      Recent Labs     20  1629   WBC 9.0       Recent Labs     20  1629   BUN 15   CREATININE 0.7*         Intake/Output Summary (Last 24 hours) at 2020 0128  Last data filed at 2020 0019  Gross per 24 hour   Intake --   Output 725 ml   Net -725 ml       Culture Results:  pending    Ht Readings from Last 1 Encounters:   20 5' 7\" (1.702 m)        Wt Readings from Last 1 Encounters:   20 196 lb 10.4 oz (89.2 kg)         Estimated Creatinine Clearance: 121 mL/min (A) (based on SCr of 0.7 mg/dL (L)). Assessment/Plan:  Will initiate vancomycin 1250 mg IV every 12 hours. Regimen projects a trough level of 15-20 mg/L. Timing of trough level will be determined based on culture results, renal function, and clinical response. Thank you for the consult. Will continue to follow.     Yazmin Eugene, PharmD  2020 1:29 AM
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
## 2020-01-23 NOTE — ED NOTES
Patient calling out stating \"this room is so hot. I can't breathe. \" This RN reassured patient that his oxygen was WNL and the room was as cool as this RN could put the thermostat. Patietn understood. Patient requesting an update on when he would be going upstairs. Informed patient there are no inpatient orders in for him yet.       Diana Collado RN  01/22/20 8413

## 2020-01-23 NOTE — PLAN OF CARE
Problem: Risk for Impaired Skin Integrity  Goal: Tissue integrity - skin and mucous membranes  Description  Structural intactness and normal physiological function of skin and  mucous membranes. Outcome: Ongoing     Problem: SAFETY  Goal: Free from accidental physical injury  Outcome: Ongoing     Problem: DAILY CARE  Goal: Daily care needs are met  Outcome: Ongoing     Problem: PAIN  Goal: Patient's pain/discomfort is manageable  Outcome: Ongoing     Problem: SKIN INTEGRITY  Goal: Skin integrity is maintained or improved  Outcome: Ongoing     Problem: KNOWLEDGE DEFICIT  Goal: Patient/S.O. demonstrates understanding of disease process, treatment plan, medications, and discharge instructions.   Outcome: Ongoing     Problem: DISCHARGE BARRIERS  Goal: Patient's continuum of care needs are met  Outcome: Ongoing     Problem: Discharge Planning:  Goal: Discharged to appropriate level of care  Description  Discharged to appropriate level of care  Outcome: Ongoing     Problem: Gas Exchange - Impaired:  Goal: Levels of oxygenation will improve  Description  Levels of oxygenation will improve  Outcome: Ongoing

## 2020-01-24 LAB
ANION GAP SERPL CALCULATED.3IONS-SCNC: 10 MMOL/L (ref 3–16)
BASOPHILS ABSOLUTE: 0.1 K/UL (ref 0–0.2)
BASOPHILS RELATIVE PERCENT: 0.9 %
BUN BLDV-MCNC: 23 MG/DL (ref 7–20)
CALCIUM SERPL-MCNC: 8.8 MG/DL (ref 8.3–10.6)
CHLORIDE BLD-SCNC: 87 MMOL/L (ref 99–110)
CO2: 40 MMOL/L (ref 21–32)
CREAT SERPL-MCNC: 0.9 MG/DL (ref 0.9–1.3)
EOSINOPHILS ABSOLUTE: 0.1 K/UL (ref 0–0.6)
EOSINOPHILS RELATIVE PERCENT: 0.5 %
ESTIMATED AVERAGE GLUCOSE: 111.2 MG/DL
GFR AFRICAN AMERICAN: >60
GFR NON-AFRICAN AMERICAN: >60
GLUCOSE BLD-MCNC: 136 MG/DL (ref 70–99)
GLUCOSE BLD-MCNC: 149 MG/DL (ref 70–99)
GLUCOSE BLD-MCNC: 155 MG/DL (ref 70–99)
GLUCOSE BLD-MCNC: 230 MG/DL (ref 70–99)
GLUCOSE BLD-MCNC: 93 MG/DL (ref 70–99)
HBA1C MFR BLD: 5.5 %
HCT VFR BLD CALC: 25.4 % (ref 40.5–52.5)
HEMOGLOBIN: 8.4 G/DL (ref 13.5–17.5)
L. PNEUMOPHILA SEROGP 1 UR AG: NORMAL
LYMPHOCYTES ABSOLUTE: 1.3 K/UL (ref 1–5.1)
LYMPHOCYTES RELATIVE PERCENT: 12.1 %
MAGNESIUM: 1.8 MG/DL (ref 1.8–2.4)
MCH RBC QN AUTO: 28 PG (ref 26–34)
MCHC RBC AUTO-ENTMCNC: 33 G/DL (ref 31–36)
MCV RBC AUTO: 84.8 FL (ref 80–100)
MONOCYTES ABSOLUTE: 1 K/UL (ref 0–1.3)
MONOCYTES RELATIVE PERCENT: 9.5 %
NEUTROPHILS ABSOLUTE: 8.1 K/UL (ref 1.7–7.7)
NEUTROPHILS RELATIVE PERCENT: 77 %
PDW BLD-RTO: 16.9 % (ref 12.4–15.4)
PERFORMED ON: ABNORMAL
PERFORMED ON: NORMAL
PLATELET # BLD: 337 K/UL (ref 135–450)
PMV BLD AUTO: 8.1 FL (ref 5–10.5)
POTASSIUM REFLEX MAGNESIUM: 4.2 MMOL/L (ref 3.5–5.1)
RBC # BLD: 2.99 M/UL (ref 4.2–5.9)
SODIUM BLD-SCNC: 137 MMOL/L (ref 136–145)
STREP PNEUMONIAE ANTIGEN, URINE: NORMAL
URINE CULTURE, ROUTINE: NORMAL
WBC # BLD: 10.5 K/UL (ref 4–11)

## 2020-01-24 PROCEDURE — 83036 HEMOGLOBIN GLYCOSYLATED A1C: CPT

## 2020-01-24 PROCEDURE — 6370000000 HC RX 637 (ALT 250 FOR IP): Performed by: HOSPITALIST

## 2020-01-24 PROCEDURE — 2580000003 HC RX 258: Performed by: INTERNAL MEDICINE

## 2020-01-24 PROCEDURE — 6370000000 HC RX 637 (ALT 250 FOR IP): Performed by: INTERNAL MEDICINE

## 2020-01-24 PROCEDURE — 85025 COMPLETE CBC W/AUTO DIFF WBC: CPT

## 2020-01-24 PROCEDURE — 6360000002 HC RX W HCPCS: Performed by: INTERNAL MEDICINE

## 2020-01-24 PROCEDURE — 94640 AIRWAY INHALATION TREATMENT: CPT

## 2020-01-24 PROCEDURE — 94761 N-INVAS EAR/PLS OXIMETRY MLT: CPT

## 2020-01-24 PROCEDURE — 80048 BASIC METABOLIC PNL TOTAL CA: CPT

## 2020-01-24 PROCEDURE — 94760 N-INVAS EAR/PLS OXIMETRY 1: CPT

## 2020-01-24 PROCEDURE — 2700000000 HC OXYGEN THERAPY PER DAY

## 2020-01-24 PROCEDURE — 36415 COLL VENOUS BLD VENIPUNCTURE: CPT

## 2020-01-24 PROCEDURE — 1200000000 HC SEMI PRIVATE

## 2020-01-24 PROCEDURE — 83735 ASSAY OF MAGNESIUM: CPT

## 2020-01-24 RX ADMIN — ENOXAPARIN SODIUM 40 MG: 40 INJECTION SUBCUTANEOUS at 09:55

## 2020-01-24 RX ADMIN — ATORVASTATIN CALCIUM 40 MG: 40 TABLET, FILM COATED ORAL at 20:04

## 2020-01-24 RX ADMIN — RANOLAZINE 500 MG: 500 TABLET, FILM COATED, EXTENDED RELEASE ORAL at 09:56

## 2020-01-24 RX ADMIN — TAMSULOSIN HYDROCHLORIDE 0.4 MG: 0.4 CAPSULE ORAL at 09:57

## 2020-01-24 RX ADMIN — ASPIRIN 81 MG 81 MG: 81 TABLET ORAL at 09:57

## 2020-01-24 RX ADMIN — INSULIN LISPRO 5 UNITS: 100 INJECTION, SOLUTION INTRAVENOUS; SUBCUTANEOUS at 13:16

## 2020-01-24 RX ADMIN — OYSTER SHELL CALCIUM WITH VITAMIN D 1 TABLET: 500; 200 TABLET, FILM COATED ORAL at 09:56

## 2020-01-24 RX ADMIN — IPRATROPIUM BROMIDE AND ALBUTEROL SULFATE 1 AMPULE: .5; 3 SOLUTION RESPIRATORY (INHALATION) at 08:27

## 2020-01-24 RX ADMIN — POLYETHYLENE GLYCOL 3350 17 G: 17 POWDER, FOR SOLUTION ORAL at 20:04

## 2020-01-24 RX ADMIN — INSULIN LISPRO 4 UNITS: 100 INJECTION, SOLUTION INTRAVENOUS; SUBCUTANEOUS at 10:02

## 2020-01-24 RX ADMIN — IPRATROPIUM BROMIDE AND ALBUTEROL SULFATE 1 AMPULE: .5; 3 SOLUTION RESPIRATORY (INHALATION) at 12:02

## 2020-01-24 RX ADMIN — INSULIN LISPRO 2 UNITS: 100 INJECTION, SOLUTION INTRAVENOUS; SUBCUTANEOUS at 13:15

## 2020-01-24 RX ADMIN — SODIUM CHLORIDE, PRESERVATIVE FREE 10 ML: 5 INJECTION INTRAVENOUS at 20:05

## 2020-01-24 RX ADMIN — IPRATROPIUM BROMIDE AND ALBUTEROL SULFATE 1 AMPULE: .5; 3 SOLUTION RESPIRATORY (INHALATION) at 20:24

## 2020-01-24 RX ADMIN — OXYCODONE HYDROCHLORIDE AND ACETAMINOPHEN 1 TABLET: 7.5; 325 TABLET ORAL at 10:01

## 2020-01-24 RX ADMIN — FUROSEMIDE 80 MG: 10 INJECTION, SOLUTION INTRAMUSCULAR; INTRAVENOUS at 09:56

## 2020-01-24 RX ADMIN — IPRATROPIUM BROMIDE AND ALBUTEROL SULFATE 1 AMPULE: .5; 3 SOLUTION RESPIRATORY (INHALATION) at 01:44

## 2020-01-24 RX ADMIN — FUROSEMIDE 80 MG: 10 INJECTION, SOLUTION INTRAMUSCULAR; INTRAVENOUS at 18:37

## 2020-01-24 RX ADMIN — IPRATROPIUM BROMIDE AND ALBUTEROL SULFATE 1 AMPULE: .5; 3 SOLUTION RESPIRATORY (INHALATION) at 16:09

## 2020-01-24 RX ADMIN — RANOLAZINE 500 MG: 500 TABLET, FILM COATED, EXTENDED RELEASE ORAL at 20:04

## 2020-01-24 RX ADMIN — SPIRONOLACTONE 50 MG: 25 TABLET ORAL at 09:56

## 2020-01-24 RX ADMIN — OXYCODONE HYDROCHLORIDE AND ACETAMINOPHEN 1 TABLET: 7.5; 325 TABLET ORAL at 20:04

## 2020-01-24 RX ADMIN — AMIODARONE HYDROCHLORIDE 200 MG: 200 TABLET ORAL at 09:56

## 2020-01-24 RX ADMIN — INSULIN GLARGINE 30 UNITS: 100 INJECTION, SOLUTION SUBCUTANEOUS at 20:04

## 2020-01-24 RX ADMIN — OXYCODONE HYDROCHLORIDE AND ACETAMINOPHEN 1 TABLET: 7.5; 325 TABLET ORAL at 01:11

## 2020-01-24 RX ADMIN — SODIUM CHLORIDE, PRESERVATIVE FREE 10 ML: 5 INJECTION INTRAVENOUS at 09:58

## 2020-01-24 ASSESSMENT — PAIN DESCRIPTION - PROGRESSION
CLINICAL_PROGRESSION: GRADUALLY WORSENING

## 2020-01-24 ASSESSMENT — PAIN DESCRIPTION - FREQUENCY
FREQUENCY: CONTINUOUS

## 2020-01-24 ASSESSMENT — PAIN - FUNCTIONAL ASSESSMENT
PAIN_FUNCTIONAL_ASSESSMENT: ACTIVITIES ARE NOT PREVENTED

## 2020-01-24 ASSESSMENT — PAIN DESCRIPTION - PAIN TYPE
TYPE: CHRONIC PAIN

## 2020-01-24 ASSESSMENT — PAIN SCALES - GENERAL
PAINLEVEL_OUTOF10: 8
PAINLEVEL_OUTOF10: 8
PAINLEVEL_OUTOF10: 7
PAINLEVEL_OUTOF10: 6
PAINLEVEL_OUTOF10: 5
PAINLEVEL_OUTOF10: 2

## 2020-01-24 ASSESSMENT — PAIN DESCRIPTION - ORIENTATION
ORIENTATION: LOWER
ORIENTATION: LOWER

## 2020-01-24 ASSESSMENT — PAIN DESCRIPTION - DESCRIPTORS
DESCRIPTORS: DISCOMFORT;ACHING
DESCRIPTORS: CONSTANT
DESCRIPTORS: CONSTANT

## 2020-01-24 ASSESSMENT — PAIN DESCRIPTION - LOCATION
LOCATION: BACK

## 2020-01-24 ASSESSMENT — PAIN DESCRIPTION - ONSET
ONSET: GRADUAL
ONSET: ON-GOING
ONSET: GRADUAL

## 2020-01-24 NOTE — PLAN OF CARE
Problem: Risk for Impaired Skin Integrity  Goal: Tissue integrity - skin and mucous membranes  Description  Structural intactness and normal physiological function of skin and  mucous membranes. Outcome: Ongoing    Problem: SAFETY  Goal: Free from accidental physical injury  1  Outcome: Ongoing       Problem: DAILY CARE  Goal: Daily care needs are met    Outcome: Ongoing       Problem: PAIN  Goal: Patient's pain/discomfort is manageable    Outcome: Ongoing       Problem: SKIN INTEGRITY  Goal: Skin integrity is maintained or improved    Outcome: Ongoing       Problem: KNOWLEDGE DEFICIT  Goal: Patient/S.O. demonstrates understanding of disease process, treatment plan, medications, and discharge instructions.     Outcome: Ongoing       Problem: DISCHARGE BARRIERS  Goal: Patient's continuum of care needs are met    Outcome: Ongoing       Problem: Discharge Planning:  Goal: Discharged to appropriate level of care  Description  Discharged to appropriate level of care    Outcome: Ongoing       Problem: Gas Exchange - Impaired:  Goal: Levels of oxygenation will improve  Description  Levels of oxygenation will improve    Outcome: Ongoing         Problem: OXYGENATION/RESPIRATORY FUNCTION  Goal: Patient will maintain patent airway    Outcome: Ongoing    Goal: Patient will achieve/maintain normal respiratory rate/effort  Description  Respiratory rate and effort will be within normal limits for the patient    Outcome: Ongoing       Problem: HEMODYNAMIC STATUS  Goal: Patient has stable vital signs and fluid balance    Outcome: Ongoing       Problem: FLUID AND ELECTROLYTE IMBALANCE  Goal: Fluid and electrolyte balance are achieved/maintained    Outcome: Ongoing       Problem: ACTIVITY INTOLERANCE/IMPAIRED MOBILITY  Goal: Mobility/activity is maintained at optimum level for patient    Outcome: Ongoing       Problem: Pain:  Goal: Pain level will decrease  Description  Pain level will decrease    Outcome: Ongoing    Goal: Control of acute pain  Description  Control of acute pain    Outcome: Ongoing    Goal: Control of chronic pain  Description  Control of chronic pain    Outcome: Ongoing

## 2020-01-24 NOTE — CARE COORDINATION
Case Management:  Patient is from [facility] and per discussion with patient he plans to return there at discharge. Patient tells cm that the plan is for possible discharge tomorrow. Notified Bartolome Monge at [facility] and she tells  that [facility] does not do bed holds that they will need to pre cert for patient to return.   Electronically signed by Radha Garcia on 1/24/2020 at 4:44 PM

## 2020-01-24 NOTE — PROGRESS NOTES
Hospitalist Progress Note  1/24/2020 9:49 AM    PCP: Julianna Max MD    [id number]     Date of Admission: 1/22/2020                                                                                                                     HOSPITAL COURSE    Patient demographics:  The patient  Steve So is a 61 y.o. male     Significant past medical history:   Patient Active Problem List   Diagnosis    Arthritis    [facility]cemia [facility]    HBP (high blood pressure)    Hyperlipidemia    COPD (chronic obstructive pulmonary disease) (HCC)    Viral hepatitis C    Back pain    PAD (peripheral artery disease) ([facility]    Spinal stenosis of lumbar region    Tobacco use    Atherosclerosis of native artery of left lower extremity with intermittent claudication (HCC)    Chest pain    Pleural effusion due to CHF (congestive heart failure) ([facility]    Pleural effusion, right    Alcohol abuse, continuous    Tachycardia    Atrial fibrillation with RVR ([facility])    Hyponatremia    Congestive heart failure ([facility])    REJI (acute kidney injury) ([facility]    Hypokalemia    Coronary artery disease involving autologous artery coronary bypass graft with angina pectoris ([facility]    Essential hypertension    Chest pain of uncertain etiology    Acute on chronic combined systolic and diastolic HF (heart failure) (HCC)    Acute hyperkalemia    Typical atrial flutter (HCC)    Chronic systolic HF (heart failure) (HCC)    Hypotension    Vasovagal syncope    Laceration of scalp without foreign body    Nonischemic cardiomyopathy (HCC)    Syncope and collapse    Ischemic foot    Diabetes mellitus with peripheral circulatory disorder (HCC)    Limb ischemia    COPD exacerbation (HCC)    Nonhealing surgical wound    Acromioclavicular joint arthritis    Bradycardia    Shortness of breath    Permanent atrial fibrillation    Chronic atrial fibrillation    Other specified injury of inferior mesenteric vein, initial encounter    Postprocedural hypotension    Acute respiratory failure with hypercapnia (HCC)    High anion gap metabolic acidosis    Centrilobular emphysema (HCC)    Hypomagnesemia    Heart failure, acute on chronic, systolic and diastolic (HCC)    Atrial fibrillation, persistent    Anemia    Diabetes mellitus type 2, controlled (HCC)    Constipation    Acute on chronic systolic heart failure (HCC)    Atrial fibrillation, rapid (HCC)    COPD with acute exacerbation (HCC)    Cocaine use    Severe sepsis (HCC)    Atrial fibrillation with RVR (HCC)    Acute on chronic respiratory failure with hypoxia (HCC)    Respiratory distress    Biventricular HF (heart failure) (HCC)    Nicotine dependence    Suspected sleep apnea    Coronary artery disease involving native coronary artery of native heart without angina pectoris    CAD (coronary artery disease)    Acute renal failure (ARF) (HCC)    Morbid obesity due to excess calories (HCC)    Acute respiratory failure with hypoxia (HCC)    Nocturnal hypoxia    Hypercapnemia    SOB (shortness of breath)    Acute on chronic respiratory failure with hypercapnia (HCC)    Chronic respiratory failure with hypercapnia (HCC)    Acute pulmonary edema (HCC)    Acute on chronic systolic HF (heart failure) (HCC)    Hx of AKA (above knee amputation), left (HCC)    Respiratory failure (HCC)    HCAP (healthcare-associated pneumonia)    Ventricular tachycardia (HCC)    Shock circulatory (HCC)    Tachypnea    Neutrophilic leukocytosis    Chronic respiratory failure with hypoxia (HCC)    Cardiac arrest (HCC)    PEA (Pulseless electrical activity) (HCC)    Ileus (HCC)    Pneumonia of right lower lobe due to infectious organism [facility])    Atrial fibrillation, controlled ([address]    Pulmonary HTN ([address]    Weakness of right lower extremity    Large bowel obstruction ([address]    Tobacco abuse    Intra-abdominal abscess ([address]    Intra-abdominal fluid collection    Moderate malnutrition ([facility]    NSTEMI (non-ST elevated myocardial infarction) ([facility]    Systolic CHF [facility])         Presenting symptoms:      Diagnostic workup:      CONSULTS DURING ADMISSION :   IP CONSULT TO PALLIATIVE CARE      Patient was diagnosed with:        Treatment while inpatient:  Patient presented to the emergency room with worsening shortness of breath. Patient has a history of systolic CHF with ejection fraction of 30% and COPD                       Patient is a smoker and at baseline patient is on 2 L nasal cannula  Patient was diagnosed with acute on chronic CHF and started on diuretics                                                           ----------------------------------------------------------      SUBJECTIVE COMPLAINTS- Follow-up for shortness of breath    Diet: DIET CARDIAC; Low Sodium (2 GM);  Daily Fluid Restriction: 1500 ml      OBJECTIVE:   Patient Active Problem List   Diagnosis    Arthritis    Diabetes mellitus with hyperglycemia ([facility])    HBP (high blood pressure)    Hyperlipidemia    COPD (chronic obstructive pulmonary disease) ([facility])    Viral hepatitis C    Back pain    PAD (peripheral artery disease) [facility]    Spinal stenosis of lumbar region    Tobacco use    Atherosclerosis of native artery of left lower extremity with intermittent claudication (HCC)    Chest pain    Pleural effusion due to CHF (congestive heart failure) ([facility])    Pleural effusion, right    Alcohol abuse, continuous    Tachycardia    Atrial fibrillation with RVR (HCC)    Hyponatremia    Congestive heart failure (HCC)    REJI (acute kidney injury) ([address]    Hypokalemia    Coronary artery disease involving autologous artery coronary bypass graft with angina pectoris ([facility]    Essential hypertension    Chest pain of uncertain etiology    Acute on chronic combined systolic and diastolic HF (heart failure) (HCC)    Acute hyperkalemia    Typical atrial flutter ([address]    Chronic systolic HF (heart failure) (HCC)    Hypotension    Vasovagal syncope    Laceration of scalp without foreign body    Nonischemic cardiomyopathy (HCC)    Syncope and collapse    Ischemic foot    Diabetes mellitus with peripheral circulatory disorder (HCC)    Limb ischemia    COPD exacerbation (HCC)    Nonhealing surgical wound    Acromioclavicular joint arthritis    Bradycardia    Shortness of breath    Permanent atrial fibrillation    Chronic atrial fibrillation    Other specified injury of inferior mesenteric vein, initial encounter    Postprocedural hypotension    Acute respiratory failure with hypercapnia (HCC)    High anion gap metabolic acidosis    Centrilobular emphysema (HCC)    Hypomagnesemia    Heart failure, acute on chronic, systolic and diastolic (HCC)    Atrial fibrillation, persistent    Anemia    Diabetes mellitus type 2, controlled (HCC)    Constipation    Acute on chronic systolic heart failure (HCC)    [facility] fibrillation, rapid ([facility]    COPD with acute exacerbation (HCC)    Cocaine use    Severe sepsis (HCC)    Atrial fibrillation with RVR ([facility]    Acute on chronic respiratory failure with hypoxia ([facility])    Respiratory distress    Biventricular HF (heart failure) ([facility])    Nicotine dependence    Suspected sleep apnea    Coronary artery disease involving native coronary artery of native heart without angina pectoris    CAD (coronary artery disease)    Acute renal failure (ARF) (HCC)    Morbid obesity due to excess calories (HCC)    Acute respiratory failure with hypoxia (HCC)    Nocturnal hypoxia    Hypercapnemia    SOB (shortness of breath)    Acute on chronic respiratory failure with hypercapnia (HCC)    Chronic respiratory failure with hypercapnia (HCC)    Acute pulmonary edema (HCC)    Acute on chronic systolic HF (heart failure) ([facility]    Hx of AKA (above knee amputation), left (HCC)    Respiratory failure ([address]    HCAP (healthcare-associated pneumonia)    [facility]    Shock circulatory (HCC)    Tachypnea    Neutrophilic leukocytosis    Chronic respiratory failure with hypoxia (HCC)    Cardiac arrest (HCC)    PEA (Pulseless electrical activity) (HCC)    Ileus (HCC)    Pneumonia of right lower lobe due to infectious organism [facility])    Atrial fibrillation, controlled ([facility]    Pulmonary HTN (HCC)    Weakness of right lower extremity    Large bowel obstruction (HCC)    Tobacco abuse    Intra-abdominal abscess (HCC)    Intra-abdominal fluid collection    Moderate malnutrition (HCC)    NSTEMI (non-ST elevated myocardial infarction) (HCC)    Systolic CHF (HCC)       Allergies  Rocephin [ceftriaxone] and Demadex [torsemide]    Medications    Scheduled Meds:   furosemide  80 mg Intravenous BID    spironolactone  50 mg Oral Daily    insulin glargine  30 Units Subcutaneous Nightly    insulin lispro  0-12 Units Subcutaneous TID WC    insulin lispro  0-6 Units Subcutaneous Nightly    ipratropium-albuterol  1 ampule Inhalation Q4H WA    amiodarone  200 mg Oral Daily    aspirin  81 mg Oral Daily    atorvastatin  40 mg Oral Nightly    calcium-vitamin D  1 tablet Oral Daily    polyethylene glycol  17 g Oral BID    ranolazine  500 mg Oral BID    tamsulosin  0.4 mg Oral Daily    sodium chloride flush  10 mL Intravenous 2 times per day    enoxaparin  40 mg Subcutaneous Daily     Continuous Infusions:   dextrose       PRN Meds:  magnesium sulfate, glucose, dextrose, glucagon (rDNA), dextrose, nitroGLYCERIN, oxyCODONE-acetaminophen, sodium chloride flush    Vitals   Vitals /wt   Patient Vitals for the past 8 hrs:   BP Temp Temp src Pulse Resp SpO2 Weight   01/24/20 0848 116/81 (!) 90.6 °F (32.6 °C) -- 93 20 90 % --   01/24/20 0827 -- -- -- -- 18 97 % --   01/24/20 0531 118/83 97.4 °F (36.3 °C) Axillary 100 22 94 % 195 lb 8.8 oz (88.7 kg)        72HR INTAKE/OUTPUT:      Intake/Output Summary (Last 24 hours) at 1/24/2020 0949  Last data filed at 1/24/2020 0850  Gross per 24 hour   Intake 560 ml   Output 2050 ml   Net -1490 ml       Exam:    Gen:   Alert and oriented ×3  Eyes: PERRL. No sclera icterus. No conjunctival injection. ENT: No discharge. Pharynx clear. External appearance of ears and nose normal.  Neck: Trachea midline. No obvious mass. Resp: Decreased breath sounds bilaterally  CV: Regular rate. Regular rhythm. No murmur or rub. No edema. GI: Non-tender. Non-distended. No hernia. Skin: Warm, dry, normal texture and turgor. Lymph: No cervical LAD. No supraclavicular LAD. M/S: / Ext. No cyanosis. No clubbing. No joint deformity. Neuro: CN 2-12 are intact,  no neurologic deficits noted. PT/INR: No results for input(s): PROTIME, INR in the last 72 hours. APTT: No results for input(s): APTT in the last 72 hours. CBC:   Recent Labs     01/22/20  1629 01/23/20  0451 01/24/20 0458   WBC 9.0 7.3 10.5   HGB 8.0* 8.2* 8.4*   HCT 24.6* 25.3* 25.4*   MCV 85.5 85.0 84.8    306 337       BMP:   Recent Labs     01/22/20  1629 01/23/20  0451 01/24/20 0458    138 137   K 4.6 5.1 4.2   CL 92* 92* 87*   CO2 33* 34* 40*   BUN 15 15 23*   CREATININE 0.7* 0.7* 0.9       LIVER PROFILE:   Recent Labs     01/22/20 1629   ALKPHOS 103   AST 26   ALT 16   BILITOT 0.3     No results for input(s): AMYLASE in the last 72 hours. No results for input(s): LIPASE in the last 72 hours. UA:No results for input(s): NITRITE, LABCAST, WBCUA, RBCUA, MUCUS in the last 72 hours. TROPONIN:   Recent Labs     01/22/20 1629   TROPONINI 0.02*       Lab Results   Component Value Date/Time    URRFLXCULT Not Indicated 01/22/2020 07:38 PM       No results for input(s): TSHREFLEX in the last 72 hours.     No components found for: PEO3586  POC GLUCOSE:    Recent Labs     01/23/20  1809 01/23/20  2244   POCGLU 128* 140*     Recent Labs     01/24/20  0458   LABA1C 5.5      Lab Results   Component Value Date LABA1C 5.5 01/24/2020         ASSESSMENT AND PLAN    Acute systolic CHF  IV Lasix, metolazone, spironolactone,  Fluid balance is -2.2 L  Continue to monitor creatinine      Bilateral pleural effusions  Cardiomegaly  Likely related to CHF  Continue on diuretics     COPD unspecified J44.9  Continue with the bronchodilators      Type 2 diabetes mellitus E11.9  hold OHA  Insulin sliding scale      Essential primary hypertension I10  Continue patient on home medication    Anxiety depression F 41.9  Continue on home medication    Atrial fibrillation I48.91  Heart rate is controlled  Continue patient on xarelto           Code Status: Full Code        Dispo -Continue care        The patient and / or the family were informed of the results of any tests, a time was given to answer questions, a plan was proposed and they agreed with plan. Lon Liang MD    This note was transcribed using [id number]. Please disregard any translational errors.
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
## 2020-01-24 NOTE — PLAN OF CARE
Problem: Risk for Impaired Skin Integrity  Goal: Tissue integrity - skin and mucous membranes  Description  Structural intactness and normal physiological function of skin and  mucous membranes. 1/24/2020 1012 by Kirit Shaikh RN  Outcome: Ongoing  1/24/2020 0441 by Estella Linton RN  Outcome: Ongoing  1/24/2020 0440 by Estella Linton RN  Outcome: Ongoing     Problem: SAFETY  Goal: Free from accidental physical injury  1/24/2020 1012 by Kirit Shaikh RN  Outcome: Ongoing  1/24/2020 0441 by Estella Linton RN  Outcome: Ongoing  1/24/2020 0440 by Estella Linton RN  Outcome: Ongoing     Problem: DAILY CARE  Goal: Daily care needs are met  1/24/2020 1012 by Kirit Shaikh RN  Outcome: Ongoing  1/24/2020 0441 by Estella Linton RN  Outcome: Ongoing  1/24/2020 0440 by Estella Linton RN  Outcome: Ongoing     Problem: PAIN  Goal: Patient's pain/discomfort is manageable  1/24/2020 1012 by Kirit Shaikh RN  Outcome: Ongoing  1/24/2020 0441 by Estella Linton RN  Outcome: Ongoing  1/24/2020 0440 by Estella Linton RN  Outcome: Ongoing     Problem: SKIN INTEGRITY  Goal: Skin integrity is maintained or improved  1/24/2020 1012 by Kirit Shaikh RN  Outcome: Ongoing  1/24/2020 0441 by Estella Linton RN  Outcome: Ongoing  1/24/2020 0440 by Estella Linton RN  Outcome: Ongoing     Problem: KNOWLEDGE DEFICIT  Goal: Patient/S.O. demonstrates understanding of disease process, treatment plan, medications, and discharge instructions.   1/24/2020 1012 by Kirit Shaikh RN  Outcome: Ongoing  1/24/2020 0441 by Estella Linton RN  Outcome: Ongoing  1/24/2020 0440 by Estella Linton RN  Outcome: Ongoing     Problem: DISCHARGE BARRIERS  Goal: Patient's continuum of care needs are met  1/24/2020 1012 by Kirit Shaikh RN  Outcome: Ongoing  1/24/2020 0441 by Estella Linton RN  Outcome: Ongoing  1/24/2020 0440 by Estella Linton RN  Outcome: Ongoing     Problem: Discharge Planning:  Goal: Discharged to appropriate level of care  Description  Discharged to appropriate level of care  1/24/2020 1012 by Neo Noguera RN  Outcome: Ongoing  1/24/2020 0441 by Gissell Austin RN  Outcome: Ongoing  1/24/2020 0440 by Gissell Austin RN  Outcome: Ongoing     Problem: Gas Exchange - Impaired:  Goal: Levels of oxygenation will improve  Description  Levels of oxygenation will improve  1/24/2020 1012 by Neo Noguera RN  Outcome: Ongoing  1/24/2020 0441 by Gissell Austin RN  Outcome: Ongoing  1/24/2020 0440 by Gissell Austin RN  Outcome: Ongoing     Problem: OXYGENATION/RESPIRATORY FUNCTION  Goal: Patient will maintain patent airway  1/24/2020 1012 by Neo Noguera RN  Outcome: Ongoing  1/24/2020 0441 by Gissell Austin RN  Outcome: Ongoing  1/24/2020 0440 by Gissell Austin RN  Outcome: Ongoing  Goal: Patient will achieve/maintain normal respiratory rate/effort  Description  Respiratory rate and effort will be within normal limits for the patient  1/24/2020 1012 by Neo Noguera RN  Outcome: Ongoing  1/24/2020 0441 by Gissell Austin RN  Outcome: Ongoing  1/24/2020 0440 by Gissell Austin RN  Outcome: Ongoing     Problem: HEMODYNAMIC STATUS  Goal: Patient has stable vital signs and fluid balance  1/24/2020 1012 by Neo Noguera RN  Outcome: Ongoing  1/24/2020 0441 by Gissell Austin RN  Outcome: Ongoing  1/24/2020 0440 by Gissell Austin RN  Outcome: Ongoing     Problem: FLUID AND ELECTROLYTE IMBALANCE  Goal: Fluid and electrolyte balance are achieved/maintained  1/24/2020 1012 by eNo Noguera RN  Outcome: Ongoing  1/24/2020 0441 by Gissell Austin RN  Outcome: Ongoing  1/24/2020 0440 by Gissell Austin RN  Outcome: Ongoing     Problem: ACTIVITY INTOLERANCE/IMPAIRED MOBILITY  Goal: Mobility/activity is maintained at optimum level for patient  1/24/2020 1012 by Neo Noguera RN  Outcome: Ongoing  1/24/2020 0441 by Gissell Austin RN  Outcome: Ongoing  1/24/2020 0440 by Aminah Rincon RN  Outcome: Ongoing     Problem: Pain:  Goal: Pain level will decrease  Description  Pain level will decrease  1/24/2020 1012 by Sha Glez RN  Outcome: Ongoing  1/24/2020 0441 by Aminah Rincon RN  Outcome: Ongoing  1/24/2020 0440 by Aminah Rincon RN  Outcome: Ongoing  Goal: Control of acute pain  Description  Control of acute pain  1/24/2020 1012 by Sha Glez RN  Outcome: Ongoing  1/24/2020 0441 by Aminah Rincon RN  Outcome: Ongoing  1/24/2020 0440 by Aminah Rincon RN  Outcome: Ongoing  Goal: Control of chronic pain  Description  Control of chronic pain  1/24/2020 1012 by Sha Glez RN  Outcome: Ongoing  1/24/2020 0441 by Aminah Rincon RN  Outcome: Ongoing  1/24/2020 0440 by Aminah Rincon RN  Outcome: Ongoing   Freq turning. Freq rounding. Bed alarm. Bed in low position. Side rails x2.  Call light in reach

## 2020-01-24 NOTE — PLAN OF CARE
Problem: Risk for Impaired Skin Integrity  Goal: Tissue integrity - skin and mucous membranes  Description  Structural intactness and normal physiological function of skin and  mucous membranes. 1/24/2020 1043 by Yecenia Spaulding RN  Outcome: Ongoing  1/24/2020 1012 by Yecenia Spaulding RN  Outcome: Ongoing  1/24/2020 0441 by Sohail Gonzalez RN  Outcome: Ongoing  1/24/2020 0440 by Sohail Gonzalez RN  Outcome: Ongoing     Problem: SAFETY  Goal: Free from accidental physical injury  1/24/2020 1043 by Yecenia Spaulding RN  Outcome: Ongoing  1/24/2020 1012 by Yecenia Spaulding RN  Outcome: Ongoing  1/24/2020 0441 by Sohail Gonzalez RN  Outcome: Ongoing  1/24/2020 0440 by Sohail Gonzalez RN  Outcome: Ongoing     Problem: DAILY CARE  Goal: Daily care needs are met  1/24/2020 1043 by Yecenia Spaulding RN  Outcome: Ongoing  1/24/2020 1012 by Yecenia Spaulding RN  Outcome: Ongoing  1/24/2020 0441 by Sohail Gonzalez RN  Outcome: Ongoing  1/24/2020 0440 by Sohail Gonzalez RN  Outcome: Ongoing     Problem: PAIN  Goal: Patient's pain/discomfort is manageable  1/24/2020 1043 by Yecenia Spaulding RN  Outcome: Ongoing  1/24/2020 1012 by Yecenia Spaulding RN  Outcome: Ongoing  1/24/2020 0441 by Sohail Gonzalez RN  Outcome: Ongoing  1/24/2020 0440 by Sohail Gonzalez RN  Outcome: Ongoing     Problem: SKIN INTEGRITY  Goal: Skin integrity is maintained or improved  1/24/2020 1043 by Yecenia Spaulding RN  Outcome: Ongoing  1/24/2020 1012 by Yecenia Spaulding RN  Outcome: Ongoing  1/24/2020 0441 by Sohail Gonzalez RN  Outcome: Ongoing  1/24/2020 0440 by Sohail Gonzalez RN  Outcome: Ongoing     Problem: KNOWLEDGE DEFICIT  Goal: Patient/S.O. demonstrates understanding of disease process, treatment plan, medications, and discharge instructions.   1/24/2020 1043 by Yecenia Spaulding RN  Outcome: Ongoing  1/24/2020 1012 by Yecenia Spaulding RN  Outcome: Ongoing  1/24/2020 0441 by Sohail Gonzalez RN  Outcome: Ongoing  1/24/2020 0440 by Jose Benavides RN  Outcome: Ongoing     Problem: DISCHARGE BARRIERS  Goal: Patient's continuum of care needs are met  1/24/2020 1043 by Melanie Chin RN  Outcome: Ongoing  1/24/2020 1012 by Melanie Chni RN  Outcome: Ongoing  1/24/2020 0441 by Jose Benavides RN  Outcome: Ongoing  1/24/2020 0440 by Jose Benavides RN  Outcome: Ongoing     Problem: Discharge Planning:  Goal: Discharged to appropriate level of care  Description  Discharged to appropriate level of care  1/24/2020 1043 by Melanie Chin RN  Outcome: Ongoing  1/24/2020 1012 by Melanie Chin RN  Outcome: Ongoing  1/24/2020 0441 by Jose Benavides RN  Outcome: Ongoing  1/24/2020 0440 by Jose Benavides RN  Outcome: Ongoing     Problem: Gas Exchange - Impaired:  Goal: Levels of oxygenation will improve  Description  Levels of oxygenation will improve  1/24/2020 1043 by Melanie Chin RN  Outcome: Ongoing  1/24/2020 1012 by Melanie Chin RN  Outcome: Ongoing  1/24/2020 0441 by Jose Benavides RN  Outcome: Ongoing  1/24/2020 0440 by Jose Benavides RN  Outcome: Ongoing     Problem: OXYGENATION/RESPIRATORY FUNCTION  Goal: Patient will maintain patent airway  1/24/2020 1043 by Melanie Chin RN  Outcome: Ongoing  1/24/2020 1012 by Melanie Chin RN  Outcome: Ongoing  1/24/2020 0441 by Jose Benavides RN  Outcome: Ongoing  1/24/2020 0440 by Jose Benavides RN  Outcome: Ongoing  Goal: Patient will achieve/maintain normal respiratory rate/effort  Description  Respiratory rate and effort will be within normal limits for the patient  1/24/2020 1043 by Melanie Chin RN  Outcome: Ongoing  1/24/2020 1012 by Melanie Chin RN  Outcome: Ongoing  1/24/2020 0441 by Jose Benavides RN  Outcome: Ongoing  1/24/2020 0440 by Jose Benavides RN  Outcome: Ongoing     Problem: HEMODYNAMIC STATUS  Goal: Patient has stable vital signs and fluid balance  1/24/2020 1043 by Melanie Chin RN  Outcome: Ongoing  1/24/2020 1012 by Faith Angelucci DAGOBERTO Katz  Outcome: Ongoing  1/24/2020 0441 by Estella Linton RN  Outcome: Ongoing  1/24/2020 0440 by Estella Linton RN  Outcome: Ongoing     Problem: FLUID AND ELECTROLYTE IMBALANCE  Goal: Fluid and electrolyte balance are achieved/maintained  1/24/2020 1043 by Kirit Shaikh RN  Outcome: Ongoing  1/24/2020 1012 by Kirit Shaikh RN  Outcome: Ongoing  1/24/2020 0441 by Estella Linton RN  Outcome: Ongoing  1/24/2020 0440 by Estella Linton RN  Outcome: Ongoing     Problem: ACTIVITY INTOLERANCE/IMPAIRED MOBILITY  Goal: Mobility/activity is maintained at optimum level for patient  1/24/2020 1043 by Kirit Shaikh RN  Outcome: Ongoing  1/24/2020 1012 by Kirit Shaikh RN  Outcome: Ongoing  1/24/2020 0441 by Estella Linton RN  Outcome: Ongoing  1/24/2020 0440 by Estella Linton RN  Outcome: Ongoing     Problem: Pain:  Goal: Pain level will decrease  Description  Pain level will decrease  1/24/2020 1043 by Kirit Shaikh RN  Outcome: Ongoing  1/24/2020 1012 by Kirit Shaikh RN  Outcome: Ongoing  1/24/2020 0441 by Estella Linton RN  Outcome: Ongoing  1/24/2020 0440 by Estella Linton RN  Outcome: Ongoing  Goal: Control of acute pain  Description  Control of acute pain  1/24/2020 1043 by Kirit Shaikh RN  Outcome: Ongoing  1/24/2020 1012 by Kirit Shaikh RN  Outcome: Ongoing  1/24/2020 0441 by Estella Linton RN  Outcome: Ongoing  1/24/2020 0440 by Estella Linton RN  Outcome: Ongoing  Goal: Control of chronic pain  Description  Control of chronic pain  1/24/2020 1043 by Kirit Shaikh RN  Outcome: Ongoing  1/24/2020 1012 by Kirit Shaikh RN  Outcome: Ongoing  1/24/2020 0441 by Estella Linton RN  Outcome: Ongoing  1/24/2020 0440 by Estella Linton RN  Outcome: Ongoing

## 2020-01-25 ENCOUNTER — APPOINTMENT (OUTPATIENT)
Dept: GENERAL RADIOLOGY | Age: 60
DRG: 194 | End: 2020-01-25
Payer: COMMERCIAL

## 2020-01-25 LAB
ANION GAP SERPL CALCULATED.3IONS-SCNC: 9 MMOL/L (ref 3–16)
BASOPHILS ABSOLUTE: 0.1 K/UL (ref 0–0.2)
BASOPHILS RELATIVE PERCENT: 1.1 %
BUN BLDV-MCNC: 23 MG/DL (ref 7–20)
CALCIUM SERPL-MCNC: 9.1 MG/DL (ref 8.3–10.6)
CHLORIDE BLD-SCNC: 82 MMOL/L (ref 99–110)
CO2: 42 MMOL/L (ref 21–32)
CREAT SERPL-MCNC: 0.8 MG/DL (ref 0.9–1.3)
EOSINOPHILS ABSOLUTE: 0.2 K/UL (ref 0–0.6)
EOSINOPHILS RELATIVE PERCENT: 2.5 %
GFR AFRICAN AMERICAN: >60
GFR NON-AFRICAN AMERICAN: >60
GLUCOSE BLD-MCNC: 128 MG/DL (ref 70–99)
GLUCOSE BLD-MCNC: 142 MG/DL (ref 70–99)
GLUCOSE BLD-MCNC: 174 MG/DL (ref 70–99)
GLUCOSE BLD-MCNC: 91 MG/DL (ref 70–99)
GLUCOSE BLD-MCNC: 97 MG/DL (ref 70–99)
HCT VFR BLD CALC: 27.1 % (ref 40.5–52.5)
HEMOGLOBIN: 8.7 G/DL (ref 13.5–17.5)
LYMPHOCYTES ABSOLUTE: 1.1 K/UL (ref 1–5.1)
LYMPHOCYTES RELATIVE PERCENT: 13.8 %
MAGNESIUM: 1.8 MG/DL (ref 1.8–2.4)
MCH RBC QN AUTO: 27.1 PG (ref 26–34)
MCHC RBC AUTO-ENTMCNC: 32 G/DL (ref 31–36)
MCV RBC AUTO: 84.6 FL (ref 80–100)
MONOCYTES ABSOLUTE: 0.6 K/UL (ref 0–1.3)
MONOCYTES RELATIVE PERCENT: 8.2 %
NEUTROPHILS ABSOLUTE: 5.8 K/UL (ref 1.7–7.7)
NEUTROPHILS RELATIVE PERCENT: 74.4 %
PDW BLD-RTO: 17 % (ref 12.4–15.4)
PERFORMED ON: ABNORMAL
PERFORMED ON: NORMAL
PLATELET # BLD: 345 K/UL (ref 135–450)
PMV BLD AUTO: 7.8 FL (ref 5–10.5)
POTASSIUM REFLEX MAGNESIUM: 4.6 MMOL/L (ref 3.5–5.1)
RBC # BLD: 3.2 M/UL (ref 4.2–5.9)
SODIUM BLD-SCNC: 133 MMOL/L (ref 136–145)
WBC # BLD: 7.8 K/UL (ref 4–11)

## 2020-01-25 PROCEDURE — 94640 AIRWAY INHALATION TREATMENT: CPT

## 2020-01-25 PROCEDURE — 2580000003 HC RX 258: Performed by: INTERNAL MEDICINE

## 2020-01-25 PROCEDURE — 6370000000 HC RX 637 (ALT 250 FOR IP): Performed by: HOSPITALIST

## 2020-01-25 PROCEDURE — 6360000002 HC RX W HCPCS: Performed by: INTERNAL MEDICINE

## 2020-01-25 PROCEDURE — 83735 ASSAY OF MAGNESIUM: CPT

## 2020-01-25 PROCEDURE — 85025 COMPLETE CBC W/AUTO DIFF WBC: CPT

## 2020-01-25 PROCEDURE — 71045 X-RAY EXAM CHEST 1 VIEW: CPT

## 2020-01-25 PROCEDURE — 36415 COLL VENOUS BLD VENIPUNCTURE: CPT

## 2020-01-25 PROCEDURE — 6370000000 HC RX 637 (ALT 250 FOR IP): Performed by: INTERNAL MEDICINE

## 2020-01-25 PROCEDURE — 99255 IP/OBS CONSLTJ NEW/EST HI 80: CPT | Performed by: INTERNAL MEDICINE

## 2020-01-25 PROCEDURE — 80048 BASIC METABOLIC PNL TOTAL CA: CPT

## 2020-01-25 PROCEDURE — 1200000000 HC SEMI PRIVATE

## 2020-01-25 PROCEDURE — 94760 N-INVAS EAR/PLS OXIMETRY 1: CPT

## 2020-01-25 PROCEDURE — 2700000000 HC OXYGEN THERAPY PER DAY

## 2020-01-25 RX ADMIN — IPRATROPIUM BROMIDE AND ALBUTEROL SULFATE 1 AMPULE: .5; 3 SOLUTION RESPIRATORY (INHALATION) at 12:29

## 2020-01-25 RX ADMIN — ATORVASTATIN CALCIUM 40 MG: 40 TABLET, FILM COATED ORAL at 20:06

## 2020-01-25 RX ADMIN — SPIRONOLACTONE 50 MG: 25 TABLET ORAL at 09:08

## 2020-01-25 RX ADMIN — FUROSEMIDE 80 MG: 10 INJECTION, SOLUTION INTRAMUSCULAR; INTRAVENOUS at 17:36

## 2020-01-25 RX ADMIN — ASPIRIN 81 MG 81 MG: 81 TABLET ORAL at 09:09

## 2020-01-25 RX ADMIN — RANOLAZINE 500 MG: 500 TABLET, FILM COATED, EXTENDED RELEASE ORAL at 20:06

## 2020-01-25 RX ADMIN — OYSTER SHELL CALCIUM WITH VITAMIN D 1 TABLET: 500; 200 TABLET, FILM COATED ORAL at 09:08

## 2020-01-25 RX ADMIN — FUROSEMIDE 80 MG: 10 INJECTION, SOLUTION INTRAMUSCULAR; INTRAVENOUS at 09:08

## 2020-01-25 RX ADMIN — IPRATROPIUM BROMIDE AND ALBUTEROL SULFATE 1 AMPULE: .5; 3 SOLUTION RESPIRATORY (INHALATION) at 20:25

## 2020-01-25 RX ADMIN — SODIUM CHLORIDE, PRESERVATIVE FREE 10 ML: 5 INJECTION INTRAVENOUS at 20:06

## 2020-01-25 RX ADMIN — IPRATROPIUM BROMIDE AND ALBUTEROL SULFATE 1 AMPULE: .5; 3 SOLUTION RESPIRATORY (INHALATION) at 07:59

## 2020-01-25 RX ADMIN — INSULIN GLARGINE 30 UNITS: 100 INJECTION, SOLUTION SUBCUTANEOUS at 21:59

## 2020-01-25 RX ADMIN — INSULIN LISPRO 5 UNITS: 100 INJECTION, SOLUTION INTRAVENOUS; SUBCUTANEOUS at 09:09

## 2020-01-25 RX ADMIN — RANOLAZINE 500 MG: 500 TABLET, FILM COATED, EXTENDED RELEASE ORAL at 09:08

## 2020-01-25 RX ADMIN — SODIUM CHLORIDE, PRESERVATIVE FREE 10 ML: 5 INJECTION INTRAVENOUS at 09:09

## 2020-01-25 RX ADMIN — RIVAROXABAN 15 MG: 15 TABLET, FILM COATED ORAL at 17:36

## 2020-01-25 RX ADMIN — OXYCODONE HYDROCHLORIDE AND ACETAMINOPHEN 1 TABLET: 7.5; 325 TABLET ORAL at 20:06

## 2020-01-25 RX ADMIN — INSULIN LISPRO 1 UNITS: 100 INJECTION, SOLUTION INTRAVENOUS; SUBCUTANEOUS at 22:00

## 2020-01-25 RX ADMIN — ENOXAPARIN SODIUM 40 MG: 40 INJECTION SUBCUTANEOUS at 09:09

## 2020-01-25 RX ADMIN — INSULIN LISPRO 5 UNITS: 100 INJECTION, SOLUTION INTRAVENOUS; SUBCUTANEOUS at 17:36

## 2020-01-25 RX ADMIN — AMIODARONE HYDROCHLORIDE 200 MG: 200 TABLET ORAL at 09:08

## 2020-01-25 RX ADMIN — TAMSULOSIN HYDROCHLORIDE 0.4 MG: 0.4 CAPSULE ORAL at 09:08

## 2020-01-25 RX ADMIN — INSULIN LISPRO 2 UNITS: 100 INJECTION, SOLUTION INTRAVENOUS; SUBCUTANEOUS at 17:36

## 2020-01-25 RX ADMIN — IPRATROPIUM BROMIDE AND ALBUTEROL SULFATE 1 AMPULE: .5; 3 SOLUTION RESPIRATORY (INHALATION) at 16:45

## 2020-01-25 ASSESSMENT — PAIN DESCRIPTION - DESCRIPTORS
DESCRIPTORS: ACHING;DISCOMFORT
DESCRIPTORS: ACHING;DISCOMFORT

## 2020-01-25 ASSESSMENT — PAIN DESCRIPTION - ORIENTATION
ORIENTATION: LOWER
ORIENTATION: LOWER

## 2020-01-25 ASSESSMENT — PAIN DESCRIPTION - LOCATION
LOCATION: BACK
LOCATION: BACK

## 2020-01-25 ASSESSMENT — PAIN SCALES - GENERAL
PAINLEVEL_OUTOF10: 4
PAINLEVEL_OUTOF10: 7

## 2020-01-25 ASSESSMENT — PAIN DESCRIPTION - PROGRESSION
CLINICAL_PROGRESSION: GRADUALLY WORSENING

## 2020-01-25 ASSESSMENT — PAIN DESCRIPTION - PAIN TYPE
TYPE: CHRONIC PAIN
TYPE: CHRONIC PAIN

## 2020-01-25 ASSESSMENT — PAIN DESCRIPTION - ONSET
ONSET: ON-GOING
ONSET: ON-GOING

## 2020-01-25 ASSESSMENT — PAIN DESCRIPTION - FREQUENCY
FREQUENCY: CONTINUOUS
FREQUENCY: CONTINUOUS
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
## 2020-01-25 NOTE — PROGRESS NOTES
Hospitalist Progress Note  1/25/2020 9:24 AM    PCP: Mino Carlos MD    [phone]     Date of Admission: 1/22/2020                                                                                                                     HOSPITAL COURSE    Patient demographics:  The patient  Olimpia Crain is a 61 y.o. male     Significant past medical history:   Patient Active Problem List   Diagnosis    Arthritis    Diabetes mellitus with hyperglycemia ([facility]    HBP (high blood pressure)    Hyperlipidemia    COPD (chronic obstructive pulmonary disease) (HCC)    Viral hepatitis C    Back pain    PAD (peripheral artery disease) ([facility]    Spinal stenosis of lumbar region    Tobacco use    Atherosclerosis of native artery of left lower extremity with intermittent claudication (HCC)    Chest pain    Pleural effusion due to CHF (congestive heart failure) ([facility])    Pleural effusion, right    Alcohol abuse, continuous    Tachycardia    Atrial fibrillation with RVR ([facility])    Hyponatremia    Congestive heart failure ([facility])    REJI (acute kidney injury) ([facility]    Hypokalemia    Coronary artery disease involving autologous artery coronary bypass graft with angina pectoris ([facility]    Essential hypertension    Chest pain of uncertain etiology    Acute on chronic combined systolic and diastolic HF (heart failure) (HCC)    Acute hyperkalemia    Typical atrial flutter (HCC)    Chronic systolic HF (heart failure) (HCC)    Hypotension    Vasovagal syncope    Laceration of scalp without foreign body    Nonischemic cardiomyopathy (HCC)    Syncope and collapse    Ischemic foot    Diabetes mellitus with peripheral circulatory disorder (HCC)    Limb ischemia    COPD exacerbation (HCC)    Nonhealing surgical wound    Acromioclavicular joint arthritis    Bradycardia    Shortness of breath    Permanent atrial fibrillation    Chronic atrial fibrillation    Other specified injury of inferior mesenteric vein, initial encounter    Postprocedural hypotension    Acute respiratory failure with hypercapnia (HCC)    High anion gap metabolic acidosis    Centrilobular emphysema (HCC)    Hypomagnesemia    Heart failure, acute on chronic, systolic and diastolic (HCC)    Atrial fibrillation, persistent    Anemia    Diabetes mellitus type 2, controlled (HCC)    Constipation    Acute on chronic systolic heart failure (HCC)    Atrial fibrillation, rapid (HCC)    COPD with acute exacerbation (HCC)    Cocaine use    Severe sepsis (HCC)    Atrial fibrillation with RVR (HCC)    Acute on chronic respiratory failure with hypoxia (HCC)    Respiratory distress    Biventricular HF (heart failure) (HCC)    Nicotine dependence    Suspected sleep apnea    Coronary artery disease involving native coronary artery of native heart without angina pectoris    CAD (coronary artery disease)    Acute renal failure (ARF) (HCC)    Morbid obesity due to excess calories (HCC)    Acute respiratory failure with hypoxia (HCC)    Nocturnal hypoxia    Hypercapnemia    SOB (shortness of breath)    Acute on chronic respiratory failure with hypercapnia (HCC)    Chronic respiratory failure with hypercapnia (HCC)    Acute pulmonary edema (HCC)    Acute on chronic systolic HF (heart failure) (HCC)    Hx of AKA (above knee amputation), left (HCC)    Respiratory failure (HCC)    HCAP (healthcare-associated pneumonia)    Ventricular tachycardia (HCC)    Shock circulatory (HCC)    Tachypnea    Neutrophilic leukocytosis    Chronic respiratory failure with hypoxia (HCC)    Cardiac arrest (HCC)    PEA (Pulseless electrical activity) (HCC)    Ileus (HCC)    Pneumonia of right lower lobe due to infectious organism [facility])    Atrial fibrillation, controlled ([address]    Pulmonary HTN ([address]    Weakness of right lower extremity    Large bowel obstruction ([address]    Tobacco abuse    Intra-abdominal abscess ([address]    Intra-abdominal chronic combined systolic and diastolic HF (heart failure) (HCC)    Acute hyperkalemia    Typical atrial flutter (HCC)    Chronic systolic HF (heart failure) (HCC)    Hypotension    Vasovagal syncope    Laceration of scalp without foreign body    Nonischemic cardiomyopathy (HCC)    Syncope and collapse    Ischemic foot    Diabetes mellitus with peripheral circulatory disorder (HCC)    Limb ischemia    COPD exacerbation (HCC)    Nonhealing surgical wound    Acromioclavicular joint arthritis    Bradycardia    Shortness of breath    Permanent atrial fibrillation    Chronic atrial fibrillation    Other specified injury of inferior mesenteric vein, initial encounter    Postprocedural hypotension    Acute respiratory failure with hypercapnia (HCC)    High anion gap metabolic acidosis    Centrilobular emphysema (HCC)    Hypomagnesemia    Heart failure, acute on chronic, systolic and diastolic (HCC)    Atrial fibrillation, persistent    Anemia    Diabetes mellitus type 2, controlled (HCC)    Constipation    Acute on chronic systolic heart failure (HCC)    Atrial fibrillation, rapid (HCC)    COPD with acute exacerbation (HCC)    Cocaine use    Severe sepsis (HCC)    Atrial fibrillation with RVR (HCC)    Acute on chronic respiratory failure with hypoxia [facility]    Respiratory distress    Biventricular HF (heart failure) ([facility])    Nicotine dependence    Suspected sleep apnea    Coronary artery disease involving native coronary artery of native heart without angina pectoris    CAD (coronary artery disease)    Acute renal failure (ARF) (HCC)    Morbid obesity due to excess calories (HCC)    Acute respiratory failure with hypoxia (HCC)    Nocturnal hypoxia    Hypercapnemia    SOB (shortness of breath)    Acute on chronic respiratory failure with hypercapnia (HCC)    Chronic respiratory failure with hypercapnia (HCC)    Acute pulmonary edema (HCC)    Acute on chronic systolic HF (heart failure) (HCC)    Hx of AKA (above knee amputation), left (HCC)    Respiratory failure ([facility]    HCAP (healthcare-associated pneumonia)    Ventricular tachycardia (HCC)    Shock circulatory (HCC)    Tachypnea    Neutrophilic leukocytosis    Chronic respiratory failure with hypoxia (HCC)    Cardiac arrest (HCC)    PEA (Pulseless electrical activity) (HCC)    Ileus (HCC)    Pneumonia of right lower lobe due to infectious organism [facility])    Atrial fibrillation, controlled ([facility]    Pulmonary HTN (HCC)    Weakness of right lower extremity    Large bowel obstruction (HCC)    Tobacco abuse    Intra-abdominal abscess (HCC)    Intra-abdominal fluid collection    Moderate malnutrition (HCC)    NSTEMI (non-ST elevated myocardial infarction) (HCC)    Systolic CHF (HCC)       Allergies  Rocephin [ceftriaxone] and Demadex [torsemide]    Medications    Scheduled Meds:   insulin lispro  5 Units Subcutaneous TID WC    furosemide  80 mg Intravenous BID    spironolactone  50 mg Oral Daily    insulin glargine  30 Units Subcutaneous Nightly    insulin lispro  0-12 Units Subcutaneous TID     insulin lispro  0-6 Units Subcutaneous Nightly    ipratropium-albuterol  1 ampule Inhalation Q4H WA    amiodarone  200 mg Oral Daily    aspirin  81 mg Oral Daily    atorvastatin  40 mg Oral Nightly    calcium-vitamin D  1 tablet Oral Daily    polyethylene glycol  17 g Oral BID    ranolazine  500 mg Oral BID    tamsulosin  0.4 mg Oral Daily    sodium chloride flush  10 mL Intravenous 2 times per day    enoxaparin  40 mg Subcutaneous Daily     Continuous Infusions:   dextrose       PRN Meds:  magnesium sulfate, glucose, dextrose, glucagon (rDNA), dextrose, nitroGLYCERIN, oxyCODONE-acetaminophen, sodium chloride flush    Vitals   Vitals /wt   Patient Vitals for the past 8 hrs:   BP Temp Temp src Pulse Resp SpO2 Weight   01/25/20 0802 -- -- -- -- -- 95 % --   01/25/20 0729 (!) 95/51 97.4 °F (36.3 °C) Oral 83 18 93 % --   01/25/20 0447 109/70 97.3 °F (36.3 °C) Oral 102 14 93 % 187 lb 13.3 oz (85.2 kg)        72HR INTAKE/OUTPUT:      Intake/Output Summary (Last 24 hours) at 1/25/2020 0924  Last data filed at 1/25/2020 0122  Gross per 24 hour   Intake 480 ml   Output 4125 ml   Net -3645 ml       Exam:    Gen:   Alert and oriented ×3  Eyes: PERRL. No sclera icterus. No conjunctival injection. ENT: No discharge. Pharynx clear. External appearance of ears and nose normal.  Neck: Trachea midline. No obvious mass. Resp: Decreased breath sounds bilaterally  CV: Regular rate. Regular rhythm. No murmur or rub. No edema. GI: Non-tender. Non-distended. No hernia. Skin: Warm, dry, normal texture and turgor. Lymph: No cervical LAD. No supraclavicular LAD. M/S: / Ext. No cyanosis. No clubbing. No joint deformity. Neuro: CN 2-12 are intact,  no neurologic deficits noted. PT/INR: No results for input(s): PROTIME, INR in the last 72 hours. APTT: No results for input(s): APTT in the last 72 hours. CBC:   Recent Labs     01/22/20 1629 01/23/20 0451 01/24/20 0458 01/25/20  0437   WBC 9.0 7.3 10.5 7.8   HGB 8.0* 8.2* 8.4* 8.7*   HCT 24.6* 25.3* 25.4* 27.1*   MCV 85.5 85.0 84.8 84.6    306 337 345       BMP:   Recent Labs     01/22/20  1629 01/23/20  0451 01/24/20  0458 01/25/20  0436    138 137 133*   K 4.6 5.1 4.2 4.6   CL 92* 92* 87* 82*   CO2 33* 34* 40* 42*   BUN 15 15 23* 23*   CREATININE 0.7* 0.7* 0.9 0.8*       LIVER PROFILE:   Recent Labs     01/22/20 1629   ALKPHOS 103   AST 26   ALT 16   BILITOT 0.3     No results for input(s): AMYLASE in the last 72 hours. No results for input(s): LIPASE in the last 72 hours. UA:No results for input(s): NITRITE, LABCAST, WBCUA, RBCUA, MUCUS in the last 72 hours.     TROPONIN:   Recent Labs     01/22/20  1629   TROPONINI 0.02*       Lab Results   Component Value Date/Time    URRFLXCULT Not Indicated 01/22/2020 07:38 PM       No results for input(s):

## 2020-01-25 NOTE — PLAN OF CARE
Problem: Risk for Impaired Skin Integrity  Goal: Tissue integrity - skin and mucous membranes  Description  Structural intactness and normal physiological function of skin and  mucous membranes. 1/25/2020 1412 by Paco Martinez RN  Outcome: Ongoing     Problem: SAFETY  Goal: Free from accidental physical injury  1/25/2020 1412 by Paco Martinez RN  Outcome: Ongoing     Problem: DAILY CARE  Goal: Daily care needs are met  1/25/2020 1412 by Paco Martinez RN  Outcome: Ongoing     Problem: PAIN  Goal: Patient's pain/discomfort is manageable  1/25/2020 1412 by Paco Martinez RN  Outcome: Ongoing     Problem: SKIN INTEGRITY  Goal: Skin integrity is maintained or improved  1/25/2020 1412 by Paco Martinez RN  Outcome: Ongoing     Problem: KNOWLEDGE DEFICIT  Goal: Patient/S.O. demonstrates understanding of disease process, treatment plan, medications, and discharge instructions.   1/25/2020 1412 by Paco Martinez RN  Outcome: Ongoing     Problem: DISCHARGE BARRIERS  Goal: Patient's continuum of care needs are met  1/25/2020 1412 by Paco Martinez RN  Outcome: Ongoing     Problem: Discharge Planning:  Goal: Discharged to appropriate level of care  Description  Discharged to appropriate level of care  1/25/2020 1412 by Paco Martinez RN  Outcome: Ongoing

## 2020-01-25 NOTE — PLAN OF CARE
Problem: Risk for Impaired Skin Integrity  Goal: Tissue integrity - skin and mucous membranes  Description  Structural intactness and normal physiological function of skin and  mucous membranes. Outcome: Ongoing     Problem: SAFETY  Goal: Free from accidental physical injury  Outcome: Ongoing     Problem: DAILY CARE  Goal: Daily care needs are met  Outcome: Ongoing     Problem: PAIN  Goal: Patient's pain/discomfort is manageable  Outcome: Ongoing     Problem: SKIN INTEGRITY  Goal: Skin integrity is maintained or improved  Outcome: Ongoing     Problem: KNOWLEDGE DEFICIT  Goal: Patient/S.O. demonstrates understanding of disease process, treatment plan, medications, and discharge instructions.   Outcome: Ongoing     Problem: DISCHARGE BARRIERS  Goal: Patient's continuum of care needs are met  Outcome: Ongoing     Problem: Discharge Planning:  Goal: Discharged to appropriate level of care  Description  Discharged to appropriate level of care  Outcome: Ongoing     Problem: Gas Exchange - Impaired:  Goal: Levels of oxygenation will improve  Description  Levels of oxygenation will improve  Outcome: Ongoing     Problem: OXYGENATION/RESPIRATORY FUNCTION  Goal: Patient will maintain patent airway  Outcome: Ongoing  Goal: Patient will achieve/maintain normal respiratory rate/effort  Description  Respiratory rate and effort will be within normal limits for the patient  Outcome: Ongoing     Problem: HEMODYNAMIC STATUS  Goal: Patient has stable vital signs and fluid balance  Outcome: Ongoing     Problem: FLUID AND ELECTROLYTE IMBALANCE  Goal: Fluid and electrolyte balance are achieved/maintained  Outcome: Ongoing     Problem: ACTIVITY INTOLERANCE/IMPAIRED MOBILITY  Goal: Mobility/activity is maintained at optimum level for patient  Outcome: Ongoing     Problem: Pain:  Goal: Pain level will decrease  Description  Pain level will decrease  Outcome: Ongoing  Goal: Control of acute pain  Description  Control of acute pain  Outcome: Ongoing  Goal: Control of chronic pain  Description  Control of chronic pain  Outcome: Ongoing

## 2020-01-26 LAB
ANION GAP SERPL CALCULATED.3IONS-SCNC: 12 MMOL/L (ref 3–16)
BASOPHILS ABSOLUTE: 0.1 K/UL (ref 0–0.2)
BASOPHILS RELATIVE PERCENT: 0.7 %
BLOOD CULTURE, ROUTINE: NORMAL
BUN BLDV-MCNC: 23 MG/DL (ref 7–20)
CALCIUM SERPL-MCNC: 9.4 MG/DL (ref 8.3–10.6)
CHLORIDE BLD-SCNC: 83 MMOL/L (ref 99–110)
CO2: 41 MMOL/L (ref 21–32)
CREAT SERPL-MCNC: 1 MG/DL (ref 0.9–1.3)
CULTURE, BLOOD 2: NORMAL
EOSINOPHILS ABSOLUTE: 0.1 K/UL (ref 0–0.6)
EOSINOPHILS RELATIVE PERCENT: 1.5 %
GFR AFRICAN AMERICAN: >60
GFR NON-AFRICAN AMERICAN: >60
GLUCOSE BLD-MCNC: 146 MG/DL (ref 70–99)
GLUCOSE BLD-MCNC: 154 MG/DL (ref 70–99)
GLUCOSE BLD-MCNC: 155 MG/DL (ref 70–99)
GLUCOSE BLD-MCNC: 90 MG/DL (ref 70–99)
GLUCOSE BLD-MCNC: 99 MG/DL (ref 70–99)
HCT VFR BLD CALC: 28.6 % (ref 40.5–52.5)
HEMOGLOBIN: 9.5 G/DL (ref 13.5–17.5)
LYMPHOCYTES ABSOLUTE: 1.2 K/UL (ref 1–5.1)
LYMPHOCYTES RELATIVE PERCENT: 14.6 %
MAGNESIUM: 1.9 MG/DL (ref 1.8–2.4)
MCH RBC QN AUTO: 27.6 PG (ref 26–34)
MCHC RBC AUTO-ENTMCNC: 33.1 G/DL (ref 31–36)
MCV RBC AUTO: 83.4 FL (ref 80–100)
MONOCYTES ABSOLUTE: 0.7 K/UL (ref 0–1.3)
MONOCYTES RELATIVE PERCENT: 8.7 %
NEUTROPHILS ABSOLUTE: 6.2 K/UL (ref 1.7–7.7)
NEUTROPHILS RELATIVE PERCENT: 74.5 %
PDW BLD-RTO: 16.8 % (ref 12.4–15.4)
PERFORMED ON: ABNORMAL
PERFORMED ON: NORMAL
PLATELET # BLD: 372 K/UL (ref 135–450)
PMV BLD AUTO: 7.8 FL (ref 5–10.5)
POTASSIUM REFLEX MAGNESIUM: 3.4 MMOL/L (ref 3.5–5.1)
RBC # BLD: 3.43 M/UL (ref 4.2–5.9)
SODIUM BLD-SCNC: 136 MMOL/L (ref 136–145)
WBC # BLD: 8.3 K/UL (ref 4–11)

## 2020-01-26 PROCEDURE — 6370000000 HC RX 637 (ALT 250 FOR IP): Performed by: INTERNAL MEDICINE

## 2020-01-26 PROCEDURE — 80048 BASIC METABOLIC PNL TOTAL CA: CPT

## 2020-01-26 PROCEDURE — 36415 COLL VENOUS BLD VENIPUNCTURE: CPT

## 2020-01-26 PROCEDURE — 85025 COMPLETE CBC W/AUTO DIFF WBC: CPT

## 2020-01-26 PROCEDURE — 94761 N-INVAS EAR/PLS OXIMETRY MLT: CPT

## 2020-01-26 PROCEDURE — 2700000000 HC OXYGEN THERAPY PER DAY

## 2020-01-26 PROCEDURE — 2580000003 HC RX 258: Performed by: INTERNAL MEDICINE

## 2020-01-26 PROCEDURE — 94640 AIRWAY INHALATION TREATMENT: CPT

## 2020-01-26 PROCEDURE — 83735 ASSAY OF MAGNESIUM: CPT

## 2020-01-26 PROCEDURE — 6360000002 HC RX W HCPCS: Performed by: INTERNAL MEDICINE

## 2020-01-26 PROCEDURE — 1200000000 HC SEMI PRIVATE

## 2020-01-26 PROCEDURE — 6370000000 HC RX 637 (ALT 250 FOR IP): Performed by: HOSPITALIST

## 2020-01-26 RX ORDER — POTASSIUM CHLORIDE 7.45 MG/ML
10 INJECTION INTRAVENOUS PRN
Status: DISCONTINUED | OUTPATIENT
Start: 2020-01-26 | End: 2020-02-07

## 2020-01-26 RX ORDER — POTASSIUM CHLORIDE 20 MEQ/1
40 TABLET, EXTENDED RELEASE ORAL PRN
Status: DISCONTINUED | OUTPATIENT
Start: 2020-01-26 | End: 2020-02-07

## 2020-01-26 RX ADMIN — RANOLAZINE 500 MG: 500 TABLET, FILM COATED, EXTENDED RELEASE ORAL at 08:47

## 2020-01-26 RX ADMIN — ASPIRIN 81 MG 81 MG: 81 TABLET ORAL at 08:47

## 2020-01-26 RX ADMIN — INSULIN LISPRO 1 UNITS: 100 INJECTION, SOLUTION INTRAVENOUS; SUBCUTANEOUS at 20:46

## 2020-01-26 RX ADMIN — RIVAROXABAN 15 MG: 15 TABLET, FILM COATED ORAL at 17:43

## 2020-01-26 RX ADMIN — FUROSEMIDE 80 MG: 10 INJECTION, SOLUTION INTRAMUSCULAR; INTRAVENOUS at 08:46

## 2020-01-26 RX ADMIN — AMIODARONE HYDROCHLORIDE 200 MG: 200 TABLET ORAL at 08:47

## 2020-01-26 RX ADMIN — SODIUM CHLORIDE, PRESERVATIVE FREE 10 ML: 5 INJECTION INTRAVENOUS at 20:18

## 2020-01-26 RX ADMIN — INSULIN LISPRO 2 UNITS: 100 INJECTION, SOLUTION INTRAVENOUS; SUBCUTANEOUS at 12:13

## 2020-01-26 RX ADMIN — ATORVASTATIN CALCIUM 40 MG: 40 TABLET, FILM COATED ORAL at 20:19

## 2020-01-26 RX ADMIN — IPRATROPIUM BROMIDE AND ALBUTEROL SULFATE 1 AMPULE: .5; 3 SOLUTION RESPIRATORY (INHALATION) at 15:36

## 2020-01-26 RX ADMIN — TAMSULOSIN HYDROCHLORIDE 0.4 MG: 0.4 CAPSULE ORAL at 08:47

## 2020-01-26 RX ADMIN — INSULIN LISPRO 5 UNITS: 100 INJECTION, SOLUTION INTRAVENOUS; SUBCUTANEOUS at 08:46

## 2020-01-26 RX ADMIN — OXYCODONE HYDROCHLORIDE AND ACETAMINOPHEN 1 TABLET: 7.5; 325 TABLET ORAL at 08:53

## 2020-01-26 RX ADMIN — OYSTER SHELL CALCIUM WITH VITAMIN D 1 TABLET: 500; 200 TABLET, FILM COATED ORAL at 08:47

## 2020-01-26 RX ADMIN — INSULIN GLARGINE 30 UNITS: 100 INJECTION, SOLUTION SUBCUTANEOUS at 20:46

## 2020-01-26 RX ADMIN — SODIUM CHLORIDE, PRESERVATIVE FREE 10 ML: 5 INJECTION INTRAVENOUS at 08:48

## 2020-01-26 RX ADMIN — OXYCODONE HYDROCHLORIDE AND ACETAMINOPHEN 1 TABLET: 7.5; 325 TABLET ORAL at 20:49

## 2020-01-26 RX ADMIN — INSULIN LISPRO 5 UNITS: 100 INJECTION, SOLUTION INTRAVENOUS; SUBCUTANEOUS at 12:14

## 2020-01-26 RX ADMIN — SPIRONOLACTONE 50 MG: 25 TABLET ORAL at 08:47

## 2020-01-26 RX ADMIN — IPRATROPIUM BROMIDE AND ALBUTEROL SULFATE 1 AMPULE: .5; 3 SOLUTION RESPIRATORY (INHALATION) at 07:52

## 2020-01-26 RX ADMIN — POLYETHYLENE GLYCOL 3350 17 G: 17 POWDER, FOR SOLUTION ORAL at 20:19

## 2020-01-26 RX ADMIN — IPRATROPIUM BROMIDE AND ALBUTEROL SULFATE 1 AMPULE: .5; 3 SOLUTION RESPIRATORY (INHALATION) at 11:45

## 2020-01-26 RX ADMIN — INSULIN LISPRO 2 UNITS: 100 INJECTION, SOLUTION INTRAVENOUS; SUBCUTANEOUS at 17:43

## 2020-01-26 RX ADMIN — INSULIN LISPRO 5 UNITS: 100 INJECTION, SOLUTION INTRAVENOUS; SUBCUTANEOUS at 17:43

## 2020-01-26 RX ADMIN — RANOLAZINE 500 MG: 500 TABLET, FILM COATED, EXTENDED RELEASE ORAL at 20:19

## 2020-01-26 RX ADMIN — POLYETHYLENE GLYCOL 3350 17 G: 17 POWDER, FOR SOLUTION ORAL at 08:47

## 2020-01-26 RX ADMIN — POTASSIUM CHLORIDE 40 MEQ: 1500 TABLET, EXTENDED RELEASE ORAL at 12:14

## 2020-01-26 RX ADMIN — FUROSEMIDE 80 MG: 10 INJECTION, SOLUTION INTRAMUSCULAR; INTRAVENOUS at 17:43

## 2020-01-26 RX ADMIN — IPRATROPIUM BROMIDE AND ALBUTEROL SULFATE 1 AMPULE: .5; 3 SOLUTION RESPIRATORY (INHALATION) at 21:10

## 2020-01-26 ASSESSMENT — PAIN DESCRIPTION - ONSET
ONSET: ON-GOING
ONSET: ON-GOING

## 2020-01-26 ASSESSMENT — PAIN DESCRIPTION - FREQUENCY
FREQUENCY: INTERMITTENT
FREQUENCY: CONTINUOUS

## 2020-01-26 ASSESSMENT — PAIN DESCRIPTION - PROGRESSION
CLINICAL_PROGRESSION: NOT CHANGED
CLINICAL_PROGRESSION: NOT CHANGED

## 2020-01-26 ASSESSMENT — PAIN SCALES - GENERAL
PAINLEVEL_OUTOF10: 0
PAINLEVEL_OUTOF10: 7
PAINLEVEL_OUTOF10: 6
PAINLEVEL_OUTOF10: 4
PAINLEVEL_OUTOF10: 0

## 2020-01-26 ASSESSMENT — PAIN DESCRIPTION - PAIN TYPE
TYPE: CHRONIC PAIN
TYPE: CHRONIC PAIN

## 2020-01-26 ASSESSMENT — PAIN DESCRIPTION - DESCRIPTORS
DESCRIPTORS: ACHING
DESCRIPTORS: ACHING

## 2020-01-26 ASSESSMENT — PAIN DESCRIPTION - ORIENTATION
ORIENTATION: LOWER
ORIENTATION: LOWER

## 2020-01-26 ASSESSMENT — PAIN DESCRIPTION - LOCATION
LOCATION: BACK
LOCATION: BACK

## 2020-01-26 NOTE — PROGRESS NOTES
Hospitalist Progress Note  1/26/2020 9:03 AM    PCP: Jesus Staley MD    [phone]     Date of Admission: 1/22/2020                                                                                                                     HOSPITAL COURSE    Patient demographics:  The patient  Dimas Sutton is a 61 y.o. male     Significant past medical history:   Patient Active Problem List   Diagnosis    Arthritis    Diabetes mellitus with hyperglycemia ([facility]    HBP (high blood pressure)    Hyperlipidemia    COPD (chronic obstructive pulmonary disease) (HCC)    Viral hepatitis C    Back pain    PAD (peripheral artery disease) ([facility]    Spinal stenosis of lumbar region    Tobacco use    Atherosclerosis of native artery of left lower extremity with intermittent claudication (HCC)    Chest pain    Pleural effusion due to CHF (congestive heart failure) ([facility]    Pleural effusion, right    Alcohol abuse, continuous    Tachycardia    Atrial fibrillation with RVR ([facility])    Hyponatremia    Congestive heart failure ([facility])    REJI (acute kidney injury) ([facility] [address] [facility]    Hypokalemia    Coronary artery disease involving autologous artery coronary bypass graft with angina pectoris ([facility] [address] [facility]    Essential hypertension    Chest pain of uncertain etiology    Acute on chronic combined systolic and diastolic HF (heart failure) (HCC)    Acute hyperkalemia    Typical atrial flutter (HCC)    Chronic systolic HF (heart failure) (HCC)    Hypotension    Vasovagal syncope    Laceration of scalp without foreign body    Nonischemic cardiomyopathy (HCC)    Syncope and collapse    Ischemic foot    Diabetes mellitus with peripheral circulatory disorder (HCC)    Limb ischemia    COPD exacerbation (HCC)    Nonhealing surgical wound    Acromioclavicular joint arthritis    Bradycardia    Shortness of breath    Permanent atrial fibrillation    Chronic atrial fibrillation    Other specified injury of inferior mesenteric vein, initial encounter    Postprocedural hypotension    Acute respiratory failure with hypercapnia (HCC)    High anion gap metabolic acidosis    Centrilobular emphysema (HCC)    Hypomagnesemia    Heart failure, acute on chronic, systolic and diastolic (HCC)    Atrial fibrillation, persistent    Anemia    Diabetes mellitus type 2, controlled (HCC)    Constipation    Acute on chronic systolic heart failure (HCC)    Atrial fibrillation, rapid (HCC)    COPD with acute exacerbation (HCC)    Cocaine use    Severe sepsis (HCC)    Atrial fibrillation with RVR (HCC)    Acute on chronic respiratory failure with hypoxia (HCC)    Respiratory distress    Biventricular failure (HCC)    Nicotine dependence    Suspected sleep apnea    Coronary artery disease involving native coronary artery of native heart without angina pectoris    CAD (coronary artery disease)    Acute renal failure (ARF) (HCC)    Morbid obesity due to excess calories (HCC)    Acute respiratory failure with hypoxia (HCC)    Nocturnal hypoxia    Hypercapnemia    SOB (shortness of breath)    Acute on chronic respiratory failure with hypercapnia (HCC)    Chronic respiratory failure with hypercapnia (HCC)    Acute pulmonary edema (HCC)    Acute on chronic systolic HF (heart failure) (HCC)    Hx of AKA (above knee amputation), left (HCC)    Respiratory failure (HCC)    HCAP (healthcare-associated pneumonia)    Ventricular tachycardia (HCC)    Shock circulatory (HCC)    Tachypnea    Neutrophilic leukocytosis    Chronic respiratory failure with hypoxia (HCC)    Cardiac arrest (HCC)    PEA (Pulseless electrical activity) (HCC)    Ileus (HCC)    Pneumonia of right lower lobe due to infectious organism [facility])    Atrial fibrillation, controlled ([address]    Pulmonary HTN ([address]    Weakness of right lower extremity    Large bowel obstruction ([address]    Tobacco abuse    Intra-abdominal abscess ([address]    Intra-abdominal fluid collection    Moderate malnutrition ([facility]    NSTEMI (non-ST elevated myocardial infarction) ([facility]    Systolic CHF (HCC)    Pleural effusion         Presenting symptoms:  Shortness of Breath    Diagnostic workup:      CONSULTS DURING ADMISSION :   IP CONSULT TO PALLIATIVE CARE  IP CONSULT TO PULMONOLOGY      Patient was diagnosed with:  Acute systolic CHF  Bilateral pleural effusions  COPD unspecified  Atrial fibrillation  Type 2 diabetes mellitus    Treatment while inpatient:  Patient presented to the emergency room with worsening shortness of breath. Patient has a history of systolic CHF with ejection fraction of 30% and COPD                       Patient is a smoker and at baseline patient is on 2 L nasal cannula  Patient was diagnosed with acute on chronic CHF and started on diuretics                                                           ----------------------------------------------------------      SUBJECTIVE COMPLAINTS- Follow-up for shortness of breath    Diet: DIET CARDIAC; Low Sodium (2 GM);  Daily Fluid Restriction: 1500 ml      OBJECTIVE:   Patient Active Problem List   Diagnosis    Arthritis    Diabetes mellitus with hyperglycemia (HCC)    HBP (high blood pressure)    Hyperlipidemia    COPD (chronic obstructive pulmonary disease) (HCC)    Viral hepatitis C    Back pain    PAD (peripheral artery disease) ([facility]    Spinal stenosis of lumbar region    Tobacco use    Atherosclerosis of native artery of left lower extremity with intermittent claudication (HCC)    Chest pain    Pleural effusion due to CHF (congestive heart failure) (HCC)    Pleural effusion, right    Alcohol abuse, continuous    Tachycardia    Atrial fibrillation with RVR (HCC)    Hyponatremia    Congestive heart failure (HCC)    REJI (acute kidney injury) ([facility]    Hypokalemia    Coronary artery disease involving autologous artery coronary bypass graft with angina pectoris ([facility]    Essential hypertension    Chest pain of uncertain etiology    Acute on chronic combined systolic and diastolic HF (heart failure) (HCC)    Acute hyperkalemia    Typical atrial flutter (HCC)    Chronic systolic HF (heart failure) (HCC)    Hypotension    Vasovagal syncope    Laceration of scalp without foreign body    Nonischemic cardiomyopathy (HCC)    Syncope and collapse    Ischemic foot    Diabetes mellitus with peripheral circulatory disorder (HCC)    Limb ischemia    COPD exacerbation (HCC)    Nonhealing surgical wound    Acromioclavicular joint arthritis    Bradycardia    Shortness of breath    Permanent atrial fibrillation    Chronic atrial fibrillation    Other specified injury of inferior mesenteric vein, initial encounter    Postprocedural hypotension    Acute respiratory failure with hypercapnia (HCC)    High anion gap metabolic acidosis    Centrilobular emphysema (HCC)    Hypomagnesemia    Heart failure, acute on chronic, systolic and diastolic (HCC)    Atrial fibrillation, persistent    Anemia    Diabetes mellitus type 2, controlled (HCC)    Constipation    Acute on chronic systolic heart failure (HCC)    Atrial fibrillation, rapid (HCC)    COPD with acute exacerbation (HCC)    Cocaine use    Severe sepsis (HCC)    Atrial fibrillation with RVR (HCC)    Acute on chronic respiratory failure with hypoxia (HCC)    Respiratory distress    Biventricular failure (HCC)    Nicotine dependence    Suspected sleep apnea    Coronary artery disease involving native coronary artery of native heart without angina pectoris    CAD (coronary artery disease)    Acute renal failure (ARF) ([address]    Morbid obesity due to excess calories (HCC)    Acute respiratory failure with hypoxia (HCC)    Nocturnal hypoxia    Hypercapnemia    SOB (shortness of breath)    Acute on chronic respiratory failure with hypercapnia (HCC)    Chronic respiratory failure with hypercapnia (HCC)    Acute pulmonary edema (HCC)    Acute on chronic systolic HF (heart failure) (HCC)    Hx of AKA (above knee amputation), left (HCC)    Respiratory failure ([facility] [address]    HCAP (healthcare-associated pneumonia)    Ventricular tachycardia (HCC)    Shock circulatory (HCC)    Tachypnea    Neutrophilic leukocytosis    Chronic respiratory failure with hypoxia (HCC)    Cardiac arrest (HCC)    PEA (Pulseless electrical activity) (HCC)    Ileus (HCC)    Pneumonia of right lower lobe due to infectious organism [facility])    Atrial fibrillation, controlled ([facility] [address] [facility]    Pulmonary HTN (HCC)    Weakness of right lower extremity    Large bowel obstruction (HCC)    Tobacco abuse    Intra-abdominal abscess (HCC)    Intra-abdominal fluid collection    Moderate malnutrition (HCC)    NSTEMI (non-ST elevated myocardial infarction) (HCC)    Systolic CHF (HCC)    Pleural effusion       Allergies  Rocephin [ceftriaxone] and Demadex [torsemide]    Medications    Scheduled Meds:   rivaroxaban  15 mg Oral Daily    insulin lispro  5 Units Subcutaneous TID WC    furosemide  80 mg Intravenous BID    spironolactone  50 mg Oral Daily    insulin glargine  30 Units Subcutaneous Nightly    insulin lispro  0-12 Units Subcutaneous TID WC    insulin lispro  0-6 Units Subcutaneous Nightly    ipratropium-albuterol  1 ampule Inhalation Q4H WA    amiodarone  200 mg Oral Daily    aspirin  81 mg Oral Daily    atorvastatin  40 mg Oral Nightly    calcium-vitamin D  1 tablet Oral Daily    polyethylene glycol  17 g Oral BID    ranolazine  500 mg Oral BID    tamsulosin  0.4 mg Oral Daily    sodium chloride flush  10 mL Intravenous 2 times per day     Continuous Infusions:   dextrose       PRN Meds:  magnesium sulfate, glucose, dextrose, glucagon (rDNA), dextrose, nitroGLYCERIN, oxyCODONE-acetaminophen, sodium chloride flush    Vitals   Vitals /wt   Patient Vitals for the past 8 hrs:   BP Temp Temp src Pulse Resp SpO2 Weight   01/26/20 0754 -- -- -- -- -- 96 % -- 01/26/20 0716 130/73 97.8 °F (36.6 °C) Oral 82 16 96 % --   01/26/20 0514 112/69 97.7 °F (36.5 °C) Oral 88 18 94 % 182 lb 12.2 oz (82.9 kg)   01/26/20 0352 -- -- -- 89 -- -- --   01/26/20 0117 -- -- -- 91 -- -- --        72HR INTAKE/OUTPUT:      Intake/Output Summary (Last 24 hours) at 1/26/2020 0903  Last data filed at 1/26/2020 0500  Gross per 24 hour   Intake 720 ml   Output 5635 ml   Net -4915 ml       Exam:    Gen:   Alert and oriented ×3  Eyes: PERRL. No sclera icterus. No conjunctival injection. ENT: No discharge. Pharynx clear. External appearance of ears and nose normal.  Neck: Trachea midline. No obvious mass. Resp: Decreased breath sounds bilaterally  CV: Regular rate. Regular rhythm. No murmur or rub. No edema. GI: Non-tender. Non-distended. No hernia. Skin: Warm, dry, normal texture and turgor. Lymph: No cervical LAD. No supraclavicular LAD. M/S: / Ext. No cyanosis. No clubbing. No joint deformity. Neuro: CN 2-12 are intact,  no neurologic deficits noted. PT/INR: No results for input(s): PROTIME, INR in the last 72 hours. APTT: No results for input(s): APTT in the last 72 hours. CBC:   Recent Labs     01/24/20 0458 01/25/20 0437 01/26/20 0524   WBC 10.5 7.8 8.3   HGB 8.4* 8.7* 9.5*   HCT 25.4* 27.1* 28.6*   MCV 84.8 84.6 83.4    345 372       BMP:   Recent Labs     01/24/20 0458 01/25/20 0436 01/26/20 0524    133* 136   K 4.2 4.6 3.4*   CL 87* 82* 83*   CO2 40* 42* 41*   BUN 23* 23* 23*   CREATININE 0.9 0.8* 1.0       LIVER PROFILE:   No results for input(s): ALKPHOS, AST, ALT, ALB, BILIDIR, BILITOT, ALKPHOS in the last 72 hours. No results for input(s): AMYLASE in the last 72 hours. No results for input(s): LIPASE in the last 72 hours. UA:No results for input(s): NITRITE, LABCAST, WBCUA, RBCUA, MUCUS in the last 72 hours. TROPONIN:   No results for input(s): Jose Stevie in the last 72 hours.     Lab Results   Component Value Date/Time URRFLXCULT Not Indicated 01/22/2020 07:38 PM       No results for input(s): TSHREFLEX in the last 72 hours. No components found for: [id number]  POC GLUCOSE:    Recent Labs     01/25/20  0848 01/25/20  1211 01/25/20  1720 01/25/20  2154 01/26/20  0840   POCGLU 97 128* 174* 142* 99     Recent Labs     01/24/20  0458   LABA1C 5.5      Lab Results   Component Value Date    LABA1C 5.5 01/24/2020     Echo  Severe left ventricular dysfunction with global hypokinesis. Ejection fraction is visually estimated to be 30-35%. The right ventricle is severely enlarged with severely reduced function. The left atrium is mildly dilated. (6/22/2019)    ASSESSMENT AND PLAN    Acute systolic CHF  IV Lasix, and spironolactone,  Fluid balance -10.8 L  Continue on diuretics      Bilateral pleural effusions  Cardiomegaly  Thoracentesis if pleural effusion does not improve  Pulmonary consult is appreciated    COPD unspecified J44.9  Continue with the bronchodilators      Type 2 diabetes mellitus E11.9  hold OHA  Insulin sliding scale      Essential primary hypertension I10  Continue patient on home medication    Anxiety depression F 41.9  Continue on home medication    Atrial fibrillation I48.91  Heart rate is controlled  Continue patient on xarelto           Code Status: Full Code        Dispo -Continue care        The patient and / or the family were informed of the results of any tests, a time was given to answer questions, a plan was proposed and they agreed with plan. Damián Mcgowan MD    This note was transcribed using [id number]. Please disregard any translational errors.

## 2020-01-26 NOTE — PLAN OF CARE
Problem: Risk for Impaired Skin Integrity  Goal: Tissue integrity - skin and mucous membranes  Description  Structural intactness and normal physiological function of skin and  mucous membranes. 1/26/2020 1201 by Jessie Sim RN  Outcome: Ongoing     Problem: SAFETY  Goal: Free from accidental physical injury  1/26/2020 1201 by Jessie Sim RN  Outcome: Ongoing     Problem: DAILY CARE  Goal: Daily care needs are met  1/26/2020 1201 by Jessie Sim RN  Outcome: Ongoing     Problem: PAIN  Goal: Patient's pain/discomfort is manageable  1/26/2020 1201 by Jessie Sim RN  Outcome: Ongoing     Problem: SKIN INTEGRITY  Goal: Skin integrity is maintained or improved  1/26/2020 1201 by Jessie Sim RN  Outcome: Ongoing     Problem: KNOWLEDGE DEFICIT  Goal: Patient/S.O. demonstrates understanding of disease process, treatment plan, medications, and discharge instructions.   1/26/2020 1201 by Jessie Sim RN  Outcome: Ongoing

## 2020-01-26 NOTE — PLAN OF CARE
Problem: Risk for Impaired Skin Integrity  Goal: Tissue integrity - skin and mucous membranes  Description  Structural intactness and normal physiological function of skin and  mucous membranes.   1/26/2020 0138 by Isael Calderon RN  Outcome: Ongoing     Problem: SAFETY  Goal: Free from accidental physical injury  1/26/2020 0138 by Isael Calderon RN  Outcome: Ongoing     Problem: SKIN INTEGRITY  Goal: Skin integrity is maintained or improved  1/26/2020 0138 by Isael Calderon RN  Outcome: Ongoing     Problem: OXYGENATION/RESPIRATORY FUNCTION  Goal: Patient will maintain patent airway  1/26/2020 0138 by Isael Calderon RN  Outcome: Ongoing     Problem: Pain:  Goal: Control of chronic pain  Description  Control of chronic pain  1/26/2020 0138 by Isael Calderon RN  Outcome: Ongoing

## 2020-01-27 ENCOUNTER — APPOINTMENT (OUTPATIENT)
Dept: GENERAL RADIOLOGY | Age: 60
DRG: 194 | End: 2020-01-27
Payer: COMMERCIAL

## 2020-01-27 LAB
ANION GAP SERPL CALCULATED.3IONS-SCNC: 11 MMOL/L (ref 3–16)
BASOPHILS ABSOLUTE: 0.1 K/UL (ref 0–0.2)
BASOPHILS RELATIVE PERCENT: 1 %
BUN BLDV-MCNC: 23 MG/DL (ref 7–20)
CALCIUM SERPL-MCNC: 9.7 MG/DL (ref 8.3–10.6)
CHLORIDE BLD-SCNC: 84 MMOL/L (ref 99–110)
CO2: 39 MMOL/L (ref 21–32)
CREAT SERPL-MCNC: 1 MG/DL (ref 0.9–1.3)
EOSINOPHILS ABSOLUTE: 0.2 K/UL (ref 0–0.6)
EOSINOPHILS RELATIVE PERCENT: 1.8 %
GFR AFRICAN AMERICAN: >60
GFR NON-AFRICAN AMERICAN: >60
GLUCOSE BLD-MCNC: 108 MG/DL (ref 70–99)
GLUCOSE BLD-MCNC: 118 MG/DL (ref 70–99)
GLUCOSE BLD-MCNC: 144 MG/DL (ref 70–99)
GLUCOSE BLD-MCNC: 151 MG/DL (ref 70–99)
GLUCOSE BLD-MCNC: 177 MG/DL (ref 70–99)
HCT VFR BLD CALC: 31.3 % (ref 40.5–52.5)
HEMOGLOBIN: 10.5 G/DL (ref 13.5–17.5)
LYMPHOCYTES ABSOLUTE: 1.3 K/UL (ref 1–5.1)
LYMPHOCYTES RELATIVE PERCENT: 13.1 %
MAGNESIUM: 1.9 MG/DL (ref 1.8–2.4)
MCH RBC QN AUTO: 28 PG (ref 26–34)
MCHC RBC AUTO-ENTMCNC: 33.5 G/DL (ref 31–36)
MCV RBC AUTO: 83.7 FL (ref 80–100)
MONOCYTES ABSOLUTE: 0.9 K/UL (ref 0–1.3)
MONOCYTES RELATIVE PERCENT: 8.9 %
NEUTROPHILS ABSOLUTE: 7.7 K/UL (ref 1.7–7.7)
NEUTROPHILS RELATIVE PERCENT: 75.2 %
PDW BLD-RTO: 16.9 % (ref 12.4–15.4)
PERFORMED ON: ABNORMAL
PLATELET # BLD: 378 K/UL (ref 135–450)
PMV BLD AUTO: 8 FL (ref 5–10.5)
POTASSIUM REFLEX MAGNESIUM: 4.1 MMOL/L (ref 3.5–5.1)
RBC # BLD: 3.74 M/UL (ref 4.2–5.9)
SODIUM BLD-SCNC: 134 MMOL/L (ref 136–145)
WBC # BLD: 10.2 K/UL (ref 4–11)

## 2020-01-27 PROCEDURE — 6370000000 HC RX 637 (ALT 250 FOR IP): Performed by: HOSPITALIST

## 2020-01-27 PROCEDURE — 85025 COMPLETE CBC W/AUTO DIFF WBC: CPT

## 2020-01-27 PROCEDURE — 99232 SBSQ HOSP IP/OBS MODERATE 35: CPT | Performed by: INTERNAL MEDICINE

## 2020-01-27 PROCEDURE — 6370000000 HC RX 637 (ALT 250 FOR IP): Performed by: INTERNAL MEDICINE

## 2020-01-27 PROCEDURE — 94760 N-INVAS EAR/PLS OXIMETRY 1: CPT

## 2020-01-27 PROCEDURE — 1200000000 HC SEMI PRIVATE

## 2020-01-27 PROCEDURE — 71045 X-RAY EXAM CHEST 1 VIEW: CPT

## 2020-01-27 PROCEDURE — 36415 COLL VENOUS BLD VENIPUNCTURE: CPT

## 2020-01-27 PROCEDURE — 94640 AIRWAY INHALATION TREATMENT: CPT

## 2020-01-27 PROCEDURE — 2700000000 HC OXYGEN THERAPY PER DAY

## 2020-01-27 PROCEDURE — 2580000003 HC RX 258: Performed by: INTERNAL MEDICINE

## 2020-01-27 PROCEDURE — 6360000002 HC RX W HCPCS: Performed by: INTERNAL MEDICINE

## 2020-01-27 PROCEDURE — 80048 BASIC METABOLIC PNL TOTAL CA: CPT

## 2020-01-27 PROCEDURE — 83735 ASSAY OF MAGNESIUM: CPT

## 2020-01-27 RX ORDER — IPRATROPIUM BROMIDE AND ALBUTEROL SULFATE 2.5; .5 MG/3ML; MG/3ML
1 SOLUTION RESPIRATORY (INHALATION) EVERY 4 HOURS PRN
Status: DISCONTINUED | OUTPATIENT
Start: 2020-01-27 | End: 2020-02-14 | Stop reason: HOSPADM

## 2020-01-27 RX ADMIN — FUROSEMIDE 80 MG: 10 INJECTION, SOLUTION INTRAMUSCULAR; INTRAVENOUS at 17:34

## 2020-01-27 RX ADMIN — FUROSEMIDE 80 MG: 10 INJECTION, SOLUTION INTRAMUSCULAR; INTRAVENOUS at 09:25

## 2020-01-27 RX ADMIN — INSULIN LISPRO 5 UNITS: 100 INJECTION, SOLUTION INTRAVENOUS; SUBCUTANEOUS at 09:29

## 2020-01-27 RX ADMIN — INSULIN LISPRO 2 UNITS: 100 INJECTION, SOLUTION INTRAVENOUS; SUBCUTANEOUS at 12:30

## 2020-01-27 RX ADMIN — SPIRONOLACTONE 50 MG: 25 TABLET ORAL at 09:25

## 2020-01-27 RX ADMIN — INSULIN LISPRO 2 UNITS: 100 INJECTION, SOLUTION INTRAVENOUS; SUBCUTANEOUS at 18:41

## 2020-01-27 RX ADMIN — IPRATROPIUM BROMIDE AND ALBUTEROL SULFATE 1 AMPULE: .5; 3 SOLUTION RESPIRATORY (INHALATION) at 12:59

## 2020-01-27 RX ADMIN — AMIODARONE HYDROCHLORIDE 200 MG: 200 TABLET ORAL at 09:25

## 2020-01-27 RX ADMIN — SODIUM CHLORIDE, PRESERVATIVE FREE 10 ML: 5 INJECTION INTRAVENOUS at 09:27

## 2020-01-27 RX ADMIN — IPRATROPIUM BROMIDE AND ALBUTEROL SULFATE 1 AMPULE: .5; 3 SOLUTION RESPIRATORY (INHALATION) at 20:43

## 2020-01-27 RX ADMIN — INSULIN LISPRO 5 UNITS: 100 INJECTION, SOLUTION INTRAVENOUS; SUBCUTANEOUS at 18:42

## 2020-01-27 RX ADMIN — POLYETHYLENE GLYCOL 3350 17 G: 17 POWDER, FOR SOLUTION ORAL at 21:32

## 2020-01-27 RX ADMIN — ASPIRIN 81 MG 81 MG: 81 TABLET ORAL at 09:25

## 2020-01-27 RX ADMIN — OXYCODONE HYDROCHLORIDE AND ACETAMINOPHEN 1 TABLET: 7.5; 325 TABLET ORAL at 09:29

## 2020-01-27 RX ADMIN — INSULIN GLARGINE 30 UNITS: 100 INJECTION, SOLUTION SUBCUTANEOUS at 21:34

## 2020-01-27 RX ADMIN — IPRATROPIUM BROMIDE AND ALBUTEROL SULFATE 1 AMPULE: .5; 3 SOLUTION RESPIRATORY (INHALATION) at 09:07

## 2020-01-27 RX ADMIN — SODIUM CHLORIDE, PRESERVATIVE FREE 10 ML: 5 INJECTION INTRAVENOUS at 21:33

## 2020-01-27 RX ADMIN — RANOLAZINE 500 MG: 500 TABLET, FILM COATED, EXTENDED RELEASE ORAL at 09:25

## 2020-01-27 RX ADMIN — IPRATROPIUM BROMIDE AND ALBUTEROL SULFATE 1 AMPULE: .5; 3 SOLUTION RESPIRATORY (INHALATION) at 17:38

## 2020-01-27 RX ADMIN — ATORVASTATIN CALCIUM 40 MG: 40 TABLET, FILM COATED ORAL at 21:32

## 2020-01-27 RX ADMIN — OYSTER SHELL CALCIUM WITH VITAMIN D 1 TABLET: 500; 200 TABLET, FILM COATED ORAL at 09:25

## 2020-01-27 RX ADMIN — TAMSULOSIN HYDROCHLORIDE 0.4 MG: 0.4 CAPSULE ORAL at 09:25

## 2020-01-27 RX ADMIN — RANOLAZINE 500 MG: 500 TABLET, FILM COATED, EXTENDED RELEASE ORAL at 21:32

## 2020-01-27 RX ADMIN — POLYETHYLENE GLYCOL 3350 17 G: 17 POWDER, FOR SOLUTION ORAL at 09:26

## 2020-01-27 ASSESSMENT — PAIN SCALES - GENERAL: PAINLEVEL_OUTOF10: 7

## 2020-01-27 NOTE — PROGRESS NOTES
Hospitalist Progress Note  1/27/2020 8:54 AM    PCP: Carlito Agarwal MD    [phone]     Date of Admission: 1/22/2020                                                                                                                     HOSPITAL COURSE    Patient demographics:  The patient  Phil Gtz is a 61 y.o. male     Significant past medical history:   Patient Active Problem List   Diagnosis    Arthritis    Diabetes mellitus with hyperglycemia ([facility]    HBP (high blood pressure)    Hyperlipidemia    COPD (chronic obstructive pulmonary disease) (HCC)    Viral hepatitis C    Back pain    PAD (peripheral artery disease) ([address]    Spinal stenosis of lumbar region    Tobacco use    Atherosclerosis of native artery of left lower extremity with intermittent claudication (HCC)    Chest pain    Pleural effusion due to CHF (congestive heart failure) ([facility]    Pleural effusion, right    Alcohol abuse, continuous    Tachycardia    Atrial fibrillation with RVR ([facility])    Hyponatremia    Congestive heart failure ([facility])    REJI (acute kidney injury) ([facility]    Hypokalemia    Coronary artery disease involving autologous artery coronary bypass graft with angina pectoris ([facility]    Essential hypertension    Chest pain of uncertain etiology    Acute on chronic combined systolic and diastolic HF (heart failure) (HCC)    Acute hyperkalemia    Typical atrial flutter (HCC)    Chronic systolic HF (heart failure) (HCC)    Hypotension    Vasovagal syncope    Laceration of scalp without foreign body    Nonischemic cardiomyopathy ([facility])    Syncope and collapse    Ischemic foot    Diabetes mellitus with peripheral circulatory disorder (HCC)    Limb ischemia    COPD exacerbation (HCC)    Nonhealing surgical wound    Acromioclavicular joint arthritis    Bradycardia    Shortness of breath    Permanent atrial fibrillation    Chronic atrial fibrillation    Other specified injury of inferior mesenteric collection    Moderate malnutrition ([facility]    NSTEMI (non-ST elevated myocardial infarction) ([facility]    Systolic CHF (HCC)    Pleural effusion         Presenting symptoms:  Shortness of Breath    Diagnostic workup:      CONSULTS DURING ADMISSION :   IP CONSULT TO PALLIATIVE CARE  IP CONSULT TO PULMONOLOGY      Patient was diagnosed with:  Acute systolic CHF  Bilateral pleural effusions  COPD unspecified  Atrial fibrillation  Type 2 diabetes mellitus    Treatment while inpatient:  Patient presented to the emergency room with worsening shortness of breath. Patient has a history of systolic CHF with ejection fraction of 30% and COPD                       Patient is a smoker and at baseline patient is on 2 L nasal cannula  Patient was diagnosed with acute on chronic CHF and started on diuretics                                                           ----------------------------------------------------------      SUBJECTIVE COMPLAINTS- Follow-up for shortness of breath    Diet: DIET CARDIAC; Low Sodium (2 GM);  Daily Fluid Restriction: 1500 ml      OBJECTIVE:   Patient Active Problem List   Diagnosis    Arthritis    Diabetes mellitus with hyperglycemia (HCC)    HBP (high blood pressure)    Hyperlipidemia    COPD (chronic obstructive pulmonary disease) (HCC)    Viral hepatitis C    Back pain    PAD (peripheral artery disease) ([facility]    Spinal stenosis of lumbar region    Tobacco use    Atherosclerosis of native artery of left lower extremity with intermittent claudication (HCC)    Chest pain    Pleural effusion due to CHF (congestive heart failure) (HCC)    Pleural effusion, right    Alcohol abuse, continuous    Tachycardia    Atrial fibrillation with RVR (HCC)    Hyponatremia    Congestive heart failure (HCC)    REJI (acute kidney injury) ([facility]    Hypokalemia    Coronary artery disease involving autologous artery coronary bypass graft with angina pectoris ([facility]    Essential hypertension    Chest pain of uncertain etiology    Acute on chronic combined systolic and diastolic HF (heart failure) (HCC)    Acute hyperkalemia    Typical atrial flutter (HCC)    Chronic systolic HF (heart failure) (HCC)    Hypotension    Vasovagal syncope    Laceration of scalp without foreign body    Nonischemic cardiomyopathy (HCC)    Syncope and collapse    Ischemic foot    Diabetes mellitus with peripheral circulatory disorder (HCC)    Limb ischemia    COPD exacerbation (HCC)    Nonhealing surgical wound    Acromioclavicular joint arthritis    Bradycardia    Shortness of breath    Permanent atrial fibrillation    Chronic atrial fibrillation    Other specified injury of inferior mesenteric vein, initial encounter    Postprocedural hypotension    Acute respiratory failure with hypercapnia (HCC)    High anion gap metabolic acidosis    Centrilobular emphysema (HCC)    Hypomagnesemia    Heart failure, acute on chronic, systolic and diastolic (HCC)    Atrial fibrillation, persistent    Anemia    Diabetes mellitus type 2, controlled (HCC)    Constipation    Acute on chronic systolic heart failure (HCC)    Atrial fibrillation, rapid (HCC)    COPD with acute exacerbation (HCC)    Cocaine use    Severe sepsis (HCC)    Atrial fibrillation with RVR (HCC)    Acute on chronic respiratory failure with hypoxia (HCC)    Respiratory distress    Biventricular failure (HCC)    Nicotine dependence    Suspected sleep apnea    Coronary artery disease involving native coronary artery of native heart without angina pectoris    CAD (coronary artery disease)    Acute renal failure (ARF) ([address]    Morbid obesity due to excess calories (HCC)    Acute respiratory failure with hypoxia (HCC)    Nocturnal hypoxia    Hypercapnemia    SOB (shortness of breath)    Acute on chronic respiratory failure with hypercapnia (HCC)    Chronic respiratory failure with hypercapnia (HCC)    Acute pulmonary edema (HCC)    Acute on chronic systolic HF (heart failure) (HCC)    Hx of AKA (above knee amputation), left (HCC)    Respiratory failure [facility]    HCAP (healthcare-associated pneumonia)    Ventricular tachycardia (HCC)    Shock circulatory (HCC)    Tachypnea    Neutrophilic leukocytosis    Chronic respiratory failure with hypoxia (HCC)    Cardiac arrest (HCC)    PEA (Pulseless electrical activity) (HCC)    Ileus (HCC)    Pneumonia of right lower lobe due to infectious organism [facility])    Atrial fibrillation, controlled ([facility]    Pulmonary HTN (HCC)    Weakness of right lower extremity    Large bowel obstruction (HCC)    Tobacco abuse    Intra-abdominal abscess (HCC)    Intra-abdominal fluid collection    Moderate malnutrition (HCC)    NSTEMI (non-ST elevated myocardial infarction) (HCC)    Systolic CHF (HCC)    Pleural effusion       Allergies  Rocephin [ceftriaxone] and Demadex [torsemide]    Medications    Scheduled Meds:   rivaroxaban  15 mg Oral Daily    insulin lispro  5 Units Subcutaneous TID WC    furosemide  80 mg Intravenous BID    spironolactone  50 mg Oral Daily    insulin glargine  30 Units Subcutaneous Nightly    insulin lispro  0-12 Units Subcutaneous TID WC    insulin lispro  0-6 Units Subcutaneous Nightly    ipratropium-albuterol  1 ampule Inhalation Q4H WA    amiodarone  200 mg Oral Daily    aspirin  81 mg Oral Daily    atorvastatin  40 mg Oral Nightly    calcium-vitamin D  1 tablet Oral Daily    polyethylene glycol  17 g Oral BID    ranolazine  500 mg Oral BID    tamsulosin  0.4 mg Oral Daily    sodium chloride flush  10 mL Intravenous 2 times per day     Continuous Infusions:   dextrose       PRN Meds:  potassium chloride **OR** potassium alternative oral replacement **OR** potassium chloride, magnesium sulfate, glucose, dextrose, glucagon (rDNA), dextrose, nitroGLYCERIN, oxyCODONE-acetaminophen, sodium chloride flush    Vitals   Vitals /wt   Patient Vitals for the past 8 hrs:   BP Temp Temp src Pulse Resp SpO2 Weight   01/27/20 0602 -- -- -- 93 -- -- --   01/27/20 0457 94/60 98.1 °F (36.7 °C) Oral 98 20 95 % 178 lb 9.2 oz (81 kg)   01/27/20 0439 -- -- -- 99 -- -- --   01/27/20 0113 -- -- -- 100 -- -- --        72HR INTAKE/OUTPUT:      Intake/Output Summary (Last 24 hours) at 1/27/2020 0854  Last data filed at 1/27/2020 0520  Gross per 24 hour   Intake 660 ml   Output 2300 ml   Net -1640 ml       Exam:    Gen:   Alert and oriented ×3  Eyes: PERRL. No sclera icterus. No conjunctival injection. ENT: No discharge. Pharynx clear. External appearance of ears and nose normal.  Neck: Trachea midline. No obvious mass. Resp: Decreased breath sounds bilaterally  CV: Regular rate. Regular rhythm. No murmur or rub. No edema. GI: Non-tender. Non-distended. No hernia. Skin: Warm, dry, normal texture and turgor. Lymph: No cervical LAD. No supraclavicular LAD. M/S: / Ext. No cyanosis. No clubbing. No joint deformity. Neuro: CN 2-12 are intact,  no neurologic deficits noted. PT/INR: No results for input(s): PROTIME, INR in the last 72 hours. APTT: No results for input(s): APTT in the last 72 hours. CBC:   Recent Labs     01/25/20 0437 01/26/20 0524 01/27/20 0452   WBC 7.8 8.3 10.2   HGB 8.7* 9.5* 10.5*   HCT 27.1* 28.6* 31.3*   MCV 84.6 83.4 83.7    372 378       BMP:   Recent Labs     01/25/20 0436 01/26/20 0524 01/27/20 0452   * 136 134*   K 4.6 3.4* 4.1   CL 82* 83* 84*   CO2 42* 41* 39*   BUN 23* 23* 23*   CREATININE 0.8* 1.0 1.0       LIVER PROFILE:   No results for input(s): ALKPHOS, AST, ALT, ALB, BILIDIR, BILITOT, ALKPHOS in the last 72 hours. No results for input(s): AMYLASE in the last 72 hours. No results for input(s): LIPASE in the last 72 hours. UA:No results for input(s): NITRITE, LABCAST, WBCUA, RBCUA, MUCUS in the last 72 hours. TROPONIN:   No results for input(s): [employer] in the last 72 hours.     Lab Results   Component Value Date/Time    URRFLXCULT Not Indicated 01/22/2020 07:38 PM       No results for input(s): TSHREFLEX in the last 72 hours. No components found for: [id number]  POC GLUCOSE:    Recent Labs     01/26/20  0840 01/26/20  1209 01/26/20  1651 01/26/20  2035 01/27/20  0812   POCGLU 99 155* 146* 154* 108*     No results for input(s): LABA1C in the last 72 hours. Lab Results   Component Value Date    LABA1C 5.5 01/24/2020     Echo  Severe left ventricular dysfunction with global hypokinesis. Ejection fraction is visually estimated to be 30-35%. The right ventricle is severely enlarged with severely reduced function. The left atrium is mildly dilated. (6/22/2019)    ASSESSMENT AND PLAN    Acute systolic CHF  IV Lasix, and spironolactone,  Fluid balance -12.1 L  Continue on diuretics  Continue to monitor creatinine    Bilateral pleural effusions  Cardiomegaly  Thoracentesis if pleural effusion does not improve  Chest x-ray today shows moderate left pleural effusion  There is some improvement compared to previous exam  Dense consolidation in left base  Pulmonary is following        COPD unspecified J44.9  Continue with the bronchodilators      Type 2 diabetes mellitus E11.9  hold OHA  Insulin sliding scale      Essential primary hypertension I10  Continue patient on home medication    Anxiety depression F 41.9  Continue on home medication    Atrial fibrillation I48.91  Heart rate is controlled  Continue patient on xarelto           Code Status: Full Code        Dispo -Continue care        The patient and / or the family were informed of the results of any tests, a time was given to answer questions, a plan was proposed and they agreed with plan. Radha Kearns MD    This note was transcribed using [id number] [employer]. Please disregard any translational errors.

## 2020-01-27 NOTE — PROGRESS NOTES
Pulmonary Progress Note    CC: Pleural effusion  Chronic hypoxic respiratory failure (on 2.5L NC)  Biventricular failure  Centrilobular emphysema     24 hr events:  He continues to report shortness of breath. He is on supplemental oxygen. ROS:  +SOB  +cough  No vomiting     PHYSICAL EXAM:  Blood pressure 103/72, pulse 99, temperature 97.6 °F (36.4 °C), temperature source Oral, resp. rate 15, height 5' 7\" (1.702 m), weight 178 lb 9.2 oz (81 kg), SpO2 95 %. on 2L NC    Intake/Output Summary (Last 24 hours) at 1/27/2020 1437  Last data filed at 1/27/2020 1251  Gross per 24 hour   Intake 300 ml   Output 2400 ml   Net -2100 ml       Gen: Well developed; well nourished  Eyes: No scleral icterus. No conjunctival injection. ENT: External appearance of ears and nose normal.  Neck: Trachea midline. No obvious mass. No visible thyroid enlargement    Respiratory: Decreased breath sounds over left lower lung zone, no accessory muscle use  Cardiovascular: Irregular, no appreciable murmurs. No edema. Skin: Warm and dry. No rashes or ulcers on visible areas. Normal texture and turgor  Musculoskeletal: No cyanosis or clubbing.  Left AKA  Psychiatric: Normal mood and affect; exhibits normal insight and judgement     Medications:  Current Facility-Administered Medications: ipratropium-albuterol (DUONEB) nebulizer solution 1 ampule, 1 ampule, Inhalation, Q4H PRN  potassium chloride (KLOR-CON M) extended release tablet 40 mEq, 40 mEq, Oral, PRN **OR** potassium bicarb-citric acid (EFFER-K) effervescent tablet 40 mEq, 40 mEq, Oral, PRN **OR** potassium chloride 10 mEq/100 mL IVPB (Peripheral Line), 10 mEq, Intravenous, PRN  rivaroxaban (XARELTO) tablet 15 mg, 15 mg, Oral, Daily  insulin lispro (HUMALOG) injection vial 5 Units, 5 Units, Subcutaneous, TID WC  furosemide (LASIX) injection 80 mg, 80 mg, Intravenous, BID  spironolactone (ALDACTONE) tablet 50 mg, 50 mg, Oral, Daily  magnesium sulfate 1 g in dextrose 5% 100 mL IVPB, 1 g, Intravenous, PRN  insulin glargine (LANTUS) injection vial 30 Units, 30 Units, Subcutaneous, Nightly  glucose (GLUTOSE) 40 % oral gel 15 g, 15 g, Oral, PRN  dextrose 50 % IV solution, 12.5 g, Intravenous, PRN  glucagon (rDNA) injection 1 mg, 1 mg, Intramuscular, PRN  dextrose 5 % solution, 100 mL/hr, Intravenous, PRN  insulin lispro (HUMALOG) injection vial 0-12 Units, 0-12 Units, Subcutaneous, TID WC  insulin lispro (HUMALOG) injection vial 0-6 Units, 0-6 Units, Subcutaneous, Nightly  ipratropium-albuterol (DUONEB) nebulizer solution 1 ampule, 1 ampule, Inhalation, Q4H WA  amiodarone (CORDARONE) tablet 200 mg, 200 mg, Oral, Daily  aspirin chewable tablet 81 mg, 81 mg, Oral, Daily  atorvastatin (LIPITOR) tablet 40 mg, 40 mg, Oral, Nightly  calcium-vitamin D 500-200 MG-UNIT per tablet 1 tablet, 1 tablet, Oral, Daily  nitroGLYCERIN (NITROSTAT) SL tablet 0.4 mg, 0.4 mg, Sublingual, Q5 Min PRN  oxyCODONE-acetaminophen (PERCOCET) 7.5-325 MG per tablet 1 tablet, 1 tablet, Oral, Q4H PRN  polyethylene glycol (GLYCOLAX) packet 17 g, 17 g, Oral, BID  ranolazine (RANEXA) extended release tablet 500 mg, 500 mg, Oral, BID  tamsulosin (FLOMAX) capsule 0.4 mg, 0.4 mg, Oral, Daily  sodium chloride flush 0.9 % injection 10 mL, 10 mL, Intravenous, 2 times per day  sodium chloride flush 0.9 % injection 10 mL, 10 mL, Intravenous, PRN    Data reviewed:  Labs:  CBC:   Recent Labs     01/25/20  0437 01/26/20  0524 01/27/20  0452   WBC 7.8 8.3 10.2   HGB 8.7* 9.5* 10.5*   HCT 27.1* 28.6* 31.3*   MCV 84.6 83.4 83.7    372 378     BMP:   Recent Labs     01/25/20  0436 01/26/20  0524 01/27/20 0452   * 136 134*   K 4.6 3.4* 4.1   CL 82* 83* 84*   CO2 42* 41* 39*   BUN 23* 23* 23*   CREATININE 0.8* 1.0 1.0     LIVER PROFILE: No results for input(s): AST, ALT, LIPASE, BILIDIR, BILITOT, ALKPHOS in the last 72 hours. Invalid input(s): AMYLASE,  ALB  PT/INR: No results for input(s): PROTIME, INR in the last 72 hours.   APTT: No

## 2020-01-27 NOTE — PLAN OF CARE
Problem: Risk for Impaired Skin Integrity  Goal: Tissue integrity - skin and mucous membranes  Description  Structural intactness and normal physiological function of skin and  mucous membranes.   1/27/2020 0134 by Dora Huang RN  Outcome: Ongoing     Problem: SAFETY  Goal: Free from accidental physical injury  1/27/2020 0134 by Dora Huang RN  Outcome: Ongoing     Problem: PAIN  Goal: Patient's pain/discomfort is manageable  1/27/2020 0134 by Dora Huang RN  Outcome: Ongoing     Problem: SKIN INTEGRITY  Goal: Skin integrity is maintained or improved  1/27/2020 0134 by Dora Huang RN  Outcome: Ongoing     Problem: HEMODYNAMIC STATUS  Goal: Patient has stable vital signs and fluid balance  1/27/2020 0134 by Dora Huang RN  Outcome: Ongoing

## 2020-01-28 ENCOUNTER — APPOINTMENT (OUTPATIENT)
Dept: ULTRASOUND IMAGING | Age: 60
DRG: 194 | End: 2020-01-28
Payer: COMMERCIAL

## 2020-01-28 ENCOUNTER — APPOINTMENT (OUTPATIENT)
Dept: GENERAL RADIOLOGY | Age: 60
DRG: 194 | End: 2020-01-28
Payer: COMMERCIAL

## 2020-01-28 LAB
ANION GAP SERPL CALCULATED.3IONS-SCNC: 9 MMOL/L (ref 3–16)
BASOPHILS ABSOLUTE: 0.2 K/UL (ref 0–0.2)
BASOPHILS RELATIVE PERCENT: 1 %
BUN BLDV-MCNC: 24 MG/DL (ref 7–20)
CALCIUM SERPL-MCNC: 9.5 MG/DL (ref 8.3–10.6)
CHLORIDE BLD-SCNC: 83 MMOL/L (ref 99–110)
CO2: 36 MMOL/L (ref 21–32)
CREAT SERPL-MCNC: 0.9 MG/DL (ref 0.9–1.3)
EOSINOPHILS ABSOLUTE: 0.1 K/UL (ref 0–0.6)
EOSINOPHILS RELATIVE PERCENT: 0.5 %
FLUID TYPE: NORMAL
GFR AFRICAN AMERICAN: >60
GFR NON-AFRICAN AMERICAN: >60
GLUCOSE BLD-MCNC: 104 MG/DL (ref 70–99)
GLUCOSE BLD-MCNC: 106 MG/DL (ref 70–99)
GLUCOSE BLD-MCNC: 121 MG/DL (ref 70–99)
GLUCOSE BLD-MCNC: 135 MG/DL (ref 70–99)
GLUCOSE BLD-MCNC: 236 MG/DL (ref 70–99)
GLUCOSE, FLUID: 120 MG/DL
HCT VFR BLD CALC: 31.2 % (ref 40.5–52.5)
HEMOGLOBIN: 10.3 G/DL (ref 13.5–17.5)
INR BLD: 1.43 (ref 0.86–1.14)
LACTATE DEHYDROGENASE: 220 U/L (ref 100–190)
LD, FLUID: 187 U/L
LYMPHOCYTES ABSOLUTE: 1.3 K/UL (ref 1–5.1)
LYMPHOCYTES RELATIVE PERCENT: 6.5 %
MAGNESIUM: 2.1 MG/DL (ref 1.8–2.4)
MCH RBC QN AUTO: 27.2 PG (ref 26–34)
MCHC RBC AUTO-ENTMCNC: 33 G/DL (ref 31–36)
MCV RBC AUTO: 82.3 FL (ref 80–100)
MONOCYTES ABSOLUTE: 1.7 K/UL (ref 0–1.3)
MONOCYTES RELATIVE PERCENT: 8.6 %
NEUTROPHILS ABSOLUTE: 16.7 K/UL (ref 1.7–7.7)
NEUTROPHILS RELATIVE PERCENT: 83.4 %
PDW BLD-RTO: 16.6 % (ref 12.4–15.4)
PERFORMED ON: ABNORMAL
PLATELET # BLD: 386 K/UL (ref 135–450)
PMV BLD AUTO: 7.9 FL (ref 5–10.5)
POTASSIUM REFLEX MAGNESIUM: 4.1 MMOL/L (ref 3.5–5.1)
PROTEIN FLUID: 5.9 G/DL
PROTHROMBIN TIME: 16.7 SEC (ref 10–13.2)
RBC # BLD: 3.8 M/UL (ref 4.2–5.9)
SODIUM BLD-SCNC: 128 MMOL/L (ref 136–145)
TOTAL PROTEIN: 9.2 G/DL (ref 6.4–8.2)
WBC # BLD: 20 K/UL (ref 4–11)

## 2020-01-28 PROCEDURE — 1200000000 HC SEMI PRIVATE

## 2020-01-28 PROCEDURE — 80048 BASIC METABOLIC PNL TOTAL CA: CPT

## 2020-01-28 PROCEDURE — 0W9B3ZZ DRAINAGE OF LEFT PLEURAL CAVITY, PERCUTANEOUS APPROACH: ICD-10-PCS | Performed by: RADIOLOGY

## 2020-01-28 PROCEDURE — 85025 COMPLETE CBC W/AUTO DIFF WBC: CPT

## 2020-01-28 PROCEDURE — 6370000000 HC RX 637 (ALT 250 FOR IP): Performed by: FAMILY MEDICINE

## 2020-01-28 PROCEDURE — 71045 X-RAY EXAM CHEST 1 VIEW: CPT

## 2020-01-28 PROCEDURE — C1729 CATH, DRAINAGE: HCPCS

## 2020-01-28 PROCEDURE — 87205 SMEAR GRAM STAIN: CPT

## 2020-01-28 PROCEDURE — 85610 PROTHROMBIN TIME: CPT

## 2020-01-28 PROCEDURE — 2700000000 HC OXYGEN THERAPY PER DAY

## 2020-01-28 PROCEDURE — 88184 FLOWCYTOMETRY/ TC 1 MARKER: CPT

## 2020-01-28 PROCEDURE — 6370000000 HC RX 637 (ALT 250 FOR IP): Performed by: INTERNAL MEDICINE

## 2020-01-28 PROCEDURE — 94761 N-INVAS EAR/PLS OXIMETRY MLT: CPT

## 2020-01-28 PROCEDURE — 88112 CYTOPATH CELL ENHANCE TECH: CPT

## 2020-01-28 PROCEDURE — 83735 ASSAY OF MAGNESIUM: CPT

## 2020-01-28 PROCEDURE — 83615 LACTATE (LD) (LDH) ENZYME: CPT

## 2020-01-28 PROCEDURE — 36415 COLL VENOUS BLD VENIPUNCTURE: CPT

## 2020-01-28 PROCEDURE — 88185 FLOWCYTOMETRY/TC ADD-ON: CPT

## 2020-01-28 PROCEDURE — 84157 ASSAY OF PROTEIN OTHER: CPT

## 2020-01-28 PROCEDURE — 84155 ASSAY OF PROTEIN SERUM: CPT

## 2020-01-28 PROCEDURE — 89051 BODY FLUID CELL COUNT: CPT

## 2020-01-28 PROCEDURE — 87070 CULTURE OTHR SPECIMN AEROBIC: CPT

## 2020-01-28 PROCEDURE — 2580000003 HC RX 258: Performed by: INTERNAL MEDICINE

## 2020-01-28 PROCEDURE — 82945 GLUCOSE OTHER FLUID: CPT

## 2020-01-28 PROCEDURE — 6360000002 HC RX W HCPCS: Performed by: INTERNAL MEDICINE

## 2020-01-28 PROCEDURE — 94640 AIRWAY INHALATION TREATMENT: CPT

## 2020-01-28 PROCEDURE — 88305 TISSUE EXAM BY PATHOLOGIST: CPT

## 2020-01-28 PROCEDURE — 6370000000 HC RX 637 (ALT 250 FOR IP): Performed by: HOSPITALIST

## 2020-01-28 RX ORDER — FUROSEMIDE 40 MG/1
80 TABLET ORAL 2 TIMES DAILY
Status: DISCONTINUED | OUTPATIENT
Start: 2020-01-28 | End: 2020-01-29

## 2020-01-28 RX ADMIN — ASPIRIN 81 MG 81 MG: 81 TABLET ORAL at 09:54

## 2020-01-28 RX ADMIN — INSULIN LISPRO 2 UNITS: 100 INJECTION, SOLUTION INTRAVENOUS; SUBCUTANEOUS at 21:48

## 2020-01-28 RX ADMIN — IPRATROPIUM BROMIDE AND ALBUTEROL SULFATE 1 AMPULE: .5; 3 SOLUTION RESPIRATORY (INHALATION) at 00:52

## 2020-01-28 RX ADMIN — SODIUM CHLORIDE, PRESERVATIVE FREE 10 ML: 5 INJECTION INTRAVENOUS at 09:56

## 2020-01-28 RX ADMIN — INSULIN GLARGINE 30 UNITS: 100 INJECTION, SOLUTION SUBCUTANEOUS at 21:49

## 2020-01-28 RX ADMIN — POLYETHYLENE GLYCOL 3350 17 G: 17 POWDER, FOR SOLUTION ORAL at 09:54

## 2020-01-28 RX ADMIN — RANOLAZINE 500 MG: 500 TABLET, FILM COATED, EXTENDED RELEASE ORAL at 21:48

## 2020-01-28 RX ADMIN — ATORVASTATIN CALCIUM 40 MG: 40 TABLET, FILM COATED ORAL at 21:48

## 2020-01-28 RX ADMIN — SODIUM CHLORIDE, PRESERVATIVE FREE 10 ML: 5 INJECTION INTRAVENOUS at 22:07

## 2020-01-28 RX ADMIN — RANOLAZINE 500 MG: 500 TABLET, FILM COATED, EXTENDED RELEASE ORAL at 09:54

## 2020-01-28 RX ADMIN — SPIRONOLACTONE 50 MG: 25 TABLET ORAL at 09:54

## 2020-01-28 RX ADMIN — IPRATROPIUM BROMIDE AND ALBUTEROL SULFATE 1 AMPULE: .5; 3 SOLUTION RESPIRATORY (INHALATION) at 20:00

## 2020-01-28 RX ADMIN — FUROSEMIDE 80 MG: 10 INJECTION, SOLUTION INTRAMUSCULAR; INTRAVENOUS at 09:54

## 2020-01-28 RX ADMIN — IPRATROPIUM BROMIDE AND ALBUTEROL SULFATE 1 AMPULE: .5; 3 SOLUTION RESPIRATORY (INHALATION) at 16:09

## 2020-01-28 RX ADMIN — IPRATROPIUM BROMIDE AND ALBUTEROL SULFATE 1 AMPULE: .5; 3 SOLUTION RESPIRATORY (INHALATION) at 11:18

## 2020-01-28 RX ADMIN — IPRATROPIUM BROMIDE AND ALBUTEROL SULFATE 1 AMPULE: .5; 3 SOLUTION RESPIRATORY (INHALATION) at 08:31

## 2020-01-28 RX ADMIN — OXYCODONE HYDROCHLORIDE AND ACETAMINOPHEN 1 TABLET: 7.5; 325 TABLET ORAL at 17:35

## 2020-01-28 RX ADMIN — OYSTER SHELL CALCIUM WITH VITAMIN D 1 TABLET: 500; 200 TABLET, FILM COATED ORAL at 09:54

## 2020-01-28 RX ADMIN — FUROSEMIDE 80 MG: 40 TABLET ORAL at 22:05

## 2020-01-28 RX ADMIN — TAMSULOSIN HYDROCHLORIDE 0.4 MG: 0.4 CAPSULE ORAL at 09:54

## 2020-01-28 RX ADMIN — AMIODARONE HYDROCHLORIDE 200 MG: 200 TABLET ORAL at 09:56

## 2020-01-28 RX ADMIN — POLYETHYLENE GLYCOL 3350 17 G: 17 POWDER, FOR SOLUTION ORAL at 21:48

## 2020-01-28 ASSESSMENT — PAIN DESCRIPTION - PROGRESSION
CLINICAL_PROGRESSION: NOT CHANGED
CLINICAL_PROGRESSION: NOT CHANGED

## 2020-01-28 ASSESSMENT — PAIN DESCRIPTION - DESCRIPTORS
DESCRIPTORS: ACHING
DESCRIPTORS: ACHING

## 2020-01-28 ASSESSMENT — PAIN SCALES - GENERAL
PAINLEVEL_OUTOF10: 8
PAINLEVEL_OUTOF10: 0
PAINLEVEL_OUTOF10: 10

## 2020-01-28 ASSESSMENT — PAIN DESCRIPTION - FREQUENCY
FREQUENCY: INTERMITTENT
FREQUENCY: INTERMITTENT

## 2020-01-28 ASSESSMENT — PAIN SCALES - WONG BAKER
WONGBAKER_NUMERICALRESPONSE: 0
WONGBAKER_NUMERICALRESPONSE: 0

## 2020-01-28 ASSESSMENT — PAIN DESCRIPTION - LOCATION
LOCATION: BACK
LOCATION: BACK

## 2020-01-28 ASSESSMENT — PAIN DESCRIPTION - ORIENTATION
ORIENTATION: LEFT
ORIENTATION: LEFT

## 2020-01-28 ASSESSMENT — PAIN DESCRIPTION - ONSET
ONSET: ON-GOING
ONSET: ON-GOING

## 2020-01-28 ASSESSMENT — PAIN DESCRIPTION - PAIN TYPE
TYPE: CHRONIC PAIN
TYPE: CHRONIC PAIN

## 2020-01-28 NOTE — PROGRESS NOTES
Patient is resting in bed, eyes closed. Patient is A&O. Patient is on 2.5L of O2 nasal cannula. Side rails are up x3. Fall precautions are in place. Bed alarm on. Bed is in lowest position. Call light is in reach. VSS taken. AM meds given. Shift assessment completed. Surgical consent for thoracentesis signed and placed in soft chart. Will continue to monitor patient per unit protocols.  Electronically signed by Jonny Al RN on 1/28/2020 at 10:50 AM

## 2020-01-28 NOTE — PLAN OF CARE
Problem: Risk for Impaired Skin Integrity  Goal: Tissue integrity - skin and mucous membranes  Description  Structural intactness and normal physiological function of skin and  mucous membranes. 1/28/2020 1047 by Vanessa Naqvi RN  Outcome: Ongoing   Will monitor skin and mucous members. Will turn patient every 2 hours, monitor for friction and sheering, and change dressings as needed. Will preform skin assessment every shift. Problem: SAFETY  Goal: Free from accidental physical injury  1/28/2020 1047 by Vanessa Naqvi RN  Outcome: Ongoing   Free from accidental physical injury   Problem: DAILY CARE  Goal: Daily care needs are met  1/28/2020 1047 by Vanessa Naqvi RN  Outcome: Ongoing   Daily care needs are met   Problem: PAIN  Goal: Patient's pain/discomfort is manageable  1/28/2020 1047 by Vanessa Naqvi RN  Outcome: Ongoing     Problem: SKIN INTEGRITY  Goal: Skin integrity is maintained or improved  1/28/2020 1047 by Vanessa Naqvi RN  Outcome: Ongoing     Problem: KNOWLEDGE DEFICIT  Goal: Patient/S.O. demonstrates understanding of disease process, treatment plan, medications, and discharge instructions.   1/28/2020 1047 by Vanessa Naqvi RN  Outcome: Ongoing     Problem: DISCHARGE BARRIERS  Goal: Patient's continuum of care needs are met  1/28/2020 1047 by Vanessa Naqvi RN  Outcome: Ongoing     Problem: Discharge Planning:  Goal: Discharged to appropriate level of care  Description  Discharged to appropriate level of care  1/28/2020 1047 by Vanessa Naqvi RN  Outcome: Ongoing     Problem: Gas Exchange - Impaired:  Goal: Levels of oxygenation will improve  Description  Levels of oxygenation will improve  1/28/2020 1047 by Vanessa Naqvi RN  Outcome: Ongoing     Problem: OXYGENATION/RESPIRATORY FUNCTION  Goal: Patient will maintain patent airway  1/28/2020 1047 by Vanessa Naqvi RN  Outcome: Ongoing   Patient will maintain patent airway  Problem: OXYGENATION/RESPIRATORY FUNCTION  Goal: Patient will achieve/maintain normal respiratory rate/effort  Description  Respiratory rate and effort will be within normal limits for the patient  1/28/2020 1047 by Trey Sierra RN  Outcome: Ongoing     Problem: HEMODYNAMIC STATUS  Goal: Patient has stable vital signs and fluid balance  1/28/2020 1047 by Trey Sierra RN  Outcome: Ongoing     Problem: FLUID AND ELECTROLYTE IMBALANCE  Goal: Fluid and electrolyte balance are achieved/maintained  1/28/2020 1047 by Trey Sierra RN  Outcome: Ongoing     Problem: ACTIVITY INTOLERANCE/IMPAIRED MOBILITY  Goal: Mobility/activity is maintained at optimum level for patient  1/28/2020 1047 by Trey Sierra RN  Outcome: Ongoing     Problem: Pain:  Goal: Pain level will decrease  Description  Pain level will decrease  1/28/2020 1047 by Trey Sierra RN  Outcome: Ongoing   Pain level will decrease  Problem: Pain:  Goal: Control of acute pain  Description  Control of acute pain  1/28/2020 1047 by Trey Sierra RN  Outcome: Ongoing   Patient educated on acute pain. Taught patient to use call light to ask for pain medication. PRN pain medication given for acute pain. Will continue to monitor pain per unit protocol.     Problem: Pain:  Goal: Control of chronic pain  Description  Control of chronic pain  1/28/2020 1047 by Trey Sierra RN  Outcome: Ongoing

## 2020-01-28 NOTE — PROGRESS NOTES
Patient is resting in bed with eyes closed. Patient is on 2.5L of O2 nasal cannula. Side rails are up x3. Fall precautions are in place. Bed alarm on. Bed is in lowest position. Call light is in reach. Dressing to left mid back from thoracentesis is dry and intact. Will continue to monitor patient per unit protocols.  Electronically signed by Brian Liang RN on 1/28/2020 at 3:46 PM

## 2020-01-28 NOTE — PROGRESS NOTES
Patient arrived back to the unit in room 5113 with transport. Will continue to monitor patient per unit protocols.  Electronically signed by Abdiaziz Khalil RN on 1/28/2020 at 3:12 PM

## 2020-01-28 NOTE — PROGRESS NOTES
Patient has left the unit for thoracentesis with transport. Surgical consent signed and in soft chart. Will continue to monitor per unit protocols.  Electronically signed by Adelina Cuello RN on 1/28/2020 at 2:36 PM

## 2020-01-28 NOTE — PROGRESS NOTES
Pulmonary/Critical Care Progress Note    CC:  Follow-up:  Pleural effusion  Chronic hypoxic respiratory failure (on 2.5L NC)  Biventricular failure  Centrilobular emphysema    Subjective:  On 2.5L NC  550cc removed yesterday during thoracentesis  Breathing feels ok    ROS  SOB improved slightly  No fever  No chills    Intake/Output Summary (Last 24 hours) at 1/28/2020 1358  Last data filed at 1/28/2020 1111  Gross per 24 hour   Intake 490 ml   Output 2550 ml   Net -2060 ml         PHYSICAL EXAM:  Blood pressure 124/81, pulse 106, temperature 98.1 °F (36.7 °C), temperature source Oral, resp. rate 18, height 5' 7\" (1.702 m), weight 180 lb 8.9 oz (81.9 kg), SpO2 97 %.'  Gen: No distress. Well developed, well-nourished  Eyes: No scleral icterus. No conjunctival injection. ENT: External appearance of ears and nose normal.  Neck: Trachea midline. No obvious mass. No visible thyroid enlargement     Respiratory: No crackles. No wheezes. No rhonchi. Diminished in bases  Cardiovascular: Regular rate. Regular rhythm. No murmur or rub. No edema  GI: Non-tender. Non-distended. No hernia. Bowel sounds present  Skin: Warm, dry, normal texture and turgor. No abnormalities on exposed extremities. Musculoskeletal: No cyanosis. No clubbing. No joint deformity. Amputation  Neuro: Moves all four extremities.    Psychiatric:  Normal mood and affect; exhibits normal insight and judgement     Medications:    Scheduled Meds:   insulin lispro  5 Units Subcutaneous TID     furosemide  80 mg Intravenous BID    spironolactone  50 mg Oral Daily    insulin glargine  30 Units Subcutaneous Nightly    insulin lispro  0-12 Units Subcutaneous TID     insulin lispro  0-6 Units Subcutaneous Nightly    ipratropium-albuterol  1 ampule Inhalation Q4H WA    amiodarone  200 mg Oral Daily    aspirin  81 mg Oral Daily    atorvastatin  40 mg Oral Nightly    calcium-vitamin D  1 tablet Oral Daily    polyethylene glycol  17 g Oral BID    diminished suggesting a possible restrictive   defect; suggest lung volumes if clinically indicated. Lung Volumes  3. Lung volumes show mild restrictive defect. Bronchodilator  1. There is a response to bronchodilator. Flow-Volume Loop  4. The flow-volume loop is compatible with an obstructive   process. Diffusing Capacity  5. Diffusing capacity is decreased. [>60% and <LLN]    Overall Interpretation  Severe obstruction is present    Mild restriction is present    There is a bronchodilator response present    Diffusion capacity is mildly reduced    FEV1 is 1.26 L at 40% predicted. FEV1/FVC ratio is 61    Severe obstruction is present.  Air trapping is present.    Bronchodilator response is present.  Mild reduction in diffusing   capacity.  Findings can be seen in emphysema and the   bronchodilator response could suggest an asthma overlap.    Correlate clinically. Assessment:   Pleural effusion  Chronic hypoxic respiratory failure (on 2.5L NC)  Biventricular failure  Centrilobular emphysema    Plan:  · Maintain oxygen saturations >90% with supplemental oxygen and wean as tolerated  · Follow fluid studies and cultures, monocyte predominant, will follow cytology and flow  · Lasix and spironolactone for diuresis  · Strict I/Os  · DVT prophylaxis with SCDs; holding chemoprophylaxis d/t invasive procedure    Will need to follow-up with Dr. Luz Liang with repeat CXR on 2/11 at 1040. Thank you for allowing me to participate in the care of this patient.     Cira Shelby, [facility]  [facility]

## 2020-01-28 NOTE — PROGRESS NOTES
Hospital Medicine Progress Note     Date:  1/28/2020    PCP: Jesus Staley MD (Tel: [phone])    Date of Admission: 1/22/2020        Subjective  Pt feels better than admission but still not at his baseline. Objective  Physical exam:  Vitals: /81   Pulse 106   Temp 98.1 °F (36.7 °C) (Oral)   Resp 18   Ht 5' 7\" (1.702 m)   Wt 180 lb 8.9 oz (81.9 kg)   SpO2 97%   BMI 28.28 kg/m²   Gen: Not in distress. Alert. Head: Normocephalic. Atraumatic. Eyes: EOMI. Good acuity. ENT: Oral mucosa moist  Neck: No JVD. No obvious thyromegaly. CVS: Nml S1S2, no MRG, irregular  Pulmomary: diminished BS B/L  Gastrointestinal: Soft, NT/ND. Positive bowel sounds. Musculoskeletal: No edema. Warm, LEFT AKA  Neuro: No focal deficit. Moves extremity spontaneously. Psychiatry: Appropriate affect. Not agitated. Skin: Warm, dry with normal turgor. No rash      24HR INTAKE/OUTPUT:      Intake/Output Summary (Last 24 hours) at 1/28/2020 1439  Last data filed at 1/28/2020 1111  Gross per 24 hour   Intake 490 ml   Output 2550 ml   Net -2060 ml     I/O last 3 completed shifts: In: 56 [P.O.:600;  I.V.:10]  Out: 3050 [Urine:2850; Stool:200]  I/O this shift:  In: 240 [P.O.:240]  Out: 400 [Urine:400]      Meds:    insulin lispro  5 Units Subcutaneous TID WC    furosemide  80 mg Intravenous BID    spironolactone  50 mg Oral Daily    insulin glargine  30 Units Subcutaneous Nightly    insulin lispro  0-12 Units Subcutaneous TID     insulin lispro  0-6 Units Subcutaneous Nightly    ipratropium-albuterol  1 ampule Inhalation Q4H WA    amiodarone  200 mg Oral Daily    aspirin  81 mg Oral Daily    atorvastatin  40 mg Oral Nightly    calcium-vitamin D  1 tablet Oral Daily    polyethylene glycol  17 g Oral BID    ranolazine  500 mg Oral BID    tamsulosin  0.4 mg Oral Daily    sodium chloride flush  10 mL Intravenous 2 times per day       Infusions:    dextrose           PRN Meds: ipratropium-albuterol, potassium chloride **OR** potassium alternative oral replacement **OR** potassium chloride, magnesium sulfate, glucose, dextrose, glucagon (rDNA), dextrose, nitroGLYCERIN, oxyCODONE-acetaminophen, sodium chloride flush    Labs/imaging:  CBC:   Recent Labs     01/26/20  0524 01/27/20  0452 01/28/20  0509   WBC 8.3 10.2 20.0*   HGB 9.5* 10.5* 10.3*    378 386         BMP:    Recent Labs     01/26/20  0524 01/27/20  0452 01/28/20  0509    134* 128*   K 3.4* 4.1 4.1   CL 83* 84* 83*   CO2 41* 39* 36*   BUN 23* 23* 24*   CREATININE 1.0 1.0 0.9   GLUCOSE 90 151* 106*         Hepatic: No results for input(s): AST, ALT, ALB, BILITOT, ALKPHOS in the last 72 hours. Troponin: No results for input(s): TROPONINI in the last 72 hours. BNP: No results for input(s): BNP in the last 72 hours. INR:   Recent Labs     01/28/20  0804   INR 1.43*           Reviewed imaging and reports noted      Assessment:  Principal Problem:    Acute on chronic respiratory failure with hypoxia (HCC)  Active Problems:    PAD (peripheral artery disease) (HCC)    Acute on chronic combined systolic and diastolic HF (heart failure) (HCC)    Centrilobular emphysema (HCC)    Diabetes mellitus type 2, controlled (HCC)    Biventricular failure (HCC)    CAD (coronary artery disease)    Pulmonary HTN (HCC)    Systolic CHF (HCC)    Pleural effusion  Resolved Problems:    * No resolved hospital problems. *        Plan:  Pleural Effusion  - appreciate pulm reccs  - plan for thoracocentesis today, cont lasix and aldactone      Acute on Chronic sCHF  - cont lasix and aldactone, asp, statin    CHronic Resp Failure w/ Hypoxia  - on baseline of 2.5L O2 nC, maintain O2 sats > 90%      Centrilobular Emphysema  - cont duonebs      IDDM  - cont lantus, humalog, SSI, CTM      HTN  - stable, CPm, CTM      Chronic a Fib  - cont amio, rate controlled, hold xarelto for thoracocentesis          Diet: DIET CARDIAC; Low Sodium (2 GM);  Daily Fluid Restriction: 1500 ml    Activity: up as tolerated  Prophylaxis: scd    Code status: Full Code     ----------        Larry Doss MD  -------------------------------  Rounding hospitalist

## 2020-01-28 NOTE — PLAN OF CARE
Problem: Risk for Impaired Skin Integrity  Goal: Tissue integrity - skin and mucous membranes  Description  Structural intactness and normal physiological function of skin and  mucous membranes. Outcome: Ongoing     Problem: SAFETY  Goal: Free from accidental physical injury  Outcome: Ongoing     Problem: DAILY CARE  Goal: Daily care needs are met  Outcome: Ongoing     Problem: PAIN  Goal: Patient's pain/discomfort is manageable  Outcome: Ongoing     Problem: KNOWLEDGE DEFICIT  Goal: Patient/S.O. demonstrates understanding of disease process, treatment plan, medications, and discharge instructions.   Outcome: Ongoing     Problem: OXYGENATION/RESPIRATORY FUNCTION  Goal: Patient will maintain patent airway  Outcome: Ongoing     Problem: HEMODYNAMIC STATUS  Goal: Patient has stable vital signs and fluid balance  Outcome: Ongoing     Problem: FLUID AND ELECTROLYTE IMBALANCE  Goal: Fluid and electrolyte balance are achieved/maintained  Outcome: Ongoing

## 2020-01-29 ENCOUNTER — APPOINTMENT (OUTPATIENT)
Dept: GENERAL RADIOLOGY | Age: 60
DRG: 194 | End: 2020-01-29
Payer: COMMERCIAL

## 2020-01-29 ENCOUNTER — APPOINTMENT (OUTPATIENT)
Dept: CT IMAGING | Age: 60
DRG: 194 | End: 2020-01-29
Payer: COMMERCIAL

## 2020-01-29 LAB
ANION GAP SERPL CALCULATED.3IONS-SCNC: 12 MMOL/L (ref 3–16)
APPEARANCE FLUID: NORMAL
BASOPHILS ABSOLUTE: 0.1 K/UL (ref 0–0.2)
BASOPHILS RELATIVE PERCENT: 0.7 %
BUN BLDV-MCNC: 27 MG/DL (ref 7–20)
CALCIUM SERPL-MCNC: 9.2 MG/DL (ref 8.3–10.6)
CELL COUNT FLUID TYPE: NORMAL
CHLORIDE BLD-SCNC: 87 MMOL/L (ref 99–110)
CLOT EVALUATION: NORMAL
CO2: 33 MMOL/L (ref 21–32)
COLOR FLUID: NORMAL
CREAT SERPL-MCNC: 0.9 MG/DL (ref 0.9–1.3)
EOSINOPHILS ABSOLUTE: 0.3 K/UL (ref 0–0.6)
EOSINOPHILS RELATIVE PERCENT: 1.7 %
GFR AFRICAN AMERICAN: >60
GFR NON-AFRICAN AMERICAN: >60
GLUCOSE BLD-MCNC: 104 MG/DL (ref 70–99)
GLUCOSE BLD-MCNC: 114 MG/DL (ref 70–99)
GLUCOSE BLD-MCNC: 125 MG/DL (ref 70–99)
GLUCOSE BLD-MCNC: 161 MG/DL (ref 70–99)
GLUCOSE BLD-MCNC: 242 MG/DL (ref 70–99)
HCT VFR BLD CALC: 31.4 % (ref 40.5–52.5)
HEMATOLOGY PATH CONSULT: NO
HEMOGLOBIN: 10.3 G/DL (ref 13.5–17.5)
LYMPHOCYTES ABSOLUTE: 1.3 K/UL (ref 1–5.1)
LYMPHOCYTES RELATIVE PERCENT: 8.2 %
LYMPHOCYTES, BODY FLUID: 14 %
MAGNESIUM: 2.2 MG/DL (ref 1.8–2.4)
MCH RBC QN AUTO: 27.2 PG (ref 26–34)
MCHC RBC AUTO-ENTMCNC: 32.7 G/DL (ref 31–36)
MCV RBC AUTO: 83.3 FL (ref 80–100)
MONOCYTE, FLUID: 70 %
MONOCYTES ABSOLUTE: 2 K/UL (ref 0–1.3)
MONOCYTES RELATIVE PERCENT: 12.5 %
NEUTROPHIL, FLUID: 16 %
NEUTROPHILS ABSOLUTE: 12.3 K/UL (ref 1.7–7.7)
NEUTROPHILS RELATIVE PERCENT: 76.9 %
NUCLEATED CELLS FLUID: 421 /CUMM
NUMBER OF CELLS COUNTED FLUID: 100
PDW BLD-RTO: 16.7 % (ref 12.4–15.4)
PERFORMED ON: ABNORMAL
PLATELET # BLD: 371 K/UL (ref 135–450)
PMV BLD AUTO: 8.2 FL (ref 5–10.5)
POTASSIUM REFLEX MAGNESIUM: 3.7 MMOL/L (ref 3.5–5.1)
RBC # BLD: 3.77 M/UL (ref 4.2–5.9)
RBC FLUID: NORMAL /CUMM
SODIUM BLD-SCNC: 132 MMOL/L (ref 136–145)
WBC # BLD: 15.9 K/UL (ref 4–11)

## 2020-01-29 PROCEDURE — 1200000000 HC SEMI PRIVATE

## 2020-01-29 PROCEDURE — 6370000000 HC RX 637 (ALT 250 FOR IP): Performed by: HOSPITALIST

## 2020-01-29 PROCEDURE — C1751 CATH, INF, PER/CENT/MIDLINE: HCPCS

## 2020-01-29 PROCEDURE — 74177 CT ABD & PELVIS W/CONTRAST: CPT

## 2020-01-29 PROCEDURE — 6370000000 HC RX 637 (ALT 250 FOR IP): Performed by: FAMILY MEDICINE

## 2020-01-29 PROCEDURE — 94640 AIRWAY INHALATION TREATMENT: CPT

## 2020-01-29 PROCEDURE — 6360000004 HC RX CONTRAST MEDICATION: Performed by: INTERNAL MEDICINE

## 2020-01-29 PROCEDURE — 6370000000 HC RX 637 (ALT 250 FOR IP): Performed by: INTERNAL MEDICINE

## 2020-01-29 PROCEDURE — 2700000000 HC OXYGEN THERAPY PER DAY

## 2020-01-29 PROCEDURE — 6360000002 HC RX W HCPCS: Performed by: INTERNAL MEDICINE

## 2020-01-29 PROCEDURE — 2580000003 HC RX 258: Performed by: INTERNAL MEDICINE

## 2020-01-29 PROCEDURE — 71045 X-RAY EXAM CHEST 1 VIEW: CPT

## 2020-01-29 PROCEDURE — 87040 BLOOD CULTURE FOR BACTERIA: CPT

## 2020-01-29 PROCEDURE — 02HV33Z INSERTION OF INFUSION DEVICE INTO SUPERIOR VENA CAVA, PERCUTANEOUS APPROACH: ICD-10-PCS | Performed by: INTERNAL MEDICINE

## 2020-01-29 PROCEDURE — 99232 SBSQ HOSP IP/OBS MODERATE 35: CPT | Performed by: NURSE PRACTITIONER

## 2020-01-29 PROCEDURE — 36415 COLL VENOUS BLD VENIPUNCTURE: CPT

## 2020-01-29 PROCEDURE — 80048 BASIC METABOLIC PNL TOTAL CA: CPT

## 2020-01-29 PROCEDURE — 36569 INSJ PICC 5 YR+ W/O IMAGING: CPT

## 2020-01-29 PROCEDURE — 83735 ASSAY OF MAGNESIUM: CPT

## 2020-01-29 PROCEDURE — 76937 US GUIDE VASCULAR ACCESS: CPT

## 2020-01-29 PROCEDURE — 85025 COMPLETE CBC W/AUTO DIFF WBC: CPT

## 2020-01-29 PROCEDURE — 94760 N-INVAS EAR/PLS OXIMETRY 1: CPT

## 2020-01-29 RX ORDER — SODIUM CHLORIDE 0.9 % (FLUSH) 0.9 %
10 SYRINGE (ML) INJECTION PRN
Status: DISCONTINUED | OUTPATIENT
Start: 2020-01-29 | End: 2020-02-14 | Stop reason: HOSPADM

## 2020-01-29 RX ORDER — SODIUM CHLORIDE 0.9 % (FLUSH) 0.9 %
10 SYRINGE (ML) INJECTION EVERY 12 HOURS SCHEDULED
Status: DISCONTINUED | OUTPATIENT
Start: 2020-01-29 | End: 2020-02-14 | Stop reason: HOSPADM

## 2020-01-29 RX ORDER — FUROSEMIDE 40 MG/1
40 TABLET ORAL DAILY
Status: DISCONTINUED | OUTPATIENT
Start: 2020-01-30 | End: 2020-01-30

## 2020-01-29 RX ORDER — LIDOCAINE HYDROCHLORIDE 10 MG/ML
5 INJECTION, SOLUTION EPIDURAL; INFILTRATION; INTRACAUDAL; PERINEURAL ONCE
Status: DISCONTINUED | OUTPATIENT
Start: 2020-01-29 | End: 2020-02-14 | Stop reason: HOSPADM

## 2020-01-29 RX ADMIN — SODIUM CHLORIDE, PRESERVATIVE FREE 10 ML: 5 INJECTION INTRAVENOUS at 21:15

## 2020-01-29 RX ADMIN — IPRATROPIUM BROMIDE AND ALBUTEROL SULFATE 1 AMPULE: .5; 3 SOLUTION RESPIRATORY (INHALATION) at 16:28

## 2020-01-29 RX ADMIN — INSULIN GLARGINE 30 UNITS: 100 INJECTION, SOLUTION SUBCUTANEOUS at 21:15

## 2020-01-29 RX ADMIN — OYSTER SHELL CALCIUM WITH VITAMIN D 1 TABLET: 500; 200 TABLET, FILM COATED ORAL at 08:27

## 2020-01-29 RX ADMIN — SPIRONOLACTONE 50 MG: 25 TABLET ORAL at 08:27

## 2020-01-29 RX ADMIN — OXYCODONE HYDROCHLORIDE AND ACETAMINOPHEN 1 TABLET: 7.5; 325 TABLET ORAL at 14:36

## 2020-01-29 RX ADMIN — IPRATROPIUM BROMIDE AND ALBUTEROL SULFATE 1 AMPULE: .5; 3 SOLUTION RESPIRATORY (INHALATION) at 12:09

## 2020-01-29 RX ADMIN — AMIODARONE HYDROCHLORIDE 200 MG: 200 TABLET ORAL at 08:26

## 2020-01-29 RX ADMIN — OXYCODONE HYDROCHLORIDE AND ACETAMINOPHEN 1 TABLET: 7.5; 325 TABLET ORAL at 00:21

## 2020-01-29 RX ADMIN — RANOLAZINE 500 MG: 500 TABLET, FILM COATED, EXTENDED RELEASE ORAL at 08:26

## 2020-01-29 RX ADMIN — ATORVASTATIN CALCIUM 40 MG: 40 TABLET, FILM COATED ORAL at 21:13

## 2020-01-29 RX ADMIN — DEXTROSE MONOHYDRATE 490 MG: 50 INJECTION, SOLUTION INTRAVENOUS at 21:15

## 2020-01-29 RX ADMIN — FUROSEMIDE 80 MG: 40 TABLET ORAL at 08:26

## 2020-01-29 RX ADMIN — TAMSULOSIN HYDROCHLORIDE 0.4 MG: 0.4 CAPSULE ORAL at 08:27

## 2020-01-29 RX ADMIN — IOPAMIDOL 75 ML: 755 INJECTION, SOLUTION INTRAVENOUS at 15:57

## 2020-01-29 RX ADMIN — POLYETHYLENE GLYCOL 3350 17 G: 17 POWDER, FOR SOLUTION ORAL at 21:13

## 2020-01-29 RX ADMIN — IPRATROPIUM BROMIDE AND ALBUTEROL SULFATE 1 AMPULE: .5; 3 SOLUTION RESPIRATORY (INHALATION) at 20:50

## 2020-01-29 RX ADMIN — INSULIN LISPRO 2 UNITS: 100 INJECTION, SOLUTION INTRAVENOUS; SUBCUTANEOUS at 21:16

## 2020-01-29 RX ADMIN — INSULIN LISPRO 5 UNITS: 100 INJECTION, SOLUTION INTRAVENOUS; SUBCUTANEOUS at 08:27

## 2020-01-29 RX ADMIN — RANOLAZINE 500 MG: 500 TABLET, FILM COATED, EXTENDED RELEASE ORAL at 21:13

## 2020-01-29 RX ADMIN — POLYETHYLENE GLYCOL 3350 17 G: 17 POWDER, FOR SOLUTION ORAL at 08:26

## 2020-01-29 RX ADMIN — IPRATROPIUM BROMIDE AND ALBUTEROL SULFATE 1 AMPULE: .5; 3 SOLUTION RESPIRATORY (INHALATION) at 08:11

## 2020-01-29 RX ADMIN — ASPIRIN 81 MG 81 MG: 81 TABLET ORAL at 08:27

## 2020-01-29 ASSESSMENT — PAIN DESCRIPTION - ONSET
ONSET: ON-GOING

## 2020-01-29 ASSESSMENT — PAIN DESCRIPTION - PAIN TYPE
TYPE: CHRONIC PAIN

## 2020-01-29 ASSESSMENT — PAIN DESCRIPTION - LOCATION
LOCATION: GENERALIZED
LOCATION: BACK

## 2020-01-29 ASSESSMENT — PAIN DESCRIPTION - FREQUENCY
FREQUENCY: INTERMITTENT
FREQUENCY: INTERMITTENT
FREQUENCY: CONTINUOUS
FREQUENCY: CONTINUOUS

## 2020-01-29 ASSESSMENT — PAIN DESCRIPTION - PROGRESSION
CLINICAL_PROGRESSION: NOT CHANGED
CLINICAL_PROGRESSION: GRADUALLY WORSENING
CLINICAL_PROGRESSION: GRADUALLY IMPROVING

## 2020-01-29 ASSESSMENT — PAIN SCALES - WONG BAKER: WONGBAKER_NUMERICALRESPONSE: 0

## 2020-01-29 ASSESSMENT — PAIN DESCRIPTION - ORIENTATION
ORIENTATION: MID;LOWER
ORIENTATION: LEFT
ORIENTATION: MID;LOWER
ORIENTATION: LEFT

## 2020-01-29 ASSESSMENT — PAIN - FUNCTIONAL ASSESSMENT
PAIN_FUNCTIONAL_ASSESSMENT: PREVENTS OR INTERFERES SOME ACTIVE ACTIVITIES AND ADLS

## 2020-01-29 ASSESSMENT — PAIN SCALES - GENERAL
PAINLEVEL_OUTOF10: 7
PAINLEVEL_OUTOF10: 8
PAINLEVEL_OUTOF10: 7
PAINLEVEL_OUTOF10: 8
PAINLEVEL_OUTOF10: 8
PAINLEVEL_OUTOF10: 4
PAINLEVEL_OUTOF10: 3

## 2020-01-29 ASSESSMENT — PAIN DESCRIPTION - DESCRIPTORS
DESCRIPTORS: ACHING

## 2020-01-29 NOTE — PROGRESS NOTES
Patient refuses to allow the IV that he states is infiltrated to be removed, he also refuses to have a new IV placed. Pain to the mid low back controled with PRN pain medication. He does become tachy with coughing spells. Call light in reach bed alarm set.

## 2020-01-29 NOTE — PROGRESS NOTES
Hospital Medicine Progress Note     Date:  1/29/2020    PCP: Marty Azar MD (Tel: [phone])    Date of Admission: 1/22/2020        Subjective  Coughing all night. Feels weak. Overall unwell. Low grade fever this am. Abd pain. Objective  Physical exam:  Vitals: /71   Pulse 123   Temp 97.6 °F (36.4 °C) (Oral)   Resp 22   Ht 5' 7\" (1.702 m)   Wt 179 lb 3.7 oz (81.3 kg)   SpO2 93%   BMI 28.07 kg/m²   Gen: Not in distress. Alert. Head: Normocephalic. Atraumatic. Eyes: EOMI. Good acuity. ENT: Oral mucosa moist  Neck: No JVD. No obvious thyromegaly. CVS: Nml S1S2, no MRG, irregular  Pulmomary: diminished BS B/L  Gastrointestinal: Soft, tender - non focal. Positive bowel sounds. Colostomy RLL  Musculoskeletal: No edema. Warm, remote LEFT AKA  Neuro: No focal deficit. Moves extremity spontaneously. Psychiatry: Appropriate affect. Not agitated. Skin: Warm, dry with normal turgor. No rash      24HR INTAKE/OUTPUT:      Intake/Output Summary (Last 24 hours) at 1/29/2020 1329  Last data filed at 1/29/2020 1320  Gross per 24 hour   Intake 1140 ml   Output 2076 ml   Net -936 ml     I/O last 3 completed shifts: In: 5 [P.O.:540]  Out: 1801 [Urine:851; Other:550; Stool:400]  I/O this shift:   In: 840 [P.O.:840]  Out: 675 [Urine:675]      Meds:    furosemide  80 mg Oral BID    insulin lispro  5 Units Subcutaneous TID     furosemide  80 mg Intravenous BID    spironolactone  50 mg Oral Daily    insulin glargine  30 Units Subcutaneous Nightly    insulin lispro  0-12 Units Subcutaneous TID     insulin lispro  0-6 Units Subcutaneous Nightly    ipratropium-albuterol  1 ampule Inhalation Q4H WA    amiodarone  200 mg Oral Daily    aspirin  81 mg Oral Daily    atorvastatin  40 mg Oral Nightly    calcium-vitamin D  1 tablet Oral Daily    polyethylene glycol  17 g Oral BID    ranolazine  500 mg Oral BID    tamsulosin  0.4 mg Oral Daily    sodium chloride flush  10 mL Intravenous amio, rate controlled,   - hold xarelto till CT abd pelvis results are back. Abd pain, low grade fever this am.   Complex surgical Hx - Hx of bowel obstruction s/p ex lap and colostomy. Later complicated by intraabd abscess requiring drainage and completed IV zosyn back in Nov 2019. Will get repeat CT abd pelvis to reassess. Diet: DIET CARDIAC; Low Sodium (2 GM);  Daily Fluid Restriction: 1500 ml    Activity: up as tolerated  Prophylaxis: scd    Code status: Full Code     ----------        Catarina Camilo MD

## 2020-01-29 NOTE — CARE COORDINATION
Case Management:  Follow up with MAKEDA BLAND, AN AFFILIATE OF [facility] re return and this cm was told that pt/ot is not necessary that insurance will not approve it and all they need to do is a long term certification but they are usually only good for a few days. They will require at least a day notice to get certification completed but the do not need information from us.   Electronically signed by 20linesalberta Emmanuel on 1/29/2020 at 12:40 PM

## 2020-01-29 NOTE — PLAN OF CARE
Problem: Risk for Impaired Skin Integrity  Goal: Tissue integrity - skin and mucous membranes  Description  Structural intactness and normal physiological function of skin and  mucous membranes. Outcome: Met This Shift     Problem: SAFETY  Goal: Free from accidental physical injury  Outcome: Met This Shift     Problem: DAILY CARE  Goal: Daily care needs are met  Outcome: Met This Shift     Problem: PAIN  Goal: Patient's pain/discomfort is manageable  Outcome: Met This Shift     Problem: SKIN INTEGRITY  Goal: Skin integrity is maintained or improved  Outcome: Met This Shift     Problem: KNOWLEDGE DEFICIT  Goal: Patient/S.O. demonstrates understanding of disease process, treatment plan, medications, and discharge instructions.   Outcome: Met This Shift     Problem: DISCHARGE BARRIERS  Goal: Patient's continuum of care needs are met  Outcome: Met This Shift     Problem: Discharge Planning:  Goal: Discharged to appropriate level of care  Description  Discharged to appropriate level of care  Outcome: Met This Shift     Problem: Gas Exchange - Impaired:  Goal: Levels of oxygenation will improve  Description  Levels of oxygenation will improve  Outcome: Met This Shift     Problem: OXYGENATION/RESPIRATORY FUNCTION  Goal: Patient will maintain patent airway  Outcome: Met This Shift  Goal: Patient will achieve/maintain normal respiratory rate/effort  Description  Respiratory rate and effort will be within normal limits for the patient  Outcome: Met This Shift     Problem: HEMODYNAMIC STATUS  Goal: Patient has stable vital signs and fluid balance  Outcome: Met This Shift     Problem: FLUID AND ELECTROLYTE IMBALANCE  Goal: Fluid and electrolyte balance are achieved/maintained  Outcome: Met This Shift     Problem: ACTIVITY INTOLERANCE/IMPAIRED MOBILITY  Goal: Mobility/activity is maintained at optimum level for patient  Outcome: Met This Shift

## 2020-01-30 ENCOUNTER — APPOINTMENT (OUTPATIENT)
Dept: CT IMAGING | Age: 60
DRG: 194 | End: 2020-01-30
Payer: COMMERCIAL

## 2020-01-30 LAB
ANION GAP SERPL CALCULATED.3IONS-SCNC: 12 MMOL/L (ref 3–16)
APTT: 30.1 SEC (ref 24.2–36.2)
BASOPHILS ABSOLUTE: 0.2 K/UL (ref 0–0.2)
BASOPHILS RELATIVE PERCENT: 0.8 %
BUN BLDV-MCNC: 24 MG/DL (ref 7–20)
C-REACTIVE PROTEIN: 134.5 MG/L (ref 0–5.1)
CALCIUM SERPL-MCNC: 8.7 MG/DL (ref 8.3–10.6)
CHLORIDE BLD-SCNC: 88 MMOL/L (ref 99–110)
CO2: 31 MMOL/L (ref 21–32)
CREAT SERPL-MCNC: 0.9 MG/DL (ref 0.9–1.3)
EOSINOPHILS ABSOLUTE: 0.1 K/UL (ref 0–0.6)
EOSINOPHILS RELATIVE PERCENT: 0.7 %
GFR AFRICAN AMERICAN: >60
GFR NON-AFRICAN AMERICAN: >60
GLUCOSE BLD-MCNC: 110 MG/DL (ref 70–99)
GLUCOSE BLD-MCNC: 113 MG/DL (ref 70–99)
GLUCOSE BLD-MCNC: 126 MG/DL (ref 70–99)
GLUCOSE BLD-MCNC: 144 MG/DL (ref 70–99)
GLUCOSE BLD-MCNC: 208 MG/DL (ref 70–99)
HCT VFR BLD CALC: 30.8 % (ref 40.5–52.5)
HEMATOLOGY PATH CONSULT: NO
HEMOGLOBIN: 9.7 G/DL (ref 13.5–17.5)
INR BLD: 1.38 (ref 0.86–1.14)
LYMPHOCYTES ABSOLUTE: 0.9 K/UL (ref 1–5.1)
LYMPHOCYTES RELATIVE PERCENT: 4.7 %
MAGNESIUM: 2.1 MG/DL (ref 1.8–2.4)
MCH RBC QN AUTO: 26.6 PG (ref 26–34)
MCHC RBC AUTO-ENTMCNC: 31.6 G/DL (ref 31–36)
MCV RBC AUTO: 84.2 FL (ref 80–100)
MONOCYTES ABSOLUTE: 2.3 K/UL (ref 0–1.3)
MONOCYTES RELATIVE PERCENT: 12 %
NEUTROPHILS ABSOLUTE: 15.9 K/UL (ref 1.7–7.7)
NEUTROPHILS RELATIVE PERCENT: 81.8 %
PDW BLD-RTO: 16.9 % (ref 12.4–15.4)
PERFORMED ON: ABNORMAL
PLATELET # BLD: 320 K/UL (ref 135–450)
PMV BLD AUTO: 9 FL (ref 5–10.5)
POTASSIUM REFLEX MAGNESIUM: 4.7 MMOL/L (ref 3.5–5.1)
PROTHROMBIN TIME: 16.1 SEC (ref 10–13.2)
RBC # BLD: 3.65 M/UL (ref 4.2–5.9)
SEDIMENTATION RATE, ERYTHROCYTE: 104 MM/HR (ref 0–20)
SODIUM BLD-SCNC: 131 MMOL/L (ref 136–145)
WBC # BLD: 19.4 K/UL (ref 4–11)

## 2020-01-30 PROCEDURE — 99255 IP/OBS CONSLTJ NEW/EST HI 80: CPT | Performed by: INTERNAL MEDICINE

## 2020-01-30 PROCEDURE — 6360000002 HC RX W HCPCS: Performed by: INTERNAL MEDICINE

## 2020-01-30 PROCEDURE — 2700000000 HC OXYGEN THERAPY PER DAY

## 2020-01-30 PROCEDURE — 94760 N-INVAS EAR/PLS OXIMETRY 1: CPT

## 2020-01-30 PROCEDURE — 6360000002 HC RX W HCPCS: Performed by: RADIOLOGY

## 2020-01-30 PROCEDURE — 6370000000 HC RX 637 (ALT 250 FOR IP): Performed by: HOSPITALIST

## 2020-01-30 PROCEDURE — 86140 C-REACTIVE PROTEIN: CPT

## 2020-01-30 PROCEDURE — 6370000000 HC RX 637 (ALT 250 FOR IP): Performed by: INTERNAL MEDICINE

## 2020-01-30 PROCEDURE — 1200000000 HC SEMI PRIVATE

## 2020-01-30 PROCEDURE — 85730 THROMBOPLASTIN TIME PARTIAL: CPT

## 2020-01-30 PROCEDURE — 85025 COMPLETE CBC W/AUTO DIFF WBC: CPT

## 2020-01-30 PROCEDURE — 87075 CULTR BACTERIA EXCEPT BLOOD: CPT

## 2020-01-30 PROCEDURE — 83735 ASSAY OF MAGNESIUM: CPT

## 2020-01-30 PROCEDURE — 87205 SMEAR GRAM STAIN: CPT

## 2020-01-30 PROCEDURE — 87070 CULTURE OTHR SPECIMN AEROBIC: CPT

## 2020-01-30 PROCEDURE — APPSS180 APP SPLIT SHARED TIME > 60 MINUTES: Performed by: NURSE PRACTITIONER

## 2020-01-30 PROCEDURE — 77012 CT SCAN FOR NEEDLE BIOPSY: CPT

## 2020-01-30 PROCEDURE — 2580000003 HC RX 258: Performed by: INTERNAL MEDICINE

## 2020-01-30 PROCEDURE — 94640 AIRWAY INHALATION TREATMENT: CPT

## 2020-01-30 PROCEDURE — 85652 RBC SED RATE AUTOMATED: CPT

## 2020-01-30 PROCEDURE — 99151 MOD SED SAME PHYS/QHP <5 YRS: CPT

## 2020-01-30 PROCEDURE — 80048 BASIC METABOLIC PNL TOTAL CA: CPT

## 2020-01-30 PROCEDURE — 85610 PROTHROMBIN TIME: CPT

## 2020-01-30 RX ORDER — MIDAZOLAM HYDROCHLORIDE 1 MG/ML
INJECTION INTRAMUSCULAR; INTRAVENOUS DAILY PRN
Status: COMPLETED | OUTPATIENT
Start: 2020-01-30 | End: 2020-01-30

## 2020-01-30 RX ORDER — FENTANYL CITRATE 50 UG/ML
INJECTION, SOLUTION INTRAMUSCULAR; INTRAVENOUS DAILY PRN
Status: COMPLETED | OUTPATIENT
Start: 2020-01-30 | End: 2020-01-30

## 2020-01-30 RX ADMIN — MIDAZOLAM 0.5 MG: 1 INJECTION INTRAMUSCULAR; INTRAVENOUS at 11:13

## 2020-01-30 RX ADMIN — TAMSULOSIN HYDROCHLORIDE 0.4 MG: 0.4 CAPSULE ORAL at 09:11

## 2020-01-30 RX ADMIN — FENTANYL CITRATE 25 MCG: 50 INJECTION INTRAMUSCULAR; INTRAVENOUS at 11:18

## 2020-01-30 RX ADMIN — OXYCODONE HYDROCHLORIDE AND ACETAMINOPHEN 1 TABLET: 7.5; 325 TABLET ORAL at 14:37

## 2020-01-30 RX ADMIN — AMIODARONE HYDROCHLORIDE 200 MG: 200 TABLET ORAL at 09:11

## 2020-01-30 RX ADMIN — MIDAZOLAM 0.5 MG: 1 INJECTION INTRAMUSCULAR; INTRAVENOUS at 11:41

## 2020-01-30 RX ADMIN — FENTANYL CITRATE 25 MCG: 50 INJECTION INTRAMUSCULAR; INTRAVENOUS at 11:55

## 2020-01-30 RX ADMIN — FENTANYL CITRATE 25 MCG: 50 INJECTION INTRAMUSCULAR; INTRAVENOUS at 11:44

## 2020-01-30 RX ADMIN — IPRATROPIUM BROMIDE AND ALBUTEROL SULFATE 1 AMPULE: .5; 3 SOLUTION RESPIRATORY (INHALATION) at 07:48

## 2020-01-30 RX ADMIN — PIPERACILLIN AND TAZOBACTAM 3.38 G: 3; .375 INJECTION, POWDER, LYOPHILIZED, FOR SOLUTION INTRAVENOUS at 18:23

## 2020-01-30 RX ADMIN — IPRATROPIUM BROMIDE AND ALBUTEROL SULFATE 1 AMPULE: .5; 3 SOLUTION RESPIRATORY (INHALATION) at 20:27

## 2020-01-30 RX ADMIN — INSULIN LISPRO 4 UNITS: 100 INJECTION, SOLUTION INTRAVENOUS; SUBCUTANEOUS at 18:25

## 2020-01-30 RX ADMIN — ATORVASTATIN CALCIUM 40 MG: 40 TABLET, FILM COATED ORAL at 21:47

## 2020-01-30 RX ADMIN — POLYETHYLENE GLYCOL 3350 17 G: 17 POWDER, FOR SOLUTION ORAL at 09:12

## 2020-01-30 RX ADMIN — SODIUM CHLORIDE, PRESERVATIVE FREE 10 ML: 5 INJECTION INTRAVENOUS at 09:30

## 2020-01-30 RX ADMIN — RANOLAZINE 500 MG: 500 TABLET, FILM COATED, EXTENDED RELEASE ORAL at 09:11

## 2020-01-30 RX ADMIN — DEXTROSE MONOHYDRATE 490 MG: 50 INJECTION, SOLUTION INTRAVENOUS at 06:41

## 2020-01-30 RX ADMIN — FENTANYL CITRATE 25 MCG: 50 INJECTION INTRAMUSCULAR; INTRAVENOUS at 11:13

## 2020-01-30 RX ADMIN — RANOLAZINE 500 MG: 500 TABLET, FILM COATED, EXTENDED RELEASE ORAL at 21:46

## 2020-01-30 RX ADMIN — OXYCODONE HYDROCHLORIDE AND ACETAMINOPHEN 1 TABLET: 7.5; 325 TABLET ORAL at 21:51

## 2020-01-30 RX ADMIN — OYSTER SHELL CALCIUM WITH VITAMIN D 1 TABLET: 500; 200 TABLET, FILM COATED ORAL at 09:11

## 2020-01-30 RX ADMIN — SODIUM CHLORIDE, PRESERVATIVE FREE 10 ML: 5 INJECTION INTRAVENOUS at 21:46

## 2020-01-30 RX ADMIN — DEXTROSE MONOHYDRATE 325 MG: 50 INJECTION, SOLUTION INTRAVENOUS at 20:31

## 2020-01-30 RX ADMIN — PIPERACILLIN AND TAZOBACTAM 3.38 G: 3; .375 INJECTION, POWDER, LYOPHILIZED, FOR SOLUTION INTRAVENOUS at 01:30

## 2020-01-30 RX ADMIN — IPRATROPIUM BROMIDE AND ALBUTEROL SULFATE 1 AMPULE: .5; 3 SOLUTION RESPIRATORY (INHALATION) at 13:36

## 2020-01-30 RX ADMIN — MIDAZOLAM 1 MG: 1 INJECTION INTRAMUSCULAR; INTRAVENOUS at 11:44

## 2020-01-30 RX ADMIN — IPRATROPIUM BROMIDE AND ALBUTEROL SULFATE 1 AMPULE: .5; 3 SOLUTION RESPIRATORY (INHALATION) at 16:10

## 2020-01-30 RX ADMIN — POLYETHYLENE GLYCOL 3350 17 G: 17 POWDER, FOR SOLUTION ORAL at 21:47

## 2020-01-30 RX ADMIN — FENTANYL CITRATE 25 MCG: 50 INJECTION INTRAMUSCULAR; INTRAVENOUS at 11:40

## 2020-01-30 RX ADMIN — INSULIN LISPRO 5 UNITS: 100 INJECTION, SOLUTION INTRAVENOUS; SUBCUTANEOUS at 18:25

## 2020-01-30 RX ADMIN — PIPERACILLIN AND TAZOBACTAM 3.38 G: 3; .375 INJECTION, POWDER, LYOPHILIZED, FOR SOLUTION INTRAVENOUS at 14:23

## 2020-01-30 RX ADMIN — PIPERACILLIN AND TAZOBACTAM 3.38 G: 3; .375 INJECTION, POWDER, LYOPHILIZED, FOR SOLUTION INTRAVENOUS at 05:54

## 2020-01-30 RX ADMIN — INSULIN GLARGINE 30 UNITS: 100 INJECTION, SOLUTION SUBCUTANEOUS at 21:47

## 2020-01-30 ASSESSMENT — PAIN DESCRIPTION - DESCRIPTORS
DESCRIPTORS: ACHING

## 2020-01-30 ASSESSMENT — PAIN DESCRIPTION - PROGRESSION
CLINICAL_PROGRESSION: NOT CHANGED
CLINICAL_PROGRESSION: GRADUALLY IMPROVING
CLINICAL_PROGRESSION: NOT CHANGED
CLINICAL_PROGRESSION: GRADUALLY WORSENING

## 2020-01-30 ASSESSMENT — PAIN SCALES - GENERAL
PAINLEVEL_OUTOF10: 6
PAINLEVEL_OUTOF10: 3
PAINLEVEL_OUTOF10: 7
PAINLEVEL_OUTOF10: 0
PAINLEVEL_OUTOF10: 5
PAINLEVEL_OUTOF10: 4
PAINLEVEL_OUTOF10: 4
PAINLEVEL_OUTOF10: 10
PAINLEVEL_OUTOF10: 5

## 2020-01-30 ASSESSMENT — PAIN DESCRIPTION - FREQUENCY
FREQUENCY: CONTINUOUS
FREQUENCY: CONTINUOUS
FREQUENCY: INTERMITTENT
FREQUENCY: INTERMITTENT

## 2020-01-30 ASSESSMENT — PAIN DESCRIPTION - ONSET
ONSET: ON-GOING

## 2020-01-30 ASSESSMENT — PAIN DESCRIPTION - PAIN TYPE
TYPE: ACUTE PAIN

## 2020-01-30 ASSESSMENT — PAIN DESCRIPTION - LOCATION
LOCATION: ABDOMEN
LOCATION: HEAD

## 2020-01-30 ASSESSMENT — PAIN - FUNCTIONAL ASSESSMENT: PAIN_FUNCTIONAL_ASSESSMENT: ACTIVITIES ARE NOT PREVENTED

## 2020-01-30 ASSESSMENT — ENCOUNTER SYMPTOMS: NAUSEA: 1

## 2020-01-30 NOTE — PROGRESS NOTES
Hospital Medicine Progress Note     Date:  1/30/2020    PCP: Nathalie Gordon MD (Tel: [phone])    Date of Admission: 1/22/2020        Subjective      Fever resolved. IR s/p per drainage of R fluid collection - looks bloody. Objective  Physical exam:  Vitals: /68   Pulse 119   Temp 97.2 °F (36.2 °C) (Axillary)   Resp 19   Ht 5' 7\" (1.702 m)   Wt 172 lb 13.5 oz (78.4 kg)   SpO2 95%   BMI 27.07 kg/m²   Gen: Not in distress. Alert. Head: Normocephalic. Atraumatic. Eyes: EOMI. Good acuity. ENT: Oral mucosa moist  Neck: No JVD. No obvious thyromegaly. CVS: Nml S1S2, no MRG, irregular  Pulmomary: diminished BS B/L  Gastrointestinal: Soft, tender - non focal. Positive bowel sounds. Colostomy RLL  Musculoskeletal: No edema. Warm, remote LEFT AKA  Neuro: No focal deficit. Moves extremity spontaneously. Psychiatry: Appropriate affect. Not agitated. Skin: Warm, dry with normal turgor. No rash      24HR INTAKE/OUTPUT:      Intake/Output Summary (Last 24 hours) at 1/30/2020 1546  Last data filed at 1/30/2020 1130  Gross per 24 hour   Intake 810 ml   Output 675 ml   Net 135 ml     I/O last 3 completed shifts: In: 80 [P.O.:600; I.V.:10; IV Piggyback:200]  Out: 675 [Urine:225; Other:350; Stool:100]  No intake/output data recorded.       Meds:    voriconazole  4 mg/kg Intravenous Q12H    lidocaine 1 % injection  5 mL Intradermal Once    sodium chloride flush  10 mL Intravenous 2 times per day    piperacillin-tazobactam  3.375 g Intravenous Q6H    insulin lispro  5 Units Subcutaneous TID WC    insulin glargine  30 Units Subcutaneous Nightly    insulin lispro  0-12 Units Subcutaneous TID WC    insulin lispro  0-6 Units Subcutaneous Nightly    ipratropium-albuterol  1 ampule Inhalation Q4H WA    amiodarone  200 mg Oral Daily    aspirin  81 mg Oral Daily    atorvastatin  40 mg Oral Nightly    calcium-vitamin D  1 tablet Oral Daily    polyethylene glycol  17 g Oral BID    Emphysema  - cont duonebs      IDDM  - cont lantus, humalog, SSI, CTM      HTN  - stable, CPm, CTM      Chronic a Fib  - cont amio, rate controlled,   - hold xarelto         Abd pain, low grade fever  Complex surgical Hx - Hx of bowel obstruction s/p ex lap and colostomy. Later complicated by intraabd abscess requiring drainage and completed IV zosyn back in Nov 2019. CT abd pelvis this admit:   -  Increased size complex cystic collection RUQ   - recurrent LUQ collection   - unclear if these are infected or hemorrhagic or both  CRP markedly elevated, sed rate pending. Plan:    - started iv zosyn and vfend. - ID and surgery eval.    - IR - drain placed in RUQ collection - 250 drained in procedure and large volume of bloody appearing drainage in the bag already. - keep off Xarelto           Diet: DIET CARB CONTROL; Low Sodium (2 GM);  Daily Fluid Restriction: 1500 ml    Activity: up as tolerated  Prophylaxis: scd    Code status: Full Code     ----------        Ortiz Woodson MD

## 2020-01-30 NOTE — PROGRESS NOTES
General Surgery    Asked to help arrange IR for ongoing fluid collections    Drain placed in chronic RUQ collection today  May need second drain in LUQ collection at some point if symptoms persist    Will follow    Electronically signed by Gabriella Mckeon MD on 1/30/2020 at 2:13 PM
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
## 2020-01-30 NOTE — PROGRESS NOTES
Pulmonary/Critical Care Progress Note    Cytology and flow reviewed. Negative for malignancy. Patient to follow-up with Dr. Karishma Henson with follow up x-ray.     Will sign off at this time, please call with questions    PIPO Person  [facility] Sleep, and Critical Care

## 2020-01-30 NOTE — BRIEF OP NOTE
Brief Postoperative Note  ______________________________________________________________    Patient: Hillary Ross  YOB: 1960  MRN: [id number]  Date of Procedure:     Pre-Op Diagnosis: Enlarging cystic lesion in right abdomen     Post-Op Diagnosis: Same       CT guided 12 Fr pigtail drainage catheter placement into a right upper quadrant cystic lesion     Anesthesia: Moderate sedation and local anesthesia    Al Cason DO     Estimated Blood Loss (mL): less than 50 mL     Complications: None    Specimens:     Approximately 350 mL of dark bloody aspirate was obtained, sample sent for analysis       Drains:   Closed/Suction Drain Right Abdomen 12 Serbian (Active)       Colostomy RLQ (Active)   Stomal Appliance 2 piece 1/30/2020  7:33 AM   Stoma  Assessment Pink;Moist 1/30/2020  7:33 AM   Mucocutaneous Junction Intact 1/30/2020  6:10 AM   Peristomal Assessment Clean; Intact 1/30/2020  4:00 AM   Stool Appearance Soft 1/30/2020  7:33 AM   Stool Color Brown 1/30/2020  7:33 AM   Stool Amount Small 1/30/2020  7:33 AM   Output (mL) 100 ml 1/30/2020  7:33 AM       [REMOVED] Closed/Suction Drain Left Abdomen 10 Serbian (Removed)       Findings:   CT guided 12 Fr pigtail drainage catheter placement into a right upper quadrant cystic lesion   Approximately 350 mL of dark bloody aspirate was obtained, sample sent for analysis      Olya Triplett MD  Date: 1/30/2020  Time: 12:11 PM

## 2020-01-30 NOTE — PLAN OF CARE
Problem: PAIN  Goal: Patient's pain/discomfort is manageable  Outcome: Ongoing   Pain/discomfort being managed with PRN analgesics per MD orders. Pt able to express presence and absence of pain and rate pain appropriately using numerical scale. Problem: SKIN INTEGRITY  Goal: Skin integrity is maintained or improved  Outcome: Ongoing  Skin assessment completed every shift. Pt assessed for incontinence, appropriate barrier cream applied prn. Pt encouraged to turn/rotate every 2 hours.  Assistance provided if pt unable to do so themselves.  '
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
## 2020-01-31 LAB
ANION GAP SERPL CALCULATED.3IONS-SCNC: 10 MMOL/L (ref 3–16)
BASOPHILS ABSOLUTE: 0.1 K/UL (ref 0–0.2)
BASOPHILS RELATIVE PERCENT: 0.5 %
BODY FLUID CULTURE, STERILE: NORMAL
BUN BLDV-MCNC: 28 MG/DL (ref 7–20)
CALCIUM SERPL-MCNC: 8.6 MG/DL (ref 8.3–10.6)
CHLORIDE BLD-SCNC: 86 MMOL/L (ref 99–110)
CO2: 32 MMOL/L (ref 21–32)
CREAT SERPL-MCNC: 1 MG/DL (ref 0.9–1.3)
EOSINOPHILS ABSOLUTE: 0.3 K/UL (ref 0–0.6)
EOSINOPHILS RELATIVE PERCENT: 1.3 %
GFR AFRICAN AMERICAN: >60
GFR NON-AFRICAN AMERICAN: >60
GLUCOSE BLD-MCNC: 132 MG/DL (ref 70–99)
GLUCOSE BLD-MCNC: 158 MG/DL (ref 70–99)
GLUCOSE BLD-MCNC: 225 MG/DL (ref 70–99)
GLUCOSE BLD-MCNC: 59 MG/DL (ref 70–99)
GLUCOSE BLD-MCNC: 95 MG/DL (ref 70–99)
GRAM STAIN RESULT: NORMAL
HCT VFR BLD CALC: 29.7 % (ref 40.5–52.5)
HEMATOLOGY PATH CONSULT: NO
HEMOGLOBIN: 9.6 G/DL (ref 13.5–17.5)
LYMPHOCYTES ABSOLUTE: 0.8 K/UL (ref 1–5.1)
LYMPHOCYTES RELATIVE PERCENT: 3.8 %
MAGNESIUM: 2.1 MG/DL (ref 1.8–2.4)
MCH RBC QN AUTO: 27 PG (ref 26–34)
MCHC RBC AUTO-ENTMCNC: 32.4 G/DL (ref 31–36)
MCV RBC AUTO: 83.3 FL (ref 80–100)
MONOCYTES ABSOLUTE: 2.1 K/UL (ref 0–1.3)
MONOCYTES RELATIVE PERCENT: 10.5 %
NEUTROPHILS ABSOLUTE: 17 K/UL (ref 1.7–7.7)
NEUTROPHILS RELATIVE PERCENT: 83.9 %
PDW BLD-RTO: 16.8 % (ref 12.4–15.4)
PERFORMED ON: ABNORMAL
PERFORMED ON: NORMAL
PLATELET # BLD: 238 K/UL (ref 135–450)
PMV BLD AUTO: 8.3 FL (ref 5–10.5)
POTASSIUM REFLEX MAGNESIUM: 4.2 MMOL/L (ref 3.5–5.1)
RBC # BLD: 3.57 M/UL (ref 4.2–5.9)
SODIUM BLD-SCNC: 128 MMOL/L (ref 136–145)
WBC # BLD: 20.2 K/UL (ref 4–11)

## 2020-01-31 PROCEDURE — 83735 ASSAY OF MAGNESIUM: CPT

## 2020-01-31 PROCEDURE — 1200000000 HC SEMI PRIVATE

## 2020-01-31 PROCEDURE — 2500000003 HC RX 250 WO HCPCS: Performed by: INTERNAL MEDICINE

## 2020-01-31 PROCEDURE — 2580000003 HC RX 258: Performed by: INTERNAL MEDICINE

## 2020-01-31 PROCEDURE — 6360000002 HC RX W HCPCS: Performed by: INTERNAL MEDICINE

## 2020-01-31 PROCEDURE — 0W9F30Z DRAINAGE OF ABDOMINAL WALL WITH DRAINAGE DEVICE, PERCUTANEOUS APPROACH: ICD-10-PCS | Performed by: SURGERY

## 2020-01-31 PROCEDURE — 6370000000 HC RX 637 (ALT 250 FOR IP): Performed by: HOSPITALIST

## 2020-01-31 PROCEDURE — 6370000000 HC RX 637 (ALT 250 FOR IP): Performed by: INTERNAL MEDICINE

## 2020-01-31 PROCEDURE — 99233 SBSQ HOSP IP/OBS HIGH 50: CPT | Performed by: INTERNAL MEDICINE

## 2020-01-31 PROCEDURE — 80048 BASIC METABOLIC PNL TOTAL CA: CPT

## 2020-01-31 PROCEDURE — 85025 COMPLETE CBC W/AUTO DIFF WBC: CPT

## 2020-01-31 PROCEDURE — 94640 AIRWAY INHALATION TREATMENT: CPT

## 2020-01-31 PROCEDURE — 94760 N-INVAS EAR/PLS OXIMETRY 1: CPT

## 2020-01-31 PROCEDURE — 2700000000 HC OXYGEN THERAPY PER DAY

## 2020-01-31 RX ORDER — INSULIN GLARGINE 100 [IU]/ML
15 INJECTION, SOLUTION SUBCUTANEOUS NIGHTLY
Status: DISCONTINUED | OUTPATIENT
Start: 2020-01-31 | End: 2020-02-11

## 2020-01-31 RX ORDER — DILTIAZEM HYDROCHLORIDE 5 MG/ML
10 INJECTION INTRAVENOUS ONCE
Status: COMPLETED | OUTPATIENT
Start: 2020-01-31 | End: 2020-01-31

## 2020-01-31 RX ADMIN — PIPERACILLIN AND TAZOBACTAM 3.38 G: 3; .375 INJECTION, POWDER, LYOPHILIZED, FOR SOLUTION INTRAVENOUS at 06:38

## 2020-01-31 RX ADMIN — OYSTER SHELL CALCIUM WITH VITAMIN D 1 TABLET: 500; 200 TABLET, FILM COATED ORAL at 08:32

## 2020-01-31 RX ADMIN — PIPERACILLIN AND TAZOBACTAM 3.38 G: 3; .375 INJECTION, POWDER, LYOPHILIZED, FOR SOLUTION INTRAVENOUS at 01:00

## 2020-01-31 RX ADMIN — AMIODARONE HYDROCHLORIDE 200 MG: 200 TABLET ORAL at 08:32

## 2020-01-31 RX ADMIN — ATORVASTATIN CALCIUM 40 MG: 40 TABLET, FILM COATED ORAL at 22:02

## 2020-01-31 RX ADMIN — DILTIAZEM HYDROCHLORIDE 10 MG: 5 INJECTION INTRAVENOUS at 04:47

## 2020-01-31 RX ADMIN — IPRATROPIUM BROMIDE AND ALBUTEROL SULFATE 1 AMPULE: .5; 3 SOLUTION RESPIRATORY (INHALATION) at 17:06

## 2020-01-31 RX ADMIN — IPRATROPIUM BROMIDE AND ALBUTEROL SULFATE 1 AMPULE: .5; 3 SOLUTION RESPIRATORY (INHALATION) at 19:56

## 2020-01-31 RX ADMIN — SODIUM CHLORIDE, PRESERVATIVE FREE 10 ML: 5 INJECTION INTRAVENOUS at 08:32

## 2020-01-31 RX ADMIN — TAMSULOSIN HYDROCHLORIDE 0.4 MG: 0.4 CAPSULE ORAL at 08:32

## 2020-01-31 RX ADMIN — INSULIN GLARGINE 15 UNITS: 100 INJECTION, SOLUTION SUBCUTANEOUS at 22:02

## 2020-01-31 RX ADMIN — IPRATROPIUM BROMIDE AND ALBUTEROL SULFATE 1 AMPULE: .5; 3 SOLUTION RESPIRATORY (INHALATION) at 02:22

## 2020-01-31 RX ADMIN — POLYETHYLENE GLYCOL 3350 17 G: 17 POWDER, FOR SOLUTION ORAL at 22:02

## 2020-01-31 RX ADMIN — RANOLAZINE 500 MG: 500 TABLET, FILM COATED, EXTENDED RELEASE ORAL at 22:02

## 2020-01-31 RX ADMIN — IPRATROPIUM BROMIDE AND ALBUTEROL SULFATE 1 AMPULE: .5; 3 SOLUTION RESPIRATORY (INHALATION) at 11:19

## 2020-01-31 RX ADMIN — RANOLAZINE 500 MG: 500 TABLET, FILM COATED, EXTENDED RELEASE ORAL at 08:32

## 2020-01-31 RX ADMIN — OXYCODONE HYDROCHLORIDE AND ACETAMINOPHEN 1 TABLET: 7.5; 325 TABLET ORAL at 02:07

## 2020-01-31 RX ADMIN — PIPERACILLIN AND TAZOBACTAM 3.38 G: 3; .375 INJECTION, POWDER, LYOPHILIZED, FOR SOLUTION INTRAVENOUS at 14:39

## 2020-01-31 RX ADMIN — OXYCODONE HYDROCHLORIDE AND ACETAMINOPHEN 1 TABLET: 7.5; 325 TABLET ORAL at 07:15

## 2020-01-31 RX ADMIN — OXYCODONE HYDROCHLORIDE AND ACETAMINOPHEN 1 TABLET: 7.5; 325 TABLET ORAL at 13:11

## 2020-01-31 RX ADMIN — ASPIRIN 81 MG 81 MG: 81 TABLET ORAL at 08:32

## 2020-01-31 RX ADMIN — PIPERACILLIN AND TAZOBACTAM 3.38 G: 3; .375 INJECTION, POWDER, LYOPHILIZED, FOR SOLUTION INTRAVENOUS at 22:02

## 2020-01-31 RX ADMIN — INSULIN LISPRO 2 UNITS: 100 INJECTION, SOLUTION INTRAVENOUS; SUBCUTANEOUS at 22:02

## 2020-01-31 RX ADMIN — POLYETHYLENE GLYCOL 3350 17 G: 17 POWDER, FOR SOLUTION ORAL at 08:32

## 2020-01-31 RX ADMIN — DEXTROSE MONOHYDRATE 200 MG: 50 INJECTION, SOLUTION INTRAVENOUS at 13:52

## 2020-01-31 RX ADMIN — DILTIAZEM HYDROCHLORIDE 5 MG/HR: 5 INJECTION INTRAVENOUS at 04:52

## 2020-01-31 RX ADMIN — INSULIN LISPRO 2 UNITS: 100 INJECTION, SOLUTION INTRAVENOUS; SUBCUTANEOUS at 18:20

## 2020-01-31 ASSESSMENT — PAIN SCALES - GENERAL
PAINLEVEL_OUTOF10: 7
PAINLEVEL_OUTOF10: 4
PAINLEVEL_OUTOF10: 4
PAINLEVEL_OUTOF10: 7
PAINLEVEL_OUTOF10: 5
PAINLEVEL_OUTOF10: 4
PAINLEVEL_OUTOF10: 4
PAINLEVEL_OUTOF10: 6
PAINLEVEL_OUTOF10: 6
PAINLEVEL_OUTOF10: 7

## 2020-01-31 ASSESSMENT — PAIN DESCRIPTION - PROGRESSION
CLINICAL_PROGRESSION: NOT CHANGED
CLINICAL_PROGRESSION: GRADUALLY IMPROVING

## 2020-01-31 ASSESSMENT — PAIN - FUNCTIONAL ASSESSMENT
PAIN_FUNCTIONAL_ASSESSMENT: ACTIVITIES ARE NOT PREVENTED

## 2020-01-31 ASSESSMENT — PAIN DESCRIPTION - PAIN TYPE
TYPE: ACUTE PAIN

## 2020-01-31 ASSESSMENT — PAIN DESCRIPTION - ONSET
ONSET: ON-GOING

## 2020-01-31 ASSESSMENT — PAIN DESCRIPTION - LOCATION
LOCATION: ABDOMEN

## 2020-01-31 ASSESSMENT — PAIN DESCRIPTION - ORIENTATION
ORIENTATION: RIGHT

## 2020-01-31 ASSESSMENT — PAIN DESCRIPTION - DESCRIPTORS
DESCRIPTORS: ACHING

## 2020-01-31 ASSESSMENT — PAIN DESCRIPTION - FREQUENCY
FREQUENCY: INTERMITTENT
FREQUENCY: CONTINUOUS
FREQUENCY: INTERMITTENT
FREQUENCY: INTERMITTENT
FREQUENCY: CONTINUOUS
FREQUENCY: INTERMITTENT
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
## 2020-01-31 NOTE — PROGRESS NOTES
[facility] Medicine Progress Note     Date:  1/31/2020    PCP: Gustavo Montero MD (Tel: [phone])    Date of Admission: 1/22/2020        Subjective      Fever resolved. 1/30 - went to IR - s/p per drainage of R fluid collection - looks bloody. Output decreased. Feels about the same. Objective  Physical exam:  Vitals: BP 93/66   Pulse 97   Temp 98.1 °F (36.7 °C) (Oral)   Resp 18   Ht 5' 7\" (1.702 m)   Wt 176 lb 5.9 oz (80 kg)   SpO2 98%   BMI 27.62 kg/m²   Gen: Not in distress. Alert. Head: Normocephalic. Atraumatic. Eyes: EOMI. Good acuity. ENT: Oral mucosa moist  Neck: No JVD. No obvious thyromegaly. CVS: Nml S1S2, no MRG, irregular  Pulmomary: diminished BS B/L  Gastrointestinal: Soft, tender - non focal. Positive bowel sounds. Colostomy RLL  Musculoskeletal: No edema. Warm, remote LEFT AKA  Neuro: No focal deficit. Moves extremity spontaneously. Psychiatry: Appropriate affect. Not agitated. Skin: Warm, dry with normal turgor. No rash      24HR INTAKE/OUTPUT:      Intake/Output Summary (Last 24 hours) at 1/31/2020 1600  Last data filed at 1/31/2020 1400  Gross per 24 hour   Intake 1215.67 ml   Output 1385 ml   Net -169.33 ml     I/O last 3 completed shifts: In: 1455.7 [P.O.:1200; I.V.:5.7; IV Piggyback:250]  Out: 1760 [Urine:825; Drains:435; Stool:500]  No intake/output data recorded.       Meds:    piperacillin-tazobactam  3.375 g Intravenous Q8H    insulin glargine  15 Units Subcutaneous Nightly    [START ON 2/1/2020] anidulafungin  100 mg Intravenous Q24H    And    anidulafungin  200 mg Intravenous Once    lidocaine 1 % injection  5 mL Intradermal Once    sodium chloride flush  10 mL Intravenous 2 times per day    insulin lispro  0-12 Units Subcutaneous TID WC    insulin lispro  0-6 Units Subcutaneous Nightly    ipratropium-albuterol  1 ampule Inhalation Q4H WA    amiodarone  200 mg Oral Daily    aspirin  81 mg Oral Daily    atorvastatin  40 mg Oral Nightly    calcium-vitamin D  1 tablet Oral Daily    polyethylene glycol  17 g Oral BID    ranolazine  500 mg Oral BID    tamsulosin  0.4 mg Oral Daily       Infusions:    diltiazem 5 mg/hr (01/31/20 0452)    dextrose           PRN Meds: sodium chloride flush, ipratropium-albuterol, potassium chloride **OR** potassium alternative oral replacement **OR** potassium chloride, magnesium sulfate, glucose, dextrose, glucagon (rDNA), dextrose, nitroGLYCERIN, oxyCODONE-acetaminophen    Labs/imaging:  CBC:   Recent Labs     01/29/20  0435 01/30/20  0610 01/31/20  0645   WBC 15.9* 19.4* 20.2*   HGB 10.3* 9.7* 9.6*    320 238         BMP:    Recent Labs     01/29/20  0435 01/30/20  0610 01/31/20  0645   * 131* 128*   K 3.7 4.7 4.2   CL 87* 88* 86*   CO2 33* 31 32   BUN 27* 24* 28*   CREATININE 0.9 0.9 1.0   GLUCOSE 161* 113* 59*         Hepatic: No results for input(s): AST, ALT, ALB, BILITOT, ALKPHOS in the last 72 hours. Troponin: No results for input(s): TROPONINI in the last 72 hours. BNP: No results for input(s): BNP in the last 72 hours. INR:   Recent Labs     01/30/20  0921   INR 1.38*           Reviewed imaging and reports noted      Assessment:  Principal Problem:    Acute on chronic respiratory failure with hypoxia (HCC)  Active Problems:    PAD (peripheral artery disease) (HCC)    Acute on chronic combined systolic and diastolic HF (heart failure) (HCC)    Centrilobular emphysema (HCC)    Diabetes mellitus type 2, controlled (HCC)    Biventricular failure (HCC)    CAD (coronary artery disease)    Pulmonary HTN (HCC)    Systolic CHF (HCC)    Bilateral pleural effusion  Resolved Problems:    * No resolved hospital problems. *        Plan:      Pleural Effusion  - appreciate pulm reccs  - s/p thoracocentesis - exudative and bloody effusion drained. .   - holding diuretics      Acute on Chronic systolic CHF EF 83%  - hold diuretics. - cont ASA, statin  - BB on hold due to low BP.          Chronic Resp Failure w/ Hypoxia  - on baseline of 2.5L O2 nC, maintain O2 sats > 90%      Centrilobular Emphysema  - cont duonebs      IDDM  - cont lantus, humalog, SSI, CTM      HTN  - stable      Chronic a Fib  - cont amio, rate controlled,   - hold xarelto         Abd pain, low grade fever  Complex surgical Hx - Hx of bowel obstruction s/p ex lap and colostomy. Later complicated by intraabd abscess requiring drainage and completed IV zosyn back in Nov 2019. CT abd pelvis this admit:   -  Increased size complex cystic collection RUQ   - recurrent LUQ collection   - unclear if these are infected or hemorrhagic or both  CRP and sed rate markedly elevated. Plan:    - started iv zosyn and vfend. - ID and surgery eval.    - IR - drain placed in RUQ collection    - keep off Xarelto           Diet: DIET CARB CONTROL; Low Sodium (2 GM);  Daily Fluid Restriction: 1500 ml      Activity: up as tolerated    Prophylaxis: scd    Code status: Full Code       Isaac Rogers MD

## 2020-01-31 NOTE — CARE COORDINATION
[facility]  Hypoglycemia Event and Prevention Plan      NAME: Dorise Essex  MEDICAL RECORD NUMBER:  [id number]  AGE: 61 y.o. GENDER: male  : 1960  EPISODE DATE:  2020     Data     Recent Labs     20  21020  0827 20  1251 20  1715 20  2103 20  0754   POCGLU 242* 144* 110* 208* 126* 95   Results for Job Sandoval (MRN [id number]) as of 2020 08:34   Ref. Range 2020 06:45   Glucose Latest Ref Range: 70 - 99 mg/dL 59 (L)       Medications  Scheduled Medications:   piperacillin-tazobactam  3.375 g Intravenous Q8H    lidocaine 1 % injection  5 mL Intradermal Once    sodium chloride flush  10 mL Intravenous 2 times per day    insulin lispro  5 Units Subcutaneous TID WC    insulin glargine  30 Units Subcutaneous Nightly    insulin lispro  0-12 Units Subcutaneous TID WC    insulin lispro  0-6 Units Subcutaneous Nightly    ipratropium-albuterol  1 ampule Inhalation Q4H WA    amiodarone  200 mg Oral Daily    aspirin  81 mg Oral Daily    atorvastatin  40 mg Oral Nightly    calcium-vitamin D  1 tablet Oral Daily    polyethylene glycol  17 g Oral BID    ranolazine  500 mg Oral BID    tamsulosin  0.4 mg Oral Daily       Diet  Current diet/supplement order: DIET CARB CONTROL; Low Sodium (2 GM); Daily Fluid Restriction: 1500 ml     Recorded PO: PO Meals Eaten (%): 76 - 100% last meal in flowsheets      Action        Physician Notified of event: Yes   Serge Izquierdo MD    POCT added at 0200.       Responce     Achieved POCT Blood Glucose greater than 70 mg/dl: Yes      Medication plan updated: Yes        Electronically signed by Sheila Rubio RN on 2020 at 8:33 AM

## 2020-01-31 NOTE — PROGRESS NOTES
Pb Armas is a 61 y.o. male patient.     Current Facility-Administered Medications   Medication Dose Route Frequency Provider Last Rate Last Dose    voriconazole (VFEND) 325 mg in dextrose 5 % 100 mL IVPB  4 mg/kg Intravenous Q12H Jeremias Sierra MD        lidocaine PF 1 % injection 5 mL  5 mL Intradermal Once Jeremias Sierra MD        sodium chloride flush 0.9 % injection 10 mL  10 mL Intravenous 2 times per day Jeremias Sierra MD   10 mL at 01/30/20 0930    sodium chloride flush 0.9 % injection 10 mL  10 mL Intravenous PRN Jeremias Sierra MD        piperacillin-tazobactam (ZOSYN) 3.375 g in dextrose 5 % 50 mL IVPB (mini-bag)  3.375 g Intravenous Q6H Jeremias Sierra  mL/hr at 01/30/20 1823 3.375 g at 01/30/20 1823    ipratropium-albuterol (DUONEB) nebulizer solution 1 ampule  1 ampule Inhalation Q4H PRN Heidi Velasco MD   1 ampule at 01/28/20 0052    potassium chloride (KLOR-CON M) extended release tablet 40 mEq  40 mEq Oral PRN Neel Crawley MD   40 mEq at 01/26/20 1214    Or    potassium bicarb-citric acid (EFFER-K) effervescent tablet 40 mEq  40 mEq Oral PRN Neel Crawley MD        Or    potassium chloride 10 mEq/100 mL IVPB (Peripheral Line)  10 mEq Intravenous PRN Neel Crawley MD        insulin lispro (HUMALOG) injection vial 5 Units  5 Units Subcutaneous TID WC Neel Crawley MD   5 Units at 01/30/20 1825    magnesium sulfate 1 g in dextrose 5% 100 mL IVPB  1 g Intravenous PRN Neel Crawley MD        insulin glargine (LANTUS) injection vial 30 Units  30 Units Subcutaneous Nightly Neel Crawley MD   30 Units at 01/29/20 2115    glucose (GLUTOSE) 40 % oral gel 15 g  15 g Oral PRN Neel Crawley MD        dextrose 50 % IV solution  12.5 g Intravenous PRN Neel Crawley MD        glucagon (rDNA) injection 1 mg  1 mg Intramuscular PRN Neel Crawley MD        dextrose 5 % solution  100 mL/hr Intravenous PRN Susy Townsend MD        insulin lispro (HUMALOG) injection vial 0-12 Units  0-12 Units Subcutaneous TID WC Susy Townsend MD   4 Units at 01/30/20 1825    insulin lispro (HUMALOG) injection vial 0-6 Units  0-6 Units Subcutaneous Nightly Susy Townsend MD   2 Units at 01/29/20 2116    ipratropium-albuterol (DUONEB) nebulizer solution 1 ampule  1 ampule Inhalation Q4H WA Loida Reno MD   1 ampule at 01/30/20 1610    amiodarone (CORDARONE) tablet 200 mg  200 mg Oral Daily Loida Rowdy, MD   200 mg at 01/30/20 9729    aspirin chewable tablet 81 mg  81 mg Oral Daily Loida Reno MD   Stopped at 01/30/20 1026    atorvastatin (LIPITOR) tablet 40 mg  40 mg Oral Nightly Loida Rowdy, MD   40 mg at 01/29/20 2113    calcium-vitamin D 500-200 MG-UNIT per tablet 1 tablet  1 tablet Oral Daily Loida MD Rowdy   1 tablet at 01/30/20 0911    nitroGLYCERIN (NITROSTAT) SL tablet 0.4 mg  0.4 mg Sublingual Q5 Min PRN Loida MD Rowdy        oxyCODONE-acetaminophen (PERCOCET) 7.5-325 MG per tablet 1 tablet  1 tablet Oral Q4H PRN Loida Reno MD   1 tablet at 01/30/20 1437    polyethylene glycol (GLYCOLAX) packet 17 g  17 g Oral BID Loida Range, MD   17 g at 01/30/20 0912    ranolazine (RANEXA) extended release tablet 500 mg  500 mg Oral BID Loida Range, MD   500 mg at 01/30/20 0911    tamsulosin (FLOMAX) capsule 0.4 mg  0.4 mg Oral Daily Loida Range, MD   0.4 mg at 01/30/20 0911     Allergies   Allergen Reactions    Rocephin [Ceftriaxone] Other (See Comments)     Sloughing of skin (blisters) on hands and lower arms; pancytopenia    Demadex [Torsemide] Other (See Comments)     Skin sloughing on hands and feet     Principal Problem:    Acute on chronic respiratory failure with hypoxia (HCC)  Active Problems:    PAD (peripheral artery disease) (HCC)    Acute on chronic combined systolic and diastolic HF (heart failure) ([facility] [address] Centrilobular emphysema (HCC)    Diabetes mellitus type 2, controlled ([facility] [address]    Biventricular failure (HCC)    CAD (coronary artery disease)    Pulmonary HTN (HCC)    Systolic CHF (HCC)    Bilateral pleural effusion  Resolved Problems:    * No resolved hospital problems. *    Blood pressure 101/68, pulse 119, temperature 97.2 °F (36.2 °C), temperature source Axillary, resp. rate 18, height 5' 7\" (1.702 m), weight 172 lb 13.5 oz (78.4 kg), SpO2 97 %. Subjective:  Symptoms:  Stable. He reports weakness. Diet:  Adequate intake. He reports  nausea. Activity level: Impaired due to weakness. Pain:  He complains of pain that is moderate. He reports pain is worsening. Pain is well controlled. Objective:  General Appearance:  Uncomfortable. Vital signs: (most recent): Blood pressure 101/68, pulse 119, temperature 97.2 °F (36.2 °C), temperature source Axillary, resp. rate 18, height 5' 7\" (1.702 m), weight 172 lb 13.5 oz (78.4 kg), SpO2 97 %. Vital signs are normal.    Output: Producing urine and producing stool. Lungs:  Normal effort and normal respiratory rate. Heart: Irregular rhythm. Abdomen: Abdomen is soft. Bowel sounds are normal.   There is no abdominal tenderness. (Patient has Ostomy and abscess drain on R). Skin:  Warm and dry. There is ecchymosis. Assessment:    Condition: In stable condition.          Elizabeth Alvares RN  1/30/2020

## 2020-01-31 NOTE — CARE COORDINATION
Case Management:  Up dated [facility] on status of patient and plan to return early next week, Monday or Tuesday, bed on hold.   Electronically signed by Belenda Krabbe on 1/31/2020 at 3:04 PM

## 2020-01-31 NOTE — PLAN OF CARE
Patient notified and expressed understanding.   Problem: Falls - Risk of:  Goal: Will remain free from falls  Will remain free from falls   Outcome: Ongoing  Ongoing    Problem: Activity Intolerance:  Goal: Ability to tolerate increased activity will improve  Ability to tolerate increased activity will improve   Outcome: Ongoing  Ongoing    Problem: OXYGENATION/RESPIRATORY FUNCTION  Goal: Patient will maintain patent airway  Outcome: Ongoing  Patient reminded to keep oxygen on. Will continue to monitor.

## 2020-01-31 NOTE — PLAN OF CARE
STATUS  Goal: Patient has stable vital signs and fluid balance  Outcome: Ongoing   Vitals completed q4h and assessed by RN. I&O charted and assessed per MD order. Pt tolerating adequate fluid intake. Problem: FLUID AND ELECTROLYTE IMBALANCE  Goal: Fluid and electrolyte balance are achieved/maintained  Outcome: Ongoing   Pt electrolytes drawn and assessed per MD order. Replaced as needed per orders. I&O charted and assessed. Pt tolerating adequate fluid intake    Problem: ACTIVITY INTOLERANCE/IMPAIRED MOBILITY  Goal: Mobility/activity is maintained at optimum level for patient  Outcome: Ongoing   Assess pts mobility status and compare to baseline. Determine if pt uses crutches/cane/walker and make sure they are within reach. Provide bedside commode to conserve pt energy. Encourage adeqaute rest periods, especially before meals, other activities of daily living, exercise sessions, and ambulation. Keep items pt uses frequently within reach.
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
## 2020-01-31 NOTE — PLAN OF CARE
Problem: Risk for Impaired Skin Integrity  Goal: Tissue integrity - skin and mucous membranes  Description  Structural intactness and normal physiological function of skin and  mucous membranes.   1/31/2020 0301 by Andres Syed RN  Outcome: Ongoing  1/30/2020 1708 by Julio Thornton RN  Outcome: Ongoing     Problem: SAFETY  Goal: Free from accidental physical injury  1/31/2020 0301 by Andres Syed RN  Outcome: Ongoing  1/30/2020 1708 by Julio Thornton RN  Outcome: Ongoing     Problem: PAIN  Goal: Patient's pain/discomfort is manageable  1/31/2020 0301 by Andres Syed RN  Outcome: Ongoing  1/30/2020 1708 by Julio Thornton RN  Outcome: Ongoing     Problem: OXYGENATION/RESPIRATORY FUNCTION  Goal: Patient will maintain patent airway  1/31/2020 0301 by Andres Syed RN  Outcome: Ongoing  1/30/2020 1708 by Julio Thornton RN  Outcome: Ongoing  Goal: Patient will achieve/maintain normal respiratory rate/effort  Description  Respiratory rate and effort will be within normal limits for the patient  1/30/2020 1708 by Julio Thronton RN  Outcome: Ongoing

## 2020-02-01 LAB
ANION GAP SERPL CALCULATED.3IONS-SCNC: 10 MMOL/L (ref 3–16)
BASOPHILS ABSOLUTE: 0.1 K/UL (ref 0–0.2)
BASOPHILS RELATIVE PERCENT: 0.8 %
BUN BLDV-MCNC: 31 MG/DL (ref 7–20)
CALCIUM SERPL-MCNC: 8.3 MG/DL (ref 8.3–10.6)
CHLORIDE BLD-SCNC: 88 MMOL/L (ref 99–110)
CO2: 31 MMOL/L (ref 21–32)
CREAT SERPL-MCNC: 0.9 MG/DL (ref 0.9–1.3)
EOSINOPHILS ABSOLUTE: 0.2 K/UL (ref 0–0.6)
EOSINOPHILS RELATIVE PERCENT: 1.3 %
GFR AFRICAN AMERICAN: >60
GFR NON-AFRICAN AMERICAN: >60
GLUCOSE BLD-MCNC: 105 MG/DL (ref 70–99)
GLUCOSE BLD-MCNC: 119 MG/DL (ref 70–99)
GLUCOSE BLD-MCNC: 130 MG/DL (ref 70–99)
GLUCOSE BLD-MCNC: 162 MG/DL (ref 70–99)
GLUCOSE BLD-MCNC: 209 MG/DL (ref 70–99)
HCT VFR BLD CALC: 26.6 % (ref 40.5–52.5)
HEMATOLOGY PATH CONSULT: NO
HEMOGLOBIN: 8.6 G/DL (ref 13.5–17.5)
LYMPHOCYTES ABSOLUTE: 0.7 K/UL (ref 1–5.1)
LYMPHOCYTES RELATIVE PERCENT: 4.5 %
MAGNESIUM: 2.2 MG/DL (ref 1.8–2.4)
MCH RBC QN AUTO: 27 PG (ref 26–34)
MCHC RBC AUTO-ENTMCNC: 32.4 G/DL (ref 31–36)
MCV RBC AUTO: 83.2 FL (ref 80–100)
MONOCYTES ABSOLUTE: 1.6 K/UL (ref 0–1.3)
MONOCYTES RELATIVE PERCENT: 9.9 %
NEUTROPHILS ABSOLUTE: 13.7 K/UL (ref 1.7–7.7)
NEUTROPHILS RELATIVE PERCENT: 83.5 %
PDW BLD-RTO: 16.4 % (ref 12.4–15.4)
PERFORMED ON: ABNORMAL
PLATELET # BLD: 295 K/UL (ref 135–450)
PMV BLD AUTO: 8.8 FL (ref 5–10.5)
POTASSIUM REFLEX MAGNESIUM: 3.8 MMOL/L (ref 3.5–5.1)
RBC # BLD: 3.2 M/UL (ref 4.2–5.9)
SODIUM BLD-SCNC: 129 MMOL/L (ref 136–145)
WBC # BLD: 16.4 K/UL (ref 4–11)

## 2020-02-01 PROCEDURE — 2580000003 HC RX 258: Performed by: INTERNAL MEDICINE

## 2020-02-01 PROCEDURE — 6370000000 HC RX 637 (ALT 250 FOR IP): Performed by: HOSPITALIST

## 2020-02-01 PROCEDURE — 94640 AIRWAY INHALATION TREATMENT: CPT

## 2020-02-01 PROCEDURE — 6370000000 HC RX 637 (ALT 250 FOR IP): Performed by: INTERNAL MEDICINE

## 2020-02-01 PROCEDURE — 99233 SBSQ HOSP IP/OBS HIGH 50: CPT | Performed by: INTERNAL MEDICINE

## 2020-02-01 PROCEDURE — 83735 ASSAY OF MAGNESIUM: CPT

## 2020-02-01 PROCEDURE — 85025 COMPLETE CBC W/AUTO DIFF WBC: CPT

## 2020-02-01 PROCEDURE — 80048 BASIC METABOLIC PNL TOTAL CA: CPT

## 2020-02-01 PROCEDURE — 6360000002 HC RX W HCPCS: Performed by: INTERNAL MEDICINE

## 2020-02-01 PROCEDURE — 1200000000 HC SEMI PRIVATE

## 2020-02-01 PROCEDURE — 2500000003 HC RX 250 WO HCPCS: Performed by: INTERNAL MEDICINE

## 2020-02-01 PROCEDURE — 2700000000 HC OXYGEN THERAPY PER DAY

## 2020-02-01 PROCEDURE — 94760 N-INVAS EAR/PLS OXIMETRY 1: CPT

## 2020-02-01 PROCEDURE — 99231 SBSQ HOSP IP/OBS SF/LOW 25: CPT | Performed by: SURGERY

## 2020-02-01 RX ADMIN — AMIODARONE HYDROCHLORIDE 200 MG: 200 TABLET ORAL at 08:17

## 2020-02-01 RX ADMIN — INSULIN GLARGINE 15 UNITS: 100 INJECTION, SOLUTION SUBCUTANEOUS at 22:11

## 2020-02-01 RX ADMIN — POLYETHYLENE GLYCOL 3350 17 G: 17 POWDER, FOR SOLUTION ORAL at 09:21

## 2020-02-01 RX ADMIN — OXYCODONE HYDROCHLORIDE AND ACETAMINOPHEN 1 TABLET: 7.5; 325 TABLET ORAL at 16:46

## 2020-02-01 RX ADMIN — IPRATROPIUM BROMIDE AND ALBUTEROL SULFATE 1 AMPULE: .5; 3 SOLUTION RESPIRATORY (INHALATION) at 01:06

## 2020-02-01 RX ADMIN — TAMSULOSIN HYDROCHLORIDE 0.4 MG: 0.4 CAPSULE ORAL at 09:21

## 2020-02-01 RX ADMIN — OXYCODONE HYDROCHLORIDE AND ACETAMINOPHEN 1 TABLET: 7.5; 325 TABLET ORAL at 08:17

## 2020-02-01 RX ADMIN — DEXTROSE MONOHYDRATE 100 MG: 50 INJECTION, SOLUTION INTRAVENOUS at 13:40

## 2020-02-01 RX ADMIN — RANOLAZINE 500 MG: 500 TABLET, FILM COATED, EXTENDED RELEASE ORAL at 09:21

## 2020-02-01 RX ADMIN — OYSTER SHELL CALCIUM WITH VITAMIN D 1 TABLET: 500; 200 TABLET, FILM COATED ORAL at 09:21

## 2020-02-01 RX ADMIN — INSULIN LISPRO 2 UNITS: 100 INJECTION, SOLUTION INTRAVENOUS; SUBCUTANEOUS at 12:22

## 2020-02-01 RX ADMIN — PIPERACILLIN AND TAZOBACTAM 3.38 G: 3; .375 INJECTION, POWDER, LYOPHILIZED, FOR SOLUTION INTRAVENOUS at 14:59

## 2020-02-01 RX ADMIN — IPRATROPIUM BROMIDE AND ALBUTEROL SULFATE 1 AMPULE: .5; 3 SOLUTION RESPIRATORY (INHALATION) at 12:22

## 2020-02-01 RX ADMIN — IPRATROPIUM BROMIDE AND ALBUTEROL SULFATE 1 AMPULE: .5; 3 SOLUTION RESPIRATORY (INHALATION) at 16:02

## 2020-02-01 RX ADMIN — RANOLAZINE 500 MG: 500 TABLET, FILM COATED, EXTENDED RELEASE ORAL at 20:56

## 2020-02-01 RX ADMIN — SODIUM CHLORIDE, PRESERVATIVE FREE 10 ML: 5 INJECTION INTRAVENOUS at 20:56

## 2020-02-01 RX ADMIN — PIPERACILLIN AND TAZOBACTAM 3.38 G: 3; .375 INJECTION, POWDER, LYOPHILIZED, FOR SOLUTION INTRAVENOUS at 06:01

## 2020-02-01 RX ADMIN — INSULIN LISPRO 2 UNITS: 100 INJECTION, SOLUTION INTRAVENOUS; SUBCUTANEOUS at 22:11

## 2020-02-01 RX ADMIN — OXYCODONE HYDROCHLORIDE AND ACETAMINOPHEN 1 TABLET: 7.5; 325 TABLET ORAL at 12:22

## 2020-02-01 RX ADMIN — DILTIAZEM HYDROCHLORIDE 5 MG/HR: 5 INJECTION INTRAVENOUS at 01:47

## 2020-02-01 RX ADMIN — PIPERACILLIN AND TAZOBACTAM 3.38 G: 3; .375 INJECTION, POWDER, LYOPHILIZED, FOR SOLUTION INTRAVENOUS at 22:12

## 2020-02-01 RX ADMIN — IPRATROPIUM BROMIDE AND ALBUTEROL SULFATE 1 AMPULE: .5; 3 SOLUTION RESPIRATORY (INHALATION) at 09:30

## 2020-02-01 RX ADMIN — IPRATROPIUM BROMIDE AND ALBUTEROL SULFATE 1 AMPULE: .5; 3 SOLUTION RESPIRATORY (INHALATION) at 19:38

## 2020-02-01 RX ADMIN — ATORVASTATIN CALCIUM 40 MG: 40 TABLET, FILM COATED ORAL at 20:56

## 2020-02-01 RX ADMIN — ASPIRIN 81 MG 81 MG: 81 TABLET ORAL at 09:21

## 2020-02-01 ASSESSMENT — PAIN DESCRIPTION - FREQUENCY
FREQUENCY: CONTINUOUS

## 2020-02-01 ASSESSMENT — PAIN - FUNCTIONAL ASSESSMENT
PAIN_FUNCTIONAL_ASSESSMENT: ACTIVITIES ARE NOT PREVENTED

## 2020-02-01 ASSESSMENT — PAIN SCALES - GENERAL
PAINLEVEL_OUTOF10: 4
PAINLEVEL_OUTOF10: 5
PAINLEVEL_OUTOF10: 7
PAINLEVEL_OUTOF10: 7
PAINLEVEL_OUTOF10: 0
PAINLEVEL_OUTOF10: 8
PAINLEVEL_OUTOF10: 4

## 2020-02-01 ASSESSMENT — PAIN DESCRIPTION - PROGRESSION
CLINICAL_PROGRESSION: GRADUALLY WORSENING

## 2020-02-01 ASSESSMENT — PAIN DESCRIPTION - ONSET
ONSET: ON-GOING

## 2020-02-01 ASSESSMENT — PAIN DESCRIPTION - LOCATION
LOCATION: ABDOMEN;BACK
LOCATION: ABDOMEN
LOCATION: ABDOMEN;BACK

## 2020-02-01 ASSESSMENT — PAIN DESCRIPTION - PAIN TYPE
TYPE: ACUTE PAIN;CHRONIC PAIN
TYPE: ACUTE PAIN;CHRONIC PAIN
TYPE: ACUTE PAIN

## 2020-02-01 ASSESSMENT — PAIN DESCRIPTION - ORIENTATION
ORIENTATION: RIGHT

## 2020-02-01 ASSESSMENT — PAIN DESCRIPTION - DESCRIPTORS
DESCRIPTORS: STABBING
DESCRIPTORS: ACHING;DISCOMFORT
DESCRIPTORS: ACHING;DISCOMFORT

## 2020-02-01 NOTE — PLAN OF CARE
Problem: Risk for Impaired Skin Integrity  Goal: Tissue integrity - skin and mucous membranes  Description  Structural intactness and normal physiological function of skin and  mucous membranes. Outcome: Ongoing  Note:   Pt is on a specialty mattress. Staff is attempting to turn and reposition pt every 2 hours. However pt refuses to turn at times. Skin assessment completed every shift. Pt assessed for incontinence, appropriate barrier cream applied prn. Pt encouraged to turn/rotate every 2 hours. Assistance provided if pt unable to do so themselves. Problem: SAFETY  Goal: Free from accidental physical injury  Outcome: Ongoing  Note:   Patient assessed for fall risk; fall precautions initiated. Patient and family instructed about safety devices. Environment kept free of clutter and adequate lighting provided. Bed locked and in lowest position. Call light within reach. Will continue to monitor. Pt makes no attempts to get out of bed on his own. Problem: DAILY CARE  Goal: Daily care needs are met  Outcome: Ongoing  Note:   Patient's ability assessed to perform self care and independent activity encouraged according to that ability. Assisted with ADL's as needed. Risk for skin breakdown assessed. Potential discharge needs assessed. Patient and family included in daily care decisions. Problem: PAIN  Goal: Patient's pain/discomfort is manageable  Outcome: Ongoing  Note:   Pain/discomfort being managed with PRN analgesics per MD orders. Pt able to express presence and absence of pain and rate pain appropriately using numerical scale. Problem: SKIN INTEGRITY  Goal: Skin integrity is maintained or improved  Outcome: Ongoing  Note:   Skin assessment completed every shift. Pt assessed for incontinence, appropriate barrier cream applied prn. Pt encouraged to turn/rotate every 2 hours. Assistance provided if pt unable to do so themselves. Problem: KNOWLEDGE DEFICIT  Goal: Patient/S. O. demonstrates understanding of disease process, treatment plan, medications, and discharge instructions. Outcome: Ongoing  Note:   Pt is receptive to education. Problem: DISCHARGE BARRIERS  Goal: Patient's continuum of care needs are met  Outcome: Ongoing  Note:   Pt [facility] and plans are returning there at discharge. Problem: OXYGENATION/RESPIRATORY FUNCTION  Goal: Patient will maintain patent airway  Outcome: Ongoing  Note:   Respirations easy even no distress. Does become short of breath easily. Pt remains on oxygen at 2.5L via nasal canula. Problem: OXYGENATION/RESPIRATORY FUNCTION  Goal: Patient will achieve/maintain normal respiratory rate/effort  Description  Respiratory rate and effort will be within normal limits for the patient  Outcome: Ongoing  Note:   Respirations easy even no distress. Pt does become short of breath with activity. Problem: HEMODYNAMIC STATUS  Goal: Patient has stable vital signs and fluid balance  Outcome: Ongoing  Note:   VSS. Pt is on a fluid restriction and he is aware and has been compliant with it. Problem: FLUID AND ELECTROLYTE IMBALANCE  Goal: Fluid and electrolyte balance are achieved/maintained  Outcome: Ongoing  Note:   Labs being monitored. I/O being monitored. Weight checked daily-pt has had a slight increase in weight. Problem: ACTIVITY INTOLERANCE/IMPAIRED MOBILITY  Goal: Mobility/activity is maintained at optimum level for patient  Outcome: Ongoing  Note:   Pt has been in bed this shift. Has allowed nurse to assist him with repositioning. Pt does not want to get up in the chair. Problem: Pain:  Goal: Pain level will decrease  Description  Pain level will decrease  Outcome: Ongoing  Note:   Pt does receive relief from pain medication. Problem: Pain:  Goal: Control of acute pain  Description  Control of acute pain  Outcome: Ongoing  Note:   Pain/discomfort being managed with PRN analgesics per MD orders.  Pt able to express presence and absence of pain and rate pain appropriately using numerical scale. Problem: Pain:  Goal: Control of chronic pain  Description  Control of chronic pain  Outcome: Ongoing  Note:   Pain/discomfort being managed with PRN analgesics per MD orders. Pt able to express presence and absence of pain and rate pain appropriately using numerical scale. Problem: Falls - Risk of:  Goal: Will remain free from falls  Description  Will remain free from falls  Outcome: Ongoing  Note:   Fall risk assessment completed every shift. All precautions in place. Pt has call light within reach at all times. Room clear of clutter. Pt aware to call for assistance when getting up. Problem: Falls - Risk of:  Goal: Absence of physical injury  Description  Absence of physical injury  Outcome: Ongoing  Note:   No signs of physical injury. Problem: Activity:  Goal: Ability to tolerate increased activity will improve  Description  Ability to tolerate increased activity will improve  Outcome: Ongoing  Note:   Pt remains in atrial fib, rate is controlled. Pt taken off Cardizem drip. Problem: Activity:  Goal: Expression of feelings of increased energy will increase  Description  Expression of feelings of increased energy will increase  Outcome: Ongoing  Note:   Pt is well rested. Alert conversation appropriate. Problem: Cardiac:  Goal: Ability to maintain an adequate cardiac output will improve  Description  Ability to maintain an adequate cardiac output will improve  Outcome: Ongoing  Note:   Skin is warm and dry. VSS. Problem: Cardiac:  Goal: Complications related to the disease process, condition or treatment will be avoided or minimized  Description  Complications related to the disease process, condition or treatment will be avoided or minimized  Outcome: Ongoing  Note:   .        Problem: Discharge Planning:  Goal: Discharged to appropriate level of care  Description  Discharged to appropriate level of care  Outcome: Completed     Problem: Gas Exchange - Impaired:  Goal: Levels of oxygenation will improve  Description  Levels of oxygenation will improve  Outcome: Completed

## 2020-02-01 NOTE — PROGRESS NOTES
Hospital Medicine Progress Note     Date:  2/1/2020    PCP: Catia Caba MD (Tel: [phone])    Date of Admission: 1/22/2020        Subjective    1/30 - went to IR - s/p per drainage of R fluid collection - looks bloody. Output decreased. Feels better. No repeat fever. Objective  Physical exam:  Vitals: /64   Pulse 81   Temp 97.7 °F (36.5 °C) (Oral)   Resp 18   Ht 5' 7\" (1.702 m)   Wt 179 lb 0.2 oz (81.2 kg)   SpO2 96%   BMI 28.04 kg/m²   Gen: Not in distress. Alert. Head: Normocephalic. Atraumatic. Eyes: EOMI. Good acuity. ENT: Oral mucosa moist  Neck: No JVD. No obvious thyromegaly. CVS: Nml S1S2, no MRG, irregular  Pulmomary: diminished BS B/L  Gastrointestinal: Soft, tender - non focal. Positive bowel sounds. Colostomy RLL  Musculoskeletal: No edema. Warm, remote LEFT AKA  Neuro: No focal deficit. Moves extremity spontaneously. Psychiatry: Appropriate affect. Not agitated. Skin: Warm, dry with normal turgor. No rash      24HR INTAKE/OUTPUT:      Intake/Output Summary (Last 24 hours) at 2/1/2020 1338  Last data filed at 2/1/2020 1157  Gross per 24 hour   Intake 1631 ml   Output 1220 ml   Net 411 ml     I/O last 3 completed shifts: In: 0 [P.O.:1380]  Out: 1090 [Urine:800; Drains:40; Stool:250]  I/O this shift:   In: 491 [P.O.:476; I.V.:15]  Out: 150 [Urine:50; Stool:100]      Meds:    piperacillin-tazobactam  3.375 g Intravenous Q8H    insulin glargine  15 Units Subcutaneous Nightly    anidulafungin  100 mg Intravenous Q24H    lidocaine 1 % injection  5 mL Intradermal Once    sodium chloride flush  10 mL Intravenous 2 times per day    insulin lispro  0-12 Units Subcutaneous TID WC    insulin lispro  0-6 Units Subcutaneous Nightly    ipratropium-albuterol  1 ampule Inhalation Q4H WA    amiodarone  200 mg Oral Daily    aspirin  81 mg Oral Daily    atorvastatin  40 mg Oral Nightly    calcium-vitamin D  1 tablet Oral Daily    polyethylene glycol  17 g Oral BID  ranolazine  500 mg Oral BID    tamsulosin  0.4 mg Oral Daily       Infusions:    diltiazem Stopped (02/01/20 1157)    dextrose           PRN Meds: sodium chloride flush, ipratropium-albuterol, potassium chloride **OR** potassium alternative oral replacement **OR** potassium chloride, magnesium sulfate, glucose, dextrose, glucagon (rDNA), dextrose, nitroGLYCERIN, oxyCODONE-acetaminophen    Labs/imaging:  CBC:   Recent Labs     01/30/20  0610 01/31/20  0645 02/01/20  0616   WBC 19.4* 20.2* 16.4*   HGB 9.7* 9.6* 8.6*    238 295         BMP:    Recent Labs     01/30/20  0610 01/31/20  0645 02/01/20  0616   * 128* 129*   K 4.7 4.2 3.8   CL 88* 86* 88*   CO2 31 32 31   BUN 24* 28* 31*   CREATININE 0.9 1.0 0.9   GLUCOSE 113* 59* 105*         Hepatic: No results for input(s): AST, ALT, ALB, BILITOT, ALKPHOS in the last 72 hours. Troponin: No results for input(s): TROPONINI in the last 72 hours. BNP: No results for input(s): BNP in the last 72 hours. INR:   Recent Labs     01/30/20  0921   INR 1.38*           Reviewed imaging and reports noted      Assessment:  Principal Problem:    Acute on chronic respiratory failure with hypoxia (HCC)  Active Problems:    PAD (peripheral artery disease) (HCC)    Acute on chronic combined systolic and diastolic HF (heart failure) ([facility])    Centrilobular emphysema (HCC)    Diabetes mellitus type 2, controlled (HCC)    Biventricular failure (HCC)    CAD (coronary artery disease)    Pulmonary HTN (HCC)    Systolic CHF (HCC)    Bilateral pleural effusion    Abdominal fluid collection  Resolved Problems:    * No resolved hospital problems. *        Plan:      Pleural Effusion  - appreciate pulm reccs  - s/p thoracocentesis - exudative and bloody effusion drained. .   - holding diuretics      Acute on Chronic systolic CHF EF 73%  - hold diuretics. - cont ASA, statin  - BB on hold due to low BP.          Chronic Resp Failure w/ Hypoxia  - on baseline of 2.5L O2 nC, maintain O2 sats > 90%      Centrilobular Emphysema  - cont duonebs      IDDM  - cont lantus - dose decreased  - humalog, SSI      HTN  - stable      Chronic a Fib  - cont amio, rate controlled,   - hold xarelto         Abd pain, low grade fever  Complex surgical Hx - Hx of bowel obstruction s/p ex lap and colostomy. Later complicated by intraabd abscess requiring drainage and completed IV zosyn back in Nov 2019. CT abd pelvis this admit:   -  Increased size complex cystic collection RUQ   - recurrent LUQ collection   - unclear if these are infected or hemorrhagic or both  CRP and sed rate markedly elevated. Plan:    - started iv zosyn and vfend. - ID and surgery eval.    - IR - drain placed in RUQ collection    - keep off Xarelto           Diet: DIET CARB CONTROL; Low Sodium (2 GM);  Daily Fluid Restriction: 1500 ml      Activity: up as tolerated    Prophylaxis: scd    Code status: Full Code       Karen Henderson MD

## 2020-02-01 NOTE — PROGRESS NOTES
[facility] [phone]                                     Daily Progress Note                                                         Pt Name: Tyshawn Agosto  Medical Record Number: [id number]  Date of Birth 1960   Today's Date: 2/1/2020  Chief Complaint   Patient presents with    Shortness of Breath     x4 days        ASSESSMENT/PLAN  Right upper quadrant cystic mass  -Drained by IR with what appears to be old blood. Drain remains. -Repeat CT in 1 to 2 days to reassess             Marianne Bear is eating breakfast, says he is starving. Abdominal pain slightly improved. OBJECTIVE  VITALS:  height is 5' 7\" (1.702 m) and weight is 179 lb 0.2 oz (81.2 kg). His oral temperature is 98.7 °F (37.1 °C). His blood pressure is 103/68 and his pulse is 99. His respiration is 18 and oxygen saturation is 98%. INTAKE/OUTPUT:    Intake/Output Summary (Last 24 hours) at 2/1/2020 1132  Last data filed at 2/1/2020 0916  Gross per 24 hour   Intake 1376 ml   Output 1240 ml   Net 136 ml     GENERAL: alert, no distress  ABDOMEN: Soft, nontender. Drain right abdomen sanguinous, old blood. Colostomy present with stool in appliance. I/O last 3 completed shifts: In: 1380 [P.O.:1380]  Out: 1090 [Urine:800; Drains:40; Stool:250]      LABS  Recent Labs     01/30/20  0921  02/01/20  0616   WBC  --    < > 16.4*   HGB  --    < > 8.6*   HCT  --    < > 26.6*   PLT  --    < > 295   NA  --    < > 129*   K  --    < > 3.8   CL  --    < > 88*   CO2  --    < > 31   BUN  --    < > 31*   CREATININE  --    < > 0.9   MG  --    < > 2.20   CALCIUM  --    < > 8.3   INR 1.38*  --   --     < > = values in this interval not displayed.        EDUCATION  Patient educated about Disease Process, Medications, Smoking Cessation, Oxygenation, Incentive Spirometry and Deep Breath and Cough, Diabetes, Hyperlipidemia, Smoking Cessation, Nutrition, Exercise and Hypertension    Electronically signed by JOON Stephens CNP on 2/1/2020 at 11:32 [address] [facility] and Vascular Surgery   [phone] Office  [phone] Cell

## 2020-02-01 NOTE — PROGRESS NOTES
Dr. Grisel Gomez in to see pt. Informed him that nurse stopped cardizem drip since heart rate is staying in the 80s. Also informed him that pt is on PO amiodarone.

## 2020-02-01 NOTE — PROGRESS NOTES
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     Intra abdominal fluid collections concern for abscess  H/o Colectomy and colostomy in Oct 2019  H/o Abdominal fluid collections s/p IR drain placement in the past  Bi lateral pleural effusions  CAD  Cardiomyopathy  CHF  Left BKA status   A fib  Smoking+  WBC elevation  Abnormal CT abd/pelvis from 1/29   S/p IR guided new drain placement in Rt upper Quadrant fluid collection   Hep C+Ve     Pleural fluid cx negative thus far and abdominal fluid cx requested and in process-    Status post IR guided drainage placement on the right abdominal fluid collection fluid indicates that seems to be more bloody, he was on anticoagulation prior for atrial fibrillation.      Surgery notes reviewed left lower quadrant fluid collection being observed if not improving is a plan for drainage. Labs, Microbiology, Radiology and all the pertinent results from current hospitalization and  care every where were reviewed  by me as a part of the evaluation   Plan:   1. Cont IV Zosyn change to Q8 hr extended infusion  2. ESR elevated, CRP noted   3. IV Anidulafungin x 100 mg daily   4. IR fluid aspirated cx in process  5. Trend WBC      Discussed with patient/Family and Nursing d/w Primary team      Risk of Complications/Morbidity: High      · Illness(es)/ Infection present that pose threat to bodily function. · There is potential for severe exacerbation of infection/side effects of treatment. · Therapy requires intensive monitoring for antimicrobial agent toxicity. Discussed with patient/Family and Nursing staff     Thanks for allowing me to participate in your patient's care and please call me with any questions or concerns.     Brigitte Mtz MD  Infectious Disease  [facility]) Physician  Phone: [phone]   Fax : [phone]

## 2020-02-02 LAB
ANION GAP SERPL CALCULATED.3IONS-SCNC: 12 MMOL/L (ref 3–16)
BASOPHILS ABSOLUTE: 0.1 K/UL (ref 0–0.2)
BASOPHILS RELATIVE PERCENT: 1 %
BLOOD CULTURE, ROUTINE: NORMAL
BUN BLDV-MCNC: 28 MG/DL (ref 7–20)
CALCIUM SERPL-MCNC: 8.5 MG/DL (ref 8.3–10.6)
CHLORIDE BLD-SCNC: 89 MMOL/L (ref 99–110)
CO2: 28 MMOL/L (ref 21–32)
CREAT SERPL-MCNC: 0.8 MG/DL (ref 0.9–1.3)
CULTURE, BLOOD 2: NORMAL
EOSINOPHILS ABSOLUTE: 0.2 K/UL (ref 0–0.6)
EOSINOPHILS RELATIVE PERCENT: 1.8 %
GFR AFRICAN AMERICAN: >60
GFR NON-AFRICAN AMERICAN: >60
GLUCOSE BLD-MCNC: 119 MG/DL (ref 70–99)
GLUCOSE BLD-MCNC: 139 MG/DL (ref 70–99)
GLUCOSE BLD-MCNC: 145 MG/DL (ref 70–99)
GLUCOSE BLD-MCNC: 173 MG/DL (ref 70–99)
GLUCOSE BLD-MCNC: 191 MG/DL (ref 70–99)
HCT VFR BLD CALC: 25.8 % (ref 40.5–52.5)
HEMOGLOBIN: 8.5 G/DL (ref 13.5–17.5)
LYMPHOCYTES ABSOLUTE: 0.8 K/UL (ref 1–5.1)
LYMPHOCYTES RELATIVE PERCENT: 6.5 %
MAGNESIUM: 2.3 MG/DL (ref 1.8–2.4)
MCH RBC QN AUTO: 27.4 PG (ref 26–34)
MCHC RBC AUTO-ENTMCNC: 32.9 G/DL (ref 31–36)
MCV RBC AUTO: 83.3 FL (ref 80–100)
MONOCYTES ABSOLUTE: 1.1 K/UL (ref 0–1.3)
MONOCYTES RELATIVE PERCENT: 8.3 %
NEUTROPHILS ABSOLUTE: 10.4 K/UL (ref 1.7–7.7)
NEUTROPHILS RELATIVE PERCENT: 82.4 %
PDW BLD-RTO: 16.7 % (ref 12.4–15.4)
PERFORMED ON: ABNORMAL
PLATELET # BLD: 260 K/UL (ref 135–450)
PMV BLD AUTO: 8.6 FL (ref 5–10.5)
POTASSIUM REFLEX MAGNESIUM: 3.9 MMOL/L (ref 3.5–5.1)
RBC # BLD: 3.09 M/UL (ref 4.2–5.9)
SODIUM BLD-SCNC: 129 MMOL/L (ref 136–145)
WBC # BLD: 12.7 K/UL (ref 4–11)

## 2020-02-02 PROCEDURE — 6370000000 HC RX 637 (ALT 250 FOR IP): Performed by: INTERNAL MEDICINE

## 2020-02-02 PROCEDURE — 2700000000 HC OXYGEN THERAPY PER DAY

## 2020-02-02 PROCEDURE — 6370000000 HC RX 637 (ALT 250 FOR IP): Performed by: HOSPITALIST

## 2020-02-02 PROCEDURE — 2580000003 HC RX 258: Performed by: INTERNAL MEDICINE

## 2020-02-02 PROCEDURE — 6360000002 HC RX W HCPCS: Performed by: INTERNAL MEDICINE

## 2020-02-02 PROCEDURE — 36592 COLLECT BLOOD FROM PICC: CPT

## 2020-02-02 PROCEDURE — 85025 COMPLETE CBC W/AUTO DIFF WBC: CPT

## 2020-02-02 PROCEDURE — 99233 SBSQ HOSP IP/OBS HIGH 50: CPT | Performed by: INTERNAL MEDICINE

## 2020-02-02 PROCEDURE — 94761 N-INVAS EAR/PLS OXIMETRY MLT: CPT

## 2020-02-02 PROCEDURE — 99231 SBSQ HOSP IP/OBS SF/LOW 25: CPT | Performed by: SURGERY

## 2020-02-02 PROCEDURE — 94640 AIRWAY INHALATION TREATMENT: CPT

## 2020-02-02 PROCEDURE — 80048 BASIC METABOLIC PNL TOTAL CA: CPT

## 2020-02-02 PROCEDURE — 83735 ASSAY OF MAGNESIUM: CPT

## 2020-02-02 PROCEDURE — 1200000000 HC SEMI PRIVATE

## 2020-02-02 RX ORDER — FUROSEMIDE 20 MG/1
20 TABLET ORAL DAILY
Status: DISCONTINUED | OUTPATIENT
Start: 2020-02-02 | End: 2020-02-05

## 2020-02-02 RX ORDER — TORSEMIDE 20 MG/1
20 TABLET ORAL DAILY
Status: DISCONTINUED | OUTPATIENT
Start: 2020-02-02 | End: 2020-02-02

## 2020-02-02 RX ADMIN — IPRATROPIUM BROMIDE AND ALBUTEROL SULFATE 1 AMPULE: .5; 3 SOLUTION RESPIRATORY (INHALATION) at 12:15

## 2020-02-02 RX ADMIN — ATORVASTATIN CALCIUM 40 MG: 40 TABLET, FILM COATED ORAL at 21:36

## 2020-02-02 RX ADMIN — TAMSULOSIN HYDROCHLORIDE 0.4 MG: 0.4 CAPSULE ORAL at 09:48

## 2020-02-02 RX ADMIN — RANOLAZINE 500 MG: 500 TABLET, FILM COATED, EXTENDED RELEASE ORAL at 21:36

## 2020-02-02 RX ADMIN — RANOLAZINE 500 MG: 500 TABLET, FILM COATED, EXTENDED RELEASE ORAL at 09:48

## 2020-02-02 RX ADMIN — OXYCODONE HYDROCHLORIDE AND ACETAMINOPHEN 1 TABLET: 7.5; 325 TABLET ORAL at 02:18

## 2020-02-02 RX ADMIN — PIPERACILLIN AND TAZOBACTAM 3.38 G: 3; .375 INJECTION, POWDER, LYOPHILIZED, FOR SOLUTION INTRAVENOUS at 15:20

## 2020-02-02 RX ADMIN — INSULIN LISPRO 1 UNITS: 100 INJECTION, SOLUTION INTRAVENOUS; SUBCUTANEOUS at 21:38

## 2020-02-02 RX ADMIN — OYSTER SHELL CALCIUM WITH VITAMIN D 1 TABLET: 500; 200 TABLET, FILM COATED ORAL at 09:48

## 2020-02-02 RX ADMIN — PIPERACILLIN AND TAZOBACTAM 3.38 G: 3; .375 INJECTION, POWDER, LYOPHILIZED, FOR SOLUTION INTRAVENOUS at 21:36

## 2020-02-02 RX ADMIN — AMIODARONE HYDROCHLORIDE 200 MG: 200 TABLET ORAL at 09:48

## 2020-02-02 RX ADMIN — IPRATROPIUM BROMIDE AND ALBUTEROL SULFATE 1 AMPULE: .5; 3 SOLUTION RESPIRATORY (INHALATION) at 20:24

## 2020-02-02 RX ADMIN — OXYCODONE HYDROCHLORIDE AND ACETAMINOPHEN 1 TABLET: 7.5; 325 TABLET ORAL at 15:02

## 2020-02-02 RX ADMIN — IPRATROPIUM BROMIDE AND ALBUTEROL SULFATE 1 AMPULE: .5; 3 SOLUTION RESPIRATORY (INHALATION) at 08:06

## 2020-02-02 RX ADMIN — ASPIRIN 81 MG 81 MG: 81 TABLET ORAL at 09:48

## 2020-02-02 RX ADMIN — IPRATROPIUM BROMIDE AND ALBUTEROL SULFATE 1 AMPULE: .5; 3 SOLUTION RESPIRATORY (INHALATION) at 16:15

## 2020-02-02 RX ADMIN — IPRATROPIUM BROMIDE AND ALBUTEROL SULFATE 1 AMPULE: .5; 3 SOLUTION RESPIRATORY (INHALATION) at 00:37

## 2020-02-02 RX ADMIN — SODIUM CHLORIDE, PRESERVATIVE FREE 10 ML: 5 INJECTION INTRAVENOUS at 09:49

## 2020-02-02 RX ADMIN — FUROSEMIDE 20 MG: 20 TABLET ORAL at 15:02

## 2020-02-02 RX ADMIN — DEXTROSE MONOHYDRATE 100 MG: 50 INJECTION, SOLUTION INTRAVENOUS at 13:39

## 2020-02-02 RX ADMIN — POLYETHYLENE GLYCOL 3350 17 G: 17 POWDER, FOR SOLUTION ORAL at 21:36

## 2020-02-02 RX ADMIN — PIPERACILLIN AND TAZOBACTAM 3.38 G: 3; .375 INJECTION, POWDER, LYOPHILIZED, FOR SOLUTION INTRAVENOUS at 06:10

## 2020-02-02 RX ADMIN — OXYCODONE HYDROCHLORIDE AND ACETAMINOPHEN 1 TABLET: 7.5; 325 TABLET ORAL at 08:26

## 2020-02-02 RX ADMIN — SODIUM CHLORIDE, PRESERVATIVE FREE 10 ML: 5 INJECTION INTRAVENOUS at 21:59

## 2020-02-02 RX ADMIN — POLYETHYLENE GLYCOL 3350 17 G: 17 POWDER, FOR SOLUTION ORAL at 09:48

## 2020-02-02 RX ADMIN — INSULIN GLARGINE 15 UNITS: 100 INJECTION, SOLUTION SUBCUTANEOUS at 21:38

## 2020-02-02 RX ADMIN — INSULIN LISPRO 2 UNITS: 100 INJECTION, SOLUTION INTRAVENOUS; SUBCUTANEOUS at 12:24

## 2020-02-02 ASSESSMENT — PAIN DESCRIPTION - PAIN TYPE
TYPE: ACUTE PAIN
TYPE: ACUTE PAIN

## 2020-02-02 ASSESSMENT — PAIN DESCRIPTION - ONSET
ONSET: ON-GOING
ONSET: ON-GOING

## 2020-02-02 ASSESSMENT — PAIN SCALES - GENERAL
PAINLEVEL_OUTOF10: 8
PAINLEVEL_OUTOF10: 8
PAINLEVEL_OUTOF10: 7
PAINLEVEL_OUTOF10: 0
PAINLEVEL_OUTOF10: 4

## 2020-02-02 ASSESSMENT — PAIN DESCRIPTION - FREQUENCY
FREQUENCY: CONTINUOUS
FREQUENCY: CONTINUOUS

## 2020-02-02 ASSESSMENT — PAIN DESCRIPTION - LOCATION
LOCATION: ABDOMEN
LOCATION: ABDOMEN

## 2020-02-02 ASSESSMENT — PAIN DESCRIPTION - PROGRESSION
CLINICAL_PROGRESSION: NOT CHANGED
CLINICAL_PROGRESSION: GRADUALLY WORSENING

## 2020-02-02 ASSESSMENT — PAIN DESCRIPTION - DESCRIPTORS
DESCRIPTORS: PATIENT UNABLE TO DESCRIBE
DESCRIPTORS: ACHING;STABBING

## 2020-02-02 ASSESSMENT — PAIN DESCRIPTION - ORIENTATION
ORIENTATION: RIGHT
ORIENTATION: OTHER (COMMENT)

## 2020-02-02 NOTE — PROGRESS NOTES
[facility] Medicine Progress Note     Date:  2/2/2020    PCP: Nathalie Gordon MD (Tel: [phone])    Date of Admission: 1/22/2020        Subjective    1/30 - went to IR - s/p percutaneous drainage of R fluid collection - looks bloody. Output decreased and clearing somewhat today. Still with a lot of abdominal discomfort in the right hemiabdomen today. No emesis. Objective  Physical exam:  Vitals: /86   Pulse 103   Temp 97.8 °F (36.6 °C) (Oral)   Resp 18   Ht 5' 7\" (1.702 m)   Wt 176 lb 9.4 oz (80.1 kg)   SpO2 98%   BMI 27.66 kg/m²   Gen: Not in distress. Alert. Head: Normocephalic. Atraumatic. Eyes: EOMI. Good acuity. ENT: Oral mucosa moist  Neck: No JVD. No obvious thyromegaly. CVS: Nml S1S2, no MRG, irregular  Pulmomary: diminished BS B/L  Gastrointestinal: Soft, tender - non focal. Positive bowel sounds. Colostomy RLL  Musculoskeletal: No edema. Warm, remote LEFT AKA  Neuro: No focal deficit. Moves extremity spontaneously. Psychiatry: Appropriate affect. Not agitated. Skin: Warm, dry with normal turgor. No rash      24HR INTAKE/OUTPUT:      Intake/Output Summary (Last 24 hours) at 2/2/2020 1356  Last data filed at 2/2/2020 1345  Gross per 24 hour   Intake 1390 ml   Output 1220 ml   Net 170 ml     I/O last 3 completed shifts:   In: 80 [P.O.:956; I.V.:15; IV Piggyback:430]  Out: 870 [Urine:450; Drains:20; Stool:400]  I/O this shift:  In: 56 [P.O.:480; IV Piggyback:130]  Out: 500 [Urine:400; Stool:100]      Meds:    piperacillin-tazobactam  3.375 g Intravenous Q8H    insulin glargine  15 Units Subcutaneous Nightly    anidulafungin  100 mg Intravenous Q24H    lidocaine 1 % injection  5 mL Intradermal Once    sodium chloride flush  10 mL Intravenous 2 times per day    insulin lispro  0-12 Units Subcutaneous TID     insulin lispro  0-6 Units Subcutaneous Nightly    ipratropium-albuterol  1 ampule Inhalation Q4H WA    amiodarone  200 mg Oral Daily    aspirin diuretic for the past 2 days due to abdominal abscess and infection. - showing signs of fluid retention - will resume with low dose lasix today. - cont ASA, statin  - BB on hold due to low BP. Chronic Resp Failure w/ Hypoxia  - on baseline of 2.5L O2 nC, maintain O2 sats > 90%      Centrilobular Emphysema  - cont duonebs      IDDM  - cont lantus - dose decreased  - humalog, SSI      HTN  - stable      Chronic a Fib  - cont amio, rate controlled,   - hold xarelto         Abd pain, low grade fever  Complex surgical Hx - Hx of bowel obstruction s/p ex lap and colostomy. Later complicated by intraabd abscess requiring drainage and completed IV zosyn back in Nov 2019. CT abd pelvis this admit:   -  Increased size complex cystic collection RUQ   - recurrent LUQ collection   - unclear if these are infected or hemorrhagic or both  CRP and sed rate markedly elevated. Plan:    - started iv zosyn and vfend. - ID and surgery involved. - IR - drain placed in RUQ collection    - keep off Xarelto    - plan is to repeat CT abdomen/pelvis on Monday to reassess.    - drain is to be flushed q12hrs          Diet: DIET CARB CONTROL; Low Sodium (2 GM); Daily Fluid Restriction: 1500 ml        Activity: up as tolerated, limited mobility due to left AKA.        Prophylaxis: scd        Code status: Full Code           Shelbi Frost MD

## 2020-02-02 NOTE — PLAN OF CARE
Problem: Risk for Impaired Skin Integrity  Goal: Tissue integrity - skin and mucous membranes  Description  Structural intactness and normal physiological function of skin and  mucous membranes. Outcome: Ongoing  Note:   Skin assessment completed every shift. Pt assessed for incontinence, appropriate barrier cream applied prn. Pt encouraged to turn/rotate every 2 hours. Assistance provided if pt unable to do so themselves. Pt is reluctant to turn and reposition. He is on a specialty mattress. Pt has been educated on importance of turning and repositioning. Problem: SAFETY  Goal: Free from accidental physical injury  Outcome: Ongoing  Note:   Patient assessed for fall risk; fall precautions initiated. Patient and family instructed about safety devices. Environment kept free of clutter and adequate lighting provided. Bed locked and in lowest position. Call light within reach. Will continue to monitor. Problem: DAILY CARE  Goal: Daily care needs are met  Outcome: Ongoing  Note:   Patient's ability assessed to perform self care and independent activity encouraged according to that ability. Assisted with ADL's as needed. Risk for skin breakdown assessed. Potential discharge needs assessed. Patient and family included in daily care decisions. Problem: PAIN  Goal: Patient's pain/discomfort is manageable  Outcome: Ongoing  Note:   Pain/discomfort being managed with PRN analgesics per MD orders. Pt able to express presence and absence of pain and rate pain appropriately using numerical scale. Problem: SKIN INTEGRITY  Goal: Skin integrity is maintained or improved  Outcome: Ongoing  Note:   Skin assessment completed every shift. Pt assessed for incontinence, appropriate barrier cream applied prn. Pt encouraged to turn/rotate every 2 hours. Assistance provided if pt unable to do so themselves.         Problem: KNOWLEDGE DEFICIT  Goal: Patient/S.O. demonstrates understanding of disease process, and absence of pain and rate pain appropriately using numerical scale. Problem: Pain:  Goal: Control of chronic pain  Description  Control of chronic pain  Outcome: Ongoing  Note:   Pain/discomfort being managed with PRN analgesics per MD orders. Pt able to express presence and absence of pain and rate pain appropriately using numerical scale. Problem: Falls - Risk of:  Goal: Will remain free from falls  Description  Will remain free from falls  Outcome: Ongoing  Note:   Pt makes no attempts to get out of bed on his own. Fall risk assessment completed every shift. All precautions in place. Pt has call light within reach at all times. Room clear of clutter. Pt aware to call for assistance when getting up. Problem: Falls - Risk of:  Goal: Absence of physical injury  Description  Absence of physical injury  Outcome: Ongoing  Note:   No signs of physical injury. Problem: Activity:  Goal: Ability to tolerate increased activity will improve  Description  Ability to tolerate increased activity will improve  Outcome: Ongoing  Note:   Pt has had no complaints of fatigue. He said he is tired from not sleeping but otherwise no fatigue. Heart rate is under control. Problem: Activity:  Goal: Expression of feelings of increased energy will increase  Description  Expression of feelings of increased energy will increase  Outcome: Ongoing  Note:   . Problem: Cardiac:  Goal: Ability to maintain an adequate cardiac output will improve  Description  Ability to maintain an adequate cardiac output will improve  Outcome: Ongoing  Note:   Skin is warm and dry. Palpable pulses.

## 2020-02-02 NOTE — PROGRESS NOTES
Pt is resting in bed. Pt states he did not sleep well last night. He is \"cranky\" this morning. Pt has been given pain medication and repositioned in bed. Spoke to respiratory therapist about the ear protectors for his oxygen tubing, Arthur said he would see if they have some down in the department. Respirations easy even no distress, does get short of breath with activity. Call light within reach. Will monitor.

## 2020-02-02 NOTE — PLAN OF CARE
Problem: Risk for Impaired Skin Integrity  Goal: Tissue integrity - skin and mucous membranes  Description  Structural intactness and normal physiological function of skin and  mucous membranes. 2/2/2020 0039 by Sherron Johnson RN  Outcome: Ongoing  2/1/2020 1551 by Tequila Beckett RN  Outcome: Ongoing  Note:   Pt is on a specialty mattress. Staff is attempting to turn and reposition pt every 2 hours. However pt refuses to turn at times. Skin assessment completed every shift. Pt assessed for incontinence, appropriate barrier cream applied prn. Pt encouraged to turn/rotate every 2 hours. Assistance provided if pt unable to do so themselves. Problem: SAFETY  Goal: Free from accidental physical injury  2/2/2020 0039 by Sherron Johnson RN  Outcome: Ongoing  Note:   Patient's bed is in a low position, call light in reach, and bed alarms on. Will continue to monitor  2/1/2020 1551 by Tequlia Beckett RN  Outcome: Ongoing  Note:   Patient assessed for fall risk; fall precautions initiated. Patient and family instructed about safety devices. Environment kept free of clutter and adequate lighting provided. Bed locked and in lowest position. Call light within reach. Will continue to monitor. Pt makes no attempts to get out of bed on his own. Problem: DAILY CARE  Goal: Daily care needs are met  2/2/2020 0039 by Sherron Johnson RN  Outcome: Ongoing  2/1/2020 1551 by Tequila Beckett RN  Outcome: Ongoing  Note:   Patient's ability assessed to perform self care and independent activity encouraged according to that ability. Assisted with ADL's as needed. Risk for skin breakdown assessed. Potential discharge needs assessed. Patient and family included in daily care decisions.       Problem: PAIN  Goal: Patient's pain/discomfort is manageable  2/2/2020 0039 by Sherron Johnson RN  Outcome: Ongoing  Note:   Will monitor patient's pain levels and treat with appropriate PRN pian medications  2/1/2020 1551 by Jarvis Olivo Simone Eugene RN  Outcome: Ongoing  Note:   Pain/discomfort being managed with PRN analgesics per MD orders. Pt able to express presence and absence of pain and rate pain appropriately using numerical scale. Problem: SKIN INTEGRITY  Goal: Skin integrity is maintained or improved  2/2/2020 0039 by Bobby Echeverria RN  Outcome: Ongoing  2/1/2020 1551 by Roderick Simpson RN  Outcome: Ongoing  Note:   Skin assessment completed every shift. Pt assessed for incontinence, appropriate barrier cream applied prn. Pt encouraged to turn/rotate every 2 hours. Assistance provided if pt unable to do so themselves. Problem: KNOWLEDGE DEFICIT  Goal: Patient/S.O. demonstrates understanding of disease process, treatment plan, medications, and discharge instructions. 2/2/2020 0039 by Bobby Echeverria RN  Outcome: Ongoing  2/1/2020 1551 by Roderick Simpson RN  Outcome: Ongoing  Note:   Pt is receptive to education. Problem: DISCHARGE BARRIERS  Goal: Patient's continuum of care needs are met  2/2/2020 0039 by Bobby Echeverria RN  Outcome: Ongoing  2/1/2020 1551 by Roderick Simpson RN  Outcome: Ongoing  Note:   Pt resides at [facility] and plans are returning there at discharge. Problem: OXYGENATION/RESPIRATORY FUNCTION  Goal: Patient will maintain patent airway  2/2/2020 0039 by Bobby Echeverria RN  Outcome: Ongoing  2/1/2020 1551 by Roderick Simpson RN  Outcome: Ongoing  Note:   Respirations easy even no distress. Does become short of breath easily. Pt remains on oxygen at 2.5L via nasal canula. Goal: Patient will achieve/maintain normal respiratory rate/effort  Description  Respiratory rate and effort will be within normal limits for the patient  2/2/2020 0039 by Bobby Echeverria RN  Outcome: Ongoing  2/1/2020 1551 by Roderick Simpson RN  Outcome: Ongoing  Note:   Respirations easy even no distress. Pt does become short of breath with activity.      Problem: HEMODYNAMIC STATUS  Goal: Patient has stable vital signs and fluid balance  2/2/2020 0039 by Clint Little RN  Outcome: Ongoing  2/1/2020 1551 by Lisha Zamora RN  Outcome: Ongoing  Note:   VSS. Pt is on a fluid restriction and he is aware and has been compliant with it. Problem: FLUID AND ELECTROLYTE IMBALANCE  Goal: Fluid and electrolyte balance are achieved/maintained  2/2/2020 0039 by Clint Little RN  Outcome: Ongoing  2/1/2020 1551 by Lisha Zamora RN  Outcome: Ongoing  Note:   Labs being monitored. I/O being monitored. Weight checked daily-pt has had a slight increase in weight. Problem: ACTIVITY INTOLERANCE/IMPAIRED MOBILITY  Goal: Mobility/activity is maintained at optimum level for patient  2/2/2020 0039 by Clint Little RN  Outcome: Ongoing  2/1/2020 1551 by Lisha Zamora RN  Outcome: Ongoing  Note:   Pt has been in bed this shift. Has allowed nurse to assist him with repositioning. Pt does not want to get up in the chair. Problem: Pain:  Goal: Pain level will decrease  Description  Pain level will decrease  2/2/2020 0039 by Clint Little RN  Outcome: Ongoing  2/1/2020 1551 by Lisha Zamora RN  Outcome: Ongoing  Note:   Pt does receive relief from pain medication. Goal: Control of acute pain  Description  Control of acute pain  2/2/2020 0039 by Clint Little RN  Outcome: Ongoing  2/1/2020 1551 by Lisha Zamora RN  Outcome: Ongoing  Note:   Pain/discomfort being managed with PRN analgesics per MD orders. Pt able to express presence and absence of pain and rate pain appropriately using numerical scale. Goal: Control of chronic pain  Description  Control of chronic pain  2/2/2020 0039 by Clint Little RN  Outcome: Ongoing  2/1/2020 1551 by Lisha Zamora RN  Outcome: Ongoing  Note:   Pain/discomfort being managed with PRN analgesics per MD orders. Pt able to express presence and absence of pain and rate pain appropriately using numerical scale.       Problem: Falls - Risk of:  Goal: Will remain free from falls  Description  Will remain free from falls  2/2/2020 0039 by John Sigala RN  Outcome: Ongoing  2/1/2020 1551 by Isabel Valadez RN  Outcome: Ongoing  Note:   Fall risk assessment completed every shift. All precautions in place. Pt has call light within reach at all times. Room clear of clutter. Pt aware to call for assistance when getting up. Goal: Absence of physical injury  Description  Absence of physical injury  2/2/2020 0039 by John Sigala RN  Outcome: Ongoing  2/1/2020 1551 by Isabel Valadez RN  Outcome: Ongoing  Note:   No signs of physical injury. Problem: Activity:  Goal: Ability to tolerate increased activity will improve  Description  Ability to tolerate increased activity will improve  2/2/2020 0039 by John Sigala RN  Outcome: Ongoing  2/1/2020 1551 by Isabel Valadez RN  Outcome: Ongoing  Note:   Pt remains in atrial fib, rate is controlled. Pt taken off Cardizem drip. Goal: Expression of feelings of increased energy will increase  Description  Expression of feelings of increased energy will increase  2/2/2020 0039 by John Sigala RN  Outcome: Ongoing  2/1/2020 1551 by Isabel aVladez RN  Outcome: Ongoing  Note:   Pt is well rested. Alert conversation appropriate. Problem: Cardiac:  Goal: Ability to maintain an adequate cardiac output will improve  Description  Ability to maintain an adequate cardiac output will improve  2/2/2020 0039 by John Sigala RN  Outcome: Ongoing  2/1/2020 1551 by Isabel Valadez RN  Outcome: Ongoing  Note:   Skin is warm and dry. VSS. Goal: Complications related to the disease process, condition or treatment will be avoided or minimized  Description  Complications related to the disease process, condition or treatment will be avoided or minimized  2/2/2020 0039 by John Sigala RN  Outcome: Ongoing  2/1/2020 1551 by Isabel Valadez RN  Outcome: Ongoing  Note: Collette Ruts
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
## 2020-02-03 ENCOUNTER — APPOINTMENT (OUTPATIENT)
Dept: CT IMAGING | Age: 60
DRG: 194 | End: 2020-02-03
Payer: COMMERCIAL

## 2020-02-03 LAB
ANION GAP SERPL CALCULATED.3IONS-SCNC: 8 MMOL/L (ref 3–16)
BASOPHILS ABSOLUTE: 0.1 K/UL (ref 0–0.2)
BASOPHILS RELATIVE PERCENT: 1 %
BUN BLDV-MCNC: 21 MG/DL (ref 7–20)
CALCIUM SERPL-MCNC: 8.6 MG/DL (ref 8.3–10.6)
CHLORIDE BLD-SCNC: 91 MMOL/L (ref 99–110)
CO2: 31 MMOL/L (ref 21–32)
CREAT SERPL-MCNC: 0.8 MG/DL (ref 0.9–1.3)
EOSINOPHILS ABSOLUTE: 0.2 K/UL (ref 0–0.6)
EOSINOPHILS RELATIVE PERCENT: 1.8 %
GFR AFRICAN AMERICAN: >60
GFR NON-AFRICAN AMERICAN: >60
GLUCOSE BLD-MCNC: 106 MG/DL (ref 70–99)
GLUCOSE BLD-MCNC: 110 MG/DL (ref 70–99)
GLUCOSE BLD-MCNC: 122 MG/DL (ref 70–99)
GLUCOSE BLD-MCNC: 145 MG/DL (ref 70–99)
GLUCOSE BLD-MCNC: 210 MG/DL (ref 70–99)
HCT VFR BLD CALC: 26 % (ref 40.5–52.5)
HEMOGLOBIN: 8.3 G/DL (ref 13.5–17.5)
LYMPHOCYTES ABSOLUTE: 0.8 K/UL (ref 1–5.1)
LYMPHOCYTES RELATIVE PERCENT: 6.1 %
MAGNESIUM: 2 MG/DL (ref 1.8–2.4)
MCH RBC QN AUTO: 26.5 PG (ref 26–34)
MCHC RBC AUTO-ENTMCNC: 32 G/DL (ref 31–36)
MCV RBC AUTO: 83 FL (ref 80–100)
MONOCYTES ABSOLUTE: 1 K/UL (ref 0–1.3)
MONOCYTES RELATIVE PERCENT: 8.2 %
NEUTROPHILS ABSOLUTE: 10.4 K/UL (ref 1.7–7.7)
NEUTROPHILS RELATIVE PERCENT: 82.9 %
PDW BLD-RTO: 16.3 % (ref 12.4–15.4)
PERFORMED ON: ABNORMAL
PLATELET # BLD: 316 K/UL (ref 135–450)
PMV BLD AUTO: 8.4 FL (ref 5–10.5)
POTASSIUM REFLEX MAGNESIUM: 4.2 MMOL/L (ref 3.5–5.1)
RBC # BLD: 3.13 M/UL (ref 4.2–5.9)
SODIUM BLD-SCNC: 130 MMOL/L (ref 136–145)
WBC # BLD: 12.6 K/UL (ref 4–11)

## 2020-02-03 PROCEDURE — 6370000000 HC RX 637 (ALT 250 FOR IP): Performed by: INTERNAL MEDICINE

## 2020-02-03 PROCEDURE — 99231 SBSQ HOSP IP/OBS SF/LOW 25: CPT | Performed by: SURGERY

## 2020-02-03 PROCEDURE — 80048 BASIC METABOLIC PNL TOTAL CA: CPT

## 2020-02-03 PROCEDURE — 1200000000 HC SEMI PRIVATE

## 2020-02-03 PROCEDURE — APPNB30 APP NON BILLABLE TIME 0-30 MINS: Performed by: PHYSICIAN ASSISTANT

## 2020-02-03 PROCEDURE — 2580000003 HC RX 258: Performed by: INTERNAL MEDICINE

## 2020-02-03 PROCEDURE — 6360000002 HC RX W HCPCS: Performed by: INTERNAL MEDICINE

## 2020-02-03 PROCEDURE — 74177 CT ABD & PELVIS W/CONTRAST: CPT

## 2020-02-03 PROCEDURE — 6360000004 HC RX CONTRAST MEDICATION: Performed by: SURGERY

## 2020-02-03 PROCEDURE — 2700000000 HC OXYGEN THERAPY PER DAY

## 2020-02-03 PROCEDURE — 94761 N-INVAS EAR/PLS OXIMETRY MLT: CPT

## 2020-02-03 PROCEDURE — 94640 AIRWAY INHALATION TREATMENT: CPT

## 2020-02-03 PROCEDURE — 99233 SBSQ HOSP IP/OBS HIGH 50: CPT | Performed by: INTERNAL MEDICINE

## 2020-02-03 PROCEDURE — 6370000000 HC RX 637 (ALT 250 FOR IP): Performed by: HOSPITALIST

## 2020-02-03 PROCEDURE — 85025 COMPLETE CBC W/AUTO DIFF WBC: CPT

## 2020-02-03 PROCEDURE — 83735 ASSAY OF MAGNESIUM: CPT

## 2020-02-03 RX ADMIN — DEXTROSE MONOHYDRATE 100 MG: 50 INJECTION, SOLUTION INTRAVENOUS at 12:26

## 2020-02-03 RX ADMIN — PIPERACILLIN AND TAZOBACTAM 3.38 G: 3; .375 INJECTION, POWDER, LYOPHILIZED, FOR SOLUTION INTRAVENOUS at 14:08

## 2020-02-03 RX ADMIN — RANOLAZINE 500 MG: 500 TABLET, FILM COATED, EXTENDED RELEASE ORAL at 08:48

## 2020-02-03 RX ADMIN — RANOLAZINE 500 MG: 500 TABLET, FILM COATED, EXTENDED RELEASE ORAL at 21:07

## 2020-02-03 RX ADMIN — OXYCODONE HYDROCHLORIDE AND ACETAMINOPHEN 1 TABLET: 7.5; 325 TABLET ORAL at 08:50

## 2020-02-03 RX ADMIN — IPRATROPIUM BROMIDE AND ALBUTEROL SULFATE 1 AMPULE: .5; 3 SOLUTION RESPIRATORY (INHALATION) at 12:02

## 2020-02-03 RX ADMIN — PIPERACILLIN AND TAZOBACTAM 3.38 G: 3; .375 INJECTION, POWDER, LYOPHILIZED, FOR SOLUTION INTRAVENOUS at 22:25

## 2020-02-03 RX ADMIN — PIPERACILLIN AND TAZOBACTAM 3.38 G: 3; .375 INJECTION, POWDER, LYOPHILIZED, FOR SOLUTION INTRAVENOUS at 06:06

## 2020-02-03 RX ADMIN — INSULIN LISPRO 2 UNITS: 100 INJECTION, SOLUTION INTRAVENOUS; SUBCUTANEOUS at 22:24

## 2020-02-03 RX ADMIN — FUROSEMIDE 20 MG: 20 TABLET ORAL at 08:49

## 2020-02-03 RX ADMIN — IPRATROPIUM BROMIDE AND ALBUTEROL SULFATE 1 AMPULE: .5; 3 SOLUTION RESPIRATORY (INHALATION) at 08:11

## 2020-02-03 RX ADMIN — POLYETHYLENE GLYCOL 3350 17 G: 17 POWDER, FOR SOLUTION ORAL at 08:49

## 2020-02-03 RX ADMIN — OXYCODONE HYDROCHLORIDE AND ACETAMINOPHEN 1 TABLET: 7.5; 325 TABLET ORAL at 16:11

## 2020-02-03 RX ADMIN — SODIUM CHLORIDE, PRESERVATIVE FREE 10 ML: 5 INJECTION INTRAVENOUS at 21:07

## 2020-02-03 RX ADMIN — IPRATROPIUM BROMIDE AND ALBUTEROL SULFATE 1 AMPULE: .5; 3 SOLUTION RESPIRATORY (INHALATION) at 16:11

## 2020-02-03 RX ADMIN — INSULIN LISPRO 2 UNITS: 100 INJECTION, SOLUTION INTRAVENOUS; SUBCUTANEOUS at 12:40

## 2020-02-03 RX ADMIN — ASPIRIN 81 MG 81 MG: 81 TABLET ORAL at 08:48

## 2020-02-03 RX ADMIN — IPRATROPIUM BROMIDE AND ALBUTEROL SULFATE 1 AMPULE: .5; 3 SOLUTION RESPIRATORY (INHALATION) at 01:05

## 2020-02-03 RX ADMIN — OXYCODONE HYDROCHLORIDE AND ACETAMINOPHEN 1 TABLET: 7.5; 325 TABLET ORAL at 22:25

## 2020-02-03 RX ADMIN — ATORVASTATIN CALCIUM 40 MG: 40 TABLET, FILM COATED ORAL at 21:07

## 2020-02-03 RX ADMIN — POLYETHYLENE GLYCOL 3350 17 G: 17 POWDER, FOR SOLUTION ORAL at 21:07

## 2020-02-03 RX ADMIN — INSULIN GLARGINE 15 UNITS: 100 INJECTION, SOLUTION SUBCUTANEOUS at 22:24

## 2020-02-03 RX ADMIN — IOPAMIDOL 75 ML: 755 INJECTION, SOLUTION INTRAVENOUS at 17:18

## 2020-02-03 RX ADMIN — TAMSULOSIN HYDROCHLORIDE 0.4 MG: 0.4 CAPSULE ORAL at 08:48

## 2020-02-03 RX ADMIN — IPRATROPIUM BROMIDE AND ALBUTEROL SULFATE 1 AMPULE: .5; 3 SOLUTION RESPIRATORY (INHALATION) at 20:01

## 2020-02-03 RX ADMIN — OYSTER SHELL CALCIUM WITH VITAMIN D 1 TABLET: 500; 200 TABLET, FILM COATED ORAL at 08:48

## 2020-02-03 RX ADMIN — AMIODARONE HYDROCHLORIDE 200 MG: 200 TABLET ORAL at 08:49

## 2020-02-03 ASSESSMENT — PAIN - FUNCTIONAL ASSESSMENT
PAIN_FUNCTIONAL_ASSESSMENT: ACTIVITIES ARE NOT PREVENTED

## 2020-02-03 ASSESSMENT — PAIN DESCRIPTION - PROGRESSION
CLINICAL_PROGRESSION: GRADUALLY IMPROVING
CLINICAL_PROGRESSION: GRADUALLY WORSENING
CLINICAL_PROGRESSION: GRADUALLY IMPROVING
CLINICAL_PROGRESSION: NOT CHANGED
CLINICAL_PROGRESSION: GRADUALLY IMPROVING
CLINICAL_PROGRESSION: GRADUALLY IMPROVING

## 2020-02-03 ASSESSMENT — PAIN DESCRIPTION - PAIN TYPE
TYPE: ACUTE PAIN

## 2020-02-03 ASSESSMENT — PAIN DESCRIPTION - LOCATION
LOCATION: ABDOMEN

## 2020-02-03 ASSESSMENT — PAIN DESCRIPTION - ORIENTATION
ORIENTATION: RIGHT

## 2020-02-03 ASSESSMENT — PAIN SCALES - GENERAL
PAINLEVEL_OUTOF10: 0
PAINLEVEL_OUTOF10: 0
PAINLEVEL_OUTOF10: 5
PAINLEVEL_OUTOF10: 8
PAINLEVEL_OUTOF10: 5
PAINLEVEL_OUTOF10: 5
PAINLEVEL_OUTOF10: 7
PAINLEVEL_OUTOF10: 5
PAINLEVEL_OUTOF10: 8

## 2020-02-03 ASSESSMENT — PAIN DESCRIPTION - FREQUENCY
FREQUENCY: CONTINUOUS

## 2020-02-03 ASSESSMENT — PAIN DESCRIPTION - DESCRIPTORS
DESCRIPTORS: ACHING;STABBING
DESCRIPTORS: ACHING
DESCRIPTORS: ACHING;STABBING
DESCRIPTORS: ACHING;STABBING

## 2020-02-03 ASSESSMENT — PAIN SCALES - WONG BAKER
WONGBAKER_NUMERICALRESPONSE: 0
WONGBAKER_NUMERICALRESPONSE: 0

## 2020-02-03 ASSESSMENT — PAIN DESCRIPTION - ONSET
ONSET: ON-GOING

## 2020-02-03 NOTE — PLAN OF CARE
Problem: SAFETY  Goal: Free from accidental physical injury  Outcome: Ongoing   Encouraged to use call light. Problem: DAILY CARE  Goal: Daily care needs are met  Outcome: Ongoing   Provided fresh water, heated blanket, readjusted in bed. Problem: PAIN  Goal: Patient's pain/discomfort is manageable  Outcome: Ongoing   Gave pain medication, readjusted in bed. Problem: SKIN INTEGRITY  Goal: Skin integrity is maintained or improved  Outcome: Ongoing   Offered repositioning. Problem: KNOWLEDGE DEFICIT  Goal: Patient/S.O. demonstrates understanding of disease process, treatment plan, medications, and discharge instructions. Outcome: Ongoing   Explained medications to patient. Problem: OXYGENATION/RESPIRATORY FUNCTION  Goal: Patient will maintain patent airway  2/3/2020 0945 by Dillon Raines RN  Outcome: Ongoing  2/3/2020 0125 by Suzi Snow RN  Outcome: Ongoing   99% on 2.5 L. Respiratory therapy seeing patient. Continues to be SOA with exertion. Problem: OXYGENATION/RESPIRATORY FUNCTION  Goal: Patient will achieve/maintain normal respiratory rate/effort  Description  Respiratory rate and effort will be within normal limits for the patient  2/3/2020 0945 by Dillon Raines RN  Outcome: Ongoing  2/3/2020 0125 by Suzi Snow RN  Outcome: Ongoing   Continues to be SOA with exertion. Problem: HEMODYNAMIC STATUS  Goal: Patient has stable vital signs and fluid balance  2/3/2020 0945 by Dillon Raines RN  Outcome: Ongoing  2/3/2020 0125 by Suzi Snow RN  Outcome: Ongoing   VSS. Problem: FLUID AND ELECTROLYTE IMBALANCE  Goal: Fluid and electrolyte balance are achieved/maintained  2/3/2020 0945 by Dillon Raines RN  Outcome: Ongoing  2/3/2020 0125 by Suzi Snow RN  Outcome: Ongoing   Morning labs taken this morning. Pt drinking and eating well.       Problem: ACTIVITY INTOLERANCE/IMPAIRED MOBILITY  Goal: Mobility/activity is maintained at optimum level for patient  2/3/2020 0945 by Gonzalez Beckwith RN  Outcome: Ongoing  2/3/2020 0125 by Dennis Be RN  Outcome: Ongoing   Continue to encourage activity. Problem: Pain:  Goal: Pain level will decrease  Description  Pain level will decrease  2/3/2020 0945 by Gonzalez Beckwith RN  Outcome: Ongoing  2/3/2020 0125 by Dennis Be RN  Outcome: Ongoing  Note:   Pain/discomfort being managed with PRN analgesics per MD orders. Pt able to express presence and absence of pain and rate pain appropriately using numerical scale. Problem: Pain:  Goal: Control of acute pain  Description  Control of acute pain  2/3/2020 0125 by Dennis Be RN  Outcome: Ongoing     Problem: Pain:  Goal: Control of chronic pain  Description  Control of chronic pain  Outcome: Ongoing   Pain medication administered per orders. Problem: Falls - Risk of:  Goal: Will remain free from falls  Description  Will remain free from falls  2/3/2020 0945 by Gonzalez Beckwith RN  Outcome: Ongoing  Note:   Fall risk assessment completed every shift. All precautions in place. Pt has call light within reach at all times. Room clear of clutter. Pt aware to call for assistance when getting up. Problem: Falls - Risk of:  Goal: Absence of physical injury  Description  Absence of physical injury  2/3/2020 0945 by Gonzalez Beckwith RN  Outcome: Ongoing  2/3/2020 0125 by Dennis Be RN  Outcome: Ongoing   Fall risk precautions in place.       Problem: Cardiac:  Goal: Ability to maintain an adequate cardiac output will improve  Description  Ability to maintain an adequate cardiac output will improve  2/3/2020 0945 by Gonzalez Beckwith RN  Outcome: Ongoing  2/3/2020 0125 by Dennis Be RN  Outcome: Ongoing     Problem: Cardiac:  Goal: Complications related to the disease process, condition or treatment will be avoided or minimized  Description  Complications related to the disease process, condition or treatment will be avoided or minimized  2/3/2020 0945 by Gonzalez Beckwith, RN  Outcome: Ongoing  2/3/2020 0125 by Dennis Be, RN  Outcome: Ongoing   Education provided to patient regarding condition and process of CHF. 15 minutes of CHF education provided.

## 2020-02-03 NOTE — PROGRESS NOTES
[facility] Medicine Progress Note     Date:  2/3/2020    PCP: Graciela Dumont MD (Tel: [phone])    Date of Admission: 1/22/2020        Subjective Pt S/E. Pt frustrated, wants to go home. 1/30 - went to IR - s/p percutaneous drainage of R fluid collection - looks bloody. Objective  Physical exam:  Vitals: /83   Pulse 84   Temp 97.6 °F (36.4 °C) (Oral)   Resp 20   Ht 5' 7\" (1.702 m)   Wt 180 lb 1.9 oz (81.7 kg)   SpO2 95%   BMI 28.21 kg/m²   Gen: NAD  Head: Normocephalic. Atraumatic. Eyes: EOMI. Good acuity. Conjunctiva clear  ENT: Oral mucosa moist  Neck: No JVD. No obvious thyromegaly. CVS: Nml S1S2, no MRG, irregular  Pulmomary: diminished BS B/L  Gastrointestinal: Soft, tender - non focal. Positive bowel sounds. Colostomy RLL   Musculoskeletal: No edema. Warm, remote LEFT AKA  Neuro: No focal deficit. Moves extremity spontaneously. Psychiatry: Alert, oriented, appropriate affect. Not agitated. Skin: Warm, dry with normal turgor. No rash      24HR INTAKE/OUTPUT:      Intake/Output Summary (Last 24 hours) at 2/3/2020 1136  Last data filed at 2/3/2020 0909  Gross per 24 hour   Intake 890 ml   Output 1518.7 ml   Net -628.7 ml     I/O last 3 completed shifts:   In: 1010 [P.O.:780; IV Piggyback:230]  Out: 1718.7 [AWALQ:3078; Drains:3.7; Stool:440]  I/O this shift:  In: 120 [P.O.:120]  Out: 200 [Urine:200]      Meds:    furosemide  20 mg Oral Daily    piperacillin-tazobactam  3.375 g Intravenous Q8H    insulin glargine  15 Units Subcutaneous Nightly    anidulafungin  100 mg Intravenous Q24H    lidocaine 1 % injection  5 mL Intradermal Once    sodium chloride flush  10 mL Intravenous 2 times per day    insulin lispro  0-12 Units Subcutaneous TID WC    insulin lispro  0-6 Units Subcutaneous Nightly    ipratropium-albuterol  1 ampule Inhalation Q4H WA    amiodarone  200 mg Oral Daily    aspirin  81 mg Oral Daily    atorvastatin  40 mg Oral Nightly    calcium-vitamin D  1 tablet Oral Daily    polyethylene glycol  17 g Oral BID    ranolazine  500 mg Oral BID    tamsulosin  0.4 mg Oral Daily       Infusions:    diltiazem Stopped (02/01/20 1157)    dextrose           PRN Meds: sodium chloride flush, ipratropium-albuterol, potassium chloride **OR** potassium alternative oral replacement **OR** potassium chloride, magnesium sulfate, glucose, dextrose, glucagon (rDNA), dextrose, nitroGLYCERIN, oxyCODONE-acetaminophen    Labs/imaging:  CBC:   Recent Labs     02/01/20  0616 02/02/20 0615 02/03/20 0615   WBC 16.4* 12.7* 12.6*   HGB 8.6* 8.5* 8.3*    260 316         BMP:    Recent Labs     02/01/20  0616 02/02/20 0615 02/03/20  0615   * 129* 130*   K 3.8 3.9 4.2   CL 88* 89* 91*   CO2 31 28 31   BUN 31* 28* 21*   CREATININE 0.9 0.8* 0.8*   GLUCOSE 105* 145* 106*         Hepatic: No results for input(s): AST, ALT, ALB, BILITOT, ALKPHOS in the last 72 hours. Troponin: No results for input(s): TROPONINI in the last 72 hours. BNP: No results for input(s): BNP in the last 72 hours. INR:   No results for input(s): INR in the last 72 hours. Reviewed imaging and reports noted      Assessment:  Principal Problem:    Acute on chronic respiratory failure with hypoxia (HCC)  Active Problems:    PAD (peripheral artery disease) (HCC)    Acute on chronic combined systolic and diastolic HF (heart failure) (HCC)    Centrilobular emphysema (HCC)    Diabetes mellitus type 2, controlled (HCC)    Biventricular failure (HCC)    CAD (coronary artery disease)    Pulmonary HTN (HCC)    Systolic CHF (HCC)    Bilateral pleural effusion    Abdominal fluid collection  Resolved Problems:    * No resolved hospital problems. *        Plan:  Intraabdominal abscess   Complex surgical Hx - Hx of bowel obstruction s/p ex lap and colostomy. Later complicated by intraabdominal abscess requiring drainage and completed IV zosyn back in Nov 2019.    CT abd pelvis this admit: Increased size complex cystic collection RUQ, recurrent LUQ collection- unclear if these are infected or hemorrhagic or both  CRP and sed rate markedly elevated. - started iv zosyn and vfend. - ID and surgery involved. - IR - drain placed in RUQ collection   - keep off Xarelto   - repeat CT abdomen/pelvis today pending  - drain is to be flushed q12hrs    Pleural Effusion - on home O2 level  - appreciate pulm reccs  - s/p thoracocentesis - exudative and bloody effusion drained    Acute on Chronic systolic CHF EF 74% - improved  - held his diuretic for the past 2 days due to abdominal abscess and infection. - showing signs of fluid retention - resumed with low dose lasix  - I/o: - .7 L x 24 hrs  - daily wts: he is at his dry wt of 180 lbs   - cont ASA, statin  - BB on hold due to low BP    Chronic Resp Failure w/ Hypoxia   - on baseline of 2.5L O2 nC, maintain O2 sats > 90%    Centrilobular Emphysema  - cont duonebs    IDDM  - cont lantus - dose decreased  - humalog, SSI    HTN  - stable    Chronic a Fib  - cont amio, rate controlled,   - hold xarelto         Diet: DIET CARB CONTROL; Low Sodium (2 GM); Daily Fluid Restriction: 1500 ml      Activity: up as tolerated, limited mobility due to left AKA.      Prophylaxis: scd    Code status: Full Code     Arti Franco DO
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
## 2020-02-03 NOTE — PROGRESS NOTES
[facility] [phone]                                     Daily Progress Note                                                         Pt Name: Alexandra Palomares  Medical Record Number: [id number]  Date of Birth 1960   Today's Date: 2/3/2020  Chief Complaint   Patient presents with    Shortness of Breath     x4 days        ASSESSMENT/PLAN  Right upper quadrant cystic mass  -Drained by IR with what appears to be old blood. Drain remains. -Repeat CT today to reassess fluid collection       SUBJECTIVE  Debbrah Guardian is tolerating diet.  + ostomy output. Abdominal pain improving. OBJECTIVE  VITALS:  height is 5' 7\" (1.702 m) and weight is 180 lb 1.9 oz (81.7 kg). His oral temperature is 97.6 °F (36.4 °C). His blood pressure is 114/83 and his pulse is 84. His respiration is 20 and oxygen saturation is 95%. INTAKE/OUTPUT:      Intake/Output Summary (Last 24 hours) at 2/3/2020 1307  Last data filed at 2/3/2020 1156  Gross per 24 hour   Intake 890 ml   Output 1568.7 ml   Net -678.7 ml     GENERAL: alert, no distress  ABDOMEN: Soft, nontender. Drain right abdomen serosanguinous. Colostomy present with stool in appliance. I/O last 3 completed shifts:   In: 1010 [P.O.:780; IV Piggyback:230]  Out: 1718.7 [CUAKO:7614; Drains:3.7; Stool:440]      LABS  Recent Labs     02/03/20  0615   WBC 12.6*   HGB 8.3*   HCT 26.0*      *   K 4.2   CL 91*   CO2 31   BUN 21*   CREATININE 0.8*   MG 2.00   CALCIUM 8.6       EDUCATION  Patient educated about Disease Process, Medications, Smoking Cessation, Oxygenation, Incentive Spirometry and Deep Breath and Cough, Diabetes, Hyperlipidemia, Smoking Cessation, Nutrition, Exercise and Hypertension    Electronically signed by Kym Pichardo MD on 2/3/2020 at 1:07 PM

## 2020-02-03 NOTE — PLAN OF CARE
Problem: Risk for Impaired Skin Integrity  Goal: Tissue integrity - skin and mucous membranes  Description  Structural intactness and normal physiological function of skin and  mucous membranes. 2/3/2020 0125 by Mary Alice Santos RN  Outcome: Ongoing  Note:   Patient assisted in turning but sometimes refuses. R leg elevated with one pillow. Problem: OXYGENATION/RESPIRATORY FUNCTION  Goal: Patient will maintain patent airway  2/3/2020 0125 by Mary Alice Santos RN  Outcome: Ongoing  Goal: Patient will achieve/maintain normal respiratory rate/effort  Description  Respiratory rate and effort will be within normal limits for the patient  2/3/2020 0125 by Mary Alice Santos RN  Outcome: Ongoing    Problem: HEMODYNAMIC STATUS  Goal: Patient has stable vital signs and fluid balance  2/3/2020 0125 by Mary Alice Santos RN  Outcome: Ongoing      Problem: FLUID AND ELECTROLYTE IMBALANCE  Goal: Fluid and electrolyte balance are achieved/maintained  2/3/2020 0125 by Mary Alice Santos RN  Outcome: Ongoing    Problem: ACTIVITY INTOLERANCE/IMPAIRED MOBILITY  Goal: Mobility/activity is maintained at optimum level for patient  2/3/2020 0125 by Mary Alice Santos RN  Outcome: Ongoing    Problem: Pain:  Description  Pain management should include both nonpharmacologic and pharmacologic interventions. Goal: Pain level will decrease  Description  Pain level will decrease  2/3/2020 0125 by Mary Alice Santos RN  Outcome: Ongoing  Note:   Pain/discomfort being managed with PRN analgesics per MD orders. Pt able to express presence and absence of pain and rate pain appropriately using numerical scale. 2/2/2020 1532 by Luis Miguel Santacruz RN  Outcome: Ongoing  Note:   Pt does complain of pain at times. He does obtain relief with pain medication.   Goal: Control of acute pain  Description  Control of acute pain  2/3/2020 0125 by Mary Alice Santos RN  Outcome: Ongoing    Problem: Falls - Risk of:  Goal: Will remain free from falls  Description  Will remain free from falls  2/3/2020 0125 by Estiven Carter RN  Outcome: Ongoing  Note:   Fall risk assessment completed every shift. All precautions in place. Pt has call light within reach at all times. Room clear of clutter. Pt aware to call for assistance when getting up. 2/2/2020 1532 by Toshia Avelar RN  Outcome: Ongoing  Note:   Pt makes no attempts to get out of bed on his own. Fall risk assessment completed every shift. All precautions in place. Pt has call light within reach at all times. Room clear of clutter. Pt aware to call for assistance when getting up. Goal: Absence of physical injury  Description  Absence of physical injury  2/3/2020 0125 by Estiven Carter RN  Outcome: Ongoing    Problem:  Activity:  Goal: Ability to tolerate increased activity will improve  Description  Ability to tolerate increased activity will improve  2/3/2020 0125 by Estiven Carter RN  Outcome: Ongoing  Goal: Expression of feelings of increased energy will increase  Description  Expression of feelings of increased energy will increase  2/3/2020 0125 by Estiven Carter RN  Outcome: Ongoing    Problem: Cardiac:  Goal: Ability to maintain an adequate cardiac output will improve  Description  Ability to maintain an adequate cardiac output will improve  2/3/2020 0125 by Estiven Carter RN  Outcome: Ongoing  Goal: Complications related to the disease process, condition or treatment will be avoided or minimized  Description  Complications related to the disease process, condition or treatment will be avoided or minimized  2/3/2020 0125 by Estiven Carter RN  Outcome: Ongoing

## 2020-02-03 NOTE — PROGRESS NOTES
Called to patient's room, states he feels like he cannot breathe. LS-diminished. Pulse ox-99%. Called and spoke to Scottie lim from respiratory who states he will be up to see pt.

## 2020-02-04 LAB
ANION GAP SERPL CALCULATED.3IONS-SCNC: 10 MMOL/L (ref 3–16)
BASOPHILS ABSOLUTE: 0.1 K/UL (ref 0–0.2)
BASOPHILS RELATIVE PERCENT: 1.3 %
BUN BLDV-MCNC: 15 MG/DL (ref 7–20)
CALCIUM SERPL-MCNC: 8.5 MG/DL (ref 8.3–10.6)
CHLORIDE BLD-SCNC: 93 MMOL/L (ref 99–110)
CO2: 31 MMOL/L (ref 21–32)
CREAT SERPL-MCNC: 0.6 MG/DL (ref 0.9–1.3)
EOSINOPHILS ABSOLUTE: 0.2 K/UL (ref 0–0.6)
EOSINOPHILS RELATIVE PERCENT: 2 %
GFR AFRICAN AMERICAN: >60
GFR NON-AFRICAN AMERICAN: >60
GLUCOSE BLD-MCNC: 105 MG/DL (ref 70–99)
GLUCOSE BLD-MCNC: 126 MG/DL (ref 70–99)
GLUCOSE BLD-MCNC: 158 MG/DL (ref 70–99)
GLUCOSE BLD-MCNC: 160 MG/DL (ref 70–99)
GLUCOSE BLD-MCNC: 79 MG/DL (ref 70–99)
HCT VFR BLD CALC: 26.1 % (ref 40.5–52.5)
HEMOGLOBIN: 8.4 G/DL (ref 13.5–17.5)
LYMPHOCYTES ABSOLUTE: 0.9 K/UL (ref 1–5.1)
LYMPHOCYTES RELATIVE PERCENT: 7.9 %
MAGNESIUM: 2 MG/DL (ref 1.8–2.4)
MCH RBC QN AUTO: 26.8 PG (ref 26–34)
MCHC RBC AUTO-ENTMCNC: 32.3 G/DL (ref 31–36)
MCV RBC AUTO: 83.2 FL (ref 80–100)
MONOCYTES ABSOLUTE: 1.2 K/UL (ref 0–1.3)
MONOCYTES RELATIVE PERCENT: 10.7 %
NEUTROPHILS ABSOLUTE: 8.4 K/UL (ref 1.7–7.7)
NEUTROPHILS RELATIVE PERCENT: 78.1 %
PDW BLD-RTO: 17.1 % (ref 12.4–15.4)
PERFORMED ON: ABNORMAL
PERFORMED ON: NORMAL
PLATELET # BLD: 325 K/UL (ref 135–450)
PMV BLD AUTO: 7.9 FL (ref 5–10.5)
POTASSIUM REFLEX MAGNESIUM: 4.2 MMOL/L (ref 3.5–5.1)
RBC # BLD: 3.14 M/UL (ref 4.2–5.9)
SODIUM BLD-SCNC: 134 MMOL/L (ref 136–145)
WBC # BLD: 10.8 K/UL (ref 4–11)

## 2020-02-04 PROCEDURE — 2580000003 HC RX 258: Performed by: INTERNAL MEDICINE

## 2020-02-04 PROCEDURE — 94761 N-INVAS EAR/PLS OXIMETRY MLT: CPT

## 2020-02-04 PROCEDURE — 6370000000 HC RX 637 (ALT 250 FOR IP): Performed by: HOSPITALIST

## 2020-02-04 PROCEDURE — 1200000000 HC SEMI PRIVATE

## 2020-02-04 PROCEDURE — 94640 AIRWAY INHALATION TREATMENT: CPT

## 2020-02-04 PROCEDURE — APPNB30 APP NON BILLABLE TIME 0-30 MINS: Performed by: PHYSICIAN ASSISTANT

## 2020-02-04 PROCEDURE — 6370000000 HC RX 637 (ALT 250 FOR IP): Performed by: INTERNAL MEDICINE

## 2020-02-04 PROCEDURE — 6360000002 HC RX W HCPCS: Performed by: INTERNAL MEDICINE

## 2020-02-04 PROCEDURE — 36592 COLLECT BLOOD FROM PICC: CPT

## 2020-02-04 PROCEDURE — 2700000000 HC OXYGEN THERAPY PER DAY

## 2020-02-04 PROCEDURE — 85025 COMPLETE CBC W/AUTO DIFF WBC: CPT

## 2020-02-04 PROCEDURE — APPSS15 APP SPLIT SHARED TIME 0-15 MINUTES: Performed by: PHYSICIAN ASSISTANT

## 2020-02-04 PROCEDURE — 83735 ASSAY OF MAGNESIUM: CPT

## 2020-02-04 PROCEDURE — 80048 BASIC METABOLIC PNL TOTAL CA: CPT

## 2020-02-04 PROCEDURE — 99232 SBSQ HOSP IP/OBS MODERATE 35: CPT | Performed by: INTERNAL MEDICINE

## 2020-02-04 RX ADMIN — IPRATROPIUM BROMIDE AND ALBUTEROL SULFATE 1 AMPULE: .5; 3 SOLUTION RESPIRATORY (INHALATION) at 20:35

## 2020-02-04 RX ADMIN — PIPERACILLIN AND TAZOBACTAM 3.38 G: 3; .375 INJECTION, POWDER, LYOPHILIZED, FOR SOLUTION INTRAVENOUS at 14:26

## 2020-02-04 RX ADMIN — AMIODARONE HYDROCHLORIDE 200 MG: 200 TABLET ORAL at 09:13

## 2020-02-04 RX ADMIN — IPRATROPIUM BROMIDE AND ALBUTEROL SULFATE 1 AMPULE: .5; 3 SOLUTION RESPIRATORY (INHALATION) at 08:15

## 2020-02-04 RX ADMIN — IPRATROPIUM BROMIDE AND ALBUTEROL SULFATE 1 AMPULE: .5; 3 SOLUTION RESPIRATORY (INHALATION) at 01:28

## 2020-02-04 RX ADMIN — INSULIN LISPRO 2 UNITS: 100 INJECTION, SOLUTION INTRAVENOUS; SUBCUTANEOUS at 12:39

## 2020-02-04 RX ADMIN — OXYCODONE HYDROCHLORIDE AND ACETAMINOPHEN 1 TABLET: 7.5; 325 TABLET ORAL at 09:13

## 2020-02-04 RX ADMIN — RANOLAZINE 500 MG: 500 TABLET, FILM COATED, EXTENDED RELEASE ORAL at 09:13

## 2020-02-04 RX ADMIN — SODIUM CHLORIDE, PRESERVATIVE FREE 10 ML: 5 INJECTION INTRAVENOUS at 09:15

## 2020-02-04 RX ADMIN — INSULIN GLARGINE 15 UNITS: 100 INJECTION, SOLUTION SUBCUTANEOUS at 23:19

## 2020-02-04 RX ADMIN — IPRATROPIUM BROMIDE AND ALBUTEROL SULFATE 1 AMPULE: .5; 3 SOLUTION RESPIRATORY (INHALATION) at 23:39

## 2020-02-04 RX ADMIN — OYSTER SHELL CALCIUM WITH VITAMIN D 1 TABLET: 500; 200 TABLET, FILM COATED ORAL at 09:12

## 2020-02-04 RX ADMIN — TAMSULOSIN HYDROCHLORIDE 0.4 MG: 0.4 CAPSULE ORAL at 09:13

## 2020-02-04 RX ADMIN — Medication 10 ML: at 13:41

## 2020-02-04 RX ADMIN — IPRATROPIUM BROMIDE AND ALBUTEROL SULFATE 1 AMPULE: .5; 3 SOLUTION RESPIRATORY (INHALATION) at 16:05

## 2020-02-04 RX ADMIN — ATORVASTATIN CALCIUM 40 MG: 40 TABLET, FILM COATED ORAL at 23:19

## 2020-02-04 RX ADMIN — OXYCODONE HYDROCHLORIDE AND ACETAMINOPHEN 1 TABLET: 7.5; 325 TABLET ORAL at 23:19

## 2020-02-04 RX ADMIN — INSULIN LISPRO 1 UNITS: 100 INJECTION, SOLUTION INTRAVENOUS; SUBCUTANEOUS at 23:19

## 2020-02-04 RX ADMIN — RANOLAZINE 500 MG: 500 TABLET, FILM COATED, EXTENDED RELEASE ORAL at 23:20

## 2020-02-04 RX ADMIN — DEXTROSE MONOHYDRATE 100 MG: 50 INJECTION, SOLUTION INTRAVENOUS at 13:41

## 2020-02-04 RX ADMIN — POLYETHYLENE GLYCOL 3350 17 G: 17 POWDER, FOR SOLUTION ORAL at 09:13

## 2020-02-04 RX ADMIN — PIPERACILLIN AND TAZOBACTAM 3.38 G: 3; .375 INJECTION, POWDER, LYOPHILIZED, FOR SOLUTION INTRAVENOUS at 06:07

## 2020-02-04 RX ADMIN — IPRATROPIUM BROMIDE AND ALBUTEROL SULFATE 1 AMPULE: .5; 3 SOLUTION RESPIRATORY (INHALATION) at 12:09

## 2020-02-04 RX ADMIN — ASPIRIN 81 MG 81 MG: 81 TABLET ORAL at 09:12

## 2020-02-04 RX ADMIN — FUROSEMIDE 20 MG: 20 TABLET ORAL at 09:13

## 2020-02-04 ASSESSMENT — PAIN DESCRIPTION - LOCATION
LOCATION: ABDOMEN

## 2020-02-04 ASSESSMENT — PAIN SCALES - GENERAL
PAINLEVEL_OUTOF10: 8
PAINLEVEL_OUTOF10: 6
PAINLEVEL_OUTOF10: 8
PAINLEVEL_OUTOF10: 5
PAINLEVEL_OUTOF10: 0
PAINLEVEL_OUTOF10: 8
PAINLEVEL_OUTOF10: 5

## 2020-02-04 ASSESSMENT — PAIN DESCRIPTION - ORIENTATION
ORIENTATION: RIGHT

## 2020-02-04 ASSESSMENT — PAIN DESCRIPTION - DESCRIPTORS
DESCRIPTORS: ACHING

## 2020-02-04 ASSESSMENT — PAIN - FUNCTIONAL ASSESSMENT
PAIN_FUNCTIONAL_ASSESSMENT: ACTIVITIES ARE NOT PREVENTED

## 2020-02-04 ASSESSMENT — PAIN DESCRIPTION - FREQUENCY
FREQUENCY: CONTINUOUS

## 2020-02-04 ASSESSMENT — PAIN DESCRIPTION - PAIN TYPE
TYPE: ACUTE PAIN

## 2020-02-04 ASSESSMENT — PAIN DESCRIPTION - PROGRESSION
CLINICAL_PROGRESSION: GRADUALLY IMPROVING
CLINICAL_PROGRESSION: GRADUALLY WORSENING
CLINICAL_PROGRESSION: NOT CHANGED
CLINICAL_PROGRESSION: GRADUALLY IMPROVING
CLINICAL_PROGRESSION: GRADUALLY IMPROVING

## 2020-02-04 ASSESSMENT — PAIN DESCRIPTION - ONSET
ONSET: ON-GOING

## 2020-02-04 NOTE — CARE COORDINATION
Case Management:  Follow up with MAKEDA BLAND, AN AFFILIATE OF [facility] re potential need for irrigation of drain and two iv medication Zosyn iv q 8 and anidulafungin (Eraxis) at discharge. The facility can do the Zosyn but will have to check on the anidulafungin.   Electronically signed by Belenda Krabbe on 2/4/2020 at 4:08 PM

## 2020-02-04 NOTE — PROGRESS NOTES
[facility] Medicine Progress Note     Date:  2/4/2020    PCP: Lyndsay Rodriguez MD (Tel: [phone])    Date of Admission: 1/22/2020        Subjective Pt S/E. No acute events. States he doesn't want to get oob or work with pt/ot here. Objective  Physical exam:  Vitals: /85   Pulse 111   Temp 97.7 °F (36.5 °C) (Oral)   Resp 20   Ht 5' 7\" (1.702 m)   Wt 180 lb 8.9 oz (81.9 kg)   SpO2 98%   BMI 28.28 kg/m²   Gen: NAD, appears comfortable  Head: Normocephalic. Atraumatic. Eyes: EOMI. Good acuity. Conjunctiva clear  ENT: Oral mucosa moist  Neck: No JVD. No obvious thyromegaly. CVS: Nml S1S2, no MRG, irregular  Pulmomary: diminished BS B/L  Gastrointestinal: firm at baseline, tender - non focal. Positive bowel sounds. Colostomy RLL with moderate amount of soft brown stool. ruq drain in place   Musculoskeletal: No edema. Warm, remote LEFT AKA  Neuro: No focal deficit. Moves extremity spontaneously. Psychiatry: Alert, oriented x3, appropriate affect. Not agitated. Skin: Warm, dry with normal turgor. No rash      24HR INTAKE/OUTPUT:      Intake/Output Summary (Last 24 hours) at 2/4/2020 0949  Last data filed at 2/4/2020 0817  Gross per 24 hour   Intake 910 ml   Output 1230 ml   Net -320 ml     I/O last 3 completed shifts:   In: 790 [P.O.:360; IV Piggyback:430]  Out: 1280 [Urine:1150; Drains:5; Stool:125]  I/O this shift:  In: 240 [P.O.:240]  Out: 150 [Urine:150]      Meds:    furosemide  20 mg Oral Daily    piperacillin-tazobactam  3.375 g Intravenous Q8H    insulin glargine  15 Units Subcutaneous Nightly    anidulafungin  100 mg Intravenous Q24H    lidocaine 1 % injection  5 mL Intradermal Once    sodium chloride flush  10 mL Intravenous 2 times per day    insulin lispro  0-12 Units Subcutaneous TID WC    insulin lispro  0-6 Units Subcutaneous Nightly    ipratropium-albuterol  1 ampule Inhalation Q4H WA    amiodarone  200 mg Oral Daily    aspirin  81 mg Oral Daily    atorvastatin  40 abd pelvis this admit: Increased size complex cystic collection RUQ, recurrent LUQ collection- unclear if these are infected or hemorrhagic or both  1/30 - went to IR - s/p percutaneous drainage of R fluid collection  - cont iv zosyn and eaxis  - ID and surgery involved. - keep off Xarelto   - repeat CT abdomen/pelvis today pending  - drain is to be flushed q12hrs    Pleural Effusion - on home O2 level  - appreciate pulm reccs  - s/p thoracocentesis - exudative and bloody effusion drained    Acute on Chronic systolic CHF EF 48% - improved  - held his diuretic for the past 2 days due to abdominal abscess and infection. - showing signs of fluid retention - resumed with low dose lasix  - I/o: - .5 L x 24 hrs; -16 L total  - daily wts: he is at his dry wt of 180 lbs   - cont ASA, statin    Chronic Resp Failure w/ Hypoxia   - on baseline of 2.5L O2 nC, maintain O2 sats > 90%    Centrilobular Emphysema  - cont duonebs    IDDM  - cont lantus - dose decreased  - humalog, SSI    HTN  - stable    Chronic a Fib  - cont amio, rate controlled  - hold xarelto     Diet: DIET CARB CONTROL; Low Sodium (2 GM); Daily Fluid Restriction: 1500 ml      Activity: up as tolerated, limited mobility due to left AKA.      Prophylaxis: scd    Code status: Full Code     Arti Franco DO

## 2020-02-04 NOTE — PLAN OF CARE
Problem: SAFETY  Goal: Free from accidental physical injury  Outcome: Met This Shift     Problem: OXYGENATION/RESPIRATORY FUNCTION  Goal: Patient will maintain patent airway  Outcome: Met This Shift     Problem: Falls - Risk of:  Goal: Will remain free from falls  Outcome: Met This Shift     Problem: DAILY CARE  Goal: Daily care needs are met  Outcome: Ongoing     Problem: PAIN  Goal: Patient's pain/discomfort is manageable  Outcome: Ongoing     Problem: OXYGENATION/RESPIRATORY FUNCTION  Goal: Patient will achieve/maintain normal respiratory rate/effort  Outcome: Ongoing  Note:   Does have dyspnea off and on through shift, especially after activity.      Problem: Pain:  Goal: Pain level will decrease  Outcome: Ongoing

## 2020-02-04 NOTE — PLAN OF CARE
Problem: Risk for Impaired Skin Integrity  Goal: Tissue integrity - skin and mucous membranes  Description  Structural intactness and normal physiological function of skin and  mucous membranes. 2/4/2020 0910 by Valeria Leroy RN  Outcome: Ongoing  2/4/2020 0216 by Manuel Azar RN  Outcome: Ongoing   Pt did have sacral heart on but is refusing. Turning and repositioning have been encouraged. Problem: SAFETY  Goal: Free from accidental physical injury  2/4/2020 0910 by Valeria Leroy RN  Outcome: Ongoing  2/4/2020 0216 by Manuel Azar RN  Outcome: Met This Shift   Fall risk interventions in place, nonskid footwear on R foot. Problem: DAILY CARE  Goal: Daily care needs are met  2/4/2020 0910 by Valeria Leroy RN  Outcome: Ongoing  2/4/2020 0216 by Manuel Azar RN  Outcome: Ongoing   All basic needs met. Problem: PAIN  Goal: Patient's pain/discomfort is manageable  2/4/2020 0910 by Valeria Leroy RN  Outcome: Ongoing  2/4/2020 0216 by Manuel Azar RN  Outcome: Ongoing   Receiving prn pain medication, repositioning offered. Problem: SKIN INTEGRITY  Goal: Skin integrity is maintained or improved  2/4/2020 0910 by Valeria Leroy RN  Outcome: Ongoing  2/4/2020 0216 by Manuel Azar RN  Outcome: Met This Shift   Turning and repositioning encouraged. Problem: KNOWLEDGE DEFICIT  Goal: Patient/S.O. demonstrates understanding of disease process, treatment plan, medications, and discharge instructions. 2/4/2020 0910 by Valeria Leroy RN  Outcome: Ongoing  2/4/2020 0216 by Manuel Azar RN  Outcome: Ongoing   15 minutes of CHF education provided. Problem: DISCHARGE BARRIERS  Goal: Patient's continuum of care needs are met  2/4/2020 0910 by Valeria Leroy RN  Outcome: Ongoing  2/4/2020 0216 by Manuel Azar RN  Outcome: Ongoing   Still ongoing testing.       Problem: OXYGENATION/RESPIRATORY FUNCTION  Goal: Patient will maintain patent airway  2/4/2020 0910 by Valeria Leroy RN  Outcome: Ongoing  2/4/2020 0216 by Radha Jackson RN  Outcome: Met This Shift   On 2.5 L of oxygen, O2 sat within normal limits. Problem: OXYGENATION/RESPIRATORY FUNCTION  Goal: Patient will achieve/maintain normal respiratory rate/effort  Description  Respiratory rate and effort will be within normal limits for the patient  2/4/2020 0910 by Preston Holland RN  Outcome: Ongoing  2/4/2020 0216 by Radha Jackson RN  Outcome: Ongoing  Note:   Does have dyspnea off and on through shift, especially after activity, still SOA on days with activity. Problem: HEMODYNAMIC STATUS  Goal: Patient has stable vital signs and fluid balance  2/4/2020 0910 by Preston Holland RN  Outcome: Ongoing  2/4/2020 0216 by Radha Jackson RN  Outcome: Met This Shift     Problem: FLUID AND ELECTROLYTE IMBALANCE  Goal: Fluid and electrolyte balance are achieved/maintained  2/4/2020 0910 by Preston Holland RN  Outcome: Ongoing  2/4/2020 0216 by Radha Jackson RN  Outcome: Met This Shift   15 minutes of CHF education provided. Problem: ACTIVITY INTOLERANCE/IMPAIRED MOBILITY  Goal: Mobility/activity is maintained at optimum level for patient  2/4/2020 0910 by Preston Holland RN  Outcome: Ongoing  2/4/2020 0216 by Radha Jackson RN  Outcome: Ongoing   Physical activity encouraged. Pt has been refusing at times. Problem: Pain:  Goal: Pain level will decrease  Description  Pain level will decrease  2/4/2020 0910 by Preston Holland RN  Outcome: Ongoing  2/4/2020 0216 by Radha Jackson RN  Outcome: Ongoing   Still complains of pain. Using repositioning and prn pain medications. Problem: Falls - Risk of:  Goal: Will remain free from falls  Description  Will remain free from falls  2/4/2020 0910 by Preston Holland RN  Outcome: Ongoing  2/4/2020 0216 by Radha Jackson RN  Outcome: Met This Shift   Fall risk interventions in place.       Problem: Cardiac:  Goal: Complications related to the disease process, condition or treatment will be avoided or minimized  Description  Complications related to the disease process, condition or treatment will be avoided or minimized  2/4/2020 0910 by Randall Snowden RN  Outcome: Ongoing  2/4/2020 0216 by Valery Blake RN  Outcome: Ongoing   15 minutes of CHF education provided.
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
## 2020-02-04 NOTE — PROGRESS NOTES
[facility] [phone]                                     Daily Progress Note                                                         Pt Name: Misael Chu  Medical Record Number: [id number]  Date of Birth 1960   Today's Date: 2/4/2020  Chief Complaint   Patient presents with    Shortness of Breath     x4 days        ASSESSMENT/PLAN  Right upper quadrant cystic mass  -Drained by IR with what appears to be old blood. Drain remains. -Repeat CT yesterday redemonstrated a infrahepatic complex fluid collection with internal hyperdensity with a drain in place, and that collection is mildly decreased in size, now measuring 12.4  x 11.3 cm (previously 14.1 x 12.5 cm). -Patient states he is wheel chair bound at the [facility]  tolerating diet, and feeling a little better today    Lety Aquino is tolerating diet.  + ostomy output. Abdominal pain improving. OBJECTIVE  VITALS:  height is 5' 7\" (1.702 m) and weight is 180 lb 8.9 oz (81.9 kg). His oral temperature is 97.7 °F (36.5 °C). His blood pressure is 119/82 and his pulse is 101. His respiration is 16 and oxygen saturation is 96%. INTAKE/OUTPUT:      Intake/Output Summary (Last 24 hours) at 2/4/2020 1202  Last data filed at 2/4/2020 1032  Gross per 24 hour   Intake 910 ml   Output 1180 ml   Net -270 ml     GENERAL: alert, no distress  ABDOMEN: Soft, nontender. Drain right abdomen serosanguinous. Colostomy present with stool in appliance. I/O last 3 completed shifts:   In: 5 [P.O.:360; IV Piggyback:430]  Out: 1280 [Urine:1150; Drains:5; Stool:125]      LABS  Recent Labs     02/04/20  0605   WBC 10.8   HGB 8.4*   HCT 26.1*      *   K 4.2   CL 93*   CO2 31   BUN 15   CREATININE 0.6*   MG 2.00   CALCIUM 8.5       EDUCATION  Patient educated about Disease Process, Medications, Smoking Cessation, Oxygenation, Incentive Spirometry and Deep Breath and Cough, Diabetes, Hyperlipidemia, Smoking Cessation, Nutrition, Exercise and Hypertension    Electronically signed by IVANNA Morris on 2/4/2020 at 12:02 PM

## 2020-02-05 ENCOUNTER — APPOINTMENT (OUTPATIENT)
Dept: GENERAL RADIOLOGY | Age: 60
DRG: 194 | End: 2020-02-05
Payer: COMMERCIAL

## 2020-02-05 LAB
ANAEROBIC CULTURE: NORMAL
ANION GAP SERPL CALCULATED.3IONS-SCNC: 9 MMOL/L (ref 3–16)
BASOPHILS ABSOLUTE: 0.1 K/UL (ref 0–0.2)
BASOPHILS RELATIVE PERCENT: 1 %
BUN BLDV-MCNC: 14 MG/DL (ref 7–20)
CALCIUM SERPL-MCNC: 8.7 MG/DL (ref 8.3–10.6)
CHLORIDE BLD-SCNC: 95 MMOL/L (ref 99–110)
CO2: 31 MMOL/L (ref 21–32)
CREAT SERPL-MCNC: 0.5 MG/DL (ref 0.9–1.3)
EOSINOPHILS ABSOLUTE: 0.2 K/UL (ref 0–0.6)
EOSINOPHILS RELATIVE PERCENT: 2.4 %
GFR AFRICAN AMERICAN: >60
GFR NON-AFRICAN AMERICAN: >60
GLUCOSE BLD-MCNC: 139 MG/DL (ref 70–99)
GLUCOSE BLD-MCNC: 141 MG/DL (ref 70–99)
GLUCOSE BLD-MCNC: 159 MG/DL (ref 70–99)
GLUCOSE BLD-MCNC: 179 MG/DL (ref 70–99)
GLUCOSE BLD-MCNC: 198 MG/DL (ref 70–99)
GRAM STAIN RESULT: NORMAL
HCT VFR BLD CALC: 26.4 % (ref 40.5–52.5)
HEMOGLOBIN: 8.4 G/DL (ref 13.5–17.5)
LYMPHOCYTES ABSOLUTE: 1 K/UL (ref 1–5.1)
LYMPHOCYTES RELATIVE PERCENT: 9.3 %
MAGNESIUM: 1.9 MG/DL (ref 1.8–2.4)
MCH RBC QN AUTO: 26.7 PG (ref 26–34)
MCHC RBC AUTO-ENTMCNC: 31.8 G/DL (ref 31–36)
MCV RBC AUTO: 83.9 FL (ref 80–100)
MONOCYTES ABSOLUTE: 1.1 K/UL (ref 0–1.3)
MONOCYTES RELATIVE PERCENT: 11.2 %
NEUTROPHILS ABSOLUTE: 7.8 K/UL (ref 1.7–7.7)
NEUTROPHILS RELATIVE PERCENT: 76.1 %
PDW BLD-RTO: 17.1 % (ref 12.4–15.4)
PERFORMED ON: ABNORMAL
PLATELET # BLD: 278 K/UL (ref 135–450)
PMV BLD AUTO: 8 FL (ref 5–10.5)
POTASSIUM REFLEX MAGNESIUM: 4.2 MMOL/L (ref 3.5–5.1)
RBC # BLD: 3.15 M/UL (ref 4.2–5.9)
SODIUM BLD-SCNC: 135 MMOL/L (ref 136–145)
WBC # BLD: 10.3 K/UL (ref 4–11)
WOUND/ABSCESS: NORMAL

## 2020-02-05 PROCEDURE — 2580000003 HC RX 258: Performed by: INTERNAL MEDICINE

## 2020-02-05 PROCEDURE — 6370000000 HC RX 637 (ALT 250 FOR IP): Performed by: INTERNAL MEDICINE

## 2020-02-05 PROCEDURE — 94760 N-INVAS EAR/PLS OXIMETRY 1: CPT

## 2020-02-05 PROCEDURE — 2700000000 HC OXYGEN THERAPY PER DAY

## 2020-02-05 PROCEDURE — 83735 ASSAY OF MAGNESIUM: CPT

## 2020-02-05 PROCEDURE — 6360000002 HC RX W HCPCS: Performed by: INTERNAL MEDICINE

## 2020-02-05 PROCEDURE — 85025 COMPLETE CBC W/AUTO DIFF WBC: CPT

## 2020-02-05 PROCEDURE — 6360000002 HC RX W HCPCS: Performed by: FAMILY MEDICINE

## 2020-02-05 PROCEDURE — 6370000000 HC RX 637 (ALT 250 FOR IP): Performed by: HOSPITALIST

## 2020-02-05 PROCEDURE — 1200000000 HC SEMI PRIVATE

## 2020-02-05 PROCEDURE — 94640 AIRWAY INHALATION TREATMENT: CPT

## 2020-02-05 PROCEDURE — 99232 SBSQ HOSP IP/OBS MODERATE 35: CPT | Performed by: INTERNAL MEDICINE

## 2020-02-05 PROCEDURE — 80048 BASIC METABOLIC PNL TOTAL CA: CPT

## 2020-02-05 PROCEDURE — 74022 RADEX COMPL AQT ABD SERIES: CPT

## 2020-02-05 RX ORDER — FUROSEMIDE 10 MG/ML
40 INJECTION INTRAMUSCULAR; INTRAVENOUS DAILY
Status: DISCONTINUED | OUTPATIENT
Start: 2020-02-06 | End: 2020-02-06

## 2020-02-05 RX ORDER — FUROSEMIDE 10 MG/ML
40 INJECTION INTRAMUSCULAR; INTRAVENOUS ONCE
Status: COMPLETED | OUTPATIENT
Start: 2020-02-05 | End: 2020-02-05

## 2020-02-05 RX ADMIN — POLYETHYLENE GLYCOL 3350 17 G: 17 POWDER, FOR SOLUTION ORAL at 21:18

## 2020-02-05 RX ADMIN — OYSTER SHELL CALCIUM WITH VITAMIN D 1 TABLET: 500; 200 TABLET, FILM COATED ORAL at 10:28

## 2020-02-05 RX ADMIN — FUROSEMIDE 40 MG: 10 INJECTION, SOLUTION INTRAMUSCULAR; INTRAVENOUS at 13:06

## 2020-02-05 RX ADMIN — FUROSEMIDE 20 MG: 20 TABLET ORAL at 10:28

## 2020-02-05 RX ADMIN — RANOLAZINE 500 MG: 500 TABLET, FILM COATED, EXTENDED RELEASE ORAL at 21:19

## 2020-02-05 RX ADMIN — PIPERACILLIN AND TAZOBACTAM 3.38 G: 3; .375 INJECTION, POWDER, LYOPHILIZED, FOR SOLUTION INTRAVENOUS at 06:13

## 2020-02-05 RX ADMIN — OXYCODONE HYDROCHLORIDE AND ACETAMINOPHEN 1 TABLET: 7.5; 325 TABLET ORAL at 21:19

## 2020-02-05 RX ADMIN — RANOLAZINE 500 MG: 500 TABLET, FILM COATED, EXTENDED RELEASE ORAL at 10:27

## 2020-02-05 RX ADMIN — IPRATROPIUM BROMIDE AND ALBUTEROL SULFATE 1 AMPULE: .5; 3 SOLUTION RESPIRATORY (INHALATION) at 20:18

## 2020-02-05 RX ADMIN — AMIODARONE HYDROCHLORIDE 200 MG: 200 TABLET ORAL at 10:28

## 2020-02-05 RX ADMIN — IPRATROPIUM BROMIDE AND ALBUTEROL SULFATE 1 AMPULE: .5; 3 SOLUTION RESPIRATORY (INHALATION) at 17:21

## 2020-02-05 RX ADMIN — INSULIN LISPRO 1 UNITS: 100 INJECTION, SOLUTION INTRAVENOUS; SUBCUTANEOUS at 21:20

## 2020-02-05 RX ADMIN — PIPERACILLIN AND TAZOBACTAM 3.38 G: 3; .375 INJECTION, POWDER, LYOPHILIZED, FOR SOLUTION INTRAVENOUS at 21:45

## 2020-02-05 RX ADMIN — ASPIRIN 81 MG 81 MG: 81 TABLET ORAL at 10:28

## 2020-02-05 RX ADMIN — ATORVASTATIN CALCIUM 40 MG: 40 TABLET, FILM COATED ORAL at 21:19

## 2020-02-05 RX ADMIN — IPRATROPIUM BROMIDE AND ALBUTEROL SULFATE 1 AMPULE: .5; 3 SOLUTION RESPIRATORY (INHALATION) at 08:08

## 2020-02-05 RX ADMIN — IPRATROPIUM BROMIDE AND ALBUTEROL SULFATE 1 AMPULE: .5; 3 SOLUTION RESPIRATORY (INHALATION) at 12:49

## 2020-02-05 RX ADMIN — SODIUM CHLORIDE, PRESERVATIVE FREE 10 ML: 5 INJECTION INTRAVENOUS at 21:25

## 2020-02-05 RX ADMIN — INSULIN GLARGINE 15 UNITS: 100 INJECTION, SOLUTION SUBCUTANEOUS at 21:19

## 2020-02-05 RX ADMIN — PIPERACILLIN AND TAZOBACTAM 3.38 G: 3; .375 INJECTION, POWDER, LYOPHILIZED, FOR SOLUTION INTRAVENOUS at 15:13

## 2020-02-05 RX ADMIN — TAMSULOSIN HYDROCHLORIDE 0.4 MG: 0.4 CAPSULE ORAL at 10:28

## 2020-02-05 RX ADMIN — INSULIN LISPRO 2 UNITS: 100 INJECTION, SOLUTION INTRAVENOUS; SUBCUTANEOUS at 10:29

## 2020-02-05 RX ADMIN — DEXTROSE MONOHYDRATE 100 MG: 50 INJECTION, SOLUTION INTRAVENOUS at 13:07

## 2020-02-05 ASSESSMENT — PAIN DESCRIPTION - PROGRESSION
CLINICAL_PROGRESSION: NOT CHANGED

## 2020-02-05 ASSESSMENT — PAIN DESCRIPTION - PAIN TYPE: TYPE: ACUTE PAIN;CHRONIC PAIN

## 2020-02-05 ASSESSMENT — PAIN SCALES - WONG BAKER: WONGBAKER_NUMERICALRESPONSE: 6

## 2020-02-05 ASSESSMENT — PAIN DESCRIPTION - FREQUENCY: FREQUENCY: INTERMITTENT

## 2020-02-05 ASSESSMENT — PAIN SCALES - GENERAL: PAINLEVEL_OUTOF10: 8

## 2020-02-05 ASSESSMENT — PAIN DESCRIPTION - ONSET: ONSET: ON-GOING

## 2020-02-05 ASSESSMENT — PAIN DESCRIPTION - LOCATION: LOCATION: ABDOMEN

## 2020-02-05 ASSESSMENT — PAIN DESCRIPTION - DESCRIPTORS: DESCRIPTORS: CRAMPING;DISCOMFORT

## 2020-02-05 ASSESSMENT — PAIN - FUNCTIONAL ASSESSMENT: PAIN_FUNCTIONAL_ASSESSMENT: ACTIVITIES ARE NOT PREVENTED

## 2020-02-05 ASSESSMENT — PAIN DESCRIPTION - ORIENTATION: ORIENTATION: OTHER (COMMENT)

## 2020-02-05 NOTE — PROGRESS NOTES
Care of patient being transferred to [facility]. This RN to provide report and handoff of care.

## 2020-02-05 NOTE — PROGRESS NOTES
[facility] Medicine Progress Note     Date:  2/5/2020    PCP: Jesus Staley MD (Tel: [phone])    Date of Admission: 1/22/2020      Subjective Pt S/E. Pt is more dyspneic and has more abdominal pain today. States he hasn't urinated much this am. Denies a cough. Objective  Physical exam:  Vitals: /83   Pulse 101   Temp 97.8 °F (36.6 °C) (Oral)   Resp 20   Ht 5' 7\" (1.702 m)   Wt 188 lb 11.4 oz (85.6 kg)   SpO2 97%   BMI 29.56 kg/m²   Gen: appears uncomfortable   Head: Normocephalic. Atraumatic. Eyes: EOMI. Good acuity. Conjunctiva clear  ENT: Oral mucosa moist  Neck: No JVD. No obvious thyromegaly. CVS: Nml S1S2, no MRG, irregular, tachycardic  Pulmomary: diminished BS B/L, tachypneic, no wheezing, rales in bl lung bases   Gastrointestinal: firm at baseline, tender - non focal. normal bowel sounds. Colostomy RLL with small amount of soft brown stool and more gas. ruq drain in place   Musculoskeletal: No edema. Warm, remote LEFT AKA  Neuro: No focal deficit. Moves extremity spontaneously. Psychiatry: Alert, oriented x3  Skin: Warm, dry with normal turgor. No rash      24HR INTAKE/OUTPUT:      Intake/Output Summary (Last 24 hours) at 2/5/2020 0943  Last data filed at 2/5/2020 7658  Gross per 24 hour   Intake 580 ml   Output 665 ml   Net -85 ml     I/O last 3 completed shifts: In: 36 [P.O.:720; IV Piggyback:100]  Out: 815 [Urine:550; Stool:265]  No intake/output data recorded.       Meds:    furosemide  20 mg Oral Daily    piperacillin-tazobactam  3.375 g Intravenous Q8H    insulin glargine  15 Units Subcutaneous Nightly    anidulafungin  100 mg Intravenous Q24H    lidocaine 1 % injection  5 mL Intradermal Once    sodium chloride flush  10 mL Intravenous 2 times per day    insulin lispro  0-12 Units Subcutaneous TID WC    insulin lispro  0-6 Units Subcutaneous Nightly    ipratropium-albuterol  1 ampule Inhalation Q4H WA    amiodarone  200 mg Oral Daily    aspirin  81 mg Oral Daily    atorvastatin  40 mg Oral Nightly    calcium-vitamin D  1 tablet Oral Daily    polyethylene glycol  17 g Oral BID    ranolazine  500 mg Oral BID    tamsulosin  0.4 mg Oral Daily       Infusions:    diltiazem Stopped (02/01/20 1157)    dextrose           PRN Meds: sodium chloride flush, ipratropium-albuterol, potassium chloride **OR** potassium alternative oral replacement **OR** potassium chloride, magnesium sulfate, glucose, dextrose, glucagon (rDNA), dextrose, nitroGLYCERIN, oxyCODONE-acetaminophen    Labs/imaging:  CBC:   Recent Labs     02/03/20  0615 02/04/20  0605 02/05/20  0646   WBC 12.6* 10.8 10.3   HGB 8.3* 8.4* 8.4*    325 278         BMP:    Recent Labs     02/03/20  0615 02/04/20  0605 02/05/20  0645   * 134* 135*   K 4.2 4.2 4.2   CL 91* 93* 95*   CO2 31 31 31   BUN 21* 15 14   CREATININE 0.8* 0.6* 0.5*   GLUCOSE 106* 126* 159*         Hepatic: No results for input(s): AST, ALT, ALB, BILITOT, ALKPHOS in the last 72 hours. Troponin: No results for input(s): TROPONINI in the last 72 hours. BNP: No results for input(s): BNP in the last 72 hours. INR:   No results for input(s): INR in the last 72 hours. Reviewed imaging and reports noted      Assessment:  Principal Problem:    Acute on chronic respiratory failure with hypoxia (HCC)  Active Problems:    PAD (peripheral artery disease) (HCC)    Acute on chronic combined systolic and diastolic HF (heart failure) (HCC)    Centrilobular emphysema (HCC)    Diabetes mellitus type 2, controlled (HCC)    Biventricular failure (HCC)    CAD (coronary artery disease)    Pulmonary HTN (HCC)    Systolic CHF (HCC)    Bilateral pleural effusion    Abdominal fluid collection  Resolved Problems:    * No resolved hospital problems. *      Plan:    Intraabdominal abscess - more abdominal pain today with increasing wob; unsure if this is due to an abdominal process or pulmonary. His rales are basilar and chronic.  There are nor signs of acute infection. Will check a cxr to assess worsening edema, pna. Will check acute abdomen xr as well, although his bowel sounds were normal on exam    Complex surgical Hx - Hx of bowel obstruction s/p ex lap and colostomy. Later complicated by intraabdominal abscess requiring drainage and completed IV zosyn back in Nov 2019. CT abd pelvis this admit: Increased size complex cystic collection RUQ, recurrent LUQ collection- unclear if these are infected or hemorrhagic or both  1/30 - went to IR - s/p percutaneous drainage of R fluid collection  - cont iv zosyn and eraxis; can discharge with fluconazole and zosyn when ready  - fluid cultures negative  - ID and surgery involved  - keep off Xarelto   - repeat CT abdomen/pelviswith ruq fluid collection decreased in size    - drain is to be flushed q12hrs    Acute on Chronic systolic CHF EF 85% - improved  - cont lasix  - will give an extra dose of lasix 40 mg iv now  - I/o: - .5 L x 24 hrs; -16 L total  - daily wts: dry wt of 180 lbs, today 188? This is not likely accurate  - cont ASA, statin    Chronic Resp Failure w/ Hypoxia   - on baseline of 2.5L O2 nC, maintain O2 sats > 90%    Centrilobular Emphysema  - cont duonebs    IDDM  - cont lantus - dose decreased  - humalog, SSI    HTN  - stable    Chronic a Fib  - cont amio, rate controlled  - hold xarelto     Pleural Effusion   - appreciate pulm reccs  - s/p thoracocentesis - exudative and bloody effusion drained    Diet: DIET CARB CONTROL; Low Sodium (2 GM); Daily Fluid Restriction: 1500 ml      Activity: up as tolerated, limited mobility due to left AKA.      Prophylaxis: scd    Code status: Full Code     Arti Franco DO
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
## 2020-02-05 NOTE — PROGRESS NOTES
Patient called for asst as he stated that he was having SOB. Patient seems anxious on RN arrival. RN placed [facility] >45. Patient with O2 saturation at 96%. RT at bedside to administer breathing treatment. Patient very rude and verbally aggressive with RN. RN maintained attempts to help patient as patient's colostomy bag was full or air and feces. RN emptied bag but patient was very agitated with RN in regards as he felt bag was not important to empty. RN explained that bag was going to explode if not emptied. Patient then declined to have RN care for him. RN spoke with charge RN in regards and stated that RN will remain caring for patient, but will only go in room for medication administration.

## 2020-02-05 NOTE — CARE COORDINATION
Case Management:  Up dated [facility] on planned antibiotics at discharge which will include IV Zosyn x 7 days and oral Fluconazole .   Electronically signed by Kat Puckett on 2/5/2020 at 2:23 PM

## 2020-02-05 NOTE — PROGRESS NOTES
[facility] [phone]                                     Daily Progress Note                                                         Pt Name: Arleth Root  Medical Record Number: [id number]  Date of Birth 1960   Today's Date: 2/5/2020  Chief Complaint   Patient presents with    Shortness of Breath     x4 days        ASSESSMENT/PLAN  Right upper quadrant cystic mass  -Drained by IR with what appears to be old blood. Drain remains. -Repeat CT 2/3 redemonstrated a infrahepatic complex fluid collection with internal hyperdensity with a drain in place, and that collection is mildly decreased in size, now measuring 12.4  x 11.3 cm (previously 14.1 x 12.5 cm). -Patient states he is wheel chair bound at the [facility]  tolerating diet  -More abdominal pain and shortness of breath today. Daphney Garcia is tolerating diet.  + ostomy output. Abdominal pain worse today, more distended. SOB. OBJECTIVE  VITALS:  height is 5' 7\" (1.702 m) and weight is 188 lb 11.4 oz (85.6 kg). His oral temperature is 97.8 °F (36.6 °C). His blood pressure is 133/83 and his pulse is 101. His respiration is 20 and oxygen saturation is 97%. INTAKE/OUTPUT:      Intake/Output Summary (Last 24 hours) at 2/5/2020 1112  Last data filed at 2/5/2020 1112  Gross per 24 hour   Intake 460 ml   Output 665 ml   Net -205 ml     GENERAL: alert, no distress  ABDOMEN: Soft, nontender. Drain right abdomen serosanguinous. Colostomy present with stool in appliance. I/O last 3 completed shifts:   In: 36 [P.O.:720; IV Piggyback:100]  Out: 815 [Urine:550; Stool:265]      LABS  Recent Labs     02/05/20  0645 02/05/20  0646   WBC  --  10.3   HGB  --  8.4*   HCT  --  26.4*   PLT  --  278   *  --    K 4.2  --    CL 95*  --    CO2 31  --    BUN 14  --    CREATININE 0.5*  --    MG 1.90  --    CALCIUM 8.7  --        EDUCATION  Patient educated about Disease Process, Medications, Smoking Cessation, Oxygenation, Incentive Spirometry and Deep Breath and Cough, Diabetes, Hyperlipidemia, Smoking Cessation, Nutrition, Exercise and Hypertension    Electronically signed by JOON Villegas - CNP\-C on 2/5/2020 at 11:12 AM

## 2020-02-05 NOTE — PROGRESS NOTES
Nutrition Assessment    Type and Reason for Visit: Consult, Reassess    Nutrition Recommendations:   Continue current diet. Consume >50% of meals. Nutrition Assessment: Patient w/ shortness of breath d/t respiratory failure. Hx AKA and drain placement for enlarging R abdominal cystic lesion. CBW is 188lb. Patient continuing carb control, low sodium diet w/ fluid restriction of 1500ml. Patient did not want diet education at this time. Patient stated having an ok appetite, and had trouble eating d/t shortness of breath. Malnutrition Assessment:  · Malnutrition Status: No malnutrition  · Context: Acute illness or injury  · Findings of the 6 clinical characteristics of malnutrition (Minimum of 2 out of 6 clinical characteristics is required to make the diagnosis of moderate or severe Protein Calorie Malnutrition based on AND/ASPEN Guidelines):  1. Energy Intake-Greater than 75% of estimated energy requirement, Greater than or equal to 1 month    2. Weight Loss-No significant weight loss,    3. Fat Loss-No significant subcutaneous fat loss,    4. Muscle Loss-No significant muscle mass loss,    5. Fluid Accumulation-No significant fluid accumulation,    6.  Strength-Not measured    Nutrition Risk Level: Low    Nutrient Needs:  · Estimated Daily Total Kcal: 1700 - 2125kcal (20 - 25kcal/kg)  · Estimated Daily Protein (g): 87g - 134g (1.3 - 2g/kg)   · Estimated Daily Total Fluid (ml/day): per MD    Nutrition Diagnosis:   · Problem: No nutrition diagnosis at this time    Objective Information:  · Nutrition-Focused Physical Findings: RUE, LUE, RLE trace edema.  LLE edema above the amputation   · Wound Type: None  · Current Nutrition Therapies:  · Oral Diet Orders: 2gm Sodium, Carb Control 4 Carbs/Meal, Fluid Restriction   · Oral Diet intake: %  · Oral Nutrition Supplement (ONS) Orders: None  · ONS intake:    · Anthropometric Measures:  · Ht: 5' 7\" (170.2 cm)   · Current Body Wt: 188 lb (85.3 kg)  · Ideal Body Wt: 148 lb (67.1 kg), % Ideal Body 127%  · Adjusted Body Wt: 126 lb (57.2 kg), body weight adjusted for AKA  · BMI Classification: BMI 25.0 - 29.9 Overweight    Nutrition Interventions:   Continue current diet  Continued Inpatient Monitoring, Education declined    Nutrition Evaluation:   · Evaluation: Progressing toward goals   · Goals: Continue to tolerate most appropriate form of nutriton     · Monitoring: Meal Intake, Diet Tolerance, Weight, Skin Integrity, Pertinent Labs, Constipation      Electronically signed by Bre Longoria on 2/5/20 at 10:55 AM    Contact Number: [phone] - [phone]

## 2020-02-05 NOTE — PLAN OF CARE
Problem: Risk for Impaired Skin Integrity  Goal: Tissue integrity - skin and mucous membranes  Description  Structural intactness and normal physiological function of skin and  mucous membranes. 2/5/2020 0506 by Lory Mohs, RN  Outcome: Ongoing  Note:   Patient is refusing to turn this shift, RN attempted to educate on importance but patient refused education. 2/5/2020 0506 by Lory Mohs, RN  Outcome: Ongoing     Problem: SAFETY  Goal: Free from accidental physical injury  2/5/2020 0506 by Lory Mohs, RN  Outcome: Ongoing  2/5/2020 0506 by Lory Mohs, RN  Outcome: Ongoing     Problem: DAILY CARE  Goal: Daily care needs are met  2/5/2020 0506 by Lory Mohs, RN  Outcome: Ongoing  2/5/2020 0506 by Lory Mohs, RN  Outcome: Ongoing     Problem: PAIN  Goal: Patient's pain/discomfort is manageable  2/5/2020 0506 by Lory Mohs, RN  Outcome: Ongoing  Note:   No C/O pain this shift. Pain/discomfort being managed with PRN analgesics per MD orders. Pt able to express presence and absence of pain and rate pain appropriately using numerical scale. 2/5/2020 0506 by Lory Mohs, RN  Outcome: Ongoing     Problem: SKIN INTEGRITY  Goal: Skin integrity is maintained or improved  2/5/2020 0506 by Lory Mohs, RN  Outcome: Ongoing  Note:   Patient educated on techniques to promote skin integrity. Patient refusing to be educated, says he doesn't need it and doesn't want to turn. 2/5/2020 0506 by Lory Mohs, RN  Outcome: Ongoing     Problem: KNOWLEDGE DEFICIT  Goal: Patient/S.O. demonstrates understanding of disease process, treatment plan, medications, and discharge instructions.   2/5/2020 0506 by Lory Mohs, RN  Outcome: Ongoing  2/5/2020 0506 by Lory Mohs, RN  Outcome: Ongoing     Problem: DISCHARGE BARRIERS  Goal: Patient's continuum of care needs are met  2/5/2020 0506 by Lory Mohs, RN  Outcome: Ongoing  2/5/2020 0506 by Lory Mohs, RN  Outcome: Ongoing     Problem: OXYGENATION/RESPIRATORY FUNCTION  Goal: Patient will maintain patent airway  2/5/2020 0506 by Anastacio Esquivel RN  Outcome: Ongoing  2/5/2020 0506 by Anastacio Esquivel RN  Outcome: Ongoing  Goal: Patient will achieve/maintain normal respiratory rate/effort  Description  Respiratory rate and effort will be within normal limits for the patient  2/5/2020 0506 by Anastacio Esquivel, RN  Outcome: Ongoing  2/5/2020 0506 by Anastacio Esquivel, RN  Outcome: Ongoing     Problem: HEMODYNAMIC STATUS  Goal: Patient has stable vital signs and fluid balance  2/5/2020 0506 by Anastacio Esquivel, RN  Outcome: Ongoing  2/5/2020 0506 by Anastacio Esquivel RN  Outcome: Ongoing     Problem: FLUID AND ELECTROLYTE IMBALANCE  Goal: Fluid and electrolyte balance are achieved/maintained  2/5/2020 0506 by Anastacio Esquivel, RN  Outcome: Ongoing  2/5/2020 0506 by Anastacio Esquivel RN  Outcome: Ongoing     Problem: ACTIVITY INTOLERANCE/IMPAIRED MOBILITY  Goal: Mobility/activity is maintained at optimum level for patient  2/5/2020 0506 by Anastacio Esquivel, RN  Outcome: Ongoing  2/5/2020 0506 by Anastacio Esquivel RN  Outcome: Ongoing     Problem: Pain:  Goal: Pain level will decrease  Description  Pain level will decrease  2/5/2020 0506 by Anastacio Esquivel, RN  Outcome: Ongoing  2/5/2020 0506 by Anastacio Esquivel RN  Outcome: Ongoing  Goal: Control of acute pain  Description  Control of acute pain  2/5/2020 0506 by Anastacio Esquivel, RN  Outcome: Ongoing  2/5/2020 0506 by Anastacio Esquivel RN  Outcome: Ongoing  Goal: Control of chronic pain  Description  Control of chronic pain  2/5/2020 0506 by Anastacio Esquivel, RN  Outcome: Ongoing  2/5/2020 0506 by Anastacio Esquivel RN  Outcome: Ongoing     Problem: Falls - Risk of:  Goal: Will remain free from falls  Description  Will remain free from falls  2/5/2020 0506 by Anastacio Esquivel, RN  Outcome: Ongoing  2/5/2020 0506 by Anastacio Esquivel RN  Outcome: Ongoing  Goal: Absence of physical injury  Description  Absence of physical injury  2/5/2020 0506 by Abdiaziz Flynn

## 2020-02-05 NOTE — PROGRESS NOTES
This RN answered patient's call light. Complaining of shortness of breath. SpO2 96% on 2.5L. RT called, will be up to give PRN duoneb treatment.

## 2020-02-05 NOTE — PROGRESS NOTES
Patient refusing to accept night medication administration from RN caring for him. RN explained that other RNs are currently caring for their patient's and cannot be at his side at this time. RN re-attempted to administer medications but patient insists that meds not be administered by this RN.

## 2020-02-06 LAB
ANION GAP SERPL CALCULATED.3IONS-SCNC: 10 MMOL/L (ref 3–16)
BASOPHILS ABSOLUTE: 0.1 K/UL (ref 0–0.2)
BASOPHILS RELATIVE PERCENT: 0.9 %
BUN BLDV-MCNC: 10 MG/DL (ref 7–20)
CALCIUM SERPL-MCNC: 8.7 MG/DL (ref 8.3–10.6)
CHLORIDE BLD-SCNC: 93 MMOL/L (ref 99–110)
CO2: 33 MMOL/L (ref 21–32)
CREAT SERPL-MCNC: 0.5 MG/DL (ref 0.9–1.3)
EOSINOPHILS ABSOLUTE: 0.2 K/UL (ref 0–0.6)
EOSINOPHILS RELATIVE PERCENT: 2 %
GFR AFRICAN AMERICAN: >60
GFR NON-AFRICAN AMERICAN: >60
GLUCOSE BLD-MCNC: 172 MG/DL (ref 70–99)
GLUCOSE BLD-MCNC: 76 MG/DL (ref 70–99)
GLUCOSE BLD-MCNC: 77 MG/DL (ref 70–99)
GLUCOSE BLD-MCNC: 82 MG/DL (ref 70–99)
GLUCOSE BLD-MCNC: 89 MG/DL (ref 70–99)
HCT VFR BLD CALC: 26.3 % (ref 40.5–52.5)
HEMOGLOBIN: 8.3 G/DL (ref 13.5–17.5)
LYMPHOCYTES ABSOLUTE: 0.9 K/UL (ref 1–5.1)
LYMPHOCYTES RELATIVE PERCENT: 7.5 %
MAGNESIUM: 1.9 MG/DL (ref 1.8–2.4)
MCH RBC QN AUTO: 26.2 PG (ref 26–34)
MCHC RBC AUTO-ENTMCNC: 31.7 G/DL (ref 31–36)
MCV RBC AUTO: 82.9 FL (ref 80–100)
MONOCYTES ABSOLUTE: 1.5 K/UL (ref 0–1.3)
MONOCYTES RELATIVE PERCENT: 13.4 %
NEUTROPHILS ABSOLUTE: 8.7 K/UL (ref 1.7–7.7)
NEUTROPHILS RELATIVE PERCENT: 76.2 %
PDW BLD-RTO: 17 % (ref 12.4–15.4)
PERFORMED ON: ABNORMAL
PERFORMED ON: NORMAL
PLATELET # BLD: 342 K/UL (ref 135–450)
PMV BLD AUTO: 8.3 FL (ref 5–10.5)
POTASSIUM REFLEX MAGNESIUM: 4.3 MMOL/L (ref 3.5–5.1)
PRO-BNP: 6380 PG/ML (ref 0–124)
RBC # BLD: 3.18 M/UL (ref 4.2–5.9)
SODIUM BLD-SCNC: 136 MMOL/L (ref 136–145)
WBC # BLD: 11.5 K/UL (ref 4–11)

## 2020-02-06 PROCEDURE — 94760 N-INVAS EAR/PLS OXIMETRY 1: CPT

## 2020-02-06 PROCEDURE — 2580000003 HC RX 258: Performed by: INTERNAL MEDICINE

## 2020-02-06 PROCEDURE — 94640 AIRWAY INHALATION TREATMENT: CPT

## 2020-02-06 PROCEDURE — 6370000000 HC RX 637 (ALT 250 FOR IP): Performed by: INTERNAL MEDICINE

## 2020-02-06 PROCEDURE — 6370000000 HC RX 637 (ALT 250 FOR IP): Performed by: HOSPITALIST

## 2020-02-06 PROCEDURE — 99232 SBSQ HOSP IP/OBS MODERATE 35: CPT | Performed by: INTERNAL MEDICINE

## 2020-02-06 PROCEDURE — 6370000000 HC RX 637 (ALT 250 FOR IP): Performed by: FAMILY MEDICINE

## 2020-02-06 PROCEDURE — 2060000000 HC ICU INTERMEDIATE R&B

## 2020-02-06 PROCEDURE — 83880 ASSAY OF NATRIURETIC PEPTIDE: CPT

## 2020-02-06 PROCEDURE — 36415 COLL VENOUS BLD VENIPUNCTURE: CPT

## 2020-02-06 PROCEDURE — 2700000000 HC OXYGEN THERAPY PER DAY

## 2020-02-06 PROCEDURE — 85025 COMPLETE CBC W/AUTO DIFF WBC: CPT

## 2020-02-06 PROCEDURE — 6360000002 HC RX W HCPCS: Performed by: INTERNAL MEDICINE

## 2020-02-06 PROCEDURE — 80048 BASIC METABOLIC PNL TOTAL CA: CPT

## 2020-02-06 PROCEDURE — 83735 ASSAY OF MAGNESIUM: CPT

## 2020-02-06 PROCEDURE — 6360000002 HC RX W HCPCS: Performed by: FAMILY MEDICINE

## 2020-02-06 RX ORDER — FUROSEMIDE 10 MG/ML
40 INJECTION INTRAMUSCULAR; INTRAVENOUS 2 TIMES DAILY
Status: DISCONTINUED | OUTPATIENT
Start: 2020-02-06 | End: 2020-02-11

## 2020-02-06 RX ADMIN — TAMSULOSIN HYDROCHLORIDE 0.4 MG: 0.4 CAPSULE ORAL at 08:47

## 2020-02-06 RX ADMIN — INSULIN LISPRO 2 UNITS: 100 INJECTION, SOLUTION INTRAVENOUS; SUBCUTANEOUS at 12:45

## 2020-02-06 RX ADMIN — PIPERACILLIN AND TAZOBACTAM 3.38 G: 3; .375 INJECTION, POWDER, LYOPHILIZED, FOR SOLUTION INTRAVENOUS at 05:28

## 2020-02-06 RX ADMIN — DEXTROSE MONOHYDRATE 100 MG: 50 INJECTION, SOLUTION INTRAVENOUS at 12:45

## 2020-02-06 RX ADMIN — SODIUM CHLORIDE, PRESERVATIVE FREE 10 ML: 5 INJECTION INTRAVENOUS at 08:47

## 2020-02-06 RX ADMIN — OYSTER SHELL CALCIUM WITH VITAMIN D 1 TABLET: 500; 200 TABLET, FILM COATED ORAL at 08:47

## 2020-02-06 RX ADMIN — FUROSEMIDE 40 MG: 10 INJECTION, SOLUTION INTRAMUSCULAR; INTRAVENOUS at 08:47

## 2020-02-06 RX ADMIN — POLYETHYLENE GLYCOL 3350 17 G: 17 POWDER, FOR SOLUTION ORAL at 08:47

## 2020-02-06 RX ADMIN — IPRATROPIUM BROMIDE AND ALBUTEROL SULFATE 1 AMPULE: .5; 3 SOLUTION RESPIRATORY (INHALATION) at 20:06

## 2020-02-06 RX ADMIN — RANOLAZINE 500 MG: 500 TABLET, FILM COATED, EXTENDED RELEASE ORAL at 08:47

## 2020-02-06 RX ADMIN — IPRATROPIUM BROMIDE AND ALBUTEROL SULFATE 1 AMPULE: .5; 3 SOLUTION RESPIRATORY (INHALATION) at 02:43

## 2020-02-06 RX ADMIN — IPRATROPIUM BROMIDE AND ALBUTEROL SULFATE 1 AMPULE: .5; 3 SOLUTION RESPIRATORY (INHALATION) at 13:20

## 2020-02-06 RX ADMIN — IPRATROPIUM BROMIDE AND ALBUTEROL SULFATE 1 AMPULE: .5; 3 SOLUTION RESPIRATORY (INHALATION) at 15:43

## 2020-02-06 RX ADMIN — PIPERACILLIN AND TAZOBACTAM 3.38 G: 3; .375 INJECTION, POWDER, LYOPHILIZED, FOR SOLUTION INTRAVENOUS at 15:03

## 2020-02-06 RX ADMIN — RIVAROXABAN 20 MG: 20 TABLET, FILM COATED ORAL at 18:54

## 2020-02-06 RX ADMIN — SODIUM CHLORIDE, PRESERVATIVE FREE 10 ML: 5 INJECTION INTRAVENOUS at 22:06

## 2020-02-06 RX ADMIN — OXYCODONE HYDROCHLORIDE AND ACETAMINOPHEN 1 TABLET: 7.5; 325 TABLET ORAL at 11:02

## 2020-02-06 RX ADMIN — AMIODARONE HYDROCHLORIDE 200 MG: 200 TABLET ORAL at 08:47

## 2020-02-06 RX ADMIN — INSULIN GLARGINE 15 UNITS: 100 INJECTION, SOLUTION SUBCUTANEOUS at 22:07

## 2020-02-06 RX ADMIN — FUROSEMIDE 40 MG: 10 INJECTION, SOLUTION INTRAMUSCULAR; INTRAVENOUS at 18:43

## 2020-02-06 RX ADMIN — IPRATROPIUM BROMIDE AND ALBUTEROL SULFATE 1 AMPULE: .5; 3 SOLUTION RESPIRATORY (INHALATION) at 23:58

## 2020-02-06 RX ADMIN — POLYETHYLENE GLYCOL 3350 17 G: 17 POWDER, FOR SOLUTION ORAL at 21:49

## 2020-02-06 RX ADMIN — IPRATROPIUM BROMIDE AND ALBUTEROL SULFATE 1 AMPULE: .5; 3 SOLUTION RESPIRATORY (INHALATION) at 08:56

## 2020-02-06 RX ADMIN — PIPERACILLIN AND TAZOBACTAM 3.38 G: 3; .375 INJECTION, POWDER, LYOPHILIZED, FOR SOLUTION INTRAVENOUS at 21:49

## 2020-02-06 RX ADMIN — ATORVASTATIN CALCIUM 40 MG: 40 TABLET, FILM COATED ORAL at 21:49

## 2020-02-06 RX ADMIN — RANOLAZINE 500 MG: 500 TABLET, FILM COATED, EXTENDED RELEASE ORAL at 21:49

## 2020-02-06 RX ADMIN — ASPIRIN 81 MG 81 MG: 81 TABLET ORAL at 08:47

## 2020-02-06 ASSESSMENT — PAIN DESCRIPTION - PROGRESSION
CLINICAL_PROGRESSION: NOT CHANGED

## 2020-02-06 ASSESSMENT — PAIN DESCRIPTION - DESCRIPTORS: DESCRIPTORS: CRAMPING

## 2020-02-06 ASSESSMENT — PAIN DESCRIPTION - FREQUENCY: FREQUENCY: CONTINUOUS

## 2020-02-06 ASSESSMENT — PAIN DESCRIPTION - PAIN TYPE: TYPE: ACUTE PAIN

## 2020-02-06 ASSESSMENT — PAIN SCALES - GENERAL
PAINLEVEL_OUTOF10: 0
PAINLEVEL_OUTOF10: 7
PAINLEVEL_OUTOF10: 5

## 2020-02-06 ASSESSMENT — PAIN DESCRIPTION - LOCATION: LOCATION: ABDOMEN

## 2020-02-06 ASSESSMENT — PAIN DESCRIPTION - ONSET: ONSET: GRADUAL

## 2020-02-06 NOTE — PROGRESS NOTES
Pt resting comfortably in bed, no observable signs of distress. LUE PICC dressing change using sterile technique, without complication. Site remains free of any s/s of infection, both lumens patent with blood return, claves and caps changed. Pt tolerated well. This RN reinforced EDU on PICC care, s/s of complications. Pt verbalized understanding of all EDU. No other concerns voiced at this time, Pt in stable condition. Will continue to monitor.

## 2020-02-06 NOTE — PLAN OF CARE
Problem: Risk for Impaired Skin Integrity  Goal: Tissue integrity - skin and mucous membranes  Description  Structural intactness and normal physiological function of skin and  mucous membranes. Outcome: Ongoing     Problem: SAFETY  Goal: Free from accidental physical injury  Outcome: Ongoing     Problem: DAILY CARE  Goal: Daily care needs are met  Outcome: Ongoing     Problem: PAIN  Goal: Patient's pain/discomfort is manageable  Outcome: Ongoing     Problem: SKIN INTEGRITY  Goal: Skin integrity is maintained or improved  Outcome: Ongoing     Problem: KNOWLEDGE DEFICIT  Goal: Patient/S.O. demonstrates understanding of disease process, treatment plan, medications, and discharge instructions.   Outcome: Ongoing     Problem: DISCHARGE BARRIERS  Goal: Patient's continuum of care needs are met  Outcome: Ongoing     Problem: OXYGENATION/RESPIRATORY FUNCTION  Goal: Patient will maintain patent airway  Outcome: Ongoing  Goal: Patient will achieve/maintain normal respiratory rate/effort  Description  Respiratory rate and effort will be within normal limits for the patient  Outcome: Ongoing     Problem: HEMODYNAMIC STATUS  Goal: Patient has stable vital signs and fluid balance  Outcome: Ongoing     Problem: FLUID AND ELECTROLYTE IMBALANCE  Goal: Fluid and electrolyte balance are achieved/maintained  Outcome: Ongoing     Problem: ACTIVITY INTOLERANCE/IMPAIRED MOBILITY  Goal: Mobility/activity is maintained at optimum level for patient  Outcome: Ongoing     Problem: Pain:  Goal: Pain level will decrease  Description  Pain level will decrease  Outcome: Ongoing  Goal: Control of acute pain  Description  Control of acute pain  Outcome: Ongoing  Goal: Control of chronic pain  Description  Control of chronic pain  Outcome: Ongoing     Problem: Falls - Risk of:  Goal: Will remain free from falls  Description  Will remain free from falls  Outcome: Ongoing  Goal: Absence of physical injury  Description  Absence of physical

## 2020-02-06 NOTE — PLAN OF CARE
Problem: Risk for Impaired Skin Integrity  Goal: Tissue integrity - skin and mucous membranes  Description  Structural intactness and normal physiological function of skin and  mucous membranes. 2/5/2020 1936 by Sage Jhaveri RN  Outcome: Ongoing     Problem: SAFETY  Goal: Free from accidental physical injury  2/6/2020 0207 by Lawanda Talavera RN  Outcome: Ongoing  2/5/2020 1936 by Sage Jhaveri RN  Outcome: Ongoing     Problem: DAILY CARE  Goal: Daily care needs are met  2/6/2020 0207 by Lawanda Talavera RN  Outcome: Ongoing  2/5/2020 1936 by Sage Jhaveri RN  Outcome: Ongoing     Problem: PAIN  Goal: Patient's pain/discomfort is manageable  2/6/2020 0207 by Lawanda Talavera RN  Outcome: Ongoing  2/5/2020 1936 by Sage Jhaveri RN  Outcome: Ongoing     Problem: SKIN INTEGRITY  Goal: Skin integrity is maintained or improved  2/6/2020 0207 by Lawanda Talavera RN  Outcome: Ongoing  2/5/2020 1936 by Sage Jhaveri RN  Outcome: Ongoing     Problem: KNOWLEDGE DEFICIT  Goal: Patient/S.O. demonstrates understanding of disease process, treatment plan, medications, and discharge instructions.   2/5/2020 1936 by Sage Jhaveri RN  Outcome: Ongoing     Problem: DISCHARGE BARRIERS  Goal: Patient's continuum of care needs are met  2/5/2020 1936 by Sage Jhaveri RN  Outcome: Ongoing     Problem: OXYGENATION/RESPIRATORY FUNCTION  Goal: Patient will maintain patent airway  2/6/2020 0207 by Lawanda Talavera RN  Outcome: Ongoing  2/5/2020 1936 by Sage Jhaveri RN  Outcome: Ongoing  Goal: Patient will achieve/maintain normal respiratory rate/effort  Description  Respiratory rate and effort will be within normal limits for the patient  2/6/2020 0207 by Lawanda Talavera RN  Outcome: Ongoing  2/5/2020 1936 by Sage Jhaveri RN  Outcome: Ongoing     Problem: HEMODYNAMIC STATUS  Goal: Patient has stable vital signs and fluid balance  2/5/2020 1936 by Sage Jhaveri RN  Outcome: Ongoing     Problem: FLUID AND ELECTROLYTE IMBALANCE  Goal: Fluid and electrolyte balance are achieved/maintained  2/5/2020 1936 by Isra Oswald RN  Outcome: Ongoing     Problem: ACTIVITY INTOLERANCE/IMPAIRED MOBILITY  Goal: Mobility/activity is maintained at optimum level for patient  2/5/2020 1936 by Isra Oswald RN  Outcome: Ongoing     Problem: Pain:  Goal: Pain level will decrease  Description  Pain level will decrease  2/5/2020 1936 by Isra Oswald RN  Outcome: Ongoing  Goal: Control of acute pain  Description  Control of acute pain  2/5/2020 1936 by Isra Oswald RN  Outcome: Ongoing  Goal: Control of chronic pain  Description  Control of chronic pain  2/5/2020 1936 by Isra Oswald RN  Outcome: Ongoing     Problem: Falls - Risk of:  Goal: Will remain free from falls  Description  Will remain free from falls  2/5/2020 1936 by Isra Oswald RN  Outcome: Ongoing  Goal: Absence of physical injury  Description  Absence of physical injury  2/5/2020 1936 by Isra Oswald RN  Outcome: Ongoing     Problem:  Activity:  Goal: Ability to tolerate increased activity will improve  Description  Ability to tolerate increased activity will improve  2/5/2020 1936 by Isra Oswald RN  Outcome: Ongoing  Goal: Expression of feelings of increased energy will increase  Description  Expression of feelings of increased energy will increase  2/5/2020 1936 by Isra Oswald RN  Outcome: Ongoing     Problem: Cardiac:  Goal: Ability to maintain an adequate cardiac output will improve  Description  Ability to maintain an adequate cardiac output will improve  2/5/2020 1936 by Isra Oswald RN  Outcome: Ongoing  Goal: Complications related to the disease process, condition or treatment will be avoided or minimized  Description  Complications related to the disease process, condition or treatment will be avoided or minimized  2/5/2020 1936 by Isra Oswald RN  Outcome: Ongoing

## 2020-02-06 NOTE — PROGRESS NOTES
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 to excess calories (HCC)    Acute respiratory failure with hypoxia (HCC)    Nocturnal hypoxia    Hypercapnemia    SOB (shortness of breath)    Acute on chronic respiratory failure with hypercapnia ([facility])    Chronic respiratory failure with hypercapnia (HCC)    Acute pulmonary edema (HCC)    Acute on chronic systolic HF (heart failure) ([facility])    Hx of AKA (above knee amputation), left (HCC)    Respiratory failure ([facility])    HCAP (healthcare-associated pneumonia)    Ventricular tachycardia ([facility])    Shock circulatory ([facility])    Tachypnea    Neutrophilic leukocytosis    Chronic respiratory failure with hypoxia (HCC)    Cardiac arrest (HCC)    PEA (Pulseless electrical activity) ([facility])    Ileus ([address]    Pneumonia of right lower lobe due to infectious organism [facility])    Atrial fibrillation, controlled ([address]    Pulmonary HTN ([address]    Weakness of right lower extremity    Large bowel obstruction ([address]    Tobacco abuse    Intra-abdominal abscess ([address]    Intra-abdominal fluid collection    Moderate malnutrition ([address]    NSTEMI (non-ST elevated myocardial infarction) ([address]    Systolic CHF (HCC)    Bilateral pleural effusion    Abdominal fluid collection     Intra abdominal fluid collections concern for abscess  H/o Colectomy and colostomy in Oct 2019  H/o Abdominal fluid collections s/p IR drain placement in the past  Bi lateral pleural effusions  CAD  Cardiomyopathy  CHF  Left BKA status   A fib  Smoking+  WBC elevation  Abnormal CT abd/pelvis from 1/29   S/p IR guided new drain placement in Rt upper Quadrant fluid collection   Hep C+Ve     Pleural fluid cx negative thus far and abdominal fluid cx requested and in process-    Status post IR guided drainage placement on the right abdominal fluid collection fluid indicates that seems to be more bloody, he was on anticoagulation prior for atrial fibrillation. Surgery notes reviewed left lower quadrant fluid collection being observed if not improving is a plan for drainage. Repeat CT abd/pelvis noted and X ray from today with Left effusion may need repeat Thoracentesis     Labs, Microbiology, Radiology and all the pertinent results from current hospitalization and  care every where were reviewed  by me as a part of the evaluation   Plan:   1. Cont IV Zosyn change to Q8 hr extended infusion anticipate to cont x 7 days at d/c   2. ESR elevated, CRP noted   3. D/C  Anidulafungin Change to  oral Fluconazole therapy  200 mg daily x 7 days    4. IR fluid aspirated cx in process thus far negative  5.  Trend WBC   6. repeat CT SCAN results noted    7.May need repeat Left thoracentesis if not improving with diuresis     Discussed with patient/Family and Nursing d/w Primary team      Discussed with patient/Family and Nursing staff Thanks for allowing me to participate in your patient's care and please call me with any questions or concerns.     Von Mcdowell MD  Infectious Disease  [facility]) Physician  Phone: [phone]   Fax : [phone]

## 2020-02-06 NOTE — PROGRESS NOTES
Pt is alert and oriented in bed. VSS. See assessment in flowsheets. Morning medications given. Pt compains of SOB. Pt is 96% on 3 L NC. Call light within reach of patient. Will continue to monitor.      Electronically signed by Malcom Hanna RN on 2/6/2020 at 10:21 AM

## 2020-02-06 NOTE — PROGRESS NOTES
[facility] Medicine Progress Note     Date:  2/6/2020    PCP: Jatinder López MD (Tel: [phone])    Date of Admission: 1/22/2020      Subjective Pt S/E. Pt feels better today. On 2L O2 and no working so hard to breathe. Had a good response to lasix yesterday. Objective  Physical exam:  Vitals: /75   Pulse 113   Temp 97.2 °F (36.2 °C) (Oral)   Resp 20   Ht 5' 7\" (1.702 m)   Wt 188 lb 11.4 oz (85.6 kg)   SpO2 97%   BMI 29.56 kg/m²   Gen: nad, appears more comfortable   Head: Normocephalic. Atraumatic. Eyes: EOMI. Good acuity. Conjunctiva clear  ENT: Oral mucosa moist  Neck: No JVD. supple  CVS: Nml S1S2, no MRG, irregular, regular rate  Pulmomary: diminished BS B/L, tachypneic, no wheezing, rales in bl lung bases   Gastrointestinal: firm at baseline, tender - non focal. normal bowel sounds. Colostomy RLL with moderate amount of soft brown stool and more gas. ruq drain in place   Musculoskeletal: No edema. Warm, remote LEFT AKA  Neuro: No focal deficit. Moves extremity spontaneously. Psychiatry: Alert, oriented x3  Skin: Warm, dry with normal turgor. No rash      24HR INTAKE/OUTPUT:      Intake/Output Summary (Last 24 hours) at 2/6/2020 0903  Last data filed at 2/6/2020 0847  Gross per 24 hour   Intake 310 ml   Output 1900 ml   Net -1590 ml     I/O last 3 completed shifts:   In: 300 [P.O.:180; I.V.:20; IV Piggyback:100]  Out: 1900 [Urine:1900]  I/O this shift:  In: 10 [I.V.:10]  Out: -       Meds:    furosemide  40 mg Intravenous Daily    piperacillin-tazobactam  3.375 g Intravenous Q8H    insulin glargine  15 Units Subcutaneous Nightly    anidulafungin  100 mg Intravenous Q24H    lidocaine 1 % injection  5 mL Intradermal Once    sodium chloride flush  10 mL Intravenous 2 times per day    insulin lispro  0-12 Units Subcutaneous TID     insulin lispro  0-6 Units Subcutaneous Nightly    ipratropium-albuterol  1 ampule Inhalation Q4H WA    amiodarone  200 mg Oral Daily    aspirin with - 1.6 L out, increase lasix to 40 mg iv bid as he doesn't want thoracentesis and this can be removed with diuresis instead  - reconsulted pulmonology     Intraabdominal abscess   Complex surgical Hx - Hx of bowel obstruction s/p ex lap and colostomy. Later complicated by intraabdominal abscess requiring drainage and completed IV zosyn back in Nov 2019. CT abd pelvis this admit: Increased size complex cystic collection RUQ, recurrent LUQ collection- unclear if these are infected or hemorrhagic or both  1/30 - went to IR - s/p percutaneous drainage of R fluid collection  - cont iv zosyn and eraxis; can discharge with fluconazole and zosyn when ready  - fluid cultures negative  - ID and surgery involved  - repeat CT abdomen/pelviswith ruq fluid collection decreased in size    - drain is to be flushed q12hrs    Acute on Chronic systolic CHF EF 42% - improved  - cont lasix  - will give an extra dose of lasix 40 mg iv now  - I/o: - 1.6 L x 24 hrs; -17 L total  - daily wts: dry wt of 180 lbs, today 184  - cont ASA, statin    Chronic Resp Failure w/ Hypoxia   - on baseline of 2 L O2 nC, maintain O2 sats > 90%    Centrilobular Emphysema  - cont duonebs    IDDM  - cont lantus - dose decreased  - humalog, SSI    HTN  - stable    Chronic a Fib  - cont amio, rate controlled  - restart xarelto as he is done with surgical procedures and he does not want a thoracentesis    Diet: DIET CARB CONTROL; Low Sodium (2 GM); Daily Fluid Restriction: 1500 ml      Activity: up as tolerated, limited mobility due to left AKA.      Prophylaxis: scd    Code status: Full Code     Arti Franco DO

## 2020-02-06 NOTE — CONSULTS
3.375 g Intravenous Q8H    insulin glargine  15 Units Subcutaneous Nightly    anidulafungin  100 mg Intravenous Q24H    lidocaine 1 % injection  5 mL Intradermal Once    sodium chloride flush  10 mL Intravenous 2 times per day    insulin lispro  0-12 Units Subcutaneous TID WC    insulin lispro  0-6 Units Subcutaneous Nightly    ipratropium-albuterol  1 ampule Inhalation Q4H WA    amiodarone  200 mg Oral Daily    aspirin  81 mg Oral Daily    atorvastatin  40 mg Oral Nightly    calcium-vitamin D  1 tablet Oral Daily    polyethylene glycol  17 g Oral BID    ranolazine  500 mg Oral BID    tamsulosin  0.4 mg Oral Daily       Continuous Infusions:   diltiazem Stopped (02/01/20 1157)    dextrose         PRN Meds:  sodium chloride flush, ipratropium-albuterol, potassium chloride **OR** potassium alternative oral replacement **OR** potassium chloride, magnesium sulfate, glucose, dextrose, glucagon (rDNA), dextrose, nitroGLYCERIN, oxyCODONE-acetaminophen    Labs reviewed:  CBC:   Recent Labs     02/04/20  0605 02/05/20  0646 02/06/20  0520   WBC 10.8 10.3 11.5*   HGB 8.4* 8.4* 8.3*   HCT 26.1* 26.4* 26.3*   MCV 83.2 83.9 82.9    278 342     BMP:   Recent Labs     02/04/20  0605 02/05/20  0645 02/06/20  0520   * 135* 136   K 4.2 4.2 4.3   CL 93* 95* 93*   CO2 31 31 33*   BUN 15 14 10   CREATININE 0.6* 0.5* 0.5*     LIVER PROFILE: No results for input(s): AST, ALT, LIPASE, BILIDIR, BILITOT, ALKPHOS in the last 72 hours. Invalid input(s): AMYLASE,  ALB  PT/INR: No results for input(s): PROTIME, INR in the last 72 hours. APTT: No results for input(s): APTT in the last 72 hours. UA:No results for input(s): NITRITE, COLORU, PHUR, LABCAST, WBCUA, RBCUA, MUCUS, TRICHOMONAS, YEAST, BACTERIA, CLARITYU, SPECGRAV, LEUKOCYTESUR, UROBILINOGEN, BILIRUBINUR, BLOODU, GLUCOSEU, AMORPHOUS in the last 72 hours.     Invalid input(s): Tye People  No results for input(s): PH, PCO2, PO2 in the last 72 hours.      Films:  Radiology Review:  Pertinent images / reports were reviewed as a part of this visit. CT Chest w/ contrast: No results found for this or any previous visit. CT Chest w/o contrast:   Results for orders placed during the hospital encounter of 01/22/20   CT CHEST WO CONTRAST    Narrative EXAMINATION:  CT OF THE CHEST WITHOUT CONTRAST 1/22/2020 5:12 pm    TECHNIQUE:  CT of the chest was performed without the administration of intravenous  contrast. Multiplanar reformatted images are provided for review. Dose  modulation, iterative reconstruction, and/or weight based adjustment of the  mA/kV was utilized to reduce the radiation dose to as low as reasonably  achievable. COMPARISON:  Chest x-ray 01/22/2020, chest CT scan 12/14/2019. HISTORY:  ORDERING SYSTEM PROVIDED HISTORY: SOB, Very abnormal CXR. ? CHF v Pneumonia  or even both  TECHNOLOGIST PROVIDED HISTORY:  Reason for exam:->SOB, Very abnormal CXR. ? CHF v Pneumonia or even both  Reason for Exam: SOB, Very abnormal CXR. ? CHF v Pneumonia or even both  Acuity: Acute  Type of Exam: Subsequent/Follow-up    FINDINGS:  Mediastinum: Small pericardial effusion is present. The heart is markedly  enlarged. Prominent subcarinal and paratracheal lymph nodes are  redemonstrated. Trachea appears patent. Lungs/pleura: Centrilobular emphysematous changes are redemonstrated. Moderate right-sided pleural effusion is present with volume loss involving  the right lower lobe and right middle lobe and right upper lobe. The  appearance is similar to prior study CT scan. Pneumonia is not excluded. Moderate to large left-sided pleural effusion appears increased compared to  prior CT scan. There is near complete volume loss of the left lower lobe and  a large portion of the left upper lobe. Upper Abdomen: Small amount of perihepatic upper abdominal ascites is noted.   Partially visualized right upper quadrant cystic-appearing but heterogeneous  lesion is noted. The fluid density is greater than that of simple fluid of  uncertain significance. It appears to lie adjacent to the gallbladder and is  not completely included on the field of view of the current study. Soft Tissues/Bones: Body wall edema is noted. No acute bony lesion is  appreciated. Impression Bilateral pleural effusions are redemonstrated, large on the left and  moderate on the right. The effusion on the left appears larger compared to  CT scan 12/14/2019. The heart is markedly enlarged. New small pericardial effusion. Lungs demonstrate volume loss and consolidative changes similar to prior  study. These changes involving the left lung appear worse compared to prior  CT scan 12/14/2019. The increasing pleural effusions and consolidations could represent worsening  pulmonary edema, especially in light of the body wall edema and upper  abdominal ascites which appears new. Associated pneumonia and/or aspiration  is not excluded. Partially visualized right upper quadrant upper abdominal cystic lesion is  redemonstrated. The density is greater than that of simple fluid. This has  been demonstrated on multiple prior abdominal CT scans and is of uncertain  significance and incompletely visualized. It demonstrates internal  heterogeneous appearance which is concerning for internal hemorrhage. It is  perhaps mildly larger when compared to CT scan 11/10/2019. Further  evaluation with CT scan of the abdomen and pelvis is now recommended. Recommend performing CT scan of the abdomen and pelvis without and with IV  contrast.         CTPA:   Results for orders placed during the hospital encounter of 12/04/19   CT CHEST PULMONARY EMBOLISM W CONTRAST    Narrative EXAMINATION:  CTA OF THE CHEST 12/14/2019 12:55 pm    TECHNIQUE:  CTA of the chest was performed after the administration of intravenous  contrast.  Multiplanar reformatted images are provided for review. MIP  images are provided for review. Dose modulation, iterative reconstruction,  and/or weight based adjustment of the mA/kV was utilized to reduce the  radiation dose to as low as reasonably achievable. COMPARISON:  None. HISTORY:  ORDERING SYSTEM PROVIDED HISTORY: elevated D-dimer, hypoxia  TECHNOLOGIST PROVIDED HISTORY:  Reason for exam:->elevated D-dimer, hypoxia  Reason for Exam: sob, hypoxic    FINDINGS:  Pulmonary Arteries: Pulmonary arteries are adequately opacified for  evaluation. No pulmonary embolism demonstrated. Main pulmonary artery is  normal in caliber. Mediastinum: The heart and pericardium demonstrate no acute abnormality. There is no acute abnormality of the thoracic aorta. Lungs/pleura: Bilateral pleural effusions are present extending to the apex  in the dependent thorax on both sides. Advanced adjacent compressive  atelectasis. Superimposed emphysema. No pneumothorax. Upper Abdomen: Negative. No acute finding. Soft Tissues/Bones: No acute bone or soft tissue abnormality. Impression No evidence of pulmonary embolism. Moderate to large amount pleural fluid with bilateral pleural effusions  extending to the apex on both sides with adjacent compressive atelectasis and  decreased aerated lung. Emphysema. CXR PA/LAT:   Results for orders placed during the hospital encounter of 12/04/19   XR CHEST STANDARD (2 VW)    Narrative EXAMINATION:  TWO XRAY VIEWS OF THE CHEST    12/19/2019 7:55 am    COMPARISON:  December 16, 2019    HISTORY:  ORDERING SYSTEM PROVIDED HISTORY: F/U on fluid overload/effusion  TECHNOLOGIST PROVIDED HISTORY:  Reason for exam:->F/U on fluid overload/effusion  Reason for Exam: F/U on fluid overload/effusion  Acuity: Acute  Type of Exam: Subsequent/Follow-up    FINDINGS:  Moderate-sized left pleural effusion remains present, unchanged.   Vascular  congestion with interstitial opacity and right perihilar faint airspace  disease also remains

## 2020-02-07 LAB
ANION GAP SERPL CALCULATED.3IONS-SCNC: 8 MMOL/L (ref 3–16)
BASOPHILS ABSOLUTE: 0.1 K/UL (ref 0–0.2)
BASOPHILS RELATIVE PERCENT: 1 %
BUN BLDV-MCNC: 6 MG/DL (ref 7–20)
CALCIUM IONIZED: 1.12 MMOL/L (ref 1.12–1.32)
CALCIUM SERPL-MCNC: 4.7 MG/DL (ref 8.3–10.6)
CHLORIDE BLD-SCNC: 111 MMOL/L (ref 99–110)
CO2: 21 MMOL/L (ref 21–32)
CREAT SERPL-MCNC: <0.5 MG/DL (ref 0.9–1.3)
EOSINOPHILS ABSOLUTE: 0.1 K/UL (ref 0–0.6)
EOSINOPHILS RELATIVE PERCENT: 0.8 %
GFR AFRICAN AMERICAN: >60
GFR NON-AFRICAN AMERICAN: >60
GLUCOSE BLD-MCNC: 119 MG/DL (ref 70–99)
GLUCOSE BLD-MCNC: 163 MG/DL (ref 70–99)
GLUCOSE BLD-MCNC: 168 MG/DL (ref 70–99)
GLUCOSE BLD-MCNC: 213 MG/DL (ref 70–99)
HCT VFR BLD CALC: 25.1 % (ref 40.5–52.5)
HEMOGLOBIN: 8.2 G/DL (ref 13.5–17.5)
LYMPHOCYTES ABSOLUTE: 0.8 K/UL (ref 1–5.1)
LYMPHOCYTES RELATIVE PERCENT: 6.4 %
MAGNESIUM: 1.2 MG/DL (ref 1.8–2.4)
MCH RBC QN AUTO: 26.8 PG (ref 26–34)
MCHC RBC AUTO-ENTMCNC: 32.7 G/DL (ref 31–36)
MCV RBC AUTO: 81.9 FL (ref 80–100)
MONOCYTES ABSOLUTE: 1.5 K/UL (ref 0–1.3)
MONOCYTES RELATIVE PERCENT: 11.6 %
NEUTROPHILS ABSOLUTE: 10.3 K/UL (ref 1.7–7.7)
NEUTROPHILS RELATIVE PERCENT: 80.2 %
PDW BLD-RTO: 16.9 % (ref 12.4–15.4)
PERFORMED ON: ABNORMAL
PH VENOUS: 7.38 (ref 7.35–7.45)
PLATELET # BLD: 309 K/UL (ref 135–450)
PMV BLD AUTO: 7.6 FL (ref 5–10.5)
POTASSIUM REFLEX MAGNESIUM: 2.6 MMOL/L (ref 3.5–5.1)
RBC # BLD: 3.07 M/UL (ref 4.2–5.9)
SODIUM BLD-SCNC: 140 MMOL/L (ref 136–145)
WBC # BLD: 12.8 K/UL (ref 4–11)

## 2020-02-07 PROCEDURE — 94640 AIRWAY INHALATION TREATMENT: CPT

## 2020-02-07 PROCEDURE — 82330 ASSAY OF CALCIUM: CPT

## 2020-02-07 PROCEDURE — 2580000003 HC RX 258: Performed by: INTERNAL MEDICINE

## 2020-02-07 PROCEDURE — 80048 BASIC METABOLIC PNL TOTAL CA: CPT

## 2020-02-07 PROCEDURE — 6360000002 HC RX W HCPCS: Performed by: INTERNAL MEDICINE

## 2020-02-07 PROCEDURE — 2700000000 HC OXYGEN THERAPY PER DAY

## 2020-02-07 PROCEDURE — 6370000000 HC RX 637 (ALT 250 FOR IP): Performed by: FAMILY MEDICINE

## 2020-02-07 PROCEDURE — 99232 SBSQ HOSP IP/OBS MODERATE 35: CPT | Performed by: INTERNAL MEDICINE

## 2020-02-07 PROCEDURE — 6370000000 HC RX 637 (ALT 250 FOR IP): Performed by: INTERNAL MEDICINE

## 2020-02-07 PROCEDURE — 1200000000 HC SEMI PRIVATE

## 2020-02-07 PROCEDURE — APPNB30 APP NON BILLABLE TIME 0-30 MINS: Performed by: NURSE PRACTITIONER

## 2020-02-07 PROCEDURE — APPSS30 APP SPLIT SHARED TIME 16-30 MINUTES: Performed by: NURSE PRACTITIONER

## 2020-02-07 PROCEDURE — 6360000002 HC RX W HCPCS: Performed by: FAMILY MEDICINE

## 2020-02-07 PROCEDURE — 83735 ASSAY OF MAGNESIUM: CPT

## 2020-02-07 PROCEDURE — 6370000000 HC RX 637 (ALT 250 FOR IP): Performed by: HOSPITALIST

## 2020-02-07 PROCEDURE — 85025 COMPLETE CBC W/AUTO DIFF WBC: CPT

## 2020-02-07 PROCEDURE — 94761 N-INVAS EAR/PLS OXIMETRY MLT: CPT

## 2020-02-07 PROCEDURE — 2580000003 HC RX 258: Performed by: FAMILY MEDICINE

## 2020-02-07 PROCEDURE — 6360000002 HC RX W HCPCS: Performed by: HOSPITALIST

## 2020-02-07 RX ORDER — FLUCONAZOLE 100 MG/1
200 TABLET ORAL DAILY
Status: DISCONTINUED | OUTPATIENT
Start: 2020-02-07 | End: 2020-02-09

## 2020-02-07 RX ORDER — POTASSIUM CHLORIDE 20 MEQ/1
40 TABLET, EXTENDED RELEASE ORAL ONCE
Status: COMPLETED | OUTPATIENT
Start: 2020-02-07 | End: 2020-02-07

## 2020-02-07 RX ORDER — POTASSIUM CHLORIDE 20 MEQ/1
20 TABLET, EXTENDED RELEASE ORAL 2 TIMES DAILY WITH MEALS
Status: DISCONTINUED | OUTPATIENT
Start: 2020-02-08 | End: 2020-02-14 | Stop reason: HOSPADM

## 2020-02-07 RX ORDER — MAGNESIUM SULFATE IN WATER 40 MG/ML
4 INJECTION, SOLUTION INTRAVENOUS ONCE
Status: COMPLETED | OUTPATIENT
Start: 2020-02-07 | End: 2020-02-07

## 2020-02-07 RX ADMIN — MAGNESIUM SULFATE IN WATER 4 G: 40 INJECTION, SOLUTION INTRAVENOUS at 08:45

## 2020-02-07 RX ADMIN — PIPERACILLIN AND TAZOBACTAM 3.38 G: 3; .375 INJECTION, POWDER, LYOPHILIZED, FOR SOLUTION INTRAVENOUS at 13:54

## 2020-02-07 RX ADMIN — INSULIN LISPRO 4 UNITS: 100 INJECTION, SOLUTION INTRAVENOUS; SUBCUTANEOUS at 13:54

## 2020-02-07 RX ADMIN — RANOLAZINE 500 MG: 500 TABLET, FILM COATED, EXTENDED RELEASE ORAL at 22:46

## 2020-02-07 RX ADMIN — ATORVASTATIN CALCIUM 40 MG: 40 TABLET, FILM COATED ORAL at 22:47

## 2020-02-07 RX ADMIN — IPRATROPIUM BROMIDE AND ALBUTEROL SULFATE 1 AMPULE: .5; 3 SOLUTION RESPIRATORY (INHALATION) at 15:58

## 2020-02-07 RX ADMIN — POLYETHYLENE GLYCOL 3350 17 G: 17 POWDER, FOR SOLUTION ORAL at 08:53

## 2020-02-07 RX ADMIN — RIVAROXABAN 20 MG: 20 TABLET, FILM COATED ORAL at 17:05

## 2020-02-07 RX ADMIN — INSULIN LISPRO 2 UNITS: 100 INJECTION, SOLUTION INTRAVENOUS; SUBCUTANEOUS at 08:45

## 2020-02-07 RX ADMIN — PIPERACILLIN AND TAZOBACTAM 3.38 G: 3; .375 INJECTION, POWDER, LYOPHILIZED, FOR SOLUTION INTRAVENOUS at 22:46

## 2020-02-07 RX ADMIN — POTASSIUM CHLORIDE 40 MEQ: 1500 TABLET, EXTENDED RELEASE ORAL at 09:54

## 2020-02-07 RX ADMIN — FLUCONAZOLE 200 MG: 100 TABLET ORAL at 08:44

## 2020-02-07 RX ADMIN — IPRATROPIUM BROMIDE AND ALBUTEROL SULFATE 1 AMPULE: .5; 3 SOLUTION RESPIRATORY (INHALATION) at 07:49

## 2020-02-07 RX ADMIN — FUROSEMIDE 40 MG: 10 INJECTION, SOLUTION INTRAMUSCULAR; INTRAVENOUS at 17:05

## 2020-02-07 RX ADMIN — INSULIN GLARGINE 15 UNITS: 100 INJECTION, SOLUTION SUBCUTANEOUS at 22:48

## 2020-02-07 RX ADMIN — FUROSEMIDE 40 MG: 10 INJECTION, SOLUTION INTRAMUSCULAR; INTRAVENOUS at 08:45

## 2020-02-07 RX ADMIN — SODIUM CHLORIDE, PRESERVATIVE FREE 10 ML: 5 INJECTION INTRAVENOUS at 08:54

## 2020-02-07 RX ADMIN — TAMSULOSIN HYDROCHLORIDE 0.4 MG: 0.4 CAPSULE ORAL at 08:44

## 2020-02-07 RX ADMIN — SODIUM CHLORIDE, PRESERVATIVE FREE 10 ML: 5 INJECTION INTRAVENOUS at 22:47

## 2020-02-07 RX ADMIN — PIPERACILLIN AND TAZOBACTAM 3.38 G: 3; .375 INJECTION, POWDER, LYOPHILIZED, FOR SOLUTION INTRAVENOUS at 06:34

## 2020-02-07 RX ADMIN — RANOLAZINE 500 MG: 500 TABLET, FILM COATED, EXTENDED RELEASE ORAL at 08:44

## 2020-02-07 RX ADMIN — AMIODARONE HYDROCHLORIDE 200 MG: 200 TABLET ORAL at 08:44

## 2020-02-07 RX ADMIN — POTASSIUM CHLORIDE 10 MEQ: 7.46 INJECTION, SOLUTION INTRAVENOUS at 08:45

## 2020-02-07 RX ADMIN — OXYCODONE HYDROCHLORIDE AND ACETAMINOPHEN 1 TABLET: 7.5; 325 TABLET ORAL at 08:59

## 2020-02-07 RX ADMIN — INSULIN LISPRO 4 UNITS: 100 INJECTION, SOLUTION INTRAVENOUS; SUBCUTANEOUS at 17:05

## 2020-02-07 RX ADMIN — OYSTER SHELL CALCIUM WITH VITAMIN D 1 TABLET: 500; 200 TABLET, FILM COATED ORAL at 08:44

## 2020-02-07 RX ADMIN — POLYETHYLENE GLYCOL 3350 17 G: 17 POWDER, FOR SOLUTION ORAL at 22:46

## 2020-02-07 RX ADMIN — IPRATROPIUM BROMIDE AND ALBUTEROL SULFATE 1 AMPULE: .5; 3 SOLUTION RESPIRATORY (INHALATION) at 20:36

## 2020-02-07 RX ADMIN — OXYCODONE HYDROCHLORIDE AND ACETAMINOPHEN 1 TABLET: 7.5; 325 TABLET ORAL at 22:59

## 2020-02-07 RX ADMIN — ASPIRIN 81 MG 81 MG: 81 TABLET ORAL at 08:44

## 2020-02-07 RX ADMIN — CALCIUM GLUCONATE 2 G: 98 INJECTION, SOLUTION INTRAVENOUS at 09:54

## 2020-02-07 RX ADMIN — IPRATROPIUM BROMIDE AND ALBUTEROL SULFATE 1 AMPULE: .5; 3 SOLUTION RESPIRATORY (INHALATION) at 12:01

## 2020-02-07 RX ADMIN — IPRATROPIUM BROMIDE AND ALBUTEROL SULFATE 1 AMPULE: .5; 3 SOLUTION RESPIRATORY (INHALATION) at 08:59

## 2020-02-07 ASSESSMENT — PAIN DESCRIPTION - DESCRIPTORS
DESCRIPTORS: ACHING
DESCRIPTORS: ACHING

## 2020-02-07 ASSESSMENT — PAIN DESCRIPTION - PROGRESSION
CLINICAL_PROGRESSION: NOT CHANGED
CLINICAL_PROGRESSION: NOT CHANGED

## 2020-02-07 ASSESSMENT — PAIN DESCRIPTION - ONSET
ONSET: GRADUAL
ONSET: GRADUAL

## 2020-02-07 ASSESSMENT — PAIN DESCRIPTION - LOCATION
LOCATION: GENERALIZED
LOCATION: BACK

## 2020-02-07 ASSESSMENT — PAIN SCALES - GENERAL
PAINLEVEL_OUTOF10: 4
PAINLEVEL_OUTOF10: 7
PAINLEVEL_OUTOF10: 7
PAINLEVEL_OUTOF10: 0
PAINLEVEL_OUTOF10: 0

## 2020-02-07 ASSESSMENT — PAIN DESCRIPTION - FREQUENCY
FREQUENCY: INTERMITTENT
FREQUENCY: INTERMITTENT

## 2020-02-07 ASSESSMENT — PAIN DESCRIPTION - PAIN TYPE
TYPE: CHRONIC PAIN
TYPE: CHRONIC PAIN

## 2020-02-07 NOTE — PROGRESS NOTES
[facility] [phone]                                     Daily Progress Note                                                         Pt Name: Basilio Gordon  Medical Record Number: [id number]  Date of Birth 1960   Today's Date: 2020  Chief Complaint   Patient presents with    Shortness of Breath     x4 days        ASSESSMENT/PLAN  Right upper quadrant cystic mass  -Drained by IR with what appears to be old blood. Drain remains. -Repeat CT 2/3 redemonstrated a infrahepatic complex fluid collection with internal hyperdensity with a drain in place, and that collection is mildly decreased in size, now measuring 12.4  x 11.3 cm (previously 14.1 x 12.5 cm). -tolerating diet  -Drain flushing well, scant bloody output. Consider drain study soon to reevaluate collection. May need repositioning. Sultana Ford is tolerating diet.  + ostomy output. Rarely gets out of bed. OBJECTIVE  VITALS:  height is 5' 7\" (1.702 m) and weight is 186 lb 15.2 oz (84.8 kg). His temperature is 97.6 °F (36.4 °C). His blood pressure is 116/72 and his pulse is 105. His respiration is 20 and oxygen saturation is 98%. INTAKE/OUTPUT:      Intake/Output Summary (Last 24 hours) at 2020 1058  Last data filed at 2020 1049  Gross per 24 hour   Intake 120 ml   Output 2565 ml   Net -2445 ml     GENERAL: alert, no distress  ABDOMEN: Soft, nontender. Drain right abdomen serosanguinous. Colostomy present with stool in appliance. I/O last 3 completed shifts:   In: 10 [I.V.:10]  Out:  [CSDW; Stool:200]      LABS  Recent Labs     20  0630 20  0700   WBC 12.8*  --    HGB 8.2*  --    HCT 25.1*  --      --    NA  --  140   K  --  2.6*   CL  --  111*   CO2  --  21   BUN  --  6*   CREATININE  --  <0.5*   MG  --  1.20*   CALCIUM  --  4.7*       EDUCATION  Patient educated about Disease Process, Medications, Smoking Cessation, Oxygenation, Incentive Spirometry and Deep Breath and Cough, Diabetes, Hyperlipidemia, Smoking Cessation, Nutrition, Exercise and Hypertension    Electronically signed by JOON Land - CNP\-C on 2/7/2020 at 10:58 AM

## 2020-02-07 NOTE — PROGRESS NOTES
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 indicates that seems to be more bloody, he was on anticoagulation prior for atrial fibrillation. Surgery notes reviewed left lower quadrant fluid collection being observed if not improving is a plan for drainage. Repeat CT abd/pelvis noted and X ray from   with Left effusion may need repeat Thoracentesis vs diuretic challenge      Labs, Microbiology, Radiology and all the pertinent results from current hospitalization and  care every where were reviewed  by me as a part of the evaluation   Plan:   1. Cont IV Zosyn change to Q8 hr extended infusion anticipate to cont x 7 days at d/c   2. ESR elevated, CRP noted   3. Change to  oral Fluconazole therapy  200 mg daily x 7 days    4. IR fluid aspirated cx in process thus far negative  5.  Trend WBC   6. repeat CT SCAN results noted    7.May need repeat Left thoracentesis if not improving with diuresis     Discussed with patient/Family and Nursing d/w Primary team      Discussed with patient/Family and Nursing staff     Thanks for allowing me to participate in your patient's care and please call me with any questions or concerns.     Colleen Gallegos MD  Infectious Disease  [facility]) Physician  Phone: [phone]   Fax : [phone]

## 2020-02-07 NOTE — PROGRESS NOTES
Hospital Medicine Progress Note     Date:  2/7/2020    PCP: Catia Caba MD (Tel: [phone])    Date of Admission: 1/22/2020      Subjective Pt S/E. Pt on 3L O2, 99%. His parents are at bedside. Eating a fruit bowl. Objective  Physical exam:  Vitals: /72   Pulse 105   Temp 97.6 °F (36.4 °C)   Resp 20   Ht 5' 7\" (1.702 m)   Wt 186 lb 15.2 oz (84.8 kg)   SpO2 98%   BMI 29.28 kg/m²   Gen: nad, appears more comfortable   Head: Normocephalic. Atraumatic. Eyes: EOMI. Good acuity. Conjunctiva clear  ENT: Oral mucosa moist  Neck: No JVD. supple  CVS: Nml S1S2, no MRG, irregular, regular rate  Pulmomary: diminished BS on the left side, tachypneic, no wheezing  Gastrointestinal: firm at baseline, tender - non focal. normal bowel sounds. Colostomy RLL with moderate amount of soft brown stool and more gas. ruq drain in place   Musculoskeletal: No edema. Warm, remote LEFT AKA  Neuro: No focal deficit. Moves extremity spontaneously. Psychiatry: Alert, oriented x3  Skin: Warm, dry with normal turgor. No rash      24HR INTAKE/OUTPUT:      Intake/Output Summary (Last 24 hours) at 2/7/2020 0918  Last data filed at 2/7/2020 0300  Gross per 24 hour   Intake 0 ml   Output 1890 ml   Net -1890 ml     I/O last 3 completed shifts: In: 10 [I.V.:10]  Out: 1940 [KTGSA:1136; Stool:200]  No intake/output data recorded.       Meds:    fluconazole  200 mg Oral Daily    magnesium sulfate  4 g Intravenous Once    furosemide  40 mg Intravenous BID    rivaroxaban  20 mg Oral Daily    piperacillin-tazobactam  3.375 g Intravenous Q8H    insulin glargine  15 Units Subcutaneous Nightly    lidocaine 1 % injection  5 mL Intradermal Once    sodium chloride flush  10 mL Intravenous 2 times per day    insulin lispro  0-12 Units Subcutaneous TID WC    insulin lispro  0-6 Units Subcutaneous Nightly    ipratropium-albuterol  1 ampule Inhalation Q4H WA    amiodarone  200 mg Oral Daily    aspirin  81 mg Oral Daily    atorvastatin  40 mg Oral Nightly    calcium-vitamin D  1 tablet Oral Daily    polyethylene glycol  17 g Oral BID    ranolazine  500 mg Oral BID    tamsulosin  0.4 mg Oral Daily       Infusions:    dextrose           PRN Meds: sodium chloride flush, ipratropium-albuterol, potassium chloride **OR** potassium alternative oral replacement **OR** potassium chloride, magnesium sulfate, glucose, dextrose, glucagon (rDNA), dextrose, nitroGLYCERIN, oxyCODONE-acetaminophen    Labs/imaging:  CBC:   Recent Labs     02/05/20  0646 02/06/20  0520 02/07/20  0630   WBC 10.3 11.5* 12.8*   HGB 8.4* 8.3* 8.2*    342 309         BMP:    Recent Labs     02/05/20  0645 02/06/20  0520 02/07/20  0700   * 136 140   K 4.2 4.3 2.6*   CL 95* 93* 111*   CO2 31 33* 21   BUN 14 10 6*   CREATININE 0.5* 0.5* <0.5*   GLUCOSE 159* 76 168*         Hepatic: No results for input(s): AST, ALT, ALB, BILITOT, ALKPHOS in the last 72 hours. Troponin: No results for input(s): TROPONINI in the last 72 hours. BNP: No results for input(s): BNP in the last 72 hours. INR:   No results for input(s): INR in the last 72 hours. Reviewed imaging and reports noted      Assessment:  Principal Problem:    Acute on chronic respiratory failure with hypoxia (HCC)  Active Problems:    PAD (peripheral artery disease) (HCC)    Acute on chronic combined systolic and diastolic HF (heart failure) (HCC)    Centrilobular emphysema (HCC)    Diabetes mellitus type 2, controlled (HCC)    Biventricular failure (HCC)    CAD (coronary artery disease)    Pulmonary HTN (HCC)    Systolic CHF (HCC)    Bilateral pleural effusion    Abdominal fluid collection  Resolved Problems:    * No resolved hospital problems.  *      Plan:  Pleural Effusion   - repeat cxr with increased size pleural effusion on the left   - s/p thoracocentesis - exudative and bloody effusion drained  - cont lasix to 40 mg iv bid as he doesn't want thoracentesis and this can be removed with diuresis instead  - he may need a thoracentesis if his fluid removal isn't adequate with diuresis alone  - reconsulted pulmonology      Intraabdominal abscess   Complex surgical Hx - Hx of bowel obstruction s/p ex lap and colostomy. Later complicated by intraabdominal abscess requiring drainage and completed IV zosyn back in Nov 2019. CT abd pelvis this admit: Increased size complex cystic collection RUQ, recurrent LUQ collection- unclear if these are infected or hemorrhagic or both  1/30 - went to IR - s/p percutaneous drainage of R fluid collection  - cont iv zosyn and eraxis; can discharge with fluconazole and zosyn when ready  - fluid cultures negative  - ID and surgery involved  - repeat CT abdomen/pelviswith ruq fluid collection decreased in size    - drain is to be flushed q12hrs    Acute on Chronic systolic CHF EF 12% - improved  - cont lasix  - will give an extra dose of lasix 40 mg iv now  - I/o: - 1.9 L x 24 hrs; -17 L total  - daily wts: dry wt of 180 lbs, today 184  - cont ASA, statin    Chronic Resp Failure w/ Hypoxia   - on baseline of 2 L O2 nC, maintain O2 sats > 90% - 94%    Centrilobular Emphysema  - cont duonebs    IDDM  - cont lantus - dose decreased  - humalog, SSI    HTN  - stable    Chronic a Fib  - cont amio, rate controlled  - restarted xarelto as he is done with surgical procedures and he does not want a thoracentesis, may need to hold this again, will readdress tomorrow    Diet: DIET CARB CONTROL; Low Sodium (2 GM); Daily Fluid Restriction: 1500 ml      Activity: up as tolerated, limited mobility due to left AKA.      Prophylaxis: scd    Code status: Full Code     Arti Franco DO

## 2020-02-07 NOTE — CARE COORDINATION
Discharge Planning:  CATINA spoke with Jose Martin Reed at [facility], AN [facility]FFIL[facility]ATE OF [facility] to give her a follow up on this pt. Jose Martin Reed stated that she will begin the pre cert for this pt but will not have it back until Monday, 2/10. Jose Martin Reed stated that she is aware that this pt will need IV abx upon return to the facility. Plan: [facility], [facility] Upon d/c pending pre cert. Pre cert started on 2/7.   Electronically signed by Jonathon Guzman on 2/7/2020 at 3:01 PM

## 2020-02-07 NOTE — PLAN OF CARE
Problem: Risk for Impaired Skin Integrity  Goal: Tissue integrity - skin and mucous membranes  Description  Structural intactness and normal physiological function of skin and  mucous membranes. 2/6/2020 2323 by Jd Newsome RN  Outcome: Ongoing     Problem: SAFETY  Goal: Free from accidental physical injury  2/6/2020 2323 by Jd Newsome RN  Outcome: Ongoing     Problem: SAFETY  Goal: Free from accidental physical injury  2/6/2020 2323 by Jd Newsome RN  Outcome: Ongoing     Problem: DAILY CARE  Goal: Daily care needs are met  2/6/2020 2323 by Jd Newsome RN  Outcome: Ongoing     Problem: PAIN  Goal: Patient's pain/discomfort is manageable  2/6/2020 2323 by Jd Newsome RN  Outcome: Ongoing     Problem: SKIN INTEGRITY  Goal: Skin integrity is maintained or improved  2/6/2020 2323 by Jd Newsome RN  Outcome: Ongoing     Problem: KNOWLEDGE DEFICIT  Goal: Patient/S.O. demonstrates understanding of disease process, treatment plan, medications, and discharge instructions.   2/6/2020 2323 by Jd Newsome RN  Outcome: Ongoing

## 2020-02-07 NOTE — PLAN OF CARE
Skin assessment completed every shift. Pt assessed for incontinence, appropriate barrier cream applied prn. Pt encouraged to turn/rotate every 2 hours. Assistance provided if pt unable to do so themselves. Fall risk precautions in place. Bed in lowest position with wheels locked,bed alarm in place and activated,non-skid socks on pt, fall risk ID on pt, call light in reach, will continue to monitor. Pain/discomfort being managed with PRN analgesics per MD orders. Pt able to express presence and absence of pain and rate pain appropriately using numerical scale. Educated patient/family on CHF signs/ symptoms, causes, treatments, limited fluid intake, daily weights, discharge medications, low sodium diet, activiety and follow-up. Pt to call physician if weight gain of 3 pounds in one day or 5 pounds in one week.

## 2020-02-07 NOTE — PROGRESS NOTES
Pulmonary Progress Note    Date of Admission: 1/22/2020   LOS: 15 days     Chief Complaint   Patient presents with    Shortness of Breath     x4 days       Assessment:     Chronic Hypoxia  Pleural Effusion, exudative  Biventricular failure  Centrilobular emphysema    Plan:      -offered thora for sx relief but pt state he does not want thora again right now  -recommend continued diuretics  -currently tolerating home o2 requirement    24 Hour Events/Subjective  Sob somewhat improved. On 2L o2. ROS:   No nausea  No Vomiting  No chest pain      Intake/Output Summary (Last 24 hours) at 2/7/2020 1010  Last data filed at 2/7/2020 0959  Gross per 24 hour   Intake 120 ml   Output 1640 ml   Net -1520 ml         PHYSICAL EXAM:   Blood pressure 116/72, pulse 105, temperature 97.6 °F (36.4 °C), resp. rate 20, height 5' 7\" (1.702 m), weight 186 lb 15.2 oz (84.8 kg), SpO2 98 %.'  Gen:  No acute distress. Eyes: PERRL. Anicteric sclera. No conjunctival injection. ENT: No discharge. Posterior oropharynx clear. External appearance of ears and nose normal.  Neck: Trachea midline. No mass   Resp:  No crackles. No wheezes. No rhonchi. No dullness on percussion. CV: Regular rate. Regular rhythm. No murmur or rub. No edema. GI: Soft, Non-tender. obese +BS, diminished bs on the left  Skin: Warm, dry, w/o erythema. Lymph: No cervical or supraclavicular LAD. M/S: No cyanosis. No clubbing. S/p LE amputation  Neuro:   no focal neurologic deficit. Moves all extremities  Psych: Awake and alert, Oriented x 3. Judgement and insight appropriate. Mood stable.       Medications:    Scheduled Meds:   fluconazole  200 mg Oral Daily    magnesium sulfate  4 g Intravenous Once    [START ON 2/8/2020] potassium chloride  20 mEq Oral BID WC    calcium gluconate IVPB  2 g Intravenous Once    furosemide  40 mg Intravenous BID    rivaroxaban  20 mg Oral Daily    piperacillin-tazobactam  3.375 g Intravenous Q8H    insulin glargine  15 HISTORY: elevated D-dimer, hypoxia  TECHNOLOGIST PROVIDED HISTORY:  Reason for exam:->elevated D-dimer, hypoxia  Reason for Exam: sob, hypoxic    FINDINGS:  Pulmonary Arteries: Pulmonary arteries are adequately opacified for  evaluation. No pulmonary embolism demonstrated. Main pulmonary artery is  normal in caliber. Mediastinum: The heart and pericardium demonstrate no acute abnormality. There is no acute abnormality of the thoracic aorta. Lungs/pleura: Bilateral pleural effusions are present extending to the apex  in the dependent thorax on both sides. Advanced adjacent compressive  atelectasis. Superimposed emphysema. No pneumothorax. Upper Abdomen: Negative. No acute finding. Soft Tissues/Bones: No acute bone or soft tissue abnormality. Impression No evidence of pulmonary embolism. Moderate to large amount pleural fluid with bilateral pleural effusions  extending to the apex on both sides with adjacent compressive atelectasis and  decreased aerated lung. Emphysema. CXR PA/LAT:   Results for orders placed during the hospital encounter of 12/04/19   XR CHEST STANDARD (2 VW)    Narrative EXAMINATION:  TWO XRAY VIEWS OF THE CHEST    12/19/2019 7:55 am    COMPARISON:  December 16, 2019    HISTORY:  ORDERING SYSTEM PROVIDED HISTORY: F/U on fluid overload/effusion  TECHNOLOGIST PROVIDED HISTORY:  Reason for exam:->F/U on fluid overload/effusion  Reason for Exam: F/U on fluid overload/effusion  Acuity: Acute  Type of Exam: Subsequent/Follow-up    FINDINGS:  Moderate-sized left pleural effusion remains present, unchanged. Vascular  congestion with interstitial opacity and right perihilar faint airspace  disease also remains present, no significant change. No pneumothorax. Impression No change over the past 3 days. Moderate-sized left pleural effusion with  adjacent compressive atelectasis. Unchanged pulmonary edema.          CXR portable:   Results for orders placed during the hospital encounter of 01/22/20   XR CHEST PORTABLE    Narrative EXAMINATION:  ONE XRAY VIEW OF THE CHEST    1/29/2020 8:21 pm    COMPARISON:  01/28/2020    HISTORY:  ORDERING SYSTEM PROVIDED HISTORY: picc placement  TECHNOLOGIST PROVIDED HISTORY:  Reason for exam:->picc placement  Reason for Exam: picc placement  Acuity: Acute  Type of Exam: Initial    FINDINGS:  Cardiomegaly. Obscured left hemidiaphragm suggesting lower lobe airspace  disease such as pneumonia and/or effusion/atelectasis. Background pulmonary  vascular congestion magnified by shallow inspiration. No pneumothorax. Left  upper extremity PICC. Minimally displaced anterior/anterolateral right 7th  and probably 6th rib fractures. Impression Persistent left basilar opacity and effusion possibly pneumonia versus  atelectasis. Background vascular congestion is similar. This note was transcribed using [id number] [facility]. Please disregard any translational errors.     Thank you for this consult,    Erma Sellersus [address] [facility], Critical [facility], [facility] Medicine

## 2020-02-08 ENCOUNTER — APPOINTMENT (OUTPATIENT)
Dept: CT IMAGING | Age: 60
DRG: 194 | End: 2020-02-08
Payer: COMMERCIAL

## 2020-02-08 LAB
ANION GAP SERPL CALCULATED.3IONS-SCNC: 11 MMOL/L (ref 3–16)
BASOPHILS ABSOLUTE: 0.1 K/UL (ref 0–0.2)
BASOPHILS RELATIVE PERCENT: 0.7 %
BUN BLDV-MCNC: 13 MG/DL (ref 7–20)
CALCIUM SERPL-MCNC: 9 MG/DL (ref 8.3–10.6)
CHLORIDE BLD-SCNC: 84 MMOL/L (ref 99–110)
CO2: 39 MMOL/L (ref 21–32)
CREAT SERPL-MCNC: 0.6 MG/DL (ref 0.9–1.3)
EOSINOPHILS ABSOLUTE: 0.1 K/UL (ref 0–0.6)
EOSINOPHILS RELATIVE PERCENT: 0.4 %
GFR AFRICAN AMERICAN: >60
GFR NON-AFRICAN AMERICAN: >60
GLUCOSE BLD-MCNC: 104 MG/DL (ref 70–99)
GLUCOSE BLD-MCNC: 107 MG/DL (ref 70–99)
GLUCOSE BLD-MCNC: 119 MG/DL (ref 70–99)
GLUCOSE BLD-MCNC: 127 MG/DL (ref 70–99)
GLUCOSE BLD-MCNC: 131 MG/DL (ref 70–99)
HCT VFR BLD CALC: 27.5 % (ref 40.5–52.5)
HEMOGLOBIN: 9.1 G/DL (ref 13.5–17.5)
LYMPHOCYTES ABSOLUTE: 0.7 K/UL (ref 1–5.1)
LYMPHOCYTES RELATIVE PERCENT: 5.1 %
MAGNESIUM: 2.2 MG/DL (ref 1.8–2.4)
MCH RBC QN AUTO: 26.8 PG (ref 26–34)
MCHC RBC AUTO-ENTMCNC: 33 G/DL (ref 31–36)
MCV RBC AUTO: 81.3 FL (ref 80–100)
MONOCYTES ABSOLUTE: 1.3 K/UL (ref 0–1.3)
MONOCYTES RELATIVE PERCENT: 8.9 %
NEUTROPHILS ABSOLUTE: 12.2 K/UL (ref 1.7–7.7)
NEUTROPHILS RELATIVE PERCENT: 84.9 %
PDW BLD-RTO: 16.7 % (ref 12.4–15.4)
PERFORMED ON: ABNORMAL
PLATELET # BLD: 350 K/UL (ref 135–450)
PMV BLD AUTO: 7.9 FL (ref 5–10.5)
POTASSIUM REFLEX MAGNESIUM: 4.3 MMOL/L (ref 3.5–5.1)
RBC # BLD: 3.39 M/UL (ref 4.2–5.9)
SODIUM BLD-SCNC: 134 MMOL/L (ref 136–145)
WBC # BLD: 14.4 K/UL (ref 4–11)

## 2020-02-08 PROCEDURE — 83735 ASSAY OF MAGNESIUM: CPT

## 2020-02-08 PROCEDURE — 85025 COMPLETE CBC W/AUTO DIFF WBC: CPT

## 2020-02-08 PROCEDURE — 2700000000 HC OXYGEN THERAPY PER DAY

## 2020-02-08 PROCEDURE — 2580000003 HC RX 258: Performed by: FAMILY MEDICINE

## 2020-02-08 PROCEDURE — 6370000000 HC RX 637 (ALT 250 FOR IP): Performed by: INTERNAL MEDICINE

## 2020-02-08 PROCEDURE — 6360000002 HC RX W HCPCS: Performed by: INTERNAL MEDICINE

## 2020-02-08 PROCEDURE — 94761 N-INVAS EAR/PLS OXIMETRY MLT: CPT

## 2020-02-08 PROCEDURE — 80048 BASIC METABOLIC PNL TOTAL CA: CPT

## 2020-02-08 PROCEDURE — 94640 AIRWAY INHALATION TREATMENT: CPT

## 2020-02-08 PROCEDURE — C9113 INJ PANTOPRAZOLE SODIUM, VIA: HCPCS | Performed by: FAMILY MEDICINE

## 2020-02-08 PROCEDURE — 6370000000 HC RX 637 (ALT 250 FOR IP): Performed by: NURSE PRACTITIONER

## 2020-02-08 PROCEDURE — 74176 CT ABD & PELVIS W/O CONTRAST: CPT

## 2020-02-08 PROCEDURE — 1200000000 HC SEMI PRIVATE

## 2020-02-08 PROCEDURE — 99232 SBSQ HOSP IP/OBS MODERATE 35: CPT | Performed by: SURGERY

## 2020-02-08 PROCEDURE — 6360000002 HC RX W HCPCS: Performed by: FAMILY MEDICINE

## 2020-02-08 PROCEDURE — 2580000003 HC RX 258: Performed by: INTERNAL MEDICINE

## 2020-02-08 PROCEDURE — 6370000000 HC RX 637 (ALT 250 FOR IP): Performed by: FAMILY MEDICINE

## 2020-02-08 RX ORDER — ONDANSETRON 2 MG/ML
4 INJECTION INTRAMUSCULAR; INTRAVENOUS EVERY 6 HOURS PRN
Status: DISCONTINUED | OUTPATIENT
Start: 2020-02-08 | End: 2020-02-14 | Stop reason: HOSPADM

## 2020-02-08 RX ORDER — PANTOPRAZOLE SODIUM 40 MG/10ML
40 INJECTION, POWDER, LYOPHILIZED, FOR SOLUTION INTRAVENOUS DAILY
Status: DISCONTINUED | OUTPATIENT
Start: 2020-02-08 | End: 2020-02-11

## 2020-02-08 RX ORDER — MORPHINE SULFATE 2 MG/ML
2 INJECTION, SOLUTION INTRAMUSCULAR; INTRAVENOUS
Status: DISCONTINUED | OUTPATIENT
Start: 2020-02-08 | End: 2020-02-12

## 2020-02-08 RX ORDER — FAMOTIDINE 20 MG/1
20 TABLET, FILM COATED ORAL 2 TIMES DAILY
Status: DISCONTINUED | OUTPATIENT
Start: 2020-02-08 | End: 2020-02-08

## 2020-02-08 RX ORDER — SODIUM CHLORIDE 9 MG/ML
10 INJECTION INTRAVENOUS DAILY
Status: DISCONTINUED | OUTPATIENT
Start: 2020-02-08 | End: 2020-02-11

## 2020-02-08 RX ORDER — MORPHINE SULFATE 2 MG/ML
1 INJECTION, SOLUTION INTRAMUSCULAR; INTRAVENOUS
Status: DISCONTINUED | OUTPATIENT
Start: 2020-02-08 | End: 2020-02-12

## 2020-02-08 RX ADMIN — LIDOCAINE HYDROCHLORIDE: 20 SOLUTION ORAL; TOPICAL at 01:15

## 2020-02-08 RX ADMIN — IPRATROPIUM BROMIDE AND ALBUTEROL SULFATE 1 AMPULE: .5; 3 SOLUTION RESPIRATORY (INHALATION) at 19:58

## 2020-02-08 RX ADMIN — MORPHINE SULFATE 2 MG: 2 INJECTION, SOLUTION INTRAMUSCULAR; INTRAVENOUS at 21:14

## 2020-02-08 RX ADMIN — PIPERACILLIN AND TAZOBACTAM 3.38 G: 3; .375 INJECTION, POWDER, LYOPHILIZED, FOR SOLUTION INTRAVENOUS at 14:14

## 2020-02-08 RX ADMIN — Medication 10 ML: at 11:45

## 2020-02-08 RX ADMIN — MORPHINE SULFATE 2 MG: 2 INJECTION, SOLUTION INTRAMUSCULAR; INTRAVENOUS at 23:29

## 2020-02-08 RX ADMIN — IPRATROPIUM BROMIDE AND ALBUTEROL SULFATE 1 AMPULE: .5; 3 SOLUTION RESPIRATORY (INHALATION) at 12:10

## 2020-02-08 RX ADMIN — SODIUM CHLORIDE, PRESERVATIVE FREE 10 ML: 5 INJECTION INTRAVENOUS at 23:07

## 2020-02-08 RX ADMIN — MORPHINE SULFATE 2 MG: 2 INJECTION, SOLUTION INTRAMUSCULAR; INTRAVENOUS at 16:40

## 2020-02-08 RX ADMIN — IPRATROPIUM BROMIDE AND ALBUTEROL SULFATE 1 AMPULE: .5; 3 SOLUTION RESPIRATORY (INHALATION) at 00:23

## 2020-02-08 RX ADMIN — MORPHINE SULFATE 2 MG: 2 INJECTION, SOLUTION INTRAMUSCULAR; INTRAVENOUS at 18:45

## 2020-02-08 RX ADMIN — MORPHINE SULFATE 1 MG: 2 INJECTION, SOLUTION INTRAMUSCULAR; INTRAVENOUS at 09:57

## 2020-02-08 RX ADMIN — FUROSEMIDE 40 MG: 10 INJECTION, SOLUTION INTRAMUSCULAR; INTRAVENOUS at 11:45

## 2020-02-08 RX ADMIN — IPRATROPIUM BROMIDE AND ALBUTEROL SULFATE 1 AMPULE: .5; 3 SOLUTION RESPIRATORY (INHALATION) at 06:48

## 2020-02-08 RX ADMIN — MORPHINE SULFATE 2 MG: 2 INJECTION, SOLUTION INTRAMUSCULAR; INTRAVENOUS at 14:14

## 2020-02-08 RX ADMIN — MORPHINE SULFATE 2 MG: 2 INJECTION, SOLUTION INTRAMUSCULAR; INTRAVENOUS at 12:02

## 2020-02-08 RX ADMIN — OXYCODONE HYDROCHLORIDE AND ACETAMINOPHEN 1 TABLET: 7.5; 325 TABLET ORAL at 08:24

## 2020-02-08 RX ADMIN — PIPERACILLIN AND TAZOBACTAM 3.38 G: 3; .375 INJECTION, POWDER, LYOPHILIZED, FOR SOLUTION INTRAVENOUS at 21:14

## 2020-02-08 RX ADMIN — FUROSEMIDE 40 MG: 10 INJECTION, SOLUTION INTRAMUSCULAR; INTRAVENOUS at 18:11

## 2020-02-08 RX ADMIN — PIPERACILLIN AND TAZOBACTAM 3.38 G: 3; .375 INJECTION, POWDER, LYOPHILIZED, FOR SOLUTION INTRAVENOUS at 06:05

## 2020-02-08 RX ADMIN — SODIUM CHLORIDE, PRESERVATIVE FREE 10 ML: 5 INJECTION INTRAVENOUS at 11:47

## 2020-02-08 RX ADMIN — PANTOPRAZOLE SODIUM 40 MG: 40 INJECTION, POWDER, FOR SOLUTION INTRAVENOUS at 11:51

## 2020-02-08 RX ADMIN — FAMOTIDINE 20 MG: 20 TABLET, FILM COATED ORAL at 04:59

## 2020-02-08 RX ADMIN — IPRATROPIUM BROMIDE AND ALBUTEROL SULFATE 1 AMPULE: .5; 3 SOLUTION RESPIRATORY (INHALATION) at 16:40

## 2020-02-08 RX ADMIN — ONDANSETRON 4 MG: 2 INJECTION INTRAMUSCULAR; INTRAVENOUS at 09:57

## 2020-02-08 ASSESSMENT — PAIN DESCRIPTION - FREQUENCY
FREQUENCY: CONTINUOUS
FREQUENCY: INTERMITTENT
FREQUENCY: CONTINUOUS
FREQUENCY: CONTINUOUS

## 2020-02-08 ASSESSMENT — PAIN DESCRIPTION - PAIN TYPE
TYPE: ACUTE PAIN

## 2020-02-08 ASSESSMENT — PAIN SCALES - GENERAL
PAINLEVEL_OUTOF10: 6
PAINLEVEL_OUTOF10: 4
PAINLEVEL_OUTOF10: 0
PAINLEVEL_OUTOF10: 0
PAINLEVEL_OUTOF10: 8
PAINLEVEL_OUTOF10: 7
PAINLEVEL_OUTOF10: 0
PAINLEVEL_OUTOF10: 7
PAINLEVEL_OUTOF10: 0
PAINLEVEL_OUTOF10: 0
PAINLEVEL_OUTOF10: 8
PAINLEVEL_OUTOF10: 0
PAINLEVEL_OUTOF10: 8
PAINLEVEL_OUTOF10: 4

## 2020-02-08 ASSESSMENT — PAIN DESCRIPTION - PROGRESSION
CLINICAL_PROGRESSION: NOT CHANGED

## 2020-02-08 ASSESSMENT — PAIN DESCRIPTION - DESCRIPTORS
DESCRIPTORS: BURNING
DESCRIPTORS: ACHING;BURNING;DISCOMFORT
DESCRIPTORS: BURNING
DESCRIPTORS: ACHING;BURNING;DISCOMFORT
DESCRIPTORS: BURNING
DESCRIPTORS: BURNING

## 2020-02-08 ASSESSMENT — PAIN DESCRIPTION - DIRECTION
RADIATING_TOWARDS: THROAT

## 2020-02-08 ASSESSMENT — PAIN DESCRIPTION - ORIENTATION
ORIENTATION: MID;UPPER

## 2020-02-08 ASSESSMENT — PAIN DESCRIPTION - ONSET
ONSET: ON-GOING

## 2020-02-08 ASSESSMENT — PAIN - FUNCTIONAL ASSESSMENT
PAIN_FUNCTIONAL_ASSESSMENT: PREVENTS OR INTERFERES SOME ACTIVE ACTIVITIES AND ADLS
PAIN_FUNCTIONAL_ASSESSMENT: PREVENTS OR INTERFERES WITH MANY ACTIVE NOT PASSIVE ACTIVITIES
PAIN_FUNCTIONAL_ASSESSMENT: PREVENTS OR INTERFERES SOME ACTIVE ACTIVITIES AND ADLS

## 2020-02-08 ASSESSMENT — PAIN DESCRIPTION - LOCATION
LOCATION: ABDOMEN
LOCATION: THROAT;ABDOMEN
LOCATION: ABDOMEN
LOCATION: THROAT;ABDOMEN

## 2020-02-08 NOTE — PLAN OF CARE
Problem: Risk for Impaired Skin Integrity  Goal: Tissue integrity - skin and mucous membranes  Description  Structural intactness and normal physiological function of skin and  mucous membranes.   Outcome: Ongoing    Pt refuses turning at times and staff continue to educate pt about importance of protecting skin

## 2020-02-08 NOTE — PROGRESS NOTES
D: pt complains of throat and upper abdominal pain, said it started last night, last ate hamburger and fruit for dinner, pt throwing up this morning, pt has blood tinged sputum and brown, liquid emesis, no zofran ordered, but pt asking for pain medication A: this RN gave percocet for pain and sent secure message to Dr. Rowena Simmonds with assessment findings R: await further orders from physician

## 2020-02-08 NOTE — PROGRESS NOTES
[id number]]  Out: 2649 [Urine:2375; Drains:30; Stool:450]      LABS  Recent Labs     02/08/20  0609   WBC 14.4*   HGB 9.1*   HCT 27.5*      *   K 4.3   CL 84*   CO2 39*   BUN 13   CREATININE 0.6*   MG 2.20   CALCIUM 9.0       EDUCATION  Patient educated about Disease Process, Medications, Smoking Cessation, Oxygenation, Incentive Spirometry and Deep Breath and Cough, Diabetes, Hyperlipidemia, Smoking Cessation, Nutrition, Exercise and Hypertension    Electronically signed by Bharti Beck MD on 2/8/2020 at 2:48 PM

## 2020-02-08 NOTE — PROGRESS NOTES
Hospital Medicine Progress Note     Date:  2/8/2020    PCP: Gustavo Montero MD (Tel: [phone])    Date of Admission: 1/22/2020      Subjective Pt S/E. RN called this am, he was vomiting up brown liquid with blood and nausea and abdominal cramping. He denies worsening sob. Pt on 2.5 L O2, 97%. Objective  Physical exam:  Vitals: /88   Pulse 115   Temp 96.9 °F (36.1 °C) (Axillary)   Resp 20   Ht 5' 7\" (1.702 m)   Wt 184 lb 4.9 oz (83.6 kg)   SpO2 97%   BMI 28.87 kg/m²   Gen: nad, appears uncomfortable   Head: Normocephalic. Atraumatic. Eyes: EOMI. Conjunctiva clear  ENT: Oral mucosa moist  Neck: No JVD. supple  CVS: Nml S1S2, no MRG, irregular, regular rate  Pulmomary: diminished BS on the left side, tachypneic, no wheezing  Gastrointestinal: firm at baseline, diffusely tender without focal tenderness. normal bowel sounds. Colostomy RLL with minimal liquid brown stool in the bag, no gas. ruq drain in place   Musculoskeletal: No edema. Warm, remote LEFT AKA  Neuro: No focal deficit. Moves extremity spontaneously. Psychiatry: Alert, oriented x3  Skin: Warm, dry with normal turgor. No rash      24HR INTAKE/OUTPUT:      Intake/Output Summary (Last 24 hours) at 2/8/2020 0856  Last data filed at 2/8/2020 0816  Gross per 24 hour   Intake 1120 ml   Output 3050 ml   Net -1930 ml     I/O last 3 completed shifts:   In: 1120 [P.O.:720; IV Piggyback:400]  Out: 6856 [Urine:2375; Drains:30; Stool:450]  I/O this shift:  In: -   Out: 195 [Urine:75; Emesis/NG output:120]      Meds:    pantoprazole  40 mg Intravenous Daily    And    sodium chloride (PF)  10 mL Intravenous Daily    fluconazole  200 mg Oral Daily    potassium chloride  20 mEq Oral BID WC    furosemide  40 mg Intravenous BID    rivaroxaban  20 mg Oral Daily    piperacillin-tazobactam  3.375 g Intravenous Q8H    insulin glargine  15 Units Subcutaneous Nightly    lidocaine 1 % injection  5 mL Intradermal Once    sodium chloride flush  10 mL Intravenous 2 times per day    insulin lispro  0-12 Units Subcutaneous TID     insulin lispro  0-6 Units Subcutaneous Nightly    ipratropium-albuterol  1 ampule Inhalation Q4H WA    amiodarone  200 mg Oral Daily    aspirin  81 mg Oral Daily    atorvastatin  40 mg Oral Nightly    calcium-vitamin D  1 tablet Oral Daily    polyethylene glycol  17 g Oral BID    ranolazine  500 mg Oral BID    tamsulosin  0.4 mg Oral Daily       Infusions:    dextrose           PRN Meds: sodium chloride flush, ipratropium-albuterol, glucose, dextrose, glucagon (rDNA), dextrose, nitroGLYCERIN, oxyCODONE-acetaminophen    Labs/imaging:  CBC:   Recent Labs     02/06/20  0520 02/07/20  0630 02/08/20  0609   WBC 11.5* 12.8* 14.4*   HGB 8.3* 8.2* 9.1*    309 350         BMP:    Recent Labs     02/06/20  0520 02/07/20  0700 02/08/20  0609    140 134*   K 4.3 2.6* 4.3   CL 93* 111* 84*   CO2 33* 21 39*   BUN 10 6* 13   CREATININE 0.5* <0.5* 0.6*   GLUCOSE 76 168* 107*         Hepatic: No results for input(s): AST, ALT, ALB, BILITOT, ALKPHOS in the last 72 hours. Troponin: No results for input(s): TROPONINI in the last 72 hours. BNP: No results for input(s): BNP in the last 72 hours. INR:   No results for input(s): INR in the last 72 hours. Reviewed imaging and reports noted      Assessment:  Principal Problem:    Acute on chronic respiratory failure with hypoxia (HCC)  Active Problems:    PAD (peripheral artery disease) (HCC)    Acute on chronic combined systolic and diastolic HF (heart failure) (HCC)    Centrilobular emphysema (HCC)    Diabetes mellitus type 2, controlled (HCC)    Biventricular failure (HCC)    CAD (coronary artery disease)    Pulmonary HTN (HCC)    Systolic CHF (HCC)    Bilateral pleural effusion    Abdominal fluid collection  Resolved Problems:    * No resolved hospital problems.  *      Plan:  Pleural Effusion   - repeat cxr with increased size pleural effusion on the left   - s/p thoracocentesis - exudative and bloody effusion drained  - cont lasix to 40 mg iv bid as he doesn't want thoracentesis and this can be removed with diuresis instead  - I/o: - 1.7 L x 24 hrs  - he may need a thoracentesis if his fluid removal isn't adequate with diuresis alone  - reconsulted pulmonology      Nausea, vomiting  - brown emesis   - wbc, neutrophilia increasing  - get a ct abdomen due to concern of ileus vs obstruction  - keep npo for now  - ppi iv, antiemetics, morphine iv for pain control    Intraabdominal abscess   Complex surgical Hx - Hx of bowel obstruction s/p ex lap and colostomy. Later complicated by intraabdominal abscess requiring drainage and completed IV zosyn back in Nov 2019. CT abd pelvis this admit: Increased size complex cystic collection RUQ, recurrent LUQ collection- unclear if these are infected or hemorrhagic or both  1/30 - s/p percutaneous drainage of R fluid collection  - cont iv zosyn and eraxis; needs 7 days zosyn after discharge, 7 days po fluconazole after discharge  - fluid cultures negative  - ID and surgery involved  - repeat CT abdomen/pelviswith ruq fluid collection decreased in size    - drain is to be flushed q12hrs    Acute on Chronic systolic CHF EF 06% - improved  - cont lasix  - will give an extra dose of lasix 40 mg iv now  - daily wts: dry wt of 180 lbs, today 184  - cont ASA, statin    Chronic Resp Failure w/ Hypoxia - stable  - on baseline of 2.5 L O2 nC, maintain O2 sats > 90% - 94%    Centrilobular Emphysema  - cont duonebs    IDDM  - cont lantus - dose decreased  - humalog, SSI    HTN  - stable    Chronic a Fib  - cont amio, rate controlled  - restarted xarelto as he is done with surgical procedures and he does not want a thoracentesis, may need to hold this again, will readdress tomorrow    Diet: Diet NPO Effective Now      Activity: up as tolerated, limited mobility due to left AKA.      Prophylaxis: scd    Code status: Full Code     Arti Franco, DO

## 2020-02-08 NOTE — PROGRESS NOTES
Pt cleaned and repositioned by PCAs, Dr. Semaj Ibarra at bedside, updated this RN about plan of care, okay to hold morning medications, including amiodarone because pt is NPO, give lasix and other iv medications, pt given zofran and morphine iv and transported by stretcher for CT scan of abdomen

## 2020-02-09 LAB
ANION GAP SERPL CALCULATED.3IONS-SCNC: 9 MMOL/L (ref 3–16)
BASOPHILS ABSOLUTE: 0 K/UL (ref 0–0.2)
BASOPHILS RELATIVE PERCENT: 0.2 %
BUN BLDV-MCNC: 20 MG/DL (ref 7–20)
CALCIUM SERPL-MCNC: 8.9 MG/DL (ref 8.3–10.6)
CHLORIDE BLD-SCNC: 84 MMOL/L (ref 99–110)
CO2: 36 MMOL/L (ref 21–32)
CREAT SERPL-MCNC: 0.7 MG/DL (ref 0.9–1.3)
EOSINOPHILS ABSOLUTE: 0.1 K/UL (ref 0–0.6)
EOSINOPHILS RELATIVE PERCENT: 0.4 %
GFR AFRICAN AMERICAN: >60
GFR NON-AFRICAN AMERICAN: >60
GLUCOSE BLD-MCNC: 69 MG/DL (ref 70–99)
GLUCOSE BLD-MCNC: 79 MG/DL (ref 70–99)
GLUCOSE BLD-MCNC: 81 MG/DL (ref 70–99)
GLUCOSE BLD-MCNC: 84 MG/DL (ref 70–99)
GLUCOSE BLD-MCNC: 96 MG/DL (ref 70–99)
GLUCOSE BLD-MCNC: 98 MG/DL (ref 70–99)
HCT VFR BLD CALC: 27.3 % (ref 40.5–52.5)
HEMOGLOBIN: 8.7 G/DL (ref 13.5–17.5)
LYMPHOCYTES ABSOLUTE: 0.7 K/UL (ref 1–5.1)
LYMPHOCYTES RELATIVE PERCENT: 4.1 %
MAGNESIUM: 2.3 MG/DL (ref 1.8–2.4)
MCH RBC QN AUTO: 26.1 PG (ref 26–34)
MCHC RBC AUTO-ENTMCNC: 31.9 G/DL (ref 31–36)
MCV RBC AUTO: 81.9 FL (ref 80–100)
MONOCYTES ABSOLUTE: 1.4 K/UL (ref 0–1.3)
MONOCYTES RELATIVE PERCENT: 7.8 %
NEUTROPHILS ABSOLUTE: 15.8 K/UL (ref 1.7–7.7)
NEUTROPHILS RELATIVE PERCENT: 87.5 %
PDW BLD-RTO: 16.7 % (ref 12.4–15.4)
PERFORMED ON: NORMAL
PLATELET # BLD: 314 K/UL (ref 135–450)
PMV BLD AUTO: 8.4 FL (ref 5–10.5)
POTASSIUM REFLEX MAGNESIUM: 4.6 MMOL/L (ref 3.5–5.1)
RBC # BLD: 3.33 M/UL (ref 4.2–5.9)
SODIUM BLD-SCNC: 129 MMOL/L (ref 136–145)
WBC # BLD: 18 K/UL (ref 4–11)

## 2020-02-09 PROCEDURE — 36592 COLLECT BLOOD FROM PICC: CPT

## 2020-02-09 PROCEDURE — 2700000000 HC OXYGEN THERAPY PER DAY

## 2020-02-09 PROCEDURE — 94760 N-INVAS EAR/PLS OXIMETRY 1: CPT

## 2020-02-09 PROCEDURE — 6360000002 HC RX W HCPCS: Performed by: INTERNAL MEDICINE

## 2020-02-09 PROCEDURE — 99232 SBSQ HOSP IP/OBS MODERATE 35: CPT | Performed by: SURGERY

## 2020-02-09 PROCEDURE — 2580000003 HC RX 258: Performed by: INTERNAL MEDICINE

## 2020-02-09 PROCEDURE — 6360000002 HC RX W HCPCS: Performed by: FAMILY MEDICINE

## 2020-02-09 PROCEDURE — 6370000000 HC RX 637 (ALT 250 FOR IP): Performed by: INTERNAL MEDICINE

## 2020-02-09 PROCEDURE — 80048 BASIC METABOLIC PNL TOTAL CA: CPT

## 2020-02-09 PROCEDURE — 85025 COMPLETE CBC W/AUTO DIFF WBC: CPT

## 2020-02-09 PROCEDURE — 1200000000 HC SEMI PRIVATE

## 2020-02-09 PROCEDURE — 94640 AIRWAY INHALATION TREATMENT: CPT

## 2020-02-09 PROCEDURE — 2580000003 HC RX 258: Performed by: FAMILY MEDICINE

## 2020-02-09 PROCEDURE — 83735 ASSAY OF MAGNESIUM: CPT

## 2020-02-09 PROCEDURE — 99233 SBSQ HOSP IP/OBS HIGH 50: CPT | Performed by: INTERNAL MEDICINE

## 2020-02-09 PROCEDURE — C9113 INJ PANTOPRAZOLE SODIUM, VIA: HCPCS | Performed by: FAMILY MEDICINE

## 2020-02-09 RX ORDER — SODIUM CHLORIDE, SODIUM LACTATE, POTASSIUM CHLORIDE, CALCIUM CHLORIDE 600; 310; 30; 20 MG/100ML; MG/100ML; MG/100ML; MG/100ML
INJECTION, SOLUTION INTRAVENOUS CONTINUOUS
Status: DISCONTINUED | OUTPATIENT
Start: 2020-02-09 | End: 2020-02-12

## 2020-02-09 RX ORDER — FLUCONAZOLE 2 MG/ML
200 INJECTION, SOLUTION INTRAVENOUS EVERY 24 HOURS
Status: DISCONTINUED | OUTPATIENT
Start: 2020-02-09 | End: 2020-02-14 | Stop reason: HOSPADM

## 2020-02-09 RX ADMIN — MORPHINE SULFATE 2 MG: 2 INJECTION, SOLUTION INTRAMUSCULAR; INTRAVENOUS at 18:30

## 2020-02-09 RX ADMIN — MORPHINE SULFATE 2 MG: 2 INJECTION, SOLUTION INTRAMUSCULAR; INTRAVENOUS at 01:37

## 2020-02-09 RX ADMIN — PANTOPRAZOLE SODIUM 40 MG: 40 INJECTION, POWDER, FOR SOLUTION INTRAVENOUS at 08:45

## 2020-02-09 RX ADMIN — FUROSEMIDE 40 MG: 10 INJECTION, SOLUTION INTRAMUSCULAR; INTRAVENOUS at 18:25

## 2020-02-09 RX ADMIN — MORPHINE SULFATE 2 MG: 2 INJECTION, SOLUTION INTRAMUSCULAR; INTRAVENOUS at 13:41

## 2020-02-09 RX ADMIN — MORPHINE SULFATE 2 MG: 2 INJECTION, SOLUTION INTRAMUSCULAR; INTRAVENOUS at 08:28

## 2020-02-09 RX ADMIN — FLUCONAZOLE 200 MG: 200 INJECTION, SOLUTION INTRAVENOUS at 08:45

## 2020-02-09 RX ADMIN — SODIUM CHLORIDE, PRESERVATIVE FREE 10 ML: 5 INJECTION INTRAVENOUS at 08:46

## 2020-02-09 RX ADMIN — SODIUM CHLORIDE, PRESERVATIVE FREE 10 ML: 5 INJECTION INTRAVENOUS at 20:44

## 2020-02-09 RX ADMIN — IPRATROPIUM BROMIDE AND ALBUTEROL SULFATE 1 AMPULE: .5; 3 SOLUTION RESPIRATORY (INHALATION) at 08:13

## 2020-02-09 RX ADMIN — PIPERACILLIN AND TAZOBACTAM 3.38 G: 3; .375 INJECTION, POWDER, LYOPHILIZED, FOR SOLUTION INTRAVENOUS at 22:51

## 2020-02-09 RX ADMIN — Medication 10 ML: at 08:46

## 2020-02-09 RX ADMIN — MORPHINE SULFATE 2 MG: 2 INJECTION, SOLUTION INTRAMUSCULAR; INTRAVENOUS at 15:43

## 2020-02-09 RX ADMIN — PIPERACILLIN AND TAZOBACTAM 3.38 G: 3; .375 INJECTION, POWDER, LYOPHILIZED, FOR SOLUTION INTRAVENOUS at 15:39

## 2020-02-09 RX ADMIN — MORPHINE SULFATE 2 MG: 2 INJECTION, SOLUTION INTRAMUSCULAR; INTRAVENOUS at 20:39

## 2020-02-09 RX ADMIN — IPRATROPIUM BROMIDE AND ALBUTEROL SULFATE 1 AMPULE: .5; 3 SOLUTION RESPIRATORY (INHALATION) at 20:07

## 2020-02-09 RX ADMIN — MORPHINE SULFATE 2 MG: 2 INJECTION, SOLUTION INTRAMUSCULAR; INTRAVENOUS at 22:51

## 2020-02-09 RX ADMIN — FUROSEMIDE 40 MG: 10 INJECTION, SOLUTION INTRAMUSCULAR; INTRAVENOUS at 08:44

## 2020-02-09 RX ADMIN — SODIUM CHLORIDE, POTASSIUM CHLORIDE, SODIUM LACTATE AND CALCIUM CHLORIDE: 600; 310; 30; 20 INJECTION, SOLUTION INTRAVENOUS at 13:44

## 2020-02-09 RX ADMIN — MORPHINE SULFATE 2 MG: 2 INJECTION, SOLUTION INTRAMUSCULAR; INTRAVENOUS at 03:40

## 2020-02-09 RX ADMIN — ONDANSETRON 4 MG: 2 INJECTION INTRAMUSCULAR; INTRAVENOUS at 18:48

## 2020-02-09 RX ADMIN — MORPHINE SULFATE 2 MG: 2 INJECTION, SOLUTION INTRAMUSCULAR; INTRAVENOUS at 05:56

## 2020-02-09 RX ADMIN — MORPHINE SULFATE 2 MG: 2 INJECTION, SOLUTION INTRAMUSCULAR; INTRAVENOUS at 10:48

## 2020-02-09 RX ADMIN — IPRATROPIUM BROMIDE AND ALBUTEROL SULFATE 1 AMPULE: .5; 3 SOLUTION RESPIRATORY (INHALATION) at 13:03

## 2020-02-09 RX ADMIN — PIPERACILLIN AND TAZOBACTAM 3.38 G: 3; .375 INJECTION, POWDER, LYOPHILIZED, FOR SOLUTION INTRAVENOUS at 05:56

## 2020-02-09 RX ADMIN — IPRATROPIUM BROMIDE AND ALBUTEROL SULFATE 1 AMPULE: .5; 3 SOLUTION RESPIRATORY (INHALATION) at 16:45

## 2020-02-09 ASSESSMENT — PAIN DESCRIPTION - PAIN TYPE
TYPE: ACUTE PAIN

## 2020-02-09 ASSESSMENT — PAIN DESCRIPTION - DESCRIPTORS
DESCRIPTORS: ACHING;BURNING
DESCRIPTORS: ACHING;BURNING;DISCOMFORT
DESCRIPTORS: ACHING;BURNING;DISCOMFORT
DESCRIPTORS: ACHING;BURNING
DESCRIPTORS: ACHING;BURNING
DESCRIPTORS: ACHING;BURNING;DISCOMFORT
DESCRIPTORS: ACHING;BURNING
DESCRIPTORS: ACHING;BURNING
DESCRIPTORS: ACHING;BURNING;DISCOMFORT

## 2020-02-09 ASSESSMENT — PAIN DESCRIPTION - LOCATION
LOCATION: ABDOMEN

## 2020-02-09 ASSESSMENT — PAIN SCALES - GENERAL
PAINLEVEL_OUTOF10: 7
PAINLEVEL_OUTOF10: 0
PAINLEVEL_OUTOF10: 7
PAINLEVEL_OUTOF10: 0
PAINLEVEL_OUTOF10: 6
PAINLEVEL_OUTOF10: 0
PAINLEVEL_OUTOF10: 7
PAINLEVEL_OUTOF10: 8
PAINLEVEL_OUTOF10: 0
PAINLEVEL_OUTOF10: 8
PAINLEVEL_OUTOF10: 0
PAINLEVEL_OUTOF10: 7
PAINLEVEL_OUTOF10: 7
PAINLEVEL_OUTOF10: 0
PAINLEVEL_OUTOF10: 8
PAINLEVEL_OUTOF10: 0
PAINLEVEL_OUTOF10: 0
PAINLEVEL_OUTOF10: 9

## 2020-02-09 ASSESSMENT — PAIN DESCRIPTION - FREQUENCY
FREQUENCY: CONTINUOUS

## 2020-02-09 ASSESSMENT — PAIN DESCRIPTION - PROGRESSION
CLINICAL_PROGRESSION: NOT CHANGED

## 2020-02-09 ASSESSMENT — PAIN DESCRIPTION - ORIENTATION
ORIENTATION: MID;UPPER

## 2020-02-09 ASSESSMENT — PAIN - FUNCTIONAL ASSESSMENT
PAIN_FUNCTIONAL_ASSESSMENT: PREVENTS OR INTERFERES SOME ACTIVE ACTIVITIES AND ADLS

## 2020-02-09 ASSESSMENT — PAIN DESCRIPTION - ONSET
ONSET: ON-GOING

## 2020-02-09 ASSESSMENT — PAIN DESCRIPTION - DIRECTION
RADIATING_TOWARDS: THROAT

## 2020-02-09 NOTE — PROGRESS NOTES
Hospital Medicine Progress Note     Date:  2/9/2020    PCP: Elizabeth Irene MD (Tel: [phone])    Date of Admission: 1/22/2020      Subjective Pt S/E. Pt feels a little better this am, no vomiting, but still has nausea and abdominal pain. on 2.5 L O2    Objective  Physical exam:  Vitals: /62   Pulse 50   Temp 97.5 °F (36.4 °C) (Oral)   Resp 16   Ht 5' 7\" (1.702 m)   Wt 184 lb 4.9 oz (83.6 kg)   SpO2 93%   BMI 28.87 kg/m²   Gen: nad, appears more comfortable   Head: Normocephalic. Atraumatic  Eyes: EOMI. Conjunctiva clear  ENT: Oral mucosa moist  Neck: No JVD. supple  CVS: Nml S1S2, no MRG, irregular, regular rate  Pulmomary: diminished BS on the left side, tachypneic, no wheezing  Gastrointestinal: firm at baseline, diffusely tender without focal tenderness. normal bowel sounds. Colostomy RLL with a good amount of soft brown stool in the bag.  ruq drain in place   Musculoskeletal: No edema. Warm, remote LEFT AKA  Neuro: No focal deficit. Moves extremity spontaneously. Psychiatry: Alert, oriented x3  Skin: Warm, dry with normal turgor. No rash    24HR INTAKE/OUTPUT:      Intake/Output Summary (Last 24 hours) at 2/9/2020 0811  Last data filed at 2/9/2020 0510  Gross per 24 hour   Intake --   Output 1025 ml   Net -1025 ml     I/O last 3 completed shifts:  In: -   Out: 1100 [Urine:800; Emesis/NG output:300]  No intake/output data recorded.       Meds:    pantoprazole  40 mg Intravenous Daily    And    sodium chloride (PF)  10 mL Intravenous Daily    fluconazole  200 mg Oral Daily    potassium chloride  20 mEq Oral BID WC    furosemide  40 mg Intravenous BID    rivaroxaban  20 mg Oral Daily    piperacillin-tazobactam  3.375 g Intravenous Q8H    insulin glargine  15 Units Subcutaneous Nightly    lidocaine 1 % injection  5 mL Intradermal Once    sodium chloride flush  10 mL Intravenous 2 times per day    insulin lispro  0-12 Units Subcutaneous TID WC    insulin lispro  0-6 Units Subcutaneous Nightly    ipratropium-albuterol  1 ampule Inhalation Q4H WA    amiodarone  200 mg Oral Daily    aspirin  81 mg Oral Daily    atorvastatin  40 mg Oral Nightly    calcium-vitamin D  1 tablet Oral Daily    polyethylene glycol  17 g Oral BID    ranolazine  500 mg Oral BID    tamsulosin  0.4 mg Oral Daily       Infusions:    dextrose           PRN Meds: ondansetron, morphine **OR** morphine, sodium chloride flush, ipratropium-albuterol, glucose, dextrose, glucagon (rDNA), dextrose, nitroGLYCERIN, oxyCODONE-acetaminophen    Labs/imaging:  CBC:   Recent Labs     02/07/20  0630 02/08/20  0609 02/09/20  0600   WBC 12.8* 14.4* 18.0*   HGB 8.2* 9.1* 8.7*    350 314         BMP:    Recent Labs     02/07/20  0700 02/08/20  0609 02/09/20  0600    134* 129*   K 2.6* 4.3 4.6   * 84* 84*   CO2 21 39* 36*   BUN 6* 13 20   CREATININE <0.5* 0.6* 0.7*   GLUCOSE 168* 107* 69*         Hepatic: No results for input(s): AST, ALT, ALB, BILITOT, ALKPHOS in the last 72 hours. Troponin: No results for input(s): TROPONINI in the last 72 hours. BNP: No results for input(s): BNP in the last 72 hours. INR:   No results for input(s): INR in the last 72 hours. Reviewed imaging and reports noted      Assessment:  Principal Problem:    Acute on chronic respiratory failure with hypoxia (HCC)  Active Problems:    PAD (peripheral artery disease) (HCC)    Acute on chronic combined systolic and diastolic HF (heart failure) (HCC)    Centrilobular emphysema (HCC)    Diabetes mellitus type 2, controlled (HCC)    Biventricular failure (HCC)    CAD (coronary artery disease)    Pulmonary HTN (HCC)    Systolic CHF (HCC)    Bilateral pleural effusion    Abdominal fluid collection  Resolved Problems:    * No resolved hospital problems.  *      Plan:    Pleural Effusion   - repeat cxr with increased size pleural effusion on the left   - s/p thoracocentesis - exudative and bloody effusion drained  - cont lasix to 40 mg iv bid as he doesn't want thoracentesis and this can be removed with diuresis instead  - I/o: - .9 L x 24 hrs  - he may need a thoracentesis if his fluid removal isn't adequate with diuresis alone  - reconsulted pulmonology      Ileus   - wbc, neutrophilia increasing  - ct abdomen with ileus  - keep npo for now  - ppi iv, antiemetics, morphine iv for pain control    Intraabdominal abscess   Complex surgical Hx - Hx of bowel obstruction s/p ex lap and colostomy. Later complicated by intraabdominal abscess requiring drainage and completed IV zosyn back in Nov 2019. CT abd pelvis this admit: Increased size complex cystic collection RUQ, recurrent LUQ collection- unclear if these are infected or hemorrhagic or both  1/30 - s/p percutaneous drainage of R fluid collection  - repeat CT abdomen/pelvis on 2/3 with ruq fluid collection decreased in size, but the ct from 2/9 the collection increased; However this was without contrast. Will leave off [facility] for now and will repeat the ct with contrast tomorrow and reassess     - cont iv zosyn and eraxis -> fluconazole; needs 7 days zosyn after discharge, 7 days po fluconazole after discharge  - fluid cultures negative  - ID and surgery involved    Acute on Chronic systolic CHF EF 10%   - lasix iv  - daily wts: dry wt of 180 lbs, today 184  - cont ASA, statin    Chronic Resp Failure w/ Hypoxia - stable  - on baseline of 2.5 L O2 nC, maintain O2 sats > 90% - 94%    Centrilobular Emphysema  - cont duonebs    IDDM  - cont lantus - dose decreased  - humalog, SSI    HTN  - stable    Chronic a Fib  - cont amio, rate controlled  - hold xarelto for now    Diet: Diet NPO Effective Now      Activity: up as tolerated, limited mobility due to left AKA.      Prophylaxis: scd    Code status: Full Code     Arti Franco DO

## 2020-02-09 NOTE — PROGRESS NOTES
D: pt's mother has called twice and this RN has updated by phone, Dr. Marbella Zamora at bedside, attempted to call pt's mother, mother returned call to me and said that she \"was outside feeding the birds and the squirrels,\" mother is hoping to come to bedside this afternoon, pt is complaining about not being able to eat or drink, Dr. Marbella Zamora told this RN at bedside that pt is allowed to have a minimal amount of ice chips and 2 popsicles for the day A: this RN gave 120 ml of ice chips and 1 popsicle to the pt R: will continue to monitor pt

## 2020-02-09 NOTE — PLAN OF CARE
Problem: PAIN  Goal: Patient's pain/discomfort is manageable  Outcome: Ongoing     Problem: SKIN INTEGRITY  Goal: Skin integrity is maintained or improved  Outcome: Ongoing     Problem: SAFETY  Goal: Free from accidental physical injury  Outcome: Ongoing     Problem: ACTIVITY INTOLERANCE/IMPAIRED MOBILITY  Goal: Mobility/activity is maintained at optimum level for patient  Outcome: Ongoing

## 2020-02-09 NOTE — PLAN OF CARE
Problem: Risk for Impaired Skin Integrity  Goal: Tissue integrity - skin and mucous membranes  Description  Structural intactness and normal physiological function of skin and  mucous membranes.   2/9/2020 1149 by Kip Collet, RN  Outcome: Ongoing  2/9/2020 0813 by Catarina Johnston RN  Outcome: Ongoing

## 2020-02-09 NOTE — PROGRESS NOTES
Pulmonary Progress Note    CC:  Follow up respiratory failure, hypoxia, effusion    Subjective: At baseline oxygen and says he feels SOB due to pain in his belly  He is coughing but still SOB  Seems to be complaining of a lot of things at this time        Intake/Output Summary (Last 24 hours) at 2/9/2020 1158  Last data filed at 2/9/2020 1058  Gross per 24 hour   Intake 200 ml   Output 1005 ml   Net -805 ml         PHYSICAL EXAM:  Blood pressure 105/72, pulse 110, temperature 95.5 °F (35.3 °C), temperature source Axillary, resp. rate 20, height 5' 7\" (1.702 m), weight 184 lb 4.9 oz (83.6 kg), SpO2 93 %.'  Gen: No distress. Chronically ill  Eyes: PERRL. No sclera icterus. No conjunctival injection. ENT: No discharge. Pharynx clear. External appearance of ears and nose normal.  Neck: Trachea midline. No obvious mass. Resp: Clear on anterior chest .  CV: Regular rate. Regular rhythm. No murmur or rub. GI: Non-tender. Non-distended. No hernia. + ostomy   Skin: Warm, dry, normal texture and turgor. No nodule on exposed extremities. Lymph: No cervical LAD. No supraclavicular LAD. M/S: No cyanosis. No clubbing. No joint deformity. Neuro: Moves all four extremities. CN 2-12 tested, no defect noted.   Ext:   Left AKA, trace edema    Medications:    Scheduled Meds:   fluconazole  200 mg Intravenous Q24H    pantoprazole  40 mg Intravenous Daily    And    sodium chloride (PF)  10 mL Intravenous Daily    potassium chloride  20 mEq Oral BID WC    furosemide  40 mg Intravenous BID    [Held by provider] rivaroxaban  20 mg Oral Daily    piperacillin-tazobactam  3.375 g Intravenous Q8H    insulin glargine  15 Units Subcutaneous Nightly    lidocaine 1 % injection  5 mL Intradermal Once    sodium chloride flush  10 mL Intravenous 2 times per day    insulin lispro  0-12 Units Subcutaneous TID     insulin lispro  0-6 Units Subcutaneous Nightly    ipratropium-albuterol  1 ampule Inhalation Q4H WA    amiodarone  200 mg Oral Daily    aspirin  81 mg Oral Daily    atorvastatin  40 mg Oral Nightly    calcium-vitamin D  1 tablet Oral Daily    polyethylene glycol  17 g Oral BID    ranolazine  500 mg Oral BID    tamsulosin  0.4 mg Oral Daily       Continuous Infusions:   lactated ringers      dextrose         PRN Meds:  ondansetron, morphine **OR** morphine, sodium chloride flush, ipratropium-albuterol, glucose, dextrose, glucagon (rDNA), dextrose, nitroGLYCERIN, oxyCODONE-acetaminophen    Labs:  CBC:   Recent Labs     02/07/20  0630 02/08/20  0609 02/09/20  0600   WBC 12.8* 14.4* 18.0*   HGB 8.2* 9.1* 8.7*   HCT 25.1* 27.5* 27.3*   MCV 81.9 81.3 81.9    350 314     BMP:   Recent Labs     02/07/20  0700 02/08/20  0609 02/09/20  0600    134* 129*   K 2.6* 4.3 4.6   * 84* 84*   CO2 21 39* 36*   BUN 6* 13 20   CREATININE <0.5* 0.6* 0.7*     LIVER PROFILE: No results for input(s): AST, ALT, LIPASE, BILIDIR, BILITOT, ALKPHOS in the last 72 hours. Invalid input(s): AMYLASE,  ALB  PT/INR: No results for input(s): PROTIME, INR in the last 72 hours. APTT: No results for input(s): APTT in the last 72 hours. UA:No results for input(s): NITRITE, COLORU, PHUR, LABCAST, WBCUA, RBCUA, MUCUS, TRICHOMONAS, YEAST, BACTERIA, CLARITYU, SPECGRAV, LEUKOCYTESUR, UROBILINOGEN, BILIRUBINUR, BLOODU, GLUCOSEU, AMORPHOUS in the last 72 hours. Invalid input(s): Ivett Man  No results for input(s): PH, PCO2, PO2 in the last 72 hours. Films: AB Chest   Chest imaging reports were reviewed and imaging was reviewed by me and showed bilateral effusion, left worse than right    ABG:  Nothing recent    Cultures:  Negative    I reviewed the labs and images listed above    Assessment:   · Chronic Hypoxic Respiratory Failure  · Left Pleural effusion, with one thoracentesis performed already. It was negative cytology   · Emphysema   · Ileus      Plan:  · He is still not interested in thoracentesis.   I will check again tomorrow. If he declines it again, I will sign off. · Seems he is on lasix and IV fluids. I would prefer lasix due to his EF and low albumin  · Hold xarelto in case he decides on thoracentesis.           Larry Calvin, DO  [facility] Pulmonary

## 2020-02-09 NOTE — PROGRESS NOTES
[facility] [phone]                                     Daily Progress Note                                                         Pt Name: David Cody  Medical Record Number: [id number]  Date of Birth 1960   Today's Date: 2/9/2020  Chief Complaint   Patient presents with    Shortness of Breath     x4 days        ASSESSMENT/PLAN  Right upper quadrant cystic mass  -Drained by IR with what appears to be old blood. Drain flushes well without significant returrn. Will discuss with radiology about upsizing drain given enlargement of liver cyst/fluid size. Plan to repeat CT tomorrow.    -Repeat CT 2/3 redemonstrated a infrahepatic complex fluid collection with internal hyperdensity with a drain in place, and that collection is mildly decreased in size, now measuring 12.4  x 11.3 cm (previously 14.1 x 12.5 cm)  -repeat CT 2/8 shows liver cyst/fluid collection slightly larger. Will discuss with radiology different options  -Ileus - still distended with nausea, no emesis. Patient states NG tube cannot be placed due to nasal anatomy. Continue NPO. Monitor Cr; may need IV fluids if he becomes dehydrated given lasix for his pulmonary effusion      SUBJECTIVE  Excell Magalie had some nauses and but no emesis overnight. No significant ostomy output. OBJECTIVE  VITALS:  height is 5' 7\" (1.702 m) and weight is 184 lb 4.9 oz (83.6 kg). His axillary temperature is 95.5 °F (35.3 °C). His blood pressure is 105/72 and his pulse is 110. His respiration is 20 and oxygen saturation is 93%. INTAKE/OUTPUT:      Intake/Output Summary (Last 24 hours) at 2/9/2020 1231  Last data filed at 2/9/2020 1058  Gross per 24 hour   Intake 200 ml   Output 1005 ml   Net -805 ml     GENERAL: alert, no distress  ABDOMEN: Soft, non-tender, moderately distended. Drain right abdomen serosanguinous. Colostomy present with minimal stool in appliance.     I/O last 3 completed shifts:   In: 200 [IV Piggyback:200]  Out: 1100 [Urine:800; Emesis/NG output:300]      LABS  Recent Labs     02/09/20  0600   WBC 18.0*   HGB 8.7*   HCT 27.3*      *   K 4.6   CL 84*   CO2 36*   BUN 20   CREATININE 0.7*   MG 2.30   CALCIUM 8.9       EDUCATION  Patient educated about Disease Process, Medications, Smoking Cessation, Oxygenation, Incentive Spirometry and Deep Breath and Cough, Diabetes, Hyperlipidemia, Smoking Cessation, Nutrition, Exercise and Hypertension    Electronically signed by Kym Pichardo MD on 2/9/2020 at 12:31 PM

## 2020-02-10 ENCOUNTER — APPOINTMENT (OUTPATIENT)
Dept: ULTRASOUND IMAGING | Age: 60
DRG: 194 | End: 2020-02-10
Payer: COMMERCIAL

## 2020-02-10 ENCOUNTER — APPOINTMENT (OUTPATIENT)
Dept: GENERAL RADIOLOGY | Age: 60
DRG: 194 | End: 2020-02-10
Payer: COMMERCIAL

## 2020-02-10 ENCOUNTER — APPOINTMENT (OUTPATIENT)
Dept: CT IMAGING | Age: 60
DRG: 194 | End: 2020-02-10
Payer: COMMERCIAL

## 2020-02-10 LAB
ANION GAP SERPL CALCULATED.3IONS-SCNC: 9 MMOL/L (ref 3–16)
APPEARANCE FLUID: NORMAL
BASOPHILS ABSOLUTE: 0.1 K/UL (ref 0–0.2)
BASOPHILS RELATIVE PERCENT: 0.7 %
BUN BLDV-MCNC: 20 MG/DL (ref 7–20)
CALCIUM SERPL-MCNC: 8.7 MG/DL (ref 8.3–10.6)
CELL COUNT FLUID TYPE: NORMAL
CHLORIDE BLD-SCNC: 91 MMOL/L (ref 99–110)
CLOT EVALUATION: NORMAL
CO2: 37 MMOL/L (ref 21–32)
COLOR FLUID: NORMAL
CREAT SERPL-MCNC: 0.7 MG/DL (ref 0.9–1.3)
EOSINOPHIL FLUID: 1 %
EOSINOPHILS ABSOLUTE: 0.1 K/UL (ref 0–0.6)
EOSINOPHILS RELATIVE PERCENT: 0.6 %
FLUID PATH CONSULT: YES
FLUID TYPE: NORMAL
GFR AFRICAN AMERICAN: >60
GFR NON-AFRICAN AMERICAN: >60
GLUCOSE BLD-MCNC: 216 MG/DL (ref 70–99)
GLUCOSE BLD-MCNC: 71 MG/DL (ref 70–99)
GLUCOSE BLD-MCNC: 80 MG/DL (ref 70–99)
GLUCOSE BLD-MCNC: 82 MG/DL (ref 70–99)
GLUCOSE BLD-MCNC: 92 MG/DL (ref 70–99)
GLUCOSE BLD-MCNC: 97 MG/DL (ref 70–99)
HCT VFR BLD CALC: 23.7 % (ref 40.5–52.5)
HEMOGLOBIN: 7.7 G/DL (ref 13.5–17.5)
LD, FLUID: 134 U/L
LYMPHOCYTES ABSOLUTE: 0.8 K/UL (ref 1–5.1)
LYMPHOCYTES RELATIVE PERCENT: 5.6 %
LYMPHOCYTES, BODY FLUID: 25 %
MAGNESIUM: 2 MG/DL (ref 1.8–2.4)
MCH RBC QN AUTO: 26.6 PG (ref 26–34)
MCHC RBC AUTO-ENTMCNC: 32.8 G/DL (ref 31–36)
MCV RBC AUTO: 81.3 FL (ref 80–100)
MONOCYTE, FLUID: 2 %
MONOCYTES ABSOLUTE: 1 K/UL (ref 0–1.3)
MONOCYTES RELATIVE PERCENT: 7.6 %
MONONUCLEAR UNIDENTIFIED CELLS FLUID: 5 %
NEUTROPHIL, FLUID: 67 %
NEUTROPHILS ABSOLUTE: 11.8 K/UL (ref 1.7–7.7)
NEUTROPHILS RELATIVE PERCENT: 85.5 %
NUCLEATED CELLS FLUID: 144 /CUMM
NUMBER OF CELLS COUNTED FLUID: 100
PDW BLD-RTO: 16.4 % (ref 12.4–15.4)
PERFORMED ON: ABNORMAL
PERFORMED ON: NORMAL
PLATELET # BLD: 347 K/UL (ref 135–450)
PMV BLD AUTO: 8.3 FL (ref 5–10.5)
POTASSIUM REFLEX MAGNESIUM: 3.9 MMOL/L (ref 3.5–5.1)
PROTEIN FLUID: 4.9 G/DL
RBC # BLD: 2.91 M/UL (ref 4.2–5.9)
RBC FLUID: NORMAL /CUMM
SODIUM BLD-SCNC: 137 MMOL/L (ref 136–145)
WBC # BLD: 13.8 K/UL (ref 4–11)

## 2020-02-10 PROCEDURE — 99232 SBSQ HOSP IP/OBS MODERATE 35: CPT | Performed by: SURGERY

## 2020-02-10 PROCEDURE — 6360000002 HC RX W HCPCS: Performed by: FAMILY MEDICINE

## 2020-02-10 PROCEDURE — 83615 LACTATE (LD) (LDH) ENZYME: CPT

## 2020-02-10 PROCEDURE — 6370000000 HC RX 637 (ALT 250 FOR IP): Performed by: INTERNAL MEDICINE

## 2020-02-10 PROCEDURE — 6360000002 HC RX W HCPCS: Performed by: INTERNAL MEDICINE

## 2020-02-10 PROCEDURE — 74177 CT ABD & PELVIS W/CONTRAST: CPT

## 2020-02-10 PROCEDURE — 85025 COMPLETE CBC W/AUTO DIFF WBC: CPT

## 2020-02-10 PROCEDURE — 87070 CULTURE OTHR SPECIMN AEROBIC: CPT

## 2020-02-10 PROCEDURE — 6360000002 HC RX W HCPCS: Performed by: RADIOLOGY

## 2020-02-10 PROCEDURE — C1729 CATH, DRAINAGE: HCPCS

## 2020-02-10 PROCEDURE — 6370000000 HC RX 637 (ALT 250 FOR IP): Performed by: FAMILY MEDICINE

## 2020-02-10 PROCEDURE — 36592 COLLECT BLOOD FROM PICC: CPT

## 2020-02-10 PROCEDURE — 77012 CT SCAN FOR NEEDLE BIOPSY: CPT

## 2020-02-10 PROCEDURE — 94640 AIRWAY INHALATION TREATMENT: CPT

## 2020-02-10 PROCEDURE — APPNB30 APP NON BILLABLE TIME 0-30 MINS: Performed by: PHYSICIAN ASSISTANT

## 2020-02-10 PROCEDURE — 6360000004 HC RX CONTRAST MEDICATION

## 2020-02-10 PROCEDURE — 99233 SBSQ HOSP IP/OBS HIGH 50: CPT | Performed by: INTERNAL MEDICINE

## 2020-02-10 PROCEDURE — 1200000000 HC SEMI PRIVATE

## 2020-02-10 PROCEDURE — 80048 BASIC METABOLIC PNL TOTAL CA: CPT

## 2020-02-10 PROCEDURE — 87205 SMEAR GRAM STAIN: CPT

## 2020-02-10 PROCEDURE — 88184 FLOWCYTOMETRY/ TC 1 MARKER: CPT

## 2020-02-10 PROCEDURE — 89051 BODY FLUID CELL COUNT: CPT

## 2020-02-10 PROCEDURE — 0W9B3ZZ DRAINAGE OF LEFT PLEURAL CAVITY, PERCUTANEOUS APPROACH: ICD-10-PCS | Performed by: RADIOLOGY

## 2020-02-10 PROCEDURE — APPSS15 APP SPLIT SHARED TIME 0-15 MINUTES: Performed by: PHYSICIAN ASSISTANT

## 2020-02-10 PROCEDURE — 84157 ASSAY OF PROTEIN OTHER: CPT

## 2020-02-10 PROCEDURE — 88112 CYTOPATH CELL ENHANCE TECH: CPT

## 2020-02-10 PROCEDURE — 2580000003 HC RX 258: Performed by: INTERNAL MEDICINE

## 2020-02-10 PROCEDURE — 2700000000 HC OXYGEN THERAPY PER DAY

## 2020-02-10 PROCEDURE — 88185 FLOWCYTOMETRY/TC ADD-ON: CPT

## 2020-02-10 PROCEDURE — 99153 MOD SED SAME PHYS/QHP EA: CPT

## 2020-02-10 PROCEDURE — 99232 SBSQ HOSP IP/OBS MODERATE 35: CPT | Performed by: INTERNAL MEDICINE

## 2020-02-10 PROCEDURE — C9113 INJ PANTOPRAZOLE SODIUM, VIA: HCPCS | Performed by: FAMILY MEDICINE

## 2020-02-10 PROCEDURE — 88305 TISSUE EXAM BY PATHOLOGIST: CPT

## 2020-02-10 PROCEDURE — 94761 N-INVAS EAR/PLS OXIMETRY MLT: CPT

## 2020-02-10 PROCEDURE — 83735 ASSAY OF MAGNESIUM: CPT

## 2020-02-10 PROCEDURE — 2580000003 HC RX 258: Performed by: FAMILY MEDICINE

## 2020-02-10 PROCEDURE — 6360000004 HC RX CONTRAST MEDICATION: Performed by: SURGERY

## 2020-02-10 PROCEDURE — 71045 X-RAY EXAM CHEST 1 VIEW: CPT

## 2020-02-10 PROCEDURE — 6370000000 HC RX 637 (ALT 250 FOR IP): Performed by: HOSPITALIST

## 2020-02-10 RX ORDER — FENTANYL CITRATE 50 UG/ML
INJECTION, SOLUTION INTRAMUSCULAR; INTRAVENOUS DAILY PRN
Status: COMPLETED | OUTPATIENT
Start: 2020-02-10 | End: 2020-02-10

## 2020-02-10 RX ORDER — MIDAZOLAM HYDROCHLORIDE 1 MG/ML
INJECTION INTRAMUSCULAR; INTRAVENOUS DAILY PRN
Status: COMPLETED | OUTPATIENT
Start: 2020-02-10 | End: 2020-02-10

## 2020-02-10 RX ADMIN — MORPHINE SULFATE 2 MG: 2 INJECTION, SOLUTION INTRAMUSCULAR; INTRAVENOUS at 02:20

## 2020-02-10 RX ADMIN — IPRATROPIUM BROMIDE AND ALBUTEROL SULFATE 1 AMPULE: .5; 3 SOLUTION RESPIRATORY (INHALATION) at 19:55

## 2020-02-10 RX ADMIN — MORPHINE SULFATE 2 MG: 2 INJECTION, SOLUTION INTRAMUSCULAR; INTRAVENOUS at 05:47

## 2020-02-10 RX ADMIN — IPRATROPIUM BROMIDE AND ALBUTEROL SULFATE 1 AMPULE: .5; 3 SOLUTION RESPIRATORY (INHALATION) at 08:06

## 2020-02-10 RX ADMIN — TAMSULOSIN HYDROCHLORIDE 0.4 MG: 0.4 CAPSULE ORAL at 08:47

## 2020-02-10 RX ADMIN — IPRATROPIUM BROMIDE AND ALBUTEROL SULFATE 1 AMPULE: .5; 3 SOLUTION RESPIRATORY (INHALATION) at 02:00

## 2020-02-10 RX ADMIN — INSULIN GLARGINE 15 UNITS: 100 INJECTION, SOLUTION SUBCUTANEOUS at 20:58

## 2020-02-10 RX ADMIN — POTASSIUM CHLORIDE 20 MEQ: 1500 TABLET, EXTENDED RELEASE ORAL at 08:47

## 2020-02-10 RX ADMIN — PIPERACILLIN AND TAZOBACTAM 3.38 G: 3; .375 INJECTION, POWDER, LYOPHILIZED, FOR SOLUTION INTRAVENOUS at 05:46

## 2020-02-10 RX ADMIN — PIPERACILLIN AND TAZOBACTAM 3.38 G: 3; .375 INJECTION, POWDER, LYOPHILIZED, FOR SOLUTION INTRAVENOUS at 15:17

## 2020-02-10 RX ADMIN — IPRATROPIUM BROMIDE AND ALBUTEROL SULFATE 1 AMPULE: .5; 3 SOLUTION RESPIRATORY (INHALATION) at 16:18

## 2020-02-10 RX ADMIN — INSULIN LISPRO 2 UNITS: 100 INJECTION, SOLUTION INTRAVENOUS; SUBCUTANEOUS at 20:59

## 2020-02-10 RX ADMIN — MORPHINE SULFATE 2 MG: 2 INJECTION, SOLUTION INTRAMUSCULAR; INTRAVENOUS at 23:12

## 2020-02-10 RX ADMIN — MORPHINE SULFATE 2 MG: 2 INJECTION, SOLUTION INTRAMUSCULAR; INTRAVENOUS at 18:41

## 2020-02-10 RX ADMIN — IOHEXOL: 240 INJECTION, SOLUTION INTRATHECAL; INTRAVASCULAR; INTRAVENOUS; ORAL at 08:50

## 2020-02-10 RX ADMIN — AMIODARONE HYDROCHLORIDE 200 MG: 200 TABLET ORAL at 08:48

## 2020-02-10 RX ADMIN — OYSTER SHELL CALCIUM WITH VITAMIN D 1 TABLET: 500; 200 TABLET, FILM COATED ORAL at 08:47

## 2020-02-10 RX ADMIN — PANTOPRAZOLE SODIUM 40 MG: 40 INJECTION, POWDER, FOR SOLUTION INTRAVENOUS at 08:48

## 2020-02-10 RX ADMIN — FENTANYL CITRATE 50 MCG: 50 INJECTION INTRAMUSCULAR; INTRAVENOUS at 11:13

## 2020-02-10 RX ADMIN — FLUCONAZOLE 200 MG: 200 INJECTION, SOLUTION INTRAVENOUS at 08:47

## 2020-02-10 RX ADMIN — MORPHINE SULFATE 2 MG: 2 INJECTION, SOLUTION INTRAMUSCULAR; INTRAVENOUS at 08:47

## 2020-02-10 RX ADMIN — MORPHINE SULFATE 2 MG: 2 INJECTION, SOLUTION INTRAMUSCULAR; INTRAVENOUS at 21:07

## 2020-02-10 RX ADMIN — IPRATROPIUM BROMIDE AND ALBUTEROL SULFATE 1 AMPULE: .5; 3 SOLUTION RESPIRATORY (INHALATION) at 13:06

## 2020-02-10 RX ADMIN — SODIUM CHLORIDE, PRESERVATIVE FREE 10 ML: 5 INJECTION INTRAVENOUS at 20:58

## 2020-02-10 RX ADMIN — FUROSEMIDE 40 MG: 10 INJECTION, SOLUTION INTRAMUSCULAR; INTRAVENOUS at 08:47

## 2020-02-10 RX ADMIN — MORPHINE SULFATE 2 MG: 2 INJECTION, SOLUTION INTRAMUSCULAR; INTRAVENOUS at 12:52

## 2020-02-10 RX ADMIN — RANOLAZINE 500 MG: 500 TABLET, FILM COATED, EXTENDED RELEASE ORAL at 08:47

## 2020-02-10 RX ADMIN — IOPAMIDOL 75 ML: 755 INJECTION, SOLUTION INTRAVENOUS at 10:54

## 2020-02-10 RX ADMIN — ATORVASTATIN CALCIUM 40 MG: 40 TABLET, FILM COATED ORAL at 20:59

## 2020-02-10 RX ADMIN — FENTANYL CITRATE 25 MCG: 50 INJECTION INTRAMUSCULAR; INTRAVENOUS at 11:26

## 2020-02-10 RX ADMIN — SODIUM CHLORIDE, PRESERVATIVE FREE 10 ML: 5 INJECTION INTRAVENOUS at 08:50

## 2020-02-10 RX ADMIN — FUROSEMIDE 40 MG: 10 INJECTION, SOLUTION INTRAMUSCULAR; INTRAVENOUS at 18:41

## 2020-02-10 RX ADMIN — POLYETHYLENE GLYCOL 3350 17 G: 17 POWDER, FOR SOLUTION ORAL at 20:58

## 2020-02-10 RX ADMIN — MORPHINE SULFATE 2 MG: 2 INJECTION, SOLUTION INTRAMUSCULAR; INTRAVENOUS at 15:29

## 2020-02-10 RX ADMIN — MIDAZOLAM 1 MG: 1 INJECTION INTRAMUSCULAR; INTRAVENOUS at 11:13

## 2020-02-10 RX ADMIN — RANOLAZINE 500 MG: 500 TABLET, FILM COATED, EXTENDED RELEASE ORAL at 20:59

## 2020-02-10 RX ADMIN — PIPERACILLIN AND TAZOBACTAM 3.38 G: 3; .375 INJECTION, POWDER, LYOPHILIZED, FOR SOLUTION INTRAVENOUS at 20:58

## 2020-02-10 RX ADMIN — Medication 10 ML: at 08:47

## 2020-02-10 RX ADMIN — FENTANYL CITRATE 25 MCG: 50 INJECTION INTRAMUSCULAR; INTRAVENOUS at 11:19

## 2020-02-10 RX ADMIN — OXYCODONE HYDROCHLORIDE AND ACETAMINOPHEN 1 TABLET: 7.5; 325 TABLET ORAL at 14:00

## 2020-02-10 RX ADMIN — ASPIRIN 81 MG 81 MG: 81 TABLET ORAL at 08:48

## 2020-02-10 RX ADMIN — POTASSIUM CHLORIDE 20 MEQ: 1500 TABLET, EXTENDED RELEASE ORAL at 18:41

## 2020-02-10 ASSESSMENT — PAIN DESCRIPTION - ONSET
ONSET: ON-GOING

## 2020-02-10 ASSESSMENT — PAIN DESCRIPTION - LOCATION
LOCATION: ABDOMEN

## 2020-02-10 ASSESSMENT — PAIN SCALES - GENERAL
PAINLEVEL_OUTOF10: 6
PAINLEVEL_OUTOF10: 7
PAINLEVEL_OUTOF10: 7
PAINLEVEL_OUTOF10: 5
PAINLEVEL_OUTOF10: 7
PAINLEVEL_OUTOF10: 0
PAINLEVEL_OUTOF10: 9
PAINLEVEL_OUTOF10: 7
PAINLEVEL_OUTOF10: 4
PAINLEVEL_OUTOF10: 5
PAINLEVEL_OUTOF10: 9
PAINLEVEL_OUTOF10: 7
PAINLEVEL_OUTOF10: 7
PAINLEVEL_OUTOF10: 4
PAINLEVEL_OUTOF10: 9
PAINLEVEL_OUTOF10: 0

## 2020-02-10 ASSESSMENT — PAIN - FUNCTIONAL ASSESSMENT
PAIN_FUNCTIONAL_ASSESSMENT: PREVENTS OR INTERFERES WITH MANY ACTIVE NOT PASSIVE ACTIVITIES
PAIN_FUNCTIONAL_ASSESSMENT: PREVENTS OR INTERFERES SOME ACTIVE ACTIVITIES AND ADLS
PAIN_FUNCTIONAL_ASSESSMENT: PREVENTS OR INTERFERES WITH MANY ACTIVE NOT PASSIVE ACTIVITIES
PAIN_FUNCTIONAL_ASSESSMENT: PREVENTS OR INTERFERES SOME ACTIVE ACTIVITIES AND ADLS
PAIN_FUNCTIONAL_ASSESSMENT: PREVENTS OR INTERFERES WITH ALL ACTIVE AND SOME PASSIVE ACTIVITIES
PAIN_FUNCTIONAL_ASSESSMENT: PREVENTS OR INTERFERES SOME ACTIVE ACTIVITIES AND ADLS
PAIN_FUNCTIONAL_ASSESSMENT: PREVENTS OR INTERFERES WITH MANY ACTIVE NOT PASSIVE ACTIVITIES
PAIN_FUNCTIONAL_ASSESSMENT: PREVENTS OR INTERFERES WITH MANY ACTIVE NOT PASSIVE ACTIVITIES
PAIN_FUNCTIONAL_ASSESSMENT: PREVENTS OR INTERFERES WITH ALL ACTIVE AND SOME PASSIVE ACTIVITIES
PAIN_FUNCTIONAL_ASSESSMENT: PREVENTS OR INTERFERES SOME ACTIVE ACTIVITIES AND ADLS

## 2020-02-10 ASSESSMENT — PAIN DESCRIPTION - PROGRESSION
CLINICAL_PROGRESSION: NOT CHANGED

## 2020-02-10 ASSESSMENT — PAIN DESCRIPTION - DESCRIPTORS
DESCRIPTORS: ACHING;CONSTANT
DESCRIPTORS: ACHING;BURNING
DESCRIPTORS: ACHING
DESCRIPTORS: ACHING;BURNING;DISCOMFORT
DESCRIPTORS: ACHING;CONSTANT
DESCRIPTORS: ACHING
DESCRIPTORS: ACHING;CONSTANT
DESCRIPTORS: ACHING;BURNING;DISCOMFORT

## 2020-02-10 ASSESSMENT — PAIN DESCRIPTION - FREQUENCY
FREQUENCY: CONTINUOUS

## 2020-02-10 ASSESSMENT — PAIN DESCRIPTION - ORIENTATION
ORIENTATION: MID;UPPER
ORIENTATION: LOWER
ORIENTATION: MID;LOWER
ORIENTATION: MID;UPPER
ORIENTATION: MID;UPPER
ORIENTATION: LOWER
ORIENTATION: MID;UPPER

## 2020-02-10 ASSESSMENT — PAIN SCALES - WONG BAKER
WONGBAKER_NUMERICALRESPONSE: 4
WONGBAKER_NUMERICALRESPONSE: 8
WONGBAKER_NUMERICALRESPONSE: 6
WONGBAKER_NUMERICALRESPONSE: 8
WONGBAKER_NUMERICALRESPONSE: 10
WONGBAKER_NUMERICALRESPONSE: 6

## 2020-02-10 ASSESSMENT — PAIN DESCRIPTION - PAIN TYPE
TYPE: ACUTE PAIN
TYPE: ACUTE PAIN;SURGICAL PAIN
TYPE: ACUTE PAIN
TYPE: ACUTE PAIN;SURGICAL PAIN
TYPE: ACUTE PAIN
TYPE: ACUTE PAIN
TYPE: ACUTE PAIN;SURGICAL PAIN
TYPE: ACUTE PAIN;SURGICAL PAIN

## 2020-02-10 ASSESSMENT — PAIN DESCRIPTION - DIRECTION
RADIATING_TOWARDS: THROAT
RADIATING_TOWARDS: THROAT

## 2020-02-10 NOTE — PROGRESS NOTES
Pt rounded on this morning Q2h, whiteboard updated, pt given ice water and needs assessed. Pt had oral contrast in glasses of Ice water, pt to IR for CT of the Abdomen, an abscess drain exchange, and a thoracentesis. Pt aware, no questions or concerns at this time. Call light within reach, pt verbalized understanding. Will continue to monitor and reassess.    Electronically signed by Indira Dickey RN on 2/10/2020 at 10:45 AM

## 2020-02-10 NOTE — BRIEF OP NOTE
Brief Postoperative Note    Dorise Essex  YOB: 1960  [id number]    Pre-operative Diagnosis: RUQ cyst and left pleural effusion    Post-operative Diagnosis: Same    Procedure:   1. CT-guided drain exchange and upsize  2. Left Thoracentesis    Anesthesia: Moderate Sedation    Surgeons: Israel Shaikh MD    Estimated Blood Loss: Less than 5 mL    Complications: None    Specimens: Was Obtained: 150cc of bloody fluid drained from RUQ. Serous pleural fluid drained from left thoracentesis. Findings: Successful CT guided RUQ drain exchange and left thoracentesis.     Electronically signed by Israel Shaikh MD on 2/10/2020 at 12:03 PM

## 2020-02-10 NOTE — PLAN OF CARE
Problem: Risk for Impaired Skin Integrity  Goal: Tissue integrity - skin and mucous membranes  Description  Structural intactness and normal physiological function of skin and  mucous membranes. Outcome: Ongoing     Problem: SAFETY  Goal: Free from accidental physical injury  Outcome: Ongoing     Problem: DAILY CARE  Goal: Daily care needs are met  Outcome: Ongoing     Problem: PAIN  Goal: Patient's pain/discomfort is manageable  Outcome: Ongoing     Problem: SKIN INTEGRITY  Goal: Skin integrity is maintained or improved  Outcome: Ongoing     Problem: KNOWLEDGE DEFICIT  Goal: Patient/S.O. demonstrates understanding of disease process, treatment plan, medications, and discharge instructions.   Outcome: Ongoing     Problem: DISCHARGE BARRIERS  Goal: Patient's continuum of care needs are met  Outcome: Ongoing     Problem: OXYGENATION/RESPIRATORY FUNCTION  Goal: Patient will maintain patent airway  Outcome: Ongoing  Goal: Patient will achieve/maintain normal respiratory rate/effort  Description  Respiratory rate and effort will be within normal limits for the patient  Outcome: Ongoing     Problem: HEMODYNAMIC STATUS  Goal: Patient has stable vital signs and fluid balance  Outcome: Ongoing     Problem: FLUID AND ELECTROLYTE IMBALANCE  Goal: Fluid and electrolyte balance are achieved/maintained  Outcome: Ongoing     Problem: ACTIVITY INTOLERANCE/IMPAIRED MOBILITY  Goal: Mobility/activity is maintained at optimum level for patient  Outcome: Ongoing     Problem: Pain:  Goal: Pain level will decrease  Description  Pain level will decrease  Outcome: Ongoing  Goal: Control of acute pain  Description  Control of acute pain  Outcome: Ongoing  Goal: Control of chronic pain  Description  Control of chronic pain  Outcome: Ongoing     Problem: Falls - Risk of:  Goal: Will remain free from falls  Description  Will remain free from falls  Outcome: Ongoing  Goal: Absence of physical injury  Description  Absence of physical injury  Outcome: Ongoing     Problem:  Activity:  Goal: Ability to tolerate increased activity will improve  Description  Ability to tolerate increased activity will improve  Outcome: Ongoing  Goal: Expression of feelings of increased energy will increase  Description  Expression of feelings of increased energy will increase  Outcome: Ongoing     Problem: Cardiac:  Goal: Ability to maintain an adequate cardiac output will improve  Description  Ability to maintain an adequate cardiac output will improve  Outcome: Ongoing  Goal: Complications related to the disease process, condition or treatment will be avoided or minimized  Description  Complications related to the disease process, condition or treatment will be avoided or minimized  Outcome: Ongoing

## 2020-02-10 NOTE — PROGRESS NOTES
[facility] Medicine Progress Note     Date:  2/10/2020    PCP: Alex Dietz MD (Tel: [phone])    Date of Admission: 1/22/2020      Subjective     In IR today. .       Objective  Physical exam:  Vitals: BP 98/66   Pulse 105   Temp 97.4 °F (36.3 °C) (Oral)   Resp 18   Ht 5' 7\" (1.702 m)   Wt 188 lb 0.8 oz (85.3 kg)   SpO2 95%   BMI 29.45 kg/m²   Gen: nad, appears more comfortable   Head: Normocephalic. Atraumatic  Eyes: EOMI. Conjunctiva clear  ENT: Oral mucosa moist  Neck: No JVD. supple  CVS: Nml S1S2, no MRG, irregular, regular rate  Pulmomary: diminished BS on the left side, tachypneic, no wheezing  Gastrointestinal: firm at baseline, diffusely tender without focal tenderness. normal bowel sounds. Colostomy RLL with a good amount of soft brown stool in the bag.  ruq drain in place   Musculoskeletal: No edema. Warm, remote LEFT AKA  Neuro: No focal deficit. Moves extremity spontaneously. Psychiatry: Alert, oriented x3  Skin: Warm, dry with normal turgor. No rash    24HR INTAKE/OUTPUT:      Intake/Output Summary (Last 24 hours) at 2/10/2020 1350  Last data filed at 2/10/2020 1326  Gross per 24 hour   Intake 1700 ml   Output 3040 ml   Net -1340 ml     I/O last 3 completed shifts: In: 380 [P.O.:180; IV Piggyback:200]  Out: 1520 [Urine:1305; Drains:15; ERUHE:350]  I/O this shift:  In: 1320 [P.O.:720; I.V.:600]  Out: 2050 [Urine:400; Drains:300;  Other:1350]      Meds:    fluconazole  200 mg Intravenous Q24H    pantoprazole  40 mg Intravenous Daily    And    sodium chloride (PF)  10 mL Intravenous Daily    potassium chloride  20 mEq Oral BID WC    furosemide  40 mg Intravenous BID    [Held by provider] rivaroxaban  20 mg Oral Daily    piperacillin-tazobactam  3.375 g Intravenous Q8H    insulin glargine  15 Units Subcutaneous Nightly    lidocaine 1 % injection  5 mL Intradermal Once    sodium chloride flush  10 mL Intravenous 2 times per day    insulin lispro  0-12 Units Subcutaneous TID WC  insulin lispro  0-6 Units Subcutaneous Nightly    ipratropium-albuterol  1 ampule Inhalation Q4H WA    amiodarone  200 mg Oral Daily    aspirin  81 mg Oral Daily    atorvastatin  40 mg Oral Nightly    calcium-vitamin D  1 tablet Oral Daily    polyethylene glycol  17 g Oral BID    ranolazine  500 mg Oral BID    tamsulosin  0.4 mg Oral Daily       Infusions:    lactated ringers 50 mL/hr at 02/09/20 1344    dextrose           PRN Meds: iohexol, ondansetron, morphine **OR** morphine, sodium chloride flush, ipratropium-albuterol, glucose, dextrose, glucagon (rDNA), dextrose, nitroGLYCERIN, oxyCODONE-acetaminophen    Labs/imaging:  CBC:   Recent Labs     02/08/20  0609 02/09/20  0600 02/10/20  0600   WBC 14.4* 18.0* 13.8*   HGB 9.1* 8.7* 7.7*    314 347         BMP:    Recent Labs     02/08/20  0609 02/09/20  0600 02/10/20  0600   * 129* 137   K 4.3 4.6 3.9   CL 84* 84* 91*   CO2 39* 36* 37*   BUN 13 20 20   CREATININE 0.6* 0.7* 0.7*   GLUCOSE 107* 69* 80         Hepatic: No results for input(s): AST, ALT, ALB, BILITOT, ALKPHOS in the last 72 hours. Troponin: No results for input(s): TROPONINI in the last 72 hours. BNP: No results for input(s): BNP in the last 72 hours. INR:   No results for input(s): INR in the last 72 hours. Reviewed imaging and reports noted      Assessment:  Principal Problem:    Acute on chronic respiratory failure with hypoxia (HCC)  Active Problems:    PAD (peripheral artery disease) (HCC)    Pleural effusion on left    Acute on chronic combined systolic and diastolic HF (heart failure) (HCC)    Centrilobular emphysema (HCC)    Diabetes mellitus type 2, controlled (HCC)    Biventricular failure (HCC)    CAD (coronary artery disease)    Pulmonary HTN (HCC)    Systolic CHF (HCC)    Bilateral pleural effusion    Abdominal fluid collection  Resolved Problems:    * No resolved hospital problems.  *      Plan:    Pleural Effusion   - repeat cxr with increased size pleural effusion on the left   - s/p thoracocentesis - exudative and bloody effusion drained  - L thoracentesis 2/10 - serous pleural fluid drained       Ileus   - wbc, neutrophilia increasing  - ct abdomen with ileus  - keep npo for now  - ppi iv, antiemetics, morphine iv for pain control         Intraabdominal abscess   Complex surgical Hx - Hx of bowel obstruction s/p ex lap and colostomy. Later complicated by intraabdominal abscess requiring drainage and completed IV zosyn back in Nov 2019. CT abd pelvis this admit: Increased size complex cystic collection RUQ, recurrent LUQ collection - unclear if these are infected or hemorrhagic or both  1/30 - s/p percutaneous drainage of R fluid collection  - repeat CT abdomen/pelvis on 2/3 with ruq fluid collection decreased in size, but the ct from 2/9 the collection increased; However this was without contrast. Will leave off [facility] for now and will repeat the ct with contrast tomorrow and reassess     - cont iv zosyn and eraxis -> fluconazole; needs 7 days zosyn after discharge, 7 days po fluconazole after discharge  - fluid cultures negative  - ID and surgery involved  - 2/10 - sent to IR by surgery to upsize drainage catheter for RUQ collection. Acute on Chronic systolic CHF EF 31%   He is presently on lasix and LR - reassess fluid volume status. Pt presently NPO with ileus. Chronic Resp Failure w/ Hypoxia - stable  - on baseline of 2.5 L O2 nC, maintain O2 sats > 90% - 94%      Centrilobular Emphysema  - cont duonebs      IDDM  - cont lantus - dose decreased now 15 units  - humalog, SSI      HTN  - stable      Chronic a Fib  - cont amio, rate controlled  - holding xarelto with concerns for hemorrhagic intraabdominal fluid collections. Diet: Diet NPO Effective Now Exceptions are: Ice Chips      Activity: up as tolerated, limited mobility due to left AKA.      Prophylaxis: scd    Code status: Full Code     Serge Izquierdo MD

## 2020-02-10 NOTE — PROGRESS NOTES
Pt returned from IR, pt states he feels like he can breathe much easier since the thoracentesis. Pt c/o pain rated 8/10 in the abdomen, given prescribed analgesic (see eMAR). Pt sitting up in bed, mother at bedside, calls appropriately, no other needs or concerns at this time, will continue to monitor and reassess.  Electronically signed by Eduin Sykes RN on 2/10/2020 at 4:40 PM

## 2020-02-10 NOTE — PROGRESS NOTES
Pulmonary Progress Note    CC:  Follow up respiratory failure, hypoxia, effusion    Subjective:  At 2.5 liters of oxygen  Belly pain is affecting his breathing  Agreeable to thoracentesis       Intake/Output Summary (Last 24 hours) at 2/10/2020 0713  Last data filed at 2/10/2020 0620  Gross per 24 hour   Intake 120 ml   Output 1315 ml   Net -1195 ml         PHYSICAL EXAM:  Blood pressure 115/77, pulse 82, temperature 97.6 °F (36.4 °C), temperature source Oral, resp. rate 19, height 5' 7\" (1.702 m), weight 188 lb 0.8 oz (85.3 kg), SpO2 91 %.'  Gen: No distress. Chronically ill  Eyes: PERRL. No sclera icterus. No conjunctival injection. ENT: No discharge. Pharynx clear. External appearance of ears and nose normal.  Neck: Trachea midline. No obvious mass. Resp: Clear on anterior chest .  CV: Regular rate. Regular rhythm. No murmur or rub. GI: tender to minimal palpation No hernia. + ostomy   Skin: Warm, dry, normal texture and turgor. No nodule on exposed extremities. Lymph: No cervical LAD. No supraclavicular LAD. M/S: No cyanosis. No clubbing. No joint deformity. Neuro: Moves all four extremities. CN 2-12 tested, no defect noted.   Ext:   Left AKA, trace edema    Medications:    Scheduled Meds:   fluconazole  200 mg Intravenous Q24H    pantoprazole  40 mg Intravenous Daily    And    sodium chloride (PF)  10 mL Intravenous Daily    potassium chloride  20 mEq Oral BID WC    furosemide  40 mg Intravenous BID    [Held by provider] rivaroxaban  20 mg Oral Daily    piperacillin-tazobactam  3.375 g Intravenous Q8H    insulin glargine  15 Units Subcutaneous Nightly    lidocaine 1 % injection  5 mL Intradermal Once    sodium chloride flush  10 mL Intravenous 2 times per day    insulin lispro  0-12 Units Subcutaneous TID     insulin lispro  0-6 Units Subcutaneous Nightly    ipratropium-albuterol  1 ampule Inhalation Q4H WA    amiodarone  200 mg Oral Daily    aspirin  81 mg Oral Daily    atorvastatin  40 mg Oral Nightly    calcium-vitamin D  1 tablet Oral Daily    polyethylene glycol  17 g Oral BID    ranolazine  500 mg Oral BID    tamsulosin  0.4 mg Oral Daily       Continuous Infusions:   lactated ringers 50 mL/hr at 02/09/20 1344    dextrose         PRN Meds:  ondansetron, morphine **OR** morphine, sodium chloride flush, ipratropium-albuterol, glucose, dextrose, glucagon (rDNA), dextrose, nitroGLYCERIN, oxyCODONE-acetaminophen    Labs:  CBC:   Recent Labs     02/08/20  0609 02/09/20  0600 02/10/20  0600   WBC 14.4* 18.0* 13.8*   HGB 9.1* 8.7* 7.7*   HCT 27.5* 27.3* 23.7*   MCV 81.3 81.9 81.3    314 347     BMP:   Recent Labs     02/08/20  0609 02/09/20  0600 02/10/20  0600   * 129* 137   K 4.3 4.6 3.9   CL 84* 84* 91*   CO2 39* 36* 37*   BUN 13 20 20   CREATININE 0.6* 0.7* 0.7*     LIVER PROFILE: No results for input(s): AST, ALT, LIPASE, BILIDIR, BILITOT, ALKPHOS in the last 72 hours. Invalid input(s): AMYLASE,  ALB  PT/INR: No results for input(s): PROTIME, INR in the last 72 hours. APTT: No results for input(s): APTT in the last 72 hours. UA:No results for input(s): NITRITE, COLORU, PHUR, LABCAST, WBCUA, RBCUA, MUCUS, TRICHOMONAS, YEAST, BACTERIA, CLARITYU, SPECGRAV, LEUKOCYTESUR, UROBILINOGEN, BILIRUBINUR, BLOODU, GLUCOSEU, AMORPHOUS in the last 72 hours. Invalid input(s): Bernadette Dell  No results for input(s): PH, PCO2, PO2 in the last 72 hours. Films: AB Chest   Chest imaging reports were reviewed and imaging was reviewed by me and showed bilateral effusion, left worse than right    ABG:  Nothing recent    Cultures:  Negative    I reviewed the labs and images listed above    Assessment:   · Chronic Hypoxic Respiratory Failure  · Left Pleural effusion, with one thoracentesis performed already. It was negative cytology   · Emphysema   · Ileus      Plan:  · US guided thoracentesis.   Will send routine studies including flow cytometry   · Seems he is on lasix and IV fluids.  I would prefer lasix due to his EF and low albumin  · Hold [id number], DO  [facility]

## 2020-02-10 NOTE — PROGRESS NOTES
[facility] [phone]                                     Daily Progress Note                                                         Pt Name: David Cody  Medical Record Number: [id number]  Date of Birth 1960   Today's Date: 2/10/2020  Chief Complaint   Patient presents with    Shortness of Breath     x4 days        ASSESSMENT/PLAN  Right upper quadrant cystic mass  -Drained by IR with what appears to be old blood. Drain flushes well without significant returrn. Will discuss with radiology about upsizing drain given enlargement of liver cyst/fluid size. Plan to repeat CT tomorrow.    -Repeat CT 2/3 redemonstrated a infrahepatic complex fluid collection with internal hyperdensity with a drain in place, and that collection is mildly decreased in size, now measuring 12.4  x 11.3 cm (previously 14.1 x 12.5 cm)  -Repeat CT scan today with contrast. Will likely need larger drain placed into intraabdominal fluid collection. -repeat CT 2/8 shows liver cyst/fluid collection slightly larger. Will discuss with radiology different options  -Ileus - still distended, +gas output in ostomy bad. +tendrness. .  Patient states NG tube cannot be placed due to nasal anatomy. Continue NPO. Monitor Cr; may need IV fluids if he becomes dehydrated given lasix for his pulmonary effusion  -Continue NPO at this time. Possibly start clears later today.   -Agreeable to having a thoracentesis performed. Silvino Stanley had denies any nausea or emesis. Starting to have some output from ostomy this AM.    OBJECTIVE  VITALS:  height is 5' 7\" (1.702 m) and weight is 188 lb 0.8 oz (85.3 kg). His axillary temperature is 96.5 °F (35.8 °C). His blood pressure is 102/70 and his pulse is 107. His respiration is 14 and oxygen saturation is 91%.  INTAKE/OUTPUT:      Intake/Output Summary (Last 24 hours) at 2/10/2020 1125  Last data filed at 2/10/2020 1055  Gross per 24 hour   Intake 1460 ml   Output 1340 ml   Net 120 ml     GENERAL: alert, no distress  ABDOMEN: Soft, non-tender, moderately distended. Drain right abdomen serosanguinous. Colostomy present with minimal stool in appliance. I/O last 3 completed shifts: In: 200 [P.O.:180; IV Piggyback:200]  Out: 1520 [Urine:1305; Drains:15; Stool:200]      LABS  Recent Labs     02/10/20  0600   WBC 13.8*   HGB 7.7*   HCT 23.7*         K 3.9   CL 91*   CO2 37*   BUN 20   CREATININE 0.7*   MG 2.00   CALCIUM 8.7       EDUCATION  Patient educated about Disease Process, Medications, Smoking Cessation, Oxygenation, Incentive Spirometry and Deep Breath and Cough, Diabetes, Hyperlipidemia, Smoking Cessation, Nutrition, Exercise and Hypertension    Electronically signed by Andrea Yang PA-C on 2/10/2020 at 11:25 AM      Agree with above note. The patient was personally seen and examined. Johanna Rosario is doing OK. Having some ostomy output. Abd soft, minimally distended, NT, drain with minimal output    WBC 13.8    CT abd/pelvis shows stable hepatic cyst/hematoma without extravasation    A/P: ileus, hepatic mass    Ileus resolving.   Advance to clears  IR upsized perc drain with increase in output  On zosyn, diflucan  Thoracentesis performed today  Will follow    Kina Aguayo MD

## 2020-02-10 NOTE — PROGRESS NOTES
No acute events over night. Patient NPO except ice chips and 2 popsicle. Had one popsicle over night and ice chips, he tolerated them ok. No c/o nausea /vomiting. No BM per colostomy but a lot gas. Patient voiding frequently 50cc-100cc every 2 hrs or so. C/o abdominal pain/burning, requested for the 2mg at least every 2-3hrs.

## 2020-02-11 LAB
ANION GAP SERPL CALCULATED.3IONS-SCNC: 7 MMOL/L (ref 3–16)
BASOPHILS ABSOLUTE: 0.1 K/UL (ref 0–0.2)
BASOPHILS RELATIVE PERCENT: 1.1 %
BUN BLDV-MCNC: 15 MG/DL (ref 7–20)
CALCIUM SERPL-MCNC: 8.4 MG/DL (ref 8.3–10.6)
CHLORIDE BLD-SCNC: 84 MMOL/L (ref 99–110)
CO2: 36 MMOL/L (ref 21–32)
CREAT SERPL-MCNC: 0.6 MG/DL (ref 0.9–1.3)
EOSINOPHILS ABSOLUTE: 0.1 K/UL (ref 0–0.6)
EOSINOPHILS RELATIVE PERCENT: 1.3 %
GFR AFRICAN AMERICAN: >60
GFR NON-AFRICAN AMERICAN: >60
GLUCOSE BLD-MCNC: 105 MG/DL (ref 70–99)
GLUCOSE BLD-MCNC: 157 MG/DL (ref 70–99)
GLUCOSE BLD-MCNC: 203 MG/DL (ref 70–99)
GLUCOSE BLD-MCNC: 62 MG/DL (ref 70–99)
GLUCOSE BLD-MCNC: 98 MG/DL (ref 70–99)
HCT VFR BLD CALC: 25.3 % (ref 40.5–52.5)
HEMOGLOBIN: 8.1 G/DL (ref 13.5–17.5)
LYMPHOCYTES ABSOLUTE: 0.7 K/UL (ref 1–5.1)
LYMPHOCYTES RELATIVE PERCENT: 7.4 %
MAGNESIUM: 1.7 MG/DL (ref 1.8–2.4)
MCH RBC QN AUTO: 26.3 PG (ref 26–34)
MCHC RBC AUTO-ENTMCNC: 32.1 G/DL (ref 31–36)
MCV RBC AUTO: 81.9 FL (ref 80–100)
MONOCYTES ABSOLUTE: 0.7 K/UL (ref 0–1.3)
MONOCYTES RELATIVE PERCENT: 7.1 %
NEUTROPHILS ABSOLUTE: 7.7 K/UL (ref 1.7–7.7)
NEUTROPHILS RELATIVE PERCENT: 83.1 %
PDW BLD-RTO: 16.6 % (ref 12.4–15.4)
PERFORMED ON: ABNORMAL
PERFORMED ON: NORMAL
PLATELET # BLD: 260 K/UL (ref 135–450)
PMV BLD AUTO: 8.2 FL (ref 5–10.5)
POTASSIUM REFLEX MAGNESIUM: 3.5 MMOL/L (ref 3.5–5.1)
RBC # BLD: 3.09 M/UL (ref 4.2–5.9)
SODIUM BLD-SCNC: 127 MMOL/L (ref 136–145)
WBC # BLD: 9.2 K/UL (ref 4–11)

## 2020-02-11 PROCEDURE — 99232 SBSQ HOSP IP/OBS MODERATE 35: CPT | Performed by: INTERNAL MEDICINE

## 2020-02-11 PROCEDURE — 6370000000 HC RX 637 (ALT 250 FOR IP): Performed by: FAMILY MEDICINE

## 2020-02-11 PROCEDURE — 6360000002 HC RX W HCPCS: Performed by: FAMILY MEDICINE

## 2020-02-11 PROCEDURE — 6360000002 HC RX W HCPCS: Performed by: INTERNAL MEDICINE

## 2020-02-11 PROCEDURE — 6370000000 HC RX 637 (ALT 250 FOR IP): Performed by: INTERNAL MEDICINE

## 2020-02-11 PROCEDURE — 80048 BASIC METABOLIC PNL TOTAL CA: CPT

## 2020-02-11 PROCEDURE — C9113 INJ PANTOPRAZOLE SODIUM, VIA: HCPCS | Performed by: FAMILY MEDICINE

## 2020-02-11 PROCEDURE — 2580000003 HC RX 258: Performed by: INTERNAL MEDICINE

## 2020-02-11 PROCEDURE — 94761 N-INVAS EAR/PLS OXIMETRY MLT: CPT

## 2020-02-11 PROCEDURE — 83735 ASSAY OF MAGNESIUM: CPT

## 2020-02-11 PROCEDURE — 2580000003 HC RX 258: Performed by: FAMILY MEDICINE

## 2020-02-11 PROCEDURE — 1200000000 HC SEMI PRIVATE

## 2020-02-11 PROCEDURE — 85025 COMPLETE CBC W/AUTO DIFF WBC: CPT

## 2020-02-11 PROCEDURE — 6370000000 HC RX 637 (ALT 250 FOR IP): Performed by: HOSPITALIST

## 2020-02-11 PROCEDURE — 94640 AIRWAY INHALATION TREATMENT: CPT

## 2020-02-11 PROCEDURE — 2700000000 HC OXYGEN THERAPY PER DAY

## 2020-02-11 RX ORDER — MAGNESIUM SULFATE IN WATER 40 MG/ML
4 INJECTION, SOLUTION INTRAVENOUS ONCE
Status: COMPLETED | OUTPATIENT
Start: 2020-02-11 | End: 2020-02-11

## 2020-02-11 RX ORDER — PANTOPRAZOLE SODIUM 40 MG/1
40 TABLET, DELAYED RELEASE ORAL
Status: DISCONTINUED | OUTPATIENT
Start: 2020-02-12 | End: 2020-02-13

## 2020-02-11 RX ADMIN — POTASSIUM CHLORIDE 20 MEQ: 1500 TABLET, EXTENDED RELEASE ORAL at 17:58

## 2020-02-11 RX ADMIN — MORPHINE SULFATE 2 MG: 2 INJECTION, SOLUTION INTRAMUSCULAR; INTRAVENOUS at 18:01

## 2020-02-11 RX ADMIN — IPRATROPIUM BROMIDE AND ALBUTEROL SULFATE 1 AMPULE: .5; 3 SOLUTION RESPIRATORY (INHALATION) at 19:39

## 2020-02-11 RX ADMIN — PANTOPRAZOLE SODIUM 40 MG: 40 INJECTION, POWDER, FOR SOLUTION INTRAVENOUS at 09:11

## 2020-02-11 RX ADMIN — OXYCODONE HYDROCHLORIDE AND ACETAMINOPHEN 1 TABLET: 7.5; 325 TABLET ORAL at 10:16

## 2020-02-11 RX ADMIN — ATORVASTATIN CALCIUM 40 MG: 40 TABLET, FILM COATED ORAL at 21:08

## 2020-02-11 RX ADMIN — POTASSIUM CHLORIDE 20 MEQ: 1500 TABLET, EXTENDED RELEASE ORAL at 09:11

## 2020-02-11 RX ADMIN — MORPHINE SULFATE 2 MG: 2 INJECTION, SOLUTION INTRAMUSCULAR; INTRAVENOUS at 08:48

## 2020-02-11 RX ADMIN — INSULIN LISPRO 2 UNITS: 100 INJECTION, SOLUTION INTRAVENOUS; SUBCUTANEOUS at 21:10

## 2020-02-11 RX ADMIN — PIPERACILLIN AND TAZOBACTAM 3.38 G: 3; .375 INJECTION, POWDER, LYOPHILIZED, FOR SOLUTION INTRAVENOUS at 05:51

## 2020-02-11 RX ADMIN — MORPHINE SULFATE 2 MG: 2 INJECTION, SOLUTION INTRAMUSCULAR; INTRAVENOUS at 11:21

## 2020-02-11 RX ADMIN — POLYETHYLENE GLYCOL 3350 17 G: 17 POWDER, FOR SOLUTION ORAL at 21:08

## 2020-02-11 RX ADMIN — FLUCONAZOLE 200 MG: 200 INJECTION, SOLUTION INTRAVENOUS at 09:12

## 2020-02-11 RX ADMIN — IPRATROPIUM BROMIDE AND ALBUTEROL SULFATE 1 AMPULE: .5; 3 SOLUTION RESPIRATORY (INHALATION) at 11:50

## 2020-02-11 RX ADMIN — MORPHINE SULFATE 2 MG: 2 INJECTION, SOLUTION INTRAMUSCULAR; INTRAVENOUS at 16:01

## 2020-02-11 RX ADMIN — RANOLAZINE 500 MG: 500 TABLET, FILM COATED, EXTENDED RELEASE ORAL at 09:11

## 2020-02-11 RX ADMIN — SODIUM CHLORIDE, PRESERVATIVE FREE 10 ML: 5 INJECTION INTRAVENOUS at 09:12

## 2020-02-11 RX ADMIN — IPRATROPIUM BROMIDE AND ALBUTEROL SULFATE 1 AMPULE: .5; 3 SOLUTION RESPIRATORY (INHALATION) at 15:57

## 2020-02-11 RX ADMIN — TAMSULOSIN HYDROCHLORIDE 0.4 MG: 0.4 CAPSULE ORAL at 09:11

## 2020-02-11 RX ADMIN — PIPERACILLIN AND TAZOBACTAM 3.38 G: 3; .375 INJECTION, POWDER, LYOPHILIZED, FOR SOLUTION INTRAVENOUS at 14:00

## 2020-02-11 RX ADMIN — Medication 10 ML: at 09:14

## 2020-02-11 RX ADMIN — AMIODARONE HYDROCHLORIDE 200 MG: 200 TABLET ORAL at 09:11

## 2020-02-11 RX ADMIN — IPRATROPIUM BROMIDE AND ALBUTEROL SULFATE 1 AMPULE: .5; 3 SOLUTION RESPIRATORY (INHALATION) at 07:42

## 2020-02-11 RX ADMIN — MORPHINE SULFATE 2 MG: 2 INJECTION, SOLUTION INTRAMUSCULAR; INTRAVENOUS at 06:06

## 2020-02-11 RX ADMIN — POLYETHYLENE GLYCOL 3350 17 G: 17 POWDER, FOR SOLUTION ORAL at 09:11

## 2020-02-11 RX ADMIN — SODIUM CHLORIDE, PRESERVATIVE FREE 10 ML: 5 INJECTION INTRAVENOUS at 22:03

## 2020-02-11 RX ADMIN — MAGNESIUM SULFATE IN WATER 4 G: 40 INJECTION, SOLUTION INTRAVENOUS at 10:17

## 2020-02-11 RX ADMIN — MORPHINE SULFATE 2 MG: 2 INJECTION, SOLUTION INTRAMUSCULAR; INTRAVENOUS at 21:10

## 2020-02-11 RX ADMIN — RANOLAZINE 500 MG: 500 TABLET, FILM COATED, EXTENDED RELEASE ORAL at 21:08

## 2020-02-11 RX ADMIN — ASPIRIN 81 MG 81 MG: 81 TABLET ORAL at 09:11

## 2020-02-11 RX ADMIN — PIPERACILLIN AND TAZOBACTAM 3.38 G: 3; .375 INJECTION, POWDER, LYOPHILIZED, FOR SOLUTION INTRAVENOUS at 21:20

## 2020-02-11 RX ADMIN — MORPHINE SULFATE 2 MG: 2 INJECTION, SOLUTION INTRAMUSCULAR; INTRAVENOUS at 13:59

## 2020-02-11 RX ADMIN — SODIUM CHLORIDE, POTASSIUM CHLORIDE, SODIUM LACTATE AND CALCIUM CHLORIDE: 600; 310; 30; 20 INJECTION, SOLUTION INTRAVENOUS at 18:03

## 2020-02-11 RX ADMIN — INSULIN LISPRO 2 UNITS: 100 INJECTION, SOLUTION INTRAVENOUS; SUBCUTANEOUS at 17:58

## 2020-02-11 RX ADMIN — OYSTER SHELL CALCIUM WITH VITAMIN D 1 TABLET: 500; 200 TABLET, FILM COATED ORAL at 09:11

## 2020-02-11 ASSESSMENT — PAIN DESCRIPTION - ORIENTATION
ORIENTATION: LOWER

## 2020-02-11 ASSESSMENT — PAIN DESCRIPTION - PROGRESSION
CLINICAL_PROGRESSION: GRADUALLY IMPROVING
CLINICAL_PROGRESSION: NOT CHANGED
CLINICAL_PROGRESSION: NOT CHANGED
CLINICAL_PROGRESSION: GRADUALLY IMPROVING
CLINICAL_PROGRESSION: NOT CHANGED
CLINICAL_PROGRESSION: GRADUALLY IMPROVING
CLINICAL_PROGRESSION: GRADUALLY IMPROVING
CLINICAL_PROGRESSION: NOT CHANGED

## 2020-02-11 ASSESSMENT — PAIN DESCRIPTION - PAIN TYPE
TYPE: ACUTE PAIN

## 2020-02-11 ASSESSMENT — PAIN SCALES - GENERAL
PAINLEVEL_OUTOF10: 8
PAINLEVEL_OUTOF10: 6
PAINLEVEL_OUTOF10: 7
PAINLEVEL_OUTOF10: 6
PAINLEVEL_OUTOF10: 7
PAINLEVEL_OUTOF10: 8
PAINLEVEL_OUTOF10: 7
PAINLEVEL_OUTOF10: 6
PAINLEVEL_OUTOF10: 7
PAINLEVEL_OUTOF10: 7
PAINLEVEL_OUTOF10: 6
PAINLEVEL_OUTOF10: 6
PAINLEVEL_OUTOF10: 7
PAINLEVEL_OUTOF10: 5
PAINLEVEL_OUTOF10: 7

## 2020-02-11 ASSESSMENT — PAIN DESCRIPTION - ONSET
ONSET: GRADUAL
ONSET: ON-GOING
ONSET: GRADUAL
ONSET: ON-GOING
ONSET: GRADUAL
ONSET: ON-GOING
ONSET: ON-GOING
ONSET: GRADUAL

## 2020-02-11 ASSESSMENT — PAIN DESCRIPTION - FREQUENCY
FREQUENCY: CONTINUOUS

## 2020-02-11 ASSESSMENT — PAIN DESCRIPTION - LOCATION
LOCATION: ABDOMEN

## 2020-02-11 ASSESSMENT — PAIN DESCRIPTION - DESCRIPTORS
DESCRIPTORS: ACHING

## 2020-02-11 ASSESSMENT — PAIN - FUNCTIONAL ASSESSMENT
PAIN_FUNCTIONAL_ASSESSMENT: PREVENTS OR INTERFERES WITH MANY ACTIVE NOT PASSIVE ACTIVITIES
PAIN_FUNCTIONAL_ASSESSMENT: PREVENTS OR INTERFERES WITH MANY ACTIVE NOT PASSIVE ACTIVITIES

## 2020-02-11 NOTE — PROGRESS NOTES
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 Airspace disease may be related   to edema and/or pneumonia.                All the pertinent images and reports for the current Hospitalization were reviewed by me     Scheduled Meds:   fluconazole  200 mg Intravenous Q24H    pantoprazole  40 mg Intravenous Daily    And    sodium chloride (PF)  10 mL Intravenous Daily    potassium chloride  20 mEq Oral BID WC    furosemide  40 mg Intravenous BID    [Held by provider] rivaroxaban  20 mg Oral Daily    piperacillin-tazobactam  3.375 g Intravenous Q8H    insulin glargine  15 Units Subcutaneous Nightly    lidocaine 1 % injection  5 mL Intradermal Once    sodium chloride flush  10 mL Intravenous 2 times per day    insulin lispro  0-12 Units Subcutaneous TID     insulin lispro  0-6 Units Subcutaneous Nightly    ipratropium-albuterol  1 ampule Inhalation Q4H WA    amiodarone  200 mg Oral Daily    aspirin  81 mg Oral Daily    atorvastatin  40 mg Oral Nightly    calcium-vitamin D  1 tablet Oral Daily    polyethylene glycol  17 g Oral BID    ranolazine  500 mg Oral BID    tamsulosin  0.4 mg Oral Daily       Continuous Infusions:   lactated ringers 50 mL/hr at 02/09/20 1344    dextrose         PRN Meds:  iohexol, ondansetron, morphine **OR** morphine, sodium chloride flush, ipratropium-albuterol, glucose, dextrose, glucagon (rDNA), dextrose, nitroGLYCERIN, oxyCODONE-acetaminophen      Assessment:     Patient Active Problem List   Diagnosis    Arthritis    Diabetes mellitus with hyperglycemia (HCC)    HBP (high blood pressure)    Hyperlipidemia    COPD (chronic obstructive pulmonary disease) (HCC)    Viral hepatitis C    Back pain    [facility]peripheral artery [facility]) [facility]    Spinal stenosis of lumbar region    Tobacco use    Atherosclerosis of native artery of left lower extremity with intermittent claudication (HCC)    Chest pain    Pleural effusion due to CHF (congestive heart failure) ([facility])    Pleural effusion on left    Alcohol abuse, continuous    Tachycardia    Atrial fibrillation with RVR (HCC)    Hyponatremia    Congestive heart failure ([facility])    REJI (acute kidney injury) [facility] [address]    Hypokalemia    Coronary artery disease involving autologous artery coronary bypass graft with angina pectoris ([facility] [address]    Essential hypertension    Chest pain of uncertain etiology    Acute on chronic combined systolic and diastolic HF (heart failure) (HCC)    Acute hyperkalemia    Typical atrial flutter (HCC)    Chronic systolic HF (heart failure) (HCC)    Hypotension    Vasovagal syncope    Laceration of scalp without foreign body    [facility]ischemic cardiomyopathy ([facility] [address]    Syncope and collapse    Ischemic foot    Diabetes mellitus with peripheral circulatory disorder (HCC)    Limb ischemia    COPD exacerbation (HCC)    Nonhealing surgical wound    Acromioclavicular joint arthritis    Bradycardia    Shortness of breath    Permanent atrial fibrillation    Chronic atrial fibrillation    Other specified injury of inferior mesenteric vein, initial encounter    Postprocedural hypotension    Acute respiratory failure with hypercapnia (HCC)    High anion gap metabolic acidosis    Centrilobular emphysema (HCC)    Hypomagnesemia    Heart failure, acute on chronic, systolic and diastolic (HCC)    Atrial fibrillation, persistent    Anemia    Diabetes mellitus type 2, controlled ([facility] [address]    Constipation    Acute on chronic systolic heart failure (HCC)    Atrial fibrillation, rapid (HCC)    COPD with acute exacerbation (HCC)    Cocaine use    Severe sepsis (HCC)    [facility]VR [facility]    Acute on chronic respiratory failure with hypoxia ([facility])    Respiratory distress    Biventricular failure (HCC)    Nicotine dependence    Suspected sleep apnea    Coronary artery disease involving native coronary artery of native heart without angina pectoris    CAD (coronary artery disease)    Acute renal failure (ARF) (HCC)    Morbid obesity due to excess calories (HCC)    Acute respiratory failure with hypoxia (HCC)    Nocturnal hypoxia    Hypercapnemia    SOB (shortness of breath)    Acute on chronic respiratory failure with hypercapnia (HCC)    Chronic respiratory failure with hypercapnia (HCC)    Acute pulmonary edema (HCC)    Acute on chronic systolic HF (heart failure) (HCC)    Hx of AKA (above knee amputation), left (HCC)    Respiratory failure (HCC)    HCAP (healthcare-associated pneumonia)    Ventricular tachycardia (HCC)    Shock circulatory (HCC)    Tachypnea    Neutrophilic

## 2020-02-11 NOTE — PROGRESS NOTES
Pulmonary Progress Note    CC:  Follow up respiratory failure, hypoxia, effusion    Subjective:  Thoracentesis yesterday with 1350 ml removed  Neutrophilic effusion  Says breathing has improved with thoracentesis  Now very hungry and \"being starved\"      Intake/Output Summary (Last 24 hours) at 2/11/2020 0705  Last data filed at 2/11/2020 0616  Gross per 24 hour   Intake 2234 ml   Output 3090 ml   Net -856 ml         PHYSICAL EXAM:  Blood pressure 102/69, pulse 86, temperature 97.4 °F (36.3 °C), temperature source Oral, resp. rate 16, height 5' 7\" (1.702 m), weight 183 lb 6.8 oz (83.2 kg), SpO2 98 %.'  Gen: No distress. Chronically ill  Eyes: PERRL. No sclera icterus. No conjunctival injection. ENT: No discharge. Pharynx clear. External appearance of ears and nose normal.  Neck: Trachea midline. No obvious mass. Resp: Clear on anterior chest .  CV: Regular rate. Regular rhythm. No murmur or rub. GI: tender to minimal palpation No hernia. + ostomy   Skin: Warm, dry, normal texture and turgor. No nodule on exposed extremities. Lymph: No cervical LAD. No supraclavicular LAD. M/S: No cyanosis. No clubbing. No joint deformity. Neuro: Moves all four extremities. CN 2-12 tested, no defect noted.   Ext:   Left AKA, trace edema    Medications:    Scheduled Meds:   fluconazole  200 mg Intravenous Q24H    pantoprazole  40 mg Intravenous Daily    And    sodium chloride (PF)  10 mL Intravenous Daily    potassium chloride  20 mEq Oral BID     furosemide  40 mg Intravenous BID    [Held by provider] rivaroxaban  20 mg Oral Daily    piperacillin-tazobactam  3.375 g Intravenous Q8H    insulin glargine  15 Units Subcutaneous Nightly    lidocaine 1 % injection  5 mL Intradermal Once    sodium chloride flush  10 mL Intravenous 2 times per day    insulin lispro  0-12 Units Subcutaneous TID     insulin lispro  0-6 Units Subcutaneous Nightly    ipratropium-albuterol  1 ampule Inhalation Q4H WA    amiodarone 200 mg Oral Daily    aspirin  81 mg Oral Daily    atorvastatin  40 mg Oral Nightly    calcium-vitamin D  1 tablet Oral Daily    polyethylene glycol  17 g Oral BID    ranolazine  500 mg Oral BID    tamsulosin  0.4 mg Oral Daily       Continuous Infusions:   lactated ringers 50 mL/hr at 02/09/20 1344    dextrose         PRN Meds:  iohexol, ondansetron, morphine **OR** morphine, sodium chloride flush, ipratropium-albuterol, glucose, dextrose, glucagon (rDNA), dextrose, nitroGLYCERIN, oxyCODONE-acetaminophen    Labs:  CBC:   Recent Labs     02/09/20  0600 02/10/20  0600 02/11/20  0541   WBC 18.0* 13.8* 9.2   HGB 8.7* 7.7* 8.1*   HCT 27.3* 23.7* 25.3*   MCV 81.9 81.3 81.9    347 260     BMP:   Recent Labs     02/09/20  0600 02/10/20  0600 02/11/20  0541   * 137 127*   K 4.6 3.9 3.5   CL 84* 91* 84*   CO2 36* 37* 36*   BUN 20 20 15   CREATININE 0.7* 0.7* 0.6*     LIVER PROFILE: No results for input(s): AST, ALT, LIPASE, BILIDIR, BILITOT, ALKPHOS in the last 72 hours. Invalid input(s): AMYLASE,  ALB  PT/INR: No results for input(s): PROTIME, INR in the last 72 hours. APTT: No results for input(s): APTT in the last 72 hours. UA:No results for input(s): NITRITE, COLORU, PHUR, LABCAST, WBCUA, RBCUA, MUCUS, TRICHOMONAS, YEAST, BACTERIA, CLARITYU, SPECGRAV, LEUKOCYTESUR, UROBILINOGEN, BILIRUBINUR, BLOODU, GLUCOSEU, AMORPHOUS in the last 72 hours. Invalid input(s): Berl Shorts  No results for input(s): PH, PCO2, PO2 in the last 72 hours. Films:  Chest imaging reports were reviewed and imaging was reviewed by me and improved left effusion, RLL airspace disease.   PICC     ABG:  Nothing recent    Cultures:  Negative    I reviewed the labs and images listed above    Assessment:   · Chronic Hypoxic Respiratory Failure  · Left Pleural effusion:  First thoracentesis was monocyte predominant, second was neutrophil predominant   · Emphysema   · Ileus  · RUQ fluid collection       Plan:  · Cytology,

## 2020-02-11 NOTE — PLAN OF CARE
Problem: PAIN  Goal: Patient's pain/discomfort is manageable  Outcome: Ongoing     Problem: Pain:  Goal: Pain level will decrease  Description  Pain level will decrease  Outcome: Ongoing     Problem: Pain:  Goal: Control of acute pain  Description  Control of acute pain  Outcome: Ongoing     Problem: Pain:  Goal: Control of chronic pain  Description  Control of chronic pain  Outcome: Ongoing     Pain /discomfort being managed with PRN analgesics per MD orders. Patient able to express presence and absence of pain and rate pain appropriately using numerical scale.

## 2020-02-11 NOTE — PROGRESS NOTES
Changed patient's colostomy bag with 200ml output of stool. Stoma is pink and moist. Patient tolerated OK. Will monitor and reassess.

## 2020-02-11 NOTE — PROGRESS NOTES
Hospital Medicine Progress Note     Date:  2/11/2020    PCP: Emily Olivo MD (Tel: [phone])    Date of Admission: 1/22/2020      Subjective     Feels better today and asking for diet. Objective  Physical exam:  Vitals: BP 99/75   Pulse 102   Temp 97.3 °F (36.3 °C) (Oral)   Resp 18   Ht 5' 7\" (1.702 m)   Wt 183 lb 6.8 oz (83.2 kg)   SpO2 97%   BMI 28.73 kg/m²   Gen: nad, appears more comfortable   Head: Normocephalic. Atraumatic  Eyes: EOMI. Conjunctiva clear  ENT: Oral mucosa moist  Neck: No JVD. supple  CVS: Nml S1S2, no MRG, irregular, regular rate  Pulmomary: diminished BS on the left side, tachypneic, no wheezing  Gastrointestinal: firm at baseline, diffusely tender without focal tenderness. normal bowel sounds. Colostomy RLL with a good amount of soft brown stool in the bag.  ruq drain in place   Musculoskeletal: No edema. Warm, remote LEFT AKA  Neuro: No focal deficit. Moves extremity spontaneously. Psychiatry: Alert, oriented x3  Skin: Warm, dry with normal turgor. No rash    24HR INTAKE/OUTPUT:      Intake/Output Summary (Last 24 hours) at 2/11/2020 1651  Last data filed at 2/11/2020 1405  Gross per 24 hour   Intake 1354 ml   Output 1460 ml   Net -106 ml     I/O last 3 completed shifts: In: 9666 [P.O.:1154; IV Piggyback:200]  Out: 1560 [Urine:850; Drains:360; Stool:350]  No intake/output data recorded.       Meds:    [START ON 2/12/2020] pantoprazole  40 mg Oral QAM AC    fluconazole  200 mg Intravenous Q24H    potassium chloride  20 mEq Oral BID WC    [Held by provider] rivaroxaban  20 mg Oral Daily    piperacillin-tazobactam  3.375 g Intravenous Q8H    lidocaine 1 % injection  5 mL Intradermal Once    sodium chloride flush  10 mL Intravenous 2 times per day    insulin lispro  0-12 Units Subcutaneous TID WC    insulin lispro  0-6 Units Subcutaneous Nightly    ipratropium-albuterol  1 ampule Inhalation Q4H WA    amiodarone  200 mg Oral Daily    aspirin  81 mg Oral Daily    atorvastatin  40 mg Oral Nightly    calcium-vitamin D  1 tablet Oral Daily    polyethylene glycol  17 g Oral BID    ranolazine  500 mg Oral BID    tamsulosin  0.4 mg Oral Daily       Infusions:    lactated ringers 50 mL/hr at 02/09/20 1344    dextrose           PRN Meds: iohexol, ondansetron, morphine **OR** morphine, sodium chloride flush, ipratropium-albuterol, glucose, dextrose, glucagon (rDNA), dextrose, nitroGLYCERIN, oxyCODONE-acetaminophen    Labs/imaging:  CBC:   Recent Labs     02/09/20  0600 02/10/20  0600 02/11/20  0541   WBC 18.0* 13.8* 9.2   HGB 8.7* 7.7* 8.1*    347 260         BMP:    Recent Labs     02/09/20  0600 02/10/20  0600 02/11/20  0541   * 137 127*   K 4.6 3.9 3.5   CL 84* 91* 84*   CO2 36* 37* 36*   BUN 20 20 15   CREATININE 0.7* 0.7* 0.6*   GLUCOSE 69* 80 62*         Hepatic: No results for input(s): AST, ALT, ALB, BILITOT, ALKPHOS in the last 72 hours. Troponin: No results for input(s): TROPONINI in the last 72 hours. BNP: No results for input(s): BNP in the last 72 hours. INR:   No results for input(s): INR in the last 72 hours. Reviewed imaging and reports noted      Assessment:  Principal Problem:    Acute on chronic respiratory failure with hypoxia (HCC)  Active Problems:    PAD (peripheral artery disease) (HCC)    Pleural effusion on left    Acute on chronic combined systolic and diastolic HF (heart failure) (HCC)    Centrilobular emphysema (HCC)    Diabetes mellitus type 2, controlled (HCC)    Biventricular failure (HCC)    CAD (coronary artery disease)    Pulmonary HTN (HCC)    Systolic CHF (HCC)    Bilateral pleural effusion    Abdominal fluid collection  Resolved Problems:    * No resolved hospital problems.  *      Plan:    Pleural Effusion   - repeat cxr with increased size pleural effusion on the left   - s/p thoracocentesis - exudative and bloody effusion drained  - L thoracentesis 2/10 - serous pleural fluid drained       Ileus - resolving  Advance diet if ok with surgery       Intraabdominal abscess   Complex surgical Hx - Hx of bowel obstruction s/p ex lap and colostomy. Later complicated by intraabdominal abscess requiring drainage and completed IV zosyn back in Nov 2019. CT abd pelvis this admit: Increased size complex cystic collection RUQ, recurrent LUQ collection - unclear if these are infected or hemorrhagic or both   - 1/30 - s/p percutaneous drainage of R fluid collection   - repeat CT abdomen/pelvis on 2/3 with ruq fluid collection decreased in size,    - ct from 2/9 the collection increased; However this was without contrast.    - Will leave off [facility] for now    - cont iv zosyn and eraxis -> fluconazole; needs 7 days zosyn after discharge, 7 days po fluconazole after discharge  - fluid cultures negative  - ID and surgery involved   - 2/10 - sent to IR by surgery to upsize drainage catheter for RUQ collection. Acute on Chronic systolic CHF EF 84%   Stop lasix. Give gentle IVF until tolerating PO. Chronic Resp Failure w/ Hypoxia - stable  - on baseline of 2.5 L O2 nC, maintain O2 sats > 90% - 94%      Centrilobular Emphysema  - cont duonebs      IDDM  - stop lantus till tolerates PO  - humalog, SSI      HTN  - stable      Chronic a Fib  - cont amio, rate controlled  - holding xarelto with concerns for hemorrhagic intraabdominal fluid collections. Diet: DIET CARB CONTROL; Low Sodium (2 GM); Daily Fluid Restriction: 1500 ml      Activity: up as tolerated, limited mobility due to left AKA.      Prophylaxis: scd    Code status: Full Code     Aly Mccarty MD

## 2020-02-11 NOTE — PROGRESS NOTES
[facility] [phone]                                     Daily Progress Note                                                         Pt Name: Misael Chu  Medical Record Number: [id number]  Date of Birth 1960   Today's Date: 2/11/2020  Chief Complaint   Patient presents with    Shortness of Breath     x4 days        ASSESSMENT/PLAN  Right upper quadrant cystic mass  -Drain upsized with 300 ml out. Continues to drain well.   -thoracentesis also done.   -overall feeling much better today    Lety Aquino had denies any nausea or emesis. Ostomy working. OBJECTIVE  VITALS:  height is 5' 7\" (1.702 m) and weight is 183 lb 6.8 oz (83.2 kg). His oral temperature is 97.7 °F (36.5 °C). His blood pressure is 111/73 and his pulse is 99. His respiration is 16 and oxygen saturation is 98%. INTAKE/OUTPUT:      Intake/Output Summary (Last 24 hours) at 2/11/2020 0952  Last data filed at 2/11/2020 0858  Gross per 24 hour   Intake 1394 ml   Output 3290 ml   Net -1896 ml     GENERAL: alert, no distress  ABDOMEN: Soft, non-tender, moderately distended. Drain right abdomen serosanguinous. Colostomy present with  stool in appliance. I/O last 3 completed shifts: In: 2234 [P.O.:1634;  I.V.:600]  Out: 9053 [Urine:1100; Drains:440; Other:1350; Stool:200]      LABS  Recent Labs     02/11/20  0541   WBC 9.2   HGB 8.1*   HCT 25.3*      *   K 3.5   CL 84*   CO2 36*   BUN 15   CREATININE 0.6*   MG 1.70*   CALCIUM 8.4       EDUCATION  Patient educated about Disease Process, Medications, Smoking Cessation, Oxygenation, Incentive Spirometry and Deep Breath and Cough, Diabetes, Hyperlipidemia, Smoking Cessation, Nutrition, Exercise and Hypertension    Electronically signed by JOON Mitchell on 2/11/2020 at 9:52 AM

## 2020-02-11 NOTE — PROGRESS NOTES
Patient is alert and oriented x4, call light within reach, bed/chair alarm on. AM meds complete, patient tolerated well. VSS and WDL. No s/s of distress, no further needs noted at this time.  Electronically signed by Yelitza Short RN on 2/11/2020 at 3:10 PM

## 2020-02-11 NOTE — PROGRESS NOTES
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 abdominal cystic lesion is   redemonstrated. The density is greater than that of simple fluid. This has   been demonstrated on multiple prior abdominal CT scans and is of uncertain   significance and incompletely visualized. It demonstrates internal   heterogeneous appearance which is concerning for internal hemorrhage. It is   perhaps mildly larger when compared to CT scan 11/10/2019. Further   evaluation with CT scan of the abdomen and pelvis is now recommended.    Recommend performing CT scan of the abdomen and pelvis without and with IV   contrast.         XR CHEST PORTABLE   Final Result   Moderate left and probable small right-sided pleural effusions and bilateral   airspace disease, left greater than right. Airspace disease may be related   to edema and/or pneumonia.                All the pertinent images and reports for the current Hospitalization were reviewed by me     Scheduled Meds:   magnesium sulfate  4 g Intravenous Once    [START ON 2/12/2020] pantoprazole  40 mg Oral QAM AC    fluconazole  200 mg Intravenous Q24H    potassium chloride  20 mEq Oral BID WC    [Held by provider] rivaroxaban  20 mg Oral Daily    piperacillin-tazobactam  3.375 g Intravenous Q8H    lidocaine 1 % injection  5 mL Intradermal Once    sodium chloride flush  10 mL Intravenous 2 times per day    insulin lispro  0-12 Units Subcutaneous TID WC    insulin lispro  0-6 Units Subcutaneous Nightly    ipratropium-albuterol  1 ampule Inhalation Q4H WA    amiodarone  200 mg Oral Daily    aspirin  81 mg Oral Daily    atorvastatin  40 mg Oral Nightly    calcium-vitamin D  1 tablet Oral Daily    polyethylene glycol  17 g Oral BID    ranolazine  500 mg Oral BID    tamsulosin  0.4 mg Oral Daily       Continuous Infusions:   lactated ringers 50 mL/hr at 02/09/20 1344    dextrose         PRN Meds:  iohexol, ondansetron, morphine **OR** morphine, sodium chloride flush, ipratropium-albuterol, glucose, dextrose, glucagon (rDNA), dextrose, nitroGLYCERIN, oxyCODONE-acetaminophen      Assessment:     Patient Active Problem List   Diagnosis    Arthritis    Diabetes mellitus with hyperglycemia (HCC)    HBP (high blood pressure)    Hyperlipidemia    COPD (chronic obstructive pulmonary disease) (HCC)    Viral hepatitis C    Back pain    PAD (peripheral artery disease) (HCC)    Spinal stenosis of lumbar region    Tobacco use    Atherosclerosis of native artery of left lower extremity with intermittent claudication (HCC)    Chest pain    Pleural effusion due to CHF (congestive heart failure) ([facility] [address]    Pleural effusion on left    Alcohol abuse, continuous    Tachycardia    Atrial fibrillation with RVR (HCC)    Hyponatremia    Congestive heart failure (HCC)    REJI (acute kidney injury) [facility] [address]    Hypokalemia    Coronary artery disease involving autologous artery coronary bypass graft with angina pectoris ([facility] [address]    Essential hypertension    Chest pain of uncertain etiology    Acute on chronic combined systolic and diastolic HF (heart failure) (HCC)    Acute hyperkalemia    Typical atrial flutter (HCC)    Chronic systolic HF (heart failure) (HCC)    Hypotension    Vasovagal syncope    Laceration of scalp without foreign body    Nonischemic cardiomyopathy (HCC)    Syncope and collapse    Ischemic foot    Diabetes mellitus with peripheral circulatory disorder (HCC)    Limb ischemia    COPD exacerbation (HCC)    Nonhealing surgical wound    Acromioclavicular joint arthritis    Bradycardia    Shortness of breath    Permanent atrial fibrillation    Chronic atrial fibrillation    Other specified injury of inferior mesenteric vein, initial encounter    Postprocedural hypotension    Acute respiratory failure with hypercapnia (HCC)    High anion gap metabolic acidosis    Centrilobular emphysema (HCC)    Hypomagnesemia    Heart failure, acute on chronic, systolic and diastolic (HCC)    Atrial fibrillation, persistent    Anemia    Diabetes mellitus type 2, controlled (HCC)    Constipation    Acute on chronic systolic heart failure (HCC)    Atrial fibrillation, rapid (HCC)    COPD with acute exacerbation (HCC)    Cocaine use    Severe sepsis (HCC)    Atrial fibrillation with RVR (HCC)    Acute on chronic respiratory failure with hypoxia (HCC)    Respiratory distress    Biventricular failure (HCC)    Nicotine dependence    Suspected sleep apnea    Coronary artery disease involving native coronary artery of native heart without angina pectoris    CAD (coronary artery disease)    Acute renal failure (ARF) (HCC)    Morbid obesity due to excess calories (HCC)    Acute respiratory failure with hypoxia (HCC)    Nocturnal hypoxia    Hypercapnemia    SOB (shortness of breath)    Acute on chronic respiratory failure with hypercapnia (HCC)    Chronic respiratory failure with hypercapnia (HCC)    Acute pulmonary edema (HCC)    Acute on chronic systolic HF (heart failure) (HCC)    Hx of AKA (above knee amputation), left (HCC)    Respiratory failure (HCC)    HCAP (healthcare-associated pneumonia)    Ventricular tachycardia (HCC)    Shock circulatory (HCC)    Tachypnea    Neutrophilic leukocytosis    Chronic respiratory failure with hypoxia (HCC)    Cardiac arrest (HCC)    PEA (Pulseless electrical activity) (HCC)    Ileus (HCC)    Pneumonia of right lower lobe due to infectious organism [facility])    Atrial fibrillation, controlled ([address]    Pulmonary HTN ([address]    Weakness of right lower extremity    Large bowel obstruction ([address]    Tobacco abuse    Intra-abdominal abscess (HCC)    Intra-abdominal fluid collection    Moderate malnutrition (HCC)    NSTEMI (non-ST elevated myocardial infarction) ([address]    Systolic CHF (HCC)    Bilateral pleural effusion    Abdominal fluid collection     Intra abdominal fluid collections concern for abscess  H/o Colectomy and colostomy in Oct 2019  H/o Abdominal fluid collections s/p IR drain placement in the past  Bi lateral pleural effusions  CAD  Cardiomyopathy  CHF  Left BKA status   A fib  Smoking+  WBC elevation  Abnormal CT abd/pelvis from 1/29   S/p IR guided new drain placement in Rt upper Quadrant fluid collection   Hep C+Ve     Pleural fluid cx negative thus far and abdominal fluid cx requested and in process-    Status post IR guided drainage placement on the right abdominal fluid collection fluid indicates that seems to be more bloody, he was on anticoagulation prior for atrial fibrillation.      Surgery notes reviewed left lower quadrant fluid collection being observed if not improving is a plan for drainage. Repeat CT abd/pelvis noted and X ray from   with Left effusion s/p IR thoracentesis and Rt AROLDO drain placement    Repeat CT with improvement in fluid collections       Labs, Microbiology, Radiology and all the pertinent results from current hospitalization and  care every where were reviewed  by me as a part of the evaluation   Plan:   1. Cont IV Zosyn x  Q8 hr extended infusion anticipate to cont x 7 days at d/c   2. ESR elevated, CRP noted   3. Change to  oral Fluconazole therapy  200 mg daily x 7 days  At d/c   4. IR fluid aspirated cx in process  Repeat  5. Trend WBC  now normalized   6. repeat CT SCAN results noted    7.s/p IR thoracentesis on 2/10  Fluid NGTD  8. HEENA up dated will sign off     Discussed with patient/Family and Nursing d/w Primary team      Discussed with patient/Family and Nursing staff     Thanks for allowing me to participate in your patient's care and please call me with any questions or concerns.     Sondra Moore MD  Infectious Disease  [facility]) Physician  Phone: [phone]   Fax : [phone]

## 2020-02-11 NOTE — CARE COORDINATION
[facility]  Hypoglycemia Event and Prevention Plan      NAME: Isaak Sanchez  MEDICAL RECORD NUMBER:  [id number]  AGE: 61 y.o. GENDER: male  : 1960  EPISODE DATE:  2020     Data     Recent Labs     02/10/20  0225 02/10/20  0736 02/10/20  1230 02/10/20  1700 02/10/20  2005 02/11/20  0805   POCGLU 92 82 97 71 216* 105*     Results for Duane Hoar (MRN [id number]) as of 2020 08:58   Ref.  Range 2020 05:41   Glucose Latest Ref Range: 70 - 99 mg/dL 62 (L)       Medications  Scheduled Medications:   fluconazole  200 mg Intravenous Q24H    pantoprazole  40 mg Intravenous Daily    And    sodium chloride (PF)  10 mL Intravenous Daily    potassium chloride  20 mEq Oral BID WC    furosemide  40 mg Intravenous BID    [Held by provider] rivaroxaban  20 mg Oral Daily    piperacillin-tazobactam  3.375 g Intravenous Q8H    insulin glargine  15 Units Subcutaneous Nightly    lidocaine 1 % injection  5 mL Intradermal Once    sodium chloride flush  10 mL Intravenous 2 times per day    insulin lispro  0-12 Units Subcutaneous TID WC    insulin lispro  0-6 Units Subcutaneous Nightly    ipratropium-albuterol  1 ampule Inhalation Q4H WA    amiodarone  200 mg Oral Daily    aspirin  81 mg Oral Daily    atorvastatin  40 mg Oral Nightly    calcium-vitamin D  1 tablet Oral Daily    polyethylene glycol  17 g Oral BID    ranolazine  500 mg Oral BID    tamsulosin  0.4 mg Oral Daily       Diet  Current diet/supplement order: DIET CLEAR LIQUID; Low Sodium (2 GM)     Recorded PO: PO Meals Eaten (%): 0 last meal in flowsheets      Action     Total doses of insulin:   0 units,  0 units 2/10 17 units (Lantus 15 units plus Humalog 2 units)      Physician Notified of event: Yes   Ludivina Maddox MD      Responce     Achieved POCT Blood Glucose greater than 70 mg/dl: Yes      Medication plan updated: Yes      Electronically signed by Chris Fletcher RN on 2020 at 8:57 AM

## 2020-02-12 LAB
ANION GAP SERPL CALCULATED.3IONS-SCNC: 12 MMOL/L (ref 3–16)
BASOPHILS ABSOLUTE: 0.1 K/UL (ref 0–0.2)
BASOPHILS RELATIVE PERCENT: 0.9 %
BUN BLDV-MCNC: 9 MG/DL (ref 7–20)
CALCIUM SERPL-MCNC: 7.5 MG/DL (ref 8.3–10.6)
CHLORIDE BLD-SCNC: 90 MMOL/L (ref 99–110)
CO2: 28 MMOL/L (ref 21–32)
CREAT SERPL-MCNC: <0.5 MG/DL (ref 0.9–1.3)
EOSINOPHILS ABSOLUTE: 0.1 K/UL (ref 0–0.6)
EOSINOPHILS RELATIVE PERCENT: 1 %
GFR AFRICAN AMERICAN: >60
GFR NON-AFRICAN AMERICAN: >60
GLUCOSE BLD-MCNC: 139 MG/DL (ref 70–99)
GLUCOSE BLD-MCNC: 168 MG/DL (ref 70–99)
GLUCOSE BLD-MCNC: 169 MG/DL (ref 70–99)
GLUCOSE BLD-MCNC: 193 MG/DL (ref 70–99)
GLUCOSE BLD-MCNC: 94 MG/DL (ref 70–99)
HCT VFR BLD CALC: 21.7 % (ref 40.5–52.5)
HCT VFR BLD CALC: 24.4 % (ref 40.5–52.5)
HEMOGLOBIN: 7.1 G/DL (ref 13.5–17.5)
HEMOGLOBIN: 8 G/DL (ref 13.5–17.5)
LYMPHOCYTES ABSOLUTE: 0.6 K/UL (ref 1–5.1)
LYMPHOCYTES RELATIVE PERCENT: 6.8 %
MAGNESIUM: 1.7 MG/DL (ref 1.8–2.4)
MCH RBC QN AUTO: 26.9 PG (ref 26–34)
MCHC RBC AUTO-ENTMCNC: 32.5 G/DL (ref 31–36)
MCV RBC AUTO: 82.7 FL (ref 80–100)
MONOCYTES ABSOLUTE: 0.6 K/UL (ref 0–1.3)
MONOCYTES RELATIVE PERCENT: 6.3 %
NEUTROPHILS ABSOLUTE: 7.8 K/UL (ref 1.7–7.7)
NEUTROPHILS RELATIVE PERCENT: 85 %
PDW BLD-RTO: 17.3 % (ref 12.4–15.4)
PERFORMED ON: ABNORMAL
PERFORMED ON: NORMAL
PLATELET # BLD: 314 K/UL (ref 135–450)
PMV BLD AUTO: 8.5 FL (ref 5–10.5)
POTASSIUM REFLEX MAGNESIUM: 4.1 MMOL/L (ref 3.5–5.1)
RBC # BLD: 2.63 M/UL (ref 4.2–5.9)
SODIUM BLD-SCNC: 130 MMOL/L (ref 136–145)
WBC # BLD: 9.2 K/UL (ref 4–11)

## 2020-02-12 PROCEDURE — 85025 COMPLETE CBC W/AUTO DIFF WBC: CPT

## 2020-02-12 PROCEDURE — 99024 POSTOP FOLLOW-UP VISIT: CPT | Performed by: PHYSICIAN ASSISTANT

## 2020-02-12 PROCEDURE — 6370000000 HC RX 637 (ALT 250 FOR IP): Performed by: HOSPITALIST

## 2020-02-12 PROCEDURE — 2700000000 HC OXYGEN THERAPY PER DAY

## 2020-02-12 PROCEDURE — 6360000002 HC RX W HCPCS: Performed by: INTERNAL MEDICINE

## 2020-02-12 PROCEDURE — 6370000000 HC RX 637 (ALT 250 FOR IP): Performed by: INTERNAL MEDICINE

## 2020-02-12 PROCEDURE — 2580000003 HC RX 258: Performed by: INTERNAL MEDICINE

## 2020-02-12 PROCEDURE — 99232 SBSQ HOSP IP/OBS MODERATE 35: CPT | Performed by: INTERNAL MEDICINE

## 2020-02-12 PROCEDURE — 6360000002 HC RX W HCPCS: Performed by: FAMILY MEDICINE

## 2020-02-12 PROCEDURE — 85018 HEMOGLOBIN: CPT

## 2020-02-12 PROCEDURE — 94761 N-INVAS EAR/PLS OXIMETRY MLT: CPT

## 2020-02-12 PROCEDURE — 6370000000 HC RX 637 (ALT 250 FOR IP): Performed by: FAMILY MEDICINE

## 2020-02-12 PROCEDURE — APPSS15 APP SPLIT SHARED TIME 0-15 MINUTES: Performed by: PHYSICIAN ASSISTANT

## 2020-02-12 PROCEDURE — 1200000000 HC SEMI PRIVATE

## 2020-02-12 PROCEDURE — 83735 ASSAY OF MAGNESIUM: CPT

## 2020-02-12 PROCEDURE — 85014 HEMATOCRIT: CPT

## 2020-02-12 PROCEDURE — 80048 BASIC METABOLIC PNL TOTAL CA: CPT

## 2020-02-12 PROCEDURE — 94640 AIRWAY INHALATION TREATMENT: CPT

## 2020-02-12 PROCEDURE — APPNB30 APP NON BILLABLE TIME 0-30 MINS: Performed by: PHYSICIAN ASSISTANT

## 2020-02-12 RX ORDER — CALCIUM CARBONATE 200(500)MG
500 TABLET,CHEWABLE ORAL 3 TIMES DAILY PRN
Status: DISCONTINUED | OUTPATIENT
Start: 2020-02-12 | End: 2020-02-13

## 2020-02-12 RX ORDER — METOCLOPRAMIDE HYDROCHLORIDE 5 MG/ML
5 INJECTION INTRAMUSCULAR; INTRAVENOUS EVERY 6 HOURS
Status: DISCONTINUED | OUTPATIENT
Start: 2020-02-12 | End: 2020-02-14 | Stop reason: HOSPADM

## 2020-02-12 RX ORDER — MAGNESIUM SULFATE IN WATER 40 MG/ML
4 INJECTION, SOLUTION INTRAVENOUS ONCE
Status: COMPLETED | OUTPATIENT
Start: 2020-02-12 | End: 2020-02-12

## 2020-02-12 RX ADMIN — METOCLOPRAMIDE HYDROCHLORIDE 5 MG: 5 INJECTION INTRAMUSCULAR; INTRAVENOUS at 17:24

## 2020-02-12 RX ADMIN — IPRATROPIUM BROMIDE AND ALBUTEROL SULFATE 1 AMPULE: .5; 3 SOLUTION RESPIRATORY (INHALATION) at 15:54

## 2020-02-12 RX ADMIN — PIPERACILLIN AND TAZOBACTAM 3.38 G: 3; .375 INJECTION, POWDER, LYOPHILIZED, FOR SOLUTION INTRAVENOUS at 14:51

## 2020-02-12 RX ADMIN — SODIUM CHLORIDE, PRESERVATIVE FREE 10 ML: 5 INJECTION INTRAVENOUS at 22:49

## 2020-02-12 RX ADMIN — MORPHINE SULFATE 2 MG: 2 INJECTION, SOLUTION INTRAMUSCULAR; INTRAVENOUS at 01:18

## 2020-02-12 RX ADMIN — IPRATROPIUM BROMIDE AND ALBUTEROL SULFATE 1 AMPULE: .5; 3 SOLUTION RESPIRATORY (INHALATION) at 20:01

## 2020-02-12 RX ADMIN — METOCLOPRAMIDE HYDROCHLORIDE 5 MG: 5 INJECTION INTRAMUSCULAR; INTRAVENOUS at 12:34

## 2020-02-12 RX ADMIN — FLUCONAZOLE 200 MG: 200 INJECTION, SOLUTION INTRAVENOUS at 08:58

## 2020-02-12 RX ADMIN — INSULIN LISPRO 2 UNITS: 100 INJECTION, SOLUTION INTRAVENOUS; SUBCUTANEOUS at 17:25

## 2020-02-12 RX ADMIN — OXYCODONE HYDROCHLORIDE AND ACETAMINOPHEN 1 TABLET: 7.5; 325 TABLET ORAL at 12:34

## 2020-02-12 RX ADMIN — RANOLAZINE 500 MG: 500 TABLET, FILM COATED, EXTENDED RELEASE ORAL at 22:37

## 2020-02-12 RX ADMIN — POTASSIUM CHLORIDE 20 MEQ: 1500 TABLET, EXTENDED RELEASE ORAL at 17:25

## 2020-02-12 RX ADMIN — ASPIRIN 81 MG 81 MG: 81 TABLET ORAL at 08:58

## 2020-02-12 RX ADMIN — ANTACID TABLETS 500 MG: 500 TABLET, CHEWABLE ORAL at 12:34

## 2020-02-12 RX ADMIN — PANTOPRAZOLE SODIUM 40 MG: 40 TABLET, DELAYED RELEASE ORAL at 06:25

## 2020-02-12 RX ADMIN — MAGNESIUM SULFATE IN WATER 4 G: 40 INJECTION, SOLUTION INTRAVENOUS at 08:59

## 2020-02-12 RX ADMIN — SODIUM CHLORIDE, PRESERVATIVE FREE 10 ML: 5 INJECTION INTRAVENOUS at 08:58

## 2020-02-12 RX ADMIN — LIDOCAINE HYDROCHLORIDE: 20 SOLUTION ORAL; TOPICAL at 15:19

## 2020-02-12 RX ADMIN — TAMSULOSIN HYDROCHLORIDE 0.4 MG: 0.4 CAPSULE ORAL at 08:58

## 2020-02-12 RX ADMIN — MORPHINE SULFATE 2 MG: 2 INJECTION, SOLUTION INTRAMUSCULAR; INTRAVENOUS at 08:58

## 2020-02-12 RX ADMIN — IPRATROPIUM BROMIDE AND ALBUTEROL SULFATE 1 AMPULE: .5; 3 SOLUTION RESPIRATORY (INHALATION) at 01:24

## 2020-02-12 RX ADMIN — LIDOCAINE HYDROCHLORIDE: 20 SOLUTION ORAL; TOPICAL at 22:38

## 2020-02-12 RX ADMIN — PIPERACILLIN AND TAZOBACTAM 3.38 G: 3; .375 INJECTION, POWDER, LYOPHILIZED, FOR SOLUTION INTRAVENOUS at 22:38

## 2020-02-12 RX ADMIN — MORPHINE SULFATE 2 MG: 2 INJECTION, SOLUTION INTRAMUSCULAR; INTRAVENOUS at 06:24

## 2020-02-12 RX ADMIN — OXYCODONE HYDROCHLORIDE AND ACETAMINOPHEN 1 TABLET: 7.5; 325 TABLET ORAL at 22:37

## 2020-02-12 RX ADMIN — INSULIN LISPRO 2 UNITS: 100 INJECTION, SOLUTION INTRAVENOUS; SUBCUTANEOUS at 08:59

## 2020-02-12 RX ADMIN — RANOLAZINE 500 MG: 500 TABLET, FILM COATED, EXTENDED RELEASE ORAL at 08:57

## 2020-02-12 RX ADMIN — POTASSIUM CHLORIDE 20 MEQ: 1500 TABLET, EXTENDED RELEASE ORAL at 08:57

## 2020-02-12 RX ADMIN — IPRATROPIUM BROMIDE AND ALBUTEROL SULFATE 1 AMPULE: .5; 3 SOLUTION RESPIRATORY (INHALATION) at 07:52

## 2020-02-12 RX ADMIN — INSULIN LISPRO 1 UNITS: 100 INJECTION, SOLUTION INTRAVENOUS; SUBCUTANEOUS at 22:37

## 2020-02-12 RX ADMIN — AMIODARONE HYDROCHLORIDE 200 MG: 200 TABLET ORAL at 08:57

## 2020-02-12 RX ADMIN — IPRATROPIUM BROMIDE AND ALBUTEROL SULFATE 1 AMPULE: .5; 3 SOLUTION RESPIRATORY (INHALATION) at 12:33

## 2020-02-12 RX ADMIN — PIPERACILLIN AND TAZOBACTAM 3.38 G: 3; .375 INJECTION, POWDER, LYOPHILIZED, FOR SOLUTION INTRAVENOUS at 06:24

## 2020-02-12 RX ADMIN — POLYETHYLENE GLYCOL 3350 17 G: 17 POWDER, FOR SOLUTION ORAL at 08:57

## 2020-02-12 RX ADMIN — OYSTER SHELL CALCIUM WITH VITAMIN D 1 TABLET: 500; 200 TABLET, FILM COATED ORAL at 08:57

## 2020-02-12 RX ADMIN — ATORVASTATIN CALCIUM 40 MG: 40 TABLET, FILM COATED ORAL at 22:37

## 2020-02-12 ASSESSMENT — PAIN SCALES - GENERAL
PAINLEVEL_OUTOF10: 8
PAINLEVEL_OUTOF10: 7
PAINLEVEL_OUTOF10: 5
PAINLEVEL_OUTOF10: 6
PAINLEVEL_OUTOF10: 7
PAINLEVEL_OUTOF10: 8
PAINLEVEL_OUTOF10: 7
PAINLEVEL_OUTOF10: 4

## 2020-02-12 ASSESSMENT — PAIN DESCRIPTION - LOCATION
LOCATION: ABDOMEN

## 2020-02-12 ASSESSMENT — PAIN DESCRIPTION - ONSET
ONSET: GRADUAL
ONSET: ON-GOING
ONSET: GRADUAL
ONSET: ON-GOING

## 2020-02-12 ASSESSMENT — PAIN DESCRIPTION - FREQUENCY
FREQUENCY: CONTINUOUS
FREQUENCY: INTERMITTENT
FREQUENCY: CONTINUOUS
FREQUENCY: INTERMITTENT

## 2020-02-12 ASSESSMENT — PAIN DESCRIPTION - DESCRIPTORS
DESCRIPTORS: ACHING

## 2020-02-12 ASSESSMENT — PAIN DESCRIPTION - PROGRESSION
CLINICAL_PROGRESSION: GRADUALLY IMPROVING
CLINICAL_PROGRESSION: GRADUALLY IMPROVING
CLINICAL_PROGRESSION: GRADUALLY WORSENING
CLINICAL_PROGRESSION: GRADUALLY IMPROVING
CLINICAL_PROGRESSION: NOT CHANGED

## 2020-02-12 ASSESSMENT — PAIN DESCRIPTION - PAIN TYPE
TYPE: ACUTE PAIN
TYPE: ACUTE PAIN;SURGICAL PAIN
TYPE: ACUTE PAIN

## 2020-02-12 ASSESSMENT — PAIN DESCRIPTION - ORIENTATION
ORIENTATION: LOWER;RIGHT
ORIENTATION: LEFT;RIGHT
ORIENTATION: RIGHT;OTHER (COMMENT)
ORIENTATION: RIGHT;OTHER (COMMENT)

## 2020-02-12 NOTE — PROGRESS NOTES
Pulmonary Progress Note    CC:  Follow up respiratory failure, hypoxia, effusion    Subjective:  2.5 liters of oxygen  Cytology on pleural fluid showed inflammation   Flow was negative  Says he is more sob today  Having heart burn     Intake/Output Summary (Last 24 hours) at 2/12/2020 0715  Last data filed at 2/12/2020 2662  Gross per 24 hour   Intake 2300 ml   Output 1790 ml   Net 510 ml         PHYSICAL EXAM:  Blood pressure 92/66, pulse 113, temperature 98.7 °F (37.1 °C), temperature source Oral, resp. rate 18, height 5' 7\" (1.702 m), weight 182 lb 15.7 oz (83 kg), SpO2 96 %.'  Gen: No distress. Chronically ill  Eyes: PERRL. No sclera icterus. No conjunctival injection. ENT: No discharge. Pharynx clear. External appearance of ears and nose normal.  Neck: Trachea midline. No obvious mass. Resp: Clear on anterior chest .  CV: Regular rate. Regular rhythm. No murmur or rub. GI: tender to minimal palpation No hernia. + ostomy   Skin: Warm, dry, normal texture and turgor. No nodule on exposed extremities. Lymph: No cervical LAD. No supraclavicular LAD. M/S: No cyanosis. No clubbing. No joint deformity. Neuro: Moves all four extremities. CN 2-12 tested, no defect noted.   Ext:   Left AKA, trace edema    Medications:    Scheduled Meds:   pantoprazole  40 mg Oral QAM AC    fluconazole  200 mg Intravenous Q24H    potassium chloride  20 mEq Oral BID WC    [Held by provider] rivaroxaban  20 mg Oral Daily    piperacillin-tazobactam  3.375 g Intravenous Q8H    lidocaine 1 % injection  5 mL Intradermal Once    sodium chloride flush  10 mL Intravenous 2 times per day    insulin lispro  0-12 Units Subcutaneous TID     insulin lispro  0-6 Units Subcutaneous Nightly    ipratropium-albuterol  1 ampule Inhalation Q4H WA    amiodarone  200 mg Oral Daily    aspirin  81 mg Oral Daily    atorvastatin  40 mg Oral Nightly    calcium-vitamin D  1 tablet Oral Daily    polyethylene glycol  17 g Oral BID    ranolazine  500 mg Oral BID    tamsulosin  0.4 mg Oral Daily       Continuous Infusions:   lactated ringers 50 mL/hr at 02/11/20 1803    dextrose         PRN Meds:  iohexol, ondansetron, morphine **OR** morphine, sodium chloride flush, ipratropium-albuterol, glucose, dextrose, glucagon (rDNA), dextrose, nitroGLYCERIN, oxyCODONE-acetaminophen    Labs:  CBC:   Recent Labs     02/10/20  0600 02/11/20  0541 02/12/20  0528   WBC 13.8* 9.2 9.2   HGB 7.7* 8.1* 7.1*   HCT 23.7* 25.3* 21.7*   MCV 81.3 81.9 82.7    260 314     BMP:   Recent Labs     02/10/20  0600 02/11/20  0541 02/12/20  0529    127* 130*   K 3.9 3.5 4.1   CL 91* 84* 90*   CO2 37* 36* 28   BUN 20 15 9   CREATININE 0.7* 0.6* <0.5*     LIVER PROFILE: No results for input(s): AST, ALT, LIPASE, BILIDIR, BILITOT, ALKPHOS in the last 72 hours. Invalid input(s): AMYLASE,  ALB  PT/INR: No results for input(s): PROTIME, INR in the last 72 hours. APTT: No results for input(s): APTT in the last 72 hours. UA:No results for input(s): NITRITE, COLORU, PHUR, LABCAST, WBCUA, RBCUA, MUCUS, TRICHOMONAS, YEAST, BACTERIA, CLARITYU, SPECGRAV, LEUKOCYTESUR, UROBILINOGEN, BILIRUBINUR, BLOODU, GLUCOSEU, AMORPHOUS in the last 72 hours. Invalid input(s): Willeen Chevy  No results for input(s): PH, PCO2, PO2 in the last 72 hours. Films:  Chest imaging reports were reviewed and imaging was reviewed by me and improved left effusion, RLL airspace disease.   PICC     ABG:  Nothing recent    Cultures:  Negative    I reviewed the labs and images listed above    Assessment:   · Chronic Hypoxic Respiratory Failure  · Left Pleural effusion:  First thoracentesis was monocyte predominant, second was neutrophil predominant   · Emphysema   · Ileus  · RUQ fluid collection       Plan:  · Antibiotics per ID  · Ok to resume xarelto from my standpoint   · Protonix  · Add TUMS    Will sign off        Carmen Seymour,   [facility]

## 2020-02-12 NOTE — PROGRESS NOTES
Hospital Medicine Progress Note     Date:  2/12/2020    PCP: Nicole Walsh MD (Tel: [phone])    Date of Admission: 1/22/2020      Subjective     Reports odynophagia and severe heart burn this am. Refused breakfast tray, albeit reports that he is very hungry and denies nausea. Objective  Physical exam:  Vitals: /75   Pulse 100   Temp 97.4 °F (36.3 °C) (Oral)   Resp 18   Ht 5' 7\" (1.702 m)   Wt 182 lb 15.7 oz (83 kg)   SpO2 95%   BMI 28.66 kg/m²   Gen: nad, appears more comfortable   Head: Normocephalic. Atraumatic  Eyes: EOMI. Conjunctiva clear  ENT: Oral mucosa moist  Neck: No JVD. supple  CVS: Nml S1S2, no MRG, irregular, regular rate  Pulmomary: diminished BS on the left side, tachypneic, no wheezing  Gastrointestinal: firm at baseline, diffusely tender without focal tenderness. normal bowel sounds. Colostomy RLL with a good amount of soft brown stool in the bag.  ruq drain in place   Musculoskeletal: No edema. Warm, remote LEFT AKA  Neuro: No focal deficit. Moves extremity spontaneously. Psychiatry: Alert, oriented x3  Skin: Warm, dry with normal turgor. No rash    24HR INTAKE/OUTPUT:      Intake/Output Summary (Last 24 hours) at 2/12/2020 1207  Last data filed at 2/12/2020 0844  Gross per 24 hour   Intake 2100 ml   Output 1440 ml   Net 660 ml     I/O last 3 completed shifts: In: 2300 [P.O.:720;  I.V.:1180; IV Piggyback:400]  Out: 2080 [Urine:1275; Drains:365; Stool:150]  I/O this shift:  In: -   Out: 150 [Urine:150]      Meds:    magnesium sulfate  4 g Intravenous Once    GI cocktail   Oral 4x Daily    pantoprazole  40 mg Oral QAM AC    fluconazole  200 mg Intravenous Q24H    potassium chloride  20 mEq Oral BID WC    [Held by provider] rivaroxaban  20 mg Oral Daily    piperacillin-tazobactam  3.375 g Intravenous Q8H    lidocaine 1 % injection  5 mL Intradermal Once    sodium chloride flush  10 mL Intravenous 2 times per day    insulin lispro  0-12 Units Subcutaneous left   - s/p thoracocentesis - exudative and bloody effusion drained  - L thoracentesis 2/10 - serous pleural fluid drained  - pulm involved. Ileus - resolved  Advance diet as tolerates. Reglan IV. Intraabdominal abscess or hemorrhagic collection. Complex surgical Hx - Hx of bowel obstruction s/p ex lap and colostomy. Later complicated by intraabdominal abscess requiring drainage and completed IV zosyn back in Nov 2019. CT abd pelvis this admit: Increased size complex cystic collection RUQ, recurrent LUQ collection - unclear if these are infected or hemorrhagic or both   - 1/30 - s/p percutaneous drainage of R fluid collection   - 2/3 - repeat CT abdomen/pelvis with ruq fluid collection decreased in size   - 2/9 - repeat CT shows collection increased; However this was without contrast.    - cont iv zosyn and eraxis -> fluconazole; needs 7 days zosyn after discharge, 7 days po fluconazole after discharge per ID   - 2/10 - RUQ collection drain upsized - still draining sanguinous fluid. - fluid cultures have been negative. - ID and surgery involved  - cont to hold AC. Acute on Chronic systolic CHF EF 13%   Stop lasix. Give gentle IVF until tolerating PO - fluid now off. Chronic Resp Failure w/ Hypoxia - stable  - on baseline of 2.5 L O2 nC, maintain O2 sats > 90% - 94%      Centrilobular Emphysema  - cont duonebs      IDDM  - stop lantus till tolerates PO  - humalog, SSI      HTN  - stable      Chronic a Fib  - cont amio, rate controlled  - holding xarelto with concerns for hemorrhagic intraabdominal fluid collections. Diet: DIET CARB CONTROL; Low Sodium (2 GM); Daily Fluid Restriction: 1500 ml        Activity: up as tolerated, limited mobility due to left AKA. Reordered PTOT. Up to chair every day.      Prophylaxis: scd    Code status: Full Code     Haider Noel MD

## 2020-02-12 NOTE — PROGRESS NOTES
Patient is alert and oriented x4, call light within reach, bed/chair alarm on. AM meds complete, patient tolerated well. VSS and WDL. No s/s of distress, no further needs noted at this time.  Electronically signed by Vianney Rachel RN on 2/12/2020 at 2:06 PM

## 2020-02-12 NOTE — PLAN OF CARE
Problem: PAIN  Goal: Patient's pain/discomfort is manageable  2/12/2020 0241 by Juan Rosas RN  Outcome: Ongoing     Problem: SKIN INTEGRITY  Goal: Skin integrity is maintained or improved  2/12/2020 0241 by Juan Rosas RN  Outcome: Ongoing     Problem: KNOWLEDGE DEFICIT  Goal: Patient/S.O. demonstrates understanding of disease process, treatment plan, medications, and discharge instructions.   2/12/2020 0241 by Juan Rosas RN  Outcome: Ongoing     Problem: OXYGENATION/RESPIRATORY FUNCTION  Goal: Patient will maintain patent airway  2/12/2020 0241 by Juan Rosas RN  Outcome: Ongoing     Problem: OXYGENATION/RESPIRATORY FUNCTION  Goal: Patient will achieve/maintain normal respiratory rate/effort  Description  Respiratory rate and effort will be within normal limits for the patient  2/12/2020 0241 by Juan Rosas RN  Outcome: Ongoing     Problem: FLUID AND ELECTROLYTE IMBALANCE  Goal: Fluid and electrolyte balance are achieved/maintained  2/12/2020 0241 by Juan Rosas RN  Outcome: Ongoing     Problem: ACTIVITY INTOLERANCE/IMPAIRED MOBILITY  Goal: Mobility/activity is maintained at optimum level for patient  2/12/2020 0241 by Juan Rosas RN  Outcome: Ongoing     Problem: Pain:  Goal: Pain level will decrease  Description  Pain level will decrease  2/12/2020 0241 by Juan Rosas RN  Outcome: Ongoing     Problem: Pain:  Goal: Control of acute pain  Description  Control of acute pain  2/12/2020 0241 by Juan Rosas RN  Outcome: Ongoing     Problem: Cardiac:  Goal: Ability to maintain an adequate cardiac output will improve  Description  Ability to maintain an adequate cardiac output will improve  2/12/2020 0241 by Juan Rosas RN  Outcome: Ongoing     Problem: Cardiac:  Goal: Complications related to the disease process, condition or treatment will be avoided or minimized  Description  Complications related to the disease process, condition or treatment will be avoided or

## 2020-02-12 NOTE — PROGRESS NOTES
[facility] [phone]                                     Daily Progress Note                                                         Pt Name: Phil Gtz  Medical Record Number: [id number]  Date of Birth 1960   Today's Date: 2/12/2020  Chief Complaint   Patient presents with    Shortness of Breath     x4 days        ASSESSMENT/PLAN  Right upper quadrant cystic mass  -Drain upsized with 300 ml out. Continues to drain well. Drain flushes well  -thoracentesis done yesterday  -Today he is having some acid reflux, SOB, and slight odynophagia (won't eat breakfast)    SUBJECTIVE  Sami King had denies any nausea or emesis. Ostomy working. Drain working: bloody drainage    OBJECTIVE  VITALS:  height is 5' 7\" (1.702 m) and weight is 182 lb 15.7 oz (83 kg). His oral temperature is 97.4 °F (36.3 °C). His blood pressure is 109/75 and his pulse is 100. His respiration is 18 and oxygen saturation is 95%. INTAKE/OUTPUT:      Intake/Output Summary (Last 24 hours) at 2/12/2020 1017  Last data filed at 2/12/2020 0844  Gross per 24 hour   Intake 2300 ml   Output 1540 ml   Net 760 ml     GENERAL: alert, no distress  ABDOMEN: Soft, non-tender, moderately distended. Drain right abdomen bloody drainage. Colostomy present with  stool in appliance. I/O last 3 completed shifts: In: 2300 [P.O.:720;  I.V.:1180; IV Piggyback:400]  Out: 1549 [Urine:1275; Drains:365; Stool:150]      LABS  Recent Labs     02/12/20  0528 02/12/20  0529   WBC 9.2  --    HGB 7.1*  --    HCT 21.7*  --      --    NA  --  130*   K  --  4.1   CL  --  90*   CO2  --  28   BUN  --  9   CREATININE  --  <0.5*   MG  --  1.70*   CALCIUM  --  7.5*       EDUCATION  Patient educated about Disease Process, Medications, Smoking Cessation, Oxygenation, Incentive Spirometry and Deep Breath and Cough, Diabetes, Hyperlipidemia, Smoking Cessation, Nutrition, Exercise and

## 2020-02-13 LAB
ANION GAP SERPL CALCULATED.3IONS-SCNC: 8 MMOL/L (ref 3–16)
ANISOCYTOSIS: ABNORMAL
BASOPHILS ABSOLUTE: 0 K/UL (ref 0–0.2)
BASOPHILS RELATIVE PERCENT: 0 %
BODY FLUID CULTURE, STERILE: NORMAL
BUN BLDV-MCNC: 10 MG/DL (ref 7–20)
CALCIUM SERPL-MCNC: 8 MG/DL (ref 8.3–10.6)
CHLORIDE BLD-SCNC: 90 MMOL/L (ref 99–110)
CO2: 33 MMOL/L (ref 21–32)
CREAT SERPL-MCNC: <0.5 MG/DL (ref 0.9–1.3)
EOSINOPHILS ABSOLUTE: 0.1 K/UL (ref 0–0.6)
EOSINOPHILS RELATIVE PERCENT: 1 %
GFR AFRICAN AMERICAN: >60
GFR NON-AFRICAN AMERICAN: >60
GLUCOSE BLD-MCNC: 126 MG/DL (ref 70–99)
GLUCOSE BLD-MCNC: 135 MG/DL (ref 70–99)
GLUCOSE BLD-MCNC: 144 MG/DL (ref 70–99)
GLUCOSE BLD-MCNC: 148 MG/DL (ref 70–99)
GLUCOSE BLD-MCNC: 202 MG/DL (ref 70–99)
GRAM STAIN RESULT: NORMAL
HCT VFR BLD CALC: 31.3 % (ref 40.5–52.5)
HEMOGLOBIN: 9.9 G/DL (ref 13.5–17.5)
LYMPHOCYTES ABSOLUTE: 0.6 K/UL (ref 1–5.1)
LYMPHOCYTES RELATIVE PERCENT: 9 %
MAGNESIUM: 2.5 MG/DL (ref 1.8–2.4)
MCH RBC QN AUTO: 26.3 PG (ref 26–34)
MCHC RBC AUTO-ENTMCNC: 31.7 G/DL (ref 31–36)
MCV RBC AUTO: 82.9 FL (ref 80–100)
MONOCYTES ABSOLUTE: 0.4 K/UL (ref 0–1.3)
MONOCYTES RELATIVE PERCENT: 6 %
NEUTROPHILS ABSOLUTE: 5.8 K/UL (ref 1.7–7.7)
NEUTROPHILS RELATIVE PERCENT: 84 %
PDW BLD-RTO: 16.8 % (ref 12.4–15.4)
PERFORMED ON: ABNORMAL
PLATELET # BLD: 232 K/UL (ref 135–450)
PMV BLD AUTO: 8.2 FL (ref 5–10.5)
POLYCHROMASIA: ABNORMAL
POTASSIUM REFLEX MAGNESIUM: 4.7 MMOL/L (ref 3.5–5.1)
RBC # BLD: 3.78 M/UL (ref 4.2–5.9)
SODIUM BLD-SCNC: 131 MMOL/L (ref 136–145)
VACUOLATED NEUTROPHILS: PRESENT
WBC # BLD: 6.9 K/UL (ref 4–11)

## 2020-02-13 PROCEDURE — 6370000000 HC RX 637 (ALT 250 FOR IP): Performed by: INTERNAL MEDICINE

## 2020-02-13 PROCEDURE — 94760 N-INVAS EAR/PLS OXIMETRY 1: CPT

## 2020-02-13 PROCEDURE — 6360000002 HC RX W HCPCS: Performed by: INTERNAL MEDICINE

## 2020-02-13 PROCEDURE — 97535 SELF CARE MNGMENT TRAINING: CPT | Performed by: PHYSICIAN ASSISTANT

## 2020-02-13 PROCEDURE — 1200000000 HC SEMI PRIVATE

## 2020-02-13 PROCEDURE — 94640 AIRWAY INHALATION TREATMENT: CPT

## 2020-02-13 PROCEDURE — 2580000003 HC RX 258: Performed by: INTERNAL MEDICINE

## 2020-02-13 PROCEDURE — 2700000000 HC OXYGEN THERAPY PER DAY

## 2020-02-13 PROCEDURE — 97530 THERAPEUTIC ACTIVITIES: CPT | Performed by: PHYSICIAN ASSISTANT

## 2020-02-13 PROCEDURE — 83735 ASSAY OF MAGNESIUM: CPT

## 2020-02-13 PROCEDURE — 97530 THERAPEUTIC ACTIVITIES: CPT | Performed by: PHYSICAL THERAPIST

## 2020-02-13 PROCEDURE — APPSS30 APP SPLIT SHARED TIME 16-30 MINUTES: Performed by: NURSE PRACTITIONER

## 2020-02-13 PROCEDURE — 85025 COMPLETE CBC W/AUTO DIFF WBC: CPT

## 2020-02-13 PROCEDURE — 6370000000 HC RX 637 (ALT 250 FOR IP): Performed by: FAMILY MEDICINE

## 2020-02-13 PROCEDURE — 80048 BASIC METABOLIC PNL TOTAL CA: CPT

## 2020-02-13 PROCEDURE — 6360000002 HC RX W HCPCS: Performed by: FAMILY MEDICINE

## 2020-02-13 PROCEDURE — 97163 PT EVAL HIGH COMPLEX 45 MIN: CPT | Performed by: PHYSICAL THERAPIST

## 2020-02-13 PROCEDURE — 97167 OT EVAL HIGH COMPLEX 60 MIN: CPT | Performed by: PHYSICIAN ASSISTANT

## 2020-02-13 PROCEDURE — APPNB30 APP NON BILLABLE TIME 0-30 MINS: Performed by: NURSE PRACTITIONER

## 2020-02-13 PROCEDURE — C9113 INJ PANTOPRAZOLE SODIUM, VIA: HCPCS | Performed by: INTERNAL MEDICINE

## 2020-02-13 PROCEDURE — 6370000000 HC RX 637 (ALT 250 FOR IP): Performed by: HOSPITALIST

## 2020-02-13 RX ORDER — SODIUM CHLORIDE 9 MG/ML
10 INJECTION INTRAVENOUS 2 TIMES DAILY
Status: DISCONTINUED | OUTPATIENT
Start: 2020-02-13 | End: 2020-02-14 | Stop reason: HOSPADM

## 2020-02-13 RX ORDER — PANTOPRAZOLE SODIUM 40 MG/10ML
40 INJECTION, POWDER, LYOPHILIZED, FOR SOLUTION INTRAVENOUS 2 TIMES DAILY
Status: DISCONTINUED | OUTPATIENT
Start: 2020-02-13 | End: 2020-02-14 | Stop reason: HOSPADM

## 2020-02-13 RX ORDER — CALCIUM CARBONATE 200(500)MG
1000 TABLET,CHEWABLE ORAL EVERY 6 HOURS PRN
Status: DISCONTINUED | OUTPATIENT
Start: 2020-02-13 | End: 2020-02-14 | Stop reason: HOSPADM

## 2020-02-13 RX ADMIN — OYSTER SHELL CALCIUM WITH VITAMIN D 1 TABLET: 500; 200 TABLET, FILM COATED ORAL at 07:59

## 2020-02-13 RX ADMIN — AMIODARONE HYDROCHLORIDE 200 MG: 200 TABLET ORAL at 07:59

## 2020-02-13 RX ADMIN — ALTEPLASE 1 MG: 2.2 INJECTION, POWDER, LYOPHILIZED, FOR SOLUTION INTRAVENOUS at 03:17

## 2020-02-13 RX ADMIN — PIPERACILLIN AND TAZOBACTAM 3.38 G: 3; .375 INJECTION, POWDER, LYOPHILIZED, FOR SOLUTION INTRAVENOUS at 06:28

## 2020-02-13 RX ADMIN — METOCLOPRAMIDE HYDROCHLORIDE 5 MG: 5 INJECTION INTRAMUSCULAR; INTRAVENOUS at 16:53

## 2020-02-13 RX ADMIN — IPRATROPIUM BROMIDE AND ALBUTEROL SULFATE 1 AMPULE: .5; 3 SOLUTION RESPIRATORY (INHALATION) at 15:35

## 2020-02-13 RX ADMIN — POTASSIUM CHLORIDE 20 MEQ: 1500 TABLET, EXTENDED RELEASE ORAL at 07:59

## 2020-02-13 RX ADMIN — IPRATROPIUM BROMIDE AND ALBUTEROL SULFATE 1 AMPULE: .5; 3 SOLUTION RESPIRATORY (INHALATION) at 12:40

## 2020-02-13 RX ADMIN — OXYCODONE HYDROCHLORIDE AND ACETAMINOPHEN 1 TABLET: 7.5; 325 TABLET ORAL at 07:59

## 2020-02-13 RX ADMIN — RANOLAZINE 500 MG: 500 TABLET, FILM COATED, EXTENDED RELEASE ORAL at 20:46

## 2020-02-13 RX ADMIN — ATORVASTATIN CALCIUM 40 MG: 40 TABLET, FILM COATED ORAL at 20:46

## 2020-02-13 RX ADMIN — OXYCODONE HYDROCHLORIDE AND ACETAMINOPHEN 1 TABLET: 7.5; 325 TABLET ORAL at 22:29

## 2020-02-13 RX ADMIN — IPRATROPIUM BROMIDE AND ALBUTEROL SULFATE 1 AMPULE: .5; 3 SOLUTION RESPIRATORY (INHALATION) at 08:32

## 2020-02-13 RX ADMIN — PIPERACILLIN AND TAZOBACTAM 3.38 G: 3; .375 INJECTION, POWDER, LYOPHILIZED, FOR SOLUTION INTRAVENOUS at 20:46

## 2020-02-13 RX ADMIN — Medication 10 ML: at 20:57

## 2020-02-13 RX ADMIN — SODIUM CHLORIDE, PRESERVATIVE FREE 10 ML: 5 INJECTION INTRAVENOUS at 08:00

## 2020-02-13 RX ADMIN — ANTACID TABLETS 500 MG: 500 TABLET, CHEWABLE ORAL at 09:03

## 2020-02-13 RX ADMIN — POLYETHYLENE GLYCOL 3350 17 G: 17 POWDER, FOR SOLUTION ORAL at 20:47

## 2020-02-13 RX ADMIN — ASPIRIN 81 MG 81 MG: 81 TABLET ORAL at 08:00

## 2020-02-13 RX ADMIN — IPRATROPIUM BROMIDE AND ALBUTEROL SULFATE 1 AMPULE: .5; 3 SOLUTION RESPIRATORY (INHALATION) at 19:23

## 2020-02-13 RX ADMIN — METOCLOPRAMIDE HYDROCHLORIDE 5 MG: 5 INJECTION INTRAMUSCULAR; INTRAVENOUS at 12:12

## 2020-02-13 RX ADMIN — METOCLOPRAMIDE HYDROCHLORIDE 5 MG: 5 INJECTION INTRAMUSCULAR; INTRAVENOUS at 00:24

## 2020-02-13 RX ADMIN — PANTOPRAZOLE SODIUM 40 MG: 40 TABLET, DELAYED RELEASE ORAL at 06:28

## 2020-02-13 RX ADMIN — PIPERACILLIN AND TAZOBACTAM 3.38 G: 3; .375 INJECTION, POWDER, LYOPHILIZED, FOR SOLUTION INTRAVENOUS at 13:33

## 2020-02-13 RX ADMIN — LIDOCAINE HYDROCHLORIDE: 20 SOLUTION ORAL; TOPICAL at 08:00

## 2020-02-13 RX ADMIN — INSULIN LISPRO 4 UNITS: 100 INJECTION, SOLUTION INTRAVENOUS; SUBCUTANEOUS at 12:13

## 2020-02-13 RX ADMIN — METOCLOPRAMIDE HYDROCHLORIDE 5 MG: 5 INJECTION INTRAMUSCULAR; INTRAVENOUS at 06:28

## 2020-02-13 RX ADMIN — INSULIN LISPRO 2 UNITS: 100 INJECTION, SOLUTION INTRAVENOUS; SUBCUTANEOUS at 16:53

## 2020-02-13 RX ADMIN — POTASSIUM CHLORIDE 20 MEQ: 1500 TABLET, EXTENDED RELEASE ORAL at 16:53

## 2020-02-13 RX ADMIN — PANTOPRAZOLE SODIUM 40 MG: 40 INJECTION, POWDER, FOR SOLUTION INTRAVENOUS at 20:46

## 2020-02-13 RX ADMIN — INSULIN LISPRO 2 UNITS: 100 INJECTION, SOLUTION INTRAVENOUS; SUBCUTANEOUS at 08:04

## 2020-02-13 RX ADMIN — RANOLAZINE 500 MG: 500 TABLET, FILM COATED, EXTENDED RELEASE ORAL at 07:59

## 2020-02-13 RX ADMIN — ANTACID TABLETS 1000 MG: 500 TABLET, CHEWABLE ORAL at 14:52

## 2020-02-13 RX ADMIN — POLYETHYLENE GLYCOL 3350 17 G: 17 POWDER, FOR SOLUTION ORAL at 08:00

## 2020-02-13 RX ADMIN — FLUCONAZOLE 200 MG: 200 INJECTION, SOLUTION INTRAVENOUS at 08:00

## 2020-02-13 RX ADMIN — SODIUM CHLORIDE, PRESERVATIVE FREE 10 ML: 5 INJECTION INTRAVENOUS at 20:58

## 2020-02-13 RX ADMIN — TAMSULOSIN HYDROCHLORIDE 0.4 MG: 0.4 CAPSULE ORAL at 07:59

## 2020-02-13 ASSESSMENT — PAIN DESCRIPTION - ONSET
ONSET: ON-GOING

## 2020-02-13 ASSESSMENT — PAIN DESCRIPTION - FREQUENCY
FREQUENCY: CONTINUOUS

## 2020-02-13 ASSESSMENT — PAIN - FUNCTIONAL ASSESSMENT
PAIN_FUNCTIONAL_ASSESSMENT: PREVENTS OR INTERFERES SOME ACTIVE ACTIVITIES AND ADLS

## 2020-02-13 ASSESSMENT — PAIN DESCRIPTION - PAIN TYPE
TYPE: ACUTE PAIN

## 2020-02-13 ASSESSMENT — PAIN SCALES - GENERAL
PAINLEVEL_OUTOF10: 6
PAINLEVEL_OUTOF10: 7
PAINLEVEL_OUTOF10: 8
PAINLEVEL_OUTOF10: 5

## 2020-02-13 ASSESSMENT — PAIN DESCRIPTION - PROGRESSION
CLINICAL_PROGRESSION: NOT CHANGED
CLINICAL_PROGRESSION: GRADUALLY WORSENING

## 2020-02-13 ASSESSMENT — PAIN DESCRIPTION - DESCRIPTORS
DESCRIPTORS: SORE;DISCOMFORT
DESCRIPTORS: ACHING;CONSTANT
DESCRIPTORS: DISCOMFORT

## 2020-02-13 ASSESSMENT — PAIN DESCRIPTION - LOCATION
LOCATION: ABDOMEN;THROAT
LOCATION: BACK;ABDOMEN
LOCATION: ABDOMEN

## 2020-02-13 ASSESSMENT — PAIN DESCRIPTION - ORIENTATION: ORIENTATION: RIGHT

## 2020-02-13 NOTE — PLAN OF CARE
Problem: Nutrition  Goal: Optimal nutrition therapy  Outcome: Ongoing   Nutrition Problem: Inadequate oral intake  Intervention: Food and/or Nutrient Delivery: Continue current diet  Nutritional Goals: Continue to tolerate most appropriate form of nutriton
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
## 2020-02-13 NOTE — PLAN OF CARE
Problem: Risk for Impaired Skin Integrity  Goal: Tissue integrity - skin and mucous membranes  Description  Structural intactness and normal physiological function of skin and  mucous membranes. 2/13/2020 0413 by Galen Hurt RN  Outcome: Ongoing  Note:   Skin assessment completed every shift. Pt assessed for incontinence, appropriate barrier cream applied prn. Pt encouraged to turn/rotate every 2 hours. Patient refusing to turn, educated on importance of turning for promotion of skin integrity and prevention of breakdown. Will continue to offer turns and monitor skin integrity. 2/13/2020 0412 by Galen Hurt RN  Outcome: Ongoing     Problem: SAFETY  Goal: Free from accidental physical injury  2/13/2020 0413 by Galen Hurt RN  Outcome: Ongoing  2/13/2020 0412 by Galen Hurt RN  Outcome: Ongoing     Problem: DAILY CARE  Goal: Daily care needs are met  2/13/2020 0413 by Galen Hurt RN  Outcome: Ongoing  2/13/2020 0412 by Galen Hurt RN  Outcome: Ongoing     Problem: PAIN  Goal: Patient's pain/discomfort is manageable  2/13/2020 0413 by Galen Hurt RN  Outcome: Ongoing  Note:   Pain/discomfort being managed with PRN analgesics per MD orders. Pt able to express presence and absence of pain and rate pain appropriately using numerical scale. 2/13/2020 0412 by Galen Hurt RN  Outcome: Ongoing     Problem: SKIN INTEGRITY  Goal: Skin integrity is maintained or improved  2/13/2020 0413 by Galen Hurt RN  Outcome: Ongoing  2/13/2020 0412 by Galen Hurt RN  Outcome: Ongoing     Problem: KNOWLEDGE DEFICIT  Goal: Patient/S.O. demonstrates understanding of disease process, treatment plan, medications, and discharge instructions.   2/13/2020 0413 by Galen Hurt RN  Outcome: Ongoing  2/13/2020 0412 by Galen Hurt RN  Outcome: Ongoing     Problem: DISCHARGE BARRIERS  Goal: Patient's continuum of care needs are met  2/13/2020 0413 by Galen Hurt RN  Outcome: Ongoing  2/13/2020 0412 by Radha Stapleton DAGOBERTO Workman  Outcome: Ongoing     Problem: OXYGENATION/RESPIRATORY FUNCTION  Goal: Patient will maintain patent airway  2/13/2020 0413 by Brian Burgess RN  Outcome: Ongoing  2/13/2020 0412 by Brian Burgess RN  Outcome: Ongoing  Goal: Patient will achieve/maintain normal respiratory rate/effort  Description  Respiratory rate and effort will be within normal limits for the patient  2/13/2020 0413 by Brian Brugess RN  Outcome: Ongoing  2/13/2020 0412 by Brian Burgess RN  Outcome: Ongoing     Problem: HEMODYNAMIC STATUS  Goal: Patient has stable vital signs and fluid balance  2/13/2020 0413 by Brian Burgess RN  Outcome: Ongoing  2/13/2020 0412 by Brian Burgess RN  Outcome: Ongoing     Problem: FLUID AND ELECTROLYTE IMBALANCE  Goal: Fluid and electrolyte balance are achieved/maintained  2/13/2020 0413 by Brian Burgess RN  Outcome: Ongoing  2/13/2020 0412 by rBian Burgess RN  Outcome: Ongoing     Problem: ACTIVITY INTOLERANCE/IMPAIRED MOBILITY  Goal: Mobility/activity is maintained at optimum level for patient  2/13/2020 0413 by Brian Burgess RN  Outcome: Ongoing  2/13/2020 0412 by Brian Burgess RN  Outcome: Ongoing     Problem: Pain:  Goal: Pain level will decrease  Description  Pain level will decrease  2/13/2020 0413 by Brian Burgess RN  Outcome: Ongoing  2/13/2020 0412 by Brian Burgess RN  Outcome: Ongoing  Goal: Control of acute pain  Description  Control of acute pain  2/13/2020 0413 by Brian Burgess RN  Outcome: Ongoing  2/13/2020 0412 by Brian Burgess RN  Outcome: Ongoing  Goal: Control of chronic pain  Description  Control of chronic pain  2/13/2020 0413 by Brian Burgess RN  Outcome: Ongoing  2/13/2020 0412 by Brian Burgess RN  Outcome: Ongoing     Problem: Falls - Risk of:  Goal: Will remain free from falls  Description  Will remain free from falls  2/13/2020 0413 by Brian Burgess RN  Outcome: Ongoing  2/13/2020 0412 by Brian Burgess RN  Outcome: Ongoing  Goal: Absence of physical injury  Description  Absence of physical injury  2/13/2020 0413 by Miller Murillo RN  Outcome: Ongoing  2/13/2020 0412 by Miller Murillo RN  Outcome: Ongoing     Problem:  Activity:  Goal: Ability to tolerate increased activity will improve  Description  Ability to tolerate increased activity will improve  2/13/2020 0413 by Miller Murillo RN  Outcome: Ongoing  2/13/2020 0412 by Miller Murillo RN  Outcome: Ongoing  Goal: Expression of feelings of increased energy will increase  Description  Expression of feelings of increased energy will increase  2/13/2020 0413 by Miller Murillo RN  Outcome: Ongoing  2/13/2020 0412 by Miller Murillo RN  Outcome: Ongoing     Problem: Cardiac:  Goal: Ability to maintain an adequate cardiac output will improve  Description  Ability to maintain an adequate cardiac output will improve  2/13/2020 0413 by Miller Murillo RN  Outcome: Ongoing  2/13/2020 0412 by Miller Murillo RN  Outcome: Ongoing  Goal: Complications related to the disease process, condition or treatment will be avoided or minimized  Description  Complications related to the disease process, condition or treatment will be avoided or minimized  2/13/2020 0413 by Miller Murillo RN  Outcome: Ongoing  2/13/2020 0412 by Miller Murillo RN  Outcome: Ongoing

## 2020-02-13 NOTE — PROGRESS NOTES
Nutrition Assessment    Type and Reason for Visit: Reassess    Nutrition Recommendations:   Continue to encourage PO intake of current diet, carb control, low sodium, 1500ml fluid restriction   Monitor PO intake of meals     Nutrition Assessment: Pt nutritional status has declined. Pt is now experiencing odynophagia and severe heartburn. MD follows and prescribed meds, but pt states that it only lasts for 10 minutes and then the pain is back. Pt finds some relief with Tums. He gets hungry, but then he can't eat d/t pain and heart burn. I asked if he tried soft foods, and those don't work. He is only drinking water, and that causes him pain, but tolerable. Pt refused nutrition intervention at this time. EMR shows that he at % of his breakfast, but he stated that it was \"forced down\". Will PO intake over the next few days. Malnutrition Assessment:  · Malnutrition Status: At risk for malnutrition  · Context: Acute illness or injury  · Findings of the 6 clinical characteristics of malnutrition (Minimum of 2 out of 6 clinical characteristics is required to make the diagnosis of moderate or severe Protein Calorie Malnutrition based on AND/ASPEN Guidelines):  1. Energy Intake-Less than or equal to 75% of estimated energy requirement, Greater than or equal to 5 days    2. Weight Loss-2% loss or greater, in 1 week  3. Fat Loss-No significant subcutaneous fat loss,    4. Muscle Loss-No significant muscle mass loss,    5. Fluid Accumulation-No significant fluid accumulation,    6.   Strength-Not measured    Nutrition Risk Level: High    Nutrient Needs:  · Estimated Daily Total Kcal: 3654-9647 kcal (20-25 kcal/kg)   · Estimated Daily Protein (g): 87g - 134g (1.3 - 2g/kg)   · Estimated Daily Total Fluid (ml/day): per MD    Nutrition Diagnosis:   · Problem: Inadequate oral intake  · Etiology: related to Difficulty swallowing     Signs and symptoms:  as evidenced by Weight loss(odynophagia and severe heart burn)    Objective Information:  · Nutrition-Focused Physical Findings: RUE, LUE, RLE trace edema.  LLE edema above the amputation   · Wound Type: None  · Current Nutrition Therapies:  · Oral Diet Orders: 2gm Sodium, Carb Control 4 Carbs/Meal, Fluid Restriction   · Oral Diet intake: 0%, %  · Oral Nutrition Supplement (ONS) Orders: None  · Anthropometric Measures:  · Ht: 5' 7\" (170.2 cm)   · Current Body Wt: 179 lb (81.2 kg)  · % Weight Change:  ,  -4.8% in 1 week (could be r/t fluid, poor po intake)  · Ideal Body Wt: 148 lb (67.1 kg), % Ideal Body 127%  · Adjusted Body Wt: 126 lb (57.2 kg), body weight adjusted for AKA  · BMI Classification: BMI 25.0 - 29.9 Overweight    Nutrition Interventions:   Continue current diet  Continued Inpatient Monitoring, Education declined    Nutrition Evaluation:   · Evaluation: No progress toward goals   · Goals: Continue to tolerate most appropriate form of nutriton     · Monitoring: Meal Intake, Diet Tolerance, Weight, Skin Integrity, Pertinent Labs, Constipation      Electronically signed by Rosaline Trujillo on 2/13/20 at 11:36 AM    Contact Number: [phone]

## 2020-02-13 NOTE — PROGRESS NOTES
Hospital Medicine Progress Note     Date:  2/13/2020    PCP: Marty Azar MD (Tel: [phone])    Date of Admission: 1/22/2020      Subjective     Reports heart burn improved with Tums and GI cocktail. Still marginal PO tolerance. Ostomy output much improved. Objective  Physical exam:  Vitals: /67   Pulse 99   Temp 97.3 °F (36.3 °C) (Oral)   Resp 18   Ht 5' 7\" (1.702 m)   Wt 179 lb 3.7 oz (81.3 kg)   SpO2 96%   BMI 28.07 kg/m²   Gen: nad, appears more comfortable   Head: Normocephalic. Atraumatic  Eyes: EOMI. Conjunctiva clear  ENT: Oral mucosa moist  Neck: No JVD. supple  CVS: Nml S1S2, no MRG, irregular, regular rate  Pulmomary: diminished BS on the left side, tachypneic, no wheezing  Gastrointestinal: firm at baseline, diffusely tender without focal tenderness. normal bowel sounds. Colostomy RLL with a good amount of soft brown stool in the bag.  ruq drain in place   Musculoskeletal: No edema. Warm, remote LEFT AKA  Neuro: No focal deficit. Moves extremity spontaneously. Psychiatry: Alert, oriented x3  Skin: Warm, dry with normal turgor. No rash    24HR INTAKE/OUTPUT:      Intake/Output Summary (Last 24 hours) at 2/13/2020 1324  Last data filed at 2/13/2020 1009  Gross per 24 hour   Intake 560 ml   Output 720 ml   Net -160 ml     I/O last 3 completed shifts:   In: 5 [P.O.:120; IV Piggyback:300]  Out: 895 [Urine:450; Drains:145; Stool:300]  I/O this shift:  In: 360 [P.O.:360]  Out: 100 [Urine:100]      Meds:    pantoprazole  40 mg Intravenous BID    And    sodium chloride (PF)  10 mL Intravenous BID    metoclopramide  5 mg Intravenous Q6H    fluconazole  200 mg Intravenous Q24H    potassium chloride  20 mEq Oral BID WC    [Held by provider] rivaroxaban  20 mg Oral Daily    piperacillin-tazobactam  3.375 g Intravenous Q8H    lidocaine 1 % injection  5 mL Intradermal Once    sodium chloride flush  10 mL Intravenous 2 times per day    insulin lispro  0-12 Units Subcutaneous TID     insulin lispro  0-6 Units Subcutaneous Nightly    ipratropium-albuterol  1 ampule Inhalation Q4H WA    amiodarone  200 mg Oral Daily    aspirin  81 mg Oral Daily    atorvastatin  40 mg Oral Nightly    calcium-vitamin D  1 tablet Oral Daily    polyethylene glycol  17 g Oral BID    ranolazine  500 mg Oral BID    tamsulosin  0.4 mg Oral Daily       Infusions:    dextrose           PRN Meds: GI cocktail, calcium carbonate, iohexol, ondansetron, sodium chloride flush, ipratropium-albuterol, glucose, dextrose, glucagon (rDNA), dextrose, nitroGLYCERIN, oxyCODONE-acetaminophen    Labs/imaging:  CBC:   Recent Labs     02/11/20  0541 02/12/20  0528 02/12/20  1308 02/13/20  0650   WBC 9.2 9.2  --  6.9   HGB 8.1* 7.1* 8.0* 9.9*    314  --  232         BMP:    Recent Labs     02/11/20  0541 02/12/20  0529 02/13/20  0650   * 130* 131*   K 3.5 4.1 4.7   CL 84* 90* 90*   CO2 36* 28 33*   BUN 15 9 10   CREATININE 0.6* <0.5* <0.5*   GLUCOSE 62* 169* 126*         Hepatic: No results for input(s): AST, ALT, ALB, BILITOT, ALKPHOS in the last 72 hours. Troponin: No results for input(s): TROPONINI in the last 72 hours. BNP: No results for input(s): BNP in the last 72 hours. INR:   No results for input(s): INR in the last 72 hours. Reviewed imaging and reports noted      Assessment:  Principal Problem:    Acute on chronic respiratory failure with hypoxia (HCC)  Active Problems:    PAD (peripheral artery disease) (HCC)    Pleural effusion on left    Acute on chronic combined systolic and diastolic HF (heart failure) (HCC)    Centrilobular emphysema (HCC)    Diabetes mellitus type 2, controlled (HCC)    Biventricular failure (HCC)    CAD (coronary artery disease)    Pulmonary HTN (HCC)    Systolic CHF (HCC)    Bilateral pleural effusion    Abdominal fluid collection  Resolved Problems:    * No resolved hospital problems.  *      Plan:     Pleural Effusion   - repeat cxr with increased size pleural effusion on the left   - s/p thoracocentesis - exudative and bloody effusion drained  - L thoracentesis 2/10 - serous pleural fluid drained  - pulm involved. Ileus - resolved  Advance diet as tolerates. Reglan IV. Intraabdominal abscess or hemorrhagic collection. Complex surgical Hx - Hx of bowel obstruction s/p ex lap and colostomy. Later complicated by intraabdominal abscess requiring drainage and completed IV zosyn back in Nov 2019. CT abd pelvis this admit: Increased size complex cystic collection RUQ, recurrent LUQ collection - unclear if these are infected or hemorrhagic or both   - 1/30 - s/p percutaneous drainage of R fluid collection   - 2/3 - repeat CT abdomen/pelvis with ruq fluid collection decreased in size   - 2/9 - repeat CT shows collection increased; However this was without contrast.    - Abx plan per ID and HEENA updated. - 2/10 - RUQ collection drain upsized - still draining sanguinous fluid. - fluid cultures have been negative. - ID and surgery involved  - cont to hold AC. Acute on Chronic systolic CHF EF 67%   Stop lasix. Give gentle IVF until tolerating PO - fluid now off. Chronic Resp Failure w/ Hypoxia - stable  - on baseline of 2.5 L O2 nC, maintain O2 sats > 90% - 94%      Centrilobular Emphysema  - cont duonebs      IDDM  - stop lantus till tolerates PO  - humalog, SSI      HTN  - stable      Chronic a Fib  - cont amio, rate controlled  - holding xarelto with concerns for hemorrhagic intraabdominal fluid collections. Diet: DIET CARB CONTROL; Low Sodium (2 GM); Daily Fluid Restriction: 1500 ml        Activity: up as tolerated, limited mobility due to left AKA. Reordered PTOT. Up to chair every day. Prophylaxis: scd    Code status: Full Code     If no further surgical plans, I would anticipate return to [facility] tomorrow. Prognosis poor.         Rowan Esquivel MD

## 2020-02-13 NOTE — PROGRESS NOTES
point to assess collections   Could probably be done as an outpatient if he is otherwise ready to go   Continue drains to bulb suction for now    Electronically signed by Kendra Costa MD on 2/13/2020 at 2:26 PM
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
## 2020-02-14 VITALS
WEIGHT: 187.61 LBS | DIASTOLIC BLOOD PRESSURE: 74 MMHG | TEMPERATURE: 97.5 F | RESPIRATION RATE: 18 BRPM | SYSTOLIC BLOOD PRESSURE: 101 MMHG | BODY MASS INDEX: 29.45 KG/M2 | HEIGHT: 67 IN | OXYGEN SATURATION: 99 % | HEART RATE: 85 BPM

## 2020-02-14 LAB
ANION GAP SERPL CALCULATED.3IONS-SCNC: 6 MMOL/L (ref 3–16)
BASOPHILS ABSOLUTE: 0.1 K/UL (ref 0–0.2)
BASOPHILS RELATIVE PERCENT: 1.3 %
BUN BLDV-MCNC: 10 MG/DL (ref 7–20)
CALCIUM SERPL-MCNC: 8 MG/DL (ref 8.3–10.6)
CHLORIDE BLD-SCNC: 94 MMOL/L (ref 99–110)
CO2: 34 MMOL/L (ref 21–32)
CREAT SERPL-MCNC: 0.6 MG/DL (ref 0.9–1.3)
EOSINOPHILS ABSOLUTE: 0.3 K/UL (ref 0–0.6)
EOSINOPHILS RELATIVE PERCENT: 2.7 %
GFR AFRICAN AMERICAN: >60
GFR NON-AFRICAN AMERICAN: >60
GLUCOSE BLD-MCNC: 115 MG/DL (ref 70–99)
GLUCOSE BLD-MCNC: 120 MG/DL (ref 70–99)
GLUCOSE BLD-MCNC: 127 MG/DL (ref 70–99)
GLUCOSE BLD-MCNC: 176 MG/DL (ref 70–99)
HCT VFR BLD CALC: 23.1 % (ref 40.5–52.5)
HEMOGLOBIN: 7.6 G/DL (ref 13.5–17.5)
LYMPHOCYTES ABSOLUTE: 1.2 K/UL (ref 1–5.1)
LYMPHOCYTES RELATIVE PERCENT: 12.4 %
MAGNESIUM: 2.2 MG/DL (ref 1.8–2.4)
MCH RBC QN AUTO: 26.9 PG (ref 26–34)
MCHC RBC AUTO-ENTMCNC: 32.8 G/DL (ref 31–36)
MCV RBC AUTO: 82.1 FL (ref 80–100)
MONOCYTES ABSOLUTE: 1 K/UL (ref 0–1.3)
MONOCYTES RELATIVE PERCENT: 10.6 %
NEUTROPHILS ABSOLUTE: 7.2 K/UL (ref 1.7–7.7)
NEUTROPHILS RELATIVE PERCENT: 73 %
PDW BLD-RTO: 17.1 % (ref 12.4–15.4)
PERFORMED ON: ABNORMAL
PLATELET # BLD: 335 K/UL (ref 135–450)
PMV BLD AUTO: 7.8 FL (ref 5–10.5)
POTASSIUM REFLEX MAGNESIUM: 4.5 MMOL/L (ref 3.5–5.1)
RBC # BLD: 2.82 M/UL (ref 4.2–5.9)
SODIUM BLD-SCNC: 134 MMOL/L (ref 136–145)
WBC # BLD: 9.9 K/UL (ref 4–11)

## 2020-02-14 PROCEDURE — 2580000003 HC RX 258: Performed by: INTERNAL MEDICINE

## 2020-02-14 PROCEDURE — 6360000002 HC RX W HCPCS: Performed by: INTERNAL MEDICINE

## 2020-02-14 PROCEDURE — 6370000000 HC RX 637 (ALT 250 FOR IP): Performed by: INTERNAL MEDICINE

## 2020-02-14 PROCEDURE — 94640 AIRWAY INHALATION TREATMENT: CPT

## 2020-02-14 PROCEDURE — C9113 INJ PANTOPRAZOLE SODIUM, VIA: HCPCS | Performed by: INTERNAL MEDICINE

## 2020-02-14 PROCEDURE — 6370000000 HC RX 637 (ALT 250 FOR IP): Performed by: FAMILY MEDICINE

## 2020-02-14 PROCEDURE — 2700000000 HC OXYGEN THERAPY PER DAY

## 2020-02-14 PROCEDURE — 6360000002 HC RX W HCPCS: Performed by: FAMILY MEDICINE

## 2020-02-14 PROCEDURE — 85025 COMPLETE CBC W/AUTO DIFF WBC: CPT

## 2020-02-14 PROCEDURE — APPSS30 APP SPLIT SHARED TIME 16-30 MINUTES: Performed by: NURSE PRACTITIONER

## 2020-02-14 PROCEDURE — 80048 BASIC METABOLIC PNL TOTAL CA: CPT

## 2020-02-14 PROCEDURE — 94761 N-INVAS EAR/PLS OXIMETRY MLT: CPT

## 2020-02-14 PROCEDURE — 83735 ASSAY OF MAGNESIUM: CPT

## 2020-02-14 PROCEDURE — APPNB30 APP NON BILLABLE TIME 0-30 MINS: Performed by: NURSE PRACTITIONER

## 2020-02-14 PROCEDURE — 6370000000 HC RX 637 (ALT 250 FOR IP): Performed by: HOSPITALIST

## 2020-02-14 RX ORDER — OXYCODONE AND ACETAMINOPHEN 7.5; 325 MG/1; MG/1
1 TABLET ORAL EVERY 4 HOURS PRN
Qty: 30 TABLET | Refills: 0 | Status: SHIPPED | OUTPATIENT
Start: 2020-02-14 | End: 2020-02-19

## 2020-02-14 RX ORDER — TORSEMIDE 20 MG/1
20 TABLET ORAL DAILY
Qty: 30 TABLET | Refills: 1 | Status: SHIPPED | OUTPATIENT
Start: 2020-02-14

## 2020-02-14 RX ORDER — FLUCONAZOLE 100 MG/1
100 TABLET ORAL DAILY
Qty: 7 TABLET | Refills: 0 | Status: SHIPPED | OUTPATIENT
Start: 2020-02-14 | End: 2020-02-21

## 2020-02-14 RX ADMIN — IPRATROPIUM BROMIDE AND ALBUTEROL SULFATE 1 AMPULE: .5; 3 SOLUTION RESPIRATORY (INHALATION) at 00:47

## 2020-02-14 RX ADMIN — IPRATROPIUM BROMIDE AND ALBUTEROL SULFATE 1 AMPULE: .5; 3 SOLUTION RESPIRATORY (INHALATION) at 12:20

## 2020-02-14 RX ADMIN — FLUCONAZOLE 200 MG: 200 INJECTION, SOLUTION INTRAVENOUS at 10:34

## 2020-02-14 RX ADMIN — IPRATROPIUM BROMIDE AND ALBUTEROL SULFATE 1 AMPULE: .5; 3 SOLUTION RESPIRATORY (INHALATION) at 15:35

## 2020-02-14 RX ADMIN — POLYETHYLENE GLYCOL 3350 17 G: 17 POWDER, FOR SOLUTION ORAL at 10:19

## 2020-02-14 RX ADMIN — METOCLOPRAMIDE HYDROCHLORIDE 5 MG: 5 INJECTION INTRAMUSCULAR; INTRAVENOUS at 13:07

## 2020-02-14 RX ADMIN — PIPERACILLIN AND TAZOBACTAM 3.38 G: 3; .375 INJECTION, POWDER, LYOPHILIZED, FOR SOLUTION INTRAVENOUS at 05:49

## 2020-02-14 RX ADMIN — ASPIRIN 81 MG 81 MG: 81 TABLET ORAL at 10:19

## 2020-02-14 RX ADMIN — INSULIN LISPRO 2 UNITS: 100 INJECTION, SOLUTION INTRAVENOUS; SUBCUTANEOUS at 13:07

## 2020-02-14 RX ADMIN — OYSTER SHELL CALCIUM WITH VITAMIN D 1 TABLET: 500; 200 TABLET, FILM COATED ORAL at 10:18

## 2020-02-14 RX ADMIN — METOCLOPRAMIDE HYDROCHLORIDE 5 MG: 5 INJECTION INTRAMUSCULAR; INTRAVENOUS at 05:49

## 2020-02-14 RX ADMIN — TAMSULOSIN HYDROCHLORIDE 0.4 MG: 0.4 CAPSULE ORAL at 10:19

## 2020-02-14 RX ADMIN — POTASSIUM CHLORIDE 20 MEQ: 1500 TABLET, EXTENDED RELEASE ORAL at 10:19

## 2020-02-14 RX ADMIN — IPRATROPIUM BROMIDE AND ALBUTEROL SULFATE 1 AMPULE: .5; 3 SOLUTION RESPIRATORY (INHALATION) at 08:17

## 2020-02-14 RX ADMIN — AMIODARONE HYDROCHLORIDE 200 MG: 200 TABLET ORAL at 10:18

## 2020-02-14 RX ADMIN — PIPERACILLIN AND TAZOBACTAM 3.38 G: 3; .375 INJECTION, POWDER, LYOPHILIZED, FOR SOLUTION INTRAVENOUS at 13:07

## 2020-02-14 RX ADMIN — METOCLOPRAMIDE HYDROCHLORIDE 5 MG: 5 INJECTION INTRAMUSCULAR; INTRAVENOUS at 01:08

## 2020-02-14 RX ADMIN — IPRATROPIUM BROMIDE AND ALBUTEROL SULFATE 1 AMPULE: .5; 3 SOLUTION RESPIRATORY (INHALATION) at 18:16

## 2020-02-14 RX ADMIN — PANTOPRAZOLE SODIUM 40 MG: 40 INJECTION, POWDER, FOR SOLUTION INTRAVENOUS at 10:20

## 2020-02-14 RX ADMIN — ANTACID TABLETS 1000 MG: 500 TABLET, CHEWABLE ORAL at 08:45

## 2020-02-14 RX ADMIN — ANTACID TABLETS 1000 MG: 500 TABLET, CHEWABLE ORAL at 01:11

## 2020-02-14 RX ADMIN — RANOLAZINE 500 MG: 500 TABLET, FILM COATED, EXTENDED RELEASE ORAL at 10:18

## 2020-02-14 RX ADMIN — Medication 10 ML: at 10:20

## 2020-02-14 RX ADMIN — OXYCODONE HYDROCHLORIDE AND ACETAMINOPHEN 1 TABLET: 7.5; 325 TABLET ORAL at 08:45

## 2020-02-14 ASSESSMENT — PAIN DESCRIPTION - LOCATION
LOCATION: BACK
LOCATION: BACK;GENERALIZED

## 2020-02-14 ASSESSMENT — PAIN DESCRIPTION - PAIN TYPE
TYPE: ACUTE PAIN
TYPE: ACUTE PAIN

## 2020-02-14 ASSESSMENT — PAIN SCALES - GENERAL
PAINLEVEL_OUTOF10: 8
PAINLEVEL_OUTOF10: 8

## 2020-02-14 NOTE — PROGRESS NOTES
Report called to nurse at [facility], [facility] OF [facility]. All questions answered.

## 2020-02-14 NOTE — PLAN OF CARE
Problem: Risk for Impaired Skin Integrity  Goal: Tissue integrity - skin and mucous membranes  Description  Structural intactness and normal physiological function of skin and  mucous membranes. Outcome: Ongoing  Note:   Skin assessment completed every shift. Pt assessed for incontinence, appropriate barrier cream applied prn. Pt encouraged to turn/rotate every 2 hours. Assistance provided if pt unable to do so themselves. Problem: SAFETY  Goal: Free from accidental physical injury  Outcome: Ongoing     Problem: DAILY CARE  Goal: Daily care needs are met  Outcome: Ongoing     Problem: PAIN  Goal: Patient's pain/discomfort is manageable  Outcome: Ongoing  Note:   Pain/discomfort being managed with PRN analgesics per MD orders. Pt able to express presence and absence of pain and rate pain appropriately using numerical scale. Problem: SKIN INTEGRITY  Goal: Skin integrity is maintained or improved  Outcome: Ongoing     Problem: KNOWLEDGE DEFICIT  Goal: Patient/S.O. demonstrates understanding of disease process, treatment plan, medications, and discharge instructions.   Outcome: Ongoing     Problem: DISCHARGE BARRIERS  Goal: Patient's continuum of care needs are met  Outcome: Ongoing     Problem: OXYGENATION/RESPIRATORY FUNCTION  Goal: Patient will maintain patent airway  Outcome: Ongoing  Goal: Patient will achieve/maintain normal respiratory rate/effort  Description  Respiratory rate and effort will be within normal limits for the patient  Outcome: Ongoing     Problem: HEMODYNAMIC STATUS  Goal: Patient has stable vital signs and fluid balance  Outcome: Ongoing     Problem: FLUID AND ELECTROLYTE IMBALANCE  Goal: Fluid and electrolyte balance are achieved/maintained  Outcome: Ongoing     Problem: ACTIVITY INTOLERANCE/IMPAIRED MOBILITY  Goal: Mobility/activity is maintained at optimum level for patient  Outcome: Ongoing     Problem: Pain:  Goal: Pain level will decrease  Description  Pain level will decrease  Outcome: Ongoing  Goal: Control of acute pain  Description  Control of acute pain  Outcome: Ongoing  Goal: Control of chronic pain  Description  Control of chronic pain  Outcome: Ongoing     Problem: Falls - Risk of:  Goal: Will remain free from falls  Description  Will remain free from falls  Outcome: Ongoing  Goal: Absence of physical injury  Description  Absence of physical injury  Outcome: Ongoing  Note:   No falls this shift. PT in bed with non-skid footwear on and alarm applied. Belongings and call light are placed within reach. PT calls appropriately for needs and is rounded on frequently. Bed placed in lowest position with siderails up and wheels locked. Will continue to monitor. Problem:  Activity:  Goal: Ability to tolerate increased activity will improve  Description  Ability to tolerate increased activity will improve  Outcome: Ongoing  Goal: Expression of feelings of increased energy will increase  Description  Expression of feelings of increased energy will increase  Outcome: Ongoing     Problem: Cardiac:  Goal: Ability to maintain an adequate cardiac output will improve  Description  Ability to maintain an adequate cardiac output will improve  Outcome: Ongoing  Goal: Complications related to the disease process, condition or treatment will be avoided or minimized  Description  Complications related to the disease process, condition or treatment will be avoided or minimized  Outcome: Ongoing     Problem: Nutrition  Goal: Optimal nutrition therapy  Outcome: Ongoing

## 2020-02-14 NOTE — CARE COORDINATION
SW received message from nurse stating that the patient stated that he was supposed to go home and not back to [facility], AN [facility]FFIL[facility] OF [facility]. SW placed phone call to patient's mother who stated that he lived with her prior to his nursing facility admission and they can't accommodate his needs at home because he can't transfer. SW met with patient at bedside and explained that his family could not accommodate his needs at home because he can't transfer. Patient stated that he was upset because he's been at rehab since the spring of 2019 and he's just ready to go home. SW informed that when he is ready to go home [facility], AN [facility]FF[facility] OF [facility] can help him transition but because he has new needs such as IV antibiotics it would be best to return there and receive the care he needs. Patient stated that he wasn't happy about it but he would go. SW thanked patient for his time. No further needs noted unless indicated by medical staff and/or patient. Respectfully submitted,    SU Holt  [facility]   [phone]    Electronically signed by SU Valiente on 2/14/2020 at 3:58 PM

## 2020-02-14 NOTE — DISCHARGE SUMMARY
Hospital Medicine Discharge Summary    Patient ID: Maru Paula      Patient's PCP: Alex Hooks MD    Admit Date: 1/22/2020     Discharge Date:   2/14/2020    Admitting Physician: No admitting provider for patient encounter. Discharge Physician: Taty Max MD     Discharge Diagnoses: Active Hospital Problems    Diagnosis    Abdominal fluid collection [R18.8]    Bilateral pleural effusion [V47]    Systolic CHF (HCC) [I51.97]    Pulmonary HTN ([address] [I27.20]    CAD (coronary artery disease) [I25.10]    Biventricular failure (HCC) [I50.82]    Acute on chronic respiratory failure with hypoxia (HCC) [J96.21]    Diabetes mellitus type 2, controlled ([address] [E11.9]    Centrilobular emphysema ([address] [J43.2]    Acute on chronic combined systolic and diastolic HF (heart failure) (HCC) [I50.43]    Pleural effusion on left [J90]    PAD (peripheral artery disease) ([address] [I73.9]       The patient was seen and examined on day of discharge and this discharge summary is in conjunction with any daily progress note from day of discharge. Hospital Course: 63yo man with extensive PMHx, complex surgical hx, Hx of bowel resection, has ostomy in place, later complicated by intraabdominal abscess requiring drainage and completed Iv Abx back in Nov of 2019, presented with SOB from ECF. Pleural Effusion   - repeat cxr with increased size pleural effusion on the left   - s/p thoracocentesis - exudative and bloody effusion drained  - L thoracentesis 2/10 - serous pleural fluid drained  - pulm involved.         Ileus - resolved  Advance diet as tolerates. Reglan IV.         Intraabdominal abscess or hemorrhagic collection. Complex surgical Hx - Hx of bowel obstruction s/p ex lap and colostomy. Later complicated by intraabdominal abscess requiring drainage and completed IV zosyn back in Nov 2019.    CT abd pelvis this admit: Increased size complex cystic collection RUQ, recurrent LUQ collection - unclear if these are infected or hemorrhagic or both              - 1/30 - s/p percutaneous drainage of R fluid collection              - 2/3 - repeat CT abdomen/pelvis with ruq fluid collection decreased in size              - 2/9 - repeat CT shows collection increased; However this was without contrast.               - Abx plan per ID and HEENA updated. - 2/10 - RUQ collection drain upsized - still draining sanguinous fluid. - fluid cultures have been negative. - ID and surgery involved  - cont to hold AC. If surgery team ok with Hgb fluctuations and ongoing sanguinous drainage, he is stable for discharge from IM stand point.            Acute on Chronic systolic CHF EF 80% - resolved  Resume torsemide on discharge.         Chronic Resp Failure w/ Hypoxia - stable  - on baseline of 2.5 L O2 nC, maintain O2 sats > 90% - 94%        Centrilobular Emphysema  - cont duonebs        IDDM  - stop lantus till tolerates PO  - humalog, SSI  - on discharge resume tradjenta.           HTN  - stable        Chronic a Fib  - cont amio, rate controlled  - holding xarelto with concerns for hemorrhagic intraabdominal fluid collections. - will cont to hold on discharge till seen in follow up with surgery. Physical Exam Performed:     /81   Pulse 76   Temp 97.6 °F (36.4 °C)   Resp 18   Ht 5' 7\" (1.702 m)   Wt 187 lb 9.8 oz (85.1 kg)   SpO2 97%   BMI 29.38 kg/m²         Gen: nad, appears more comfortable   Head: Normocephalic. Atraumatic  Eyes: EOMI. Conjunctiva clear  ENT: Oral mucosa moist  Neck: No JVD. supple  CVS: Nml S1S2, no MRG, irregular, regular rate  Pulmomary: diminished BS on the left side, tachypneic, no wheezing  Gastrointestinal: firm at baseline, diffusely tender without focal tenderness. normal bowel sounds. Colostomy RLL with a good amount of soft brown stool in the bag.  ruq AROLDO drain in place - sanguinous drainage. Musculoskeletal: No edema.  Warm, remote LEFT AKA  Neuro: No focal deficit. Moves extremity spontaneously. Psychiatry: Alert, oriented x3  Skin: Warm, dry with normal turgor. No rash       Labs: For convenience and continuity at follow-up the following most recent labs are provided:      CBC:    Lab Results   Component Value Date    WBC 9.9 02/14/2020    HGB 7.6 02/14/2020    HCT 23.1 02/14/2020     02/14/2020       Renal:    Lab Results   Component Value Date     02/14/2020    K 4.5 02/14/2020    CL 94 02/14/2020    CO2 34 02/14/2020    BUN 10 02/14/2020    CREATININE 0.6 02/14/2020    CALCIUM 8.0 02/14/2020    PHOS 3.2 12/31/2019         Significant Diagnostic Studies    Radiology:   XR CHEST 1 VW   Final Result   Increasing right pleural effusion and new right basilar opacity. No pneumothorax. US THORACENTESIS   Final Result   Successful ultrasound guided left thoracentesis. CT ABSCESS DRAINAGE SOFT TISSUE   Final Result   Successful CT guided exchange and upsize of the right upper quadrant drain   from a 12 [place] drain to a 14 [place] drain. CT GUIDED NEEDLE PLACEMENT   Final Result      CT ABDOMEN PELVIS W IV CONTRAST Additional Contrast? Oral   Final Result   1. Stable size of heterogeneous fluid collection containing pigtail drain   along the inferior aspect of the right hepatic lobe with decrease in internal   density compatible with maturing hemorrhage/blood products. 2. Again seen is mixed density collection in the left anterior pararenal   space compatible with fat necrosis. 3. Bilateral pleural effusions, left greater than right, with adjacent   compressive atelectasis. 4. Status post partial colectomy with right lower quadrant colostomy. No   bowel obstruction. CT ABDOMEN PELVIS WO CONTRAST Additional Contrast? None   Final Result   1. Suspected circumferential wall thickening in the partially included mid   thoracic esophagus more likely due to esophagitis than neoplasm.   Consider endoscopy. 2. Gaseous distention of multiple small bowel loops with up to mild dilation,   most likely due to ileus. No transition point to suggest obstruction. 3. Slightly increased size of a mixed density lesion arising from the   inferior liver with increased size likely due to internal hemorrhage. Given   size and long-term persistence, this may represent a liver cyst or biliary   cystadenoma. 4. Unchanged loculated fat necrosis in the left retroperitoneum, measuring up   to 7.3 cm x 6.7 cm x 9.4 cm.   5. Small to moderate right and large left pleural effusions with associated   atelectasis most severe in the left lung base. Superimposed pneumonia or   aspiration may be present in the lingula and left lower lobe. 6. Trace ascites and mild anasarca. XR Acute Abd Series Chest 1 VW   Final Result   Probable slight reactive nonobstructive small bowel ileus. Drainage catheter   in place in the right mid abdomen. Very large left pleural effusion which   has significantly increased in size since 01/29/2020. Large amount of left   lung atelectasis. Mild pulmonary vascular congestion. Mild right basilar   atelectasis. RECOMMENDATION:   Ultrasound-guided thoracentesis recommended for diagnostic and therapeutic   benefits. CT ABDOMEN PELVIS W IV CONTRAST Additional Contrast? None   Final Result   Redemonstration of an infrahepatic complex fluid collection with internal   hyperdensity suggesting hemorrhage or proteinaceous material.  A drain is now   in place, and that collection is mildly decreased in size, now measuring 12.4   x 11.3 cm (previously 14.1 x 12.5 cm). Additional complex, multilobulated rim enhancing collection within the   leftward aspect the abdomen is similar, measuring roughly 6.8 x 5.1 cm   maximally. Moderate-sized left pleural effusion which is increased when compared to the   previous exam.  Increasing left basilar airspace disease.   Stable small right ultrasound guided left thoracentesis. XR CHEST 1 VW   Final Result   1. Moderate left pleural effusion, which has slightly decreased in size from   previous exam.  There is persistent dense consolidation/atelectasis involving   the left base and lingula. 2. Cardiomegaly. XR CHEST PORTABLE   Final Result   Stable left-sided large pleural effusion, without significant interval change   in volume. Previously described right sided pleural effusion not visualized on this view. CT CHEST WO CONTRAST   Final Result   Bilateral pleural effusions are redemonstrated, large on the left and   moderate on the right. The effusion on the left appears larger compared to   CT scan 12/14/2019. The heart is markedly enlarged. New small pericardial effusion. Lungs demonstrate volume loss and consolidative changes similar to prior   study. These changes involving the left lung appear worse compared to prior   CT scan 12/14/2019. The increasing pleural effusions and consolidations could represent worsening   pulmonary edema, especially in light of the body wall edema and upper   abdominal ascites which appears new. Associated pneumonia and/or aspiration   is not excluded. Partially visualized right upper quadrant upper abdominal cystic lesion is   redemonstrated. The density is greater than that of simple fluid. This has   been demonstrated on multiple prior abdominal CT scans and is of uncertain   significance and incompletely visualized. It demonstrates internal   heterogeneous appearance which is concerning for internal hemorrhage. It is   perhaps mildly larger when compared to CT scan 11/10/2019. Further   evaluation with CT scan of the abdomen and pelvis is now recommended.    Recommend performing CT scan of the abdomen and pelvis without and with IV   contrast.         XR CHEST PORTABLE   Final Result   Moderate left and probable small right-sided pleural effusions and bilateral   airspace disease, left greater than right. Airspace disease may be related   to edema and/or pneumonia. Consults:     IP CONSULT TO PALLIATIVE CARE  IP CONSULT TO PULMONOLOGY  IP CONSULT TO GENERAL SURGERY  IP CONSULT TO INFECTIOUS DISEASES  IP CONSULT TO PULMONOLOGY    Disposition:  Return to ECF     Condition at Discharge: Stable    Discharge Instructions/Follow-up:  Surgery 1 week     Code Status:  Full Code     Activity: activity as tolerated    Diet: cardiac diet and diabetic diet      Discharge Medications:     Current Discharge Medication List           Details   fluconazole (DIFLUCAN) 100 MG tablet Take 1 tablet by mouth daily for 7 days  Qty: 7 tablet, Refills: 0              Details   oxyCODONE-acetaminophen (PERCOCET) 7.5-325 MG per tablet Take 1 tablet by mouth every 4 hours as needed for Pain for up to 5 days. Qty: 30 tablet, Refills: 0    Comments: Reduce doses taken as pain becomes manageable  Associated Diagnoses: Intra-abdominal abscess (HCC)      torsemide (DEMADEX) 20 MG tablet Take 1 tablet by mouth daily  Qty: 30 tablet, Refills: 1              Details   aspirin 81 MG chewable tablet Take 1 tablet by mouth daily  Qty: 30 tablet, Refills: 3      ranolazine (RANEXA) 500 MG extended release tablet Take 1 tablet by mouth 2 times daily  Qty: 60 tablet, Refills: 3      nitroGLYCERIN (NITROSTAT) 0.4 MG SL tablet up to max of 3 total doses. If no relief after 1 dose, call 911.   Qty: 25 tablet, Refills: 3      polyethylene glycol (MIRALAX) PACK packet Take 17 g by mouth daily as needed      albuterol (PROVENTIL) (2.5 MG/3ML) 0.083% nebulizer solution Take 2.5 mg by nebulization every 6 hours as needed for Wheezing      simethicone (MYLICON) 80 MG chewable tablet Take 80 mg by mouth 3 times daily as needed for Flatulence      Calcium Carb-Cholecalciferol (CALCIUM-VITAMIN D) 500-200 MG-UNIT per tablet Take 1 tablet by mouth daily  Qty: 60 tablet, Refills: 0      linagliptin

## 2020-02-14 NOTE — PROGRESS NOTES
CLINICAL PHARMACY: DISCHARGE MED RECONCILIATION/REVIEW    [address] [facility],[address] Patient?: Yes    Pharmacy Heart Failure Medication Reconciliation Note    Pt discharged from [facility] today after admission for left pleural effusion. Conrado Lehman has a diagnosis of systolic heart failure (last EF = 30-35% on 6/2019). Patient taking an ACEI / ARB / Entresto:  No: h/o hypotension     Patient taking a BETA BLOCKER:  No: h/o hypotension  Patient taking a LOOP DIURETIC: Yes  Patient taking a ALDOSTERONE RECEPTOR ANTAGONIST: No: h/o hypotension    Patient has a diagnosis of Atrial Fibrillation: Yes. If yes, patient is on appropriate anticoagulation: No: intra-abdominal bleeding    Patient has a diagnosis of type 2 diabetes:  Yes.  If yes, patient prescribed the following anti-hyperglycemic medication at discharge: Tradjenta      Discharge Medications:         Medication List        START taking these medications      fluconazole 100 MG tablet  Commonly known as:  Diflucan  Take 1 tablet by mouth daily for 7 days            CHANGE how you take these medications      torsemide 20 MG tablet  Commonly known as:  DEMADEX  Take 1 tablet by mouth daily  What changed:  how much to take            CONTINUE taking these medications      acetaminophen 325 MG tablet  Commonly known as:  TYLENOL     ACIDOPHILUS PO     albuterol (2.5 MG/3ML) 0.083% nebulizer solution  Commonly known as:  PROVENTIL     amiodarone 200 MG tablet  Commonly known as:  Cordarone  Take 1 tablet by mouth daily     aspirin 81 MG chewable tablet  Take 1 tablet by mouth daily     atorvastatin 40 MG tablet  Commonly known as:  LIPITOR  TAKE 1 TABLET BY MOUTH DAILY     B-D UF III MINI PEN NEEDLES 31G X 5 MM Misc  Generic drug:  Insulin Pen Needle     calcium-vitamin D 500-200 MG-UNIT per tablet  Take 1 tablet by mouth daily     gabapentin 100 MG capsule  Commonly known as:  NEURONTIN     MAGnesium-Oxide 400 (241.3 Mg) MG Tabs tablet  Generic drug:  magnesium oxide  TAKE 1 TABLET BY MOUTH TWICE DAILY     nitroGLYCERIN 0.4 MG SL tablet  Commonly known as:  NITROSTAT  up to max of 3 total doses. If no relief after 1 dose, call 911. omeprazole 40 MG delayed release capsule  Commonly known as:  PriLOSEC  Take 1 capsule by mouth daily (with breakfast)     oxyCODONE-acetaminophen 7.5-325 MG per tablet  Commonly known as:  PERCOCET  Take 1 tablet by mouth every 4 hours as needed for Pain for up to 5 days.      polyethylene glycol Pack packet  Commonly known as:  MIRALAX     ranolazine 500 MG extended release tablet  Commonly known as:  RANEXA  Take 1 tablet by mouth 2 times daily     simethicone 80 MG chewable tablet  Commonly known as:  MYLICON     tamsulosin 0.4 MG capsule  Commonly known as:  FLOMAX  Take 1 capsule by mouth daily     Tradjenta 5 MG tablet  Generic drug:  linagliptin            STOP taking these medications      cyclobenzaprine 10 MG tablet  Commonly known as:  FLEXERIL     DULoxetine 30 MG extended release capsule  Commonly known as:  CYMBALTA     ferrous gluconate 324 (38 Fe) MG tablet  Commonly known as:  FERGON     metOLazone 5 MG tablet  Commonly known as:  ZAROXOLYN     metoprolol succinate 25 MG extended release tablet  Commonly known as:  TOPROL XL     potassium chloride 20 MEQ Tbcr extended release tablet  Commonly known as:  KLOR-CON M     rivaroxaban 15 MG Tabs tablet  Commonly known as:  XARELTO     traZODone 50 MG tablet  Commonly known as:  DESYREL               Where to Get Your Medications        These medications were sent to [address] [place] [address],[address]  [address] [place] [id number]      Phone:  [phone]   torsemide 20 MG tablet       You can get these medications from any pharmacy    Bring a paper prescription for each of these medications  fluconazole 100 MG tablet  oxyCODONE-acetaminophen 7.5-325 MG per tablet         [facility], PharmD  Heart Failure Discharge Medication Reconciliation Program  [phone]

## 2020-02-14 NOTE — CARE COORDINATION
DISCHARGE SUMMARY     DATE OF DISCHARGE: 02/14/2020    DISCHARGE DESTINATION:     Odin Pulliam  [address]    Level of Care: [place]    Report Number: [phone] 2nd floor nurses station  Fax Number:  [phone]    Hens completed    TRANSPORTATION:     Company Name:  [employer] up Time: 6pm  Phone Number: [phone]    COMMENTS:     SW placed phone call to [facility] transportation to set up ride. For future reference their phone number is [phone]. Confirmation number is [id number]. SW informed that ride could take anywhere from 1-3 hours. SW to call back within an hour to find out what time patient will be picked up. SW left message for sister Jeremiah Womack on her cell today. SW left contact info for her to call back if needed. Respectfully submitted,    SU Olivera  [facility]   [phone]    Electronically signed by SU Holguin on 2/14/2020 at 2:27 PM

## 2020-02-19 ENCOUNTER — TELEPHONE (OUTPATIENT)
Dept: INFECTIOUS DISEASES | Age: 60
End: 2020-02-19

## 2020-02-19 NOTE — TELEPHONE ENCOUNTER
[facility] is calling to see if patient can be switched from IV antibiotics to oral antibiotics and would like a call back ASAP due to the patient wanting to go home.      Please call [facility] of [address] [place] [address]

## 2020-02-20 NOTE — TELEPHONE ENCOUNTER
He has been on IV Zosyn so Augmentin would be ok   But if any concerns can forgo abx if family is pursuing Hospice care

## 2020-02-20 NOTE — TELEPHONE ENCOUNTER
Patient was switched to PO atb yesterday, Augmentin, Per Dr. Jennifer Rivera.  Left message for Dr. Aracely Rock to make aware

## 2020-02-20 NOTE — TELEPHONE ENCOUNTER
Dr. Nimesh Strickland from [facility] ([phone]) reports that patient's family is taking him home from facility. He will be on hospice care. Can she send him home on an oral  ABX? He is definitely allergic to Rocephin. Please call her.

## 2020-02-24 ENCOUNTER — HOSPITAL ENCOUNTER (OUTPATIENT)
Age: 60
Setting detail: OBSERVATION
Discharge: HOME | End: 2020-02-25
Attending: EMERGENCY MEDICINE | Admitting: INTERNAL MEDICINE
Payer: COMMERCIAL

## 2020-02-24 ENCOUNTER — APPOINTMENT (OUTPATIENT)
Dept: CT IMAGING | Age: 60
End: 2020-02-24
Payer: COMMERCIAL

## 2020-02-24 ENCOUNTER — APPOINTMENT (OUTPATIENT)
Dept: GENERAL RADIOLOGY | Age: 60
End: 2020-02-24
Payer: COMMERCIAL

## 2020-02-24 PROBLEM — J90 CHRONIC PLEURAL EFFUSION: Status: ACTIVE | Noted: 2020-02-24

## 2020-02-24 LAB
A/G RATIO: 0.5 (ref 1.1–2.2)
ALBUMIN SERPL-MCNC: 2.9 G/DL (ref 3.4–5)
ALP BLD-CCNC: 189 U/L (ref 40–129)
ALT SERPL-CCNC: 21 U/L (ref 10–40)
ANION GAP SERPL CALCULATED.3IONS-SCNC: 15 MMOL/L (ref 3–16)
AST SERPL-CCNC: 46 U/L (ref 15–37)
BASOPHILS ABSOLUTE: 0.1 K/UL (ref 0–0.2)
BASOPHILS RELATIVE PERCENT: 0.6 %
BILIRUB SERPL-MCNC: <0.2 MG/DL (ref 0–1)
BUN BLDV-MCNC: 33 MG/DL (ref 7–20)
CALCIUM SERPL-MCNC: 8.7 MG/DL (ref 8.3–10.6)
CHLORIDE BLD-SCNC: 85 MMOL/L (ref 99–110)
CO2: 31 MMOL/L (ref 21–32)
CREAT SERPL-MCNC: 2.3 MG/DL (ref 0.9–1.3)
EOSINOPHILS ABSOLUTE: 0 K/UL (ref 0–0.6)
EOSINOPHILS RELATIVE PERCENT: 0.3 %
GFR AFRICAN AMERICAN: 35
GFR NON-AFRICAN AMERICAN: 29
GLOBULIN: 5.7 G/DL
GLUCOSE BLD-MCNC: 100 MG/DL (ref 70–99)
GLUCOSE BLD-MCNC: 119 MG/DL (ref 70–99)
GLUCOSE BLD-MCNC: 171 MG/DL (ref 70–99)
HCT VFR BLD CALC: 27.1 % (ref 40.5–52.5)
HEMOGLOBIN: 8.2 G/DL (ref 13.5–17.5)
LACTIC ACID: 3.2 MMOL/L (ref 0.4–2)
LACTIC ACID: 3.4 MMOL/L (ref 0.4–2)
LYMPHOCYTES ABSOLUTE: 0.7 K/UL (ref 1–5.1)
LYMPHOCYTES RELATIVE PERCENT: 5.6 %
MCH RBC QN AUTO: 25.4 PG (ref 26–34)
MCHC RBC AUTO-ENTMCNC: 30.2 G/DL (ref 31–36)
MCV RBC AUTO: 84.1 FL (ref 80–100)
MONOCYTES ABSOLUTE: 0.8 K/UL (ref 0–1.3)
MONOCYTES RELATIVE PERCENT: 6.1 %
NEUTROPHILS ABSOLUTE: 11.1 K/UL (ref 1.7–7.7)
NEUTROPHILS RELATIVE PERCENT: 87.4 %
PDW BLD-RTO: 17.7 % (ref 12.4–15.4)
PERFORMED ON: ABNORMAL
PERFORMED ON: ABNORMAL
PLATELET # BLD: 415 K/UL (ref 135–450)
PMV BLD AUTO: 8.1 FL (ref 5–10.5)
POTASSIUM REFLEX MAGNESIUM: 5.1 MMOL/L (ref 3.5–5.1)
PRO-BNP: ABNORMAL PG/ML (ref 0–124)
RBC # BLD: 3.22 M/UL (ref 4.2–5.9)
SODIUM BLD-SCNC: 131 MMOL/L (ref 136–145)
TOTAL PROTEIN: 8.6 G/DL (ref 6.4–8.2)
WBC # BLD: 12.7 K/UL (ref 4–11)

## 2020-02-24 PROCEDURE — 71250 CT THORAX DX C-: CPT

## 2020-02-24 PROCEDURE — G0378 HOSPITAL OBSERVATION PER HR: HCPCS

## 2020-02-24 PROCEDURE — 99285 EMERGENCY DEPT VISIT HI MDM: CPT

## 2020-02-24 PROCEDURE — 96375 TX/PRO/DX INJ NEW DRUG ADDON: CPT

## 2020-02-24 PROCEDURE — 96367 TX/PROPH/DG ADDL SEQ IV INF: CPT

## 2020-02-24 PROCEDURE — 36415 COLL VENOUS BLD VENIPUNCTURE: CPT

## 2020-02-24 PROCEDURE — 74176 CT ABD & PELVIS W/O CONTRAST: CPT

## 2020-02-24 PROCEDURE — 96366 THER/PROPH/DIAG IV INF ADDON: CPT

## 2020-02-24 PROCEDURE — 71045 X-RAY EXAM CHEST 1 VIEW: CPT

## 2020-02-24 PROCEDURE — 83605 ASSAY OF LACTIC ACID: CPT

## 2020-02-24 PROCEDURE — 85025 COMPLETE CBC W/AUTO DIFF WBC: CPT

## 2020-02-24 PROCEDURE — 96365 THER/PROPH/DIAG IV INF INIT: CPT

## 2020-02-24 PROCEDURE — 83880 ASSAY OF NATRIURETIC PEPTIDE: CPT

## 2020-02-24 PROCEDURE — 87040 BLOOD CULTURE FOR BACTERIA: CPT

## 2020-02-24 PROCEDURE — 2580000003 HC RX 258: Performed by: EMERGENCY MEDICINE

## 2020-02-24 PROCEDURE — 2580000003 HC RX 258: Performed by: INTERNAL MEDICINE

## 2020-02-24 PROCEDURE — 96374 THER/PROPH/DIAG INJ IV PUSH: CPT

## 2020-02-24 PROCEDURE — 6360000002 HC RX W HCPCS: Performed by: NURSE PRACTITIONER

## 2020-02-24 PROCEDURE — 6360000002 HC RX W HCPCS: Performed by: EMERGENCY MEDICINE

## 2020-02-24 PROCEDURE — 6370000000 HC RX 637 (ALT 250 FOR IP): Performed by: INTERNAL MEDICINE

## 2020-02-24 PROCEDURE — 6360000002 HC RX W HCPCS: Performed by: INTERNAL MEDICINE

## 2020-02-24 PROCEDURE — 83036 HEMOGLOBIN GLYCOSYLATED A1C: CPT

## 2020-02-24 PROCEDURE — 80053 COMPREHEN METABOLIC PANEL: CPT

## 2020-02-24 PROCEDURE — 96372 THER/PROPH/DIAG INJ SC/IM: CPT

## 2020-02-24 RX ORDER — NICOTINE POLACRILEX 4 MG
15 LOZENGE BUCCAL PRN
Status: DISCONTINUED | OUTPATIENT
Start: 2020-02-24 | End: 2020-02-25 | Stop reason: HOSPADM

## 2020-02-24 RX ORDER — SODIUM CHLORIDE 0.9 % (FLUSH) 0.9 %
10 SYRINGE (ML) INJECTION EVERY 12 HOURS SCHEDULED
Status: DISCONTINUED | OUTPATIENT
Start: 2020-02-24 | End: 2020-02-25 | Stop reason: HOSPADM

## 2020-02-24 RX ORDER — ONDANSETRON 2 MG/ML
4 INJECTION INTRAMUSCULAR; INTRAVENOUS EVERY 6 HOURS PRN
Status: DISCONTINUED | OUTPATIENT
Start: 2020-02-24 | End: 2020-02-25 | Stop reason: HOSPADM

## 2020-02-24 RX ORDER — SIMETHICONE 80 MG
80 TABLET,CHEWABLE ORAL 3 TIMES DAILY PRN
Status: DISCONTINUED | OUTPATIENT
Start: 2020-02-24 | End: 2020-02-25 | Stop reason: HOSPADM

## 2020-02-24 RX ORDER — SODIUM CHLORIDE 0.9 % (FLUSH) 0.9 %
10 SYRINGE (ML) INJECTION PRN
Status: DISCONTINUED | OUTPATIENT
Start: 2020-02-24 | End: 2020-02-25 | Stop reason: HOSPADM

## 2020-02-24 RX ORDER — TAMSULOSIN HYDROCHLORIDE 0.4 MG/1
0.4 CAPSULE ORAL DAILY
Status: DISCONTINUED | OUTPATIENT
Start: 2020-02-25 | End: 2020-02-25 | Stop reason: HOSPADM

## 2020-02-24 RX ORDER — RANOLAZINE 500 MG/1
500 TABLET, EXTENDED RELEASE ORAL 2 TIMES DAILY
Status: DISCONTINUED | OUTPATIENT
Start: 2020-02-24 | End: 2020-02-25 | Stop reason: HOSPADM

## 2020-02-24 RX ORDER — DEXTROSE MONOHYDRATE 50 MG/ML
100 INJECTION, SOLUTION INTRAVENOUS PRN
Status: DISCONTINUED | OUTPATIENT
Start: 2020-02-24 | End: 2020-02-25 | Stop reason: HOSPADM

## 2020-02-24 RX ORDER — PANTOPRAZOLE SODIUM 40 MG/1
40 TABLET, DELAYED RELEASE ORAL
Status: DISCONTINUED | OUTPATIENT
Start: 2020-02-25 | End: 2020-02-25 | Stop reason: HOSPADM

## 2020-02-24 RX ORDER — GABAPENTIN 100 MG/1
200 CAPSULE ORAL 3 TIMES DAILY
Status: DISCONTINUED | OUTPATIENT
Start: 2020-02-24 | End: 2020-02-25 | Stop reason: HOSPADM

## 2020-02-24 RX ORDER — ACETAMINOPHEN 325 MG/1
650 TABLET ORAL EVERY 6 HOURS PRN
Status: DISCONTINUED | OUTPATIENT
Start: 2020-02-24 | End: 2020-02-25 | Stop reason: HOSPADM

## 2020-02-24 RX ORDER — ALBUTEROL SULFATE 2.5 MG/3ML
2.5 SOLUTION RESPIRATORY (INHALATION) EVERY 6 HOURS PRN
Status: DISCONTINUED | OUTPATIENT
Start: 2020-02-24 | End: 2020-02-25 | Stop reason: HOSPADM

## 2020-02-24 RX ORDER — DEXTROSE MONOHYDRATE 25 G/50ML
12.5 INJECTION, SOLUTION INTRAVENOUS PRN
Status: DISCONTINUED | OUTPATIENT
Start: 2020-02-24 | End: 2020-02-25 | Stop reason: HOSPADM

## 2020-02-24 RX ORDER — POLYETHYLENE GLYCOL 3350 17 G/17G
17 POWDER, FOR SOLUTION ORAL DAILY PRN
Status: DISCONTINUED | OUTPATIENT
Start: 2020-02-24 | End: 2020-02-25 | Stop reason: HOSPADM

## 2020-02-24 RX ORDER — ASPIRIN 81 MG/1
81 TABLET, CHEWABLE ORAL DAILY
Status: DISCONTINUED | OUTPATIENT
Start: 2020-02-25 | End: 2020-02-25 | Stop reason: HOSPADM

## 2020-02-24 RX ORDER — AMIODARONE HYDROCHLORIDE 200 MG/1
200 TABLET ORAL DAILY
Status: DISCONTINUED | OUTPATIENT
Start: 2020-02-25 | End: 2020-02-25 | Stop reason: HOSPADM

## 2020-02-24 RX ORDER — FUROSEMIDE 10 MG/ML
80 INJECTION INTRAMUSCULAR; INTRAVENOUS ONCE
Status: COMPLETED | OUTPATIENT
Start: 2020-02-24 | End: 2020-02-24

## 2020-02-24 RX ORDER — ATORVASTATIN CALCIUM 40 MG/1
40 TABLET, FILM COATED ORAL DAILY
Status: DISCONTINUED | OUTPATIENT
Start: 2020-02-25 | End: 2020-02-25 | Stop reason: HOSPADM

## 2020-02-24 RX ORDER — ONDANSETRON 2 MG/ML
4 INJECTION INTRAMUSCULAR; INTRAVENOUS ONCE
Status: COMPLETED | OUTPATIENT
Start: 2020-02-24 | End: 2020-02-24

## 2020-02-24 RX ORDER — ACETAMINOPHEN 650 MG/1
650 SUPPOSITORY RECTAL EVERY 6 HOURS PRN
Status: DISCONTINUED | OUTPATIENT
Start: 2020-02-24 | End: 2020-02-25 | Stop reason: HOSPADM

## 2020-02-24 RX ORDER — LACTOBACILLUS RHAMNOSUS GG 10B CELL
1 CAPSULE ORAL
Status: DISCONTINUED | OUTPATIENT
Start: 2020-02-25 | End: 2020-02-25 | Stop reason: HOSPADM

## 2020-02-24 RX ORDER — PROMETHAZINE HYDROCHLORIDE 25 MG/1
12.5 TABLET ORAL EVERY 6 HOURS PRN
Status: DISCONTINUED | OUTPATIENT
Start: 2020-02-24 | End: 2020-02-25 | Stop reason: HOSPADM

## 2020-02-24 RX ADMIN — INSULIN LISPRO 1 UNITS: 100 INJECTION, SOLUTION INTRAVENOUS; SUBCUTANEOUS at 21:06

## 2020-02-24 RX ADMIN — ENOXAPARIN SODIUM 40 MG: 40 INJECTION SUBCUTANEOUS at 21:05

## 2020-02-24 RX ADMIN — FUROSEMIDE 80 MG: 10 INJECTION, SOLUTION INTRAMUSCULAR; INTRAVENOUS at 16:44

## 2020-02-24 RX ADMIN — GABAPENTIN 200 MG: 100 CAPSULE ORAL at 21:05

## 2020-02-24 RX ADMIN — VANCOMYCIN HYDROCHLORIDE 1250 MG: 10 INJECTION, POWDER, LYOPHILIZED, FOR SOLUTION INTRAVENOUS at 16:53

## 2020-02-24 RX ADMIN — MEROPENEM 500 MG: 500 INJECTION, POWDER, FOR SOLUTION INTRAVENOUS at 21:05

## 2020-02-24 RX ADMIN — RANOLAZINE 500 MG: 500 TABLET, FILM COATED, EXTENDED RELEASE ORAL at 21:06

## 2020-02-24 RX ADMIN — ONDANSETRON 4 MG: 2 INJECTION INTRAMUSCULAR; INTRAVENOUS at 15:10

## 2020-02-24 ASSESSMENT — PAIN SCALES - GENERAL
PAINLEVEL_OUTOF10: 0
PAINLEVEL_OUTOF10: 0

## 2020-02-24 ASSESSMENT — ENCOUNTER SYMPTOMS
VOMITING: 0
ABDOMINAL DISTENTION: 1
NAUSEA: 0
COLOR CHANGE: 0
SHORTNESS OF BREATH: 0
ABDOMINAL PAIN: 1

## 2020-02-24 NOTE — ED NOTES
ED SBAR report provider to [facility]. Patient to be transported to Room 4271 via stretcher by transport tech. Patient transported with bedside cardiac monitor and with IV medications infusing. IV site clean, dry, and intact. MEWS score and pain assessed and documented. Updated patient and family on plan of care.      Uvaldo Hickey RN  02/24/20 8198

## 2020-02-24 NOTE — ED NOTES
Bed: B-10  Expected date:   Expected time:   Means of arrival: [facility]  Comments:  hair drain problem     Deanna Szymanski RN  02/24/20 6064

## 2020-02-24 NOTE — ED TRIAGE NOTES
Pt arrived to ED via EMS with complaints of AROLDO drain falling out. On initial assessment, pt presents with distended abdomen with pitting edema on right side. Pt flinches when pressure is applied. Colostomy bag applied to area prior to arrival. VS noted and stable. Patient A&Ox4. Respirations easy and unlabored. Skin warm and dry and appropriate for ethnicity. No acute distress noted at this time.
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
## 2020-02-24 NOTE — ED PROVIDER NOTES
Attending Supervisory Note/Shared Visit   I have personally performed a face to face diagnostic evaluation on this patient. I have reviewed the mid-levels findings and agree. History and Exam by me shows an alert white male in no acute distress. Patient has end-stage heart disease. He is in hospice. He had a fluid collection in his right upper quadrant that required IR placement of a drain on 30 January. Apparently the drain fell out sometime late Saturday night early Sunday morning. A ostomy bag was placed over the drain site. He has had no drainage since then. He has some increasing edema in his right abdomen with abdominal distention and discomfort. Heart: Regular rate and rhythm. No murmurs or gallops noted. Lungs: Breath sounds equal bilaterally and clear. Abdomen: Obese, moderately distended, moderate diffuse right lateral abdominal wall tenderness with pitting edema extending around the previous drain site in the right upper lateral abdomen. Lab reviewed. H&H are 8.2 and 27.1. White blood cell count 12,700 with 87 neutrophils and 6 lymphs. Sodium 131 with a potassium of 5.1.  BUN of 33 with a creatinine of 2.3. Glucose 100. lactic acid of 3.2. BNP of 37,045. CT chest with contrast: Cardiomegaly with pulmonary venous hypertension. Large bilateral pleural effusions with compressive atelectasis and upper lobe edema. Persistent subhepatic walled off fluid collection, slightly smaller than it lies on comparison exam.  No significant change in left upper quadrant fluid collection. All amount of free intraperitoneal fluid. CT abdomen pelvis: Cardiomegaly with pulmonary venous hypertension. Bilateral pleural effusions with compressive atelectasis. Persistent subhepatic walled off fluid collection, slightly smaller than on comparison exam.  No change in left upper quadrant fluid collection. Small amount of free intraperitoneal fluid.     Chest x-ray: Cardiomegaly and left-sided effusion with right basilar infiltrate representing atelectasis versus pneumonia. The patient is in hospice. Hospice was consulted. Test results were reviewed with the patient. The hospitalist was consulted. He has congestive heart failure with pleural effusions. He has a fluid-filled mass in his right upper quadrant that had previously had a drain in place which is now fallen out and is no longer draining. He is having a lot of abdominal pain and abdominal distention likely related to the fluid-filled mass that had previously been draining prior to the drain falling out on Saturday. The hospitalist was consulted for admission.     (Please note that portions of this note were completed with a voice recognition program.  Efforts were made to edit the dictations but occasionally words are mis-transcribed.)    Heidi Nielsen MD  Attending Emergency Physician        Scooter Miles MD  02/24/20 [id number]

## 2020-02-24 NOTE — H&P
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 catheter was attached to AROLDO suction drainage. Dose modulation, iterative reconstruction, and/or weight based adjustment of the mA/kV was utilized to reduce the radiation dose to as low as reasonably achievable. DLP: 1093.81 mGy-cm Estimated blood loss: Less than 5 cc FINDINGS: A total of 200 mL of bloody fluid was removed. Successful CT guided exchange and upsize of the right upper quadrant drain from a 12 [place] Gay drain to a 14 [place] Gay drain. Discussed with ER provider.       Thank you Navid Waldrop MD for the opportunity to be involved in this patient's care.    -----------------------------  Timbo Pizarro MD  [place] Hospitalist

## 2020-02-25 ENCOUNTER — APPOINTMENT (OUTPATIENT)
Dept: CT IMAGING | Age: 60
End: 2020-02-25
Payer: COMMERCIAL

## 2020-02-25 VITALS
HEIGHT: 67 IN | HEART RATE: 98 BPM | WEIGHT: 198.63 LBS | DIASTOLIC BLOOD PRESSURE: 73 MMHG | TEMPERATURE: 98 F | OXYGEN SATURATION: 92 % | RESPIRATION RATE: 18 BRPM | BODY MASS INDEX: 31.18 KG/M2 | SYSTOLIC BLOOD PRESSURE: 102 MMHG

## 2020-02-25 LAB
A/G RATIO: 0.4 (ref 1.1–2.2)
ALBUMIN SERPL-MCNC: 2.5 G/DL (ref 3.4–5)
ALP BLD-CCNC: 187 U/L (ref 40–129)
ALT SERPL-CCNC: 25 U/L (ref 10–40)
ANION GAP SERPL CALCULATED.3IONS-SCNC: 17 MMOL/L (ref 3–16)
AST SERPL-CCNC: 59 U/L (ref 15–37)
BASOPHILS ABSOLUTE: 0.1 K/UL (ref 0–0.2)
BASOPHILS RELATIVE PERCENT: 0.6 %
BILIRUB SERPL-MCNC: 0.3 MG/DL (ref 0–1)
BUN BLDV-MCNC: 37 MG/DL (ref 7–20)
CALCIUM SERPL-MCNC: 8.3 MG/DL (ref 8.3–10.6)
CHLORIDE BLD-SCNC: 87 MMOL/L (ref 99–110)
CO2: 27 MMOL/L (ref 21–32)
CREAT SERPL-MCNC: 2.7 MG/DL (ref 0.9–1.3)
EOSINOPHILS ABSOLUTE: 0 K/UL (ref 0–0.6)
EOSINOPHILS RELATIVE PERCENT: 0.2 %
ESTIMATED AVERAGE GLUCOSE: 116.9 MG/DL
GFR AFRICAN AMERICAN: 29
GFR NON-AFRICAN AMERICAN: 24
GLOBULIN: 5.7 G/DL
GLUCOSE BLD-MCNC: 79 MG/DL (ref 70–99)
GLUCOSE BLD-MCNC: 87 MG/DL (ref 70–99)
GLUCOSE BLD-MCNC: 92 MG/DL (ref 70–99)
HBA1C MFR BLD: 5.7 %
HCT VFR BLD CALC: 26.7 % (ref 40.5–52.5)
HEMOGLOBIN: 8.2 G/DL (ref 13.5–17.5)
INR BLD: 2.82 (ref 0.86–1.14)
LACTIC ACID: 2.8 MMOL/L (ref 0.4–2)
LACTIC ACID: 4.1 MMOL/L (ref 0.4–2)
LYMPHOCYTES ABSOLUTE: 1 K/UL (ref 1–5.1)
LYMPHOCYTES RELATIVE PERCENT: 7.2 %
MCH RBC QN AUTO: 25.8 PG (ref 26–34)
MCHC RBC AUTO-ENTMCNC: 30.7 G/DL (ref 31–36)
MCV RBC AUTO: 84.1 FL (ref 80–100)
MONOCYTES ABSOLUTE: 1.1 K/UL (ref 0–1.3)
MONOCYTES RELATIVE PERCENT: 7.6 %
NEUTROPHILS ABSOLUTE: 12.1 K/UL (ref 1.7–7.7)
NEUTROPHILS RELATIVE PERCENT: 84.4 %
PDW BLD-RTO: 18 % (ref 12.4–15.4)
PERFORMED ON: NORMAL
PERFORMED ON: NORMAL
PLATELET # BLD: 397 K/UL (ref 135–450)
PMV BLD AUTO: 8.2 FL (ref 5–10.5)
POTASSIUM SERPL-SCNC: 5.7 MMOL/L (ref 3.5–5.1)
PROTHROMBIN TIME: 33 SEC (ref 10–13.2)
RBC # BLD: 3.18 M/UL (ref 4.2–5.9)
SODIUM BLD-SCNC: 131 MMOL/L (ref 136–145)
TOTAL PROTEIN: 8.2 G/DL (ref 6.4–8.2)
WBC # BLD: 14.3 K/UL (ref 4–11)

## 2020-02-25 PROCEDURE — 96366 THER/PROPH/DIAG IV INF ADDON: CPT

## 2020-02-25 PROCEDURE — 36415 COLL VENOUS BLD VENIPUNCTURE: CPT

## 2020-02-25 PROCEDURE — C1729 CATH, DRAINAGE: HCPCS

## 2020-02-25 PROCEDURE — 85025 COMPLETE CBC W/AUTO DIFF WBC: CPT

## 2020-02-25 PROCEDURE — 6370000000 HC RX 637 (ALT 250 FOR IP): Performed by: INTERNAL MEDICINE

## 2020-02-25 PROCEDURE — 2580000003 HC RX 258: Performed by: INTERNAL MEDICINE

## 2020-02-25 PROCEDURE — APPNB180 APP NON BILLABLE TIME > 60 MINS: Performed by: PHYSICIAN ASSISTANT

## 2020-02-25 PROCEDURE — 85610 PROTHROMBIN TIME: CPT

## 2020-02-25 PROCEDURE — 83605 ASSAY OF LACTIC ACID: CPT

## 2020-02-25 PROCEDURE — APPNB180 APP NON BILLABLE TIME > 60 MINS: Performed by: NURSE PRACTITIONER

## 2020-02-25 PROCEDURE — 80053 COMPREHEN METABOLIC PANEL: CPT

## 2020-02-25 PROCEDURE — 6360000002 HC RX W HCPCS: Performed by: INTERNAL MEDICINE

## 2020-02-25 PROCEDURE — APPSS180 APP SPLIT SHARED TIME > 60 MINUTES: Performed by: PHYSICIAN ASSISTANT

## 2020-02-25 PROCEDURE — 77012 CT SCAN FOR NEEDLE BIOPSY: CPT

## 2020-02-25 PROCEDURE — G0378 HOSPITAL OBSERVATION PER HR: HCPCS

## 2020-02-25 RX ORDER — OXYCODONE AND ACETAMINOPHEN 7.5; 325 MG/1; MG/1
1 TABLET ORAL EVERY 4 HOURS PRN
Status: DISCONTINUED | OUTPATIENT
Start: 2020-02-25 | End: 2020-02-25 | Stop reason: HOSPADM

## 2020-02-25 RX ADMIN — MEROPENEM 500 MG: 500 INJECTION, POWDER, FOR SOLUTION INTRAVENOUS at 03:38

## 2020-02-25 RX ADMIN — MEROPENEM 500 MG: 500 INJECTION, POWDER, FOR SOLUTION INTRAVENOUS at 12:01

## 2020-02-25 RX ADMIN — PANTOPRAZOLE SODIUM 40 MG: 40 TABLET, DELAYED RELEASE ORAL at 06:15

## 2020-02-25 ASSESSMENT — PAIN SCALES - GENERAL: PAINLEVEL_OUTOF10: 0

## 2020-02-25 NOTE — PROGRESS NOTES
4 Eyes Skin Assessment     The patient is being assess for  Admission    I agree that 2 RN's have performed a thorough Head to Toe Skin Assessment on the patient. ALL assessment sites listed below have been assessed. Areas assessed by both nurses: yes  [x]   Head, Face, and Ears   [x]   Shoulders, Back, and Chest  [x]   Arms, Elbows, and Hands   [x]   Coccyx, Sacrum, and IschIum  [x]   Legs, Feet, and Heels        Does the Patient have Skin Breakdown?   Yes LDA WOUND CARE was Initiated documentation include the Poppy-wound, Wound Assessment, Measurements, Dressing Treatment, Drainage, and Color\",         Carlos Alberto Prevention initiated:  Yes   Wound Care Orders initiated:  NA      [facility] nurse consulted for Pressure Injury (Stage 3,4, Unstageable, DTI, NWPT, and Complex wounds), New and Established Ostomies:  Yes      Nurse 1 eSignature: Electronically signed by Marylene Nations, RN on 2/24/20 at 7:04 PM    **SHARE this note so that the co-signing nurse is able to place an eSignature**    Nurse 2 eSignature: Electronically signed by Josephine Ro RN on 2/24/20 at 7:05 PM
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
## 2020-02-25 NOTE — PROGRESS NOTES
Pt admitted to room 4271 from ED. Pt alert and oriented forgetful at times. Family at bedside. VSS. Antibiotic infusing per orders. Colostomy noted to BORIS. AROLDO drain \"came out\" at home. Call light in reach. Bed alarm on. Will continue to monitor.

## 2020-02-25 NOTE — PLAN OF CARE
Problem: Risk for Impaired Skin Integrity  Goal: Tissue integrity - skin and mucous membranes  Description  Structural intactness and normal physiological function of skin and  mucous membranes.   2/25/2020 1319 by Elisa Alvarez RN  Outcome: Ongoing     Problem: Daily Care:  Goal: Daily care needs are met  Description  Daily care needs are met  Outcome: Ongoing     Problem: Skin Integrity:  Goal: Skin integrity will stabilize  Description  Skin integrity will stabilize  Outcome: Ongoing     Problem: Discharge Planning:  Goal: Patients continuum of care needs are met  Description  Patients continuum of care needs are met  Outcome: Ongoing

## 2020-02-25 NOTE — PLAN OF CARE
Problem: Risk for Impaired Skin Integrity  Goal: Tissue integrity - skin and mucous membranes  Description specialty mattress  Structural intactness and normal physiological function of skin and  mucous membranes.   Outcome: Ongoing

## 2020-02-25 NOTE — DISCHARGE SUMMARY
Hospital Medicine Discharge Summary    Patient ID: Bernabe Castro      Patient's PCP: Carmen Le MD    Admit Date: 2/24/2020     Discharge Date:   2/25/20    Admitting Physician: Ovi Saini MD     Discharge Physician: Carrillo Morrow MD     Discharge Diagnoses: Active Hospital Problems    Diagnosis Date Noted    Chronic pleural effusion [J90] 02/24/2020    Acute on chronic combined systolic and diastolic HF (heart failure) (HCC) [I50.43]        The patient was seen and examined on day of discharge and this discharge summary is in conjunction with any daily progress note from day of discharge. Hospital Course: This is a pleasant 61 y.o. male with extensive medical history, including chronic hepatitis C, pulmonary hypertension, paroxysmal atrial fibrillation, chronic respiratory failure, COPD, chronic systolic heart failure, chronic bilateral pleural effusion, intra-abdominal fluid collection concerning for possible abscess for which he has had AROLDO drain placement. He presents to the emergency room because AROLDO drain fell out. Per my discussion with Fariba Naqvi RN, with [facility] [place], patient was sent to have AROLDO drain replaced. His CODE STATUS is DNR-CC. On presentation to the emergency room, he had extensive work-up including CT-chest, CT-abdomen, CBC, lactic acid. He has mild leukocytosis, elevated lactic acid and elevated creatinine. Because of these, ER consulted to admit for further management.     Upon discussion with patient, he states that he would rather go home and that he only came to have his AROLDO drain. He understands his condition is terminal and that the multitude of different managements done recently and now making any difference. He reports chronic shortness of breath that has been ongoing for 5 years. No new symptoms per discussion with patient. 1. Patient was actually sent to the emergency room to have AROLDO drain.   Consult has been initiated to in place on right side, AROLDO drain on right side  Musculoskeletal: No clubbing, cyanosis or edema bilaterally. Full range of motion without deformity. Skin: Skin color, texture, turgor normal.  No rashes or lesions. Neurologic:  Neurovascularly intact without any focal sensory/motor deficits. Cranial nerves: II-XII intact, grossly non-focal.  Psychiatric: Alert and oriented, thought content appropriate, normal insight      Consults:     IP CONSULT TO RADIOLOGY  IP CONSULT TO GENERAL SURGERY    Significant Diagnostic Studies:     CT GUIDED NEEDLE PLACEMENT   Final Result      CT CHEST WO CONTRAST   Final Result   1. Cardiomegaly with pulmonary venous hypertension, large bilateral pleural   effusions with compressive atelectasis, and upper lobe edema   2. Persistent subhepatic walled off fluid collection, slightly smaller than   it was on the comparison exam   3. No significant change in left upper quadrant fluid collection   4. Small amount of free intraperitoneal fluid   5. Status post partial colon resection with right lower quadrant colostomy   6. Horseshoe kidney   7. Sigmoid diverticulosis         CT ABDOMEN PELVIS WO CONTRAST Additional Contrast? None   Final Result   1. Cardiomegaly with pulmonary venous hypertension, large bilateral pleural   effusions with compressive atelectasis, and upper lobe edema   2. Persistent subhepatic walled off fluid collection, slightly smaller than   it was on the comparison exam   3. No significant change in left upper quadrant fluid collection   4. Small amount of free intraperitoneal fluid   5. Status post partial colon resection with right lower quadrant colostomy   6. Horseshoe kidney   7. Sigmoid diverticulosis         XR CHEST PORTABLE   Final Result   Cardiomegaly and left-sided effusion with right basilar infiltrate   representing atelectasis versus pneumonia.          CT ABSCESS DRAINAGE SOFT TISSUE    (Results Pending)       Disposition:  Hospice     Condition at Discharge: Terminal    [facility]    Code Status:  DNR-CC     Activity: activity as tolerated    Diet: regular diet      Labs: For convenience and continuity at follow-up the following most recent labs are provided:      CBC:    Lab Results   Component Value Date    WBC 14.3 02/25/2020    HGB 8.2 02/25/2020    HCT 26.7 02/25/2020     02/25/2020       Renal:    Lab Results   Component Value Date     02/25/2020    K 5.7 02/25/2020    K 5.1 02/24/2020    CL 87 02/25/2020    CO2 27 02/25/2020    BUN 37 02/25/2020    CREATININE 2.7 02/25/2020    CALCIUM 8.3 02/25/2020    PHOS 3.2 12/31/2019       Discharge Medications:     Current Discharge Medication List           Details   torsemide (DEMADEX) 20 MG tablet Take 1 tablet by mouth daily  Qty: 30 tablet, Refills: 1      ranolazine (RANEXA) 500 MG extended release tablet Take 1 tablet by mouth 2 times daily  Qty: 60 tablet, Refills: 3      polyethylene glycol (MIRALAX) PACK packet Take 17 g by mouth daily as needed (constipation)       albuterol (PROVENTIL) (2.5 MG/3ML) 0.083% nebulizer solution Take 2.5 mg by nebulization every 6 hours as needed for Wheezing      simethicone (MYLICON) 80 MG chewable tablet Take 80 mg by mouth 3 times daily as needed for Flatulence      acetaminophen (TYLENOL) 325 MG tablet Take 650 mg by mouth every 6 hours as needed for Pain or Fever      B-D UF III MINI PEN NEEDLES 31G X 5 MM MISC USE D  Refills: 1      Lactobacillus (ACIDOPHILUS PO) Take 1 tablet by mouth 2 times daily       amiodarone (CORDARONE) 200 MG tablet Take 1 tablet by mouth daily  Qty: 30 tablet, Refills: 1      tamsulosin (FLOMAX) 0.4 MG capsule Take 1 capsule by mouth daily  Qty: 30 capsule, Refills: 0      gabapentin (NEURONTIN) 100 MG capsule Take 200 mg by mouth 3 times daily.        omeprazole (PRILOSEC) 40 MG delayed release capsule Take 1 capsule by mouth daily (with breakfast)  Qty: 90 capsule, Refills: 3    Associated Diagnoses: Gastroesophageal reflux disease with esophagitis             No future appointments. Time Spent on discharge is more than 30 minutes in the examination, evaluation, counseling and review of medications and discharge plan. Signed:    Citlalli Higuera MD   2/25/2020      Thank you Girish Dickson MD for the opportunity to be involved in this patient's care. If you have any questions or concerns please feel free to contact me at [phone].
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
## 2020-02-25 NOTE — PROGRESS NOTES
Pt back from procedure. VSS. Discharge order noted. IV removed per protocol. Dressing clean and dry. Will continue to monitor.

## 2020-02-25 NOTE — PROGRESS NOTES
Pt to be seen per hospital protocol for HF diet education. Pt from home where he is receiving hospice services. No other nutrition-related risk factors identified. Will see at LOS per nutrition SOC. Please consult if nutrition intervention is needed sooner.
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
## 2020-02-28 LAB
BLOOD CULTURE, ROUTINE: NORMAL
CULTURE, BLOOD 2: NORMAL

## 2023-05-15 NOTE — DISCHARGE INSTR - DIET

## 2024-03-29 NOTE — PROGRESS NOTES
Chief Complaint   Multiple complaints (Pt had home health visit today and was informed of elevated HR at rest and wheezing to left lower lung. Pt arrived with circular external fixation to LLE from previous fall and fx. )      History Of Present Illness   Gregory Ryan is a 59 y.o. male with a PMHx including hypertension, peripheral neuropathy, and recent distal tibia fracture with ex fix  presenting with tachycardia.  Patient states that a home health nurse comes by his house and has been checking his vitals periodically.  States that she noticed that his heart rate has been elevated and she thought that he had some wheezing in his left lower lung base.  Reports a cough occasionally in the morning but no increased shortness of breath.  He reports no chest pain.  Reports no leg swelling, or erythema.  States he is still taking aspirin PID.  States he was finished in his on any current antibiotics.  No other complaints at this time.    Independent Historian: Yes  Other Historian or Collateral: Chart review  Interpretor: No      Review of patient's allergies indicates:  No Known Allergies    Current Facility-Administered Medications on File Prior to Encounter   Medication Dose Route Frequency Provider Last Rate Last Admin    0.9%  NaCl infusion   Intravenous Continuous Rakesh Joel MD   Stopped at 02/14/22 0929    0.9%  NaCl infusion   Intravenous Continuous Allison Kong, CAL        cyclopentolate 1% ophthalmic solution 1 drop  1 drop Left Eye On Call Procedure Rakesh Joel MD   1 drop at 03/10/20 1100    mupirocin 2 % ointment   Nasal On Call Procedure Allison Kong NP   Given at 02/14/22 0800    phenylephrine HCL 2.5% ophthalmic solution 1 drop  1 drop Left Eye On Call Procedure Rakesh Joel MD   1 drop at 03/10/20 1059    proparacaine 0.5 % ophthalmic solution 1 drop  1 drop Left Eye On Call Procedure Rakesh Joel MD   1 drop at 03/10/20 1046     Nutrition Education    Type and Reason for Visit:      Patient stated he tries to follow diet, but doesn't seem to matter he is in the hospital every 3 months. Pt states his mother does the cooking, he doesn't think she uses salt, he uses Mrs. Godinez at the table. States he only eats one meal a day. He does drink beer, but not every day as he previously did. Reviewed fluid restriction regarding the amount of water he if drinking. Pt agreed he knows what to do, just finds difficult to do. About 10 minutes spent with pt. · Verbally reviewed following information with patien. · Written educational materials provided. · Contact name and number provided.       Electronically signed by Anibal Bonilla RD, LD on 6/3/19 at 3:25 PM    Contact Number: [phone] tropicamide 1% ophthalmic solution 1 drop  1 drop Left Eye On Call Procedure Rakesh Joel MD   1 drop at 03/10/20 1100     Current Outpatient Medications on File Prior to Encounter   Medication Sig Dispense Refill    albuterol (PROVENTIL HFA) 90 mcg/actuation inhaler Inhale 2 puffs into the lungs every 4 (four) hours as needed for Wheezing or Shortness of Breath. 18 g 1    amLODIPine (NORVASC) 10 MG tablet TAKE 1 TABLET BY MOUTH EVERY DAY (Patient taking differently: Take 10 mg by mouth once daily.) 90 tablet 3    aspirin (ECOTRIN) 81 MG EC tablet Take 1 tablet (81 mg total) by mouth 2 (two) times a day. 60 tablet 11    carvediloL (COREG) 12.5 MG tablet Take 12.5 mg by mouth 2 (two) times daily.      fluticasone furoate-vilanteroL (BREO) 100-25 mcg/dose diskus inhaler Inhale 1 puff into the lungs once daily. Controller 180 each 2    fluticasone propionate (FLONASE) 50 mcg/actuation nasal spray 1 spray (50 mcg total) by Each Nostril route daily as needed for Rhinitis. 48 mL 3    ipratropium (ATROVENT) 21 mcg (0.03 %) nasal spray by Each Nostril route.      meloxicam (MOBIC) 7.5 MG tablet Take 1 tablet (7.5 mg total) by mouth daily as needed for Pain (headache). 30 tablet 0    methocarbamoL (ROBAXIN) 500 MG Tab Take 1 tablet (500 mg total) by mouth 3 (three) times daily as needed (muscle spasm, pain). 30 tablet 0    oxyCODONE (ROXICODONE) 5 MG immediate release tablet Take 1 tablet (5 mg total) by mouth every 8 (eight) hours as needed for Pain (severe pain). 21 tablet 0    polyethylene glycol (GLYCOLAX) 17 gram PwPk Take 17 g by mouth 2 (two) times daily. (Patient not taking: Reported on 3/6/2024) 60 packet 3    pregabalin (LYRICA) 25 MG capsule Take 25mg daily x 1 week --> 25mg twice daily x 1 week --> 25mg thrice daily (Patient taking differently: 25 mg 2 (two) times daily.) 60 capsule 6    pulse oximeter (PULSE OXIMETER) device by Apply Externally route 2 (two) times a day. Use twice daily at 8 AM and 3  PM and record the value in MyChart as directed. 1 each 0    rosuvastatin (CRESTOR) 20 MG tablet TAKE 1 TABLET BY MOUTH EVERY DAY (Patient taking differently: Take 20 mg by mouth once daily.) 90 tablet 3    tamsulosin (FLOMAX) 0.4 mg Cap Take 1 capsule (0.4 mg total) by mouth once daily. 90 capsule 3    valsartan (DIOVAN) 160 MG tablet TAKE 1 TABLET BY MOUTH ONCE DAILY. (Patient taking differently: Take 160 mg by mouth once daily.) 90 tablet 3       Past History   As per HPI and below:  Past Medical History:   Diagnosis Date    Anxiety     AR (allergic rhinitis) 4/14/2014    Benign hypertension     BPH (benign prostatic hypertrophy)     Depression     Erectile dysfunction     Nuclear sclerosis of both eyes 2/17/2020     Past Surgical History:   Procedure Laterality Date    APPLICATION, EXTERNAL FIXATION DEVICE Left 3/7/2024    Procedure: APPLICATION, EXTERNAL FIXATION DEVICE;  Surgeon: William Mckee MD;  Location: [facility] OR;  Service: Orthopedics;  Laterality: Left;  ankle, madie, bone foam, big C    CATARACT EXTRACTION W/  INTRAOCULAR LENS IMPLANT Left 03/10/2020    Dr. Joel    COLONOSCOPY N/A 4/25/2017    Procedure: COLONOSCOPY;  Surgeon: DENA Gomez MD;  Location: [facility] ENDO (4TH FLR);  Service: Endoscopy;  Laterality: N/A;    EPIDURAL STEROID INJECTION N/A 12/14/2022    Procedure: INJECTION, STEROID, EPIDURAL, L3-L4 CONTRAST DIRECT REF;  Surgeon: Toni Hall DO;  [facility];  Service: Pain Management;  Laterality: N/A;    EYE SURGERY  2020    catract removal    INTRAOCULAR PROSTHESES INSERTION Left 3/10/2020    Procedure: INSERTION, IOL PROSTHESIS;  Surgeon: Rakesh Joel MD;  [facility];  Service: Ophthalmology;  Laterality: Left;    none      PHACOEMULSIFICATION OF CATARACT Left 3/10/2020    Procedure: PHACOEMULSIFICATION, CATARACT;  Surgeon: Rakesh Joel MD;  [address]  Service: Ophthalmology;  Laterality: Left;     RECTAL FISTULOTOMY N/A 2/14/2022    Procedure: FISTULOTOMY, RECTUM;  Surgeon: AYLA Galloway MD;  Location: [facility] OR [address] [place] [address];  Service: Colon and Rectal;  Laterality: N/A;       Social History     Socioeconomic History    Marital status:     Number of children: 2   Occupational History    Occupation:      Employer: [employer]    Tobacco Use    Smoking status: Former     Current packs/day: 0.00     Average packs/day: 0.1 packs/day for 32.0 years (3.2 ttl pk-yrs)     Types: Cigarettes     Start date: 8/31/2001     Quit date: 1/8/2021     Years since quitting: 3.2    Smokeless tobacco: Never    Tobacco comments:     1 pack last 1 week   Substance and Sexual Activity    Alcohol use: Yes     Alcohol/week: 2.0 standard drinks of alcohol     Types: 2 Cans of beer per week     Comment: weekends    Drug use: Never    Sexual activity: Yes     Partners: Female     Birth control/protection: None     Comment:    Social History Narrative    The patient does not exercise regularly ().    Rates diet as fair to poor.    He is satisfied with weight.             Social Determinants of Health     Financial Resource Strain: Low Risk  (12/7/2023)    Overall Financial Resource Strain (CARDIA)     Difficulty of Paying Living Expenses: Not hard at all   Food Insecurity: No Food Insecurity (12/7/2023)    Hunger Vital Sign     Worried About Running Out of Food in the Last Year: Never true     Ran Out of Food in the Last Year: Never true   Transportation Needs: No Transportation Needs (12/7/2023)    PRAPARE - Transportation     Lack of Transportation (Medical): No     Lack of Transportation (Non-Medical): No   Physical Activity: Insufficiently Active (12/7/2023)    Exercise Vital Sign     Days of Exercise per Week: 3 days     Minutes of Exercise per Session: 30 min   Stress: Stress Concern Present (12/7/2023)    [employer] - Occupational Stress Questionnaire     Feeling of  "Stress : Rather much   Social Connections: Unknown (3/7/2024)    Social Connection and Isolation Panel [NHANES]     Frequency of Communication with Friends and Family: More than three times a week     Frequency of Social Gatherings with Friends and Family: Once a week     Active Member of Clubs or Organizations: Yes     Attends Club or Organization Meetings: More than 4 times per year     Marital Status:    Housing Stability: Low Risk  (12/7/2023)    Housing Stability Vital Sign     Unable to Pay for Housing in the Last Year: No     Number of Places Lived in the Last Year: 1     Unstable Housing in the Last Year: No       Family History   Problem Relation Age of Onset    Diabetes Father     Hypertension Father         none    No Known Problems Mother     Hypertension Brother         none    Diabetes Brother     Colon cancer Neg Hx     Prostate cancer Neg Hx     Cancer Neg Hx     Stroke Neg Hx     Hyperlipidemia Neg Hx     Heart disease Neg Hx        Physical Exam     Vitals:    03/29/24 1236 03/29/24 1412 03/29/24 1430 03/29/24 1730   BP: 134/77  139/77 (!) 142/87   Pulse: 101  79 87   Resp: 18  15 20   Temp: 98 °F (36.7 °C) 98.3 °F (36.8 °C) 97.5 °F (36.4 °C) 98 °F (36.7 °C)   TempSrc: Oral      SpO2: 97%  99% 99%   Weight: 86.2 kg (190 lb)      Height: 5' 10" (1.778 m)          Physical Exam  Constitutional:       General: He is not in acute distress.     Appearance: He is well-developed. He is not toxic-appearing or diaphoretic.   HENT:      Head: Normocephalic and atraumatic.      Nose: Nose normal. No congestion.      Mouth/Throat:      Mouth: Mucous membranes are moist.      Pharynx: Oropharynx is clear.   Eyes:      Extraocular Movements: Extraocular movements intact.      Pupils: Pupils are equal, round, and reactive to light.   Cardiovascular:      Rate and Rhythm: Normal rate and regular rhythm.   Pulmonary:      Effort: No respiratory distress.      Breath sounds: No wheezing, rhonchi or rales. "   Abdominal:      General: There is no distension.      Tenderness: There is no abdominal tenderness. There is no guarding.   Musculoskeletal:      Cervical back: No rigidity.      Comments: Ex fix in place 2 distal left lower extremity without surrounding erythema, or edema.  No right lower extremity swelling or tenderness to palpation.   Skin:     General: Skin is warm and dry.      Capillary Refill: Capillary refill takes less than 2 seconds.   Neurological:      General: No focal deficit present.      Mental Status: He is alert and oriented to person, place, and time.             Results     Labs Reviewed   CBC W/ AUTO DIFFERENTIAL - Abnormal; Notable for the following components:       Result Value    RBC 4.40 (*)     Hemoglobin 12.5 (*)     Hematocrit 39.0 (*)     Platelets 469 (*)     All other components within normal limits   BASIC METABOLIC PANEL - Abnormal; Notable for the following components:    CO2 22 (*)     All other components within normal limits   HIV 1 / 2 ANTIBODY    Narrative:     Release to patient->Immediate   HEPATITIS C ANTIBODY    Narrative:     Release to patient->Immediate   TROPONIN I       Imaging Results              US Lower Extremity Veins Left (Final result)  Result time 03/29/24 16:52:15      Final result by Homer Culver Jr., MD (03/29/24 16:52:15)                   Impression:      No evidence of deep venous thrombosis in the evaluated left lower extremity.    Unable to evaluate calf veins due to Ex fix.    Electronically signed by resident: Pearl Miranda  Date:    03/29/2024  Time:    16:35    Electronically signed by: Homer Friedman Jr  Date:    03/29/2024  Time:    16:52               Narrative:    EXAMINATION:  US LOWER EXTREMITY VEINS LEFT    CLINICAL HISTORY:  Tachycardia, unspecified    TECHNIQUE:  Duplex and color flow Doppler evaluation and graded compression of the left lower extremity veins was performed.    COMPARISON:  None    FINDINGS:  Left thigh veins:  The common femoral, femoral, popliteal, and upper greater saphenous veins are patent and free of thrombus. The veins are normally compressible and have normal phasic flow and augmentation response.    Left calf veins: Ex fix in place, unable to evaluate calf veins.    Contralateral CFV: The contralateral (right) common femoral vein is patent and free of thrombus.    Miscellaneous: None                                       CTA Chest Non-Coronary (PE Studies) (Final result)  Result time 03/29/24 15:23:08      Final result by Alexei Price MD (03/29/24 15:23:08)                   Impression:      No evidence of pulmonary embolism.    Interstitial opacities in the left lower lobe.  The findings may reflect segmental pulmonary edema or pneumonitis.  Suggest clinical correlation.      Electronically signed by: Alexei Price MD  Date:    03/29/2024  Time:    15:23               Narrative:    EXAMINATION:  CTA CHEST NON CORONARY (PE STUDIES)    CLINICAL HISTORY:  Pulmonary embolism (PE) suspected, high prob;    TECHNIQUE:  Low dose axial images, sagittal and coronal reformations were obtained from the thoracic inlet to the lung bases following the IV administration of 100 mL of Omnipaque 350.  Contrast timing was optimized to evaluate the pulmonary arteries.  MIP images were performed.    COMPARISON:  Chest x-ray dated 03/06/2024.    CT scan of the chest dated 08/04/2022.    FINDINGS:  CT pulmonary angiogram examination:    There are no filling defects within the pulmonary tree to suggest pulmonary embolism.  The pulmonary trunk does not appear dilated.    CT chest:    The base of the neck is within normal limits.  The thyroid gland is unremarkable.  The supraclavicular regions are within normal limits.    The trachea is unremarkable.  The central airways are within normal limits.  There is no evidence of bronchiectasis.  No endobronchial lesion is identified.    The heart is unremarkable.  There are no pericardial  effusions.  There is no evidence of intracardiac thrombus.    The thoracic aorta is normal in caliber.  There are is minimal eccentric plaque along the course of the descending thoracic aorta.  There is no intimal flap.  There is a normal 3 vessel aortic arch.    There are subcentimeter mediastinal and hilar lymph nodes.  The axillary regions are within normal limits.    There is trace bilateral pleural thickening.  No significant pleural effusions identified.  There is no evidence of a pneumothorax.  There is no evidence of pneumomediastinum.    There are some motion limitations to the examination.  No discrete pulmonary nodules identified.  There are interstitial opacities in the left lower lobe.  There is no focal consolidation.    The esophagus is unremarkable.  The visualized upper abdominal structures are within normal limits.  There is no evidence of free air in the upper abdomen.    The chest wall is unremarkable.  There are degenerative changes in the osseous structures.  There is no evidence of a fracture.                                            Initial MDM   Medical Decision Making  Patient is a 59-year-old male presenting with tachycardia and reported wheezes in the left base.  Exam at bedside is very reassuring.  Given his current immobilize status, most concerning for possible DVT/PE.  Will get ultrasound and CTA PE study to further evaluate.  Very low suspicion for pneumonia.  Very low suspicion for arrhythmia, ACS, electrolyte abnormality.  No reported history of asthma or COPD.    Amount and/or Complexity of Data Reviewed  Labs: ordered. Decision-making details documented in ED Course.  Radiology: ordered. Decision-making details documented in ED Course.  ECG/medicine tests:  Decision-making details documented in ED Course.    Risk  Prescription drug management.               Medications Given / Interventions     Medications   iohexoL (OMNIPAQUE 350) injection 100 mL (100 mLs Intravenous Given  3/29/24 1435)       Procedures     ED POCUS Performed: No    Reassessment and ED Course     ED Course as of 03/29/24 2017   Fri Mar 29, 2024   1353 CBC, CMP grossly okay [CH]   1353 EKG 12-lead  Normal sinus rhythm with a rate of 95, nonspecific T-wave changes [CH]   1531 CTA Chest Non-Coronary (PE Studies)  No evidence of pulmonary embolism.     Interstitial opacities in the left lower lobe.  The findings may reflect segmental pulmonary edema or pneumonitis.  Suggest clinical correlation.      [CH]   1531 Troponin I: <0.006 [CH]   1711 Ultrasound shows no evidence of DVT  [CH]   1711 Discussed results with the patient.  Discussed need for outpatient follow up with PCP and orthopedic surgery.  Discussed return precautions.  DC to home. [CH]      ED Course User Index  [CH] Lucero Anders MD              Final diagnoses:  [R00.0] Tachycardia           Dispo      ED Disposition Condition    Discharge Stable            ED Prescriptions    None       Follow-up Information       Follow up With Specialties Details Why Contact Info    Marcus Sanz MD Internal Medicine Schedule an appointment as soon as possible for a visit in 1 week  [address] [facility] [address]  [phone]                          Lucero Anders MD  03/29/24 2017

## (undated) DEVICE — GLOVE SURG SZ 7 L12IN FNGR THK87MIL WHT LTX FREE

## (undated) DEVICE — SHEET, T, LAPAROTOMY, STERILE: Brand: MEDLINE

## (undated) DEVICE — SUTURE VCRL SZ 2-0 L18IN ABSRB UD POLYGLACTIN 910 BRAID TIE J111T

## (undated) DEVICE — SUTURE PERMAHAND SZ 3-0 L18IN NONABSORBABLE BLK L26MM SH C013D

## (undated) DEVICE — GARMENT COMPR STD FOR 17IN CALF UNIF THER FLOTRN

## (undated) DEVICE — SUTURE PDS II SZ 2-0 L27IN ABSRB VLT L26MM CT-2 1/2 CIR Z333H

## (undated) DEVICE — GOWN SIRUS NONREIN XL W/TWL: Brand: MEDLINE INDUSTRIES, INC.

## (undated) DEVICE — SUTURE VCRL SZ 3-0 L18IN ABSRB UD L26MM SH 1/2 CIR J864D

## (undated) DEVICE — ELECTRODE ES L65IN DIA3MM S STL BLDE MPLR OPN APPRCH EZ TO

## (undated) DEVICE — RELOAD STPL L75MM OPN H3.8MM CLS 1.5MM WIRE DIA0.2MM REG

## (undated) DEVICE — SPONGE GZ W4XL4IN COT 12 PLY TYP VII WVN C FLD DSGN

## (undated) DEVICE — ELECTRODE PT RET AD L9FT HI MOIST COND ADH HYDRGEL CORDED

## (undated) DEVICE — STAPLER INT L75MM CUT LN L73MM STPL LN L77MM BLU B FRM 8

## (undated) DEVICE — SUTURE PDS II SZ 1 L96IN ABSRB VLT TP-1 L65MM 1/2 CIR Z880G

## (undated) DEVICE — STAPLER SKIN H3.9MM WIRE DIA0.58MM CRWN 6.9MM 35 STPL ROT

## (undated) DEVICE — COVER LT HNDL BLU PLAS

## (undated) DEVICE — ENDOSCOPY KIT: Brand: MEDLINE INDUSTRIES, INC.

## (undated) DEVICE — DRAPE,UTILITY,TAPE,15X26,STERILE: Brand: MEDLINE

## (undated) DEVICE — KIT NEG PRSS FOR NONLIN OR UPTO 90CM LIN INCIS PREVENA +

## (undated) DEVICE — KIT OR ROOM TURNOVER W/STRAP

## (undated) DEVICE — BITE BLK 60FR GRN ENDOSCP AD W STRP SLD DISPOSABLE

## (undated) DEVICE — SPONGE LAP W18XL18IN WHT COT 4 PLY FLD STRUNG RADPQ DISP ST

## (undated) DEVICE — STAPLER SKIN H3.9MM WIRE DIA0.58MM CRWN 6.9MM 35 STPL FIX

## (undated) DEVICE — SOLUTION IV IRRIG POUR BRL 0.9% SODIUM CHL 2F7124

## (undated) DEVICE — CHLORAPREP 26ML ORANGE

## (undated) DEVICE — SUTURE VCRL SZ 3-0 L27IN ABSRB UD L26MM SH 1/2 CIR J416H

## (undated) DEVICE — SEALER ENDOSCP NANO COAT OPN DIV CRV L JAW LIGASURE IMPACT

## (undated) DEVICE — AGENT HEMSTAT 3GM OXIDIZED REGENERATED CELOS ABSRB FOR CONT (ORDER MULTIPLES OF 5EA)

## (undated) DEVICE — SUTURE ABSORBABLE BRAIDED 2-0 CT-1 27 IN UD VICRYL J259H

## (undated) DEVICE — DUAL LUMEN STOMACH TUBE: Brand: SALEM SUMP

## (undated) DEVICE — RELOAD STPL L100MM OPN H3.8MM CLS H1.5MM WIRE DIA0.2MM BLU

## (undated) DEVICE — MAJOR SET UP PK